# Patient Record
Sex: MALE | Race: WHITE | NOT HISPANIC OR LATINO | Employment: OTHER | URBAN - METROPOLITAN AREA
[De-identification: names, ages, dates, MRNs, and addresses within clinical notes are randomized per-mention and may not be internally consistent; named-entity substitution may affect disease eponyms.]

---

## 2021-08-18 ENCOUNTER — HISTORICAL (OUTPATIENT)
Dept: ADMINISTRATIVE | Facility: HOSPITAL | Age: 71
End: 2021-08-18

## 2022-04-10 ENCOUNTER — HISTORICAL (OUTPATIENT)
Dept: ADMINISTRATIVE | Facility: HOSPITAL | Age: 72
End: 2022-04-10

## 2022-04-27 VITALS
SYSTOLIC BLOOD PRESSURE: 164 MMHG | HEIGHT: 71 IN | BODY MASS INDEX: 34.32 KG/M2 | WEIGHT: 245.13 LBS | DIASTOLIC BLOOD PRESSURE: 96 MMHG | OXYGEN SATURATION: 98 %

## 2022-06-20 ENCOUNTER — HOSPITAL ENCOUNTER (OUTPATIENT)
Dept: RADIOLOGY | Facility: HOSPITAL | Age: 72
Discharge: HOME | End: 2022-06-20
Attending: FAMILY MEDICINE
Payer: MEDICARE

## 2022-06-20 DIAGNOSIS — N50.819 TESTICULAR PAIN: ICD-10-CM

## 2022-06-20 PROCEDURE — 76870 US EXAM SCROTUM: CPT | Mod: TC

## 2023-01-09 ENCOUNTER — HOSPITAL ENCOUNTER (OUTPATIENT)
Dept: RADIOLOGY | Facility: HOSPITAL | Age: 73
Discharge: HOME | End: 2023-01-09
Attending: FAMILY MEDICINE
Payer: MEDICARE

## 2023-01-09 DIAGNOSIS — J06.9 ACUTE UPPER RESPIRATORY INFECTION: ICD-10-CM

## 2023-01-09 PROCEDURE — 71046 X-RAY EXAM CHEST 2 VIEWS: CPT | Mod: TC

## 2023-05-10 DIAGNOSIS — M54.59 OTHER LOW BACK PAIN: Primary | ICD-10-CM

## 2023-05-17 ENCOUNTER — HOSPITAL ENCOUNTER (OUTPATIENT)
Dept: RADIOLOGY | Facility: HOSPITAL | Age: 73
Discharge: HOME | End: 2023-05-17
Attending: FAMILY MEDICINE
Payer: MEDICARE

## 2023-05-17 DIAGNOSIS — M54.59 OTHER LOW BACK PAIN: ICD-10-CM

## 2023-05-17 PROCEDURE — 72148 MRI LUMBAR SPINE W/O DYE: CPT | Mod: TC

## 2023-06-07 ENCOUNTER — HOSPITAL ENCOUNTER (INPATIENT)
Facility: HOSPITAL | Age: 73
LOS: 12 days | Discharge: HOME HEALTH | DRG: 640 | End: 2023-06-19
Attending: STUDENT IN AN ORGANIZED HEALTH CARE EDUCATION/TRAINING PROGRAM | Admitting: INTERNAL MEDICINE
Payer: MEDICARE

## 2023-06-07 DIAGNOSIS — E88.09 HYPOALBUMINEMIA: ICD-10-CM

## 2023-06-07 DIAGNOSIS — R00.1 BRADYCARDIA: ICD-10-CM

## 2023-06-07 DIAGNOSIS — E04.1 THYROID NODULE: ICD-10-CM

## 2023-06-07 DIAGNOSIS — N17.9 AKI (ACUTE KIDNEY INJURY): ICD-10-CM

## 2023-06-07 DIAGNOSIS — E83.52 HYPERCALCEMIA: ICD-10-CM

## 2023-06-07 DIAGNOSIS — R41.0 CONFUSION: ICD-10-CM

## 2023-06-07 DIAGNOSIS — R53.1 WEAKNESS: ICD-10-CM

## 2023-06-07 LAB
ALBUMIN SERPL-MCNC: 1.9 G/DL (ref 3.4–4.8)
ALBUMIN/GLOB SERPL: 0.2 RATIO (ref 1.1–2)
ALP SERPL-CCNC: 69 UNIT/L (ref 40–150)
ALT SERPL-CCNC: 11 UNIT/L (ref 0–55)
AMPHET UR QL SCN: NEGATIVE
ANION GAP SERPL CALC-SCNC: 10 MEQ/L
APPEARANCE UR: CLEAR
APTT PPP: 45.8 SECONDS (ref 23.2–33.7)
AST SERPL-CCNC: 31 UNIT/L (ref 5–34)
BACTERIA #/AREA URNS AUTO: ABNORMAL /HPF
BARBITURATE SCN PRESENT UR: NEGATIVE
BASOPHILS # BLD AUTO: 0.03 X10(3)/MCL
BASOPHILS NFR BLD AUTO: 0.4 %
BENZODIAZ UR QL SCN: NEGATIVE
BILIRUB UR QL STRIP.AUTO: NEGATIVE MG/DL
BILIRUBIN DIRECT+TOT PNL SERPL-MCNC: 0.7 MG/DL
BUN SERPL-MCNC: 22 MG/DL (ref 8.4–25.7)
BUN SERPL-MCNC: 23 MG/DL (ref 8.4–25.7)
CALCIUM SERPL-MCNC: 12.6 MG/DL (ref 8.8–10)
CALCIUM SERPL-MCNC: 13.4 MG/DL (ref 8.8–10)
CANNABINOIDS UR QL SCN: NEGATIVE
CHLORIDE SERPL-SCNC: 97 MMOL/L (ref 98–107)
CHLORIDE SERPL-SCNC: 99 MMOL/L (ref 98–107)
CO2 SERPL-SCNC: 26 MMOL/L (ref 23–31)
CO2 SERPL-SCNC: 28 MMOL/L (ref 23–31)
COCAINE UR QL SCN: NEGATIVE
COLOR UR: YELLOW
CREAT SERPL-MCNC: 2.24 MG/DL (ref 0.73–1.18)
CREAT SERPL-MCNC: 2.31 MG/DL (ref 0.73–1.18)
CREAT/UREA NIT SERPL: 10
EOSINOPHIL # BLD AUTO: 0.2 X10(3)/MCL (ref 0–0.9)
EOSINOPHIL NFR BLD AUTO: 2.6 %
ERYTHROCYTE [DISTWIDTH] IN BLOOD BY AUTOMATED COUNT: 14.4 % (ref 11.5–17)
ETHANOL SERPL-MCNC: <10 MG/DL
FENTANYL UR QL SCN: NEGATIVE
GFR SERPLBLD CREATININE-BSD FMLA CKD-EPI: 29 MLS/MIN/1.73/M2
GFR SERPLBLD CREATININE-BSD FMLA CKD-EPI: 30 MLS/MIN/1.73/M2
GLOBULIN SER-MCNC: 10.7 GM/DL (ref 2.4–3.5)
GLUCOSE SERPL-MCNC: 125 MG/DL (ref 82–115)
GLUCOSE SERPL-MCNC: 165 MG/DL (ref 82–115)
GLUCOSE UR QL STRIP.AUTO: NEGATIVE MG/DL
HCT VFR BLD AUTO: 34.3 % (ref 42–52)
HGB BLD-MCNC: 11.6 G/DL (ref 14–18)
HYALINE CASTS URNS QL MICRO: ABNORMAL /LPF
IMM GRANULOCYTES # BLD AUTO: 0.07 X10(3)/MCL (ref 0–0.04)
IMM GRANULOCYTES NFR BLD AUTO: 0.9 %
INR BLD: 1.75 (ref 0–1.3)
KETONES UR QL STRIP.AUTO: NEGATIVE MG/DL
LEUKOCYTE ESTERASE UR QL STRIP.AUTO: NEGATIVE UNIT/L
LYMPHOCYTES # BLD AUTO: 2.99 X10(3)/MCL (ref 0.6–4.6)
LYMPHOCYTES NFR BLD AUTO: 38.2 %
MAGNESIUM SERPL-MCNC: 2.1 MG/DL (ref 1.6–2.6)
MCH RBC QN AUTO: 32.4 PG (ref 27–31)
MCHC RBC AUTO-ENTMCNC: 33.8 G/DL (ref 33–36)
MCV RBC AUTO: 95.8 FL (ref 80–94)
MDMA UR QL SCN: NEGATIVE
MONOCYTES # BLD AUTO: 0.79 X10(3)/MCL (ref 0.1–1.3)
MONOCYTES NFR BLD AUTO: 10.1 %
NEUTROPHILS # BLD AUTO: 3.75 X10(3)/MCL (ref 2.1–9.2)
NEUTROPHILS NFR BLD AUTO: 47.8 %
NITRITE UR QL STRIP.AUTO: NEGATIVE
OPIATES UR QL SCN: NEGATIVE
PCP UR QL: NEGATIVE
PH UR STRIP.AUTO: 5.5 [PH]
PH UR: 5.5 [PH] (ref 3–11)
PLATELET # BLD AUTO: 187 X10(3)/MCL (ref 130–400)
PMV BLD AUTO: 9.3 FL (ref 7.4–10.4)
POCT GLUCOSE: 153 MG/DL (ref 70–110)
POTASSIUM SERPL-SCNC: 3.2 MMOL/L (ref 3.5–5.1)
POTASSIUM SERPL-SCNC: 3.6 MMOL/L (ref 3.5–5.1)
PROT SERPL-MCNC: 12.6 GM/DL (ref 5.8–7.6)
PROT UR QL STRIP.AUTO: ABNORMAL MG/DL
PROTHROMBIN TIME: 20.7 SECONDS (ref 12.5–14.5)
RBC # BLD AUTO: 3.58 X10(6)/MCL (ref 4.7–6.1)
RBC #/AREA URNS AUTO: ABNORMAL /HPF
RBC UR QL AUTO: ABNORMAL UNIT/L
SODIUM SERPL-SCNC: 135 MMOL/L (ref 136–145)
SODIUM SERPL-SCNC: 137 MMOL/L (ref 136–145)
SP GR UR STRIP.AUTO: 1.02
SQUAMOUS #/AREA URNS AUTO: ABNORMAL /HPF
TROPONIN I SERPL-MCNC: 0.02 NG/ML (ref 0–0.04)
TSH SERPL-ACNC: 3.65 UIU/ML (ref 0.35–4.94)
UROBILINOGEN UR STRIP-ACNC: 1 MG/DL
WBC # SPEC AUTO: 7.83 X10(3)/MCL (ref 4.5–11.5)
WBC #/AREA URNS AUTO: ABNORMAL /HPF

## 2023-06-07 PROCEDURE — 25000003 PHARM REV CODE 250: Performed by: STUDENT IN AN ORGANIZED HEALTH CARE EDUCATION/TRAINING PROGRAM

## 2023-06-07 PROCEDURE — 83970 ASSAY OF PARATHORMONE: CPT | Performed by: INTERNAL MEDICINE

## 2023-06-07 PROCEDURE — 85025 COMPLETE CBC W/AUTO DIFF WBC: CPT | Performed by: STUDENT IN AN ORGANIZED HEALTH CARE EDUCATION/TRAINING PROGRAM

## 2023-06-07 PROCEDURE — 25000003 PHARM REV CODE 250: Performed by: INTERNAL MEDICINE

## 2023-06-07 PROCEDURE — 93010 EKG 12-LEAD: ICD-10-PCS | Mod: ,,, | Performed by: INTERNAL MEDICINE

## 2023-06-07 PROCEDURE — 82077 ASSAY SPEC XCP UR&BREATH IA: CPT | Performed by: STUDENT IN AN ORGANIZED HEALTH CARE EDUCATION/TRAINING PROGRAM

## 2023-06-07 PROCEDURE — 80307 DRUG TEST PRSMV CHEM ANLYZR: CPT | Performed by: STUDENT IN AN ORGANIZED HEALTH CARE EDUCATION/TRAINING PROGRAM

## 2023-06-07 PROCEDURE — 85730 THROMBOPLASTIN TIME PARTIAL: CPT | Performed by: STUDENT IN AN ORGANIZED HEALTH CARE EDUCATION/TRAINING PROGRAM

## 2023-06-07 PROCEDURE — 96361 HYDRATE IV INFUSION ADD-ON: CPT

## 2023-06-07 PROCEDURE — 63600175 PHARM REV CODE 636 W HCPCS: Performed by: INTERNAL MEDICINE

## 2023-06-07 PROCEDURE — 84484 ASSAY OF TROPONIN QUANT: CPT | Performed by: STUDENT IN AN ORGANIZED HEALTH CARE EDUCATION/TRAINING PROGRAM

## 2023-06-07 PROCEDURE — 99285 EMERGENCY DEPT VISIT HI MDM: CPT | Mod: 25

## 2023-06-07 PROCEDURE — 83735 ASSAY OF MAGNESIUM: CPT | Performed by: STUDENT IN AN ORGANIZED HEALTH CARE EDUCATION/TRAINING PROGRAM

## 2023-06-07 PROCEDURE — 93005 ELECTROCARDIOGRAM TRACING: CPT

## 2023-06-07 PROCEDURE — 81001 URINALYSIS AUTO W/SCOPE: CPT | Performed by: STUDENT IN AN ORGANIZED HEALTH CARE EDUCATION/TRAINING PROGRAM

## 2023-06-07 PROCEDURE — 82962 GLUCOSE BLOOD TEST: CPT

## 2023-06-07 PROCEDURE — 21400001 HC TELEMETRY ROOM

## 2023-06-07 PROCEDURE — 96375 TX/PRO/DX INJ NEW DRUG ADDON: CPT

## 2023-06-07 PROCEDURE — 84443 ASSAY THYROID STIM HORMONE: CPT | Performed by: STUDENT IN AN ORGANIZED HEALTH CARE EDUCATION/TRAINING PROGRAM

## 2023-06-07 PROCEDURE — 11000001 HC ACUTE MED/SURG PRIVATE ROOM

## 2023-06-07 PROCEDURE — 93010 ELECTROCARDIOGRAM REPORT: CPT | Mod: ,,, | Performed by: INTERNAL MEDICINE

## 2023-06-07 PROCEDURE — 85610 PROTHROMBIN TIME: CPT | Performed by: STUDENT IN AN ORGANIZED HEALTH CARE EDUCATION/TRAINING PROGRAM

## 2023-06-07 PROCEDURE — 82330 ASSAY OF CALCIUM: CPT | Performed by: INTERNAL MEDICINE

## 2023-06-07 PROCEDURE — 80053 COMPREHEN METABOLIC PANEL: CPT | Performed by: STUDENT IN AN ORGANIZED HEALTH CARE EDUCATION/TRAINING PROGRAM

## 2023-06-07 RX ORDER — FINASTERIDE 5 MG/1
5 TABLET, FILM COATED ORAL DAILY
Status: DISCONTINUED | OUTPATIENT
Start: 2023-06-08 | End: 2023-06-19 | Stop reason: HOSPADM

## 2023-06-07 RX ORDER — ATORVASTATIN CALCIUM 10 MG/1
10 TABLET, FILM COATED ORAL NIGHTLY
COMMUNITY
Start: 2023-05-17

## 2023-06-07 RX ORDER — FUROSEMIDE 10 MG/ML
20 INJECTION INTRAMUSCULAR; INTRAVENOUS
Status: DISCONTINUED | OUTPATIENT
Start: 2023-06-07 | End: 2023-06-08

## 2023-06-07 RX ORDER — METFORMIN HYDROCHLORIDE 500 MG/1
1000 TABLET, EXTENDED RELEASE ORAL 2 TIMES DAILY
Status: ON HOLD | COMMUNITY
Start: 2023-05-17 | End: 2023-06-19 | Stop reason: HOSPADM

## 2023-06-07 RX ORDER — CARVEDILOL 12.5 MG/1
12.5 TABLET ORAL 2 TIMES DAILY
COMMUNITY
Start: 2023-05-11

## 2023-06-07 RX ORDER — ACETAMINOPHEN 325 MG/1
650 TABLET ORAL
Status: COMPLETED | OUTPATIENT
Start: 2023-06-07 | End: 2023-06-07

## 2023-06-07 RX ORDER — FUROSEMIDE 10 MG/ML
40 INJECTION INTRAMUSCULAR; INTRAVENOUS ONCE
Status: COMPLETED | OUTPATIENT
Start: 2023-06-07 | End: 2023-06-07

## 2023-06-07 RX ORDER — SODIUM CHLORIDE 9 MG/ML
1000 INJECTION, SOLUTION INTRAVENOUS CONTINUOUS
Status: ACTIVE | OUTPATIENT
Start: 2023-06-07 | End: 2023-06-08

## 2023-06-07 RX ORDER — ALBUTEROL SULFATE 90 UG/1
2 AEROSOL, METERED RESPIRATORY (INHALATION)
COMMUNITY
Start: 2023-01-09

## 2023-06-07 RX ORDER — CLONIDINE HYDROCHLORIDE 0.2 MG/1
0.2 TABLET ORAL DAILY PRN
COMMUNITY
Start: 2023-05-17

## 2023-06-07 RX ORDER — CARVEDILOL 12.5 MG/1
12.5 TABLET ORAL 2 TIMES DAILY
Status: DISCONTINUED | OUTPATIENT
Start: 2023-06-08 | End: 2023-06-12

## 2023-06-07 RX ORDER — TALC
6 POWDER (GRAM) TOPICAL NIGHTLY PRN
Status: DISCONTINUED | OUTPATIENT
Start: 2023-06-07 | End: 2023-06-10

## 2023-06-07 RX ORDER — AMLODIPINE BESYLATE 5 MG/1
5 TABLET ORAL DAILY
Status: DISCONTINUED | OUTPATIENT
Start: 2023-06-08 | End: 2023-06-10

## 2023-06-07 RX ORDER — SPIRONOLACTONE 25 MG/1
12.5 TABLET ORAL EVERY MORNING
COMMUNITY
Start: 2023-05-17

## 2023-06-07 RX ORDER — ALBUTEROL SULFATE 0.83 MG/ML
2.5 SOLUTION RESPIRATORY (INHALATION) EVERY 6 HOURS PRN
Status: DISCONTINUED | OUTPATIENT
Start: 2023-06-08 | End: 2023-06-19 | Stop reason: HOSPADM

## 2023-06-07 RX ORDER — BLOOD SUGAR DIAGNOSTIC
STRIP MISCELLANEOUS
COMMUNITY
Start: 2023-04-26

## 2023-06-07 RX ORDER — AMOXICILLIN 250 MG
1 CAPSULE ORAL 2 TIMES DAILY
Status: DISCONTINUED | OUTPATIENT
Start: 2023-06-07 | End: 2023-06-19 | Stop reason: HOSPADM

## 2023-06-07 RX ORDER — FINASTERIDE 5 MG/1
5 TABLET, FILM COATED ORAL EVERY MORNING
COMMUNITY
Start: 2023-05-11

## 2023-06-07 RX ORDER — LEVOTHYROXINE SODIUM 112 UG/1
112 TABLET ORAL
COMMUNITY
Start: 2023-05-17

## 2023-06-07 RX ORDER — AMLODIPINE BESYLATE 5 MG/1
5 TABLET ORAL
Status: ON HOLD | COMMUNITY
Start: 2023-05-17 | End: 2023-06-19 | Stop reason: HOSPADM

## 2023-06-07 RX ORDER — SODIUM CHLORIDE 0.9 % (FLUSH) 0.9 %
10 SYRINGE (ML) INJECTION
Status: DISCONTINUED | OUTPATIENT
Start: 2023-06-07 | End: 2023-06-19 | Stop reason: HOSPADM

## 2023-06-07 RX ORDER — LEVOTHYROXINE SODIUM 112 UG/1
112 TABLET ORAL
Status: DISCONTINUED | OUTPATIENT
Start: 2023-06-08 | End: 2023-06-19 | Stop reason: HOSPADM

## 2023-06-07 RX ORDER — TAMSULOSIN HYDROCHLORIDE 0.4 MG/1
1 CAPSULE ORAL
COMMUNITY
Start: 2023-05-10 | End: 2023-11-01

## 2023-06-07 RX ORDER — CLONIDINE HYDROCHLORIDE 0.2 MG/1
0.2 TABLET ORAL
Status: COMPLETED | OUTPATIENT
Start: 2023-06-07 | End: 2023-06-07

## 2023-06-07 RX ORDER — ONDANSETRON 2 MG/ML
4 INJECTION INTRAMUSCULAR; INTRAVENOUS EVERY 8 HOURS PRN
Status: DISCONTINUED | OUTPATIENT
Start: 2023-06-07 | End: 2023-06-19 | Stop reason: HOSPADM

## 2023-06-07 RX ORDER — ACETAMINOPHEN 325 MG/1
650 TABLET ORAL EVERY 8 HOURS PRN
Status: DISCONTINUED | OUTPATIENT
Start: 2023-06-07 | End: 2023-06-09

## 2023-06-07 RX ORDER — TAMSULOSIN HYDROCHLORIDE 0.4 MG/1
1 CAPSULE ORAL DAILY
Status: DISCONTINUED | OUTPATIENT
Start: 2023-06-08 | End: 2023-06-19 | Stop reason: HOSPADM

## 2023-06-07 RX ORDER — FAMOTIDINE 20 MG/1
20 TABLET, FILM COATED ORAL DAILY
Status: DISCONTINUED | OUTPATIENT
Start: 2023-06-08 | End: 2023-06-19 | Stop reason: HOSPADM

## 2023-06-07 RX ORDER — GABAPENTIN 100 MG/1
100 CAPSULE ORAL DAILY PRN
COMMUNITY
Start: 2023-05-19

## 2023-06-07 RX ADMIN — CLONIDINE HYDROCHLORIDE 0.2 MG: 0.2 TABLET ORAL at 08:06

## 2023-06-07 RX ADMIN — FUROSEMIDE 40 MG: 10 INJECTION, SOLUTION INTRAMUSCULAR; INTRAVENOUS at 08:06

## 2023-06-07 RX ADMIN — SODIUM CHLORIDE 1000 ML: 9 INJECTION, SOLUTION INTRAVENOUS at 07:06

## 2023-06-07 RX ADMIN — ACETAMINOPHEN 325MG 650 MG: 325 TABLET ORAL at 05:06

## 2023-06-07 RX ADMIN — SODIUM CHLORIDE 1000 ML: 9 INJECTION, SOLUTION INTRAVENOUS at 06:06

## 2023-06-07 NOTE — ED PROVIDER NOTES
Encounter Date: 6/7/2023       History     Chief Complaint   Patient presents with    Altered Mental Status     Confusion and balance issues this morning   last seen normal 2 days ago     HPI    72-year-old male with a past medical history of hypertension, chronic back pain recently started on gabapentin, BPH, diabetes, hypothyroidism presents emergency department because his son was concerned that he had some confusion and balance issues this morning.  Patient states that he feels completely fine but took some gabapentin which was started a few days ago and it made him feel very groggy.  The patient's son states he sounded drunk this morning.  Son states that everything has resolved.  He states that his mother had similar symptoms when she took gabapentin.  Patient states he is on gabapentin for chronic back pain which worsened over last few weeks.  States the pain is tolerable.  No falls.    Review of patient's allergies indicates:   Allergen Reactions    Amoxicillin-pot clavulanate Itching     No past medical history on file.  No past surgical history on file.  No family history on file.     Review of Systems   Constitutional:  Negative for fever.   Respiratory:  Negative for cough and shortness of breath.    Cardiovascular:  Negative for chest pain.   Gastrointestinal:  Negative for abdominal pain.   Psychiatric/Behavioral:  Positive for confusion.    All other systems reviewed and are negative.    Physical Exam     Initial Vitals [06/07/23 1707]   BP Pulse Resp Temp SpO2   (!) 207/95 75 16 98.2 °F (36.8 °C) (!) 94 %      MAP       --         Physical Exam    Nursing note and vitals reviewed.  Constitutional: He appears well-developed and well-nourished. No distress.   Cardiovascular:  Normal rate and regular rhythm.           Pulmonary/Chest: Breath sounds normal. No respiratory distress.   Abdominal: Abdomen is soft. There is no abdominal tenderness. There is no rebound and no guarding.   Musculoskeletal:          General: No tenderness. Normal range of motion.     Neurological: He is alert and oriented to person, place, and time. He has normal strength. No cranial nerve deficit or sensory deficit. GCS score is 15. GCS eye subscore is 4. GCS verbal subscore is 5. GCS motor subscore is 6.   Skin: Skin is warm. Capillary refill takes less than 2 seconds.   Psychiatric: He has a normal mood and affect. Thought content normal.       ED Course   Procedures  Admission on 06/07/2023   Component Date Value Ref Range Status    PTT 06/07/2023 45.8 (H)  23.2 - 33.7 seconds Final    Sodium Level 06/07/2023 135 (L)  136 - 145 mmol/L Final    Potassium Level 06/07/2023 3.6  3.5 - 5.1 mmol/L Final    Chloride 06/07/2023 97 (L)  98 - 107 mmol/L Final    Carbon Dioxide 06/07/2023 26  23 - 31 mmol/L Final    Glucose Level 06/07/2023 165 (H)  82 - 115 mg/dL Final    Blood Urea Nitrogen 06/07/2023 23.0  8.4 - 25.7 mg/dL Final    Creatinine 06/07/2023 2.31 (H)  0.73 - 1.18 mg/dL Final    Calcium Level Total 06/07/2023 13.4 (HH)  8.8 - 10.0 mg/dL Final    Protein Total 06/07/2023 12.6 (H)  5.8 - 7.6 gm/dL Final    Albumin Level 06/07/2023 1.9 (L)  3.4 - 4.8 g/dL Final    Globulin 06/07/2023 10.7 (H)  2.4 - 3.5 gm/dL Final    Albumin/Globulin Ratio 06/07/2023 0.2 (L)  1.1 - 2.0 ratio Final    Bilirubin Total 06/07/2023 0.7  <=1.5 mg/dL Final    Alkaline Phosphatase 06/07/2023 69  40 - 150 unit/L Final    Alanine Aminotransferase 06/07/2023 11  0 - 55 unit/L Final    Aspartate Aminotransferase 06/07/2023 31  5 - 34 unit/L Final    eGFR 06/07/2023 29  mls/min/1.73/m2 Final    Amphetamines, Urine 06/07/2023 Negative  Negative Final    Barbituates, Urine 06/07/2023 Negative  Negative Final    Benzodiazepine, Urine 06/07/2023 Negative  Negative Final    Cannabinoids, Urine 06/07/2023 Negative  Negative Final    Cocaine, Urine 06/07/2023 Negative  Negative Final    Fentanyl, Urine 06/07/2023 Negative  Negative Final    MDMA, Urine 06/07/2023  Negative  Negative Final    Opiates, Urine 06/07/2023 Negative  Negative Final    Phencyclidine, Urine 06/07/2023 Negative  Negative Final    pH, Urine 06/07/2023 5.5  3.0 - 11.0 Final    Ethanol Level 06/07/2023 <10.0  <=10.0 mg/dL Final    Magnesium Level 06/07/2023 2.10  1.60 - 2.60 mg/dL Final    PT 06/07/2023 20.7 (H)  12.5 - 14.5 seconds Final    INR 06/07/2023 1.75 (H)  0.00 - 1.30 Final    Troponin-I 06/07/2023 0.023  0.000 - 0.045 ng/mL Final    Thyroid Stimulating Hormone 06/07/2023 3.649  0.350 - 4.940 uIU/mL Final    Color, UA 06/07/2023 Yellow  Yellow, Light-Yellow, Dark Yellow, Evelin, Straw Final    Appearance, UA 06/07/2023 Clear  Clear Final    Specific Gravity, UA 06/07/2023 1.025   Final    pH, UA 06/07/2023 5.5  5.0 - 8.5 Final    Protein, UA 06/07/2023 3+ (A)  Negative mg/dL Final    Glucose, UA 06/07/2023 Negative  Negative, Normal mg/dL Final    Ketones, UA 06/07/2023 Negative  Negative mg/dL Final    Blood, UA 06/07/2023 2+ (A)  Negative unit/L Final    Bilirubin, UA 06/07/2023 Negative  Negative mg/dL Final    Urobilinogen, UA 06/07/2023 1.0  0.2, 1.0, Normal mg/dL Final    Nitrites, UA 06/07/2023 Negative  Negative Final    Leukocyte Esterase, UA 06/07/2023 Negative  Negative unit/L Final    WBC 06/07/2023 7.83  4.50 - 11.50 x10(3)/mcL Final    RBC 06/07/2023 3.58 (L)  4.70 - 6.10 x10(6)/mcL Final    Hgb 06/07/2023 11.6 (L)  14.0 - 18.0 g/dL Final    Hct 06/07/2023 34.3 (L)  42.0 - 52.0 % Final    MCV 06/07/2023 95.8 (H)  80.0 - 94.0 fL Final    MCH 06/07/2023 32.4 (H)  27.0 - 31.0 pg Final    MCHC 06/07/2023 33.8  33.0 - 36.0 g/dL Final    RDW 06/07/2023 14.4  11.5 - 17.0 % Final    Platelet 06/07/2023 187  130 - 400 x10(3)/mcL Final    MPV 06/07/2023 9.3  7.4 - 10.4 fL Final    Neut % 06/07/2023 47.8  % Final    Lymph % 06/07/2023 38.2  % Final    Mono % 06/07/2023 10.1  % Final    Eos % 06/07/2023 2.6  % Final    Basophil % 06/07/2023 0.4  % Final    Lymph # 06/07/2023 2.99  0.6 - 4.6  x10(3)/mcL Final    Neut # 06/07/2023 3.75  2.1 - 9.2 x10(3)/mcL Final    Mono # 06/07/2023 0.79  0.1 - 1.3 x10(3)/mcL Final    Eos # 06/07/2023 0.20  0 - 0.9 x10(3)/mcL Final    Baso # 06/07/2023 0.03  <=0.2 x10(3)/mcL Final    IG# 06/07/2023 0.07 (H)  0 - 0.04 x10(3)/mcL Final    IG% 06/07/2023 0.9  % Final    POCT Glucose 06/07/2023 153 (H)  70 - 110 mg/dL Final    Bacteria, UA 06/07/2023 Rare  None Seen, Rare, Occasional /HPF Final    Hyaline Casts, UA 06/07/2023 Rare (A)  None Seen /LPF Final    RBC, UA 06/07/2023 0-2  None Seen, 0-2, 3-5, 0-5 /HPF Final    WBC, UA 06/07/2023 0-2  None Seen, 0-2, 3-5, 0-5 /HPF Final    Squamous Epithelial Cells, UA 06/07/2023 Rare  None Seen, Rare, Occasional, Occ /HPF Final    Sodium Level 06/07/2023 137  136 - 145 mmol/L Final    Potassium Level 06/07/2023 3.2 (L)  3.5 - 5.1 mmol/L Final    Chloride 06/07/2023 99  98 - 107 mmol/L Final    Carbon Dioxide 06/07/2023 28  23 - 31 mmol/L Final    Glucose Level 06/07/2023 125 (H)  82 - 115 mg/dL Final    Blood Urea Nitrogen 06/07/2023 22.0  8.4 - 25.7 mg/dL Final    Creatinine 06/07/2023 2.24 (H)  0.73 - 1.18 mg/dL Final    BUN/Creatinine Ratio 06/07/2023 10   Final    Calcium Level Total 06/07/2023 12.6 (HH)  8.8 - 10.0 mg/dL Final    Anion Gap 06/07/2023 10.0  mEq/L Final    eGFR 06/07/2023 30  mls/min/1.73/m2 Final       Labs Reviewed   APTT - Abnormal; Notable for the following components:       Result Value    PTT 45.8 (*)     All other components within normal limits   COMPREHENSIVE METABOLIC PANEL - Abnormal; Notable for the following components:    Sodium Level 135 (*)     Chloride 97 (*)     Glucose Level 165 (*)     Creatinine 2.31 (*)     Calcium Level Total 13.4 (*)     Protein Total 12.6 (*)     Albumin Level 1.9 (*)     Globulin 10.7 (*)     Albumin/Globulin Ratio 0.2 (*)     All other components within normal limits   PROTIME-INR - Abnormal; Notable for the following components:    PT 20.7 (*)     INR 1.75 (*)      All other components within normal limits   URINALYSIS, REFLEX TO URINE CULTURE - Abnormal; Notable for the following components:    Protein, UA 3+ (*)     Blood, UA 2+ (*)     All other components within normal limits   CBC WITH DIFFERENTIAL - Abnormal; Notable for the following components:    RBC 3.58 (*)     Hgb 11.6 (*)     Hct 34.3 (*)     MCV 95.8 (*)     MCH 32.4 (*)     IG# 0.07 (*)     All other components within normal limits   URINALYSIS, MICROSCOPIC - Abnormal; Notable for the following components:    Hyaline Casts, UA Rare (*)     All other components within normal limits   BASIC METABOLIC PANEL - Abnormal; Notable for the following components:    Potassium Level 3.2 (*)     Glucose Level 125 (*)     Creatinine 2.24 (*)     Calcium Level Total 12.6 (*)     All other components within normal limits   POCT GLUCOSE - Abnormal; Notable for the following components:    POCT Glucose 153 (*)     All other components within normal limits   DRUG SCREEN, URINE (BEAKER) - Normal    Narrative:     Cut off concentrations:    Amphetamines - 1000 ng/ml  Barbiturates - 200 ng/ml  Benzodiazepine - 200 ng/ml  Cannabinoids (THC) - 50 ng/ml  Cocaine - 300 ng/ml  Fentanyl - 1.0 ng/ml  MDMA - 500 ng/ml  Opiates - 300 ng/ml   Phencyclidine (PCP) - 25 ng/ml    Specimen submitted for drug analysis and tested for pH and specific gravity in order to evaluate sample integrity. Suspect tampering if specific gravity is <1.003 and/or pH is not within the range of 4.5 - 8.0  False negatives may result form substances such as bleach added to urine.  False positives may result for the presence of a substance with similar chemical structure to the drug or its metabolite.    This test provides only a PRELIMINARY analytical test result. A more specific alternate chemical method must be used in order to obtain a confirmed analytical result. Gas chromatography/mass spectrometry (GC/MS) is the preferred confirmatory method. Other chemical  confirmation methods are available. Clinical consideration and professional judgement should be applied to any drug of abuse test result, particularly when preliminary positive results are used.    Positive results will be confirmed only at the physicians request. Unconfirmed screening results are to be used only for medical purposes (treatment).        ALCOHOL,MEDICAL (ETHANOL) - Normal   MAGNESIUM - Normal   TROPONIN I - Normal   TSH - Normal   CBC W/ AUTO DIFFERENTIAL    Narrative:     The following orders were created for panel order CBC auto differential.  Procedure                               Abnormality         Status                     ---------                               -----------         ------                     CBC with Differential[[id number]]        Abnormal            Final result                 Please view results for these tests on the individual orders.   CALCIUM, IONIZED   PTH, INTACT   POCT GLUCOSE, HAND-HELD DEVICE     EKG Readings: (Independently Interpreted)   Initial Reading: No STEMI. Rhythm: Normal Sinus Rhythm. Heart Rate: 82. Ectopy: No Ectopy PVCs. Conduction: Normal. ST Segments: Normal ST Segments. T Waves: Normal. Clinical Impression: Normal Sinus Rhythm with PVCs   ECG Results              EKG 12-lead (In process)  Result time 06/07/23 20:37:43      In process by Interface, Lab In [facility] (06/07/23 20:37:43)                   Narrative:    Test Reason : R53.1,    Vent. Rate : 082 BPM     Atrial Rate : 082 BPM     P-R Int : 202 ms          QRS Dur : 090 ms      QT Int : 344 ms       P-R-T Axes : 055 -02 062 degrees     QTc Int : 401 ms    Sinus rhythm with occasional Premature ventricular complexes  Nonspecific T wave abnormality  Abnormal ECG  No previous ECGs available    Referred By: [email or web address]   SELF           Confirmed By:                                   Imaging Results              CT Head Without Contrast (Final result)  Result time 06/07/23 17:58:22      Final result  by Henrique Wallace MD (06/07/23 17:58:22)                   Impression:      No acute abnormality.      Electronically signed by: Chrystal Wallace MD  Date:    06/07/2023  Time:    17:58               Narrative:    EXAMINATION:  CT HEAD WITHOUT CONTRAST    CLINICAL HISTORY:  Mental status change, unknown cause;    TECHNIQUE:  Low dose axial CT images obtained throughout the head without intravenous contrast. Sagittal and coronal reconstructions were performed.  .  Automated exposure control used.    COMPARISON:  None.    FINDINGS:  Intracranial compartment:    Ventricles and sulci are normal in size for age without evidence of hydrocephalus. No extra-axial blood or fluid collections.    The brain parenchyma appears normal. No parenchymal mass, hemorrhage, edema or major vascular distribution infarct.    Skull/extracranial contents (limited evaluation): No fracture. Mastoid air cells and paranasal sinuses are essentially clear.                                       X-Ray Chest 1 View (Preliminary result)  Result time 06/07/23 20:31:24      Wet Read by Silvestre Antunez MD (06/07/23 20:31:24, [facility] - Emergency Dept, Emergency Medicine)    No acute cardiopulmonary changes noted                                     Medications   acetaminophen tablet 650 mg (650 mg Oral Given 6/7/23 1745)   sodium chloride 0.9% bolus 1,000 mL 1,000 mL (0 mLs Intravenous Stopped 6/7/23 1925)   sodium chloride 0.9% bolus 1,000 mL 1,000 mL (1,000 mLs Intravenous New Bag 6/7/23 1922)   furosemide injection 40 mg (40 mg Intravenous Given 6/7/23 2018)   cloNIDine tablet 0.2 mg (0.2 mg Oral Given 6/7/23 2041)     Medical Decision Making:   Differential Diagnosis:   Medication reaction, UTI, CVA, hypoglycemia, metabolic derangement/dehydration, CVA           ED Course as of 06/07/23 2151 Wed Jun 07, 2023   1728 POCT Glucose(!): 153 [BS]   1745 Transition of care at 6 PM. Dr. Antunez will assume care and determine  appropriate treatment and care of this patient.   [BS]   1821 QUINN P Panda Ponce MD, assumed care at 1755.  Agree with above care plan and documentation.   PT seen and examined.  Lab called with a critical calcium.  It actually corrects to 15.1 with the albumin being 1.9    cr is elevated at 2.31    Pt and son report no known history of renal disease or calcium issues.  Will give iv fluids in a bolus x2 then lasix iv and at that point repeat bmp, calcium and order a PTH.  Pt and son made aware and agree.  [PL]   2147 After 2 L of fluid patient's corrected calcium comes down to 14.3.  Discussed with him and his son that would like to admit him overnight so we have paged on-call physician to get him admitted and then will eventually be seen by Dr. Gonzalez.  I spoke with the lab who has sent out the PTH and calcium to be done [PL]      ED Course User Index  [BS] Yuval Reeder MD  [PL] Silvestre Ponce MD                 Clinical Impression:   Final diagnoses:  [R53.1] Weakness  [E83.52] Hypercalcemia (Primary)  [E88.09] Hypoalbuminemia  [R41.0] Confusion  [N17.9] COLTON (acute kidney injury)        ED Disposition Condition    Admit Stable                Silvestre Ponce MD  06/07/23 7168

## 2023-06-08 PROBLEM — R94.8 ABNORMAL RADIONUCLIDE BONE SCAN: Status: ACTIVE | Noted: 2023-06-08

## 2023-06-08 LAB
ALBUMIN SERPL-MCNC: 1.8 G/DL (ref 3.4–4.8)
ALBUMIN/GLOB SERPL: 0.2 RATIO (ref 1.1–2)
ALP SERPL-CCNC: 72 UNIT/L (ref 40–150)
ALT SERPL-CCNC: 10 UNIT/L (ref 0–55)
AST SERPL-CCNC: 26 UNIT/L (ref 5–34)
BASOPHILS # BLD AUTO: 0.02 X10(3)/MCL
BASOPHILS NFR BLD AUTO: 0.3 %
BILIRUBIN DIRECT+TOT PNL SERPL-MCNC: 0.7 MG/DL
BUN SERPL-MCNC: 22 MG/DL (ref 8.4–25.7)
CALCIUM SERPL-MCNC: 12.6 MG/DL (ref 8.8–10)
CHLORIDE SERPL-SCNC: 100 MMOL/L (ref 98–107)
CO2 SERPL-SCNC: 25 MMOL/L (ref 23–31)
CREAT SERPL-MCNC: 2.19 MG/DL (ref 0.73–1.18)
DEPRECATED CALCIDIOL+CALCIFEROL SERPL-MC: 93.1 NG/ML (ref 30–80)
EOSINOPHIL # BLD AUTO: 0.23 X10(3)/MCL (ref 0–0.9)
EOSINOPHIL NFR BLD AUTO: 3.4 %
ERYTHROCYTE [DISTWIDTH] IN BLOOD BY AUTOMATED COUNT: 14.4 % (ref 11.5–17)
EST. AVERAGE GLUCOSE BLD GHB EST-MCNC: 137 MG/DL
GFR SERPLBLD CREATININE-BSD FMLA CKD-EPI: 31 MLS/MIN/1.73/M2
GLOBULIN SER-MCNC: 10 GM/DL (ref 2.4–3.5)
GLUCOSE SERPL-MCNC: 123 MG/DL (ref 82–115)
HBA1C MFR BLD: 6.4 %
HCT VFR BLD AUTO: 33.3 % (ref 42–52)
HGB BLD-MCNC: 11.2 G/DL (ref 14–18)
IGA SERPL-MCNC: >7040 MG/DL (ref 101–645)
IGG SERPL-MCNC: 165 MG/DL (ref 540–1822)
IGM SERPL-MCNC: 6 MG/DL (ref 22–240)
IMM GRANULOCYTES # BLD AUTO: 0.03 X10(3)/MCL (ref 0–0.04)
IMM GRANULOCYTES NFR BLD AUTO: 0.4 %
LYMPHOCYTES # BLD AUTO: 2.45 X10(3)/MCL (ref 0.6–4.6)
LYMPHOCYTES NFR BLD AUTO: 35.9 %
MAGNESIUM SERPL-MCNC: 2 MG/DL (ref 1.6–2.6)
MCH RBC QN AUTO: 32.7 PG (ref 27–31)
MCHC RBC AUTO-ENTMCNC: 33.6 G/DL (ref 33–36)
MCV RBC AUTO: 97.1 FL (ref 80–94)
MONOCYTES # BLD AUTO: 0.69 X10(3)/MCL (ref 0.1–1.3)
MONOCYTES NFR BLD AUTO: 10.1 %
NEUTROPHILS # BLD AUTO: 3.41 X10(3)/MCL (ref 2.1–9.2)
NEUTROPHILS NFR BLD AUTO: 49.9 %
PHOSPHATE SERPL-MCNC: 3 MG/DL (ref 2.3–4.7)
PLATELET # BLD AUTO: 175 X10(3)/MCL (ref 130–400)
PMV BLD AUTO: 8.7 FL (ref 7.4–10.4)
POCT GLUCOSE: 100 MG/DL (ref 70–110)
POCT GLUCOSE: 118 MG/DL (ref 70–110)
POCT GLUCOSE: 119 MG/DL (ref 70–110)
POTASSIUM SERPL-SCNC: 3.2 MMOL/L (ref 3.5–5.1)
PROT SERPL-MCNC: 11.8 GM/DL (ref 5.8–7.6)
PTH-INTACT SERPL-MCNC: 8.2 PG/ML (ref 8.7–77)
RBC # BLD AUTO: 3.43 X10(6)/MCL (ref 4.7–6.1)
SODIUM SERPL-SCNC: 136 MMOL/L (ref 136–145)
TSH SERPL-ACNC: 3.56 UIU/ML (ref 0.35–4.94)
WBC # SPEC AUTO: 6.83 X10(3)/MCL (ref 4.5–11.5)

## 2023-06-08 PROCEDURE — 83521 IG LIGHT CHAINS FREE EACH: CPT | Performed by: INTERNAL MEDICINE

## 2023-06-08 PROCEDURE — 63600175 PHARM REV CODE 636 W HCPCS: Performed by: INTERNAL MEDICINE

## 2023-06-08 PROCEDURE — 82784 ASSAY IGA/IGD/IGG/IGM EACH: CPT | Performed by: INTERNAL MEDICINE

## 2023-06-08 PROCEDURE — 25000003 PHARM REV CODE 250: Performed by: INTERNAL MEDICINE

## 2023-06-08 PROCEDURE — 86334 IMMUNOFIX E-PHORESIS SERUM: CPT | Performed by: INTERNAL MEDICINE

## 2023-06-08 PROCEDURE — 82306 VITAMIN D 25 HYDROXY: CPT | Performed by: FAMILY MEDICINE

## 2023-06-08 PROCEDURE — 83036 HEMOGLOBIN GLYCOSYLATED A1C: CPT | Performed by: INTERNAL MEDICINE

## 2023-06-08 PROCEDURE — 84165 PROTEIN E-PHORESIS SERUM: CPT | Performed by: INTERNAL MEDICINE

## 2023-06-08 PROCEDURE — 83735 ASSAY OF MAGNESIUM: CPT | Performed by: INTERNAL MEDICINE

## 2023-06-08 PROCEDURE — 84100 ASSAY OF PHOSPHORUS: CPT | Performed by: INTERNAL MEDICINE

## 2023-06-08 PROCEDURE — 94761 N-INVAS EAR/PLS OXIMETRY MLT: CPT

## 2023-06-08 PROCEDURE — 25000003 PHARM REV CODE 250: Performed by: FAMILY MEDICINE

## 2023-06-08 PROCEDURE — 21400001 HC TELEMETRY ROOM

## 2023-06-08 PROCEDURE — 84443 ASSAY THYROID STIM HORMONE: CPT | Performed by: INTERNAL MEDICINE

## 2023-06-08 PROCEDURE — 99900035 HC TECH TIME PER 15 MIN (STAT)

## 2023-06-08 PROCEDURE — 85025 COMPLETE CBC W/AUTO DIFF WBC: CPT | Performed by: INTERNAL MEDICINE

## 2023-06-08 PROCEDURE — 11000001 HC ACUTE MED/SURG PRIVATE ROOM

## 2023-06-08 PROCEDURE — 63600175 PHARM REV CODE 636 W HCPCS: Performed by: FAMILY MEDICINE

## 2023-06-08 PROCEDURE — 80053 COMPREHEN METABOLIC PANEL: CPT | Performed by: INTERNAL MEDICINE

## 2023-06-08 RX ORDER — IBUPROFEN 200 MG
24 TABLET ORAL
Status: DISCONTINUED | OUTPATIENT
Start: 2023-06-08 | End: 2023-06-15

## 2023-06-08 RX ORDER — SODIUM CHLORIDE 9 MG/ML
1000 INJECTION, SOLUTION INTRAVENOUS CONTINUOUS
Status: ACTIVE | OUTPATIENT
Start: 2023-06-08 | End: 2023-06-09

## 2023-06-08 RX ORDER — HYDRALAZINE HYDROCHLORIDE 20 MG/ML
10 INJECTION INTRAMUSCULAR; INTRAVENOUS EVERY 6 HOURS PRN
Status: DISCONTINUED | OUTPATIENT
Start: 2023-06-08 | End: 2023-06-19 | Stop reason: HOSPADM

## 2023-06-08 RX ORDER — CLONIDINE HYDROCHLORIDE 0.1 MG/1
0.1 TABLET ORAL EVERY 6 HOURS PRN
Status: DISCONTINUED | OUTPATIENT
Start: 2023-06-08 | End: 2023-06-19 | Stop reason: HOSPADM

## 2023-06-08 RX ORDER — SODIUM CHLORIDE 9 MG/ML
1000 INJECTION, SOLUTION INTRAVENOUS CONTINUOUS
Status: ACTIVE | OUTPATIENT
Start: 2023-06-08 | End: 2023-06-08

## 2023-06-08 RX ORDER — ENOXAPARIN SODIUM 100 MG/ML
40 INJECTION SUBCUTANEOUS EVERY 24 HOURS
Status: DISCONTINUED | OUTPATIENT
Start: 2023-06-08 | End: 2023-06-19 | Stop reason: HOSPADM

## 2023-06-08 RX ORDER — CALCITONIN SALMON 200 [IU]/.09ML
1 SPRAY, METERED NASAL DAILY
Status: COMPLETED | OUTPATIENT
Start: 2023-06-08 | End: 2023-06-09

## 2023-06-08 RX ORDER — DEXTROSE 40 %
15 GEL (GRAM) ORAL
Status: DISCONTINUED | OUTPATIENT
Start: 2023-06-08 | End: 2023-06-15

## 2023-06-08 RX ORDER — IBUPROFEN 200 MG
16 TABLET ORAL
Status: DISCONTINUED | OUTPATIENT
Start: 2023-06-08 | End: 2023-06-15

## 2023-06-08 RX ORDER — AMLODIPINE BESYLATE 10 MG/1
10 TABLET ORAL ONCE
Status: COMPLETED | OUTPATIENT
Start: 2023-06-08 | End: 2023-06-08

## 2023-06-08 RX ORDER — INSULIN ASPART 100 [IU]/ML
0-5 INJECTION, SOLUTION INTRAVENOUS; SUBCUTANEOUS
Status: DISCONTINUED | OUTPATIENT
Start: 2023-06-08 | End: 2023-06-15

## 2023-06-08 RX ORDER — DEXTROSE 40 %
30 GEL (GRAM) ORAL
Status: DISCONTINUED | OUTPATIENT
Start: 2023-06-08 | End: 2023-06-15

## 2023-06-08 RX ORDER — AMLODIPINE BESYLATE 10 MG/1
10 TABLET ORAL DAILY
Status: CANCELLED | OUTPATIENT
Start: 2023-06-09

## 2023-06-08 RX ORDER — SODIUM CHLORIDE 9 MG/ML
1000 INJECTION, SOLUTION INTRAVENOUS CONTINUOUS
Status: DISCONTINUED | OUTPATIENT
Start: 2023-06-08 | End: 2023-06-08

## 2023-06-08 RX ORDER — GLUCAGON 1 MG
1 KIT INJECTION
Status: DISCONTINUED | OUTPATIENT
Start: 2023-06-08 | End: 2023-06-15

## 2023-06-08 RX ADMIN — CALCITONIN SALMON 1 SPRAY: 200 SPRAY, METERED NASAL at 11:06

## 2023-06-08 RX ADMIN — CLONIDINE HYDROCHLORIDE 0.1 MG: 0.1 TABLET ORAL at 04:06

## 2023-06-08 RX ADMIN — AMLODIPINE BESYLATE 5 MG: 5 TABLET ORAL at 09:06

## 2023-06-08 RX ADMIN — AMLODIPINE BESYLATE 10 MG: 10 TABLET ORAL at 01:06

## 2023-06-08 RX ADMIN — CARVEDILOL 12.5 MG: 12.5 TABLET, FILM COATED ORAL at 08:06

## 2023-06-08 RX ADMIN — SENNOSIDES AND DOCUSATE SODIUM 1 TABLET: 50; 8.6 TABLET ORAL at 12:06

## 2023-06-08 RX ADMIN — ENOXAPARIN SODIUM 40 MG: 40 INJECTION SUBCUTANEOUS at 05:06

## 2023-06-08 RX ADMIN — HYDRALAZINE HYDROCHLORIDE 10 MG: 20 INJECTION INTRAMUSCULAR; INTRAVENOUS at 01:06

## 2023-06-08 RX ADMIN — FINASTERIDE 5 MG: 5 TABLET, FILM COATED ORAL at 09:06

## 2023-06-08 RX ADMIN — SENNOSIDES AND DOCUSATE SODIUM 1 TABLET: 50; 8.6 TABLET ORAL at 08:06

## 2023-06-08 RX ADMIN — SODIUM CHLORIDE 1000 ML: 9 INJECTION, SOLUTION INTRAVENOUS at 04:06

## 2023-06-08 RX ADMIN — HYDRALAZINE HYDROCHLORIDE 10 MG: 20 INJECTION INTRAMUSCULAR; INTRAVENOUS at 08:06

## 2023-06-08 RX ADMIN — TAMSULOSIN HYDROCHLORIDE 0.4 MG: 0.4 CAPSULE ORAL at 09:06

## 2023-06-08 RX ADMIN — LEVOTHYROXINE SODIUM 112 MCG: 112 TABLET ORAL at 05:06

## 2023-06-08 RX ADMIN — SODIUM CHLORIDE 1000 ML: 9 INJECTION, SOLUTION INTRAVENOUS at 12:06

## 2023-06-08 RX ADMIN — SENNOSIDES AND DOCUSATE SODIUM 1 TABLET: 50; 8.6 TABLET ORAL at 09:06

## 2023-06-08 RX ADMIN — CARVEDILOL 12.5 MG: 12.5 TABLET, FILM COATED ORAL at 09:06

## 2023-06-08 RX ADMIN — FUROSEMIDE 20 MG: 10 INJECTION, SOLUTION INTRAMUSCULAR; INTRAVENOUS at 12:06

## 2023-06-08 RX ADMIN — FAMOTIDINE 20 MG: 20 TABLET, FILM COATED ORAL at 09:06

## 2023-06-08 RX ADMIN — SODIUM CHLORIDE 1000 ML: 9 INJECTION, SOLUTION INTRAVENOUS at 08:06

## 2023-06-08 RX ADMIN — SODIUM CHLORIDE 1000 ML: 9 INJECTION, SOLUTION INTRAVENOUS at 11:06

## 2023-06-08 NOTE — PLAN OF CARE
Problem: Adult Inpatient Plan of Care  Goal: Plan of Care Review  Outcome: Ongoing, Progressing  Goal: Patient-Specific Goal (Individualized)  Outcome: Ongoing, Progressing  Goal: Absence of Hospital-Acquired Illness or Injury  Outcome: Ongoing, Progressing  Goal: Optimal Comfort and Wellbeing  Outcome: Ongoing, Progressing  Goal: Readiness for Transition of Care  Outcome: Ongoing, Progressing     Problem: Fluid and Electrolyte Imbalance (Acute Kidney Injury/Impairment)  Goal: Fluid and Electrolyte Balance  Outcome: Ongoing, Progressing     Problem: Oral Intake Inadequate (Acute Kidney Injury/Impairment)  Goal: Optimal Nutrition Intake  Outcome: Ongoing, Progressing     Problem: Renal Function Impairment (Acute Kidney Injury/Impairment)  Goal: Effective Renal Function  Outcome: Ongoing, Progressing     Problem: Electrolyte Imbalance  Goal: Electrolyte Balance  Outcome: Ongoing, Progressing     Problem: Fall Injury Risk  Goal: Absence of Fall and Fall-Related Injury  Outcome: Ongoing, Progressing     Problem: Skin Injury Risk Increased  Goal: Skin Health and Integrity  Outcome: Ongoing, Progressing

## 2023-06-08 NOTE — CONSULTS
[facility]  [address] [address]  Phone: [phone]    Department of Nephrology  Consult Note      PATIENT NAME: Chip Goodson    MRN: [id number]  TODAY'S DATE: 06/08/2023  ADMIT DATE: 6/7/2023                          CONSULT REQUESTED BY: Debbie Gonzalez MD    SUBJECTIVE     PRINCIPAL PROBLEM: Confusion      REASON FOR CONSULT:  For hypercalcemia and chronic kidney disease.      HPI:  Patient is a 72-year-old  male with a history of hypertension chronic back pain BPH and diabetes.  He had some confusion and was brought in his result of this patient apparently poorly cognizant on evaluation noted to have an elevated calcium difficult to get a past medical history  This patient from the patient.        Review of patient's allergies indicates:   Allergen Reactions    Iodine Anaphylaxis    Shellfish containing products     Amoxicillin-pot clavulanate Itching       No past medical history on file.  No past surgical history on file.        Review of Systems   Unable to perform ROS: Acuity of condition     OBJECTIVE     VITAL SIGNS (Most Recent)  Temp: 98.1 °F (36.7 °C) (06/08/23 0743)  Pulse: 65 (06/08/23 0743)  Resp: 20 (06/08/23 0743)  BP: (!) 155/68 (06/08/23 0743)  SpO2: (!) 92 % (06/08/23 0743)    VENTILATION STATUS  Resp: 20 (06/08/23 0743)  SpO2: (!) 92 % (06/08/23 0743)           I & O (Last 24H):  Intake/Output Summary (Last 24 hours) at 6/8/2023 0909  Last data filed at 6/8/2023 0547  Gross per 24 hour   Intake --   Output 2200 ml   Net -2200 ml       WEIGHTS  Wt Readings from Last 3 Encounters:   06/07/23 1707 113.4 kg (250 lb)   02/07/18 1124 111.2 kg (245 lb 2.4 oz)   10/09/17 0837 108 kg (237 lb 15.8 oz)   06/01/17 0833 108.9 kg (239 lb 15.9 oz)       Physical Exam  Constitutional:       Appearance: He is not toxic-appearing.      Comments: Patient is very somnolent   HENT:      Head: Normocephalic and atraumatic.      Mouth/Throat:      Mouth:  Mucous membranes are moist.   Eyes:      Extraocular Movements: Extraocular movements intact.      Conjunctiva/sclera: Conjunctivae normal.      Pupils: Pupils are equal, round, and reactive to light.   Cardiovascular:      Rate and Rhythm: Normal rate and regular rhythm.      Heart sounds: No murmur heard.  Pulmonary:      Effort: Pulmonary effort is normal.      Breath sounds: Normal breath sounds.   Abdominal:      Tenderness: There is right CVA tenderness and left CVA tenderness.   Musculoskeletal:      Cervical back: Normal range of motion and neck supple.      Right lower leg: No edema.      Left lower leg: Edema present.      Comments: Atrophic muscle groups   Skin:     Capillary Refill: Capillary refill takes less than 2 seconds.   Neurological:      General: No focal deficit present.   Psychiatric:         Attention and Perception: He is inattentive.         Speech: Speech is delayed.         Behavior: Behavior is slowed.         Cognition and Memory: Cognition is impaired.       HOME MEDICATIONS:  No current facility-administered medications on file prior to encounter.     Current Outpatient Medications on File Prior to Encounter   Medication Sig Dispense Refill    albuterol (PROVENTIL/VENTOLIN HFA) 90 mcg/actuation inhaler 2 puffs every 4 to 6 hours as needed.      amLODIPine (NORVASC) 5 MG tablet Take 5 mg by mouth.      atorvastatin (LIPITOR) 10 MG tablet Take 10 mg by mouth.      carvediloL (COREG) 12.5 MG tablet Take 12.5 mg by mouth 2 (two) times daily.      cloNIDine (CATAPRES) 0.2 MG tablet Take 0.2 mg by mouth daily as needed.      finasteride (PROSCAR) 5 mg tablet Take 5 mg by mouth.      gabapentin (NEURONTIN) 100 MG capsule Take by mouth.      levothyroxine (SYNTHROID) 112 MCG tablet Take 112 mcg by mouth.      metFORMIN (GLUCOPHAGE-XR) 500 MG ER 24hr tablet Take 1,000 mg by mouth 2 (two) times daily.      ONETOUCH ULTRA TEST Strp USE TO TEST BLOOD GLUCOSE TWICE DAILY      spironolactone  (ALDACTONE) 25 MG tablet Take 12.5 mg by mouth.      tamsulosin (FLOMAX) 0.4 mg Cap Take 1 capsule by mouth.         SCHEDULED MEDS:   amLODIPine  5 mg Oral Daily    calcitonin (salmon)  1 spray Alternating Nostrils Daily    carvediloL  12.5 mg Oral BID    enoxparin  40 mg Subcutaneous Q24H (prophylaxis, 1700)    famotidine  20 mg Oral Daily    finasteride  5 mg Oral Daily    levothyroxine  112 mcg Oral Before breakfast    senna-docusate 8.6-50 mg  1 tablet Oral BID    tamsulosin  1 capsule Oral Daily       CONTINUOUS INFUSIONS:   sodium chloride 0.9%         PRN MEDS:acetaminophen, albuterol, cloNIDine, dextrose, dextrose, dextrose 50%, dextrose 50%, glucagon (human recombinant), glucose, glucose, hydrALAZINE, insulin aspart U-100, melatonin, ondansetron, sodium chloride 0.9%    LABS AND DIAGNOSTICS     CBC LAST 3 DAYS  Recent Labs   Lab 06/07/23 1730 06/08/23  0441   WBC 7.83 6.83   RBC 3.58* 3.43*   HGB 11.6* 11.2*   HCT 34.3* 33.3*   MCV 95.8* 97.1*   MCH 32.4* 32.7*   MCHC 33.8 33.6   RDW 14.4 14.4    175   MPV 9.3 8.7       COAGULATION LAST 3 DAYS  Recent Labs   Lab 06/07/23  1730   INR 1.75*       CHEMISTRY LAST 3 DAYS  Recent Labs   Lab 06/07/23 1730 06/07/23 2037 06/08/23  0441   * 137 136   K 3.6 3.2* 3.2*   CO2 26 28 25   BUN 23.0 22.0 22.0   CREATININE 2.31* 2.24* 2.19*   CALCIUM 13.4* 12.6* 12.6*   MG 2.10  --  2.00   ALBUMIN 1.9*  --  1.8*   ALKPHOS 69  --  72   ALT 11  --  10   AST 31  --  26   BILITOT 0.7  --  0.7       CARDIAC PROFILE LAST 3 DAYS  Recent Labs   Lab 06/07/23  1730   TROPONINI 0.023       ENDOCRINE LAST 3 DAYS  Recent Labs   Lab 06/07/23 1730 06/08/23  0441   TSH 3.649 3.557       LAST ARTERIAL BLOOD GAS  ABG  No results for input(s): PH, PO2, PCO2, HCO3, BE in the last 168 hours.    LAST 7 DAYS MICROBIOLOGY   Microbiology Results (last 7 days)       ** No results found for the last 168 hours. **            MOST RECENT IMAGING  CT Chest Without Contrast  Narrative:  EXAMINATION:  CT CHEST WITHOUT CONTRAST    CLINICAL HISTORY:  Hypercalcemia;, .    TECHNIQUE:  PATIENT RADIATION DOSE: DLP(mGycm) 215    As per PQRS measures, all CT scans at this facility used dose modulation, iterative reconstruction, and/or weight based dose adjustment when appropriate to reduce radiation dose to as low as reasonably achievable.    COMPARISON:  None available    FINDINGS:  Serial axial imaging of the chest without the administration of IV contrast.  Both soft tissue and pulmonary parenchymal windows were obtained.  Additional sagittal and coronal reconstructions were performed.    Artifact: Mild motion artifact is seen on some images.    Contrast: Please note this is a non contrast CT scan of the chest which is non-diagnostic for pulmonary emboli.    Soft Tissues: Unremarkable.    Lines and Tubes: None.    Axilla: A few prominent lymph nodes are seen in the right and left axilla.    Neck: The visualized soft tissues of the neck appear unremarkable.    Mediastinum: A few subcentimeter mediastinal lymph nodes are seen in the upper and lower paratracheal and subcarinal spaces .    Heart: Moderate cardiomegaly is seen. Pronounced coronary artery calcification is seen.    Aorta: Unremarkable appearing aorta.    Pulmonary Arteries: Mildly enlarged main pulmonary arteries are identified. This could reflect an element of pulmonary arterial hypertension.    Lungs: There is mild non specific dependent change at the lung bases. Mild streaky linear opacity is seen predominantly in the dependent portions at the lung bases with peripheral predominance consistent with subsegmental atelectasis. A small patchy area of ground glass density is seen in the lateral basal segment of the left lower lobe. This could reflect an acute focal infiltrate / pneumonitis.    Pleura: There is a small left sided pleural effusion. There is mild pleural thickening along the right lower posterior pleural surface.    Bony Structures:  There is diffuse osteopenia along the visualised bony structures together with multiple, numerous, small round-ovoid lucent lesion of varying sizes involving the marrow.    Spine: Moderate multilevel spondylolytic changes are seen in the thoracic spine.    Abdomen: Bilateral renal cysts are indentified, which are large in size on the left side.  Impression: Impression:    1. There is a small left sided pleural effusion.    2. A small patchy area of ground glass density is seen in the lateral basal segment of the left lower lobe. This could reflect an acute focal infiltrate / pneumonitis. Correlate clinically and with laboratory findings, as regards additional evaluation and follow-up.    3. There is diffuse osteopenia along the visualised bony structures together with multiple, numerous, small round-ovoid lucent lesion of varying sizes involving the marrow. These changes could reflect metabolic bone disease like hyperparathyroidism. Correlate clinically and with laboratory findings, as regards additional evaluation and follow-up.    1. Concur with preliminary report    Electronically signed by: Boo Camacho  Date:    06/08/2023  Time:    08:30  X-Ray Chest 1 View  Narrative: EXAMINATION:  XR CHEST 1 VIEW    CLINICAL HISTORY:  , Weakness.    COMPARISON:  0923    FINDINGS:  An AP view or more reveals the heart to be normal in size.  The trachea is just right of midline.  Atherosclerosis is seen within the aorta.  There is patchy hazy opacity at the left lung base.  There are changes and curvature are noted to the thoracic spine.  Impression: 1. Patchy infiltrate, atelectasis, and pleural reaction left lung base  2. Atherosclerosis  3. Thoracic spondylosis and scoliosis    Electronically signed by: Boo Camacho  Date:    06/08/2023  Time:    08:11      ECHOCARDIOGRAM RESULTS (last 5)  No results found for this or any previous visit.      CURRENT/PREVIOUS VISIT EKG  Results for orders placed or performed during the  hospital encounter of 06/07/23   EKG 12-lead    Collection Time: 06/07/23  5:17 PM    Narrative    Test Reason : R53.1,    Vent. Rate : 082 BPM     Atrial Rate : 082 BPM     P-R Int : 202 ms          QRS Dur : 090 ms      QT Int : 344 ms       P-R-T Axes : 055 -02 062 degrees     QTc Int : 401 ms    Sinus rhythm with occasional Premature ventricular complexes  Nonspecific T wave abnormality  Abnormal ECG  No previous ECGs available  Confirmed by Boo Sargent MD ([id number]) on 6/8/2023 8:09:08 AM    Referred By: THOMAS   SELF           Confirmed By:Boo Sargent MD           ASSESSMENT/PLAN:     Active Hospital Problems    Diagnosis    *Confusion    Hypercalcemia    Hypoalbuminemia    Weakness    COLTON (acute kidney injury)       ASSESSMENT & PLAN:   Old  male history of confusion and hypercalcemia with a very elevated total protein a very low albumin to globulin ratio and very.  Highly suspect a paraproteinemia appropriate studies have been ordered.      RECOMMENDATIONS:  Would not use a bisphosphonate just yet that would use pamidronate only for 48 hours.  And await results of labs        Avi Vega MD  Department of Nephrology  Date of Service: 06/08/2023  9:09 AM

## 2023-06-08 NOTE — PLAN OF CARE
Problem: Adult Inpatient Plan of Care  Goal: Plan of Care Review  Outcome: Ongoing, Progressing  Goal: Patient-Specific Goal (Individualized)  Outcome: Ongoing, Progressing     Problem: Fluid and Electrolyte Imbalance (Acute Kidney Injury/Impairment)  Goal: Fluid and Electrolyte Balance  Outcome: Ongoing, Progressing  Intervention: Monitor and Manage Fluid and Electrolyte Balance  Flowsheets (Taken 6/8/2023 0038)  Fluid/Electrolyte Management:   intravenous fluid replacement initiated   fluids provided   electrolyte supplement adjusted     Problem: Oral Intake Inadequate (Acute Kidney Injury/Impairment)  Goal: Optimal Nutrition Intake  Outcome: Ongoing, Progressing  Intervention: Promote and Optimize Nutrition  Flowsheets (Taken 6/8/2023 0038)  Oral Nutrition Promotion:   rest periods promoted   physical activity promoted     Problem: Renal Function Impairment (Acute Kidney Injury/Impairment)  Goal: Effective Renal Function  Outcome: Ongoing, Progressing  Intervention: Monitor and Support Renal Function  Flowsheets (Taken 6/8/2023 0038)  Medication Review/Management:   medications reviewed   infusion initiated     Problem: Electrolyte Imbalance  Goal: Electrolyte Balance  Intervention: Monitor and Manage Electrolyte Imbalance  Flowsheets (Taken 6/8/2023 0038)  Fluid/Electrolyte Management:   intravenous fluid replacement initiated   fluids provided   electrolyte supplement adjusted

## 2023-06-08 NOTE — NURSING
"During rounding, had taken off aishwarya hose and scds, patient is refusing the aishwarya hose stating "its too tight".   Bed alarm was placed on patient and education was provided regarding the aishwarya hose yet he continued to refuse.   "

## 2023-06-08 NOTE — H&P
[facility]                     [address] [address]    PATIENT NAME:       KATHERIN WOODARD   YOB: 1950  CSN:                [id number]   MRN:                [id number]  ADMIT DATE:         06/07/2023 17:05:00  PHYSICIAN:          Valeriano George MD                        HISTORY AND PHYSICAL      CODE STATUS:  Full code.    CHIEF COMPLAINT:  The patient was brought to the emergency room because of   confusion and balance issues getting worse since this morning and was normal 2   days ago, but did have a fall in the washroom in the recent past.    HISTORY OF PRESENT ILLNESS:  Mr. Katherin Woodard was seen in ER by me after   patient was admitted for altered mental status and confusion and balance issues   reported by son and as also noted by the patient.  He was last normal as per the   son 2 days ago and he had recently seen a urologist for BPH and as per the son   who was there, patient had a procedure done for most likely urethral stricture.    He said that his father told him that the obstruction in the urethra was   cleared and most likely he underwent dilatation.  He does have a history of BPH   as per the medication and history, and I have myself talked to the patient and   he was kind of tired, sleepy, but could answer most of the questions, said that   he cannot rely much on his memory, and said that he used to be on, especially   the memory part came when I asked him being a diabetic is he on aspirin.  He   says he used to be, but somebody stopped it and replaced with somebody else.    Most likely a solid medication list Mobic and that might have been the reason   somebody must have stopped the aspirin, but he did not know any reason.  The   patient was worked up by Dr. Clemens and he found that the patient has   hypercalcemia even after giving IV fluids.  The corrected calcium even after    correction was more than 14, adjusted to the albumin, which probably was the   cause of patient's metabolic encephalopathy.  The patient is being admitted for   the same as the parathyroid workup is sent out and no results will be available.    I have explained that to the patient's son who had a confusion about thyroid   and parathyroid.  Even before I left the patient, I told him that there is   nothing wrong with his thyroid hormone as patient does take Synthroid so that   the patient's son is not confused.  Besides that, patient does not give any   other specific complaint.  He had a fall for which he was given gabapentin and   they thought that that might have been the cause of patient's confusion and lack   of balance, but I said that it can make the patient sleepy, but even if those   were the cause patient's calcium is very high and needs to be investigated and   patient needs to be treated and hydrated and given some diuretics.  Also a   nephrology consult has been done for Dr. Vega.  Renal ultrasound has been   ordered in the MAR for the morning and also the patient is going to get a CAT   scan of the chest before he comes up.    PAST MEDICAL HISTORY:  Significant for hypothyroidism, BPH with LUTS, diabetes   mellitus type 2, hypertension, low back pain after the recent fall in the   washroom, dyslipidemia.    PAST SURGICAL HISTORY:  As per the patient could remember he had his appendix   removed in 1965, had a urethral dilatation done recently.  Does not know the   name of the urologist.  Besides that, he says that he did not have any other   surgery.    SOCIAL HISTORY:  He is retired from [employer], worked for 36+ years.  Does not   smoke.  Does not drink.  Lives with his wife and is still very active.  He has 5   children, 3 sons and 2 daughters, and all of them are fine.  His youngest son   had an MI, but is alive.    FAMILY HISTORY:  Pertinent for dad dying from an automobile accident when his    dad was in late 20s or early 30s.  His mother passed away.  She was of 65 of   age.  He does not remember properly the cause.  He has no brothers, 2 sisters,   and they are alive, and as per him, they are healthy.    ALLERGIES:  TO AUGMENTIN, SO POSSIBLY PENICILLIN OR CLAVULANIC ACID OR BOTH, AND   IODINE.  IODINE CAUSES ANAPHYLAXIS.  JELLYFISH HE CANNOT EAT.  SAYS HE CANNOT   EAT SHELLFISH.     MEDICATIONS:    1. Levothyroxine 112 mcg daily.  2. Finasteride 5 mg daily.  3. Amlodipine 5 mg daily.  4. Clonidine 0.2 mg p.r.n.  5. Coreg 12.5 mg b.i.d.  6. Metformin 1 g b.i.d.  7. Spironolactone 25 mg half tablet daily.  8. Tamsulosin 0.4 mg daily.  9. Gabapentin 100 to 200 one tablet 3 times a day.  10. New medicine is atorvastatin 10 mg daily, I saw in his bag.  11. Meloxicam 7.5 mg b.i.d.   12. Over-the-counter medicine cinnamon 500 mg daily.  13. Saw palmetto 450 mg daily.  14. D3 25 mcg daily.  15. Men's multivitamin 1 tablet daily.  16. Zinc 50 mg daily.    REVIEW OF SYSTEMS:  As mentioned in history of present illness.    PHYSICAL EXAMINATION:  GENERAL:  The patient is alert and oriented x3.  Slight confusion is there   because of the sleepiness and problem with the hypercalcemia.  HEENT:  Head is normocephalic.  Pupils bilaterally reactive, equal size.  Mild   conjunctival pallor.  No scleral icterus.  Trachea is midline.  CHEST:  Has good bilateral air entry.  CVS:  First and second heart sounds are heard normally without any murmurs.   ABDOMEN:  Soft, nontender.   EXTREMITIES:  Devoid of any significant edema, cyanosis, or clubbing.    LABS AND INVESTIGATIONS:  WBC 7.83, hemoglobin 11.6, hematocrit 34.3, MCV 95.8.    Pro-time 20.7, INR 1.75, PTT 45.8.  Sodium 137, potassium 3.2, chloride 99,   bicarb 28, BUN 22, creatinine 2.24.  When the patient came in, his BUN was 23,   creatinine was 2.31.  His glucose is 125.  His albumin is 1.9.  His calcium was   13.4, and after the IV fluids and Lasix, it came down  to 12.6 and is at 12.6.    Urine drug screen is unremarkable.  UA shows protein 3+, occult blood 2+.  The   CAT scan of the head shows no acute abnormality.  Chest x-ray shows borderline   cardiomegaly, mild atelectasis or scarring lung bases, thoracic spondylosis.    IMPRESSION:    1. Metabolic encephalopathy secondary to hypercalcemia under investigations as   parathyroid hormone and workup is sent out.  2. Gait problems, most likely secondary to hyperparathyroidism.  3. History of hypertension.  4. History of dyslipidemia.  5. History of hypothyroidism.  6. History of low back pain for the recent fall.  7. History of benign prostatic hypertrophy with lower urinary tract symptoms, on   appropriate therapy.    8. Acute kidney injury secondary to hypercalcemia.    PLAN:    1. Admit the patient to the floor.  2. Renal ultrasound in the morning.  3. CAT scan of the chest to be done before the patient comes up.  4. Continue with the normal saline and IV and loop diuretics.  5. Accu-Cheks a.c. and h.s. and coverage with a low-dose sliding scale because   of the renal insufficiency.  6. Avoid any calcium products.  7. Vitamin D to be stopped of over-the-counter medications.  8. Wait for the results of parathyroid hormone, total thyroid and ionized   calcium.  9. Because of the protein in the urine, 24-hour urine protein protocol to be   initiated.  10. Skeletal survey of the patient if needed to be done later on.  11. Avoid any nephrotoxic drugs.  12. DVT prophylaxis with low-dose Lovenox.  13. GI prophylaxis with proton pump inhibitors.    Pleasure taking care of Mr. Chip Goodson during his stay at [facility] on the third floor.        ______________________________  Valeriano George MD    SS/AQS  DD:  06/07/2023  Time:  11:11PM  DT:  06/08/2023  Time:  12:22AM  Job #:  [id number]    cc:   MD Tr Vickers MD        HISTORY AND PHYSICAL

## 2023-06-09 LAB
ALBUMIN % SPEP (OHS): 24.35
ALBUMIN SERPL-MCNC: 1.7 G/DL (ref 3.4–4.8)
ALBUMIN SERPL-MCNC: 2.8 G/DL (ref 3.4–4.8)
ALBUMIN/GLOB SERPL: 0.2 RATIO (ref 1.1–2)
ALBUMIN/GLOB SERPL: 0.3 RATIO (ref 1.1–2)
ALP SERPL-CCNC: 66 UNIT/L (ref 40–150)
ALPHA 1 GLOB (OHS): 0.16 GM/DL
ALPHA 1 GLOB% (OHS): 1.41
ALPHA 2 GLOB % (OHS): 3.69
ALPHA 2 GLOB (OHS): 0.42 GM/DL
ALT SERPL-CCNC: 6 UNIT/L (ref 0–55)
AST SERPL-CCNC: 24 UNIT/L (ref 5–34)
BASOPHILS # BLD AUTO: 0.03 X10(3)/MCL
BASOPHILS NFR BLD AUTO: 0.4 %
BETA GLOB (OHS): 7.78 GM/DL
BETA GLOB% (OHS): 68.86
BILIRUBIN DIRECT+TOT PNL SERPL-MCNC: 0.7 MG/DL
BUN SERPL-MCNC: 22 MG/DL (ref 8.4–25.7)
CA-I ADJ PH7.4 SERPL ISE-MCNC: 7.65 MG/DL (ref 4.57–5.43)
CALCIUM SERPL-MCNC: 12.9 MG/DL (ref 8.8–10)
CHLORIDE SERPL-SCNC: 101 MMOL/L (ref 98–107)
CO2 SERPL-SCNC: 25 MMOL/L (ref 23–31)
CREAT 24H UR-MCNC: 1491.4 MG/DAY (ref 710–1650)
CREAT SERPL-MCNC: 2.32 MG/DL (ref 0.73–1.18)
CREAT UR-MCNC: 61.5 MG/DL (ref 63–166)
EOSINOPHIL # BLD AUTO: 0.26 X10(3)/MCL (ref 0–0.9)
EOSINOPHIL NFR BLD AUTO: 3.8 %
ERYTHROCYTE [DISTWIDTH] IN BLOOD BY AUTOMATED COUNT: 14.5 % (ref 11.5–17)
GAMMA GLOBULIN % (OHS): 1.69
GAMMA GLOBULIN (OHS): 0.19 GM/DL
GFR SERPLBLD CREATININE-BSD FMLA CKD-EPI: 29 MLS/MIN/1.73/M2
GLOBULIN SER-MCNC: 8.5 GM/DL (ref 2.4–3.5)
GLOBULIN SER-MCNC: 9.5 GM/DL (ref 2.4–3.5)
GLUCOSE SERPL-MCNC: 122 MG/DL (ref 82–115)
HCT VFR BLD AUTO: 32.1 % (ref 42–52)
HGB BLD-MCNC: 10.7 G/DL (ref 14–18)
IMM GRANULOCYTES # BLD AUTO: 0.04 X10(3)/MCL (ref 0–0.04)
IMM GRANULOCYTES NFR BLD AUTO: 0.6 %
KAPPA LC FREE SER-MCNC: 5.09 MG/DL (ref 0.33–1.94)
KAPPA LC FREE/LAMBDA FREE SER: 9.09 {RATIO} (ref 0.26–1.65)
LAMBDA LC FREE SERPL-MCNC: 0.56 MG/DL (ref 0.57–2.63)
LYMPHOCYTES # BLD AUTO: 2.84 X10(3)/MCL (ref 0.6–4.6)
LYMPHOCYTES NFR BLD AUTO: 41.5 %
M SPIKE % (OHS): 29.44
M SPIKE (OHS): 3.33 GM/DL
MCH RBC QN AUTO: 31.8 PG (ref 27–31)
MCHC RBC AUTO-ENTMCNC: 33.3 G/DL (ref 33–36)
MCV RBC AUTO: 95.3 FL (ref 80–94)
MONOCYTES # BLD AUTO: 0.68 X10(3)/MCL (ref 0.1–1.3)
MONOCYTES NFR BLD AUTO: 9.9 %
NEUTROPHILS # BLD AUTO: 2.99 X10(3)/MCL (ref 2.1–9.2)
NEUTROPHILS NFR BLD AUTO: 43.8 %
PATH REV: NORMAL
PLATELET # BLD AUTO: 172 X10(3)/MCL (ref 130–400)
PMV BLD AUTO: 9.2 FL (ref 7.4–10.4)
POCT GLUCOSE: 102 MG/DL (ref 70–110)
POCT GLUCOSE: 105 MG/DL (ref 70–110)
POCT GLUCOSE: 111 MG/DL (ref 70–110)
POTASSIUM SERPL-SCNC: 3.3 MMOL/L (ref 3.5–5.1)
PROT 24H UR-MCNC: 4292.3 MG/24 H (ref 0–165)
PROT SERPL-MCNC: 11.2 GM/DL (ref 5.8–7.6)
PROT SERPL-MCNC: 11.3 GM/DL (ref 5.8–7.6)
PROT UR STRIP-MCNC: 177 MG/DL
PSA SERPL-MCNC: 0.96 NG/ML
RBC # BLD AUTO: 3.37 X10(6)/MCL (ref 4.7–6.1)
SODIUM SERPL-SCNC: 137 MMOL/L (ref 136–145)
TOTAL VOLUME  (OHS): 2425 ML
TOTAL VOLUME  (OHS): 2425 ML
WBC # SPEC AUTO: 6.84 X10(3)/MCL (ref 4.5–11.5)

## 2023-06-09 PROCEDURE — 25000003 PHARM REV CODE 250: Performed by: INTERNAL MEDICINE

## 2023-06-09 PROCEDURE — 84156 ASSAY OF PROTEIN URINE: CPT

## 2023-06-09 PROCEDURE — 25000003 PHARM REV CODE 250: Performed by: FAMILY MEDICINE

## 2023-06-09 PROCEDURE — 99900035 HC TECH TIME PER 15 MIN (STAT)

## 2023-06-09 PROCEDURE — 84166 PROTEIN E-PHORESIS/URINE/CSF: CPT

## 2023-06-09 PROCEDURE — 63600175 PHARM REV CODE 636 W HCPCS: Performed by: FAMILY MEDICINE

## 2023-06-09 PROCEDURE — 82570 ASSAY OF URINE CREATININE: CPT | Performed by: INTERNAL MEDICINE

## 2023-06-09 PROCEDURE — 84591 ASSAY OF NOS VITAMIN: CPT | Performed by: FAMILY MEDICINE

## 2023-06-09 PROCEDURE — 63600175 PHARM REV CODE 636 W HCPCS: Performed by: INTERNAL MEDICINE

## 2023-06-09 PROCEDURE — 84153 ASSAY OF PSA TOTAL: CPT | Performed by: FAMILY MEDICINE

## 2023-06-09 PROCEDURE — 85025 COMPLETE CBC W/AUTO DIFF WBC: CPT | Performed by: FAMILY MEDICINE

## 2023-06-09 PROCEDURE — 80053 COMPREHEN METABOLIC PANEL: CPT | Performed by: FAMILY MEDICINE

## 2023-06-09 PROCEDURE — 11000001 HC ACUTE MED/SURG PRIVATE ROOM

## 2023-06-09 PROCEDURE — 21400001 HC TELEMETRY ROOM

## 2023-06-09 PROCEDURE — 94761 N-INVAS EAR/PLS OXIMETRY MLT: CPT

## 2023-06-09 PROCEDURE — 84156 ASSAY OF PROTEIN URINE: CPT | Performed by: INTERNAL MEDICINE

## 2023-06-09 PROCEDURE — 0077U IG PARAPROTEIN QUAL BLD/UR: CPT | Performed by: INTERNAL MEDICINE

## 2023-06-09 RX ORDER — TRAMADOL HYDROCHLORIDE 50 MG/1
50 TABLET ORAL EVERY 4 HOURS PRN
Status: DISCONTINUED | OUTPATIENT
Start: 2023-06-09 | End: 2023-06-10

## 2023-06-09 RX ORDER — ACETAMINOPHEN 325 MG/1
650 TABLET ORAL EVERY 6 HOURS PRN
Status: DISCONTINUED | OUTPATIENT
Start: 2023-06-09 | End: 2023-06-19 | Stop reason: HOSPADM

## 2023-06-09 RX ADMIN — PAMIDRONATE DISODIUM 60 MG: 3 INJECTION, SOLUTION INTRAVENOUS at 10:06

## 2023-06-09 RX ADMIN — FAMOTIDINE 20 MG: 20 TABLET, FILM COATED ORAL at 10:06

## 2023-06-09 RX ADMIN — CARVEDILOL 12.5 MG: 12.5 TABLET, FILM COATED ORAL at 10:06

## 2023-06-09 RX ADMIN — ENOXAPARIN SODIUM 40 MG: 40 INJECTION SUBCUTANEOUS at 05:06

## 2023-06-09 RX ADMIN — LEVOTHYROXINE SODIUM 112 MCG: 112 TABLET ORAL at 05:06

## 2023-06-09 RX ADMIN — AMLODIPINE BESYLATE 5 MG: 5 TABLET ORAL at 10:06

## 2023-06-09 RX ADMIN — Medication 6 MG: at 09:06

## 2023-06-09 RX ADMIN — SENNOSIDES AND DOCUSATE SODIUM 1 TABLET: 50; 8.6 TABLET ORAL at 10:06

## 2023-06-09 RX ADMIN — SODIUM CHLORIDE 1000 ML: 9 INJECTION, SOLUTION INTRAVENOUS at 06:06

## 2023-06-09 RX ADMIN — CARVEDILOL 12.5 MG: 12.5 TABLET, FILM COATED ORAL at 09:06

## 2023-06-09 RX ADMIN — TAMSULOSIN HYDROCHLORIDE 0.4 MG: 0.4 CAPSULE ORAL at 10:06

## 2023-06-09 RX ADMIN — SENNOSIDES AND DOCUSATE SODIUM 1 TABLET: 50; 8.6 TABLET ORAL at 09:06

## 2023-06-09 RX ADMIN — TRAMADOL HYDROCHLORIDE 50 MG: 50 TABLET, FILM COATED ORAL at 09:06

## 2023-06-09 RX ADMIN — FINASTERIDE 5 MG: 5 TABLET, FILM COATED ORAL at 10:06

## 2023-06-09 RX ADMIN — HYDRALAZINE HYDROCHLORIDE 10 MG: 20 INJECTION INTRAMUSCULAR; INTRAVENOUS at 11:06

## 2023-06-09 RX ADMIN — CALCITONIN SALMON 1 SPRAY: 200 SPRAY, METERED NASAL at 10:06

## 2023-06-09 NOTE — NURSING
Patient this shift has unintentionally pulled out two Ivs, seemed impulsive even though has been educated on multiple occasions to call. First instance he saw a piece of tape on his hand pulled it and the catheter came out of his hand.  He did not realize what he had done. Second occasion after being educated to call for help tried to drink some water and spilled it on himself, he pulled out his IV taking all of his clothes off of himself. While trying to change his bed he wanted to walk to the couch.  I don't believe he is aware of his limitations, he is shaky just standing on the side of the bed. Pleasant and could answer my questions followed commands

## 2023-06-09 NOTE — NURSING
[facility] called to state the on call Oncologist will see the patient on Tuesday of next week, as he is deemed stable. Dr. Gonzalez is aware as per LouannNP

## 2023-06-09 NOTE — PROGRESS NOTES
"Inpatient Nutrition Evaluation    Admit Date: 6/7/2023   Total duration of encounter: 2 days    Nutrition Recommendation/Prescription     Rec'd continue Diabetic Diet as tolerated.   Boost Glucose Control, BID. Provides 250 kcal, 14 g protein per serving.  Monitor intake, weight, and labs.     RD following and available as needed. Thank you.     Nutrition Assessment     Chart Review    Reason Seen: continuous nutrition monitoring    Malnutrition Screening Tool Results                Diagnosis:   *Confusion    Abnormal radionuclide bone scan    Hypercalcemia    Hypoalbuminemia    Weakness    COLTON (acute kidney injury)       Relevant Medical History:   None at this time.     Nutrition-Related Medications:   Lovenox; Insulin; Levothyroxine; Senna-Docusate.     Nutrition-Related Labs:  6/9: H/H 10.7/32.1(L); K+ 3.3(L); Crea 2.32(H); GFR 29(L); (H); Ca+ 12.9(H); Alb 1.7(L).  6/8: HgbA1C 6.4.    Diet Order: Diet diabetic  Oral Supplement Order: none  Appetite/Oral Intake: fair/50-75% of meals  Factors Affecting Nutritional Intake: none identified  Food/Mormon/Cultural Preferences: none reported  Food Allergies: shellfish and Iodine.        Wound(s):       Comments    6/9: Pt with fair intake. Consuming 50% of meals per recorded intake. No recent weight loss noted/reported. Labs and meds reviewed. Nsg reports confusion better today. Will add Boost Glucose Control and continue to monitor during stay.       Anthropometrics    Height: 5' 11" (180.3 cm)    Last Weight: 113.4 kg (250 lb) (06/07/23 1707)    BMI (Calculated): 34.9  BMI Classification: obese grade I (BMI 30-34.9)        Ideal Body Weight (IBW), Male: 172 lb     % Ideal Body Weight, Male (lb): 145.35 %                          Usual Weight Provided By: EMR weight history    Wt Readings from Last 5 Encounters:   06/07/23 113.4 kg (250 lb)   02/07/18 111.2 kg (245 lb 2.4 oz)     Weight Change(s) Since Admission:  Admit Weight: 113.4 kg (250 lb) (06/07/23 " 1709)      Patient Education    Not applicable.    Monitoring & Evaluation     Dietitian will monitor food and beverage intake, energy intake, weight, weight change, electrolyte/renal panel, glucose/endocrine profile, and gastrointestinal profile.  Nutrition Risk/Follow-Up: low (follow-up in 5-7 days)  Patients assigned 'low nutrition risk' status do not qualify for a full nutritional assessment but will be monitored and re-evaluated in a 5-7 day time period. Please consult if re-evaluation needed sooner.

## 2023-06-09 NOTE — PROGRESS NOTES
[facility]  [address] [address]  Phone: [phone]    Department of Nephrology  Progress Note      PATIENT NAME: Chip Goodson  MRN: [id number]  TODAY'S DATE: 06/09/2023  ADMIT DATE: 6/7/2023    SUBJECTIVE     PRINCIPLE PROBLEM: Confusion    INTERVAL HISTORY:    6/9/2023  Patient much more with it today able to carry on conversation with the gentleman.  He is a 72-year-old  male with a history of diabetes that was admitted with altered consciousness and hypercalcemia.  He notably had a very elevated total protein and globulin.  Fourth, serum protein electrophoresis, urine protein electrophoresis and quantitation of gamma globulins were obtain SPEP and UPEP are not back yet however his IgA level was over 7000 mg/dL were normal is up to about 600 mg/dL.    Review of patient's allergies indicates:   Allergen Reactions    Iodine Anaphylaxis    Shellfish containing products     Amoxicillin-pot clavulanate Itching       Review of Systems   Constitutional: Negative.    Respiratory: Negative.     Cardiovascular: Negative.    Genitourinary: Negative.    Musculoskeletal:  Positive for back pain.     OBJECTIVE     VITAL SIGNS (Most Recent)  Temp: 99.6 °F (37.6 °C) (06/09/23 1111)  Pulse: 61 (06/09/23 1111)  Resp: 20 (06/09/23 0800)  BP: (!) 206/83 (06/09/23 1111)  SpO2: 95 % (06/09/23 1111)    VENTILATION STATUS  Resp: 20 (06/09/23 0800)  SpO2: 95 % (06/09/23 1111)           I & O (Last 24H):  Intake/Output Summary (Last 24 hours) at 6/9/2023 1204  Last data filed at 6/9/2023 1134  Gross per 24 hour   Intake --   Output 2650 ml   Net -2650 ml       WEIGHTS  Wt Readings from Last 3 Encounters:   06/07/23 1707 113.4 kg (250 lb)   02/07/18 1124 111.2 kg (245 lb 2.4 oz)   10/09/17 0837 108 kg (237 lb 15.8 oz)   06/01/17 0833 108.9 kg (239 lb 15.9 oz)       Physical Exam  Constitutional:       General: He is not in acute distress.  Eyes:      Extraocular Movements:  Extraocular movements intact.      Pupils: Pupils are equal, round, and reactive to light.   Cardiovascular:      Rate and Rhythm: Normal rate and regular rhythm.      Heart sounds: No murmur heard.  Pulmonary:      Effort: Pulmonary effort is normal.      Breath sounds: Normal breath sounds.   Abdominal:      General: Abdomen is flat.   Musculoskeletal:      Right lower leg: No edema.      Left lower leg: No edema.   Skin:     Capillary Refill: Capillary refill takes less than 2 seconds.   Neurological:      General: No focal deficit present.      Mental Status: He is alert and oriented to person, place, and time.   Psychiatric:         Mood and Affect: Mood normal.       SCHEDULED MEDS:   amLODIPine  5 mg Oral Daily    carvediloL  12.5 mg Oral BID    enoxparin  40 mg Subcutaneous Q24H (prophylaxis, 1700)    famotidine  20 mg Oral Daily    finasteride  5 mg Oral Daily    levothyroxine  112 mcg Oral Before breakfast    senna-docusate 8.6-50 mg  1 tablet Oral BID    tamsulosin  1 capsule Oral Daily       CONTINUOUS INFUSIONS:   sodium chloride 0.9% 1,000 mL (06/09/23 0635)       PRN MEDS:acetaminophen, albuterol, cloNIDine, dextrose, dextrose, dextrose 50%, dextrose 50%, glucagon (human recombinant), glucose, glucose, hydrALAZINE, insulin aspart U-100, melatonin, ondansetron, sodium chloride 0.9%    LABS AND DIAGNOSTICS     CBC LAST 3 DAYS  Recent Labs   Lab 06/07/23 1730 06/08/23 0441 06/09/23  0742   WBC 7.83 6.83 6.84   RBC 3.58* 3.43* 3.37*   HGB 11.6* 11.2* 10.7*   HCT 34.3* 33.3* 32.1*   MCV 95.8* 97.1* 95.3*   MCH 32.4* 32.7* 31.8*   MCHC 33.8 33.6 33.3   RDW 14.4 14.4 14.5    175 172   MPV 9.3 8.7 9.2       COAGULATION LAST 3 DAYS  Recent Labs   Lab 06/07/23  1730   INR 1.75*       CHEMISTRY LAST 3 DAYS  Recent Labs   Lab 06/07/23  1730 06/07/23 2037 06/08/23  0441 06/09/23  0742   * 137 136 137   K 3.6 3.2* 3.2* 3.3*   CO2 26 28 25 25   BUN 23.0 22.0 22.0 22.0   CREATININE 2.31* 2.24* 2.19*  2.32*   CALCIUM 13.4* 12.6* 12.6* 12.9*   MG 2.10  --  2.00  --    ALBUMIN 1.9*  --  1.8* 1.7*   ALKPHOS 69  --  72 66   ALT 11  --  10 6   AST 31  --  26 24   BILITOT 0.7  --  0.7 0.7       CARDIAC PROFILE LAST 3 DAYS  Recent Labs   Lab 06/07/23  1730   TROPONINI 0.023       ENDOCRINE LAST 3 DAYS  Recent Labs   Lab 06/07/23  1730 06/08/23  0441   TSH 3.649 3.557       LAST ARTERIAL BLOOD GAS  ABG  No results for input(s): PH, PO2, PCO2, HCO3, BE in the last 168 hours.    LAST 7 DAYS MICROBIOLOGY   Microbiology Results (last 7 days)       ** No results found for the last 168 hours. **            MOST RECENT IMAGING  NM Bone Scan Whole Body  Narrative: EXAMINATION:  NM BONE SCAN WHOLE BODY    CLINICAL HISTORY:  myeloma ??;, .    TECHNIQUE:  mCi of Tc-99m MDP was administered intravenously. After appropriate delay, whole body bone scan images were obtained.    COMPARISON:  None available    FINDINGS:  Activity is identified within the kidneys bilaterally and within the bladder. Focal increased activity is evident at the anterior right 4th 5th and 6th ribs and anterior left 1st, 4th, and 7th ribs.  There is mild central activity at the manubrium.  There is mild increased activity at the sternoclavicular joints as well as the shoulders bilaterally.  Mild increased activity evident thyroid bed.  Impression: 1. Scattered activity at the anterior left right rib margins as described.  A CT exam on the previous day reveals no definite fracture.  There is mild heterogeneous and bony loss at the anterior left and right ribs.  This is not have the typical pattern of a metastases.  2. Focal increased activity at the anterior manubrium which again on the CT exam demonstrates osteopenia and heterogeneous bony loss as well as scattered subcentimeter small lytic defects.  3. Suspect mild arthritic changes at the shoulders sternoclavicular joints    Electronically signed by: Boo Camacho  Date:    06/08/2023  Time:    14:28  XR  Metastatic Survey  Narrative: EXAMINATION:  SKELETAL SURVEY:    CLINICAL HISTORY:  , possible myeloma;    TECHNIQUE:  AP and lateral views were obtained of the axial skeleton as well as limited views of the lower and upper upper extremity.    COMPARISON:  None available    FINDINGS:  There is absence of dentition.  Mastoid air cells are aerated bilaterally.  Maxillary and frontal sinuses are relatively clear.  There is sinusoidal deviation of the nasal septum.  Bony structures are osteopenic.  Degenerative changes are noted to the spine.  No fracture or subluxation is seen.  No aggressive osteolytic or osteoblastic lesion is seen.  A small oval sclerotic focus is noted to the proximal left humeral shaft measuring 10 mm in greatest diameter.  Arthritic changes are evident at the shoulders, hips, wrists, and elbows bilaterally.  Atherosclerosis is seen.  Enthesophyte formation is evident at the tibial tubercle.  A pinpoint 2-3 mm metallic density noted to the soft tissues of the right axilla suggesting a foreign body and another at the soft tissues of the medial right thigh  Impression: 1. No acute osseous defect identified  2. Osteopenia  3. Osteoarthritis  4. Pinpoint metallic density/suspect foreign body right axilla and medial right thigh soft tissues  5. Small 1 cm oval shaped sclerotic focus proximal left humeral shaft which has a nonaggressive appearance.    Electronically signed by: Boo Camacho  Date:    06/08/2023  Time:    13:55  US Retroperitoneal Complete  Narrative: EXAMINATION:  RENAL/RETROPERITONEAL SONOGRAM:    CLINICAL HISTORY:  Renal insufficiency;, .    COMPARISON:  None available    FINDINGS:  Real-time imaging was performed through the retroperitoneum evaluating the kidneys. Arterial and venous flow are identified within the kidneys. No hydronephrosis is seen.  The bladder is incompletely distended.  Two round hypoechoic foci are noted to the mid and lower right kidney measuring 3.3 cm and 1.5  cm respectively compatible with simple cysts.  2 round hypoechoic foci are noted to the upper and mid left kidney measuring 5.4 and 6.5 cm respectively compatible with cysts.  No free fluid collect identified.    MEASUREMENTS:    Right Kidney: 12.9 cm    RI: 0.79    Left Kidney: 14.7 cm    RI: 0.73  Impression: 1. Findings compatible simple cysts to the kidneys bilaterally as described  2. Elevated renal resistive indices bilaterally  3. Incompletely distended bladder    Electronically signed by: Boo Camacho  Date:    06/08/2023  Time:    12:05  CT Chest Without Contrast  Narrative: EXAMINATION:  CT CHEST WITHOUT CONTRAST    CLINICAL HISTORY:  Hypercalcemia;, .    TECHNIQUE:  PATIENT RADIATION DOSE: DLP(mGycm) 215    As per PQRS measures, all CT scans at this facility used dose modulation, iterative reconstruction, and/or weight based dose adjustment when appropriate to reduce radiation dose to as low as reasonably achievable.    COMPARISON:  None available    FINDINGS:  Serial axial imaging of the chest without the administration of IV contrast.  Both soft tissue and pulmonary parenchymal windows were obtained.  Additional sagittal and coronal reconstructions were performed.    Artifact: Mild motion artifact is seen on some images.    Contrast: Please note this is a non contrast CT scan of the chest which is non-diagnostic for pulmonary emboli.    Soft Tissues: Unremarkable.    Lines and Tubes: None.    Axilla: A few prominent lymph nodes are seen in the right and left axilla.    Neck: The visualized soft tissues of the neck appear unremarkable.    Mediastinum: A few subcentimeter mediastinal lymph nodes are seen in the upper and lower paratracheal and subcarinal spaces .    Heart: Moderate cardiomegaly is seen. Pronounced coronary artery calcification is seen.    Aorta: Unremarkable appearing aorta.    Pulmonary Arteries: Mildly enlarged main pulmonary arteries are identified. This could reflect an element of  pulmonary arterial hypertension.    Lungs: There is mild non specific dependent change at the lung bases. Mild streaky linear opacity is seen predominantly in the dependent portions at the lung bases with peripheral predominance consistent with subsegmental atelectasis. A small patchy area of ground glass density is seen in the lateral basal segment of the left lower lobe. This could reflect an acute focal infiltrate / pneumonitis.    Pleura: There is a small left sided pleural effusion. There is mild pleural thickening along the right lower posterior pleural surface.    Bony Structures: There is diffuse osteopenia along the visualised bony structures together with multiple, numerous, small round-ovoid lucent lesion of varying sizes involving the marrow.    Spine: Moderate multilevel spondylolytic changes are seen in the thoracic spine.    Abdomen: Bilateral renal cysts are indentified, which are large in size on the left side.  Impression: Impression:    1. There is a small left sided pleural effusion.    2. A small patchy area of ground glass density is seen in the lateral basal segment of the left lower lobe. This could reflect an acute focal infiltrate / pneumonitis. Correlate clinically and with laboratory findings, as regards additional evaluation and follow-up.    3. There is diffuse osteopenia along the visualised bony structures together with multiple, numerous, small round-ovoid lucent lesion of varying sizes involving the marrow. These changes could reflect metabolic bone disease like hyperparathyroidism. Correlate clinically and with laboratory findings, as regards additional evaluation and follow-up.    1. Concur with preliminary report    Electronically signed by: Boo Camacho  Date:    06/08/2023  Time:    08:30  X-Ray Chest 1 View  Narrative: EXAMINATION:  XR CHEST 1 VIEW    CLINICAL HISTORY:  , Weakness.    COMPARISON:  0923    FINDINGS:  An AP view or more reveals the heart to be normal in size.   The trachea is just right of midline.  Atherosclerosis is seen within the aorta.  There is patchy hazy opacity at the left lung base.  There are changes and curvature are noted to the thoracic spine.  Impression: 1. Patchy infiltrate, atelectasis, and pleural reaction left lung base  2. Atherosclerosis  3. Thoracic spondylosis and scoliosis    Electronically signed by: Boo Camacho  Date:    06/08/2023  Time:    08:11      LASTECHO  No results found for this or any previous visit.      CURRENT/PREVIOUS VISIT EKG  Results for orders placed or performed during the hospital encounter of 06/07/23   EKG 12-lead    Collection Time: 06/07/23  5:17 PM    Narrative    Test Reason : R53.1,    Vent. Rate : 082 BPM     Atrial Rate : 082 BPM     P-R Int : 202 ms          QRS Dur : 090 ms      QT Int : 344 ms       P-R-T Axes : 055 -02 062 degrees     QTc Int : 401 ms    Sinus rhythm with occasional Premature ventricular complexes  Nonspecific T wave abnormality  Abnormal ECG  No previous ECGs available  Confirmed by Boo Sargent MD ([id number]) on 6/8/2023 8:09:08 AM    Referred By: THOMAS   SELF           Confirmed By:Boo Sargent MD       ASSESSMENT/PLAN:     Active Hospital Problems    Diagnosis    *Confusion    Abnormal radionuclide bone scan    Hypercalcemia    Hypoalbuminemia    Weakness    COLTON (acute kidney injury)       ASSESSMENT & PLAN:   72-year-old  male hypercalcemia and confusion likely due to a paraproteinemia his IgA levels have come back very elevated bili indicating an IgA multiple myeloma.      RECOMMENDATIONS:  Consult pathology and Hematology-Oncology for bone marrow biopsy and treatment        Avi Vega MD  Department of Nephrology  Date of Service: 06/09/2023  12:04 PM

## 2023-06-09 NOTE — PLAN OF CARE
Problem: Adult Inpatient Plan of Care  Goal: Plan of Care Review  Outcome: Ongoing, Progressing  Goal: Patient-Specific Goal (Individualized)  Outcome: Ongoing, Progressing  Goal: Absence of Hospital-Acquired Illness or Injury  Outcome: Ongoing, Progressing  Goal: Optimal Comfort and Wellbeing  6/9/2023 0312 by Debbie Trimble RN  Outcome: Ongoing, Progressing  6/9/2023 0311 by Debbie Trimble RN  Outcome: Ongoing, Progressing  Goal: Readiness for Transition of Care  6/9/2023 0312 by Debbie Trimble RN  Outcome: Ongoing, Progressing  6/9/2023 0311 by Debbie Trimble RN  Outcome: Ongoing, Progressing     Problem: Fluid and Electrolyte Imbalance (Acute Kidney Injury/Impairment)  Goal: Fluid and Electrolyte Balance  Outcome: Ongoing, Progressing     Problem: Oral Intake Inadequate (Acute Kidney Injury/Impairment)  Goal: Optimal Nutrition Intake  Outcome: Ongoing, Progressing

## 2023-06-09 NOTE — PROGRESS NOTES
Progress Note    Admit Date: 6/7/2023   LOS: 1 day     SUBJECTIVE:     Follow-up For:  high ca    Scheduled Meds:   amLODIPine  5 mg Oral Daily    calcitonin (salmon)  1 spray Alternating Nostrils Daily    carvediloL  12.5 mg Oral BID    enoxparin  40 mg Subcutaneous Q24H (prophylaxis, 1700)    famotidine  20 mg Oral Daily    finasteride  5 mg Oral Daily    levothyroxine  112 mcg Oral Before breakfast    senna-docusate 8.6-50 mg  1 tablet Oral BID    tamsulosin  1 capsule Oral Daily     Continuous Infusions:  PRN Meds:acetaminophen, albuterol, cloNIDine, dextrose, dextrose, dextrose 50%, dextrose 50%, glucagon (human recombinant), glucose, glucose, hydrALAZINE, insulin aspart U-100, melatonin, ondansetron, sodium chloride 0.9%    Review of patient's allergies indicates:   Allergen Reactions    Iodine Anaphylaxis    Shellfish containing products     Amoxicillin-pot clavulanate Itching       Review of Systems  Review of Systems   Constitutional:  Positive for malaise/fatigue and weight loss. Negative for chills and fever.   HENT:  Negative for congestion, ear discharge, ear pain, hearing loss, nosebleeds, sinus pain, sore throat and tinnitus.    Eyes:  Positive for double vision. Negative for blurred vision.   Respiratory:  Positive for cough and shortness of breath. Negative for hemoptysis, sputum production, wheezing and stridor.    Cardiovascular:  Negative for chest pain, palpitations, orthopnea and claudication.   Gastrointestinal:  Positive for heartburn. Negative for abdominal pain, blood in stool, constipation, diarrhea, nausea and vomiting.   Genitourinary:  Negative for dysuria and urgency.   Musculoskeletal:  Positive for back pain, myalgias and neck pain.   Skin:  Negative for itching and rash.   Neurological:  Negative for dizziness, tingling, tremors, sensory change, speech change, focal weakness, seizures, weakness and headaches.   Endo/Heme/Allergies:  Negative for polydipsia.   Psychiatric/Behavioral:   Negative for depression, substance abuse and suicidal ideas.      OBJECTIVE:     Vital Signs (Most Recent)  Temp: 97.9 °F (36.6 °C) (06/1950)  Pulse: 72 (06/08/23 2100)  Resp: 20 (06/08/23 2100)  BP: (!) 177/82 (06/1950)  SpO2: 96 % (06/08/23 2100)    Vital Signs Range (Last 24H):  Temp:  [97.3 °F (36.3 °C)-98.2 °F (36.8 °C)]   Pulse:  [53-77]   Resp:  [20]   BP: (144-177)/(68-88)   SpO2:  [92 %-96 %]     I & O (Last 24H):  Intake/Output Summary (Last 24 hours) at 6/8/2023 2122  Last data filed at 6/8/2023 1607  Gross per 24 hour   Intake 780 ml   Output 2825 ml   Net -2045 ml     Physical Exam:  Physical Exam   Vitals:    06/08/23 2100   BP:    Pulse: 72   Resp: 20   Temp:        Physical Exam   Constitutional: drowsy  but answers  appropiatel& oriented, no acute distress  HENT:   Head: Normocephalic and atraumatic.   Eyes: Conjunctivae normal and EOM are normal. Pupils are equal, round, and reactive to light.   Neck: Normal range of motion. Neck supple.   Cardiovascular: Normal rate, regular rhythm, normal heart sounds   Pulmonary/Chest: CTAB, nonlabored respiration  Abdominal: Soft, nontender, bowel sounds normal  Neurological: nonfocal  Skin: warm, dry intact  Psychiatric: normal mood and affect, cooperative          Laboratory:  Recent Results (from the past 24 hour(s))   Comprehensive metabolic panel    Collection Time: 06/08/23  4:41 AM   Result Value Ref Range    Sodium Level 136 136 - 145 mmol/L    Potassium Level 3.2 (L) 3.5 - 5.1 mmol/L    Chloride 100 98 - 107 mmol/L    Carbon Dioxide 25 23 - 31 mmol/L    Glucose Level 123 (H) 82 - 115 mg/dL    Blood Urea Nitrogen 22.0 8.4 - 25.7 mg/dL    Creatinine 2.19 (H) 0.73 - 1.18 mg/dL    Calcium Level Total 12.6 (HH) 8.8 - 10.0 mg/dL    Protein Total 11.8 (H) 5.8 - 7.6 gm/dL    Albumin Level 1.8 (L) 3.4 - 4.8 g/dL    Globulin 10.0 (H) 2.4 - 3.5 gm/dL    Albumin/Globulin Ratio 0.2 (L) 1.1 - 2.0 ratio    Bilirubin Total 0.7 <=1.5 mg/dL    Alkaline  Phosphatase 72 40 - 150 unit/L    Alanine Aminotransferase 10 0 - 55 unit/L    Aspartate Aminotransferase 26 5 - 34 unit/L    eGFR 31 mls/min/1.73/m2   Magnesium    Collection Time: 06/08/23  4:41 AM   Result Value Ref Range    Magnesium Level 2.00 1.60 - 2.60 mg/dL   Phosphorus    Collection Time: 06/08/23  4:41 AM   Result Value Ref Range    Phosphorus Level 3.0 2.3 - 4.7 mg/dL   TSH    Collection Time: 06/08/23  4:41 AM   Result Value Ref Range    Thyroid Stimulating Hormone 3.557 0.350 - 4.940 uIU/mL   Hemoglobin A1C    Collection Time: 06/08/23  4:41 AM   Result Value Ref Range    Hemoglobin A1c 6.4 <=7.0 %    Estimated Average Glucose 137.0 mg/dL   CBC with Differential    Collection Time: 06/08/23  4:41 AM   Result Value Ref Range    WBC 6.83 4.50 - 11.50 x10(3)/mcL    RBC 3.43 (L) 4.70 - 6.10 x10(6)/mcL    Hgb 11.2 (L) 14.0 - 18.0 g/dL    Hct 33.3 (L) 42.0 - 52.0 %    MCV 97.1 (H) 80.0 - 94.0 fL    MCH 32.7 (H) 27.0 - 31.0 pg    MCHC 33.6 33.0 - 36.0 g/dL    RDW 14.4 11.5 - 17.0 %    Platelet 175 130 - 400 x10(3)/mcL    MPV 8.7 7.4 - 10.4 fL    Neut % 49.9 %    Lymph % 35.9 %    Mono % 10.1 %    Eos % 3.4 %    Basophil % 0.3 %    Lymph # 2.45 0.6 - 4.6 x10(3)/mcL    Neut # 3.41 2.1 - 9.2 x10(3)/mcL    Mono # 0.69 0.1 - 1.3 x10(3)/mcL    Eos # 0.23 0 - 0.9 x10(3)/mcL    Baso # 0.02 <=0.2 x10(3)/mcL    IG# 0.03 0 - 0.04 x10(3)/mcL    IG% 0.4 %   Immunoglobulins (IgG, IgA, IgM) Quantitative    Collection Time: 06/08/23  7:00 AM   Result Value Ref Range    IgG Level 165.00 (L) 540.00 - 1,822.00 mg/dL    IgA Level >7,040.0 (H) 101.0 - 645.0 mg/dL    IgM Level 6.0 (L) 22.0 - 240.0 mg/dL   Vitamin D    Collection Time: 06/08/23  7:00 AM   Result Value Ref Range    Vit D 25 OH 93.1 (H) 30.0 - 80.0 ng/mL   POCT glucose    Collection Time: 06/08/23 11:48 AM   Result Value Ref Range    POCT Glucose 118 (H) 70 - 110 mg/dL   POCT glucose    Collection Time: 06/08/23  4:30 PM   Result Value Ref Range    POCT Glucose 100  70 - 110 mg/dL   POCT glucose    Collection Time: 06/08/23  8:13 PM   Result Value Ref Range    POCT Glucose 119 (H) 70 - 110 mg/dL        Diagnostic Results:  X-Ray Chest 1 View    Result Date: 6/8/2023  EXAMINATION: XR CHEST 1 VIEW CLINICAL HISTORY: , Weakness. COMPARISON: 0923 FINDINGS: An AP view or more reveals the heart to be normal in size.  The trachea is just right of midline.  Atherosclerosis is seen within the aorta.  There is patchy hazy opacity at the left lung base.  There are changes and curvature are noted to the thoracic spine.     1. Patchy infiltrate, atelectasis, and pleural reaction left lung base 2. Atherosclerosis 3. Thoracic spondylosis and scoliosis Electronically signed by: Boo Camacho Date:    06/08/2023 Time:    08:11    CT Head Without Contrast    Result Date: 6/7/2023  EXAMINATION: CT HEAD WITHOUT CONTRAST CLINICAL HISTORY: Mental status change, unknown cause; TECHNIQUE: Low dose axial CT images obtained throughout the head without intravenous contrast. Sagittal and coronal reconstructions were performed.  .  Automated exposure control used. COMPARISON: None. FINDINGS: Intracranial compartment: Ventricles and sulci are normal in size for age without evidence of hydrocephalus. No extra-axial blood or fluid collections. The brain parenchyma appears normal. No parenchymal mass, hemorrhage, edema or major vascular distribution infarct. Skull/extracranial contents (limited evaluation): No fracture. Mastoid air cells and paranasal sinuses are essentially clear.     No acute abnormality. Electronically signed by: Chrystal Wallace MD Date:    06/07/2023 Time:    17:58    CT Chest Without Contrast    Result Date: 6/8/2023  EXAMINATION: CT CHEST WITHOUT CONTRAST CLINICAL HISTORY: Hypercalcemia;, . TECHNIQUE: PATIENT RADIATION DOSE: DLP(mGycm) 215 As per PQRS measures, all CT scans at this facility used dose modulation, iterative reconstruction, and/or weight based dose adjustment when  appropriate to reduce radiation dose to as low as reasonably achievable. COMPARISON: None available FINDINGS: Serial axial imaging of the chest without the administration of IV contrast.  Both soft tissue and pulmonary parenchymal windows were obtained.  Additional sagittal and coronal reconstructions were performed. Artifact: Mild motion artifact is seen on some images. Contrast: Please note this is a non contrast CT scan of the chest which is non-diagnostic for pulmonary emboli. Soft Tissues: Unremarkable. Lines and Tubes: None. Axilla: A few prominent lymph nodes are seen in the right and left axilla. Neck: The visualized soft tissues of the neck appear unremarkable. Mediastinum: A few subcentimeter mediastinal lymph nodes are seen in the upper and lower paratracheal and subcarinal spaces . Heart: Moderate cardiomegaly is seen. Pronounced coronary artery calcification is seen. Aorta: Unremarkable appearing aorta. Pulmonary Arteries: Mildly enlarged main pulmonary arteries are identified. This could reflect an element of pulmonary arterial hypertension. Lungs: There is mild non specific dependent change at the lung bases. Mild streaky linear opacity is seen predominantly in the dependent portions at the lung bases with peripheral predominance consistent with subsegmental atelectasis. A small patchy area of ground glass density is seen in the lateral basal segment of the left lower lobe. This could reflect an acute focal infiltrate / pneumonitis. Pleura: There is a small left sided pleural effusion. There is mild pleural thickening along the right lower posterior pleural surface. Bony Structures: There is diffuse osteopenia along the visualised bony structures together with multiple, numerous, small round-ovoid lucent lesion of varying sizes involving the marrow. Spine: Moderate multilevel spondylolytic changes are seen in the thoracic spine. Abdomen: Bilateral renal cysts are indentified, which are large in size  on the left side.     Impression: 1. There is a small left sided pleural effusion. 2. A small patchy area of ground glass density is seen in the lateral basal segment of the left lower lobe. This could reflect an acute focal infiltrate / pneumonitis. Correlate clinically and with laboratory findings, as regards additional evaluation and follow-up. 3. There is diffuse osteopenia along the visualised bony structures together with multiple, numerous, small round-ovoid lucent lesion of varying sizes involving the marrow. These changes could reflect metabolic bone disease like hyperparathyroidism. Correlate clinically and with laboratory findings, as regards additional evaluation and follow-up. 1. Concur with preliminary report Electronically signed by: Boo Camacho Date:    06/08/2023 Time:    08:30    NM Bone Scan Whole Body    Result Date: 6/8/2023  EXAMINATION: NM BONE SCAN WHOLE BODY CLINICAL HISTORY: myeloma ??;, . TECHNIQUE: mCi of Tc-99m MDP was administered intravenously. After appropriate delay, whole body bone scan images were obtained. COMPARISON: None available FINDINGS: Activity is identified within the kidneys bilaterally and within the bladder. Focal increased activity is evident at the anterior right 4th 5th and 6th ribs and anterior left 1st, 4th, and 7th ribs.  There is mild central activity at the manubrium.  There is mild increased activity at the sternoclavicular joints as well as the shoulders bilaterally.  Mild increased activity evident thyroid bed.     1. Scattered activity at the anterior left right rib margins as described.  A CT exam on the previous day reveals no definite fracture.  There is mild heterogeneous and bony loss at the anterior left and right ribs.  This is not have the typical pattern of a metastases. 2. Focal increased activity at the anterior manubrium which again on the CT exam demonstrates osteopenia and heterogeneous bony loss as well as scattered subcentimeter small lytic  defects. 3. Suspect mild arthritic changes at the shoulders sternoclavicular joints Electronically signed by: Boo Camacho Date:    06/08/2023 Time:    14:28    US Retroperitoneal Complete    Result Date: 6/8/2023  EXAMINATION: RENAL/RETROPERITONEAL SONOGRAM: CLINICAL HISTORY: Renal insufficiency;, . COMPARISON: None available FINDINGS: Real-time imaging was performed through the retroperitoneum evaluating the kidneys. Arterial and venous flow are identified within the kidneys. No hydronephrosis is seen.  The bladder is incompletely distended.  Two round hypoechoic foci are noted to the mid and lower right kidney measuring 3.3 cm and 1.5 cm respectively compatible with simple cysts.  2 round hypoechoic foci are noted to the upper and mid left kidney measuring 5.4 and 6.5 cm respectively compatible with cysts.  No free fluid collect identified. MEASUREMENTS: Right Kidney: 12.9 cm RI: 0.79 Left Kidney: 14.7 cm RI: 0.73     1. Findings compatible simple cysts to the kidneys bilaterally as described 2. Elevated renal resistive indices bilaterally 3. Incompletely distended bladder Electronically signed by: Boo Camacho Date:    06/08/2023 Time:    12:05    XR Metastatic Survey    Result Date: 6/8/2023  EXAMINATION: SKELETAL SURVEY: CLINICAL HISTORY: , possible myeloma; TECHNIQUE: AP and lateral views were obtained of the axial skeleton as well as limited views of the lower and upper upper extremity. COMPARISON: None available FINDINGS: There is absence of dentition.  Mastoid air cells are aerated bilaterally.  Maxillary and frontal sinuses are relatively clear.  There is sinusoidal deviation of the nasal septum.  Bony structures are osteopenic.  Degenerative changes are noted to the spine.  No fracture or subluxation is seen.  No aggressive osteolytic or osteoblastic lesion is seen.  A small oval sclerotic focus is noted to the proximal left humeral shaft measuring 10 mm in greatest diameter.  Arthritic changes are  evident at the shoulders, hips, wrists, and elbows bilaterally.  Atherosclerosis is seen.  Enthesophyte formation is evident at the tibial tubercle.  A pinpoint 2-3 mm metallic density noted to the soft tissues of the right axilla suggesting a foreign body and another at the soft tissues of the medial right thigh     1. No acute osseous defect identified 2. Osteopenia 3. Osteoarthritis 4. Pinpoint metallic density/suspect foreign body right axilla and medial right thigh soft tissues 5. Small 1 cm oval shaped sclerotic focus proximal left humeral shaft which has a nonaggressive appearance. Electronically signed by: Boo Camacho Date:    06/08/2023 Time:    13:55       ASSESSMENT/PLAN:   72  year old  w  hypercalcemia   Needs workup   His ct  had finding in  c spine   Ordered  skeletal survey  area on humerus only   On nuc  bone scan   ribs munubrium  w  lesions and thyroid bed w  uptake   Will order thyroid  us.   Need to  rule out myeloma  Us  of renal  is  normal   except for  elevated resistive indices  Spep upep  ligh chains are  pending  Cont  his fluids  One spray of  calcitonin  today  and tomorrow      Plan:   Patient Active Problem List    Diagnosis Date Noted    Abnormal radionuclide bone scan 06/08/2023    Hypercalcemia 06/07/2023    Confusion 06/07/2023    Hypoalbuminemia 06/07/2023    Weakness 06/07/2023    COLTON (acute kidney injury) 06/07/2023    `

## 2023-06-10 LAB
ALBUMIN SERPL-MCNC: 1.7 G/DL (ref 3.4–4.8)
ALBUMIN/GLOB SERPL: 0.2 RATIO (ref 1.1–2)
ALLENS TEST: ABNORMAL
ALP SERPL-CCNC: 64 UNIT/L (ref 40–150)
ALT SERPL-CCNC: 7 UNIT/L (ref 0–55)
AST SERPL-CCNC: 31 UNIT/L (ref 5–34)
BASOPHILS # BLD AUTO: 0.02 X10(3)/MCL
BASOPHILS NFR BLD AUTO: 0.5 %
BILIRUBIN DIRECT+TOT PNL SERPL-MCNC: 0.9 MG/DL
BUN SERPL-MCNC: 28 MG/DL (ref 8.4–25.7)
CALCIUM SERPL-MCNC: 12.2 MG/DL (ref 8.8–10)
CHLORIDE SERPL-SCNC: 105 MMOL/L (ref 98–107)
CO2 SERPL-SCNC: 23 MMOL/L (ref 23–31)
CREAT SERPL-MCNC: 2.52 MG/DL (ref 0.73–1.18)
DELSYS: ABNORMAL
EOSINOPHIL # BLD AUTO: 0.03 X10(3)/MCL (ref 0–0.9)
EOSINOPHIL NFR BLD AUTO: 0.7 %
ERYTHROCYTE [DISTWIDTH] IN BLOOD BY AUTOMATED COUNT: 14.8 % (ref 11.5–17)
GFR SERPLBLD CREATININE-BSD FMLA CKD-EPI: 26 MLS/MIN/1.73/M2
GLOBULIN SER-MCNC: 9.6 GM/DL (ref 2.4–3.5)
GLUCOSE SERPL-MCNC: 135 MG/DL (ref 82–115)
HCO3 UR-SCNC: 24.4 MMOL/L (ref 24–28)
HCT VFR BLD AUTO: 30.5 % (ref 42–52)
HGB BLD-MCNC: 10.3 G/DL (ref 14–18)
IMM GRANULOCYTES # BLD AUTO: 0.05 X10(3)/MCL (ref 0–0.04)
IMM GRANULOCYTES NFR BLD AUTO: 1.2 %
LACTATE SERPL-SCNC: 0.8 MMOL/L (ref 0.5–2.2)
LYMPHOCYTES # BLD AUTO: 0.53 X10(3)/MCL (ref 0.6–4.6)
LYMPHOCYTES NFR BLD AUTO: 13.1 %
MAGNESIUM SERPL-MCNC: 1.9 MG/DL (ref 1.6–2.6)
MCH RBC QN AUTO: 32.2 PG (ref 27–31)
MCHC RBC AUTO-ENTMCNC: 33.8 G/DL (ref 33–36)
MCV RBC AUTO: 95.3 FL (ref 80–94)
MONOCYTES # BLD AUTO: 0.19 X10(3)/MCL (ref 0.1–1.3)
MONOCYTES NFR BLD AUTO: 4.7 %
NEUTROPHILS # BLD AUTO: 3.24 X10(3)/MCL (ref 2.1–9.2)
NEUTROPHILS NFR BLD AUTO: 79.8 %
PCO2 BLDA: 36 MMHG (ref 35–45)
PH SMN: 7.44 [PH] (ref 7.35–7.45)
PHOSPHATE SERPL-MCNC: 3.6 MG/DL (ref 2.3–4.7)
PLATELET # BLD AUTO: 138 X10(3)/MCL (ref 130–400)
PMV BLD AUTO: 8.9 FL (ref 7.4–10.4)
PO2 BLDA: 71 MMHG (ref 80–100)
POC BE: 0 MMOL/L
POC SATURATED O2: 95 % (ref 95–100)
POC TCO2: 25 MMOL/L (ref 23–27)
POCT GLUCOSE: 114 MG/DL (ref 70–110)
POCT GLUCOSE: 114 MG/DL (ref 70–110)
POCT GLUCOSE: 117 MG/DL (ref 70–110)
POCT GLUCOSE: 119 MG/DL (ref 70–110)
POCT GLUCOSE: 130 MG/DL (ref 70–110)
POTASSIUM SERPL-SCNC: 3.3 MMOL/L (ref 3.5–5.1)
PROT SERPL-MCNC: 11.3 GM/DL (ref 5.8–7.6)
RBC # BLD AUTO: 3.2 X10(6)/MCL (ref 4.7–6.1)
SAMPLE: ABNORMAL
SITE: ABNORMAL
SODIUM SERPL-SCNC: 139 MMOL/L (ref 136–145)
WBC # SPEC AUTO: 4.06 X10(3)/MCL (ref 4.5–11.5)

## 2023-06-10 PROCEDURE — C1751 CATH, INF, PER/CENT/MIDLINE: HCPCS

## 2023-06-10 PROCEDURE — 82803 BLOOD GASES ANY COMBINATION: CPT

## 2023-06-10 PROCEDURE — 83735 ASSAY OF MAGNESIUM: CPT | Performed by: FAMILY MEDICINE

## 2023-06-10 PROCEDURE — 25000003 PHARM REV CODE 250: Performed by: INTERNAL MEDICINE

## 2023-06-10 PROCEDURE — 11000001 HC ACUTE MED/SURG PRIVATE ROOM

## 2023-06-10 PROCEDURE — 94761 N-INVAS EAR/PLS OXIMETRY MLT: CPT

## 2023-06-10 PROCEDURE — 63600175 PHARM REV CODE 636 W HCPCS: Performed by: FAMILY MEDICINE

## 2023-06-10 PROCEDURE — 99900035 HC TECH TIME PER 15 MIN (STAT)

## 2023-06-10 PROCEDURE — A4216 STERILE WATER/SALINE, 10 ML: HCPCS | Performed by: FAMILY MEDICINE

## 2023-06-10 PROCEDURE — 84100 ASSAY OF PHOSPHORUS: CPT | Performed by: FAMILY MEDICINE

## 2023-06-10 PROCEDURE — 63600175 PHARM REV CODE 636 W HCPCS: Performed by: INTERNAL MEDICINE

## 2023-06-10 PROCEDURE — 83605 ASSAY OF LACTIC ACID: CPT | Performed by: FAMILY MEDICINE

## 2023-06-10 PROCEDURE — 25000003 PHARM REV CODE 250: Performed by: FAMILY MEDICINE

## 2023-06-10 PROCEDURE — 36410 VNPNXR 3YR/> PHY/QHP DX/THER: CPT

## 2023-06-10 PROCEDURE — 21400001 HC TELEMETRY ROOM

## 2023-06-10 PROCEDURE — 36600 WITHDRAWAL OF ARTERIAL BLOOD: CPT

## 2023-06-10 PROCEDURE — 85025 COMPLETE CBC W/AUTO DIFF WBC: CPT | Performed by: FAMILY MEDICINE

## 2023-06-10 PROCEDURE — 87040 BLOOD CULTURE FOR BACTERIA: CPT | Performed by: FAMILY MEDICINE

## 2023-06-10 PROCEDURE — 80053 COMPREHEN METABOLIC PANEL: CPT | Performed by: FAMILY MEDICINE

## 2023-06-10 RX ORDER — POTASSIUM CHLORIDE 7.45 MG/ML
10 INJECTION INTRAVENOUS ONCE
Status: COMPLETED | OUTPATIENT
Start: 2023-06-10 | End: 2023-06-10

## 2023-06-10 RX ORDER — SODIUM CHLORIDE 0.9 % (FLUSH) 0.9 %
10 SYRINGE (ML) INJECTION EVERY 6 HOURS
Status: DISCONTINUED | OUTPATIENT
Start: 2023-06-10 | End: 2023-06-19 | Stop reason: HOSPADM

## 2023-06-10 RX ORDER — AMLODIPINE BESYLATE 10 MG/1
10 TABLET ORAL DAILY
Status: DISCONTINUED | OUTPATIENT
Start: 2023-06-11 | End: 2023-06-19 | Stop reason: HOSPADM

## 2023-06-10 RX ORDER — CEFTRIAXONE 1 G/1
1 INJECTION, POWDER, FOR SOLUTION INTRAMUSCULAR; INTRAVENOUS
Status: DISCONTINUED | OUTPATIENT
Start: 2023-06-10 | End: 2023-06-10

## 2023-06-10 RX ORDER — AMLODIPINE BESYLATE 5 MG/1
5 TABLET ORAL DAILY
Status: DISCONTINUED | OUTPATIENT
Start: 2023-06-10 | End: 2023-06-10

## 2023-06-10 RX ORDER — FUROSEMIDE 10 MG/ML
20 INJECTION INTRAMUSCULAR; INTRAVENOUS ONCE
Status: DISCONTINUED | OUTPATIENT
Start: 2023-06-10 | End: 2023-06-10

## 2023-06-10 RX ORDER — AMLODIPINE BESYLATE 5 MG/1
5 TABLET ORAL DAILY
Status: DISPENSED | OUTPATIENT
Start: 2023-06-10 | End: 2023-06-11

## 2023-06-10 RX ORDER — SODIUM CHLORIDE 0.9 % (FLUSH) 0.9 %
10 SYRINGE (ML) INJECTION
Status: DISCONTINUED | OUTPATIENT
Start: 2023-06-10 | End: 2023-06-19 | Stop reason: HOSPADM

## 2023-06-10 RX ORDER — ALPRAZOLAM 0.25 MG/1
0.25 TABLET ORAL ONCE
Status: COMPLETED | OUTPATIENT
Start: 2023-06-10 | End: 2023-06-10

## 2023-06-10 RX ORDER — SODIUM CHLORIDE 9 MG/ML
INJECTION, SOLUTION INTRAVENOUS CONTINUOUS
Status: DISCONTINUED | OUTPATIENT
Start: 2023-06-10 | End: 2023-06-13

## 2023-06-10 RX ADMIN — LEVOTHYROXINE SODIUM 112 MCG: 112 TABLET ORAL at 05:06

## 2023-06-10 RX ADMIN — CARVEDILOL 12.5 MG: 12.5 TABLET, FILM COATED ORAL at 08:06

## 2023-06-10 RX ADMIN — SENNOSIDES AND DOCUSATE SODIUM 1 TABLET: 50; 8.6 TABLET ORAL at 10:06

## 2023-06-10 RX ADMIN — SODIUM CHLORIDE: 9 INJECTION, SOLUTION INTRAVENOUS at 12:06

## 2023-06-10 RX ADMIN — POTASSIUM CHLORIDE 10 MEQ: 7.46 INJECTION, SOLUTION INTRAVENOUS at 03:06

## 2023-06-10 RX ADMIN — CEFTRIAXONE SODIUM 1 G: 1 INJECTION, POWDER, FOR SOLUTION INTRAMUSCULAR; INTRAVENOUS at 05:06

## 2023-06-10 RX ADMIN — TRAMADOL HYDROCHLORIDE 50 MG: 50 TABLET, FILM COATED ORAL at 03:06

## 2023-06-10 RX ADMIN — SODIUM CHLORIDE 250 ML: 9 INJECTION, SOLUTION INTRAVENOUS at 12:06

## 2023-06-10 RX ADMIN — HYDRALAZINE HYDROCHLORIDE 10 MG: 20 INJECTION INTRAMUSCULAR; INTRAVENOUS at 03:06

## 2023-06-10 RX ADMIN — FINASTERIDE 5 MG: 5 TABLET, FILM COATED ORAL at 10:06

## 2023-06-10 RX ADMIN — CARVEDILOL 12.5 MG: 12.5 TABLET, FILM COATED ORAL at 10:06

## 2023-06-10 RX ADMIN — ENOXAPARIN SODIUM 40 MG: 40 INJECTION SUBCUTANEOUS at 05:06

## 2023-06-10 RX ADMIN — ALPRAZOLAM 0.25 MG: 0.25 TABLET ORAL at 08:06

## 2023-06-10 RX ADMIN — AMLODIPINE BESYLATE 5 MG: 5 TABLET ORAL at 10:06

## 2023-06-10 RX ADMIN — FAMOTIDINE 20 MG: 20 TABLET, FILM COATED ORAL at 10:06

## 2023-06-10 RX ADMIN — TAMSULOSIN HYDROCHLORIDE 0.4 MG: 0.4 CAPSULE ORAL at 10:06

## 2023-06-10 RX ADMIN — SODIUM CHLORIDE: 9 INJECTION, SOLUTION INTRAVENOUS at 06:06

## 2023-06-10 RX ADMIN — SENNOSIDES AND DOCUSATE SODIUM 1 TABLET: 50; 8.6 TABLET ORAL at 08:06

## 2023-06-10 RX ADMIN — SODIUM CHLORIDE, PRESERVATIVE FREE 10 ML: 5 INJECTION INTRAVENOUS at 05:06

## 2023-06-10 RX ADMIN — HYDRALAZINE HYDROCHLORIDE 10 MG: 20 INJECTION INTRAMUSCULAR; INTRAVENOUS at 06:06

## 2023-06-10 NOTE — PROGRESS NOTES
Progress Note    Admit Date: 6/7/2023   LOS: 3 days     SUBJECTIVE:     Follow-up For:  high ca    Scheduled Meds:   [START ON 6/11/2023] amLODIPine  10 mg Oral Daily    amLODIPine  5 mg Oral Daily    carvediloL  12.5 mg Oral BID    cefTRIAXone (ROCEPHIN) IVPB  1 g Intravenous Q24H    enoxparin  40 mg Subcutaneous Q24H (prophylaxis, 1700)    famotidine  20 mg Oral Daily    finasteride  5 mg Oral Daily    levothyroxine  112 mcg Oral Before breakfast    senna-docusate 8.6-50 mg  1 tablet Oral BID    tamsulosin  1 capsule Oral Daily     Continuous Infusions:   sodium chloride 0.9% 150 mL/hr at 06/10/23 1232     PRN Meds:acetaminophen, albuterol, cloNIDine, dextrose, dextrose, dextrose 50%, dextrose 50%, glucagon (human recombinant), glucose, glucose, hydrALAZINE, insulin aspart U-100, ondansetron, sodium chloride 0.9%    Review of patient's allergies indicates:   Allergen Reactions    Iodine Anaphylaxis    Shellfish containing products     Amoxicillin-pot clavulanate Itching       Review of Systems  Review of Systems   Constitutional:  Positive for malaise/fatigue and weight loss. Negative for chills and fever.   HENT:  Negative for congestion, ear discharge, ear pain, hearing loss, nosebleeds, sinus pain, sore throat and tinnitus.    Eyes:  Positive for double vision. Negative for blurred vision.   Respiratory:  Positive for cough and shortness of breath. Negative for hemoptysis, sputum production, wheezing and stridor.    Cardiovascular:  Negative for chest pain, palpitations, orthopnea and claudication.   Gastrointestinal:  Positive for heartburn. Negative for abdominal pain, blood in stool, constipation, diarrhea, nausea and vomiting.   Genitourinary:  Negative for dysuria and urgency.   Musculoskeletal:  Positive for back pain, myalgias and neck pain.   Skin:  Negative for itching and rash.   Neurological:  Negative for dizziness, tingling, tremors, sensory change, speech change, focal weakness, seizures, weakness  and headaches.   Endo/Heme/Allergies:  Negative for polydipsia.   Psychiatric/Behavioral:  Negative for depression, substance abuse and suicidal ideas.  confused    OBJECTIVE:     Vital Signs (Most Recent)  Temp: 97.9 °F (36.6 °C) (06/10/23 1201)  Pulse: 62 (06/10/23 1201)  Resp: 16 (06/10/23 0758)  BP: (!) 153/77 (06/10/23 1201)  SpO2: (!) 94 % (06/10/23 1201)    Vital Signs Range (Last 24H):  Temp:  [97.9 °F (36.6 °C)-98.1 °F (36.7 °C)]   Pulse:  [62-88]   Resp:  [16-20]   BP: (153-192)/(65-93)   SpO2:  [92 %-95 %]     I & O (Last 24H):  Intake/Output Summary (Last 24 hours) at 6/10/2023 1605  Last data filed at 6/10/2023 1054  Gross per 24 hour   Intake 237 ml   Output 120 ml   Net 117 ml       Physical Exam:  Physical Exam   Vitals:    06/10/23 1201   BP: (!) 153/77   Pulse: 62   Resp:    Temp: 97.9 °F (36.6 °C)       Physical Exam   Constitutional: drowsy  but answers  appropiatel& oriented, no acute distress  HENT:   Head: Normocephalic and atraumatic.   Eyes: Conjunctivae normal and EOM are normal. Pupils are equal, round, and reactive to light.   Neck: Normal range of motion. Neck supple.   Cardiovascular: Normal rate, regular rhythm, normal heart sounds   Pulmonary/Chest: CTAB, nonlabored respiration  Abdominal: Soft, nontender, bowel sounds normal  Neurological:tender  t  spine and ls pine    No  break down   on  skin on  buttocks but it  is red  Skin: warm, dry intact  Psychiatric: cooperative but confused      Laboratory:  Recent Results (from the past 24 hour(s))   POCT glucose    Collection Time: 06/09/23  9:27 PM   Result Value Ref Range    POCT Glucose 102 70 - 110 mg/dL   POCT glucose    Collection Time: 06/10/23  5:27 AM   Result Value Ref Range    POCT Glucose 117 (H) 70 - 110 mg/dL   Comprehensive Metabolic Panel    Collection Time: 06/10/23 10:19 AM   Result Value Ref Range    Sodium Level 139 136 - 145 mmol/L    Potassium Level 3.3 (L) 3.5 - 5.1 mmol/L    Chloride 105 98 - 107 mmol/L    Carbon  Dioxide 23 23 - 31 mmol/L    Glucose Level 135 (H) 82 - 115 mg/dL    Blood Urea Nitrogen 28.0 (H) 8.4 - 25.7 mg/dL    Creatinine 2.52 (H) 0.73 - 1.18 mg/dL    Calcium Level Total 12.2 (HH) 8.8 - 10.0 mg/dL    Protein Total 11.3 (H) 5.8 - 7.6 gm/dL    Albumin Level 1.7 (L) 3.4 - 4.8 g/dL    Globulin 9.6 (H) 2.4 - 3.5 gm/dL    Albumin/Globulin Ratio 0.2 (L) 1.1 - 2.0 ratio    Bilirubin Total 0.9 <=1.5 mg/dL    Alkaline Phosphatase 64 40 - 150 unit/L    Alanine Aminotransferase 7 0 - 55 unit/L    Aspartate Aminotransferase 31 5 - 34 unit/L    eGFR 26 mls/min/1.73/m2   Magnesium    Collection Time: 06/10/23 10:19 AM   Result Value Ref Range    Magnesium Level 1.90 1.60 - 2.60 mg/dL   Phosphorus    Collection Time: 06/10/23 10:19 AM   Result Value Ref Range    Phosphorus Level 3.6 2.3 - 4.7 mg/dL   CBC with Differential    Collection Time: 06/10/23 10:19 AM   Result Value Ref Range    WBC 4.06 (L) 4.50 - 11.50 x10(3)/mcL    RBC 3.20 (L) 4.70 - 6.10 x10(6)/mcL    Hgb 10.3 (L) 14.0 - 18.0 g/dL    Hct 30.5 (L) 42.0 - 52.0 %    MCV 95.3 (H) 80.0 - 94.0 fL    MCH 32.2 (H) 27.0 - 31.0 pg    MCHC 33.8 33.0 - 36.0 g/dL    RDW 14.8 11.5 - 17.0 %    Platelet 138 130 - 400 x10(3)/mcL    MPV 8.9 7.4 - 10.4 fL    Neut % 79.8 %    Lymph % 13.1 %    Mono % 4.7 %    Eos % 0.7 %    Basophil % 0.5 %    Lymph # 0.53 (L) 0.6 - 4.6 x10(3)/mcL    Neut # 3.24 2.1 - 9.2 x10(3)/mcL    Mono # 0.19 0.1 - 1.3 x10(3)/mcL    Eos # 0.03 0 - 0.9 x10(3)/mcL    Baso # 0.02 <=0.2 x10(3)/mcL    IG# 0.05 (H) 0 - 0.04 x10(3)/mcL    IG% 1.2 %   POCT glucose    Collection Time: 06/10/23 11:16 AM   Result Value Ref Range    POCT Glucose 119 (H) 70 - 110 mg/dL   ISTAT PROCEDURE    Collection Time: 06/10/23 12:41 PM   Result Value Ref Range    POC PH 7.438 7.35 - 7.45    POC PCO2 36.0 35 - 45 mmHg    POC PO2 71 (L) 80 - 100 mmHg    POC HCO3 24.4 24 - 28 mmol/L    POC BE 0 -2 to 2 mmol/L    POC SATURATED O2 95 95 - 100 %    POC TCO2 25 23 - 27 mmol/L    Sample  ARTERIAL     Site RR     Allens Test Pass     DelSys Room Air    Lactic Acid, Plasma    Collection Time: 06/10/23  3:16 PM   Result Value Ref Range    Lactic Acid Level 0.8 0.5 - 2.2 mmol/L        Diagnostic Results:  X-Ray Chest 1 View    Result Date: 6/8/2023  EXAMINATION: XR CHEST 1 VIEW CLINICAL HISTORY: , Weakness. COMPARISON: 0923 FINDINGS: An AP view or more reveals the heart to be normal in size.  The trachea is just right of midline.  Atherosclerosis is seen within the aorta.  There is patchy hazy opacity at the left lung base.  There are changes and curvature are noted to the thoracic spine.     1. Patchy infiltrate, atelectasis, and pleural reaction left lung base 2. Atherosclerosis 3. Thoracic spondylosis and scoliosis Electronically signed by: Boo Camacho Date:    06/08/2023 Time:    08:11    CT Head Without Contrast    Result Date: 6/7/2023  EXAMINATION: CT HEAD WITHOUT CONTRAST CLINICAL HISTORY: Mental status change, unknown cause; TECHNIQUE: Low dose axial CT images obtained throughout the head without intravenous contrast. Sagittal and coronal reconstructions were performed.  .  Automated exposure control used. COMPARISON: None. FINDINGS: Intracranial compartment: Ventricles and sulci are normal in size for age without evidence of hydrocephalus. No extra-axial blood or fluid collections. The brain parenchyma appears normal. No parenchymal mass, hemorrhage, edema or major vascular distribution infarct. Skull/extracranial contents (limited evaluation): No fracture. Mastoid air cells and paranasal sinuses are essentially clear.     No acute abnormality. Electronically signed by: Chrystal Wallace MD Date:    06/07/2023 Time:    17:58    CT Chest Without Contrast    Result Date: 6/8/2023  EXAMINATION: CT CHEST WITHOUT CONTRAST CLINICAL HISTORY: Hypercalcemia;, . TECHNIQUE: PATIENT RADIATION DOSE: DLP(mGycm) 215 As per PQRS measures, all CT scans at this facility used dose modulation, iterative  reconstruction, and/or weight based dose adjustment when appropriate to reduce radiation dose to as low as reasonably achievable. COMPARISON: None available FINDINGS: Serial axial imaging of the chest without the administration of IV contrast.  Both soft tissue and pulmonary parenchymal windows were obtained.  Additional sagittal and coronal reconstructions were performed. Artifact: Mild motion artifact is seen on some images. Contrast: Please note this is a non contrast CT scan of the chest which is non-diagnostic for pulmonary emboli. Soft Tissues: Unremarkable. Lines and Tubes: None. Axilla: A few prominent lymph nodes are seen in the right and left axilla. Neck: The visualized soft tissues of the neck appear unremarkable. Mediastinum: A few subcentimeter mediastinal lymph nodes are seen in the upper and lower paratracheal and subcarinal spaces . Heart: Moderate cardiomegaly is seen. Pronounced coronary artery calcification is seen. Aorta: Unremarkable appearing aorta. Pulmonary Arteries: Mildly enlarged main pulmonary arteries are identified. This could reflect an element of pulmonary arterial hypertension. Lungs: There is mild non specific dependent change at the lung bases. Mild streaky linear opacity is seen predominantly in the dependent portions at the lung bases with peripheral predominance consistent with subsegmental atelectasis. A small patchy area of ground glass density is seen in the lateral basal segment of the left lower lobe. This could reflect an acute focal infiltrate / pneumonitis. Pleura: There is a small left sided pleural effusion. There is mild pleural thickening along the right lower posterior pleural surface. Bony Structures: There is diffuse osteopenia along the visualised bony structures together with multiple, numerous, small round-ovoid lucent lesion of varying sizes involving the marrow. Spine: Moderate multilevel spondylolytic changes are seen in the thoracic spine. Abdomen:  Bilateral renal cysts are indentified, which are large in size on the left side.     Impression: 1. There is a small left sided pleural effusion. 2. A small patchy area of ground glass density is seen in the lateral basal segment of the left lower lobe. This could reflect an acute focal infiltrate / pneumonitis. Correlate clinically and with laboratory findings, as regards additional evaluation and follow-up. 3. There is diffuse osteopenia along the visualised bony structures together with multiple, numerous, small round-ovoid lucent lesion of varying sizes involving the marrow. These changes could reflect metabolic bone disease like hyperparathyroidism. Correlate clinically and with laboratory findings, as regards additional evaluation and follow-up. 1. Concur with preliminary report Electronically signed by: Boo Camacho Date:    06/08/2023 Time:    08:30    NM Bone Scan Whole Body    Result Date: 6/8/2023  EXAMINATION: NM BONE SCAN WHOLE BODY CLINICAL HISTORY: myeloma ??;, . TECHNIQUE: mCi of Tc-99m MDP was administered intravenously. After appropriate delay, whole body bone scan images were obtained. COMPARISON: None available FINDINGS: Activity is identified within the kidneys bilaterally and within the bladder. Focal increased activity is evident at the anterior right 4th 5th and 6th ribs and anterior left 1st, 4th, and 7th ribs.  There is mild central activity at the manubrium.  There is mild increased activity at the sternoclavicular joints as well as the shoulders bilaterally.  Mild increased activity evident thyroid bed.     1. Scattered activity at the anterior left right rib margins as described.  A CT exam on the previous day reveals no definite fracture.  There is mild heterogeneous and bony loss at the anterior left and right ribs.  This is not have the typical pattern of a metastases. 2. Focal increased activity at the anterior manubrium which again on the CT exam demonstrates osteopenia and  heterogeneous bony loss as well as scattered subcentimeter small lytic defects. 3. Suspect mild arthritic changes at the shoulders sternoclavicular joints Electronically signed by: Boo Camacho Date:    06/08/2023 Time:    14:28    US Retroperitoneal Complete    Result Date: 6/8/2023  EXAMINATION: RENAL/RETROPERITONEAL SONOGRAM: CLINICAL HISTORY: Renal insufficiency;, . COMPARISON: None available FINDINGS: Real-time imaging was performed through the retroperitoneum evaluating the kidneys. Arterial and venous flow are identified within the kidneys. No hydronephrosis is seen.  The bladder is incompletely distended.  Two round hypoechoic foci are noted to the mid and lower right kidney measuring 3.3 cm and 1.5 cm respectively compatible with simple cysts.  2 round hypoechoic foci are noted to the upper and mid left kidney measuring 5.4 and 6.5 cm respectively compatible with cysts.  No free fluid collect identified. MEASUREMENTS: Right Kidney: 12.9 cm RI: 0.79 Left Kidney: 14.7 cm RI: 0.73     1. Findings compatible simple cysts to the kidneys bilaterally as described 2. Elevated renal resistive indices bilaterally 3. Incompletely distended bladder Electronically signed by: Boo Camacho Date:    06/08/2023 Time:    12:05    XR Metastatic Survey    Result Date: 6/8/2023  EXAMINATION: SKELETAL SURVEY: CLINICAL HISTORY: , possible myeloma; TECHNIQUE: AP and lateral views were obtained of the axial skeleton as well as limited views of the lower and upper upper extremity. COMPARISON: None available FINDINGS: There is absence of dentition.  Mastoid air cells are aerated bilaterally.  Maxillary and frontal sinuses are relatively clear.  There is sinusoidal deviation of the nasal septum.  Bony structures are osteopenic.  Degenerative changes are noted to the spine.  No fracture or subluxation is seen.  No aggressive osteolytic or osteoblastic lesion is seen.  A small oval sclerotic focus is noted to the proximal left humeral  shaft measuring 10 mm in greatest diameter.  Arthritic changes are evident at the shoulders, hips, wrists, and elbows bilaterally.  Atherosclerosis is seen.  Enthesophyte formation is evident at the tibial tubercle.  A pinpoint 2-3 mm metallic density noted to the soft tissues of the right axilla suggesting a foreign body and another at the soft tissues of the medial right thigh     1. No acute osseous defect identified 2. Osteopenia 3. Osteoarthritis 4. Pinpoint metallic density/suspect foreign body right axilla and medial right thigh soft tissues 5. Small 1 cm oval shaped sclerotic focus proximal left humeral shaft which has a nonaggressive appearance. Electronically signed by: Boo Camacho Date:    06/08/2023 Time:    13:55       ASSESSMENT/PLAN:   72  year old  w  hypercalcemia   Needs workup   His ct  had finding in  c spine   Ordered  skeletal survey  area on humerus only   On nuc  bone scan   ribs munubrium  w  lesions and thyroid bed w  uptake   Will order thyroid  us.   Need to  rule out myeloma  Us  of renal  is  normal   except for  elevated resistive indices  Spep upep  ligh chains Cont  his fluids  One spray of  calcitonin  yesterfday   Pamidronate 60  x 1   today   T spine   mri no contrast  tday         Plan:   Patient Active Problem List    Diagnosis Date Noted    Abnormal radionuclide bone scan 06/08/2023    Hypercalcemia 06/07/2023    Confusion 06/07/2023    Hypoalbuminemia 06/07/2023    Weakness 06/07/2023    COLTON (acute kidney injury) 06/07/2023    `

## 2023-06-10 NOTE — PROCEDURES
"Chip Goodson is a 72 y.o. male patient.    Temp: 97.9 °F (36.6 °C) (06/10/23 1201)  Pulse: 62 (06/10/23 1201)  Resp: 16 (06/10/23 0758)  BP: (!) 173/75 (06/10/23 1700)  SpO2: (!) 94 % (06/10/23 1201)  Weight: 113.4 kg (250 lb) (06/07/23 1707)  Height: 5' 11" (180.3 cm) (06/07/23 1707)    PICC  Date/Time: 6/10/2023 5:22 PM  Performed by: Luigi Lemon RN  Consent Done: Yes  Time out: Immediately prior to procedure a time out was called to verify the correct patient, procedure, equipment, support staff and site/side marked as required  Indications: med administration and vascular access  Anesthesia: local infiltration  Local anesthetic: lidocaine 1% without epinephrine  Anesthetic Total (mL): 4  Preparation: skin prepped with ChloraPrep  Skin prep agent dried: skin prep agent completely dried prior to procedure  Sterile barriers: all five maximum sterile barriers used - cap, mask, sterile gown, sterile gloves, and large sterile sheet  Hand hygiene: hand hygiene performed prior to central venous catheter insertion  Location details: left basilic  Catheter type: single lumen  Catheter size: 4 Fr  Catheter Length: 17cm    Ultrasound guidance: yes  Vessel Caliber: medium and patent, compressibility normal  Needle advanced into vessel with real time Ultrasound guidance.  Guidewire confirmed in vessel.  Sterile sheath used.  Number of attempts: 1  Post-procedure: blood return through all ports, sterile dressing applied and chlorhexidine patch    Complications: none  Comments: Arm circ- 29cm        Luigi Lemon Rn  6/10/2023    "

## 2023-06-10 NOTE — PROGRESS NOTES
Progress Note    Admit Date: 6/7/2023   LOS: 3 days     SUBJECTIVE:     Follow-up For:  high ca  Confusion     Scheduled Meds:   [START ON 6/11/2023] amLODIPine  10 mg Oral Daily    amLODIPine  5 mg Oral Daily    carvediloL  12.5 mg Oral BID    cefTRIAXone (ROCEPHIN) IVPB  1 g Intravenous Q24H    enoxparin  40 mg Subcutaneous Q24H (prophylaxis, 1700)    famotidine  20 mg Oral Daily    finasteride  5 mg Oral Daily    levothyroxine  112 mcg Oral Before breakfast    senna-docusate 8.6-50 mg  1 tablet Oral BID    tamsulosin  1 capsule Oral Daily     Continuous Infusions:   sodium chloride 0.9% 150 mL/hr at 06/10/23 1232     PRN Meds:acetaminophen, albuterol, cloNIDine, dextrose, dextrose, dextrose 50%, dextrose 50%, glucagon (human recombinant), glucose, glucose, hydrALAZINE, insulin aspart U-100, ondansetron, sodium chloride 0.9%    Review of patient's allergies indicates:   Allergen Reactions    Iodine Anaphylaxis    Shellfish containing products     Amoxicillin-pot clavulanate Itching       Review of Systems  Review of Systems   Constitutional:  Positive for malaise/fatigue and weight loss. Negative for chills and fever.   HENT:  Negative for congestion, ear discharge, ear pain, hearing loss, nosebleeds, sinus pain, sore throat and tinnitus.    Eyes:  Positive for double vision. Negative for blurred vision.   Respiratory:  Positive for cough and shortness of breath. Negative for hemoptysis, sputum production, wheezing and stridor.    Cardiovascular:  Negative for chest pain, palpitations, orthopnea and claudication.   Gastrointestinal:  Positive for heartburn. Negative for abdominal pain, blood in stool, constipation, diarrhea, nausea and vomiting.   Genitourinary:  Negative for dysuria and urgency.   Musculoskeletal:  Positive for back pain, myalgias and neck pain.   Skin:  Negative for itching and rash.   Neurological:  Negative for dizziness, tingling, tremors, sensory change, speech change, focal weakness,  seizures, weakness and headaches.   Endo/Heme/Allergies:  Negative for polydipsia.   Psychiatric/Behavioral:  Negative for depression, substance abuse and suicidal ideas.  confused    OBJECTIVE:     Vital Signs (Most Recent)  Temp: 97.9 °F (36.6 °C) (06/10/23 1201)  Pulse: 62 (06/10/23 1201)  Resp: 16 (06/10/23 0758)  BP: (!) 153/77 (06/10/23 1201)  SpO2: (!) 94 % (06/10/23 1201)    Vital Signs Range (Last 24H):  Temp:  [97.9 °F (36.6 °C)-98.1 °F (36.7 °C)]   Pulse:  [62-88]   Resp:  [16-20]   BP: (153-192)/(65-93)   SpO2:  [92 %-95 %]     I & O (Last 24H):  Intake/Output Summary (Last 24 hours) at 6/10/2023 1621  Last data filed at 6/10/2023 1054  Gross per 24 hour   Intake 237 ml   Output 120 ml   Net 117 ml       Physical Exam:  Physical Exam   Vitals:    06/10/23 1201   BP: (!) 153/77   Pulse: 62   Resp:    Temp: 97.9 °F (36.6 °C)       Physical Exam   Constitutional: drowsy  but answers  appropiatel& oriented, no acute distress  HENT:   Head: Normocephalic and atraumatic.   Eyes: Conjunctivae normal and EOM are normal. Pupils are equal, round, and reactive to light.   Neck: Normal range of motion. Neck supple.   Cardiovascular: Normal rate, regular rhythm, normal heart sounds   Pulmonary/Chest: CTAB, nonlabored respiration  Abdominal: Soft, nontender, bowel sounds normal  Neurological:tender  t  spine and ls pine    No  break down   on  skin on  buttocks but it  is red  Skin: warm, dry intact  Psychiatric: cooperative but confused      Laboratory:  Recent Results (from the past 24 hour(s))   POCT glucose    Collection Time: 06/09/23  9:27 PM   Result Value Ref Range    POCT Glucose 102 70 - 110 mg/dL   POCT glucose    Collection Time: 06/10/23  5:27 AM   Result Value Ref Range    POCT Glucose 117 (H) 70 - 110 mg/dL   Comprehensive Metabolic Panel    Collection Time: 06/10/23 10:19 AM   Result Value Ref Range    Sodium Level 139 136 - 145 mmol/L    Potassium Level 3.3 (L) 3.5 - 5.1 mmol/L    Chloride 105 98 -  107 mmol/L    Carbon Dioxide 23 23 - 31 mmol/L    Glucose Level 135 (H) 82 - 115 mg/dL    Blood Urea Nitrogen 28.0 (H) 8.4 - 25.7 mg/dL    Creatinine 2.52 (H) 0.73 - 1.18 mg/dL    Calcium Level Total 12.2 (HH) 8.8 - 10.0 mg/dL    Protein Total 11.3 (H) 5.8 - 7.6 gm/dL    Albumin Level 1.7 (L) 3.4 - 4.8 g/dL    Globulin 9.6 (H) 2.4 - 3.5 gm/dL    Albumin/Globulin Ratio 0.2 (L) 1.1 - 2.0 ratio    Bilirubin Total 0.9 <=1.5 mg/dL    Alkaline Phosphatase 64 40 - 150 unit/L    Alanine Aminotransferase 7 0 - 55 unit/L    Aspartate Aminotransferase 31 5 - 34 unit/L    eGFR 26 mls/min/1.73/m2   Magnesium    Collection Time: 06/10/23 10:19 AM   Result Value Ref Range    Magnesium Level 1.90 1.60 - 2.60 mg/dL   Phosphorus    Collection Time: 06/10/23 10:19 AM   Result Value Ref Range    Phosphorus Level 3.6 2.3 - 4.7 mg/dL   CBC with Differential    Collection Time: 06/10/23 10:19 AM   Result Value Ref Range    WBC 4.06 (L) 4.50 - 11.50 x10(3)/mcL    RBC 3.20 (L) 4.70 - 6.10 x10(6)/mcL    Hgb 10.3 (L) 14.0 - 18.0 g/dL    Hct 30.5 (L) 42.0 - 52.0 %    MCV 95.3 (H) 80.0 - 94.0 fL    MCH 32.2 (H) 27.0 - 31.0 pg    MCHC 33.8 33.0 - 36.0 g/dL    RDW 14.8 11.5 - 17.0 %    Platelet 138 130 - 400 x10(3)/mcL    MPV 8.9 7.4 - 10.4 fL    Neut % 79.8 %    Lymph % 13.1 %    Mono % 4.7 %    Eos % 0.7 %    Basophil % 0.5 %    Lymph # 0.53 (L) 0.6 - 4.6 x10(3)/mcL    Neut # 3.24 2.1 - 9.2 x10(3)/mcL    Mono # 0.19 0.1 - 1.3 x10(3)/mcL    Eos # 0.03 0 - 0.9 x10(3)/mcL    Baso # 0.02 <=0.2 x10(3)/mcL    IG# 0.05 (H) 0 - 0.04 x10(3)/mcL    IG% 1.2 %   POCT glucose    Collection Time: 06/10/23 11:16 AM   Result Value Ref Range    POCT Glucose 119 (H) 70 - 110 mg/dL   ISTAT PROCEDURE    Collection Time: 06/10/23 12:41 PM   Result Value Ref Range    POC PH 7.438 7.35 - 7.45    POC PCO2 36.0 35 - 45 mmHg    POC PO2 71 (L) 80 - 100 mmHg    POC HCO3 24.4 24 - 28 mmol/L    POC BE 0 -2 to 2 mmol/L    POC SATURATED O2 95 95 - 100 %    POC TCO2 25 23 - 27  mmol/L    Sample ARTERIAL     Site RR     Allens Test Pass     DelSys Room Air    Lactic Acid, Plasma    Collection Time: 06/10/23  3:16 PM   Result Value Ref Range    Lactic Acid Level 0.8 0.5 - 2.2 mmol/L        Diagnostic Results:  X-Ray Chest 1 View    Result Date: 6/8/2023  EXAMINATION: XR CHEST 1 VIEW CLINICAL HISTORY: , Weakness. COMPARISON: 0923 FINDINGS: An AP view or more reveals the heart to be normal in size.  The trachea is just right of midline.  Atherosclerosis is seen within the aorta.  There is patchy hazy opacity at the left lung base.  There are changes and curvature are noted to the thoracic spine.     1. Patchy infiltrate, atelectasis, and pleural reaction left lung base 2. Atherosclerosis 3. Thoracic spondylosis and scoliosis Electronically signed by: Boo Camacho Date:    06/08/2023 Time:    08:11    CT Head Without Contrast    Result Date: 6/7/2023  EXAMINATION: CT HEAD WITHOUT CONTRAST CLINICAL HISTORY: Mental status change, unknown cause; TECHNIQUE: Low dose axial CT images obtained throughout the head without intravenous contrast. Sagittal and coronal reconstructions were performed.  .  Automated exposure control used. COMPARISON: None. FINDINGS: Intracranial compartment: Ventricles and sulci are normal in size for age without evidence of hydrocephalus. No extra-axial blood or fluid collections. The brain parenchyma appears normal. No parenchymal mass, hemorrhage, edema or major vascular distribution infarct. Skull/extracranial contents (limited evaluation): No fracture. Mastoid air cells and paranasal sinuses are essentially clear.     No acute abnormality. Electronically signed by: Chrystal Wallace MD Date:    06/07/2023 Time:    17:58    CT Chest Without Contrast    Result Date: 6/8/2023  EXAMINATION: CT CHEST WITHOUT CONTRAST CLINICAL HISTORY: Hypercalcemia;, . TECHNIQUE: PATIENT RADIATION DOSE: DLP(mGycm) 215 As per PQRS measures, all CT scans at this facility used dose  modulation, iterative reconstruction, and/or weight based dose adjustment when appropriate to reduce radiation dose to as low as reasonably achievable. COMPARISON: None available FINDINGS: Serial axial imaging of the chest without the administration of IV contrast.  Both soft tissue and pulmonary parenchymal windows were obtained.  Additional sagittal and coronal reconstructions were performed. Artifact: Mild motion artifact is seen on some images. Contrast: Please note this is a non contrast CT scan of the chest which is non-diagnostic for pulmonary emboli. Soft Tissues: Unremarkable. Lines and Tubes: None. Axilla: A few prominent lymph nodes are seen in the right and left axilla. Neck: The visualized soft tissues of the neck appear unremarkable. Mediastinum: A few subcentimeter mediastinal lymph nodes are seen in the upper and lower paratracheal and subcarinal spaces . Heart: Moderate cardiomegaly is seen. Pronounced coronary artery calcification is seen. Aorta: Unremarkable appearing aorta. Pulmonary Arteries: Mildly enlarged main pulmonary arteries are identified. This could reflect an element of pulmonary arterial hypertension. Lungs: There is mild non specific dependent change at the lung bases. Mild streaky linear opacity is seen predominantly in the dependent portions at the lung bases with peripheral predominance consistent with subsegmental atelectasis. A small patchy area of ground glass density is seen in the lateral basal segment of the left lower lobe. This could reflect an acute focal infiltrate / pneumonitis. Pleura: There is a small left sided pleural effusion. There is mild pleural thickening along the right lower posterior pleural surface. Bony Structures: There is diffuse osteopenia along the visualised bony structures together with multiple, numerous, small round-ovoid lucent lesion of varying sizes involving the marrow. Spine: Moderate multilevel spondylolytic changes are seen in the thoracic  spine. Abdomen: Bilateral renal cysts are indentified, which are large in size on the left side.     Impression: 1. There is a small left sided pleural effusion. 2. A small patchy area of ground glass density is seen in the lateral basal segment of the left lower lobe. This could reflect an acute focal infiltrate / pneumonitis. Correlate clinically and with laboratory findings, as regards additional evaluation and follow-up. 3. There is diffuse osteopenia along the visualised bony structures together with multiple, numerous, small round-ovoid lucent lesion of varying sizes involving the marrow. These changes could reflect metabolic bone disease like hyperparathyroidism. Correlate clinically and with laboratory findings, as regards additional evaluation and follow-up. 1. Concur with preliminary report Electronically signed by: Boo Camacho Date:    06/08/2023 Time:    08:30    NM Bone Scan Whole Body    Result Date: 6/8/2023  EXAMINATION: NM BONE SCAN WHOLE BODY CLINICAL HISTORY: myeloma ??;, . TECHNIQUE: mCi of Tc-99m MDP was administered intravenously. After appropriate delay, whole body bone scan images were obtained. COMPARISON: None available FINDINGS: Activity is identified within the kidneys bilaterally and within the bladder. Focal increased activity is evident at the anterior right 4th 5th and 6th ribs and anterior left 1st, 4th, and 7th ribs.  There is mild central activity at the manubrium.  There is mild increased activity at the sternoclavicular joints as well as the shoulders bilaterally.  Mild increased activity evident thyroid bed.     1. Scattered activity at the anterior left right rib margins as described.  A CT exam on the previous day reveals no definite fracture.  There is mild heterogeneous and bony loss at the anterior left and right ribs.  This is not have the typical pattern of a metastases. 2. Focal increased activity at the anterior manubrium which again on the CT exam demonstrates  osteopenia and heterogeneous bony loss as well as scattered subcentimeter small lytic defects. 3. Suspect mild arthritic changes at the shoulders sternoclavicular joints Electronically signed by: Boo Camacho Date:    06/08/2023 Time:    14:28    US Retroperitoneal Complete    Result Date: 6/8/2023  EXAMINATION: RENAL/RETROPERITONEAL SONOGRAM: CLINICAL HISTORY: Renal insufficiency;, . COMPARISON: None available FINDINGS: Real-time imaging was performed through the retroperitoneum evaluating the kidneys. Arterial and venous flow are identified within the kidneys. No hydronephrosis is seen.  The bladder is incompletely distended.  Two round hypoechoic foci are noted to the mid and lower right kidney measuring 3.3 cm and 1.5 cm respectively compatible with simple cysts.  2 round hypoechoic foci are noted to the upper and mid left kidney measuring 5.4 and 6.5 cm respectively compatible with cysts.  No free fluid collect identified. MEASUREMENTS: Right Kidney: 12.9 cm RI: 0.79 Left Kidney: 14.7 cm RI: 0.73     1. Findings compatible simple cysts to the kidneys bilaterally as described 2. Elevated renal resistive indices bilaterally 3. Incompletely distended bladder Electronically signed by: Boo Camacho Date:    06/08/2023 Time:    12:05    XR Metastatic Survey    Result Date: 6/8/2023  EXAMINATION: SKELETAL SURVEY: CLINICAL HISTORY: , possible myeloma; TECHNIQUE: AP and lateral views were obtained of the axial skeleton as well as limited views of the lower and upper upper extremity. COMPARISON: None available FINDINGS: There is absence of dentition.  Mastoid air cells are aerated bilaterally.  Maxillary and frontal sinuses are relatively clear.  There is sinusoidal deviation of the nasal septum.  Bony structures are osteopenic.  Degenerative changes are noted to the spine.  No fracture or subluxation is seen.  No aggressive osteolytic or osteoblastic lesion is seen.  A small oval sclerotic focus is noted to the  proximal left humeral shaft measuring 10 mm in greatest diameter.  Arthritic changes are evident at the shoulders, hips, wrists, and elbows bilaterally.  Atherosclerosis is seen.  Enthesophyte formation is evident at the tibial tubercle.  A pinpoint 2-3 mm metallic density noted to the soft tissues of the right axilla suggesting a foreign body and another at the soft tissues of the medial right thigh     1. No acute osseous defect identified 2. Osteopenia 3. Osteoarthritis 4. Pinpoint metallic density/suspect foreign body right axilla and medial right thigh soft tissues 5. Small 1 cm oval shaped sclerotic focus proximal left humeral shaft which has a nonaggressive appearance. Electronically signed by: Boo Camacho Date:    06/08/2023 Time:    13:55       ASSESSMENT/PLAN:   72  year old  w  hypercalcemia   Needs workup   His ct  had finding in  c spine   Ordered  skeletal survey  area on humerus only   On nuc  bone scan   ribs munubrium  w  lesions and thyroid bed w  uptake   Will order thyroid  us. - neg   Us  of renal  is  normal   except for  elevated resistive indices  Spep + M spike in   IgA trini   One spray of  calcitonin  6/8  Pamidronate 60  x 1   6/9  T spine   mri no contrast  showing  areas of  possible metastasis or bone marrow involvement in  t spine  Confusion is  very  increased today   Wanted to rule out  any   other  causes  other than high ca  Abg did not   show  co2   narcosis   CT  head  no acute  bleed or  changes  Blood cx  x  2    Lactic  acid  0.8  Ua   to  collect  and  cx  does have a  hx of  prostate  issues  Cxray   developing  pneumonia    and  effusion   May  be  limited  w  how much fluid  we  can   Give    \bolused   him and increased  rate     Talked to nurse about  watching for  resp  status   May need to  give lasix.   Still w  good  urine  output  Start rocephin  1  g  daily to cover for pneumonia              Plan:   Patient Active Problem List    Diagnosis Date Noted    Abnormal  radionuclide bone scan 06/08/2023    Hypercalcemia 06/07/2023    Confusion 06/07/2023    Hypoalbuminemia 06/07/2023    Weakness 06/07/2023    COLTON (acute kidney injury) 06/07/2023    `

## 2023-06-10 NOTE — PT/OT/SLP PROGRESS
Physical Therapy      Patient Name:  Chip Goodson   MRN:  [id number]    Patient not seen today secondary to Nurse/ ALONA hold, Other (Comment) (Nursing reports mulitple tests today). Will follow-up 6/12/23.

## 2023-06-11 ENCOUNTER — ANESTHESIA EVENT (OUTPATIENT)
Dept: SURGERY | Facility: HOSPITAL | Age: 73
DRG: 640 | End: 2023-06-11
Payer: MEDICARE

## 2023-06-11 LAB
ALBUMIN SERPL-MCNC: 1.5 G/DL (ref 3.4–4.8)
ALBUMIN SERPL-MCNC: 1.6 G/DL (ref 3.4–4.8)
ALBUMIN/GLOB SERPL: 0.2 RATIO (ref 1.1–2)
ALBUMIN/GLOB SERPL: 0.2 RATIO (ref 1.1–2)
ALP SERPL-CCNC: 52 UNIT/L (ref 40–150)
ALP SERPL-CCNC: 55 UNIT/L (ref 40–150)
ALT SERPL-CCNC: 13 UNIT/L (ref 0–55)
ALT SERPL-CCNC: 9 UNIT/L (ref 0–55)
AST SERPL-CCNC: 31 UNIT/L (ref 5–34)
AST SERPL-CCNC: 35 UNIT/L (ref 5–34)
BASOPHILS # BLD AUTO: 0.02 X10(3)/MCL
BASOPHILS NFR BLD AUTO: 0.4 %
BILIRUBIN DIRECT+TOT PNL SERPL-MCNC: 0.4 MG/DL
BILIRUBIN DIRECT+TOT PNL SERPL-MCNC: 0.6 MG/DL
BUN SERPL-MCNC: 36 MG/DL (ref 8.4–25.7)
BUN SERPL-MCNC: 38 MG/DL (ref 8.4–25.7)
CALCIUM SERPL-MCNC: 10.6 MG/DL (ref 8.8–10)
CALCIUM SERPL-MCNC: 9.9 MG/DL (ref 8.8–10)
CHLORIDE SERPL-SCNC: 107 MMOL/L (ref 98–107)
CHLORIDE SERPL-SCNC: 111 MMOL/L (ref 98–107)
CO2 SERPL-SCNC: 21 MMOL/L (ref 23–31)
CO2 SERPL-SCNC: 24 MMOL/L (ref 23–31)
CREAT SERPL-MCNC: 2.21 MG/DL (ref 0.73–1.18)
CREAT SERPL-MCNC: 2.46 MG/DL (ref 0.73–1.18)
EOSINOPHIL # BLD AUTO: 0.05 X10(3)/MCL (ref 0–0.9)
EOSINOPHIL NFR BLD AUTO: 1.1 %
ERYTHROCYTE [DISTWIDTH] IN BLOOD BY AUTOMATED COUNT: 15.1 % (ref 11.5–17)
GFR SERPLBLD CREATININE-BSD FMLA CKD-EPI: 27 MLS/MIN/1.73/M2
GFR SERPLBLD CREATININE-BSD FMLA CKD-EPI: 31 MLS/MIN/1.73/M2
GLOBULIN SER-MCNC: 8.4 GM/DL (ref 2.4–3.5)
GLOBULIN SER-MCNC: 9.2 GM/DL (ref 2.4–3.5)
GLUCOSE SERPL-MCNC: 100 MG/DL (ref 82–115)
GLUCOSE SERPL-MCNC: 114 MG/DL (ref 82–115)
HCT VFR BLD AUTO: 31.6 % (ref 42–52)
HGB BLD-MCNC: 10 G/DL (ref 14–18)
IMM GRANULOCYTES # BLD AUTO: 0.02 X10(3)/MCL (ref 0–0.04)
IMM GRANULOCYTES NFR BLD AUTO: 0.4 %
INR BLD: 1.64 (ref 0–1.3)
LYMPHOCYTES # BLD AUTO: 1.55 X10(3)/MCL (ref 0.6–4.6)
LYMPHOCYTES NFR BLD AUTO: 33.7 %
MAGNESIUM SERPL-MCNC: 1.9 MG/DL (ref 1.6–2.6)
MCH RBC QN AUTO: 31.6 PG (ref 27–31)
MCHC RBC AUTO-ENTMCNC: 31.6 G/DL (ref 33–36)
MCV RBC AUTO: 100 FL (ref 80–94)
MONOCYTES # BLD AUTO: 0.3 X10(3)/MCL (ref 0.1–1.3)
MONOCYTES NFR BLD AUTO: 6.5 %
NEUTROPHILS # BLD AUTO: 2.66 X10(3)/MCL (ref 2.1–9.2)
NEUTROPHILS NFR BLD AUTO: 57.9 %
PHOSPHATE SERPL-MCNC: 3.4 MG/DL (ref 2.3–4.7)
PLATELET # BLD AUTO: 126 X10(3)/MCL (ref 130–400)
PMV BLD AUTO: 9 FL (ref 7.4–10.4)
POCT GLUCOSE: 88 MG/DL (ref 70–110)
POCT GLUCOSE: 91 MG/DL (ref 70–110)
POTASSIUM SERPL-SCNC: 3.3 MMOL/L (ref 3.5–5.1)
POTASSIUM SERPL-SCNC: 3.4 MMOL/L (ref 3.5–5.1)
PROT SERPL-MCNC: 10.8 GM/DL (ref 5.8–7.6)
PROT SERPL-MCNC: 9.9 GM/DL (ref 5.8–7.6)
PROTHROMBIN TIME: 19.8 SECONDS (ref 12.5–14.5)
RBC # BLD AUTO: 3.16 X10(6)/MCL (ref 4.7–6.1)
SODIUM SERPL-SCNC: 142 MMOL/L (ref 136–145)
SODIUM SERPL-SCNC: 142 MMOL/L (ref 136–145)
WBC # SPEC AUTO: 4.6 X10(3)/MCL (ref 4.5–11.5)

## 2023-06-11 PROCEDURE — 99900035 HC TECH TIME PER 15 MIN (STAT)

## 2023-06-11 PROCEDURE — 85025 COMPLETE CBC W/AUTO DIFF WBC: CPT | Performed by: FAMILY MEDICINE

## 2023-06-11 PROCEDURE — 25000003 PHARM REV CODE 250: Performed by: FAMILY MEDICINE

## 2023-06-11 PROCEDURE — 80053 COMPREHEN METABOLIC PANEL: CPT | Performed by: FAMILY MEDICINE

## 2023-06-11 PROCEDURE — 25000003 PHARM REV CODE 250: Performed by: INTERNAL MEDICINE

## 2023-06-11 PROCEDURE — 94761 N-INVAS EAR/PLS OXIMETRY MLT: CPT

## 2023-06-11 PROCEDURE — 21400001 HC TELEMETRY ROOM

## 2023-06-11 PROCEDURE — A4216 STERILE WATER/SALINE, 10 ML: HCPCS | Performed by: FAMILY MEDICINE

## 2023-06-11 PROCEDURE — 11000001 HC ACUTE MED/SURG PRIVATE ROOM

## 2023-06-11 PROCEDURE — 63600175 PHARM REV CODE 636 W HCPCS: Performed by: INTERNAL MEDICINE

## 2023-06-11 PROCEDURE — 84100 ASSAY OF PHOSPHORUS: CPT | Performed by: FAMILY MEDICINE

## 2023-06-11 PROCEDURE — 63600175 PHARM REV CODE 636 W HCPCS: Performed by: FAMILY MEDICINE

## 2023-06-11 PROCEDURE — 83735 ASSAY OF MAGNESIUM: CPT | Performed by: FAMILY MEDICINE

## 2023-06-11 PROCEDURE — 85610 PROTHROMBIN TIME: CPT | Performed by: FAMILY MEDICINE

## 2023-06-11 RX ORDER — SODIUM CHLORIDE, SODIUM LACTATE, POTASSIUM CHLORIDE, CALCIUM CHLORIDE 600; 310; 30; 20 MG/100ML; MG/100ML; MG/100ML; MG/100ML
INJECTION, SOLUTION INTRAVENOUS CONTINUOUS
Status: CANCELLED | OUTPATIENT
Start: 2023-06-11

## 2023-06-11 RX ORDER — POTASSIUM CHLORIDE 14.9 MG/ML
20 INJECTION INTRAVENOUS ONCE
Status: COMPLETED | OUTPATIENT
Start: 2023-06-11 | End: 2023-06-11

## 2023-06-11 RX ADMIN — SENNOSIDES AND DOCUSATE SODIUM 1 TABLET: 50; 8.6 TABLET ORAL at 08:06

## 2023-06-11 RX ADMIN — CLONIDINE HYDROCHLORIDE 0.1 MG: 0.1 TABLET ORAL at 05:06

## 2023-06-11 RX ADMIN — ENOXAPARIN SODIUM 40 MG: 40 INJECTION SUBCUTANEOUS at 05:06

## 2023-06-11 RX ADMIN — SODIUM CHLORIDE, PRESERVATIVE FREE 10 ML: 5 INJECTION INTRAVENOUS at 12:06

## 2023-06-11 RX ADMIN — HYDRALAZINE HYDROCHLORIDE 10 MG: 20 INJECTION INTRAMUSCULAR; INTRAVENOUS at 08:06

## 2023-06-11 RX ADMIN — CARVEDILOL 12.5 MG: 12.5 TABLET, FILM COATED ORAL at 08:06

## 2023-06-11 RX ADMIN — SODIUM CHLORIDE, PRESERVATIVE FREE 10 ML: 5 INJECTION INTRAVENOUS at 11:06

## 2023-06-11 RX ADMIN — SODIUM CHLORIDE: 9 INJECTION, SOLUTION INTRAVENOUS at 12:06

## 2023-06-11 RX ADMIN — SODIUM CHLORIDE 500 ML: 9 INJECTION, SOLUTION INTRAVENOUS at 09:06

## 2023-06-11 RX ADMIN — POTASSIUM CHLORIDE 20 MEQ: 14.9 INJECTION, SOLUTION INTRAVENOUS at 10:06

## 2023-06-11 RX ADMIN — ACETAMINOPHEN 650 MG: 325 TABLET ORAL at 01:06

## 2023-06-11 RX ADMIN — CEFTRIAXONE SODIUM 1 G: 1 INJECTION, POWDER, FOR SOLUTION INTRAMUSCULAR; INTRAVENOUS at 04:06

## 2023-06-11 RX ADMIN — SODIUM CHLORIDE: 9 INJECTION, SOLUTION INTRAVENOUS at 08:06

## 2023-06-11 RX ADMIN — SODIUM CHLORIDE: 9 INJECTION, SOLUTION INTRAVENOUS at 01:06

## 2023-06-11 RX ADMIN — SODIUM CHLORIDE, PRESERVATIVE FREE 10 ML: 5 INJECTION INTRAVENOUS at 06:06

## 2023-06-11 RX ADMIN — SODIUM CHLORIDE, PRESERVATIVE FREE 10 ML: 5 INJECTION INTRAVENOUS at 05:06

## 2023-06-11 RX ADMIN — HYDRALAZINE HYDROCHLORIDE 10 MG: 20 INJECTION INTRAMUSCULAR; INTRAVENOUS at 12:06

## 2023-06-11 NOTE — PLAN OF CARE
Problem: Pain Acute  Goal: Acceptable Pain Control and Functional Ability  Outcome: Ongoing, Progressing  Intervention: Develop Pain Management Plan  Flowsheets (Taken 6/11/2023 8570)  Pain Management Interventions: pillow support provided     Problem: Fatigue  Goal: Improved Activity Tolerance  Outcome: Ongoing, Progressing

## 2023-06-11 NOTE — PLAN OF CARE
Problem: Pain Acute  Goal: Acceptable Pain Control and Functional Ability  Outcome: Ongoing, Progressing     Problem: Fatigue  Goal: Improved Activity Tolerance  Outcome: Ongoing, Progressing     Problem: Asthma Comorbidity  Goal: Maintenance of Asthma Control  Outcome: Ongoing, Progressing     Problem: Behavioral Health Comorbidity  Goal: Maintenance of Behavioral Health Symptom Control  Outcome: Ongoing, Progressing     Problem: COPD (Chronic Obstructive Pulmonary Disease) Comorbidity  Goal: Maintenance of COPD Symptom Control  Outcome: Ongoing, Progressing     Problem: Heart Failure Comorbidity  Goal: Maintenance of Heart Failure Symptom Control  Outcome: Ongoing, Progressing     Problem: Hypertension Comorbidity  Goal: Blood Pressure in Desired Range  Outcome: Ongoing, Progressing     Problem: Obstructive Sleep Apnea Risk or Actual Comorbidity Management  Goal: Unobstructed Breathing During Sleep  Outcome: Ongoing, Progressing     Problem: Osteoarthritis Comorbidity  Goal: Maintenance of Osteoarthritis Symptom Control  Outcome: Ongoing, Progressing     Problem: Pain Chronic (Persistent) (Comorbidity Management)  Goal: Acceptable Pain Control and Functional Ability  Outcome: Ongoing, Progressing     Problem: Seizure Disorder Comorbidity  Goal: Maintenance of Seizure Control  Outcome: Ongoing, Progressing     Problem: Gas Exchange Impaired  Goal: Optimal Gas Exchange  Outcome: Ongoing, Progressing     Problem: Infection  Goal: Absence of Infection Signs and Symptoms  Outcome: Ongoing, Progressing

## 2023-06-11 NOTE — PROGRESS NOTES
Progress Note    Admit Date: 6/7/2023   LOS: 4 days     SUBJECTIVE:     Follow-up For:  high ca  Confusion   Slightly more  alert  but  more  aggitated      Scheduled Meds:   amLODIPine  10 mg Oral Daily    carvediloL  12.5 mg Oral BID    cefTRIAXone (ROCEPHIN) IVPB  1 g Intravenous Q24H    enoxparin  40 mg Subcutaneous Q24H (prophylaxis, 1700)    famotidine  20 mg Oral Daily    finasteride  5 mg Oral Daily    levothyroxine  112 mcg Oral Before breakfast    potassium chloride in water  20 mEq Intravenous Once    senna-docusate 8.6-50 mg  1 tablet Oral BID    sodium chloride 0.9%  500 mL Intravenous Once    sodium chloride 0.9%  10 mL Intravenous Q6H    tamsulosin  1 capsule Oral Daily     Continuous Infusions:   sodium chloride 0.9% 150 mL/hr at 06/11/23 0849     PRN Meds:acetaminophen, albuterol, cloNIDine, dextrose, dextrose, dextrose 50%, dextrose 50%, glucagon (human recombinant), glucose, glucose, hydrALAZINE, insulin aspart U-100, ondansetron, sodium chloride 0.9%, Flushing PICC Protocol **AND** sodium chloride 0.9% **AND** sodium chloride 0.9%    Review of patient's allergies indicates:   Allergen Reactions    Iodine Anaphylaxis    Shellfish containing products     Amoxicillin-pot clavulanate Itching       Review of Systems  Review of Systems   Constitutional:  Positive for malaise/fatigue and weight loss. Negative for chills and fever.   HENT:  Negative for congestion, ear discharge, ear pain, hearing loss, nosebleeds, sinus pain, sore throat and tinnitus.    Eyes:  Positive for double vision. Negative for blurred vision.   Respiratory:  Positive for cough and shortness of breath. Negative for hemoptysis, sputum production, wheezing and stridor.    Cardiovascular:  Negative for chest pain, palpitations, orthopnea and claudication.   Gastrointestinal:  Positive for heartburn. Negative for abdominal pain, blood in stool, constipation, diarrhea, nausea and vomiting.   Genitourinary:  Negative for dysuria and  urgency.   Musculoskeletal:  Positive for back pain, myalgias and neck pain.   Skin:  Negative for itching and rash.   Neurological:  Negative for dizziness, tingling, tremors, sensory change, speech change, focal weakness, seizures, weakness and headaches.   Endo/Heme/Allergies:  Negative for polydipsia.   Psychiatric/Behavioral:  Negative for depression, substance abuse and suicidal ideas.  confused    OBJECTIVE:     Vital Signs (Most Recent)  Temp: 97.8 °F (36.6 °C) (06/11/23 0829)  Pulse: (!) 51 (06/11/23 0829)  Resp: 20 (06/11/23 0800)  BP: (!) 137/56 (06/11/23 0829)  SpO2: 95 % (06/11/23 0800)    Vital Signs Range (Last 24H):  Temp:  [97.8 °F (36.6 °C)-99.2 °F (37.3 °C)]   Pulse:  [51-80]   Resp:  [18-20]   BP: (137-173)/(56-87)   SpO2:  [94 %-97 %]     I & O (Last 24H):  Intake/Output Summary (Last 24 hours) at 6/11/2023 1029  Last data filed at 6/10/2023 1054  Gross per 24 hour   Intake --   Output 120 ml   Net -120 ml       Physical Exam:  Physical Exam   Vitals:    06/11/23 0829   BP: (!) 137/56   Pulse: (!) 51   Resp:    Temp: 97.8 °F (36.6 °C)       Physical Exam   Constitutional: drowsy aggitated  but did try to  get up to  urinate  no acute distress  HENT:   Head: Normocephalic and atraumatic.   Eyes: Conjunctivae normal and EOM are normal. Pupils are equal, round, and reactive to light.   Neck: Normal range of motion. Neck supple.   Cardiovascular: Normal rate, regular rhythm, normal heart sounds   Pulmonary/Chest: CTAB,  coarse  breath sounds Abdominal: Soft, nontender, bowel sounds normal  Neurological:tender  t  spine and ls pine    No  break down   on  skin on  buttocks but it  is red  Skin: warm, dry intact  Psychiatric: confused      Laboratory:  Recent Results (from the past 24 hour(s))   POCT glucose    Collection Time: 06/10/23 11:16 AM   Result Value Ref Range    POCT Glucose 119 (H) 70 - 110 mg/dL   ISTAT PROCEDURE    Collection Time: 06/10/23 12:41 PM   Result Value Ref Range    POC PH  7.438 7.35 - 7.45    POC PCO2 36.0 35 - 45 mmHg    POC PO2 71 (L) 80 - 100 mmHg    POC HCO3 24.4 24 - 28 mmol/L    POC BE 0 -2 to 2 mmol/L    POC SATURATED O2 95 95 - 100 %    POC TCO2 25 23 - 27 mmol/L    Sample ARTERIAL     Site RR     Allens Test Pass     DelSys Room Air    Lactic Acid, Plasma    Collection Time: 06/10/23  3:16 PM   Result Value Ref Range    Lactic Acid Level 0.8 0.5 - 2.2 mmol/L   POCT glucose    Collection Time: 06/10/23  5:38 PM   Result Value Ref Range    POCT Glucose 114 (H) 70 - 110 mg/dL   POCT glucose    Collection Time: 06/10/23  8:27 PM   Result Value Ref Range    POCT Glucose 114 (H) 70 - 110 mg/dL   Comprehensive Metabolic Panel    Collection Time: 06/11/23  5:46 AM   Result Value Ref Range    Sodium Level 142 136 - 145 mmol/L    Potassium Level 3.4 (L) 3.5 - 5.1 mmol/L    Chloride 107 98 - 107 mmol/L    Carbon Dioxide 24 23 - 31 mmol/L    Glucose Level 114 82 - 115 mg/dL    Blood Urea Nitrogen 36.0 (H) 8.4 - 25.7 mg/dL    Creatinine 2.46 (H) 0.73 - 1.18 mg/dL    Calcium Level Total 10.6 (H) 8.8 - 10.0 mg/dL    Protein Total 10.8 (H) 5.8 - 7.6 gm/dL    Albumin Level 1.6 (L) 3.4 - 4.8 g/dL    Globulin 9.2 (H) 2.4 - 3.5 gm/dL    Albumin/Globulin Ratio 0.2 (L) 1.1 - 2.0 ratio    Bilirubin Total 0.6 <=1.5 mg/dL    Alkaline Phosphatase 52 40 - 150 unit/L    Alanine Aminotransferase 13 0 - 55 unit/L    Aspartate Aminotransferase 35 (H) 5 - 34 unit/L    eGFR 27 mls/min/1.73/m2   Magnesium    Collection Time: 06/11/23  5:46 AM   Result Value Ref Range    Magnesium Level 1.90 1.60 - 2.60 mg/dL   Phosphorus    Collection Time: 06/11/23  5:46 AM   Result Value Ref Range    Phosphorus Level 3.4 2.3 - 4.7 mg/dL   CBC with Differential    Collection Time: 06/11/23  5:46 AM   Result Value Ref Range    WBC 4.60 4.50 - 11.50 x10(3)/mcL    RBC 3.16 (L) 4.70 - 6.10 x10(6)/mcL    Hgb 10.0 (L) 14.0 - 18.0 g/dL    Hct 31.6 (L) 42.0 - 52.0 %    .0 (H) 80.0 - 94.0 fL    MCH 31.6 (H) 27.0 - 31.0 pg     MCHC 31.6 (L) 33.0 - 36.0 g/dL    RDW 15.1 11.5 - 17.0 %    Platelet 126 (L) 130 - 400 x10(3)/mcL    MPV 9.0 7.4 - 10.4 fL    Neut % 57.9 %    Lymph % 33.7 %    Mono % 6.5 %    Eos % 1.1 %    Basophil % 0.4 %    Lymph # 1.55 0.6 - 4.6 x10(3)/mcL    Neut # 2.66 2.1 - 9.2 x10(3)/mcL    Mono # 0.30 0.1 - 1.3 x10(3)/mcL    Eos # 0.05 0 - 0.9 x10(3)/mcL    Baso # 0.02 <=0.2 x10(3)/mcL    IG# 0.02 0 - 0.04 x10(3)/mcL    IG% 0.4 %        Diagnostic Results:  X-Ray Chest 1 View    Result Date: 6/8/2023  EXAMINATION: XR CHEST 1 VIEW CLINICAL HISTORY: , Weakness. COMPARISON: 0923 FINDINGS: An AP view or more reveals the heart to be normal in size.  The trachea is just right of midline.  Atherosclerosis is seen within the aorta.  There is patchy hazy opacity at the left lung base.  There are changes and curvature are noted to the thoracic spine.     1. Patchy infiltrate, atelectasis, and pleural reaction left lung base 2. Atherosclerosis 3. Thoracic spondylosis and scoliosis Electronically signed by: Boo Camacho Date:    06/08/2023 Time:    08:11    CT Head Without Contrast    Result Date: 6/7/2023  EXAMINATION: CT HEAD WITHOUT CONTRAST CLINICAL HISTORY: Mental status change, unknown cause; TECHNIQUE: Low dose axial CT images obtained throughout the head without intravenous contrast. Sagittal and coronal reconstructions were performed.  .  Automated exposure control used. COMPARISON: None. FINDINGS: Intracranial compartment: Ventricles and sulci are normal in size for age without evidence of hydrocephalus. No extra-axial blood or fluid collections. The brain parenchyma appears normal. No parenchymal mass, hemorrhage, edema or major vascular distribution infarct. Skull/extracranial contents (limited evaluation): No fracture. Mastoid air cells and paranasal sinuses are essentially clear.     No acute abnormality. Electronically signed by: Chrystal Wallace MD Date:    06/07/2023 Time:    17:58    CT Chest Without  Contrast    Result Date: 6/8/2023  EXAMINATION: CT CHEST WITHOUT CONTRAST CLINICAL HISTORY: Hypercalcemia;, . TECHNIQUE: PATIENT RADIATION DOSE: DLP(mGycm) 215 As per PQRS measures, all CT scans at this facility used dose modulation, iterative reconstruction, and/or weight based dose adjustment when appropriate to reduce radiation dose to as low as reasonably achievable. COMPARISON: None available FINDINGS: Serial axial imaging of the chest without the administration of IV contrast.  Both soft tissue and pulmonary parenchymal windows were obtained.  Additional sagittal and coronal reconstructions were performed. Artifact: Mild motion artifact is seen on some images. Contrast: Please note this is a non contrast CT scan of the chest which is non-diagnostic for pulmonary emboli. Soft Tissues: Unremarkable. Lines and Tubes: None. Axilla: A few prominent lymph nodes are seen in the right and left axilla. Neck: The visualized soft tissues of the neck appear unremarkable. Mediastinum: A few subcentimeter mediastinal lymph nodes are seen in the upper and lower paratracheal and subcarinal spaces . Heart: Moderate cardiomegaly is seen. Pronounced coronary artery calcification is seen. Aorta: Unremarkable appearing aorta. Pulmonary Arteries: Mildly enlarged main pulmonary arteries are identified. This could reflect an element of pulmonary arterial hypertension. Lungs: There is mild non specific dependent change at the lung bases. Mild streaky linear opacity is seen predominantly in the dependent portions at the lung bases with peripheral predominance consistent with subsegmental atelectasis. A small patchy area of ground glass density is seen in the lateral basal segment of the left lower lobe. This could reflect an acute focal infiltrate / pneumonitis. Pleura: There is a small left sided pleural effusion. There is mild pleural thickening along the right lower posterior pleural surface. Bony Structures: There is diffuse  osteopenia along the visualised bony structures together with multiple, numerous, small round-ovoid lucent lesion of varying sizes involving the marrow. Spine: Moderate multilevel spondylolytic changes are seen in the thoracic spine. Abdomen: Bilateral renal cysts are indentified, which are large in size on the left side.     Impression: 1. There is a small left sided pleural effusion. 2. A small patchy area of ground glass density is seen in the lateral basal segment of the left lower lobe. This could reflect an acute focal infiltrate / pneumonitis. Correlate clinically and with laboratory findings, as regards additional evaluation and follow-up. 3. There is diffuse osteopenia along the visualised bony structures together with multiple, numerous, small round-ovoid lucent lesion of varying sizes involving the marrow. These changes could reflect metabolic bone disease like hyperparathyroidism. Correlate clinically and with laboratory findings, as regards additional evaluation and follow-up. 1. Concur with preliminary report Electronically signed by: Boo Camacho Date:    06/08/2023 Time:    08:30    NM Bone Scan Whole Body    Result Date: 6/8/2023  EXAMINATION: NM BONE SCAN WHOLE BODY CLINICAL HISTORY: myeloma ??;, . TECHNIQUE: mCi of Tc-99m MDP was administered intravenously. After appropriate delay, whole body bone scan images were obtained. COMPARISON: None available FINDINGS: Activity is identified within the kidneys bilaterally and within the bladder. Focal increased activity is evident at the anterior right 4th 5th and 6th ribs and anterior left 1st, 4th, and 7th ribs.  There is mild central activity at the manubrium.  There is mild increased activity at the sternoclavicular joints as well as the shoulders bilaterally.  Mild increased activity evident thyroid bed.     1. Scattered activity at the anterior left right rib margins as described.  A CT exam on the previous day reveals no definite fracture.  There  is mild heterogeneous and bony loss at the anterior left and right ribs.  This is not have the typical pattern of a metastases. 2. Focal increased activity at the anterior manubrium which again on the CT exam demonstrates osteopenia and heterogeneous bony loss as well as scattered subcentimeter small lytic defects. 3. Suspect mild arthritic changes at the shoulders sternoclavicular joints Electronically signed by: Boo Camacho Date:    06/08/2023 Time:    14:28    US Retroperitoneal Complete    Result Date: 6/8/2023  EXAMINATION: RENAL/RETROPERITONEAL SONOGRAM: CLINICAL HISTORY: Renal insufficiency;, . COMPARISON: None available FINDINGS: Real-time imaging was performed through the retroperitoneum evaluating the kidneys. Arterial and venous flow are identified within the kidneys. No hydronephrosis is seen.  The bladder is incompletely distended.  Two round hypoechoic foci are noted to the mid and lower right kidney measuring 3.3 cm and 1.5 cm respectively compatible with simple cysts.  2 round hypoechoic foci are noted to the upper and mid left kidney measuring 5.4 and 6.5 cm respectively compatible with cysts.  No free fluid collect identified. MEASUREMENTS: Right Kidney: 12.9 cm RI: 0.79 Left Kidney: 14.7 cm RI: 0.73     1. Findings compatible simple cysts to the kidneys bilaterally as described 2. Elevated renal resistive indices bilaterally 3. Incompletely distended bladder Electronically signed by: Boo Camacho Date:    06/08/2023 Time:    12:05    XR Metastatic Survey    Result Date: 6/8/2023  EXAMINATION: SKELETAL SURVEY: CLINICAL HISTORY: , possible myeloma; TECHNIQUE: AP and lateral views were obtained of the axial skeleton as well as limited views of the lower and upper upper extremity. COMPARISON: None available FINDINGS: There is absence of dentition.  Mastoid air cells are aerated bilaterally.  Maxillary and frontal sinuses are relatively clear.  There is sinusoidal deviation of the nasal septum.   Bony structures are osteopenic.  Degenerative changes are noted to the spine.  No fracture or subluxation is seen.  No aggressive osteolytic or osteoblastic lesion is seen.  A small oval sclerotic focus is noted to the proximal left humeral shaft measuring 10 mm in greatest diameter.  Arthritic changes are evident at the shoulders, hips, wrists, and elbows bilaterally.  Atherosclerosis is seen.  Enthesophyte formation is evident at the tibial tubercle.  A pinpoint 2-3 mm metallic density noted to the soft tissues of the right axilla suggesting a foreign body and another at the soft tissues of the medial right thigh     1. No acute osseous defect identified 2. Osteopenia 3. Osteoarthritis 4. Pinpoint metallic density/suspect foreign body right axilla and medial right thigh soft tissues 5. Small 1 cm oval shaped sclerotic focus proximal left humeral shaft which has a nonaggressive appearance. Electronically signed by: Boo Camacho Date:    06/08/2023 Time:    13:55       ASSESSMENT/PLAN:   72  year old  w  hypercalcemia   Needs workup   His ct  had finding in  c spine   Ordered  skeletal survey  area on humerus only   On nuc  bone scan   ribs munubrium  w  lesions and thyroid bed w  uptake   Will order thyroid  us. - neg   Us  of renal  is  normal   except for  elevated resistive indices  Spep + M spike in   IgA trini   One spray of  calcitonin  6/8  Pamidronate 60  x 1   6/9  T spine   mri no contrast  showing  areas of  possible metastasis or bone marrow involvement in  t spine  Confusion is slightly  b grace   Wanted to rule out  any   other  causes  other than high ca  Abg did not   show  co2   narcosis   CT  head  no acute  bleed or  changes  Blood cx  x  2    Lactic  acid  0.8  Ua   to  collect  and  cx  does have a  hx of  prostate  issues  Cxray   developing  pneumonia    and  effusion   Rocephin s tarted   May  be  limited  w  how much fluid  we  can   Give    \bolused   him and increased  rate     Talked to  nurse about  watching for  resp  status   May need to  give lasix.   Still w  good  urine  output   Difficulty  quantitating  his urine   Pt  family  wants him to walk which is  understandable  but he is  unsteady   Instructed to  use the urinal       His  ca is  better today at  10.6  (corrected  12.5)  Down  from  12.2 yesterday  Bone marrow  bx  tomorrow           Plan:   Patient Active Problem List    Diagnosis Date Noted    Abnormal radionuclide bone scan 06/08/2023    Hypercalcemia 06/07/2023    Confusion 06/07/2023    Hypoalbuminemia 06/07/2023    Weakness 06/07/2023    COLTON (acute kidney injury) 06/07/2023    `

## 2023-06-11 NOTE — NURSING
Patient is more alert, responding more, still with some confusion. Patient was able to tolerate some jello today. Patient is now up in recliner awake, still tired but able to hold himself up. Reclined chair back chair alarm in place, family at bedside and communicating with patient. Dr. Gonzalez made aware of status change.

## 2023-06-11 NOTE — ANESTHESIA PREPROCEDURE EVALUATION
06/11/2023  Chip Goodson is a 72 y.o., male.      Pre-op Assessment    I have reviewed the Patient Summary Reports.     I have reviewed the Nursing Notes. I have reviewed the NPO Status.   I have reviewed the Medications.     Review of Systems  Anesthesia Hx:  Denies Family Hx of Anesthesia complications.   Denies Personal Hx of Anesthesia complications.   Hematology/Oncology:  Hematology Normal   Oncology Normal     EENT/Dental:EENT/Dental Normal   Cardiovascular:  Cardiovascular Normal  ECG has been reviewed.    Pulmonary:  Pulmonary Normal    Renal/:   Chronic Renal Disease, CKD    Hepatic/GI:  Hepatic/GI Normal    Musculoskeletal:  Musculoskeletal Normal    Neurological:  Neurology Normal    Endocrine:  Endocrine Normal    Dermatological:  Skin Normal    Psych:   Psychiatric History          Physical Exam  General: Cooperative, Alert and Oriented    Airway:  Mallampati: II   Mouth Opening: Normal  TM Distance: Normal  Tongue: Normal  Neck ROM: Normal ROM    Dental:  Intact        Anesthesia Plan  Type of Anesthesia, risks & benefits discussed:    Anesthesia Type: Gen Natural Airway  Intra-op Monitoring Plan: Standard ASA Monitors  Post Op Pain Control Plan:   (medical reason for not using multimodal pain management)  Induction:  IV  Airway Plan: Direct  Informed Consent: Informed consent signed with the Patient and all parties understand the risks and agree with anesthesia plan.  All questions answered. Patient consented to blood products? Yes  ASA Score: 3    Ready For Surgery From Anesthesia Perspective.     .

## 2023-06-11 NOTE — PROGRESS NOTES
Pharmacokinetic Initial Assessment: IV Vancomycin    Assessment/Plan:    Initiate intravenous vancomycin with loading dose of 0 mg once followed by a maintenance dose of vancomycin 1250mg IV every 24 hours  Desired empiric serum trough concentration is 15 to 20 mcg/mL  Draw vancomycin trough level 60 min prior to third dose on 6/13 at approximately 1100  Pharmacy will continue to follow and monitor vancomycin.      Please contact pharmacy at extension [phone] with any questions regarding this assessment.     Thank you for the consult,   Smita Rivers       Patient brief summary:  Chip Goodson is a 72 y.o. male initiated on antimicrobial therapy with IV Vancomycin for treatment of suspected  pneumonia    Drug Allergies:   Review of patient's allergies indicates:   Allergen Reactions    Iodine Anaphylaxis    Shellfish containing products     Amoxicillin-pot clavulanate Itching       Actual Body Weight:   113.4kg    Renal Function:   Estimated Creatinine Clearance: 34.2 mL/min (A) (based on SCr of 2.46 mg/dL (H)).,     Dialysis Method (if applicable):  N/A    CBC (last 72 hours):  Recent Labs   Lab Result Units 06/09/23  0742 06/10/23  1019 06/11/23  0546   WBC x10(3)/mcL 6.84 4.06* 4.60   Hgb g/dL 10.7* 10.3* 10.0*   Hct % 32.1* 30.5* 31.6*   Platelet x10(3)/mcL 172 138 126*   Mono % % 9.9 4.7 6.5   Eos % % 3.8 0.7 1.1   Basophil % % 0.4 0.5 0.4       Metabolic Panel (last 72 hours):  Recent Labs   Lab Result Units 06/09/23  0742 06/09/23  0949 06/10/23  1019 06/11/23  0546   Sodium Level mmol/L 137  --  139 142   Potassium Level mmol/L 3.3*  --  3.3* 3.4*   Chloride mmol/L 101  --  105 107   Carbon Dioxide mmol/L 25  --  23 24   Glucose Level mg/dL 122*  --  135* 114   Blood Urea Nitrogen mg/dL 22.0  --  28.0* 36.0*   Creatinine mg/dL 2.32*  --  2.52* 2.46*   Urine Creatinine mg/dL  --  61.5*  --   --    Albumin Level g/dL 1.7*  --  1.7* 1.6*   Bilirubin Total mg/dL 0.7  --  0.9 0.6   Alkaline Phosphatase unit/L 66   --  64 52   Aspartate Aminotransferase unit/L 24  --  31 35*   Alanine Aminotransferase unit/L 6  --  7 13   Magnesium Level mg/dL  --   --  1.90 1.90   Phosphorus Level mg/dL  --   --  3.6 3.4       Drug levels (last 3 results):  No results for input(s): VANCOMYCINRA, VANCORANDOM, VANCOMYCINPE, VANCOPEAK, VANCOMYCINTR, VANCOTROUGH in the last 72 hours.    Microbiologic Results:  Microbiology Results (last 7 days)       Procedure Component Value Units Date/Time    Blood Culture [[id number]] Collected: 06/10/23 1516    Order Status: Sent Specimen: Blood from Arm, Right Updated: 06/10/23 1518    Blood Culture [[id number]] Collected: 06/10/23 1516    Order Status: Sent Specimen: Blood from Hand, Right Updated: 06/10/23 1518    Urine Culture High Risk [[id number]]     Order Status: Sent Specimen: Urine, Clean Catch

## 2023-06-12 ENCOUNTER — ANESTHESIA (OUTPATIENT)
Dept: SURGERY | Facility: HOSPITAL | Age: 73
DRG: 640 | End: 2023-06-12
Payer: MEDICARE

## 2023-06-12 LAB
ALBUMIN SERPL-MCNC: 1.4 G/DL (ref 3.4–4.8)
ALBUMIN/GLOB SERPL: 0.2 RATIO (ref 1.1–2)
ALP SERPL-CCNC: 53 UNIT/L (ref 40–150)
ALT SERPL-CCNC: 11 UNIT/L (ref 0–55)
AST SERPL-CCNC: 27 UNIT/L (ref 5–34)
BASOPHILS # BLD AUTO: 0.02 X10(3)/MCL
BASOPHILS NFR BLD AUTO: 0.4 %
BILIRUBIN DIRECT+TOT PNL SERPL-MCNC: 0.4 MG/DL
BUN SERPL-MCNC: 39 MG/DL (ref 8.4–25.7)
CALCIUM SERPL-MCNC: 9.8 MG/DL (ref 8.8–10)
CHLORIDE SERPL-SCNC: 110 MMOL/L (ref 98–107)
CO2 SERPL-SCNC: 21 MMOL/L (ref 23–31)
CREAT SERPL-MCNC: 2.13 MG/DL (ref 0.73–1.18)
EOSINOPHIL # BLD AUTO: 0.22 X10(3)/MCL (ref 0–0.9)
EOSINOPHIL NFR BLD AUTO: 4 %
ERYTHROCYTE [DISTWIDTH] IN BLOOD BY AUTOMATED COUNT: 15.3 % (ref 11.5–17)
GFR SERPLBLD CREATININE-BSD FMLA CKD-EPI: 32 MLS/MIN/1.73/M2
GLOBULIN SER-MCNC: 8.4 GM/DL (ref 2.4–3.5)
GLUCOSE SERPL-MCNC: 88 MG/DL (ref 82–115)
HCT VFR BLD AUTO: 27 % (ref 42–52)
HGB BLD-MCNC: 8.8 G/DL (ref 14–18)
IMM GRANULOCYTES # BLD AUTO: 0.03 X10(3)/MCL (ref 0–0.04)
IMM GRANULOCYTES NFR BLD AUTO: 0.5 %
LYMPHOCYTES # BLD AUTO: 1.96 X10(3)/MCL (ref 0.6–4.6)
LYMPHOCYTES NFR BLD AUTO: 35.5 %
MAGNESIUM SERPL-MCNC: 1.9 MG/DL (ref 1.6–2.6)
MCH RBC QN AUTO: 31.9 PG (ref 27–31)
MCHC RBC AUTO-ENTMCNC: 32.6 G/DL (ref 33–36)
MCV RBC AUTO: 97.8 FL (ref 80–94)
MONOCYTES # BLD AUTO: 0.48 X10(3)/MCL (ref 0.1–1.3)
MONOCYTES NFR BLD AUTO: 8.7 %
NEUTROPHILS # BLD AUTO: 2.81 X10(3)/MCL (ref 2.1–9.2)
NEUTROPHILS NFR BLD AUTO: 50.9 %
PHOSPHATE SERPL-MCNC: 2.6 MG/DL (ref 2.3–4.7)
PLATELET # BLD AUTO: 119 X10(3)/MCL (ref 130–400)
PMV BLD AUTO: 9.7 FL (ref 7.4–10.4)
POCT GLUCOSE: 119 MG/DL (ref 70–110)
POCT GLUCOSE: 85 MG/DL (ref 70–110)
POTASSIUM SERPL-SCNC: 3 MMOL/L (ref 3.5–5.1)
PROT SERPL-MCNC: 9.8 GM/DL (ref 5.8–7.6)
RBC # BLD AUTO: 2.76 X10(6)/MCL (ref 4.7–6.1)
SODIUM SERPL-SCNC: 142 MMOL/L (ref 136–145)
WBC # SPEC AUTO: 5.52 X10(3)/MCL (ref 4.5–11.5)

## 2023-06-12 PROCEDURE — 88364 INSITU HYBRIDIZATION (FISH): CPT

## 2023-06-12 PROCEDURE — 94664 DEMO&/EVAL PT USE INHALER: CPT

## 2023-06-12 PROCEDURE — A4216 STERILE WATER/SALINE, 10 ML: HCPCS | Performed by: FAMILY MEDICINE

## 2023-06-12 PROCEDURE — D9220A PRA ANESTHESIA: ICD-10-PCS | Mod: ,,, | Performed by: NURSE ANESTHETIST, CERTIFIED REGISTERED

## 2023-06-12 PROCEDURE — 93010 ELECTROCARDIOGRAM REPORT: CPT | Mod: ,,, | Performed by: STUDENT IN AN ORGANIZED HEALTH CARE EDUCATION/TRAINING PROGRAM

## 2023-06-12 PROCEDURE — 25000003 PHARM REV CODE 250: Performed by: INTERNAL MEDICINE

## 2023-06-12 PROCEDURE — 97110 THERAPEUTIC EXERCISES: CPT

## 2023-06-12 PROCEDURE — 88311 DECALCIFY TISSUE: CPT

## 2023-06-12 PROCEDURE — 93010 EKG 12-LEAD: ICD-10-PCS | Mod: ,,, | Performed by: STUDENT IN AN ORGANIZED HEALTH CARE EDUCATION/TRAINING PROGRAM

## 2023-06-12 PROCEDURE — 88313 SPECIAL STAINS GROUP 2: CPT

## 2023-06-12 PROCEDURE — 88182 CELL MARKER STUDY: CPT

## 2023-06-12 PROCEDURE — 88341 IMHCHEM/IMCYTCHM EA ADD ANTB: CPT

## 2023-06-12 PROCEDURE — 11000001 HC ACUTE MED/SURG PRIVATE ROOM

## 2023-06-12 PROCEDURE — 37000009 HC ANESTHESIA EA ADD 15 MINS: Performed by: PATHOLOGY

## 2023-06-12 PROCEDURE — 83735 ASSAY OF MAGNESIUM: CPT | Performed by: FAMILY MEDICINE

## 2023-06-12 PROCEDURE — 21400001 HC TELEMETRY ROOM

## 2023-06-12 PROCEDURE — 99900035 HC TECH TIME PER 15 MIN (STAT)

## 2023-06-12 PROCEDURE — 25000003 PHARM REV CODE 250: Performed by: NURSE ANESTHETIST, CERTIFIED REGISTERED

## 2023-06-12 PROCEDURE — 36000705 HC OR TIME LEV I EA ADD 15 MIN: Performed by: PATHOLOGY

## 2023-06-12 PROCEDURE — 88274 CYTOGENETICS 25-99: CPT

## 2023-06-12 PROCEDURE — 93005 ELECTROCARDIOGRAM TRACING: CPT

## 2023-06-12 PROCEDURE — 25000003 PHARM REV CODE 250: Performed by: FAMILY MEDICINE

## 2023-06-12 PROCEDURE — 80053 COMPREHEN METABOLIC PANEL: CPT | Performed by: FAMILY MEDICINE

## 2023-06-12 PROCEDURE — 63600175 PHARM REV CODE 636 W HCPCS: Performed by: FAMILY MEDICINE

## 2023-06-12 PROCEDURE — 97530 THERAPEUTIC ACTIVITIES: CPT

## 2023-06-12 PROCEDURE — 36000704 HC OR TIME LEV I 1ST 15 MIN: Performed by: PATHOLOGY

## 2023-06-12 PROCEDURE — 85025 COMPLETE CBC W/AUTO DIFF WBC: CPT | Performed by: FAMILY MEDICINE

## 2023-06-12 PROCEDURE — 63600175 PHARM REV CODE 636 W HCPCS: Performed by: NURSE ANESTHETIST, CERTIFIED REGISTERED

## 2023-06-12 PROCEDURE — 88305 TISSUE EXAM BY PATHOLOGIST: CPT | Performed by: PATHOLOGY

## 2023-06-12 PROCEDURE — 88187 FLOWCYTOMETRY/READ 2-8: CPT

## 2023-06-12 PROCEDURE — 27000173 HC ACAPELLA DEVICE DH OR DM

## 2023-06-12 PROCEDURE — 87088 URINE BACTERIA CULTURE: CPT | Performed by: FAMILY MEDICINE

## 2023-06-12 PROCEDURE — 88271 CYTOGENETICS DNA PROBE: CPT

## 2023-06-12 PROCEDURE — 88185 FLOWCYTOMETRY/TC ADD-ON: CPT

## 2023-06-12 PROCEDURE — 88184 FLOWCYTOMETRY/ TC 1 MARKER: CPT

## 2023-06-12 PROCEDURE — 97161 PT EVAL LOW COMPLEX 20 MIN: CPT

## 2023-06-12 PROCEDURE — 63600175 PHARM REV CODE 636 W HCPCS

## 2023-06-12 PROCEDURE — 94761 N-INVAS EAR/PLS OXIMETRY MLT: CPT

## 2023-06-12 PROCEDURE — 88342 IMHCHEM/IMCYTCHM 1ST ANTB: CPT

## 2023-06-12 PROCEDURE — 84100 ASSAY OF PHOSPHORUS: CPT | Performed by: FAMILY MEDICINE

## 2023-06-12 PROCEDURE — D9220A PRA ANESTHESIA: Mod: ,,, | Performed by: NURSE ANESTHETIST, CERTIFIED REGISTERED

## 2023-06-12 PROCEDURE — 88291 CYTO/MOLECULAR REPORT: CPT

## 2023-06-12 PROCEDURE — 37000008 HC ANESTHESIA 1ST 15 MINUTES: Performed by: PATHOLOGY

## 2023-06-12 PROCEDURE — 88365 INSITU HYBRIDIZATION (FISH): CPT

## 2023-06-12 RX ORDER — HYDRALAZINE HYDROCHLORIDE 10 MG/1
10 TABLET, FILM COATED ORAL EVERY 8 HOURS
Status: DISCONTINUED | OUTPATIENT
Start: 2023-06-12 | End: 2023-06-12

## 2023-06-12 RX ORDER — POTASSIUM CHLORIDE 14.9 MG/ML
20 INJECTION INTRAVENOUS ONCE
Status: COMPLETED | OUTPATIENT
Start: 2023-06-12 | End: 2023-06-12

## 2023-06-12 RX ORDER — HYDROCODONE BITARTRATE AND ACETAMINOPHEN 5; 325 MG/1; MG/1
1 TABLET ORAL EVERY 6 HOURS PRN
Status: ACTIVE | OUTPATIENT
Start: 2023-06-12 | End: 2023-06-13

## 2023-06-12 RX ORDER — FUROSEMIDE 10 MG/ML
10 INJECTION INTRAMUSCULAR; INTRAVENOUS ONCE
Status: COMPLETED | OUTPATIENT
Start: 2023-06-12 | End: 2023-06-12

## 2023-06-12 RX ORDER — HYDRALAZINE HYDROCHLORIDE 25 MG/1
25 TABLET, FILM COATED ORAL EVERY 8 HOURS
Status: DISCONTINUED | OUTPATIENT
Start: 2023-06-13 | End: 2023-06-13

## 2023-06-12 RX ORDER — FENTANYL CITRATE 50 UG/ML
INJECTION, SOLUTION INTRAMUSCULAR; INTRAVENOUS
Status: DISCONTINUED | OUTPATIENT
Start: 2023-06-12 | End: 2023-06-12

## 2023-06-12 RX ORDER — PROPOFOL 10 MG/ML
VIAL (ML) INTRAVENOUS
Status: DISCONTINUED | OUTPATIENT
Start: 2023-06-12 | End: 2023-06-12

## 2023-06-12 RX ORDER — LIDOCAINE HYDROCHLORIDE 20 MG/ML
INJECTION, SOLUTION EPIDURAL; INFILTRATION; INTRACAUDAL; PERINEURAL
Status: DISCONTINUED | OUTPATIENT
Start: 2023-06-12 | End: 2023-06-12

## 2023-06-12 RX ORDER — POTASSIUM CHLORIDE 20 MEQ/1
20 TABLET, EXTENDED RELEASE ORAL 2 TIMES DAILY
Status: DISCONTINUED | OUTPATIENT
Start: 2023-06-12 | End: 2023-06-13

## 2023-06-12 RX ADMIN — POTASSIUM CHLORIDE 20 MEQ: 1500 TABLET, EXTENDED RELEASE ORAL at 11:06

## 2023-06-12 RX ADMIN — CEFTRIAXONE SODIUM 1 G: 1 INJECTION, POWDER, FOR SOLUTION INTRAMUSCULAR; INTRAVENOUS at 03:06

## 2023-06-12 RX ADMIN — AMLODIPINE BESYLATE 10 MG: 10 TABLET ORAL at 11:06

## 2023-06-12 RX ADMIN — FUROSEMIDE 10 MG: 10 INJECTION, SOLUTION INTRAMUSCULAR; INTRAVENOUS at 10:06

## 2023-06-12 RX ADMIN — PROPOFOL 50 MG: 10 INJECTION, EMULSION INTRAVENOUS at 09:06

## 2023-06-12 RX ADMIN — FENTANYL CITRATE 100 MCG: 50 INJECTION, SOLUTION INTRAMUSCULAR; INTRAVENOUS at 09:06

## 2023-06-12 RX ADMIN — POTASSIUM CHLORIDE 20 MEQ: 14.9 INJECTION, SOLUTION INTRAVENOUS at 05:06

## 2023-06-12 RX ADMIN — FAMOTIDINE 20 MG: 20 TABLET, FILM COATED ORAL at 11:06

## 2023-06-12 RX ADMIN — ACETAMINOPHEN 650 MG: 325 TABLET ORAL at 06:06

## 2023-06-12 RX ADMIN — SENNOSIDES AND DOCUSATE SODIUM 1 TABLET: 50; 8.6 TABLET ORAL at 09:06

## 2023-06-12 RX ADMIN — SODIUM CHLORIDE, PRESERVATIVE FREE 10 ML: 5 INJECTION INTRAVENOUS at 05:06

## 2023-06-12 RX ADMIN — LIDOCAINE HYDROCHLORIDE 100 MG: 20 INJECTION, SOLUTION EPIDURAL; INFILTRATION; INTRACAUDAL; PERINEURAL at 09:06

## 2023-06-12 RX ADMIN — SENNOSIDES AND DOCUSATE SODIUM 1 TABLET: 50; 8.6 TABLET ORAL at 11:06

## 2023-06-12 RX ADMIN — SODIUM CHLORIDE: 9 INJECTION, SOLUTION INTRAVENOUS at 03:06

## 2023-06-12 RX ADMIN — POTASSIUM CHLORIDE 20 MEQ: 1500 TABLET, EXTENDED RELEASE ORAL at 09:06

## 2023-06-12 RX ADMIN — SODIUM CHLORIDE, PRESERVATIVE FREE 10 ML: 5 INJECTION INTRAVENOUS at 12:06

## 2023-06-12 RX ADMIN — FINASTERIDE 5 MG: 5 TABLET, FILM COATED ORAL at 11:06

## 2023-06-12 RX ADMIN — LEVOTHYROXINE SODIUM 112 MCG: 112 TABLET ORAL at 05:06

## 2023-06-12 RX ADMIN — ENOXAPARIN SODIUM 40 MG: 40 INJECTION SUBCUTANEOUS at 05:06

## 2023-06-12 RX ADMIN — HYDRALAZINE HYDROCHLORIDE 10 MG: 10 TABLET, FILM COATED ORAL at 09:06

## 2023-06-12 RX ADMIN — TAMSULOSIN HYDROCHLORIDE 0.4 MG: 0.4 CAPSULE ORAL at 11:06

## 2023-06-12 RX ADMIN — POTASSIUM CHLORIDE 20 MEQ: 14.9 INJECTION, SOLUTION INTRAVENOUS at 11:06

## 2023-06-12 NOTE — PROGRESS NOTES
[facility]  [address] [place] [address]  Phone: [phone]    Department of Nephrology  Progress Note      PATIENT NAME: Chip Goodson  MRN: [id number]  TODAY'S DATE: 06/12/2023  ADMIT DATE: 6/7/2023    SUBJECTIVE     PRINCIPLE PROBLEM: Confusion    INTERVAL HISTORY:    6/12/2023  72-year-old  male admitted with severe hypercalcemia and confusion which revealed most of his weekend however today's quite lucid..  Unsure for exactly why the encephalopathic behavior.  He is sitting quietly having lunch very conversive and very appropriate.  He is status post bone marrow biopsy this morning.    Review of patient's allergies indicates:   Allergen Reactions    Iodine Anaphylaxis    Shellfish containing products     Amoxicillin-pot clavulanate Itching       Review of Systems   Constitutional:  Negative for weight loss.   Respiratory:  Positive for cough. Negative for sputum production.    Cardiovascular: Negative.    Gastrointestinal: Negative.    Genitourinary: Negative.    Musculoskeletal:  Positive for back pain.     OBJECTIVE     VITAL SIGNS (Most Recent)  Temp: 97.7 °F (36.5 °C) (06/12/23 1058)  Pulse: (!) 52 (06/12/23 1058)  Resp: 18 (06/12/23 1058)  BP: (!) 156/77 (06/12/23 1058)  SpO2: 96 % (06/12/23 1100)    VENTILATION STATUS  Resp: 18 (06/12/23 1058)  SpO2: 96 % (06/12/23 1100)           I & O (Last 24H):  Intake/Output Summary (Last 24 hours) at 6/12/2023 1204  Last data filed at 6/12/2023 0942  Gross per 24 hour   Intake 360 ml   Output 455 ml   Net -95 ml       WEIGHTS  Wt Readings from Last 3 Encounters:   06/07/23 1707 113.4 kg (250 lb)   02/07/18 1124 111.2 kg (245 lb 2.4 oz)   10/09/17 0837 108 kg (237 lb 15.8 oz)   06/01/17 0833 108.9 kg (239 lb 15.9 oz)       Physical Exam  Constitutional:       Appearance: Normal appearance. He is not ill-appearing.   Eyes:      Extraocular Movements: Extraocular movements intact.      Pupils: Pupils are equal, round,  and reactive to light.   Cardiovascular:      Rate and Rhythm: Normal rate and regular rhythm.   Pulmonary:      Effort: Pulmonary effort is normal.      Breath sounds: Normal breath sounds.   Abdominal:      General: Abdomen is flat.      Tenderness: There is no guarding or rebound.   Musculoskeletal:      Right lower leg: No edema.      Left lower leg: No edema.   Neurological:      General: No focal deficit present.      Mental Status: He is alert.       SCHEDULED MEDS:   amLODIPine  10 mg Oral Daily    cefTRIAXone (ROCEPHIN) IVPB  1 g Intravenous Q24H    enoxparin  40 mg Subcutaneous Q24H (prophylaxis, 1700)    famotidine  20 mg Oral Daily    finasteride  5 mg Oral Daily    levothyroxine  112 mcg Oral Before breakfast    potassium chloride  20 mEq Oral BID    senna-docusate 8.6-50 mg  1 tablet Oral BID    sodium chloride 0.9%  10 mL Intravenous Q6H    tamsulosin  1 capsule Oral Daily       CONTINUOUS INFUSIONS:   sodium chloride 0.9% 75 mL/hr at 06/12/23 1007       PRN MEDS:acetaminophen, albuterol, cloNIDine, dextrose, dextrose, dextrose 50%, dextrose 50%, glucagon (human recombinant), glucose, glucose, hydrALAZINE, insulin aspart U-100, ondansetron, sodium chloride 0.9%, Flushing PICC Protocol **AND** sodium chloride 0.9% **AND** sodium chloride 0.9%    LABS AND DIAGNOSTICS     CBC LAST 3 DAYS  Recent Labs   Lab 06/10/23  1019 06/11/23  0546 06/12/23  0441   WBC 4.06* 4.60 5.52   RBC 3.20* 3.16* 2.76*   HGB 10.3* 10.0* 8.8*   HCT 30.5* 31.6* 27.0*   MCV 95.3* 100.0* 97.8*   MCH 32.2* 31.6* 31.9*   MCHC 33.8 31.6* 32.6*   RDW 14.8 15.1 15.3    126* 119*   MPV 8.9 9.0 9.7       COAGULATION LAST 3 DAYS  Recent Labs   Lab 06/07/23  1730 06/11/23  1612   INR 1.75* 1.64*       CHEMISTRY LAST 3 DAYS  Recent Labs   Lab 06/10/23  1019 06/10/23  1241 06/11/23  0546 06/11/23  1612 06/12/23  0441     --  142 142 142   K 3.3*  --  3.4* 3.3* 3.0*   CO2 23  --  24 21* 21*   BUN 28.0*  --  36.0* 38.0* 39.0*    CREATININE 2.52*  --  2.46* 2.21* 2.13*   CALCIUM 12.2*  --  10.6* 9.9 9.8   PH  --  7.438  --   --   --    MG 1.90  --  1.90  --  1.90   ALBUMIN 1.7*  --  1.6* 1.5* 1.4*   ALKPHOS 64  --  52 55 53   ALT 7  --  13 9 11   AST 31  --  35* 31 27   BILITOT 0.9  --  0.6 0.4 0.4       CARDIAC PROFILE LAST 3 DAYS  Recent Labs   Lab 06/07/23  1730   TROPONINI 0.023       ENDOCRINE LAST 3 DAYS  Recent Labs   Lab 06/07/23  1730 06/08/23  0441   TSH 3.649 3.557       LAST ARTERIAL BLOOD GAS  ABG  Recent Labs   Lab 06/10/23  1241   PH 7.438   PO2 71*   PCO2 36.0   HCO3 24.4   BE 0       LAST 7 DAYS MICROBIOLOGY   Microbiology Results (last 7 days)       Procedure Component Value Units Date/Time    Urine Culture High Risk [[id number]] Collected: 06/12/23 0228    Order Status: Sent Specimen: Urine, Clean Catch Updated: 06/12/23 0228    Blood Culture [[id number]] Collected: 06/10/23 1516    Order Status: Resulted Specimen: Blood from Arm, Right Updated: 06/10/23 1518    Blood Culture [[id number]] Collected: 06/10/23 1516    Order Status: Resulted Specimen: Blood from Hand, Right Updated: 06/10/23 1518            MOST RECENT IMAGING  X-Ray Chest AP Portable  Narrative: EXAMINATION:  XR CHEST AP PORTABLE    CLINICAL HISTORY:  Cough;, .    COMPARISON:  06/11/2023    FINDINGS:  An AP view or more reveals the heart is borderline enlarged.  The trachea is just right of midline.  Atherosclerosis seen within a prominent aortic arch.  Patchy hazy opacities evident at the lower lungs.  A small punctate metallic opacity is again evident at the soft tissue of the right axilla.  Degenerative changes and curvature are noted to the thoracic spine.  Impression: 1. Patchy hazy opacities again evident at the lower lungs bilaterally suspicious for infiltrate and atelectasis.  Small bibasilar pleural reactions cannot be excluded.  2. Borderline cardiomegaly  3. Atherosclerosis    Electronically signed by: Boo  Doug  Date:    06/12/2023  Time:    10:09  X-Ray Chest AP Portable  Narrative: EXAMINATION:  XR CHEST AP PORTABLE    CLINICAL HISTORY:  Cough;, .    COMPARISON:  06/10/2023    FINDINGS:  An AP view or more reveals the heart to be borderline enlarged.  The trachea is midline.  Patchy hazy increased opacities are evident at the lower lungs.  Inspiration is less than optimal.  Degenerative changes and curvature are noted to the thoracic spine.  A pinpoint metallic density is superimposed over the right axilla.  Impression: 1. Infiltrate and or atelectasis lower lungs bilaterally  2. Borderline cardiomegaly  3. Suboptimal inspiration    Electronically signed by: Boo Camacho  Date:    06/12/2023  Time:    08:51      [facility]  No results found for this or any previous visit.      CURRENT/PREVIOUS VISIT EKG  Results for orders placed or performed during the hospital encounter of 06/07/23   EKG 12-lead    Collection Time: 06/12/23  6:04 AM    Narrative    Test Reason : R00.1,    Vent. Rate : 050 BPM     Atrial Rate : 050 BPM     P-R Int : 186 ms          QRS Dur : 090 ms      QT Int : 424 ms       P-R-T Axes : 046 007 016 degrees     QTc Int : 386 ms    Sinus bradycardia  Nonspecific T wave abnormality  Abnormal ECG  When compared with ECG of 07-JUN-2023 17:17,  Premature ventricular complexes are no longer Present  Vent. rate has decreased BY  32 BPM    Referred By: [email or web address]   SELF           Confirmed By:        ASSESSMENT/PLAN:     Active Hospital Problems    Diagnosis    *Confusion    Abnormal radionuclide bone scan    Hypercalcemia    Hypoalbuminemia    Weakness    COLTON (acute kidney injury)       ASSESSMENT & PLAN:   72-year-old with hypercalcemia which is improved with pamidronate; however, still elevated with a corrected calcium of 12.4.  His mentation is improved as well as his behavior compared to the weekend according to his primary care doctor.  Serum protein electrophoresis shows an M spike at 3.3 g with  IgA and kappa chain specificity, IgA multiple myeloma probable.  Awaiting biopsy of the bone marrow results.  multiple implications for renal impairment included but not limited to light chain deposition disease, proximal tubular apathy, and cast nephropathy etc.      RECOMMENDATIONS:  Consultation to Heme-Onc for treatment plan.        Avi Vega MD  Department of Nephrology  Date of Service: 06/12/2023  12:04 PM

## 2023-06-12 NOTE — PT/OT/SLP EVAL
Physical Therapy Evaluation    Patient Name:  Chip Goodson   MRN:  [id number]    Recommendations:     Discharge Recommendations: home with home health   Discharge Equipment Recommendations: none   Barriers to discharge: Decreased caregiver support    Assessment:     Chip Goodson is a 72 y.o. male admitted with a medical diagnosis of Confusion.  He presents with the following impairments/functional limitations:   gen. weakness.    Rehab Prognosis: Good; patient would benefit from acute skilled PT services to address these deficits and reach maximum level of function.    Recent Surgery: Procedure(s) (LRB):  BIOPSY, BONE MARROW (Right) Day of Surgery    Plan:     During this hospitalization, patient to be seen 5 x/week to address the identified rehab impairments via gait training, therapeutic activities, therapeutic exercises and progress toward the following goals:    Plan of Care Expires:       Subjective     Chief Complaint: Pt agrees to participate  Patient/Family Comments/goals: to go home   Pain/Comfort:       Patients cultural, spiritual, Jehovah's witness conflicts given the current situation:      Living Environment:   home, lives with wife who is on LTAC currently  Prior to admission, patients level of function was amb.  Equipment used at home: walker, rolling.  DME owned (not currently used): none.  Upon discharge, patient will have assistance from family.    Objective:     Communicated with pt and caregiver daughter prior to session.  Patient found HOB elevated with    upon PT entry to room.    General Precautions: Standard, fall  Orthopedic Precautions:    Braces:    Respiratory Status: Room air    Exams:  RLE Strength: 3/5  LLE Strength: Deficits: 3/5    Functional Mobility:  Bed Mobility:     Scooting: minimum assistance  Supine to Sit: moderate assistance  Sit to Supine: minimum assistance  Transfers:     Sit to Stand:  moderate assistance with no AD      AM-PAC 6 CLICK MOBILITY  Total  Score:12       Treatment & Education:  Pt educ indications for therapy and HEP intro BLE AROM ex against gravity 1x10-15reps LAQ, marches, hip abd add, heel raises, toe raises    Patient left HOB elevated with all lines intact, call button in reach, and bed alarm on.    GOALS:   Multidisciplinary Problems       Physical Therapy Goals          Problem: Physical Therapy    Goal Priority Disciplines Outcome Goal Variances Interventions   Physical Therapy Goal     PT, PT/OT Ongoing, Progressing     Description: 1.  Pt will be able to tf SBA  2.  Pt will be able to perform bed mobility Talon  3.  Pt will be able to amb in halls with RW SBA  4.  Pt will be I HEP with caregiver                       History:     History reviewed. No pertinent past medical history.    History reviewed. No pertinent surgical history.    Time Tracking:     PT Received On: 06/12/23  PT Start Time: 1300     PT Stop Time: 1345  PT Total Time (min): 45 min     Billable Minutes: Evaluation 15, Therapeutic Activity 15, and Therapeutic Exercise 15      06/12/2023

## 2023-06-12 NOTE — PLAN OF CARE
Problem: Pain Acute  Goal: Acceptable Pain Control and Functional Ability  Outcome: Ongoing, Progressing     Problem: Fatigue  Goal: Improved Activity Tolerance  Outcome: Ongoing, Progressing

## 2023-06-12 NOTE — ANESTHESIA POSTPROCEDURE EVALUATION
Anesthesia Post Evaluation    Patient: Chip Goodson    Procedure(s) Performed: Procedure(s) (LRB):  BIOPSY, BONE MARROW (Right)    Final Anesthesia Type: general      Patient location during evaluation: med/surg floor  Patient participation: Yes- Able to Participate  Level of consciousness: awake and alert  Post-procedure vital signs: reviewed and stable  Pain management: adequate  Airway patency: patent  WALDO mitigation strategies: Multimodal analgesia  PONV status at discharge: No PONV  Anesthetic complications: no      Cardiovascular status: blood pressure returned to baseline  Respiratory status: unassisted  Hydration status: euvolemic  Follow-up not needed.          Vitals Value Taken Time   /78 06/12/23 0819   Temp 36.8 °C (98.2 °F) 06/12/23 0819   Pulse 54 06/12/23 0819   Resp 20 06/12/23 0819   SpO2 96 % 06/12/23 0819         No case tracking events are documented in the log.      Pain/Lan Score: Pain Rating Prior to Med Admin: 7 (6/11/2023  1:37 PM)  Pain Rating Post Med Admin: 0 (6/11/2023  2:37 PM)

## 2023-06-12 NOTE — PLAN OF CARE
06/09/23 1605   Discharge Assessment   Assessment Type Discharge Planning Assessment   Source of Information patient   People in Home significant other;grandchild(freedom)   Do you expect to return to your current living situation? Yes   Do you have help at home or someone to help you manage your care at home? Yes   Prior to hospitilization cognitive status: Alert/Oriented;No Deficits   Current cognitive status: Alert/Oriented;No Deficits   Equipment Currently Used at Home walker, rolling   Do you currently have service(s) that help you manage your care at home? No   Do you take prescription medications? Yes   Do you have prescription coverage? Yes   Do you have any problems affording any of your prescribed medications? No   Is the patient taking medications as prescribed? yes   How do you get to doctors appointments? family or friend will provide   Are you on dialysis? No   Discharge Plan A Home with family;Home Health   Discharge Plan B Home with family;Home Health   DME Needed Upon Discharge  none   Discharge Plan discussed with: Patient   Transition of Care Barriers None   Physical Activity   On average, how many days per week do you engage in moderate to strenuous exercise (like a brisk walk)? Patient refu   On average, how many minutes do you engage in exercise at this level? Patient refu   Financial Resource Strain   How hard is it for you to pay for the very basics like food, housing, medical care, and heating? Patient refu   Housing Stability   In the last 12 months, was there a time when you were not able to pay the mortgage or rent on time? [place]   In the last 12 months, was there a time when you did not have a steady place to sleep or slept in a shelter (including now)? [place]   Transportation Needs   In the past 12 months, has lack of transportation kept you from medical appointments or from getting medications? Patient refu   In the past 12 months, has lack of transportation kept you from meetings, work, or  from getting things needed for daily living? Patient refu   Food Insecurity   Within the past 12 months, you worried that your food would run out before you got the money to buy more. Patient refu   Within the past 12 months, the food you bought just didn't last and you didn't have money to get more. Patient refu   Stress   Do you feel stress - tense, restless, nervous, or anxious, or unable to sleep at night because your mind is troubled all the time - these days? Patient refu   Social Connections   In a typical week, how many times do you talk on the phone with family, friends, or neighbors? Patient refu   How often do you get together with friends or relatives? Patient refu   How often do you attend Sikh or Restorationist services? Patient refu   Do you belong to any clubs or organizations such as Sikh groups, unions, fraternal or athletic groups, or school groups? Patient refu   How often do you attend meetings of the clubs or organizations you belong to? Patient refu   Are you , , , , never , or living with a partner? Patient refu   Alcohol Use   Q1: How often do you have a drink containing alcohol? Patient refu   Q2: How many drinks containing alcohol do you have on a typical day when you are drinking? Patient refu   Q3: How often do you have six or more drinks on one occasion? Patient refu

## 2023-06-12 NOTE — NURSING
Patients BP elevated taken with monitor. Re checked with manual cuff and patient is still 176/85.  PRN meds will be given. Will continue to monitor.

## 2023-06-12 NOTE — PT/OT/SLP PROGRESS
Physical Therapy      Patient Name:  Chip Goodson   MRN:  [id number]    Patient not seen today secondary to in bone biopsy procedure. Will follow-up this pm.

## 2023-06-13 LAB
ALBUMIN SERPL-MCNC: 1.5 G/DL (ref 3.4–4.8)
ALBUMIN/GLOB SERPL: 0.2 RATIO (ref 1.1–2)
ALP SERPL-CCNC: 67 UNIT/L (ref 40–150)
ALT SERPL-CCNC: 19 UNIT/L (ref 0–55)
AST SERPL-CCNC: 30 UNIT/L (ref 5–34)
BASOPHILS # BLD AUTO: 0.01 X10(3)/MCL
BASOPHILS NFR BLD AUTO: 0.2 %
BILIRUBIN DIRECT+TOT PNL SERPL-MCNC: 0.4 MG/DL
BUN SERPL-MCNC: 33 MG/DL (ref 8.4–25.7)
CALCIUM SERPL-MCNC: 8.9 MG/DL (ref 8.8–10)
CHLORIDE SERPL-SCNC: 107 MMOL/L (ref 98–107)
CO2 SERPL-SCNC: 23 MMOL/L (ref 23–31)
CREAT SERPL-MCNC: 1.91 MG/DL (ref 0.73–1.18)
EOSINOPHIL # BLD AUTO: 0.28 X10(3)/MCL (ref 0–0.9)
EOSINOPHIL NFR BLD AUTO: 4.2 %
ERYTHROCYTE [DISTWIDTH] IN BLOOD BY AUTOMATED COUNT: 14.9 % (ref 11.5–17)
GFR SERPLBLD CREATININE-BSD FMLA CKD-EPI: 37 MLS/MIN/1.73/M2
GLOBULIN SER-MCNC: 8.7 GM/DL (ref 2.4–3.5)
GLUCOSE SERPL-MCNC: 91 MG/DL (ref 82–115)
HCT VFR BLD AUTO: 28.4 % (ref 42–52)
HGB BLD-MCNC: 9.3 G/DL (ref 14–18)
IMM GRANULOCYTES # BLD AUTO: 0.05 X10(3)/MCL (ref 0–0.04)
IMM GRANULOCYTES NFR BLD AUTO: 0.8 %
LYMPHOCYTES # BLD AUTO: 2.32 X10(3)/MCL (ref 0.6–4.6)
LYMPHOCYTES NFR BLD AUTO: 34.9 %
MAGNESIUM SERPL-MCNC: 1.8 MG/DL (ref 1.6–2.6)
MCH RBC QN AUTO: 31.8 PG (ref 27–31)
MCHC RBC AUTO-ENTMCNC: 32.7 G/DL (ref 33–36)
MCV RBC AUTO: 97.3 FL (ref 80–94)
MONOCYTES # BLD AUTO: 0.43 X10(3)/MCL (ref 0.1–1.3)
MONOCYTES NFR BLD AUTO: 6.5 %
NEUTROPHILS # BLD AUTO: 3.55 X10(3)/MCL (ref 2.1–9.2)
NEUTROPHILS NFR BLD AUTO: 53.4 %
PHOSPHATE SERPL-MCNC: 2.3 MG/DL (ref 2.3–4.7)
PLATELET # BLD AUTO: 126 X10(3)/MCL (ref 130–400)
PMV BLD AUTO: 9.5 FL (ref 7.4–10.4)
POCT GLUCOSE: 101 MG/DL (ref 70–110)
POCT GLUCOSE: 126 MG/DL (ref 70–110)
POCT GLUCOSE: 90 MG/DL (ref 70–110)
POCT GLUCOSE: 91 MG/DL (ref 70–110)
POCT GLUCOSE: 91 MG/DL (ref 70–110)
POTASSIUM SERPL-SCNC: 2.9 MMOL/L (ref 3.5–5.1)
PROT SERPL-MCNC: 10.2 GM/DL (ref 5.8–7.6)
RBC # BLD AUTO: 2.92 X10(6)/MCL (ref 4.7–6.1)
SODIUM SERPL-SCNC: 140 MMOL/L (ref 136–145)
WBC # SPEC AUTO: 6.64 X10(3)/MCL (ref 4.5–11.5)

## 2023-06-13 PROCEDURE — 97116 GAIT TRAINING THERAPY: CPT

## 2023-06-13 PROCEDURE — 63600175 PHARM REV CODE 636 W HCPCS: Performed by: INTERNAL MEDICINE

## 2023-06-13 PROCEDURE — 25000003 PHARM REV CODE 250: Performed by: INTERNAL MEDICINE

## 2023-06-13 PROCEDURE — 27000173 HC ACAPELLA DEVICE DH OR DM

## 2023-06-13 PROCEDURE — 94761 N-INVAS EAR/PLS OXIMETRY MLT: CPT

## 2023-06-13 PROCEDURE — 80053 COMPREHEN METABOLIC PANEL: CPT | Performed by: FAMILY MEDICINE

## 2023-06-13 PROCEDURE — 94664 DEMO&/EVAL PT USE INHALER: CPT

## 2023-06-13 PROCEDURE — 85025 COMPLETE CBC W/AUTO DIFF WBC: CPT | Performed by: FAMILY MEDICINE

## 2023-06-13 PROCEDURE — 63600175 PHARM REV CODE 636 W HCPCS: Performed by: FAMILY MEDICINE

## 2023-06-13 PROCEDURE — 83735 ASSAY OF MAGNESIUM: CPT | Performed by: FAMILY MEDICINE

## 2023-06-13 PROCEDURE — A4216 STERILE WATER/SALINE, 10 ML: HCPCS | Performed by: FAMILY MEDICINE

## 2023-06-13 PROCEDURE — 99900035 HC TECH TIME PER 15 MIN (STAT)

## 2023-06-13 PROCEDURE — 84100 ASSAY OF PHOSPHORUS: CPT | Performed by: FAMILY MEDICINE

## 2023-06-13 PROCEDURE — 21400001 HC TELEMETRY ROOM

## 2023-06-13 PROCEDURE — 25000003 PHARM REV CODE 250: Performed by: FAMILY MEDICINE

## 2023-06-13 PROCEDURE — 97530 THERAPEUTIC ACTIVITIES: CPT

## 2023-06-13 RX ORDER — POTASSIUM CHLORIDE 14.9 MG/ML
20 INJECTION INTRAVENOUS ONCE
Status: COMPLETED | OUTPATIENT
Start: 2023-06-13 | End: 2023-06-13

## 2023-06-13 RX ORDER — POTASSIUM CHLORIDE 14.9 MG/ML
20 INJECTION INTRAVENOUS ONCE
Status: DISCONTINUED | OUTPATIENT
Start: 2023-06-13 | End: 2023-06-15

## 2023-06-13 RX ORDER — FUROSEMIDE 10 MG/ML
10 INJECTION INTRAMUSCULAR; INTRAVENOUS ONCE
Status: COMPLETED | OUTPATIENT
Start: 2023-06-13 | End: 2023-06-13

## 2023-06-13 RX ORDER — HYDRALAZINE HYDROCHLORIDE 50 MG/1
50 TABLET, FILM COATED ORAL EVERY 8 HOURS
Status: DISCONTINUED | OUTPATIENT
Start: 2023-06-13 | End: 2023-06-19 | Stop reason: HOSPADM

## 2023-06-13 RX ORDER — POTASSIUM CHLORIDE 20 MEQ/1
40 TABLET, EXTENDED RELEASE ORAL 2 TIMES DAILY
Status: DISCONTINUED | OUTPATIENT
Start: 2023-06-13 | End: 2023-06-15

## 2023-06-13 RX ORDER — GABAPENTIN 100 MG/1
100 CAPSULE ORAL 3 TIMES DAILY
Status: DISCONTINUED | OUTPATIENT
Start: 2023-06-13 | End: 2023-06-19 | Stop reason: HOSPADM

## 2023-06-13 RX ADMIN — POTASSIUM CHLORIDE 40 MEQ: 1500 TABLET, EXTENDED RELEASE ORAL at 09:06

## 2023-06-13 RX ADMIN — SODIUM CHLORIDE, PRESERVATIVE FREE 10 ML: 5 INJECTION INTRAVENOUS at 12:06

## 2023-06-13 RX ADMIN — HYDRALAZINE HYDROCHLORIDE 25 MG: 25 TABLET, FILM COATED ORAL at 05:06

## 2023-06-13 RX ADMIN — SENNOSIDES AND DOCUSATE SODIUM 1 TABLET: 50; 8.6 TABLET ORAL at 09:06

## 2023-06-13 RX ADMIN — GABAPENTIN 100 MG: 100 CAPSULE ORAL at 09:06

## 2023-06-13 RX ADMIN — ENOXAPARIN SODIUM 40 MG: 40 INJECTION SUBCUTANEOUS at 04:06

## 2023-06-13 RX ADMIN — TAMSULOSIN HYDROCHLORIDE 0.4 MG: 0.4 CAPSULE ORAL at 08:06

## 2023-06-13 RX ADMIN — LEVOTHYROXINE SODIUM 112 MCG: 112 TABLET ORAL at 05:06

## 2023-06-13 RX ADMIN — FAMOTIDINE 20 MG: 20 TABLET, FILM COATED ORAL at 08:06

## 2023-06-13 RX ADMIN — CEFTRIAXONE SODIUM 1 G: 1 INJECTION, POWDER, FOR SOLUTION INTRAMUSCULAR; INTRAVENOUS at 04:06

## 2023-06-13 RX ADMIN — HYDRALAZINE HYDROCHLORIDE 10 MG: 20 INJECTION INTRAMUSCULAR; INTRAVENOUS at 05:06

## 2023-06-13 RX ADMIN — SODIUM CHLORIDE: 9 INJECTION, SOLUTION INTRAVENOUS at 05:06

## 2023-06-13 RX ADMIN — HYDRALAZINE HYDROCHLORIDE 50 MG: 50 TABLET, FILM COATED ORAL at 02:06

## 2023-06-13 RX ADMIN — SODIUM CHLORIDE, PRESERVATIVE FREE 10 ML: 5 INJECTION INTRAVENOUS at 05:06

## 2023-06-13 RX ADMIN — AMLODIPINE BESYLATE 10 MG: 10 TABLET ORAL at 08:06

## 2023-06-13 RX ADMIN — FINASTERIDE 5 MG: 5 TABLET, FILM COATED ORAL at 08:06

## 2023-06-13 RX ADMIN — SENNOSIDES AND DOCUSATE SODIUM 1 TABLET: 50; 8.6 TABLET ORAL at 08:06

## 2023-06-13 RX ADMIN — GABAPENTIN 100 MG: 100 CAPSULE ORAL at 02:06

## 2023-06-13 RX ADMIN — HYDRALAZINE HYDROCHLORIDE 50 MG: 50 TABLET, FILM COATED ORAL at 09:06

## 2023-06-13 RX ADMIN — FUROSEMIDE 10 MG: 10 INJECTION, SOLUTION INTRAMUSCULAR; INTRAVENOUS at 12:06

## 2023-06-13 RX ADMIN — POTASSIUM CHLORIDE 20 MEQ: 14.9 INJECTION, SOLUTION INTRAVENOUS at 09:06

## 2023-06-13 NOTE — CONSULTS
[facility] - Oncology Acute  Hematology/Oncology  Progress Note      Consult Requested By: Debbie Gonzalez MD    Reason for Consult:     SUBJECTIVE:     History of Present Illness:  Patient is a 72 y.o. male brought to the emergency department for confusion.  Upon evaluation in the ER CT scan of the head with no acute intracranial abnormality.  Finding consistent with microangiopathic no interval change.  CT of the chest with no contrast showed diffuse osteopenia with multiple, numerous, small round avoid lucent lesions ovarian size involving the marrow.  Changes could reflect metabolic bone disease like hyperparathyroidism.  MRI of the thoracic spine with multiple scattered vertebral body lesion are suspect and could represent metastatic disease.  Bone scan with scattered activity in the anterior left right rib margins no fractures focal increased activity at the anterior manubrium scattered subcentimeter small lytic defects.  Ultrasound of the thyroid that shows multiple nodules. skeletal survey  area on humerus only.    Labs with elevated calcium of 12.6, corrected calcium 14.36.  Total protein was 11.8 with a total globulin 10.0.  Further workup revealed an IgA over 7040.0, IgM 6.0, IgG 165.00.  Free serum kappa light chain 5.09, free serum lambda light chains 0.5600 with a kappa/lambda ratio of 9.09.  M spike 3.33.  PSA 0.96    Bone marrow biopsy performed yesterday. Patient receive Aredia 60 mg on 06/09/2023 and was started on hydration.      Patient is seen in rounds this afternoon.  Daughter at bedside.  Patient is more alert and oriented.  Able to answer questions most of the time properly.  Occasional confusion.    Continuous Infusions:  Scheduled Meds:   amLODIPine  10 mg Oral Daily    cefTRIAXone (ROCEPHIN) IVPB  1 g Intravenous Q24H    enoxparin  40 mg Subcutaneous Q24H (prophylaxis, 1700)    famotidine  20 mg Oral Daily    finasteride  5 mg Oral Daily    gabapentin  100 mg Oral TID     hydrALAZINE  50 mg Oral Q8H    levothyroxine  112 mcg Oral Before breakfast    potassium chloride in water  20 mEq Intravenous Once    potassium chloride  40 mEq Oral BID    senna-docusate 8.6-50 mg  1 tablet Oral BID    sodium chloride 0.9%  10 mL Intravenous Q6H    tamsulosin  1 capsule Oral Daily     PRN Meds:acetaminophen, albuterol, cloNIDine, dextrose, dextrose, dextrose 50%, dextrose 50%, glucagon (human recombinant), glucose, glucose, hydrALAZINE, HYDROcodone-acetaminophen, insulin aspart U-100, ondansetron, sodium chloride 0.9%, Flushing PICC Protocol **AND** sodium chloride 0.9% **AND** sodium chloride 0.9%      PAST MEDICAL HISTORY:  Significant for hypothyroidism, BPH with LUTS, diabetes   mellitus type 2, hypertension, low back pain after the recent fall in the   washroom, dyslipidemia.  PAST SURGICAL HISTORY:  As per the patient could remember he had his appendix   removed in 1965, had a urethral dilatation done recently.  Does not know the   name of the urologist.  Besides that, he says that he did not have any other   surgery.  SOCIAL HISTORY:  He is retired from [email or web address], worked for 36+ years.  Does not   smoke.  Does not drink.  Lives with his wife and is still very active.  He has 5   children, 3 sons and 2 daughters, and all of them are fine.  His youngest son   had an MI, but is alive.  FAMILY HISTORY:  Pertinent for dad dying from an automobile accident when his   dad was in late 20s or early 30s.  His mother passed away.  She was of 65 of   age.  He does not remember properly the cause.  He has no brothers, 2 sisters,   and they are alive, and as per him, they are healthy.    History reviewed. No pertinent past medical history.  Past Surgical History:   Procedure Laterality Date    BONE MARROW BIOPSY Right 6/12/2023    Procedure: BIOPSY, BONE MARROW;  Surgeon: Silvano Austin MD;  Location: [facility];  Service: General;  Laterality: Right;     History reviewed. No pertinent family history.        Review of patient's allergies indicates:   Allergen Reactions    Iodine Anaphylaxis    Shellfish containing products     Amoxicillin-pot clavulanate Itching      No current facility-administered medications on file prior to encounter.     Current Outpatient Medications on File Prior to Encounter   Medication Sig Dispense Refill    albuterol (PROVENTIL/VENTOLIN HFA) 90 mcg/actuation inhaler 2 puffs every 4 to 6 hours as needed.      amLODIPine (NORVASC) 5 MG tablet Take 5 mg by mouth.      atorvastatin (LIPITOR) 10 MG tablet Take 10 mg by mouth.      carvediloL (COREG) 12.5 MG tablet Take 12.5 mg by mouth 2 (two) times daily.      cloNIDine (CATAPRES) 0.2 MG tablet Take 0.2 mg by mouth daily as needed.      finasteride (PROSCAR) 5 mg tablet Take 5 mg by mouth.      gabapentin (NEURONTIN) 100 MG capsule Take by mouth.      levothyroxine (SYNTHROID) 112 MCG tablet Take 112 mcg by mouth.      metFORMIN (GLUCOPHAGE-XR) 500 MG ER 24hr tablet Take 1,000 mg by mouth 2 (two) times daily.      ONETOUCH ULTRA TEST Strp USE TO TEST BLOOD GLUCOSE TWICE DAILY      spironolactone (ALDACTONE) 25 MG tablet Take 12.5 mg by mouth.      tamsulosin (FLOMAX) 0.4 mg Cap Take 1 capsule by mouth.         Review of Systems   Constitutional:  Positive for activity change, appetite change and fatigue. Negative for chills, diaphoresis, fever and unexpected weight change.   HENT:  Negative for nasal congestion, mouth sores, nosebleeds, sinus pressure/congestion, sore throat and trouble swallowing.    Eyes: Negative.    Respiratory:  Negative for cough and shortness of breath.    Cardiovascular:  Negative for chest pain and palpitations.   Gastrointestinal:  Negative for abdominal distention, abdominal pain, blood in stool, change in bowel habit, constipation, diarrhea, nausea, vomiting and change in bowel habit.   Endocrine: Negative.    Genitourinary:  Negative for bladder incontinence, decreased urine volume, difficulty urinating, dysuria,  frequency, hematuria and urgency.   Musculoskeletal:  Negative for arthralgias, back pain, gait problem, joint swelling, leg pain and myalgias.   Integumentary:  Negative for rash.   Allergic/Immunologic: Negative.    Neurological:  Positive for weakness. Negative for dizziness, tremors, syncope, light-headedness, numbness, headaches and memory loss.   Hematological:  Negative for adenopathy. Does not bruise/bleed easily.   Psychiatric/Behavioral:  Positive for confusion (better). Negative for agitation, hallucinations, sleep disturbance and suicidal ideas. The patient is not nervous/anxious.        OBJECTIVE:     Vital Signs (Most Recent)  Temp: 97.9 °F (36.6 °C) (06/13/23 1554)  Pulse: 73 (06/13/23 1554)  Resp: 20 (06/13/23 1318)  BP: (!) 171/95 (06/13/23 1554)  SpO2: 95 % (06/13/23 1554)    Pain Assessment: No pain reported at this time    Vital Signs Range (Last 24H):  Temp:  [97.8 °F (36.6 °C)-98.7 °F (37.1 °C)]   Pulse:  [57-78]   Resp:  [16-20]   BP: (155-187)/(67-95)   SpO2:  [95 %-97 %]     Physical Exam:  Physical Exam  Vitals and nursing note reviewed.   Constitutional:       General: He is not in acute distress.     Comments: Elderly male   HENT:      Head: Normocephalic and atraumatic.      Mouth/Throat:      Mouth: Mucous membranes are moist.   Eyes:      General: No scleral icterus.     Extraocular Movements: Extraocular movements intact.      Conjunctiva/sclera: Conjunctivae normal.   Neck:      Vascular: No JVD.   Cardiovascular:      Rate and Rhythm: Normal rate and regular rhythm.      Heart sounds: No murmur heard.  Pulmonary:      Effort: Pulmonary effort is normal.      Breath sounds: Normal breath sounds. No wheezing or rhonchi.   Abdominal:      General: Bowel sounds are normal. There is no distension.      Palpations: Abdomen is soft.      Tenderness: There is no abdominal tenderness.   Musculoskeletal:         General: No swelling or deformity.      Cervical back: Neck supple.    Lymphadenopathy:      Head:      Right side of head: No submandibular adenopathy.      Left side of head: No submandibular adenopathy.      Cervical: No cervical adenopathy.      Upper Body:      Right upper body: No supraclavicular or axillary adenopathy.      Left upper body: No supraclavicular or axillary adenopathy.      Lower Body: No right inguinal adenopathy. No left inguinal adenopathy.   Skin:     General: Skin is warm.      Coloration: Skin is not jaundiced.      Findings: No rash.   Neurological:      Mental Status: He is alert.      Cranial Nerves: Cranial nerves 2-12 are intact.      Comments: Confused at times   Psychiatric:         Behavior: Behavior is cooperative.       Laboratory:  CBC with Differential:  Recent Labs   Lab 06/13/23 0459   WBC 6.64   RBC 2.92*   HCT 28.4*   HGB 9.3*   MCV 97.3*   MCH 31.8*   RDW 14.9   *   MPV 9.5     CMP:  Recent Labs   Lab 06/13/23 0459   CALCIUM 8.9   ALBUMIN 1.5*      K 2.9*   CO2 23   BUN 33.0*   CREATININE 1.91*   ALKPHOS 67   ALT 19   AST 30   BILITOT 0.4     BMP:   Recent Labs   Lab 06/13/23 0459   CALCIUM 8.9      K 2.9*   CO2 23   BUN 33.0*   CREATININE 1.91*     LFTs:   Recent Labs   Lab 06/13/23 0459   ALT 19   AST 30   ALKPHOS 67   BILITOT 0.4   ALBUMIN 1.5*     Haptoglobin: No results for input(s): HAPTOGLOBIN in the last 24 hours.  Tumor Markers: No results for input(s): PSA, CEA, , AFPTM, AG5143,  in the last 24 hours.    Invalid input(s): ALGTM  Immunology: No results for input(s): SPEP, PAO, JACQUELINE, FREELAMBDALI in the last 24 hours.  Coagulation: No results for input(s): PT, INR, APTT in the last 24 hours.  Specimen (24h ago, onward)      None          Microbiology Results (last 7 days)       Procedure Component Value Units Date/Time    Urine Culture High Risk [[id number]] Collected: 06/12/23 0228    Order Status: Completed Specimen: Urine, Clean Catch Updated: 06/13/23 0631     Urine Culture No Growth At 24 Hours     Blood Culture [[id number]]  (Normal) Collected: 06/10/23 1516    Order Status: Completed Specimen: Blood from Arm, Right Updated: 06/12/23 1601     CULTURE, BLOOD ([facility]) No Growth At 24 Hours    Blood Culture [[id number]]  (Normal) Collected: 06/10/23 1516    Order Status: Completed Specimen: Blood from Hand, Right Updated: 06/12/23 1601     CULTURE, BLOOD ([facility]) No Growth At 24 Hours            Diagnostic Results:  Imaging Results              CT Chest Without Contrast (Final result)  Result time 06/08/23 08:30:58      Final result by Boo Camacho MD (06/08/23 08:30:58)                   Impression:    Impression:    1. There is a small left sided pleural effusion.    2. A small patchy area of ground glass density is seen in the lateral basal segment of the left lower lobe. This could reflect an acute focal infiltrate / pneumonitis. Correlate clinically and with laboratory findings, as regards additional evaluation and follow-up.    3. There is diffuse osteopenia along the visualised bony structures together with multiple, numerous, small round-ovoid lucent lesion of varying sizes involving the marrow. These changes could reflect metabolic bone disease like hyperparathyroidism. Correlate clinically and with laboratory findings, as regards additional evaluation and follow-up.    1. Concur with preliminary report      Electronically signed by: Boo Camacho  Date:    06/08/2023  Time:    08:30               Narrative:    EXAMINATION:  CT CHEST WITHOUT CONTRAST    CLINICAL HISTORY:  Hypercalcemia;, .    TECHNIQUE:  PATIENT RADIATION DOSE: DLP(mGycm) 215    As per PQRS measures, all CT scans at this facility used dose modulation, iterative reconstruction, and/or weight based dose adjustment when appropriate to reduce radiation dose to as low as reasonably achievable.    COMPARISON:  None available    FINDINGS:  Serial axial imaging of the chest without the administration of IV contrast.  Both soft tissue and pulmonary  parenchymal windows were obtained.  Additional sagittal and coronal reconstructions were performed.    Artifact: Mild motion artifact is seen on some images.    Contrast: Please note this is a non contrast CT scan of the chest which is non-diagnostic for pulmonary emboli.    Soft Tissues: Unremarkable.    Lines and Tubes: None.    Axilla: A few prominent lymph nodes are seen in the right and left axilla.    Neck: The visualized soft tissues of the neck appear unremarkable.    Mediastinum: A few subcentimeter mediastinal lymph nodes are seen in the upper and lower paratracheal and subcarinal spaces .    Heart: Moderate cardiomegaly is seen. Pronounced coronary artery calcification is seen.    Aorta: Unremarkable appearing aorta.    Pulmonary Arteries: Mildly enlarged main pulmonary arteries are identified. This could reflect an element of pulmonary arterial hypertension.    Lungs: There is mild non specific dependent change at the lung bases. Mild streaky linear opacity is seen predominantly in the dependent portions at the lung bases with peripheral predominance consistent with subsegmental atelectasis. A small patchy area of ground glass density is seen in the lateral basal segment of the left lower lobe. This could reflect an acute focal infiltrate / pneumonitis.    Pleura: There is a small left sided pleural effusion. There is mild pleural thickening along the right lower posterior pleural surface.    Bony Structures: There is diffuse osteopenia along the visualised bony structures together with multiple, numerous, small round-ovoid lucent lesion of varying sizes involving the marrow.    Spine: Moderate multilevel spondylolytic changes are seen in the thoracic spine.    Abdomen: Bilateral renal cysts are indentified, which are large in size on the left side.                                       CT Head Without Contrast (Final result)  Result time 06/07/23 17:58:22      Final result by Henrique Wallace MD  (06/07/23 17:58:22)                   Impression:      No acute abnormality.      Electronically signed by: Chrystal Wallace MD  Date:    06/07/2023  Time:    17:58               Narrative:    EXAMINATION:  CT HEAD WITHOUT CONTRAST    CLINICAL HISTORY:  Mental status change, unknown cause;    TECHNIQUE:  Low dose axial CT images obtained throughout the head without intravenous contrast. Sagittal and coronal reconstructions were performed.  .  Automated exposure control used.    COMPARISON:  None.    FINDINGS:  Intracranial compartment:    Ventricles and sulci are normal in size for age without evidence of hydrocephalus. No extra-axial blood or fluid collections.    The brain parenchyma appears normal. No parenchymal mass, hemorrhage, edema or major vascular distribution infarct.    Skull/extracranial contents (limited evaluation): No fracture. Mastoid air cells and paranasal sinuses are essentially clear.                                       X-Ray Chest 1 View (Final result)  Result time 06/08/23 08:11:56      Final result by Boo Camacho MD (06/08/23 08:11:56)                   Impression:      1. Patchy infiltrate, atelectasis, and pleural reaction left lung base  2. Atherosclerosis  3. Thoracic spondylosis and scoliosis      Electronically signed by: Boo Camacho  Date:    06/08/2023  Time:    08:11               Narrative:    EXAMINATION:  XR CHEST 1 VIEW    CLINICAL HISTORY:  , Weakness.    COMPARISON:  0923    FINDINGS:  An AP view or more reveals the heart to be normal in size.  The trachea is just right of midline.  Atherosclerosis is seen within the aorta.  There is patchy hazy opacity at the left lung base.  There are changes and curvature are noted to the thoracic spine.                                       RADIOLOGY REPORT (Final result)  Result time 06/12/23 12:09:38      Final result by Unknown User (06/12/23 12:09:38)                                            ASSESSMENT/PLAN:      Patient Active Problem List   Diagnosis    Hypercalcemia    Confusion    Hypoalbuminemia    Weakness    COLTON (acute kidney injury)    Abnormal radionuclide bone scan   IgA monoclonal gammopathy  Altered mental status-improving patient more alert today  Hypercalcemia-improving  Hypokalemia  COLTON-improving    Plan  24 hours urine collection for immunofixation   Continue hydration  He will benefit of pulse dose of dexamethasone 40 mg daily x 4 days, but will need monitor very closely his blood sugar. Patient clinically much better and stable. Numbers improving. Can start pulse dose after completion of 24 hrs urine collection.  Replace potassium  Continue supportive care  Patient will need followup at the clinic upon d/c    Thank you for the consult  Will be available for any questions.      lEsy Amaya MD  Hematology/Oncology  [facility]

## 2023-06-13 NOTE — PROGRESS NOTES
Progress Note    Admit Date: 6/7/2023   LOS: 5 days     SUBJECTIVE:     Follow-up For:  high ca    Daughter  harvey present   He  is better today   More oriented  today         Scheduled Meds:   amLODIPine  10 mg Oral Daily    cefTRIAXone (ROCEPHIN) IVPB  1 g Intravenous Q24H    enoxparin  40 mg Subcutaneous Q24H (prophylaxis, 1700)    famotidine  20 mg Oral Daily    finasteride  5 mg Oral Daily    [START ON 6/13/2023] hydrALAZINE  25 mg Oral Q8H    levothyroxine  112 mcg Oral Before breakfast    potassium chloride  20 mEq Oral BID    senna-docusate 8.6-50 mg  1 tablet Oral BID    sodium chloride 0.9%  10 mL Intravenous Q6H    tamsulosin  1 capsule Oral Daily     Continuous Infusions:   sodium chloride 0.9% 75 mL/hr at 06/12/23 1536     PRN Meds:acetaminophen, albuterol, cloNIDine, dextrose, dextrose, dextrose 50%, dextrose 50%, glucagon (human recombinant), glucose, glucose, hydrALAZINE, HYDROcodone-acetaminophen, insulin aspart U-100, ondansetron, sodium chloride 0.9%, Flushing PICC Protocol **AND** sodium chloride 0.9% **AND** sodium chloride 0.9%    Review of patient's allergies indicates:   Allergen Reactions    Iodine Anaphylaxis    Shellfish containing products     Amoxicillin-pot clavulanate Itching       Review of Systems  Review of Systems   Constitutional:  Positive for malaise/fatigue and weight loss. Negative for chills and fever.   HENT:  Negative for congestion, ear discharge, ear pain, hearing loss, nosebleeds, sinus pain, sore throat and tinnitus.    Eyes:  Positive for double vision. Negative for blurred vision.   Respiratory:  Positive for cough and shortness of breath. Negative for hemoptysis, sputum production, wheezing and stridor.    Cardiovascular:  Negative for chest pain, palpitations, orthopnea and claudication.   Gastrointestinal:  Positive for heartburn. Negative for abdominal pain, blood in stool, constipation, diarrhea, nausea and vomiting.   Genitourinary:  Negative for dysuria and  urgency.   Musculoskeletal:  Positive for back pain, myalgias and neck pain.   Skin:  Negative for itching and rash.   Neurological:  Negative for dizziness, tingling, tremors, sensory change, speech change, focal weakness, seizures, weakness and headaches.   Endo/Heme/Allergies:  Negative for polydipsia.   Psychiatric/Behavioral:  Negative for depression, substance abuse and suicidal ideas.  confused    OBJECTIVE:     Vital Signs (Most Recent)  Temp: 97.8 °F (36.6 °C) (06/12/23 1951)  Pulse: (!) 58 (06/12/23 2000)  Resp: 20 (06/12/23 2000)  BP: (!) 187/88 (06/12/23 1951)  SpO2: 95 % (06/12/23 2000)    Vital Signs Range (Last 24H):  Temp:  [97.7 °F (36.5 °C)-98.4 °F (36.9 °C)]   Pulse:  [52-63]   Resp:  [18-20]   BP: (143-187)/(71-88)   SpO2:  [95 %-96 %]     I & O (Last 24H):  Intake/Output Summary (Last 24 hours) at 6/12/2023 2216  Last data filed at 6/12/2023 1702  Gross per 24 hour   Intake 50 ml   Output 805 ml   Net -755 ml       Physical Exam:  Physical Exam   Vitals:    06/12/23 2000   BP:    Pulse: (!) 58   Resp: 20   Temp:        Physical Exam   Constitutional: drowsy aggitated  but did try to  get up to  urinate  no acute distress  HENT:   Head: Normocephalic and atraumatic.   Eyes: Conjunctivae normal and EOM are normal. Pupils are equal, round, and reactive to light.   Neck: Normal range of motion. Neck supple.   Cardiovascular: Normal rate, regular rhythm, normal heart sounds   Pulmonary/Chest: CTAB,  coarse  breath sounds Abdominal: Soft, nontender, bowel sounds normal  Neurological:tender  t  spine and ls pine    No  break down   on  skin on  buttocks but it  is red  Skin: warm, dry intact  Psychiatric: confused      Laboratory:  Recent Results (from the past 24 hour(s))   Comprehensive Metabolic Panel    Collection Time: 06/12/23  4:41 AM   Result Value Ref Range    Sodium Level 142 136 - 145 mmol/L    Potassium Level 3.0 (L) 3.5 - 5.1 mmol/L    Chloride 110 (H) 98 - 107 mmol/L    Carbon Dioxide 21  (L) 23 - 31 mmol/L    Glucose Level 88 82 - 115 mg/dL    Blood Urea Nitrogen 39.0 (H) 8.4 - 25.7 mg/dL    Creatinine 2.13 (H) 0.73 - 1.18 mg/dL    Calcium Level Total 9.8 8.8 - 10.0 mg/dL    Protein Total 9.8 (H) 5.8 - 7.6 gm/dL    Albumin Level 1.4 (L) 3.4 - 4.8 g/dL    Globulin 8.4 (H) 2.4 - 3.5 gm/dL    Albumin/Globulin Ratio 0.2 (L) 1.1 - 2.0 ratio    Bilirubin Total 0.4 <=1.5 mg/dL    Alkaline Phosphatase 53 40 - 150 unit/L    Alanine Aminotransferase 11 0 - 55 unit/L    Aspartate Aminotransferase 27 5 - 34 unit/L    eGFR 32 mls/min/1.73/m2   Magnesium    Collection Time: 06/12/23  4:41 AM   Result Value Ref Range    Magnesium Level 1.90 1.60 - 2.60 mg/dL   Phosphorus    Collection Time: 06/12/23  4:41 AM   Result Value Ref Range    Phosphorus Level 2.6 2.3 - 4.7 mg/dL   CBC with Differential    Collection Time: 06/12/23  4:41 AM   Result Value Ref Range    WBC 5.52 4.50 - 11.50 x10(3)/mcL    RBC 2.76 (L) 4.70 - 6.10 x10(6)/mcL    Hgb 8.8 (L) 14.0 - 18.0 g/dL    Hct 27.0 (L) 42.0 - 52.0 %    MCV 97.8 (H) 80.0 - 94.0 fL    MCH 31.9 (H) 27.0 - 31.0 pg    MCHC 32.6 (L) 33.0 - 36.0 g/dL    RDW 15.3 11.5 - 17.0 %    Platelet 119 (L) 130 - 400 x10(3)/mcL    MPV 9.7 7.4 - 10.4 fL    Neut % 50.9 %    Lymph % 35.5 %    Mono % 8.7 %    Eos % 4.0 %    Basophil % 0.4 %    Lymph # 1.96 0.6 - 4.6 x10(3)/mcL    Neut # 2.81 2.1 - 9.2 x10(3)/mcL    Mono # 0.48 0.1 - 1.3 x10(3)/mcL    Eos # 0.22 0 - 0.9 x10(3)/mcL    Baso # 0.02 <=0.2 x10(3)/mcL    IG# 0.03 0 - 0.04 x10(3)/mcL    IG% 0.5 %   POCT glucose    Collection Time: 06/12/23 11:22 AM   Result Value Ref Range    POCT Glucose 85 70 - 110 mg/dL   POCT glucose    Collection Time: 06/12/23  4:16 PM   Result Value Ref Range    POCT Glucose 119 (H) 70 - 110 mg/dL        Diagnostic Results:  X-Ray Chest 1 View    Result Date: 6/8/2023  EXAMINATION: XR CHEST 1 VIEW CLINICAL HISTORY: , Weakness. COMPARISON: 0923 FINDINGS: An AP view or more reveals the heart to be normal in size.   The trachea is just right of midline.  Atherosclerosis is seen within the aorta.  There is patchy hazy opacity at the left lung base.  There are changes and curvature are noted to the thoracic spine.     1. Patchy infiltrate, atelectasis, and pleural reaction left lung base 2. Atherosclerosis 3. Thoracic spondylosis and scoliosis Electronically signed by: Boo Camacho Date:    06/08/2023 Time:    08:11    CT Head Without Contrast    Result Date: 6/7/2023  EXAMINATION: CT HEAD WITHOUT CONTRAST CLINICAL HISTORY: Mental status change, unknown cause; TECHNIQUE: Low dose axial CT images obtained throughout the head without intravenous contrast. Sagittal and coronal reconstructions were performed.  .  Automated exposure control used. COMPARISON: None. FINDINGS: Intracranial compartment: Ventricles and sulci are normal in size for age without evidence of hydrocephalus. No extra-axial blood or fluid collections. The brain parenchyma appears normal. No parenchymal mass, hemorrhage, edema or major vascular distribution infarct. Skull/extracranial contents (limited evaluation): No fracture. Mastoid air cells and paranasal sinuses are essentially clear.     No acute abnormality. Electronically signed by: Chrystal Wallace MD Date:    06/07/2023 Time:    17:58    CT Chest Without Contrast    Result Date: 6/8/2023  EXAMINATION: CT CHEST WITHOUT CONTRAST CLINICAL HISTORY: Hypercalcemia;, . TECHNIQUE: PATIENT RADIATION DOSE: DLP(mGycm) 215 As per PQRS measures, all CT scans at this facility used dose modulation, iterative reconstruction, and/or weight based dose adjustment when appropriate to reduce radiation dose to as low as reasonably achievable. COMPARISON: None available FINDINGS: Serial axial imaging of the chest without the administration of IV contrast.  Both soft tissue and pulmonary parenchymal windows were obtained.  Additional sagittal and coronal reconstructions were performed. Artifact: Mild motion artifact is  seen on some images. Contrast: Please note this is a non contrast CT scan of the chest which is non-diagnostic for pulmonary emboli. Soft Tissues: Unremarkable. Lines and Tubes: None. Axilla: A few prominent lymph nodes are seen in the right and left axilla. Neck: The visualized soft tissues of the neck appear unremarkable. Mediastinum: A few subcentimeter mediastinal lymph nodes are seen in the upper and lower paratracheal and subcarinal spaces . Heart: Moderate cardiomegaly is seen. Pronounced coronary artery calcification is seen. Aorta: Unremarkable appearing aorta. Pulmonary Arteries: Mildly enlarged main pulmonary arteries are identified. This could reflect an element of pulmonary arterial hypertension. Lungs: There is mild non specific dependent change at the lung bases. Mild streaky linear opacity is seen predominantly in the dependent portions at the lung bases with peripheral predominance consistent with subsegmental atelectasis. A small patchy area of ground glass density is seen in the lateral basal segment of the left lower lobe. This could reflect an acute focal infiltrate / pneumonitis. Pleura: There is a small left sided pleural effusion. There is mild pleural thickening along the right lower posterior pleural surface. Bony Structures: There is diffuse osteopenia along the visualised bony structures together with multiple, numerous, small round-ovoid lucent lesion of varying sizes involving the marrow. Spine: Moderate multilevel spondylolytic changes are seen in the thoracic spine. Abdomen: Bilateral renal cysts are indentified, which are large in size on the left side.     Impression: 1. There is a small left sided pleural effusion. 2. A small patchy area of ground glass density is seen in the lateral basal segment of the left lower lobe. This could reflect an acute focal infiltrate / pneumonitis. Correlate clinically and with laboratory findings, as regards additional evaluation and follow-up. 3.  There is diffuse osteopenia along the visualised bony structures together with multiple, numerous, small round-ovoid lucent lesion of varying sizes involving the marrow. These changes could reflect metabolic bone disease like hyperparathyroidism. Correlate clinically and with laboratory findings, as regards additional evaluation and follow-up. 1. Concur with preliminary report Electronically signed by: Boo Camacho Date:    06/08/2023 Time:    08:30    NM Bone Scan Whole Body    Result Date: 6/8/2023  EXAMINATION: NM BONE SCAN WHOLE BODY CLINICAL HISTORY: myeloma ??;, . TECHNIQUE: mCi of Tc-99m MDP was administered intravenously. After appropriate delay, whole body bone scan images were obtained. COMPARISON: None available FINDINGS: Activity is identified within the kidneys bilaterally and within the bladder. Focal increased activity is evident at the anterior right 4th 5th and 6th ribs and anterior left 1st, 4th, and 7th ribs.  There is mild central activity at the manubrium.  There is mild increased activity at the sternoclavicular joints as well as the shoulders bilaterally.  Mild increased activity evident thyroid bed.     1. Scattered activity at the anterior left right rib margins as described.  A CT exam on the previous day reveals no definite fracture.  There is mild heterogeneous and bony loss at the anterior left and right ribs.  This is not have the typical pattern of a metastases. 2. Focal increased activity at the anterior manubrium which again on the CT exam demonstrates osteopenia and heterogeneous bony loss as well as scattered subcentimeter small lytic defects. 3. Suspect mild arthritic changes at the shoulders sternoclavicular joints Electronically signed by: Boo Camacho Date:    06/08/2023 Time:    14:28    US Retroperitoneal Complete    Result Date: 6/8/2023  EXAMINATION: RENAL/RETROPERITONEAL SONOGRAM: CLINICAL HISTORY: Renal insufficiency;, . COMPARISON: None available FINDINGS: Real-time  imaging was performed through the retroperitoneum evaluating the kidneys. Arterial and venous flow are identified within the kidneys. No hydronephrosis is seen.  The bladder is incompletely distended.  Two round hypoechoic foci are noted to the mid and lower right kidney measuring 3.3 cm and 1.5 cm respectively compatible with simple cysts.  2 round hypoechoic foci are noted to the upper and mid left kidney measuring 5.4 and 6.5 cm respectively compatible with cysts.  No free fluid collect identified. MEASUREMENTS: Right Kidney: 12.9 cm RI: 0.79 Left Kidney: 14.7 cm RI: 0.73     1. Findings compatible simple cysts to the kidneys bilaterally as described 2. Elevated renal resistive indices bilaterally 3. Incompletely distended bladder Electronically signed by: Boo Camacho Date:    06/08/2023 Time:    12:05    XR Metastatic Survey    Result Date: 6/8/2023  EXAMINATION: SKELETAL SURVEY: CLINICAL HISTORY: , possible myeloma; TECHNIQUE: AP and lateral views were obtained of the axial skeleton as well as limited views of the lower and upper upper extremity. COMPARISON: None available FINDINGS: There is absence of dentition.  Mastoid air cells are aerated bilaterally.  Maxillary and frontal sinuses are relatively clear.  There is sinusoidal deviation of the nasal septum.  Bony structures are osteopenic.  Degenerative changes are noted to the spine.  No fracture or subluxation is seen.  No aggressive osteolytic or osteoblastic lesion is seen.  A small oval sclerotic focus is noted to the proximal left humeral shaft measuring 10 mm in greatest diameter.  Arthritic changes are evident at the shoulders, hips, wrists, and elbows bilaterally.  Atherosclerosis is seen.  Enthesophyte formation is evident at the tibial tubercle.  A pinpoint 2-3 mm metallic density noted to the soft tissues of the right axilla suggesting a foreign body and another at the soft tissues of the medial right thigh     1. No acute osseous defect  identified 2. Osteopenia 3. Osteoarthritis 4. Pinpoint metallic density/suspect foreign body right axilla and medial right thigh soft tissues 5. Small 1 cm oval shaped sclerotic focus proximal left humeral shaft which has a nonaggressive appearance. Electronically signed by: Boo Camacho Date:    06/08/2023 Time:    13:55       ASSESSMENT/PLAN:   72  year old  w  hypercalcemia   Needs workup   His ct  had finding in  c spine   Ordered  skeletal survey  area on humerus only   On nuc  bone scan   ribs munubrium  w  lesions and thyroid bed w  uptake   Will order thyroid  us. - neg   Us  of renal  is  normal   except for  elevated resistive indices  Spep + M spike in   IgA trini   One spray of  calcitonin  6/8  Pamidronate 60  x 1   6/9  T spine   mri no contrast  showing  areas of  possible metastasis or bone marrow involvement in  t spine  Confusion is slightly  b grace   Wanted to rule out  any   other  causes  other than high ca  Abg did not   show  co2   narcosis   CT  head  no acute  bleed or  changes  Blood cx  x  2   neg   Lactic  acid  0.8  Ua   to  collect  and  cx  does have a  hx of  prostate  issues  Cxray   developing  pneumonia    and  effusion   lasix  today     Rocephin s tarted   Still w  good  urine  output     His  calcium is  much better  He  is much more lucid today   Bone marrow bx today     Hem onc  to see tomorrow    Plan:   Patient Active Problem List    Diagnosis Date Noted    Abnormal radionuclide bone scan 06/08/2023    Hypercalcemia 06/07/2023    Confusion 06/07/2023    Hypoalbuminemia 06/07/2023    Weakness 06/07/2023    COLTON (acute kidney injury) 06/07/2023    `

## 2023-06-13 NOTE — PROCEDURES
"Chip Goodson is a 72 y.o. male patient.    Temp: 98.7 °F (37.1 °C) (06/13/23 0741)  Pulse: 63 (06/13/23 0741)  Resp: 20 (06/13/23 0741)  BP: (!) 177/80 (reported b/p to nurse) (06/13/23 0741)  SpO2: 97 % (06/13/23 0741)  Weight: 113.4 kg (250 lb) (06/07/23 1707)  Height: 5' 11" (180.3 cm) (06/07/23 1707)       Bone marrow    Date/Time: 6/13/2023 10:28 AM  Performed by: Silvano Austin MD  Authorized by: Silvano Austin MD     Consent Done?: Yes (Verbal)   Immediately prior to procedure a time out was called to verify the correct patient, procedure, equipment, support staff and site/side marked as required.   Patient was prepped and draped in the usual sterile fashion.      Assistants?: Yes    I was present for the entire procedure.    Position: left lateral  Anesthesia: topical anesthetic  Local anesthetic: lidocaine 1% without epinephrine  Aspiration?: Yes   Biopsy?: Yes    Specimen source: right posterior iliac crest  Number of Specimens: 2  Estimated blood loss (cc):3  Patient tolerated the procedure well with no immediate complications.  Post-operative instructions were provided for the patient.  Patient was discharged and will follow up if any complications occur.     6/13/2023    "

## 2023-06-13 NOTE — PT/OT/SLP PROGRESS
Physical Therapy Treatment    Patient Name:  Chip Goodson   MRN:  [id number]    Recommendations:     Discharge Recommendations: home with home health vs. SNF?  Discharge Equipment Recommendations: none  Barriers to discharge: Decreased caregiver support    Assessment:     Chip Goodson is a 72 y.o. male admitted with a medical diagnosis of Confusion.  He presents with the following impairments/functional limitations:   difficulty walking.    Rehab Prognosis: Good; patient would benefit from acute skilled PT services to address these deficits and reach maximum level of function.    Recent Surgery: Procedure(s) (LRB):  BIOPSY, BONE MARROW (Right) 1 Day Post-Op    Plan:     During this hospitalization, patient to be seen 5 x/week to address the identified rehab impairments via gait training, therapeutic activities, therapeutic exercises and progress toward the following goals:    Plan of Care Expires:       Subjective     Chief Complaint: Pt agrees to participate in PT  Patient/Family Comments/goals: to rtn home with wife  Pain/Comfort:         Objective:     Communicated with pt and  prior to session.  Patient found HOB elevated with   upon PT entry to room.     General Precautions: Standard, fall  Orthopedic Precautions:    Braces:    Respiratory Status: Room air     Functional Mobility:  Bed Mobility:     Supine to Sit: moderate assistance  Transfers:     Sit to Stand:  moderate assistance with rolling walker  Gait: 80ftx2 RW Crispin      AM-PAC 6 CLICK MOBILITY          Treatment & Education:  Up; in chair with call bell, pt educated call bell use for safety, chair alarm on,  in room    GOALS:   Multidisciplinary Problems       Physical Therapy Goals          Problem: Physical Therapy    Goal Priority Disciplines Outcome Goal Variances Interventions   Physical Therapy Goal     PT, PT/OT Ongoing, Progressing     Description: 1.  Pt will be able to tf SBA  2.  Pt will be able to perform bed mobility  [place]  3.  Pt will be able to amb in halls with RW SBA  4.  Pt will be I HEP with caregiver                       Time Tracking:     PT Received On: 06/13/23  PT Start Time: 0945     PT Stop Time: 1015  PT Total Time (min): 30 min     Billable Minutes: Gait Training 15 and Therapeutic Activity 15    Treatment Type: Treatment  PT/PTA: PT           06/13/2023

## 2023-06-13 NOTE — PLAN OF CARE
06/13/23 1329   Discharge Reassessment   Assessment Type Discharge Planning Reassessment   Discharge Plan discussed with: Patient   Discharge Plan A Home Health   Discharge Plan B Home Health   DME Needed Upon Discharge  none   Transition of Care Barriers None   Why the patient remains in the hospital Requires continued medical care   Post-Acute Status   Post-Acute Authorization Home Health   Home Health Status Referrals Sent   Discharge Delays (!) Post-Acute Set-up     Spoke to patient's fly about d/c options.  They are refusing SNF and wants pt to go home with home health.  Wife asked that I set up [facility].  Referral sent.    Daughter is Elsy at # [phone].

## 2023-06-14 LAB
ALBUMIN SERPL-MCNC: 1.5 G/DL (ref 3.4–4.8)
ALBUMIN/GLOB SERPL: 0.2 RATIO (ref 1.1–2)
ALP SERPL-CCNC: 70 UNIT/L (ref 40–150)
ALT SERPL-CCNC: 20 UNIT/L (ref 0–55)
AST SERPL-CCNC: 30 UNIT/L (ref 5–34)
BACTERIA UR CULT: NO GROWTH
BASOPHILS # BLD AUTO: 0.03 X10(3)/MCL
BASOPHILS NFR BLD AUTO: 0.4 %
BILIRUBIN DIRECT+TOT PNL SERPL-MCNC: 0.4 MG/DL
BUN SERPL-MCNC: 26 MG/DL (ref 8.4–25.7)
CALCIUM SERPL-MCNC: 9 MG/DL (ref 8.8–10)
CHLORIDE SERPL-SCNC: 107 MMOL/L (ref 98–107)
CO2 SERPL-SCNC: 22 MMOL/L (ref 23–31)
CREAT SERPL-MCNC: 1.86 MG/DL (ref 0.73–1.18)
EOSINOPHIL # BLD AUTO: 0.35 X10(3)/MCL (ref 0–0.9)
EOSINOPHIL NFR BLD AUTO: 5.1 %
ERYTHROCYTE [DISTWIDTH] IN BLOOD BY AUTOMATED COUNT: 15 % (ref 11.5–17)
GFR SERPLBLD CREATININE-BSD FMLA CKD-EPI: 38 MLS/MIN/1.73/M2
GLOBULIN SER-MCNC: 9.2 GM/DL (ref 2.4–3.5)
GLUCOSE SERPL-MCNC: 100 MG/DL (ref 82–115)
HCT VFR BLD AUTO: 31.9 % (ref 42–52)
HGB BLD-MCNC: 10.5 G/DL (ref 14–18)
IMM GRANULOCYTES # BLD AUTO: 0.04 X10(3)/MCL (ref 0–0.04)
IMM GRANULOCYTES NFR BLD AUTO: 0.6 %
LYMPHOCYTES # BLD AUTO: 2.35 X10(3)/MCL (ref 0.6–4.6)
LYMPHOCYTES NFR BLD AUTO: 34.2 %
MAYO GENERIC ORDERABLE RESULT: ABNORMAL
MCH RBC QN AUTO: 31.8 PG (ref 27–31)
MCHC RBC AUTO-ENTMCNC: 32.9 G/DL (ref 33–36)
MCV RBC AUTO: 96.7 FL (ref 80–94)
MONOCYTES # BLD AUTO: 0.59 X10(3)/MCL (ref 0.1–1.3)
MONOCYTES NFR BLD AUTO: 8.6 %
NEUTROPHILS # BLD AUTO: 3.51 X10(3)/MCL (ref 2.1–9.2)
NEUTROPHILS NFR BLD AUTO: 51.1 %
PLATELET # BLD AUTO: 131 X10(3)/MCL (ref 130–400)
PMV BLD AUTO: 9.2 FL (ref 7.4–10.4)
POCT GLUCOSE: 131 MG/DL (ref 70–110)
POCT GLUCOSE: 170 MG/DL (ref 70–110)
POCT GLUCOSE: 172 MG/DL (ref 70–110)
POTASSIUM SERPL-SCNC: 3.5 MMOL/L (ref 3.5–5.1)
PROT SERPL-MCNC: 10.7 GM/DL (ref 5.8–7.6)
PSYCHE PATHOLOGY RESULT: NORMAL
RBC # BLD AUTO: 3.3 X10(6)/MCL (ref 4.7–6.1)
SODIUM SERPL-SCNC: 140 MMOL/L (ref 136–145)
WBC # SPEC AUTO: 6.87 X10(3)/MCL (ref 4.5–11.5)

## 2023-06-14 PROCEDURE — 97116 GAIT TRAINING THERAPY: CPT

## 2023-06-14 PROCEDURE — 84166 PROTEIN E-PHORESIS/URINE/CSF: CPT

## 2023-06-14 PROCEDURE — 80053 COMPREHEN METABOLIC PANEL: CPT | Performed by: FAMILY MEDICINE

## 2023-06-14 PROCEDURE — 63600175 PHARM REV CODE 636 W HCPCS: Performed by: FAMILY MEDICINE

## 2023-06-14 PROCEDURE — 97530 THERAPEUTIC ACTIVITIES: CPT

## 2023-06-14 PROCEDURE — 21400001 HC TELEMETRY ROOM

## 2023-06-14 PROCEDURE — 94664 DEMO&/EVAL PT USE INHALER: CPT

## 2023-06-14 PROCEDURE — 25000003 PHARM REV CODE 250: Performed by: INTERNAL MEDICINE

## 2023-06-14 PROCEDURE — 27000173 HC ACAPELLA DEVICE DH OR DM

## 2023-06-14 PROCEDURE — 0077U IG PARAPROTEIN QUAL BLD/UR: CPT | Performed by: FAMILY MEDICINE

## 2023-06-14 PROCEDURE — 94761 N-INVAS EAR/PLS OXIMETRY MLT: CPT

## 2023-06-14 PROCEDURE — 99900035 HC TECH TIME PER 15 MIN (STAT)

## 2023-06-14 PROCEDURE — A4216 STERILE WATER/SALINE, 10 ML: HCPCS | Performed by: FAMILY MEDICINE

## 2023-06-14 PROCEDURE — 25000003 PHARM REV CODE 250: Performed by: FAMILY MEDICINE

## 2023-06-14 PROCEDURE — 85025 COMPLETE CBC W/AUTO DIFF WBC: CPT | Performed by: FAMILY MEDICINE

## 2023-06-14 PROCEDURE — 84156 ASSAY OF PROTEIN URINE: CPT

## 2023-06-14 RX ORDER — PANTOPRAZOLE SODIUM 40 MG/10ML
40 INJECTION, POWDER, LYOPHILIZED, FOR SOLUTION INTRAVENOUS DAILY
Status: DISCONTINUED | OUTPATIENT
Start: 2023-06-14 | End: 2023-06-14

## 2023-06-14 RX ORDER — ALPRAZOLAM 0.5 MG/1
0.5 TABLET ORAL EVERY 8 HOURS PRN
Status: DISCONTINUED | OUTPATIENT
Start: 2023-06-14 | End: 2023-06-17

## 2023-06-14 RX ORDER — PANTOPRAZOLE SODIUM 40 MG/10ML
40 INJECTION, POWDER, LYOPHILIZED, FOR SOLUTION INTRAVENOUS DAILY
Status: DISCONTINUED | OUTPATIENT
Start: 2023-06-15 | End: 2023-06-14

## 2023-06-14 RX ORDER — DEXAMETHASONE 4 MG/1
40 TABLET ORAL DAILY
Status: COMPLETED | OUTPATIENT
Start: 2023-06-14 | End: 2023-06-17

## 2023-06-14 RX ORDER — FUROSEMIDE 10 MG/ML
10 INJECTION INTRAMUSCULAR; INTRAVENOUS ONCE
Status: COMPLETED | OUTPATIENT
Start: 2023-06-15 | End: 2023-06-15

## 2023-06-14 RX ORDER — CARVEDILOL 12.5 MG/1
12.5 TABLET ORAL 2 TIMES DAILY
Status: DISCONTINUED | OUTPATIENT
Start: 2023-06-14 | End: 2023-06-19 | Stop reason: HOSPADM

## 2023-06-14 RX ORDER — GLUCAGON 1 MG
1 KIT INJECTION
Status: DISCONTINUED | OUTPATIENT
Start: 2023-06-14 | End: 2023-06-19 | Stop reason: HOSPADM

## 2023-06-14 RX ORDER — INSULIN ASPART 100 [IU]/ML
0-5 INJECTION, SOLUTION INTRAVENOUS; SUBCUTANEOUS
Status: DISCONTINUED | OUTPATIENT
Start: 2023-06-14 | End: 2023-06-19 | Stop reason: HOSPADM

## 2023-06-14 RX ORDER — PANTOPRAZOLE SODIUM 40 MG/10ML
40 INJECTION, POWDER, LYOPHILIZED, FOR SOLUTION INTRAVENOUS DAILY
Status: DISCONTINUED | OUTPATIENT
Start: 2023-06-15 | End: 2023-06-15

## 2023-06-14 RX ORDER — DEXTROSE 40 %
15 GEL (GRAM) ORAL
Status: DISCONTINUED | OUTPATIENT
Start: 2023-06-14 | End: 2023-06-19 | Stop reason: HOSPADM

## 2023-06-14 RX ORDER — DEXTROSE 40 %
30 GEL (GRAM) ORAL
Status: DISCONTINUED | OUTPATIENT
Start: 2023-06-14 | End: 2023-06-19 | Stop reason: HOSPADM

## 2023-06-14 RX ADMIN — HYDRALAZINE HYDROCHLORIDE 50 MG: 50 TABLET, FILM COATED ORAL at 06:06

## 2023-06-14 RX ADMIN — SENNOSIDES AND DOCUSATE SODIUM 1 TABLET: 50; 8.6 TABLET ORAL at 08:06

## 2023-06-14 RX ADMIN — TAMSULOSIN HYDROCHLORIDE 0.4 MG: 0.4 CAPSULE ORAL at 09:06

## 2023-06-14 RX ADMIN — CEFTRIAXONE SODIUM 1 G: 1 INJECTION, POWDER, FOR SOLUTION INTRAMUSCULAR; INTRAVENOUS at 04:06

## 2023-06-14 RX ADMIN — GABAPENTIN 100 MG: 100 CAPSULE ORAL at 08:06

## 2023-06-14 RX ADMIN — SODIUM CHLORIDE, PRESERVATIVE FREE 10 ML: 5 INJECTION INTRAVENOUS at 05:06

## 2023-06-14 RX ADMIN — HYDRALAZINE HYDROCHLORIDE 50 MG: 50 TABLET, FILM COATED ORAL at 02:06

## 2023-06-14 RX ADMIN — SODIUM CHLORIDE, PRESERVATIVE FREE 10 ML: 5 INJECTION INTRAVENOUS at 02:06

## 2023-06-14 RX ADMIN — SENNOSIDES AND DOCUSATE SODIUM 1 TABLET: 50; 8.6 TABLET ORAL at 09:06

## 2023-06-14 RX ADMIN — ENOXAPARIN SODIUM 40 MG: 40 INJECTION SUBCUTANEOUS at 04:06

## 2023-06-14 RX ADMIN — SODIUM CHLORIDE, PRESERVATIVE FREE 10 ML: 5 INJECTION INTRAVENOUS at 06:06

## 2023-06-14 RX ADMIN — FINASTERIDE 5 MG: 5 TABLET, FILM COATED ORAL at 09:06

## 2023-06-14 RX ADMIN — HYDRALAZINE HYDROCHLORIDE 50 MG: 50 TABLET, FILM COATED ORAL at 10:06

## 2023-06-14 RX ADMIN — POTASSIUM CHLORIDE 40 MEQ: 1500 TABLET, EXTENDED RELEASE ORAL at 09:06

## 2023-06-14 RX ADMIN — SODIUM CHLORIDE, PRESERVATIVE FREE 10 ML: 5 INJECTION INTRAVENOUS at 12:06

## 2023-06-14 RX ADMIN — CARVEDILOL 12.5 MG: 12.5 TABLET, FILM COATED ORAL at 09:06

## 2023-06-14 RX ADMIN — LEVOTHYROXINE SODIUM 112 MCG: 112 TABLET ORAL at 06:06

## 2023-06-14 RX ADMIN — GABAPENTIN 100 MG: 100 CAPSULE ORAL at 02:06

## 2023-06-14 RX ADMIN — AMLODIPINE BESYLATE 10 MG: 10 TABLET ORAL at 09:06

## 2023-06-14 RX ADMIN — PANTOPRAZOLE SODIUM 8 MG/HR: 40 INJECTION, POWDER, FOR SOLUTION INTRAVENOUS at 11:06

## 2023-06-14 RX ADMIN — FAMOTIDINE 20 MG: 20 TABLET, FILM COATED ORAL at 09:06

## 2023-06-14 RX ADMIN — DEXAMETHASONE 40 MG: 4 TABLET ORAL at 05:06

## 2023-06-14 RX ADMIN — GABAPENTIN 100 MG: 100 CAPSULE ORAL at 09:06

## 2023-06-14 RX ADMIN — POTASSIUM CHLORIDE 40 MEQ: 1500 TABLET, EXTENDED RELEASE ORAL at 08:06

## 2023-06-14 NOTE — PT/OT/SLP PROGRESS
Physical Therapy Treatment    Patient Name:  Chip Goodson   MRN:  [id number]    Recommendations:     Discharge Recommendations: home   Discharge Equipment Recommendations: none  Barriers to discharge: Decreased caregiver support    Assessment:     Chip Goodson is a 72 y.o. male admitted with a medical diagnosis of Confusion.  He presents with the following impairments/functional limitations:   difficulty walking.    Rehab Prognosis: Good; patient would benefit from acute skilled PT services to address these deficits and reach maximum level of function.    Recent Surgery: Procedure(s) (LRB):  BIOPSY, BONE MARROW (Right) 2 Days Post-Op    Plan:     During this hospitalization, patient to be seen 5 x/week to address the identified rehab impairments via gait training, therapeutic activities, therapeutic exercises and progress toward the following goals:    Plan of Care Expires:   6-23/23    Subjective     Chief Complaint: Pt agrees to participate in PT  Patient/Family Comments/goals: to rtn home with wife  Pain/Comfort:         Objective:     Communicated with pt and  prior to session.  Patient found HOB elevated with   upon PT entry to room.     General Precautions: Standard, fall  Orthopedic Precautions:    Braces:    Respiratory Status: Room air     Functional Mobility:  Pt amb 200ft Supervision  Pt tf sit to stand, bed to chair, on and off commode supervision      AM-PAC 6 CLICK MOBILITY          Treatment & Education:  Up; in chair with call bell, pt educated call bell use for safety, chair alarm on,  in room    GOALS:   Multidisciplinary Problems       Physical Therapy Goals          Problem: Physical Therapy    Goal Priority Disciplines Outcome Goal Variances Interventions   Physical Therapy Goal     PT, PT/OT Ongoing, Progressing     Description: 1.  Pt will be able to tf SBA  2.  Pt will be able to perform bed mobility Talon  3.  Pt will be able to amb in halls with RW SBA  4.  Pt will be I HEP  with caregiver                       Time Tracking:     PT Received On: 06/14/23  PT Start Time: 0830     PT Stop Time: 0900  PT Total Time (min): 30 min     Billable Minutes: Gait Training 15 and Therapeutic Activity 15    Treatment Type: Treatment  PT/PTA: PT           06/14/2023

## 2023-06-14 NOTE — PLAN OF CARE
Problem: Pain Acute  Goal: Acceptable Pain Control and Functional Ability  Outcome: Ongoing, Progressing     Problem: Fatigue  Goal: Improved Activity Tolerance  Outcome: Ongoing, Progressing     Problem: Gas Exchange Impaired  Goal: Optimal Gas Exchange  Outcome: Ongoing, Progressing     Problem: Infection  Goal: Absence of Infection Signs and Symptoms  Outcome: Ongoing, Progressing

## 2023-06-15 LAB
ALBUMIN SERPL-MCNC: 1.5 G/DL (ref 3.4–4.8)
ALBUMIN/GLOB SERPL: 0.2 RATIO (ref 1.1–2)
ALP SERPL-CCNC: 78 UNIT/L (ref 40–150)
ALT SERPL-CCNC: 25 UNIT/L (ref 0–55)
AST SERPL-CCNC: 30 UNIT/L (ref 5–34)
BASOPHILS # BLD AUTO: 0.03 X10(3)/MCL
BASOPHILS NFR BLD AUTO: 0.4 %
BILIRUBIN DIRECT+TOT PNL SERPL-MCNC: 0.4 MG/DL
BUN SERPL-MCNC: 30 MG/DL (ref 8.4–25.7)
CALCIUM SERPL-MCNC: 8.6 MG/DL (ref 8.8–10)
CHLORIDE SERPL-SCNC: 103 MMOL/L (ref 98–107)
CO2 SERPL-SCNC: 22 MMOL/L (ref 23–31)
CREAT SERPL-MCNC: 1.79 MG/DL (ref 0.73–1.18)
EOSINOPHIL # BLD AUTO: 0.08 X10(3)/MCL (ref 0–0.9)
EOSINOPHIL NFR BLD AUTO: 1.1 %
ERYTHROCYTE [DISTWIDTH] IN BLOOD BY AUTOMATED COUNT: 14.8 % (ref 11.5–17)
GFR SERPLBLD CREATININE-BSD FMLA CKD-EPI: 40 MLS/MIN/1.73/M2
GLOBULIN SER-MCNC: 9.3 GM/DL (ref 2.4–3.5)
GLUCOSE SERPL-MCNC: 199 MG/DL (ref 82–115)
HCT VFR BLD AUTO: 32.7 % (ref 42–52)
HGB BLD-MCNC: 10.5 G/DL (ref 14–18)
IMM GRANULOCYTES # BLD AUTO: 0.09 X10(3)/MCL (ref 0–0.04)
IMM GRANULOCYTES NFR BLD AUTO: 1.2 %
LYMPHOCYTES # BLD AUTO: 2.15 X10(3)/MCL (ref 0.6–4.6)
LYMPHOCYTES NFR BLD AUTO: 28.5 %
MAGNESIUM SERPL-MCNC: 1.9 MG/DL (ref 1.6–2.6)
MCH RBC QN AUTO: 31.5 PG (ref 27–31)
MCHC RBC AUTO-ENTMCNC: 32.1 G/DL (ref 33–36)
MCV RBC AUTO: 98.2 FL (ref 80–94)
MONOCYTES # BLD AUTO: 0.17 X10(3)/MCL (ref 0.1–1.3)
MONOCYTES NFR BLD AUTO: 2.3 %
NEUTROPHILS # BLD AUTO: 5.02 X10(3)/MCL (ref 2.1–9.2)
NEUTROPHILS NFR BLD AUTO: 66.5 %
PHOSPHATE SERPL-MCNC: 2.8 MG/DL (ref 2.3–4.7)
PLATELET # BLD AUTO: 127 X10(3)/MCL (ref 130–400)
PMV BLD AUTO: 10.3 FL (ref 7.4–10.4)
POCT GLUCOSE: 241 MG/DL (ref 70–110)
POCT GLUCOSE: 251 MG/DL (ref 70–110)
POCT GLUCOSE: 344 MG/DL (ref 70–110)
POTASSIUM SERPL-SCNC: 4.4 MMOL/L (ref 3.5–5.1)
PROT SERPL-MCNC: 10.8 GM/DL (ref 5.8–7.6)
RBC # BLD AUTO: 3.33 X10(6)/MCL (ref 4.7–6.1)
SODIUM SERPL-SCNC: 135 MMOL/L (ref 136–145)
WBC # SPEC AUTO: 7.54 X10(3)/MCL (ref 4.5–11.5)

## 2023-06-15 PROCEDURE — 94761 N-INVAS EAR/PLS OXIMETRY MLT: CPT

## 2023-06-15 PROCEDURE — C9113 INJ PANTOPRAZOLE SODIUM, VIA: HCPCS | Performed by: FAMILY MEDICINE

## 2023-06-15 PROCEDURE — 99900035 HC TECH TIME PER 15 MIN (STAT)

## 2023-06-15 PROCEDURE — 25000003 PHARM REV CODE 250: Performed by: INTERNAL MEDICINE

## 2023-06-15 PROCEDURE — 84100 ASSAY OF PHOSPHORUS: CPT | Performed by: FAMILY MEDICINE

## 2023-06-15 PROCEDURE — 63600175 PHARM REV CODE 636 W HCPCS: Performed by: FAMILY MEDICINE

## 2023-06-15 PROCEDURE — 85025 COMPLETE CBC W/AUTO DIFF WBC: CPT | Performed by: FAMILY MEDICINE

## 2023-06-15 PROCEDURE — 99232 SBSQ HOSP IP/OBS MODERATE 35: CPT | Mod: ,,, | Performed by: STUDENT IN AN ORGANIZED HEALTH CARE EDUCATION/TRAINING PROGRAM

## 2023-06-15 PROCEDURE — 80053 COMPREHEN METABOLIC PANEL: CPT | Performed by: FAMILY MEDICINE

## 2023-06-15 PROCEDURE — 97116 GAIT TRAINING THERAPY: CPT

## 2023-06-15 PROCEDURE — 83735 ASSAY OF MAGNESIUM: CPT | Performed by: FAMILY MEDICINE

## 2023-06-15 PROCEDURE — 99232 PR SUBSEQUENT HOSPITAL CARE,LEVL II: ICD-10-PCS | Mod: ,,, | Performed by: STUDENT IN AN ORGANIZED HEALTH CARE EDUCATION/TRAINING PROGRAM

## 2023-06-15 PROCEDURE — 94664 DEMO&/EVAL PT USE INHALER: CPT

## 2023-06-15 PROCEDURE — 21400001 HC TELEMETRY ROOM

## 2023-06-15 PROCEDURE — 25000003 PHARM REV CODE 250: Performed by: FAMILY MEDICINE

## 2023-06-15 PROCEDURE — 94668 MNPJ CHEST WALL SBSQ: CPT

## 2023-06-15 PROCEDURE — A4216 STERILE WATER/SALINE, 10 ML: HCPCS | Performed by: FAMILY MEDICINE

## 2023-06-15 RX ORDER — PANTOPRAZOLE SODIUM 40 MG/10ML
40 INJECTION, POWDER, LYOPHILIZED, FOR SOLUTION INTRAVENOUS 2 TIMES DAILY
Status: DISCONTINUED | OUTPATIENT
Start: 2023-06-15 | End: 2023-06-17

## 2023-06-15 RX ADMIN — SENNOSIDES AND DOCUSATE SODIUM 1 TABLET: 50; 8.6 TABLET ORAL at 10:06

## 2023-06-15 RX ADMIN — FUROSEMIDE 10 MG: 10 INJECTION, SOLUTION INTRAMUSCULAR; INTRAVENOUS at 06:06

## 2023-06-15 RX ADMIN — INSULIN ASPART 2 UNITS: 100 INJECTION, SOLUTION INTRAVENOUS; SUBCUTANEOUS at 09:06

## 2023-06-15 RX ADMIN — LEVOTHYROXINE SODIUM 112 MCG: 112 TABLET ORAL at 05:06

## 2023-06-15 RX ADMIN — SODIUM CHLORIDE, PRESERVATIVE FREE 10 ML: 5 INJECTION INTRAVENOUS at 11:06

## 2023-06-15 RX ADMIN — SODIUM CHLORIDE, PRESERVATIVE FREE 10 ML: 5 INJECTION INTRAVENOUS at 07:06

## 2023-06-15 RX ADMIN — SENNOSIDES AND DOCUSATE SODIUM 1 TABLET: 50; 8.6 TABLET ORAL at 09:06

## 2023-06-15 RX ADMIN — INSULIN ASPART 2 UNITS: 100 INJECTION, SOLUTION INTRAVENOUS; SUBCUTANEOUS at 12:06

## 2023-06-15 RX ADMIN — SODIUM CHLORIDE, PRESERVATIVE FREE 10 ML: 5 INJECTION INTRAVENOUS at 05:06

## 2023-06-15 RX ADMIN — SODIUM CHLORIDE, PRESERVATIVE FREE 10 ML: 5 INJECTION INTRAVENOUS at 12:06

## 2023-06-15 RX ADMIN — TAMSULOSIN HYDROCHLORIDE 0.4 MG: 0.4 CAPSULE ORAL at 10:06

## 2023-06-15 RX ADMIN — AMLODIPINE BESYLATE 10 MG: 10 TABLET ORAL at 10:06

## 2023-06-15 RX ADMIN — CEFTRIAXONE SODIUM 1 G: 1 INJECTION, POWDER, FOR SOLUTION INTRAMUSCULAR; INTRAVENOUS at 05:06

## 2023-06-15 RX ADMIN — FINASTERIDE 5 MG: 5 TABLET, FILM COATED ORAL at 10:06

## 2023-06-15 RX ADMIN — DEXAMETHASONE 40 MG: 4 TABLET ORAL at 10:06

## 2023-06-15 RX ADMIN — GABAPENTIN 100 MG: 100 CAPSULE ORAL at 09:06

## 2023-06-15 RX ADMIN — ALPRAZOLAM 0.5 MG: 0.5 TABLET ORAL at 11:06

## 2023-06-15 RX ADMIN — GABAPENTIN 100 MG: 100 CAPSULE ORAL at 10:06

## 2023-06-15 RX ADMIN — HYDRALAZINE HYDROCHLORIDE 50 MG: 50 TABLET, FILM COATED ORAL at 01:06

## 2023-06-15 RX ADMIN — CARVEDILOL 12.5 MG: 12.5 TABLET, FILM COATED ORAL at 10:06

## 2023-06-15 RX ADMIN — CLONIDINE HYDROCHLORIDE 0.1 MG: 0.1 TABLET ORAL at 05:06

## 2023-06-15 RX ADMIN — CARVEDILOL 12.5 MG: 12.5 TABLET, FILM COATED ORAL at 09:06

## 2023-06-15 RX ADMIN — FAMOTIDINE 20 MG: 20 TABLET, FILM COATED ORAL at 10:06

## 2023-06-15 RX ADMIN — HYDRALAZINE HYDROCHLORIDE 50 MG: 50 TABLET, FILM COATED ORAL at 05:06

## 2023-06-15 RX ADMIN — HYDRALAZINE HYDROCHLORIDE 50 MG: 50 TABLET, FILM COATED ORAL at 09:06

## 2023-06-15 RX ADMIN — PANTOPRAZOLE SODIUM 40 MG: 40 INJECTION, POWDER, FOR SOLUTION INTRAVENOUS at 09:06

## 2023-06-15 RX ADMIN — GABAPENTIN 100 MG: 100 CAPSULE ORAL at 04:06

## 2023-06-15 RX ADMIN — PANTOPRAZOLE SODIUM 40 MG: 40 INJECTION, POWDER, FOR SOLUTION INTRAVENOUS at 10:06

## 2023-06-15 RX ADMIN — ENOXAPARIN SODIUM 40 MG: 40 INJECTION SUBCUTANEOUS at 04:06

## 2023-06-15 NOTE — PROGRESS NOTES
Progress Note    Admit Date: 6/7/2023   LOS: 7 days     SUBJECTIVE:     Follow-up For:  high ca    Daughter  harvey present   He  is better today   More oriented  today         Scheduled Meds:   amLODIPine  10 mg Oral Daily    carvediloL  12.5 mg Oral BID    cefTRIAXone (ROCEPHIN) IVPB  1 g Intravenous Q24H    dexAMETHasone  40 mg Oral Daily    enoxparin  40 mg Subcutaneous Q24H (prophylaxis, 1700)    famotidine  20 mg Oral Daily    finasteride  5 mg Oral Daily    gabapentin  100 mg Oral TID    hydrALAZINE  50 mg Oral Q8H    levothyroxine  112 mcg Oral Before breakfast    pantoprazole  40 mg Intravenous Daily    potassium chloride in water  20 mEq Intravenous Once    potassium chloride  40 mEq Oral BID    senna-docusate 8.6-50 mg  1 tablet Oral BID    sodium chloride 0.9%  10 mL Intravenous Q6H    tamsulosin  1 capsule Oral Daily     Continuous Infusions:    PRN Meds:acetaminophen, albuterol, ALPRAZolam, cloNIDine, dextrose 10%, dextrose 10%, dextrose, dextrose, dextrose, dextrose, dextrose 50%, dextrose 50%, glucagon (human recombinant), glucagon (human recombinant), glucose, glucose, hydrALAZINE, insulin aspart U-100, insulin aspart U-100, ondansetron, sodium chloride 0.9%, Flushing PICC Protocol **AND** sodium chloride 0.9% **AND** sodium chloride 0.9%    Review of patient's allergies indicates:   Allergen Reactions    Iodine Anaphylaxis    Shellfish containing products     Amoxicillin-pot clavulanate Itching       Review of Systems  Review of Systems   Constitutional:  Positive for malaise/fatigue and weight loss. Negative for chills and fever.   HENT:  Negative for congestion, ear discharge, ear pain, hearing loss, nosebleeds, sinus pain, sore throat and tinnitus.    Eyes:  Positive for double vision. Negative for blurred vision.   Respiratory:  Positive for cough and shortness of breath. Negative for hemoptysis, sputum production, wheezing and stridor.    Cardiovascular:  Negative for chest pain,  palpitations, orthopnea and claudication.   Gastrointestinal:  Positive for heartburn. Negative for abdominal pain, blood in stool, constipation, diarrhea, nausea and vomiting.   Genitourinary:  Negative for dysuria and urgency.   Musculoskeletal:  Positive for back pain, myalgias and neck pain.   Skin:  Negative for itching and rash.   Neurological:  Negative for dizziness, tingling, tremors, sensory change, speech change, focal weakness, seizures, weakness and headaches.   Endo/Heme/Allergies:  Negative for polydipsia.   Psychiatric/Behavioral:  Negative for depression, substance abuse and suicidal ideas.  confused    OBJECTIVE:     Vital Signs (Most Recent)  Temp: 97.4 °F (36.3 °C) (06/14/23 2015)  Pulse: 89 (06/14/23 2015)  Resp: 20 (06/14/23 2015)  BP: (!) 196/81 (06/14/23 2015)  SpO2: 97 % (06/14/23 2015)    Vital Signs Range (Last 24H):  Temp:  [97.4 °F (36.3 °C)-98.7 °F (37.1 °C)]   Pulse:  [76-93]   Resp:  [20]   BP: (148-196)/(62-90)   SpO2:  [94 %-97 %]     I & O (Last 24H):  Intake/Output Summary (Last 24 hours) at 6/14/2023 2224  Last data filed at 6/14/2023 1910  Gross per 24 hour   Intake 1436.7 ml   Output 1175 ml   Net 261.7 ml       Physical Exam:  Physical Exam   Vitals:    06/14/23 2015   BP: (!) 196/81   Pulse: 89   Resp: 20   Temp: 97.4 °F (36.3 °C)       Physical Exam   Constitutional: drowsy aggitated  but did try to  get up to  urinate  no acute distress  HENT:   Head: Normocephalic and atraumatic.   Eyes: Conjunctivae normal and EOM are normal. Pupils are equal, round, and reactive to light.   Neck: Normal range of motion. Neck supple.   Cardiovascular: Normal rate, regular rhythm, normal heart sounds   Pulmonary/Chest: CTAB,  coarse  breath sounds Abdominal: Soft, nontender, bowel sounds normal  Neurological:tender  t  spine and ls pine    No  break down   on  skin on  buttocks but it  is red  Skin: warm, dry intact  Psychiatric: confused      Laboratory:  Recent Results (from the past 24  hour(s))   Comprehensive Metabolic Panel    Collection Time: 06/14/23  6:06 AM   Result Value Ref Range    Sodium Level 140 136 - 145 mmol/L    Potassium Level 3.5 3.5 - 5.1 mmol/L    Chloride 107 98 - 107 mmol/L    Carbon Dioxide 22 (L) 23 - 31 mmol/L    Glucose Level 100 82 - 115 mg/dL    Blood Urea Nitrogen 26.0 (H) 8.4 - 25.7 mg/dL    Creatinine 1.86 (H) 0.73 - 1.18 mg/dL    Calcium Level Total 9.0 8.8 - 10.0 mg/dL    Protein Total 10.7 (H) 5.8 - 7.6 gm/dL    Albumin Level 1.5 (L) 3.4 - 4.8 g/dL    Globulin 9.2 (H) 2.4 - 3.5 gm/dL    Albumin/Globulin Ratio 0.2 (L) 1.1 - 2.0 ratio    Bilirubin Total 0.4 <=1.5 mg/dL    Alkaline Phosphatase 70 40 - 150 unit/L    Alanine Aminotransferase 20 0 - 55 unit/L    Aspartate Aminotransferase 30 5 - 34 unit/L    eGFR 38 mls/min/1.73/m2   CBC with Differential    Collection Time: 06/14/23  6:06 AM   Result Value Ref Range    WBC 6.87 4.50 - 11.50 x10(3)/mcL    RBC 3.30 (L) 4.70 - 6.10 x10(6)/mcL    Hgb 10.5 (L) 14.0 - 18.0 g/dL    Hct 31.9 (L) 42.0 - 52.0 %    MCV 96.7 (H) 80.0 - 94.0 fL    MCH 31.8 (H) 27.0 - 31.0 pg    MCHC 32.9 (L) 33.0 - 36.0 g/dL    RDW 15.0 11.5 - 17.0 %    Platelet 131 130 - 400 x10(3)/mcL    MPV 9.2 7.4 - 10.4 fL    Neut % 51.1 %    Lymph % 34.2 %    Mono % 8.6 %    Eos % 5.1 %    Basophil % 0.4 %    Lymph # 2.35 0.6 - 4.6 x10(3)/mcL    Neut # 3.51 2.1 - 9.2 x10(3)/mcL    Mono # 0.59 0.1 - 1.3 x10(3)/mcL    Eos # 0.35 0 - 0.9 x10(3)/mcL    Baso # 0.03 <=0.2 x10(3)/mcL    IG# 0.04 0 - 0.04 x10(3)/mcL    IG% 0.6 %   POCT glucose    Collection Time: 06/14/23 10:42 AM   Result Value Ref Range    POCT Glucose 172 (H) 70 - 110 mg/dL   POCT glucose    Collection Time: 06/14/23  4:29 PM   Result Value Ref Range    POCT Glucose 131 (H) 70 - 110 mg/dL   POCT glucose    Collection Time: 06/14/23  8:47 PM   Result Value Ref Range    POCT Glucose 170 (H) 70 - 110 mg/dL        Diagnostic Results:  X-Ray Chest 1 View    Result Date: 6/8/2023  EXAMINATION: XR CHEST 1  VIEW CLINICAL HISTORY: , Weakness. COMPARISON: 0923 FINDINGS: An AP view or more reveals the heart to be normal in size.  The trachea is just right of midline.  Atherosclerosis is seen within the aorta.  There is patchy hazy opacity at the left lung base.  There are changes and curvature are noted to the thoracic spine.     1. Patchy infiltrate, atelectasis, and pleural reaction left lung base 2. Atherosclerosis 3. Thoracic spondylosis and scoliosis Electronically signed by: Boo Camacho Date:    06/08/2023 Time:    08:11    CT Head Without Contrast    Result Date: 6/7/2023  EXAMINATION: CT HEAD WITHOUT CONTRAST CLINICAL HISTORY: Mental status change, unknown cause; TECHNIQUE: Low dose axial CT images obtained throughout the head without intravenous contrast. Sagittal and coronal reconstructions were performed.  .  Automated exposure control used. COMPARISON: None. FINDINGS: Intracranial compartment: Ventricles and sulci are normal in size for age without evidence of hydrocephalus. No extra-axial blood or fluid collections. The brain parenchyma appears normal. No parenchymal mass, hemorrhage, edema or major vascular distribution infarct. Skull/extracranial contents (limited evaluation): No fracture. Mastoid air cells and paranasal sinuses are essentially clear.     No acute abnormality. Electronically signed by: Chrystal Wallace MD Date:    06/07/2023 Time:    17:58    CT Chest Without Contrast    Result Date: 6/8/2023  EXAMINATION: CT CHEST WITHOUT CONTRAST CLINICAL HISTORY: Hypercalcemia;, . TECHNIQUE: PATIENT RADIATION DOSE: DLP(mGycm) 215 As per PQRS measures, all CT scans at this facility used dose modulation, iterative reconstruction, and/or weight based dose adjustment when appropriate to reduce radiation dose to as low as reasonably achievable. COMPARISON: None available FINDINGS: Serial axial imaging of the chest without the administration of IV contrast.  Both soft tissue and pulmonary parenchymal  windows were obtained.  Additional sagittal and coronal reconstructions were performed. Artifact: Mild motion artifact is seen on some images. Contrast: Please note this is a non contrast CT scan of the chest which is non-diagnostic for pulmonary emboli. Soft Tissues: Unremarkable. Lines and Tubes: None. Axilla: A few prominent lymph nodes are seen in the right and left axilla. Neck: The visualized soft tissues of the neck appear unremarkable. Mediastinum: A few subcentimeter mediastinal lymph nodes are seen in the upper and lower paratracheal and subcarinal spaces . Heart: Moderate cardiomegaly is seen. Pronounced coronary artery calcification is seen. Aorta: Unremarkable appearing aorta. Pulmonary Arteries: Mildly enlarged main pulmonary arteries are identified. This could reflect an element of pulmonary arterial hypertension. Lungs: There is mild non specific dependent change at the lung bases. Mild streaky linear opacity is seen predominantly in the dependent portions at the lung bases with peripheral predominance consistent with subsegmental atelectasis. A small patchy area of ground glass density is seen in the lateral basal segment of the left lower lobe. This could reflect an acute focal infiltrate / pneumonitis. Pleura: There is a small left sided pleural effusion. There is mild pleural thickening along the right lower posterior pleural surface. Bony Structures: There is diffuse osteopenia along the visualised bony structures together with multiple, numerous, small round-ovoid lucent lesion of varying sizes involving the marrow. Spine: Moderate multilevel spondylolytic changes are seen in the thoracic spine. Abdomen: Bilateral renal cysts are indentified, which are large in size on the left side.     Impression: 1. There is a small left sided pleural effusion. 2. A small patchy area of ground glass density is seen in the lateral basal segment of the left lower lobe. This could reflect an acute focal  infiltrate / pneumonitis. Correlate clinically and with laboratory findings, as regards additional evaluation and follow-up. 3. There is diffuse osteopenia along the visualised bony structures together with multiple, numerous, small round-ovoid lucent lesion of varying sizes involving the marrow. These changes could reflect metabolic bone disease like hyperparathyroidism. Correlate clinically and with laboratory findings, as regards additional evaluation and follow-up. 1. Concur with preliminary report Electronically signed by: Boo Camacho Date:    06/08/2023 Time:    08:30    NM Bone Scan Whole Body    Result Date: 6/8/2023  EXAMINATION: NM BONE SCAN WHOLE BODY CLINICAL HISTORY: myeloma ??;, . TECHNIQUE: mCi of Tc-99m MDP was administered intravenously. After appropriate delay, whole body bone scan images were obtained. COMPARISON: None available FINDINGS: Activity is identified within the kidneys bilaterally and within the bladder. Focal increased activity is evident at the anterior right 4th 5th and 6th ribs and anterior left 1st, 4th, and 7th ribs.  There is mild central activity at the manubrium.  There is mild increased activity at the sternoclavicular joints as well as the shoulders bilaterally.  Mild increased activity evident thyroid bed.     1. Scattered activity at the anterior left right rib margins as described.  A CT exam on the previous day reveals no definite fracture.  There is mild heterogeneous and bony loss at the anterior left and right ribs.  This is not have the typical pattern of a metastases. 2. Focal increased activity at the anterior manubrium which again on the CT exam demonstrates osteopenia and heterogeneous bony loss as well as scattered subcentimeter small lytic defects. 3. Suspect mild arthritic changes at the shoulders sternoclavicular joints Electronically signed by: Boo Camacho Date:    06/08/2023 Time:    14:28    US Retroperitoneal Complete    Result Date:  6/8/2023  EXAMINATION: RENAL/RETROPERITONEAL SONOGRAM: CLINICAL HISTORY: Renal insufficiency;, . COMPARISON: None available FINDINGS: Real-time imaging was performed through the retroperitoneum evaluating the kidneys. Arterial and venous flow are identified within the kidneys. No hydronephrosis is seen.  The bladder is incompletely distended.  Two round hypoechoic foci are noted to the mid and lower right kidney measuring 3.3 cm and 1.5 cm respectively compatible with simple cysts.  2 round hypoechoic foci are noted to the upper and mid left kidney measuring 5.4 and 6.5 cm respectively compatible with cysts.  No free fluid collect identified. MEASUREMENTS: Right Kidney: 12.9 cm RI: 0.79 Left Kidney: 14.7 cm RI: 0.73     1. Findings compatible simple cysts to the kidneys bilaterally as described 2. Elevated renal resistive indices bilaterally 3. Incompletely distended bladder Electronically signed by: Boo Camacho Date:    06/08/2023 Time:    12:05    XR Metastatic Survey    Result Date: 6/8/2023  EXAMINATION: SKELETAL SURVEY: CLINICAL HISTORY: , possible myeloma; TECHNIQUE: AP and lateral views were obtained of the axial skeleton as well as limited views of the lower and upper upper extremity. COMPARISON: None available FINDINGS: There is absence of dentition.  Mastoid air cells are aerated bilaterally.  Maxillary and frontal sinuses are relatively clear.  There is sinusoidal deviation of the nasal septum.  Bony structures are osteopenic.  Degenerative changes are noted to the spine.  No fracture or subluxation is seen.  No aggressive osteolytic or osteoblastic lesion is seen.  A small oval sclerotic focus is noted to the proximal left humeral shaft measuring 10 mm in greatest diameter.  Arthritic changes are evident at the shoulders, hips, wrists, and elbows bilaterally.  Atherosclerosis is seen.  Enthesophyte formation is evident at the tibial tubercle.  A pinpoint 2-3 mm metallic density noted to the soft  tissues of the right axilla suggesting a foreign body and another at the soft tissues of the medial right thigh     1. No acute osseous defect identified 2. Osteopenia 3. Osteoarthritis 4. Pinpoint metallic density/suspect foreign body right axilla and medial right thigh soft tissues 5. Small 1 cm oval shaped sclerotic focus proximal left humeral shaft which has a nonaggressive appearance. Electronically signed by: Boo Camacho Date:    06/08/2023 Time:    13:55       ASSESSMENT/PLAN:   72  year old  w  hypercalcemia   Needs workup   His ct  had finding in  c spine   Ordered  skeletal survey  area on humerus only   On nuc  bone scan   ribs munubrium  w  lesions and thyroid bed w  uptake   Will order thyroid  us. - neg   Us  of renal  is  normal   except for  elevated resistive indices  Spep + M spike in   IgA trini   One spray of  calcitonin  6/8  Pamidronate 60  x 1   6/9  T spine   mri no contrast  showing  areas of  possible metastasis or bone marrow involvement in  t spine  Confusion is slightly  b grace   Wanted to rule out  any   other  causes  other than high ca  Abg did not   show  co2   narcosis   CT  head  no acute  bleed or  changes  Blood cx  x  2   neg   Lactic  acid  0.8  Ua   to  collect  and  cx  does have a  hx of  prostate  issues  Cxray   developing  pneumonia    and  effusion   lasix  today     Rocephin s tarted   Still w  good  urine  output     His  calcium is  much better  He  is much more lucid today   Bone marrow bx  mon  Hem onc  to see  today   Fix potassium     Plan:   Patient Active Problem List    Diagnosis Date Noted    Abnormal radionuclide bone scan 06/08/2023    Hypercalcemia 06/07/2023    Confusion 06/07/2023    Hypoalbuminemia 06/07/2023    Weakness 06/07/2023    COLTON (acute kidney injury) 06/07/2023    `

## 2023-06-15 NOTE — PLAN OF CARE
Problem: Pain Acute  Goal: Acceptable Pain Control and Functional Ability  Outcome: Ongoing, Progressing     Problem: Fatigue  Goal: Improved Activity Tolerance  Outcome: Ongoing, Progressing     Problem: Gas Exchange Impaired  Goal: Optimal Gas Exchange  Outcome: Ongoing, Progressing     Problem: Infection  Goal: Absence of Infection Signs and Symptoms  Outcome: Ongoing, Progressing     Patient is alert and oriented x4, PERRLA, Bed lowest position, bed alarm activated, chair alarm in place, side rails up x2, call bell within reach

## 2023-06-15 NOTE — PROGRESS NOTES
[facility]  Hematology/Oncology  Progress Note      Consult Requested By: Debbie Gonzalez MD    Reason for Consult:     SUBJECTIVE:     History of Present Illness:  Patient is a 72 y.o. male brought to the emergency department for confusion.  Upon evaluation in the ER CT scan of the head with no acute intracranial abnormality.  Finding consistent with microangiopathic no interval change.  CT of the chest with no contrast showed diffuse osteopenia with multiple, numerous, small round avoid lucent lesions ovarian size involving the marrow.  Changes could reflect metabolic bone disease like hyperparathyroidism.  MRI of the thoracic spine with multiple scattered vertebral body lesion are suspect and could represent metastatic disease.  Bone scan with scattered activity in the anterior left right rib margins no fractures focal increased activity at the anterior manubrium scattered subcentimeter small lytic defects.  Ultrasound of the thyroid that shows multiple nodules. skeletal survey  area on humerus only.    Labs with elevated calcium of 12.6, corrected calcium 14.36.  Total protein was 11.8 with a total globulin 10.0.  Further workup revealed an IgA over 7040.0, IgM 6.0, IgG 165.00.  Free serum kappa light chain 5.09, free serum lambda light chains 0.5600 with a kappa/lambda ratio of 9.09.  M spike 3.33.  PSA 0.96    Bone marrow biopsy performed yesterday. Patient receive Aredia 60 mg on 06/09/2023 and was started on hydration.      Patient is seen in rounds this afternoon.  Daughter at bedside.  Patient is more alert and oriented.  Able to answer questions most of the time properly.  Occasional confusion.    Interval History  6/15/23 - Patient seen and examined this morning. No one at bedside. Very pleasant. Appears oriented and less confused compared to previous documentation. Unable to remember much of his hospitalization or biopsy but otherwise doing well with no complaints this  morning.     Continuous Infusions:  Scheduled Meds:   amLODIPine  10 mg Oral Daily    carvediloL  12.5 mg Oral BID    cefTRIAXone (ROCEPHIN) IVPB  1 g Intravenous Q24H    dexAMETHasone  40 mg Oral Daily    enoxparin  40 mg Subcutaneous Q24H (prophylaxis, 1700)    famotidine  20 mg Oral Daily    finasteride  5 mg Oral Daily    gabapentin  100 mg Oral TID    hydrALAZINE  50 mg Oral Q8H    levothyroxine  112 mcg Oral Before breakfast    pantoprazole  40 mg Intravenous BID    senna-docusate 8.6-50 mg  1 tablet Oral BID    sodium chloride 0.9%  10 mL Intravenous Q6H    tamsulosin  1 capsule Oral Daily     PRN Meds:acetaminophen, albuterol, ALPRAZolam, cloNIDine, dextrose 10%, dextrose 10%, dextrose, dextrose, dextrose, dextrose, dextrose 50%, dextrose 50%, glucagon (human recombinant), glucagon (human recombinant), glucose, glucose, hydrALAZINE, insulin aspart U-100, insulin aspart U-100, ondansetron, sodium chloride 0.9%, Flushing PICC Protocol **AND** sodium chloride 0.9% **AND** sodium chloride 0.9%      PAST MEDICAL HISTORY:  Significant for hypothyroidism, BPH with LUTS, diabetes   mellitus type 2, hypertension, low back pain after the recent fall in the   washroom, dyslipidemia.  PAST SURGICAL HISTORY:  As per the patient could remember he had his appendix   removed in 1965, had a urethral dilatation done recently.  Does not know the   name of the urologist.  Besides that, he says that he did not have any other   surgery.  SOCIAL HISTORY:  He is retired from [employer], worked for 36+ years.  Does not   smoke.  Does not drink.  Lives with his wife and is still very active.  He has 5   children, 3 sons and 2 daughters, and all of them are fine.  His youngest son   had an MI, but is alive.  FAMILY HISTORY:  Pertinent for dad dying from an automobile accident when his   dad was in late 20s or early 30s.  His mother passed away.  She was of 65 of   age.  He does not remember properly the cause.  He has no brothers, 2  sisters,   and they are alive, and as per him, they are healthy.    History reviewed. No pertinent past medical history.  Past Surgical History:   Procedure Laterality Date    BONE MARROW BIOPSY Right 6/12/2023    Procedure: BIOPSY, BONE MARROW;  Surgeon: Silvano Austin MD;  Location: [facility];  Service: General;  Laterality: Right;     History reviewed. No pertinent family history.       Review of patient's allergies indicates:   Allergen Reactions    Iodine Anaphylaxis    Shellfish containing products     Amoxicillin-pot clavulanate Itching      No current facility-administered medications on file prior to encounter.     Current Outpatient Medications on File Prior to Encounter   Medication Sig Dispense Refill    albuterol (PROVENTIL/VENTOLIN HFA) 90 mcg/actuation inhaler 2 puffs every 4 to 6 hours as needed.      amLODIPine (NORVASC) 5 MG tablet Take 5 mg by mouth.      atorvastatin (LIPITOR) 10 MG tablet Take 10 mg by mouth.      carvediloL (COREG) 12.5 MG tablet Take 12.5 mg by mouth 2 (two) times daily.      cloNIDine (CATAPRES) 0.2 MG tablet Take 0.2 mg by mouth daily as needed.      finasteride (PROSCAR) 5 mg tablet Take 5 mg by mouth.      gabapentin (NEURONTIN) 100 MG capsule Take by mouth.      levothyroxine (SYNTHROID) 112 MCG tablet Take 112 mcg by mouth.      metFORMIN (GLUCOPHAGE-XR) 500 MG ER 24hr tablet Take 1,000 mg by mouth 2 (two) times daily.      ONETOUCH ULTRA TEST Strp USE TO TEST BLOOD GLUCOSE TWICE DAILY      spironolactone (ALDACTONE) 25 MG tablet Take 12.5 mg by mouth.      tamsulosin (FLOMAX) 0.4 mg Cap Take 1 capsule by mouth.         Review of Systems   Constitutional:  Positive for activity change, appetite change and fatigue. Negative for chills, diaphoresis, fever and unexpected weight change.   HENT:  Negative for nasal congestion, mouth sores, nosebleeds, sinus pressure/congestion, sore throat and trouble swallowing.    Eyes: Negative.    Respiratory:  Negative for cough and  shortness of breath.    Cardiovascular:  Negative for chest pain and palpitations.   Gastrointestinal:  Negative for abdominal distention, abdominal pain, blood in stool, change in bowel habit, constipation, diarrhea, nausea, vomiting and change in bowel habit.   Endocrine: Negative.    Genitourinary:  Negative for bladder incontinence, decreased urine volume, difficulty urinating, dysuria, frequency, hematuria and urgency.   Musculoskeletal:  Negative for arthralgias, back pain, gait problem, joint swelling, leg pain and myalgias.   Integumentary:  Negative for rash.   Allergic/Immunologic: Negative.    Neurological:  Positive for weakness. Negative for dizziness, tremors, syncope, light-headedness, numbness, headaches and memory loss.   Hematological:  Negative for adenopathy. Does not bruise/bleed easily.   Psychiatric/Behavioral:  Positive for confusion (better). Negative for agitation, hallucinations, sleep disturbance and suicidal ideas. The patient is not nervous/anxious.        OBJECTIVE:     Vital Signs (Most Recent)  Temp: 97.6 °F (36.4 °C) (06/15/23 1242)  Pulse: 80 (06/15/23 1242)  Resp: 20 (06/14/23 2015)  BP: (!) 168/68 (06/15/23 1242)  SpO2: 96 % (06/15/23 1242)    Pain Assessment: No pain reported at this time    Vital Signs Range (Last 24H):  Temp:  [97.4 °F (36.3 °C)-98 °F (36.7 °C)]   Pulse:  [77-89]   Resp:  [20]   BP: (148-196)/(68-90)   SpO2:  [95 %-97 %]     Physical Exam:  Physical Exam  Vitals and nursing note reviewed.   Constitutional:       General: He is not in acute distress.     Comments: Elderly male   HENT:      Head: Normocephalic and atraumatic.      Mouth/Throat:      Mouth: Mucous membranes are moist.   Eyes:      General: No scleral icterus.     Extraocular Movements: Extraocular movements intact.      Conjunctiva/sclera: Conjunctivae normal.   Neck:      Vascular: No JVD.   Cardiovascular:      Rate and Rhythm: Normal rate and regular rhythm.      Heart sounds: No murmur  heard.  Pulmonary:      Effort: Pulmonary effort is normal.      Breath sounds: Normal breath sounds. No wheezing or rhonchi.   Abdominal:      General: Bowel sounds are normal. There is no distension.      Palpations: Abdomen is soft.      Tenderness: There is no abdominal tenderness.   Musculoskeletal:         General: No swelling or deformity.      Cervical back: Neck supple.   Lymphadenopathy:      Head:      Right side of head: No submandibular adenopathy.      Left side of head: No submandibular adenopathy.      Cervical: No cervical adenopathy.      Upper Body:      Right upper body: No supraclavicular or axillary adenopathy.      Left upper body: No supraclavicular or axillary adenopathy.      Lower Body: No right inguinal adenopathy. No left inguinal adenopathy.   Skin:     General: Skin is warm.      Coloration: Skin is not jaundiced.      Findings: No rash.   Neurological:      Mental Status: He is alert.      Cranial Nerves: Cranial nerves 2-12 are intact.      Comments: Confused at times   Psychiatric:         Behavior: Behavior is cooperative.       Laboratory:  CBC with Differential:  Recent Labs   Lab 06/15/23  0440   WBC 7.54   RBC 3.33*   HCT 32.7*   HGB 10.5*   MCV 98.2*   MCH 31.5*   RDW 14.8   *   MPV 10.3       CMP:  Recent Labs   Lab 06/15/23  0440   CALCIUM 8.6*   ALBUMIN 1.5*   *   K 4.4   CO2 22*   BUN 30.0*   CREATININE 1.79*   ALKPHOS 78   ALT 25   AST 30   BILITOT 0.4       BMP:   Recent Labs   Lab 06/15/23  0440   CALCIUM 8.6*   *   K 4.4   CO2 22*   BUN 30.0*   CREATININE 1.79*       LFTs:   Recent Labs   Lab 06/15/23  0440   ALT 25   AST 30   ALKPHOS 78   BILITOT 0.4   ALBUMIN 1.5*       Haptoglobin: No results for input(s): HAPTOGLOBIN in the last 24 hours.  Tumor Markers: No results for input(s): PSA, CEA, , AFPTM, [id number],  in the last 24 hours.    Invalid input(s): ALGTM  Immunology: No results for input(s): SPEP, PAO, JACQUELINE, FREELAMBDALI in the last 24  hours.  Coagulation: No results for input(s): PT, INR, APTT in the last 24 hours.  Specimen (24h ago, onward)      None          Microbiology Results (last 7 days)       Procedure Component Value Units Date/Time    Blood Culture [[id number]]  (Normal) Collected: 06/10/23 1516    Order Status: Completed Specimen: Blood from Arm, Right Updated: 06/15/23 1601     CULTURE, BLOOD ([facility]) No Growth At 96 Hours    Blood Culture [[id number]]  (Normal) Collected: 06/10/23 1516    Order Status: Completed Specimen: Blood from Hand, Right Updated: 06/15/23 1601     CULTURE, BLOOD ([facility]) No Growth At 96 Hours    Urine Culture High Risk [[id number]] Collected: 06/12/23 0228    Order Status: Completed Specimen: Urine, Clean Catch Updated: 06/14/23 1051     Urine Culture No Growth            Diagnostic Results:  Imaging Results              CT Chest Without Contrast (Final result)  Result time 06/08/23 08:30:58      Final result by Boo Camacho MD (06/08/23 08:30:58)                   Impression:    Impression:    1. There is a small left sided pleural effusion.    2. A small patchy area of ground glass density is seen in the lateral basal segment of the left lower lobe. This could reflect an acute focal infiltrate / pneumonitis. Correlate clinically and with laboratory findings, as regards additional evaluation and follow-up.    3. There is diffuse osteopenia along the visualised bony structures together with multiple, numerous, small round-ovoid lucent lesion of varying sizes involving the marrow. These changes could reflect metabolic bone disease like hyperparathyroidism. Correlate clinically and with laboratory findings, as regards additional evaluation and follow-up.    1. Concur with preliminary report      Electronically signed by: Boo Camacho  Date:    06/08/2023  Time:    08:30               Narrative:    EXAMINATION:  CT CHEST WITHOUT CONTRAST    CLINICAL HISTORY:  Hypercalcemia;, .    TECHNIQUE:  PATIENT RADIATION DOSE:  DLP(mGycm) 215    As per PQRS measures, all CT scans at this facility used dose modulation, iterative reconstruction, and/or weight based dose adjustment when appropriate to reduce radiation dose to as low as reasonably achievable.    COMPARISON:  None available    FINDINGS:  Serial axial imaging of the chest without the administration of IV contrast.  Both soft tissue and pulmonary parenchymal windows were obtained.  Additional sagittal and coronal reconstructions were performed.    Artifact: Mild motion artifact is seen on some images.    Contrast: Please note this is a non contrast CT scan of the chest which is non-diagnostic for pulmonary emboli.    Soft Tissues: Unremarkable.    Lines and Tubes: None.    Axilla: A few prominent lymph nodes are seen in the right and left axilla.    Neck: The visualized soft tissues of the neck appear unremarkable.    Mediastinum: A few subcentimeter mediastinal lymph nodes are seen in the upper and lower paratracheal and subcarinal spaces .    Heart: Moderate cardiomegaly is seen. Pronounced coronary artery calcification is seen.    Aorta: Unremarkable appearing aorta.    Pulmonary Arteries: Mildly enlarged main pulmonary arteries are identified. This could reflect an element of pulmonary arterial hypertension.    Lungs: There is mild non specific dependent change at the lung bases. Mild streaky linear opacity is seen predominantly in the dependent portions at the lung bases with peripheral predominance consistent with subsegmental atelectasis. A small patchy area of ground glass density is seen in the lateral basal segment of the left lower lobe. This could reflect an acute focal infiltrate / pneumonitis.    Pleura: There is a small left sided pleural effusion. There is mild pleural thickening along the right lower posterior pleural surface.    Bony Structures: There is diffuse osteopenia along the visualised bony structures together with multiple, numerous, small round-ovoid  lucent lesion of varying sizes involving the marrow.    Spine: Moderate multilevel spondylolytic changes are seen in the thoracic spine.    Abdomen: Bilateral renal cysts are indentified, which are large in size on the left side.                                       CT Head Without Contrast (Final result)  Result time 06/07/23 17:58:22      Final result by Henrique Wallace MD (06/07/23 17:58:22)                   Impression:      No acute abnormality.      Electronically signed by: Chrystal Wallace MD  Date:    06/07/2023  Time:    17:58               Narrative:    EXAMINATION:  CT HEAD WITHOUT CONTRAST    CLINICAL HISTORY:  Mental status change, unknown cause;    TECHNIQUE:  Low dose axial CT images obtained throughout the head without intravenous contrast. Sagittal and coronal reconstructions were performed.  .  Automated exposure control used.    COMPARISON:  None.    FINDINGS:  Intracranial compartment:    Ventricles and sulci are normal in size for age without evidence of hydrocephalus. No extra-axial blood or fluid collections.    The brain parenchyma appears normal. No parenchymal mass, hemorrhage, edema or major vascular distribution infarct.    Skull/extracranial contents (limited evaluation): No fracture. Mastoid air cells and paranasal sinuses are essentially clear.                                       X-Ray Chest 1 View (Final result)  Result time 06/08/23 08:11:56      Final result by Boo Camacho MD (06/08/23 08:11:56)                   Impression:      1. Patchy infiltrate, atelectasis, and pleural reaction left lung base  2. Atherosclerosis  3. Thoracic spondylosis and scoliosis      Electronically signed by: Boo Camacho  Date:    06/08/2023  Time:    08:11               Narrative:    EXAMINATION:  XR CHEST 1 VIEW    CLINICAL HISTORY:  , Weakness.    COMPARISON:  0923    FINDINGS:  An AP view or more reveals the heart to be normal in size.  The trachea is just right of midline.   Atherosclerosis is seen within the aorta.  There is patchy hazy opacity at the left lung base.  There are changes and curvature are noted to the thoracic spine.                                       RADIOLOGY REPORT (Final result)  Result time 06/12/23 12:09:38      Final result by Unknown User (06/12/23 12:09:38)                                            ASSESSMENT/PLAN:     Patient Active Problem List   Diagnosis    Hypercalcemia    Confusion    Hypoalbuminemia    Weakness    COLTON (acute kidney injury)    Abnormal radionuclide bone scan   IgA monoclonal gammopathy  Altered mental status-improving patient more alert today  Hypercalcemia-improving  Hypokalemia  COLTON-improving    Plan  24 hours urine collection for immunofixation in process  Continue hydration  Continue dexamethasone 40 mg daily x 4 days,currently day 2   Patient clinically much better and stable. Numbers improving.  Replace electrolytes as needed  Continue supportive care  Patient will need followup at the clinic with Dr. Amaya upon d/c    Thank you for the consult  Will be available for any questions.      Elizabeth LeJeune, MD  Hematology/Oncology  [facility]

## 2023-06-15 NOTE — PT/OT/SLP PROGRESS
Physical Therapy Treatment    Patient Name:  Chip Goodson   MRN:  [id number]    Recommendations:     Discharge Recommendations: home   Discharge Equipment Recommendations: none  Barriers to discharge: Decreased caregiver support    Assessment:     Chip Goodson is a 72 y.o. male admitted with a medical diagnosis of Confusion.  He presents with the following impairments/functional limitations:   difficulty walking.    Rehab Prognosis: Good; patient would benefit from acute skilled PT services to address these deficits and reach maximum level of function.    Recent Surgery: Procedure(s) (LRB):  BIOPSY, BONE MARROW (Right) 3 Days Post-Op    Plan:     During this hospitalization, patient to be seen 5 x/week to address the identified rehab impairments via gait training, therapeutic activities, therapeutic exercises and progress toward the following goals:    Plan of Care Expires:   6-23/23    Subjective     Chief Complaint: Pt agrees to participate in PT  Patient/Family Comments/goals: to rtn home with wife  Pain/Comfort:         Objective:     Communicated with pt and  prior to session.  Patient found HOB elevated with   upon PT entry to room.     General Precautions: Standard, fall  Orthopedic Precautions:    Braces:    Respiratory Status: Room air     Functional Mobility:  Pt amb 250ft Supervision  Pt tf sit to stand, bed to chair SBA  AM-PAC 6 CLICK MOBILITY          Treatment & Education:  Up; in chair with call bell, pt educated call bell use for safety, chair alarm on,  in room    GOALS:   Multidisciplinary Problems       Physical Therapy Goals          Problem: Physical Therapy    Goal Priority Disciplines Outcome Goal Variances Interventions   Physical Therapy Goal     PT, PT/OT Ongoing, Progressing     Description: 1.  Pt will be able to tf SBA  2.  Pt will be able to perform bed mobility Talon  3.  Pt will be able to amb in halls with RW SBA  4.  Pt will be I HEP with caregiver                        Time Tracking:     PT Received On: 06/15/23  PT Start Time: 1130     PT Stop Time: 1145  PT Total Time (min): 15 min     Billable Minutes: Gt Tx 15min    Treatment Type: Treatment  PT/PTA: PT           06/15/2023

## 2023-06-15 NOTE — PROGRESS NOTES
Progress Note    Admit Date: 6/7/2023   LOS: 7 days     SUBJECTIVE:     Follow-up For:  high ca    Daughter  harvey present   He  is better today   More oriented  today         Scheduled Meds:   amLODIPine  10 mg Oral Daily    carvediloL  12.5 mg Oral BID    cefTRIAXone (ROCEPHIN) IVPB  1 g Intravenous Q24H    dexAMETHasone  40 mg Oral Daily    enoxparin  40 mg Subcutaneous Q24H (prophylaxis, 1700)    famotidine  20 mg Oral Daily    finasteride  5 mg Oral Daily    gabapentin  100 mg Oral TID    hydrALAZINE  50 mg Oral Q8H    levothyroxine  112 mcg Oral Before breakfast    pantoprazole  40 mg Intravenous Daily    potassium chloride in water  20 mEq Intravenous Once    potassium chloride  40 mEq Oral BID    senna-docusate 8.6-50 mg  1 tablet Oral BID    sodium chloride 0.9%  10 mL Intravenous Q6H    tamsulosin  1 capsule Oral Daily     Continuous Infusions:    PRN Meds:acetaminophen, albuterol, ALPRAZolam, cloNIDine, dextrose 10%, dextrose 10%, dextrose, dextrose, dextrose, dextrose, dextrose 50%, dextrose 50%, glucagon (human recombinant), glucagon (human recombinant), glucose, glucose, hydrALAZINE, insulin aspart U-100, insulin aspart U-100, ondansetron, sodium chloride 0.9%, Flushing PICC Protocol **AND** sodium chloride 0.9% **AND** sodium chloride 0.9%    Review of patient's allergies indicates:   Allergen Reactions    Iodine Anaphylaxis    Shellfish containing products     Amoxicillin-pot clavulanate Itching       Review of Systems  Review of Systems   Constitutional:  Positive for malaise/fatigue and weight loss. Negative for chills and fever.   HENT:  Negative for congestion, ear discharge, ear pain, hearing loss, nosebleeds, sinus pain, sore throat and tinnitus.    Eyes:  Positive for double vision. Negative for blurred vision.   Respiratory:  Positive for cough and shortness of breath. Negative for hemoptysis, sputum production, wheezing and stridor.    Cardiovascular:  Negative for chest pain,  palpitations, orthopnea and claudication.   Gastrointestinal:  Positive for heartburn. Negative for abdominal pain, blood in stool, constipation, diarrhea, nausea and vomiting.   Genitourinary:  Negative for dysuria and urgency.   Musculoskeletal:  Positive for back pain, myalgias and neck pain.   Skin:  Negative for itching and rash.   Neurological:  Negative for dizziness, tingling, tremors, sensory change, speech change, focal weakness, seizures, weakness and headaches.   Endo/Heme/Allergies:  Negative for polydipsia.   Psychiatric/Behavioral:  Negative for depression, substance abuse and suicidal ideas.  confused    OBJECTIVE:     Vital Signs (Most Recent)  Temp: 97.4 °F (36.3 °C) (06/14/23 2015)  Pulse: 89 (06/14/23 2015)  Resp: 20 (06/14/23 2015)  BP: (!) 196/81 (06/14/23 2015)  SpO2: 97 % (06/14/23 2015)    Vital Signs Range (Last 24H):  Temp:  [97.4 °F (36.3 °C)-98.7 °F (37.1 °C)]   Pulse:  [76-93]   Resp:  [20]   BP: (148-196)/(62-90)   SpO2:  [94 %-97 %]     I & O (Last 24H):  Intake/Output Summary (Last 24 hours) at 6/14/2023 2220  Last data filed at 6/14/2023 1910  Gross per 24 hour   Intake 1436.7 ml   Output 1175 ml   Net 261.7 ml       Physical Exam:  Physical Exam   Vitals:    06/14/23 2015   BP: (!) 196/81   Pulse: 89   Resp: 20   Temp: 97.4 °F (36.3 °C)       Physical Exam   Constitutional: drowsy aggitated  but did try to  get up to  urinate  no acute distress  HENT:   Head: Normocephalic and atraumatic.   Eyes: Conjunctivae normal and EOM are normal. Pupils are equal, round, and reactive to light.   Neck: Normal range of motion. Neck supple.   Cardiovascular: Normal rate, regular rhythm, normal heart sounds   Pulmonary/Chest: CTAB,  coarse  breath sounds Abdominal: Soft, nontender, bowel sounds normal  Neurological:tender  t  spine and ls pine    No  break down   on  skin on  buttocks but it  is red  Skin: warm, dry intact  Psychiatric: confused      Laboratory:  Recent Results (from the past 24  hour(s))   Comprehensive Metabolic Panel    Collection Time: 06/14/23  6:06 AM   Result Value Ref Range    Sodium Level 140 136 - 145 mmol/L    Potassium Level 3.5 3.5 - 5.1 mmol/L    Chloride 107 98 - 107 mmol/L    Carbon Dioxide 22 (L) 23 - 31 mmol/L    Glucose Level 100 82 - 115 mg/dL    Blood Urea Nitrogen 26.0 (H) 8.4 - 25.7 mg/dL    Creatinine 1.86 (H) 0.73 - 1.18 mg/dL    Calcium Level Total 9.0 8.8 - 10.0 mg/dL    Protein Total 10.7 (H) 5.8 - 7.6 gm/dL    Albumin Level 1.5 (L) 3.4 - 4.8 g/dL    Globulin 9.2 (H) 2.4 - 3.5 gm/dL    Albumin/Globulin Ratio 0.2 (L) 1.1 - 2.0 ratio    Bilirubin Total 0.4 <=1.5 mg/dL    Alkaline Phosphatase 70 40 - 150 unit/L    Alanine Aminotransferase 20 0 - 55 unit/L    Aspartate Aminotransferase 30 5 - 34 unit/L    eGFR 38 mls/min/1.73/m2   CBC with Differential    Collection Time: 06/14/23  6:06 AM   Result Value Ref Range    WBC 6.87 4.50 - 11.50 x10(3)/mcL    RBC 3.30 (L) 4.70 - 6.10 x10(6)/mcL    Hgb 10.5 (L) 14.0 - 18.0 g/dL    Hct 31.9 (L) 42.0 - 52.0 %    MCV 96.7 (H) 80.0 - 94.0 fL    MCH 31.8 (H) 27.0 - 31.0 pg    MCHC 32.9 (L) 33.0 - 36.0 g/dL    RDW 15.0 11.5 - 17.0 %    Platelet 131 130 - 400 x10(3)/mcL    MPV 9.2 7.4 - 10.4 fL    Neut % 51.1 %    Lymph % 34.2 %    Mono % 8.6 %    Eos % 5.1 %    Basophil % 0.4 %    Lymph # 2.35 0.6 - 4.6 x10(3)/mcL    Neut # 3.51 2.1 - 9.2 x10(3)/mcL    Mono # 0.59 0.1 - 1.3 x10(3)/mcL    Eos # 0.35 0 - 0.9 x10(3)/mcL    Baso # 0.03 <=0.2 x10(3)/mcL    IG# 0.04 0 - 0.04 x10(3)/mcL    IG% 0.6 %   POCT glucose    Collection Time: 06/14/23 10:42 AM   Result Value Ref Range    POCT Glucose 172 (H) 70 - 110 mg/dL   POCT glucose    Collection Time: 06/14/23  4:29 PM   Result Value Ref Range    POCT Glucose 131 (H) 70 - 110 mg/dL   POCT glucose    Collection Time: 06/14/23  8:47 PM   Result Value Ref Range    POCT Glucose 170 (H) 70 - 110 mg/dL        Diagnostic Results:  X-Ray Chest 1 View    Result Date: 6/8/2023  EXAMINATION: XR CHEST 1  VIEW CLINICAL HISTORY: , Weakness. COMPARISON: 0923 FINDINGS: An AP view or more reveals the heart to be normal in size.  The trachea is just right of midline.  Atherosclerosis is seen within the aorta.  There is patchy hazy opacity at the left lung base.  There are changes and curvature are noted to the thoracic spine.     1. Patchy infiltrate, atelectasis, and pleural reaction left lung base 2. Atherosclerosis 3. Thoracic spondylosis and scoliosis Electronically signed by: Boo Camacho Date:    06/08/2023 Time:    08:11    CT Head Without Contrast    Result Date: 6/7/2023  EXAMINATION: CT HEAD WITHOUT CONTRAST CLINICAL HISTORY: Mental status change, unknown cause; TECHNIQUE: Low dose axial CT images obtained throughout the head without intravenous contrast. Sagittal and coronal reconstructions were performed.  .  Automated exposure control used. COMPARISON: None. FINDINGS: Intracranial compartment: Ventricles and sulci are normal in size for age without evidence of hydrocephalus. No extra-axial blood or fluid collections. The brain parenchyma appears normal. No parenchymal mass, hemorrhage, edema or major vascular distribution infarct. Skull/extracranial contents (limited evaluation): No fracture. Mastoid air cells and paranasal sinuses are essentially clear.     No acute abnormality. Electronically signed by: Chrystal Wallace MD Date:    06/07/2023 Time:    17:58    CT Chest Without Contrast    Result Date: 6/8/2023  EXAMINATION: CT CHEST WITHOUT CONTRAST CLINICAL HISTORY: Hypercalcemia;, . TECHNIQUE: PATIENT RADIATION DOSE: DLP(mGycm) 215 As per PQRS measures, all CT scans at this facility used dose modulation, iterative reconstruction, and/or weight based dose adjustment when appropriate to reduce radiation dose to as low as reasonably achievable. COMPARISON: None available FINDINGS: Serial axial imaging of the chest without the administration of IV contrast.  Both soft tissue and pulmonary parenchymal  windows were obtained.  Additional sagittal and coronal reconstructions were performed. Artifact: Mild motion artifact is seen on some images. Contrast: Please note this is a non contrast CT scan of the chest which is non-diagnostic for pulmonary emboli. Soft Tissues: Unremarkable. Lines and Tubes: None. Axilla: A few prominent lymph nodes are seen in the right and left axilla. Neck: The visualized soft tissues of the neck appear unremarkable. Mediastinum: A few subcentimeter mediastinal lymph nodes are seen in the upper and lower paratracheal and subcarinal spaces . Heart: Moderate cardiomegaly is seen. Pronounced coronary artery calcification is seen. Aorta: Unremarkable appearing aorta. Pulmonary Arteries: Mildly enlarged main pulmonary arteries are identified. This could reflect an element of pulmonary arterial hypertension. Lungs: There is mild non specific dependent change at the lung bases. Mild streaky linear opacity is seen predominantly in the dependent portions at the lung bases with peripheral predominance consistent with subsegmental atelectasis. A small patchy area of ground glass density is seen in the lateral basal segment of the left lower lobe. This could reflect an acute focal infiltrate / pneumonitis. Pleura: There is a small left sided pleural effusion. There is mild pleural thickening along the right lower posterior pleural surface. Bony Structures: There is diffuse osteopenia along the visualised bony structures together with multiple, numerous, small round-ovoid lucent lesion of varying sizes involving the marrow. Spine: Moderate multilevel spondylolytic changes are seen in the thoracic spine. Abdomen: Bilateral renal cysts are indentified, which are large in size on the left side.     Impression: 1. There is a small left sided pleural effusion. 2. A small patchy area of ground glass density is seen in the lateral basal segment of the left lower lobe. This could reflect an acute focal  infiltrate / pneumonitis. Correlate clinically and with laboratory findings, as regards additional evaluation and follow-up. 3. There is diffuse osteopenia along the visualised bony structures together with multiple, numerous, small round-ovoid lucent lesion of varying sizes involving the marrow. These changes could reflect metabolic bone disease like hyperparathyroidism. Correlate clinically and with laboratory findings, as regards additional evaluation and follow-up. 1. Concur with preliminary report Electronically signed by: Boo Camacho Date:    06/08/2023 Time:    08:30    NM Bone Scan Whole Body    Result Date: 6/8/2023  EXAMINATION: NM BONE SCAN WHOLE BODY CLINICAL HISTORY: myeloma ??;, . TECHNIQUE: mCi of Tc-99m MDP was administered intravenously. After appropriate delay, whole body bone scan images were obtained. COMPARISON: None available FINDINGS: Activity is identified within the kidneys bilaterally and within the bladder. Focal increased activity is evident at the anterior right 4th 5th and 6th ribs and anterior left 1st, 4th, and 7th ribs.  There is mild central activity at the manubrium.  There is mild increased activity at the sternoclavicular joints as well as the shoulders bilaterally.  Mild increased activity evident thyroid bed.     1. Scattered activity at the anterior left right rib margins as described.  A CT exam on the previous day reveals no definite fracture.  There is mild heterogeneous and bony loss at the anterior left and right ribs.  This is not have the typical pattern of a metastases. 2. Focal increased activity at the anterior manubrium which again on the CT exam demonstrates osteopenia and heterogeneous bony loss as well as scattered subcentimeter small lytic defects. 3. Suspect mild arthritic changes at the shoulders sternoclavicular joints Electronically signed by: Boo Camacho Date:    06/08/2023 Time:    14:28    US Retroperitoneal Complete    Result Date:  6/8/2023  EXAMINATION: RENAL/RETROPERITONEAL SONOGRAM: CLINICAL HISTORY: Renal insufficiency;, . COMPARISON: None available FINDINGS: Real-time imaging was performed through the retroperitoneum evaluating the kidneys. Arterial and venous flow are identified within the kidneys. No hydronephrosis is seen.  The bladder is incompletely distended.  Two round hypoechoic foci are noted to the mid and lower right kidney measuring 3.3 cm and 1.5 cm respectively compatible with simple cysts.  2 round hypoechoic foci are noted to the upper and mid left kidney measuring 5.4 and 6.5 cm respectively compatible with cysts.  No free fluid collect identified. MEASUREMENTS: Right Kidney: 12.9 cm RI: 0.79 Left Kidney: 14.7 cm RI: 0.73     1. Findings compatible simple cysts to the kidneys bilaterally as described 2. Elevated renal resistive indices bilaterally 3. Incompletely distended bladder Electronically signed by: Boo Camacho Date:    06/08/2023 Time:    12:05    XR Metastatic Survey    Result Date: 6/8/2023  EXAMINATION: SKELETAL SURVEY: CLINICAL HISTORY: , possible myeloma; TECHNIQUE: AP and lateral views were obtained of the axial skeleton as well as limited views of the lower and upper upper extremity. COMPARISON: None available FINDINGS: There is absence of dentition.  Mastoid air cells are aerated bilaterally.  Maxillary and frontal sinuses are relatively clear.  There is sinusoidal deviation of the nasal septum.  Bony structures are osteopenic.  Degenerative changes are noted to the spine.  No fracture or subluxation is seen.  No aggressive osteolytic or osteoblastic lesion is seen.  A small oval sclerotic focus is noted to the proximal left humeral shaft measuring 10 mm in greatest diameter.  Arthritic changes are evident at the shoulders, hips, wrists, and elbows bilaterally.  Atherosclerosis is seen.  Enthesophyte formation is evident at the tibial tubercle.  A pinpoint 2-3 mm metallic density noted to the soft  tissues of the right axilla suggesting a foreign body and another at the soft tissues of the medial right thigh     1. No acute osseous defect identified 2. Osteopenia 3. Osteoarthritis 4. Pinpoint metallic density/suspect foreign body right axilla and medial right thigh soft tissues 5. Small 1 cm oval shaped sclerotic focus proximal left humeral shaft which has a nonaggressive appearance. Electronically signed by: Boo Camacho Date:    06/08/2023 Time:    13:55       ASSESSMENT/PLAN:   72  year old  w  hypercalcemia   Needs workup   His ct  had finding in  c spine   Ordered  skeletal survey  area on humerus only   On nuc  bone scan   ribs munubrium  w  lesions and thyroid bed w  uptake   Will order thyroid  us. - neg   Us  of renal  is  normal   except for  elevated resistive indices  Spep + M spike in   IgA trini   One spray of  calcitonin  6/8  Pamidronate 60  x 1   6/9  T spine   mri no contrast  showing  areas of  possible metastasis or bone marrow involvement in  t spine  Confusion is slightly  b grace   Wanted to rule out  any   other  causes  other than high ca  Abg did not   show  co2   narcosis   CT  head  no acute  bleed or  changes  Blood cx  x  2   neg   Lactic  acid  0.8  Ua   to  collect  and  cx  does have a  hx of  prostate  issues  Cxray   developing  pneumonia    and  effusion   lasix  today     Rocephin s tarted   Still w  good  urine  output     His  calcium is  much better  He  is much more lucid today   Bone marrow bx results  pending   Will finish  24 hour   urine   And will start  dex   40  po x   4  days  Protonix  40  bid  iv   Sliding  scale    Xanax as  needed   Prn bp  meds are   available         Hem onc saw  him yesterday    Plan:   Patient Active Problem List    Diagnosis Date Noted    Abnormal radionuclide bone scan 06/08/2023    Hypercalcemia 06/07/2023    Confusion 06/07/2023    Hypoalbuminemia 06/07/2023    Weakness 06/07/2023    COLTON (acute kidney injury) 06/07/2023    `

## 2023-06-16 LAB
ALBUMIN SERPL-MCNC: 1.5 G/DL (ref 3.4–4.8)
ALBUMIN/GLOB SERPL: 0.2 RATIO (ref 1.1–2)
ALP SERPL-CCNC: 66 UNIT/L (ref 40–150)
ALT SERPL-CCNC: 20 UNIT/L (ref 0–55)
AST SERPL-CCNC: 31 UNIT/L (ref 5–34)
BACTERIA BLD CULT: NORMAL
BACTERIA BLD CULT: NORMAL
BASOPHILS # BLD AUTO: 0 X10(3)/MCL
BASOPHILS NFR BLD AUTO: 0 %
BILIRUBIN DIRECT+TOT PNL SERPL-MCNC: 0.3 MG/DL
BUN SERPL-MCNC: 41 MG/DL (ref 8.4–25.7)
CALCIUM SERPL-MCNC: 8.2 MG/DL (ref 8.8–10)
CHLORIDE SERPL-SCNC: 103 MMOL/L (ref 98–107)
CO2 SERPL-SCNC: 22 MMOL/L (ref 23–31)
CREAT SERPL-MCNC: 1.59 MG/DL (ref 0.73–1.18)
EOSINOPHIL # BLD AUTO: 0 X10(3)/MCL (ref 0–0.9)
EOSINOPHIL NFR BLD AUTO: 0 %
ERYTHROCYTE [DISTWIDTH] IN BLOOD BY AUTOMATED COUNT: 14.6 % (ref 11.5–17)
GFR SERPLBLD CREATININE-BSD FMLA CKD-EPI: 46 MLS/MIN/1.73/M2
GLOBULIN SER-MCNC: 8.8 GM/DL (ref 2.4–3.5)
GLUCOSE SERPL-MCNC: 191 MG/DL (ref 82–115)
HCT VFR BLD AUTO: 28.8 % (ref 42–52)
HGB BLD-MCNC: 9.6 G/DL (ref 14–18)
IMM GRANULOCYTES # BLD AUTO: 0.61 X10(3)/MCL (ref 0–0.04)
IMM GRANULOCYTES NFR BLD AUTO: 6.6 %
LYMPHOCYTES # BLD AUTO: 1.3 X10(3)/MCL (ref 0.6–4.6)
LYMPHOCYTES NFR BLD AUTO: 14.1 %
MCH RBC QN AUTO: 31.6 PG (ref 27–31)
MCHC RBC AUTO-ENTMCNC: 33.3 G/DL (ref 33–36)
MCV RBC AUTO: 94.7 FL (ref 80–94)
MONOCYTES # BLD AUTO: 0.65 X10(3)/MCL (ref 0.1–1.3)
MONOCYTES NFR BLD AUTO: 7.1 %
NEUTROPHILS # BLD AUTO: 6.65 X10(3)/MCL (ref 2.1–9.2)
NEUTROPHILS NFR BLD AUTO: 72.2 %
PLATELET # BLD AUTO: 123 X10(3)/MCL (ref 130–400)
PMV BLD AUTO: 9.2 FL (ref 7.4–10.4)
POCT GLUCOSE: 183 MG/DL (ref 70–110)
POCT GLUCOSE: 202 MG/DL (ref 70–110)
POCT GLUCOSE: 249 MG/DL (ref 70–110)
POTASSIUM SERPL-SCNC: 3.8 MMOL/L (ref 3.5–5.1)
PROT SERPL-MCNC: 10.3 GM/DL (ref 5.8–7.6)
RBC # BLD AUTO: 3.04 X10(6)/MCL (ref 4.7–6.1)
SODIUM SERPL-SCNC: 136 MMOL/L (ref 136–145)
WBC # SPEC AUTO: 9.21 X10(3)/MCL (ref 4.5–11.5)

## 2023-06-16 PROCEDURE — 25000003 PHARM REV CODE 250: Performed by: FAMILY MEDICINE

## 2023-06-16 PROCEDURE — 27000173 HC ACAPELLA DEVICE DH OR DM

## 2023-06-16 PROCEDURE — 97116 GAIT TRAINING THERAPY: CPT | Mod: CQ

## 2023-06-16 PROCEDURE — 63600175 PHARM REV CODE 636 W HCPCS: Performed by: FAMILY MEDICINE

## 2023-06-16 PROCEDURE — 94664 DEMO&/EVAL PT USE INHALER: CPT

## 2023-06-16 PROCEDURE — 27000221 HC OXYGEN, UP TO 24 HOURS

## 2023-06-16 PROCEDURE — 25000003 PHARM REV CODE 250: Performed by: INTERNAL MEDICINE

## 2023-06-16 PROCEDURE — 94761 N-INVAS EAR/PLS OXIMETRY MLT: CPT

## 2023-06-16 PROCEDURE — 21400001 HC TELEMETRY ROOM

## 2023-06-16 PROCEDURE — A4216 STERILE WATER/SALINE, 10 ML: HCPCS | Performed by: FAMILY MEDICINE

## 2023-06-16 PROCEDURE — C9113 INJ PANTOPRAZOLE SODIUM, VIA: HCPCS | Performed by: FAMILY MEDICINE

## 2023-06-16 PROCEDURE — 99900035 HC TECH TIME PER 15 MIN (STAT)

## 2023-06-16 PROCEDURE — 80053 COMPREHEN METABOLIC PANEL: CPT | Performed by: FAMILY MEDICINE

## 2023-06-16 PROCEDURE — 97530 THERAPEUTIC ACTIVITIES: CPT | Mod: CQ

## 2023-06-16 PROCEDURE — 85025 COMPLETE CBC W/AUTO DIFF WBC: CPT | Performed by: FAMILY MEDICINE

## 2023-06-16 RX ORDER — FUROSEMIDE 10 MG/ML
20 INJECTION INTRAMUSCULAR; INTRAVENOUS ONCE
Status: COMPLETED | OUTPATIENT
Start: 2023-06-16 | End: 2023-06-16

## 2023-06-16 RX ORDER — CLONAZEPAM 0.5 MG/1
0.5 TABLET ORAL NIGHTLY PRN
Status: DISCONTINUED | OUTPATIENT
Start: 2023-06-17 | End: 2023-06-19 | Stop reason: HOSPADM

## 2023-06-16 RX ADMIN — INSULIN ASPART 2 UNITS: 100 INJECTION, SOLUTION INTRAVENOUS; SUBCUTANEOUS at 04:06

## 2023-06-16 RX ADMIN — CLONIDINE HYDROCHLORIDE 0.1 MG: 0.1 TABLET ORAL at 09:06

## 2023-06-16 RX ADMIN — FAMOTIDINE 20 MG: 20 TABLET, FILM COATED ORAL at 09:06

## 2023-06-16 RX ADMIN — AMLODIPINE BESYLATE 10 MG: 10 TABLET ORAL at 09:06

## 2023-06-16 RX ADMIN — FUROSEMIDE 20 MG: 10 INJECTION, SOLUTION INTRAVENOUS at 08:06

## 2023-06-16 RX ADMIN — CEFTRIAXONE SODIUM 1 G: 1 INJECTION, POWDER, FOR SOLUTION INTRAMUSCULAR; INTRAVENOUS at 04:06

## 2023-06-16 RX ADMIN — CARVEDILOL 12.5 MG: 12.5 TABLET, FILM COATED ORAL at 09:06

## 2023-06-16 RX ADMIN — HYDRALAZINE HYDROCHLORIDE 50 MG: 50 TABLET, FILM COATED ORAL at 02:06

## 2023-06-16 RX ADMIN — ENOXAPARIN SODIUM 40 MG: 40 INJECTION SUBCUTANEOUS at 04:06

## 2023-06-16 RX ADMIN — PANTOPRAZOLE SODIUM 40 MG: 40 INJECTION, POWDER, FOR SOLUTION INTRAVENOUS at 08:06

## 2023-06-16 RX ADMIN — GABAPENTIN 100 MG: 100 CAPSULE ORAL at 02:06

## 2023-06-16 RX ADMIN — LEVOTHYROXINE SODIUM 112 MCG: 112 TABLET ORAL at 06:06

## 2023-06-16 RX ADMIN — SODIUM CHLORIDE, PRESERVATIVE FREE 10 ML: 5 INJECTION INTRAVENOUS at 06:06

## 2023-06-16 RX ADMIN — INSULIN ASPART 2 UNITS: 100 INJECTION, SOLUTION INTRAVENOUS; SUBCUTANEOUS at 11:06

## 2023-06-16 RX ADMIN — SODIUM CHLORIDE, PRESERVATIVE FREE 10 ML: 5 INJECTION INTRAVENOUS at 11:06

## 2023-06-16 RX ADMIN — SENNOSIDES AND DOCUSATE SODIUM 1 TABLET: 50; 8.6 TABLET ORAL at 09:06

## 2023-06-16 RX ADMIN — TAMSULOSIN HYDROCHLORIDE 0.4 MG: 0.4 CAPSULE ORAL at 09:06

## 2023-06-16 RX ADMIN — PANTOPRAZOLE SODIUM 40 MG: 40 INJECTION, POWDER, FOR SOLUTION INTRAVENOUS at 09:06

## 2023-06-16 RX ADMIN — GABAPENTIN 100 MG: 100 CAPSULE ORAL at 09:06

## 2023-06-16 RX ADMIN — FINASTERIDE 5 MG: 5 TABLET, FILM COATED ORAL at 09:06

## 2023-06-16 RX ADMIN — SODIUM CHLORIDE, PRESERVATIVE FREE 10 ML: 5 INJECTION INTRAVENOUS at 05:06

## 2023-06-16 RX ADMIN — INSULIN ASPART 1 UNITS: 100 INJECTION, SOLUTION INTRAVENOUS; SUBCUTANEOUS at 09:06

## 2023-06-16 RX ADMIN — HYDRALAZINE HYDROCHLORIDE 50 MG: 50 TABLET, FILM COATED ORAL at 06:06

## 2023-06-16 RX ADMIN — DEXAMETHASONE 40 MG: 4 TABLET ORAL at 09:06

## 2023-06-16 RX ADMIN — CLONAZEPAM 0.5 MG: 0.5 TABLET ORAL at 11:06

## 2023-06-16 RX ADMIN — HYDRALAZINE HYDROCHLORIDE 50 MG: 50 TABLET, FILM COATED ORAL at 09:06

## 2023-06-16 NOTE — PT/OT/SLP PROGRESS
Physical Therapy Treatment    Patient Name:  Chip Goodson   MRN:  [id number]    Recommendations:     Discharge Recommendations: home  Discharge Equipment Recommendations: none  Barriers to discharge: Decreased caregiver support    Assessment:     Chip Goodson is a 72 y.o. male admitted with a medical diagnosis of Confusion.  He presents with the following impairments/functional limitations:   difficulty walking.    Rehab Prognosis: Good; patient would benefit from acute skilled PT services to address these deficits and reach maximum level of function.    Recent Surgery: Procedure(s) (LRB):  BIOPSY, BONE MARROW (Right) 4 Days Post-Op    Plan:     During this hospitalization, patient to be seen 5 x/week to address the identified rehab impairments via gait training, therapeutic activities, therapeutic exercises and progress toward the following goals:    Plan of Care Expires:       Subjective     Chief Complaint: no complaints today  Patient/Family Comments/goals: wanted to walk for longer today and stay out of room  Pain/Comfort:  Pain Rating 1: 0/10      Objective:     Communicated with RN prior to session.  Patient found HOB elevated with bed alarm upon PT entry to room.     General Precautions: Standard, fall  Orthopedic Precautions:    Braces:    Respiratory Status: Room air     Functional Mobility:  Bed Mobility:     Supine to Sit: supervision  Transfers:     Sit to Stand:  stand by assistance with no AD  Gait: Pt amb 150ft x 3 SPV; min LOB during turning however pt able to self correct   Balance: Pt ashley/doff shorts while standing with no AD, CGA      AM-PAC 6 CLICK MOBILITY          Treatment & Education:  Educated pt on using call bell when needing to get up for safety    Patient left up in chair with all lines intact, call button in reach, chair alarm on, and  in room ..    GOALS:   Multidisciplinary Problems       Physical Therapy Goals          Problem: Physical Therapy    Goal Priority Disciplines  Outcome Goal Variances Interventions   Physical Therapy Goal     PT, PT/OT Ongoing, Progressing     Description: 1.  Pt will be able to tf SBA  2.  Pt will be able to perform bed mobility Talon  3.  Pt will be able to amb in halls with RW SBA  4.  Pt will be I HEP with caregiver                       Time Tracking:     PT Received On:    PT Start Time: 1051     PT Stop Time: 1117  PT Total Time (min): 26 min     Billable Minutes: Gait Training 15 and Therapeutic Activity 11    Treatment Type: Treatment  PT/PTA: PTA           06/16/2023

## 2023-06-16 NOTE — PROGRESS NOTES
Progress Note    Admit Date: 6/7/2023   LOS: 9 days     SUBJECTIVE:     Follow-up For:  high ca    Daughter  harvey present   He  is better today   More oriented  today         Scheduled Meds:   amLODIPine  10 mg Oral Daily    carvediloL  12.5 mg Oral BID    cefTRIAXone (ROCEPHIN) IVPB  1 g Intravenous Q24H    dexAMETHasone  40 mg Oral Daily    enoxparin  40 mg Subcutaneous Q24H (prophylaxis, 1700)    famotidine  20 mg Oral Daily    finasteride  5 mg Oral Daily    gabapentin  100 mg Oral TID    hydrALAZINE  50 mg Oral Q8H    levothyroxine  112 mcg Oral Before breakfast    pantoprazole  40 mg Intravenous BID    senna-docusate 8.6-50 mg  1 tablet Oral BID    sodium chloride 0.9%  10 mL Intravenous Q6H    tamsulosin  1 capsule Oral Daily     Continuous Infusions:    PRN Meds:acetaminophen, albuterol, ALPRAZolam, cloNIDine, dextrose 10%, dextrose 10%, dextrose, dextrose, glucagon (human recombinant), hydrALAZINE, insulin aspart U-100, ondansetron, sodium chloride 0.9%, Flushing PICC Protocol **AND** sodium chloride 0.9% **AND** sodium chloride 0.9%    Review of patient's allergies indicates:   Allergen Reactions    Iodine Anaphylaxis    Shellfish containing products     Amoxicillin-pot clavulanate Itching       Review of Systems  Review of Systems   Constitutional:  Positive for malaise/fatigue and weight loss. Negative for chills and fever.   HENT:  Negative for congestion, ear discharge, ear pain, hearing loss, nosebleeds, sinus pain, sore throat and tinnitus.    Eyes:  Positive for double vision. Negative for blurred vision.   Respiratory:  Positive for cough and shortness of breath. Negative for hemoptysis, sputum production, wheezing and stridor.    Cardiovascular:  Negative for chest pain, palpitations, orthopnea and claudication.   Gastrointestinal:  Positive for heartburn. Negative for abdominal pain, blood in stool, constipation, diarrhea, nausea and vomiting.   Genitourinary:  Negative for dysuria and  urgency.   Musculoskeletal:  Positive for back pain, myalgias and neck pain.   Skin:  Negative for itching and rash.   Neurological:  Negative for dizziness, tingling, tremors, sensory change, speech change, focal weakness, seizures, weakness and headaches.   Endo/Heme/Allergies:  Negative for polydipsia.   Psychiatric/Behavioral:  Negative for depression, substance abuse and suicidal ideas.  confused    OBJECTIVE:     Vital Signs (Most Recent)  Temp: 98.4 °F (36.9 °C) (06/16/23 0319)  Pulse: 80 (06/16/23 0319)  Resp: 18 (06/15/23 2015)  BP: (!) 159/85 (06/16/23 0319)  SpO2: 96 % (06/16/23 0319)    Vital Signs Range (Last 24H):  Temp:  [97.4 °F (36.3 °C)-98.4 °F (36.9 °C)]   Pulse:  [64-91]   Resp:  [18]   BP: (143-170)/(65-92)   SpO2:  [92 %-97 %]     I & O (Last 24H):  Intake/Output Summary (Last 24 hours) at 6/16/2023 0659  Last data filed at 6/16/2023 0445  Gross per 24 hour   Intake 1148.02 ml   Output 2575 ml   Net -1426.98 ml       Physical Exam:  Physical Exam   Vitals:    06/16/23 0319   BP: (!) 159/85   Pulse: 80   Resp:    Temp: 98.4 °F (36.9 °C)       Physical Exam   Constitutional: drowsy aggitated  but did try to  get up to  urinate  no acute distress  HENT:   Head: Normocephalic and atraumatic.   Eyes: Conjunctivae normal and EOM are normal. Pupils are equal, round, and reactive to light.   Neck: Normal range of motion. Neck supple.   Cardiovascular: Normal rate, regular rhythm, normal heart sounds   Pulmonary/Chest: CTAB,  coarse  breath sounds Abdominal: Soft, nontender, bowel sounds normal  Neurological:tender  t  spine and ls pine    No  break down   on  skin on  buttocks but it  is red  Skin: warm, dry intact  Psychiatric: confused      Laboratory:  Recent Results (from the past 24 hour(s))   POCT glucose    Collection Time: 06/15/23 10:58 AM   Result Value Ref Range    POCT Glucose 241 (H) 70 - 110 mg/dL   POCT glucose    Collection Time: 06/15/23  3:58 PM   Result Value Ref Range    POCT  Glucose 251 (H) 70 - 110 mg/dL   POCT glucose    Collection Time: 06/15/23  8:18 PM   Result Value Ref Range    POCT Glucose 344 (H) 70 - 110 mg/dL   POCT glucose    Collection Time: 06/16/23  6:14 AM   Result Value Ref Range    POCT Glucose 183 (H) 70 - 110 mg/dL        Diagnostic Results:  X-Ray Chest 1 View    Result Date: 6/8/2023  EXAMINATION: XR CHEST 1 VIEW CLINICAL HISTORY: , Weakness. COMPARISON: 0923 FINDINGS: An AP view or more reveals the heart to be normal in size.  The trachea is just right of midline.  Atherosclerosis is seen within the aorta.  There is patchy hazy opacity at the left lung base.  There are changes and curvature are noted to the thoracic spine.     1. Patchy infiltrate, atelectasis, and pleural reaction left lung base 2. Atherosclerosis 3. Thoracic spondylosis and scoliosis Electronically signed by: Boo Camacho Date:    06/08/2023 Time:    08:11    CT Head Without Contrast    Result Date: 6/7/2023  EXAMINATION: CT HEAD WITHOUT CONTRAST CLINICAL HISTORY: Mental status change, unknown cause; TECHNIQUE: Low dose axial CT images obtained throughout the head without intravenous contrast. Sagittal and coronal reconstructions were performed.  .  Automated exposure control used. COMPARISON: None. FINDINGS: Intracranial compartment: Ventricles and sulci are normal in size for age without evidence of hydrocephalus. No extra-axial blood or fluid collections. The brain parenchyma appears normal. No parenchymal mass, hemorrhage, edema or major vascular distribution infarct. Skull/extracranial contents (limited evaluation): No fracture. Mastoid air cells and paranasal sinuses are essentially clear.     No acute abnormality. Electronically signed by: Chrystal Wallace MD Date:    06/07/2023 Time:    17:58    CT Chest Without Contrast    Result Date: 6/8/2023  EXAMINATION: CT CHEST WITHOUT CONTRAST CLINICAL HISTORY: Hypercalcemia;, . TECHNIQUE: PATIENT RADIATION DOSE: DLP(mGycm) 215 As per  PQRS measures, all CT scans at this facility used dose modulation, iterative reconstruction, and/or weight based dose adjustment when appropriate to reduce radiation dose to as low as reasonably achievable. COMPARISON: None available FINDINGS: Serial axial imaging of the chest without the administration of IV contrast.  Both soft tissue and pulmonary parenchymal windows were obtained.  Additional sagittal and coronal reconstructions were performed. Artifact: Mild motion artifact is seen on some images. Contrast: Please note this is a non contrast CT scan of the chest which is non-diagnostic for pulmonary emboli. Soft Tissues: Unremarkable. Lines and Tubes: None. Axilla: A few prominent lymph nodes are seen in the right and left axilla. Neck: The visualized soft tissues of the neck appear unremarkable. Mediastinum: A few subcentimeter mediastinal lymph nodes are seen in the upper and lower paratracheal and subcarinal spaces . Heart: Moderate cardiomegaly is seen. Pronounced coronary artery calcification is seen. Aorta: Unremarkable appearing aorta. Pulmonary Arteries: Mildly enlarged main pulmonary arteries are identified. This could reflect an element of pulmonary arterial hypertension. Lungs: There is mild non specific dependent change at the lung bases. Mild streaky linear opacity is seen predominantly in the dependent portions at the lung bases with peripheral predominance consistent with subsegmental atelectasis. A small patchy area of ground glass density is seen in the lateral basal segment of the left lower lobe. This could reflect an acute focal infiltrate / pneumonitis. Pleura: There is a small left sided pleural effusion. There is mild pleural thickening along the right lower posterior pleural surface. Bony Structures: There is diffuse osteopenia along the visualised bony structures together with multiple, numerous, small round-ovoid lucent lesion of varying sizes involving the marrow. Spine: Moderate  multilevel spondylolytic changes are seen in the thoracic spine. Abdomen: Bilateral renal cysts are indentified, which are large in size on the left side.     Impression: 1. There is a small left sided pleural effusion. 2. A small patchy area of ground glass density is seen in the lateral basal segment of the left lower lobe. This could reflect an acute focal infiltrate / pneumonitis. Correlate clinically and with laboratory findings, as regards additional evaluation and follow-up. 3. There is diffuse osteopenia along the visualised bony structures together with multiple, numerous, small round-ovoid lucent lesion of varying sizes involving the marrow. These changes could reflect metabolic bone disease like hyperparathyroidism. Correlate clinically and with laboratory findings, as regards additional evaluation and follow-up. 1. Concur with preliminary report Electronically signed by: Boo Camacho Date:    06/08/2023 Time:    08:30    NM Bone Scan Whole Body    Result Date: 6/8/2023  EXAMINATION: NM BONE SCAN WHOLE BODY CLINICAL HISTORY: myeloma ??;, . TECHNIQUE: mCi of Tc-99m MDP was administered intravenously. After appropriate delay, whole body bone scan images were obtained. COMPARISON: None available FINDINGS: Activity is identified within the kidneys bilaterally and within the bladder. Focal increased activity is evident at the anterior right 4th 5th and 6th ribs and anterior left 1st, 4th, and 7th ribs.  There is mild central activity at the manubrium.  There is mild increased activity at the sternoclavicular joints as well as the shoulders bilaterally.  Mild increased activity evident thyroid bed.     1. Scattered activity at the anterior left right rib margins as described.  A CT exam on the previous day reveals no definite fracture.  There is mild heterogeneous and bony loss at the anterior left and right ribs.  This is not have the typical pattern of a metastases. 2. Focal increased activity at the  anterior manubrium which again on the CT exam demonstrates osteopenia and heterogeneous bony loss as well as scattered subcentimeter small lytic defects. 3. Suspect mild arthritic changes at the shoulders sternoclavicular joints Electronically signed by: Boo Camacho Date:    06/08/2023 Time:    14:28    US Retroperitoneal Complete    Result Date: 6/8/2023  EXAMINATION: RENAL/RETROPERITONEAL SONOGRAM: CLINICAL HISTORY: Renal insufficiency;, . COMPARISON: None available FINDINGS: Real-time imaging was performed through the retroperitoneum evaluating the kidneys. Arterial and venous flow are identified within the kidneys. No hydronephrosis is seen.  The bladder is incompletely distended.  Two round hypoechoic foci are noted to the mid and lower right kidney measuring 3.3 cm and 1.5 cm respectively compatible with simple cysts.  2 round hypoechoic foci are noted to the upper and mid left kidney measuring 5.4 and 6.5 cm respectively compatible with cysts.  No free fluid collect identified. MEASUREMENTS: Right Kidney: 12.9 cm RI: 0.79 Left Kidney: 14.7 cm RI: 0.73     1. Findings compatible simple cysts to the kidneys bilaterally as described 2. Elevated renal resistive indices bilaterally 3. Incompletely distended bladder Electronically signed by: Boo Camacho Date:    06/08/2023 Time:    12:05    XR Metastatic Survey    Result Date: 6/8/2023  EXAMINATION: SKELETAL SURVEY: CLINICAL HISTORY: , possible myeloma; TECHNIQUE: AP and lateral views were obtained of the axial skeleton as well as limited views of the lower and upper upper extremity. COMPARISON: None available FINDINGS: There is absence of dentition.  Mastoid air cells are aerated bilaterally.  Maxillary and frontal sinuses are relatively clear.  There is sinusoidal deviation of the nasal septum.  Bony structures are osteopenic.  Degenerative changes are noted to the spine.  No fracture or subluxation is seen.  No aggressive osteolytic or osteoblastic lesion is  seen.  A small oval sclerotic focus is noted to the proximal left humeral shaft measuring 10 mm in greatest diameter.  Arthritic changes are evident at the shoulders, hips, wrists, and elbows bilaterally.  Atherosclerosis is seen.  Enthesophyte formation is evident at the tibial tubercle.  A pinpoint 2-3 mm metallic density noted to the soft tissues of the right axilla suggesting a foreign body and another at the soft tissues of the medial right thigh     1. No acute osseous defect identified 2. Osteopenia 3. Osteoarthritis 4. Pinpoint metallic density/suspect foreign body right axilla and medial right thigh soft tissues 5. Small 1 cm oval shaped sclerotic focus proximal left humeral shaft which has a nonaggressive appearance. Electronically signed by: Boo Camacho Date:    06/08/2023 Time:    13:55       ASSESSMENT/PLAN:   72  year old  w  hypercalcemia   Needs workup   His ct  had finding in  c spine   Ordered  skeletal survey  area on humerus only   On nuc  bone scan   ribs munubrium  w  lesions and thyroid bed w  uptake   Will order thyroid  us. - neg   Us  of renal  is  normal   except for  elevated resistive indices  Spep + M spike in   IgA trini   One spray of  calcitonin  6/8  Pamidronate 60  x 1   6/9  T spine   mri no contrast  showing  areas of  possible metastasis or bone marrow involvement in  t spine  Confusion is slightly  b grace   Wanted to rule out  any   other  causes  other than high ca  Abg did not   show  co2   narcosis   CT  head  no acute  bleed or  changes  Blood cx  x  2   neg   Lactic  acid  0.8  Ua   to  collect  and  cx  does have a  hx of  prostate  issues  Cxray   developing  pneumonia    and  effusion   lasix  today     Rocephin s tarted   Still w  good  urine  output     His  calcium is  much better  He  is much more lucid today   Bone marrow bx results  pending   Will finish  24 hour   urine   And will start  dex   40  po x   4  days (dose  2  today   Protonix  40  bid  iv   Sliding   scale    Xanax as  needed   Prn bp  meds are   available   Using  sliding scale  his  gluc  was  340        Hem onc saw  him yesterday    Plan:   Patient Active Problem List    Diagnosis Date Noted    Abnormal radionuclide bone scan 06/08/2023    Hypercalcemia 06/07/2023    Confusion 06/07/2023    Hypoalbuminemia 06/07/2023    Weakness 06/07/2023    COLTON (acute kidney injury) 06/07/2023    `

## 2023-06-16 NOTE — PROGRESS NOTES
"Inpatient Nutrition Evaluation    Admit Date: 6/7/2023   Total duration of encounter: 9 days    Nutrition Recommendation/Prescription     Rec'd continue Diabetic Diet as tolerated.   Continue Boost Glucose Control, BID. Provides 250 kcal, 14 g protein per serving.  Monitor intake, weight, and labs.     RD following and available as needed. Thank you.     Nutrition Assessment     Chart Review    Reason Seen: continuous nutrition monitoring and follow-up    Malnutrition Screening Tool Results                Diagnosis:   *Confusion    Abnormal radionuclide bone scan    Hypercalcemia    Hypoalbuminemia    Weakness    COLTON (acute kidney injury)       Relevant Medical History:   None at this time.     Nutrition-Related Medications:   Lovenox; Insulin; Levothyroxine; Senna-Docusate; Dexamethasone    Nutrition-Related Labs:  6/16: BUN/Crea 41/1.59(H); GFR 46(L) - Improving. (H); Alb 1.5(L); Ca+ 8.2(L); H/H 9.6/28.8(L).  6/9: H/H 10.7/32.1(L); K+ 3.3(L); Crea 2.32(H); GFR 29(L); (H); Ca+ 12.9(H); Alb 1.7(L).  6/8: HgbA1C 6.4.    Diet Order: Diet diabetic  Oral Supplement Order: none  Appetite/Oral Intake: fair/50-75% of meals  Factors Affecting Nutritional Intake: none identified  Food/[facility]/Cultural Preferences: none reported  Food Allergies: shellfish and Iodine.        Wound(s):       Comments    6/16: Appetite improving. Last recorded intake 75%. Labs and meds reviewed. Weight stable. Will continue to monitor during stay.     6/9: Pt with fair intake. Consuming 50% of meals per recorded intake. No recent weight loss noted/reported. Labs and meds reviewed. Nsg reports confusion better today. Will add Boost Glucose Control and continue to monitor during stay.       Anthropometrics    Height: 5' 11" (180.3 cm)    Last Weight: 113.4 kg (250 lb) (06/07/23 1707)    BMI (Calculated): 34.9  BMI Classification: obese grade I (BMI 30-34.9)        Ideal Body Weight (IBW), Male: 172 lb     % Ideal Body Weight, Male " (lb): 145.35 %                          Usual Weight Provided By: EMR weight history    Wt Readings from Last 5 Encounters:   06/07/23 113.4 kg (250 lb)   02/07/18 111.2 kg (245 lb 2.4 oz)     Weight Change(s) Since Admission:  Admit Weight: 113.4 kg (250 lb) (06/07/23 1707)      Patient Education    Not applicable.    Monitoring & Evaluation     Dietitian will monitor food and beverage intake, energy intake, weight, weight change, electrolyte/renal panel, glucose/endocrine profile, and gastrointestinal profile.  Nutrition Risk/Follow-Up: low (follow-up in 5-7 days)  Patients assigned 'low nutrition risk' status do not qualify for a full nutritional assessment but will be monitored and re-evaluated in a 5-7 day time period. Please consult if re-evaluation needed sooner.

## 2023-06-16 NOTE — PROGRESS NOTES
Progress Note    Admit Date: 6/7/2023   LOS: 9 days     SUBJECTIVE:     Follow-up For:  high ca    Daughter  harvey present   He  is better today   More oriented  today       Day 3 of 4 of dexamethasone 40 p.o.      Scheduled Meds:   amLODIPine  10 mg Oral Daily    carvediloL  12.5 mg Oral BID    cefTRIAXone (ROCEPHIN) IVPB  1 g Intravenous Q24H    dexAMETHasone  40 mg Oral Daily    enoxparin  40 mg Subcutaneous Q24H (prophylaxis, 1700)    famotidine  20 mg Oral Daily    finasteride  5 mg Oral Daily    gabapentin  100 mg Oral TID    hydrALAZINE  50 mg Oral Q8H    levothyroxine  112 mcg Oral Before breakfast    pantoprazole  40 mg Intravenous BID    senna-docusate 8.6-50 mg  1 tablet Oral BID    sodium chloride 0.9%  10 mL Intravenous Q6H    tamsulosin  1 capsule Oral Daily     Continuous Infusions:    PRN Meds:acetaminophen, albuterol, ALPRAZolam, cloNIDine, dextrose 10%, dextrose 10%, dextrose, dextrose, glucagon (human recombinant), hydrALAZINE, insulin aspart U-100, ondansetron, sodium chloride 0.9%, Flushing PICC Protocol **AND** sodium chloride 0.9% **AND** sodium chloride 0.9%    Review of patient's allergies indicates:   Allergen Reactions    Iodine Anaphylaxis    Shellfish containing products     Amoxicillin-pot clavulanate Itching       Review of Systems  Review of Systems   Constitutional:  Positive for malaise/fatigue and weight loss. Negative for chills and fever.   HENT:  Negative for congestion, ear discharge, ear pain, hearing loss, nosebleeds, sinus pain, sore throat and tinnitus.    Eyes:  Positive for double vision. Negative for blurred vision.   Respiratory:  Positive for cough and shortness of breath. Negative for hemoptysis, sputum production, wheezing and stridor.    Cardiovascular:  Negative for chest pain, palpitations, orthopnea and claudication.   Gastrointestinal:  Positive for heartburn. Negative for abdominal pain, blood in stool, constipation, diarrhea, nausea and vomiting.    Genitourinary:  Negative for dysuria and urgency.   Musculoskeletal:  Positive for back pain, myalgias and neck pain.   Skin:  Negative for itching and rash.   Neurological:  Negative for dizziness, tingling, tremors, sensory change, speech change, focal weakness, seizures, weakness and headaches.   Endo/Heme/Allergies:  Negative for polydipsia.   Psychiatric/Behavioral:  Negative for depression, substance abuse and suicidal ideas.  confused    OBJECTIVE:     Vital Signs (Most Recent)  Temp: 98.4 °F (36.9 °C) (06/16/23 0319)  Pulse: 80 (06/16/23 0319)  Resp: 18 (06/15/23 2015)  BP: (!) 159/85 (06/16/23 0319)  SpO2: 96 % (06/16/23 0700)    Vital Signs Range (Last 24H):  Temp:  [97.4 °F (36.3 °C)-98.4 °F (36.9 °C)]   Pulse:  [64-91]   Resp:  [18]   BP: (143-170)/(65-92)   SpO2:  [92 %-97 %]     I & O (Last 24H):  Intake/Output Summary (Last 24 hours) at 6/16/2023 0805  Last data filed at 6/16/2023 0445  Gross per 24 hour   Intake 848.02 ml   Output 2575 ml   Net -1726.98 ml       Physical Exam:  Physical Exam   Vitals:    06/16/23 0319   BP: (!) 159/85   Pulse: 80   Resp:    Temp: 98.4 °F (36.9 °C)       Physical Exam   Constitutional: drowsy aggitated  but did try to  get up to  urinate  no acute distress  HENT:   Head: Normocephalic and atraumatic.   Eyes: Conjunctivae normal and EOM are normal. Pupils are equal, round, and reactive to light.   Neck: Normal range of motion. Neck supple.   Cardiovascular: Normal rate, regular rhythm, normal heart sounds   Pulmonary/Chest: CTAB,  coarse  breath sounds Abdominal: Soft, nontender, bowel sounds normal  Neurological:tender  t  spine and ls pine    No  break down   on  skin on  buttocks but it  is red  Skin: warm, dry intact  Psychiatric: confused      Laboratory:  Recent Results (from the past 24 hour(s))   POCT glucose    Collection Time: 06/15/23 10:58 AM   Result Value Ref Range    POCT Glucose 241 (H) 70 - 110 mg/dL   POCT glucose    Collection Time: 06/15/23  3:58  PM   Result Value Ref Range    POCT Glucose 251 (H) 70 - 110 mg/dL   POCT glucose    Collection Time: 06/15/23  8:18 PM   Result Value Ref Range    POCT Glucose 344 (H) 70 - 110 mg/dL   POCT glucose    Collection Time: 06/16/23  6:14 AM   Result Value Ref Range    POCT Glucose 183 (H) 70 - 110 mg/dL   Comprehensive Metabolic Panel    Collection Time: 06/16/23  7:23 AM   Result Value Ref Range    Sodium Level 136 136 - 145 mmol/L    Potassium Level 3.8 3.5 - 5.1 mmol/L    Chloride 103 98 - 107 mmol/L    Carbon Dioxide 22 (L) 23 - 31 mmol/L    Glucose Level 191 (H) 82 - 115 mg/dL    Blood Urea Nitrogen 41.0 (H) 8.4 - 25.7 mg/dL    Creatinine 1.59 (H) 0.73 - 1.18 mg/dL    Calcium Level Total 8.2 (L) 8.8 - 10.0 mg/dL    Protein Total 10.3 (H) 5.8 - 7.6 gm/dL    Albumin Level 1.5 (L) 3.4 - 4.8 g/dL    Globulin 8.8 (H) 2.4 - 3.5 gm/dL    Albumin/Globulin Ratio 0.2 (L) 1.1 - 2.0 ratio    Bilirubin Total 0.3 <=1.5 mg/dL    Alkaline Phosphatase 66 40 - 150 unit/L    Alanine Aminotransferase 20 0 - 55 unit/L    Aspartate Aminotransferase 31 5 - 34 unit/L    eGFR 46 mls/min/1.73/m2   CBC with Differential    Collection Time: 06/16/23  7:23 AM   Result Value Ref Range    WBC 9.21 4.50 - 11.50 x10(3)/mcL    RBC 3.04 (L) 4.70 - 6.10 x10(6)/mcL    Hgb 9.6 (L) 14.0 - 18.0 g/dL    Hct 28.8 (L) 42.0 - 52.0 %    MCV 94.7 (H) 80.0 - 94.0 fL    MCH 31.6 (H) 27.0 - 31.0 pg    MCHC 33.3 33.0 - 36.0 g/dL    RDW 14.6 11.5 - 17.0 %    Platelet 123 (L) 130 - 400 x10(3)/mcL    MPV 9.2 7.4 - 10.4 fL    Neut % 72.2 %    Lymph % 14.1 %    Mono % 7.1 %    Eos % 0.0 %    Basophil % 0.0 %    Lymph # 1.30 0.6 - 4.6 x10(3)/mcL    Neut # 6.65 2.1 - 9.2 x10(3)/mcL    Mono # 0.65 0.1 - 1.3 x10(3)/mcL    Eos # 0.00 0 - 0.9 x10(3)/mcL    Baso # 0.00 <=0.2 x10(3)/mcL    IG# 0.61 (H) 0 - 0.04 x10(3)/mcL    IG% 6.6 %        Diagnostic Results:  X-Ray Chest 1 View    Result Date: 6/8/2023  EXAMINATION: XR CHEST 1 VIEW CLINICAL HISTORY: , Weakness. COMPARISON:  0923 FINDINGS: An AP view or more reveals the heart to be normal in size.  The trachea is just right of midline.  Atherosclerosis is seen within the aorta.  There is patchy hazy opacity at the left lung base.  There are changes and curvature are noted to the thoracic spine.     1. Patchy infiltrate, atelectasis, and pleural reaction left lung base 2. Atherosclerosis 3. Thoracic spondylosis and scoliosis Electronically signed by: Boo Camacho Date:    06/08/2023 Time:    08:11    CT Head Without Contrast    Result Date: 6/7/2023  EXAMINATION: CT HEAD WITHOUT CONTRAST CLINICAL HISTORY: Mental status change, unknown cause; TECHNIQUE: Low dose axial CT images obtained throughout the head without intravenous contrast. Sagittal and coronal reconstructions were performed.  .  Automated exposure control used. COMPARISON: None. FINDINGS: Intracranial compartment: Ventricles and sulci are normal in size for age without evidence of hydrocephalus. No extra-axial blood or fluid collections. The brain parenchyma appears normal. No parenchymal mass, hemorrhage, edema or major vascular distribution infarct. Skull/extracranial contents (limited evaluation): No fracture. Mastoid air cells and paranasal sinuses are essentially clear.     No acute abnormality. Electronically signed by: Chrystal Wallace MD Date:    06/07/2023 Time:    17:58    CT Chest Without Contrast    Result Date: 6/8/2023  EXAMINATION: CT CHEST WITHOUT CONTRAST CLINICAL HISTORY: Hypercalcemia;, . TECHNIQUE: PATIENT RADIATION DOSE: DLP(mGycm) 215 As per PQRS measures, all CT scans at this facility used dose modulation, iterative reconstruction, and/or weight based dose adjustment when appropriate to reduce radiation dose to as low as reasonably achievable. COMPARISON: None available FINDINGS: Serial axial imaging of the chest without the administration of IV contrast.  Both soft tissue and pulmonary parenchymal windows were obtained.  Additional sagittal and  coronal reconstructions were performed. Artifact: Mild motion artifact is seen on some images. Contrast: Please note this is a non contrast CT scan of the chest which is non-diagnostic for pulmonary emboli. Soft Tissues: Unremarkable. Lines and Tubes: None. Axilla: A few prominent lymph nodes are seen in the right and left axilla. Neck: The visualized soft tissues of the neck appear unremarkable. Mediastinum: A few subcentimeter mediastinal lymph nodes are seen in the upper and lower paratracheal and subcarinal spaces . Heart: Moderate cardiomegaly is seen. Pronounced coronary artery calcification is seen. Aorta: Unremarkable appearing aorta. Pulmonary Arteries: Mildly enlarged main pulmonary arteries are identified. This could reflect an element of pulmonary arterial hypertension. Lungs: There is mild non specific dependent change at the lung bases. Mild streaky linear opacity is seen predominantly in the dependent portions at the lung bases with peripheral predominance consistent with subsegmental atelectasis. A small patchy area of ground glass density is seen in the lateral basal segment of the left lower lobe. This could reflect an acute focal infiltrate / pneumonitis. Pleura: There is a small left sided pleural effusion. There is mild pleural thickening along the right lower posterior pleural surface. Bony Structures: There is diffuse osteopenia along the visualised bony structures together with multiple, numerous, small round-ovoid lucent lesion of varying sizes involving the marrow. Spine: Moderate multilevel spondylolytic changes are seen in the thoracic spine. Abdomen: Bilateral renal cysts are indentified, which are large in size on the left side.     Impression: 1. There is a small left sided pleural effusion. 2. A small patchy area of ground glass density is seen in the lateral basal segment of the left lower lobe. This could reflect an acute focal infiltrate / pneumonitis. Correlate clinically and with  laboratory findings, as regards additional evaluation and follow-up. 3. There is diffuse osteopenia along the visualised bony structures together with multiple, numerous, small round-ovoid lucent lesion of varying sizes involving the marrow. These changes could reflect metabolic bone disease like hyperparathyroidism. Correlate clinically and with laboratory findings, as regards additional evaluation and follow-up. 1. Concur with preliminary report Electronically signed by: Boo Camacho Date:    06/08/2023 Time:    08:30    NM Bone Scan Whole Body    Result Date: 6/8/2023  EXAMINATION: NM BONE SCAN WHOLE BODY CLINICAL HISTORY: myeloma ??;, . TECHNIQUE: mCi of Tc-99m MDP was administered intravenously. After appropriate delay, whole body bone scan images were obtained. COMPARISON: None available FINDINGS: Activity is identified within the kidneys bilaterally and within the bladder. Focal increased activity is evident at the anterior right 4th 5th and 6th ribs and anterior left 1st, 4th, and 7th ribs.  There is mild central activity at the manubrium.  There is mild increased activity at the sternoclavicular joints as well as the shoulders bilaterally.  Mild increased activity evident thyroid bed.     1. Scattered activity at the anterior left right rib margins as described.  A CT exam on the previous day reveals no definite fracture.  There is mild heterogeneous and bony loss at the anterior left and right ribs.  This is not have the typical pattern of a metastases. 2. Focal increased activity at the anterior manubrium which again on the CT exam demonstrates osteopenia and heterogeneous bony loss as well as scattered subcentimeter small lytic defects. 3. Suspect mild arthritic changes at the shoulders sternoclavicular joints Electronically signed by: Boo Camacho Date:    06/08/2023 Time:    14:28    US Retroperitoneal Complete    Result Date: 6/8/2023  EXAMINATION: RENAL/RETROPERITONEAL SONOGRAM: CLINICAL HISTORY:  Renal insufficiency;, . COMPARISON: None available FINDINGS: Real-time imaging was performed through the retroperitoneum evaluating the kidneys. Arterial and venous flow are identified within the kidneys. No hydronephrosis is seen.  The bladder is incompletely distended.  Two round hypoechoic foci are noted to the mid and lower right kidney measuring 3.3 cm and 1.5 cm respectively compatible with simple cysts.  2 round hypoechoic foci are noted to the upper and mid left kidney measuring 5.4 and 6.5 cm respectively compatible with cysts.  No free fluid collect identified. MEASUREMENTS: Right Kidney: 12.9 cm RI: 0.79 Left Kidney: 14.7 cm RI: 0.73     1. Findings compatible simple cysts to the kidneys bilaterally as described 2. Elevated renal resistive indices bilaterally 3. Incompletely distended bladder Electronically signed by: Boo Camacho Date:    06/08/2023 Time:    12:05    XR Metastatic Survey    Result Date: 6/8/2023  EXAMINATION: SKELETAL SURVEY: CLINICAL HISTORY: , possible myeloma; TECHNIQUE: AP and lateral views were obtained of the axial skeleton as well as limited views of the lower and upper upper extremity. COMPARISON: None available FINDINGS: There is absence of dentition.  Mastoid air cells are aerated bilaterally.  Maxillary and frontal sinuses are relatively clear.  There is sinusoidal deviation of the nasal septum.  Bony structures are osteopenic.  Degenerative changes are noted to the spine.  No fracture or subluxation is seen.  No aggressive osteolytic or osteoblastic lesion is seen.  A small oval sclerotic focus is noted to the proximal left humeral shaft measuring 10 mm in greatest diameter.  Arthritic changes are evident at the shoulders, hips, wrists, and elbows bilaterally.  Atherosclerosis is seen.  Enthesophyte formation is evident at the tibial tubercle.  A pinpoint 2-3 mm metallic density noted to the soft tissues of the right axilla suggesting a foreign body and another at the soft  tissues of the medial right thigh     1. No acute osseous defect identified 2. Osteopenia 3. Osteoarthritis 4. Pinpoint metallic density/suspect foreign body right axilla and medial right thigh soft tissues 5. Small 1 cm oval shaped sclerotic focus proximal left humeral shaft which has a nonaggressive appearance. Electronically signed by: Boo Camacho Date:    06/08/2023 Time:    13:55       ASSESSMENT/PLAN:   72  year old  w  hypercalcemia   Needs workup   His ct  had finding in  c spine   Ordered  skeletal survey  area on humerus only   On nuc  bone scan   ribs munubrium  w  lesions and thyroid bed w  uptake   Will order thyroid  us. - neg   Us  of renal  is  normal   except for  elevated resistive indices  Spep + M spike in   IgA trini   One spray of  calcitonin  6/8  Pamidronate 60  x 1   6/9  T spine   mri no contrast  showing  areas of  possible metastasis or bone marrow involvement in  t spine  Confusion is slightly  b grace   Wanted to rule out  any   other  causes  other than high ca  Abg did not   show  co2   narcosis   CT  head  no acute  bleed or  changes  Blood cx  x  2   neg   Lactic  acid  0.8  Ua   to  collect  and  cx  does have a  hx of  prostate  issues  Cxray   developing  pneumonia    and  effusion   lasix  today     Rocephin s tarted   Still w  good  urine  output     His  calcium is  much better  He  is much more lucid today   Bone marrow bx results  pending   Will finish  24 hour   urine   And will start  dex   40  po x   4  days (dose  2  today   Protonix  40  bid  iv   Sliding  scale    Xanax as  needed   Prn bp  meds are   available   Using  sliding scale  his  gluc  was  340    Continue sliding scale, continue p.r.n. blood pressure medications.  Patient is tolerating the dexamethasone well.  Using Xanax p.r.n.      Hem onc saw  him yesterday  We will follow up with Dr. Amaya on 06/27      Plan:   Patient Active Problem List    Diagnosis Date Noted    Abnormal radionuclide bone scan  06/08/2023    Hypercalcemia 06/07/2023    Confusion 06/07/2023    Hypoalbuminemia 06/07/2023    Weakness 06/07/2023    COLTON (acute kidney injury) 06/07/2023    `

## 2023-06-17 PROBLEM — C90.00 MULTIPLE MYELOMA: Status: ACTIVE | Noted: 2023-06-17

## 2023-06-17 LAB
BIOTIN SERPL-SCNC: 1308.1 PG/ML (ref 221–3004)
POCT GLUCOSE: 193 MG/DL (ref 70–110)
POCT GLUCOSE: 217 MG/DL (ref 70–110)
POCT GLUCOSE: 310 MG/DL (ref 70–110)

## 2023-06-17 PROCEDURE — 27000221 HC OXYGEN, UP TO 24 HOURS

## 2023-06-17 PROCEDURE — 25000003 PHARM REV CODE 250: Performed by: INTERNAL MEDICINE

## 2023-06-17 PROCEDURE — 94761 N-INVAS EAR/PLS OXIMETRY MLT: CPT

## 2023-06-17 PROCEDURE — 63600175 PHARM REV CODE 636 W HCPCS: Performed by: FAMILY MEDICINE

## 2023-06-17 PROCEDURE — 25000003 PHARM REV CODE 250: Performed by: FAMILY MEDICINE

## 2023-06-17 PROCEDURE — A4216 STERILE WATER/SALINE, 10 ML: HCPCS | Performed by: FAMILY MEDICINE

## 2023-06-17 PROCEDURE — 94664 DEMO&/EVAL PT USE INHALER: CPT

## 2023-06-17 PROCEDURE — 21400001 HC TELEMETRY ROOM

## 2023-06-17 PROCEDURE — 27000173 HC ACAPELLA DEVICE DH OR DM

## 2023-06-17 PROCEDURE — 99900035 HC TECH TIME PER 15 MIN (STAT)

## 2023-06-17 PROCEDURE — C9113 INJ PANTOPRAZOLE SODIUM, VIA: HCPCS | Performed by: FAMILY MEDICINE

## 2023-06-17 RX ORDER — PANTOPRAZOLE SODIUM 40 MG/1
40 TABLET, DELAYED RELEASE ORAL
Status: DISCONTINUED | OUTPATIENT
Start: 2023-06-17 | End: 2023-06-19 | Stop reason: HOSPADM

## 2023-06-17 RX ADMIN — FAMOTIDINE 20 MG: 20 TABLET, FILM COATED ORAL at 09:06

## 2023-06-17 RX ADMIN — AMLODIPINE BESYLATE 10 MG: 10 TABLET ORAL at 09:06

## 2023-06-17 RX ADMIN — INSULIN ASPART 4 UNITS: 100 INJECTION, SOLUTION INTRAVENOUS; SUBCUTANEOUS at 05:06

## 2023-06-17 RX ADMIN — SODIUM CHLORIDE, PRESERVATIVE FREE 10 ML: 5 INJECTION INTRAVENOUS at 12:06

## 2023-06-17 RX ADMIN — GABAPENTIN 100 MG: 100 CAPSULE ORAL at 09:06

## 2023-06-17 RX ADMIN — DEXAMETHASONE 40 MG: 4 TABLET ORAL at 09:06

## 2023-06-17 RX ADMIN — HYDRALAZINE HYDROCHLORIDE 50 MG: 50 TABLET, FILM COATED ORAL at 02:06

## 2023-06-17 RX ADMIN — GABAPENTIN 100 MG: 100 CAPSULE ORAL at 08:06

## 2023-06-17 RX ADMIN — HYDRALAZINE HYDROCHLORIDE 50 MG: 50 TABLET, FILM COATED ORAL at 09:06

## 2023-06-17 RX ADMIN — ENOXAPARIN SODIUM 40 MG: 40 INJECTION SUBCUTANEOUS at 05:06

## 2023-06-17 RX ADMIN — CLONAZEPAM 0.5 MG: 0.5 TABLET ORAL at 09:06

## 2023-06-17 RX ADMIN — PANTOPRAZOLE SODIUM 40 MG: 40 INJECTION, POWDER, FOR SOLUTION INTRAVENOUS at 08:06

## 2023-06-17 RX ADMIN — SENNOSIDES AND DOCUSATE SODIUM 1 TABLET: 50; 8.6 TABLET ORAL at 09:06

## 2023-06-17 RX ADMIN — FINASTERIDE 5 MG: 5 TABLET, FILM COATED ORAL at 09:06

## 2023-06-17 RX ADMIN — SODIUM CHLORIDE, PRESERVATIVE FREE 10 ML: 5 INJECTION INTRAVENOUS at 05:06

## 2023-06-17 RX ADMIN — TAMSULOSIN HYDROCHLORIDE 0.4 MG: 0.4 CAPSULE ORAL at 09:06

## 2023-06-17 RX ADMIN — INSULIN ASPART 1 UNITS: 100 INJECTION, SOLUTION INTRAVENOUS; SUBCUTANEOUS at 09:06

## 2023-06-17 RX ADMIN — LEVOTHYROXINE SODIUM 112 MCG: 112 TABLET ORAL at 05:06

## 2023-06-17 RX ADMIN — CARVEDILOL 12.5 MG: 12.5 TABLET, FILM COATED ORAL at 09:06

## 2023-06-17 RX ADMIN — PANTOPRAZOLE SODIUM 40 MG: 40 TABLET, DELAYED RELEASE ORAL at 03:06

## 2023-06-17 RX ADMIN — CEFTRIAXONE SODIUM 1 G: 1 INJECTION, POWDER, FOR SOLUTION INTRAMUSCULAR; INTRAVENOUS at 04:06

## 2023-06-17 RX ADMIN — SENNOSIDES AND DOCUSATE SODIUM 1 TABLET: 50; 8.6 TABLET ORAL at 08:06

## 2023-06-17 RX ADMIN — HYDRALAZINE HYDROCHLORIDE 50 MG: 50 TABLET, FILM COATED ORAL at 05:06

## 2023-06-17 RX ADMIN — CARVEDILOL 12.5 MG: 12.5 TABLET, FILM COATED ORAL at 08:06

## 2023-06-17 RX ADMIN — GABAPENTIN 100 MG: 100 CAPSULE ORAL at 03:06

## 2023-06-17 RX ADMIN — INSULIN ASPART 2 UNITS: 100 INJECTION, SOLUTION INTRAVENOUS; SUBCUTANEOUS at 06:06

## 2023-06-17 NOTE — NURSING
Pt requesting sleep aid, xanax 0.5 mg po ordered PRN, but pt has experienced mild confusion with this medication on last night. Call placed to Dr. Donahue to receive an order for something else, order received for klonopin 0.5 mg HSPRN. Order readback and verified.Will monitor pt.

## 2023-06-17 NOTE — PROGRESS NOTES
[facility]  [address] [place] [address]  Phone: [phone]    (Hospital) Internal Medicine  Progress Note      PATIENT NAME: Chip Goodson  MRN: [id number]  TODAY'S DATE: 06/17/2023  ADMIT DATE: 6/7/2023    SUBJECTIVE     PRINCIPLE PROBLEM: Confusion    INTERVAL HISTORY:    6/17/2023  Cross coverage for Dr. Debbie Gonzalez.  Patient was admitted for hypercalcemia acute renal failure, ultimate workup for multiple myeloma was confirmed positive with bone marrow.  Further delineation of disease process off to [place].      Patient today feels much better.  He has been on steroids after consultation with Dr. Amaya oncology.  He is no complaints today.  I put him on some Klonopin last night any slept much better.  He was delirious and confused couple of nights prior to.  He is very linear today.    Review of patient's allergies indicates:   Allergen Reactions    Iodine Anaphylaxis    Shellfish containing products     Amoxicillin-pot clavulanate Itching       ROS-14 point review of systems except as mentioned above.    OBJECTIVE     VITAL SIGNS (Most Recent)  Temp: 98 °F (36.7 °C) (06/17/23 1200)  Pulse: 64 (06/17/23 1200)  Resp: 20 (06/17/23 1200)  BP: (!) 160/67 (06/17/23 1200)  SpO2: (!) 94 % (06/17/23 1200)    VENTILATION STATUS  Resp: 20 (06/17/23 1200)  SpO2: (!) 94 % (06/17/23 1200)           I & O (Last 24H):  Intake/Output Summary (Last 24 hours) at 6/17/2023 1540  Last data filed at 6/17/2023 0818  Gross per 24 hour   Intake 1022.59 ml   Output 1100 ml   Net -77.41 ml       WEIGHTS  Wt Readings from Last 3 Encounters:   06/07/23 1707 113.4 kg (250 lb)   02/07/18 1124 111.2 kg (245 lb 2.4 oz)   10/09/17 0837 108 kg (237 lb 15.8 oz)   06/01/17 0833 108.9 kg (239 lb 15.9 oz)       Physical Exam    Patient is alert and oriented x3.  Cranial nerves 2-12 intact.  He is with his wife present from the [facility] as well as their son Kaiser who is at the bedside.  He is regular rate and  rhythm no rubs gallops or murmurs clear to auscultation bilaterally soft nontender nondistended mildly obese abdomen no clubbing cyanosis or edema bilateral lower extremities.    SCHEDULED MEDS:   amLODIPine  10 mg Oral Daily    carvediloL  12.5 mg Oral BID    cefTRIAXone (ROCEPHIN) IVPB  1 g Intravenous Q24H    enoxparin  40 mg Subcutaneous Q24H (prophylaxis, 1700)    famotidine  20 mg Oral Daily    finasteride  5 mg Oral Daily    gabapentin  100 mg Oral TID    hydrALAZINE  50 mg Oral Q8H    levothyroxine  112 mcg Oral Before breakfast    pantoprazole  40 mg Oral BID AC    senna-docusate 8.6-50 mg  1 tablet Oral BID    sodium chloride 0.9%  10 mL Intravenous Q6H    tamsulosin  1 capsule Oral Daily       CONTINUOUS INFUSIONS:    PRN MEDS:acetaminophen, albuterol, clonazePAM, cloNIDine, dextrose 10%, dextrose 10%, dextrose, dextrose, glucagon (human recombinant), hydrALAZINE, insulin aspart U-100, ondansetron, sodium chloride 0.9%, Flushing PICC Protocol **AND** sodium chloride 0.9% **AND** sodium chloride 0.9%    LABS AND DIAGNOSTICS     CBC LAST 3 DAYS  Recent Labs   Lab 06/14/23  0606 06/15/23  0440 06/16/23  0723   WBC 6.87 7.54 9.21   RBC 3.30* 3.33* 3.04*   HGB 10.5* 10.5* 9.6*   HCT 31.9* 32.7* 28.8*   MCV 96.7* 98.2* 94.7*   MCH 31.8* 31.5* 31.6*   MCHC 32.9* 32.1* 33.3   RDW 15.0 14.8 14.6    127* 123*   MPV 9.2 10.3 9.2       COAGULATION LAST 3 DAYS  Recent Labs   Lab 06/11/23  1612   INR 1.64*       CHEMISTRY LAST 3 DAYS  Recent Labs   Lab 06/12/23  0441 06/13/23  0459 06/14/23  0606 06/15/23  0440 06/16/23  0723    140 140 135* 136   K 3.0* 2.9* 3.5 4.4 3.8   CO2 21* 23 22* 22* 22*   BUN 39.0* 33.0* 26.0* 30.0* 41.0*   CREATININE 2.13* 1.91* 1.86* 1.79* 1.59*   CALCIUM 9.8 8.9 9.0 8.6* 8.2*   MG 1.90 1.80  --  1.90  --    ALBUMIN 1.4* 1.5* 1.5* 1.5* 1.5*   ALKPHOS 53 67 70 78 66   ALT 11 19 20 25 20   AST 27 30 30 30 31   BILITOT 0.4 0.4 0.4 0.4 0.3       CARDIAC PROFILE LAST 3 DAYS  No  results for input(s): BNP, CPK, CPKMB, LDH, TROPONINI in the last 168 hours.    ENDOCRINE LAST 3 DAYS  No results for input(s): TSH, PROCAL in the last 168 hours.    LAST ARTERIAL BLOOD GAS  ABG  No results for input(s): PH, PO2, PCO2, HCO3, BE in the last 168 hours.    LAST 7 DAYS MICROBIOLOGY   Microbiology Results (last 7 days)       Procedure Component Value Units Date/Time    Blood Culture [[id number]]  (Normal) Collected: 06/10/23 1516    Order Status: Completed Specimen: Blood from Arm, Right Updated: 06/16/23 1601     CULTURE, BLOOD ([facility]) No Growth at 5 days    Blood Culture [[id number]]  (Normal) Collected: 06/10/23 1516    Order Status: Completed Specimen: Blood from Hand, Right Updated: 06/16/23 1601     CULTURE, BLOOD ([facility]) No Growth at 5 days    Urine Culture High Risk [[id number]] Collected: 06/12/23 0228    Order Status: Completed Specimen: Urine, Clean Catch Updated: 06/14/23 1051     Urine Culture No Growth            MOST RECENT IMAGING  X-Ray Chest PA And Lateral  Narrative: EXAMINATION:  XR CHEST PA AND LATERAL    CLINICAL HISTORY:  Dyspnea;    COMPARISON:  12 June 2023    FINDINGS:  Frontal and lateral views of the chest were obtained. Heart size upper limit normal, stable.  There is improved aeration of the lung bases compared to prior but some mild bibasilar opacities persist.  Suspect small pleural effusions as well.  Impression: Improved aeration of the lung bases with mild residual bibasilar opacities and small pleural effusions suspected.    Electronically signed by: Mesfin Varma  Date:    06/16/2023  Time:    09:32      [facility]  No results found for this or any previous visit.      CURRENT/PREVIOUS VISIT EKG  Results for orders placed or performed during the hospital encounter of 06/07/23   EKG 12-lead    Collection Time: 06/12/23  6:04 AM    Narrative    Test Reason : R00.1,    Vent. Rate : 050 BPM     Atrial Rate : 050 BPM     P-R Int : 186 ms          QRS Dur : 090 ms      QT Int : 424  ms       P-R-T Axes : 046 007 016 degrees     QTc Int : 386 ms    Sinus bradycardia  Nonspecific T wave abnormality  Abnormal ECG  Confirmed by Rommel Pinzon MD ([id number]) on 6/12/2023 5:19:20 PM    Referred By: THOMAS   SELF           Confirmed By:Rommel Pinzon MD       ASSESSMENT/PLAN:     Active Hospital Problems    Diagnosis    *Confusion    Multiple myeloma    Abnormal radionuclide bone scan    Hypercalcemia    Hypoalbuminemia    Weakness    COLTON (acute kidney injury)       ASSESSMENT & PLAN:   Confusion resolved.    Defer to Dr. Gonzalez to follow up on [facility] for further multiple myeloma delineation.  Right now we are waiting for steroids to suppress and then follow up with Oncology per Dr. Gonzalez's discharge planned for Monday.      Hypercalcemia resolved.      Continue therapy, diet, monitoring renal function.      RECOMMENDATIONS:  DVT prophylaxis with enoxaparin.  GI prophylaxis with pantoprazole orally.        Kei Donahue III, MD  Department of Hospital Medicine ([facility])   Date of Service: 06/17/2023  3:38 PM

## 2023-06-18 LAB
POCT GLUCOSE: 172 MG/DL (ref 70–110)
POCT GLUCOSE: 209 MG/DL (ref 70–110)
POCT GLUCOSE: 224 MG/DL (ref 70–110)
POCT GLUCOSE: 239 MG/DL (ref 70–110)
POCT GLUCOSE: 272 MG/DL (ref 70–110)
POCT GLUCOSE: 281 MG/DL (ref 70–110)
POCT GLUCOSE: 47 MG/DL (ref 70–110)

## 2023-06-18 PROCEDURE — 25000003 PHARM REV CODE 250: Performed by: FAMILY MEDICINE

## 2023-06-18 PROCEDURE — 94761 N-INVAS EAR/PLS OXIMETRY MLT: CPT

## 2023-06-18 PROCEDURE — 63600175 PHARM REV CODE 636 W HCPCS: Performed by: FAMILY MEDICINE

## 2023-06-18 PROCEDURE — 99900035 HC TECH TIME PER 15 MIN (STAT)

## 2023-06-18 PROCEDURE — 25000003 PHARM REV CODE 250: Performed by: INTERNAL MEDICINE

## 2023-06-18 PROCEDURE — 21400001 HC TELEMETRY ROOM

## 2023-06-18 PROCEDURE — 94664 DEMO&/EVAL PT USE INHALER: CPT

## 2023-06-18 PROCEDURE — A4216 STERILE WATER/SALINE, 10 ML: HCPCS | Performed by: FAMILY MEDICINE

## 2023-06-18 PROCEDURE — 94668 MNPJ CHEST WALL SBSQ: CPT

## 2023-06-18 PROCEDURE — 27000173 HC ACAPELLA DEVICE DH OR DM

## 2023-06-18 RX ADMIN — CARVEDILOL 12.5 MG: 12.5 TABLET, FILM COATED ORAL at 09:06

## 2023-06-18 RX ADMIN — CARVEDILOL 12.5 MG: 12.5 TABLET, FILM COATED ORAL at 08:06

## 2023-06-18 RX ADMIN — GABAPENTIN 100 MG: 100 CAPSULE ORAL at 03:06

## 2023-06-18 RX ADMIN — INSULIN ASPART 1 UNITS: 100 INJECTION, SOLUTION INTRAVENOUS; SUBCUTANEOUS at 09:06

## 2023-06-18 RX ADMIN — SODIUM CHLORIDE, PRESERVATIVE FREE 10 ML: 5 INJECTION INTRAVENOUS at 01:06

## 2023-06-18 RX ADMIN — GABAPENTIN 100 MG: 100 CAPSULE ORAL at 08:06

## 2023-06-18 RX ADMIN — AMLODIPINE BESYLATE 10 MG: 10 TABLET ORAL at 08:06

## 2023-06-18 RX ADMIN — CEFTRIAXONE SODIUM 1 G: 1 INJECTION, POWDER, FOR SOLUTION INTRAMUSCULAR; INTRAVENOUS at 03:06

## 2023-06-18 RX ADMIN — GABAPENTIN 100 MG: 100 CAPSULE ORAL at 09:06

## 2023-06-18 RX ADMIN — LEVOTHYROXINE SODIUM 112 MCG: 112 TABLET ORAL at 06:06

## 2023-06-18 RX ADMIN — SODIUM CHLORIDE, PRESERVATIVE FREE 10 ML: 5 INJECTION INTRAVENOUS at 05:06

## 2023-06-18 RX ADMIN — HYDRALAZINE HYDROCHLORIDE 50 MG: 50 TABLET, FILM COATED ORAL at 06:06

## 2023-06-18 RX ADMIN — INSULIN ASPART 2 UNITS: 100 INJECTION, SOLUTION INTRAVENOUS; SUBCUTANEOUS at 06:06

## 2023-06-18 RX ADMIN — INSULIN ASPART 2 UNITS: 100 INJECTION, SOLUTION INTRAVENOUS; SUBCUTANEOUS at 10:06

## 2023-06-18 RX ADMIN — SENNOSIDES AND DOCUSATE SODIUM 1 TABLET: 50; 8.6 TABLET ORAL at 08:06

## 2023-06-18 RX ADMIN — CLONAZEPAM 0.5 MG: 0.5 TABLET ORAL at 10:06

## 2023-06-18 RX ADMIN — FINASTERIDE 5 MG: 5 TABLET, FILM COATED ORAL at 08:06

## 2023-06-18 RX ADMIN — SENNOSIDES AND DOCUSATE SODIUM 1 TABLET: 50; 8.6 TABLET ORAL at 09:06

## 2023-06-18 RX ADMIN — PANTOPRAZOLE SODIUM 40 MG: 40 TABLET, DELAYED RELEASE ORAL at 03:06

## 2023-06-18 RX ADMIN — FAMOTIDINE 20 MG: 20 TABLET, FILM COATED ORAL at 08:06

## 2023-06-18 RX ADMIN — HYDRALAZINE HYDROCHLORIDE 50 MG: 50 TABLET, FILM COATED ORAL at 09:06

## 2023-06-18 RX ADMIN — HYDRALAZINE HYDROCHLORIDE 50 MG: 50 TABLET, FILM COATED ORAL at 01:06

## 2023-06-18 RX ADMIN — PANTOPRAZOLE SODIUM 40 MG: 40 TABLET, DELAYED RELEASE ORAL at 06:06

## 2023-06-18 RX ADMIN — ENOXAPARIN SODIUM 40 MG: 40 INJECTION SUBCUTANEOUS at 05:06

## 2023-06-18 RX ADMIN — SODIUM CHLORIDE, PRESERVATIVE FREE 10 ML: 5 INJECTION INTRAVENOUS at 11:06

## 2023-06-18 RX ADMIN — TAMSULOSIN HYDROCHLORIDE 0.4 MG: 0.4 CAPSULE ORAL at 08:06

## 2023-06-18 NOTE — PLAN OF CARE
Problem: Pain Acute  Goal: Acceptable Pain Control and Functional Ability  Outcome: Ongoing, Progressing  Intervention: Develop Pain Management Plan  Flowsheets (Taken 6/18/2023 0736)  Pain Management Interventions:   quiet environment facilitated   care clustered  Intervention: Prevent or Manage Pain  Flowsheets (Taken 6/18/2023 0736)  Sleep/Rest Enhancement:   awakenings minimized   family presence promoted  Intervention: Optimize Psychosocial Wellbeing  Flowsheets (Taken 6/18/2023 0736)  Diversional Activities: television     Problem: Fatigue  Goal: Improved Activity Tolerance  Outcome: Ongoing, Progressing  Intervention: Promote Improved Energy  Flowsheets (Taken 6/18/2023 0736)  Sleep/Rest Enhancement:   awakenings minimized   family presence promoted  Activity Management: Ambulated to bathroom - L4     Problem: Gas Exchange Impaired  Goal: Optimal Gas Exchange  Outcome: Ongoing, Progressing  Intervention: Optimize Oxygenation and Ventilation  Flowsheets (Taken 6/18/2023 0736)  Head of Bed (HOB) Positioning: HOB at 20 degrees     Problem: Infection  Goal: Absence of Infection Signs and Symptoms  Outcome: Ongoing, Progressing  Intervention: Prevent or Manage Infection  Flowsheets (Taken 6/18/2023 0736)  Fever Reduction/Comfort Measures:   lightweight bedding   lightweight clothing

## 2023-06-18 NOTE — PROGRESS NOTES
[facility]  [address] [place] [address]  Phone: [phone]    (Hospital) Internal Medicine  Progress Note      PATIENT NAME: Chip Goodson  MRN: [id number]  TODAY'S DATE: 06/18/2023  ADMIT DATE: 6/7/2023    SUBJECTIVE     PRINCIPLE PROBLEM: Multiple myeloma    INTERVAL HISTORY:    6/18/2023  Patient is without any particular complaints today.  He is feeling nice and strong he says.  He is eating has an appetite.  He did have 1 glucose that was 47 this morning.  He was asymptomatic.    Review of patient's allergies indicates:   Allergen Reactions    Iodine Anaphylaxis    Shellfish containing products     Amoxicillin-pot clavulanate Itching       ROS negative except as mentioned above.    OBJECTIVE     VITAL SIGNS (Most Recent)  Temp: 97.7 °F (36.5 °C) (06/18/23 1212)  Pulse: 77 (06/18/23 1212)  Resp: 18 (06/17/23 2053)  BP: (!) 174/88 (06/18/23 1212)  SpO2: 96 % (06/18/23 1212)    VENTILATION STATUS  Resp: 18 (06/17/23 2053)  SpO2: 96 % (06/18/23 1212)           I & O (Last 24H):  Intake/Output Summary (Last 24 hours) at 6/18/2023 1557  Last data filed at 6/18/2023 1200  Gross per 24 hour   Intake 535.78 ml   Output 1725 ml   Net -1189.22 ml       WEIGHTS  Wt Readings from Last 3 Encounters:   06/07/23 1707 113.4 kg (250 lb)   02/07/18 1124 111.2 kg (245 lb 2.4 oz)   10/09/17 0837 108 kg (237 lb 15.8 oz)   06/01/17 0833 108.9 kg (239 lb 15.9 oz)       Physical Exam    On physical exam he is alert orient x3.  He looks well.  Cranial nerves 2-12 are intact no JVD.  Regular rate and rhythm no rubs gallops murmurs clear to auscultation bilaterally soft nontender nondistended abdomen no clubbing cyanosis edema bilateral lower extremities.    SCHEDULED MEDS:   amLODIPine  10 mg Oral Daily    carvediloL  12.5 mg Oral BID    cefTRIAXone (ROCEPHIN) IVPB  1 g Intravenous Q24H    enoxparin  40 mg Subcutaneous Q24H (prophylaxis, 1700)    famotidine  20 mg Oral Daily    finasteride  5 mg  Oral Daily    gabapentin  100 mg Oral TID    hydrALAZINE  50 mg Oral Q8H    levothyroxine  112 mcg Oral Before breakfast    pantoprazole  40 mg Oral BID AC    senna-docusate 8.6-50 mg  1 tablet Oral BID    sodium chloride 0.9%  10 mL Intravenous Q6H    tamsulosin  1 capsule Oral Daily       CONTINUOUS INFUSIONS:    PRN MEDS:acetaminophen, albuterol, clonazePAM, cloNIDine, dextrose 10%, dextrose 10%, dextrose, dextrose, glucagon (human recombinant), hydrALAZINE, insulin aspart U-100, ondansetron, sodium chloride 0.9%, Flushing PICC Protocol **AND** sodium chloride 0.9% **AND** sodium chloride 0.9%    LABS AND DIAGNOSTICS     CBC LAST 3 DAYS  Recent Labs   Lab 06/14/23  0606 06/15/23  0440 06/16/23  0723   WBC 6.87 7.54 9.21   RBC 3.30* 3.33* 3.04*   HGB 10.5* 10.5* 9.6*   HCT 31.9* 32.7* 28.8*   MCV 96.7* 98.2* 94.7*   MCH 31.8* 31.5* 31.6*   MCHC 32.9* 32.1* 33.3   RDW 15.0 14.8 14.6    127* 123*   MPV 9.2 10.3 9.2       COAGULATION LAST 3 DAYS  Recent Labs   Lab 06/11/23  1612   INR 1.64*       CHEMISTRY LAST 3 DAYS  Recent Labs   Lab 06/12/23  0441 06/13/23  0459 06/14/23  0606 06/15/23  0440 06/16/23  0723    140 140 135* 136   K 3.0* 2.9* 3.5 4.4 3.8   CO2 21* 23 22* 22* 22*   BUN 39.0* 33.0* 26.0* 30.0* 41.0*   CREATININE 2.13* 1.91* 1.86* 1.79* 1.59*   CALCIUM 9.8 8.9 9.0 8.6* 8.2*   MG 1.90 1.80  --  1.90  --    ALBUMIN 1.4* 1.5* 1.5* 1.5* 1.5*   ALKPHOS 53 67 70 78 66   ALT 11 19 20 25 20   AST 27 30 30 30 31   BILITOT 0.4 0.4 0.4 0.4 0.3       CARDIAC PROFILE LAST 3 DAYS  No results for input(s): BNP, CPK, CPKMB, LDH, TROPONINI in the last 168 hours.    ENDOCRINE LAST 3 DAYS  No results for input(s): TSH, PROCAL in the last 168 hours.    LAST ARTERIAL BLOOD GAS  ABG  No results for input(s): PH, PO2, PCO2, HCO3, BE in the last 168 hours.    LAST 7 DAYS MICROBIOLOGY   Microbiology Results (last 7 days)       Procedure Component Value Units Date/Time    Blood Culture [[id number]]  (Normal)  Collected: 06/10/23 1516    Order Status: Completed Specimen: Blood from Arm, Right Updated: 06/16/23 1601     CULTURE, BLOOD ([facility]) No Growth at 5 days    Blood Culture [[id number]]  (Normal) Collected: 06/10/23 1516    Order Status: Completed Specimen: Blood from Hand, Right Updated: 06/16/23 1601     CULTURE, BLOOD ([facility]) No Growth at 5 days    Urine Culture High Risk [[id number]] Collected: 06/12/23 0228    Order Status: Completed Specimen: Urine, Clean Catch Updated: 06/14/23 1051     Urine Culture No Growth            MOST RECENT IMAGING  X-Ray Chest PA And Lateral  Narrative: EXAMINATION:  XR CHEST PA AND LATERAL    CLINICAL HISTORY:  Dyspnea;    COMPARISON:  12 June 2023    FINDINGS:  Frontal and lateral views of the chest were obtained. Heart size upper limit normal, stable.  There is improved aeration of the lung bases compared to prior but some mild bibasilar opacities persist.  Suspect small pleural effusions as well.  Impression: Improved aeration of the lung bases with mild residual bibasilar opacities and small pleural effusions suspected.    Electronically signed by: Mesfin Varma  Date:    06/16/2023  Time:    09:32      [facility]  No results found for this or any previous visit.      CURRENT/PREVIOUS VISIT EKG  Results for orders placed or performed during the hospital encounter of 06/07/23   EKG 12-lead    Collection Time: 06/12/23  6:04 AM    Narrative    Test Reason : R00.1,    Vent. Rate : 050 BPM     Atrial Rate : 050 BPM     P-R Int : 186 ms          QRS Dur : 090 ms      QT Int : 424 ms       P-R-T Axes : 046 007 016 degrees     QTc Int : 386 ms    Sinus bradycardia  Nonspecific T wave abnormality  Abnormal ECG  Confirmed by Rommel Pinzon MD ([id number]) on 6/12/2023 5:19:20 PM    Referred By: [email or web address]   SELF           Confirmed By:Rommel Pinzon MD       ASSESSMENT/PLAN:     Active Hospital Problems    Diagnosis    *Multiple myeloma    Abnormal radionuclide bone scan    Hypercalcemia    Confusion     Hypoalbuminemia    Weakness    COLTON (acute kidney injury)       ASSESSMENT & PLAN:   Confusion resolved.     Defer to Dr. Gonzalez to follow up on [place] proceedings for further multiple myeloma delineation.  Right now we are waiting for steroids to suppress and then follow up with Oncology per Dr. Gonzalez's discharge planned for Monday.       Hypercalcemia resolved.       Continue therapy, diet, monitoring renal function.        RECOMMENDATIONS:  DVT prophylaxis with enoxaparin.  GI prophylaxis with pantoprazole orally.           Kei Donahue III, MD  Department of Hospital Medicine ([facility])   Date of Service: 06/18/2023  3:57 PM

## 2023-06-19 VITALS
HEART RATE: 74 BPM | SYSTOLIC BLOOD PRESSURE: 141 MMHG | OXYGEN SATURATION: 96 % | WEIGHT: 250 LBS | BODY MASS INDEX: 35 KG/M2 | DIASTOLIC BLOOD PRESSURE: 76 MMHG | HEIGHT: 71 IN | RESPIRATION RATE: 20 BRPM | TEMPERATURE: 98 F

## 2023-06-19 LAB
ALBUMIN SERPL-MCNC: 1.5 G/DL (ref 3.4–4.8)
ALBUMIN/GLOB SERPL: 0.2 RATIO (ref 1.1–2)
ALP SERPL-CCNC: 68 UNIT/L (ref 40–150)
ALT SERPL-CCNC: 24 UNIT/L (ref 0–55)
AST SERPL-CCNC: 24 UNIT/L (ref 5–34)
BASOPHILS # BLD AUTO: 0.01 X10(3)/MCL
BASOPHILS NFR BLD AUTO: 0.1 %
BILIRUBIN DIRECT+TOT PNL SERPL-MCNC: 0.5 MG/DL
BUN SERPL-MCNC: 40 MG/DL (ref 8.4–25.7)
CALCIUM SERPL-MCNC: 7.6 MG/DL (ref 8.8–10)
CHLORIDE SERPL-SCNC: 104 MMOL/L (ref 98–107)
CO2 SERPL-SCNC: 25 MMOL/L (ref 23–31)
CREAT SERPL-MCNC: 1.24 MG/DL (ref 0.73–1.18)
EOSINOPHIL # BLD AUTO: 0.06 X10(3)/MCL (ref 0–0.9)
EOSINOPHIL NFR BLD AUTO: 0.7 %
ERYTHROCYTE [DISTWIDTH] IN BLOOD BY AUTOMATED COUNT: 14.6 % (ref 11.5–17)
GFR SERPLBLD CREATININE-BSD FMLA CKD-EPI: >60 MLS/MIN/1.73/M2
GLOBULIN SER-MCNC: 7.8 GM/DL (ref 2.4–3.5)
GLUCOSE SERPL-MCNC: 162 MG/DL (ref 82–115)
HCT VFR BLD AUTO: 29.2 % (ref 42–52)
HGB BLD-MCNC: 9.8 G/DL (ref 14–18)
IMM GRANULOCYTES # BLD AUTO: 0.14 X10(3)/MCL (ref 0–0.04)
IMM GRANULOCYTES NFR BLD AUTO: 1.6 %
LYMPHOCYTES # BLD AUTO: 1.45 X10(3)/MCL (ref 0.6–4.6)
LYMPHOCYTES NFR BLD AUTO: 16.9 %
MAGNESIUM SERPL-MCNC: 1.9 MG/DL (ref 1.6–2.6)
MCH RBC QN AUTO: 31.7 PG (ref 27–31)
MCHC RBC AUTO-ENTMCNC: 33.6 G/DL (ref 33–36)
MCV RBC AUTO: 94.5 FL (ref 80–94)
MONOCYTES # BLD AUTO: 0.82 X10(3)/MCL (ref 0.1–1.3)
MONOCYTES NFR BLD AUTO: 9.6 %
NEUTROPHILS # BLD AUTO: 6.1 X10(3)/MCL (ref 2.1–9.2)
NEUTROPHILS NFR BLD AUTO: 71.1 %
PHOSPHATE SERPL-MCNC: 1.8 MG/DL (ref 2.3–4.7)
PLATELET # BLD AUTO: 133 X10(3)/MCL (ref 130–400)
PMV BLD AUTO: 9.5 FL (ref 7.4–10.4)
POTASSIUM SERPL-SCNC: 3.6 MMOL/L (ref 3.5–5.1)
PROT SERPL-MCNC: 9.3 GM/DL (ref 5.8–7.6)
RBC # BLD AUTO: 3.09 X10(6)/MCL (ref 4.7–6.1)
SODIUM SERPL-SCNC: 137 MMOL/L (ref 136–145)
WBC # SPEC AUTO: 8.58 X10(3)/MCL (ref 4.5–11.5)

## 2023-06-19 PROCEDURE — 25000003 PHARM REV CODE 250: Performed by: INTERNAL MEDICINE

## 2023-06-19 PROCEDURE — 94664 DEMO&/EVAL PT USE INHALER: CPT

## 2023-06-19 PROCEDURE — 83735 ASSAY OF MAGNESIUM: CPT | Performed by: INTERNAL MEDICINE

## 2023-06-19 PROCEDURE — A4216 STERILE WATER/SALINE, 10 ML: HCPCS | Performed by: FAMILY MEDICINE

## 2023-06-19 PROCEDURE — 99900035 HC TECH TIME PER 15 MIN (STAT)

## 2023-06-19 PROCEDURE — 94761 N-INVAS EAR/PLS OXIMETRY MLT: CPT

## 2023-06-19 PROCEDURE — 25000003 PHARM REV CODE 250: Performed by: FAMILY MEDICINE

## 2023-06-19 PROCEDURE — 85025 COMPLETE CBC W/AUTO DIFF WBC: CPT | Performed by: INTERNAL MEDICINE

## 2023-06-19 PROCEDURE — 80053 COMPREHEN METABOLIC PANEL: CPT | Performed by: INTERNAL MEDICINE

## 2023-06-19 PROCEDURE — 94668 MNPJ CHEST WALL SBSQ: CPT

## 2023-06-19 PROCEDURE — 27000173 HC ACAPELLA DEVICE DH OR DM

## 2023-06-19 PROCEDURE — 84100 ASSAY OF PHOSPHORUS: CPT | Performed by: INTERNAL MEDICINE

## 2023-06-19 RX ORDER — FAMOTIDINE 20 MG/1
20 TABLET, FILM COATED ORAL DAILY
Qty: 30 TABLET | Refills: 11 | Status: SHIPPED | OUTPATIENT
Start: 2023-06-19 | End: 2024-03-11

## 2023-06-19 RX ORDER — AMLODIPINE BESYLATE 10 MG/1
10 TABLET ORAL DAILY
Qty: 30 EACH | Refills: 11 | Status: SHIPPED | OUTPATIENT
Start: 2023-06-19 | End: 2024-03-11

## 2023-06-19 RX ORDER — HYDRALAZINE HYDROCHLORIDE 50 MG/1
50 TABLET, FILM COATED ORAL EVERY 8 HOURS
Qty: 90 TABLET | Refills: 11 | Status: SHIPPED | OUTPATIENT
Start: 2023-06-19 | End: 2024-06-18

## 2023-06-19 RX ORDER — PANTOPRAZOLE SODIUM 40 MG/1
40 TABLET, DELAYED RELEASE ORAL
Qty: 60 TABLET | Refills: 11 | Status: SHIPPED | OUTPATIENT
Start: 2023-06-19 | End: 2024-06-18

## 2023-06-19 RX ADMIN — SODIUM CHLORIDE, PRESERVATIVE FREE 10 ML: 5 INJECTION INTRAVENOUS at 06:06

## 2023-06-19 RX ADMIN — SODIUM CHLORIDE, PRESERVATIVE FREE 10 ML: 5 INJECTION INTRAVENOUS at 12:06

## 2023-06-19 RX ADMIN — LEVOTHYROXINE SODIUM 112 MCG: 112 TABLET ORAL at 06:06

## 2023-06-19 RX ADMIN — HYDRALAZINE HYDROCHLORIDE 50 MG: 50 TABLET, FILM COATED ORAL at 06:06

## 2023-06-19 RX ADMIN — PANTOPRAZOLE SODIUM 40 MG: 40 TABLET, DELAYED RELEASE ORAL at 06:06

## 2023-06-19 NOTE — PLAN OF CARE
06/19/23 0924   Final Note   Assessment Type Final Discharge Note   Anticipated Discharge Disposition Home-Health   Hospital Resources/Appts/Education Provided Post-Acute resouces added to AVS   Post-Acute Status   Post-Acute Authorization Home Health   Home Health Status Set-up Complete/Auth obtained   Discharge Delays None known at this time     D/c info sent to [facility] for d/c home today.

## 2023-06-20 ENCOUNTER — PATIENT OUTREACH (OUTPATIENT)
Dept: ADMINISTRATIVE | Facility: CLINIC | Age: 73
End: 2023-06-20
Payer: MEDICARE

## 2023-06-20 LAB — MAYO GENERIC ORDERABLE RESULT: ABNORMAL

## 2023-06-20 NOTE — PROGRESS NOTES
C3 nurse spoke with Chip Goodson and wife for a TCC post hospital discharge follow up call. The patient has a scheduled HOSFU appointment with Debbie Gonzalez MD (Family Medicine) 06/27/2023 @ 2:45 and Elsy Cordova MD (Hematology and Oncology);6/27/23 @ 09:40.  Patient unable to complete med reconciliation as he is not familiar with his medications.  Patient and wife asked that I contact their daughter, Ashleigh.  Attempted to contact daughter.  No answer. VM left.

## 2023-06-26 DIAGNOSIS — E83.52 HYPERCALCEMIA: Primary | ICD-10-CM

## 2023-06-26 DIAGNOSIS — E88.09 HYPOALBUMINEMIA: ICD-10-CM

## 2023-06-26 NOTE — PROGRESS NOTES
HEMATOLOGY/ONCOLOGY OFFICE CLINIC VISIT    Visit Information:    Initial Evaluation: 2023 (hospital consult)   Referring Provider: Dr Gonzalez  Other providers:  Code status: Not addressed    Diagnosis:  Multiple Myeloma    Present treatment:    Treatment/Oncology history:    Plan of care:     Imagin/16/23 XRAY Chest: Impression:  Improved aeration of the lung bases with mild residual bibasilar opacities and small pleural effusions suspected.    23 XRAY Chest: Impression:  1. Patchy hazy opacities again evident at the lower lungs bilaterally suspicious for infiltrate and atelectasis.  Small bibasilar pleural reactions cannot be excluded.  2. Borderline cardiomegaly  3. Atherosclerosis    6/10/23 CT Head:Impression:  No acute intracranial abnormality.  Findings consistent with microangiopathy no interval change.    23 MRI Thoracic Spine: IMPRESSION  1. Limited exam secondary to technical factors discussed above.  2. Within limitations, no convincing evidence of acute thoracic spine abnormality, high-grade canal stenosis, or focal cord pathology.  3. Multiple scattered vertebral body lesions are suspected and could represent metastatic disease, otherwise of incomplete characterization by non-contrast protocol.  4. Incidental findings of the partially visualized thoracic cavity and retroperitoneum discussed above, poorly assessed by modality/protocol.  Recent non-contrast chest CT provides overall better visualization.    23 Bone Scan Whole Body:    Impression:  1. Scattered activity at the anterior left right rib margins as described.  A CT exam on the previous day reveals no definite fracture.  There is mild heterogeneous and bony loss at the anterior left and right ribs.  This is not have the typical pattern of a metastases.  2. Focal increased activity at the anterior manubrium which again on the CT exam demonstrates osteopenia and heterogeneous bony loss as well as scattered subcentimeter  small lytic defects.  3. Suspect mild arthritic changes at the shoulders sternoclavicular joints    Pathology:  6/12/23 Bone marrow aspiration/Biopsy:   PLASMA CELL MYELOMA, KAPPA RESTRICTED.     PLASMA CELL MYELOMA INVOLVES 90% OF BONE MARROW CELLULARITY WITH SMALL AREAS OF  SEQUENTIAL TRILINEAGE HEMATOPOIESIS.    ABSENT IRON STORES.     PERIPHERAL BLOOD: NORMOCYTIC NORMOCHROMIC ANEMIA. THROMBOCYTOPENIA.         CLINICAL HISTORY:       Patient: Chip Goodson is a 72 y.o. male That I saw for the first time as hospital consult on 6/13/2023.   Patient was brought to the emergency department for confusion.  Upon evaluation in the ER CT scan of the head with no acute intracranial abnormality.  Finding consistent with microangiopathic no interval change.  CT of the chest with no contrast showed diffuse osteopenia with multiple, numerous, small round avoid lucent lesions ovarian size involving the marrow.  Changes could reflect metabolic bone disease like hyperparathyroidism.  MRI of the thoracic spine with multiple scattered vertebral body lesion are suspect and could represent metastatic disease.  Bone scan with scattered activity in the anterior left right rib margins no fractures focal increased activity at the anterior manubrium scattered subcentimeter small lytic defects.  Ultrasound of the thyroid that shows multiple nodules. skeletal survey  area on humerus only.     Labs with elevated calcium of 12.6, corrected calcium 14.36.  Total protein was 11.8 with a total globulin 10.0.  Further workup revealed an IgA over 7040.0, IgM 6.0, IgG 165.00.  Free serum kappa light chain 5.09, free serum lambda light chains 0.5600 with a kappa/lambda ratio of 9.09.  M spike 3.33.  PSA 0.96     Bone marrow biopsy performed yesterday. Patient receive pamidronate 60 mg on 06/09/2023 and was started on hydration.  His calcium improved as well as his mental status.       Chief Complaint: Multiple Myeloma (Hospital Follow up---  Multiple Myeloma)      Interval History:  6/27/23: Pt arrives today as a F/U hospital consult, with labs and bone marrow results. Bone marrow results show that he does have multiple myeloma. Extensively explained different treatment options. Pt states he has a painful cough. He stated that he has multiple family members with cancer as well. He has pain in joints of shoulders, and c/o food tasting funny. No SOB or fevers noted.         Past Medical History:   Diagnosis Date    GERD (gastroesophageal reflux disease)     HTN (hypertension)     Hypothyroidism, unspecified     Type 2 diabetes mellitus without complications       Past Surgical History:   Procedure Laterality Date    APPENDECTOMY  1965    BONE MARROW BIOPSY Right 06/12/2023    Procedure: BIOPSY, BONE MARROW;  Surgeon: Silvano Austin MD;  Location: [facility];  Service: General;  Laterality: Right;    removal of boils       Family History   Problem Relation Age of Onset    Lung cancer Mother     Breast cancer Sister      Social Connections: Unknown    Frequency of Communication with Friends and Family: Patient refused    Frequency of Social Gatherings with Friends and Family: Patient refused    Attends [employer] Services: Patient refused    Active Member of Clubs or Organizations: Patient refused    Attends Club or Organization Meetings: Patient refused    Marital Status: Patient refused       Review of patient's allergies indicates:   Allergen Reactions    Iodine Anaphylaxis    Shellfish containing products     Amoxicillin-pot clavulanate Itching      Current Outpatient Medications on File Prior to Visit   Medication Sig Dispense Refill    albuterol (PROVENTIL/VENTOLIN HFA) 90 mcg/actuation inhaler 2 puffs every 4 to 6 hours as needed.      amLODIPine (NORVASC) 10 MG tablet Take 1 tablet (10 mg total) by mouth once daily. 30 each 11    atorvastatin (LIPITOR) 10 MG tablet Take 10 mg by mouth.      carvediloL (COREG) 12.5 MG tablet Take 12.5 mg by mouth 2 (two)  times daily.      cloNIDine (CATAPRES) 0.2 MG tablet Take 0.2 mg by mouth daily as needed.      famotidine (PEPCID) 20 MG tablet Take 1 tablet (20 mg total) by mouth once daily. 30 tablet 11    finasteride (PROSCAR) 5 mg tablet Take 5 mg by mouth.      gabapentin (NEURONTIN) 100 MG capsule Take by mouth.      hydrALAZINE (APRESOLINE) 50 MG tablet Take 1 tablet (50 mg total) by mouth every 8 (eight) hours. 90 tablet 11    levothyroxine (SYNTHROID) 112 MCG tablet Take 112 mcg by mouth.      ONETOUCH ULTRA TEST Strp USE TO TEST BLOOD GLUCOSE TWICE DAILY      pantoprazole (PROTONIX) 40 MG tablet Take 1 tablet (40 mg total) by mouth 2 (two) times daily before meals. 60 tablet 11    SITagliptin phosphate (JANUVIA) 50 MG Tab Take 1 tablet (50 mg total) by mouth once daily. 30 tablet 5    spironolactone (ALDACTONE) 25 MG tablet Take 12.5 mg by mouth.      tamsulosin (FLOMAX) 0.4 mg Cap Take 1 capsule by mouth.       No current facility-administered medications on file prior to visit.      Review of Systems   Constitutional:  Positive for activity change and fatigue. Negative for appetite change, chills, diaphoresis, fever and unexpected weight change.   HENT:  Negative for nasal congestion, mouth sores, nosebleeds, sinus pressure/congestion, sore throat and trouble swallowing.    Eyes: Negative.    Respiratory:  Negative for cough and shortness of breath.    Cardiovascular:  Negative for chest pain and palpitations.   Gastrointestinal:  Negative for abdominal distention, abdominal pain, blood in stool, change in bowel habit, constipation, diarrhea, nausea, vomiting and change in bowel habit.   Endocrine: Negative.    Genitourinary:  Negative for bladder incontinence, decreased urine volume, difficulty urinating, dysuria, frequency, hematuria and urgency.   Musculoskeletal:  Negative for arthralgias, back pain, gait problem, joint swelling, leg pain and myalgias.   Integumentary:  Negative for rash.   Allergic/Immunologic:  "Negative.    Neurological:  Positive for weakness. Negative for dizziness, tremors, syncope, light-headedness, numbness, headaches and memory loss.   Hematological:  Negative for adenopathy. Does not bruise/bleed easily.   Psychiatric/Behavioral:  Negative for agitation, confusion, hallucinations, sleep disturbance and suicidal ideas. The patient is not nervous/anxious.             Vitals:    06/27/23 0947   BP: 129/77   BP Location: Left arm   Pulse: 65   Resp: 20   Temp: 97.6 °F (36.4 °C)   SpO2: 97%   Weight: 88.5 kg (195 lb)   Height: 5' 11" (1.803 m)      Wt Readings from Last 6 Encounters:   06/27/23 88.5 kg (195 lb)   06/07/23 113.4 kg (250 lb)   02/07/18 111.2 kg (245 lb 2.4 oz)     Body mass index is 27.2 kg/m².  Body surface area is 2.11 meters squared.  Physical Exam  Vitals and nursing note reviewed.   Constitutional:       General: He is not in acute distress.     Appearance: Normal appearance.      Comments: Elderly male   HENT:      Head: Normocephalic and atraumatic.      Mouth/Throat:      Mouth: Mucous membranes are moist.   Eyes:      General: No scleral icterus.     Extraocular Movements: Extraocular movements intact.      Conjunctiva/sclera: Conjunctivae normal.   Neck:      Vascular: No JVD.   Cardiovascular:      Rate and Rhythm: Normal rate and regular rhythm.      Heart sounds: No murmur heard.  Pulmonary:      Effort: Pulmonary effort is normal.      Breath sounds: Normal breath sounds. No wheezing or rhonchi.   Abdominal:      General: Bowel sounds are normal. There is no distension.      Palpations: Abdomen is soft.      Tenderness: There is no abdominal tenderness.   Musculoskeletal:         General: No swelling or deformity.      Cervical back: Neck supple.   Lymphadenopathy:      Head:      Right side of head: No submandibular adenopathy.      Left side of head: No submandibular adenopathy.      Cervical: No cervical adenopathy.      Upper Body:      Right upper body: No " supraclavicular or axillary adenopathy.      Left upper body: No supraclavicular or axillary adenopathy.      Lower Body: No right inguinal adenopathy. No left inguinal adenopathy.   Skin:     General: Skin is warm.      Coloration: Skin is not jaundiced.      Findings: No rash.   Neurological:      General: No focal deficit present.      Mental Status: He is alert and oriented to person, place, and time.      Cranial Nerves: Cranial nerves 2-12 are intact.   Psychiatric:         Attention and Perception: Attention normal.         Behavior: Behavior is cooperative.     ECOG SCORE             Laboratory:  CBC with Differential:  Lab Results   Component Value Date    WBC 6.70 06/27/2023    RBC 3.00 (L) 06/27/2023    HGB 9.7 (L) 06/27/2023    HCT 29.4 (L) 06/27/2023    MCV 98.0 (H) 06/27/2023    MCH 32.3 (H) 06/27/2023    MCHC 33.0 06/27/2023    RDW 15.4 06/27/2023     06/27/2023    MPV 8.7 06/27/2023     Serum:  SPEP: Serum protein electrophoresis shows a distinct monoclonal peak (3.33 g/dL) in the beta zone, consistent with a monoclonal gammopathy.   PAO: A band is present in the IgA trini with a corresponding band in the kappa trini.   The immunofixation electrophoresis are consistent with monoclonal gammopathy of IgA-kappa isotype.     IgG Level 540.00 - 1,822.00 mg/dL 165.00 Low     IgA Level 101.0 - 645.0 mg/dL >7,040.0 High     IgM Level 22.0 - 240.0 mg/dL 6.0 Low       Kappa Free Light Chain 0.3300 - 1.94 mg/dL 5.09 High     Lambda Free Light Chain 0.5700 - 2.63 mg/dL 0.5600 Low     Kappa/Lambda FLC Ratio 0.2600 - 1.65 9.09 High      Urine:   24 hrs Urine:  M spike    1467      H    mg/24 h    CMP:  Sodium Level   Date Value Ref Range Status   06/27/2023 135 (L) 136 - 145 mmol/L Final     Potassium Level   Date Value Ref Range Status   06/27/2023 4.4 3.5 - 5.1 mmol/L Final     Carbon Dioxide   Date Value Ref Range Status   06/27/2023 23 23 - 31 mmol/L Final     Blood Urea Nitrogen   Date Value Ref Range  Status   06/27/2023 16.0 8.4 - 25.7 mg/dL Final     Creatinine   Date Value Ref Range Status   06/27/2023 1.75 (H) 0.73 - 1.18 mg/dL Final     Calcium Level Total   Date Value Ref Range Status   06/27/2023 8.3 (L) 8.8 - 10.0 mg/dL Final     Albumin Level   Date Value Ref Range Status   06/27/2023 1.8 (L) 3.4 - 4.8 g/dL Final     Bilirubin Total   Date Value Ref Range Status   06/27/2023 0.4 <=1.5 mg/dL Final     Alkaline Phosphatase   Date Value Ref Range Status   06/27/2023 92 40 - 150 unit/L Final     Aspartate Aminotransferase   Date Value Ref Range Status   06/27/2023 21 5 - 34 unit/L Final     Alanine Aminotransferase   Date Value Ref Range Status   06/27/2023 17 0 - 55 unit/L Final             Assessment:       1. Multiple myeloma, remission status unspecified          Plan:       Patient is doing much better today.  He is ready to start treatment.    -Chemo Ed scxheduled  -Chest XRAY today   RTC w/MD in 4 weeks with labs/results  The patient was seen, interviewed and examined. Pertinent lab and radiology studies were reviewed.   The patient was given ample opportunity to ask questions, and to the best of my abilities, all questions answered to satisfaction; patient demonstrated understanding of what we discussed and agreeable to the plan. Pt instructed to call should develop concerning signs/symptoms or have further questions.       Elsy Amaya MD  Hematology/Oncology

## 2023-06-26 NOTE — DISCHARGE SUMMARY
Discharge summary     Admit dx   1. Metabolic encephalopathy secondary to hypercalcemia under investigations as   parathyroid hormone and workup is sent out.  2. Gait problems, most likely secondary to hyperparathyroidism.  3. History of hypertension.  4. History of dyslipidemia.  5. History of hypothyroidism.  6. History of low back pain for the recent fall.  7. History of benign prostatic hypertrophy with lower urinary tract symptoms, on   appropriate therapy.    8. Acute kidney injury secondary to hypercalcemia.     Discharge  dx   Pleural effusion     Confusion    Hypoalbuminemia    Hypertension         Abnormal radionuclide bone scan   IgA monoclonal gammopathy  Altered mental status-improving patient more alert today  Hypercalcemia-improving  Hypokalemia  COLTON-improving      72   year  old brought to  the   ER  w confusion over the prior   week. He  was found to have  hypercalcemia. He  was started on fluids for  calcium  and   COLTON.   He was slow to  respond to  this and  was given pamidronate 60 mg.  After 2-3 days, his calcium did come down.  When his calcium decreased his mentation improved.  He underwent a bone marrow biopsy.  It showed sheets of plasma cells consistent with myeloma.  He did have activity on  his nuclear medicine scan his ribs and thoracic MRI showed lesions in his T-spine.  He did have some infiltrate in his lungs which she was started on Rocephin.  After the aggressive hydration he did have some pleural effusions resolving with Lasix.  Dr. Amaya saw him for consultation initially he had a follow-up consultation with Dr. Lejeune while in the hospital.  He is to follow up with Dr. Amaya on June 27th and he is to follow-up with me in a week he is to call if he has fever reason shortness of breath or any increase in pain  Lab findings and physical exam below            Vitals:    06/19/23 0432   BP: (!) 141/76   Pulse: 74   Resp:    Temp: 97.6 °F (36.4 °C)        Physical Exam   Constitutional: alert & oriented, no acute distress  HENT:   Head: Normocephalic and atraumatic.  Congnition greatly improved  Eyes: Conjunctivae normal and EOM are normal.   Wearing dentures  Pupils are equal, round, and reactive to light.   Neck: Normal range of motion. Neck supple.   Cardiovascular: Normal rate, regular rhythm, normal heart sounds   Pulmonary/Chest: CTAB, nonlabored respiration diminished  breath sounds   Abdominal: Soft, nontender, bowel sounds normal  Neurological: nonfocal  Skin: warm, dry intact  Psychiatric: normal mood and affect, cooperative                    X-Ray Chest PA And Lateral  Narrative: EXAMINATION:  XR CHEST PA AND LATERAL    CLINICAL HISTORY:  Dyspnea;    COMPARISON:  12 June 2023    FINDINGS:  Frontal and lateral views of the chest were obtained. Heart size upper limit normal, stable.  There is improved aeration of the lung bases compared to prior but some mild bibasilar opacities persist.  Suspect small pleural effusions as well.  Impression: Improved aeration of the lung bases with mild residual bibasilar opacities and small pleural effusions suspected.    Electronically signed by: Mesfin Varma  Date:    06/16/2023  Time:    09:32      FINAL DIAGNOSIS       1. BONE MARROW, RIGHT POSTERIOR ILIAC CREST, ASPIRATE, CLOT, DECALCIFIED CORE   BIOPSY:       PLASMA CELL MYELOMA, KAPPA RESTRICTED.       PLASMA CELL MYELOMA INVOLVES 90% OF BONE MARROW CELLULARITY WITH SMALL AREAS OF   SEQUENTIAL TRILINEAGE HEMATOPOIESIS.       ABSENT IRON STORES.       PERIPHERAL BLOOD:       NORMOCYTIC NORMOCHROMIC ANEMIA.       THROMBOCYTOPENIA.       2. SEE DIAGNOSIS #1.       CODE 9       EXAMINATION  MRI THORACIC SPINE WITHOUT CONTRAST     CLINICAL HISTORY  Bone neoplasm, thoracic spine, recurrence suspected;Mid-back pain, neuro deficit;     TECHNIQUE  Multiplanar, multisequence MR images were obtained without the intravenous administration of gadolinium-based contrast  media.     COMPARISON  None available at the time of initial interpretation.     FINDINGS  Exam quality: Markedly limited secondary to extensive patient movement and severe resulting artifact; this renders the axial T2-weighted sequence is centrally nondiagnostic.  Additionally, assessment of potential osseous lesions is significantly degraded by non-contrast protocol.     Vertebral bodies: No definite acute cortical displacement, retropulsion, or vertebral body compression deformity is identified.  Degenerative endplate and facet changes are noted throughout the spinal column, with multilevel right anterolateral osteophytic projections.  There is notable widespread and relatively poor marginated heterogeneous background marrow signal pattern.  No definite expansile or destructive focal lesion is identified.  However, multilevel circumscribed subtle T2/STIR hyperintense regions are noted and could represent neoplastic etiology.  For example, structure at the right T5 vertebral body measures approximately 1.4 cm x 1.5 cm (series 7, image 10); central T10 lesion measures 1.4 cm x 1.7 cm (image 10).     Disc spaces: Mild to moderate multilevel disc height loss is appreciated, as well as signal changes suggestive of degenerative desiccation.  Minimal areas of posterior protrusion and disc osteophyte complexes are appreciated throughout the thoracic spine, resulting in no significant spinal canal or foraminal stenosis.     Spinal canal: The visualized spinal cord is normal in size, contour, and signal pattern.  The conus medullaris terminates below the level of L1.     Other findings: The included paraspinal musculature and visualized retroperitoneal structures are without evidence of acute abnormality, although evaluation is limited by exam protocol.  Moderate volume layering left pleural effusion and suspected trace right pleural fluid noted, better evaluated on the 7 June 2023 chest CT.  Additionally, large exophytic  cystic structures of the kidneys are also poorly assessed by technical factors discussed above.     IMPRESSION  1. Limited exam secondary to technical factors discussed above.  2. Within limitations, no convincing evidence of acute thoracic spine abnormality, high-grade canal stenosis, or focal cord pathology.  3. Multiple scattered vertebral body lesions are suspected and could represent metastatic disease, otherwise of incomplete characterization by non-contrast protocol.  4. Incidental findings of the partially visualized thoracic cavity and retroperitoneum discussed above, poorly assessed by modality/protocol.  Recent non-contrast chest CT provides overall better visualization.  ==========     This report was flagged in Epic as abnormal.        Electronically signed by: Erlin Juárez  Date:                                            06/09/2023  Time:                                           18:29           Exam Ended: 06/09/23 15:43 Last Resulted: 06/09/23 18:29       Order Details     View Encounter     Lab and Collection Details     Routing     Result History     View Encounter Conversation           Result Care Coordination      Patient Communication     Add Comments   Seen Back to Top             MRI Thoracic Spine Without Contrast: Patient Communication     Add Comments   Seen     External Result Report    External Result Report     Narrative & Impression    EXAMINATION  MRI THORACIC SPINE WITHOUT CONTRAST     CLINICAL HISTORY  Bone neoplasm, thoracic spine, recurrence suspected;Mid-back pain, neuro deficit;     TECHNIQUE  Multiplanar, multisequence MR images were obtained without the intravenous administration of gadolinium-based contrast media.     COMPARISON  None available at the time of initial interpretation.     FINDINGS  Exam quality: Markedly limited secondary to extensive patient movement and severe resulting artifact; this renders the axial T2-weighted sequence is centrally nondiagnostic.   Additionally, assessment of potential osseous lesions is significantly degraded by non-contrast protocol.     Vertebral bodies: No definite acute cortical displacement, retropulsion, or vertebral body compression deformity is identified.  Degenerative endplate and facet changes are noted throughout the spinal column, with multilevel right anterolateral osteophytic projections.  There is notable widespread and relatively poor marginated heterogeneous background marrow signal pattern.  No definite expansile or destructive focal lesion is identified.  However, multilevel circumscribed subtle T2/STIR hyperintense regions are noted and could represent neoplastic etiology.  For example, structure at the right T5 vertebral body measures approximately 1.4 cm x 1.5 cm (series 7, image 10); central T10 lesion measures 1.4 cm x 1.7 cm (image 10).     Disc spaces: Mild to moderate multilevel disc height loss is appreciated, as well as signal changes suggestive of degenerative desiccation.  Minimal areas of posterior protrusion and disc osteophyte complexes are appreciated throughout the thoracic spine, resulting in no significant spinal canal or foraminal stenosis.     Spinal canal: The visualized spinal cord is normal in size, contour, and signal pattern.  The conus medullaris terminates below the level of L1.     Other findings: The included paraspinal musculature and visualized retroperitoneal structures are without evidence of acute abnormality, although evaluation is limited by exam protocol.  Moderate volume layering left pleural effusion and suspected trace right pleural fluid noted, better evaluated on the 7 June 2023 chest CT.  Additionally, large exophytic cystic structures of the kidneys are also poorly assessed by technical factors discussed above.     IMPRESSION  1. Limited exam secondary to technical factors discussed above.  2. Within limitations, no convincing evidence of acute thoracic spine abnormality,  high-grade canal stenosis, or focal cord pathology.  3. Multiple scattered vertebral body lesions are suspected and could represent metastatic disease, otherwise of incomplete characterization by non-contrast protocol.  4. Incidental findings of the partially visualized thoracic cavity and retroperitoneum discussed above, poorly assessed by modality/protocol.  Recent non-contrast chest CT provides overall better visualization.  ==========     This report was flagged in Epic as abnormal.     NM Bone Scan Whole Body  Status: Final result     MyChart Results Release    [facility] Status: Active  Results Release     PACS Images for [employer] Viewer     Show images for NM Bone Scan Whole Body  NM Bone Scan Whole Body  Order: [id number]  Status: Final result     Visible to patient: Yes (seen)     Next appt: 06/27/2023 at 09:40 AM in Hematology and Oncology (Elsy Cordova MD)     0 Result Notes  Details    Reading Physician Reading Date Result Priority   Boo Camacho MD  [phone] 6/8/2023 Routine     Narrative & Impression  EXAMINATION:  NM BONE SCAN WHOLE BODY     CLINICAL HISTORY:  myeloma ??;, .     TECHNIQUE:  mCi of Tc-99m MDP was administered intravenously. After appropriate delay, whole body bone scan images were obtained.     COMPARISON:  None available     FINDINGS:  Activity is identified within the kidneys bilaterally and within the bladder. Focal increased activity is evident at the anterior right 4th 5th and 6th ribs and anterior left 1st, 4th, and 7th ribs.  There is mild central activity at the manubrium.  There is mild increased activity at the sternoclavicular joints as well as the shoulders bilaterally.  Mild increased activity evident thyroid bed.     Impression:     1. Scattered activity at the anterior left right rib margins as described.  A CT exam on the previous day reveals no definite fracture.  There is mild heterogeneous and bony loss at the anterior left and right ribs.   This is not have the typical pattern of a metastases.  2. Focal increased activity at the anterior manubrium which again on the CT exam demonstrates osteopenia and heterogeneous bony loss as well as scattered subcentimeter small lytic defects.  3. Suspect mild arthritic changes at the shoulders sternoclavicular joints        Electronically signed by: Boo Camacho  Date:                                            06/08/2023

## 2023-06-27 ENCOUNTER — OFFICE VISIT (OUTPATIENT)
Dept: HEMATOLOGY/ONCOLOGY | Facility: CLINIC | Age: 73
End: 2023-06-27
Payer: MEDICARE

## 2023-06-27 ENCOUNTER — HOSPITAL ENCOUNTER (OUTPATIENT)
Dept: RADIOLOGY | Facility: HOSPITAL | Age: 73
Discharge: HOME | End: 2023-06-27
Attending: INTERNAL MEDICINE
Payer: MEDICARE

## 2023-06-27 VITALS
RESPIRATION RATE: 20 BRPM | SYSTOLIC BLOOD PRESSURE: 129 MMHG | DIASTOLIC BLOOD PRESSURE: 77 MMHG | BODY MASS INDEX: 27.3 KG/M2 | TEMPERATURE: 98 F | WEIGHT: 195 LBS | HEIGHT: 71 IN | HEART RATE: 65 BPM | OXYGEN SATURATION: 97 %

## 2023-06-27 DIAGNOSIS — E88.09 HYPOALBUMINEMIA: ICD-10-CM

## 2023-06-27 DIAGNOSIS — Z29.11 ENCOUNTER FOR PROPHYLACTIC IMMUNOTHERAPY FOR RESPIRATORY SYNCYTIAL VIRUS (RSV): ICD-10-CM

## 2023-06-27 DIAGNOSIS — C90.00 MULTIPLE MYELOMA, REMISSION STATUS UNSPECIFIED: Primary | ICD-10-CM

## 2023-06-27 DIAGNOSIS — C90.00 MULTIPLE MYELOMA NOT HAVING ACHIEVED REMISSION: ICD-10-CM

## 2023-06-27 DIAGNOSIS — C90.00 MULTIPLE MYELOMA, REMISSION STATUS UNSPECIFIED: ICD-10-CM

## 2023-06-27 PROCEDURE — 99215 OFFICE O/P EST HI 40 MIN: CPT | Mod: S$PBB,,, | Performed by: INTERNAL MEDICINE

## 2023-06-27 PROCEDURE — 99999 PR PBB SHADOW E&M-EST. PATIENT-LVL V: CPT | Mod: PBBFAC,,, | Performed by: INTERNAL MEDICINE

## 2023-06-27 PROCEDURE — 99215 PR OFFICE/OUTPT VISIT, EST, LEVL V, 40-54 MIN: ICD-10-PCS | Mod: S$PBB,,, | Performed by: INTERNAL MEDICINE

## 2023-06-27 PROCEDURE — 71046 X-RAY EXAM CHEST 2 VIEWS: CPT | Mod: TC

## 2023-06-27 PROCEDURE — 99215 OFFICE O/P EST HI 40 MIN: CPT | Mod: PBBFAC,25 | Performed by: INTERNAL MEDICINE

## 2023-06-27 PROCEDURE — 99999 PR PBB SHADOW E&M-EST. PATIENT-LVL V: ICD-10-PCS | Mod: PBBFAC,,, | Performed by: INTERNAL MEDICINE

## 2023-06-27 RX ORDER — MONTELUKAST SODIUM 10 MG/1
10 TABLET ORAL
Status: CANCELLED | OUTPATIENT
Start: 2023-07-12

## 2023-06-27 RX ORDER — MONTELUKAST SODIUM 10 MG/1
10 TABLET ORAL
Status: CANCELLED | OUTPATIENT
Start: 2023-07-25

## 2023-06-27 RX ORDER — DEXAMETHASONE 4 MG/1
20 TABLET ORAL
Status: CANCELLED | OUTPATIENT
Start: 2023-07-26

## 2023-06-27 RX ORDER — SODIUM CHLORIDE 0.9 % (FLUSH) 0.9 %
10 SYRINGE (ML) INJECTION
Status: CANCELLED | OUTPATIENT
Start: 2023-07-12

## 2023-06-27 RX ORDER — EPINEPHRINE 0.3 MG/.3ML
0.3 INJECTION SUBCUTANEOUS ONCE AS NEEDED
Status: CANCELLED | OUTPATIENT
Start: 2023-07-12

## 2023-06-27 RX ORDER — DEXAMETHASONE 4 MG/1
20 TABLET ORAL
Status: CANCELLED | OUTPATIENT
Start: 2023-07-12

## 2023-06-27 RX ORDER — HEPARIN 100 UNIT/ML
500 SYRINGE INTRAVENOUS
Status: CANCELLED | OUTPATIENT
Start: 2023-08-15

## 2023-06-27 RX ORDER — EPINEPHRINE 0.3 MG/.3ML
0.3 INJECTION SUBCUTANEOUS ONCE AS NEEDED
Status: CANCELLED | OUTPATIENT
Start: 2023-08-15

## 2023-06-27 RX ORDER — DIPHENHYDRAMINE HYDROCHLORIDE 50 MG/ML
50 INJECTION INTRAMUSCULAR; INTRAVENOUS ONCE AS NEEDED
Status: CANCELLED | OUTPATIENT
Start: 2023-07-25

## 2023-06-27 RX ORDER — DEXAMETHASONE 4 MG/1
20 TABLET ORAL
Status: CANCELLED | OUTPATIENT
Start: 2023-07-05

## 2023-06-27 RX ORDER — ACETAMINOPHEN 325 MG/1
650 TABLET ORAL
Status: CANCELLED | OUTPATIENT
Start: 2023-07-25

## 2023-06-27 RX ORDER — ACETAMINOPHEN 325 MG/1
650 TABLET ORAL
Status: CANCELLED | OUTPATIENT
Start: 2023-08-15

## 2023-06-27 RX ORDER — FAMOTIDINE 20 MG/1
20 TABLET, FILM COATED ORAL
Status: CANCELLED | OUTPATIENT
Start: 2023-07-12

## 2023-06-27 RX ORDER — DIPHENHYDRAMINE HYDROCHLORIDE 50 MG/ML
50 INJECTION INTRAMUSCULAR; INTRAVENOUS ONCE AS NEEDED
Status: CANCELLED | OUTPATIENT
Start: 2023-08-15

## 2023-06-27 RX ORDER — DEXAMETHASONE 4 MG/1
20 TABLET ORAL
Qty: 20 TABLET | Refills: 1 | Status: SHIPPED | OUTPATIENT
Start: 2023-06-27 | End: 2023-06-28 | Stop reason: SDUPTHER

## 2023-06-27 RX ORDER — SODIUM CHLORIDE 0.9 % (FLUSH) 0.9 %
10 SYRINGE (ML) INJECTION
Status: CANCELLED | OUTPATIENT
Start: 2023-07-25

## 2023-06-27 RX ORDER — DIPHENHYDRAMINE HCL 25 MG
25 CAPSULE ORAL
Status: CANCELLED | OUTPATIENT
Start: 2023-08-15

## 2023-06-27 RX ORDER — DIPHENHYDRAMINE HCL 25 MG
25 CAPSULE ORAL
Status: CANCELLED | OUTPATIENT
Start: 2023-07-25

## 2023-06-27 RX ORDER — EPINEPHRINE 0.3 MG/.3ML
0.3 INJECTION SUBCUTANEOUS ONCE AS NEEDED
Status: CANCELLED | OUTPATIENT
Start: 2023-07-25

## 2023-06-27 RX ORDER — SODIUM CHLORIDE 0.9 % (FLUSH) 0.9 %
10 SYRINGE (ML) INJECTION
Status: CANCELLED | OUTPATIENT
Start: 2023-08-15

## 2023-06-27 RX ORDER — DIPHENHYDRAMINE HCL 25 MG
25 CAPSULE ORAL
Status: CANCELLED | OUTPATIENT
Start: 2023-08-01

## 2023-06-27 RX ORDER — DIPHENHYDRAMINE HYDROCHLORIDE 50 MG/ML
50 INJECTION INTRAMUSCULAR; INTRAVENOUS ONCE AS NEEDED
Status: CANCELLED | OUTPATIENT
Start: 2023-08-01

## 2023-06-27 RX ORDER — MONTELUKAST SODIUM 10 MG/1
10 TABLET ORAL
Status: CANCELLED | OUTPATIENT
Start: 2023-08-01

## 2023-06-27 RX ORDER — SODIUM CHLORIDE 0.9 % (FLUSH) 0.9 %
10 SYRINGE (ML) INJECTION
Status: CANCELLED | OUTPATIENT
Start: 2023-08-01

## 2023-06-27 RX ORDER — DEXAMETHASONE 4 MG/1
20 TABLET ORAL
Status: CANCELLED | OUTPATIENT
Start: 2023-07-19

## 2023-06-27 RX ORDER — ACETAMINOPHEN 325 MG/1
650 TABLET ORAL
Status: CANCELLED | OUTPATIENT
Start: 2023-07-12

## 2023-06-27 RX ORDER — HEPARIN 100 UNIT/ML
500 SYRINGE INTRAVENOUS
Status: CANCELLED | OUTPATIENT
Start: 2023-07-25

## 2023-06-27 RX ORDER — DIPHENHYDRAMINE HYDROCHLORIDE 50 MG/ML
50 INJECTION INTRAMUSCULAR; INTRAVENOUS ONCE AS NEEDED
Status: CANCELLED | OUTPATIENT
Start: 2023-07-12

## 2023-06-27 RX ORDER — DIPHENHYDRAMINE HCL 25 MG
25 CAPSULE ORAL
Status: CANCELLED | OUTPATIENT
Start: 2023-07-12

## 2023-06-27 RX ORDER — HEPARIN 100 UNIT/ML
500 SYRINGE INTRAVENOUS
Status: CANCELLED | OUTPATIENT
Start: 2023-07-12

## 2023-06-27 RX ORDER — HEPARIN 100 UNIT/ML
500 SYRINGE INTRAVENOUS
Status: CANCELLED | OUTPATIENT
Start: 2023-08-01

## 2023-06-27 RX ORDER — ACETAMINOPHEN 325 MG/1
650 TABLET ORAL
Status: CANCELLED | OUTPATIENT
Start: 2023-08-01

## 2023-06-27 RX ORDER — EPINEPHRINE 0.3 MG/.3ML
0.3 INJECTION SUBCUTANEOUS ONCE AS NEEDED
Status: CANCELLED | OUTPATIENT
Start: 2023-08-01

## 2023-06-28 DIAGNOSIS — C90.00 MULTIPLE MYELOMA NOT HAVING ACHIEVED REMISSION: Primary | ICD-10-CM

## 2023-06-28 RX ORDER — LENALIDOMIDE 20 MG/1
20 CAPSULE ORAL DAILY
Qty: 21 CAPSULE | Refills: 0 | Status: SHIPPED | OUTPATIENT
Start: 2023-06-28 | End: 2023-07-03 | Stop reason: DRUGHIGH

## 2023-06-28 RX ORDER — DEXAMETHASONE 4 MG/1
20 TABLET ORAL
Qty: 20 TABLET | Refills: 1 | Status: SHIPPED | OUTPATIENT
Start: 2023-06-28 | End: 2024-02-06

## 2023-07-03 ENCOUNTER — OFFICE VISIT (OUTPATIENT)
Dept: HEMATOLOGY/ONCOLOGY | Facility: CLINIC | Age: 73
End: 2023-07-03
Payer: MEDICARE

## 2023-07-03 ENCOUNTER — TELEPHONE (OUTPATIENT)
Dept: PHARMACY | Facility: CLINIC | Age: 73
End: 2023-07-03
Payer: MEDICARE

## 2023-07-03 ENCOUNTER — CLINICAL SUPPORT (OUTPATIENT)
Dept: HEMATOLOGY/ONCOLOGY | Facility: CLINIC | Age: 73
End: 2023-07-03
Payer: MEDICARE

## 2023-07-03 ENCOUNTER — SPECIALTY PHARMACY (OUTPATIENT)
Dept: PHARMACY | Facility: CLINIC | Age: 73
End: 2023-07-03
Payer: MEDICARE

## 2023-07-03 VITALS
OXYGEN SATURATION: 96 % | HEIGHT: 71 IN | DIASTOLIC BLOOD PRESSURE: 64 MMHG | SYSTOLIC BLOOD PRESSURE: 117 MMHG | RESPIRATION RATE: 14 BRPM | BODY MASS INDEX: 27.63 KG/M2 | WEIGHT: 197.38 LBS | TEMPERATURE: 98 F | HEART RATE: 71 BPM

## 2023-07-03 DIAGNOSIS — C90.00 MULTIPLE MYELOMA, REMISSION STATUS UNSPECIFIED: Primary | ICD-10-CM

## 2023-07-03 PROCEDURE — 99999 PR PBB SHADOW E&M-EST. PATIENT-LVL IV: CPT | Mod: PBBFAC,,,

## 2023-07-03 PROCEDURE — 99999 PR PBB SHADOW E&M-EST. PATIENT-LVL I: ICD-10-PCS | Mod: PBBFAC,,,

## 2023-07-03 PROCEDURE — 99214 OFFICE O/P EST MOD 30 MIN: CPT | Mod: PBBFAC

## 2023-07-03 PROCEDURE — 99999 PR PBB SHADOW E&M-EST. PATIENT-LVL IV: ICD-10-PCS | Mod: PBBFAC,,,

## 2023-07-03 PROCEDURE — 99215 PR OFFICE/OUTPT VISIT, EST, LEVL V, 40-54 MIN: ICD-10-PCS | Mod: S$PBB,,,

## 2023-07-03 PROCEDURE — 99999 PR PBB SHADOW E&M-EST. PATIENT-LVL I: CPT | Mod: PBBFAC,,,

## 2023-07-03 PROCEDURE — 99211 OFF/OP EST MAY X REQ PHY/QHP: CPT | Mod: PBBFAC

## 2023-07-03 PROCEDURE — 99215 OFFICE O/P EST HI 40 MIN: CPT | Mod: S$PBB,,,

## 2023-07-03 RX ORDER — ACYCLOVIR 400 MG/1
400 TABLET ORAL 2 TIMES DAILY
Qty: 60 TABLET | Refills: 11 | Status: SHIPPED | OUTPATIENT
Start: 2023-07-03 | End: 2024-07-02

## 2023-07-03 RX ORDER — ONDANSETRON HYDROCHLORIDE 8 MG/1
8 TABLET, FILM COATED ORAL EVERY 8 HOURS PRN
Qty: 30 TABLET | Refills: 2 | Status: SHIPPED | OUTPATIENT
Start: 2023-07-03 | End: 2024-07-02

## 2023-07-03 RX ORDER — LENALIDOMIDE 10 MG/1
10 CAPSULE ORAL DAILY
Qty: 21 EACH | Refills: 0 | Status: ACTIVE | OUTPATIENT
Start: 2023-07-03 | End: 2023-08-08 | Stop reason: SDUPTHER

## 2023-07-03 RX ORDER — HYDROCODONE POLISTIREX AND CHLORPHENIRAMINE POLISTIREX 10; 8 MG/5ML; MG/5ML
SUSPENSION, EXTENDED RELEASE ORAL
COMMUNITY
Start: 2023-06-28 | End: 2024-01-29

## 2023-07-03 NOTE — TELEPHONE ENCOUNTER
PA for lenalidomide approved until 06/28/2026. Patient may fill medication at [facility] (phone # [phone])    Will route rx to [facility] and close out [facility] encounter

## 2023-07-03 NOTE — TELEPHONE ENCOUNTER
Joseph, this is Marquita Dao, clinical pharmacist with [facility] that is part of your care team.  We have begun working on your prescription that your doctor has sent us. Our next steps include:     Working with your insurance company to obtain approval for your medication  Working with you to ensure your medication is affordable     We will be calling you along the way with updates on your medication but if you have any concerns or receive information that you would like to discuss please reach us at [phone].    Welcome call outcome: Patient/caregiver reached

## 2023-07-03 NOTE — PROGRESS NOTES
THERAPY EDUCATION: DARATUMUMAB + LENALIDOMIDE + DEXAMETHASONE     Diagnosis:  Multiple Myeloma    Present treatment: Daratumumab + Lenalidomide + Dexamethasone     Treatment/Oncology history:    Plan of care:     Imagin/16/23 XRAY Chest: Impression:  Improved aeration of the lung bases with mild residual bibasilar opacities and small pleural effusions suspected.    23 XRAY Chest: Impression:  1. Patchy hazy opacities again evident at the lower lungs bilaterally suspicious for infiltrate and atelectasis.  Small bibasilar pleural reactions cannot be excluded.  2. Borderline cardiomegaly  3. Atherosclerosis    6/10/23 CT Head:Impression:  No acute intracranial abnormality.  Findings consistent with microangiopathy no interval change.    23 MRI Thoracic Spine: IMPRESSION  1. Limited exam secondary to technical factors discussed above.  2. Within limitations, no convincing evidence of acute thoracic spine abnormality, high-grade canal stenosis, or focal cord pathology.  3. Multiple scattered vertebral body lesions are suspected and could represent metastatic disease, otherwise of incomplete characterization by non-contrast protocol.  4. Incidental findings of the partially visualized thoracic cavity and retroperitoneum discussed above, poorly assessed by modality/protocol.  Recent non-contrast chest CT provides overall better visualization.    23 Bone Scan Whole Body:    Impression:  1. Scattered activity at the anterior left right rib margins as described.  A CT exam on the previous day reveals no definite fracture.  There is mild heterogeneous and bony loss at the anterior left and right ribs.  This is not have the typical pattern of a metastases.  2. Focal increased activity at the anterior manubrium which again on the CT exam demonstrates osteopenia and heterogeneous bony loss as well as scattered subcentimeter small lytic defects.  3. Suspect mild arthritic changes at the shoulders  sternoclavicular joints    Pathology:  6/12/23 Bone marrow aspiration/Biopsy:   PLASMA CELL MYELOMA, KAPPA RESTRICTED.     PLASMA CELL MYELOMA INVOLVES 90% OF BONE MARROW CELLULARITY WITH SMALL AREAS OF  SEQUENTIAL TRILINEAGE HEMATOPOIESIS.    ABSENT IRON STORES.     PERIPHERAL BLOOD: NORMOCYTIC NORMOCHROMIC ANEMIA. THROMBOCYTOPENIA.         CLINICAL HISTORY:       Patient: Chip Goodson is a 72 y.o. male That I saw for the first time as hospital consult on 6/13/2023.   Patient was brought to the emergency department for confusion.  Upon evaluation in the ER CT scan of the head with no acute intracranial abnormality.  Finding consistent with microangiopathic no interval change.  CT of the chest with no contrast showed diffuse osteopenia with multiple, numerous, small round avoid lucent lesions ovarian size involving the marrow.  Changes could reflect metabolic bone disease like hyperparathyroidism.  MRI of the thoracic spine with multiple scattered vertebral body lesion are suspect and could represent metastatic disease.  Bone scan with scattered activity in the anterior left right rib margins no fractures focal increased activity at the anterior manubrium scattered subcentimeter small lytic defects.  Ultrasound of the thyroid that shows multiple nodules. skeletal survey  area on humerus only.     Labs with elevated calcium of 12.6, corrected calcium 14.36.  Total protein was 11.8 with a total globulin 10.0.  Further workup revealed an IgA over 7040.0, IgM 6.0, IgG 165.00.  Free serum kappa light chain 5.09, free serum lambda light chains 0.5600 with a kappa/lambda ratio of 9.09.  M spike 3.33.  PSA 0.96     Bone marrow biopsy performed yesterday. Patient receive pamidronate 60 mg on 06/09/2023 and was started on hydration.  His calcium improved as well as his mental status.       Chief Complaint: Therapy Education       Interval History:    7/3/23: Mr. Goodson presents to the clinic accompanied by his daughter  "for therapy education. Patient reports that he has a "nagging cough that can't go away". Denies fever, chills, SOB, N/V/D, constipation, recent infection, chest pain or bleeding.    6/27/23: Pt arrives today as a F/U hospital consult, with labs and bone marrow results. Bone marrow results show that he does have multiple myeloma. Extensively explained different treatment options. Pt states he has a painful cough. He stated that he has multiple family members with cancer as well. He has pain in joints of shoulders, and c/o food tasting funny. No SOB or fevers noted.         Past Medical History:   Diagnosis Date    GERD (gastroesophageal reflux disease)     HTN (hypertension)     Hypothyroidism, unspecified     Type 2 diabetes mellitus without complications       Past Surgical History:   Procedure Laterality Date    APPENDECTOMY  1965    BONE MARROW BIOPSY Right 06/12/2023    Procedure: BIOPSY, BONE MARROW;  Surgeon: Silvano Austin MD;  Location: [facility] [place];  Service: General;  Laterality: Right;    removal of boils       Family History   Problem Relation Age of Onset    Lung cancer Mother     Breast cancer Sister      Social Connections: Unknown    Frequency of Communication with Friends and Family: Patient refused    Frequency of Social Gatherings with Friends and Family: Patient refused    Attends Taoist Services: Patient refused    Active Member of Clubs or Organizations: Patient refused    Attends Club or Organization Meetings: Patient refused    Marital Status: Patient refused       Review of patient's allergies indicates:   Allergen Reactions    Iodine Anaphylaxis    Shellfish containing products     Amoxicillin-pot clavulanate Itching      Current Outpatient Medications on File Prior to Visit   Medication Sig Dispense Refill    acyclovir (ZOVIRAX) 400 MG tablet Take 1 tablet (400 mg total) by mouth 2 (two) times daily. 60 tablet 11    albuterol (PROVENTIL/VENTOLIN HFA) 90 mcg/actuation inhaler 2 puffs every 4 " to 6 hours as needed.      amLODIPine (NORVASC) 10 MG tablet Take 1 tablet (10 mg total) by mouth once daily. 30 each 11    atorvastatin (LIPITOR) 10 MG tablet Take 10 mg by mouth.      carvediloL (COREG) 12.5 MG tablet Take 12.5 mg by mouth 2 (two) times daily.      cloNIDine (CATAPRES) 0.2 MG tablet Take 0.2 mg by mouth daily as needed.      dexAMETHasone (DECADRON) 4 MG Tab Take 5 tablets (20 mg total) by mouth every 7 days. on days following daratumumab infusion. Take with food. 20 tablet 1    famotidine (PEPCID) 20 MG tablet Take 1 tablet (20 mg total) by mouth once daily. 30 tablet 11    finasteride (PROSCAR) 5 mg tablet Take 5 mg by mouth.      gabapentin (NEURONTIN) 100 MG capsule Take by mouth.      hydrALAZINE (APRESOLINE) 50 MG tablet Take 1 tablet (50 mg total) by mouth every 8 (eight) hours. 90 tablet 11    levothyroxine (SYNTHROID) 112 MCG tablet Take 112 mcg by mouth.      ONETOUCH ULTRA TEST Strp USE TO TEST BLOOD GLUCOSE TWICE DAILY      pantoprazole (PROTONIX) 40 MG tablet Take 1 tablet (40 mg total) by mouth 2 (two) times daily before meals. 60 tablet 11    SITagliptin phosphate (JANUVIA) 50 MG Tab Take 1 tablet (50 mg total) by mouth once daily. 30 tablet 5    spironolactone (ALDACTONE) 25 MG tablet Take 12.5 mg by mouth.      tamsulosin (FLOMAX) 0.4 mg Cap Take 1 capsule by mouth.      [DISCONTINUED] lenalidomide (REVLIMID) 20 mg Cap Take 20 mg by mouth Daily For 21 days followed by a 7 day break, every 28 days. 21 capsule 0     No current facility-administered medications on file prior to visit.      Review of Systems   Constitutional:  Negative for activity change, appetite change, chills, diaphoresis, fatigue, fever and unexpected weight change.   HENT:  Negative for nasal congestion, mouth sores, nosebleeds, sinus pressure/congestion, sore throat and trouble swallowing.    Eyes: Negative.    Respiratory:  Negative for cough and shortness of breath.    Cardiovascular:  Negative for chest pain  "and palpitations.   Gastrointestinal:  Negative for abdominal distention, abdominal pain, blood in stool, change in bowel habit, constipation, diarrhea, nausea, vomiting and change in bowel habit.   Endocrine: Negative.    Genitourinary:  Negative for bladder incontinence, decreased urine volume, difficulty urinating, dysuria, frequency, hematuria and urgency.   Musculoskeletal:  Negative for arthralgias, back pain, gait problem, joint swelling, leg pain and myalgias.   Integumentary:  Negative for rash.   Allergic/Immunologic: Negative.    Neurological:  Negative for dizziness, tremors, syncope, weakness, light-headedness, numbness, headaches and memory loss.   Hematological:  Negative for adenopathy. Does not bruise/bleed easily.   Psychiatric/Behavioral:  Negative for agitation, confusion, hallucinations, sleep disturbance and suicidal ideas. The patient is not nervous/anxious.             Vitals:    07/03/23 1119   BP: 117/64   BP Location: Left arm   Patient Position: Sitting   Pulse: 71   Resp: 14   Temp: 97.7 °F (36.5 °C)   TempSrc: Oral   SpO2: 96%   Weight: 89.5 kg (197 lb 6.4 oz)   Height: 5' 11" (1.803 m)        Wt Readings from Last 6 Encounters:   06/27/23 88.5 kg (195 lb)   06/07/23 113.4 kg (250 lb)   02/07/18 111.2 kg (245 lb 2.4 oz)     Body mass index is 27.53 kg/m².  Body surface area is 2.12 meters squared.      Laboratory:  CBC with Differential:  Lab Results   Component Value Date    WBC 6.70 06/27/2023    RBC 3.00 (L) 06/27/2023    HGB 9.7 (L) 06/27/2023    HCT 29.4 (L) 06/27/2023    MCV 98.0 (H) 06/27/2023    MCH 32.3 (H) 06/27/2023    MCHC 33.0 06/27/2023    RDW 15.4 06/27/2023     06/27/2023    MPV 8.7 06/27/2023     Serum:  SPEP: Serum protein electrophoresis shows a distinct monoclonal peak (3.33 g/dL) in the beta zone, consistent with a monoclonal gammopathy.   PAO: A band is present in the IgA trini with a corresponding band in the kappa trini.   The immunofixation electrophoresis " are consistent with monoclonal gammopathy of IgA-kappa isotype.     IgG Level 540.00 - 1,822.00 mg/dL 165.00 Low     IgA Level 101.0 - 645.0 mg/dL >7,040.0 High     IgM Level 22.0 - 240.0 mg/dL 6.0 Low       Kappa Free Light Chain 0.3300 - 1.94 mg/dL 5.09 High     Lambda Free Light Chain 0.5700 - 2.63 mg/dL 0.5600 Low     Kappa/Lambda FLC Ratio 0.2600 - 1.65 9.09 High      Urine:   24 hrs Urine:  M spike    1467      H    mg/24 h    CMP:  Sodium Level   Date Value Ref Range Status   06/27/2023 135 (L) 136 - 145 mmol/L Final     Potassium Level   Date Value Ref Range Status   06/27/2023 4.4 3.5 - 5.1 mmol/L Final     Carbon Dioxide   Date Value Ref Range Status   06/27/2023 23 23 - 31 mmol/L Final     Blood Urea Nitrogen   Date Value Ref Range Status   06/27/2023 16.0 8.4 - 25.7 mg/dL Final     Creatinine   Date Value Ref Range Status   06/27/2023 1.75 (H) 0.73 - 1.18 mg/dL Final     Calcium Level Total   Date Value Ref Range Status   06/27/2023 8.3 (L) 8.8 - 10.0 mg/dL Final     Albumin Level   Date Value Ref Range Status   06/27/2023 1.8 (L) 3.4 - 4.8 g/dL Final     Bilirubin Total   Date Value Ref Range Status   06/27/2023 0.4 <=1.5 mg/dL Final     Alkaline Phosphatase   Date Value Ref Range Status   06/27/2023 92 40 - 150 unit/L Final     Aspartate Aminotransferase   Date Value Ref Range Status   06/27/2023 21 5 - 34 unit/L Final     Alanine Aminotransferase   Date Value Ref Range Status   06/27/2023 17 0 - 55 unit/L Final             Assessment:       Multiple Myeloma  Plan:   -Therapy education completed on 7/3/23.  -Plan to start DRd on 7/5/23. Daratumumab pre and post-treatment medication: before treatment educated patient with daratumumab that he will need to take some tablets called a premedication to help prevent you from having a reaction. Made patient aware that the premedications (Benadryl, Tylenol, Pepcid, Singulair) will be given to him 30 minutes prior to each treatment.  -Take Home Medications  discussed and calendar given for guidance      Dexamethasone- Take 5 tablets (20 mg total) by mouth every 7 days. And on the mornings before Darzalex infusion.     Lenalidomide- Take 10 mg by mouth once daily for 21 days followed by a 7 day break, every 28 days.      Acyclovir- Take 1 tablet (400 mg total) by mouth 2 (two) times daily  -Zofran PRN prescribed for at home with instructions to be taken by mouth every 8 hours as needed for nausea. If not working, Compazine can be prescribed as 2nd choice.    -OTC Imodium AD recommended for diarrhea (4-6 BMs a day). Take 2 tablets after the first loose bowel movement, and 1 tablet after each loose bowel movement after the first dose has been taken. No more than 4 tablets should be taken in any 24-hour period. If not working, Lomotil can be prescribed as 2nd choice.    -Emphasized adequate hand-hygiene and limited contact with people who are sick.   -Monitor and notify any bleeding in urine, stool, or sputum.  As well as unusual bleeding or bruising and stomach pain.   - Emphasized hydration with 4 16 oz bottles of water a day.    -Importance of moisturizing with fragrance free lotion to prevent skin rash and/or hand-foot syndrome.  -Call clinic if fever >100.4, shakes or chills, shortness of breath, chest pain, uncontrolled vomiting or diarrhea, pain and tingling in the chest or arm, or just not feeling well.    - RTC on 7/13 23 for toxicity check with NP    DISCUSSION:    1.  A total of 60 minutes were spent in counseling today, in which 100% were face-to-face.  At today's therapy teaching session, we discussed the patient's cancer diagnosis as well as planned therapy regimen, protocol, side effects and toxicities.  A handout of each therapeutic agent in the regimen was provided and reviewed in detail.    2.  The following side effects were discussed but not limited to:    The following side effects are common (occurring in greater than 20%) for patients taking  daratumumab:    Low blood counts. Your white and red blood cells and platelets may temporarily decrease. This can put you at increased risk for infection, anemia and/or bleeding.  Relatively mild infusion reactions (nasal congestion, throat irritation, cough, shortness of breath, chills, vomiting)  Fatigue  Nausea  Back pain  Cough  Joint pain  Leg pain  Decreased appetite  Diarrhea  Constipation  Muscular chest pain  Headache  High blood pressure    The following side effects are common (occurring in greater than 30%) for patients taking Lenalidomide:    Low blood counts . Your white blood cells and platelets may temporarily decrease. This can put you at increased risk for infection and/or bleeding.  Blood counts are monitored closely and dose adjustments may be necessary.   Diarrhea  Itching  Rash  Fatigue, tiredness    These are less common side effects for patients receiving Lenalidomide:    Constipation  Nausea  Sore throat  Generalized aches and pains   Fever   Back pain   Swelling of ankles or feet   Cough   Dizziness   Headache   Muscle cramps  Shortness of breath     Generalized weakness  Nose bleed   Infection  Dry skin   Anemia (low red blood cell count)     Pneumonia   Low potassium   Difficulty sleeping   Poor appetite  Vomiting                  a.  Discussed the risk of infection while on therapy related to pancytopenia, specifically a decrease in their white blood cell count.  Instructed to contact our office for temperature >100.4 F, chills, sudden onset cough or shortness of breath, symptoms of a urinary tract infection.                b.  Discussed the risk of anemia. Instructed to contact our office for dizziness, heart palpitations, or extreme or sudden changes in weakness.                c.  Discussed the risk of thrombocytopenia, which increases the risk of bruising or bleeding.  Instructed the patient to contact our office for spontaneous signs of bleeding, including nose bleeds, bleeding from  the gums or mouth, blood in sputum, urine or stool and unusual or excessive bruising or rash.                d.  Discussed GI side effects including weight changes, changes in appetite, altered sense of taste, stomatitis, nausea, vomiting, diarrhea, constipation, and heartburn.                e.  Discussed  side effects including painful urination, changes in the amount of urination, possible urine color changes.  Discussed fertility issues and to prevent  pregnancy if of child bearing age.                f.  Discussed neurological side effects including the risk of peripheral neuropathy, either temporary or permanent.                g.  Discussed the potential for skin, hair, and nail changes.       3.  Instructed to contact our office for discussion of medication changes, the addition of vitamin and/or herbal supplementation as they may interact with some chemotherapy agents.    4.  Discussed dietary modifications and the need to maintain adequate caloric intake and proper oral hydration.  Recommended 64 ounces of fluid per day.    5.  Discussed anti-emetic protocol and bowel regimen protocol.    6.  Office contact information given including after hours number.  Discussed there is an oncologist on call 24/7, 365 days including weekends.  Provided primary nurse's information .    7.  In summary, the patient is in agreement with the plan of care.  Questions appeared to be answered to their satisfaction. Consented the patient to the treatment plan and the patient was educated on the planned duration of the treatment and schedule of the treatment administration. Copy to be scanned into the chart.    All questions answered to the satisfaction of the patient and family.     Follow up appointments given to peggy.      BENNETT PERRY-C  PATIENT EDUCATOR  [facility]

## 2023-07-03 NOTE — NURSING
I met with Chip and his daughter for his patient education. He has a sense of humor and is hard of hearing. They know they can reach out to me or to the patient navigator in [place].

## 2023-07-05 DIAGNOSIS — C90.00 MULTIPLE MYELOMA NOT HAVING ACHIEVED REMISSION: Primary | ICD-10-CM

## 2023-07-06 ENCOUNTER — LAB VISIT (OUTPATIENT)
Dept: LAB | Facility: HOSPITAL | Age: 73
End: 2023-07-06
Attending: NURSE PRACTITIONER
Payer: MEDICARE

## 2023-07-06 DIAGNOSIS — C90.00 MULTIPLE MYELOMA NOT HAVING ACHIEVED REMISSION: ICD-10-CM

## 2023-07-06 DIAGNOSIS — Z29.11 ENCOUNTER FOR PROPHYLACTIC IMMUNOTHERAPY FOR RESPIRATORY SYNCYTIAL VIRUS (RSV): ICD-10-CM

## 2023-07-06 DIAGNOSIS — C90.00 MULTIPLE MYELOMA, REMISSION STATUS UNSPECIFIED: ICD-10-CM

## 2023-07-06 LAB
ABORH RETYPE: NORMAL
DIRECT COOMBS (DAT): NORMAL
GROUP & RH: NORMAL
HAV IGM SERPL QL IA: NONREACTIVE
HBV CORE IGM SERPL QL IA: NONREACTIVE
HBV SURFACE AG SERPL QL IA: NONREACTIVE
HCV AB SERPL QL IA: NONREACTIVE
INDIRECT COOMBS GEL: NORMAL
SPECIMEN OUTDATE: NORMAL

## 2023-07-06 PROCEDURE — 86880 COOMBS TEST DIRECT: CPT | Performed by: NURSE PRACTITIONER

## 2023-07-06 PROCEDURE — 36415 COLL VENOUS BLD VENIPUNCTURE: CPT

## 2023-07-06 PROCEDURE — 86900 BLOOD TYPING SEROLOGIC ABO: CPT | Performed by: NURSE PRACTITIONER

## 2023-07-06 PROCEDURE — 80074 ACUTE HEPATITIS PANEL: CPT

## 2023-07-07 LAB — PATH REV: NORMAL

## 2023-07-12 ENCOUNTER — OFFICE VISIT (OUTPATIENT)
Dept: HEMATOLOGY/ONCOLOGY | Facility: CLINIC | Age: 73
End: 2023-07-12
Payer: MEDICARE

## 2023-07-12 VITALS
BODY MASS INDEX: 27.62 KG/M2 | DIASTOLIC BLOOD PRESSURE: 74 MMHG | OXYGEN SATURATION: 95 % | RESPIRATION RATE: 18 BRPM | SYSTOLIC BLOOD PRESSURE: 130 MMHG | HEART RATE: 72 BPM | TEMPERATURE: 97 F | HEIGHT: 71 IN | WEIGHT: 197.31 LBS

## 2023-07-12 DIAGNOSIS — C90.00 MULTIPLE MYELOMA, REMISSION STATUS UNSPECIFIED: Primary | ICD-10-CM

## 2023-07-12 PROCEDURE — 99999 PR PBB SHADOW E&M-EST. PATIENT-LVL IV: CPT | Mod: PBBFAC,,, | Performed by: NURSE PRACTITIONER

## 2023-07-12 PROCEDURE — 99214 OFFICE O/P EST MOD 30 MIN: CPT | Mod: PBBFAC | Performed by: NURSE PRACTITIONER

## 2023-07-12 PROCEDURE — 99214 OFFICE O/P EST MOD 30 MIN: CPT | Mod: S$PBB,,, | Performed by: NURSE PRACTITIONER

## 2023-07-12 PROCEDURE — 99214 PR OFFICE/OUTPT VISIT, EST, LEVL IV, 30-39 MIN: ICD-10-PCS | Mod: S$PBB,,, | Performed by: NURSE PRACTITIONER

## 2023-07-12 PROCEDURE — 99999 PR PBB SHADOW E&M-EST. PATIENT-LVL IV: ICD-10-PCS | Mod: PBBFAC,,, | Performed by: NURSE PRACTITIONER

## 2023-07-17 RX ORDER — HEPARIN 100 UNIT/ML
500 SYRINGE INTRAVENOUS
Status: CANCELLED | OUTPATIENT
Start: 2023-07-18

## 2023-07-17 RX ORDER — SODIUM CHLORIDE 0.9 % (FLUSH) 0.9 %
10 SYRINGE (ML) INJECTION
Status: CANCELLED | OUTPATIENT
Start: 2023-07-18

## 2023-07-17 RX ORDER — MONTELUKAST SODIUM 10 MG/1
10 TABLET ORAL
Status: CANCELLED | OUTPATIENT
Start: 2023-07-18

## 2023-07-17 RX ORDER — DIPHENHYDRAMINE HYDROCHLORIDE 50 MG/ML
50 INJECTION INTRAMUSCULAR; INTRAVENOUS ONCE AS NEEDED
Status: CANCELLED | OUTPATIENT
Start: 2023-07-18

## 2023-07-17 RX ORDER — FAMOTIDINE 20 MG/1
20 TABLET, FILM COATED ORAL
Status: CANCELLED | OUTPATIENT
Start: 2023-07-18

## 2023-07-17 RX ORDER — DIPHENHYDRAMINE HCL 25 MG
25 CAPSULE ORAL
Status: CANCELLED | OUTPATIENT
Start: 2023-07-18

## 2023-07-17 RX ORDER — ACETAMINOPHEN 325 MG/1
650 TABLET ORAL
Status: CANCELLED | OUTPATIENT
Start: 2023-07-18

## 2023-07-17 RX ORDER — EPINEPHRINE 0.3 MG/.3ML
0.3 INJECTION SUBCUTANEOUS ONCE AS NEEDED
Status: CANCELLED | OUTPATIENT
Start: 2023-07-18

## 2023-07-17 NOTE — PROGRESS NOTES
HEMATOLOGY/ONCOLOGY OFFICE CLINIC VISIT    Visit Information:    Initial Evaluation: 2023 (hospital consult)   Referring Provider: Dr Gonzalez  Other providers:  Code status: Not addressed    Diagnosis:  Multiple Myeloma    Present treatment:    Treatment/Oncology history:    Plan of care:     Imagin/16/23 XRAY Chest: Impression:  Improved aeration of the lung bases with mild residual bibasilar opacities and small pleural effusions suspected.    23 XRAY Chest: Impression:  1. Patchy hazy opacities again evident at the lower lungs bilaterally suspicious for infiltrate and atelectasis.  Small bibasilar pleural reactions cannot be excluded.  2. Borderline cardiomegaly  3. Atherosclerosis    6/10/23 CT Head:Impression:  No acute intracranial abnormality.  Findings consistent with microangiopathy no interval change.    23 MRI Thoracic Spine: IMPRESSION  1. Limited exam secondary to technical factors discussed above.  2. Within limitations, no convincing evidence of acute thoracic spine abnormality, high-grade canal stenosis, or focal cord pathology.  3. Multiple scattered vertebral body lesions are suspected and could represent metastatic disease, otherwise of incomplete characterization by non-contrast protocol.  4. Incidental findings of the partially visualized thoracic cavity and retroperitoneum discussed above, poorly assessed by modality/protocol.  Recent non-contrast chest CT provides overall better visualization.    23 Bone Scan Whole Body:    Impression:  1. Scattered activity at the anterior left right rib margins as described.  A CT exam on the previous day reveals no definite fracture.  There is mild heterogeneous and bony loss at the anterior left and right ribs.  This is not have the typical pattern of a metastases.  2. Focal increased activity at the anterior manubrium which again on the CT exam demonstrates osteopenia and heterogeneous bony loss as well as scattered subcentimeter  small lytic defects.  3. Suspect mild arthritic changes at the shoulders sternoclavicular joints    Pathology:  6/12/23 Bone marrow aspiration/Biopsy:   PLASMA CELL MYELOMA, KAPPA RESTRICTED.     PLASMA CELL MYELOMA INVOLVES 90% OF BONE MARROW CELLULARITY WITH SMALL AREAS OF  SEQUENTIAL TRILINEAGE HEMATOPOIESIS.    ABSENT IRON STORES.     PERIPHERAL BLOOD: NORMOCYTIC NORMOCHROMIC ANEMIA. THROMBOCYTOPENIA.         CLINICAL HISTORY:       Patient: Chip Goodson is a 72 y.o. male That was seen  for the first time as hospital consult on 6/13/2023.   Patient was brought to the emergency department for confusion.  Upon evaluation in the ER CT scan of the head with no acute intracranial abnormality.  Finding consistent with microangiopathic no interval change.  CT of the chest with no contrast showed diffuse osteopenia with multiple, numerous, small round avid lucent lesions ovarian size involving the marrow.  Changes could reflect metabolic bone disease like hyperparathyroidism.  MRI of the thoracic spine with multiple scattered vertebral body lesion are suspect and could represent metastatic disease.  Bone scan with scattered activity in the anterior left right rib margins no fractures focal increased activity at the anterior manubrium scattered subcentimeter small lytic defects.  Ultrasound of the thyroid that shows multiple nodules. skeletal survey  area on humerus only.     Labs with elevated calcium of 12.6, corrected calcium 14.36.  Total protein was 11.8 with a total globulin 10.0.  Further workup revealed an IgA over 7040.0, IgM 6.0, IgG 165.00.  Free serum kappa light chain 5.09, free serum lambda light chains 0.5600 with a kappa/lambda ratio of 9.09.  M spike 3.33.  PSA 0.96     Bone marrow biopsy performed 6/12/23. Patient receive pamidronate 60 mg on 06/09/2023 and was started on hydration.  His calcium improved as well as his mental status.       Chief Complaint: Multiple Myeloma (Pt reports coughing x 3  weeks.)      Interval History:    7/12/13: Mr. Goodson presents today for cycle 1 of Revlemid+ Daratumumab+Dexamethasone. He reports feeling well overall. He has his prescribed medications on hand. He has a cough for the last 3 weeks for which his PCP is addressing. He is not currently on any antibiotics.     6/27/23: Pt arrives today as a F/U hospital consult, with labs and bone marrow results. Bone marrow results show that he does have multiple myeloma. Extensively explained different treatment options. Pt states he has a painful cough. He stated that he has multiple family members with cancer as well. He has pain in joints of shoulders, and c/o food tasting funny. No SOB or fevers noted.         Past Medical History:   Diagnosis Date    GERD (gastroesophageal reflux disease)     HTN (hypertension)     Hypothyroidism, unspecified     Type 2 diabetes mellitus without complications       Past Surgical History:   Procedure Laterality Date    APPENDECTOMY  1965    BONE MARROW BIOPSY Right 06/12/2023    Procedure: BIOPSY, BONE MARROW;  Surgeon: Silvano Austin MD;  Location: [facility];  Service: General;  Laterality: Right;    removal of boils       Family History   Problem Relation Age of Onset    Lung cancer Mother     Breast cancer Sister      Social Connections: Unknown    Frequency of Communication with Friends and Family: Patient refused    Frequency of Social Gatherings with Friends and Family: Patient refused    Attends Synagogue Services: Patient refused    Active Member of Clubs or Organizations: Patient refused    Attends Club or Organization Meetings: Patient refused    Marital Status: Patient refused       Review of patient's allergies indicates:   Allergen Reactions    Iodine Anaphylaxis    Shellfish containing products     Amoxicillin-pot clavulanate Itching      Current Outpatient Medications on File Prior to Visit   Medication Sig Dispense Refill    acyclovir (ZOVIRAX) 400 MG tablet Take 1 tablet (400 mg  total) by mouth 2 (two) times daily. 60 tablet 11    albuterol (PROVENTIL/VENTOLIN HFA) 90 mcg/actuation inhaler 2 puffs every 4 to 6 hours as needed.      amLODIPine (NORVASC) 10 MG tablet Take 1 tablet (10 mg total) by mouth once daily. 30 each 11    atorvastatin (LIPITOR) 10 MG tablet Take 10 mg by mouth.      carvediloL (COREG) 12.5 MG tablet Take 12.5 mg by mouth 2 (two) times daily.      cloNIDine (CATAPRES) 0.2 MG tablet Take 0.2 mg by mouth daily as needed.      famotidine (PEPCID) 20 MG tablet Take 1 tablet (20 mg total) by mouth once daily. 30 tablet 11    finasteride (PROSCAR) 5 mg tablet Take 5 mg by mouth.      gabapentin (NEURONTIN) 100 MG capsule Take by mouth.      hydrALAZINE (APRESOLINE) 50 MG tablet Take 1 tablet (50 mg total) by mouth every 8 (eight) hours. 90 tablet 11    levothyroxine (SYNTHROID) 112 MCG tablet Take 112 mcg by mouth.      ondansetron (ZOFRAN) 8 MG tablet Take 1 tablet (8 mg total) by mouth every 8 (eight) hours as needed for Nausea. 30 tablet 2    ONETOUCH ULTRA TEST Strp USE TO TEST BLOOD GLUCOSE TWICE DAILY      pantoprazole (PROTONIX) 40 MG tablet Take 1 tablet (40 mg total) by mouth 2 (two) times daily before meals. 60 tablet 11    SITagliptin phosphate (JANUVIA) 50 MG Tab Take 1 tablet (50 mg total) by mouth once daily. 30 tablet 5    spironolactone (ALDACTONE) 25 MG tablet Take 12.5 mg by mouth.      tamsulosin (FLOMAX) 0.4 mg Cap Take 1 capsule by mouth.      dexAMETHasone (DECADRON) 4 MG Tab Take 5 tablets (20 mg total) by mouth every 7 days. on days following daratumumab infusion. Take with food. (Patient not taking: Reported on 2023) 20 tablet 1    hydrocodone-chlorpheniramine (TUSSIONEX) 10-8 mg/5 mL suspension SMARTSI Milliliter(s) By Mouth Every 12 Hours      lenalidomide 10 mg Cap Take 10 mg by mouth once daily for 21 days followed by a 7 day break, every 28 days. (Patient not taking: Reported on 2023.) 21 each 0     No current facility-administered  "medications on file prior to visit.      Review of Systems   Constitutional:  Positive for activity change and fatigue. Negative for appetite change, chills, diaphoresis, fever and unexpected weight change.   HENT:  Negative for nasal congestion, mouth sores, nosebleeds, sinus pressure/congestion, sore throat and trouble swallowing.    Eyes: Negative.    Respiratory:  Negative for cough and shortness of breath.    Cardiovascular:  Negative for chest pain and palpitations.   Gastrointestinal:  Negative for abdominal distention, abdominal pain, blood in stool, change in bowel habit, constipation, diarrhea, nausea, vomiting and change in bowel habit.   Endocrine: Negative.    Genitourinary:  Negative for bladder incontinence, decreased urine volume, difficulty urinating, dysuria, frequency, hematuria and urgency.   Musculoskeletal:  Negative for arthralgias, back pain, gait problem, joint swelling, leg pain and myalgias.   Integumentary:  Negative for rash.   Allergic/Immunologic: Negative.    Neurological:  Positive for weakness. Negative for dizziness, tremors, syncope, light-headedness, numbness, headaches and memory loss.   Hematological:  Negative for adenopathy. Does not bruise/bleed easily.   Psychiatric/Behavioral:  Negative for agitation, confusion, hallucinations, sleep disturbance and suicidal ideas. The patient is not nervous/anxious.             Vitals:    07/12/23 1313   BP: 130/74   BP Location: Left arm   Patient Position: Sitting   Pulse: 72   Resp: 18   Temp: 97.4 °F (36.3 °C)   SpO2: 95%   Weight: 89.5 kg (197 lb 5 oz)   Height: 5' 11" (1.803 m)      Wt Readings from Last 6 Encounters:   07/12/23 89.5 kg (197 lb 5 oz)   07/03/23 89.5 kg (197 lb 6.4 oz)   06/27/23 88.5 kg (195 lb)   06/07/23 113.4 kg (250 lb)   02/07/18 111.2 kg (245 lb 2.4 oz)     Body mass index is 27.52 kg/m².  Body surface area is 2.12 meters squared.  Physical Exam  Vitals and nursing note reviewed.   Constitutional:       " General: He is not in acute distress.     Appearance: Normal appearance.      Comments: Elderly male   HENT:      Head: Normocephalic and atraumatic.      Mouth/Throat:      Mouth: Mucous membranes are moist.   Eyes:      General: No scleral icterus.     Extraocular Movements: Extraocular movements intact.      Conjunctiva/sclera: Conjunctivae normal.   Neck:      Vascular: No JVD.   Cardiovascular:      Rate and Rhythm: Normal rate and regular rhythm.      Heart sounds: No murmur heard.  Pulmonary:      Effort: Pulmonary effort is normal.      Breath sounds: Normal breath sounds. No wheezing or rhonchi.   Abdominal:      General: Bowel sounds are normal. There is no distension.      Palpations: Abdomen is soft.      Tenderness: There is no abdominal tenderness.   Musculoskeletal:         General: No swelling or deformity.      Cervical back: Neck supple.   Lymphadenopathy:      Head:      Right side of head: No submandibular adenopathy.      Left side of head: No submandibular adenopathy.      Cervical: No cervical adenopathy.      Upper Body:      Right upper body: No supraclavicular or axillary adenopathy.      Left upper body: No supraclavicular or axillary adenopathy.      Lower Body: No right inguinal adenopathy. No left inguinal adenopathy.   Skin:     General: Skin is warm.      Coloration: Skin is not jaundiced.      Findings: No rash.   Neurological:      General: No focal deficit present.      Mental Status: He is alert and oriented to person, place, and time.      Cranial Nerves: Cranial nerves 2-12 are intact.   Psychiatric:         Attention and Perception: Attention normal.         Behavior: Behavior is cooperative.     Laboratory:  CBC with Differential:  Lab Results   Component Value Date    WBC 5.12 07/12/2023    RBC 2.86 (L) 07/12/2023    HGB 9.2 (L) 07/12/2023    HCT 28.8 (L) 07/12/2023    .7 (H) 07/12/2023    MCH 32.2 (H) 07/12/2023    MCHC 31.9 (L) 07/12/2023    RDW 16.1 07/12/2023      07/12/2023    MPV 9.1 07/12/2023     Serum:  SPEP: Serum protein electrophoresis shows a distinct monoclonal peak (3.33 g/dL) in the beta zone, consistent with a monoclonal gammopathy.   PAO: A band is present in the IgA trini with a corresponding band in the kappa trini.   The immunofixation electrophoresis are consistent with monoclonal gammopathy of IgA-kappa isotype.     IgG Level 540.00 - 1,822.00 mg/dL 165.00 Low     IgA Level 101.0 - 645.0 mg/dL >7,040.0 High     IgM Level 22.0 - 240.0 mg/dL 6.0 Low       Kappa Free Light Chain 0.3300 - 1.94 mg/dL 5.09 High     Lambda Free Light Chain 0.5700 - 2.63 mg/dL 0.5600 Low     Kappa/Lambda FLC Ratio 0.2600 - 1.65 9.09 High      Urine:   24 hrs Urine:  M spike    1467      H    mg/24 h    CMP:  Sodium Level   Date Value Ref Range Status   07/12/2023 136 136 - 145 mmol/L Final     Potassium Level   Date Value Ref Range Status   07/12/2023 4.3 3.5 - 5.1 mmol/L Final     Carbon Dioxide   Date Value Ref Range Status   07/12/2023 24 23 - 31 mmol/L Final     Blood Urea Nitrogen   Date Value Ref Range Status   07/12/2023 18.0 8.4 - 25.7 mg/dL Final     Creatinine   Date Value Ref Range Status   07/12/2023 2.09 (H) 0.73 - 1.18 mg/dL Final     Calcium Level Total   Date Value Ref Range Status   07/12/2023 9.2 8.8 - 10.0 mg/dL Final     Albumin Level   Date Value Ref Range Status   07/12/2023 1.8 (L) 3.4 - 4.8 g/dL Final   07/12/2023 2.6 (L) 3.4 - 4.8 g/dL Final     Bilirubin Total   Date Value Ref Range Status   07/12/2023 0.5 <=1.5 mg/dL Final     Alkaline Phosphatase   Date Value Ref Range Status   07/12/2023 124 40 - 150 unit/L Final     Aspartate Aminotransferase   Date Value Ref Range Status   07/12/2023 18 5 - 34 unit/L Final     Alanine Aminotransferase   Date Value Ref Range Status   07/12/2023 11 0 - 55 unit/L Final             Assessment:     Multiple Myeloma        Plan:      -  RTC w/NP in 1 week with labs/results for tox check and treatment.  The patient was  seen, interviewed and examined. Pertinent lab and radiology studies were reviewed.   The patient was given ample opportunity to ask questions, and to the best of my abilities, all questions answered to satisfaction; patient demonstrated understanding of what we discussed and agreeable to the plan. Pt instructed to call should develop concerning signs/symptoms or have further questions.

## 2023-07-18 ENCOUNTER — INFUSION (OUTPATIENT)
Dept: INFUSION THERAPY | Facility: HOSPITAL | Age: 73
End: 2023-07-18
Attending: INTERNAL MEDICINE
Payer: MEDICARE

## 2023-07-18 VITALS
TEMPERATURE: 98 F | HEIGHT: 71 IN | RESPIRATION RATE: 18 BRPM | SYSTOLIC BLOOD PRESSURE: 138 MMHG | BODY MASS INDEX: 27.49 KG/M2 | WEIGHT: 196.38 LBS | DIASTOLIC BLOOD PRESSURE: 75 MMHG | HEART RATE: 99 BPM

## 2023-07-18 DIAGNOSIS — C90.00 MULTIPLE MYELOMA NOT HAVING ACHIEVED REMISSION: ICD-10-CM

## 2023-07-18 DIAGNOSIS — C90.00 MULTIPLE MYELOMA, REMISSION STATUS UNSPECIFIED: Primary | ICD-10-CM

## 2023-07-18 DIAGNOSIS — C90.00 MULTIPLE MYELOMA NOT HAVING ACHIEVED REMISSION: Primary | ICD-10-CM

## 2023-07-18 PROCEDURE — 96401 CHEMO ANTI-NEOPL SQ/IM: CPT

## 2023-07-18 PROCEDURE — 63600175 PHARM REV CODE 636 W HCPCS: Performed by: INTERNAL MEDICINE

## 2023-07-18 PROCEDURE — 96375 TX/PRO/DX INJ NEW DRUG ADDON: CPT

## 2023-07-18 PROCEDURE — 25000003 PHARM REV CODE 250: Performed by: INTERNAL MEDICINE

## 2023-07-18 PROCEDURE — 96374 THER/PROPH/DIAG INJ IV PUSH: CPT

## 2023-07-18 RX ORDER — DIPHENHYDRAMINE HYDROCHLORIDE 50 MG/ML
50 INJECTION INTRAMUSCULAR; INTRAVENOUS ONCE AS NEEDED
Status: DISCONTINUED | OUTPATIENT
Start: 2023-07-18 | End: 2023-07-18 | Stop reason: HOSPADM

## 2023-07-18 RX ORDER — HEPARIN 100 UNIT/ML
500 SYRINGE INTRAVENOUS
Status: DISCONTINUED | OUTPATIENT
Start: 2023-07-18 | End: 2023-07-18 | Stop reason: HOSPADM

## 2023-07-18 RX ORDER — DEXAMETHASONE 4 MG/1
4 TABLET ORAL DAILY
Qty: 2 TABLET | Refills: 0 | Status: SHIPPED | OUTPATIENT
Start: 2023-07-18 | End: 2023-07-18 | Stop reason: SDUPTHER

## 2023-07-18 RX ORDER — EPINEPHRINE 0.3 MG/.3ML
0.3 INJECTION SUBCUTANEOUS ONCE AS NEEDED
Status: DISCONTINUED | OUTPATIENT
Start: 2023-07-18 | End: 2023-07-18 | Stop reason: HOSPADM

## 2023-07-18 RX ORDER — LEVOFLOXACIN 500 MG/1
500 TABLET, FILM COATED ORAL DAILY
Qty: 7 EACH | Refills: 0 | Status: SHIPPED | OUTPATIENT
Start: 2023-07-18 | End: 2024-02-06

## 2023-07-18 RX ORDER — MONTELUKAST SODIUM 10 MG/1
10 TABLET ORAL
Status: COMPLETED | OUTPATIENT
Start: 2023-07-18 | End: 2023-07-18

## 2023-07-18 RX ORDER — FAMOTIDINE 20 MG/1
20 TABLET, FILM COATED ORAL
Status: COMPLETED | OUTPATIENT
Start: 2023-07-18 | End: 2023-07-18

## 2023-07-18 RX ORDER — MEPERIDINE HYDROCHLORIDE 100 MG/ML
25 INJECTION INTRAMUSCULAR; INTRAVENOUS; SUBCUTANEOUS ONCE
Status: COMPLETED | OUTPATIENT
Start: 2023-07-18 | End: 2023-07-18

## 2023-07-18 RX ORDER — LEVOFLOXACIN 500 MG/1
500 TABLET, FILM COATED ORAL DAILY
Qty: 7 TABLET | Refills: 0 | Status: SHIPPED | OUTPATIENT
Start: 2023-07-18 | End: 2023-07-18 | Stop reason: SDUPTHER

## 2023-07-18 RX ORDER — DIPHENHYDRAMINE HCL 25 MG
25 CAPSULE ORAL
Status: COMPLETED | OUTPATIENT
Start: 2023-07-18 | End: 2023-07-18

## 2023-07-18 RX ORDER — ACETAMINOPHEN 325 MG/1
650 TABLET ORAL
Status: COMPLETED | OUTPATIENT
Start: 2023-07-18 | End: 2023-07-18

## 2023-07-18 RX ORDER — DEXAMETHASONE 4 MG/1
4 TABLET ORAL DAILY
Qty: 2 TABLET | Refills: 0 | Status: SHIPPED | OUTPATIENT
Start: 2023-07-18 | End: 2023-11-15

## 2023-07-18 RX ORDER — SODIUM CHLORIDE 0.9 % (FLUSH) 0.9 %
10 SYRINGE (ML) INJECTION
Status: DISCONTINUED | OUTPATIENT
Start: 2023-07-18 | End: 2023-07-18 | Stop reason: HOSPADM

## 2023-07-18 RX ADMIN — DARATUMUMAB AND HYALURONIDASE-FIHJ (HUMAN RECOMBINANT) 1800 MG: 1800; 30000 INJECTION SUBCUTANEOUS at 09:07

## 2023-07-18 RX ADMIN — DIPHENHYDRAMINE HYDROCHLORIDE 25 MG: 25 CAPSULE ORAL at 08:07

## 2023-07-18 RX ADMIN — MEPERIDINE HYDROCHLORIDE 25 MG: 100 INJECTION INTRAMUSCULAR; INTRAVENOUS; SUBCUTANEOUS at 03:07

## 2023-07-18 RX ADMIN — FAMOTIDINE 20 MG: 20 TABLET ORAL at 08:07

## 2023-07-18 RX ADMIN — HYDROCORTISONE SODIUM SUCCINATE 100 MG: 100 INJECTION, POWDER, FOR SOLUTION INTRAMUSCULAR; INTRAVENOUS at 03:07

## 2023-07-18 RX ADMIN — MONTELUKAST 10 MG: 10 TABLET, FILM COATED ORAL at 08:07

## 2023-07-18 RX ADMIN — ACETAMINOPHEN 650 MG: 325 TABLET ORAL at 08:07

## 2023-07-18 NOTE — PROGRESS NOTES
Mr. Goodson started cycle 1 Daratumumab+ Dexamethasone + Revlimid today. Daratumumab was started at 930.  He was noted with reaction, at about 3pm, he was given Demerol 25mg IV, Hydrocortisone 100mg and placed on 2L NC. Upon questioning the patient he had not taken oral steroids this morning prior to the start of his Daratumumab treatment. We will discontinue oral weekly Dexamethasone and change to IV Dexamethasone 20mg.     He is noted with ongoing cough. CT from June shows a small patchy area of ground glass density is seen in the lateral basal segment of the left lower lobe. This could reflect an acute focal infiltrate / pneumonitis.     I will start him on prophylactic levaquin 500mg incase he is developing pneumonia. His PCP has given him Tussionex. I will also give him dex 4mg daily x 2 days for delayed reaction prevention.    He was advised to go to the ER if he notes any fever of 100.4 or higher, chest pain, SOB, or new/worsening symptoms.

## 2023-07-21 DIAGNOSIS — C90.00 MULTIPLE MYELOMA, REMISSION STATUS UNSPECIFIED: Primary | ICD-10-CM

## 2023-07-25 ENCOUNTER — INFUSION (OUTPATIENT)
Dept: INFUSION THERAPY | Facility: HOSPITAL | Age: 73
End: 2023-07-25
Attending: INTERNAL MEDICINE
Payer: MEDICARE

## 2023-07-25 ENCOUNTER — OFFICE VISIT (OUTPATIENT)
Dept: HEMATOLOGY/ONCOLOGY | Facility: CLINIC | Age: 73
End: 2023-07-25
Payer: MEDICARE

## 2023-07-25 VITALS
TEMPERATURE: 98 F | WEIGHT: 194.19 LBS | SYSTOLIC BLOOD PRESSURE: 138 MMHG | BODY MASS INDEX: 27.19 KG/M2 | HEIGHT: 71 IN | RESPIRATION RATE: 17 BRPM | HEART RATE: 61 BPM | DIASTOLIC BLOOD PRESSURE: 64 MMHG | OXYGEN SATURATION: 97 %

## 2023-07-25 VITALS
HEART RATE: 65 BPM | SYSTOLIC BLOOD PRESSURE: 126 MMHG | TEMPERATURE: 97 F | OXYGEN SATURATION: 97 % | WEIGHT: 194.19 LBS | DIASTOLIC BLOOD PRESSURE: 73 MMHG | BODY MASS INDEX: 27.19 KG/M2 | RESPIRATION RATE: 20 BRPM | HEIGHT: 71 IN

## 2023-07-25 DIAGNOSIS — R53.1 WEAKNESS: ICD-10-CM

## 2023-07-25 DIAGNOSIS — C90.00 MULTIPLE MYELOMA NOT HAVING ACHIEVED REMISSION: Primary | ICD-10-CM

## 2023-07-25 PROCEDURE — 99215 OFFICE O/P EST HI 40 MIN: CPT | Mod: S$PBB,,, | Performed by: INTERNAL MEDICINE

## 2023-07-25 PROCEDURE — 63600175 PHARM REV CODE 636 W HCPCS: Mod: JZ,TB | Performed by: INTERNAL MEDICINE

## 2023-07-25 PROCEDURE — 96367 TX/PROPH/DG ADDL SEQ IV INF: CPT

## 2023-07-25 PROCEDURE — 96365 THER/PROPH/DIAG IV INF INIT: CPT

## 2023-07-25 PROCEDURE — 99215 PR OFFICE/OUTPT VISIT, EST, LEVL V, 40-54 MIN: ICD-10-PCS | Mod: S$PBB,,, | Performed by: INTERNAL MEDICINE

## 2023-07-25 PROCEDURE — 99213 OFFICE O/P EST LOW 20 MIN: CPT | Mod: PBBFAC,25 | Performed by: INTERNAL MEDICINE

## 2023-07-25 PROCEDURE — 96401 CHEMO ANTI-NEOPL SQ/IM: CPT

## 2023-07-25 PROCEDURE — 99999 PR PBB SHADOW E&M-EST. PATIENT-LVL III: CPT | Mod: PBBFAC,,, | Performed by: INTERNAL MEDICINE

## 2023-07-25 PROCEDURE — 99999 PR PBB SHADOW E&M-EST. PATIENT-LVL III: ICD-10-PCS | Mod: PBBFAC,,, | Performed by: INTERNAL MEDICINE

## 2023-07-25 PROCEDURE — 25000003 PHARM REV CODE 250: Performed by: INTERNAL MEDICINE

## 2023-07-25 PROCEDURE — 25000003 PHARM REV CODE 250: Performed by: NURSE PRACTITIONER

## 2023-07-25 RX ORDER — ACETAMINOPHEN 325 MG/1
650 TABLET ORAL
Status: COMPLETED | OUTPATIENT
Start: 2023-07-25 | End: 2023-07-25

## 2023-07-25 RX ORDER — DIPHENHYDRAMINE HCL 25 MG
25 CAPSULE ORAL
Status: COMPLETED | OUTPATIENT
Start: 2023-07-25 | End: 2023-07-25

## 2023-07-25 RX ORDER — MONTELUKAST SODIUM 10 MG/1
10 TABLET ORAL
Status: COMPLETED | OUTPATIENT
Start: 2023-07-25 | End: 2023-07-25

## 2023-07-25 RX ADMIN — SODIUM CHLORIDE: 9 INJECTION, SOLUTION INTRAVENOUS at 09:07

## 2023-07-25 RX ADMIN — DARATUMUMAB AND HYALURONIDASE-FIHJ (HUMAN RECOMBINANT) 1800 MG: 1800; 30000 INJECTION SUBCUTANEOUS at 10:07

## 2023-07-25 RX ADMIN — DIPHENHYDRAMINE HYDROCHLORIDE 25 MG: 25 CAPSULE ORAL at 09:07

## 2023-07-25 RX ADMIN — DEXAMETHASONE SODIUM PHOSPHATE 20 MG: 4 INJECTION, SOLUTION INTRA-ARTICULAR; INTRALESIONAL; INTRAMUSCULAR; INTRAVENOUS; SOFT TISSUE at 09:07

## 2023-07-25 RX ADMIN — ACETAMINOPHEN 650 MG: 325 TABLET ORAL at 09:07

## 2023-07-25 RX ADMIN — MONTELUKAST 10 MG: 10 TABLET, FILM COATED ORAL at 09:07

## 2023-07-25 NOTE — PROGRESS NOTES
HEMATOLOGY/ONCOLOGY OFFICE CLINIC VISIT    Visit Information:    Initial Evaluation: 6/13/2023 (hospital consult)   Referring Provider: Dr Gonzalez  Other providers:  Code status: Not addressed    Diagnosis:  Multiple Myeloma    Present treatment:    Treatment/Oncology history:    Plan of care:     Imaging:  Ct C 6/7/2023: 1. There is a small left sided pleural effusion.2. A small patchy area of ground glass density is seen in the lateral basal segment of the left lower lobe. This could reflect an acute focal infiltrate / pneumonitis. 3. There is diffuse osteopenia along the visualised bony structures together with multiple, numerous, small round-ovoid lucent lesion of varying sizes involving the marrow. These changes could reflect metabolic bone disease like hyperparathyroidism. Correlate clinically and with laboratory findings, as regards additional evaluation and follow-up.  6/8/23 Bone Scan Whole Body:  1. Scattered activity at the anterior left right rib margins as described.  A CT exam on the previous day reveals no definite fracture.  There is mild heterogeneous and bony loss at the anterior left and right ribs.  This is not have the typical pattern of a metastases.  2. Focal increased activity at the anterior manubrium which again on the CT exam demonstrates osteopenia and heterogeneous bony loss as well as scattered subcentimeter small lytic defects. 3. Suspect mild arthritic changes at the shoulders sternoclavicular joints  6/9/23 MRI Thoracic Spine: Within limitations, no convincing evidence of acute thoracic spine abnormality, high-grade canal stenosis, or focal cord pathology. Multiple scattered vertebral body lesions are suspected and could represent metastatic disease, otherwise of incomplete characterization by non-contrast protocol. Incidental findings of the partially visualized thoracic cavity and retroperitoneum discussed above, poorly assessed by modality/protocol.  Recent non-contrast chest CT  provides overall better visualization.  6/10/23 CT Head: No acute intracranial abnormality.  Findings consistent with microangiopathy no interval change.  6/12/23 XRAY Chest: 1. Patchy hazy opacities again evident at the lower lungs bilaterally suspicious for infiltrate and atelectasis.  Small bibasilar pleural reactions cannot be excluded. 2. Borderline cardiomegaly 3. Atherosclerosis  6/16/23 XRAY Chest: Improved aeration of the lung bases with mild residual bibasilar opacities and small pleural effusions suspected.      Pathology:  6/12/23 Bone marrow aspiration/Biopsy:   PLASMA CELL MYELOMA, KAPPA RESTRICTED.     PLASMA CELL MYELOMA INVOLVES 90% OF BONE MARROW CELLULARITY WITH SMALL AREAS OF  SEQUENTIAL TRILINEAGE HEMATOPOIESIS.    ABSENT IRON STORES.     PERIPHERAL BLOOD: NORMOCYTIC NORMOCHROMIC ANEMIA. THROMBOCYTOPENIA.         CLINICAL HISTORY:       Patient: Chip Goodson is a 72 y.o. male That was seen  for the first time as hospital consult on 6/13/2023.   Patient was brought to the emergency department for confusion.  Upon evaluation in the ER CT scan of the head with no acute intracranial abnormality.  Finding consistent with microangiopathic no interval change.  CT of the chest with no contrast showed diffuse osteopenia with multiple, numerous, small round avid lucent lesions ovarian size involving the marrow.  Changes could reflect metabolic bone disease like hyperparathyroidism.  MRI of the thoracic spine with multiple scattered vertebral body lesion are suspect and could represent metastatic disease.  Bone scan with scattered activity in the anterior left right rib margins no fractures focal increased activity at the anterior manubrium scattered subcentimeter small lytic defects.  Ultrasound of the thyroid that shows multiple nodules. skeletal survey  area on humerus only.     Labs with elevated calcium of 12.6, corrected calcium 14.36.  Total protein was 11.8 with a total globulin 10.0.  Further  workup revealed an IgA over 7040.0, IgM 6.0, IgG 165.00.  Free serum kappa light chain 5.09, free serum lambda light chains 0.5600 with a kappa/lambda ratio of 9.09.  M spike 3.33.  PSA 0.96     Bone marrow biopsy performed 6/12/23. Patient receive pamidronate 60 mg on 06/09/2023 and was started on hydration.  His calcium improved as well as his mental status.       Chief Complaint: Other Misc (Pt reports a burning sensation during urination.)      Interval History:  6/27/23: Pt arrives today as a F/U hospital consult, with labs and bone marrow results. Bone marrow results show that he does have multiple myeloma. Extensively explained different treatment options. Pt states he has a painful cough. He stated that he has multiple family members with cancer as well. He has pain in joints of shoulders, and c/o food tasting funny. No SOB or fevers noted.     7/12/13: Mr. Goodson presents today for cycle 1 of Revlemid+ Daratumumab+Dexamethasone. He reports feeling well overall. He has his prescribed medications on hand. He has a cough for the last 3 weeks for which his PCP is addressing. He is not currently on any antibiotics.     7/25/2023: Patient is here today for C1D8. He had some rigors toward the end of daratumumab infusion but otherwise he tolerated treatment well. He still reports fatigue and weakness. Not worse. No fever      Past Medical History:   Diagnosis Date    GERD (gastroesophageal reflux disease)     HTN (hypertension)     Hypothyroidism, unspecified     Type 2 diabetes mellitus without complications       Past Surgical History:   Procedure Laterality Date    APPENDECTOMY  1965    BONE MARROW BIOPSY Right 06/12/2023    Procedure: BIOPSY, BONE MARROW;  Surgeon: Silvano Austin MD;  Location: [facility];  Service: General;  Laterality: Right;    removal of boils       Family History   Problem Relation Age of Onset    Lung cancer Mother     Breast cancer Sister      Social Connections: Unknown    Frequency of  Communication with Friends and Family: Patient refused    Frequency of Social Gatherings with Friends and Family: Patient refused    Attends [employer] Services: Patient refused    Active Member of Clubs or Organizations: Patient refused    Attends Club or Organization Meetings: Patient refused    Marital Status: Patient refused       Review of patient's allergies indicates:   Allergen Reactions    Iodine Anaphylaxis    Shellfish containing products     Amoxicillin-pot clavulanate Itching      Current Outpatient Medications on File Prior to Visit   Medication Sig Dispense Refill    acyclovir (ZOVIRAX) 400 MG tablet Take 1 tablet (400 mg total) by mouth 2 (two) times daily. 60 tablet 11    albuterol (PROVENTIL/VENTOLIN HFA) 90 mcg/actuation inhaler 2 puffs every 4 to 6 hours as needed.      amLODIPine (NORVASC) 10 MG tablet Take 1 tablet (10 mg total) by mouth once daily. 30 each 11    atorvastatin (LIPITOR) 10 MG tablet Take 10 mg by mouth.      carvediloL (COREG) 12.5 MG tablet Take 12.5 mg by mouth 2 (two) times daily.      cloNIDine (CATAPRES) 0.2 MG tablet Take 0.2 mg by mouth daily as needed.      dexAMETHasone (DECADRON) 4 MG Tab Take 5 tablets (20 mg total) by mouth every 7 days. on days following daratumumab infusion. Take with food. 20 tablet 1    dexAMETHasone (DECADRON) 4 MG Tab Take 1 tablet (4 mg total) by mouth once daily. For 2 days 23, and 23 2 tablet 0    famotidine (PEPCID) 20 MG tablet Take 1 tablet (20 mg total) by mouth once daily. 30 tablet 11    finasteride (PROSCAR) 5 mg tablet Take 5 mg by mouth.      gabapentin (NEURONTIN) 100 MG capsule Take by mouth.      hydrALAZINE (APRESOLINE) 50 MG tablet Take 1 tablet (50 mg total) by mouth every 8 (eight) hours. 90 tablet 11    hydrocodone-chlorpheniramine (TUSSIONEX) 10-8 mg/5 mL suspension SMARTSI Milliliter(s) By Mouth Every 12 Hours      lenalidomide 10 mg Cap Take 10 mg by mouth once daily for 21 days followed by a 7 day break,  every 28 days. 21 each 0    levoFLOXacin (LEVAQUIN) 500 MG tablet Take 1 tablet (500 mg total) by mouth once daily. 7 each 0    levothyroxine (SYNTHROID) 112 MCG tablet Take 112 mcg by mouth.      ondansetron (ZOFRAN) 8 MG tablet Take 1 tablet (8 mg total) by mouth every 8 (eight) hours as needed for Nausea. 30 tablet 2    ONETOUCH ULTRA TEST Strp USE TO TEST BLOOD GLUCOSE TWICE DAILY      pantoprazole (PROTONIX) 40 MG tablet Take 1 tablet (40 mg total) by mouth 2 (two) times daily before meals. 60 tablet 11    SITagliptin phosphate (JANUVIA) 50 MG Tab Take 1 tablet (50 mg total) by mouth once daily. 30 tablet 5    spironolactone (ALDACTONE) 25 MG tablet Take 12.5 mg by mouth.      tamsulosin (FLOMAX) 0.4 mg Cap Take 1 capsule by mouth.       Current Facility-Administered Medications on File Prior to Visit   Medication Dose Route Frequency Provider Last Rate Last Admin    alteplase injection 2 mg  2 mg Intra-Catheter PRN Elsy Amaya MD        daratumumab-hyaluronidase-fihj subcutaneous injection 1,800 mg  1,800 mg Subcutaneous 1 time in Clinic/HOD Elsy Amaya MD        diphenhydrAMINE injection 50 mg  50 mg Intravenous Once PRN Elsy Amaya MD        EPINEPHrine (EPIPEN) 0.3 mg/0.3 mL pen injection 0.3 mg  0.3 mg Intramuscular Once PRN Elsy Amaya MD        heparin, porcine (PF) 100 unit/mL injection flush 500 Units  500 Units Intravenous PRN Elsy Amaya MD        hydrocortisone sodium succinate injection 100 mg  100 mg Intravenous Once PRN Elsy Amaya MD        sodium chloride 0.9% 250 mL flush bag   Intravenous 1 time in Clinic/HOD Elsy Amaya MD        sodium chloride 0.9% flush 10 mL  10 mL Intravenous PRN Elsy Amaya MD          Review of Systems   Constitutional:  Positive for activity change and fatigue. Negative for appetite change, chills, diaphoresis, fever and unexpected weight change.   HENT:  Negative for nasal congestion, mouth sores,  "nosebleeds, sinus pressure/congestion, sore throat and trouble swallowing.    Eyes: Negative.    Respiratory:  Negative for cough and shortness of breath.    Cardiovascular:  Negative for chest pain and palpitations.   Gastrointestinal:  Negative for abdominal distention, abdominal pain, blood in stool, change in bowel habit, constipation, diarrhea, nausea, vomiting and change in bowel habit.   Endocrine: Negative.    Genitourinary:  Positive for difficulty urinating (had a ocampo cath). Negative for bladder incontinence, decreased urine volume, dysuria, frequency, hematuria and urgency.   Musculoskeletal:  Negative for arthralgias, back pain, gait problem, joint swelling, leg pain and myalgias.   Integumentary:  Negative for rash.   Allergic/Immunologic: Negative.    Neurological:  Positive for weakness. Negative for dizziness, tremors, syncope, light-headedness, numbness, headaches and memory loss.   Hematological:  Negative for adenopathy. Does not bruise/bleed easily.   Psychiatric/Behavioral:  Negative for agitation, confusion, hallucinations, sleep disturbance and suicidal ideas. The patient is not nervous/anxious.             Vitals:    07/25/23 0841   BP: 138/64   BP Location: Left arm   Patient Position: Sitting   Pulse: 61   Resp: 17   Temp: 98.2 °F (36.8 °C)   TempSrc: Oral   SpO2: 97%   Weight: 88.1 kg (194 lb 3.2 oz)   Height: 5' 11" (1.803 m)      Wt Readings from Last 6 Encounters:   07/25/23 88.1 kg (194 lb 3.2 oz)   07/18/23 89.1 kg (196 lb 6.4 oz)   07/12/23 89.5 kg (197 lb 5 oz)   07/03/23 89.5 kg (197 lb 6.4 oz)   06/27/23 88.5 kg (195 lb)   06/07/23 113.4 kg (250 lb)     Body mass index is 27.09 kg/m².  Body surface area is 2.1 meters squared.  Physical Exam  Vitals and nursing note reviewed.   Constitutional:       General: He is not in acute distress.     Appearance: Normal appearance.      Comments: Elderly male   HENT:      Head: Normocephalic and atraumatic.      Mouth/Throat:      Mouth: " Mucous membranes are moist.   Eyes:      General: No scleral icterus.     Extraocular Movements: Extraocular movements intact.      Conjunctiva/sclera: Conjunctivae normal.   Neck:      Vascular: No JVD.   Cardiovascular:      Rate and Rhythm: Normal rate and regular rhythm.      Heart sounds: No murmur heard.  Pulmonary:      Effort: Pulmonary effort is normal.      Breath sounds: Normal breath sounds. No wheezing or rhonchi.   Abdominal:      General: Bowel sounds are normal. There is no distension.      Palpations: Abdomen is soft.      Tenderness: There is no abdominal tenderness.   Musculoskeletal:         General: No swelling or deformity.      Cervical back: Neck supple.   Lymphadenopathy:      Head:      Right side of head: No submandibular adenopathy.      Left side of head: No submandibular adenopathy.      Cervical: No cervical adenopathy.      Upper Body:      Right upper body: No supraclavicular or axillary adenopathy.      Left upper body: No supraclavicular or axillary adenopathy.      Lower Body: No right inguinal adenopathy. No left inguinal adenopathy.   Skin:     General: Skin is warm.      Coloration: Skin is not jaundiced.      Findings: No rash.   Neurological:      General: No focal deficit present.      Mental Status: He is alert and oriented to person, place, and time.      Cranial Nerves: Cranial nerves 2-12 are intact.   Psychiatric:         Attention and Perception: Attention normal.         Behavior: Behavior is cooperative.     Laboratory:  CBC with Differential:  Lab Results   Component Value Date    WBC 8.25 07/25/2023    RBC 2.87 (L) 07/25/2023    HGB 9.3 (L) 07/25/2023    HCT 28.6 (L) 07/25/2023    MCV 99.7 (H) 07/25/2023    MCH 32.4 (H) 07/25/2023    MCHC 32.5 (L) 07/25/2023    RDW 16.2 07/25/2023     (L) 07/25/2023    MPV 10.7 (H) 07/25/2023     Serum:  SPEP: Serum protein electrophoresis shows a distinct monoclonal peak (3.33 g/dL) in the beta zone, consistent with a  monoclonal gammopathy.   PAO: A band is present in the IgA trini with a corresponding band in the kappa trini.   The immunofixation electrophoresis are consistent with monoclonal gammopathy of IgA-kappa isotype.     IgG Level 540.00 - 1,822.00 mg/dL 165.00 Low     IgA Level 101.0 - 645.0 mg/dL >7,040.0 High     IgM Level 22.0 - 240.0 mg/dL 6.0 Low       Kappa Free Light Chain 0.3300 - 1.94 mg/dL 5.09 High     Lambda Free Light Chain 0.5700 - 2.63 mg/dL 0.5600 Low     Kappa/Lambda FLC Ratio 0.2600 - 1.65 9.09 High      Urine:   24 hrs Urine:  M spike    1467      H    mg/24 h    CMP:  Sodium Level   Date Value Ref Range Status   07/25/2023 135 (L) 136 - 145 mmol/L Final     Potassium Level   Date Value Ref Range Status   07/25/2023 3.7 3.5 - 5.1 mmol/L Final     Carbon Dioxide   Date Value Ref Range Status   07/25/2023 25 23 - 31 mmol/L Final     Blood Urea Nitrogen   Date Value Ref Range Status   07/25/2023 31.0 (H) 8.4 - 25.7 mg/dL Final     Creatinine   Date Value Ref Range Status   07/25/2023 1.65 (H) 0.73 - 1.18 mg/dL Final     Calcium Level Total   Date Value Ref Range Status   07/25/2023 8.4 (L) 8.8 - 10.0 mg/dL Final     Albumin Level   Date Value Ref Range Status   07/25/2023 2.0 (L) 3.4 - 4.8 g/dL Final     Bilirubin Total   Date Value Ref Range Status   07/25/2023 0.7 <=1.5 mg/dL Final     Alkaline Phosphatase   Date Value Ref Range Status   07/25/2023 68 40 - 150 unit/L Final     Aspartate Aminotransferase   Date Value Ref Range Status   07/25/2023 14 5 - 34 unit/L Final     Alanine Aminotransferase   Date Value Ref Range Status   07/25/2023 16 0 - 55 unit/L Final             Assessment:       1. Multiple myeloma not having achieved remission    2. Weakness            Plan:      RTC 3 weeks for with NP  with myeloma labs and 24 hrs urine collection and treatment  Ok for C1D8 today  The patient was seen, interviewed and examined. Pertinent lab and radiology studies were reviewed.   The patient was given  ample opportunity to ask questions, and to the best of my abilities, all questions answered to satisfaction; patient demonstrated understanding of what we discussed and agreeable to the plan. Pt instructed to call should develop concerning signs/symptoms or have further questions.     Elsy Amaya MD  Hematology/Oncology  [facility]

## 2023-07-25 NOTE — PLAN OF CARE
Patient will have no s/s of Infusion reaction. Patient premedicated with Tylenol, Benadryl and decadron and singulair  .

## 2023-08-01 ENCOUNTER — INFUSION (OUTPATIENT)
Dept: INFUSION THERAPY | Facility: HOSPITAL | Age: 73
End: 2023-08-01
Attending: INTERNAL MEDICINE
Payer: MEDICARE

## 2023-08-01 VITALS
DIASTOLIC BLOOD PRESSURE: 64 MMHG | OXYGEN SATURATION: 97 % | HEIGHT: 71 IN | WEIGHT: 193.63 LBS | TEMPERATURE: 98 F | HEART RATE: 60 BPM | SYSTOLIC BLOOD PRESSURE: 126 MMHG | BODY MASS INDEX: 27.11 KG/M2

## 2023-08-01 DIAGNOSIS — C90.00 MULTIPLE MYELOMA NOT HAVING ACHIEVED REMISSION: Primary | ICD-10-CM

## 2023-08-01 PROCEDURE — 96365 THER/PROPH/DIAG IV INF INIT: CPT

## 2023-08-01 PROCEDURE — 63600175 PHARM REV CODE 636 W HCPCS: Mod: JZ,TB | Performed by: INTERNAL MEDICINE

## 2023-08-01 PROCEDURE — 96401 CHEMO ANTI-NEOPL SQ/IM: CPT

## 2023-08-01 PROCEDURE — 96367 TX/PROPH/DG ADDL SEQ IV INF: CPT

## 2023-08-01 PROCEDURE — 25000003 PHARM REV CODE 250: Performed by: INTERNAL MEDICINE

## 2023-08-01 RX ORDER — DIPHENHYDRAMINE HCL 25 MG
25 CAPSULE ORAL
Status: COMPLETED | OUTPATIENT
Start: 2023-08-01 | End: 2023-08-01

## 2023-08-01 RX ORDER — MONTELUKAST SODIUM 10 MG/1
10 TABLET ORAL
Status: COMPLETED | OUTPATIENT
Start: 2023-08-01 | End: 2023-08-01

## 2023-08-01 RX ORDER — ACETAMINOPHEN 325 MG/1
650 TABLET ORAL
Status: COMPLETED | OUTPATIENT
Start: 2023-08-01 | End: 2023-08-01

## 2023-08-01 RX ADMIN — SODIUM CHLORIDE: 9 INJECTION, SOLUTION INTRAVENOUS at 09:08

## 2023-08-01 RX ADMIN — MONTELUKAST 10 MG: 10 TABLET, FILM COATED ORAL at 09:08

## 2023-08-01 RX ADMIN — ACETAMINOPHEN 650 MG: 325 TABLET ORAL at 09:08

## 2023-08-01 RX ADMIN — DIPHENHYDRAMINE HYDROCHLORIDE 25 MG: 25 CAPSULE ORAL at 09:08

## 2023-08-01 RX ADMIN — DARATUMUMAB AND HYALURONIDASE-FIHJ (HUMAN RECOMBINANT) 1800 MG: 1800; 30000 INJECTION SUBCUTANEOUS at 10:08

## 2023-08-01 RX ADMIN — DEXAMETHASONE SODIUM PHOSPHATE 20 MG: 4 INJECTION, SOLUTION INTRA-ARTICULAR; INTRALESIONAL; INTRAMUSCULAR; INTRAVENOUS; SOFT TISSUE at 09:08

## 2023-08-01 NOTE — PLAN OF CARE
Patient will have no S&S of nausea and instructed understanding of how to take antiemetics. Patient will receive injection in a timely manor Patient completed injection without difficulty.

## 2023-08-07 DIAGNOSIS — C90.00 MULTIPLE MYELOMA, REMISSION STATUS UNSPECIFIED: Primary | ICD-10-CM

## 2023-08-08 ENCOUNTER — DOCUMENTATION ONLY (OUTPATIENT)
Dept: HEMATOLOGY/ONCOLOGY | Facility: CLINIC | Age: 73
End: 2023-08-08
Payer: MEDICARE

## 2023-08-08 DIAGNOSIS — C90.00 MULTIPLE MYELOMA NOT HAVING ACHIEVED REMISSION: Primary | ICD-10-CM

## 2023-08-08 DIAGNOSIS — C90.00 MULTIPLE MYELOMA NOT HAVING ACHIEVED REMISSION: ICD-10-CM

## 2023-08-08 NOTE — PROGRESS NOTES
Lab called with critical WBC of 1.9. Dr. Amaya informed of critical results. New orders noted to home chemotherapy, Levaquin 500mg po Daily x 7days. & call patient to have him to call us if having any fever. Patient called informed about new orders and no Chemotherapy today due to low WBC count. He was informed that we will repeat his labs in one week.

## 2023-08-09 RX ORDER — LENALIDOMIDE 10 MG/1
10 CAPSULE ORAL DAILY
Qty: 21 EACH | Refills: 0 | Status: SHIPPED | OUTPATIENT
Start: 2023-08-09 | End: 2023-08-28 | Stop reason: SDUPTHER

## 2023-08-15 ENCOUNTER — OFFICE VISIT (OUTPATIENT)
Dept: HEMATOLOGY/ONCOLOGY | Facility: CLINIC | Age: 73
End: 2023-08-15
Payer: MEDICARE

## 2023-08-15 ENCOUNTER — INFUSION (OUTPATIENT)
Dept: INFUSION THERAPY | Facility: HOSPITAL | Age: 73
End: 2023-08-15
Attending: FAMILY MEDICINE
Payer: MEDICARE

## 2023-08-15 VITALS
HEART RATE: 69 BPM | RESPIRATION RATE: 18 BRPM | WEIGHT: 193.81 LBS | OXYGEN SATURATION: 97 % | BODY MASS INDEX: 27.75 KG/M2 | DIASTOLIC BLOOD PRESSURE: 72 MMHG | TEMPERATURE: 98 F | SYSTOLIC BLOOD PRESSURE: 126 MMHG | HEIGHT: 70 IN

## 2023-08-15 DIAGNOSIS — C90.00 MULTIPLE MYELOMA NOT HAVING ACHIEVED REMISSION: Primary | ICD-10-CM

## 2023-08-15 PROCEDURE — 99215 OFFICE O/P EST HI 40 MIN: CPT | Mod: PBBFAC,25 | Performed by: NURSE PRACTITIONER

## 2023-08-15 PROCEDURE — 99999 PR PBB SHADOW E&M-EST. PATIENT-LVL V: ICD-10-PCS | Mod: PBBFAC,,, | Performed by: NURSE PRACTITIONER

## 2023-08-15 PROCEDURE — 99215 OFFICE O/P EST HI 40 MIN: CPT | Mod: S$PBB,,, | Performed by: NURSE PRACTITIONER

## 2023-08-15 PROCEDURE — 96367 TX/PROPH/DG ADDL SEQ IV INF: CPT

## 2023-08-15 PROCEDURE — 63600175 PHARM REV CODE 636 W HCPCS: Mod: JZ,TB | Performed by: INTERNAL MEDICINE

## 2023-08-15 PROCEDURE — 25000003 PHARM REV CODE 250: Performed by: INTERNAL MEDICINE

## 2023-08-15 PROCEDURE — 96365 THER/PROPH/DIAG IV INF INIT: CPT

## 2023-08-15 PROCEDURE — 96401 CHEMO ANTI-NEOPL SQ/IM: CPT

## 2023-08-15 PROCEDURE — 99215 PR OFFICE/OUTPT VISIT, EST, LEVL V, 40-54 MIN: ICD-10-PCS | Mod: S$PBB,,, | Performed by: NURSE PRACTITIONER

## 2023-08-15 PROCEDURE — 99999 PR PBB SHADOW E&M-EST. PATIENT-LVL V: CPT | Mod: PBBFAC,,, | Performed by: NURSE PRACTITIONER

## 2023-08-15 RX ORDER — DIPHENHYDRAMINE HYDROCHLORIDE 50 MG/ML
50 INJECTION INTRAMUSCULAR; INTRAVENOUS ONCE AS NEEDED
Status: DISCONTINUED | OUTPATIENT
Start: 2023-08-15 | End: 2023-08-15 | Stop reason: HOSPADM

## 2023-08-15 RX ORDER — DIPHENHYDRAMINE HCL 25 MG
25 CAPSULE ORAL
Status: COMPLETED | OUTPATIENT
Start: 2023-08-15 | End: 2023-08-15

## 2023-08-15 RX ORDER — ACETAMINOPHEN 325 MG/1
650 TABLET ORAL
Status: COMPLETED | OUTPATIENT
Start: 2023-08-15 | End: 2023-08-15

## 2023-08-15 RX ORDER — HEPARIN 100 UNIT/ML
500 SYRINGE INTRAVENOUS
Status: DISCONTINUED | OUTPATIENT
Start: 2023-08-15 | End: 2023-08-15 | Stop reason: HOSPADM

## 2023-08-15 RX ORDER — EPINEPHRINE 0.3 MG/.3ML
0.3 INJECTION SUBCUTANEOUS ONCE AS NEEDED
Status: DISCONTINUED | OUTPATIENT
Start: 2023-08-15 | End: 2023-08-15 | Stop reason: HOSPADM

## 2023-08-15 RX ORDER — SODIUM CHLORIDE 0.9 % (FLUSH) 0.9 %
10 SYRINGE (ML) INJECTION
Status: DISCONTINUED | OUTPATIENT
Start: 2023-08-15 | End: 2023-08-15 | Stop reason: HOSPADM

## 2023-08-15 RX ADMIN — DARATUMUMAB AND HYALURONIDASE-FIHJ (HUMAN RECOMBINANT) 1800 MG: 1800; 30000 INJECTION SUBCUTANEOUS at 12:08

## 2023-08-15 RX ADMIN — DEXAMETHASONE SODIUM PHOSPHATE 20 MG: 4 INJECTION, SOLUTION INTRA-ARTICULAR; INTRALESIONAL; INTRAMUSCULAR; INTRAVENOUS; SOFT TISSUE at 11:08

## 2023-08-15 RX ADMIN — DIPHENHYDRAMINE HYDROCHLORIDE 25 MG: 25 CAPSULE ORAL at 11:08

## 2023-08-15 RX ADMIN — ACETAMINOPHEN 650 MG: 325 TABLET ORAL at 11:08

## 2023-08-15 NOTE — PROGRESS NOTES
HEMATOLOGY/ONCOLOGY OFFICE CLINIC VISIT    Visit Information:    Initial Evaluation: 6/13/2023 (hospital consult)   Referring Provider: Dr Gonzalez  Other providers:  Code status: Not addressed    Diagnosis:  Multiple Myeloma    Present treatment:    Treatment/Oncology history:    Plan of care:     Imaging:  Ct C 6/7/2023: 1. There is a small left sided pleural effusion.2. A small patchy area of ground glass density is seen in the lateral basal segment of the left lower lobe. This could reflect an acute focal infiltrate / pneumonitis. 3. There is diffuse osteopenia along the visualised bony structures together with multiple, numerous, small round-ovoid lucent lesion of varying sizes involving the marrow. These changes could reflect metabolic bone disease like hyperparathyroidism. Correlate clinically and with laboratory findings, as regards additional evaluation and follow-up.  6/8/23 Bone Scan Whole Body:  1. Scattered activity at the anterior left right rib margins as described.  A CT exam on the previous day reveals no definite fracture.  There is mild heterogeneous and bony loss at the anterior left and right ribs.  This is not have the typical pattern of a metastases.  2. Focal increased activity at the anterior manubrium which again on the CT exam demonstrates osteopenia and heterogeneous bony loss as well as scattered subcentimeter small lytic defects. 3. Suspect mild arthritic changes at the shoulders sternoclavicular joints  6/9/23 MRI Thoracic Spine: Within limitations, no convincing evidence of acute thoracic spine abnormality, high-grade canal stenosis, or focal cord pathology. Multiple scattered vertebral body lesions are suspected and could represent metastatic disease, otherwise of incomplete characterization by non-contrast protocol. Incidental findings of the partially visualized thoracic cavity and retroperitoneum discussed above, poorly assessed by modality/protocol.  Recent non-contrast chest CT  provides overall better visualization.  6/10/23 CT Head: No acute intracranial abnormality.  Findings consistent with microangiopathy no interval change.  6/12/23 XRAY Chest: 1. Patchy hazy opacities again evident at the lower lungs bilaterally suspicious for infiltrate and atelectasis.  Small bibasilar pleural reactions cannot be excluded. 2. Borderline cardiomegaly 3. Atherosclerosis  6/16/23 XRAY Chest: Improved aeration of the lung bases with mild residual bibasilar opacities and small pleural effusions suspected.      Pathology:  6/12/23 Bone marrow aspiration/Biopsy:   PLASMA CELL MYELOMA, KAPPA RESTRICTED.     PLASMA CELL MYELOMA INVOLVES 90% OF BONE MARROW CELLULARITY WITH SMALL AREAS OF  SEQUENTIAL TRILINEAGE HEMATOPOIESIS.    ABSENT IRON STORES.     PERIPHERAL BLOOD: NORMOCYTIC NORMOCHROMIC ANEMIA. THROMBOCYTOPENIA.         CLINICAL HISTORY:       Patient: Chip Goodson is a 72 y.o. male That was seen  for the first time as hospital consult on 6/13/2023.   Patient was brought to the emergency department for confusion.  Upon evaluation in the ER CT scan of the head with no acute intracranial abnormality.  Finding consistent with microangiopathic no interval change.  CT of the chest with no contrast showed diffuse osteopenia with multiple, numerous, small round avid lucent lesions ovarian size involving the marrow.  Changes could reflect metabolic bone disease like hyperparathyroidism.  MRI of the thoracic spine with multiple scattered vertebral body lesion are suspect and could represent metastatic disease.  Bone scan with scattered activity in the anterior left right rib margins no fractures focal increased activity at the anterior manubrium scattered subcentimeter small lytic defects.  Ultrasound of the thyroid that shows multiple nodules. skeletal survey  area on humerus only.     Labs with elevated calcium of 12.6, corrected calcium 14.36.  Total protein was 11.8 with a total globulin 10.0.  Further  "workup revealed an IgA over 7040.0, IgM 6.0, IgG 165.00.  Free serum kappa light chain 5.09, free serum lambda light chains 0.5600 with a kappa/lambda ratio of 9.09.  M spike 3.33.  PSA 0.96     Bone marrow biopsy performed 6/12/23. Patient receive pamidronate 60 mg on 06/09/2023 and was started on hydration.  His calcium improved as well as his mental status.       Chief Complaint: Multiple Myeloma (No complaints.)      Interval History:  6/27/23: Pt arrives today as a F/U hospital consult, with labs and bone marrow results. Bone marrow results show that he does have multiple myeloma. Extensively explained different treatment options. Pt states he has a painful cough. He stated that he has multiple family members with cancer as well. He has pain in joints of shoulders, and c/o food tasting funny. No SOB or fevers noted.     7/12/13: Mr. Goodson presents today for cycle 1 of Revlemid+ Daratumumab+Dexamethasone. He reports feeling well overall. He has his prescribed medications on hand. He has a cough for the last 3 weeks for which his PCP is addressing. He is not currently on any antibiotics.     7/25/2023: Patient is here today for C1D8. He had some rigors toward the end of daratumumab infusion but otherwise he tolerated treatment well. He still reports fatigue and weakness. Not worse. No fever    8/15/2023:  Mr. Goodson is here today for follow up and cycle 1 day 22 of Daratumumab, Lenalidomide, and Dexamethasone.  His cycle 1, day 15 was held due to WBC 1.9 and ANC 0.684.  He was placed on Levaquin 500 mg PO QD x 7 days, which has completed.  Today, he reports "feeling great."  He denies any fever, chills, SOB, chest pain, cough, abdominal pain, change in bowel or bladder habits, abnormal bleeding or bruising, joint or bone pain, no edema of the lower extremities, and no peripheral  neuropathy.  Labs reviewed with patient.  WBC 3.29, hgb 10.3, Platelets 216,000, creatinine 1.39, calcium 8.7, albumin 2.5, and MM " panel is pending from today.  Weight is stable.  Eating and drinking well.  No recent hospitalizations.        Past Medical History:   Diagnosis Date    GERD (gastroesophageal reflux disease)     HTN (hypertension)     Hypothyroidism, unspecified     Type 2 diabetes mellitus without complications       Past Surgical History:   Procedure Laterality Date    APPENDECTOMY  1965    BONE MARROW BIOPSY Right 06/12/2023    Procedure: BIOPSY, BONE MARROW;  Surgeon: Silvano Austin MD;  Location: [facility];  Service: General;  Laterality: Right;    removal of boils       Family History   Problem Relation Age of Onset    Lung cancer Mother     Breast cancer Sister      Social Connections: Unknown (6/9/2023)    Social Connection and Isolation Panel [NHANES]     Frequency of Communication with Friends and Family: Patient refused     Frequency of Social Gatherings with Friends and Family: Patient refused     Attends Restorationist Services: Patient refused     Active Member of Clubs or Organizations: Patient refused     Attends Club or Organization Meetings: Patient refused     Marital Status: Patient refused       Review of patient's allergies indicates:   Allergen Reactions    Iodine Anaphylaxis    Shellfish containing products     Amoxicillin-pot clavulanate Itching      Current Outpatient Medications on File Prior to Visit   Medication Sig Dispense Refill    acyclovir (ZOVIRAX) 400 MG tablet Take 1 tablet (400 mg total) by mouth 2 (two) times daily. 60 tablet 11    albuterol (PROVENTIL/VENTOLIN HFA) 90 mcg/actuation inhaler 2 puffs every 4 to 6 hours as needed.      amLODIPine (NORVASC) 10 MG tablet Take 1 tablet (10 mg total) by mouth once daily. 30 each 11    atorvastatin (LIPITOR) 10 MG tablet Take 10 mg by mouth.      carvediloL (COREG) 12.5 MG tablet Take 12.5 mg by mouth 2 (two) times daily.      cloNIDine (CATAPRES) 0.2 MG tablet Take 0.2 mg by mouth daily as needed.      dexAMETHasone (DECADRON) 4 MG Tab Take 5 tablets (20 mg  total) by mouth every 7 days. on days following daratumumab infusion. Take with food. 20 tablet 1    dexAMETHasone (DECADRON) 4 MG Tab Take 1 tablet (4 mg total) by mouth once daily. For 2 days 23, and 23 2 tablet 0    famotidine (PEPCID) 20 MG tablet Take 1 tablet (20 mg total) by mouth once daily. 30 tablet 11    finasteride (PROSCAR) 5 mg tablet Take 5 mg by mouth.      gabapentin (NEURONTIN) 100 MG capsule Take by mouth.      hydrALAZINE (APRESOLINE) 50 MG tablet Take 1 tablet (50 mg total) by mouth every 8 (eight) hours. 90 tablet 11    hydrocodone-chlorpheniramine (TUSSIONEX) 10-8 mg/5 mL suspension SMARTSI Milliliter(s) By Mouth Every 12 Hours      lenalidomide 10 mg Cap Take 10 mg by mouth once daily for 21 days followed by a 7 day break, every 28 days. 21 each 0    levoFLOXacin (LEVAQUIN) 500 MG tablet Take 1 tablet (500 mg total) by mouth once daily. 7 each 0    levothyroxine (SYNTHROID) 112 MCG tablet Take 112 mcg by mouth.      ondansetron (ZOFRAN) 8 MG tablet Take 1 tablet (8 mg total) by mouth every 8 (eight) hours as needed for Nausea. 30 tablet 2    ONETOUCH ULTRA TEST Strp USE TO TEST BLOOD GLUCOSE TWICE DAILY      pantoprazole (PROTONIX) 40 MG tablet Take 1 tablet (40 mg total) by mouth 2 (two) times daily before meals. 60 tablet 11    SITagliptin phosphate (JANUVIA) 50 MG Tab Take 1 tablet (50 mg total) by mouth once daily. 30 tablet 5    spironolactone (ALDACTONE) 25 MG tablet Take 12.5 mg by mouth.      tamsulosin (FLOMAX) 0.4 mg Cap Take 1 capsule by mouth.       Current Facility-Administered Medications on File Prior to Visit   Medication Dose Route Frequency Provider Last Rate Last Admin    [COMPLETED] acetaminophen tablet 650 mg  650 mg Oral 1 time in Clinic/[facility] Elsy Amaya MD   650 mg at 08/15/23 1100    alteplase injection 2 mg  2 mg Intra-Catheter PRN Elsy Amaya MD        alteplase injection 2 mg  2 mg Intra-Catheter PRN Elsy Amaya MD         daratumumab-hyaluronidase-fihj subcutaneous injection 1,800 mg  1,800 mg Subcutaneous 1 time in Clinic/[facility] Elsy Amaya MD        daratumumab-hyaluronidase-fihj subcutaneous injection 1,800 mg  1,800 mg Subcutaneous 1 time in Clinic/[facility] Elsy Amaya MD        [COMPLETED] dexAMETHasone (DECADRON) 20 mg in sodium chloride 0.9% 50 mL IVPB  20 mg Intravenous 1 time in Clinic/[facility] Elsy Amaya  mL/hr at 08/15/23 1116 20 mg at 08/15/23 1116    [COMPLETED] diphenhydrAMINE capsule 25 mg  25 mg Oral 1 time in Clinic/[facility] Elsy Amaya MD   25 mg at 08/15/23 1101    diphenhydrAMINE injection 50 mg  50 mg Intravenous Once PRN Elsy Amaya MD        diphenhydrAMINE injection 50 mg  50 mg Intravenous Once PRN Elsy Amaya MD        EPINEPHrine (EPIPEN) 0.3 mg/0.3 mL pen injection 0.3 mg  0.3 mg Intramuscular Once PRN Elsy Amaya MD        EPINEPHrine (EPIPEN) 0.3 mg/0.3 mL pen injection 0.3 mg  0.3 mg Intramuscular Once PRN Elsy Amaya MD        heparin, porcine (PF) 100 unit/mL injection flush 500 Units  500 Units Intravenous PRN Elsy Amaya MD        heparin, porcine (PF) 100 unit/mL injection flush 500 Units  500 Units Intravenous PRN Elsy Amaya MD        hydrocortisone sodium succinate injection 100 mg  100 mg Intravenous Once PRN Elsy Amaya MD        hydrocortisone sodium succinate injection 100 mg  100 mg Intravenous Once PRN Elsy Amaya MD        sodium chloride 0.9% 250 mL flush bag   Intravenous 1 time in Clinic/[facility] Elsy Amaya MD        sodium chloride 0.9% 250 mL flush bag   Intravenous 1 time in [facility] Elsy Amaya MD        sodium chloride 0.9% flush 10 mL  10 mL Intravenous PRN Elsy Amaya MD        sodium chloride 0.9% flush 10 mL  10 mL Intravenous PRN Elsy Amaya MD          Review of Systems   Constitutional:  Negative for appetite change, chills, diaphoresis, fever  "and unexpected weight change.   HENT:  Negative for nasal congestion, mouth sores, nosebleeds, sinus pressure/congestion, sore throat and trouble swallowing.    Eyes: Negative.    Respiratory:  Negative for cough and shortness of breath.    Cardiovascular:  Negative for chest pain and palpitations.   Gastrointestinal:  Negative for abdominal distention, abdominal pain, blood in stool, change in bowel habit, constipation, diarrhea, nausea, vomiting and change in bowel habit.   Endocrine: Negative.    Genitourinary:  Negative for bladder incontinence, decreased urine volume, dysuria, frequency, hematuria and urgency. Difficulty urinating: had a ocampo cath.  Musculoskeletal:  Negative for arthralgias, back pain, gait problem, joint swelling, leg pain and myalgias.   Integumentary:  Negative for rash.   Allergic/Immunologic: Negative.    Neurological:  Negative for dizziness, tremors, syncope, weakness, light-headedness, numbness, headaches and memory loss.   Hematological:  Negative for adenopathy. Does not bruise/bleed easily.   Psychiatric/Behavioral:  Negative for agitation, confusion, hallucinations, sleep disturbance and suicidal ideas. The patient is not nervous/anxious.               Vitals:    08/15/23 1008   BP: 126/72   Pulse: 69   Resp: 18   Temp: 98 °F (36.7 °C)   SpO2: 97%   Weight: 87.9 kg (193 lb 12.8 oz)   Height: 5' 10" (1.778 m)      Wt Readings from Last 6 Encounters:   08/15/23 87.9 kg (193 lb 12.8 oz)   08/01/23 87.8 kg (193 lb 9.6 oz)   07/25/23 88.1 kg (194 lb 3.2 oz)   07/25/23 88.1 kg (194 lb 3.2 oz)   07/18/23 89.1 kg (196 lb 6.4 oz)   07/12/23 89.5 kg (197 lb 5 oz)     Body mass index is 27.81 kg/m².  Body surface area is 2.08 meters squared.  Physical Exam  Vitals and nursing note reviewed.   Constitutional:       General: He is not in acute distress.     Appearance: Normal appearance.      Comments: Elderly male   HENT:      Head: Normocephalic and atraumatic.      Mouth/Throat:      Mouth: " Mucous membranes are moist.   Eyes:      General: No scleral icterus.     Extraocular Movements: Extraocular movements intact.      Conjunctiva/sclera: Conjunctivae normal.   Neck:      Vascular: No JVD.   Cardiovascular:      Rate and Rhythm: Normal rate and regular rhythm.      Heart sounds: No murmur heard.  Pulmonary:      Effort: Pulmonary effort is normal.      Breath sounds: Normal breath sounds. No wheezing or rhonchi.   Abdominal:      General: Bowel sounds are normal. There is no distension.      Palpations: Abdomen is soft.      Tenderness: There is no abdominal tenderness.   Musculoskeletal:         General: No swelling or deformity.      Cervical back: Neck supple.   Lymphadenopathy:      Head:      Right side of head: No submandibular adenopathy.      Left side of head: No submandibular adenopathy.      Cervical: No cervical adenopathy.      Upper Body:      Right upper body: No supraclavicular or axillary adenopathy.      Left upper body: No supraclavicular or axillary adenopathy.      Lower Body: No right inguinal adenopathy. No left inguinal adenopathy.   Skin:     General: Skin is warm.      Coloration: Skin is not jaundiced.      Findings: No rash.   Neurological:      General: No focal deficit present.      Mental Status: He is alert and oriented to person, place, and time.      Cranial Nerves: Cranial nerves 2-12 are intact.   Psychiatric:         Attention and Perception: Attention normal.         Behavior: Behavior is cooperative.       Laboratory:  CBC with Differential:  Lab Results   Component Value Date    WBC 3.29 (L) 08/15/2023    RBC 3.14 (L) 08/15/2023    HGB 10.3 (L) 08/15/2023    HCT 32.1 (L) 08/15/2023    .2 (H) 08/15/2023    MCH 32.8 (H) 08/15/2023    MCHC 32.1 (L) 08/15/2023    RDW 15.9 08/15/2023     08/15/2023    MPV 9.3 08/15/2023     Serum:  SPEP: Serum protein electrophoresis shows a distinct monoclonal peak (3.33 g/dL) in the beta zone, consistent with a  monoclonal gammopathy.   PAO: A band is present in the IgA trini with a corresponding band in the kappa trini.   The immunofixation electrophoresis are consistent with monoclonal gammopathy of IgA-kappa isotype.     IgG Level 540.00 - 1,822.00 mg/dL 165.00 Low     IgA Level 101.0 - 645.0 mg/dL >7,040.0 High     IgM Level 22.0 - 240.0 mg/dL 6.0 Low       Kappa Free Light Chain 0.3300 - 1.94 mg/dL 5.09 High     Lambda Free Light Chain 0.5700 - 2.63 mg/dL 0.5600 Low     Kappa/Lambda FLC Ratio 0.2600 - 1.65 9.09 High      Urine:   24 hrs Urine:  M spike    1467      H    mg/24 h    CMP:  Sodium Level   Date Value Ref Range Status   08/15/2023 139 136 - 145 mmol/L Final     Potassium Level   Date Value Ref Range Status   08/15/2023 3.7 3.5 - 5.1 mmol/L Final     Carbon Dioxide   Date Value Ref Range Status   08/15/2023 26 23 - 31 mmol/L Final     Blood Urea Nitrogen   Date Value Ref Range Status   08/15/2023 21.0 8.4 - 25.7 mg/dL Final     Creatinine   Date Value Ref Range Status   08/15/2023 1.39 (H) 0.73 - 1.18 mg/dL Final     Calcium Level Total   Date Value Ref Range Status   08/15/2023 8.7 (L) 8.8 - 10.0 mg/dL Final     Albumin Level   Date Value Ref Range Status   08/15/2023 2.5 (L) 3.4 - 4.8 g/dL Final     Bilirubin Total   Date Value Ref Range Status   08/15/2023 0.5 <=1.5 mg/dL Final     Alkaline Phosphatase   Date Value Ref Range Status   08/15/2023 90 40 - 150 unit/L Final     Aspartate Aminotransferase   Date Value Ref Range Status   08/15/2023 13 5 - 34 unit/L Final     Alanine Aminotransferase   Date Value Ref Range Status   08/15/2023 15 0 - 55 unit/L Final             Assessment:       1. Multiple myeloma not having achieved remission      8/15/2023 Assessment:  Patient doing well today.  No complaints.  Afebrile.  Completed Antibiotics x 7 days for neutropenia.   Taking Lenalidomide and Dexamethasone as directed.  Here for cycle 1, day 22 today.        Plan:    8/15/2023 Plan:  Proceed with Daratumumab  today, cycle 1, day 22.  Return on 8/22/2023 for cycle 2 day 1 of Daratumumab.  MM panel pending.  Patient states no urine for UPEP today.  Will order next week with labs.      The patient is in agreement with today's plan of care.  All questions answered.  Patient is aware to contact our office for any problems or concerns prior to next visit.      Elizabeth Gonzalez, MSN-ED, APRN, AGACNP-BC, OCN

## 2023-08-15 NOTE — PLAN OF CARE
Encourage frequent hand washing, avoid raw seafood, report temperature > 100.4, wash fresh fruits and vegetables

## 2023-08-22 ENCOUNTER — OFFICE VISIT (OUTPATIENT)
Dept: HEMATOLOGY/ONCOLOGY | Facility: CLINIC | Age: 73
End: 2023-08-22
Payer: MEDICARE

## 2023-08-22 ENCOUNTER — INFUSION (OUTPATIENT)
Dept: INFUSION THERAPY | Facility: HOSPITAL | Age: 73
End: 2023-08-22
Attending: FAMILY MEDICINE
Payer: MEDICARE

## 2023-08-22 VITALS
HEIGHT: 70 IN | DIASTOLIC BLOOD PRESSURE: 69 MMHG | BODY MASS INDEX: 27.92 KG/M2 | TEMPERATURE: 98 F | RESPIRATION RATE: 16 BRPM | OXYGEN SATURATION: 96 % | WEIGHT: 195 LBS | HEART RATE: 91 BPM | SYSTOLIC BLOOD PRESSURE: 122 MMHG

## 2023-08-22 DIAGNOSIS — C90.00 MULTIPLE MYELOMA NOT HAVING ACHIEVED REMISSION: Primary | ICD-10-CM

## 2023-08-22 PROCEDURE — 96401 CHEMO ANTI-NEOPL SQ/IM: CPT

## 2023-08-22 PROCEDURE — 99999 PR PBB SHADOW E&M-EST. PATIENT-LVL V: ICD-10-PCS | Mod: PBBFAC,,, | Performed by: NURSE PRACTITIONER

## 2023-08-22 PROCEDURE — 96365 THER/PROPH/DIAG IV INF INIT: CPT

## 2023-08-22 PROCEDURE — 25000003 PHARM REV CODE 250: Performed by: NURSE PRACTITIONER

## 2023-08-22 PROCEDURE — 63600175 PHARM REV CODE 636 W HCPCS: Mod: JZ,TB | Performed by: NURSE PRACTITIONER

## 2023-08-22 PROCEDURE — 99215 OFFICE O/P EST HI 40 MIN: CPT | Mod: S$PBB,,, | Performed by: NURSE PRACTITIONER

## 2023-08-22 PROCEDURE — 99215 PR OFFICE/OUTPT VISIT, EST, LEVL V, 40-54 MIN: ICD-10-PCS | Mod: S$PBB,,, | Performed by: NURSE PRACTITIONER

## 2023-08-22 PROCEDURE — 96367 TX/PROPH/DG ADDL SEQ IV INF: CPT

## 2023-08-22 PROCEDURE — 99215 OFFICE O/P EST HI 40 MIN: CPT | Mod: PBBFAC,25 | Performed by: NURSE PRACTITIONER

## 2023-08-22 PROCEDURE — 99999 PR PBB SHADOW E&M-EST. PATIENT-LVL V: CPT | Mod: PBBFAC,,, | Performed by: NURSE PRACTITIONER

## 2023-08-22 RX ORDER — ACETAMINOPHEN 325 MG/1
650 TABLET ORAL
Status: CANCELLED | OUTPATIENT
Start: 2023-08-22

## 2023-08-22 RX ORDER — DIPHENHYDRAMINE HYDROCHLORIDE 50 MG/ML
50 INJECTION INTRAMUSCULAR; INTRAVENOUS ONCE AS NEEDED
Status: CANCELLED | OUTPATIENT
Start: 2023-09-12

## 2023-08-22 RX ORDER — EPINEPHRINE 0.3 MG/.3ML
0.3 INJECTION SUBCUTANEOUS ONCE AS NEEDED
Status: CANCELLED | OUTPATIENT
Start: 2023-08-29

## 2023-08-22 RX ORDER — SODIUM CHLORIDE 0.9 % (FLUSH) 0.9 %
10 SYRINGE (ML) INJECTION
Status: CANCELLED | OUTPATIENT
Start: 2023-09-05

## 2023-08-22 RX ORDER — HEPARIN 100 UNIT/ML
500 SYRINGE INTRAVENOUS
Status: CANCELLED | OUTPATIENT
Start: 2023-08-22

## 2023-08-22 RX ORDER — DIPHENHYDRAMINE HCL 25 MG
25 CAPSULE ORAL
Status: CANCELLED | OUTPATIENT
Start: 2023-09-05

## 2023-08-22 RX ORDER — SODIUM CHLORIDE 0.9 % (FLUSH) 0.9 %
10 SYRINGE (ML) INJECTION
Status: CANCELLED | OUTPATIENT
Start: 2023-08-22

## 2023-08-22 RX ORDER — DIPHENHYDRAMINE HYDROCHLORIDE 50 MG/ML
50 INJECTION INTRAMUSCULAR; INTRAVENOUS ONCE AS NEEDED
Status: CANCELLED | OUTPATIENT
Start: 2023-08-29

## 2023-08-22 RX ORDER — DIPHENHYDRAMINE HCL 25 MG
25 CAPSULE ORAL
Status: CANCELLED | OUTPATIENT
Start: 2023-09-12

## 2023-08-22 RX ORDER — SODIUM CHLORIDE 0.9 % (FLUSH) 0.9 %
10 SYRINGE (ML) INJECTION
Status: CANCELLED | OUTPATIENT
Start: 2023-08-29

## 2023-08-22 RX ORDER — DIPHENHYDRAMINE HCL 25 MG
25 CAPSULE ORAL
Status: CANCELLED | OUTPATIENT
Start: 2023-08-22

## 2023-08-22 RX ORDER — ACETAMINOPHEN 325 MG/1
650 TABLET ORAL
Status: COMPLETED | OUTPATIENT
Start: 2023-08-22 | End: 2023-08-22

## 2023-08-22 RX ORDER — HEPARIN 100 UNIT/ML
500 SYRINGE INTRAVENOUS
Status: CANCELLED | OUTPATIENT
Start: 2023-08-29

## 2023-08-22 RX ORDER — DIPHENHYDRAMINE HYDROCHLORIDE 50 MG/ML
50 INJECTION INTRAMUSCULAR; INTRAVENOUS ONCE AS NEEDED
Status: DISCONTINUED | OUTPATIENT
Start: 2023-08-22 | End: 2023-08-22 | Stop reason: HOSPADM

## 2023-08-22 RX ORDER — SODIUM CHLORIDE 0.9 % (FLUSH) 0.9 %
10 SYRINGE (ML) INJECTION
Status: DISCONTINUED | OUTPATIENT
Start: 2023-08-22 | End: 2023-08-22 | Stop reason: HOSPADM

## 2023-08-22 RX ORDER — DIPHENHYDRAMINE HYDROCHLORIDE 50 MG/ML
50 INJECTION INTRAMUSCULAR; INTRAVENOUS ONCE AS NEEDED
Status: CANCELLED | OUTPATIENT
Start: 2023-08-22

## 2023-08-22 RX ORDER — HEPARIN 100 UNIT/ML
500 SYRINGE INTRAVENOUS
Status: CANCELLED | OUTPATIENT
Start: 2023-09-12

## 2023-08-22 RX ORDER — HEPARIN 100 UNIT/ML
500 SYRINGE INTRAVENOUS
Status: CANCELLED | OUTPATIENT
Start: 2023-09-05

## 2023-08-22 RX ORDER — EPINEPHRINE 0.3 MG/.3ML
0.3 INJECTION SUBCUTANEOUS ONCE AS NEEDED
Status: CANCELLED | OUTPATIENT
Start: 2023-09-05

## 2023-08-22 RX ORDER — DIPHENHYDRAMINE HYDROCHLORIDE 50 MG/ML
50 INJECTION INTRAMUSCULAR; INTRAVENOUS ONCE AS NEEDED
Status: CANCELLED | OUTPATIENT
Start: 2023-09-05

## 2023-08-22 RX ORDER — DIPHENHYDRAMINE HCL 25 MG
25 CAPSULE ORAL
Status: COMPLETED | OUTPATIENT
Start: 2023-08-22 | End: 2023-08-22

## 2023-08-22 RX ORDER — ACETAMINOPHEN 325 MG/1
650 TABLET ORAL
Status: CANCELLED | OUTPATIENT
Start: 2023-09-12

## 2023-08-22 RX ORDER — HEPARIN 100 UNIT/ML
500 SYRINGE INTRAVENOUS
Status: DISCONTINUED | OUTPATIENT
Start: 2023-08-22 | End: 2023-08-22 | Stop reason: HOSPADM

## 2023-08-22 RX ORDER — EPINEPHRINE 0.3 MG/.3ML
0.3 INJECTION SUBCUTANEOUS ONCE AS NEEDED
Status: CANCELLED | OUTPATIENT
Start: 2023-08-22

## 2023-08-22 RX ORDER — EPINEPHRINE 0.3 MG/.3ML
0.3 INJECTION SUBCUTANEOUS ONCE AS NEEDED
Status: CANCELLED | OUTPATIENT
Start: 2023-09-12

## 2023-08-22 RX ORDER — EPINEPHRINE 0.3 MG/.3ML
0.3 INJECTION SUBCUTANEOUS ONCE AS NEEDED
Status: DISCONTINUED | OUTPATIENT
Start: 2023-08-22 | End: 2023-08-22 | Stop reason: HOSPADM

## 2023-08-22 RX ORDER — SODIUM CHLORIDE 0.9 % (FLUSH) 0.9 %
10 SYRINGE (ML) INJECTION
Status: CANCELLED | OUTPATIENT
Start: 2023-09-12

## 2023-08-22 RX ORDER — DIPHENHYDRAMINE HCL 25 MG
25 CAPSULE ORAL
Status: CANCELLED | OUTPATIENT
Start: 2023-08-29

## 2023-08-22 RX ORDER — ACETAMINOPHEN 325 MG/1
650 TABLET ORAL
Status: CANCELLED | OUTPATIENT
Start: 2023-08-29

## 2023-08-22 RX ORDER — ACETAMINOPHEN 325 MG/1
650 TABLET ORAL
Status: CANCELLED | OUTPATIENT
Start: 2023-09-05

## 2023-08-22 RX ADMIN — SODIUM CHLORIDE: 9 INJECTION, SOLUTION INTRAVENOUS at 09:08

## 2023-08-22 RX ADMIN — DEXAMETHASONE SODIUM PHOSPHATE 20 MG: 4 INJECTION, SOLUTION INTRA-ARTICULAR; INTRALESIONAL; INTRAMUSCULAR; INTRAVENOUS; SOFT TISSUE at 09:08

## 2023-08-22 RX ADMIN — ACETAMINOPHEN 650 MG: 325 TABLET ORAL at 09:08

## 2023-08-22 RX ADMIN — DIPHENHYDRAMINE HYDROCHLORIDE 25 MG: 25 CAPSULE ORAL at 09:08

## 2023-08-22 RX ADMIN — DARATUMUMAB AND HYALURONIDASE-FIHJ (HUMAN RECOMBINANT) 1800 MG: 1800; 30000 INJECTION SUBCUTANEOUS at 10:08

## 2023-08-22 NOTE — PROGRESS NOTES
HEMATOLOGY/ONCOLOGY OFFICE CLINIC VISIT    Visit Information:    Initial Evaluation: 6/13/2023 (hospital consult)   Referring Provider: Dr Gonzalez  Other providers:  Code status: Not addressed    Diagnosis:  Multiple Myeloma    Present treatment:    Treatment/Oncology history:    Plan of care:     Imaging:  Ct C 6/7/2023: 1. There is a small left sided pleural effusion.2. A small patchy area of ground glass density is seen in the lateral basal segment of the left lower lobe. This could reflect an acute focal infiltrate / pneumonitis. 3. There is diffuse osteopenia along the visualised bony structures together with multiple, numerous, small round-ovoid lucent lesion of varying sizes involving the marrow. These changes could reflect metabolic bone disease like hyperparathyroidism. Correlate clinically and with laboratory findings, as regards additional evaluation and follow-up.  6/8/23 Bone Scan Whole Body:  1. Scattered activity at the anterior left right rib margins as described.  A CT exam on the previous day reveals no definite fracture.  There is mild heterogeneous and bony loss at the anterior left and right ribs.  This is not have the typical pattern of a metastases.  2. Focal increased activity at the anterior manubrium which again on the CT exam demonstrates osteopenia and heterogeneous bony loss as well as scattered subcentimeter small lytic defects. 3. Suspect mild arthritic changes at the shoulders sternoclavicular joints  6/9/23 MRI Thoracic Spine: Within limitations, no convincing evidence of acute thoracic spine abnormality, high-grade canal stenosis, or focal cord pathology. Multiple scattered vertebral body lesions are suspected and could represent metastatic disease, otherwise of incomplete characterization by non-contrast protocol. Incidental findings of the partially visualized thoracic cavity and retroperitoneum discussed above, poorly assessed by modality/protocol.  Recent non-contrast chest CT  provides overall better visualization.  6/10/23 CT Head: No acute intracranial abnormality.  Findings consistent with microangiopathy no interval change.  6/12/23 XRAY Chest: 1. Patchy hazy opacities again evident at the lower lungs bilaterally suspicious for infiltrate and atelectasis.  Small bibasilar pleural reactions cannot be excluded. 2. Borderline cardiomegaly 3. Atherosclerosis  6/16/23 XRAY Chest: Improved aeration of the lung bases with mild residual bibasilar opacities and small pleural effusions suspected.      Pathology:  6/12/23 Bone marrow aspiration/Biopsy:   PLASMA CELL MYELOMA, KAPPA RESTRICTED.     PLASMA CELL MYELOMA INVOLVES 90% OF BONE MARROW CELLULARITY WITH SMALL AREAS OF  SEQUENTIAL TRILINEAGE HEMATOPOIESIS.    ABSENT IRON STORES.     PERIPHERAL BLOOD: NORMOCYTIC NORMOCHROMIC ANEMIA. THROMBOCYTOPENIA.         CLINICAL HISTORY:       Patient: Chip Goodson is a 72 y.o. male That was seen  for the first time as hospital consult on 6/13/2023.   Patient was brought to the emergency department for confusion.  Upon evaluation in the ER CT scan of the head with no acute intracranial abnormality.  Finding consistent with microangiopathic no interval change.  CT of the chest with no contrast showed diffuse osteopenia with multiple, numerous, small round avid lucent lesions ovarian size involving the marrow.  Changes could reflect metabolic bone disease like hyperparathyroidism.  MRI of the thoracic spine with multiple scattered vertebral body lesion are suspect and could represent metastatic disease.  Bone scan with scattered activity in the anterior left right rib margins no fractures focal increased activity at the anterior manubrium scattered subcentimeter small lytic defects.  Ultrasound of the thyroid that shows multiple nodules. skeletal survey  area on humerus only.     Labs with elevated calcium of 12.6, corrected calcium 14.36.  Total protein was 11.8 with a total globulin 10.0.  Further  "workup revealed an IgA over 7040.0, IgM 6.0, IgG 165.00.  Free serum kappa light chain 5.09, free serum lambda light chains 0.5600 with a kappa/lambda ratio of 9.09.  M spike 3.33.  PSA 0.96     Bone marrow biopsy performed 6/12/23. Patient receive pamidronate 60 mg on 06/09/2023 and was started on hydration.  His calcium improved as well as his mental status.       Chief Complaint: Other Misc (Pt reports no new concerns today.)      Interval History:  6/27/23: Pt arrives today as a F/U hospital consult, with labs and bone marrow results. Bone marrow results show that he does have multiple myeloma. Extensively explained different treatment options. Pt states he has a painful cough. He stated that he has multiple family members with cancer as well. He has pain in joints of shoulders, and c/o food tasting funny. No SOB or fevers noted.     7/12/13: Mr. Goodson presents today for cycle 1 of Revlemid+ Daratumumab+Dexamethasone. He reports feeling well overall. He has his prescribed medications on hand. He has a cough for the last 3 weeks for which his PCP is addressing. He is not currently on any antibiotics.     7/25/2023: Patient is here today for C1D8. He had some rigors toward the end of daratumumab infusion but otherwise he tolerated treatment well. He still reports fatigue and weakness. Not worse. No fever    8/15/2023:  Mr. Goodson is here today for follow up and cycle 1 day 22 of Daratumumab, Lenalidomide, and Dexamethasone.  His cycle 1, day 15 was held due to WBC 1.9 and ANC 0.684.  He was placed on Levaquin 500 mg PO QD x 7 days, which has completed.  Today, he reports "feeling great."  He denies any fever, chills, SOB, chest pain, cough, abdominal pain, change in bowel or bladder habits, abnormal bleeding or bruising, joint or bone pain, no edema of the lower extremities, and no peripheral  neuropathy.  Labs reviewed with patient.  WBC 3.29, hgb 10.3, Platelets 216,000, creatinine 1.39, calcium 8.7, " albumin 2.5, and MM panel is pending from today.  Weight is stable.  Eating and drinking well.  No recent hospitalizations.      8/22/2023:    Mr. Goodson is here today for follow up and cycle 1 day cycle 2 of Daratumumab, Lenalidomide, and Dexamethasone.  Today, he reports feeling well.  He denies any fever, chills, SOB, chest pain, cough, abdominal pain, confirms constipation, no change in bladder habits, abnormal bleeding or bruising, joint or bone pain, no edema of the lower extremities, and no peripheral  neuropathy.  Labs reviewed with patient.  WBC 3.30, hgb 10.0, Platelets 200,000, creatinine 1.16, calcium 8.4, albumin 2.6, with corrected calcium 9.52.  MM panel results from last visit discussed with patient:  IgG 208, IgA 4,003.0, IgM 5.0; Kappa FLC 2.19, Kappa/Lambda ratio 6.64, and M Scar 1.59, all numbers trending downward from previous visits.  Weight is stable.  Eating and drinking well.  No recent hospitalizations.        Past Medical History:   Diagnosis Date    GERD (gastroesophageal reflux disease)     HTN (hypertension)     Hypothyroidism, unspecified     Type 2 diabetes mellitus without complications       Past Surgical History:   Procedure Laterality Date    APPENDECTOMY  1965    BONE MARROW BIOPSY Right 06/12/2023    Procedure: BIOPSY, BONE MARROW;  Surgeon: Silvano Austin MD;  Location: [facility];  Service: General;  Laterality: Right;    removal of boils       Family History   Problem Relation Age of Onset    Lung cancer Mother     Breast cancer Sister      Social Connections: Unknown (6/9/2023)    Social Connection and Isolation Panel [NHANES]     Frequency of Communication with Friends and Family: Patient refused     Frequency of Social Gatherings with Friends and Family: Patient refused     Attends Rastafari Services: Patient refused     Active Member of Clubs or Organizations: Patient refused     Attends Club or Organization Meetings: Patient refused     Marital Status: Patient refused        Review of patient's allergies indicates:   Allergen Reactions    Iodine Anaphylaxis    Shellfish containing products     Amoxicillin-pot clavulanate Itching      Current Outpatient Medications on File Prior to Visit   Medication Sig Dispense Refill    acyclovir (ZOVIRAX) 400 MG tablet Take 1 tablet (400 mg total) by mouth 2 (two) times daily. 60 tablet 11    albuterol (PROVENTIL/VENTOLIN HFA) 90 mcg/actuation inhaler 2 puffs every 4 to 6 hours as needed.      amLODIPine (NORVASC) 10 MG tablet Take 1 tablet (10 mg total) by mouth once daily. 30 each 11    atorvastatin (LIPITOR) 10 MG tablet Take 10 mg by mouth.      carvediloL (COREG) 12.5 MG tablet Take 12.5 mg by mouth 2 (two) times daily.      cloNIDine (CATAPRES) 0.2 MG tablet Take 0.2 mg by mouth daily as needed.      dexAMETHasone (DECADRON) 4 MG Tab Take 5 tablets (20 mg total) by mouth every 7 days. on days following daratumumab infusion. Take with food. 20 tablet 1    dexAMETHasone (DECADRON) 4 MG Tab Take 1 tablet (4 mg total) by mouth once daily. For 2 days 23, and 23 2 tablet 0    famotidine (PEPCID) 20 MG tablet Take 1 tablet (20 mg total) by mouth once daily. 30 tablet 11    finasteride (PROSCAR) 5 mg tablet Take 5 mg by mouth.      gabapentin (NEURONTIN) 100 MG capsule Take by mouth.      hydrALAZINE (APRESOLINE) 50 MG tablet Take 1 tablet (50 mg total) by mouth every 8 (eight) hours. 90 tablet 11    hydrocodone-chlorpheniramine (TUSSIONEX) 10-8 mg/5 mL suspension SMARTSI Milliliter(s) By Mouth Every 12 Hours      lenalidomide 10 mg Cap Take 10 mg by mouth once daily for 21 days followed by a 7 day break, every 28 days. 21 each 0    levoFLOXacin (LEVAQUIN) 500 MG tablet Take 1 tablet (500 mg total) by mouth once daily. 7 each 0    levothyroxine (SYNTHROID) 112 MCG tablet Take 112 mcg by mouth.      ondansetron (ZOFRAN) 8 MG tablet Take 1 tablet (8 mg total) by mouth every 8 (eight) hours as needed for Nausea. 30 tablet 2    ONETOUCH  ULTRA TEST Strp USE TO TEST BLOOD GLUCOSE TWICE DAILY      pantoprazole (PROTONIX) 40 MG tablet Take 1 tablet (40 mg total) by mouth 2 (two) times daily before meals. 60 tablet 11    SITagliptin phosphate (JANUVIA) 50 MG Tab Take 1 tablet (50 mg total) by mouth once daily. 30 tablet 5    spironolactone (ALDACTONE) 25 MG tablet Take 12.5 mg by mouth.      tamsulosin (FLOMAX) 0.4 mg Cap Take 1 capsule by mouth.       Current Facility-Administered Medications on File Prior to Visit   Medication Dose Route Frequency Provider Last Rate Last Admin    alteplase injection 2 mg  2 mg Intra-Catheter PRN Elsy Amaya MD        daratumumab-hyaluronidase-fihj subcutaneous injection 1,800 mg  1,800 mg Subcutaneous 1 time in Clinic/HOD Elsy Amaya MD        diphenhydrAMINE injection 50 mg  50 mg Intravenous Once PRN Elsy Amaya MD        EPINEPHrine (EPIPEN) 0.3 mg/0.3 mL pen injection 0.3 mg  0.3 mg Intramuscular Once PRN Elsy Amaya MD        heparin, porcine (PF) 100 unit/mL injection flush 500 Units  500 Units Intravenous PRN Elsy Amaya MD        hydrocortisone sodium succinate injection 100 mg  100 mg Intravenous Once PRN Elsy Amaya MD        sodium chloride 0.9% 250 mL flush bag   Intravenous 1 time in Clinic/HOD Elsy Amaya MD        sodium chloride 0.9% flush 10 mL  10 mL Intravenous PRN Elsy Amaya MD          Review of Systems   Constitutional:  Negative for appetite change, chills, diaphoresis, fever and unexpected weight change.   HENT:  Negative for nasal congestion, mouth sores, nosebleeds, sinus pressure/congestion, sore throat and trouble swallowing.    Eyes: Negative.    Respiratory:  Negative for cough and shortness of breath.    Cardiovascular:  Negative for chest pain and palpitations.   Gastrointestinal:  Negative for abdominal distention, abdominal pain, blood in stool, change in bowel habit, constipation, diarrhea, nausea, vomiting and  "change in bowel habit.   Endocrine: Negative.    Genitourinary:  Negative for bladder incontinence, decreased urine volume, dysuria, frequency, hematuria and urgency. Difficulty urinating: had a ocampo cath.  Musculoskeletal:  Negative for arthralgias, back pain, gait problem, joint swelling, leg pain and myalgias.   Integumentary:  Negative for rash.   Allergic/Immunologic: Negative.    Neurological:  Negative for dizziness, tremors, syncope, weakness, light-headedness, numbness, headaches and memory loss.   Hematological:  Negative for adenopathy. Does not bruise/bleed easily.   Psychiatric/Behavioral:  Negative for agitation, confusion, hallucinations, sleep disturbance and suicidal ideas. The patient is not nervous/anxious.               Vitals:    08/22/23 0850   BP: 122/69   BP Location: Left arm   Patient Position: Sitting   Pulse: 91   Resp: 16   Temp: 98.3 °F (36.8 °C)   TempSrc: Oral   SpO2: 96%   Weight: 88.5 kg (195 lb)   Height: 5' 10" (1.778 m)      Wt Readings from Last 6 Encounters:   08/22/23 88.5 kg (195 lb)   08/15/23 87.9 kg (193 lb 12.8 oz)   08/01/23 87.8 kg (193 lb 9.6 oz)   07/25/23 88.1 kg (194 lb 3.2 oz)   07/25/23 88.1 kg (194 lb 3.2 oz)   07/18/23 89.1 kg (196 lb 6.4 oz)     Body mass index is 27.98 kg/m².  Body surface area is 2.09 meters squared.  Physical Exam  Vitals and nursing note reviewed.   Constitutional:       General: He is not in acute distress.     Appearance: Normal appearance.      Comments: Elderly male   HENT:      Head: Normocephalic and atraumatic.      Mouth/Throat:      Mouth: Mucous membranes are moist.   Eyes:      General: No scleral icterus.     Extraocular Movements: Extraocular movements intact.      Conjunctiva/sclera: Conjunctivae normal.   Neck:      Vascular: No JVD.   Cardiovascular:      Rate and Rhythm: Normal rate and regular rhythm.      Heart sounds: No murmur heard.  Pulmonary:      Effort: Pulmonary effort is normal.      Breath sounds: Normal breath " sounds. No wheezing or rhonchi.   Abdominal:      General: Bowel sounds are normal. There is no distension.      Palpations: Abdomen is soft.      Tenderness: There is no abdominal tenderness.   Musculoskeletal:         General: No swelling or deformity.      Cervical back: Neck supple.   Lymphadenopathy:      Head:      Right side of head: No submandibular adenopathy.      Left side of head: No submandibular adenopathy.      Cervical: No cervical adenopathy.      Upper Body:      Right upper body: No supraclavicular or axillary adenopathy.      Left upper body: No supraclavicular or axillary adenopathy.      Lower Body: No right inguinal adenopathy. No left inguinal adenopathy.   Skin:     General: Skin is warm.      Coloration: Skin is not jaundiced.      Findings: No rash.   Neurological:      General: No focal deficit present.      Mental Status: He is alert and oriented to person, place, and time.      Cranial Nerves: Cranial nerves 2-12 are intact.   Psychiatric:         Attention and Perception: Attention normal.         Behavior: Behavior is cooperative.       Laboratory:  CBC with Differential:  Lab Results   Component Value Date    WBC 3.30 (L) 08/22/2023    RBC 3.04 (L) 08/22/2023    HGB 10.0 (L) 08/22/2023    HCT 30.7 (L) 08/22/2023    .0 (H) 08/22/2023    MCH 32.9 (H) 08/22/2023    MCHC 32.6 (L) 08/22/2023    RDW 15.2 08/22/2023     08/22/2023    MPV 9.9 08/22/2023     Serum:  SPEP: Serum protein electrophoresis shows a distinct monoclonal peak (3.33 g/dL) in the beta zone, consistent with a monoclonal gammopathy.   PAO: A band is present in the IgA trini with a corresponding band in the kappa trini.   The immunofixation electrophoresis are consistent with monoclonal gammopathy of IgA-kappa isotype.     IgG Level 540.00 - 1,822.00 mg/dL 165.00 Low     IgA Level 101.0 - 645.0 mg/dL >7,040.0 High     IgM Level 22.0 - 240.0 mg/dL 6.0 Low       Kappa Free Light Chain 0.3300 - 1.94 mg/dL 5.09  High     Lambda Free Light Chain 0.5700 - 2.63 mg/dL 0.5600 Low     Kappa/Lambda FLC Ratio 0.2600 - 1.65 9.09 High      Urine:   24 hrs Urine:  M spike    1467      H    mg/24 h    CMP:  Sodium Level   Date Value Ref Range Status   08/22/2023 139 136 - 145 mmol/L Final     Potassium Level   Date Value Ref Range Status   08/22/2023 3.6 3.5 - 5.1 mmol/L Final     Carbon Dioxide   Date Value Ref Range Status   08/22/2023 26 23 - 31 mmol/L Final     Blood Urea Nitrogen   Date Value Ref Range Status   08/22/2023 15.0 8.4 - 25.7 mg/dL Final     Creatinine   Date Value Ref Range Status   08/22/2023 1.16 0.73 - 1.18 mg/dL Final     Calcium Level Total   Date Value Ref Range Status   08/22/2023 8.4 (L) 8.8 - 10.0 mg/dL Final     Albumin Level   Date Value Ref Range Status   08/22/2023 2.6 (L) 3.4 - 4.8 g/dL Final     Bilirubin Total   Date Value Ref Range Status   08/22/2023 0.7 <=1.5 mg/dL Final     Alkaline Phosphatase   Date Value Ref Range Status   08/22/2023 100 40 - 150 unit/L Final     Aspartate Aminotransferase   Date Value Ref Range Status   08/22/2023 16 5 - 34 unit/L Final     Alanine Aminotransferase   Date Value Ref Range Status   08/22/2023 21 0 - 55 unit/L Final             Assessment:       1. Multiple myeloma not having achieved remission      8/22/2023 Assessment:  Patient doing well today.  No complaints.  Afebrile.  Taking Lenalidomide and Dexamethasone as directed.  Here for cycle 2, day 1 today.        Plan:    8/22/2023 Plan:  Proceed with Daratumumab today, cycle 2, day 1.  Return on 8/29/2023 for cycle 2 day 8 of Daratumumab.  Patient will return 24 hour UPEP next week.  Will order next week with labs.      The patient is in agreement with today's plan of care.  All questions answered.  Patient is aware to contact our office for any problems or concerns prior to next visit.      Elizabeth Gonzalez, MSN-ED, APRN, AGACNP-BC, OCN

## 2023-08-23 PROCEDURE — 0077U IG PARAPROTEIN QUAL BLD/UR: CPT

## 2023-08-28 DIAGNOSIS — C90.00 MULTIPLE MYELOMA NOT HAVING ACHIEVED REMISSION: ICD-10-CM

## 2023-08-28 RX ORDER — LENALIDOMIDE 10 MG/1
10 CAPSULE ORAL DAILY
Qty: 21 EACH | Refills: 0 | Status: SHIPPED | OUTPATIENT
Start: 2023-08-28 | End: 2023-10-03 | Stop reason: SDUPTHER

## 2023-08-29 ENCOUNTER — LAB VISIT (OUTPATIENT)
Dept: LAB | Facility: HOSPITAL | Age: 73
End: 2023-08-29
Attending: NURSE PRACTITIONER
Payer: MEDICARE

## 2023-08-29 ENCOUNTER — INFUSION (OUTPATIENT)
Dept: INFUSION THERAPY | Facility: HOSPITAL | Age: 73
End: 2023-08-29
Attending: FAMILY MEDICINE
Payer: MEDICARE

## 2023-08-29 VITALS
HEART RATE: 74 BPM | DIASTOLIC BLOOD PRESSURE: 70 MMHG | SYSTOLIC BLOOD PRESSURE: 126 MMHG | BODY MASS INDEX: 27.54 KG/M2 | RESPIRATION RATE: 18 BRPM | TEMPERATURE: 99 F | HEIGHT: 70 IN | WEIGHT: 192.38 LBS | OXYGEN SATURATION: 97 %

## 2023-08-29 DIAGNOSIS — C90.00 MULTIPLE MYELOMA NOT HAVING ACHIEVED REMISSION: ICD-10-CM

## 2023-08-29 DIAGNOSIS — C90.00 MULTIPLE MYELOMA NOT HAVING ACHIEVED REMISSION: Primary | ICD-10-CM

## 2023-08-29 LAB
ALBUMIN SERPL-MCNC: 2.6 G/DL (ref 3.4–4.8)
ALBUMIN/GLOB SERPL: 0.6 RATIO (ref 1.1–2)
ALP SERPL-CCNC: 113 UNIT/L (ref 40–150)
ALT SERPL-CCNC: 21 UNIT/L (ref 0–55)
AST SERPL-CCNC: 11 UNIT/L (ref 5–34)
BASOPHILS # BLD AUTO: 0.03 X10(3)/MCL
BASOPHILS NFR BLD AUTO: 0.6 %
BILIRUB SERPL-MCNC: 0.8 MG/DL
BUN SERPL-MCNC: 23 MG/DL (ref 8.4–25.7)
CALCIUM SERPL-MCNC: 8.7 MG/DL (ref 8.8–10)
CHLORIDE SERPL-SCNC: 105 MMOL/L (ref 98–107)
CO2 SERPL-SCNC: 27 MMOL/L (ref 23–31)
CREAT SERPL-MCNC: 1.28 MG/DL (ref 0.73–1.18)
EOSINOPHIL # BLD AUTO: 0.42 X10(3)/MCL (ref 0–0.9)
EOSINOPHIL NFR BLD AUTO: 8.2 %
ERYTHROCYTE [DISTWIDTH] IN BLOOD BY AUTOMATED COUNT: 14.6 % (ref 11.5–17)
GFR SERPLBLD CREATININE-BSD FMLA CKD-EPI: 59 MLS/MIN/1.73/M2
GLOBULIN SER-MCNC: 4.1 GM/DL (ref 2.4–3.5)
GLUCOSE SERPL-MCNC: 127 MG/DL (ref 82–115)
HCT VFR BLD AUTO: 34.1 % (ref 42–52)
HGB BLD-MCNC: 11.2 G/DL (ref 14–18)
IMM GRANULOCYTES # BLD AUTO: 0.01 X10(3)/MCL (ref 0–0.04)
IMM GRANULOCYTES NFR BLD AUTO: 0.2 %
LYMPHOCYTES # BLD AUTO: 1.16 X10(3)/MCL (ref 0.6–4.6)
LYMPHOCYTES NFR BLD AUTO: 22.5 %
MAGNESIUM SERPL-MCNC: 1.9 MG/DL (ref 1.6–2.6)
MCH RBC QN AUTO: 33.1 PG (ref 27–31)
MCHC RBC AUTO-ENTMCNC: 32.8 G/DL (ref 33–36)
MCV RBC AUTO: 100.9 FL (ref 80–94)
MONOCYTES # BLD AUTO: 0.94 X10(3)/MCL (ref 0.1–1.3)
MONOCYTES NFR BLD AUTO: 18.3 %
NEUTROPHILS # BLD AUTO: 2.59 X10(3)/MCL (ref 2.1–9.2)
NEUTROPHILS NFR BLD AUTO: 50.2 %
PHOSPHATE SERPL-MCNC: 2.6 MG/DL (ref 2.3–4.7)
PLATELET # BLD AUTO: 160 X10(3)/MCL (ref 130–400)
PMV BLD AUTO: 10.1 FL (ref 7.4–10.4)
POTASSIUM SERPL-SCNC: 3.5 MMOL/L (ref 3.5–5.1)
PROT SERPL-MCNC: 6.7 GM/DL (ref 5.8–7.6)
RBC # BLD AUTO: 3.38 X10(6)/MCL (ref 4.7–6.1)
SODIUM SERPL-SCNC: 139 MMOL/L (ref 136–145)
WBC # SPEC AUTO: 5.15 X10(3)/MCL (ref 4.5–11.5)

## 2023-08-29 PROCEDURE — 96365 THER/PROPH/DIAG IV INF INIT: CPT

## 2023-08-29 PROCEDURE — 85025 COMPLETE CBC W/AUTO DIFF WBC: CPT

## 2023-08-29 PROCEDURE — 80053 COMPREHEN METABOLIC PANEL: CPT

## 2023-08-29 PROCEDURE — 83735 ASSAY OF MAGNESIUM: CPT

## 2023-08-29 PROCEDURE — 25000003 PHARM REV CODE 250: Performed by: NURSE PRACTITIONER

## 2023-08-29 PROCEDURE — 96401 CHEMO ANTI-NEOPL SQ/IM: CPT

## 2023-08-29 PROCEDURE — 84100 ASSAY OF PHOSPHORUS: CPT

## 2023-08-29 PROCEDURE — 63600175 PHARM REV CODE 636 W HCPCS: Mod: JZ,TB | Performed by: NURSE PRACTITIONER

## 2023-08-29 PROCEDURE — 36415 COLL VENOUS BLD VENIPUNCTURE: CPT

## 2023-08-29 RX ORDER — ACETAMINOPHEN 325 MG/1
650 TABLET ORAL
Status: COMPLETED | OUTPATIENT
Start: 2023-08-29 | End: 2023-08-29

## 2023-08-29 RX ORDER — DIPHENHYDRAMINE HCL 25 MG
25 CAPSULE ORAL
Status: COMPLETED | OUTPATIENT
Start: 2023-08-29 | End: 2023-08-29

## 2023-08-29 RX ORDER — DIPHENHYDRAMINE HYDROCHLORIDE 50 MG/ML
50 INJECTION INTRAMUSCULAR; INTRAVENOUS ONCE AS NEEDED
Status: DISCONTINUED | OUTPATIENT
Start: 2023-08-29 | End: 2023-08-29 | Stop reason: HOSPADM

## 2023-08-29 RX ORDER — EPINEPHRINE 0.3 MG/.3ML
0.3 INJECTION SUBCUTANEOUS ONCE AS NEEDED
Status: DISCONTINUED | OUTPATIENT
Start: 2023-08-29 | End: 2023-08-29 | Stop reason: HOSPADM

## 2023-08-29 RX ORDER — SODIUM CHLORIDE 0.9 % (FLUSH) 0.9 %
10 SYRINGE (ML) INJECTION
Status: DISCONTINUED | OUTPATIENT
Start: 2023-08-29 | End: 2023-08-29 | Stop reason: HOSPADM

## 2023-08-29 RX ORDER — HEPARIN 100 UNIT/ML
500 SYRINGE INTRAVENOUS
Status: DISCONTINUED | OUTPATIENT
Start: 2023-08-29 | End: 2023-08-29 | Stop reason: HOSPADM

## 2023-08-29 RX ADMIN — DIPHENHYDRAMINE HYDROCHLORIDE 25 MG: 25 CAPSULE ORAL at 09:08

## 2023-08-29 RX ADMIN — ACETAMINOPHEN 650 MG: 325 TABLET ORAL at 09:08

## 2023-08-29 RX ADMIN — DARATUMUMAB AND HYALURONIDASE-FIHJ (HUMAN RECOMBINANT) 1800 MG: 1800; 30000 INJECTION SUBCUTANEOUS at 10:08

## 2023-08-29 RX ADMIN — DEXAMETHASONE SODIUM PHOSPHATE 20 MG: 4 INJECTION, SOLUTION INTRA-ARTICULAR; INTRALESIONAL; INTRAMUSCULAR; INTRAVENOUS; SOFT TISSUE at 09:08

## 2023-09-05 ENCOUNTER — OFFICE VISIT (OUTPATIENT)
Dept: HEMATOLOGY/ONCOLOGY | Facility: CLINIC | Age: 73
End: 2023-09-05
Payer: MEDICARE

## 2023-09-05 ENCOUNTER — INFUSION (OUTPATIENT)
Dept: INFUSION THERAPY | Facility: HOSPITAL | Age: 73
End: 2023-09-05
Attending: FAMILY MEDICINE
Payer: MEDICARE

## 2023-09-05 VITALS
BODY MASS INDEX: 27.49 KG/M2 | OXYGEN SATURATION: 98 % | HEART RATE: 51 BPM | SYSTOLIC BLOOD PRESSURE: 112 MMHG | WEIGHT: 192 LBS | HEIGHT: 70 IN | TEMPERATURE: 98 F | DIASTOLIC BLOOD PRESSURE: 67 MMHG | RESPIRATION RATE: 16 BRPM

## 2023-09-05 VITALS
OXYGEN SATURATION: 98 % | HEIGHT: 70 IN | TEMPERATURE: 98 F | BODY MASS INDEX: 27.49 KG/M2 | WEIGHT: 192 LBS | DIASTOLIC BLOOD PRESSURE: 67 MMHG | SYSTOLIC BLOOD PRESSURE: 112 MMHG | RESPIRATION RATE: 16 BRPM | HEART RATE: 51 BPM

## 2023-09-05 DIAGNOSIS — C90.00 MULTIPLE MYELOMA NOT HAVING ACHIEVED REMISSION: Primary | ICD-10-CM

## 2023-09-05 PROCEDURE — 99999 PR PBB SHADOW E&M-EST. PATIENT-LVL V: CPT | Mod: PBBFAC,,,

## 2023-09-05 PROCEDURE — 96365 THER/PROPH/DIAG IV INF INIT: CPT

## 2023-09-05 PROCEDURE — 99215 OFFICE O/P EST HI 40 MIN: CPT | Mod: PBBFAC

## 2023-09-05 PROCEDURE — 96401 CHEMO ANTI-NEOPL SQ/IM: CPT

## 2023-09-05 PROCEDURE — 99215 PR OFFICE/OUTPT VISIT, EST, LEVL V, 40-54 MIN: ICD-10-PCS | Mod: S$PBB,,,

## 2023-09-05 PROCEDURE — 63600175 PHARM REV CODE 636 W HCPCS: Mod: JZ,TB | Performed by: NURSE PRACTITIONER

## 2023-09-05 PROCEDURE — 25000003 PHARM REV CODE 250: Performed by: NURSE PRACTITIONER

## 2023-09-05 PROCEDURE — 96367 TX/PROPH/DG ADDL SEQ IV INF: CPT

## 2023-09-05 PROCEDURE — 99215 OFFICE O/P EST HI 40 MIN: CPT | Mod: S$PBB,,,

## 2023-09-05 PROCEDURE — 99999 PR PBB SHADOW E&M-EST. PATIENT-LVL V: ICD-10-PCS | Mod: PBBFAC,,,

## 2023-09-05 RX ORDER — NITROGLYCERIN 0.4 MG/1
TABLET SUBLINGUAL
COMMUNITY
Start: 2023-08-21

## 2023-09-05 RX ORDER — DIPHENHYDRAMINE HCL 25 MG
25 CAPSULE ORAL
Status: COMPLETED | OUTPATIENT
Start: 2023-09-05 | End: 2023-09-05

## 2023-09-05 RX ORDER — ACETAMINOPHEN 325 MG/1
650 TABLET ORAL
Status: COMPLETED | OUTPATIENT
Start: 2023-09-05 | End: 2023-09-05

## 2023-09-05 RX ADMIN — DARATUMUMAB AND HYALURONIDASE-FIHJ (HUMAN RECOMBINANT) 1800 MG: 1800; 30000 INJECTION SUBCUTANEOUS at 11:09

## 2023-09-05 RX ADMIN — DEXAMETHASONE SODIUM PHOSPHATE 20 MG: 4 INJECTION, SOLUTION INTRA-ARTICULAR; INTRALESIONAL; INTRAMUSCULAR; INTRAVENOUS; SOFT TISSUE at 10:09

## 2023-09-05 RX ADMIN — DIPHENHYDRAMINE HYDROCHLORIDE 25 MG: 25 CAPSULE ORAL at 10:09

## 2023-09-05 RX ADMIN — ACETAMINOPHEN 650 MG: 325 TABLET ORAL at 10:09

## 2023-09-05 RX ADMIN — SODIUM CHLORIDE: 9 INJECTION, SOLUTION INTRAVENOUS at 10:09

## 2023-09-05 NOTE — PROGRESS NOTES
HEMATOLOGY/ONCOLOGY OFFICE CLINIC VISIT    Visit Information:    Initial Evaluation: 6/13/2023 (hospital consult)   Referring Provider: Dr Gonzalez  Other providers:  Code status: Not addressed    Diagnosis:  Multiple Myeloma    Present treatment:  Daratumumab, lenalidomide, and dexamethasone    Treatment/Oncology history:    Plan of care:     Imaging:  Ct C 6/7/2023: 1. There is a small left sided pleural effusion.2. A small patchy area of ground glass density is seen in the lateral basal segment of the left lower lobe. This could reflect an acute focal infiltrate / pneumonitis. 3. There is diffuse osteopenia along the visualised bony structures together with multiple, numerous, small round-ovoid lucent lesion of varying sizes involving the marrow. These changes could reflect metabolic bone disease like hyperparathyroidism. Correlate clinically and with laboratory findings, as regards additional evaluation and follow-up.  6/8/23 Bone Scan Whole Body:  1. Scattered activity at the anterior left right rib margins as described.  A CT exam on the previous day reveals no definite fracture.  There is mild heterogeneous and bony loss at the anterior left and right ribs.  This is not have the typical pattern of a metastases.  2. Focal increased activity at the anterior manubrium which again on the CT exam demonstrates osteopenia and heterogeneous bony loss as well as scattered subcentimeter small lytic defects. 3. Suspect mild arthritic changes at the shoulders sternoclavicular joints  6/9/23 MRI Thoracic Spine: Within limitations, no convincing evidence of acute thoracic spine abnormality, high-grade canal stenosis, or focal cord pathology. Multiple scattered vertebral body lesions are suspected and could represent metastatic disease, otherwise of incomplete characterization by non-contrast protocol. Incidental findings of the partially visualized thoracic cavity and retroperitoneum discussed above, poorly assessed by  modality/protocol.  Recent non-contrast chest CT provides overall better visualization.  6/10/23 CT Head: No acute intracranial abnormality.  Findings consistent with microangiopathy no interval change.  6/12/23 XRAY Chest: 1. Patchy hazy opacities again evident at the lower lungs bilaterally suspicious for infiltrate and atelectasis.  Small bibasilar pleural reactions cannot be excluded. 2. Borderline cardiomegaly 3. Atherosclerosis  6/16/23 XRAY Chest: Improved aeration of the lung bases with mild residual bibasilar opacities and small pleural effusions suspected.      Pathology:  6/12/23 Bone marrow aspiration/Biopsy:   PLASMA CELL MYELOMA, KAPPA RESTRICTED.     PLASMA CELL MYELOMA INVOLVES 90% OF BONE MARROW CELLULARITY WITH SMALL AREAS OF  SEQUENTIAL TRILINEAGE HEMATOPOIESIS.    ABSENT IRON STORES.     PERIPHERAL BLOOD: NORMOCYTIC NORMOCHROMIC ANEMIA. THROMBOCYTOPENIA.         CLINICAL HISTORY:       Patient: Chip Goodson is a 73 y.o. male That was seen  for the first time as hospital consult on 6/13/2023.   Patient was brought to the emergency department for confusion.  Upon evaluation in the ER CT scan of the head with no acute intracranial abnormality.  Finding consistent with microangiopathic no interval change.  CT of the chest with no contrast showed diffuse osteopenia with multiple, numerous, small round avid lucent lesions ovarian size involving the marrow.  Changes could reflect metabolic bone disease like hyperparathyroidism.  MRI of the thoracic spine with multiple scattered vertebral body lesion are suspect and could represent metastatic disease.  Bone scan with scattered activity in the anterior left right rib margins no fractures focal increased activity at the anterior manubrium scattered subcentimeter small lytic defects.  Ultrasound of the thyroid that shows multiple nodules. skeletal survey  area on humerus only.     Labs with elevated calcium of 12.6, corrected calcium 14.36.  Total  protein was 11.8 with a total globulin 10.0.  Further workup revealed an IgA over 7040.0, IgM 6.0, IgG 165.00.  Free serum kappa light chain 5.09, free serum lambda light chains 0.5600 with a kappa/lambda ratio of 9.09.  M spike 3.33.  PSA 0.96     Bone marrow biopsy performed 6/12/23. Patient receive pamidronate 60 mg on 06/09/2023 and was started on hydration.  His calcium improved as well as his mental status.       Chief Complaint: Multiple Myeloma (Patient c/o increased lower back pain since last visit.)      Interval History:  9/5/2023:  He returns to clinic today for a three-week follow-up for treatment clearance for cycle 2 day 15 daratumumab, Angely mellitus, and dexamethasone.  He reports feeling well today, but does note some increased fatigue.  He does report constipation that he is currently prescribed something by his PCP for, he is unsure of the name at this time.  Recommended Colace and MiraLax as needed as well.  He denies any fever, chills, shortness of breath, chest pain, cough, abdominal pain, nausea, vomiting, diarrhea, peripheral neuropathy, edema.  Labs reviewed with patient in detail, weight stable.    8/22/2023:    Mr. Goodson is here today for follow up and cycle 1 day cycle 2 of Daratumumab, Lenalidomide, and Dexamethasone.  Today, he reports feeling well.  He denies any fever, chills, SOB, chest pain, cough, abdominal pain, confirms constipation, no change in bladder habits, abnormal bleeding or bruising, joint or bone pain, no edema of the lower extremities, and no peripheral  neuropathy.  Labs reviewed with patient.  WBC 3.30, hgb 10.0, Platelets 200,000, creatinine 1.16, calcium 8.4, albumin 2.6, with corrected calcium 9.52.  MM panel results from last visit discussed with patient:  IgG 208, IgA 4,003.0, IgM 5.0; Kappa FLC 2.19, Kappa/Lambda ratio 6.64, and M Scar 1.59, all numbers trending downward from previous visits.  Weight is stable.  Eating and drinking well.  No recent  hospitalizations.        Past Medical History:   Diagnosis Date    GERD (gastroesophageal reflux disease)     HTN (hypertension)     Hypothyroidism, unspecified     Type 2 diabetes mellitus without complications       Past Surgical History:   Procedure Laterality Date    APPENDECTOMY  1965    BONE MARROW BIOPSY Right 06/12/2023    Procedure: BIOPSY, BONE MARROW;  Surgeon: Silvano Austin MD;  Location: [facility];  Service: General;  Laterality: Right;    removal of boils       Family History   Problem Relation Age of Onset    Lung cancer Mother     Breast cancer Sister      Social Connections: Unknown (6/9/2023)    Social Connection and Isolation Panel [NHANES]     Frequency of Communication with Friends and Family: Patient refused     Frequency of Social Gatherings with Friends and Family: Patient refused     Attends Protestant Services: Patient refused     Active Member of Clubs or Organizations: Patient refused     Attends Club or Organization Meetings: Patient refused     Marital Status: Patient refused       Review of patient's allergies indicates:   Allergen Reactions    Iodine Anaphylaxis    Shellfish containing products     Amoxicillin-pot clavulanate Itching      Current Outpatient Medications on File Prior to Visit   Medication Sig Dispense Refill    acyclovir (ZOVIRAX) 400 MG tablet Take 1 tablet (400 mg total) by mouth 2 (two) times daily. 60 tablet 11    albuterol (PROVENTIL/VENTOLIN HFA) 90 mcg/actuation inhaler 2 puffs every 4 to 6 hours as needed.      amLODIPine (NORVASC) 10 MG tablet Take 1 tablet (10 mg total) by mouth once daily. 30 each 11    atorvastatin (LIPITOR) 10 MG tablet Take 10 mg by mouth.      carvediloL (COREG) 12.5 MG tablet Take 12.5 mg by mouth 2 (two) times daily.      cloNIDine (CATAPRES) 0.2 MG tablet Take 0.2 mg by mouth daily as needed.      dexAMETHasone (DECADRON) 4 MG Tab Take 5 tablets (20 mg total) by mouth every 7 days. on days following daratumumab infusion. Take with food.  20 tablet 1    dexAMETHasone (DECADRON) 4 MG Tab Take 1 tablet (4 mg total) by mouth once daily. For 2 days 23, and 23 2 tablet 0    famotidine (PEPCID) 20 MG tablet Take 1 tablet (20 mg total) by mouth once daily. 30 tablet 11    finasteride (PROSCAR) 5 mg tablet Take 5 mg by mouth.      gabapentin (NEURONTIN) 100 MG capsule Take by mouth.      hydrALAZINE (APRESOLINE) 50 MG tablet Take 1 tablet (50 mg total) by mouth every 8 (eight) hours. 90 tablet 11    hydrocodone-chlorpheniramine (TUSSIONEX) 10-8 mg/5 mL suspension SMARTSI Milliliter(s) By Mouth Every 12 Hours      lenalidomide 10 mg Cap Take 10 mg by mouth once daily for 21 days followed by a 7 day break, every 28 days. 21 each 0    levoFLOXacin (LEVAQUIN) 500 MG tablet Take 1 tablet (500 mg total) by mouth once daily. 7 each 0    levothyroxine (SYNTHROID) 112 MCG tablet Take 112 mcg by mouth.      nitroGLYCERIN (NITROSTAT) 0.4 MG SL tablet place one tablet under the tongue every five minutes as needed for chest pain up to 3 doses. if no relief call 911      ondansetron (ZOFRAN) 8 MG tablet Take 1 tablet (8 mg total) by mouth every 8 (eight) hours as needed for Nausea. 30 tablet 2    ONETOUCH ULTRA TEST Strp USE TO TEST BLOOD GLUCOSE TWICE DAILY      pantoprazole (PROTONIX) 40 MG tablet Take 1 tablet (40 mg total) by mouth 2 (two) times daily before meals. 60 tablet 11    SITagliptin phosphate (JANUVIA) 50 MG Tab Take 1 tablet (50 mg total) by mouth once daily. 30 tablet 5    spironolactone (ALDACTONE) 25 MG tablet Take 12.5 mg by mouth.      tamsulosin (FLOMAX) 0.4 mg Cap Take 1 capsule by mouth.       Current Facility-Administered Medications on File Prior to Visit   Medication Dose Route Frequency Provider Last Rate Last Admin    alteplase injection 2 mg  2 mg Intra-Catheter PRN Elsy Amaay MD        daratumumab-hyaluronidase-fij subcutaneous injection 1,800 mg  1,800 mg Subcutaneous 1 time in Clinic/HOD Elsy Amaya MD         diphenhydrAMINE injection 50 mg  50 mg Intravenous Once PRN Elsy Amaya MD        EPINEPHrine (EPIPEN) 0.3 mg/0.3 mL pen injection 0.3 mg  0.3 mg Intramuscular Once PRN Elsy Amaya MD        heparin, porcine (PF) 100 unit/mL injection flush 500 Units  500 Units Intravenous PRN Elsy Amaya MD        hydrocortisone sodium succinate injection 100 mg  100 mg Intravenous Once PRN Elsy Amaya MD        sodium chloride 0.9% 250 mL flush bag   Intravenous 1 time in Clinic/HOD Elsy Amaya MD        sodium chloride 0.9% flush 10 mL  10 mL Intravenous PRN Elsy Amaya MD          Review of Systems   Constitutional:  Negative for appetite change, chills, diaphoresis, fever and unexpected weight change.   HENT:  Negative for nasal congestion, mouth sores, nosebleeds, sinus pressure/congestion, sore throat and trouble swallowing.    Eyes: Negative.    Respiratory:  Negative for cough and shortness of breath.    Cardiovascular:  Negative for chest pain and palpitations.   Gastrointestinal:  Negative for abdominal distention, abdominal pain, blood in stool, change in bowel habit, constipation, diarrhea, nausea, vomiting and change in bowel habit.   Endocrine: Negative.    Genitourinary:  Negative for bladder incontinence, decreased urine volume, dysuria, frequency, hematuria and urgency. Difficulty urinating: had a ocampo cath.  Musculoskeletal:  Negative for arthralgias, back pain, gait problem, joint swelling, leg pain and myalgias.   Integumentary:  Negative for rash.   Allergic/Immunologic: Negative.    Neurological:  Negative for dizziness, tremors, syncope, weakness, light-headedness, numbness, headaches and memory loss.   Hematological:  Negative for adenopathy. Does not bruise/bleed easily.   Psychiatric/Behavioral:  Negative for agitation, confusion, hallucinations, sleep disturbance and suicidal ideas. The patient is not nervous/anxious.               Vitals:     "09/05/23 0853   BP: 112/67   Pulse: (!) 51   Resp: 16   Temp: 97.5 °F (36.4 °C)   SpO2: 98%   Weight: 87.1 kg (192 lb)   Height: 5' 10" (1.778 m)      Wt Readings from Last 6 Encounters:   09/05/23 87.1 kg (192 lb)   08/29/23 87.3 kg (192 lb 6.4 oz)   08/22/23 88.5 kg (195 lb)   08/15/23 87.9 kg (193 lb 12.8 oz)   08/01/23 87.8 kg (193 lb 9.6 oz)   07/25/23 88.1 kg (194 lb 3.2 oz)     Body mass index is 27.55 kg/m².  Body surface area is 2.07 meters squared.  Physical Exam  Vitals and nursing note reviewed.   Constitutional:       General: He is not in acute distress.     Appearance: Normal appearance.      Comments: Elderly male   HENT:      Head: Normocephalic and atraumatic.      Mouth/Throat:      Mouth: Mucous membranes are moist.   Eyes:      General: No scleral icterus.     Extraocular Movements: Extraocular movements intact.      Conjunctiva/sclera: Conjunctivae normal.   Neck:      Vascular: No JVD.   Cardiovascular:      Rate and Rhythm: Normal rate and regular rhythm.      Heart sounds: No murmur heard.  Pulmonary:      Effort: Pulmonary effort is normal.      Breath sounds: Normal breath sounds. No wheezing or rhonchi.   Abdominal:      General: Bowel sounds are normal. There is no distension.      Palpations: Abdomen is soft.      Tenderness: There is no abdominal tenderness.   Musculoskeletal:         General: No swelling or deformity.      Cervical back: Neck supple.   Lymphadenopathy:      Head:      Right side of head: No submandibular adenopathy.      Left side of head: No submandibular adenopathy.      Cervical: No cervical adenopathy.      Upper Body:      Right upper body: No supraclavicular or axillary adenopathy.      Left upper body: No supraclavicular or axillary adenopathy.      Lower Body: No right inguinal adenopathy. No left inguinal adenopathy.   Skin:     General: Skin is warm.      Coloration: Skin is not jaundiced.      Findings: No rash.   Neurological:      General: No focal " deficit present.      Mental Status: He is alert and oriented to person, place, and time.      Cranial Nerves: Cranial nerves 2-12 are intact.   Psychiatric:         Attention and Perception: Attention normal.         Behavior: Behavior is cooperative.       Laboratory:  CBC with Differential:  Lab Results   Component Value Date    WBC 4.58 09/05/2023    RBC 3.58 (L) 09/05/2023    HGB 11.9 (L) 09/05/2023    HCT 35.8 (L) 09/05/2023    .0 (H) 09/05/2023    MCH 33.2 (H) 09/05/2023    MCHC 33.2 09/05/2023    RDW 14.3 09/05/2023     09/05/2023    MPV 10.3 09/05/2023     Serum:  SPEP: Serum protein electrophoresis shows a distinct monoclonal peak (3.33 g/dL) in the beta zone, consistent with a monoclonal gammopathy.   PAO: A band is present in the IgA trini with a corresponding band in the kappa trini.   The immunofixation electrophoresis are consistent with monoclonal gammopathy of IgA-kappa isotype.     IgG Level 540.00 - 1,822.00 mg/dL 165.00 Low     IgA Level 101.0 - 645.0 mg/dL >7,040.0 High     IgM Level 22.0 - 240.0 mg/dL 6.0 Low       Kappa Free Light Chain 0.3300 - 1.94 mg/dL 5.09 High     Lambda Free Light Chain 0.5700 - 2.63 mg/dL 0.5600 Low     Kappa/Lambda FLC Ratio 0.2600 - 1.65 9.09 High      Urine:   24 hrs Urine:  M spike    1467      H    mg/24 h    CMP:  Sodium Level   Date Value Ref Range Status   09/05/2023 138 136 - 145 mmol/L Final     Potassium Level   Date Value Ref Range Status   09/05/2023 3.8 3.5 - 5.1 mmol/L Final     Carbon Dioxide   Date Value Ref Range Status   09/05/2023 27 23 - 31 mmol/L Final     Blood Urea Nitrogen   Date Value Ref Range Status   09/05/2023 17.0 8.4 - 25.7 mg/dL Final     Creatinine   Date Value Ref Range Status   09/05/2023 1.06 0.73 - 1.18 mg/dL Final     Calcium Level Total   Date Value Ref Range Status   09/05/2023 8.9 8.8 - 10.0 mg/dL Final     Albumin Level   Date Value Ref Range Status   09/05/2023 2.9 (L) 3.4 - 4.8 g/dL Final     Bilirubin Total    Date Value Ref Range Status   09/05/2023 0.9 <=1.5 mg/dL Final     Alkaline Phosphatase   Date Value Ref Range Status   09/05/2023 123 40 - 150 unit/L Final     Aspartate Aminotransferase   Date Value Ref Range Status   09/05/2023 13 5 - 34 unit/L Final     Alanine Aminotransferase   Date Value Ref Range Status   09/05/2023 20 0 - 55 unit/L Final             Assessment:       1. Multiple myeloma not having achieved remission      Chemotherapy   Patient doing well today.  No complaints.  Afebrile.  Taking Lenalidomide and Dexamethasone as directed.  Here for cycle 2, day 15 today.    Constipation  Currently taking prescribed medication by PCP, recommended Colace and MiraLax as needed as well.      Plan:   Labs stable.    Continue with cycle 2 day 15 as scheduled.    Return to clinic in one-week with NP for follow up/lab/treatment.    CBC, CMP, Mag, phos, mm labs      The patient is in agreement with today's plan of care.  All questions answered.  Patient is aware to contact our office for any problems or concerns prior to next visit.    VICKY Pineda-C  Oncology/Hematology   [facility]

## 2023-09-05 NOTE — PLAN OF CARE
Patient will have no s/s of Infusion reaction. Patient premedicated with Tylenol, Benadryl and Dexamethasone.

## 2023-09-11 PROBLEM — N17.9 AKI (ACUTE KIDNEY INJURY): Status: RESOLVED | Noted: 2023-06-07 | Resolved: 2023-09-11

## 2023-09-12 ENCOUNTER — INFUSION (OUTPATIENT)
Dept: INFUSION THERAPY | Facility: HOSPITAL | Age: 73
End: 2023-09-12
Attending: FAMILY MEDICINE
Payer: MEDICARE

## 2023-09-12 ENCOUNTER — OFFICE VISIT (OUTPATIENT)
Dept: HEMATOLOGY/ONCOLOGY | Facility: CLINIC | Age: 73
End: 2023-09-12
Payer: MEDICARE

## 2023-09-12 VITALS
HEIGHT: 70 IN | DIASTOLIC BLOOD PRESSURE: 71 MMHG | SYSTOLIC BLOOD PRESSURE: 132 MMHG | WEIGHT: 194.38 LBS | OXYGEN SATURATION: 97 % | BODY MASS INDEX: 27.83 KG/M2 | HEART RATE: 60 BPM | RESPIRATION RATE: 17 BRPM | TEMPERATURE: 97 F

## 2023-09-12 VITALS
HEART RATE: 60 BPM | SYSTOLIC BLOOD PRESSURE: 132 MMHG | BODY MASS INDEX: 27.83 KG/M2 | WEIGHT: 194.38 LBS | RESPIRATION RATE: 17 BRPM | TEMPERATURE: 97 F | HEIGHT: 70 IN | OXYGEN SATURATION: 97 % | DIASTOLIC BLOOD PRESSURE: 71 MMHG

## 2023-09-12 DIAGNOSIS — R53.83 FATIGUE, UNSPECIFIED TYPE: ICD-10-CM

## 2023-09-12 DIAGNOSIS — C90.00 MULTIPLE MYELOMA NOT HAVING ACHIEVED REMISSION: Primary | ICD-10-CM

## 2023-09-12 DIAGNOSIS — D84.821 IMMUNODEFICIENCY DUE TO CHEMOTHERAPY: ICD-10-CM

## 2023-09-12 DIAGNOSIS — E83.52 HYPERCALCEMIA: ICD-10-CM

## 2023-09-12 DIAGNOSIS — R53.1 WEAKNESS: ICD-10-CM

## 2023-09-12 DIAGNOSIS — C90.00 MULTIPLE MYELOMA, REMISSION STATUS UNSPECIFIED: ICD-10-CM

## 2023-09-12 DIAGNOSIS — K59.00 CONSTIPATION, UNSPECIFIED CONSTIPATION TYPE: ICD-10-CM

## 2023-09-12 DIAGNOSIS — T45.1X5A IMMUNODEFICIENCY DUE TO CHEMOTHERAPY: ICD-10-CM

## 2023-09-12 DIAGNOSIS — Z79.899 IMMUNODEFICIENCY DUE TO CHEMOTHERAPY: ICD-10-CM

## 2023-09-12 DIAGNOSIS — E88.09 HYPOALBUMINEMIA: ICD-10-CM

## 2023-09-12 PROCEDURE — 99215 OFFICE O/P EST HI 40 MIN: CPT | Mod: PBBFAC,25

## 2023-09-12 PROCEDURE — 99999 PR PBB SHADOW E&M-EST. PATIENT-LVL V: CPT | Mod: PBBFAC,,,

## 2023-09-12 PROCEDURE — 99999 PR PBB SHADOW E&M-EST. PATIENT-LVL V: ICD-10-PCS | Mod: PBBFAC,,,

## 2023-09-12 PROCEDURE — 25000003 PHARM REV CODE 250: Performed by: NURSE PRACTITIONER

## 2023-09-12 PROCEDURE — 63600175 PHARM REV CODE 636 W HCPCS: Mod: JZ,TB | Performed by: NURSE PRACTITIONER

## 2023-09-12 PROCEDURE — 99215 PR OFFICE/OUTPT VISIT, EST, LEVL V, 40-54 MIN: ICD-10-PCS | Mod: S$PBB,,,

## 2023-09-12 PROCEDURE — 96401 CHEMO ANTI-NEOPL SQ/IM: CPT

## 2023-09-12 PROCEDURE — 96365 THER/PROPH/DIAG IV INF INIT: CPT

## 2023-09-12 PROCEDURE — 99215 OFFICE O/P EST HI 40 MIN: CPT | Mod: S$PBB,,,

## 2023-09-12 PROCEDURE — 96367 TX/PROPH/DG ADDL SEQ IV INF: CPT

## 2023-09-12 RX ORDER — SODIUM CHLORIDE 0.9 % (FLUSH) 0.9 %
10 SYRINGE (ML) INJECTION
Status: DISCONTINUED | OUTPATIENT
Start: 2023-09-12 | End: 2023-09-12 | Stop reason: HOSPADM

## 2023-09-12 RX ORDER — DIPHENHYDRAMINE HCL 25 MG
25 CAPSULE ORAL
Status: COMPLETED | OUTPATIENT
Start: 2023-09-12 | End: 2023-09-12

## 2023-09-12 RX ORDER — HEPARIN 100 UNIT/ML
500 SYRINGE INTRAVENOUS
Status: DISCONTINUED | OUTPATIENT
Start: 2023-09-12 | End: 2023-09-12 | Stop reason: HOSPADM

## 2023-09-12 RX ORDER — ACETAMINOPHEN 325 MG/1
650 TABLET ORAL
Status: COMPLETED | OUTPATIENT
Start: 2023-09-12 | End: 2023-09-12

## 2023-09-12 RX ORDER — DEXAMETHASONE 4 MG/1
TABLET ORAL
Qty: 30 TABLET | Refills: 3 | Status: SHIPPED | OUTPATIENT
Start: 2023-09-12 | End: 2024-02-06

## 2023-09-12 RX ADMIN — SODIUM CHLORIDE: 9 INJECTION, SOLUTION INTRAVENOUS at 11:09

## 2023-09-12 RX ADMIN — ACETAMINOPHEN 650 MG: 325 TABLET ORAL at 10:09

## 2023-09-12 RX ADMIN — DARATUMUMAB AND HYALURONIDASE-FIHJ (HUMAN RECOMBINANT) 1800 MG: 1800; 30000 INJECTION SUBCUTANEOUS at 12:09

## 2023-09-12 RX ADMIN — DEXAMETHASONE SODIUM PHOSPHATE 20 MG: 4 INJECTION, SOLUTION INTRA-ARTICULAR; INTRALESIONAL; INTRAMUSCULAR; INTRAVENOUS; SOFT TISSUE at 11:09

## 2023-09-12 RX ADMIN — DIPHENHYDRAMINE HYDROCHLORIDE 25 MG: 25 CAPSULE ORAL at 10:09

## 2023-09-12 NOTE — PROGRESS NOTES
HEMATOLOGY/ONCOLOGY OFFICE CLINIC VISIT    Visit Information:    Initial Evaluation: 6/13/2023 (hospital consult)   Referring Provider: Dr Gonzalez  Other providers:  Code status: Not addressed    Diagnosis:  Multiple Myeloma    Present treatment:  Daratumumab, lenalidomide, and dexamethasone    Treatment/Oncology history:    Plan of care:     Imaging:  Ct C 6/7/2023: 1. There is a small left sided pleural effusion.2. A small patchy area of ground glass density is seen in the lateral basal segment of the left lower lobe. This could reflect an acute focal infiltrate / pneumonitis. 3. There is diffuse osteopenia along the visualised bony structures together with multiple, numerous, small round-ovoid lucent lesion of varying sizes involving the marrow. These changes could reflect metabolic bone disease like hyperparathyroidism. Correlate clinically and with laboratory findings, as regards additional evaluation and follow-up.  6/8/23 Bone Scan Whole Body:  1. Scattered activity at the anterior left right rib margins as described.  A CT exam on the previous day reveals no definite fracture.  There is mild heterogeneous and bony loss at the anterior left and right ribs.  This is not have the typical pattern of a metastases.  2. Focal increased activity at the anterior manubrium which again on the CT exam demonstrates osteopenia and heterogeneous bony loss as well as scattered subcentimeter small lytic defects. 3. Suspect mild arthritic changes at the shoulders sternoclavicular joints  6/9/23 MRI Thoracic Spine: Within limitations, no convincing evidence of acute thoracic spine abnormality, high-grade canal stenosis, or focal cord pathology. Multiple scattered vertebral body lesions are suspected and could represent metastatic disease, otherwise of incomplete characterization by non-contrast protocol. Incidental findings of the partially visualized thoracic cavity and retroperitoneum discussed above, poorly assessed by  modality/protocol.  Recent non-contrast chest CT provides overall better visualization.  6/10/23 CT Head: No acute intracranial abnormality.  Findings consistent with microangiopathy no interval change.  6/12/23 XRAY Chest: 1. Patchy hazy opacities again evident at the lower lungs bilaterally suspicious for infiltrate and atelectasis.  Small bibasilar pleural reactions cannot be excluded. 2. Borderline cardiomegaly 3. Atherosclerosis  6/16/23 XRAY Chest: Improved aeration of the lung bases with mild residual bibasilar opacities and small pleural effusions suspected.      Pathology:  6/12/23 Bone marrow aspiration/Biopsy:   PLASMA CELL MYELOMA, KAPPA RESTRICTED.     PLASMA CELL MYELOMA INVOLVES 90% OF BONE MARROW CELLULARITY WITH SMALL AREAS OF  SEQUENTIAL TRILINEAGE HEMATOPOIESIS.    ABSENT IRON STORES.     PERIPHERAL BLOOD: NORMOCYTIC NORMOCHROMIC ANEMIA. THROMBOCYTOPENIA.         CLINICAL HISTORY:       Patient: Chip Goodson is a 73 y.o. male That was seen  for the first time as hospital consult on 6/13/2023.   Patient was brought to the emergency department for confusion.  Upon evaluation in the ER CT scan of the head with no acute intracranial abnormality.  Finding consistent with microangiopathic no interval change.  CT of the chest with no contrast showed diffuse osteopenia with multiple, numerous, small round avid lucent lesions ovarian size involving the marrow.  Changes could reflect metabolic bone disease like hyperparathyroidism.  MRI of the thoracic spine with multiple scattered vertebral body lesion are suspect and could represent metastatic disease.  Bone scan with scattered activity in the anterior left right rib margins no fractures focal increased activity at the anterior manubrium scattered subcentimeter small lytic defects.  Ultrasound of the thyroid that shows multiple nodules. skeletal survey  area on humerus only.     Labs with elevated calcium of 12.6, corrected calcium 14.36.  Total  protein was 11.8 with a total globulin 10.0.  Further workup revealed an IgA over 7040.0, IgM 6.0, IgG 165.00.  Free serum kappa light chain 5.09, free serum lambda light chains 0.5600 with a kappa/lambda ratio of 9.09.  M spike 3.33.  PSA 0.96     Bone marrow biopsy performed 6/12/23. Patient receive pamidronate 60 mg on 06/09/2023 and was started on hydration.  His calcium improved as well as his mental status.       Chief Complaint: Other Misc (Pt reports no new concerns today.)      Interval History:   He returns to clinic today for a one-week follow-up and for treatment clearance for cycle 2 day 22 daratumumab, lenalidomide, and dexamethasone.  He reports feeling well today. Stable fatigue.  Having constipation, but currently taking Linzess as prescribed by PCP for this. He denies any fever, chills, shortness of breath, chest pain, cough, abdominal pain, nausea, vomiting, diarrhea, peripheral neuropathy, edema.  Labs reviewed with patient in detail, weight stable.        Past Medical History:   Diagnosis Date    GERD (gastroesophageal reflux disease)     HTN (hypertension)     Hypothyroidism, unspecified     Type 2 diabetes mellitus without complications       Past Surgical History:   Procedure Laterality Date    APPENDECTOMY  1965    BONE MARROW BIOPSY Right 06/12/2023    Procedure: BIOPSY, BONE MARROW;  Surgeon: Silvano Austin MD;  Location: [facility];  Service: General;  Laterality: Right;    removal of boils       Family History   Problem Relation Age of Onset    Lung cancer Mother     Breast cancer Sister      Social Connections: Unknown (6/9/2023)    Social Connection and Isolation Panel [NHANES]     Frequency of Communication with Friends and Family: Patient refused     Frequency of Social Gatherings with Friends and Family: Patient refused     Attends Jew Services: Patient refused     Active Member of Clubs or Organizations: Patient refused     Attends Club or Organization Meetings: Patient refused      Marital Status: Patient refused       Review of patient's allergies indicates:   Allergen Reactions    Iodine Anaphylaxis    Shellfish containing products     Amoxicillin-pot clavulanate Itching      Current Outpatient Medications on File Prior to Visit   Medication Sig Dispense Refill    acyclovir (ZOVIRAX) 400 MG tablet Take 1 tablet (400 mg total) by mouth 2 (two) times daily. 60 tablet 11    albuterol (PROVENTIL/VENTOLIN HFA) 90 mcg/actuation inhaler 2 puffs every 4 to 6 hours as needed.      amLODIPine (NORVASC) 10 MG tablet Take 1 tablet (10 mg total) by mouth once daily. 30 each 11    atorvastatin (LIPITOR) 10 MG tablet Take 10 mg by mouth.      carvediloL (COREG) 12.5 MG tablet Take 12.5 mg by mouth 2 (two) times daily.      cloNIDine (CATAPRES) 0.2 MG tablet Take 0.2 mg by mouth daily as needed.      dexAMETHasone (DECADRON) 4 MG Tab Take 5 tablets (20 mg total) by mouth every 7 days. on days following daratumumab infusion. Take with food. 20 tablet 1    dexAMETHasone (DECADRON) 4 MG Tab Take 1 tablet (4 mg total) by mouth once daily. For 2 days 23, and 23 2 tablet 0    famotidine (PEPCID) 20 MG tablet Take 1 tablet (20 mg total) by mouth once daily. 30 tablet 11    finasteride (PROSCAR) 5 mg tablet Take 5 mg by mouth.      gabapentin (NEURONTIN) 100 MG capsule Take by mouth.      hydrALAZINE (APRESOLINE) 50 MG tablet Take 1 tablet (50 mg total) by mouth every 8 (eight) hours. 90 tablet 11    hydrocodone-chlorpheniramine (TUSSIONEX) 10-8 mg/5 mL suspension SMARTSI Milliliter(s) By Mouth Every 12 Hours      lenalidomide 10 mg Cap Take 10 mg by mouth once daily for 21 days followed by a 7 day break, every 28 days. 21 each 0    levoFLOXacin (LEVAQUIN) 500 MG tablet Take 1 tablet (500 mg total) by mouth once daily. 7 each 0    levothyroxine (SYNTHROID) 112 MCG tablet Take 112 mcg by mouth.      nitroGLYCERIN (NITROSTAT) 0.4 MG SL tablet place one tablet under the tongue every five minutes as  needed for chest pain up to 3 doses. if no relief call 911      ondansetron (ZOFRAN) 8 MG tablet Take 1 tablet (8 mg total) by mouth every 8 (eight) hours as needed for Nausea. 30 tablet 2    ONETOUCH ULTRA TEST Strp USE TO TEST BLOOD GLUCOSE TWICE DAILY      pantoprazole (PROTONIX) 40 MG tablet Take 1 tablet (40 mg total) by mouth 2 (two) times daily before meals. 60 tablet 11    SITagliptin phosphate (JANUVIA) 50 MG Tab Take 1 tablet (50 mg total) by mouth once daily. 30 tablet 5    spironolactone (ALDACTONE) 25 MG tablet Take 12.5 mg by mouth.      tamsulosin (FLOMAX) 0.4 mg Cap Take 1 capsule by mouth.       Current Facility-Administered Medications on File Prior to Visit   Medication Dose Route Frequency Provider Last Rate Last Admin    alteplase injection 2 mg  2 mg Intra-Catheter PRN Elsy Amaya MD        daratumumab-hyaluronidase-fihj subcutaneous injection 1,800 mg  1,800 mg Subcutaneous 1 time in Clinic/Elsy Lane MD        diphenhydrAMINE injection 50 mg  50 mg Intravenous Once PRN Elsy Amaya MD        EPINEPHrine (EPIPEN) 0.3 mg/0.3 mL pen injection 0.3 mg  0.3 mg Intramuscular Once PRN Elsy Amaya MD        heparin, porcine (PF) 100 unit/mL injection flush 500 Units  500 Units Intravenous PRN Elsy Amaya MD        hydrocortisone sodium succinate injection 100 mg  100 mg Intravenous Once PRN Elsy Amaya MD        sodium chloride 0.9% 250 mL flush bag   Intravenous 1 time in Clinic/Elsy Lane MD        sodium chloride 0.9% flush 10 mL  10 mL Intravenous PRN Elsy Amaya MD          Review of Systems   Constitutional:  Negative for appetite change, chills, diaphoresis, fever and unexpected weight change.   HENT:  Negative for nasal congestion, mouth sores, nosebleeds, sinus pressure/congestion, sore throat and trouble swallowing.    Eyes: Negative.    Respiratory:  Negative for cough and shortness of breath.   "  Cardiovascular:  Negative for chest pain and palpitations.   Gastrointestinal:  Negative for abdominal distention, abdominal pain, blood in stool, change in bowel habit, constipation, diarrhea, nausea, vomiting and change in bowel habit.   Endocrine: Negative.    Genitourinary:  Negative for bladder incontinence, decreased urine volume, dysuria, frequency, hematuria and urgency. Difficulty urinating: had a ocampo cath.  Musculoskeletal:  Negative for arthralgias, back pain, gait problem, joint swelling, leg pain and myalgias.   Integumentary:  Negative for rash.   Allergic/Immunologic: Negative.    Neurological:  Negative for dizziness, tremors, syncope, weakness, light-headedness, numbness, headaches and memory loss.   Hematological:  Negative for adenopathy. Does not bruise/bleed easily.   Psychiatric/Behavioral:  Negative for agitation, confusion, hallucinations, sleep disturbance and suicidal ideas. The patient is not nervous/anxious.               Vitals:    09/12/23 0945   BP: 132/71   BP Location: Right arm   Patient Position: Sitting   Pulse: 60   Resp: 17   Temp: 97.4 °F (36.3 °C)   TempSrc: Oral   SpO2: 97%   Weight: 88.2 kg (194 lb 6.4 oz)   Height: 5' 10" (1.778 m)      Wt Readings from Last 6 Encounters:   09/12/23 88.2 kg (194 lb 6.4 oz)   09/05/23 87.1 kg (192 lb)   09/05/23 87.1 kg (192 lb)   08/29/23 87.3 kg (192 lb 6.4 oz)   08/22/23 88.5 kg (195 lb)   08/15/23 87.9 kg (193 lb 12.8 oz)     Body mass index is 27.89 kg/m².  Body surface area is 2.09 meters squared.  Physical Exam  Vitals and nursing note reviewed.   Constitutional:       General: He is not in acute distress.     Appearance: Normal appearance.      Comments: Elderly male   HENT:      Head: Normocephalic and atraumatic.      Mouth/Throat:      Mouth: Mucous membranes are moist.   Eyes:      General: No scleral icterus.     Extraocular Movements: Extraocular movements intact.      Conjunctiva/sclera: Conjunctivae normal.   Neck:      " Vascular: No JVD.   Cardiovascular:      Rate and Rhythm: Normal rate and regular rhythm.      Heart sounds: No murmur heard.  Pulmonary:      Effort: Pulmonary effort is normal.      Breath sounds: Normal breath sounds. No wheezing or rhonchi.   Abdominal:      General: Bowel sounds are normal. There is no distension.      Palpations: Abdomen is soft.      Tenderness: There is no abdominal tenderness.   Musculoskeletal:         General: No swelling or deformity.      Cervical back: Neck supple.   Lymphadenopathy:      Head:      Right side of head: No submandibular adenopathy.      Left side of head: No submandibular adenopathy.      Cervical: No cervical adenopathy.      Upper Body:      Right upper body: No supraclavicular or axillary adenopathy.      Left upper body: No supraclavicular or axillary adenopathy.      Lower Body: No right inguinal adenopathy. No left inguinal adenopathy.   Skin:     General: Skin is warm.      Coloration: Skin is not jaundiced.      Findings: No rash.   Neurological:      General: No focal deficit present.      Mental Status: He is alert and oriented to person, place, and time.      Cranial Nerves: Cranial nerves 2-12 are intact.   Psychiatric:         Attention and Perception: Attention normal.         Behavior: Behavior is cooperative.       Laboratory:  CBC with Differential:  Lab Results   Component Value Date    WBC 5.57 09/12/2023    RBC 3.70 (L) 09/12/2023    HGB 12.1 (L) 09/12/2023    HCT 36.9 (L) 09/12/2023    MCV 99.7 (H) 09/12/2023    MCH 32.7 (H) 09/12/2023    MCHC 32.8 (L) 09/12/2023    RDW 14.0 09/12/2023     09/12/2023    MPV 9.4 09/12/2023     Serum:  SPEP: Serum protein electrophoresis shows a distinct monoclonal peak (3.33 g/dL) in the beta zone, consistent with a monoclonal gammopathy.   PAO: A band is present in the IgA trini with a corresponding band in the kappa trini.   The immunofixation electrophoresis are consistent with monoclonal gammopathy of  IgA-kappa isotype.     IgG Level 540.00 - 1,822.00 mg/dL 165.00 Low     IgA Level 101.0 - 645.0 mg/dL >7,040.0 High     IgM Level 22.0 - 240.0 mg/dL 6.0 Low       Kappa Free Light Chain 0.3300 - 1.94 mg/dL 5.09 High     Lambda Free Light Chain 0.5700 - 2.63 mg/dL 0.5600 Low     Kappa/Lambda FLC Ratio 0.2600 - 1.65 9.09 High      Urine:   24 hrs Urine:  M spike    1467      H    mg/24 h    CMP:  Sodium Level   Date Value Ref Range Status   09/12/2023 140 136 - 145 mmol/L Final     Potassium Level   Date Value Ref Range Status   09/12/2023 3.8 3.5 - 5.1 mmol/L Final     Carbon Dioxide   Date Value Ref Range Status   09/12/2023 27 23 - 31 mmol/L Final     Blood Urea Nitrogen   Date Value Ref Range Status   09/12/2023 15.0 8.4 - 25.7 mg/dL Final     Creatinine   Date Value Ref Range Status   09/12/2023 1.12 0.73 - 1.18 mg/dL Final     Calcium Level Total   Date Value Ref Range Status   09/12/2023 9.1 8.8 - 10.0 mg/dL Final     Albumin Level   Date Value Ref Range Status   09/12/2023 3.0 (L) 3.4 - 4.8 g/dL Final     Bilirubin Total   Date Value Ref Range Status   09/12/2023 0.8 <=1.5 mg/dL Final     Alkaline Phosphatase   Date Value Ref Range Status   09/12/2023 148 40 - 150 unit/L Final     Aspartate Aminotransferase   Date Value Ref Range Status   09/12/2023 14 5 - 34 unit/L Final     Alanine Aminotransferase   Date Value Ref Range Status   09/12/2023 47 0 - 55 unit/L Final             Assessment:       No diagnosis found.    Chemotherapy   Patient doing well today.  No complaints.  Afebrile.  Taking Lenalidomide and Dexamethasone as directed.  Here for cycle 2, day 22 today.    Constipation  Currently taking Linzess as prescribed by PCP      Plan:   Labs stable.    MM labs pending.   Continue with cycle 2 day 22 as scheduled.    Return to clinic in one-week with NP for follow up/lab/treatment.  (C3D1)  CBC, CMP, Mag, phos      The patient is in agreement with today's plan of care.  All questions answered.  Patient is  aware to contact our office for any problems or concerns prior to next visit.    VICKY Pineda-C  Oncology/Hematology   [facility]

## 2023-09-15 ENCOUNTER — LAB VISIT (OUTPATIENT)
Dept: LAB | Facility: HOSPITAL | Age: 73
End: 2023-09-15
Attending: FAMILY MEDICINE
Payer: MEDICARE

## 2023-09-15 DIAGNOSIS — C90.00 MULTIPLE MYELOMA NOT HAVING ACHIEVED REMISSION: ICD-10-CM

## 2023-09-15 DIAGNOSIS — E78.2 MIXED HYPERLIPIDEMIA: ICD-10-CM

## 2023-09-15 DIAGNOSIS — I11.9 MALIGNANT HYPERTENSIVE HEART DISEASE WITHOUT HEART FAILURE: Primary | ICD-10-CM

## 2023-09-15 DIAGNOSIS — E11.69 DIABETES MELLITUS ASSOCIATED WITH HORMONAL ETIOLOGY: ICD-10-CM

## 2023-09-15 LAB
ALBUMIN SERPL-MCNC: 3 G/DL (ref 3.4–4.8)
ALBUMIN/GLOB SERPL: 0.9 RATIO (ref 1.1–2)
ALP SERPL-CCNC: 118 UNIT/L (ref 40–150)
ALT SERPL-CCNC: 32 UNIT/L (ref 0–55)
AST SERPL-CCNC: 11 UNIT/L (ref 5–34)
BASOPHILS # BLD AUTO: 0.03 X10(3)/MCL
BASOPHILS NFR BLD AUTO: 0.4 %
BILIRUB SERPL-MCNC: 1.1 MG/DL
BUN SERPL-MCNC: 21 MG/DL (ref 8.4–25.7)
CALCIUM SERPL-MCNC: 9.4 MG/DL (ref 8.8–10)
CHLORIDE SERPL-SCNC: 102 MMOL/L (ref 98–107)
CHOLEST SERPL-MCNC: 101 MG/DL
CHOLEST/HDLC SERPL: 2 {RATIO} (ref 0–5)
CO2 SERPL-SCNC: 29 MMOL/L (ref 23–31)
CREAT SERPL-MCNC: 1.01 MG/DL (ref 0.73–1.18)
EOSINOPHIL # BLD AUTO: 0.09 X10(3)/MCL (ref 0–0.9)
EOSINOPHIL NFR BLD AUTO: 1.1 %
ERYTHROCYTE [DISTWIDTH] IN BLOOD BY AUTOMATED COUNT: 14.3 % (ref 11.5–17)
EST. AVERAGE GLUCOSE BLD GHB EST-MCNC: 119.8 MG/DL
GFR SERPLBLD CREATININE-BSD FMLA CKD-EPI: >60 MLS/MIN/1.73/M2
GLOBULIN SER-MCNC: 3.4 GM/DL (ref 2.4–3.5)
GLUCOSE SERPL-MCNC: 124 MG/DL (ref 82–115)
HBA1C MFR BLD: 5.8 %
HCT VFR BLD AUTO: 36.7 % (ref 42–52)
HDLC SERPL-MCNC: 42 MG/DL (ref 35–60)
HGB BLD-MCNC: 12.2 G/DL (ref 14–18)
IMM GRANULOCYTES # BLD AUTO: 0.04 X10(3)/MCL (ref 0–0.04)
IMM GRANULOCYTES NFR BLD AUTO: 0.5 %
LDLC SERPL CALC-MCNC: 42 MG/DL (ref 50–140)
LYMPHOCYTES # BLD AUTO: 1.81 X10(3)/MCL (ref 0.6–4.6)
LYMPHOCYTES NFR BLD AUTO: 22.4 %
MAGNESIUM SERPL-MCNC: 2 MG/DL (ref 1.6–2.6)
MCH RBC QN AUTO: 33.3 PG (ref 27–31)
MCHC RBC AUTO-ENTMCNC: 33.2 G/DL (ref 33–36)
MCV RBC AUTO: 100.3 FL (ref 80–94)
MONOCYTES # BLD AUTO: 1.41 X10(3)/MCL (ref 0.1–1.3)
MONOCYTES NFR BLD AUTO: 17.5 %
NEUTROPHILS # BLD AUTO: 4.69 X10(3)/MCL (ref 2.1–9.2)
NEUTROPHILS NFR BLD AUTO: 58.1 %
PHOSPHATE SERPL-MCNC: 2.3 MG/DL (ref 2.3–4.7)
PLATELET # BLD AUTO: 236 X10(3)/MCL (ref 130–400)
PMV BLD AUTO: 9.3 FL (ref 7.4–10.4)
POTASSIUM SERPL-SCNC: 3.8 MMOL/L (ref 3.5–5.1)
PROT SERPL-MCNC: 6.4 GM/DL (ref 5.8–7.6)
RBC # BLD AUTO: 3.66 X10(6)/MCL (ref 4.7–6.1)
SODIUM SERPL-SCNC: 137 MMOL/L (ref 136–145)
TRIGL SERPL-MCNC: 85 MG/DL (ref 34–140)
VLDLC SERPL CALC-MCNC: 17 MG/DL
WBC # SPEC AUTO: 8.07 X10(3)/MCL (ref 4.5–11.5)

## 2023-09-15 PROCEDURE — 84100 ASSAY OF PHOSPHORUS: CPT

## 2023-09-15 PROCEDURE — 83036 HEMOGLOBIN GLYCOSYLATED A1C: CPT

## 2023-09-15 PROCEDURE — 80061 LIPID PANEL: CPT

## 2023-09-15 PROCEDURE — 83521 IG LIGHT CHAINS FREE EACH: CPT

## 2023-09-15 PROCEDURE — 36415 COLL VENOUS BLD VENIPUNCTURE: CPT

## 2023-09-15 PROCEDURE — 83735 ASSAY OF MAGNESIUM: CPT

## 2023-09-15 PROCEDURE — 82784 ASSAY IGA/IGD/IGG/IGM EACH: CPT

## 2023-09-15 PROCEDURE — 80053 COMPREHEN METABOLIC PANEL: CPT

## 2023-09-15 PROCEDURE — 85025 COMPLETE CBC W/AUTO DIFF WBC: CPT

## 2023-09-18 ENCOUNTER — LAB VISIT (OUTPATIENT)
Dept: LAB | Facility: HOSPITAL | Age: 73
End: 2023-09-18
Payer: MEDICARE

## 2023-09-18 DIAGNOSIS — C90.00 MULTIPLE MYELOMA NOT HAVING ACHIEVED REMISSION: ICD-10-CM

## 2023-09-18 LAB
ALBUMIN SERPL-MCNC: 2.8 G/DL (ref 3.4–4.8)
ALBUMIN/GLOB SERPL: 0.8 RATIO (ref 1.1–2)
ALP SERPL-CCNC: 137 UNIT/L (ref 40–150)
ALT SERPL-CCNC: 32 UNIT/L (ref 0–55)
AST SERPL-CCNC: 20 UNIT/L (ref 5–34)
BASOPHILS # BLD AUTO: 0.04 X10(3)/MCL
BASOPHILS NFR BLD AUTO: 0.7 %
BILIRUB SERPL-MCNC: 1.1 MG/DL
BUN SERPL-MCNC: 13.5 MG/DL (ref 8.4–25.7)
CALCIUM SERPL-MCNC: 9.1 MG/DL (ref 8.8–10)
CHLORIDE SERPL-SCNC: 101 MMOL/L (ref 98–107)
CO2 SERPL-SCNC: 26 MMOL/L (ref 23–31)
CREAT SERPL-MCNC: 1.17 MG/DL (ref 0.73–1.18)
EOSINOPHIL # BLD AUTO: 0.43 X10(3)/MCL (ref 0–0.9)
EOSINOPHIL NFR BLD AUTO: 7.5 %
ERYTHROCYTE [DISTWIDTH] IN BLOOD BY AUTOMATED COUNT: 13.8 % (ref 11.5–17)
GFR SERPLBLD CREATININE-BSD FMLA CKD-EPI: >60 MLS/MIN/1.73/M2
GLOBULIN SER-MCNC: 3.5 GM/DL (ref 2.4–3.5)
GLUCOSE SERPL-MCNC: 130 MG/DL (ref 82–115)
HCT VFR BLD AUTO: 36.7 % (ref 42–52)
HGB BLD-MCNC: 12.3 G/DL (ref 14–18)
IMM GRANULOCYTES # BLD AUTO: 0.01 X10(3)/MCL (ref 0–0.04)
IMM GRANULOCYTES NFR BLD AUTO: 0.2 %
LYMPHOCYTES # BLD AUTO: 1.53 X10(3)/MCL (ref 0.6–4.6)
LYMPHOCYTES NFR BLD AUTO: 26.8 %
MAGNESIUM SERPL-MCNC: 2.2 MG/DL (ref 1.6–2.6)
MCH RBC QN AUTO: 33.4 PG (ref 27–31)
MCHC RBC AUTO-ENTMCNC: 33.5 G/DL (ref 33–36)
MCV RBC AUTO: 99.7 FL (ref 80–94)
MONOCYTES # BLD AUTO: 0.68 X10(3)/MCL (ref 0.1–1.3)
MONOCYTES NFR BLD AUTO: 11.9 %
NEUTROPHILS # BLD AUTO: 3.01 X10(3)/MCL (ref 2.1–9.2)
NEUTROPHILS NFR BLD AUTO: 52.9 %
PHOSPHATE SERPL-MCNC: 2.8 MG/DL (ref 2.3–4.7)
PLATELET # BLD AUTO: 227 X10(3)/MCL (ref 130–400)
PMV BLD AUTO: 9.9 FL (ref 7.4–10.4)
POTASSIUM SERPL-SCNC: 4.2 MMOL/L (ref 3.5–5.1)
PROT SERPL-MCNC: 6.3 GM/DL (ref 5.8–7.6)
RBC # BLD AUTO: 3.68 X10(6)/MCL (ref 4.7–6.1)
SODIUM SERPL-SCNC: 133 MMOL/L (ref 136–145)
WBC # SPEC AUTO: 5.7 X10(3)/MCL (ref 4.5–11.5)

## 2023-09-18 PROCEDURE — 84100 ASSAY OF PHOSPHORUS: CPT

## 2023-09-18 PROCEDURE — 80053 COMPREHEN METABOLIC PANEL: CPT

## 2023-09-18 PROCEDURE — 36415 COLL VENOUS BLD VENIPUNCTURE: CPT

## 2023-09-18 PROCEDURE — 83735 ASSAY OF MAGNESIUM: CPT

## 2023-09-18 PROCEDURE — 85025 COMPLETE CBC W/AUTO DIFF WBC: CPT

## 2023-09-19 ENCOUNTER — OFFICE VISIT (OUTPATIENT)
Dept: HEMATOLOGY/ONCOLOGY | Facility: CLINIC | Age: 73
End: 2023-09-19
Payer: MEDICARE

## 2023-09-19 ENCOUNTER — INFUSION (OUTPATIENT)
Dept: INFUSION THERAPY | Facility: HOSPITAL | Age: 73
End: 2023-09-19
Attending: FAMILY MEDICINE
Payer: MEDICARE

## 2023-09-19 VITALS
OXYGEN SATURATION: 98 % | RESPIRATION RATE: 18 BRPM | SYSTOLIC BLOOD PRESSURE: 146 MMHG | HEIGHT: 70 IN | WEIGHT: 191 LBS | BODY MASS INDEX: 27.35 KG/M2 | DIASTOLIC BLOOD PRESSURE: 75 MMHG | HEART RATE: 54 BPM | TEMPERATURE: 98 F

## 2023-09-19 VITALS
HEART RATE: 54 BPM | TEMPERATURE: 98 F | OXYGEN SATURATION: 98 % | DIASTOLIC BLOOD PRESSURE: 75 MMHG | BODY MASS INDEX: 27.35 KG/M2 | SYSTOLIC BLOOD PRESSURE: 146 MMHG | HEIGHT: 70 IN | RESPIRATION RATE: 18 BRPM | WEIGHT: 191 LBS

## 2023-09-19 DIAGNOSIS — Z79.899 IMMUNODEFICIENCY DUE TO CHEMOTHERAPY: ICD-10-CM

## 2023-09-19 DIAGNOSIS — E88.09 HYPOALBUMINEMIA: ICD-10-CM

## 2023-09-19 DIAGNOSIS — C90.00 MULTIPLE MYELOMA, REMISSION STATUS UNSPECIFIED: ICD-10-CM

## 2023-09-19 DIAGNOSIS — R53.83 FATIGUE, UNSPECIFIED TYPE: ICD-10-CM

## 2023-09-19 DIAGNOSIS — E83.52 HYPERCALCEMIA: ICD-10-CM

## 2023-09-19 DIAGNOSIS — R53.1 WEAKNESS: ICD-10-CM

## 2023-09-19 DIAGNOSIS — K59.00 CONSTIPATION, UNSPECIFIED CONSTIPATION TYPE: ICD-10-CM

## 2023-09-19 DIAGNOSIS — D84.821 IMMUNODEFICIENCY DUE TO CHEMOTHERAPY: ICD-10-CM

## 2023-09-19 DIAGNOSIS — C90.00 MULTIPLE MYELOMA NOT HAVING ACHIEVED REMISSION: Primary | ICD-10-CM

## 2023-09-19 DIAGNOSIS — T45.1X5A IMMUNODEFICIENCY DUE TO CHEMOTHERAPY: ICD-10-CM

## 2023-09-19 PROCEDURE — 99999 PR PBB SHADOW E&M-EST. PATIENT-LVL V: CPT | Mod: PBBFAC,,,

## 2023-09-19 PROCEDURE — 99999 PR PBB SHADOW E&M-EST. PATIENT-LVL V: ICD-10-PCS | Mod: PBBFAC,,,

## 2023-09-19 PROCEDURE — 96365 THER/PROPH/DIAG IV INF INIT: CPT

## 2023-09-19 PROCEDURE — 99215 OFFICE O/P EST HI 40 MIN: CPT | Mod: S$PBB,,,

## 2023-09-19 PROCEDURE — 99215 PR OFFICE/OUTPT VISIT, EST, LEVL V, 40-54 MIN: ICD-10-PCS | Mod: S$PBB,,,

## 2023-09-19 PROCEDURE — 99215 OFFICE O/P EST HI 40 MIN: CPT | Mod: PBBFAC,25

## 2023-09-19 PROCEDURE — 63600175 PHARM REV CODE 636 W HCPCS: Mod: JZ,TB

## 2023-09-19 PROCEDURE — 96367 TX/PROPH/DG ADDL SEQ IV INF: CPT

## 2023-09-19 PROCEDURE — 96401 CHEMO ANTI-NEOPL SQ/IM: CPT

## 2023-09-19 PROCEDURE — 25000003 PHARM REV CODE 250

## 2023-09-19 RX ORDER — DIPHENHYDRAMINE HYDROCHLORIDE 50 MG/ML
50 INJECTION INTRAMUSCULAR; INTRAVENOUS ONCE AS NEEDED
Status: CANCELLED | OUTPATIENT
Start: 2023-10-03

## 2023-09-19 RX ORDER — SODIUM CHLORIDE 0.9 % (FLUSH) 0.9 %
10 SYRINGE (ML) INJECTION
Status: CANCELLED | OUTPATIENT
Start: 2023-10-03

## 2023-09-19 RX ORDER — DIPHENHYDRAMINE HYDROCHLORIDE 50 MG/ML
50 INJECTION INTRAMUSCULAR; INTRAVENOUS ONCE AS NEEDED
Status: CANCELLED | OUTPATIENT
Start: 2023-09-19

## 2023-09-19 RX ORDER — ACETAMINOPHEN 325 MG/1
650 TABLET ORAL
Status: CANCELLED | OUTPATIENT
Start: 2023-10-03

## 2023-09-19 RX ORDER — HEPARIN 100 UNIT/ML
500 SYRINGE INTRAVENOUS
Status: CANCELLED | OUTPATIENT
Start: 2023-10-03

## 2023-09-19 RX ORDER — EPINEPHRINE 0.3 MG/.3ML
0.3 INJECTION SUBCUTANEOUS ONCE AS NEEDED
Status: CANCELLED | OUTPATIENT
Start: 2023-09-19

## 2023-09-19 RX ORDER — EPINEPHRINE 0.3 MG/.3ML
0.3 INJECTION SUBCUTANEOUS ONCE AS NEEDED
Status: CANCELLED | OUTPATIENT
Start: 2023-10-03

## 2023-09-19 RX ORDER — DIPHENHYDRAMINE HCL 25 MG
25 CAPSULE ORAL
Status: CANCELLED | OUTPATIENT
Start: 2023-09-19

## 2023-09-19 RX ORDER — DIPHENHYDRAMINE HCL 25 MG
25 CAPSULE ORAL
Status: CANCELLED | OUTPATIENT
Start: 2023-10-03

## 2023-09-19 RX ORDER — ACETAMINOPHEN 325 MG/1
650 TABLET ORAL
Status: CANCELLED | OUTPATIENT
Start: 2023-09-19

## 2023-09-19 RX ORDER — DIPHENHYDRAMINE HCL 25 MG
25 CAPSULE ORAL
Status: COMPLETED | OUTPATIENT
Start: 2023-09-19 | End: 2023-09-19

## 2023-09-19 RX ORDER — HEPARIN 100 UNIT/ML
500 SYRINGE INTRAVENOUS
Status: CANCELLED | OUTPATIENT
Start: 2023-09-19

## 2023-09-19 RX ORDER — ACETAMINOPHEN 325 MG/1
650 TABLET ORAL
Status: COMPLETED | OUTPATIENT
Start: 2023-09-19 | End: 2023-09-19

## 2023-09-19 RX ORDER — SODIUM CHLORIDE 0.9 % (FLUSH) 0.9 %
10 SYRINGE (ML) INJECTION
Status: CANCELLED | OUTPATIENT
Start: 2023-09-19

## 2023-09-19 RX ADMIN — SODIUM CHLORIDE: 9 INJECTION, SOLUTION INTRAVENOUS at 11:09

## 2023-09-19 RX ADMIN — DARATUMUMAB AND HYALURONIDASE-FIHJ (HUMAN RECOMBINANT) 1800 MG: 1800; 30000 INJECTION SUBCUTANEOUS at 12:09

## 2023-09-19 RX ADMIN — DIPHENHYDRAMINE HYDROCHLORIDE 25 MG: 25 CAPSULE ORAL at 11:09

## 2023-09-19 RX ADMIN — ACETAMINOPHEN 650 MG: 325 TABLET ORAL at 11:09

## 2023-09-19 RX ADMIN — DEXAMETHASONE SODIUM PHOSPHATE 20 MG: 4 INJECTION, SOLUTION INTRA-ARTICULAR; INTRALESIONAL; INTRAMUSCULAR; INTRAVENOUS; SOFT TISSUE at 11:09

## 2023-09-19 NOTE — PROGRESS NOTES
HEMATOLOGY/ONCOLOGY OFFICE CLINIC VISIT    Visit Information:    Initial Evaluation: 6/13/2023 (hospital consult)   Referring Provider: Dr Gonzalez  Other providers:  Code status: Not addressed    Diagnosis:  Multiple Myeloma    Present treatment:  Daratumumab, lenalidomide, and dexamethasone    Treatment/Oncology history:    Plan of care:     Imaging:  Ct C 6/7/2023: 1. There is a small left sided pleural effusion.2. A small patchy area of ground glass density is seen in the lateral basal segment of the left lower lobe. This could reflect an acute focal infiltrate / pneumonitis. 3. There is diffuse osteopenia along the visualised bony structures together with multiple, numerous, small round-ovoid lucent lesion of varying sizes involving the marrow. These changes could reflect metabolic bone disease like hyperparathyroidism. Correlate clinically and with laboratory findings, as regards additional evaluation and follow-up.  6/8/23 Bone Scan Whole Body:  1. Scattered activity at the anterior left right rib margins as described.  A CT exam on the previous day reveals no definite fracture.  There is mild heterogeneous and bony loss at the anterior left and right ribs.  This is not have the typical pattern of a metastases.  2. Focal increased activity at the anterior manubrium which again on the CT exam demonstrates osteopenia and heterogeneous bony loss as well as scattered subcentimeter small lytic defects. 3. Suspect mild arthritic changes at the shoulders sternoclavicular joints  6/9/23 MRI Thoracic Spine: Within limitations, no convincing evidence of acute thoracic spine abnormality, high-grade canal stenosis, or focal cord pathology. Multiple scattered vertebral body lesions are suspected and could represent metastatic disease, otherwise of incomplete characterization by non-contrast protocol. Incidental findings of the partially visualized thoracic cavity and retroperitoneum discussed above, poorly assessed by  modality/protocol.  Recent non-contrast chest CT provides overall better visualization.  6/10/23 CT Head: No acute intracranial abnormality.  Findings consistent with microangiopathy no interval change.  6/12/23 XRAY Chest: 1. Patchy hazy opacities again evident at the lower lungs bilaterally suspicious for infiltrate and atelectasis.  Small bibasilar pleural reactions cannot be excluded. 2. Borderline cardiomegaly 3. Atherosclerosis  6/16/23 XRAY Chest: Improved aeration of the lung bases with mild residual bibasilar opacities and small pleural effusions suspected.      Pathology:  6/12/23 Bone marrow aspiration/Biopsy:   PLASMA CELL MYELOMA, KAPPA RESTRICTED.     PLASMA CELL MYELOMA INVOLVES 90% OF BONE MARROW CELLULARITY WITH SMALL AREAS OF  SEQUENTIAL TRILINEAGE HEMATOPOIESIS.    ABSENT IRON STORES.     PERIPHERAL BLOOD: NORMOCYTIC NORMOCHROMIC ANEMIA. THROMBOCYTOPENIA.         CLINICAL HISTORY:       Patient: Chip Goodson is a 73 y.o. male That was seen  for the first time as hospital consult on 6/13/2023.   Patient was brought to the emergency department for confusion.  Upon evaluation in the ER CT scan of the head with no acute intracranial abnormality.  Finding consistent with microangiopathic no interval change.  CT of the chest with no contrast showed diffuse osteopenia with multiple, numerous, small round avid lucent lesions ovarian size involving the marrow.  Changes could reflect metabolic bone disease like hyperparathyroidism.  MRI of the thoracic spine with multiple scattered vertebral body lesion are suspect and could represent metastatic disease.  Bone scan with scattered activity in the anterior left right rib margins no fractures focal increased activity at the anterior manubrium scattered subcentimeter small lytic defects.  Ultrasound of the thyroid that shows multiple nodules. skeletal survey  area on humerus only.     Labs with elevated calcium of 12.6, corrected calcium 14.36.  Total  protein was 11.8 with a total globulin 10.0.  Further workup revealed an IgA over 7040.0, IgM 6.0, IgG 165.00.  Free serum kappa light chain 5.09, free serum lambda light chains 0.5600 with a kappa/lambda ratio of 9.09.  M spike 3.33.  PSA 0.96     Bone marrow biopsy performed 6/12/23. Patient receive pamidronate 60 mg on 06/09/2023 and was started on hydration.  His calcium improved as well as his mental status.       Chief Complaint: Other Misc (Pt reports no new concerns today.)      Interval History:   He returns to clinic today for a one-week follow-up and for treatment clearance for cycle 3 day 1 daratumumab, lenalidomide, and dexamethasone.  He reports feeling well today. Stable fatigue.  Having constipation, but currently taking Linzess as prescribed by PCP for this. He denies any fever, chills, shortness of breath, chest pain, cough, abdominal pain, nausea, vomiting, diarrhea, peripheral neuropathy, edema.  Labs reviewed with patient in detail, weight stable.        Past Medical History:   Diagnosis Date    GERD (gastroesophageal reflux disease)     HTN (hypertension)     Hypothyroidism, unspecified     Type 2 diabetes mellitus without complications       Past Surgical History:   Procedure Laterality Date    APPENDECTOMY  1965    BONE MARROW BIOPSY Right 06/12/2023    Procedure: BIOPSY, BONE MARROW;  Surgeon: Silvano Austin MD;  Location: [facility];  Service: General;  Laterality: Right;    removal of boils       Family History   Problem Relation Age of Onset    Lung cancer Mother     Breast cancer Sister      Social Connections: Unknown (6/9/2023)    Social Connection and Isolation Panel [NHANES]     Frequency of Communication with Friends and Family: Patient refused     Frequency of Social Gatherings with Friends and Family: Patient refused     Attends Sikh Services: Patient refused     Active Member of Clubs or Organizations: Patient refused     Attends Club or Organization Meetings: Patient refused      Marital Status: Patient refused       Review of patient's allergies indicates:   Allergen Reactions    Iodine Anaphylaxis    Shellfish containing products     Amoxicillin-pot clavulanate Itching      Current Outpatient Medications on File Prior to Visit   Medication Sig Dispense Refill    acyclovir (ZOVIRAX) 400 MG tablet Take 1 tablet (400 mg total) by mouth 2 (two) times daily. 60 tablet 11    albuterol (PROVENTIL/VENTOLIN HFA) 90 mcg/actuation inhaler 2 puffs every 4 to 6 hours as needed.      amLODIPine (NORVASC) 10 MG tablet Take 1 tablet (10 mg total) by mouth once daily. 30 each 11    atorvastatin (LIPITOR) 10 MG tablet Take 10 mg by mouth.      carvediloL (COREG) 12.5 MG tablet Take 12.5 mg by mouth 2 (two) times daily.      cloNIDine (CATAPRES) 0.2 MG tablet Take 0.2 mg by mouth daily as needed.      dexAMETHasone (DECADRON) 4 MG Tab Take 5 tablets (20 mg total) by mouth every 7 days. on days following daratumumab infusion. Take with food. 20 tablet 1    dexAMETHasone (DECADRON) 4 MG Tab Take 1 tablet (4 mg total) by mouth once daily. For 2 days 23, and 23 2 tablet 0    dexAMETHasone (DECADRON) 4 MG Tab Take 5 tabs (20 mg) orally on days following daratumumab infusion (Days 2 and 16) and take 10 tabs (40 mg) on Days 8 and 22 of each cycle. Take with food. 30 tablet 3    famotidine (PEPCID) 20 MG tablet Take 1 tablet (20 mg total) by mouth once daily. 30 tablet 11    finasteride (PROSCAR) 5 mg tablet Take 5 mg by mouth.      gabapentin (NEURONTIN) 100 MG capsule Take by mouth.      hydrALAZINE (APRESOLINE) 50 MG tablet Take 1 tablet (50 mg total) by mouth every 8 (eight) hours. 90 tablet 11    hydrocodone-chlorpheniramine (TUSSIONEX) 10-8 mg/5 mL suspension SMARTSI Milliliter(s) By Mouth Every 12 Hours      lenalidomide 10 mg Cap Take 10 mg by mouth once daily for 21 days followed by a 7 day break, every 28 days. 21 each 0    levoFLOXacin (LEVAQUIN) 500 MG tablet Take 1 tablet (500 mg total) by  mouth once daily. 7 each 0    levothyroxine (SYNTHROID) 112 MCG tablet Take 112 mcg by mouth.      nitroGLYCERIN (NITROSTAT) 0.4 MG SL tablet place one tablet under the tongue every five minutes as needed for chest pain up to 3 doses. if no relief call 911      ondansetron (ZOFRAN) 8 MG tablet Take 1 tablet (8 mg total) by mouth every 8 (eight) hours as needed for Nausea. 30 tablet 2    ONETOUCH ULTRA TEST Strp USE TO TEST BLOOD GLUCOSE TWICE DAILY      pantoprazole (PROTONIX) 40 MG tablet Take 1 tablet (40 mg total) by mouth 2 (two) times daily before meals. 60 tablet 11    SITagliptin phosphate (JANUVIA) 50 MG Tab Take 1 tablet (50 mg total) by mouth once daily. 30 tablet 5    spironolactone (ALDACTONE) 25 MG tablet Take 12.5 mg by mouth.      tamsulosin (FLOMAX) 0.4 mg Cap Take 1 capsule by mouth.       Current Facility-Administered Medications on File Prior to Visit   Medication Dose Route Frequency Provider Last Rate Last Admin    [COMPLETED] acetaminophen tablet 650 mg  650 mg Oral 1 time in [facility] Marjan Eli FNP   650 mg at 09/19/23 1124    alteplase injection 2 mg  2 mg Intra-Catheter PRN Elsy Amaya MD        daratumumab-hyaluronidase-fihj subcutaneous injection 1,800 mg  1,800 mg Subcutaneous 1 time in Clinic/E[address]y [address] MD        [COMPLETED] daratumumab-hyaluronidase-fihj subcutaneous injection 1,800 mg  1,800 mg Subcutaneous 1 time in [facility]/Marjan Nevarez FNP   1,800 mg at 09/19/23 1239    [COMPLETED] dexAMETHasone (DECADRON) 20 mg in sodium chloride 0.9% 50 mL IVPB  20 mg Intravenous 1 time in Clinic/Marjan Nevarez FNP   Stopped at 09/19/23 1141    [COMPLETED] diphenhydrAMINE capsule 25 mg  25 mg Oral 1 time in Clinic/[facility] Marjan Eli FNP   25 mg at 09/19/23 1124    diphenhydrAMINE injection 50 mg  50 mg Intravenous Once PRN Elsy Amaya MD        EPINEPHrine (EPIPEN) 0.3 mg/0.3 mL pen injection 0.3 mg  0.3 mg Intramuscular Once PRN Monique  Elsy BURNHAM MD        heparin, porcine (PF) 100 unit/mL injection flush 500 Units  500 Units Intravenous PRN Elsy Amaya MD        hydrocortisone sodium succinate injection 100 mg  100 mg Intravenous Once PRN Elsy Amaya MD        sodium chloride 0.9% 250 mL flush bag   Intravenous 1 time in Clinic/[facility] Elsy Amaya MD        [COMPLETED] sodium chloride 0.9% 250 mL flush bag   Intravenous 1 time in Clinic/[facility] Marjan Eli FNP   Stopped at 09/19/23 1254    sodium chloride 0.9% flush 10 mL  10 mL Intravenous PRN Elsy Amaya MD          Review of Systems   Constitutional:  Negative for appetite change, chills, diaphoresis, fever and unexpected weight change.   HENT:  Negative for nasal congestion, mouth sores, nosebleeds, sinus pressure/congestion, sore throat and trouble swallowing.    Eyes: Negative.    Respiratory:  Negative for cough and shortness of breath.    Cardiovascular:  Negative for chest pain and palpitations.   Gastrointestinal:  Negative for abdominal distention, abdominal pain, blood in stool, change in bowel habit, constipation, diarrhea, nausea and vomiting.   Endocrine: Negative.    Genitourinary:  Negative for bladder incontinence, decreased urine volume, dysuria, frequency, hematuria and urgency. Difficulty urinating: had a ocampo cath.  Musculoskeletal:  Negative for arthralgias, back pain, gait problem, joint swelling, leg pain and myalgias.   Integumentary:  Negative for rash.   Allergic/Immunologic: Negative.    Neurological:  Negative for dizziness, tremors, syncope, weakness, light-headedness, numbness, headaches and memory loss.   Hematological:  Negative for adenopathy. Does not bruise/bleed easily.   Psychiatric/Behavioral:  Negative for agitation, confusion, hallucinations, sleep disturbance and suicidal ideas. The patient is not nervous/anxious.               Vitals:    09/19/23 1026   BP: (!) 146/75   BP Location: Right arm   Patient Position:  "Sitting   Pulse: (!) 54   Resp: 18   Temp: 98.1 °F (36.7 °C)   TempSrc: Oral   SpO2: 98%   Weight: 86.6 kg (191 lb)   Height: 5' 10" (1.778 m)      Wt Readings from Last 6 Encounters:   09/19/23 86.6 kg (191 lb)   09/19/23 86.6 kg (191 lb)   09/12/23 88.2 kg (194 lb 6.4 oz)   09/12/23 88.2 kg (194 lb 6.4 oz)   09/05/23 87.1 kg (192 lb)   09/05/23 87.1 kg (192 lb)     Body mass index is 27.41 kg/m².  Body surface area is 2.07 meters squared.  Physical Exam  Vitals and nursing note reviewed.   Constitutional:       General: He is not in acute distress.     Appearance: Normal appearance.      Comments: Elderly male   HENT:      Head: Normocephalic and atraumatic.      Mouth/Throat:      Mouth: Mucous membranes are moist.   Eyes:      General: No scleral icterus.     Extraocular Movements: Extraocular movements intact.      Conjunctiva/sclera: Conjunctivae normal.   Neck:      Vascular: No JVD.   Cardiovascular:      Rate and Rhythm: Normal rate and regular rhythm.      Heart sounds: No murmur heard.  Pulmonary:      Effort: Pulmonary effort is normal.      Breath sounds: Normal breath sounds. No wheezing or rhonchi.   Abdominal:      General: Bowel sounds are normal. There is no distension.      Palpations: Abdomen is soft.      Tenderness: There is no abdominal tenderness.   Musculoskeletal:         General: No swelling or deformity.      Cervical back: Neck supple.   Lymphadenopathy:      Head:      Right side of head: No submandibular adenopathy.      Left side of head: No submandibular adenopathy.      Cervical: No cervical adenopathy.      Upper Body:      Right upper body: No supraclavicular or axillary adenopathy.      Left upper body: No supraclavicular or axillary adenopathy.      Lower Body: No right inguinal adenopathy. No left inguinal adenopathy.   Skin:     General: Skin is warm.      Coloration: Skin is not jaundiced.      Findings: No rash.   Neurological:      General: No focal deficit present.      " Mental Status: He is alert and oriented to person, place, and time.      Cranial Nerves: Cranial nerves 2-12 are intact.   Psychiatric:         Attention and Perception: Attention normal.         Behavior: Behavior is cooperative.       Laboratory:  CBC with Differential:  Lab Results   Component Value Date    WBC 5.70 09/18/2023    RBC 3.68 (L) 09/18/2023    HGB 12.3 (L) 09/18/2023    HCT 36.7 (L) 09/18/2023    MCV 99.7 (H) 09/18/2023    MCH 33.4 (H) 09/18/2023    MCHC 33.5 09/18/2023    RDW 13.8 09/18/2023     09/18/2023    MPV 9.9 09/18/2023     Serum:  SPEP: Serum protein electrophoresis shows a distinct monoclonal peak (3.33 g/dL) in the beta zone, consistent with a monoclonal gammopathy.   PAO: A band is present in the IgA trini with a corresponding band in the kappa trini.   The immunofixation electrophoresis are consistent with monoclonal gammopathy of IgA-kappa isotype.     IgG Level 540.00 - 1,822.00 mg/dL 165.00 Low     IgA Level 101.0 - 645.0 mg/dL >7,040.0 High     IgM Level 22.0 - 240.0 mg/dL 6.0 Low       Kappa Free Light Chain 0.3300 - 1.94 mg/dL 5.09 High     Lambda Free Light Chain 0.5700 - 2.63 mg/dL 0.5600 Low     Kappa/Lambda FLC Ratio 0.2600 - 1.65 9.09 High      Urine:   24 hrs Urine:  M spike    1467      H    mg/24 h    CMP:  Sodium Level   Date Value Ref Range Status   09/18/2023 133 (L) 136 - 145 mmol/L Final     Potassium Level   Date Value Ref Range Status   09/18/2023 4.2 3.5 - 5.1 mmol/L Final     Carbon Dioxide   Date Value Ref Range Status   09/18/2023 26 23 - 31 mmol/L Final     Blood Urea Nitrogen   Date Value Ref Range Status   09/18/2023 13.5 8.4 - 25.7 mg/dL Final     Creatinine   Date Value Ref Range Status   09/18/2023 1.17 0.73 - 1.18 mg/dL Final     Calcium Level Total   Date Value Ref Range Status   09/18/2023 9.1 8.8 - 10.0 mg/dL Final     Albumin Level   Date Value Ref Range Status   09/18/2023 2.8 (L) 3.4 - 4.8 g/dL Final     Bilirubin Total   Date Value Ref Range  Status   09/18/2023 1.1 <=1.5 mg/dL Final     Alkaline Phosphatase   Date Value Ref Range Status   09/18/2023 137 40 - 150 unit/L Final     Aspartate Aminotransferase   Date Value Ref Range Status   09/18/2023 20 5 - 34 unit/L Final     Alanine Aminotransferase   Date Value Ref Range Status   09/18/2023 32 0 - 55 unit/L Final             Assessment:       1. Multiple myeloma not having achieved remission    2. Multiple myeloma, remission status unspecified    3. Weakness    4. Hypoalbuminemia    5. Hypercalcemia    6. Constipation, unspecified constipation type    7. 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  No complaints.  Afebrile.  Taking Lenalidomide and Dexamethasone as directed.  Here for cycle 3, day 1 today.    Constipation  Currently taking Linzess as prescribed by PCP      Plan:   Labs stable.    MM labs reviewed and improving.  Regimen reviewed with patient in detail.   Continue with cycle 3 day 1 as scheduled.    Return to clinic in two weeks with NP for follow up/lab/treatment.  (C3D15)        The patient is in agreement with today's plan of care.  All questions answered.  Patient is aware to contact our office for any problems or concerns prior to next visit.    Marjan Eli, VICKY-C  Oncology/Hematology   [facility]

## 2023-10-03 ENCOUNTER — INFUSION (OUTPATIENT)
Dept: INFUSION THERAPY | Facility: HOSPITAL | Age: 73
End: 2023-10-03
Attending: FAMILY MEDICINE
Payer: MEDICARE

## 2023-10-03 ENCOUNTER — OFFICE VISIT (OUTPATIENT)
Dept: HEMATOLOGY/ONCOLOGY | Facility: CLINIC | Age: 73
End: 2023-10-03
Payer: MEDICARE

## 2023-10-03 VITALS
DIASTOLIC BLOOD PRESSURE: 73 MMHG | SYSTOLIC BLOOD PRESSURE: 135 MMHG | WEIGHT: 195 LBS | BODY MASS INDEX: 27.92 KG/M2 | OXYGEN SATURATION: 97 % | HEIGHT: 70 IN | RESPIRATION RATE: 17 BRPM | TEMPERATURE: 98 F | HEART RATE: 52 BPM

## 2023-10-03 DIAGNOSIS — C90.00 MULTIPLE MYELOMA NOT HAVING ACHIEVED REMISSION: ICD-10-CM

## 2023-10-03 DIAGNOSIS — D50.8 OTHER IRON DEFICIENCY ANEMIA: ICD-10-CM

## 2023-10-03 DIAGNOSIS — C90.00 MULTIPLE MYELOMA NOT HAVING ACHIEVED REMISSION: Primary | ICD-10-CM

## 2023-10-03 PROCEDURE — 99999 PR PBB SHADOW E&M-EST. PATIENT-LVL V: ICD-10-PCS | Mod: PBBFAC,,, | Performed by: INTERNAL MEDICINE

## 2023-10-03 PROCEDURE — 25000003 PHARM REV CODE 250

## 2023-10-03 PROCEDURE — 96401 CHEMO ANTI-NEOPL SQ/IM: CPT

## 2023-10-03 PROCEDURE — 99215 OFFICE O/P EST HI 40 MIN: CPT | Mod: S$PBB,,, | Performed by: INTERNAL MEDICINE

## 2023-10-03 PROCEDURE — 99215 PR OFFICE/OUTPT VISIT, EST, LEVL V, 40-54 MIN: ICD-10-PCS | Mod: S$PBB,,, | Performed by: INTERNAL MEDICINE

## 2023-10-03 PROCEDURE — 63600175 PHARM REV CODE 636 W HCPCS: Mod: JZ,JG

## 2023-10-03 PROCEDURE — 99215 OFFICE O/P EST HI 40 MIN: CPT | Mod: PBBFAC | Performed by: INTERNAL MEDICINE

## 2023-10-03 PROCEDURE — 99999 PR PBB SHADOW E&M-EST. PATIENT-LVL V: CPT | Mod: PBBFAC,,, | Performed by: INTERNAL MEDICINE

## 2023-10-03 RX ORDER — HEPARIN 100 UNIT/ML
500 SYRINGE INTRAVENOUS
Status: DISCONTINUED | OUTPATIENT
Start: 2023-10-03 | End: 2023-10-03 | Stop reason: HOSPADM

## 2023-10-03 RX ORDER — SODIUM CHLORIDE 0.9 % (FLUSH) 0.9 %
10 SYRINGE (ML) INJECTION
Status: DISCONTINUED | OUTPATIENT
Start: 2023-10-03 | End: 2023-10-03 | Stop reason: HOSPADM

## 2023-10-03 RX ORDER — EPINEPHRINE 0.3 MG/.3ML
0.3 INJECTION SUBCUTANEOUS ONCE AS NEEDED
Status: DISCONTINUED | OUTPATIENT
Start: 2023-10-03 | End: 2023-10-03 | Stop reason: HOSPADM

## 2023-10-03 RX ORDER — DIPHENHYDRAMINE HYDROCHLORIDE 50 MG/ML
50 INJECTION INTRAMUSCULAR; INTRAVENOUS ONCE AS NEEDED
Status: DISCONTINUED | OUTPATIENT
Start: 2023-10-03 | End: 2023-10-03 | Stop reason: HOSPADM

## 2023-10-03 RX ORDER — DIPHENHYDRAMINE HCL 25 MG
25 CAPSULE ORAL
Status: COMPLETED | OUTPATIENT
Start: 2023-10-03 | End: 2023-10-03

## 2023-10-03 RX ORDER — ACETAMINOPHEN 325 MG/1
650 TABLET ORAL
Status: COMPLETED | OUTPATIENT
Start: 2023-10-03 | End: 2023-10-03

## 2023-10-03 RX ORDER — DEXAMETHASONE 4 MG/1
20 TABLET ORAL
Status: COMPLETED | OUTPATIENT
Start: 2023-10-03 | End: 2023-10-03

## 2023-10-03 RX ADMIN — DEXAMETHASONE 20 MG: 4 TABLET ORAL at 10:10

## 2023-10-03 RX ADMIN — DIPHENHYDRAMINE HYDROCHLORIDE 25 MG: 25 CAPSULE ORAL at 10:10

## 2023-10-03 RX ADMIN — ACETAMINOPHEN 650 MG: 325 TABLET ORAL at 10:10

## 2023-10-03 RX ADMIN — DARATUMUMAB AND HYALURONIDASE-FIHJ (HUMAN RECOMBINANT) 1800 MG: 1800; 30000 INJECTION SUBCUTANEOUS at 11:10

## 2023-10-03 NOTE — PROGRESS NOTES
HEMATOLOGY/ONCOLOGY OFFICE CLINIC VISIT    Visit Information:    Initial Evaluation: 6/13/2023 (hospital consult)   Referring Provider: Dr Gonzalez  Other providers:  Code status: Not addressed    Diagnosis:  Multiple Myeloma IgA kappa    Present treatment:  Daratumumab, lenalidomide, and dexamethasone initiated 7/12/23    Treatment/Oncology history: 72 yo male admitted to the hospital in [place] 6/7/23 for weakness and hypercalcemia. She was diagnosed with multiple myeloma.     Plan of care:     Imaging:  Ct C 6/7/2023: 1. There is a small left sided pleural effusion.2. A small patchy area of ground glass density is seen in the lateral basal segment of the left lower lobe. This could reflect an acute focal infiltrate / pneumonitis. 3. There is diffuse osteopenia along the visualised bony structures together with multiple, numerous, small round-ovoid lucent lesion of varying sizes involving the marrow. These changes could reflect metabolic bone disease like hyperparathyroidism. Correlate clinically and with laboratory findings, as regards additional evaluation and follow-up.  6/8/23 Bone Scan Whole Body:  1. Scattered activity at the anterior left right rib margins as described.  A CT exam on the previous day reveals no definite fracture.  There is mild heterogeneous and bony loss at the anterior left and right ribs.  This is not have the typical pattern of a metastases.  2. Focal increased activity at the anterior manubrium which again on the CT exam demonstrates osteopenia and heterogeneous bony loss as well as scattered subcentimeter small lytic defects. 3. Suspect mild arthritic changes at the shoulders sternoclavicular joints  6/9/23 MRI Thoracic Spine: Within limitations, no convincing evidence of acute thoracic spine abnormality, high-grade canal stenosis, or focal cord pathology. Multiple scattered vertebral body lesions are suspected and could represent metastatic disease, otherwise of incomplete  characterization by non-contrast protocol. Incidental findings of the partially visualized thoracic cavity and retroperitoneum discussed above, poorly assessed by modality/protocol.  Recent non-contrast chest CT provides overall better visualization.  6/10/23 CT Head: No acute intracranial abnormality.  Findings consistent with microangiopathy no interval change.  6/12/23 XRAY Chest: 1. Patchy hazy opacities again evident at the lower lungs bilaterally suspicious for infiltrate and atelectasis.  Small bibasilar pleural reactions cannot be excluded. 2. Borderline cardiomegaly 3. Atherosclerosis  6/16/23 XRAY Chest: Improved aeration of the lung bases with mild residual bibasilar opacities and small pleural effusions suspected.      Pathology:  6/12/23 Bone marrow aspiration/Biopsy:   PLASMA CELL MYELOMA, KAPPA RESTRICTED.     PLASMA CELL MYELOMA INVOLVES 90% OF BONE MARROW CELLULARITY WITH SMALL AREAS OF  SEQUENTIAL TRILINEAGE HEMATOPOIESIS.    ABSENT IRON STORES.     PERIPHERAL BLOOD: NORMOCYTIC NORMOCHROMIC ANEMIA. THROMBOCYTOPENIA.     LABS:  6/8/23 M-spike 3.33, IgA >7040, serum kappa 5/ lambda 0.56/ K:L ratio 9.09, 24 hour urine for M protein done but not resulted. Calcium 13.4/Alb 1.9, Cr 2.3, globulin 10.7  7/12/23 M-spike 2.49. IgA >7040, Corrected calcium 12.3, Cr 2.09, Globulin 7.6  8/15/23 M-spike 1.49, IgA 4000  9/18/23 M-spike 1.0, IgA 1700    CLINICAL HISTORY:       Patient: Chip Goodson is a 73 y.o. male That was seen  for the first time as hospital consult on 6/13/2023.   Patient was brought to the emergency department for confusion.  Upon evaluation in the ER CT scan of the head with no acute intracranial abnormality.  Finding consistent with microangiopathic no interval change.  CT of the chest with no contrast showed diffuse osteopenia with multiple, numerous, small round avid lucent lesions ovarian size involving the marrow.  Changes could reflect metabolic bone disease like  hyperparathyroidism.  MRI of the thoracic spine with multiple scattered vertebral body lesion are suspect and could represent metastatic disease.  Bone scan with scattered activity in the anterior left right rib margins no fractures focal increased activity at the anterior manubrium scattered subcentimeter small lytic defects.  Ultrasound of the thyroid that shows multiple nodules. skeletal survey  area on humerus only.     Labs with elevated calcium of 12.6, corrected calcium 14.36.  Total protein was 11.8 with a total globulin 10.0.  Further workup revealed an IgA over 7040.0, IgM 6.0, IgG 165.00.  Free serum kappa light chain 5.09, free serum lambda light chains 0.5600 with a kappa/lambda ratio of 9.09.  M spike 3.33.  PSA 0.96     Bone marrow biopsy performed 6/12/23. Patient receive pamidronate 60 mg on 06/09/2023 and was started on hydration.  His calcium improved as well as his mental status.     Chief Complaint: No chief complaint on file.    Interval History:  10/3/23 No complaints. Tolerating treatment well. I introduced the idea of transplant. Not being certain of transplant team age cut off, will make a referral to them for telehealth.  To get cycle 3 day 15.     9/19/23 He returns to clinic today for a one-week follow-up and for treatment clearance for cycle 3 day 1 daratumumab, lenalidomide, and dexamethasone.  He reports feeling well today. Stable fatigue.  Having constipation, but currently taking Linzess as prescribed by PCP for this. He denies any fever, chills, shortness of breath, chest pain, cough, abdominal pain, nausea, vomiting, diarrhea, peripheral neuropathy, edema.  Labs reviewed with patient in detail, weight stable.    Past Medical History:   Diagnosis Date    GERD (gastroesophageal reflux disease)     HTN (hypertension)     Hypothyroidism, unspecified     Type 2 diabetes mellitus without complications       Past Surgical History:   Procedure Laterality Date    APPENDECTOMY  1965    BONE  MARROW BIOPSY Right 06/12/2023    Procedure: BIOPSY, BONE MARROW;  Surgeon: Silvano Austin MD;  Location: [facility];  Service: General;  Laterality: Right;    removal of boils       Family History   Problem Relation Age of Onset    Lung cancer Mother     Breast cancer Sister      Social Connections: Unknown (6/9/2023)    Social Connection and Isolation Panel [NHANES]     Frequency of Communication with Friends and Family: Patient refused     Frequency of Social Gatherings with Friends and Family: Patient refused     Attends Zoroastrian Services: Patient refused     Active Member of Clubs or Organizations: Patient refused     Attends Club or Organization Meetings: Patient refused     Marital Status: Patient refused       Review of patient's allergies indicates:   Allergen Reactions    Iodine Anaphylaxis    Shellfish containing products     Amoxicillin-pot clavulanate Itching      Current Outpatient Medications on File Prior to Visit   Medication Sig Dispense Refill    acyclovir (ZOVIRAX) 400 MG tablet Take 1 tablet (400 mg total) by mouth 2 (two) times daily. 60 tablet 11    albuterol (PROVENTIL/VENTOLIN HFA) 90 mcg/actuation inhaler 2 puffs every 4 to 6 hours as needed.      amLODIPine (NORVASC) 10 MG tablet Take 1 tablet (10 mg total) by mouth once daily. 30 each 11    atorvastatin (LIPITOR) 10 MG tablet Take 10 mg by mouth.      carvediloL (COREG) 12.5 MG tablet Take 12.5 mg by mouth 2 (two) times daily.      cloNIDine (CATAPRES) 0.2 MG tablet Take 0.2 mg by mouth daily as needed.      dexAMETHasone (DECADRON) 4 MG Tab Take 5 tablets (20 mg total) by mouth every 7 days. on days following daratumumab infusion. Take with food. 20 tablet 1    dexAMETHasone (DECADRON) 4 MG Tab Take 1 tablet (4 mg total) by mouth once daily. For 2 days 7/19/23, and 7/20/23 2 tablet 0    dexAMETHasone (DECADRON) 4 MG Tab Take 5 tabs (20 mg) orally on days following daratumumab infusion (Days 2 and 16) and take 10 tabs (40 mg) on Days 8 and  22 of each cycle. Take with food. 30 tablet 3    famotidine (PEPCID) 20 MG tablet Take 1 tablet (20 mg total) by mouth once daily. 30 tablet 11    finasteride (PROSCAR) 5 mg tablet Take 5 mg by mouth.      gabapentin (NEURONTIN) 100 MG capsule Take by mouth.      hydrALAZINE (APRESOLINE) 50 MG tablet Take 1 tablet (50 mg total) by mouth every 8 (eight) hours. 90 tablet 11    hydrocodone-chlorpheniramine (TUSSIONEX) 10-8 mg/5 mL suspension SMARTSI Milliliter(s) By Mouth Every 12 Hours      lenalidomide 10 mg Cap Take 10 mg by mouth once daily for 21 days followed by a 7 day break, every 28 days. 21 each 0    levoFLOXacin (LEVAQUIN) 500 MG tablet Take 1 tablet (500 mg total) by mouth once daily. 7 each 0    levothyroxine (SYNTHROID) 112 MCG tablet Take 112 mcg by mouth.      nitroGLYCERIN (NITROSTAT) 0.4 MG SL tablet place one tablet under the tongue every five minutes as needed for chest pain up to 3 doses. if no relief call 911      ondansetron (ZOFRAN) 8 MG tablet Take 1 tablet (8 mg total) by mouth every 8 (eight) hours as needed for Nausea. 30 tablet 2    ONETOUCH ULTRA TEST Strp USE TO TEST BLOOD GLUCOSE TWICE DAILY      pantoprazole (PROTONIX) 40 MG tablet Take 1 tablet (40 mg total) by mouth 2 (two) times daily before meals. 60 tablet 11    SITagliptin phosphate (JANUVIA) 50 MG Tab Take 1 tablet (50 mg total) by mouth once daily. 30 tablet 5    spironolactone (ALDACTONE) 25 MG tablet Take 12.5 mg by mouth.      tamsulosin (FLOMAX) 0.4 mg Cap Take 1 capsule by mouth.       Current Facility-Administered Medications on File Prior to Visit   Medication Dose Route Frequency Provider Last Rate Last Admin    alteplase injection 2 mg  2 mg Intra-Catheter PRN Elsy Amaya MD        daratumumab-hyaluronidase-fij subcutaneous injection 1,800 mg  1,800 mg Subcutaneous 1 time in Clinic/HOD Elsy Amaya MD        diphenhydrAMINE injection 50 mg  50 mg Intravenous Once PRN Elsy Amaya MD         EPINEPHrine (EPIPEN) 0.3 mg/0.3 mL pen injection 0.3 mg  0.3 mg Intramuscular Once PRN Elsy Amaya MD        heparin, porcine (PF) 100 unit/mL injection flush 500 Units  500 Units Intravenous PRN Elsy Amaya MD        hydrocortisone sodium succinate injection 100 mg  100 mg Intravenous Once PRN Elsy Amaya MD        sodium chloride 0.9% 250 mL flush bag   Intravenous 1 time in Clinic/HOD Elsy Amaya MD        sodium chloride 0.9% flush 10 mL  10 mL Intravenous PRN Elsy Amaya MD          Review of Systems   Constitutional:  Negative for appetite change, chills, diaphoresis, fever and unexpected weight change.   HENT:  Negative for nasal congestion, mouth sores, nosebleeds, sinus pressure/congestion, sore throat and trouble swallowing.    Eyes: Negative.    Respiratory:  Negative for cough and shortness of breath.    Cardiovascular:  Negative for chest pain and palpitations.   Gastrointestinal:  Negative for abdominal distention, abdominal pain, blood in stool, change in bowel habit, constipation, diarrhea, nausea and vomiting.   Endocrine: Negative.    Genitourinary:  Negative for bladder incontinence, decreased urine volume, dysuria, frequency, hematuria and urgency. Difficulty urinating: had a ocampo cath.  Musculoskeletal:  Negative for arthralgias, back pain, gait problem, joint swelling, leg pain and myalgias.   Integumentary:  Negative for rash.   Allergic/Immunologic: Negative.    Neurological:  Negative for dizziness, tremors, syncope, weakness, light-headedness, numbness, headaches and memory loss.   Hematological:  Negative for adenopathy. Does not bruise/bleed easily.   Psychiatric/Behavioral:  Negative for agitation, confusion, hallucinations, sleep disturbance and suicidal ideas. The patient is not nervous/anxious.               There were no vitals filed for this visit.     Wt Readings from Last 6 Encounters:   09/19/23 86.6 kg (191 lb)   09/19/23 86.6 kg  (191 lb)   09/12/23 88.2 kg (194 lb 6.4 oz)   09/12/23 88.2 kg (194 lb 6.4 oz)   09/05/23 87.1 kg (192 lb)   09/05/23 87.1 kg (192 lb)     There is no height or weight on file to calculate BMI.  There is no height or weight on file to calculate BSA.  Physical Exam  Vitals and nursing note reviewed.   Constitutional:       General: He is not in acute distress.     Appearance: Normal appearance.      Comments: Elderly male   HENT:      Head: Normocephalic and atraumatic.      Mouth/Throat:      Mouth: Mucous membranes are moist.   Eyes:      General: No scleral icterus.     Extraocular Movements: Extraocular movements intact.      Conjunctiva/sclera: Conjunctivae normal.   Neck:      Vascular: No JVD.   Cardiovascular:      Rate and Rhythm: Normal rate and regular rhythm.      Heart sounds: No murmur heard.  Pulmonary:      Effort: Pulmonary effort is normal.      Breath sounds: Normal breath sounds. No wheezing or rhonchi.   Abdominal:      General: Bowel sounds are normal. There is no distension.      Palpations: Abdomen is soft.      Tenderness: There is no abdominal tenderness.   Musculoskeletal:         General: No swelling or deformity.      Cervical back: Neck supple.   Lymphadenopathy:      Head:      Right side of head: No submandibular adenopathy.      Left side of head: No submandibular adenopathy.      Cervical: No cervical adenopathy.      Upper Body:      Right upper body: No supraclavicular or axillary adenopathy.      Left upper body: No supraclavicular or axillary adenopathy.      Lower Body: No right inguinal adenopathy. No left inguinal adenopathy.   Skin:     General: Skin is warm.      Coloration: Skin is not jaundiced.      Findings: No rash.   Neurological:      General: No focal deficit present.      Mental Status: He is alert and oriented to person, place, and time.      Cranial Nerves: Cranial nerves 2-12 are intact.   Psychiatric:         Attention and Perception: Attention normal.          Behavior: Behavior is cooperative.     Laboratory:  CBC with Differential:  Lab Results   Component Value Date    WBC 3.56 (L) 10/03/2023    RBC 3.39 (L) 10/03/2023    HGB 11.0 (L) 10/03/2023    HCT 33.6 (L) 10/03/2023    MCV 99.1 (H) 10/03/2023    MCH 32.4 (H) 10/03/2023    MCHC 32.7 (L) 10/03/2023    RDW 13.2 10/03/2023     10/03/2023    MPV 10.3 10/03/2023     Serum:  SPEP: Serum protein electrophoresis shows a distinct monoclonal peak (3.33 g/dL) in the beta zone, consistent with a monoclonal gammopathy.   PAO: A band is present in the IgA trini with a corresponding band in the kappa trini.   The immunofixation electrophoresis are consistent with monoclonal gammopathy of IgA-kappa isotype.     IgG Level 540.00 - 1,822.00 mg/dL 165.00 Low     IgA Level 101.0 - 645.0 mg/dL >7,040.0 High     IgM Level 22.0 - 240.0 mg/dL 6.0 Low       Kappa Free Light Chain 0.3300 - 1.94 mg/dL 5.09 High     Lambda Free Light Chain 0.5700 - 2.63 mg/dL 0.5600 Low     Kappa/Lambda FLC Ratio 0.2600 - 1.65 9.09 High      Urine:   24 hrs Urine:  M spike    1467      H    mg/24 h    CMP:  Sodium Level   Date Value Ref Range Status   09/18/2023 133 (L) 136 - 145 mmol/L Final     Potassium Level   Date Value Ref Range Status   09/18/2023 4.2 3.5 - 5.1 mmol/L Final     Carbon Dioxide   Date Value Ref Range Status   09/18/2023 26 23 - 31 mmol/L Final     Blood Urea Nitrogen   Date Value Ref Range Status   09/18/2023 13.5 8.4 - 25.7 mg/dL Final     Creatinine   Date Value Ref Range Status   09/18/2023 1.17 0.73 - 1.18 mg/dL Final     Calcium Level Total   Date Value Ref Range Status   09/18/2023 9.1 8.8 - 10.0 mg/dL Final     Albumin Level   Date Value Ref Range Status   09/18/2023 2.8 (L) 3.4 - 4.8 g/dL Final     Bilirubin Total   Date Value Ref Range Status   09/18/2023 1.1 <=1.5 mg/dL Final     Alkaline Phosphatase   Date Value Ref Range Status   09/18/2023 137 40 - 150 unit/L Final     Aspartate Aminotransferase   Date Value Ref Range  Status   09/18/2023 20 5 - 34 unit/L Final     Alanine Aminotransferase   Date Value Ref Range Status   09/18/2023 32 0 - 55 unit/L Final             Assessment:       No diagnosis found.      Chemotherapy   Patient doing well today.  No complaints.  Afebrile.  Taking Lenalidomide and Dexamethasone as directed.  Here for cycle 3, day 1 today.    Constipation  Currently taking Linzess as prescribed by PCP      Plan:   Labs stable.    MM labs reviewed and improving.  Regimen reviewed with patient in detail.   Continue with cycle 3 day 1 as scheduled today 10/3/23.    Return to clinic in two weeks with NP for follow up/lab/treatment.  C4/D1    ** begin oral iron for absent iron stores on bone marrow biopsy. Cycle 3 day 15 today, RTC 10/17/23, refer to transplant [facility] for telemed consult.     The patient is in agreement with today's plan of care.  All questions answered.  Patient is aware to contact our office for any problems or concerns prior to next visit.    Sandra Rose  Oncology/Hematology   [facility]

## 2023-10-04 ENCOUNTER — TELEPHONE (OUTPATIENT)
Dept: HEMATOLOGY/ONCOLOGY | Facility: CLINIC | Age: 73
End: 2023-10-04
Payer: MEDICARE

## 2023-10-04 DIAGNOSIS — C90.00 MULTIPLE MYELOMA NOT HAVING ACHIEVED REMISSION: Primary | ICD-10-CM

## 2023-10-04 RX ORDER — LENALIDOMIDE 10 MG/1
10 CAPSULE ORAL DAILY
Qty: 21 EACH | Refills: 0 | Status: SHIPPED | OUTPATIENT
Start: 2023-10-04 | End: 2023-10-30 | Stop reason: SDUPTHER

## 2023-10-04 NOTE — TELEPHONE ENCOUNTER
----- Message from Bhavani Sarmiento RN sent at 10/4/2023 11:15 AM CDT -----  Regarding: cONSULT tRANSPLANT  TRANSPLANT CONSULT FOR TELEHEALTH  IF POSSIBLE.

## 2023-10-05 ENCOUNTER — DOCUMENTATION ONLY (OUTPATIENT)
Dept: SURGERY | Facility: CLINIC | Age: 73
End: 2023-10-05
Payer: MEDICARE

## 2023-10-05 NOTE — NURSING
Benefits check complete, gathering records, waiting for response as to schedule virtually.  Oncology Navigation   Intake  Date of Diagnosis: 06/12/23  Cancer Type: Myeloma  Internal / External Referral: Internal  Date of Referral: 10/04/23  Date Worked: 10/04/23     Treatment        Medical Oncologist: Elsy Amaya MD       Procedures: Biopsy; Bone scan; Ultrasound; MRI; CT; X-Ray  Biopsy Schedule Date: 06/12/23  Bone Scan Schedule Date: 06/08/23  CT Schedule Date: 06/10/23  MRI Schedule Date: 06/09/23  Ultrasound Schedule Date: 06/09/23    General Referrals: [employer]; Dexter Niño          Support Systems: Family members     Acuity  Systemic Treatment - predicted or initiated: More than one treatment modality concurrently (chemotherapy, radiation, etc.) (+2)  Treatment Tolerability: 1   Needed: 0  Support: 0  Verbalizes Financial Concerns: 0  Transportation: 0  History of noncompliance/frequent no shows and cancellations: 0  Verbalizes the need for more education: 0  Navigation Acuity: 5     Follow Up  No follow-ups on file.

## 2023-10-17 ENCOUNTER — LAB VISIT (OUTPATIENT)
Dept: LAB | Facility: HOSPITAL | Age: 73
End: 2023-10-17
Attending: INTERNAL MEDICINE
Payer: MEDICARE

## 2023-10-17 DIAGNOSIS — C90.00 MULTIPLE MYELOMA NOT HAVING ACHIEVED REMISSION: ICD-10-CM

## 2023-10-17 LAB
ALBUMIN SERPL-MCNC: 3.3 G/DL (ref 3.4–4.8)
ALBUMIN/GLOB SERPL: 1.2 RATIO (ref 1.1–2)
ALP SERPL-CCNC: 138 UNIT/L (ref 40–150)
ALT SERPL-CCNC: 15 UNIT/L (ref 0–55)
AST SERPL-CCNC: 10 UNIT/L (ref 5–34)
BASOPHILS # BLD AUTO: 0.06 X10(3)/MCL
BASOPHILS NFR BLD AUTO: 1.2 %
BILIRUB SERPL-MCNC: 0.8 MG/DL
BUN SERPL-MCNC: 20 MG/DL (ref 8.4–25.7)
CALCIUM SERPL-MCNC: 9.1 MG/DL (ref 8.8–10)
CHLORIDE SERPL-SCNC: 104 MMOL/L (ref 98–107)
CO2 SERPL-SCNC: 29 MMOL/L (ref 23–31)
CREAT SERPL-MCNC: 1.1 MG/DL (ref 0.73–1.18)
EOSINOPHIL # BLD AUTO: 0.24 X10(3)/MCL (ref 0–0.9)
EOSINOPHIL NFR BLD AUTO: 4.7 %
ERYTHROCYTE [DISTWIDTH] IN BLOOD BY AUTOMATED COUNT: 13.7 % (ref 11.5–17)
GFR SERPLBLD CREATININE-BSD FMLA CKD-EPI: >60 MLS/MIN/1.73/M2
GLOBULIN SER-MCNC: 2.7 GM/DL (ref 2.4–3.5)
GLUCOSE SERPL-MCNC: 117 MG/DL (ref 82–115)
HCT VFR BLD AUTO: 37.8 % (ref 42–52)
HGB BLD-MCNC: 12.6 G/DL (ref 14–18)
IGA SERPL-MCNC: 700 MG/DL (ref 101–645)
IGG SERPL-MCNC: 345 MG/DL (ref 540–1822)
IGM SERPL-MCNC: 12 MG/DL (ref 22–240)
IMM GRANULOCYTES # BLD AUTO: 0.07 X10(3)/MCL (ref 0–0.04)
IMM GRANULOCYTES NFR BLD AUTO: 1.4 %
LYMPHOCYTES # BLD AUTO: 1.36 X10(3)/MCL (ref 0.6–4.6)
LYMPHOCYTES NFR BLD AUTO: 26.6 %
MCH RBC QN AUTO: 32.6 PG (ref 27–31)
MCHC RBC AUTO-ENTMCNC: 33.3 G/DL (ref 33–36)
MCV RBC AUTO: 97.9 FL (ref 80–94)
MONOCYTES # BLD AUTO: 0.43 X10(3)/MCL (ref 0.1–1.3)
MONOCYTES NFR BLD AUTO: 8.4 %
NEUTROPHILS # BLD AUTO: 2.95 X10(3)/MCL (ref 2.1–9.2)
NEUTROPHILS NFR BLD AUTO: 57.7 %
PLATELET # BLD AUTO: 194 X10(3)/MCL (ref 130–400)
PMV BLD AUTO: 10.2 FL (ref 7.4–10.4)
POTASSIUM SERPL-SCNC: 3.5 MMOL/L (ref 3.5–5.1)
PROT SERPL-MCNC: 6 GM/DL (ref 5.8–7.6)
RBC # BLD AUTO: 3.86 X10(6)/MCL (ref 4.7–6.1)
SODIUM SERPL-SCNC: 140 MMOL/L (ref 136–145)
WBC # SPEC AUTO: 5.11 X10(3)/MCL (ref 4.5–11.5)

## 2023-10-17 PROCEDURE — 86334 IMMUNOFIX E-PHORESIS SERUM: CPT

## 2023-10-17 PROCEDURE — 80053 COMPREHEN METABOLIC PANEL: CPT

## 2023-10-17 PROCEDURE — 85025 COMPLETE CBC W/AUTO DIFF WBC: CPT

## 2023-10-17 PROCEDURE — 84165 PROTEIN E-PHORESIS SERUM: CPT

## 2023-10-17 PROCEDURE — 82784 ASSAY IGA/IGD/IGG/IGM EACH: CPT

## 2023-10-17 PROCEDURE — 36415 COLL VENOUS BLD VENIPUNCTURE: CPT

## 2023-10-17 PROCEDURE — 83521 IG LIGHT CHAINS FREE EACH: CPT

## 2023-10-18 ENCOUNTER — OFFICE VISIT (OUTPATIENT)
Dept: HEMATOLOGY/ONCOLOGY | Facility: CLINIC | Age: 73
End: 2023-10-18
Payer: MEDICARE

## 2023-10-18 ENCOUNTER — INFUSION (OUTPATIENT)
Dept: INFUSION THERAPY | Facility: HOSPITAL | Age: 73
End: 2023-10-18
Attending: FAMILY MEDICINE
Payer: MEDICARE

## 2023-10-18 VITALS
SYSTOLIC BLOOD PRESSURE: 128 MMHG | OXYGEN SATURATION: 97 % | TEMPERATURE: 99 F | DIASTOLIC BLOOD PRESSURE: 72 MMHG | BODY MASS INDEX: 28.06 KG/M2 | HEART RATE: 54 BPM | HEIGHT: 70 IN | WEIGHT: 196 LBS | RESPIRATION RATE: 18 BRPM

## 2023-10-18 VITALS
SYSTOLIC BLOOD PRESSURE: 128 MMHG | DIASTOLIC BLOOD PRESSURE: 72 MMHG | HEART RATE: 54 BPM | RESPIRATION RATE: 18 BRPM | TEMPERATURE: 99 F | HEIGHT: 70 IN | OXYGEN SATURATION: 97 % | BODY MASS INDEX: 28.06 KG/M2 | WEIGHT: 196 LBS

## 2023-10-18 DIAGNOSIS — C90.00 MULTIPLE MYELOMA, REMISSION STATUS UNSPECIFIED: ICD-10-CM

## 2023-10-18 DIAGNOSIS — C90.00 MULTIPLE MYELOMA NOT HAVING ACHIEVED REMISSION: Primary | ICD-10-CM

## 2023-10-18 DIAGNOSIS — T45.1X5A IMMUNODEFICIENCY DUE TO CHEMOTHERAPY: ICD-10-CM

## 2023-10-18 DIAGNOSIS — D84.821 IMMUNODEFICIENCY DUE TO CHEMOTHERAPY: ICD-10-CM

## 2023-10-18 DIAGNOSIS — K59.00 CONSTIPATION, UNSPECIFIED CONSTIPATION TYPE: ICD-10-CM

## 2023-10-18 DIAGNOSIS — E83.52 HYPERCALCEMIA: ICD-10-CM

## 2023-10-18 DIAGNOSIS — R53.83 FATIGUE, UNSPECIFIED TYPE: ICD-10-CM

## 2023-10-18 DIAGNOSIS — R53.1 WEAKNESS: ICD-10-CM

## 2023-10-18 DIAGNOSIS — E88.09 HYPOALBUMINEMIA: ICD-10-CM

## 2023-10-18 DIAGNOSIS — D50.8 OTHER IRON DEFICIENCY ANEMIA: ICD-10-CM

## 2023-10-18 DIAGNOSIS — Z79.899 IMMUNODEFICIENCY DUE TO CHEMOTHERAPY: ICD-10-CM

## 2023-10-18 LAB
KAPPA LC FREE SER-MCNC: 1.21 MG/DL (ref 0.33–1.94)
KAPPA LC FREE/LAMBDA FREE SER: 2.09 {RATIO} (ref 0.26–1.65)
LAMBDA LC FREE SERPL-MCNC: 0.58 MG/DL (ref 0.57–2.63)

## 2023-10-18 PROCEDURE — 96401 CHEMO ANTI-NEOPL SQ/IM: CPT

## 2023-10-18 PROCEDURE — 99215 OFFICE O/P EST HI 40 MIN: CPT | Mod: S$PBB,,,

## 2023-10-18 PROCEDURE — 63600175 PHARM REV CODE 636 W HCPCS

## 2023-10-18 PROCEDURE — 99215 OFFICE O/P EST HI 40 MIN: CPT | Mod: PBBFAC

## 2023-10-18 PROCEDURE — 99999 PR PBB SHADOW E&M-EST. PATIENT-LVL V: CPT | Mod: PBBFAC,,,

## 2023-10-18 PROCEDURE — 99215 PR OFFICE/OUTPT VISIT, EST, LEVL V, 40-54 MIN: ICD-10-PCS | Mod: S$PBB,,,

## 2023-10-18 PROCEDURE — 25000003 PHARM REV CODE 250

## 2023-10-18 PROCEDURE — 99999 PR PBB SHADOW E&M-EST. PATIENT-LVL V: ICD-10-PCS | Mod: PBBFAC,,,

## 2023-10-18 RX ORDER — EPINEPHRINE 0.3 MG/.3ML
0.3 INJECTION SUBCUTANEOUS ONCE AS NEEDED
Status: CANCELLED | OUTPATIENT
Start: 2023-10-18

## 2023-10-18 RX ORDER — DIPHENHYDRAMINE HCL 25 MG
25 CAPSULE ORAL
Status: COMPLETED | OUTPATIENT
Start: 2023-10-18 | End: 2023-10-18

## 2023-10-18 RX ORDER — ACETAMINOPHEN 325 MG/1
650 TABLET ORAL
Status: COMPLETED | OUTPATIENT
Start: 2023-10-18 | End: 2023-10-18

## 2023-10-18 RX ORDER — DIPHENHYDRAMINE HYDROCHLORIDE 50 MG/ML
50 INJECTION INTRAMUSCULAR; INTRAVENOUS ONCE AS NEEDED
Status: CANCELLED | OUTPATIENT
Start: 2023-10-18

## 2023-10-18 RX ORDER — ACETAMINOPHEN 325 MG/1
650 TABLET ORAL
Status: CANCELLED | OUTPATIENT
Start: 2023-10-18

## 2023-10-18 RX ORDER — HEPARIN 100 UNIT/ML
500 SYRINGE INTRAVENOUS
Status: CANCELLED | OUTPATIENT
Start: 2023-10-18

## 2023-10-18 RX ORDER — DIPHENHYDRAMINE HCL 25 MG
25 CAPSULE ORAL
Status: CANCELLED | OUTPATIENT
Start: 2023-10-18

## 2023-10-18 RX ORDER — DEXAMETHASONE 4 MG/1
20 TABLET ORAL
Status: CANCELLED
Start: 2023-10-18

## 2023-10-18 RX ORDER — DEXAMETHASONE 4 MG/1
20 TABLET ORAL
Status: COMPLETED | OUTPATIENT
Start: 2023-10-18 | End: 2023-10-18

## 2023-10-18 RX ORDER — SODIUM CHLORIDE 0.9 % (FLUSH) 0.9 %
10 SYRINGE (ML) INJECTION
Status: CANCELLED | OUTPATIENT
Start: 2023-10-18

## 2023-10-18 RX ORDER — RANOLAZINE 500 MG/1
500 TABLET, EXTENDED RELEASE ORAL 2 TIMES DAILY
COMMUNITY
Start: 2023-10-12

## 2023-10-18 RX ADMIN — DARATUMUMAB AND HYALURONIDASE-FIHJ (HUMAN RECOMBINANT) 1800 MG: 1800; 30000 INJECTION SUBCUTANEOUS at 12:10

## 2023-10-18 RX ADMIN — DEXAMETHASONE 20 MG: 4 TABLET ORAL at 11:10

## 2023-10-18 RX ADMIN — DIPHENHYDRAMINE HYDROCHLORIDE 25 MG: 25 CAPSULE ORAL at 11:10

## 2023-10-18 RX ADMIN — ACETAMINOPHEN 650 MG: 325 TABLET ORAL at 11:10

## 2023-10-18 NOTE — PROGRESS NOTES
HEMATOLOGY/ONCOLOGY OFFICE CLINIC VISIT    Visit Information:    Initial Evaluation: 6/13/2023 (hospital consult)   Referring Provider: Dr Gonzalez  Other providers:  Code status: Not addressed    Diagnosis:  Multiple Myeloma IgA kappa    Present treatment:  Daratumumab, lenalidomide, and dexamethasone initiated 7/12/23    Treatment/Oncology history: 74 yo male admitted to the hospital in [place] 6/7/23 for weakness and hypercalcemia. She was diagnosed with multiple myeloma.     Plan of care:     Imaging:  Ct C 6/7/2023: 1. There is a small left sided pleural effusion.2. A small patchy area of ground glass density is seen in the lateral basal segment of the left lower lobe. This could reflect an acute focal infiltrate / pneumonitis. 3. There is diffuse osteopenia along the visualised bony structures together with multiple, numerous, small round-ovoid lucent lesion of varying sizes involving the marrow. These changes could reflect metabolic bone disease like hyperparathyroidism. Correlate clinically and with laboratory findings, as regards additional evaluation and follow-up.  6/8/23 Bone Scan Whole Body:  1. Scattered activity at the anterior left right rib margins as described.  A CT exam on the previous day reveals no definite fracture.  There is mild heterogeneous and bony loss at the anterior left and right ribs.  This is not have the typical pattern of a metastases.  2. Focal increased activity at the anterior manubrium which again on the CT exam demonstrates osteopenia and heterogeneous bony loss as well as scattered subcentimeter small lytic defects. 3. Suspect mild arthritic changes at the shoulders sternoclavicular joints  6/9/23 MRI Thoracic Spine: Within limitations, no convincing evidence of acute thoracic spine abnormality, high-grade canal stenosis, or focal cord pathology. Multiple scattered vertebral body lesions are suspected and could represent metastatic disease, otherwise of incomplete  characterization by non-contrast protocol. Incidental findings of the partially visualized thoracic cavity and retroperitoneum discussed above, poorly assessed by modality/protocol.  Recent non-contrast chest CT provides overall better visualization.  6/10/23 CT Head: No acute intracranial abnormality.  Findings consistent with microangiopathy no interval change.  6/12/23 XRAY Chest: 1. Patchy hazy opacities again evident at the lower lungs bilaterally suspicious for infiltrate and atelectasis.  Small bibasilar pleural reactions cannot be excluded. 2. Borderline cardiomegaly 3. Atherosclerosis  6/16/23 XRAY Chest: Improved aeration of the lung bases with mild residual bibasilar opacities and small pleural effusions suspected.      Pathology:  6/12/23 Bone marrow aspiration/Biopsy:   PLASMA CELL MYELOMA, KAPPA RESTRICTED.     PLASMA CELL MYELOMA INVOLVES 90% OF BONE MARROW CELLULARITY WITH SMALL AREAS OF  SEQUENTIAL TRILINEAGE HEMATOPOIESIS.    ABSENT IRON STORES.     PERIPHERAL BLOOD: NORMOCYTIC NORMOCHROMIC ANEMIA. THROMBOCYTOPENIA.     LABS:  6/8/23 M-spike 3.33, IgA >7040, serum kappa 5/ lambda 0.56/ K:L ratio 9.09, 24 hour urine for M protein done but not resulted. Calcium 13.4/Alb 1.9, Cr 2.3, globulin 10.7  7/12/23 M-spike 2.49. IgA >7040, Corrected calcium 12.3, Cr 2.09, Globulin 7.6  8/15/23 M-spike 1.49, IgA 4000  9/18/23 M-spike 1.0, IgA 1700    CLINICAL HISTORY:       Patient: Chip Goodson is a 73 y.o. male That was seen  for the first time as hospital consult on 6/13/2023.   Patient was brought to the emergency department for confusion.  Upon evaluation in the ER CT scan of the head with no acute intracranial abnormality.  Finding consistent with microangiopathic no interval change.  CT of the chest with no contrast showed diffuse osteopenia with multiple, numerous, small round avid lucent lesions ovarian size involving the marrow.  Changes could reflect metabolic bone disease like  hyperparathyroidism.  MRI of the thoracic spine with multiple scattered vertebral body lesion are suspect and could represent metastatic disease.  Bone scan with scattered activity in the anterior left right rib margins no fractures focal increased activity at the anterior manubrium scattered subcentimeter small lytic defects.  Ultrasound of the thyroid that shows multiple nodules. skeletal survey  area on humerus only.     Labs with elevated calcium of 12.6, corrected calcium 14.36.  Total protein was 11.8 with a total globulin 10.0.  Further workup revealed an IgA over 7040.0, IgM 6.0, IgG 165.00.  Free serum kappa light chain 5.09, free serum lambda light chains 0.5600 with a kappa/lambda ratio of 9.09.  M spike 3.33.  PSA 0.96     Bone marrow biopsy performed 6/12/23. Patient receive pamidronate 60 mg on 06/09/2023 and was started on hydration.  His calcium improved as well as his mental status.     Chief Complaint: Multiple Myeloma (Pt reports no new complaints)    Interval History:  He returns to clinic today for a two week follow-up and for treatment clearance for cycle 4 day 1 daratumumab, lenalidomide, and dexamethasone.  He reports feeling well today. Stable fatigue.  Having constipation, but currently taking Linzess as prescribed by PCP for this. He did begin iron BID as prescribed at last OV. He has an appt with the transplant team on Friday to discuss his eligibility. He denies any fever, chills, shortness of breath, chest pain, cough, abdominal pain, nausea, vomiting, diarrhea, peripheral neuropathy, edema.  Labs reviewed with patient in detail, weight stable.    Past Medical History:   Diagnosis Date    GERD (gastroesophageal reflux disease)     HTN (hypertension)     Hypothyroidism, unspecified     Type 2 diabetes mellitus without complications       Past Surgical History:   Procedure Laterality Date    APPENDECTOMY  1965    BONE MARROW BIOPSY Right 06/12/2023    Procedure: BIOPSY, BONE MARROW;   Surgeon: Silvano Austin MD;  Location: [facility] OR;  Service: General;  Laterality: Right;    removal of boils       Family History   Problem Relation Age of Onset    Lung cancer Mother     Breast cancer Sister      Social Connections: Unknown (6/9/2023)    Social Connection and Isolation Panel [NHANES]     Frequency of Communication with Friends and Family: Patient refused     Frequency of Social Gatherings with Friends and Family: Patient refused     Attends Rastafarian Services: Patient refused     Active Member of Clubs or Organizations: Patient refused     Attends Club or Organization Meetings: Patient refused     Marital Status: Patient refused       Review of patient's allergies indicates:   Allergen Reactions    Iodine Anaphylaxis    Shellfish containing products     Amoxicillin-pot clavulanate Itching      Current Outpatient Medications on File Prior to Visit   Medication Sig Dispense Refill    acyclovir (ZOVIRAX) 400 MG tablet Take 1 tablet (400 mg total) by mouth 2 (two) times daily. 60 tablet 11    albuterol (PROVENTIL/VENTOLIN HFA) 90 mcg/actuation inhaler 2 puffs every 4 to 6 hours as needed.      amLODIPine (NORVASC) 10 MG tablet Take 1 tablet (10 mg total) by mouth once daily. 30 each 11    atorvastatin (LIPITOR) 10 MG tablet Take 10 mg by mouth.      carvediloL (COREG) 12.5 MG tablet Take 12.5 mg by mouth 2 (two) times daily.      cloNIDine (CATAPRES) 0.2 MG tablet Take 0.2 mg by mouth daily as needed.      dexAMETHasone (DECADRON) 4 MG Tab Take 5 tablets (20 mg total) by mouth every 7 days. on days following daratumumab infusion. Take with food. 20 tablet 1    dexAMETHasone (DECADRON) 4 MG Tab Take 1 tablet (4 mg total) by mouth once daily. For 2 days 7/19/23, and 7/20/23 2 tablet 0    dexAMETHasone (DECADRON) 4 MG Tab Take 5 tabs (20 mg) orally on days following daratumumab infusion (Days 2 and 16) and take 10 tabs (40 mg) on Days 8 and 22 of each cycle. Take with food. 30 tablet 3    famotidine (PEPCID)  20 MG tablet Take 1 tablet (20 mg total) by mouth once daily. 30 tablet 11    finasteride (PROSCAR) 5 mg tablet Take 5 mg by mouth.      gabapentin (NEURONTIN) 100 MG capsule Take by mouth.      hydrALAZINE (APRESOLINE) 50 MG tablet Take 1 tablet (50 mg total) by mouth every 8 (eight) hours. 90 tablet 11    hydrocodone-chlorpheniramine (TUSSIONEX) 10-8 mg/5 mL suspension SMARTSI Milliliter(s) By Mouth Every 12 Hours      lenalidomide 10 mg Cap Take 10 mg by mouth once daily for 21 days followed by a 7 day break, every 28 days. 21 each 0    levoFLOXacin (LEVAQUIN) 500 MG tablet Take 1 tablet (500 mg total) by mouth once daily. 7 each 0    levothyroxine (SYNTHROID) 112 MCG tablet Take 112 mcg by mouth.      nitroGLYCERIN (NITROSTAT) 0.4 MG SL tablet place one tablet under the tongue every five minutes as needed for chest pain up to 3 doses. if no relief call 911      ondansetron (ZOFRAN) 8 MG tablet Take 1 tablet (8 mg total) by mouth every 8 (eight) hours as needed for Nausea. 30 tablet 2    ONETOUCH ULTRA TEST Strp USE TO TEST BLOOD GLUCOSE TWICE DAILY      pantoprazole (PROTONIX) 40 MG tablet Take 1 tablet (40 mg total) by mouth 2 (two) times daily before meals. 60 tablet 11    ranolazine (RANEXA) 500 MG Tb12 Take 500 mg by mouth 2 (two) times daily.      SITagliptin phosphate (JANUVIA) 50 MG Tab Take 1 tablet (50 mg total) by mouth once daily. 30 tablet 5    spironolactone (ALDACTONE) 25 MG tablet Take 12.5 mg by mouth.      tamsulosin (FLOMAX) 0.4 mg Cap Take 1 capsule by mouth.       Current Facility-Administered Medications on File Prior to Visit   Medication Dose Route Frequency Provider Last Rate Last Admin    alteplase injection 2 mg  2 mg Intra-Catheter PRN Elsy Amaya MD        daratumumab-hyaluronidase-fihj subcutaneous injection 1,800 mg  1,800 mg Subcutaneous 1 time in Clinic/HOD Elsy Amaya MD        diphenhydrAMINE injection 50 mg  50 mg Intravenous Once PRN Elsy Amaya,  MD        EPINEPHrine (EPIPEN) 0.3 mg/0.3 mL pen injection 0.3 mg  0.3 mg Intramuscular Once PRN Elsy Amaya MD        heparin, porcine (PF) 100 unit/mL injection flush 500 Units  500 Units Intravenous PRN Elsy Amaya MD        hydrocortisone sodium succinate injection 100 mg  100 mg Intravenous Once PRN Elsy Amaya MD        sodium chloride 0.9% 250 mL flush bag   Intravenous 1 time in Clinic/HOD Elsy Amaya MD        sodium chloride 0.9% flush 10 mL  10 mL Intravenous PRN Elsy Amaya MD          Review of Systems   Constitutional:  Negative for appetite change, chills, diaphoresis, fever and unexpected weight change.   HENT:  Negative for nasal congestion, mouth sores, nosebleeds, sinus pressure/congestion, sore throat and trouble swallowing.    Eyes: Negative.    Respiratory:  Negative for cough and shortness of breath.    Cardiovascular:  Negative for chest pain and palpitations.   Gastrointestinal:  Negative for abdominal distention, abdominal pain, blood in stool, change in bowel habit, constipation, diarrhea, nausea and vomiting.   Endocrine: Negative.    Genitourinary:  Negative for bladder incontinence, decreased urine volume, dysuria, frequency, hematuria and urgency. Difficulty urinating: had a ocampo cath.  Musculoskeletal:  Negative for arthralgias, back pain, gait problem, joint swelling, leg pain and myalgias.   Integumentary:  Negative for rash.   Allergic/Immunologic: Negative.    Neurological:  Negative for dizziness, tremors, syncope, weakness, light-headedness, numbness, headaches and memory loss.   Hematological:  Negative for adenopathy. Does not bruise/bleed easily.   Psychiatric/Behavioral:  Negative for agitation, confusion, hallucinations, sleep disturbance and suicidal ideas. The patient is not nervous/anxious.               Vitals:    10/18/23 1028   BP: 128/72   Pulse: (!) 54   Resp: 18   Temp: 98.7 °F (37.1 °C)   SpO2: 97%   Weight: 88.9 kg  "(196 lb)   Height: 5' 10" (1.778 m)        Wt Readings from Last 6 Encounters:   10/18/23 88.9 kg (196 lb)   10/18/23 88.9 kg (196 lb)   10/03/23 88.5 kg (195 lb)   09/19/23 86.6 kg (191 lb)   09/19/23 86.6 kg (191 lb)   09/12/23 88.2 kg (194 lb 6.4 oz)     Body mass index is 28.12 kg/m².  Body surface area is 2.1 meters squared.  Physical Exam  Vitals and nursing note reviewed.   Constitutional:       General: He is not in acute distress.     Appearance: Normal appearance.      Comments: Elderly male   HENT:      Head: Normocephalic and atraumatic.      Mouth/Throat:      Mouth: Mucous membranes are moist.   Eyes:      General: No scleral icterus.     Extraocular Movements: Extraocular movements intact.      Conjunctiva/sclera: Conjunctivae normal.   Neck:      Vascular: No JVD.   Cardiovascular:      Rate and Rhythm: Normal rate and regular rhythm.      Heart sounds: No murmur heard.  Pulmonary:      Effort: Pulmonary effort is normal.      Breath sounds: Normal breath sounds. No wheezing or rhonchi.   Abdominal:      General: Bowel sounds are normal. There is no distension.      Palpations: Abdomen is soft.      Tenderness: There is no abdominal tenderness.   Musculoskeletal:         General: No swelling or deformity.      Cervical back: Neck supple.   Lymphadenopathy:      Head:      Right side of head: No submandibular adenopathy.      Left side of head: No submandibular adenopathy.      Cervical: No cervical adenopathy.      Upper Body:      Right upper body: No supraclavicular or axillary adenopathy.      Left upper body: No supraclavicular or axillary adenopathy.      Lower Body: No right inguinal adenopathy. No left inguinal adenopathy.   Skin:     General: Skin is warm.      Coloration: Skin is not jaundiced.      Findings: No rash.   Neurological:      General: No focal deficit present.      Mental Status: He is alert and oriented to person, place, and time.      Cranial Nerves: Cranial nerves 2-12 are " intact.   Psychiatric:         Attention and Perception: Attention normal.         Behavior: Behavior is cooperative.       Laboratory:  CBC with Differential:  Lab Results   Component Value Date    WBC 5.11 10/17/2023    RBC 3.86 (L) 10/17/2023    HGB 12.6 (L) 10/17/2023    HCT 37.8 (L) 10/17/2023    MCV 97.9 (H) 10/17/2023    MCH 32.6 (H) 10/17/2023    MCHC 33.3 10/17/2023    RDW 13.7 10/17/2023     10/17/2023    MPV 10.2 10/17/2023     Serum:  SPEP: Serum protein electrophoresis shows a distinct monoclonal peak (3.33 g/dL) in the beta zone, consistent with a monoclonal gammopathy.   PAO: A band is present in the IgA trini with a corresponding band in the kappa trini.   The immunofixation electrophoresis are consistent with monoclonal gammopathy of IgA-kappa isotype.     IgG Level 540.00 - 1,822.00 mg/dL 165.00 Low     IgA Level 101.0 - 645.0 mg/dL >7,040.0 High     IgM Level 22.0 - 240.0 mg/dL 6.0 Low       Kappa Free Light Chain 0.3300 - 1.94 mg/dL 5.09 High     Lambda Free Light Chain 0.5700 - 2.63 mg/dL 0.5600 Low     Kappa/Lambda FLC Ratio 0.2600 - 1.65 9.09 High      Urine:   24 hrs Urine:  M spike    1467      H    mg/24 h    CMP:  Sodium Level   Date Value Ref Range Status   10/17/2023 140 136 - 145 mmol/L Final     Potassium Level   Date Value Ref Range Status   10/17/2023 3.5 3.5 - 5.1 mmol/L Final     Carbon Dioxide   Date Value Ref Range Status   10/17/2023 29 23 - 31 mmol/L Final     Blood Urea Nitrogen   Date Value Ref Range Status   10/17/2023 20.0 8.4 - 25.7 mg/dL Final     Creatinine   Date Value Ref Range Status   10/17/2023 1.10 0.73 - 1.18 mg/dL Final     Calcium Level Total   Date Value Ref Range Status   10/17/2023 9.1 8.8 - 10.0 mg/dL Final     Albumin Level   Date Value Ref Range Status   10/17/2023 3.3 (L) 3.4 - 4.8 g/dL Final     Bilirubin Total   Date Value Ref Range Status   10/17/2023 0.8 <=1.5 mg/dL Final     Alkaline Phosphatase   Date Value Ref Range Status   10/17/2023 138 40  - 150 unit/L Final     Aspartate Aminotransferase   Date Value Ref Range Status   10/17/2023 10 5 - 34 unit/L Final     Alanine Aminotransferase   Date Value Ref Range Status   10/17/2023 15 0 - 55 unit/L Final             Assessment:       No diagnosis found.      Chemotherapy   Patient doing well today.  No complaints.  Afebrile.  Taking Lenalidomide and Dexamethasone as directed.  Here for cycle 3, day 1 today.    Constipation  Currently taking Linzess as prescribed by PCP      Plan:   Labs stable.    MM labs reviewed and improving.  Regimen reviewed with patient in detail.   Continue with cycle 4 day 1 as scheduled today as scheduled.   Continue oral iron.   Follow-up with transplant as scheduled.   Return to clinic in two weeks with NP for follow up/lab/treatment.  C4/D15  PET ordered to be completed prior to C5. Will need MD appt after for scan review   Qmonth MM labs      The patient is in agreement with today's plan of care.  All questions answered.  Patient is aware to contact our office for any problems or concerns prior to next visit.    VICKY Pineda-C  Oncology/Hematology   [facility]

## 2023-10-19 LAB
ALBUMIN % SPEP (OHS): 52.45
ALBUMIN SERPL-MCNC: 2.9 G/DL (ref 3.4–4.8)
ALBUMIN/GLOB SERPL: 1.1 RATIO (ref 1.1–2)
ALPHA 1 GLOB (OHS): 0.23 GM/DL
ALPHA 1 GLOB% (OHS): 4.16
ALPHA 2 GLOB % (OHS): 13.06
ALPHA 2 GLOB (OHS): 0.72 GM/DL
BETA GLOB (OHS): 1.37 GM/DL
BETA GLOB% (OHS): 24.95
GAMMA GLOBULIN % (OHS): 5.38
GAMMA GLOBULIN (OHS): 0.3 GM/DL
GLOBULIN SER-MCNC: 2.6 GM/DL (ref 2.4–3.5)
M SPIKE % (OHS): ABNORMAL
M SPIKE (OHS): ABNORMAL
PATH REV: NORMAL
PROT SERPL-MCNC: 5.5 GM/DL (ref 5.8–7.6)

## 2023-10-20 ENCOUNTER — OFFICE VISIT (OUTPATIENT)
Dept: HEMATOLOGY/ONCOLOGY | Facility: CLINIC | Age: 73
End: 2023-10-20
Payer: MEDICARE

## 2023-10-20 DIAGNOSIS — K21.9 GASTROESOPHAGEAL REFLUX DISEASE, UNSPECIFIED WHETHER ESOPHAGITIS PRESENT: ICD-10-CM

## 2023-10-20 DIAGNOSIS — E11.9 TYPE 2 DIABETES MELLITUS WITHOUT COMPLICATION, WITHOUT LONG-TERM CURRENT USE OF INSULIN: ICD-10-CM

## 2023-10-20 DIAGNOSIS — C90.01 MULTIPLE MYELOMA IN REMISSION: Primary | ICD-10-CM

## 2023-10-20 DIAGNOSIS — Z76.82 STEM CELL TRANSPLANT CANDIDATE: ICD-10-CM

## 2023-10-20 DIAGNOSIS — E03.9 ACQUIRED HYPOTHYROIDISM: ICD-10-CM

## 2023-10-20 DIAGNOSIS — I10 PRIMARY HYPERTENSION: ICD-10-CM

## 2023-10-20 PROCEDURE — 99215 PR OFFICE/OUTPT VISIT, EST, LEVL V, 40-54 MIN: ICD-10-PCS | Mod: 95,,, | Performed by: INTERNAL MEDICINE

## 2023-10-20 PROCEDURE — 99215 OFFICE O/P EST HI 40 MIN: CPT | Mod: 95,,, | Performed by: INTERNAL MEDICINE

## 2023-10-20 NOTE — PROGRESS NOTES
HEMATOLOGY/ONCOLOGY CONSULTATION        The patient location is: home  The chief complaint leading to consultation is: multiple myeloma, stem cell transplant candidate    Visit type: audiovisual    Face to Face time with patient: 30 minutes  45 minutes of total time spent on the encounter, which includes face to face time and non-face to face time preparing to see the patient (eg, review of tests), Obtaining and/or reviewing separately obtained history, Documenting clinical information in the electronic or other health record, Independently interpreting results (not separately reported) and communicating results to the patient/family/caregiver, or Care coordination (not separately reported).         Each patient to whom he or she provides medical services by telemedicine is:  (1) informed of the relationship between the physician and patient and the respective role of any other health care provider with respect to management of the patient; and (2) notified that he or she may decline to receive medical services by telemedicine and may withdraw from such care at any time.    Notes:      Multiple Myeloma IgA kappa    Present treatment:  Daratumumab, lenalidomide, and dexamethasone initiated 7/12/23      Oncology History ( copied from EMR)    Imaging:  CT C 6/7/2023: 1. There is a small left sided pleural effusion.2. A small patchy area of ground glass density is seen in the lateral basal segment of the left lower lobe. This could reflect an acute focal infiltrate / pneumonitis. 3. There is diffuse osteopenia along the visualised bony structures together with multiple, numerous, small round-ovoid lucent lesion of varying sizes involving the marrow. These changes could reflect metabolic bone disease like hyperparathyroidism. Correlate clinically and with laboratory findings, as regards additional evaluation and follow-up.  6/8/23 Bone Scan Whole Body:  1. Scattered activity at the anterior left right rib margins as  described.  A CT exam on the previous day reveals no definite fracture.  There is mild heterogeneous and bony loss at the anterior left and right ribs.  This is not have the typical pattern of a metastases.  2. Focal increased activity at the anterior manubrium which again on the CT exam demonstrates osteopenia and heterogeneous bony loss as well as scattered subcentimeter small lytic defects. 3. Suspect mild arthritic changes at the shoulders sternoclavicular joints  6/9/23 MRI Thoracic Spine: Within limitations, no convincing evidence of acute thoracic spine abnormality, high-grade canal stenosis, or focal cord pathology. Multiple scattered vertebral body lesions are suspected and could represent metastatic disease, otherwise of incomplete characterization by non-contrast protocol. Incidental findings of the partially visualized thoracic cavity and retroperitoneum discussed above, poorly assessed by modality/protocol.  Recent non-contrast chest CT provides overall better visualization.  6/10/23 CT Head: No acute intracranial abnormality.  Findings consistent with microangiopathy no interval change.  6/12/23 XRAY Chest: 1. Patchy hazy opacities again evident at the lower lungs bilaterally suspicious for infiltrate and atelectasis.  Small bibasilar pleural reactions cannot be excluded. 2. Borderline cardiomegaly 3. Atherosclerosis  6/16/23 XRAY Chest: Improved aeration of the lung bases with mild residual bibasilar opacities and small pleural effusions suspected.      Pathology:  6/12/23 Bone marrow aspiration/Biopsy:   PLASMA CELL MYELOMA, KAPPA RESTRICTED.     PLASMA CELL MYELOMA INVOLVES 90% OF BONE MARROW CELLULARITY WITH SMALL AREAS OF  SEQUENTIAL TRILINEAGE HEMATOPOIESIS.    ABSENT IRON STORES.     PERIPHERAL BLOOD: NORMOCYTIC NORMOCHROMIC ANEMIA. THROMBOCYTOPENIA.     LABS:  6/8/23 M-spike 3.33, IgA >7040, serum kappa 5/ lambda 0.56/ K:L ratio 9.09, 24 hour urine for M protein done but not resulted. Calcium  13.4/Alb 1.9, Cr 2.3, globulin 10.7  7/12/23 M-spike 2.49. IgA >7040, Corrected calcium 12.3, Cr 2.09, Globulin 7.6  8/15/23 M-spike 1.49, IgA 4000  9/18/23 M-spike 1.0, IgA 1700    , 73, is here for virtual visit for stem cell transplant consultation. He has HTn, GERD, type 2 DM not on long term insulin.   He has IgA kappa multiple myeloma, diagnosed in June 2023.    He has been treated with DRd from 7/18/23.   He is currently in cycle 4. He has tolerated the treatments well.      Past Medical History:   Diagnosis Date    GERD (gastroesophageal reflux disease)     HTN (hypertension)     Hypothyroidism, unspecified     Type 2 diabetes mellitus without complications       Past Surgical History:   Procedure Laterality Date    APPENDECTOMY  1965    BONE MARROW BIOPSY Right 06/12/2023    Procedure: BIOPSY, BONE MARROW;  Surgeon: Silvano Austin MD;  Location: [facility];  Service: General;  Laterality: Right;    removal of boils       Family History   Problem Relation Age of Onset    Lung cancer Mother     Breast cancer Sister      Social Connections: Unknown (6/9/2023)    Social Connection and Isolation Panel [NHANES]     Frequency of Communication with Friends and Family: Patient refused     Frequency of Social Gatherings with Friends and Family: Patient refused     Attends [employer] Services: Patient refused     Active Member of Clubs or Organizations: Patient refused     Attends Club or Organization Meetings: Patient refused     Marital Status: Patient refused       Review of patient's allergies indicates:   Allergen Reactions    Iodine Anaphylaxis    Shellfish containing products     Amoxicillin-pot clavulanate Itching      Current Outpatient Medications on File Prior to Visit   Medication Sig Dispense Refill    acyclovir (ZOVIRAX) 400 MG tablet Take 1 tablet (400 mg total) by mouth 2 (two) times daily. 60 tablet 11    albuterol (PROVENTIL/VENTOLIN HFA) 90 mcg/actuation inhaler 2 puffs  every 4 to 6 hours as needed.      amLODIPine (NORVASC) 10 MG tablet Take 1 tablet (10 mg total) by mouth once daily. 30 each 11    atorvastatin (LIPITOR) 10 MG tablet Take 10 mg by mouth.      carvediloL (COREG) 12.5 MG tablet Take 12.5 mg by mouth 2 (two) times daily.      cloNIDine (CATAPRES) 0.2 MG tablet Take 0.2 mg by mouth daily as needed.      dexAMETHasone (DECADRON) 4 MG Tab Take 5 tablets (20 mg total) by mouth every 7 days. on days following daratumumab infusion. Take with food. 20 tablet 1    dexAMETHasone (DECADRON) 4 MG Tab Take 1 tablet (4 mg total) by mouth once daily. For 2 days 23, and 23 2 tablet 0    dexAMETHasone (DECADRON) 4 MG Tab Take 5 tabs (20 mg) orally on days following daratumumab infusion (Days 2 and 16) and take 10 tabs (40 mg) on Days 8 and 22 of each cycle. Take with food. 30 tablet 3    famotidine (PEPCID) 20 MG tablet Take 1 tablet (20 mg total) by mouth once daily. 30 tablet 11    finasteride (PROSCAR) 5 mg tablet Take 5 mg by mouth.      gabapentin (NEURONTIN) 100 MG capsule Take by mouth.      hydrALAZINE (APRESOLINE) 50 MG tablet Take 1 tablet (50 mg total) by mouth every 8 (eight) hours. 90 tablet 11    hydrocodone-chlorpheniramine (TUSSIONEX) 10-8 mg/5 mL suspension SMARTSI Milliliter(s) By Mouth Every 12 Hours      lenalidomide 10 mg Cap Take 10 mg by mouth once daily for 21 days followed by a 7 day break, every 28 days. 21 each 0    levoFLOXacin (LEVAQUIN) 500 MG tablet Take 1 tablet (500 mg total) by mouth once daily. 7 each 0    levothyroxine (SYNTHROID) 112 MCG tablet Take 112 mcg by mouth.      nitroGLYCERIN (NITROSTAT) 0.4 MG SL tablet place one tablet under the tongue every five minutes as needed for chest pain up to 3 doses. if no relief call 911      ondansetron (ZOFRAN) 8 MG tablet Take 1 tablet (8 mg total) by mouth every 8 (eight) hours as needed for Nausea. 30 tablet 2    ONETOUCH ULTRA TEST Strp USE TO TEST BLOOD GLUCOSE TWICE  DAILY      pantoprazole (PROTONIX) 40 MG tablet Take 1 tablet (40 mg total) by mouth 2 (two) times daily before meals. 60 tablet 11    ranolazine (RANEXA) 500 MG Tb12 Take 500 mg by mouth 2 (two) times daily.      SITagliptin phosphate (JANUVIA) 50 MG Tab Take 1 tablet (50 mg total) by mouth once daily. 30 tablet 5    spironolactone (ALDACTONE) 25 MG tablet Take 12.5 mg by mouth.      tamsulosin (FLOMAX) 0.4 mg Cap Take 1 capsule by mouth.       Current Facility-Administered Medications on File Prior to Visit   Medication Dose Route Frequency Provider Last Rate Last Admin    alteplase injection 2 mg  2 mg Intra-Catheter PRN Elsy Amaya MD        daratumumab-hyaluronidase-fihj subcutaneous injection 1,800 mg  1,800 mg Subcutaneous 1 time in Clinic/HOD Elsy Amaya MD        diphenhydrAMINE injection 50 mg  50 mg Intravenous Once PRN Elsy Amaya MD        EPINEPHrine (EPIPEN) 0.3 mg/0.3 mL pen injection 0.3 mg  0.3 mg Intramuscular Once PRN Elsy Amaya MD        heparin, porcine (PF) 100 unit/mL injection flush 500 Units  500 Units Intravenous PRN Elsy Amaya MD        hydrocortisone sodium succinate injection 100 mg  100 mg Intravenous Once PRN Elsy Amaya MD        sodium chloride 0.9% 250 mL flush bag   Intravenous 1 time in Clinic/HOD Elsy Amaya MD        sodium chloride 0.9% flush 10 mL  10 mL Intravenous PRN Elsy Amaya MD          Review of Systems   Constitutional:  Negative for appetite change, chills, diaphoresis, fever and unexpected weight change.   HENT:  Negative for nasal congestion, mouth sores, nosebleeds, sinus pressure/congestion, sore throat and trouble swallowing.    Eyes: Negative.    Respiratory:  Negative for cough and shortness of breath.    Cardiovascular:  Negative for chest pain and palpitations.   Gastrointestinal:  Negative for abdominal distention, abdominal pain, blood in stool, change in bowel habit,  constipation, diarrhea, nausea and vomiting.   Endocrine: Negative.    Genitourinary:  Negative for bladder incontinence, decreased urine volume, dysuria, frequency, hematuria and urgency. Difficulty urinating: had a ocampo cath.  Musculoskeletal:  Negative for arthralgias, back pain, gait problem, joint swelling, leg pain and myalgias.   Integumentary:  Negative for rash.   Allergic/Immunologic: Negative.    Neurological:  Negative for dizziness, tremors, syncope, weakness, light-headedness, numbness, headaches and memory loss.   Hematological:  Negative for adenopathy. Does not bruise/bleed easily.   Psychiatric/Behavioral:  Negative for agitation, confusion, hallucinations, sleep disturbance and suicidal ideas. The patient is not nervous/anxious.          There were no vitals filed for this visit.      Physical exam deferred due to nature of visit today     Laboratory:  CBC with Differential:  Lab Results   Component Value Date    WBC 5.11 10/17/2023    RBC 3.86 (L) 10/17/2023    HGB 12.6 (L) 10/17/2023    HCT 37.8 (L) 10/17/2023    MCV 97.9 (H) 10/17/2023    MCH 32.6 (H) 10/17/2023    MCHC 33.3 10/17/2023    RDW 13.7 10/17/2023     10/17/2023    MPV 10.2 10/17/2023     Serum:  SPEP: Serum protein electrophoresis shows a distinct monoclonal peak (3.33 g/dL) in the beta zone, consistent with a monoclonal gammopathy.   PAO: A band is present in the IgA trini with a corresponding band in the kappa trini.   The immunofixation electrophoresis are consistent with monoclonal gammopathy of IgA-kappa isotype.     IgG Level 540.00 - 1,822.00 mg/dL 165.00 Low     IgA Level 101.0 - 645.0 mg/dL >7,040.0 High     IgM Level 22.0 - 240.0 mg/dL 6.0 Low       Kappa Free Light Chain 0.3300 - 1.94 mg/dL 5.09 High     Lambda Free Light Chain 0.5700 - 2.63 mg/dL 0.5600 Low     Kappa/Lambda FLC Ratio 0.2600 - 1.65 9.09 High      Urine:   24 hrs Urine:  M spike    1467      H    mg/24 h    CMP:  Sodium Level   Date Value Ref Range  Status   10/17/2023 140 136 - 145 mmol/L Final     Potassium Level   Date Value Ref Range Status   10/17/2023 3.5 3.5 - 5.1 mmol/L Final     Carbon Dioxide   Date Value Ref Range Status   10/17/2023 29 23 - 31 mmol/L Final     Blood Urea Nitrogen   Date Value Ref Range Status   10/17/2023 20.0 8.4 - 25.7 mg/dL Final     Creatinine   Date Value Ref Range Status   10/17/2023 1.10 0.73 - 1.18 mg/dL Final     Calcium Level Total   Date Value Ref Range Status   10/17/2023 9.1 8.8 - 10.0 mg/dL Final     Albumin Level   Date Value Ref Range Status   10/17/2023 3.3 (L) 3.4 - 4.8 g/dL Final   10/17/2023 2.9 (L) 3.4 - 4.8 g/dL Final     Bilirubin Total   Date Value Ref Range Status   10/17/2023 0.8 <=1.5 mg/dL Final     Alkaline Phosphatase   Date Value Ref Range Status   10/17/2023 138 40 - 150 unit/L Final     Aspartate Aminotransferase   Date Value Ref Range Status   10/17/2023 10 5 - 34 unit/L Final     Alanine Aminotransferase   Date Value Ref Range Status   10/17/2023 15 0 - 55 unit/L Final           Assessment    1. IgA kappa multuiple myeloma in remission  2. Stem cell transplant candidate  3. Type 2 DM not on long term insulin without complications  4. HTn  5. GERD  6. BPH  7. Hypothyroidism    Plan:    1,2: Stage at diagnosis not clear. FISH, LDH, serum beta2 microglobulin at diagnosis not available. He had ~90% plasma cells in his bone marrow at diagnosis. He had hypercalcemia. He had M spike of 3.33g/dl on SPEP in June 2023. He has responded well to DRd. M spike has decreased significantly.    Role, timing, risk and benefits of autologous peripheral blood stem cell transplant in multiple myeloma discussed in detail.  I explained that it is NOT CURATIVE, but has significant progression free survival, and overall survival, especially when it is followed by maintenance chemotherapy.  However, most of the benefit of high dose chemotherapy and stem cell transplant is in younger ( < 65) patients.   He is very fit, had  limited co-morbidities, and has good support.  He will be considered for reduced intensity conditioning and autologous stem cell transplant.  He will stop daratumumab after cycle 4, and continue lenaldiomide and decadron.    Discussed with , his oncologist in [place].       3. He follows with his primary care physician.    4. He follows with his primary care physician. On hydralazine, tamsulosin, spironolactione    5. Denies heart burn. He follows with his primary care physician.    6. On tamsulosin, follows with his primary care physician    7. He is on synthroid, follows with his primary care physician.        BMT Chart Routing      Follow up with physician . Per transplant coordintaor   Follow up with [facility]    Provider visit type    Infusion scheduling note    Injection scheduling note    Labs    Imaging    Pharmacy appointment    Other referrals

## 2023-10-23 ENCOUNTER — TELEPHONE (OUTPATIENT)
Dept: HEMATOLOGY/ONCOLOGY | Facility: CLINIC | Age: 73
End: 2023-10-23
Payer: MEDICARE

## 2023-10-23 NOTE — TELEPHONE ENCOUNTER
----- Message from Mary Jo Bhakta sent at 10/23/2023  9:58 AM CDT -----  Regarding: RE: PET  PET 11/09/23 @ [facility]  Pt confirmed appt  ----- Message -----  From: Abbie Ulrich MA  Sent: 10/18/2023  11:05 AM CDT  To: Mary Jo Bhakta  Subject: PET                                              Will order PET to be completed prior to C5.  RTC in four weeks with MD for scan review/lab/treat

## 2023-10-30 DIAGNOSIS — C90.00 MULTIPLE MYELOMA NOT HAVING ACHIEVED REMISSION: ICD-10-CM

## 2023-10-30 RX ORDER — LENALIDOMIDE 10 MG/1
10 CAPSULE ORAL DAILY
Qty: 21 EACH | Refills: 0 | Status: SHIPPED | OUTPATIENT
Start: 2023-10-30 | End: 2023-11-24 | Stop reason: SDUPTHER

## 2023-10-31 ENCOUNTER — LAB VISIT (OUTPATIENT)
Dept: LAB | Facility: HOSPITAL | Age: 73
End: 2023-10-31
Payer: MEDICARE

## 2023-10-31 DIAGNOSIS — C90.00 MULTIPLE MYELOMA NOT HAVING ACHIEVED REMISSION: ICD-10-CM

## 2023-10-31 DIAGNOSIS — C90.00 MULTIPLE MYELOMA, REMISSION STATUS UNSPECIFIED: ICD-10-CM

## 2023-10-31 LAB
ALBUMIN SERPL-MCNC: 3.2 G/DL (ref 3.4–4.8)
ALBUMIN/GLOB SERPL: 1.3 RATIO (ref 1.1–2)
ALP SERPL-CCNC: 131 UNIT/L (ref 40–150)
ALT SERPL-CCNC: 17 UNIT/L (ref 0–55)
AST SERPL-CCNC: 11 UNIT/L (ref 5–34)
BASOPHILS # BLD AUTO: 0.05 X10(3)/MCL
BASOPHILS NFR BLD AUTO: 0.9 %
BILIRUB SERPL-MCNC: 0.8 MG/DL
BUN SERPL-MCNC: 17 MG/DL (ref 8.4–25.7)
CALCIUM SERPL-MCNC: 9 MG/DL (ref 8.8–10)
CHLORIDE SERPL-SCNC: 104 MMOL/L (ref 98–107)
CO2 SERPL-SCNC: 28 MMOL/L (ref 23–31)
CREAT SERPL-MCNC: 1.1 MG/DL (ref 0.73–1.18)
EOSINOPHIL # BLD AUTO: 0.56 X10(3)/MCL (ref 0–0.9)
EOSINOPHIL NFR BLD AUTO: 10.1 %
ERYTHROCYTE [DISTWIDTH] IN BLOOD BY AUTOMATED COUNT: 13.9 % (ref 11.5–17)
GFR SERPLBLD CREATININE-BSD FMLA CKD-EPI: >60 MLS/MIN/1.73/M2
GLOBULIN SER-MCNC: 2.4 GM/DL (ref 2.4–3.5)
GLUCOSE SERPL-MCNC: 126 MG/DL (ref 82–115)
HCT VFR BLD AUTO: 36.5 % (ref 42–52)
HGB BLD-MCNC: 12.3 G/DL (ref 14–18)
IMM GRANULOCYTES # BLD AUTO: 0.01 X10(3)/MCL (ref 0–0.04)
IMM GRANULOCYTES NFR BLD AUTO: 0.2 %
LYMPHOCYTES # BLD AUTO: 1.72 X10(3)/MCL (ref 0.6–4.6)
LYMPHOCYTES NFR BLD AUTO: 31 %
MAGNESIUM SERPL-MCNC: 2 MG/DL (ref 1.6–2.6)
MCH RBC QN AUTO: 32.9 PG (ref 27–31)
MCHC RBC AUTO-ENTMCNC: 33.7 G/DL (ref 33–36)
MCV RBC AUTO: 97.6 FL (ref 80–94)
MONOCYTES # BLD AUTO: 0.8 X10(3)/MCL (ref 0.1–1.3)
MONOCYTES NFR BLD AUTO: 14.4 %
NEUTROPHILS # BLD AUTO: 2.41 X10(3)/MCL (ref 2.1–9.2)
NEUTROPHILS NFR BLD AUTO: 43.4 %
PHOSPHATE SERPL-MCNC: 3.1 MG/DL (ref 2.3–4.7)
PLATELET # BLD AUTO: 149 X10(3)/MCL (ref 130–400)
PMV BLD AUTO: 9.7 FL (ref 7.4–10.4)
POTASSIUM SERPL-SCNC: 3.6 MMOL/L (ref 3.5–5.1)
PROT SERPL-MCNC: 5.6 GM/DL (ref 5.8–7.6)
RBC # BLD AUTO: 3.74 X10(6)/MCL (ref 4.7–6.1)
SODIUM SERPL-SCNC: 143 MMOL/L (ref 136–145)
WBC # SPEC AUTO: 5.55 X10(3)/MCL (ref 4.5–11.5)

## 2023-10-31 PROCEDURE — 85025 COMPLETE CBC W/AUTO DIFF WBC: CPT

## 2023-10-31 PROCEDURE — 84100 ASSAY OF PHOSPHORUS: CPT

## 2023-10-31 PROCEDURE — 36415 COLL VENOUS BLD VENIPUNCTURE: CPT

## 2023-10-31 PROCEDURE — 83735 ASSAY OF MAGNESIUM: CPT

## 2023-10-31 PROCEDURE — 80053 COMPREHEN METABOLIC PANEL: CPT

## 2023-11-01 ENCOUNTER — OFFICE VISIT (OUTPATIENT)
Dept: HEMATOLOGY/ONCOLOGY | Facility: CLINIC | Age: 73
End: 2023-11-01
Payer: MEDICARE

## 2023-11-01 ENCOUNTER — INFUSION (OUTPATIENT)
Dept: INFUSION THERAPY | Facility: HOSPITAL | Age: 73
End: 2023-11-01
Attending: FAMILY MEDICINE
Payer: MEDICARE

## 2023-11-01 VITALS
WEIGHT: 200 LBS | TEMPERATURE: 99 F | BODY MASS INDEX: 28.63 KG/M2 | OXYGEN SATURATION: 99 % | WEIGHT: 200 LBS | SYSTOLIC BLOOD PRESSURE: 134 MMHG | HEIGHT: 70 IN | SYSTOLIC BLOOD PRESSURE: 134 MMHG | HEIGHT: 70 IN | BODY MASS INDEX: 28.63 KG/M2 | RESPIRATION RATE: 20 BRPM | DIASTOLIC BLOOD PRESSURE: 73 MMHG | RESPIRATION RATE: 20 BRPM | OXYGEN SATURATION: 99 % | HEART RATE: 59 BPM | DIASTOLIC BLOOD PRESSURE: 73 MMHG | HEART RATE: 59 BPM

## 2023-11-01 DIAGNOSIS — T45.1X5A IMMUNODEFICIENCY DUE TO CHEMOTHERAPY: ICD-10-CM

## 2023-11-01 DIAGNOSIS — Z79.899 IMMUNODEFICIENCY DUE TO CHEMOTHERAPY: ICD-10-CM

## 2023-11-01 DIAGNOSIS — C90.00 MULTIPLE MYELOMA NOT HAVING ACHIEVED REMISSION: Primary | ICD-10-CM

## 2023-11-01 DIAGNOSIS — Z76.82 STEM CELL TRANSPLANT CANDIDATE: ICD-10-CM

## 2023-11-01 DIAGNOSIS — D50.8 OTHER IRON DEFICIENCY ANEMIA: ICD-10-CM

## 2023-11-01 DIAGNOSIS — D84.821 IMMUNODEFICIENCY DUE TO CHEMOTHERAPY: ICD-10-CM

## 2023-11-01 DIAGNOSIS — C90.00 MULTIPLE MYELOMA, REMISSION STATUS UNSPECIFIED: Primary | ICD-10-CM

## 2023-11-01 PROCEDURE — 96401 CHEMO ANTI-NEOPL SQ/IM: CPT

## 2023-11-01 PROCEDURE — 99215 PR OFFICE/OUTPT VISIT, EST, LEVL V, 40-54 MIN: ICD-10-PCS | Mod: S$PBB,,,

## 2023-11-01 PROCEDURE — 99215 OFFICE O/P EST HI 40 MIN: CPT | Mod: PBBFAC

## 2023-11-01 PROCEDURE — 99999 PR PBB SHADOW E&M-EST. PATIENT-LVL V: CPT | Mod: PBBFAC,,,

## 2023-11-01 PROCEDURE — 99999 PR PBB SHADOW E&M-EST. PATIENT-LVL V: ICD-10-PCS | Mod: PBBFAC,,,

## 2023-11-01 PROCEDURE — 99215 OFFICE O/P EST HI 40 MIN: CPT | Mod: S$PBB,,,

## 2023-11-01 PROCEDURE — 63600175 PHARM REV CODE 636 W HCPCS

## 2023-11-01 PROCEDURE — 25000003 PHARM REV CODE 250

## 2023-11-01 RX ORDER — DIPHENHYDRAMINE HYDROCHLORIDE 50 MG/ML
50 INJECTION INTRAMUSCULAR; INTRAVENOUS ONCE AS NEEDED
Status: CANCELLED | OUTPATIENT
Start: 2023-11-01

## 2023-11-01 RX ORDER — OXYBUTYNIN CHLORIDE 10 MG/1
TABLET, EXTENDED RELEASE ORAL
COMMUNITY
Start: 2023-07-03 | End: 2024-02-06

## 2023-11-01 RX ORDER — ACETAMINOPHEN 325 MG/1
650 TABLET ORAL
Status: CANCELLED | OUTPATIENT
Start: 2023-11-01

## 2023-11-01 RX ORDER — SODIUM CHLORIDE 0.9 % (FLUSH) 0.9 %
10 SYRINGE (ML) INJECTION
Status: CANCELLED | OUTPATIENT
Start: 2023-11-01

## 2023-11-01 RX ORDER — DEXAMETHASONE 4 MG/1
20 TABLET ORAL
Status: COMPLETED | OUTPATIENT
Start: 2023-11-01 | End: 2023-11-01

## 2023-11-01 RX ORDER — DIPHENHYDRAMINE HCL 25 MG
25 CAPSULE ORAL
Status: CANCELLED | OUTPATIENT
Start: 2023-11-01

## 2023-11-01 RX ORDER — EPINEPHRINE 0.3 MG/.3ML
0.3 INJECTION SUBCUTANEOUS ONCE AS NEEDED
Status: CANCELLED | OUTPATIENT
Start: 2023-11-01

## 2023-11-01 RX ORDER — DEXAMETHASONE 4 MG/1
20 TABLET ORAL
Status: CANCELLED
Start: 2023-11-01

## 2023-11-01 RX ORDER — ACETAMINOPHEN 325 MG/1
650 TABLET ORAL
Status: COMPLETED | OUTPATIENT
Start: 2023-11-01 | End: 2023-11-01

## 2023-11-01 RX ORDER — ALPRAZOLAM 0.5 MG/1
0.5 TABLET ORAL DAILY PRN
COMMUNITY
Start: 2023-10-19

## 2023-11-01 RX ORDER — HEPARIN 100 UNIT/ML
500 SYRINGE INTRAVENOUS
Status: CANCELLED | OUTPATIENT
Start: 2023-11-01

## 2023-11-01 RX ORDER — DIPHENHYDRAMINE HCL 25 MG
25 CAPSULE ORAL
Status: COMPLETED | OUTPATIENT
Start: 2023-11-01 | End: 2023-11-01

## 2023-11-01 RX ADMIN — DIPHENHYDRAMINE HYDROCHLORIDE 25 MG: 25 CAPSULE ORAL at 01:11

## 2023-11-01 RX ADMIN — DARATUMUMAB AND HYALURONIDASE-FIHJ (HUMAN RECOMBINANT) 1800 MG: 1800; 30000 INJECTION SUBCUTANEOUS at 02:11

## 2023-11-01 RX ADMIN — ACETAMINOPHEN 650 MG: 325 TABLET ORAL at 01:11

## 2023-11-01 RX ADMIN — DEXAMETHASONE 20 MG: 4 TABLET ORAL at 01:11

## 2023-11-01 NOTE — PROGRESS NOTES
HEMATOLOGY/ONCOLOGY OFFICE CLINIC VISIT    Visit Information:    Initial Evaluation: 6/13/2023 (hospital consult)   Referring Provider: Dr Gonzalez  Other providers:  Code status: Not addressed    Diagnosis:  Multiple Myeloma IgA kappa    Present treatment:  Daratumumab, lenalidomide, and dexamethasone initiated 7/12/23    Treatment/Oncology history: 72 yo male admitted to the hospital in [place] 6/7/23 for weakness and hypercalcemia. She was diagnosed with multiple myeloma.     Plan of care:     Imaging:  Ct C 6/7/2023: 1. There is a small left sided pleural effusion.2. A small patchy area of ground glass density is seen in the lateral basal segment of the left lower lobe. This could reflect an acute focal infiltrate / pneumonitis. 3. There is diffuse osteopenia along the visualised bony structures together with multiple, numerous, small round-ovoid lucent lesion of varying sizes involving the marrow. These changes could reflect metabolic bone disease like hyperparathyroidism. Correlate clinically and with laboratory findings, as regards additional evaluation and follow-up.  6/8/23 Bone Scan Whole Body:  1. Scattered activity at the anterior left right rib margins as described.  A CT exam on the previous day reveals no definite fracture.  There is mild heterogeneous and bony loss at the anterior left and right ribs.  This is not have the typical pattern of a metastases.  2. Focal increased activity at the anterior manubrium which again on the CT exam demonstrates osteopenia and heterogeneous bony loss as well as scattered subcentimeter small lytic defects. 3. Suspect mild arthritic changes at the shoulders sternoclavicular joints  6/9/23 MRI Thoracic Spine: Within limitations, no convincing evidence of acute thoracic spine abnormality, high-grade canal stenosis, or focal cord pathology. Multiple scattered vertebral body lesions are suspected and could represent metastatic disease, otherwise of incomplete  characterization by non-contrast protocol. Incidental findings of the partially visualized thoracic cavity and retroperitoneum discussed above, poorly assessed by modality/protocol.  Recent non-contrast chest CT provides overall better visualization.  6/10/23 CT Head: No acute intracranial abnormality.  Findings consistent with microangiopathy no interval change.  6/12/23 XRAY Chest: 1. Patchy hazy opacities again evident at the lower lungs bilaterally suspicious for infiltrate and atelectasis.  Small bibasilar pleural reactions cannot be excluded. 2. Borderline cardiomegaly 3. Atherosclerosis  6/16/23 XRAY Chest: Improved aeration of the lung bases with mild residual bibasilar opacities and small pleural effusions suspected.      Pathology:  6/12/23 Bone marrow aspiration/Biopsy:   PLASMA CELL MYELOMA, KAPPA RESTRICTED.     PLASMA CELL MYELOMA INVOLVES 90% OF BONE MARROW CELLULARITY WITH SMALL AREAS OF  SEQUENTIAL TRILINEAGE HEMATOPOIESIS.    ABSENT IRON STORES.     PERIPHERAL BLOOD: NORMOCYTIC NORMOCHROMIC ANEMIA. THROMBOCYTOPENIA.     LABS:  6/8/23 M-spike 3.33, IgA >7040, serum kappa 5/ lambda 0.56/ K:L ratio 9.09, 24 hour urine for M protein done but not resulted. Calcium 13.4/Alb 1.9, Cr 2.3, globulin 10.7  7/12/23 M-spike 2.49. IgA >7040, Corrected calcium 12.3, Cr 2.09, Globulin 7.6  8/15/23 M-spike 1.49, IgA 4000  9/18/23 M-spike 1.0, IgA 1700    CLINICAL HISTORY:       Patient: Chip Goodson is a 73 y.o. male That was seen  for the first time as hospital consult on 6/13/2023.   Patient was brought to the emergency department for confusion.  Upon evaluation in the ER CT scan of the head with no acute intracranial abnormality.  Finding consistent with microangiopathic no interval change.  CT of the chest with no contrast showed diffuse osteopenia with multiple, numerous, small round avid lucent lesions ovarian size involving the marrow.  Changes could reflect metabolic bone disease like  hyperparathyroidism.  MRI of the thoracic spine with multiple scattered vertebral body lesion are suspect and could represent metastatic disease.  Bone scan with scattered activity in the anterior left right rib margins no fractures focal increased activity at the anterior manubrium scattered subcentimeter small lytic defects.  Ultrasound of the thyroid that shows multiple nodules. skeletal survey  area on humerus only.     Labs with elevated calcium of 12.6, corrected calcium 14.36.  Total protein was 11.8 with a total globulin 10.0.  Further workup revealed an IgA over 7040.0, IgM 6.0, IgG 165.00.  Free serum kappa light chain 5.09, free serum lambda light chains 0.5600 with a kappa/lambda ratio of 9.09.  M spike 3.33.  PSA 0.96     Bone marrow biopsy performed 6/12/23. Patient receive pamidronate 60 mg on 06/09/2023 and was started on hydration.  His calcium improved as well as his mental status.     Chief Complaint: Multiple Myeloma (F/u with labs-- Patient reports no new concerns )    Interval History:  He returns to clinic today for a two week follow-up and for treatment clearance for cycle 4 day 15 daratumumab, lenalidomide, and dexamethasone.  He reports feeling well today. Stable fatigue.  Having constipation, but currently taking Linzess as prescribed by PCP for this. He did begin iron BID as prescribed at last OV. He had an appt with the transplant team on 10/20/2023 to discuss his eligibility.  As of now, he will be stopping daratumumab after cycle 4 and continue with lenalidomide and Decadron per transplant on 10/20/2023. He denies any fever, chills, shortness of breath, chest pain, cough, abdominal pain, nausea, vomiting, diarrhea, peripheral neuropathy, edema.  Labs reviewed with patient in detail, weight stable.    Past Medical History:   Diagnosis Date    GERD (gastroesophageal reflux disease)     HTN (hypertension)     Hypothyroidism, unspecified     Type 2 diabetes mellitus without complications        Past Surgical History:   Procedure Laterality Date    APPENDECTOMY  1965    BONE MARROW BIOPSY Right 06/12/2023    Procedure: BIOPSY, BONE MARROW;  Surgeon: Silvano Austin MD;  Location: [facility];  Service: General;  Laterality: Right;    removal of boils       Family History   Problem Relation Age of Onset    Lung cancer Mother     Breast cancer Sister      Social Connections: Unknown (6/9/2023)    Social Connection and Isolation Panel [NHANES]     Frequency of Communication with Friends and Family: Patient refused     Frequency of Social Gatherings with Friends and Family: Patient refused     Attends Caodaism Services: Patient refused     Active Member of Clubs or Organizations: Patient refused     Attends Club or Organization Meetings: Patient refused     Marital Status: Patient refused       Review of patient's allergies indicates:   Allergen Reactions    Iodine Anaphylaxis    Shellfish containing products     Amoxicillin-pot clavulanate Itching      Current Outpatient Medications on File Prior to Visit   Medication Sig Dispense Refill    acyclovir (ZOVIRAX) 400 MG tablet Take 1 tablet (400 mg total) by mouth 2 (two) times daily. 60 tablet 11    albuterol (PROVENTIL/VENTOLIN HFA) 90 mcg/actuation inhaler 2 puffs every 4 to 6 hours as needed.      ALPRAZolam (XANAX) 0.5 MG tablet Take 0.5 mg by mouth daily as needed.      amLODIPine (NORVASC) 10 MG tablet Take 1 tablet (10 mg total) by mouth once daily. 30 each 11    atorvastatin (LIPITOR) 10 MG tablet Take 10 mg by mouth.      carvediloL (COREG) 12.5 MG tablet Take 12.5 mg by mouth 2 (two) times daily.      cloNIDine (CATAPRES) 0.2 MG tablet Take 0.2 mg by mouth daily as needed.      dexAMETHasone (DECADRON) 4 MG Tab Take 5 tablets (20 mg total) by mouth every 7 days. on days following daratumumab infusion. Take with food. 20 tablet 1    dexAMETHasone (DECADRON) 4 MG Tab Take 1 tablet (4 mg total) by mouth once daily. For 2 days 7/19/23, and 7/20/23 2 tablet  0    dexAMETHasone (DECADRON) 4 MG Tab Take 5 tabs (20 mg) orally on days following daratumumab infusion (Days 2 and 16) and take 10 tabs (40 mg) on Days 8 and 22 of each cycle. Take with food. 30 tablet 3    famotidine (PEPCID) 20 MG tablet Take 1 tablet (20 mg total) by mouth once daily. 30 tablet 11    finasteride (PROSCAR) 5 mg tablet Take 5 mg by mouth.      gabapentin (NEURONTIN) 100 MG capsule Take by mouth.      hydrALAZINE (APRESOLINE) 50 MG tablet Take 1 tablet (50 mg total) by mouth every 8 (eight) hours. 90 tablet 11    hydrocodone-chlorpheniramine (TUSSIONEX) 10-8 mg/5 mL suspension SMARTSI Milliliter(s) By Mouth Every 12 Hours      lenalidomide 10 mg Cap Take 10 mg by mouth once daily for 21 days followed by a 7 day break, every 28 days. 21 each 0    levoFLOXacin (LEVAQUIN) 500 MG tablet Take 1 tablet (500 mg total) by mouth once daily. 7 each 0    levothyroxine (SYNTHROID) 112 MCG tablet Take 112 mcg by mouth.      nitroGLYCERIN (NITROSTAT) 0.4 MG SL tablet place one tablet under the tongue every five minutes as needed for chest pain up to 3 doses. if no relief call 911      ondansetron (ZOFRAN) 8 MG tablet Take 1 tablet (8 mg total) by mouth every 8 (eight) hours as needed for Nausea. 30 tablet 2    ONETOUCH ULTRA TEST Strp USE TO TEST BLOOD GLUCOSE TWICE DAILY      oxybutynin (DITROPAN-XL) 10 MG 24 hr tablet TAKE ONE TABLET BY MOUTH ONCE DAILY AS NEEDED FOR BLADDER SPASMS      pantoprazole (PROTONIX) 40 MG tablet Take 1 tablet (40 mg total) by mouth 2 (two) times daily before meals. 60 tablet 11    ranolazine (RANEXA) 500 MG Tb12 Take 500 mg by mouth 2 (two) times daily.      SITagliptin phosphate (JANUVIA) 50 MG Tab Take 1 tablet (50 mg total) by mouth once daily. 30 tablet 5    spironolactone (ALDACTONE) 25 MG tablet Take 12.5 mg by mouth.      [DISCONTINUED] tamsulosin (FLOMAX) 0.4 mg Cap Take 1 capsule by mouth.       Current Facility-Administered Medications on File Prior to Visit    Medication Dose Route Frequency Provider Last Rate Last Admin    alteplase injection 2 mg  2 mg Intra-Catheter PRN Elsy Amaya MD        daratumumab-hyaluronidase-fihj subcutaneous injection 1,800 mg  1,800 mg Subcutaneous 1 time in Clinic/HOD Elsy Amaya MD        diphenhydrAMINE injection 50 mg  50 mg Intravenous Once PRN Elsy Amaya MD        EPINEPHrine (EPIPEN) 0.3 mg/0.3 mL pen injection 0.3 mg  0.3 mg Intramuscular Once PRN Elsy Amaya MD        heparin, porcine (PF) 100 unit/mL injection flush 500 Units  500 Units Intravenous PRN Elsy Amaya MD        hydrocortisone sodium succinate injection 100 mg  100 mg Intravenous Once PRN Elsy Amaya MD        sodium chloride 0.9% 250 mL flush bag   Intravenous 1 time in Clinic/HOD Elsy Amaya MD        sodium chloride 0.9% flush 10 mL  10 mL Intravenous PRN Elsy Amaya MD          Review of Systems   Constitutional:  Negative for appetite change, chills, diaphoresis, fever and unexpected weight change.   HENT:  Negative for nasal congestion, mouth sores, nosebleeds, sinus pressure/congestion, sore throat and trouble swallowing.    Eyes: Negative.    Respiratory:  Negative for cough and shortness of breath.    Cardiovascular:  Negative for chest pain and palpitations.   Gastrointestinal:  Negative for abdominal distention, abdominal pain, blood in stool, change in bowel habit, constipation, diarrhea, nausea and vomiting.   Endocrine: Negative.    Genitourinary:  Negative for bladder incontinence, decreased urine volume, dysuria, frequency, hematuria and urgency. Difficulty urinating: had a ocampo cath.  Musculoskeletal:  Negative for arthralgias, back pain, gait problem, joint swelling, leg pain and myalgias.   Integumentary:  Negative for rash.   Allergic/Immunologic: Negative.    Neurological:  Negative for dizziness, tremors, syncope, weakness, light-headedness, numbness, headaches and memory  "loss.   Hematological:  Negative for adenopathy. Does not bruise/bleed easily.   Psychiatric/Behavioral:  Negative for agitation, confusion, hallucinations, sleep disturbance and suicidal ideas. The patient is not nervous/anxious.               Vitals:    11/01/23 1302   BP: 134/73   BP Location: Left arm   Pulse: (!) 59   Resp: 20   SpO2: 99%   Weight: 90.7 kg (200 lb)   Height: 5' 10" (1.778 m)          Wt Readings from Last 6 Encounters:   11/01/23 90.7 kg (200 lb)   10/18/23 88.9 kg (196 lb)   10/18/23 88.9 kg (196 lb)   10/03/23 88.5 kg (195 lb)   09/19/23 86.6 kg (191 lb)   09/19/23 86.6 kg (191 lb)     Body mass index is 28.7 kg/m².  Body surface area is 2.12 meters squared.  Physical Exam  Vitals and nursing note reviewed.   Constitutional:       General: He is not in acute distress.     Appearance: Normal appearance.      Comments: Elderly male   HENT:      Head: Normocephalic and atraumatic.      Mouth/Throat:      Mouth: Mucous membranes are moist.   Eyes:      General: No scleral icterus.     Extraocular Movements: Extraocular movements intact.      Conjunctiva/sclera: Conjunctivae normal.   Neck:      Vascular: No JVD.   Cardiovascular:      Rate and Rhythm: Normal rate and regular rhythm.      Heart sounds: No murmur heard.  Pulmonary:      Effort: Pulmonary effort is normal.      Breath sounds: Normal breath sounds. No wheezing or rhonchi.   Abdominal:      General: Bowel sounds are normal. There is no distension.      Palpations: Abdomen is soft.      Tenderness: There is no abdominal tenderness.   Musculoskeletal:         General: No swelling or deformity.      Cervical back: Neck supple.   Lymphadenopathy:      Head:      Right side of head: No submandibular adenopathy.      Left side of head: No submandibular adenopathy.      Cervical: No cervical adenopathy.      Upper Body:      Right upper body: No supraclavicular or axillary adenopathy.      Left upper body: No supraclavicular or axillary " adenopathy.      Lower Body: No right inguinal adenopathy. No left inguinal adenopathy.   Skin:     General: Skin is warm.      Coloration: Skin is not jaundiced.      Findings: No rash.   Neurological:      General: No focal deficit present.      Mental Status: He is alert and oriented to person, place, and time.      Cranial Nerves: Cranial nerves 2-12 are intact.   Psychiatric:         Attention and Perception: Attention normal.         Behavior: Behavior is cooperative.       Laboratory:  CBC with Differential:  Lab Results   Component Value Date    WBC 5.55 10/31/2023    RBC 3.74 (L) 10/31/2023    HGB 12.3 (L) 10/31/2023    HCT 36.5 (L) 10/31/2023    MCV 97.6 (H) 10/31/2023    MCH 32.9 (H) 10/31/2023    MCHC 33.7 10/31/2023    RDW 13.9 10/31/2023     10/31/2023    MPV 9.7 10/31/2023     Serum:  SPEP: Serum protein electrophoresis shows a distinct monoclonal peak (3.33 g/dL) in the beta zone, consistent with a monoclonal gammopathy.   PAO: A band is present in the IgA trini with a corresponding band in the kappa trini.   The immunofixation electrophoresis are consistent with monoclonal gammopathy of IgA-kappa isotype.     IgG Level 540.00 - 1,822.00 mg/dL 165.00 Low     IgA Level 101.0 - 645.0 mg/dL >7,040.0 High     IgM Level 22.0 - 240.0 mg/dL 6.0 Low       Kappa Free Light Chain 0.3300 - 1.94 mg/dL 5.09 High     Lambda Free Light Chain 0.5700 - 2.63 mg/dL 0.5600 Low     Kappa/Lambda FLC Ratio 0.2600 - 1.65 9.09 High      Urine:   24 hrs Urine:  M spike    1467      H    mg/24 h    CMP:  Sodium Level   Date Value Ref Range Status   10/31/2023 143 136 - 145 mmol/L Final     Potassium Level   Date Value Ref Range Status   10/31/2023 3.6 3.5 - 5.1 mmol/L Final     Carbon Dioxide   Date Value Ref Range Status   10/31/2023 28 23 - 31 mmol/L Final     Blood Urea Nitrogen   Date Value Ref Range Status   10/31/2023 17.0 8.4 - 25.7 mg/dL Final     Creatinine   Date Value Ref Range Status   10/31/2023 1.10 0.73 -  1.18 mg/dL Final     Calcium Level Total   Date Value Ref Range Status   10/31/2023 9.0 8.8 - 10.0 mg/dL Final     Albumin Level   Date Value Ref Range Status   10/31/2023 3.2 (L) 3.4 - 4.8 g/dL Final     Bilirubin Total   Date Value Ref Range Status   10/31/2023 0.8 <=1.5 mg/dL Final     Alkaline Phosphatase   Date Value Ref Range Status   10/31/2023 131 40 - 150 unit/L Final     Aspartate Aminotransferase   Date Value Ref Range Status   10/31/2023 11 5 - 34 unit/L Final     Alanine Aminotransferase   Date Value Ref Range Status   10/31/2023 17 0 - 55 unit/L Final             Assessment:       1. Multiple myeloma, remission status unspecified    2. Immunodeficiency due to chemotherapy    3. Stem cell transplant candidate    4. Other iron deficiency anemia          Chemotherapy   Patient doing well today.  No complaints.  Afebrile.  Taking Lenalidomide and Dexamethasone as directed.  Here for cycle 4, day 15 today.    Constipation  Currently taking Linzess as prescribed by PCP      Plan:   Labs stable.    Continue with cycle 4 day 15 as scheduled today.   Per transplant, planning to stop daratumumab after completion of cycle 4 and continue with lenalidomide and dexamethasone this time.  Continue oral iron.   Follow-up with transplant as scheduled.  PET scheduled to be completed prior to next office visit.  Return to clinic in two weeks with MD for follow up/lab/treatment C5/D1  Qmonth MM labs      The patient is in agreement with today's plan of care.  All questions answered.  Patient is aware to contact our office for any problems or concerns prior to next visit.    Marjan Eli, FNP-C  Oncology/Hematology   [facility]

## 2023-11-09 ENCOUNTER — HOSPITAL ENCOUNTER (OUTPATIENT)
Dept: RADIOLOGY | Facility: HOSPITAL | Age: 73
Discharge: HOME | End: 2023-11-09
Payer: MEDICARE

## 2023-11-09 DIAGNOSIS — C90.00 MULTIPLE MYELOMA NOT HAVING ACHIEVED REMISSION: ICD-10-CM

## 2023-11-09 LAB — POCT GLUCOSE: 111 MG/DL (ref 70–110)

## 2023-11-09 PROCEDURE — A9552 F18 FDG: HCPCS

## 2023-11-15 ENCOUNTER — OFFICE VISIT (OUTPATIENT)
Dept: HEMATOLOGY/ONCOLOGY | Facility: CLINIC | Age: 73
End: 2023-11-15
Payer: MEDICARE

## 2023-11-15 VITALS
WEIGHT: 201.38 LBS | RESPIRATION RATE: 18 BRPM | SYSTOLIC BLOOD PRESSURE: 145 MMHG | TEMPERATURE: 98 F | BODY MASS INDEX: 28.83 KG/M2 | OXYGEN SATURATION: 97 % | HEIGHT: 70 IN | HEART RATE: 51 BPM | DIASTOLIC BLOOD PRESSURE: 71 MMHG

## 2023-11-15 DIAGNOSIS — D84.821 IMMUNODEFICIENCY DUE TO CHEMOTHERAPY: ICD-10-CM

## 2023-11-15 DIAGNOSIS — D50.8 OTHER IRON DEFICIENCY ANEMIA: ICD-10-CM

## 2023-11-15 DIAGNOSIS — C90.00 MULTIPLE MYELOMA, REMISSION STATUS UNSPECIFIED: Primary | ICD-10-CM

## 2023-11-15 DIAGNOSIS — Z76.82 STEM CELL TRANSPLANT CANDIDATE: ICD-10-CM

## 2023-11-15 DIAGNOSIS — T45.1X5A IMMUNODEFICIENCY DUE TO CHEMOTHERAPY: ICD-10-CM

## 2023-11-15 DIAGNOSIS — Z79.899 IMMUNODEFICIENCY DUE TO CHEMOTHERAPY: ICD-10-CM

## 2023-11-15 PROCEDURE — 99999 PR PBB SHADOW E&M-EST. PATIENT-LVL V: ICD-10-PCS | Mod: PBBFAC,,, | Performed by: INTERNAL MEDICINE

## 2023-11-15 PROCEDURE — 99214 OFFICE O/P EST MOD 30 MIN: CPT | Mod: S$PBB,,, | Performed by: INTERNAL MEDICINE

## 2023-11-15 PROCEDURE — 99214 PR OFFICE/OUTPT VISIT, EST, LEVL IV, 30-39 MIN: ICD-10-PCS | Mod: S$PBB,,, | Performed by: INTERNAL MEDICINE

## 2023-11-15 PROCEDURE — 99999 PR PBB SHADOW E&M-EST. PATIENT-LVL V: CPT | Mod: PBBFAC,,, | Performed by: INTERNAL MEDICINE

## 2023-11-15 PROCEDURE — 99215 OFFICE O/P EST HI 40 MIN: CPT | Mod: PBBFAC | Performed by: INTERNAL MEDICINE

## 2023-11-15 NOTE — PROGRESS NOTES
HEMATOLOGY/ONCOLOGY OFFICE CLINIC VISIT    Visit Information:    Initial Evaluation: 6/13/2023 (hospital consult)   Referring Provider: Dr Gonzalez  Other providers:  Code status: Not addressed    Diagnosis:  Multiple Myeloma IgA kappa    Present treatment:   lenalidomide 10 mg by mouth once daily for 21 days followed by a 7 day break, every 28 days. , and dexamethasone 20 mg weekly po initiated 7/12/23    PREVIOUS TREATMENT: Daratumumab x 4 cycles from 7/12/23 to 11/1/23 then held,   lenalidomide 10 mg by mouth once daily for 21 days followed by a 7 day break, every 28 days. , and dexamethasone 20 mg weekly initiated 7/12/23.    Treatment/Oncology history: 74 yo male admitted to the hospital in [place] 6/7/23 for weakness and hypercalcemia. She was diagnosed with multiple myeloma.     Plan of care:     Imaging:  Ct C 6/7/2023: 1. There is a small left sided pleural effusion.2. A small patchy area of ground glass density is seen in the lateral basal segment of the left lower lobe. This could reflect an acute focal infiltrate / pneumonitis. 3. There is diffuse osteopenia along the visualised bony structures together with multiple, numerous, small round-ovoid lucent lesion of varying sizes involving the marrow. These changes could reflect metabolic bone disease like hyperparathyroidism. Correlate clinically and with laboratory findings, as regards additional evaluation and follow-up.  6/8/23 Bone Scan Whole Body:  1. Scattered activity at the anterior left right rib margins as described.  A CT exam on the previous day reveals no definite fracture.  There is mild heterogeneous and bony loss at the anterior left and right ribs.  This is not have the typical pattern of a metastases.  2. Focal increased activity at the anterior manubrium which again on the CT exam demonstrates osteopenia and heterogeneous bony loss as well as scattered subcentimeter small lytic defects. 3. Suspect mild arthritic changes at the shoulders  sternoclavicular joints  6/9/23 MRI Thoracic Spine: Within limitations, no convincing evidence of acute thoracic spine abnormality, high-grade canal stenosis, or focal cord pathology. Multiple scattered vertebral body lesions are suspected and could represent metastatic disease, otherwise of incomplete characterization by non-contrast protocol. Incidental findings of the partially visualized thoracic cavity and retroperitoneum discussed above, poorly assessed by modality/protocol.  Recent non-contrast chest CT provides overall better visualization.  6/10/23 CT Head: No acute intracranial abnormality.  Findings consistent with microangiopathy no interval change.  6/12/23 XRAY Chest: 1. Patchy hazy opacities again evident at the lower lungs bilaterally suspicious for infiltrate and atelectasis.  Small bibasilar pleural reactions cannot be excluded. 2. Borderline cardiomegaly 3. Atherosclerosis  6/16/23 XRAY Chest: Improved aeration of the lung bases with mild residual bibasilar opacities and small pleural effusions suspected.      Pathology:  6/12/23 Bone marrow aspiration/Biopsy:   PLASMA CELL MYELOMA, KAPPA RESTRICTED.     PLASMA CELL MYELOMA INVOLVES 90% OF BONE MARROW CELLULARITY WITH SMALL AREAS OF  SEQUENTIAL TRILINEAGE HEMATOPOIESIS.    ABSENT IRON STORES.     PERIPHERAL BLOOD: NORMOCYTIC NORMOCHROMIC ANEMIA. THROMBOCYTOPENIA.     LABS:  6/8/23 M-spike 3.33, IgA >7040, serum kappa 5/ lambda 0.56/ K:L ratio 9.09, 24 hour urine for M protein done but not resulted. Calcium 13.4/Alb 1.9, Cr 2.3, globulin 10.7  7/12/23 M-spike 2.49. IgA >7040, Corrected calcium 12.3, Cr 2.09, Globulin 7.6  8/15/23 M-spike 1.49, IgA 4000  9/18/23 M-spike 1.0, IgA 1700  10/17/23 M-spike 0.1, IgA 700, KL ratio 2 (nl K and L light chains)    CLINICAL HISTORY:       Patient: Chip Goodson is a 73 y.o. male That was seen  for the first time as hospital consult on 6/13/2023.   Patient was brought to the emergency department for  confusion.  Upon evaluation in the ER CT scan of the head with no acute intracranial abnormality.  Finding consistent with microangiopathic no interval change.  CT of the chest with no contrast showed diffuse osteopenia with multiple, numerous, small round avid lucent lesions ovarian size involving the marrow.  Changes could reflect metabolic bone disease like hyperparathyroidism.  MRI of the thoracic spine with multiple scattered vertebral body lesion are suspect and could represent metastatic disease.  Bone scan with scattered activity in the anterior left right rib margins no fractures focal increased activity at the anterior manubrium scattered subcentimeter small lytic defects.  Ultrasound of the thyroid that shows multiple nodules. skeletal survey  area on humerus only.     Labs with elevated calcium of 12.6, corrected calcium 14.36.  Total protein was 11.8 with a total globulin 10.0.  Further workup revealed an IgA over 7040.0, IgM 6.0, IgG 165.00.  Free serum kappa light chain 5.09, free serum lambda light chains 0.5600 with a kappa/lambda ratio of 9.09.  M spike 3.33.  PSA 0.96     Bone marrow biopsy performed 6/12/23. Patient receive pamidronate 60 mg on 06/09/2023 and was started on hydration.  His calcium improved as well as his mental status.     Chief Complaint: Multiple Myeloma (Pt reports no new complaints)    Interval History:  11/15/23:  no further Daratumumab per transplant. The transplant doctor will followup with him in Mid January so as not to have the transplant during the holidays. He will continue Dex and leno; his daughter puts his meds into a pill box for him. He is not familiar with what he is taking.     11/1/23 He returns to clinic today for a two week follow-up and for treatment clearance for cycle 4 day 15 daratumumab, lenalidomide, and dexamethasone.  He reports feeling well today. Stable fatigue.  Having constipation, but currently taking Linzess as prescribed by PCP for this. He  did begin iron BID as prescribed at last OV. He had an appt with the transplant team on 10/20/2023 to discuss his eligibility.  As of now, he will be stopping daratumumab after cycle 4 and continue with lenalidomide and Decadron per transplant on 10/20/2023. He denies any fever, chills, shortness of breath, chest pain, cough, abdominal pain, nausea, vomiting, diarrhea, peripheral neuropathy, edema.  Labs reviewed with patient in detail, weight stable.    Past Medical History:   Diagnosis Date    GERD (gastroesophageal reflux disease)     HTN (hypertension)     Hypothyroidism, unspecified     Type 2 diabetes mellitus without complications       Past Surgical History:   Procedure Laterality Date    APPENDECTOMY  1965    BONE MARROW BIOPSY Right 06/12/2023    Procedure: BIOPSY, BONE MARROW;  Surgeon: Silvano Austin MD;  Location: [facility];  Service: General;  Laterality: Right;    removal of boils       Family History   Problem Relation Age of Onset    Lung cancer Mother     Breast cancer Sister      Social Connections: Unknown (6/9/2023)    Social Connection and Isolation Panel [NHANES]     Frequency of Communication with Friends and Family: Patient refused     Frequency of Social Gatherings with Friends and Family: Patient refused     Attends Scientologist Services: Patient refused     Active Member of Clubs or Organizations: Patient refused     Attends Club or Organization Meetings: Patient refused     Marital Status: Patient refused       Review of patient's allergies indicates:   Allergen Reactions    Iodine Anaphylaxis    Shellfish containing products     Amoxicillin-pot clavulanate Itching      Current Outpatient Medications on File Prior to Visit   Medication Sig Dispense Refill    acyclovir (ZOVIRAX) 400 MG tablet Take 1 tablet (400 mg total) by mouth 2 (two) times daily. 60 tablet 11    albuterol (PROVENTIL/VENTOLIN HFA) 90 mcg/actuation inhaler 2 puffs every 4 to 6 hours as needed.      ALPRAZolam (XANAX) 0.5 MG  tablet Take 0.5 mg by mouth daily as needed.      amLODIPine (NORVASC) 10 MG tablet Take 1 tablet (10 mg total) by mouth once daily. 30 each 11    atorvastatin (LIPITOR) 10 MG tablet Take 10 mg by mouth.      carvediloL (COREG) 12.5 MG tablet Take 12.5 mg by mouth 2 (two) times daily.      cloNIDine (CATAPRES) 0.2 MG tablet Take 0.2 mg by mouth daily as needed.      dexAMETHasone (DECADRON) 4 MG Tab Take 5 tablets (20 mg total) by mouth every 7 days. on days following daratumumab infusion. Take with food. 20 tablet 1    dexAMETHasone (DECADRON) 4 MG Tab Take 5 tabs (20 mg) orally on days following daratumumab infusion (Days 2 and 16) and take 10 tabs (40 mg) on Days 8 and 22 of each cycle. Take with food. 30 tablet 3    famotidine (PEPCID) 20 MG tablet Take 1 tablet (20 mg total) by mouth once daily. 30 tablet 11    finasteride (PROSCAR) 5 mg tablet Take 5 mg by mouth.      gabapentin (NEURONTIN) 100 MG capsule Take by mouth.      hydrALAZINE (APRESOLINE) 50 MG tablet Take 1 tablet (50 mg total) by mouth every 8 (eight) hours. 90 tablet 11    hydrocodone-chlorpheniramine (TUSSIONEX) 10-8 mg/5 mL suspension SMARTSI Milliliter(s) By Mouth Every 12 Hours      lenalidomide 10 mg Cap Take 10 mg by mouth once daily for 21 days followed by a 7 day break, every 28 days. 21 each 0    levoFLOXacin (LEVAQUIN) 500 MG tablet Take 1 tablet (500 mg total) by mouth once daily. 7 each 0    levothyroxine (SYNTHROID) 112 MCG tablet Take 112 mcg by mouth.      nitroGLYCERIN (NITROSTAT) 0.4 MG SL tablet place one tablet under the tongue every five minutes as needed for chest pain up to 3 doses. if no relief call 911      ondansetron (ZOFRAN) 8 MG tablet Take 1 tablet (8 mg total) by mouth every 8 (eight) hours as needed for Nausea. 30 tablet 2    ONETOUCH ULTRA TEST Strp USE TO TEST BLOOD GLUCOSE TWICE DAILY      oxybutynin (DITROPAN-XL) 10 MG 24 hr tablet TAKE ONE TABLET BY MOUTH ONCE DAILY AS NEEDED FOR BLADDER SPASMS       pantoprazole (PROTONIX) 40 MG tablet Take 1 tablet (40 mg total) by mouth 2 (two) times daily before meals. 60 tablet 11    ranolazine (RANEXA) 500 MG Tb12 Take 500 mg by mouth 2 (two) times daily.      SITagliptin phosphate (JANUVIA) 50 MG Tab Take 1 tablet (50 mg total) by mouth once daily. 30 tablet 5    spironolactone (ALDACTONE) 25 MG tablet Take 12.5 mg by mouth.      [DISCONTINUED] dexAMETHasone (DECADRON) 4 MG Tab Take 1 tablet (4 mg total) by mouth once daily. For 2 days 7/19/23, and 7/20/23 2 tablet 0     Current Facility-Administered Medications on File Prior to Visit   Medication Dose Route Frequency Provider Last Rate Last Admin    alteplase injection 2 mg  2 mg Intra-Catheter PRN Elsy Amaya MD        daratumumab-hyaluronidase-fihj subcutaneous injection 1,800 mg  1,800 mg Subcutaneous 1 time in Clinic/HOD Elsy Amaya MD        diphenhydrAMINE injection 50 mg  50 mg Intravenous Once PRN Elsy Amaya MD        EPINEPHrine (EPIPEN) 0.3 mg/0.3 mL pen injection 0.3 mg  0.3 mg Intramuscular Once PRN Elsy Amaya MD        heparin, porcine (PF) 100 unit/mL injection flush 500 Units  500 Units Intravenous PRN Elsy Amaya MD        hydrocortisone sodium succinate injection 100 mg  100 mg Intravenous Once PRN Elsy Amaya MD        sodium chloride 0.9% 250 mL flush bag   Intravenous 1 time in Clinic/HOD Elsy Amaya MD        sodium chloride 0.9% flush 10 mL  10 mL Intravenous PRN Elsy Amaya MD          Review of Systems   Constitutional:  Negative for appetite change, chills, diaphoresis, fever and unexpected weight change.   HENT:  Negative for nasal congestion, mouth sores, nosebleeds, sinus pressure/congestion, sore throat and trouble swallowing.    Eyes: Negative.    Respiratory:  Negative for cough and shortness of breath.    Cardiovascular:  Negative for chest pain and palpitations.   Gastrointestinal:  Negative for abdominal  "distention, abdominal pain, blood in stool, change in bowel habit, constipation, diarrhea, nausea and vomiting.   Endocrine: Negative.    Genitourinary:  Negative for bladder incontinence, decreased urine volume, dysuria, frequency, hematuria and urgency. Difficulty urinating: had a ocampo cath.  Musculoskeletal:  Negative for arthralgias, back pain, gait problem, joint swelling, leg pain and myalgias.   Integumentary:  Negative for rash.   Allergic/Immunologic: Negative.    Neurological:  Negative for dizziness, tremors, syncope, weakness, light-headedness, numbness, headaches and memory loss.   Hematological:  Negative for adenopathy. Does not bruise/bleed easily.   Psychiatric/Behavioral:  Negative for agitation, confusion, hallucinations, sleep disturbance and suicidal ideas. The patient is not nervous/anxious.               Vitals:    11/15/23 1005   BP: (!) 145/71   Pulse: (!) 51   Resp: 18   Temp: 98.2 °F (36.8 °C)   SpO2: 97%   Weight: 91.4 kg (201 lb 6.4 oz)   Height: 5' 10" (1.778 m)            Wt Readings from Last 6 Encounters:   11/15/23 91.4 kg (201 lb 6.4 oz)   11/01/23 90.7 kg (200 lb)   11/01/23 90.7 kg (200 lb)   10/18/23 88.9 kg (196 lb)   10/18/23 88.9 kg (196 lb)   10/03/23 88.5 kg (195 lb)     Body mass index is 28.9 kg/m².  Body surface area is 2.12 meters squared.  Physical Exam  Vitals and nursing note reviewed.   Constitutional:       General: He is not in acute distress.     Appearance: Normal appearance.      Comments: Elderly male   HENT:      Head: Normocephalic and atraumatic.      Mouth/Throat:      Mouth: Mucous membranes are moist.   Eyes:      General: No scleral icterus.     Extraocular Movements: Extraocular movements intact.      Conjunctiva/sclera: Conjunctivae normal.   Neck:      Vascular: No JVD.   Cardiovascular:      Rate and Rhythm: Normal rate and regular rhythm.      Heart sounds: No murmur heard.  Pulmonary:      Effort: Pulmonary effort is normal.      Breath sounds: " Normal breath sounds. No wheezing or rhonchi.   Abdominal:      General: Bowel sounds are normal. There is no distension.      Palpations: Abdomen is soft.      Tenderness: There is no abdominal tenderness.   Musculoskeletal:         General: No swelling or deformity.      Cervical back: Neck supple.   Lymphadenopathy:      Head:      Right side of head: No submandibular adenopathy.      Left side of head: No submandibular adenopathy.      Cervical: No cervical adenopathy.      Upper Body:      Right upper body: No supraclavicular or axillary adenopathy.      Left upper body: No supraclavicular or axillary adenopathy.      Lower Body: No right inguinal adenopathy. No left inguinal adenopathy.   Skin:     General: Skin is warm.      Coloration: Skin is not jaundiced.      Findings: No rash.   Neurological:      General: No focal deficit present.      Mental Status: He is alert and oriented to person, place, and time.      Cranial Nerves: Cranial nerves 2-12 are intact.   Psychiatric:         Attention and Perception: Attention normal.         Behavior: Behavior is cooperative.       Laboratory:  CBC with Differential:  Lab Results   Component Value Date    WBC 6.11 11/15/2023    RBC 3.98 (L) 11/15/2023    HGB 12.9 (L) 11/15/2023    HCT 39.1 (L) 11/15/2023    MCV 98.2 (H) 11/15/2023    MCH 32.4 (H) 11/15/2023    MCHC 33.0 11/15/2023    RDW 14.3 11/15/2023     11/15/2023    MPV 9.7 11/15/2023     Serum:  SPEP: Serum protein electrophoresis shows a distinct monoclonal peak (3.33 g/dL) in the beta zone, consistent with a monoclonal gammopathy.   PAO: A band is present in the IgA trini with a corresponding band in the kappa trini.   The immunofixation electrophoresis are consistent with monoclonal gammopathy of IgA-kappa isotype.     IgG Level 540.00 - 1,822.00 mg/dL 165.00 Low     IgA Level 101.0 - 645.0 mg/dL >7,040.0 High     IgM Level 22.0 - 240.0 mg/dL 6.0 Low       Kappa Free Light Chain 0.3300 - 1.94 mg/dL  "5.09 High     Lambda Free Light Chain 0.5700 - 2.63 mg/dL 0.5600 Low     Kappa/Lambda FLC Ratio 0.2600 - 1.65 9.09 High      Urine:   24 hrs Urine:  M spike    1467      H    mg/24 h    CMP:  Sodium Level   Date Value Ref Range Status   11/15/2023 141 136 - 145 mmol/L Final     Potassium Level   Date Value Ref Range Status   11/15/2023 3.7 3.5 - 5.1 mmol/L Final     Carbon Dioxide   Date Value Ref Range Status   11/15/2023 29 23 - 31 mmol/L Final     Blood Urea Nitrogen   Date Value Ref Range Status   11/15/2023 22.0 8.4 - 25.7 mg/dL Final     Creatinine   Date Value Ref Range Status   11/15/2023 1.20 (H) 0.73 - 1.18 mg/dL Final     Calcium Level Total   Date Value Ref Range Status   11/15/2023 9.3 8.8 - 10.0 mg/dL Final     Albumin Level   Date Value Ref Range Status   11/15/2023 3.6 3.4 - 4.8 g/dL Final     Bilirubin Total   Date Value Ref Range Status   11/15/2023 0.8 <=1.5 mg/dL Final     Alkaline Phosphatase   Date Value Ref Range Status   11/15/2023 130 40 - 150 unit/L Final     Aspartate Aminotransferase   Date Value Ref Range Status   11/15/2023 15 5 - 34 unit/L Final     Alanine Aminotransferase   Date Value Ref Range Status   11/15/2023 21 0 - 55 unit/L Final             Assessment:       1. Multiple myeloma, remission status unspecified    2. Immunodeficiency due to chemotherapy    3. Stem cell transplant candidate    4. Other iron deficiency anemia        Chemotherapy   Patient doing well today.  No complaints.  Afebrile.  Taking Lenalidomide and Dexamethasone as directed.  Here for cycle 4, day 15 today.    Constipation  Currently taking Linzess as prescribed by PCP  Per transplant MD DR. Vasquez on 10/20/23:     "He is very fit, had limited co-morbidities, and has good support.  He will be considered for reduced intensity conditioning and autologous stem cell transplant.  He will stop daratumumab after cycle 4, and continue lenaldiomide and decadron.    Discussed with , his oncologist in " "[place]. "      Plan:   Labs stable.    Completed cycle 4 day 15 on 11/1/23; no further daratumumab per transplant team after cycle 4.   Per transplant, continue with lenalidomide and dexamethasone this time.  Continue oral iron.   Follow-up with transplant as scheduled in Mid January.  PET 11/9/23 ordered by Transplatn:    Musculoskeletal:  Several FDG avid left rib expansile lesions are present, maximum SUV of 4.3 in the 5th rib on image 119.  Similar appearance is in the anterolateral right 3rd and 4th ribs.   Impression:   1. Bilateral rib expansile lesions with low level uptake likely relate to patient's myeloma.  2. No extraosseous disease.    Return to clinic in 4 weeks weeks with MD for follow up  Qmonth MM labs      The patient is in agreement with today's plan of care.  All questions answered.  Patient is aware to contact our office for any problems or concerns prior to next visit.    Sandra Rose MD  Oncology/Hematology   [facility]       "

## 2023-11-24 DIAGNOSIS — C90.00 MULTIPLE MYELOMA NOT HAVING ACHIEVED REMISSION: ICD-10-CM

## 2023-11-24 RX ORDER — LENALIDOMIDE 10 MG/1
10 CAPSULE ORAL DAILY
Qty: 21 EACH | Refills: 0 | Status: SHIPPED | OUTPATIENT
Start: 2023-11-24 | End: 2023-12-26 | Stop reason: SDUPTHER

## 2023-12-06 ENCOUNTER — LAB VISIT (OUTPATIENT)
Dept: LAB | Facility: HOSPITAL | Age: 73
End: 2023-12-06
Attending: INTERNAL MEDICINE
Payer: MEDICARE

## 2023-12-06 DIAGNOSIS — C90.00 MULTIPLE MYELOMA, REMISSION STATUS UNSPECIFIED: ICD-10-CM

## 2023-12-06 DIAGNOSIS — C90.00 MULTIPLE MYELOMA, REMISSION STATUS UNSPECIFIED: Primary | ICD-10-CM

## 2023-12-06 LAB
ALBUMIN SERPL-MCNC: 3.6 G/DL (ref 3.4–4.8)
ALBUMIN/GLOB SERPL: 1.6 RATIO (ref 1.1–2)
ALP SERPL-CCNC: 115 UNIT/L (ref 40–150)
ALT SERPL-CCNC: 34 UNIT/L (ref 0–55)
AST SERPL-CCNC: 15 UNIT/L (ref 5–34)
BASOPHILS # BLD AUTO: 0.07 X10(3)/MCL
BASOPHILS NFR BLD AUTO: 1.4 %
BILIRUB SERPL-MCNC: 1.1 MG/DL
BUN SERPL-MCNC: 20 MG/DL (ref 8.4–25.7)
CALCIUM SERPL-MCNC: 9.3 MG/DL (ref 8.8–10)
CHLORIDE SERPL-SCNC: 105 MMOL/L (ref 98–107)
CO2 SERPL-SCNC: 28 MMOL/L (ref 23–31)
CREAT SERPL-MCNC: 1.21 MG/DL (ref 0.73–1.18)
EOSINOPHIL # BLD AUTO: 0.18 X10(3)/MCL (ref 0–0.9)
EOSINOPHIL NFR BLD AUTO: 3.6 %
ERYTHROCYTE [DISTWIDTH] IN BLOOD BY AUTOMATED COUNT: 13.7 % (ref 11.5–17)
GFR SERPLBLD CREATININE-BSD FMLA CKD-EPI: >60 MLS/MIN/1.73/M2
GLOBULIN SER-MCNC: 2.3 GM/DL (ref 2.4–3.5)
GLUCOSE SERPL-MCNC: 140 MG/DL (ref 82–115)
HCT VFR BLD AUTO: 38.9 % (ref 42–52)
HGB BLD-MCNC: 13.1 G/DL (ref 14–18)
IGA SERPL-MCNC: 402 MG/DL (ref 101–645)
IGG SERPL-MCNC: 424 MG/DL (ref 540–1822)
IGM SERPL-MCNC: 12 MG/DL (ref 22–240)
IMM GRANULOCYTES # BLD AUTO: 0.01 X10(3)/MCL (ref 0–0.04)
IMM GRANULOCYTES NFR BLD AUTO: 0.2 %
LYMPHOCYTES # BLD AUTO: 1.62 X10(3)/MCL (ref 0.6–4.6)
LYMPHOCYTES NFR BLD AUTO: 32.8 %
MCH RBC QN AUTO: 32.8 PG (ref 27–31)
MCHC RBC AUTO-ENTMCNC: 33.7 G/DL (ref 33–36)
MCV RBC AUTO: 97.5 FL (ref 80–94)
MONOCYTES # BLD AUTO: 0.73 X10(3)/MCL (ref 0.1–1.3)
MONOCYTES NFR BLD AUTO: 14.8 %
NEUTROPHILS # BLD AUTO: 2.33 X10(3)/MCL (ref 2.1–9.2)
NEUTROPHILS NFR BLD AUTO: 47.2 %
PLATELET # BLD AUTO: 219 X10(3)/MCL (ref 130–400)
PMV BLD AUTO: 9.4 FL (ref 7.4–10.4)
POTASSIUM SERPL-SCNC: 3.7 MMOL/L (ref 3.5–5.1)
PROT SERPL-MCNC: 5.9 GM/DL (ref 5.8–7.6)
RBC # BLD AUTO: 3.99 X10(6)/MCL (ref 4.7–6.1)
SODIUM SERPL-SCNC: 139 MMOL/L (ref 136–145)
WBC # SPEC AUTO: 4.94 X10(3)/MCL (ref 4.5–11.5)

## 2023-12-06 PROCEDURE — 82784 ASSAY IGA/IGD/IGG/IGM EACH: CPT

## 2023-12-06 PROCEDURE — 82232 ASSAY OF BETA-2 PROTEIN: CPT

## 2023-12-06 PROCEDURE — 83521 IG LIGHT CHAINS FREE EACH: CPT | Mod: 59

## 2023-12-06 PROCEDURE — 86334 IMMUNOFIX E-PHORESIS SERUM: CPT

## 2023-12-06 PROCEDURE — 84165 PROTEIN E-PHORESIS SERUM: CPT

## 2023-12-06 PROCEDURE — 36415 COLL VENOUS BLD VENIPUNCTURE: CPT

## 2023-12-06 PROCEDURE — 80053 COMPREHEN METABOLIC PANEL: CPT

## 2023-12-06 PROCEDURE — 85025 COMPLETE CBC W/AUTO DIFF WBC: CPT

## 2023-12-07 LAB
ALBUMIN % SPEP (OHS): 58.64
ALBUMIN SERPL-MCNC: 3.5 G/DL (ref 3.4–4.8)
ALBUMIN/GLOB SERPL: 1.5 RATIO (ref 1.1–2)
ALPHA 1 GLOB (OHS): 0.22 GM/DL
ALPHA 1 GLOB% (OHS): 3.75
ALPHA 2 GLOB % (OHS): 11.52
ALPHA 2 GLOB (OHS): 0.68 GM/DL
BETA GLOB (OHS): 1.14 GM/DL
BETA GLOB% (OHS): 19.3
GAMMA GLOBULIN % (OHS): 6.8
GAMMA GLOBULIN (OHS): 0.4 GM/DL
GLOBULIN SER-MCNC: 2.4 GM/DL (ref 2.4–3.5)
KAPPA LC FREE SER-MCNC: 1.11 MG/DL (ref 0.33–1.94)
KAPPA LC FREE/LAMBDA FREE SER: 1.52 {RATIO} (ref 0.26–1.65)
LAMBDA LC FREE SERPL-MCNC: 0.73 MG/DL (ref 0.57–2.63)
M SPIKE % (OHS): NORMAL
M SPIKE (OHS): NORMAL
PATH REV: NORMAL
PROT SERPL-MCNC: 5.9 GM/DL (ref 5.8–7.6)

## 2023-12-08 LAB — B2 MICROGLOB SERPL-MCNC: 2.39 MCG/ML (ref 1.21–2.7)

## 2023-12-13 ENCOUNTER — OFFICE VISIT (OUTPATIENT)
Dept: HEMATOLOGY/ONCOLOGY | Facility: CLINIC | Age: 73
End: 2023-12-13
Payer: MEDICARE

## 2023-12-13 ENCOUNTER — TELEPHONE (OUTPATIENT)
Dept: HEMATOLOGY/ONCOLOGY | Facility: CLINIC | Age: 73
End: 2023-12-13
Payer: MEDICARE

## 2023-12-13 VITALS
DIASTOLIC BLOOD PRESSURE: 65 MMHG | RESPIRATION RATE: 20 BRPM | OXYGEN SATURATION: 98 % | BODY MASS INDEX: 28.83 KG/M2 | TEMPERATURE: 98 F | WEIGHT: 201.38 LBS | HEIGHT: 70 IN | HEART RATE: 71 BPM | SYSTOLIC BLOOD PRESSURE: 123 MMHG

## 2023-12-13 DIAGNOSIS — T45.1X5A IMMUNODEFICIENCY DUE TO CHEMOTHERAPY: ICD-10-CM

## 2023-12-13 DIAGNOSIS — Z76.82 STEM CELL TRANSPLANT CANDIDATE: ICD-10-CM

## 2023-12-13 DIAGNOSIS — Z79.899 IMMUNODEFICIENCY DUE TO CHEMOTHERAPY: ICD-10-CM

## 2023-12-13 DIAGNOSIS — C90.00 MULTIPLE MYELOMA, REMISSION STATUS UNSPECIFIED: Primary | ICD-10-CM

## 2023-12-13 DIAGNOSIS — D84.821 IMMUNODEFICIENCY DUE TO CHEMOTHERAPY: ICD-10-CM

## 2023-12-13 DIAGNOSIS — D50.8 OTHER IRON DEFICIENCY ANEMIA: ICD-10-CM

## 2023-12-13 PROCEDURE — 99215 PR OFFICE/OUTPT VISIT, EST, LEVL V, 40-54 MIN: ICD-10-PCS | Mod: S$PBB,,,

## 2023-12-13 PROCEDURE — 99215 OFFICE O/P EST HI 40 MIN: CPT | Mod: PBBFAC

## 2023-12-13 PROCEDURE — 99999 PR PBB SHADOW E&M-EST. PATIENT-LVL V: ICD-10-PCS | Mod: PBBFAC,,,

## 2023-12-13 PROCEDURE — 99215 OFFICE O/P EST HI 40 MIN: CPT | Mod: S$PBB,,,

## 2023-12-13 PROCEDURE — 99999 PR PBB SHADOW E&M-EST. PATIENT-LVL V: CPT | Mod: PBBFAC,,,

## 2023-12-13 NOTE — TELEPHONE ENCOUNTER
Per Dr. Evans's office visit note from 10/20/23, no follow up needed unless directed by transplant coordinator.

## 2023-12-13 NOTE — TELEPHONE ENCOUNTER
"----- Message from Carlos Avila sent at 12/13/2023  2:53 PM CST -----  Consult/Advisory:      Name Of Caller: Sheridan Sarmiento ([facility])      Contact Preference?: [phone]      Provider Name: Cristina      Does patient feel the need to be seen today? No      What is the nature of the call?: Requesting confirmation if pt needs a follow up appt or not      Additional Notes:  "Thank you for all that you do for our patients"        "

## 2023-12-13 NOTE — PROGRESS NOTES
HEMATOLOGY/ONCOLOGY OFFICE CLINIC VISIT    Visit Information:    Initial Evaluation: 6/13/2023 (hospital consult)   Referring Provider: Dr Gonzalez  Other providers:  Code status: Not addressed    Diagnosis:  Multiple Myeloma IgA kappa    Present treatment:   lenalidomide 10 mg by mouth once daily for 21 days followed by a 7 day break, every 28 days. , and dexamethasone 20 mg weekly po initiated 7/12/23    PREVIOUS TREATMENT: Daratumumab x 4 cycles from 7/12/23 to 11/1/23 then held,   lenalidomide 10 mg by mouth once daily for 21 days followed by a 7 day break, every 28 days. , and dexamethasone 20 mg weekly initiated 7/12/23.    Treatment/Oncology history: 72 yo male admitted to the hospital in [place] 6/7/23 for weakness and hypercalcemia. She was diagnosed with multiple myeloma.     Plan of care:     Imaging:  Ct C 6/7/2023: 1. There is a small left sided pleural effusion.2. A small patchy area of ground glass density is seen in the lateral basal segment of the left lower lobe. This could reflect an acute focal infiltrate / pneumonitis. 3. There is diffuse osteopenia along the visualised bony structures together with multiple, numerous, small round-ovoid lucent lesion of varying sizes involving the marrow. These changes could reflect metabolic bone disease like hyperparathyroidism. Correlate clinically and with laboratory findings, as regards additional evaluation and follow-up.  6/8/23 Bone Scan Whole Body:  1. Scattered activity at the anterior left right rib margins as described.  A CT exam on the previous day reveals no definite fracture.  There is mild heterogeneous and bony loss at the anterior left and right ribs.  This is not have the typical pattern of a metastases.  2. Focal increased activity at the anterior manubrium which again on the CT exam demonstrates osteopenia and heterogeneous bony loss as well as scattered subcentimeter small lytic defects. 3. Suspect mild arthritic changes at the shoulders  sternoclavicular joints  6/9/23 MRI Thoracic Spine: Within limitations, no convincing evidence of acute thoracic spine abnormality, high-grade canal stenosis, or focal cord pathology. Multiple scattered vertebral body lesions are suspected and could represent metastatic disease, otherwise of incomplete characterization by non-contrast protocol. Incidental findings of the partially visualized thoracic cavity and retroperitoneum discussed above, poorly assessed by modality/protocol.  Recent non-contrast chest CT provides overall better visualization.  6/10/23 CT Head: No acute intracranial abnormality.  Findings consistent with microangiopathy no interval change.  6/12/23 XRAY Chest: 1. Patchy hazy opacities again evident at the lower lungs bilaterally suspicious for infiltrate and atelectasis.  Small bibasilar pleural reactions cannot be excluded. 2. Borderline cardiomegaly 3. Atherosclerosis  6/16/23 XRAY Chest: Improved aeration of the lung bases with mild residual bibasilar opacities and small pleural effusions suspected.      Pathology:  6/12/23 Bone marrow aspiration/Biopsy:   PLASMA CELL MYELOMA, KAPPA RESTRICTED.     PLASMA CELL MYELOMA INVOLVES 90% OF BONE MARROW CELLULARITY WITH SMALL AREAS OF  SEQUENTIAL TRILINEAGE HEMATOPOIESIS.    ABSENT IRON STORES.     PERIPHERAL BLOOD: NORMOCYTIC NORMOCHROMIC ANEMIA. THROMBOCYTOPENIA.     LABS:  6/8/23 M-spike 3.33, IgA >7040, serum kappa 5/ lambda 0.56/ K:L ratio 9.09, 24 hour urine for M protein done but not resulted. Calcium 13.4/Alb 1.9, Cr 2.3, globulin 10.7  7/12/23 M-spike 2.49. IgA >7040, Corrected calcium 12.3, Cr 2.09, Globulin 7.6  8/15/23 M-spike 1.49, IgA 4000  9/18/23 M-spike 1.0, IgA 1700  10/17/23 M-spike 0.1, IgA 700, KL ratio 2 (nl K and L light chains)  11/15/23 M spike 0.14, IgA 448, KL ratio 1.65 (normal K and L light chains)  12/06/23 M spike 0.12, IgA 402, KL ratio 1.52 (normal K and L light chains)    CLINICAL HISTORY:       Patient: Chip  Hamzah is a 73 y.o. male That was seen  for the first time as hospital consult on 6/13/2023.   Patient was brought to the emergency department for confusion.  Upon evaluation in the ER CT scan of the head with no acute intracranial abnormality.  Finding consistent with microangiopathic no interval change.  CT of the chest with no contrast showed diffuse osteopenia with multiple, numerous, small round avid lucent lesions ovarian size involving the marrow.  Changes could reflect metabolic bone disease like hyperparathyroidism.  MRI of the thoracic spine with multiple scattered vertebral body lesion are suspect and could represent metastatic disease.  Bone scan with scattered activity in the anterior left right rib margins no fractures focal increased activity at the anterior manubrium scattered subcentimeter small lytic defects.  Ultrasound of the thyroid that shows multiple nodules. skeletal survey  area on humerus only.     Labs with elevated calcium of 12.6, corrected calcium 14.36.  Total protein was 11.8 with a total globulin 10.0.  Further workup revealed an IgA over 7040.0, IgM 6.0, IgG 165.00.  Free serum kappa light chain 5.09, free serum lambda light chains 0.5600 with a kappa/lambda ratio of 9.09.  M spike 3.33.  PSA 0.96     Bone marrow biopsy performed 6/12/23. Patient receive pamidronate 60 mg on 06/09/2023 and was started on hydration.  His calcium improved as well as his mental status.     Chief Complaint: Multiple Myeloma (No complaints. )    Interval History:  12/13/2023:  He returns to clinic today for a 4 week follow up.  He started his most recent cycle of Revlimid on 12/04/2023.  He does not currently have follow up with transplant team at this time.  PET was completed recently.  He feels well today without any concerns or complaints.  Labs are stable and reviewed with him in detail.    11/15/23:  no further Daratumumab per transplant. The transplant doctor will followup with him in Mid January  so as not to have the transplant during the holidays. He will continue Dex and leno; his daughter puts his meds into a pill box for him. He is not familiar with what he is taking.     11/1/23 He returns to clinic today for a two week follow-up and for treatment clearance for cycle 4 day 15 daratumumab, lenalidomide, and dexamethasone.  He reports feeling well today. Stable fatigue.  Having constipation, but currently taking Linzess as prescribed by PCP for this. He did begin iron BID as prescribed at last OV. He had an appt with the transplant team on 10/20/2023 to discuss his eligibility.  As of now, he will be stopping daratumumab after cycle 4 and continue with lenalidomide and Decadron per transplant on 10/20/2023. He denies any fever, chills, shortness of breath, chest pain, cough, abdominal pain, nausea, vomiting, diarrhea, peripheral neuropathy, edema.  Labs reviewed with patient in detail, weight stable.    Past Medical History:   Diagnosis Date    GERD (gastroesophageal reflux disease)     HTN (hypertension)     Hypothyroidism, unspecified     Type 2 diabetes mellitus without complications       Past Surgical History:   Procedure Laterality Date    APPENDECTOMY  1965    BONE MARROW BIOPSY Right 06/12/2023    Procedure: BIOPSY, BONE MARROW;  Surgeon: Silvano Austin MD;  Location: [facility];  Service: General;  Laterality: Right;    removal of boils       Family History   Problem Relation Age of Onset    Lung cancer Mother     Breast cancer Sister      Social Connections: Unknown (6/9/2023)    Social Connection and Isolation Panel [NHANES]     Frequency of Communication with Friends and Family: Patient refused     Frequency of Social Gatherings with Friends and Family: Patient refused     Attends Orthodoxy Services: Patient refused     Active Member of Clubs or Organizations: Patient refused     Attends Club or Organization Meetings: Patient refused     Marital Status: Patient refused       Review of patient's  allergies indicates:   Allergen Reactions    Iodine Anaphylaxis    Shellfish containing products     Amoxicillin-pot clavulanate Itching      Current Outpatient Medications on File Prior to Visit   Medication Sig Dispense Refill    acyclovir (ZOVIRAX) 400 MG tablet Take 1 tablet (400 mg total) by mouth 2 (two) times daily. 60 tablet 11    albuterol (PROVENTIL/VENTOLIN HFA) 90 mcg/actuation inhaler 2 puffs every 4 to 6 hours as needed.      ALPRAZolam (XANAX) 0.5 MG tablet Take 0.5 mg by mouth daily as needed.      amLODIPine (NORVASC) 10 MG tablet Take 1 tablet (10 mg total) by mouth once daily. 30 each 11    atorvastatin (LIPITOR) 10 MG tablet Take 10 mg by mouth.      carvediloL (COREG) 12.5 MG tablet Take 12.5 mg by mouth 2 (two) times daily.      cloNIDine (CATAPRES) 0.2 MG tablet Take 0.2 mg by mouth daily as needed.      dexAMETHasone (DECADRON) 4 MG Tab Take 5 tablets (20 mg total) by mouth every 7 days. on days following daratumumab infusion. Take with food. 20 tablet 1    dexAMETHasone (DECADRON) 4 MG Tab Take 5 tabs (20 mg) orally on days following daratumumab infusion (Days 2 and 16) and take 10 tabs (40 mg) on Days 8 and 22 of each cycle. Take with food. 30 tablet 3    famotidine (PEPCID) 20 MG tablet Take 1 tablet (20 mg total) by mouth once daily. 30 tablet 11    finasteride (PROSCAR) 5 mg tablet Take 5 mg by mouth.      gabapentin (NEURONTIN) 100 MG capsule Take by mouth.      hydrALAZINE (APRESOLINE) 50 MG tablet Take 1 tablet (50 mg total) by mouth every 8 (eight) hours. 90 tablet 11    hydrocodone-chlorpheniramine (TUSSIONEX) 10-8 mg/5 mL suspension SMARTSI Milliliter(s) By Mouth Every 12 Hours      lenalidomide 10 mg Cap Take 10 mg by mouth once daily for 21 days followed by a 7 day break, every 28 days. 21 each 0    levoFLOXacin (LEVAQUIN) 500 MG tablet Take 1 tablet (500 mg total) by mouth once daily. 7 each 0    levothyroxine (SYNTHROID) 112 MCG tablet Take 112 mcg by mouth.       nitroGLYCERIN (NITROSTAT) 0.4 MG SL tablet place one tablet under the tongue every five minutes as needed for chest pain up to 3 doses. if no relief call 911      ondansetron (ZOFRAN) 8 MG tablet Take 1 tablet (8 mg total) by mouth every 8 (eight) hours as needed for Nausea. 30 tablet 2    ONETOUCH ULTRA TEST Strp USE TO TEST BLOOD GLUCOSE TWICE DAILY      oxybutynin (DITROPAN-XL) 10 MG 24 hr tablet TAKE ONE TABLET BY MOUTH ONCE DAILY AS NEEDED FOR BLADDER SPASMS      pantoprazole (PROTONIX) 40 MG tablet Take 1 tablet (40 mg total) by mouth 2 (two) times daily before meals. 60 tablet 11    ranolazine (RANEXA) 500 MG Tb12 Take 500 mg by mouth 2 (two) times daily.      SITagliptin phosphate (JANUVIA) 50 MG Tab Take 1 tablet (50 mg total) by mouth once daily. 30 tablet 5    spironolactone (ALDACTONE) 25 MG tablet Take 12.5 mg by mouth.       Current Facility-Administered Medications on File Prior to Visit   Medication Dose Route Frequency Provider Last Rate Last Admin    alteplase injection 2 mg  2 mg Intra-Catheter PRN Elsy Amaya MD        daratumumab-hyaluronidase-fihj subcutaneous injection 1,800 mg  1,800 mg Subcutaneous 1 time in Clinic/Elsy Lane MD        diphenhydrAMINE injection 50 mg  50 mg Intravenous Once PRN Elsy Amaya MD        EPINEPHrine (EPIPEN) 0.3 mg/0.3 mL pen injection 0.3 mg  0.3 mg Intramuscular Once PRN Elsy Amaya MD        heparin, porcine (PF) 100 unit/mL injection flush 500 Units  500 Units Intravenous PRN Elsy Amaya MD        hydrocortisone sodium succinate injection 100 mg  100 mg Intravenous Once PRN Elsy Amaya MD        sodium chloride 0.9% 250 mL flush bag   Intravenous 1 time in Clinic/Elsy Lane MD        sodium chloride 0.9% flush 10 mL  10 mL Intravenous PRN Elsy Amaya MD          Review of Systems   Constitutional:  Negative for appetite change, chills, diaphoresis, fever and unexpected weight  "change.   HENT:  Negative for nasal congestion, mouth sores, nosebleeds, sinus pressure/congestion, sore throat and trouble swallowing.    Eyes: Negative.    Respiratory:  Negative for cough and shortness of breath.    Cardiovascular:  Negative for chest pain and palpitations.   Gastrointestinal:  Negative for abdominal distention, abdominal pain, blood in stool, change in bowel habit, constipation, diarrhea, nausea and vomiting.   Endocrine: Negative.    Genitourinary:  Negative for bladder incontinence, decreased urine volume, dysuria, frequency, hematuria and urgency. Difficulty urinating: had a ocampo cath.  Musculoskeletal:  Negative for arthralgias, back pain, gait problem, joint swelling, leg pain and myalgias.   Integumentary:  Negative for rash.   Allergic/Immunologic: Negative.    Neurological:  Negative for dizziness, tremors, syncope, weakness, light-headedness, numbness, headaches and memory loss.   Hematological:  Negative for adenopathy. Does not bruise/bleed easily.   Psychiatric/Behavioral:  Negative for agitation, confusion, hallucinations, sleep disturbance and suicidal ideas. The patient is not nervous/anxious.               Vitals:    12/13/23 1417   BP: 123/65   Pulse: 71   Resp: 20   Temp: 97.7 °F (36.5 °C)   SpO2: 98%   Weight: 91.4 kg (201 lb 6.4 oz)   Height: 5' 10" (1.778 m)            Wt Readings from Last 6 Encounters:   12/13/23 91.4 kg (201 lb 6.4 oz)   11/15/23 91.4 kg (201 lb 6.4 oz)   11/01/23 90.7 kg (200 lb)   11/01/23 90.7 kg (200 lb)   10/18/23 88.9 kg (196 lb)   10/18/23 88.9 kg (196 lb)     Body mass index is 28.9 kg/m².  Body surface area is 2.12 meters squared.  Physical Exam  Vitals and nursing note reviewed.   Constitutional:       General: He is not in acute distress.     Appearance: Normal appearance.      Comments: Elderly male   HENT:      Head: Normocephalic and atraumatic.      Mouth/Throat:      Mouth: Mucous membranes are moist.   Eyes:      General: No scleral " icterus.     Extraocular Movements: Extraocular movements intact.      Conjunctiva/sclera: Conjunctivae normal.   Neck:      Vascular: No JVD.   Cardiovascular:      Rate and Rhythm: Normal rate and regular rhythm.      Heart sounds: No murmur heard.  Pulmonary:      Effort: Pulmonary effort is normal.      Breath sounds: Normal breath sounds. No wheezing or rhonchi.   Abdominal:      General: Bowel sounds are normal. There is no distension.      Palpations: Abdomen is soft.      Tenderness: There is no abdominal tenderness.   Musculoskeletal:         General: No swelling or deformity.      Cervical back: Neck supple.   Lymphadenopathy:      Head:      Right side of head: No submandibular adenopathy.      Left side of head: No submandibular adenopathy.      Cervical: No cervical adenopathy.      Upper Body:      Right upper body: No supraclavicular or axillary adenopathy.      Left upper body: No supraclavicular or axillary adenopathy.      Lower Body: No right inguinal adenopathy. No left inguinal adenopathy.   Skin:     General: Skin is warm.      Coloration: Skin is not jaundiced.      Findings: No rash.   Neurological:      General: No focal deficit present.      Mental Status: He is alert and oriented to person, place, and time.      Cranial Nerves: Cranial nerves 2-12 are intact.   Psychiatric:         Attention and Perception: Attention normal.         Behavior: Behavior is cooperative.       Laboratory:  CBC with Differential:  Lab Results   Component Value Date    WBC 4.94 12/06/2023    RBC 3.99 (L) 12/06/2023    HGB 13.1 (L) 12/06/2023    HCT 38.9 (L) 12/06/2023    MCV 97.5 (H) 12/06/2023    MCH 32.8 (H) 12/06/2023    MCHC 33.7 12/06/2023    RDW 13.7 12/06/2023     12/06/2023    MPV 9.4 12/06/2023     Serum:  SPEP: Serum protein electrophoresis shows a distinct monoclonal peak (3.33 g/dL) in the beta zone, consistent with a monoclonal gammopathy.   PAO: A band is present in the IgA trini with a  corresponding band in the kappa trini.   The immunofixation electrophoresis are consistent with monoclonal gammopathy of IgA-kappa isotype.     IgG Level 540.00 - 1,822.00 mg/dL 165.00 Low     IgA Level 101.0 - 645.0 mg/dL >7,040.0 High     IgM Level 22.0 - 240.0 mg/dL 6.0 Low       Kappa Free Light Chain 0.3300 - 1.94 mg/dL 5.09 High     Lambda Free Light Chain 0.5700 - 2.63 mg/dL 0.5600 Low     Kappa/Lambda FLC Ratio 0.2600 - 1.65 9.09 High      Urine:   24 hrs Urine:  M spike    1467      H    mg/24 h    CMP:  Sodium Level   Date Value Ref Range Status   12/06/2023 139 136 - 145 mmol/L Final     Potassium Level   Date Value Ref Range Status   12/06/2023 3.7 3.5 - 5.1 mmol/L Final     Carbon Dioxide   Date Value Ref Range Status   12/06/2023 28 23 - 31 mmol/L Final     Blood Urea Nitrogen   Date Value Ref Range Status   12/06/2023 20.0 8.4 - 25.7 mg/dL Final     Creatinine   Date Value Ref Range Status   12/06/2023 1.21 (H) 0.73 - 1.18 mg/dL Final     Calcium Level Total   Date Value Ref Range Status   12/06/2023 9.3 8.8 - 10.0 mg/dL Final     Albumin Level   Date Value Ref Range Status   12/06/2023 3.6 3.4 - 4.8 g/dL Final   12/06/2023 3.5 3.4 - 4.8 g/dL Final     Bilirubin Total   Date Value Ref Range Status   12/06/2023 1.1 <=1.5 mg/dL Final     Alkaline Phosphatase   Date Value Ref Range Status   12/06/2023 115 40 - 150 unit/L Final     Aspartate Aminotransferase   Date Value Ref Range Status   12/06/2023 15 5 - 34 unit/L Final     Alanine Aminotransferase   Date Value Ref Range Status   12/06/2023 34 0 - 55 unit/L Final             Assessment:       1. Multiple myeloma, remission status unspecified    2. Immunodeficiency due to chemotherapy    3. Stem cell transplant candidate    4. Other iron deficiency anemia          Chemotherapy   Patient doing well today.  No complaints.  Afebrile.  Taking Lenalidomide and Dexamethasone as directed.  Here for cycle 5    Constipation  Currently taking Linzess as prescribed  "by PCP    Per transplant MD DR. Vasquez on 10/20/23:     "He is very fit, had limited co-morbidities, and has good support.  He will be considered for reduced intensity conditioning and autologous stem cell transplant.  He will stop daratumumab after cycle 4, and continue lenaldiomide and decadron.    Discussed with , his oncologist in [place]. "      Plan:   Labs stable.    Per transplant, continue with lenalidomide and dexamethasone only at this time. Currently on cycle 5  Continue oral iron.   Follow-up with transplant as scheduled in Mid January.  Return to clinic in 4 weeks weeks with MD for follow up  Qmonth MM labs      The patient is in agreement with today's plan of care.  All questions answered.  Patient is aware to contact our office for any problems or concerns prior to next visit.    Marjan Eli, VICKY-C  Oncology/Hematology   [facility]         "

## 2023-12-19 ENCOUNTER — LAB VISIT (OUTPATIENT)
Dept: LAB | Facility: HOSPITAL | Age: 73
End: 2023-12-19
Attending: FAMILY MEDICINE
Payer: MEDICARE

## 2023-12-19 DIAGNOSIS — T45.1X5A IMMUNODEFICIENCY DUE TO CHEMOTHERAPY: ICD-10-CM

## 2023-12-19 DIAGNOSIS — C90.00 MULTIPLE MYELOMA, REMISSION STATUS UNSPECIFIED: Primary | ICD-10-CM

## 2023-12-19 DIAGNOSIS — D50.8 OTHER IRON DEFICIENCY ANEMIA: ICD-10-CM

## 2023-12-19 DIAGNOSIS — Z79.899 IMMUNODEFICIENCY DUE TO CHEMOTHERAPY: ICD-10-CM

## 2023-12-19 DIAGNOSIS — D84.821 IMMUNODEFICIENCY DUE TO CHEMOTHERAPY: ICD-10-CM

## 2023-12-19 LAB
ALBUMIN SERPL-MCNC: 3.4 G/DL (ref 3.4–4.8)
ALBUMIN/GLOB SERPL: 1.4 RATIO (ref 1.1–2)
ALP SERPL-CCNC: 104 UNIT/L (ref 40–150)
ALT SERPL-CCNC: 28 UNIT/L (ref 0–55)
AST SERPL-CCNC: 19 UNIT/L (ref 5–34)
BASOPHILS # BLD AUTO: 0.02 X10(3)/MCL
BASOPHILS NFR BLD AUTO: 0.5 %
BILIRUB SERPL-MCNC: 1 MG/DL
BUN SERPL-MCNC: 22 MG/DL (ref 8.4–25.7)
CALCIUM SERPL-MCNC: 8.9 MG/DL (ref 8.8–10)
CHLORIDE SERPL-SCNC: 105 MMOL/L (ref 98–107)
CHOLEST SERPL-MCNC: 98 MG/DL
CHOLEST/HDLC SERPL: 3 {RATIO} (ref 0–5)
CO2 SERPL-SCNC: 27 MMOL/L (ref 23–31)
CREAT SERPL-MCNC: 1.33 MG/DL (ref 0.73–1.18)
EOSINOPHIL # BLD AUTO: 0.26 X10(3)/MCL (ref 0–0.9)
EOSINOPHIL NFR BLD AUTO: 6.6 %
ERYTHROCYTE [DISTWIDTH] IN BLOOD BY AUTOMATED COUNT: 13.2 % (ref 11.5–17)
EST. AVERAGE GLUCOSE BLD GHB EST-MCNC: 114 MG/DL
GFR SERPLBLD CREATININE-BSD FMLA CKD-EPI: 56 MLS/MIN/1.73/M2
GLOBULIN SER-MCNC: 2.5 GM/DL (ref 2.4–3.5)
GLUCOSE SERPL-MCNC: 116 MG/DL (ref 82–115)
HBA1C MFR BLD: 5.6 %
HCT VFR BLD AUTO: 38.6 % (ref 42–52)
HDLC SERPL-MCNC: 34 MG/DL (ref 35–60)
HGB BLD-MCNC: 13 G/DL (ref 14–18)
IMM GRANULOCYTES # BLD AUTO: 0.01 X10(3)/MCL (ref 0–0.04)
IMM GRANULOCYTES NFR BLD AUTO: 0.3 %
LDLC SERPL CALC-MCNC: 44 MG/DL (ref 50–140)
LYMPHOCYTES # BLD AUTO: 1.17 X10(3)/MCL (ref 0.6–4.6)
LYMPHOCYTES NFR BLD AUTO: 29.6 %
MCH RBC QN AUTO: 32.7 PG (ref 27–31)
MCHC RBC AUTO-ENTMCNC: 33.7 G/DL (ref 33–36)
MCV RBC AUTO: 97 FL (ref 80–94)
MONOCYTES # BLD AUTO: 0.72 X10(3)/MCL (ref 0.1–1.3)
MONOCYTES NFR BLD AUTO: 18.2 %
NEUTROPHILS # BLD AUTO: 1.77 X10(3)/MCL (ref 2.1–9.2)
NEUTROPHILS NFR BLD AUTO: 44.8 %
PLATELET # BLD AUTO: 106 X10(3)/MCL (ref 130–400)
PMV BLD AUTO: 10.4 FL (ref 7.4–10.4)
POTASSIUM SERPL-SCNC: 3.5 MMOL/L (ref 3.5–5.1)
PROT SERPL-MCNC: 5.9 GM/DL (ref 5.8–7.6)
RBC # BLD AUTO: 3.98 X10(6)/MCL (ref 4.7–6.1)
SODIUM SERPL-SCNC: 140 MMOL/L (ref 136–145)
TRIGL SERPL-MCNC: 98 MG/DL (ref 34–140)
TSH SERPL-ACNC: 4.89 UIU/ML (ref 0.35–4.94)
VLDLC SERPL CALC-MCNC: 20 MG/DL
WBC # SPEC AUTO: 3.95 X10(3)/MCL (ref 4.5–11.5)

## 2023-12-19 PROCEDURE — 85025 COMPLETE CBC W/AUTO DIFF WBC: CPT

## 2023-12-19 PROCEDURE — 36415 COLL VENOUS BLD VENIPUNCTURE: CPT

## 2023-12-19 PROCEDURE — 84443 ASSAY THYROID STIM HORMONE: CPT

## 2023-12-19 PROCEDURE — 80061 LIPID PANEL: CPT

## 2023-12-19 PROCEDURE — 80053 COMPREHEN METABOLIC PANEL: CPT

## 2023-12-19 PROCEDURE — 83036 HEMOGLOBIN GLYCOSYLATED A1C: CPT

## 2023-12-26 DIAGNOSIS — C90.00 MULTIPLE MYELOMA NOT HAVING ACHIEVED REMISSION: ICD-10-CM

## 2023-12-27 ENCOUNTER — HOSPITAL ENCOUNTER (OUTPATIENT)
Dept: RADIOLOGY | Facility: HOSPITAL | Age: 73
Discharge: HOME | End: 2023-12-27
Attending: FAMILY MEDICINE
Payer: MEDICARE

## 2023-12-27 DIAGNOSIS — R05.9 COUGH: ICD-10-CM

## 2023-12-27 PROCEDURE — 71046 X-RAY EXAM CHEST 2 VIEWS: CPT | Mod: TC

## 2023-12-27 RX ORDER — LENALIDOMIDE 10 MG/1
10 CAPSULE ORAL DAILY
Qty: 21 EACH | Refills: 0 | Status: SHIPPED | OUTPATIENT
Start: 2023-12-27 | End: 2024-01-26 | Stop reason: SDUPTHER

## 2023-12-29 ENCOUNTER — OFFICE VISIT (OUTPATIENT)
Dept: HEMATOLOGY/ONCOLOGY | Facility: CLINIC | Age: 73
End: 2023-12-29
Payer: MEDICARE

## 2023-12-29 VITALS
WEIGHT: 203.81 LBS | BODY MASS INDEX: 29.18 KG/M2 | HEIGHT: 70 IN | RESPIRATION RATE: 18 BRPM | DIASTOLIC BLOOD PRESSURE: 72 MMHG | OXYGEN SATURATION: 98 % | HEART RATE: 56 BPM | SYSTOLIC BLOOD PRESSURE: 140 MMHG

## 2023-12-29 DIAGNOSIS — C90.00 MULTIPLE MYELOMA, REMISSION STATUS UNSPECIFIED: Primary | ICD-10-CM

## 2023-12-29 DIAGNOSIS — Z76.82 STEM CELL TRANSPLANT CANDIDATE: ICD-10-CM

## 2023-12-29 DIAGNOSIS — T45.1X5A IMMUNODEFICIENCY DUE TO CHEMOTHERAPY: ICD-10-CM

## 2023-12-29 DIAGNOSIS — D84.821 IMMUNODEFICIENCY DUE TO CHEMOTHERAPY: ICD-10-CM

## 2023-12-29 DIAGNOSIS — Z79.899 IMMUNODEFICIENCY DUE TO CHEMOTHERAPY: ICD-10-CM

## 2023-12-29 PROCEDURE — 99999 PR PBB SHADOW E&M-EST. PATIENT-LVL V: CPT | Mod: PBBFAC,,, | Performed by: INTERNAL MEDICINE

## 2023-12-29 PROCEDURE — 99214 OFFICE O/P EST MOD 30 MIN: CPT | Mod: S$PBB,,, | Performed by: INTERNAL MEDICINE

## 2023-12-29 PROCEDURE — 99215 OFFICE O/P EST HI 40 MIN: CPT | Mod: PBBFAC | Performed by: INTERNAL MEDICINE

## 2023-12-29 PROCEDURE — 99214 PR OFFICE/OUTPT VISIT, EST, LEVL IV, 30-39 MIN: ICD-10-PCS | Mod: S$PBB,,, | Performed by: INTERNAL MEDICINE

## 2023-12-29 PROCEDURE — 84165 PROTEIN E-PHORESIS SERUM: CPT

## 2023-12-29 PROCEDURE — 99999 PR PBB SHADOW E&M-EST. PATIENT-LVL V: ICD-10-PCS | Mod: PBBFAC,,, | Performed by: INTERNAL MEDICINE

## 2023-12-29 PROCEDURE — 84155 ASSAY OF PROTEIN SERUM: CPT

## 2023-12-29 RX ORDER — CHLOPHEDIANOL HCL AND PYRILAMINE MALEATE 12.5; 12.5 MG/5ML; MG/5ML
SOLUTION ORAL
COMMUNITY
End: 2024-02-06

## 2023-12-29 NOTE — PROGRESS NOTES
HEMATOLOGY/ONCOLOGY OFFICE CLINIC VISIT    Visit Information:    Initial Evaluation: 6/13/2023 (hospital consult)   Referring Provider: Dr Gonzalez  Other providers:  Code status: Not addressed    Diagnosis:  Multiple Myeloma IgA kappa    Present treatment:   lenalidomide 10 mg by mouth once daily for 21 days followed by a 7 day break, every 28 days. , and dexamethasone 20 mg weekly po initiated 7/12/23    PREVIOUS TREATMENT: Daratumumab x 4 cycles from 7/12/23 to 11/1/23 then held,   lenalidomide 10 mg by mouth once daily for 21 days followed by a 7 day break, every 28 days. , and dexamethasone 20 mg weekly initiated 7/12/23.    Treatment/Oncology history: 72 yo male admitted to the hospital in [place] 6/7/23 for weakness and hypercalcemia. She was diagnosed with multiple myeloma.     Plan of care:     Imaging:  Ct C 6/7/2023: 1. There is a small left sided pleural effusion.2. A small patchy area of ground glass density is seen in the lateral basal segment of the left lower lobe. This could reflect an acute focal infiltrate / pneumonitis. 3. There is diffuse osteopenia along the visualised bony structures together with multiple, numerous, small round-ovoid lucent lesion of varying sizes involving the marrow. These changes could reflect metabolic bone disease like hyperparathyroidism. Correlate clinically and with laboratory findings, as regards additional evaluation and follow-up.  6/8/23 Bone Scan Whole Body:  1. Scattered activity at the anterior left right rib margins as described.  A CT exam on the previous day reveals no definite fracture.  There is mild heterogeneous and bony loss at the anterior left and right ribs.  This is not have the typical pattern of a metastases.  2. Focal increased activity at the anterior manubrium which again on the CT exam demonstrates osteopenia and heterogeneous bony loss as well as scattered subcentimeter small lytic defects. 3. Suspect mild arthritic changes at the shoulders  sternoclavicular joints  6/9/23 MRI Thoracic Spine: Within limitations, no convincing evidence of acute thoracic spine abnormality, high-grade canal stenosis, or focal cord pathology. Multiple scattered vertebral body lesions are suspected and could represent metastatic disease, otherwise of incomplete characterization by non-contrast protocol. Incidental findings of the partially visualized thoracic cavity and retroperitoneum discussed above, poorly assessed by modality/protocol.  Recent non-contrast chest CT provides overall better visualization.  6/10/23 CT Head: No acute intracranial abnormality.  Findings consistent with microangiopathy no interval change.  6/12/23 XRAY Chest: 1. Patchy hazy opacities again evident at the lower lungs bilaterally suspicious for infiltrate and atelectasis.  Small bibasilar pleural reactions cannot be excluded. 2. Borderline cardiomegaly 3. Atherosclerosis  6/16/23 XRAY Chest: Improved aeration of the lung bases with mild residual bibasilar opacities and small pleural effusions suspected.      Pathology:  6/12/23 Bone marrow aspiration/Biopsy:   PLASMA CELL MYELOMA, KAPPA RESTRICTED.     PLASMA CELL MYELOMA INVOLVES 90% OF BONE MARROW CELLULARITY WITH SMALL AREAS OF  SEQUENTIAL TRILINEAGE HEMATOPOIESIS.    ABSENT IRON STORES.     PERIPHERAL BLOOD: NORMOCYTIC NORMOCHROMIC ANEMIA. THROMBOCYTOPENIA.     LABS:  6/8/23 M-spike 3.33, IgA >7040, serum kappa 5/ lambda 0.56/ K:L ratio 9.09, 24 hour urine for M protein done but not resulted. Calcium 13.4/Alb 1.9, Cr 2.3, globulin 10.7  7/12/23 M-spike 2.49. IgA >7040, Corrected calcium 12.3, Cr 2.09, Globulin 7.6  8/15/23 M-spike 1.49, IgA 4000  9/18/23 M-spike 1.0, IgA 1700  10/17/23 M-spike 0.1, IgA 700, KL ratio 2 (nl K and L light chains)  11/15/23 M spike 0.14, IgA 448, KL ratio 1.65 (normal K and L light chains)  12/06/23 M spike 0.12, IgA 402, KL ratio 1.52 (normal K and L light chains)    CLINICAL HISTORY:       Patient: Chip  Hamzah is a 73 y.o. male That was seen  for the first time as hospital consult on 6/13/2023.   Patient was brought to the emergency department for confusion.  Upon evaluation in the ER CT scan of the head with no acute intracranial abnormality.  Finding consistent with microangiopathic no interval change.  CT of the chest with no contrast showed diffuse osteopenia with multiple, numerous, small round avid lucent lesions ovarian size involving the marrow.  Changes could reflect metabolic bone disease like hyperparathyroidism.  MRI of the thoracic spine with multiple scattered vertebral body lesion are suspect and could represent metastatic disease.  Bone scan with scattered activity in the anterior left right rib margins no fractures focal increased activity at the anterior manubrium scattered subcentimeter small lytic defects.  Ultrasound of the thyroid that shows multiple nodules. skeletal survey  area on humerus only.     Labs with elevated calcium of 12.6, corrected calcium 14.36.  Total protein was 11.8 with a total globulin 10.0.  Further workup revealed an IgA over 7040.0, IgM 6.0, IgG 165.00.  Free serum kappa light chain 5.09, free serum lambda light chains 0.5600 with a kappa/lambda ratio of 9.09.  M spike 3.33.  PSA 0.96     Bone marrow biopsy performed 6/12/23. Patient receive pamidronate 60 mg on 06/09/2023 and was started on hydration.  His calcium improved as well as his mental status.     Chief Complaint: Multiple Myeloma (No complaints. )    Interval History:  12/13/2023:  He returns to clinic today for a 4 week follow up.  He started his most recent cycle of Revlimid on 12/04/2023.  He does not currently have follow up with transplant team at this time.  PET was completed recently.  He feels well today without any concerns or complaints.  Labs are stable and reviewed with him in detail.    11/15/23:  no further Daratumumab per transplant. The transplant doctor will followup with him in Mid January  so as not to have the transplant during the holidays. He will continue Dex and leno; his daughter puts his meds into a pill box for him. He is not familiar with what he is taking.     11/1/23 He returns to clinic today for a two week follow-up and for treatment clearance for cycle 4 day 15 daratumumab, lenalidomide, and dexamethasone.  He reports feeling well today. Stable fatigue.  Having constipation, but currently taking Linzess as prescribed by PCP for this. He did begin iron BID as prescribed at last OV. He had an appt with the transplant team on 10/20/2023 to discuss his eligibility.  As of now, he will be stopping daratumumab after cycle 4 and continue with lenalidomide and Decadron per transplant on 10/20/2023. He denies any fever, chills, shortness of breath, chest pain, cough, abdominal pain, nausea, vomiting, diarrhea, peripheral neuropathy, edema.  Labs reviewed with patient in detail, weight stable.    Past Medical History:   Diagnosis Date    GERD (gastroesophageal reflux disease)     HTN (hypertension)     Hypothyroidism, unspecified     Type 2 diabetes mellitus without complications       Past Surgical History:   Procedure Laterality Date    APPENDECTOMY  1965    BONE MARROW BIOPSY Right 06/12/2023    Procedure: BIOPSY, BONE MARROW;  Surgeon: Silvano Austin MD;  Location: [facility];  Service: General;  Laterality: Right;    removal of boils       Family History   Problem Relation Age of Onset    Lung cancer Mother     Breast cancer Sister      Social Connections: Patient Declined (6/9/2023)    Social Connection and Isolation Panel [NHANES]     Frequency of Communication with Friends and Family: Patient declined     Frequency of Social Gatherings with Friends and Family: Patient declined     Attends Faith Services: Patient declined     Active Member of Clubs or Organizations: Patient declined     Attends Club or Organization Meetings: Patient declined     Marital Status: Patient declined       Review  of patient's allergies indicates:   Allergen Reactions    Iodine Anaphylaxis    Shellfish containing products     Amoxicillin-pot clavulanate Itching      Current Outpatient Medications on File Prior to Visit   Medication Sig Dispense Refill    acyclovir (ZOVIRAX) 400 MG tablet Take 1 tablet (400 mg total) by mouth 2 (two) times daily. 60 tablet 11    albuterol (PROVENTIL/VENTOLIN HFA) 90 mcg/actuation inhaler 2 puffs every 4 to 6 hours as needed.      ALPRAZolam (XANAX) 0.5 MG tablet Take 0.5 mg by mouth daily as needed.      amLODIPine (NORVASC) 10 MG tablet Take 1 tablet (10 mg total) by mouth once daily. 30 each 11    atorvastatin (LIPITOR) 10 MG tablet Take 10 mg by mouth.      carvediloL (COREG) 12.5 MG tablet Take 12.5 mg by mouth 2 (two) times daily.      cloNIDine (CATAPRES) 0.2 MG tablet Take 0.2 mg by mouth daily as needed.      dexAMETHasone (DECADRON) 4 MG Tab Take 5 tablets (20 mg total) by mouth every 7 days. on days following daratumumab infusion. Take with food. 20 tablet 1    dexAMETHasone (DECADRON) 4 MG Tab Take 5 tabs (20 mg) orally on days following daratumumab infusion (Days 2 and 16) and take 10 tabs (40 mg) on Days 8 and 22 of each cycle. Take with food. 30 tablet 3    famotidine (PEPCID) 20 MG tablet Take 1 tablet (20 mg total) by mouth once daily. 30 tablet 11    finasteride (PROSCAR) 5 mg tablet Take 5 mg by mouth.      gabapentin (NEURONTIN) 100 MG capsule Take by mouth.      hydrALAZINE (APRESOLINE) 50 MG tablet Take 1 tablet (50 mg total) by mouth every 8 (eight) hours. 90 tablet 11    hydrocodone-chlorpheniramine (TUSSIONEX) 10-8 mg/5 mL suspension SMARTSI Milliliter(s) By Mouth Every 12 Hours      lenalidomide 10 mg Cap Take 10 mg by mouth once daily for 21 days followed by a 7 day break, every 28 days. 21 each 0    levoFLOXacin (LEVAQUIN) 500 MG tablet Take 1 tablet (500 mg total) by mouth once daily. 7 each 0    levothyroxine (SYNTHROID) 112 MCG tablet Take 112 mcg by mouth.       nitroGLYCERIN (NITROSTAT) 0.4 MG SL tablet place one tablet under the tongue every five minutes as needed for chest pain up to 3 doses. if no relief call 911      ondansetron (ZOFRAN) 8 MG tablet Take 1 tablet (8 mg total) by mouth every 8 (eight) hours as needed for Nausea. 30 tablet 2    ONETOUCH ULTRA TEST Strp USE TO TEST BLOOD GLUCOSE TWICE DAILY      oxybutynin (DITROPAN-XL) 10 MG 24 hr tablet TAKE ONE TABLET BY MOUTH ONCE DAILY AS NEEDED FOR BLADDER SPASMS      pantoprazole (PROTONIX) 40 MG tablet Take 1 tablet (40 mg total) by mouth 2 (two) times daily before meals. 60 tablet 11    pyrilamine-chlophedianoL (NINJACOF) 12.5-12.5 mg/5 mL Liqd Take by mouth.      ranolazine (RANEXA) 500 MG Tb12 Take 500 mg by mouth 2 (two) times daily.      SITagliptin phosphate (JANUVIA) 50 MG Tab Take 1 tablet (50 mg total) by mouth once daily. 30 tablet 5    spironolactone (ALDACTONE) 25 MG tablet Take 12.5 mg by mouth.       Current Facility-Administered Medications on File Prior to Visit   Medication Dose Route Frequency Provider Last Rate Last Admin    alteplase injection 2 mg  2 mg Intra-Catheter PRN Elsy Amaya MD        daratumumab-hyaluronidase-fihj subcutaneous injection 1,800 mg  1,800 mg Subcutaneous 1 time in Clinic/HOD Elsy Amaya MD        diphenhydrAMINE injection 50 mg  50 mg Intravenous Once PRN Elsy Amaya MD        EPINEPHrine (EPIPEN) 0.3 mg/0.3 mL pen injection 0.3 mg  0.3 mg Intramuscular Once PRN Elsy Amaya MD        heparin, porcine (PF) 100 unit/mL injection flush 500 Units  500 Units Intravenous PRN Elsy Amaya MD        hydrocortisone sodium succinate injection 100 mg  100 mg Intravenous Once PRN Elsy Amaya MD        sodium chloride 0.9% 250 mL flush bag   Intravenous 1 time in Clinic/HOD Elsy Amaya MD        sodium chloride 0.9% flush 10 mL  10 mL Intravenous PRN Elsy Amaya MD          Review of Systems   Constitutional:   "Negative for appetite change, chills, diaphoresis, fever and unexpected weight change.   HENT:  Negative for nasal congestion, mouth sores, nosebleeds, sinus pressure/congestion, sore throat and trouble swallowing.    Eyes: Negative.    Respiratory:  Negative for cough and shortness of breath.    Cardiovascular:  Negative for chest pain and palpitations.   Gastrointestinal:  Negative for abdominal distention, abdominal pain, blood in stool, change in bowel habit, constipation, diarrhea, nausea and vomiting.   Endocrine: Negative.    Genitourinary:  Negative for bladder incontinence, decreased urine volume, dysuria, frequency, hematuria and urgency. Difficulty urinating: had a ocampo cath.  Musculoskeletal:  Negative for arthralgias, back pain, gait problem, joint swelling, leg pain and myalgias.   Integumentary:  Negative for rash.   Allergic/Immunologic: Negative.    Neurological:  Negative for dizziness, tremors, syncope, weakness, light-headedness, numbness, headaches and memory loss.   Hematological:  Negative for adenopathy. Does not bruise/bleed easily.   Psychiatric/Behavioral:  Negative for agitation, confusion, hallucinations, sleep disturbance and suicidal ideas. The patient is not nervous/anxious.               Vitals:    12/29/23 1131   BP: (!) 140/72   Pulse: (!) 56   Resp: 18   SpO2: 98%   Weight: 92.4 kg (203 lb 12.8 oz)   Height: 5' 10" (1.778 m)              Wt Readings from Last 6 Encounters:   12/29/23 92.4 kg (203 lb 12.8 oz)   12/13/23 91.4 kg (201 lb 6.4 oz)   11/15/23 91.4 kg (201 lb 6.4 oz)   11/01/23 90.7 kg (200 lb)   11/01/23 90.7 kg (200 lb)   10/18/23 88.9 kg (196 lb)     Body mass index is 29.24 kg/m².  Body surface area is 2.14 meters squared.  Physical Exam  Vitals and nursing note reviewed.   Constitutional:       General: He is not in acute distress.     Appearance: Normal appearance.      Comments: Elderly male   HENT:      Head: Normocephalic and atraumatic.      Mouth/Throat:      " Mouth: Mucous membranes are moist.   Eyes:      General: No scleral icterus.     Extraocular Movements: Extraocular movements intact.      Conjunctiva/sclera: Conjunctivae normal.   Neck:      Vascular: No JVD.   Cardiovascular:      Rate and Rhythm: Normal rate and regular rhythm.      Heart sounds: No murmur heard.  Pulmonary:      Effort: Pulmonary effort is normal.      Breath sounds: Normal breath sounds. No wheezing or rhonchi.   Abdominal:      General: Bowel sounds are normal. There is no distension.      Palpations: Abdomen is soft.      Tenderness: There is no abdominal tenderness.   Musculoskeletal:         General: No swelling or deformity.      Cervical back: Neck supple.   Lymphadenopathy:      Head:      Right side of head: No submandibular adenopathy.      Left side of head: No submandibular adenopathy.      Cervical: No cervical adenopathy.      Upper Body:      Right upper body: No supraclavicular or axillary adenopathy.      Left upper body: No supraclavicular or axillary adenopathy.      Lower Body: No right inguinal adenopathy. No left inguinal adenopathy.   Skin:     General: Skin is warm.      Coloration: Skin is not jaundiced.      Findings: No rash.   Neurological:      General: No focal deficit present.      Mental Status: He is alert and oriented to person, place, and time.      Cranial Nerves: Cranial nerves 2-12 are intact.   Psychiatric:         Attention and Perception: Attention normal.         Behavior: Behavior is cooperative.       Laboratory:  CBC with Differential:  Lab Results   Component Value Date    WBC 5.58 12/29/2023    RBC 4.05 (L) 12/29/2023    HGB 13.5 (L) 12/29/2023    HCT 40.0 (L) 12/29/2023    MCV 98.8 (H) 12/29/2023    MCH 33.3 (H) 12/29/2023    MCHC 33.8 12/29/2023    RDW 13.5 12/29/2023     12/29/2023    MPV 9.6 12/29/2023   ++++++++++++++  ORIGINAL MYELOMA LABS IN 7/2023  Serum:  SPEP: Serum protein electrophoresis shows a distinct monoclonal peak (3.33  g/dL) in the beta zone, consistent with a monoclonal gammopathy.   PAO: A band is present in the IgA trini with a corresponding band in the kappa trini.   The immunofixation electrophoresis are consistent with monoclonal gammopathy of IgA-kappa isotype.     IgG Level 540.00 - 1,822.00 mg/dL 165.00 Low     IgA Level 101.0 - 645.0 mg/dL >7,040.0 High     IgM Level 22.0 - 240.0 mg/dL 6.0 Low       Kappa Free Light Chain 0.3300 - 1.94 mg/dL 5.09 High     Lambda Free Light Chain 0.5700 - 2.63 mg/dL 0.5600 Low     Kappa/Lambda FLC Ratio 0.2600 - 1.65 9.09 High      Urine:   24 hrs Urine:  M spike    1467      H    mg/24 h  +++++++++++++++++  CMP:  Sodium Level   Date Value Ref Range Status   12/29/2023 141 136 - 145 mmol/L Final     Potassium Level   Date Value Ref Range Status   12/29/2023 3.8 3.5 - 5.1 mmol/L Final     Carbon Dioxide   Date Value Ref Range Status   12/29/2023 29 23 - 31 mmol/L Final     Blood Urea Nitrogen   Date Value Ref Range Status   12/29/2023 17.0 8.4 - 25.7 mg/dL Final     Creatinine   Date Value Ref Range Status   12/29/2023 1.14 0.73 - 1.18 mg/dL Final     Calcium Level Total   Date Value Ref Range Status   12/29/2023 9.2 8.8 - 10.0 mg/dL Final     Albumin Level   Date Value Ref Range Status   12/29/2023 3.5 3.4 - 4.8 g/dL Final     Bilirubin Total   Date Value Ref Range Status   12/29/2023 1.0 <=1.5 mg/dL Final     Alkaline Phosphatase   Date Value Ref Range Status   12/29/2023 118 40 - 150 unit/L Final     Aspartate Aminotransferase   Date Value Ref Range Status   12/29/2023 11 5 - 34 unit/L Final     Alanine Aminotransferase   Date Value Ref Range Status   12/29/2023 20 0 - 55 unit/L Final      12/6/23 IgA 402, M-spike 0.12, WBC 5.58, HGB 13.5, HCT 40.0    5.58 x10(3)/mcL Lymph % 29.2 %    RBC 4.05 Low  x10(6)/mcL Mono % 13.8 %   Hgb 13.5 Low  g/dL Eos % 5.9 %   Hct 40.0 Low  % Basophil % 0.4 %   MCV 98.8 High  fL Lymph # 1.63 x10(3)/mcL   MCH 33.3 High  pg Neut # 2.82 x10(3)/mcL   MCHC 33.8  "g/dL Mono # 0.77 x10(3)/mcL   RDW 13.5 % Eos # 0.33 x10(3)/mcL   Platelet 251 x10(3)/mc        IMAGING  11/2023 PET:  1. Bilateral rib expansile lesions with low level uptake likely relate to patient's myeloma.  2. No extraosseous disease.  Assessment:       1. Multiple myeloma, remission status unspecified    2. Stem cell transplant candidate    3. Immunodeficiency due to chemotherapy            Chemotherapy   Patient doing well today.  No complaints.  Afebrile.  Taking Lenalidomide and Dexamethasone as directed.  Here for cycle 5    Constipation  Currently taking Linzess as prescribed by PCP    Per transplant MD DR. Vasquez on 10/20/23:     "He is very fit, had limited co-morbidities, and has good support.  He will be considered for reduced intensity conditioning and autologous stem cell transplant.  He will stop daratumumab after cycle 4, and continue lenaldiomide and decadron.    Discussed with , his oncologist in [place]. "      Plan:   Labs stable.    Per transplant, continue with lenalidomide and dexamethasone only at this time. Currently on cycle 5  Continue oral iron.   At his 12/29/23 visit he did not have follow up with transplant. I chatted with Dr. Evans who states that he will get the process started.  Return to clinic in 4 weeks weeks with MD for follow up  Qmonth MM labs      The patient is in agreement with today's plan of care.  All questions answered.  Patient is aware to contact our office for any problems or concerns prior to next visit.    Oncology/Hematology   [facility]         "

## 2024-01-02 ENCOUNTER — TELEPHONE (OUTPATIENT)
Dept: HEMATOLOGY/ONCOLOGY | Facility: CLINIC | Age: 74
End: 2024-01-02
Payer: MEDICARE

## 2024-01-02 NOTE — TELEPHONE ENCOUNTER
----- Message from Bhavani Sarmiento RN sent at 1/2/2024  1:50 PM CST -----  Regarding: Needs Transplant Doctor Appointment  Dr Rose spoke with Dr Evans on Friday regarding this patient. He suppose to be getting an appointment schedule. Can you let me know when patient is schedule? Thanks.

## 2024-01-17 ENCOUNTER — TELEPHONE (OUTPATIENT)
Dept: HEMATOLOGY/ONCOLOGY | Facility: CLINIC | Age: 74
End: 2024-01-17
Payer: MEDICARE

## 2024-01-17 DIAGNOSIS — C90.01 MULTIPLE MYELOMA IN REMISSION: ICD-10-CM

## 2024-01-17 DIAGNOSIS — Z76.82 STEM CELL TRANSPLANT CANDIDATE: Primary | ICD-10-CM

## 2024-01-17 DIAGNOSIS — C90.00 MULTIPLE MYELOMA, REMISSION STATUS UNSPECIFIED: ICD-10-CM

## 2024-01-17 NOTE — TELEPHONE ENCOUNTER
----- Message from Temi Escobedo sent at 1/17/2024 11:31 AM CST -----  Type:  Information Regarding Upcoming Appt     Who Called: Pt's daughter, Ashleigh   Would the patient rather a call back or a response via [facility]? Call back   Best Call Back Number: [phone]  Additional Information: Please be advised, caller would like to know for upcoming appt on 01/23 for pt, if it's going to be overnight and what should they bring

## 2024-01-19 ENCOUNTER — LAB VISIT (OUTPATIENT)
Dept: LAB | Facility: HOSPITAL | Age: 74
End: 2024-01-19
Attending: INTERNAL MEDICINE
Payer: MEDICARE

## 2024-01-19 DIAGNOSIS — C90.01 MULTIPLE MYELOMA IN REMISSION: ICD-10-CM

## 2024-01-19 LAB
ALBUMIN SERPL-MCNC: 3.8 G/DL (ref 3.4–4.8)
ALBUMIN/GLOB SERPL: 1.7 RATIO (ref 1.1–2)
ALP SERPL-CCNC: 104 UNIT/L (ref 40–150)
ALT SERPL-CCNC: 17 UNIT/L (ref 0–55)
AST SERPL-CCNC: 11 UNIT/L (ref 5–34)
BASOPHILS # BLD AUTO: 0.04 X10(3)/MCL
BASOPHILS NFR BLD AUTO: 0.6 %
BILIRUB SERPL-MCNC: 1 MG/DL
BUN SERPL-MCNC: 28 MG/DL (ref 8.4–25.7)
CALCIUM SERPL-MCNC: 9.4 MG/DL (ref 8.8–10)
CHLORIDE SERPL-SCNC: 103 MMOL/L (ref 98–107)
CO2 SERPL-SCNC: 29 MMOL/L (ref 23–31)
CREAT SERPL-MCNC: 1.15 MG/DL (ref 0.73–1.18)
EOSINOPHIL # BLD AUTO: 0.35 X10(3)/MCL (ref 0–0.9)
EOSINOPHIL NFR BLD AUTO: 4.9 %
ERYTHROCYTE [DISTWIDTH] IN BLOOD BY AUTOMATED COUNT: 13.3 % (ref 11.5–17)
GFR SERPLBLD CREATININE-BSD FMLA CKD-EPI: >60 MLS/MIN/1.73/M2
GLOBULIN SER-MCNC: 2.2 GM/DL (ref 2.4–3.5)
GLUCOSE SERPL-MCNC: 118 MG/DL (ref 82–115)
HCT VFR BLD AUTO: 39.1 % (ref 42–52)
HGB BLD-MCNC: 13.3 G/DL (ref 14–18)
IGA SERPL-MCNC: 335 MG/DL (ref 101–645)
IGG SERPL-MCNC: 454 MG/DL (ref 540–1822)
IGM SERPL-MCNC: 15 MG/DL (ref 22–240)
IMM GRANULOCYTES # BLD AUTO: 0.01 X10(3)/MCL (ref 0–0.04)
IMM GRANULOCYTES NFR BLD AUTO: 0.1 %
LDH SERPL-CCNC: 111 U/L (ref 125–220)
LYMPHOCYTES # BLD AUTO: 1.9 X10(3)/MCL (ref 0.6–4.6)
LYMPHOCYTES NFR BLD AUTO: 26.5 %
MCH RBC QN AUTO: 33.5 PG (ref 27–31)
MCHC RBC AUTO-ENTMCNC: 34 G/DL (ref 33–36)
MCV RBC AUTO: 98.5 FL (ref 80–94)
MONOCYTES # BLD AUTO: 0.73 X10(3)/MCL (ref 0.1–1.3)
MONOCYTES NFR BLD AUTO: 10.2 %
NEUTROPHILS # BLD AUTO: 4.15 X10(3)/MCL (ref 2.1–9.2)
NEUTROPHILS NFR BLD AUTO: 57.7 %
PLATELET # BLD AUTO: 152 X10(3)/MCL (ref 130–400)
PMV BLD AUTO: 9.9 FL (ref 7.4–10.4)
POTASSIUM SERPL-SCNC: 3.6 MMOL/L (ref 3.5–5.1)
PROT SERPL-MCNC: 6 GM/DL (ref 5.8–7.6)
RBC # BLD AUTO: 3.97 X10(6)/MCL (ref 4.7–6.1)
SODIUM SERPL-SCNC: 137 MMOL/L (ref 136–145)
WBC # SPEC AUTO: 7.18 X10(3)/MCL (ref 4.5–11.5)

## 2024-01-19 PROCEDURE — 80053 COMPREHEN METABOLIC PANEL: CPT

## 2024-01-19 PROCEDURE — 84165 PROTEIN E-PHORESIS SERUM: CPT

## 2024-01-19 PROCEDURE — 83521 IG LIGHT CHAINS FREE EACH: CPT | Mod: 59

## 2024-01-19 PROCEDURE — 82784 ASSAY IGA/IGD/IGG/IGM EACH: CPT

## 2024-01-19 PROCEDURE — 84155 ASSAY OF PROTEIN SERUM: CPT | Mod: 91

## 2024-01-19 PROCEDURE — 83615 LACTATE (LD) (LDH) ENZYME: CPT

## 2024-01-19 PROCEDURE — 36415 COLL VENOUS BLD VENIPUNCTURE: CPT

## 2024-01-19 PROCEDURE — 85025 COMPLETE CBC W/AUTO DIFF WBC: CPT

## 2024-01-22 LAB
KAPPA LC FREE SER-MCNC: 1.61 MG/DL (ref 0.33–1.94)
KAPPA LC FREE/LAMBDA FREE SER: 1.75 {RATIO} (ref 0.26–1.65)
LAMBDA LC FREE SERPL-MCNC: 0.92 MG/DL (ref 0.57–2.63)

## 2024-01-23 ENCOUNTER — OFFICE VISIT (OUTPATIENT)
Dept: HEMATOLOGY/ONCOLOGY | Facility: CLINIC | Age: 74
End: 2024-01-23
Payer: MEDICARE

## 2024-01-23 DIAGNOSIS — C90.01 MULTIPLE MYELOMA IN REMISSION: ICD-10-CM

## 2024-01-23 DIAGNOSIS — K21.9 GASTROESOPHAGEAL REFLUX DISEASE, UNSPECIFIED WHETHER ESOPHAGITIS PRESENT: Primary | ICD-10-CM

## 2024-01-23 DIAGNOSIS — Z76.82 STEM CELL TRANSPLANT CANDIDATE: ICD-10-CM

## 2024-01-23 DIAGNOSIS — I10 PRIMARY HYPERTENSION: ICD-10-CM

## 2024-01-23 LAB — MAYO GENERIC ORDERABLE RESULT: ABNORMAL

## 2024-01-23 PROCEDURE — 99214 OFFICE O/P EST MOD 30 MIN: CPT | Mod: 95,,, | Performed by: INTERNAL MEDICINE

## 2024-01-23 NOTE — PROGRESS NOTES
STEM CELL TRANSPLANT FOLLOW UP NOTE        The patient location is: home  The chief complaint leading to consultation is: multiple myeloma, stem cell transplant candidate    Visit type: audiovisual    Face to Face time with patient: 30 minutes  45 minutes of total time spent on the encounter, which includes face to face time and non-face to face time preparing to see the patient (eg, review of tests), Obtaining and/or reviewing separately obtained history, Documenting clinical information in the electronic or other health record, Independently interpreting results (not separately reported) and communicating results to the patient/family/caregiver, or Care coordination (not separately reported).         Each patient to whom he or she provides medical services by telemedicine is:  (1) informed of the relationship between the physician and patient and the respective role of any other health care provider with respect to management of the patient; and (2) notified that he or she may decline to receive medical services by telemedicine and may withdraw from such care at any time.    Notes:      Multiple Myeloma IgA kappa    Present treatment:  Daratumumab, lenalidomide, and dexamethasone initiated 7/12/23      Oncology History ( copied from EMR)    Imaging:  CT C 6/7/2023: 1. There is a small left sided pleural effusion.2. A small patchy area of ground glass density is seen in the lateral basal segment of the left lower lobe. This could reflect an acute focal infiltrate / pneumonitis. 3. There is diffuse osteopenia along the visualised bony structures together with multiple, numerous, small round-ovoid lucent lesion of varying sizes involving the marrow. These changes could reflect metabolic bone disease like hyperparathyroidism. Correlate clinically and with laboratory findings, as regards additional evaluation and follow-up.  6/8/23 Bone Scan Whole Body:  1. Scattered activity at the anterior left right rib margins as  described.  A CT exam on the previous day reveals no definite fracture.  There is mild heterogeneous and bony loss at the anterior left and right ribs.  This is not have the typical pattern of a metastases.  2. Focal increased activity at the anterior manubrium which again on the CT exam demonstrates osteopenia and heterogeneous bony loss as well as scattered subcentimeter small lytic defects. 3. Suspect mild arthritic changes at the shoulders sternoclavicular joints  6/9/23 MRI Thoracic Spine: Within limitations, no convincing evidence of acute thoracic spine abnormality, high-grade canal stenosis, or focal cord pathology. Multiple scattered vertebral body lesions are suspected and could represent metastatic disease, otherwise of incomplete characterization by non-contrast protocol. Incidental findings of the partially visualized thoracic cavity and retroperitoneum discussed above, poorly assessed by modality/protocol.  Recent non-contrast chest CT provides overall better visualization.  6/10/23 CT Head: No acute intracranial abnormality.  Findings consistent with microangiopathy no interval change.  6/12/23 XRAY Chest: 1. Patchy hazy opacities again evident at the lower lungs bilaterally suspicious for infiltrate and atelectasis.  Small bibasilar pleural reactions cannot be excluded. 2. Borderline cardiomegaly 3. Atherosclerosis  6/16/23 XRAY Chest: Improved aeration of the lung bases with mild residual bibasilar opacities and small pleural effusions suspected.      Pathology:  6/12/23 Bone marrow aspiration/Biopsy:   PLASMA CELL MYELOMA, KAPPA RESTRICTED.     PLASMA CELL MYELOMA INVOLVES 90% OF BONE MARROW CELLULARITY WITH SMALL AREAS OF  SEQUENTIAL TRILINEAGE HEMATOPOIESIS.    ABSENT IRON STORES.     PERIPHERAL BLOOD: NORMOCYTIC NORMOCHROMIC ANEMIA. THROMBOCYTOPENIA.     LABS:  6/8/23 M-spike 3.33, IgA >7040, serum kappa 5/ lambda 0.56/ K:L ratio 9.09, 24 hour urine for M protein done but not resulted. Calcium  13.4/Alb 1.9, Cr 2.3, globulin 10.7  7/12/23 M-spike 2.49. IgA >7040, Corrected calcium 12.3, Cr 2.09, Globulin 7.6  8/15/23 M-spike 1.49, IgA 4000  9/18/23 M-spike 1.0, IgA 1700    , 73, is here for virtual visit for stem cell transplant consultation. He has HTn, GERD, type 2 DM not on long term insulin.   He has IgA kappa multiple myeloma, diagnosed in June 2023.    He has been treated with DRd from 7/18/23.   He is currently in cycle 4. He has tolerated the treatments well.        Review of patient's allergies indicates:   Allergen Reactions    Iodine Anaphylaxis    Shellfish containing products     Amoxicillin-pot clavulanate Itching      Current Outpatient Medications on File Prior to Visit   Medication Sig Dispense Refill    acyclovir (ZOVIRAX) 400 MG tablet Take 1 tablet (400 mg total) by mouth 2 (two) times daily. 60 tablet 11    albuterol (PROVENTIL/VENTOLIN HFA) 90 mcg/actuation inhaler 2 puffs every 4 to 6 hours as needed.      ALPRAZolam (XANAX) 0.5 MG tablet Take 0.5 mg by mouth daily as needed.      amLODIPine (NORVASC) 10 MG tablet Take 1 tablet (10 mg total) by mouth once daily. 30 each 11    atorvastatin (LIPITOR) 10 MG tablet Take 10 mg by mouth.      carvediloL (COREG) 12.5 MG tablet Take 12.5 mg by mouth 2 (two) times daily.      cloNIDine (CATAPRES) 0.2 MG tablet Take 0.2 mg by mouth daily as needed.      dexAMETHasone (DECADRON) 4 MG Tab Take 5 tablets (20 mg total) by mouth every 7 days. on days following daratumumab infusion. Take with food. 20 tablet 1    dexAMETHasone (DECADRON) 4 MG Tab Take 5 tabs (20 mg) orally on days following daratumumab infusion (Days 2 and 16) and take 10 tabs (40 mg) on Days 8 and 22 of each cycle. Take with food. 30 tablet 3    famotidine (PEPCID) 20 MG tablet Take 1 tablet (20 mg total) by mouth once daily. 30 tablet 11    finasteride (PROSCAR) 5 mg tablet Take 5 mg by mouth.      gabapentin (NEURONTIN) 100 MG capsule Take by mouth.      hydrALAZINE  (APRESOLINE) 50 MG tablet Take 1 tablet (50 mg total) by mouth every 8 (eight) hours. 90 tablet 11    hydrocodone-chlorpheniramine (TUSSIONEX) 10-8 mg/5 mL suspension SMARTSI Milliliter(s) By Mouth Every 12 Hours      lenalidomide 10 mg Cap Take 10 mg by mouth once daily for 21 days followed by a 7 day break, every 28 days. 21 each 0    levoFLOXacin (LEVAQUIN) 500 MG tablet Take 1 tablet (500 mg total) by mouth once daily. 7 each 0    levothyroxine (SYNTHROID) 112 MCG tablet Take 112 mcg by mouth.      nitroGLYCERIN (NITROSTAT) 0.4 MG SL tablet place one tablet under the tongue every five minutes as needed for chest pain up to 3 doses. if no relief call 911      ondansetron (ZOFRAN) 8 MG tablet Take 1 tablet (8 mg total) by mouth every 8 (eight) hours as needed for Nausea. 30 tablet 2    ONETOUCH ULTRA TEST Strp USE TO TEST BLOOD GLUCOSE TWICE DAILY      oxybutynin (DITROPAN-XL) 10 MG 24 hr tablet TAKE ONE TABLET BY MOUTH ONCE DAILY AS NEEDED FOR BLADDER SPASMS      pantoprazole (PROTONIX) 40 MG tablet Take 1 tablet (40 mg total) by mouth 2 (two) times daily before meals. 60 tablet 11    pyrilamine-chlophedianoL (NINJACOF) 12.5-12.5 mg/5 mL Liqd Take by mouth.      ranolazine (RANEXA) 500 MG Tb12 Take 500 mg by mouth 2 (two) times daily.      SITagliptin phosphate (JANUVIA) 50 MG Tab Take 1 tablet (50 mg total) by mouth once daily. 30 tablet 5    spironolactone (ALDACTONE) 25 MG tablet Take 12.5 mg by mouth.       Current Facility-Administered Medications on File Prior to Visit   Medication Dose Route Frequency Provider Last Rate Last Admin    alteplase injection 2 mg  2 mg Intra-Catheter PRN Elsy Amaya MD        daratumumab-hyaluronidase-fij subcutaneous injection 1,800 mg  1,800 mg Subcutaneous 1 time in Clinic/HOD Elsy Amaya MD        diphenhydrAMINE injection 50 mg  50 mg Intravenous Once PRN Elsy Amaya MD        EPINEPHrine (EPIPEN) 0.3 mg/0.3 mL pen injection 0.3 mg  0.3 mg  Intramuscular Once PRN Elsy Amaya MD        heparin, porcine (PF) 100 unit/mL injection flush 500 Units  500 Units Intravenous PRN Elsy Amaya MD        hydrocortisone sodium succinate injection 100 mg  100 mg Intravenous Once PRN Elsy Amaya MD        sodium chloride 0.9% 250 mL flush bag   Intravenous 1 time in Clinic/HOD Elsy Amaya MD        sodium chloride 0.9% flush 10 mL  10 mL Intravenous PRN Elsy Amaya MD          Review of Systems   Constitutional:  Negative for appetite change, chills, diaphoresis, fever and unexpected weight change.   HENT:  Negative for nasal congestion, mouth sores, nosebleeds, sinus pressure/congestion, sore throat and trouble swallowing.    Eyes: Negative.    Respiratory:  Negative for cough and shortness of breath.    Cardiovascular:  Negative for chest pain and palpitations.   Gastrointestinal:  Negative for abdominal distention, abdominal pain, blood in stool, change in bowel habit, constipation, diarrhea, nausea and vomiting.   Endocrine: Negative.    Genitourinary:  Negative for bladder incontinence, decreased urine volume, dysuria, frequency, hematuria and urgency. Difficulty urinating: had a ocampo cath.  Musculoskeletal:  Negative for arthralgias, back pain, gait problem, joint swelling, leg pain and myalgias.   Integumentary:  Negative for rash.   Allergic/Immunologic: Negative.    Neurological:  Negative for dizziness, tremors, syncope, weakness, light-headedness, numbness, headaches and memory loss.   Hematological:  Negative for adenopathy. Does not bruise/bleed easily.   Psychiatric/Behavioral:  Negative for agitation, confusion, hallucinations, sleep disturbance and suicidal ideas. The patient is not nervous/anxious.          There were no vitals filed for this visit.      Physical exam deferred due to nature of visit today     Laboratory:  CBC with Differential:  Lab Results   Component Value Date    WBC 7.18 01/19/2024     RBC 3.97 (L) 01/19/2024    HGB 13.3 (L) 01/19/2024    HCT 39.1 (L) 01/19/2024    MCV 98.5 (H) 01/19/2024    MCH 33.5 (H) 01/19/2024    MCHC 34.0 01/19/2024    RDW 13.3 01/19/2024     01/19/2024    MPV 9.9 01/19/2024     Serum:  SPEP: Serum protein electrophoresis shows a distinct monoclonal peak (3.33 g/dL) in the beta zone, consistent with a monoclonal gammopathy.   PAO: A band is present in the IgA trini with a corresponding band in the kappa trini.   The immunofixation electrophoresis are consistent with monoclonal gammopathy of IgA-kappa isotype.     IgG Level 540.00 - 1,822.00 mg/dL 165.00 Low     IgA Level 101.0 - 645.0 mg/dL >7,040.0 High     IgM Level 22.0 - 240.0 mg/dL 6.0 Low       Kappa Free Light Chain 0.3300 - 1.94 mg/dL 5.09 High     Lambda Free Light Chain 0.5700 - 2.63 mg/dL 0.5600 Low     Kappa/Lambda FLC Ratio 0.2600 - 1.65 9.09 High      Urine:   24 hrs Urine:  M spike    1467      H    mg/24 h    CMP:  Sodium Level   Date Value Ref Range Status   01/19/2024 137 136 - 145 mmol/L Final     Potassium Level   Date Value Ref Range Status   01/19/2024 3.6 3.5 - 5.1 mmol/L Final     Carbon Dioxide   Date Value Ref Range Status   01/19/2024 29 23 - 31 mmol/L Final     Blood Urea Nitrogen   Date Value Ref Range Status   01/19/2024 28.0 (H) 8.4 - 25.7 mg/dL Final     Creatinine   Date Value Ref Range Status   01/19/2024 1.15 0.73 - 1.18 mg/dL Final     Calcium Level Total   Date Value Ref Range Status   01/19/2024 9.4 8.8 - 10.0 mg/dL Final     Albumin Level   Date Value Ref Range Status   01/19/2024 3.8 3.4 - 4.8 g/dL Final     Bilirubin Total   Date Value Ref Range Status   01/19/2024 1.0 <=1.5 mg/dL Final     Alkaline Phosphatase   Date Value Ref Range Status   01/19/2024 104 40 - 150 unit/L Final     Aspartate Aminotransferase   Date Value Ref Range Status   01/19/2024 11 5 - 34 unit/L Final     Alanine Aminotransferase   Date Value Ref Range Status   01/19/2024 17 0 - 55 unit/L Final       Component      Latest Ref Rng 1/19/2024   Sodium      136 - 145 mmol/L 137    Potassium      3.5 - 5.1 mmol/L 3.6    Chloride      98 - 107 mmol/L 103    CO2      23 - 31 mmol/L 29    Glucose      82 - 115 mg/dL 118 (H)    BUN      8.4 - 25.7 mg/dL 28.0 (H)    Creatinine      0.73 - 1.18 mg/dL 1.15    Calcium      8.8 - 10.0 mg/dL 9.4    PROTEIN TOTAL      5.8 - 7.6 gm/dL 6.0    Albumin      3.4 - 4.8 g/dL 3.8    Globulin, Total      2.4 - 3.5 gm/dL 2.2 (L)    Albumin/Globulin Ratio      1.1 - 2.0 ratio 1.7    BILIRUBIN TOTAL      <=1.5 mg/dL 1.0    ALP      40 - 150 unit/L 104    ALT      0 - 55 unit/L 17    AST      5 - 34 unit/L 11    eGFR      mls/min/1.73/m2 >60    Kappa Free Light Chain      0.3300 - 1.94 mg/dL 1.61    Lambda Free Light Chain      0.5700 - 2.63 mg/dL 0.9200    Kappa/Lambda FLC Ratio      0.2600 - 1.65  1.75 (H)    IgG      540.00 - 1,822.00 mg/dL 454.00 (L)    IgA      101.0 - 645.0 mg/dL 335.0    IgM      22.0 - 240.0 mg/dL 15.0 (L)    LD      125 - 220 U/L 111 (L)        12/29/23 SPEP         Small abnormality in beta fraction. .     M-protein Isotype MALDI-TOF MS        IgA kappa, small monoclonal.   Suggest quantitative immunoglobulin level to assist in   following monoclonal protein.        Assessment    1. IgA kappa multuiple myeloma in remission  2. Stem cell transplant candidate  3. Type 2 DM not on long term insulin without complications  4. HTn  5. GERD  6. BPH  7. Hypothyroidism    Plan:    1,2: Stage at diagnosis not clear. FISH, LDH, serum beta2 microglobulin at diagnosis not available. He had ~90% plasma cells in his bone marrow at diagnosis. He had hypercalcemia. He had M spike of 3.33g/dl on SPEP in June 2023. He has responded well to DRd. M spike has decreased significantly.    Role, timing, risk and benefits of autologous peripheral blood stem cell transplant in multiple myeloma discussed in detail.  I explained that it is NOT CURATIVE, but has significant progression free  survival, and overall survival, especially when it is followed by maintenance chemotherapy.  However, most of the benefit of high dose chemotherapy and stem cell transplant is in younger ( < 65) patients.   He is very fit, had limited co-morbidities, and has good support.  He will be considered for reduced intensity conditioning and autologous stem cell transplant.  He has stopped daratumumab after cycle 4, and continue lenaldiomide and decadron.    Most recent SPEP from 12/29/23 shows a very small monoclonal protein. He is still interested in proceeding to SCT. He will jhave colonoscopy scheduled. He has dentures, and has no ongoing dental issues.   He will start evaluation for SCT in the next few weeks.       3. He follows with his primary care physician.    4. He follows with his primary care physician. On hydralazine, tamsulosin, spironolactione    5. Denies heart burn. He follows with his primary care physician.    6. On tamsulosin, follows with his primary care physician    7. He is on synthroid, follows with his primary care physician.

## 2024-01-26 DIAGNOSIS — C90.00 MULTIPLE MYELOMA NOT HAVING ACHIEVED REMISSION: ICD-10-CM

## 2024-01-26 RX ORDER — LENALIDOMIDE 10 MG/1
10 CAPSULE ORAL DAILY
Qty: 21 EACH | Refills: 0 | Status: SHIPPED | OUTPATIENT
Start: 2024-01-26 | End: 2024-02-27

## 2024-01-29 ENCOUNTER — LAB VISIT (OUTPATIENT)
Dept: LAB | Facility: HOSPITAL | Age: 74
End: 2024-01-29
Attending: INTERNAL MEDICINE
Payer: MEDICARE

## 2024-01-29 ENCOUNTER — OFFICE VISIT (OUTPATIENT)
Dept: HEMATOLOGY/ONCOLOGY | Facility: CLINIC | Age: 74
End: 2024-01-29
Payer: MEDICARE

## 2024-01-29 VITALS
BODY MASS INDEX: 29.62 KG/M2 | OXYGEN SATURATION: 97 % | DIASTOLIC BLOOD PRESSURE: 72 MMHG | WEIGHT: 206.88 LBS | HEART RATE: 53 BPM | SYSTOLIC BLOOD PRESSURE: 148 MMHG | RESPIRATION RATE: 18 BRPM | TEMPERATURE: 98 F | HEIGHT: 70 IN

## 2024-01-29 DIAGNOSIS — E03.9 ACQUIRED HYPOTHYROIDISM: ICD-10-CM

## 2024-01-29 DIAGNOSIS — T45.1X5A IMMUNODEFICIENCY DUE TO CHEMOTHERAPY: ICD-10-CM

## 2024-01-29 DIAGNOSIS — E11.9 TYPE 2 DIABETES MELLITUS WITHOUT COMPLICATION, WITHOUT LONG-TERM CURRENT USE OF INSULIN: ICD-10-CM

## 2024-01-29 DIAGNOSIS — C90.01 MULTIPLE MYELOMA IN REMISSION: Primary | ICD-10-CM

## 2024-01-29 DIAGNOSIS — Z79.899 IMMUNODEFICIENCY DUE TO CHEMOTHERAPY: ICD-10-CM

## 2024-01-29 DIAGNOSIS — Z86.39: ICD-10-CM

## 2024-01-29 DIAGNOSIS — Z76.82 STEM CELL TRANSPLANT CANDIDATE: ICD-10-CM

## 2024-01-29 DIAGNOSIS — D84.821 IMMUNODEFICIENCY DUE TO CHEMOTHERAPY: ICD-10-CM

## 2024-01-29 DIAGNOSIS — C90.00 MULTIPLE MYELOMA, REMISSION STATUS UNSPECIFIED: ICD-10-CM

## 2024-01-29 LAB
ALBUMIN SERPL-MCNC: 3.6 G/DL (ref 3.4–4.8)
ALBUMIN/GLOB SERPL: 1.6 RATIO (ref 1.1–2)
ALP SERPL-CCNC: 96 UNIT/L (ref 40–150)
ALT SERPL-CCNC: 13 UNIT/L (ref 0–55)
AST SERPL-CCNC: 10 UNIT/L (ref 5–34)
BILIRUB SERPL-MCNC: 1 MG/DL
BUN SERPL-MCNC: 16 MG/DL (ref 8.4–25.7)
CALCIUM SERPL-MCNC: 9.1 MG/DL (ref 8.8–10)
CHLORIDE SERPL-SCNC: 104 MMOL/L (ref 98–107)
CO2 SERPL-SCNC: 29 MMOL/L (ref 23–31)
CREAT SERPL-MCNC: 1.18 MG/DL (ref 0.73–1.18)
GFR SERPLBLD CREATININE-BSD FMLA CKD-EPI: >60 MLS/MIN/1.73/M2
GLOBULIN SER-MCNC: 2.2 GM/DL (ref 2.4–3.5)
GLUCOSE SERPL-MCNC: 147 MG/DL (ref 82–115)
IGA SERPL-MCNC: 353 MG/DL (ref 101–645)
IGG SERPL-MCNC: 477 MG/DL (ref 540–1822)
IGM SERPL-MCNC: 18 MG/DL (ref 22–240)
POTASSIUM SERPL-SCNC: 3.7 MMOL/L (ref 3.5–5.1)
PROT SERPL-MCNC: 5.8 GM/DL (ref 5.8–7.6)
SODIUM SERPL-SCNC: 139 MMOL/L (ref 136–145)

## 2024-01-29 PROCEDURE — 99215 OFFICE O/P EST HI 40 MIN: CPT | Mod: PBBFAC | Performed by: INTERNAL MEDICINE

## 2024-01-29 PROCEDURE — 36415 COLL VENOUS BLD VENIPUNCTURE: CPT

## 2024-01-29 PROCEDURE — 82784 ASSAY IGA/IGD/IGG/IGM EACH: CPT

## 2024-01-29 PROCEDURE — 99999 PR PBB SHADOW E&M-EST. PATIENT-LVL V: CPT | Mod: PBBFAC,,, | Performed by: INTERNAL MEDICINE

## 2024-01-29 PROCEDURE — 83521 IG LIGHT CHAINS FREE EACH: CPT

## 2024-01-29 PROCEDURE — 84165 PROTEIN E-PHORESIS SERUM: CPT

## 2024-01-29 PROCEDURE — 80053 COMPREHEN METABOLIC PANEL: CPT

## 2024-01-29 PROCEDURE — 99214 OFFICE O/P EST MOD 30 MIN: CPT | Mod: S$PBB,,, | Performed by: INTERNAL MEDICINE

## 2024-01-29 NOTE — PROGRESS NOTES
Multiple Myeloma IgA kappa    Present treatment:  Lenalidomide and dexmetasone.  PREVIOUS TREATMENT  Daratumumab, lenalidomide, and dexamethasone x 4 cycles  initiated 7/12/23      Oncology History     Imaging:  CT C 6/7/2023: 1. There is a small left sided pleural effusion.2. A small patchy area of ground glass density is seen in the lateral basal segment of the left lower lobe. This could reflect an acute focal infiltrate / pneumonitis. 3. There is diffuse osteopenia along the visualised bony structures together with multiple, numerous, small round-ovoid lucent lesion of varying sizes involving the marrow. These changes could reflect metabolic bone disease like hyperparathyroidism. Correlate clinically and with laboratory findings, as regards additional evaluation and follow-up.  6/8/23 Bone Scan Whole Body:  1. Scattered activity at the anterior left right rib margins as described.  A CT exam on the previous day reveals no definite fracture.  There is mild heterogeneous and bony loss at the anterior left and right ribs.  This is not have the typical pattern of a metastases.  2. Focal increased activity at the anterior manubrium which again on the CT exam demonstrates osteopenia and heterogeneous bony loss as well as scattered subcentimeter small lytic defects. 3. Suspect mild arthritic changes at the shoulders sternoclavicular joints  6/9/23 MRI Thoracic Spine: Within limitations, no convincing evidence of acute thoracic spine abnormality, high-grade canal stenosis, or focal cord pathology. Multiple scattered vertebral body lesions are suspected and could represent metastatic disease, otherwise of incomplete characterization by non-contrast protocol. Incidental findings of the partially visualized thoracic cavity and retroperitoneum discussed above, poorly assessed by modality/protocol.  Recent non-contrast chest CT provides overall better visualization.  6/10/23 CT Head: No acute intracranial abnormality.   "Findings consistent with microangiopathy no interval change.  6/12/23 XRAY Chest: 1. Patchy hazy opacities again evident at the lower lungs bilaterally suspicious for infiltrate and atelectasis.  Small bibasilar pleural reactions cannot be excluded. 2. Borderline cardiomegaly 3. Atherosclerosis  6/16/23 XRAY Chest: Improved aeration of the lung bases with mild residual bibasilar opacities and small pleural effusions suspected.      Pathology:  6/12/23 Bone marrow aspiration/Biopsy:   PLASMA CELL MYELOMA, KAPPA RESTRICTED.     PLASMA CELL MYELOMA INVOLVES 90% OF BONE MARROW CELLULARITY WITH SMALL AREAS OF  SEQUENTIAL TRILINEAGE HEMATOPOIESIS.    ABSENT IRON STORES.     PERIPHERAL BLOOD: NORMOCYTIC NORMOCHROMIC ANEMIA. THROMBOCYTOPENIA.     LABS:  6/8/23 M-spike 3.33, IgA >7040, serum kappa 5/ lambda 0.56/ K:L ratio 9.09, 24 hour urine for M protein done but not resulted. Calcium 13.4/Alb 1.9, Cr 2.3, globulin 10.7  7/12/23 M-spike 2.49. IgA >7040, Corrected calcium 12.3, Cr 2.09, Globulin 7.6  8/15/23 M-spike 1.49, IgA 4000  9/18/23 M-spike 1.0, IgA 1700  1/19/24 M-spike 0 (smal protein in beta fraction), IgA 335, KFLC 1.61/LFLC 0.92/ ratio 1.75    INTERVAL HISTORY:    1/24/24:  Per Dr. Vasquez's Note:  " 74yo, is here for virtual visit for stem cell transplant consultation. He has HTn, GERD, type 2 DM not on long term insulin.   He has IgA kappa multiple myeloma, diagnosed in June 2023.    He has been treated with DRd from 7/18/23.   He is currently in cycle 4. He has tolerated the treatments well. ..   He will be considered for reduced intensity conditioning and autologous stem cell transplant.  He has stopped daratumumab after cycle 4, and continue lenaldiomide and decadron...   He will start evaluation for SCT in the next few weeks. "     Review of patient's allergies indicates:   Allergen Reactions    Iodine Anaphylaxis    Shellfish containing products     Amoxicillin-pot clavulanate Itching      Current " Outpatient Medications on File Prior to Visit   Medication Sig Dispense Refill    acyclovir (ZOVIRAX) 400 MG tablet Take 1 tablet (400 mg total) by mouth 2 (two) times daily. 60 tablet 11    albuterol (PROVENTIL/VENTOLIN HFA) 90 mcg/actuation inhaler 2 puffs every 4 to 6 hours as needed.      ALPRAZolam (XANAX) 0.5 MG tablet Take 0.5 mg by mouth daily as needed.      amLODIPine (NORVASC) 10 MG tablet Take 1 tablet (10 mg total) by mouth once daily. 30 each 11    atorvastatin (LIPITOR) 10 MG tablet Take 10 mg by mouth.      carvediloL (COREG) 12.5 MG tablet Take 12.5 mg by mouth 2 (two) times daily.      cloNIDine (CATAPRES) 0.2 MG tablet Take 0.2 mg by mouth daily as needed.      dexAMETHasone (DECADRON) 4 MG Tab Take 5 tablets (20 mg total) by mouth every 7 days. on days following daratumumab infusion. Take with food. 20 tablet 1    dexAMETHasone (DECADRON) 4 MG Tab Take 5 tabs (20 mg) orally on days following daratumumab infusion (Days 2 and 16) and take 10 tabs (40 mg) on Days 8 and 22 of each cycle. Take with food. 30 tablet 3    famotidine (PEPCID) 20 MG tablet Take 1 tablet (20 mg total) by mouth once daily. 30 tablet 11    finasteride (PROSCAR) 5 mg tablet Take 5 mg by mouth.      gabapentin (NEURONTIN) 100 MG capsule Take by mouth.      hydrALAZINE (APRESOLINE) 50 MG tablet Take 1 tablet (50 mg total) by mouth every 8 (eight) hours. 90 tablet 11    hydrocodone-chlorpheniramine (TUSSIONEX) 10-8 mg/5 mL suspension SMARTSI Milliliter(s) By Mouth Every 12 Hours      lenalidomide 10 mg Cap Take 10 mg by mouth once daily for 21 days followed by a 7 day break, every 28 days. 21 each 0    levoFLOXacin (LEVAQUIN) 500 MG tablet Take 1 tablet (500 mg total) by mouth once daily. 7 each 0    levothyroxine (SYNTHROID) 112 MCG tablet Take 112 mcg by mouth.      nitroGLYCERIN (NITROSTAT) 0.4 MG SL tablet place one tablet under the tongue every five minutes as needed for chest pain up to 3 doses. if no relief call 911       ondansetron (ZOFRAN) 8 MG tablet Take 1 tablet (8 mg total) by mouth every 8 (eight) hours as needed for Nausea. 30 tablet 2    ONETOUCH ULTRA TEST Strp USE TO TEST BLOOD GLUCOSE TWICE DAILY      oxybutynin (DITROPAN-XL) 10 MG 24 hr tablet TAKE ONE TABLET BY MOUTH ONCE DAILY AS NEEDED FOR BLADDER SPASMS      pantoprazole (PROTONIX) 40 MG tablet Take 1 tablet (40 mg total) by mouth 2 (two) times daily before meals. 60 tablet 11    pyrilamine-chlophedianoL (NINJACOF) 12.5-12.5 mg/5 mL Liqd Take by mouth.      ranolazine (RANEXA) 500 MG Tb12 Take 500 mg by mouth 2 (two) times daily.      SITagliptin phosphate (JANUVIA) 50 MG Tab Take 1 tablet (50 mg total) by mouth once daily. 30 tablet 5    spironolactone (ALDACTONE) 25 MG tablet Take 12.5 mg by mouth.       Current Facility-Administered Medications on File Prior to Visit   Medication Dose Route Frequency Provider Last Rate Last Admin    alteplase injection 2 mg  2 mg Intra-Catheter PRN Elsy Amaya MD        daratumumab-hyaluronidase-fihj subcutaneous injection 1,800 mg  1,800 mg Subcutaneous 1 time in Clinic/HOD Elsy Amaya MD        diphenhydrAMINE injection 50 mg  50 mg Intravenous Once PRN Elsy Amaya MD        EPINEPHrine (EPIPEN) 0.3 mg/0.3 mL pen injection 0.3 mg  0.3 mg Intramuscular Once PRN Elsy Amaya MD        heparin, porcine (PF) 100 unit/mL injection flush 500 Units  500 Units Intravenous PRN Elsy Amaya MD        hydrocortisone sodium succinate injection 100 mg  100 mg Intravenous Once PRN Elsy Amaya MD        sodium chloride 0.9% 250 mL flush bag   Intravenous 1 time in Clinic/HOD Elsy Amaya MD        sodium chloride 0.9% flush 10 mL  10 mL Intravenous PRN Elsy Amaya MD          Review of Systems   Constitutional:  Negative for appetite change, chills, diaphoresis, fever and unexpected weight change.   HENT:  Negative for nasal congestion, mouth sores, nosebleeds, sinus  pressure/congestion, sore throat and trouble swallowing.    Eyes: Negative.    Respiratory:  Negative for cough and shortness of breath.    Cardiovascular:  Negative for chest pain and palpitations.   Gastrointestinal:  Negative for abdominal distention, abdominal pain, blood in stool, change in bowel habit, constipation, diarrhea, nausea and vomiting.   Endocrine: Negative.    Genitourinary:  Negative for bladder incontinence, decreased urine volume, dysuria, frequency, hematuria and urgency. Difficulty urinating: had a ocampo cath.  Musculoskeletal:  Negative for arthralgias, back pain, gait problem, joint swelling, leg pain and myalgias.   Integumentary:  Negative for rash.   Allergic/Immunologic: Negative.    Neurological:  Negative for dizziness, tremors, syncope, weakness, light-headedness, numbness, headaches and memory loss.   Hematological:  Negative for adenopathy. Does not bruise/bleed easily.   Psychiatric/Behavioral:  Negative for agitation, confusion, hallucinations, sleep disturbance and suicidal ideas. The patient is not nervous/anxious.          There were no vitals filed for this visit.      Physical exam deferred due to nature of visit today     Laboratory:  CBC with Differential:  Lab Results   Component Value Date    WBC 7.18 01/19/2024    RBC 3.97 (L) 01/19/2024    HGB 13.3 (L) 01/19/2024    HCT 39.1 (L) 01/19/2024    MCV 98.5 (H) 01/19/2024    MCH 33.5 (H) 01/19/2024    MCHC 34.0 01/19/2024    RDW 13.3 01/19/2024     01/19/2024    MPV 9.9 01/19/2024   INITIAL MYELOMA LABS:  Serum:  SPEP: Serum protein electrophoresis shows a distinct monoclonal peak (3.33 g/dL) in the beta zone, consistent with a monoclonal gammopathy.   PAO: A band is present in the IgA trini with a corresponding band in the kappa trini.   The immunofixation electrophoresis are consistent with monoclonal gammopathy of IgA-kappa isotype.     IgG Level 540.00 - 1,822.00 mg/dL 165.00 Low     IgA Level 101.0 - 645.0 mg/dL  >7,040.0 High     IgM Level 22.0 - 240.0 mg/dL 6.0 Low       Kappa Free Light Chain 0.3300 - 1.94 mg/dL 5.09 High     Lambda Free Light Chain 0.5700 - 2.63 mg/dL 0.5600 Low     Kappa/Lambda FLC Ratio 0.2600 - 1.65 9.09 High      Urine:   24 hrs Urine:  M spike    1467      H    mg/24 h    CMP:  Sodium Level   Date Value Ref Range Status   01/19/2024 137 136 - 145 mmol/L Final     Potassium Level   Date Value Ref Range Status   01/19/2024 3.6 3.5 - 5.1 mmol/L Final     Carbon Dioxide   Date Value Ref Range Status   01/19/2024 29 23 - 31 mmol/L Final     Blood Urea Nitrogen   Date Value Ref Range Status   01/19/2024 28.0 (H) 8.4 - 25.7 mg/dL Final     Creatinine   Date Value Ref Range Status   01/19/2024 1.15 0.73 - 1.18 mg/dL Final     Calcium Level Total   Date Value Ref Range Status   01/19/2024 9.4 8.8 - 10.0 mg/dL Final     Albumin Level   Date Value Ref Range Status   01/19/2024 3.8 3.4 - 4.8 g/dL Final     Bilirubin Total   Date Value Ref Range Status   01/19/2024 1.0 <=1.5 mg/dL Final     Alkaline Phosphatase   Date Value Ref Range Status   01/19/2024 104 40 - 150 unit/L Final     Aspartate Aminotransferase   Date Value Ref Range Status   01/19/2024 11 5 - 34 unit/L Final     Alanine Aminotransferase   Date Value Ref Range Status   01/19/2024 17 0 - 55 unit/L Final      Component      Latest Ref Rng 1/19/2024   Sodium      136 - 145 mmol/L 137    Potassium      3.5 - 5.1 mmol/L 3.6    Chloride      98 - 107 mmol/L 103    CO2      23 - 31 mmol/L 29    Glucose      82 - 115 mg/dL 118 (H)    BUN      8.4 - 25.7 mg/dL 28.0 (H)    Creatinine      0.73 - 1.18 mg/dL 1.15    Calcium      8.8 - 10.0 mg/dL 9.4    PROTEIN TOTAL      5.8 - 7.6 gm/dL 6.0    Albumin      3.4 - 4.8 g/dL 3.8    Globulin, Total      2.4 - 3.5 gm/dL 2.2 (L)    Albumin/Globulin Ratio      1.1 - 2.0 ratio 1.7    BILIRUBIN TOTAL      <=1.5 mg/dL 1.0    ALP      40 - 150 unit/L 104    ALT      0 - 55 unit/L 17    AST      5 - 34 unit/L 11    eGFR    "   mls/min/1.73/m2 >60    Kappa Free Light Chain      0.3300 - 1.94 mg/dL 1.61    Lambda Free Light Chain      0.5700 - 2.63 mg/dL 0.9200    Kappa/Lambda FLC Ratio      0.2600 - 1.65  1.75 (H)    IgG      540.00 - 1,822.00 mg/dL 454.00 (L)    IgA      101.0 - 645.0 mg/dL 335.0    IgM      22.0 - 240.0 mg/dL 15.0 (L)    LD      125 - 220 U/L 111 (L)        12/29/23 SPEP  Small abnormality in beta fraction. .     M-protein Isotype MALDI-TOF MS        IgA kappa, small monoclonal.   Suggest quantitative immunoglobulin level to assist in following monoclonal protein.        Assessment    1. IgA kappa multuiple myeloma in remission  2. Stem cell transplant candidate  3. Type 2 DM not on long term insulin without complications  4. HTn  5. GERD  6. BPH  7. Hypothyroidism    Plan oer transplant:  "Keep appt with his dentist in prep for transplant  Has an appt with Dr. Donahue to disciss a colonoscopy   Once he has t hese test done he will present in [facility] for bone marrow transplant in March.   1,2: Stage at diagnosis not clear. FISH, LDH, serum beta2 microglobulin at diagnosis not available. He had ~90% plasma cells in his bone marrow at diagnosis. He had hypercalcemia. He had M spike of 3.33g/dl on SPEP in June 2023. He has responded well to DRd. M spike is no longer detectable.     Role, timing, risk and benefits of autologous peripheral blood stem cell transplant in multiple myeloma discussed in detail.  I explained that it is NOT CURATIVE, but has significant progression free survival, and overall survival, especially when it is followed by maintenance chemotherapy.  However, most of the benefit of high dose chemotherapy and stem cell transplant is in younger ( < 65) patients.   He is very fit, had limited co-morbidities, and has good support.  He will be considered for reduced intensity conditioning and autologous stem cell transplant.  He has stopped daratumumab after cycle 4, and continue lenaldiomide and decadron.  " "  Most recent SPEP from 12/29/23 shows a very small monoclonal protein. He is still interested in proceeding to SCT. He will jhave colonoscopy scheduled. He has dentures, and has no ongoing dental issues.   He will start evaluation for SCT in the next few weeks.       3. He follows with his primary care physician.    4. He follows with his primary care physician. On hydralazine, tamsulosin, spironolactione    5. Denies heart burn. He follows with his primary care physician.    6. On tamsulosin, follows with his primary care physician    7. He is on synthroid, follows with his primary care physician."    Patient continues on Rev Dex. All protein studies have normalized.       "

## 2024-01-30 LAB
KAPPA LC FREE SER-MCNC: 1.5 MG/DL (ref 0.33–1.94)
KAPPA LC FREE/LAMBDA FREE SER: 1.63 {RATIO} (ref 0.26–1.65)
LAMBDA LC FREE SERPL-MCNC: 0.92 MG/DL (ref 0.57–2.63)

## 2024-01-31 LAB
ALBUMIN % SPEP (OHS): 59.37
ALBUMIN SERPL-MCNC: 3.4 G/DL (ref 3.4–4.8)
ALBUMIN/GLOB SERPL: 1.5 RATIO (ref 1.1–2)
ALPHA 1 GLOB (OHS): 0.2 GM/DL
ALPHA 1 GLOB% (OHS): 3.44
ALPHA 2 GLOB % (OHS): 10.6
ALPHA 2 GLOB (OHS): 0.6 GM/DL
BETA GLOB (OHS): 1.06 GM/DL
BETA GLOB% (OHS): 18.6
GAMMA GLOBULIN % (OHS): 7.99
GAMMA GLOBULIN (OHS): 0.46 GM/DL
GLOBULIN SER-MCNC: 2.3 GM/DL (ref 2.4–3.5)
M SPIKE % (OHS): 2.09
M SPIKE (OHS): 0.12 GM/DL
PATH REV: NORMAL
PROT SERPL-MCNC: 5.7 GM/DL (ref 5.8–7.6)

## 2024-02-05 DIAGNOSIS — Z12.11 SCREEN FOR COLON CANCER: Primary | ICD-10-CM

## 2024-02-06 ENCOUNTER — CLINICAL SUPPORT (OUTPATIENT)
Dept: RESPIRATORY THERAPY | Facility: HOSPITAL | Age: 74
End: 2024-02-06
Attending: INTERNAL MEDICINE
Payer: MEDICARE

## 2024-02-06 DIAGNOSIS — Z12.11 SCREEN FOR COLON CANCER: ICD-10-CM

## 2024-02-06 PROCEDURE — 93010 ELECTROCARDIOGRAM REPORT: CPT | Mod: ,,, | Performed by: INTERNAL MEDICINE

## 2024-02-06 PROCEDURE — 93005 ELECTROCARDIOGRAM TRACING: CPT

## 2024-02-06 RX ORDER — METFORMIN HYDROCHLORIDE 500 MG/1
1000 TABLET, EXTENDED RELEASE ORAL 2 TIMES DAILY
COMMUNITY
Start: 2023-12-29

## 2024-02-06 NOTE — DISCHARGE INSTRUCTIONS
Follow prep on Wednesday. Clear liquids only. Nothing by mouth after midnight. Take Amlodipine, Ranexa, Hydralazine, and Carvedilol AM of procedure with small sip of water.         INSTRUCTIONS  AFTER A COLONOSCOPY/EGD    NO DRIVING X 24 HOURS. NOTIFY YOUR DOCTOR WITH     ABDOMINAL PAIN UNRELIEVED BY  PASSING GAS,   FEVER WITHIN 24 HOURS, OR LARGE AMOUNT OF BLEEDING.

## 2024-02-08 ENCOUNTER — TELEPHONE (OUTPATIENT)
Dept: HEMATOLOGY/ONCOLOGY | Facility: CLINIC | Age: 74
End: 2024-02-08
Payer: MEDICARE

## 2024-02-08 LAB
OHS QRS DURATION: 88 MS
OHS QTC CALCULATION: 420 MS

## 2024-02-08 NOTE — TELEPHONE ENCOUNTER
Spoke with patient to discuss autologous stem cell transplant. Discussed with patient pre-screening requirements. Educated patient on the evaluation process, mobilization, line placement, stem cell collection, and the hospitalization including current visitor's policy. Reviewed with patient lodging and caregiver requirements following transplant as well as the need for follow-up and immunizations. Reading material provided; patient instructed to reach out with further questions.

## 2024-02-09 DIAGNOSIS — Z76.82 STEM CELL TRANSPLANT CANDIDATE: Primary | ICD-10-CM

## 2024-02-09 DIAGNOSIS — C90.01 MULTIPLE MYELOMA IN REMISSION: ICD-10-CM

## 2024-02-12 ENCOUNTER — ANESTHESIA EVENT (OUTPATIENT)
Dept: SURGERY | Facility: HOSPITAL | Age: 74
End: 2024-02-12
Payer: MEDICARE

## 2024-02-12 NOTE — ANESTHESIA PREPROCEDURE EVALUATION
02/12/2024  Chip Goodson is a 73 y.o., male.      Pre-op Assessment    I have reviewed the Patient Summary Reports.    I have reviewed the NPO Status.   I have reviewed the Medications.     Review of Systems  Anesthesia Hx:  No problems with previous Anesthesia   Neg history of prior surgery.          Denies Family Hx of Anesthesia complications.    Denies Personal Hx of Anesthesia complications.                    Social:  Former Smoker       Hematology/Oncology:  Hematology Normal                     Current/Recent Cancer.            Oncology Comments: Multiple Myeloma     EENT/Dental:  EENT/Dental Normal           Cardiovascular:     Hypertension   CAD       CHF    hyperlipidemia                             Pulmonary:  Pulmonary Normal                       Renal/:  Renal/ Normal                 Hepatic/GI:     GERD             Musculoskeletal:  Musculoskeletal Normal                Neurological:  Neurology Normal                                      Endocrine:  Diabetes Hypothyroidism          Dermatological:  Skin Normal    Psych:  Psychiatric History anxiety                 Physical Exam  General: Cooperative and Alert    Airway:  Mallampati: II   Mouth Opening: Normal  TM Distance: Normal  Tongue: Normal  Neck ROM: Normal ROM    Dental:  Intact        Anesthesia Plan  Type of Anesthesia, risks & benefits discussed:    Anesthesia Type: Gen Natural Airway  Intra-op Monitoring Plan: Standard ASA Monitors  Post Op Pain Control Plan: multimodal analgesia  Induction:  IV  Informed Consent: Informed consent signed with the Patient and all parties understand the risks and agree with anesthesia plan.  All questions answered. Patient consented to blood products? Yes  ASA Score: 3  Day of Surgery Review of History & Physical: I have interviewed and examined the patient. I have reviewed the patient's H&P  dated: There are no significant changes.     Ready For Surgery From Anesthesia Perspective.     .

## 2024-02-15 ENCOUNTER — HOSPITAL ENCOUNTER (OUTPATIENT)
Facility: HOSPITAL | Age: 74
Discharge: HOME | End: 2024-02-15
Attending: INTERNAL MEDICINE | Admitting: INTERNAL MEDICINE
Payer: MEDICARE

## 2024-02-15 ENCOUNTER — ANESTHESIA (OUTPATIENT)
Dept: SURGERY | Facility: HOSPITAL | Age: 74
End: 2024-02-15
Payer: MEDICARE

## 2024-02-15 VITALS
RESPIRATION RATE: 18 BRPM | HEART RATE: 57 BPM | DIASTOLIC BLOOD PRESSURE: 80 MMHG | SYSTOLIC BLOOD PRESSURE: 179 MMHG | OXYGEN SATURATION: 99 % | TEMPERATURE: 98 F | BODY MASS INDEX: 28.84 KG/M2 | WEIGHT: 201 LBS

## 2024-02-15 DIAGNOSIS — Z12.11 SCREEN FOR COLON CANCER: ICD-10-CM

## 2024-02-15 LAB — POCT GLUCOSE: 107 MG/DL (ref 70–110)

## 2024-02-15 PROCEDURE — 25000003 PHARM REV CODE 250: Performed by: NURSE ANESTHETIST, CERTIFIED REGISTERED

## 2024-02-15 PROCEDURE — 88305 TISSUE EXAM BY PATHOLOGIST: CPT | Performed by: INTERNAL MEDICINE

## 2024-02-15 PROCEDURE — D9220A PRA ANESTHESIA: Mod: PT,,, | Performed by: NURSE ANESTHETIST, CERTIFIED REGISTERED

## 2024-02-15 PROCEDURE — 37000008 HC ANESTHESIA 1ST 15 MINUTES: Performed by: INTERNAL MEDICINE

## 2024-02-15 PROCEDURE — 63600175 PHARM REV CODE 636 W HCPCS: Performed by: NURSE ANESTHETIST, CERTIFIED REGISTERED

## 2024-02-15 PROCEDURE — 37000009 HC ANESTHESIA EA ADD 15 MINS: Performed by: INTERNAL MEDICINE

## 2024-02-15 PROCEDURE — 82962 GLUCOSE BLOOD TEST: CPT | Performed by: INTERNAL MEDICINE

## 2024-02-15 PROCEDURE — 63600175 PHARM REV CODE 636 W HCPCS: Performed by: ANESTHESIOLOGY

## 2024-02-15 PROCEDURE — C1773 RET DEV, INSERTABLE: HCPCS | Performed by: INTERNAL MEDICINE

## 2024-02-15 PROCEDURE — 45385 COLONOSCOPY W/LESION REMOVAL: CPT | Mod: PT | Performed by: INTERNAL MEDICINE

## 2024-02-15 RX ORDER — PROPOFOL 10 MG/ML
VIAL (ML) INTRAVENOUS
Status: DISCONTINUED | OUTPATIENT
Start: 2024-02-15 | End: 2024-02-15

## 2024-02-15 RX ORDER — LIDOCAINE HYDROCHLORIDE 20 MG/ML
INJECTION INTRAVENOUS
Status: DISCONTINUED | OUTPATIENT
Start: 2024-02-15 | End: 2024-02-15

## 2024-02-15 RX ORDER — SODIUM CHLORIDE, SODIUM LACTATE, POTASSIUM CHLORIDE, CALCIUM CHLORIDE 600; 310; 30; 20 MG/100ML; MG/100ML; MG/100ML; MG/100ML
INJECTION, SOLUTION INTRAVENOUS CONTINUOUS
Status: DISCONTINUED | OUTPATIENT
Start: 2024-02-15 | End: 2024-03-11

## 2024-02-15 RX ADMIN — PROPOFOL 50 MG: 10 INJECTION, EMULSION INTRAVENOUS at 07:02

## 2024-02-15 RX ADMIN — SODIUM CHLORIDE, POTASSIUM CHLORIDE, SODIUM LACTATE AND CALCIUM CHLORIDE: 600; 310; 30; 20 INJECTION, SOLUTION INTRAVENOUS at 06:02

## 2024-02-15 RX ADMIN — LIDOCAINE HYDROCHLORIDE 20 MG: 20 INJECTION, SOLUTION INTRAVENOUS at 07:02

## 2024-02-15 RX ADMIN — PROPOFOL 100 MG: 10 INJECTION, EMULSION INTRAVENOUS at 07:02

## 2024-02-15 RX ADMIN — PROPOFOL 50 MG: 10 INJECTION, EMULSION INTRAVENOUS at 08:02

## 2024-02-15 NOTE — OP NOTE
[facility] - Periop Services  Operative Note    Date of Procedure: 2/15/2024     Procedure: Procedure(s) (LRB):  COLONOSCOPY (N/A)   Colonoscopy    Surgeon(s) and Role:     * Kei Donahue III, MD - Primary    Assisting Surgeon: None    Pre-Operative Diagnosis: Screen for colon cancer [Z12.11]  Screening for colon cancer   (pending stem cell transplant)    Post-Operative Diagnosis: Post-Op Diagnosis Codes:     * Screen for colon cancer [Z12.11]  Proximal to mid transverse subcentimeter hot snare polypectomy  Pan diverticulosis predominance sigmoid  Suboptimal prep  BPH with nodular prostate    Endoscopic Specimens:  ID Type Source Tests Collected by Time Destination   A : TRANSVERSE COLON POLYPECTOMY Tissue Intestine Large, Transverse Colon SPECIMEN TO PATHOLOGY Kei Donahue III, MD 2/15/2024 0748          Anesthesia: General    Consent:  Patient was consented for the procedure at my office.  The risks and benefits of procedure explained in detail.  They were willing to undergo those risks.    HPI & Operative Findings (including complications, if any):  This is a pleasant 73-year-old white gentleman unfortunately with a diagnosis of multiple myeloma pending stem cell transplant.  Here for clearance of colonic malignancy.  The patient was no alarm symptoms from a GI standpoint.    Patient was brought down to the endoscopy room suite.  He admitted yesterday upon questioning despite my telling him to be on clears all day long, eating catfish courtbouillon at noon.  Then he took his prep.    He was laid in left lateral decubitus position, rectal exam was performed.  It was within normal limits.  The prostate was enlarged and firm with a nodule in the left posterior lobe that was about 3-4 mm.  The Olympus colonoscope was lubricated advanced to the left side of the colon.  He would some significantly large diverticula in the left side but I was able to easily get past these and into the cecum.  The  cecum had the remnants of the meal from yesterday but I could clearly see that he had no obvious masses or polyps to the cecum.  I was able to intubate the terminal ileum to 10 cm in clear that as well no abnormalities noted.      360 degree circumferential views were taken of the entirety of the colon.  I washed out as much as I could and aspirated as much as I could with the detritus from the previous meal.  Again no obvious masses or polyps to the ascending colon hepatic flexure and proximal transverse.      Patient had a small 3-4 mm pedunculated polyp that was removed with a hot snare electrocautery settings 12/6 and removed in collected and placed in jar letter A.  No perforation no bleed.  The rest of the transverse colon was within normal limits next para splenic flexure, descending colon and sigmoid were within normal limits with the exception of the aforementioned diverticular disease in an increasing gradient in this anatomic area.  The scope and retroflexion showed no abnormalities to the rectum.    The patient otherwise tolerated the procedure well scope was removed without any complications.      Discussion:     I would clear this gentleman from a lower GI standpoint from the perspective of his saw stem cell transplant.  He has no obvious malignancies at this point in the colon of concern.  However, just for continuity sake and the fact that he did not have the best prep I had repeat a colonoscopy in 3 years if he is healthy around age 76.      Blood Loss (EBL): 0 mL           Implants: * No implants in log *    Specimens:   Specimen (24h ago, onward)       Start     Ordered    02/15/24 0805  Specimen to Pathology  RELEASE UPON ORDERING        References:    Click here for ordering Quick Tip   Question:  Release to patient  Answer:  Immediate    02/15/24 0805                            Condition: Stable for Discharge    Disposition: Home or Self Care        Discharge Note    OUTCOME: Patient tolerated  treatment/procedure well without complication and is now ready for discharge.    DISPOSITION: Home or Self Care    FINAL DIAGNOSIS:  <principal problem not specified>    FOLLOWUP: Follow up in clinic in 1-2 weeks to review biopsies    DISCHARGE INSTRUCTIONS:  No discharge procedures on file.     Clinical Reference Documents Added to Patient Instructions         Document    COLONOSCOPY DISCHARGE INSTRUCTIONS (ENGLISH)    MODERATE SEDATION IN ADULTS DISCHARGE INSTRUCTIONS (ENGLISH)

## 2024-02-15 NOTE — ANESTHESIA POSTPROCEDURE EVALUATION
Anesthesia Post Evaluation    Patient: Chip Goodson    Procedure(s) Performed: Procedure(s) (LRB):  COLONOSCOPY (N/A)    Final Anesthesia Type: general      Patient location during evaluation: OPS  Patient participation: Yes- Able to Participate  Level of consciousness: awake and alert  Post-procedure vital signs: reviewed and stable  Pain management: adequate  Airway patency: patent  WALDO mitigation strategies: Multimodal analgesia  PONV status at discharge: No PONV  Anesthetic complications: no      Cardiovascular status: hemodynamically stable  Respiratory status: unassisted, spontaneous ventilation and room air  Hydration status: euvolemic  Follow-up not needed.              Vitals Value Taken Time   /78 02/15/24 0624   Temp 36.4 °C (97.5 °F) 02/15/24 0552   Pulse 53 02/15/24 0624   Resp 18 02/15/24 0552   SpO2 97 % 02/15/24 0552         No case tracking events are documented in the log.      Pain/Lan Score: No data recorded

## 2024-02-19 ENCOUNTER — LAB VISIT (OUTPATIENT)
Dept: LAB | Facility: HOSPITAL | Age: 74
End: 2024-02-19
Attending: INTERNAL MEDICINE
Payer: MEDICARE

## 2024-02-19 DIAGNOSIS — C90.01 MULTIPLE MYELOMA IN REMISSION: ICD-10-CM

## 2024-02-19 LAB
ALBUMIN SERPL-MCNC: 3.7 G/DL (ref 3.4–4.8)
ALBUMIN/GLOB SERPL: 1.5 RATIO (ref 1.1–2)
ALP SERPL-CCNC: 101 UNIT/L (ref 40–150)
ALT SERPL-CCNC: 18 UNIT/L (ref 0–55)
AST SERPL-CCNC: 12 UNIT/L (ref 5–34)
BASOPHILS # BLD AUTO: 0.03 X10(3)/MCL
BASOPHILS NFR BLD AUTO: 0.5 %
BILIRUB SERPL-MCNC: 0.9 MG/DL
BUN SERPL-MCNC: 23 MG/DL (ref 8.4–25.7)
CALCIUM SERPL-MCNC: 9.5 MG/DL (ref 8.8–10)
CHLORIDE SERPL-SCNC: 104 MMOL/L (ref 98–107)
CO2 SERPL-SCNC: 29 MMOL/L (ref 23–31)
CREAT SERPL-MCNC: 1.04 MG/DL (ref 0.73–1.18)
EOSINOPHIL # BLD AUTO: 0.35 X10(3)/MCL (ref 0–0.9)
EOSINOPHIL NFR BLD AUTO: 5.8 %
ERYTHROCYTE [DISTWIDTH] IN BLOOD BY AUTOMATED COUNT: 13.5 % (ref 11.5–17)
GFR SERPLBLD CREATININE-BSD FMLA CKD-EPI: >60 MLS/MIN/1.73/M2
GLOBULIN SER-MCNC: 2.5 GM/DL (ref 2.4–3.5)
GLUCOSE SERPL-MCNC: 128 MG/DL (ref 82–115)
HCT VFR BLD AUTO: 39.7 % (ref 42–52)
HGB BLD-MCNC: 13.5 G/DL (ref 14–18)
IGA SERPL-MCNC: 408 MG/DL (ref 101–645)
IGG SERPL-MCNC: 464 MG/DL (ref 540–1822)
IGM SERPL-MCNC: 15 MG/DL (ref 22–240)
IMM GRANULOCYTES # BLD AUTO: 0.04 X10(3)/MCL (ref 0–0.04)
IMM GRANULOCYTES NFR BLD AUTO: 0.7 %
LYMPHOCYTES # BLD AUTO: 1.65 X10(3)/MCL (ref 0.6–4.6)
LYMPHOCYTES NFR BLD AUTO: 27.2 %
MCH RBC QN AUTO: 33.9 PG (ref 27–31)
MCHC RBC AUTO-ENTMCNC: 34 G/DL (ref 33–36)
MCV RBC AUTO: 99.7 FL (ref 80–94)
MONOCYTES # BLD AUTO: 0.57 X10(3)/MCL (ref 0.1–1.3)
MONOCYTES NFR BLD AUTO: 9.4 %
NEUTROPHILS # BLD AUTO: 3.42 X10(3)/MCL (ref 2.1–9.2)
NEUTROPHILS NFR BLD AUTO: 56.4 %
PLATELET # BLD AUTO: 203 X10(3)/MCL (ref 130–400)
PMV BLD AUTO: 9.8 FL (ref 7.4–10.4)
POTASSIUM SERPL-SCNC: 3.5 MMOL/L (ref 3.5–5.1)
PROT SERPL-MCNC: 6.2 GM/DL (ref 5.8–7.6)
PSYCHE PATHOLOGY RESULT: NORMAL
RBC # BLD AUTO: 3.98 X10(6)/MCL (ref 4.7–6.1)
SODIUM SERPL-SCNC: 140 MMOL/L (ref 136–145)
WBC # SPEC AUTO: 6.06 X10(3)/MCL (ref 4.5–11.5)

## 2024-02-19 PROCEDURE — 84165 PROTEIN E-PHORESIS SERUM: CPT

## 2024-02-19 PROCEDURE — 82784 ASSAY IGA/IGD/IGG/IGM EACH: CPT

## 2024-02-19 PROCEDURE — 83521 IG LIGHT CHAINS FREE EACH: CPT | Mod: 59

## 2024-02-19 PROCEDURE — 36415 COLL VENOUS BLD VENIPUNCTURE: CPT

## 2024-02-19 PROCEDURE — 85025 COMPLETE CBC W/AUTO DIFF WBC: CPT

## 2024-02-19 PROCEDURE — 80053 COMPREHEN METABOLIC PANEL: CPT

## 2024-02-20 LAB
KAPPA LC FREE SER-MCNC: 1.27 MG/DL (ref 0.33–1.94)
KAPPA LC FREE/LAMBDA FREE SER: 1.59 {RATIO} (ref 0.26–1.65)
LAMBDA LC FREE SERPL-MCNC: 0.8 MG/DL (ref 0.57–2.63)
PATH REV: NORMAL

## 2024-02-22 LAB
ALBUMIN % SPEP (OHS): 59.26
ALBUMIN SERPL-MCNC: 3.7 G/DL (ref 3.4–4.8)
ALBUMIN/GLOB SERPL: 1.5 RATIO (ref 1.1–2)
ALPHA 1 GLOB (OHS): 0.2 GM/DL
ALPHA 1 GLOB% (OHS): 3.17
ALPHA 2 GLOB % (OHS): 11.15
ALPHA 2 GLOB (OHS): 0.69 GM/DL
BETA GLOB (OHS): 1.18 GM/DL
BETA GLOB% (OHS): 18.96
GAMMA GLOBULIN % (OHS): 7.46
GAMMA GLOBULIN (OHS): 0.46 GM/DL
GLOBULIN SER-MCNC: 2.5 GM/DL (ref 2.4–3.5)
M SPIKE % (OHS): 2.52
M SPIKE (OHS): 0.16 GM/DL
PROT SERPL-MCNC: 6.2 GM/DL (ref 5.8–7.6)

## 2024-02-23 ENCOUNTER — DOCUMENTATION ONLY (OUTPATIENT)
Dept: HEMATOLOGY/ONCOLOGY | Facility: CLINIC | Age: 74
End: 2024-02-23
Payer: MEDICARE

## 2024-02-23 NOTE — PROGRESS NOTES
Received request to assist with lodging.  Spoke to daughter Ashleigh, who will be caregiver during upcoming appointments.  Both are eligible for [facility] with no need for wheelchair access or service animals and no prior criminal convictions.  Request submitted to [facility] for 3/4 to 3/11; awaiting response.  Will follow.

## 2024-02-26 ENCOUNTER — OFFICE VISIT (OUTPATIENT)
Dept: HEMATOLOGY/ONCOLOGY | Facility: CLINIC | Age: 74
End: 2024-02-26
Payer: MEDICARE

## 2024-02-26 DIAGNOSIS — D84.821 IMMUNODEFICIENCY DUE TO CHEMOTHERAPY: ICD-10-CM

## 2024-02-26 DIAGNOSIS — C90.01 MULTIPLE MYELOMA IN REMISSION: Primary | ICD-10-CM

## 2024-02-26 DIAGNOSIS — T45.1X5A IMMUNODEFICIENCY DUE TO CHEMOTHERAPY: ICD-10-CM

## 2024-02-26 DIAGNOSIS — Z76.82 STEM CELL TRANSPLANT CANDIDATE: ICD-10-CM

## 2024-02-26 DIAGNOSIS — Z79.899 IMMUNODEFICIENCY DUE TO CHEMOTHERAPY: ICD-10-CM

## 2024-02-26 PROCEDURE — 99999 PR PBB SHADOW E&M-EST. PATIENT-LVL I: CPT | Mod: PBBFAC,,,

## 2024-02-26 PROCEDURE — 99215 OFFICE O/P EST HI 40 MIN: CPT | Mod: S$PBB,,,

## 2024-02-26 PROCEDURE — 99211 OFF/OP EST MAY X REQ PHY/QHP: CPT | Mod: PBBFAC

## 2024-02-26 NOTE — PROGRESS NOTES
Multiple Myeloma IgA kappa    Present treatment:  Lenalidomide and dexmetasone.  PREVIOUS TREATMENT  Daratumumab, lenalidomide, and dexamethasone x 4 cycles  initiated 7/12/23      Oncology History     Imaging:  CT C 6/7/2023: 1. There is a small left sided pleural effusion.2. A small patchy area of ground glass density is seen in the lateral basal segment of the left lower lobe. This could reflect an acute focal infiltrate / pneumonitis. 3. There is diffuse osteopenia along the visualised bony structures together with multiple, numerous, small round-ovoid lucent lesion of varying sizes involving the marrow. These changes could reflect metabolic bone disease like hyperparathyroidism. Correlate clinically and with laboratory findings, as regards additional evaluation and follow-up.  6/8/23 Bone Scan Whole Body:  1. Scattered activity at the anterior left right rib margins as described.  A CT exam on the previous day reveals no definite fracture.  There is mild heterogeneous and bony loss at the anterior left and right ribs.  This is not have the typical pattern of a metastases.  2. Focal increased activity at the anterior manubrium which again on the CT exam demonstrates osteopenia and heterogeneous bony loss as well as scattered subcentimeter small lytic defects. 3. Suspect mild arthritic changes at the shoulders sternoclavicular joints  6/9/23 MRI Thoracic Spine: Within limitations, no convincing evidence of acute thoracic spine abnormality, high-grade canal stenosis, or focal cord pathology. Multiple scattered vertebral body lesions are suspected and could represent metastatic disease, otherwise of incomplete characterization by non-contrast protocol. Incidental findings of the partially visualized thoracic cavity and retroperitoneum discussed above, poorly assessed by modality/protocol.  Recent non-contrast chest CT provides overall better visualization.  6/10/23 CT Head: No acute intracranial abnormality.   "Findings consistent with microangiopathy no interval change.  6/12/23 XRAY Chest: 1. Patchy hazy opacities again evident at the lower lungs bilaterally suspicious for infiltrate and atelectasis.  Small bibasilar pleural reactions cannot be excluded. 2. Borderline cardiomegaly 3. Atherosclerosis  6/16/23 XRAY Chest: Improved aeration of the lung bases with mild residual bibasilar opacities and small pleural effusions suspected.      Pathology:  6/12/23 Bone marrow aspiration/Biopsy:   PLASMA CELL MYELOMA, KAPPA RESTRICTED.     PLASMA CELL MYELOMA INVOLVES 90% OF BONE MARROW CELLULARITY WITH SMALL AREAS OF  SEQUENTIAL TRILINEAGE HEMATOPOIESIS.    ABSENT IRON STORES.     PERIPHERAL BLOOD: NORMOCYTIC NORMOCHROMIC ANEMIA. THROMBOCYTOPENIA.     LABS:  6/8/23 M-spike 3.33, IgA >7040, serum kappa 5/ lambda 0.56/ K:L ratio 9.09, 24 hour urine for M protein done but not resulted. Calcium 13.4/Alb 1.9, Cr 2.3, globulin 10.7  7/12/23 M-spike 2.49. IgA >7040, Corrected calcium 12.3, Cr 2.09, Globulin 7.6  8/15/23 M-spike 1.49, IgA 4000  9/18/23 M-spike 1.0, IgA 1700  1/19/24 M-spike 0 (smal protein in beta fraction), IgA 335, KFLC 1.61/LFLC 0.92/ ratio 1.75    INTERVAL HISTORY:    2/26/24 Patient is here today for a 4 week follow up for Multiple Myeloma IgA Kappa. He is currently on lenalidomide and decadron. He had a colonoscopy on 2/15/2024  for clearance for stem cell transplant, repeat colonoscopy in 3 years around age 76. He is scheduled for a PET and biopsy on 3/4/2024 in [place] to start evaluation for stem cell transplant in April. He denies any new pain, chest pain, shortness of breath, diarrhea, constipation, nausea, vomiting, change in appetite, fatigue, unexplained weight loss.    1/24/24:  Per Dr. Vasquez's Note:  " 72yo, is here for virtual visit for stem cell transplant consultation. He has HTn, GERD, type 2 DM not on long term insulin.   He has IgA kappa multiple myeloma, diagnosed in June 2023.    He has " "been treated with Hannah from 7/18/23.   He is currently in cycle 4. He has tolerated the treatments well. ..   He will be considered for reduced intensity conditioning and autologous stem cell transplant.  He has stopped daratumumab after cycle 4, and continue lenaldiomide and decadron...   He will start evaluation for SCT in the next few weeks. "     Review of patient's allergies indicates:   Allergen Reactions    Iodine Anaphylaxis    Shellfish containing products     Poison ivy extract     Amoxicillin-pot clavulanate Itching      Current Outpatient Medications on File Prior to Visit   Medication Sig Dispense Refill    acyclovir (ZOVIRAX) 400 MG tablet Take 1 tablet (400 mg total) by mouth 2 (two) times daily. 60 tablet 11    albuterol (PROVENTIL/VENTOLIN HFA) 90 mcg/actuation inhaler 2 puffs every 4 to 6 hours as needed.      ALPRAZolam (XANAX) 0.5 MG tablet Take 0.5 mg by mouth daily as needed.      amLODIPine (NORVASC) 10 MG tablet Take 1 tablet (10 mg total) by mouth once daily. (Patient taking differently: Take 10 mg by mouth every morning.) 30 each 11    atorvastatin (LIPITOR) 10 MG tablet Take 10 mg by mouth every evening.      carvediloL (COREG) 12.5 MG tablet Take 12.5 mg by mouth 2 (two) times daily.      cloNIDine (CATAPRES) 0.2 MG tablet Take 0.2 mg by mouth daily as needed.      famotidine (PEPCID) 20 MG tablet Take 1 tablet (20 mg total) by mouth once daily. (Patient taking differently: Take 20 mg by mouth every morning.) 30 tablet 11    finasteride (PROSCAR) 5 mg tablet Take 5 mg by mouth every morning.      gabapentin (NEURONTIN) 100 MG capsule 100 mg 3 (three) times daily.      hydrALAZINE (APRESOLINE) 50 MG tablet Take 1 tablet (50 mg total) by mouth every 8 (eight) hours. 90 tablet 11    lenalidomide 10 mg Cap Take 10 mg by mouth once daily for 21 days followed by a 7 day break, every 28 days. 21 each 0    levothyroxine (SYNTHROID) 112 MCG tablet Take 112 mcg by mouth before breakfast.      " metFORMIN (GLUCOPHAGE-XR) 500 MG ER 24hr tablet Take 1,000 mg by mouth 2 (two) times daily.      nitroGLYCERIN (NITROSTAT) 0.4 MG SL tablet place one tablet under the tongue every five minutes as needed for chest pain up to 3 doses. if no relief call 911      ondansetron (ZOFRAN) 8 MG tablet Take 1 tablet (8 mg total) by mouth every 8 (eight) hours as needed for Nausea. 30 tablet 2    ONETOUCH ULTRA TEST Strp USE TO TEST BLOOD GLUCOSE TWICE DAILY      pantoprazole (PROTONIX) 40 MG tablet Take 1 tablet (40 mg total) by mouth 2 (two) times daily before meals. 60 tablet 11    ranolazine (RANEXA) 500 MG Tb12 Take 500 mg by mouth 2 (two) times daily.      SITagliptin phosphate (JANUVIA) 50 MG Tab Take 1 tablet (50 mg total) by mouth once daily. (Patient taking differently: Take 50 mg by mouth every morning.) 30 tablet 5    spironolactone (ALDACTONE) 25 MG tablet Take 12.5 mg by mouth every morning.       Current Facility-Administered Medications on File Prior to Visit   Medication Dose Route Frequency Provider Last Rate Last Admin    alteplase injection 2 mg  2 mg Intra-Catheter PRN Elsy Amaya MD        daratumumab-hyaluronidase-fihj subcutaneous injection 1,800 mg  1,800 mg Subcutaneous 1 time in Clinic/HOD Elsy Amaya MD        diphenhydrAMINE injection 50 mg  50 mg Intravenous Once PRN Elsy Amaya MD        EPINEPHrine (EPIPEN) 0.3 mg/0.3 mL pen injection 0.3 mg  0.3 mg Intramuscular Once PRN Elsy Amaya MD        heparin, porcine (PF) 100 unit/mL injection flush 500 Units  500 Units Intravenous PRN Elsy Amaya MD        hydrocortisone sodium succinate injection 100 mg  100 mg Intravenous Once PRN Elsy Amaya MD        lactated ringers infusion   Intravenous Continuous Felecia Garrison MD 10 mL/hr at 02/15/24 0643 Restarted at 02/15/24 0805    sodium chloride 0.9% 250 mL flush bag   Intravenous 1 time in Clinic/HOD Esly Amaya MD        sodium chloride  0.9% flush 10 mL  10 mL Intravenous PRN Elsy Amaya MD          Review of Systems   Constitutional:  Negative for appetite change, chills, diaphoresis, fever and unexpected weight change.   HENT:  Negative for nasal congestion, mouth sores, nosebleeds, sinus pressure/congestion, sore throat and trouble swallowing.    Eyes: Negative.    Respiratory:  Negative for cough and shortness of breath.    Cardiovascular:  Negative for chest pain and palpitations.   Gastrointestinal:  Negative for abdominal distention, abdominal pain, blood in stool, change in bowel habit, constipation, diarrhea, nausea and vomiting.   Endocrine: Negative.    Genitourinary:  Negative for bladder incontinence, decreased urine volume, dysuria, frequency, hematuria and urgency. Difficulty urinating: had a ocampo cath.  Musculoskeletal:  Negative for arthralgias, back pain, gait problem, joint swelling, leg pain and myalgias.   Integumentary:  Negative for rash.   Allergic/Immunologic: Negative.    Neurological:  Negative for dizziness, tremors, syncope, weakness, light-headedness, numbness, headaches and memory loss.   Hematological:  Negative for adenopathy. Does not bruise/bleed easily.   Psychiatric/Behavioral:  Negative for agitation, confusion, hallucinations, sleep disturbance and suicidal ideas. The patient is not nervous/anxious.          There were no vitals filed for this visit.      Physical exam deferred due to nature of visit today     Laboratory:  CBC with Differential:  Lab Results   Component Value Date    WBC 6.06 02/19/2024    RBC 3.98 (L) 02/19/2024    HGB 13.5 (L) 02/19/2024    HCT 39.7 (L) 02/19/2024    MCV 99.7 (H) 02/19/2024    MCH 33.9 (H) 02/19/2024    MCHC 34.0 02/19/2024    RDW 13.5 02/19/2024     02/19/2024    MPV 9.8 02/19/2024   INITIAL MYELOMA LABS:  Serum:  SPEP: Serum protein electrophoresis shows a distinct monoclonal peak (3.33 g/dL) in the beta zone, consistent with a monoclonal gammopathy.    PAO: A band is present in the IgA trini with a corresponding band in the kappa trini.   The immunofixation electrophoresis are consistent with monoclonal gammopathy of IgA-kappa isotype.     IgG Level 540.00 - 1,822.00 mg/dL 165.00 Low     IgA Level 101.0 - 645.0 mg/dL >7,040.0 High     IgM Level 22.0 - 240.0 mg/dL 6.0 Low       Kappa Free Light Chain 0.3300 - 1.94 mg/dL 5.09 High     Lambda Free Light Chain 0.5700 - 2.63 mg/dL 0.5600 Low     Kappa/Lambda FLC Ratio 0.2600 - 1.65 9.09 High      Urine:   24 hrs Urine:  M spike    1467      H    mg/24 h    CMP:  Sodium Level   Date Value Ref Range Status   02/19/2024 140 136 - 145 mmol/L Final     Potassium Level   Date Value Ref Range Status   02/19/2024 3.5 3.5 - 5.1 mmol/L Final     Carbon Dioxide   Date Value Ref Range Status   02/19/2024 29 23 - 31 mmol/L Final     Blood Urea Nitrogen   Date Value Ref Range Status   02/19/2024 23.0 8.4 - 25.7 mg/dL Final     Creatinine   Date Value Ref Range Status   02/19/2024 1.04 0.73 - 1.18 mg/dL Final     Calcium Level Total   Date Value Ref Range Status   02/19/2024 9.5 8.8 - 10.0 mg/dL Final     Albumin Level   Date Value Ref Range Status   02/19/2024 3.7 3.4 - 4.8 g/dL Final   02/19/2024 3.7 3.4 - 4.8 g/dL Final     Bilirubin Total   Date Value Ref Range Status   02/19/2024 0.9 <=1.5 mg/dL Final     Alkaline Phosphatase   Date Value Ref Range Status   02/19/2024 101 40 - 150 unit/L Final     Aspartate Aminotransferase   Date Value Ref Range Status   02/19/2024 12 5 - 34 unit/L Final     Alanine Aminotransferase   Date Value Ref Range Status   02/19/2024 18 0 - 55 unit/L Final      Component      Latest Ref Rng 1/19/2024   Sodium      136 - 145 mmol/L 137    Potassium      3.5 - 5.1 mmol/L 3.6    Chloride      98 - 107 mmol/L 103    CO2      23 - 31 mmol/L 29    Glucose      82 - 115 mg/dL 118 (H)    BUN      8.4 - 25.7 mg/dL 28.0 (H)    Creatinine      0.73 - 1.18 mg/dL 1.15    Calcium      8.8 - 10.0 mg/dL 9.4    PROTEIN  "TOTAL      5.8 - 7.6 gm/dL 6.0    Albumin      3.4 - 4.8 g/dL 3.8    Globulin, Total      2.4 - 3.5 gm/dL 2.2 (L)    Albumin/Globulin Ratio      1.1 - 2.0 ratio 1.7    BILIRUBIN TOTAL      <=1.5 mg/dL 1.0    ALP      40 - 150 unit/L 104    ALT      0 - 55 unit/L 17    AST      5 - 34 unit/L 11    eGFR      mls/min/1.73/m2 >60    Kappa Free Light Chain      0.3300 - 1.94 mg/dL 1.61    Lambda Free Light Chain      0.5700 - 2.63 mg/dL 0.9200    Kappa/Lambda FLC Ratio      0.2600 - 1.65  1.75 (H)    IgG      540.00 - 1,822.00 mg/dL 454.00 (L)    IgA      101.0 - 645.0 mg/dL 335.0    IgM      22.0 - 240.0 mg/dL 15.0 (L)    LD      125 - 220 U/L 111 (L)        12/29/23 SPEP  Small abnormality in beta fraction. .     M-protein Isotype MALDI-TOF MS        IgA kappa, small monoclonal.   Suggest quantitative immunoglobulin level to assist in following monoclonal protein.        Assessment    1. IgA kappa multuiple myeloma in remission  2. Stem cell transplant candidate  3. Type 2 DM not on long term insulin without complications  4. HTn  5. GERD  6. BPH  7. Hypothyroidism    Plan oer transplant:  "Keep appt with his dentist in prep for transplant  Has an appt with Dr. Donahue to disciss a colonoscopy   Once he has t hese test done he will present in [facility] for bone marrow transplant in March.   1,2: Stage at diagnosis not clear. FISH, LDH, serum beta2 microglobulin at diagnosis not available. He had ~90% plasma cells in his bone marrow at diagnosis. He had hypercalcemia. He had M spike of 3.33g/dl on SPEP in June 2023. He has responded well to DRd. M spike is no longer detectable.     Role, timing, risk and benefits of autologous peripheral blood stem cell transplant in multiple myeloma discussed in detail.  I explained that it is NOT CURATIVE, but has significant progression free survival, and overall survival, especially when it is followed by maintenance chemotherapy.  However, most of the benefit of high dose chemotherapy " "and stem cell transplant is in younger ( < 65) patients.   He is very fit, had limited co-morbidities, and has good support.  He will be considered for reduced intensity conditioning and autologous stem cell transplant.  He has stopped daratumumab after cycle 4, and continue lenaldiomide and decadron.    Most recent SPEP from 12/29/23 shows a very small monoclonal protein. He is still interested in proceeding to SCT. He will jhave colonoscopy scheduled. He has dentures, and has no ongoing dental issues.   He will start evaluation for SCT in the next few weeks.       3. He follows with his primary care physician.    4. He follows with his primary care physician. On hydralazine, tamsulosin, spironolactione    5. Denies heart burn. He follows with his primary care physician.    6. On tamsulosin, follows with his primary care physician    7. He is on synthroid, follows with his primary care physician."    Patient continues on Rev Dex. All protein studies have normalized.        Return to clinic in 4 weeks labs/Dr. Rose  Continue lenalidomide and dexamethasone.  CBC CMP MM labs 1 week prior to visit     Shaina Tai, NICHOLASP-C  Oncology/Hematology  [facility]      "

## 2024-02-27 DIAGNOSIS — C90.00 MULTIPLE MYELOMA NOT HAVING ACHIEVED REMISSION: ICD-10-CM

## 2024-02-27 RX ORDER — LENALIDOMIDE 10 MG/1
CAPSULE ORAL
Qty: 21 EACH | Refills: 0 | Status: SHIPPED | OUTPATIENT
Start: 2024-02-27 | End: 2024-03-01 | Stop reason: SDUPTHER

## 2024-02-28 DIAGNOSIS — C90.00 MULTIPLE MYELOMA NOT HAVING ACHIEVED REMISSION: ICD-10-CM

## 2024-03-01 DIAGNOSIS — C90.00 MULTIPLE MYELOMA NOT HAVING ACHIEVED REMISSION: ICD-10-CM

## 2024-03-01 RX ORDER — LENALIDOMIDE 10 MG/1
10 CAPSULE ORAL DAILY
Qty: 21 EACH | Refills: 0 | Status: ON HOLD | OUTPATIENT
Start: 2024-03-01 | End: 2024-05-11 | Stop reason: HOSPADM

## 2024-03-05 ENCOUNTER — HOSPITAL ENCOUNTER (OUTPATIENT)
Dept: RADIOLOGY | Facility: HOSPITAL | Age: 74
Discharge: HOME | End: 2024-03-05
Attending: INTERNAL MEDICINE
Payer: MEDICARE

## 2024-03-05 ENCOUNTER — PROCEDURE VISIT (OUTPATIENT)
Dept: HEMATOLOGY/ONCOLOGY | Facility: CLINIC | Age: 74
End: 2024-03-05
Payer: MEDICARE

## 2024-03-05 VITALS
HEART RATE: 40 BPM | OXYGEN SATURATION: 97 % | SYSTOLIC BLOOD PRESSURE: 171 MMHG | TEMPERATURE: 98 F | RESPIRATION RATE: 18 BRPM | DIASTOLIC BLOOD PRESSURE: 76 MMHG

## 2024-03-05 DIAGNOSIS — C90.00 MULTIPLE MYELOMA NOT HAVING ACHIEVED REMISSION: ICD-10-CM

## 2024-03-05 DIAGNOSIS — D46.4 REFRACTORY ANEMIA, UNSPECIFIED: ICD-10-CM

## 2024-03-05 DIAGNOSIS — Z76.82 STEM CELL TRANSPLANT CANDIDATE: ICD-10-CM

## 2024-03-05 DIAGNOSIS — Z76.82 STEM CELL TRANSPLANT CANDIDATE: Primary | ICD-10-CM

## 2024-03-05 DIAGNOSIS — C90.01 MULTIPLE MYELOMA IN REMISSION: ICD-10-CM

## 2024-03-05 LAB — POCT GLUCOSE: 110 MG/DL (ref 70–110)

## 2024-03-05 PROCEDURE — 38222 DX BONE MARROW BX & ASPIR: CPT | Mod: S$PBB,LT,, | Performed by: NURSE PRACTITIONER

## 2024-03-05 PROCEDURE — 88365 INSITU HYBRIDIZATION (FISH): CPT | Performed by: PATHOLOGY

## 2024-03-05 PROCEDURE — A9552 F18 FDG: HCPCS | Performed by: INTERNAL MEDICINE

## 2024-03-05 PROCEDURE — 85097 BONE MARROW INTERPRETATION: CPT | Mod: ,,, | Performed by: PATHOLOGY

## 2024-03-05 PROCEDURE — 88271 CYTOGENETICS DNA PROBE: CPT | Performed by: NURSE PRACTITIONER

## 2024-03-05 PROCEDURE — 88274 CYTOGENETICS 25-99: CPT | Performed by: NURSE PRACTITIONER

## 2024-03-05 PROCEDURE — 88313 SPECIAL STAINS GROUP 2: CPT | Mod: 26,,, | Performed by: PATHOLOGY

## 2024-03-05 PROCEDURE — 88311 DECALCIFY TISSUE: CPT | Mod: 26,,, | Performed by: PATHOLOGY

## 2024-03-05 PROCEDURE — 88341 IMHCHEM/IMCYTCHM EA ADD ANTB: CPT | Mod: 26,,, | Performed by: PATHOLOGY

## 2024-03-05 PROCEDURE — 88342 IMHCHEM/IMCYTCHM 1ST ANTB: CPT | Mod: 26,59,, | Performed by: PATHOLOGY

## 2024-03-05 PROCEDURE — 88365 INSITU HYBRIDIZATION (FISH): CPT | Mod: 26,,, | Performed by: PATHOLOGY

## 2024-03-05 PROCEDURE — 99999PBSHW PR PBB SHADOW TECHNICAL ONLY FILED TO HB: Mod: PBBFAC,,,

## 2024-03-05 PROCEDURE — 78816 PET IMAGE W/CT FULL BODY: CPT | Mod: 26,PS,, | Performed by: STUDENT IN AN ORGANIZED HEALTH CARE EDUCATION/TRAINING PROGRAM

## 2024-03-05 PROCEDURE — 88311 DECALCIFY TISSUE: CPT | Performed by: PATHOLOGY

## 2024-03-05 PROCEDURE — 88185 FLOWCYTOMETRY/TC ADD-ON: CPT | Mod: 59 | Performed by: PATHOLOGY

## 2024-03-05 PROCEDURE — 88364 INSITU HYBRIDIZATION (FISH): CPT | Performed by: PATHOLOGY

## 2024-03-05 PROCEDURE — 88189 FLOWCYTOMETRY/READ 16 & >: CPT | Mod: ,,, | Performed by: PATHOLOGY

## 2024-03-05 PROCEDURE — 88342 IMHCHEM/IMCYTCHM 1ST ANTB: CPT | Performed by: PATHOLOGY

## 2024-03-05 PROCEDURE — 38222 DX BONE MARROW BX & ASPIR: CPT | Mod: PBBFAC | Performed by: NURSE PRACTITIONER

## 2024-03-05 PROCEDURE — 88305 TISSUE EXAM BY PATHOLOGIST: CPT | Mod: 59 | Performed by: PATHOLOGY

## 2024-03-05 PROCEDURE — 88184 FLOWCYTOMETRY/ TC 1 MARKER: CPT | Performed by: NURSE PRACTITIONER

## 2024-03-05 PROCEDURE — 88184 FLOWCYTOMETRY/ TC 1 MARKER: CPT | Mod: 59 | Performed by: PATHOLOGY

## 2024-03-05 PROCEDURE — 88305 TISSUE EXAM BY PATHOLOGIST: CPT | Mod: 26,,, | Performed by: PATHOLOGY

## 2024-03-05 PROCEDURE — 88364 INSITU HYBRIDIZATION (FISH): CPT | Mod: 26,,, | Performed by: PATHOLOGY

## 2024-03-05 PROCEDURE — 78816 PET IMAGE W/CT FULL BODY: CPT | Mod: TC,PS

## 2024-03-05 PROCEDURE — 88341 IMHCHEM/IMCYTCHM EA ADD ANTB: CPT | Mod: 59 | Performed by: PATHOLOGY

## 2024-03-05 PROCEDURE — 88313 SPECIAL STAINS GROUP 2: CPT | Performed by: PATHOLOGY

## 2024-03-05 RX ORDER — FLUDEOXYGLUCOSE F18 500 MCI/ML
10 INJECTION INTRAVENOUS
Status: COMPLETED | OUTPATIENT
Start: 2024-03-05 | End: 2024-03-05

## 2024-03-05 RX ORDER — LIDOCAINE HYDROCHLORIDE 20 MG/ML
8 INJECTION, SOLUTION INFILTRATION; PERINEURAL
Status: COMPLETED | OUTPATIENT
Start: 2024-03-05 | End: 2024-03-05

## 2024-03-05 RX ADMIN — FLUDEOXYGLUCOSE F-18 11.63 MILLICURIE: 500 INJECTION INTRAVENOUS at 09:03

## 2024-03-05 RX ADMIN — LIDOCAINE HYDROCHLORIDE 8 ML: 20 INJECTION, SOLUTION INFILTRATION; PERINEURAL at 11:03

## 2024-03-05 NOTE — PROCEDURES
Bone marrow    Date/Time: 3/5/2024 11:30 AM    Performed by: Chelsie Man NP  Authorized by: Chelsie Man NP    Consent Done?: Yes (Written)      Position: prone  Aspiration?: Yes   Biopsy?: Yes        PROCEDURE NOTE:  Date of Procedure: 03/05/2024  Bone Marrow Biopsy and Aspiration  Indication: MM; pre auto SCT  Consent: Informed consent was obtained from patient.  Timeout: Done and documented. Allergies reviewed.  Position: prone  Site: Left posterior illiac crest.  Prep: Chlorohexidine.  Needle used: 11 gauge Jamshidi needle.  Anesthetic: 2% lidocaine 8 cc.  Biopsy: The biopsy needle was introduced into the marrow cavity and an aspirate was obtained without complications and sent for flow cytometry, PCPD fish, and cytogenetics. Core biopsy obtained without difficulty and sent for routine histologic examination.  Complications: None.  Disposition: The patient was placed supine for 15min following procedure. MA to assess bandaid for bleeding prior to discharge home. Patient aware to keep bandaid dry and intact for 24hrs and to call clinic for any bleeding, fevers, pain, or signs of infection.  Blood loss: Minimal.     Chelsie Man NP  Hematology/Oncology/BMT

## 2024-03-06 LAB
PCPDS FINAL DIAGNOSIS: NORMAL
PCPDS PRE-ANALYSIS PRE-SORT: NORMAL

## 2024-03-08 LAB
BODY SITE - BONE MARROW: NORMAL
CLINICAL DIAGNOSIS - BONE MARROW: NORMAL
FLOW CYTOMETRY ANTIBODIES ANALYZED - BONE MARROW: NORMAL
FLOW CYTOMETRY COMMENT - BONE MARROW: NORMAL
FLOW CYTOMETRY INTERPRETATION - BONE MARROW: NORMAL

## 2024-03-11 ENCOUNTER — HOSPITAL ENCOUNTER (OUTPATIENT)
Dept: PULMONOLOGY | Facility: CLINIC | Age: 74
Discharge: HOME | End: 2024-03-11
Payer: MEDICARE

## 2024-03-11 ENCOUNTER — CLINICAL SUPPORT (OUTPATIENT)
Dept: HEMATOLOGY/ONCOLOGY | Facility: CLINIC | Age: 74
End: 2024-03-11
Payer: MEDICARE

## 2024-03-11 ENCOUNTER — HOSPITAL ENCOUNTER (OUTPATIENT)
Dept: CARDIOLOGY | Facility: CLINIC | Age: 74
Discharge: HOME | End: 2024-03-11
Payer: MEDICARE

## 2024-03-11 ENCOUNTER — HOSPITAL ENCOUNTER (OUTPATIENT)
Dept: CARDIOLOGY | Facility: HOSPITAL | Age: 74
Discharge: HOME | End: 2024-03-11
Attending: INTERNAL MEDICINE
Payer: MEDICARE

## 2024-03-11 ENCOUNTER — HOSPITAL ENCOUNTER (OUTPATIENT)
Dept: RADIOLOGY | Facility: HOSPITAL | Age: 74
Discharge: HOME | End: 2024-03-11
Attending: INTERNAL MEDICINE
Payer: MEDICARE

## 2024-03-11 VITALS
SYSTOLIC BLOOD PRESSURE: 140 MMHG | DIASTOLIC BLOOD PRESSURE: 70 MMHG | BODY MASS INDEX: 28.77 KG/M2 | HEART RATE: 74 BPM | HEIGHT: 70 IN | WEIGHT: 201 LBS

## 2024-03-11 DIAGNOSIS — Z76.82 STEM CELL TRANSPLANT CANDIDATE: ICD-10-CM

## 2024-03-11 DIAGNOSIS — C90.01 MULTIPLE MYELOMA IN REMISSION: ICD-10-CM

## 2024-03-11 DIAGNOSIS — I10 PRIMARY HYPERTENSION: ICD-10-CM

## 2024-03-11 DIAGNOSIS — Z76.82 STEM CELL TRANSPLANT CANDIDATE: Primary | ICD-10-CM

## 2024-03-11 DIAGNOSIS — C90.00 MULTIPLE MYELOMA NOT HAVING ACHIEVED REMISSION: Primary | ICD-10-CM

## 2024-03-11 DIAGNOSIS — E11.9 TYPE 2 DIABETES MELLITUS WITHOUT COMPLICATION, WITHOUT LONG-TERM CURRENT USE OF INSULIN: ICD-10-CM

## 2024-03-11 LAB
ASCENDING AORTA: 3.61 CM
AV INDEX (PROSTH): 0.8
AV MEAN GRADIENT: 5 MMHG
AV PEAK GRADIENT: 11 MMHG
AV VALVE AREA BY VELOCITY RATIO: 3.58 CM²
AV VALVE AREA: 4.29 CM²
AV VELOCITY RATIO: 0.67
BSA FOR ECHO PROCEDURE: 2.12 M2
CV ECHO LV RWT: 0.37 CM
DLCO ADJ PRE: 18.34 ML/(MIN*MMHG) (ref 20.31–34.17)
DLCO SINGLE BREATH LLN: 20.31
DLCO SINGLE BREATH PRE REF: 66.4 %
DLCO SINGLE BREATH REF: 27.24
DLCOC SBVA LLN: 2.6
DLCOC SBVA PRE REF: 85.5 %
DLCOC SBVA REF: 3.72
DLCOC SINGLE BREATH LLN: 20.31
DLCOC SINGLE BREATH PRE REF: 67.3 %
DLCOC SINGLE BREATH REF: 27.24
DLCOCSBVAULN: 4.83
DLCOCSINGLEBREATHULN: 34.17
DLCOSINGLEBREATHULN: 34.17
DLCOVA LLN: 2.6
DLCOVA PRE REF: 84.3 %
DLCOVA PRE: 3.13 ML/(MIN*MMHG*L) (ref 2.6–4.83)
DLCOVA REF: 3.72
DLCOVAULN: 4.83
DLVAADJ PRE: 3.18 ML/(MIN*MMHG*L) (ref 2.6–4.83)
DOP CALC AO PEAK VEL: 1.69 M/S
DOP CALC AO VTI: 37.71 CM
DOP CALC LVOT AREA: 5.3 CM2
DOP CALC LVOT DIAMETER: 2.61 CM
DOP CALC LVOT PEAK VEL: 1.13 M/S
DOP CALC LVOT STROKE VOLUME: 161.87 CM3
DOP CALCLVOT PEAK VEL VTI: 30.27 CM
E WAVE DECELERATION TIME: 163.23 MSEC
E/A RATIO: 0.8
E/E' RATIO: 9.41 M/S
ECHO LV POSTERIOR WALL: 0.98 CM (ref 0.6–1.1)
FEF 25 75 LLN: 0.94
FEF 25 75 PRE REF: 97.1 %
FEF 25 75 REF: 2.3
FEV05 LLN: 1.53
FEV05 REF: 2.67
FEV1 FVC LLN: 61
FEV1 FVC PRE REF: 99.5 %
FEV1 FVC REF: 75
FEV1 LLN: 2.24
FEV1 PRE REF: 89.6 %
FEV1 REF: 3.15
FRACTIONAL SHORTENING: 31 % (ref 28–44)
FVC LLN: 3.09
FVC PRE REF: 89.5 %
FVC REF: 4.22
GLOBAL LONGITUIDAL STRAIN: 21 %
INTERVENTRICULAR SEPTUM: 1 CM (ref 0.6–1.1)
IVC PRE: 3.57 L (ref 3.09–5.37)
IVC SINGLE BREATH LLN: 3.09
IVC SINGLE BREATH PRE REF: 84.5 %
IVC SINGLE BREATH REF: 4.22
IVCSINGLEBREATHULN: 5.37
LA MAJOR: 7.61 CM
LA MINOR: 6.95 CM
LA WIDTH: 4.42 CM
LEFT ATRIUM SIZE: 4.78 CM
LEFT ATRIUM VOLUME INDEX MOD: 58.3 ML/M2
LEFT ATRIUM VOLUME INDEX: 62.4 ML/M2
LEFT ATRIUM VOLUME MOD: 121.88 CM3
LEFT ATRIUM VOLUME: 130.47 CM3
LEFT INTERNAL DIMENSION IN SYSTOLE: 3.72 CM (ref 2.1–4)
LEFT VENTRICLE DIASTOLIC VOLUME INDEX: 66.42 ML/M2
LEFT VENTRICLE DIASTOLIC VOLUME: 138.81 ML
LEFT VENTRICLE MASS INDEX: 96 G/M2
LEFT VENTRICLE SYSTOLIC VOLUME INDEX: 28.1 ML/M2
LEFT VENTRICLE SYSTOLIC VOLUME: 58.75 ML
LEFT VENTRICULAR INTERNAL DIMENSION IN DIASTOLE: 5.36 CM (ref 3.5–6)
LEFT VENTRICULAR MASS: 201.49 G
LV LATERAL E/E' RATIO: 8 M/S
LV SEPTAL E/E' RATIO: 11.43 M/S
MV A" WAVE DURATION": 13.13 MSEC
MV PEAK A VEL: 1 M/S
MV PEAK E VEL: 0.8 M/S
MV STENOSIS PRESSURE HALF TIME: 47.34 MS
MV VALVE AREA P 1/2 METHOD: 4.65 CM2
OHS LV EJECTION FRACTION SIMPSONS BIPLANE MOD: 59 %
OHS QRS DURATION: 90 MS
OHS QTC CALCULATION: 399 MS
PEF LLN: 5.78
PEF PRE REF: 73.5 %
PEF REF: 8.17
PHYSICIAN COMMENT: ABNORMAL
PISA TR MAX VEL: 3.21 M/S
PRE DLCO: 18.08 ML/(MIN*MMHG) (ref 20.31–34.17)
PRE FEF 25 75: 2.23 L/S (ref 0.94–4.25)
PRE FET 100: 6.88 SEC
PRE FEV05 REF: 82.5 %
PRE FEV1 FVC: 74.79 % (ref 61.19–87.67)
PRE FEV1: 2.83 L (ref 2.24–4.01)
PRE FEV5: 2.2 L (ref 1.53–3.8)
PRE FVC: 3.78 L (ref 3.09–5.37)
PRE PEF: 6 L/S (ref 5.78–10.55)
PULM VEIN S/D RATIO: 1.38
PV PEAK D VEL: 0.24 M/S
PV PEAK S VEL: 0.33 M/S
RA MAJOR: 7.28 CM
RA PRESSURE ESTIMATED: 3 MMHG
RA WIDTH: 4.75 CM
RIGHT VENTRICULAR END-DIASTOLIC DIMENSION: 5.04 CM
RV TB RVSP: 6 MMHG
SINUS: 3.74 CM
STJ: 3.19 CM
TDI LATERAL: 0.1 M/S
TDI SEPTAL: 0.07 M/S
TDI: 0.09 M/S
TR MAX PG: 41 MMHG
TRICUSPID ANNULAR PLANE SYSTOLIC EXCURSION: 2.81 CM
TV REST PULMONARY ARTERY PRESSURE: 44 MMHG
VA PRE: 5.77 L (ref 7.18–7.18)
VA SINGLE BREATH LLN: 7.18
VA SINGLE BREATH PRE REF: 80.3 %
VA SINGLE BREATH REF: 7.18
VASINGLEBREATHULN: 7.18
Z-SCORE OF LEFT VENTRICULAR DIMENSION IN END DIASTOLE: -1.85
Z-SCORE OF LEFT VENTRICULAR DIMENSION IN END SYSTOLE: -0.46

## 2024-03-11 PROCEDURE — 94729 DIFFUSING CAPACITY: CPT | Mod: 26,S$PBB,, | Performed by: INTERNAL MEDICINE

## 2024-03-11 PROCEDURE — 99999 PR PBB SHADOW E&M-EST. PATIENT-LVL II: CPT | Mod: PBBFAC,,,

## 2024-03-11 PROCEDURE — 93306 TTE W/DOPPLER COMPLETE: CPT | Mod: 26,,, | Performed by: INTERNAL MEDICINE

## 2024-03-11 PROCEDURE — 93306 TTE W/DOPPLER COMPLETE: CPT

## 2024-03-11 PROCEDURE — 71046 X-RAY EXAM CHEST 2 VIEWS: CPT | Mod: 26,,, | Performed by: RADIOLOGY

## 2024-03-11 PROCEDURE — 93010 ELECTROCARDIOGRAM REPORT: CPT | Mod: S$PBB,,, | Performed by: INTERNAL MEDICINE

## 2024-03-11 PROCEDURE — 93005 ELECTROCARDIOGRAM TRACING: CPT | Mod: PBBFAC | Performed by: INTERNAL MEDICINE

## 2024-03-11 PROCEDURE — 94729 DIFFUSING CAPACITY: CPT | Mod: PBBFAC | Performed by: INTERNAL MEDICINE

## 2024-03-11 PROCEDURE — 94010 BREATHING CAPACITY TEST: CPT | Mod: 26,S$PBB,, | Performed by: INTERNAL MEDICINE

## 2024-03-11 PROCEDURE — 94010 BREATHING CAPACITY TEST: CPT | Mod: PBBFAC | Performed by: INTERNAL MEDICINE

## 2024-03-11 PROCEDURE — 93356 MYOCRD STRAIN IMG SPCKL TRCK: CPT | Mod: ,,, | Performed by: INTERNAL MEDICINE

## 2024-03-11 PROCEDURE — 99212 OFFICE O/P EST SF 10 MIN: CPT | Mod: PBBFAC,25

## 2024-03-11 PROCEDURE — 71046 X-RAY EXAM CHEST 2 VIEWS: CPT | Mod: TC,FY

## 2024-03-11 RX ORDER — ASCORBIC ACID 500 MG
500 TABLET ORAL DAILY
COMMUNITY

## 2024-03-11 RX ORDER — ZINC GLUCONATE 50 MG
50 TABLET ORAL DAILY
Status: ON HOLD | COMMUNITY
End: 2024-05-11 | Stop reason: HOSPADM

## 2024-03-11 RX ORDER — POLYETHYLENE GLYCOL-3350 AND ELECTROLYTES 236; 6.74; 5.86; 2.97; 22.74 G/274.31G; G/274.31G; G/274.31G; G/274.31G; G/274.31G
POWDER, FOR SOLUTION ORAL
COMMUNITY
Start: 2024-02-14 | End: 2024-03-11 | Stop reason: ALTCHOICE

## 2024-03-11 RX ORDER — AMLODIPINE BESYLATE 10 MG/1
10 TABLET ORAL EVERY MORNING
Start: 2024-03-11

## 2024-03-11 RX ORDER — MAG HYDROX/ALUMINUM HYD/SIMETH 400-400-40
1 SUSPENSION, ORAL (FINAL DOSE FORM) ORAL NIGHTLY
Status: ON HOLD | COMMUNITY
End: 2024-04-16 | Stop reason: CLARIF

## 2024-03-11 RX ORDER — PHENYLEPHRINE HCL 10 MG
2000 TABLET ORAL DAILY
Status: ON HOLD | COMMUNITY
End: 2024-04-16 | Stop reason: CLARIF

## 2024-03-11 RX ORDER — FAMOTIDINE 20 MG/1
20 TABLET, FILM COATED ORAL EVERY MORNING
Qty: 30 TABLET | Refills: 11 | Status: ON HOLD
Start: 2024-03-11 | End: 2024-05-11 | Stop reason: HOSPADM

## 2024-03-11 RX ORDER — ACETAMINOPHEN 500 MG
5000 TABLET ORAL DAILY
COMMUNITY

## 2024-03-11 RX ORDER — FERROUS SULFATE 325(65) MG
325 TABLET, DELAYED RELEASE (ENTERIC COATED) ORAL DAILY
COMMUNITY

## 2024-03-11 NOTE — PROGRESS NOTES
BMT Pharmacist Evaluation    Current Outpatient Medications:     acyclovir (ZOVIRAX) 400 MG tablet, Take 1 tablet (400 mg total) by mouth 2 (two) times daily., Disp: 60 tablet, Rfl: 11    albuterol (PROVENTIL/VENTOLIN HFA) 90 mcg/actuation inhaler, 2 puffs every 4 to 6 hours as needed., Disp: , Rfl:     ALPRAZolam (XANAX) 0.5 MG tablet, Take 0.5 mg by mouth daily as needed., Disp: , Rfl:     amLODIPine (NORVASC) 10 MG tablet, Take 1 tablet (10 mg total) by mouth every morning., Disp: , Rfl:     ascorbic acid, vitamin C, (VITAMIN C) 500 MG tablet, Take 500 mg by mouth once daily., Disp: , Rfl:     atorvastatin (LIPITOR) 10 MG tablet, Take 10 mg by mouth every evening., Disp: , Rfl:     carvediloL (COREG) 12.5 MG tablet, Take 12.5 mg by mouth 2 (two) times daily., Disp: , Rfl:     cholecalciferol, vitamin D3, (VITAMIN D3) 125 mcg (5,000 unit) Tab, Take 5,000 Units by mouth once daily., Disp: , Rfl:     cinnamon bark (CINNAMON) 500 mg capsule, Take 2,000 mg by mouth once daily., Disp: , Rfl:     cloNIDine (CATAPRES) 0.2 MG tablet, Take 0.2 mg by mouth daily as needed., Disp: , Rfl:     famotidine (PEPCID) 20 MG tablet, Take 1 tablet (20 mg total) by mouth every morning., Disp: 30 tablet, Rfl: 11    ferrous sulfate 325 (65 FE) MG EC tablet, Take 325 mg by mouth once daily., Disp: , Rfl:     finasteride (PROSCAR) 5 mg tablet, Take 5 mg by mouth every morning., Disp: , Rfl:     hydrALAZINE (APRESOLINE) 50 MG tablet, Take 1 tablet (50 mg total) by mouth every 8 (eight) hours., Disp: 90 tablet, Rfl: 11    lenalidomide (REVLIMID) 10 mg Cap, Take 10 mg by mouth once daily., Disp: 21 each, Rfl: 0    levothyroxine (SYNTHROID) 112 MCG tablet, Take 112 mcg by mouth before breakfast., Disp: , Rfl:     metFORMIN (GLUCOPHAGE-XR) 500 MG ER 24hr tablet, Take 1,000 mg by mouth 2 (two) times daily., Disp: , Rfl:     nitroGLYCERIN (NITROSTAT) 0.4 MG SL tablet, place one tablet under the tongue every five minutes as needed for chest pain  up to 3 doses. if no relief call 911, Disp: , Rfl:     ondansetron (ZOFRAN) 8 MG tablet, Take 1 tablet (8 mg total) by mouth every 8 (eight) hours as needed for Nausea., Disp: 30 tablet, Rfl: 2    ONETOUCH ULTRA TEST Strp, USE TO TEST BLOOD GLUCOSE TWICE DAILY, Disp: , Rfl:     pantoprazole (PROTONIX) 40 MG tablet, Take 1 tablet (40 mg total) by mouth 2 (two) times daily before meals., Disp: 60 tablet, Rfl: 11    ranolazine (RANEXA) 500 MG Tb12, Take 500 mg by mouth 2 (two) times daily., Disp: , Rfl:     saw palmetto 450 mg Cap, Take 1 capsule by mouth every evening., Disp: , Rfl:     spironolactone (ALDACTONE) 25 MG tablet, Take 12.5 mg by mouth every morning., Disp: , Rfl:     zinc gluconate 50 mg tablet, Take 50 mg by mouth once daily., Disp: , Rfl:     gabapentin (NEURONTIN) 100 MG capsule, 100 mg daily as needed., Disp: , Rfl:   No current facility-administered medications for this visit.      Review of patient's allergies indicates:   Allergen Reactions    Iodine Anaphylaxis    Shellfish containing products     Poison ivy extract     Amoxicillin-pot clavulanate Itching    - Augmentin - itching       CrCl cannot be calculated (Unknown ideal weight.).       Medication adherence: pill box  Medication-related problems: none     Planned conditioning regimen:  Melphalan TBD on Day -1    Antimicrobial Prophylaxis:  Acyclovir starting on Day -1  Levofloxacin starting on Day -1  Fluconazole starting on Day -1  Bactrim starting on Day +30    Growth Factor Support:  Neupogen starting on Day +7      Caregiver: daughter and other family members  Post-transplant discharge plans: Smita Portillo     Notes:  Reviewed and reconciled the medication list with the patient and daughter. Patient was able to confirm all medications he currently takes including schedule and indication. Patient demonstrates excellent medication adherence and understands the importance of this through the transplant process.     Reviewed the planned  high-dose chemotherapy regimen, including schedule and possible side effects. Provided the patient with drug information handouts for chemotherapy. Reviewed possible side effects of transplant including: neutropenia, thrombocytopenia, anemia, infection, infusion reactions, nausea/vomiting, mucositis, loss of appetite, taste changes, diarrhea,hair loss, liver and/or renal dysfunction. Also discussed the rare side effect of neurotoxicity. Reviewed prophylactic antimicrobials, as well as prophylactic and as needed antiemetics. Encouraged the patient to report all possible side effects/new symptoms and to ask for supportive care medications if needed. Patient verbalized understanding and all questions were answered.    Pharmacy Notes:   - I asked about the PRN reason for clonidine (ex: SBP > xxx), however he has not had to use clonidine in over 8 months. Would hold from ordering during admission and discontinue medication at discharge if not needed.    - Similar is gabapentin. He has not had to use it since starting chemotherapy. Would also hold upon admission and potentially discontinue at discharge if patient no longer requires it.    - He is currently taking pantoprazole BID and famotidine daily due to possible ulcerations in his stomach. I attempted to get a timeline of when this information was given, but daughter and patient unable to recall. He is most likely past needing pantoprazole BID, so would reduce to usual pantoprazole daily upon admission. He most likely no longer requires the famotidine as well.    - I discussed that all supplements will be held upon admission: ascorbic acid, cinnamon bark, ferrous sulfate, saw palmetto, and zinc.      - All supplements may be resumed at discharge with the exception of: saw palmetto (risk of prolonged bleeding and patient will be thrombocytopenic); zinc (if patient requires fluoroquinolone and tetracycline at discharge due to drug-supplement interaction).    - No  history of shingles, invasive fungal infections, or CDiff.    - Patient understands that he will need to take a higher dose of acyclovir for one year post-transplant and start taking Bactrim at day +30 and continue until six months post-transplant.     Drug Interactions: see above       Proposed recommendations:  The patient demonstrates good medication adherence and understanding of the chemotherapy and transplant plan. BMT/Hematology Oncology PharmD will continue to follow the patient while admitted to the inpatient unit.       Francine Jasso, Pharm.D., BCOP  Clinical Pharmacy Specialist, Bone Marrow Transplant/Cellular Therapy  [facility]  SpectraLink: [id number]

## 2024-03-13 ENCOUNTER — SOCIAL WORK (OUTPATIENT)
Dept: HEMATOLOGY/ONCOLOGY | Facility: CLINIC | Age: 74
End: 2024-03-13
Payer: MEDICARE

## 2024-03-13 NOTE — PROGRESS NOTES
[facility]   Bone Marrow Transplant Psychosocial Assessment   Date: 2024       Demographic Information     Name: Chip Goodson    : 1950    Age: 73 y.o.    Sex: male    Race: White    Marital Status:    SS #:     Phone Number(s): [phone] (home) [phone] (work)    Home Address: [address] [place] [address]    Mailing Address: [address] [place] [address]    Are you a [place] Citizen? Yes     Contact Information     Next of Kin: Hamzah,Ashleigh   Relationship: Daughter   Phone Number(s): [phone]   Emergency Contact: Extended Emergency Contact Information  Primary Emergency Contact: Ashleigh Goodson  Mobile Phone: [phone]  Relation: Daughter  Preferred language: English   needed? No  Secondary Emergency Contact: [facility]  Mobile Phone: [phone]  Relation: Son  Preferred language: English   needed? No        Living Arrangements   Household Composition:  Patient currently resides with: Spouse and Other (grandson, 22)   If patient resides with spouse, please explain the marital relationship: Not present, unable to assess   Does the patient currently own his/her own home? Yes   Does the patient currently rent home/apartment: No   Are current living arrangements permanent? Yes   If no, please explain: Spouse's situation may be affected by pending amputation       Children's Names     Name Sex Age   1. 5 kids including Ashleigh and [facility]  All adult     Support System   Primary Caregiver:     Name: Ashleigh Goodson   Relationship: daughter   Cell #: [phone]     Secondary Caregiver:     Name: TBD   Relationship: Nephew vs. 3 granddaughters                             Will patient's caregiver be available full-time? Yes   What is the patient's Episcopal? Cheondoism raised Latter day vs. Full Judaism   Is patient currently practicing or non-practicing? Yes      Does patient have any other sources for support?  "Yes      If yes, please explain: Children live nearby; sister and goddaughter to provide support during hospitalization       Post BMT Plans      Does patient have full understanding of recovery from BMT? Yes   Does patient understand risk associated with BMT? Yes   What are patient's housing plans post BMT? [place]   Does patient have a Living Will? No   Does patient have a Power of ?  No     Employment Information     Is patient currently employed? No   Employer: [employer] to record [employer]    Phone #: Data Unavailable   Position: Retired from [employer] in 2009     Significant Other Employment Information     Employer: retired     Financial Information     Monthly Income: $4,166.67   Yearly Income: $50,000   Source of Income:  social security   Do you have any financial concerns? No   If yes, please explain:         Insurance Information      Do you have health insurance?  Yes   Insurance [employer] Medicare AB/C supplement   Policy #: [id number]   Group #: [id number]   Policy Elise: self   Medicare: Yes   Medicare Part D: Yes   Medicaid: No   Do you have Disability Insurance? No      Do you have a Cancer Policy? No   Do you have medication/prescription coverage? Yes   Are you a ? No       Medical Information     Diagnosis: No diagnosis found. "MM"   Date of Diagnosis: 6/2023   Is this a new diagnosis? Yes         If no, please explain:    Past Medical History: Past Medical History:   Diagnosis Date    Chronic diastolic (congestive) heart failure     Coronary artery disease     GERD (gastroesophageal reflux disease)     High cholesterol     HTN (hypertension)     Hypothyroidism, unspecified     Multiple myeloma     Secondary pulmonary arterial hypertension     Type 2 diabetes mellitus without complications       Infusion Services: none   Home Health: Past use of [facility] in 2023 for hypercalcemia   Durable Medical Equipment: RW, not in use   Activities of Daily Living: " "independent   Patient's Family Cancer History: Cancer-related family history includes Breast cancer in his sister; Lung cancer in his mother.cousin, unknown dx.  Wife with liposarcoma.       Cognitive Functioning     Cognitive State: alert   Does patient have any concerns that may affect medical follow up and full understanding of treatment? No   Does patient have any concerns that may impact medication compliance? No   Education Level: high school diploma/GED   Does the patient have any learning disabilities? No   If yes, please explain:    Can the patient read English? Yes   Can the patient write in English? Yes      Is the patient Literate? Yes   What is the patient's primary language? English   Does the patient need interpretation services? No        Psychosocial History     Does patient have any emotional issues? No   If, yes please explain:     Does patient have a psychiatric history? No   If, yes please explain:     Is patient currently taking any psychiatric medication? No   If yes, please list medications:    Is patient currently in therapy or attending support groups? No                   Alcohol/Drug Use/Abuse History     Alcohol Use: yearly at [place] Social History     Substance and Sexual Activity   Alcohol Use Yes      Tobacco Use: quit 30 years ago Social History     Tobacco Use   Smoking Status Former    Types: Cigarettes   Smokeless Tobacco Former      Drug Use:none Social History     Substance and Sexual Activity   Drug Use Not on file          Coping Skills     How is the patient currently coping with their diagnosis? "Whoop the shit out of" his stress; cares for cows, horses, tractors, hay bridger   Is the patient open/receptive to psychosocial intervention? Yes   Has the patient experienced any significant losses in his/her life? No      If yes, please explain:    What are the patient's identified needs? lodging   Goals: Return to normal activities, including farm work/livestock   Interview " Behavior: Open, cooperative   Suitability for Transplant: Suitable   Additional Comments: Mr. Goodson and his daughter Ashleigh were open and cooperative throughout assessment for auto SCT on 3/11.  He is very hard of hearing.  Ashleigh will be his primary caregiver at the [facility], with her daughters and his nephew available to assist if needed. They both had a good understanding of risks and benefits of transplant and the recovery process.  Both signed the caregiver contract without reservation.  They stayed at the [facility] during evaluation.  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  His support at home after [facility] may be affected by wife's adjustment following her 3/20 amputation.

## 2024-03-14 LAB
GENETICIST REVIEW: NORMAL
PLASMA CELL PROLIF RELEASED BY: NORMAL
PLASMA CELL PROLIF RESULT SUMMARY: NORMAL
PLASMA CELL PROLIF RESULT TABLE: NORMAL
REASON FOR REFERRAL, PLASMA CELL PROLIF (PCPD), FISH: NORMAL
REF LAB TEST METHOD: NORMAL
RESULTS, PLASMA CELL PROLIF (PCPD), FISH: NORMAL
SERVICE CMNT-IMP: NORMAL
SERVICE CMNT-IMP: NORMAL
SPECIMEN SOURCE: NORMAL
SPECIMEN, PLASMA CELL PROLIF (PCPD), FISH: NORMAL

## 2024-03-18 ENCOUNTER — PATIENT MESSAGE (OUTPATIENT)
Dept: SURGERY | Facility: CLINIC | Age: 74
End: 2024-03-18
Payer: MEDICARE

## 2024-03-18 ENCOUNTER — LAB VISIT (OUTPATIENT)
Dept: LAB | Facility: HOSPITAL | Age: 74
End: 2024-03-18
Payer: MEDICARE

## 2024-03-18 DIAGNOSIS — C90.01 MULTIPLE MYELOMA IN REMISSION: ICD-10-CM

## 2024-03-18 DIAGNOSIS — D84.821 IMMUNODEFICIENCY DUE TO CHEMOTHERAPY: ICD-10-CM

## 2024-03-18 DIAGNOSIS — Z76.82 STEM CELL TRANSPLANT CANDIDATE: Primary | ICD-10-CM

## 2024-03-18 DIAGNOSIS — Z79.899 IMMUNODEFICIENCY DUE TO CHEMOTHERAPY: ICD-10-CM

## 2024-03-18 DIAGNOSIS — T45.1X5A IMMUNODEFICIENCY DUE TO CHEMOTHERAPY: ICD-10-CM

## 2024-03-18 LAB
ALBUMIN SERPL-MCNC: 3.9 G/DL (ref 3.4–4.8)
ALBUMIN/GLOB SERPL: 1.4 RATIO (ref 1.1–2)
ALP SERPL-CCNC: 97 UNIT/L (ref 40–150)
ALT SERPL-CCNC: 11 UNIT/L (ref 0–55)
AST SERPL-CCNC: 12 UNIT/L (ref 5–34)
BASOPHILS # BLD AUTO: 0.07 X10(3)/MCL
BASOPHILS NFR BLD AUTO: 1.2 %
BILIRUB SERPL-MCNC: 0.9 MG/DL
BUN SERPL-MCNC: 27 MG/DL (ref 8.4–25.7)
CALCIUM SERPL-MCNC: 9.7 MG/DL (ref 8.8–10)
CHLORIDE SERPL-SCNC: 104 MMOL/L (ref 98–107)
CO2 SERPL-SCNC: 28 MMOL/L (ref 23–31)
COMMENT: NORMAL
CREAT SERPL-MCNC: 1.36 MG/DL (ref 0.73–1.18)
EOSINOPHIL # BLD AUTO: 0.2 X10(3)/MCL (ref 0–0.9)
EOSINOPHIL NFR BLD AUTO: 3.4 %
ERYTHROCYTE [DISTWIDTH] IN BLOOD BY AUTOMATED COUNT: 13.2 % (ref 11.5–17)
FINAL PATHOLOGIC DIAGNOSIS: NORMAL
GFR SERPLBLD CREATININE-BSD FMLA CKD-EPI: 55 MLS/MIN/1.73/M2
GLOBULIN SER-MCNC: 2.8 GM/DL (ref 2.4–3.5)
GLUCOSE SERPL-MCNC: 133 MG/DL (ref 82–115)
GROSS: NORMAL
HCT VFR BLD AUTO: 39.5 % (ref 42–52)
HGB BLD-MCNC: 13.7 G/DL (ref 14–18)
IGA SERPL-MCNC: 502 MG/DL (ref 101–645)
IGG SERPL-MCNC: 516 MG/DL (ref 540–1822)
IGM SERPL-MCNC: 19 MG/DL (ref 22–240)
IMM GRANULOCYTES # BLD AUTO: 0.02 X10(3)/MCL (ref 0–0.04)
IMM GRANULOCYTES NFR BLD AUTO: 0.3 %
LYMPHOCYTES # BLD AUTO: 2.32 X10(3)/MCL (ref 0.6–4.6)
LYMPHOCYTES NFR BLD AUTO: 39.7 %
Lab: NORMAL
MCH RBC QN AUTO: 34.9 PG (ref 27–31)
MCHC RBC AUTO-ENTMCNC: 34.7 G/DL (ref 33–36)
MCV RBC AUTO: 100.5 FL (ref 80–94)
MICROSCOPIC EXAM: NORMAL
MONOCYTES # BLD AUTO: 0.82 X10(3)/MCL (ref 0.1–1.3)
MONOCYTES NFR BLD AUTO: 14 %
NEUTROPHILS # BLD AUTO: 2.42 X10(3)/MCL (ref 2.1–9.2)
NEUTROPHILS NFR BLD AUTO: 41.4 %
PLATELET # BLD AUTO: 214 X10(3)/MCL (ref 130–400)
PMV BLD AUTO: 8.9 FL (ref 7.4–10.4)
POTASSIUM SERPL-SCNC: 4.1 MMOL/L (ref 3.5–5.1)
PROT SERPL-MCNC: 6.7 GM/DL (ref 5.8–7.6)
RBC # BLD AUTO: 3.93 X10(6)/MCL (ref 4.7–6.1)
SODIUM SERPL-SCNC: 139 MMOL/L (ref 136–145)
SUPPLEMENTAL DIAGNOSIS: NORMAL
WBC # SPEC AUTO: 5.85 X10(3)/MCL (ref 4.5–11.5)

## 2024-03-18 PROCEDURE — 80053 COMPREHEN METABOLIC PANEL: CPT

## 2024-03-18 PROCEDURE — 83521 IG LIGHT CHAINS FREE EACH: CPT

## 2024-03-18 PROCEDURE — 36415 COLL VENOUS BLD VENIPUNCTURE: CPT

## 2024-03-18 PROCEDURE — 84165 PROTEIN E-PHORESIS SERUM: CPT

## 2024-03-18 PROCEDURE — 85025 COMPLETE CBC W/AUTO DIFF WBC: CPT

## 2024-03-18 PROCEDURE — 82784 ASSAY IGA/IGD/IGG/IGM EACH: CPT

## 2024-03-19 ENCOUNTER — OFFICE VISIT (OUTPATIENT)
Dept: PSYCHIATRY | Facility: CLINIC | Age: 74
End: 2024-03-19
Payer: MEDICARE

## 2024-03-19 DIAGNOSIS — Z76.82 STEM CELL TRANSPLANT CANDIDATE: ICD-10-CM

## 2024-03-19 DIAGNOSIS — C90.01 MULTIPLE MYELOMA IN REMISSION: ICD-10-CM

## 2024-03-19 DIAGNOSIS — Z01.818 PRE-TRANSPLANT EVALUATION FOR STEM CELL TRANSPLANT: Primary | ICD-10-CM

## 2024-03-19 LAB
KAPPA LC FREE SER-MCNC: 1.43 MG/DL (ref 0.33–1.94)
KAPPA LC FREE/LAMBDA FREE SER: 1.66 {RATIO} (ref 0.26–1.65)
LAMBDA LC FREE SERPL-MCNC: 0.86 MG/DL (ref 0.57–2.63)

## 2024-03-19 PROCEDURE — 99999 PR PBB SHADOW E&M-EST. PATIENT-LVL II: CPT | Mod: PBBFAC,,, | Performed by: PSYCHOLOGIST

## 2024-03-19 PROCEDURE — 99212 OFFICE O/P EST SF 10 MIN: CPT | Mod: PBBFAC | Performed by: PSYCHOLOGIST

## 2024-03-19 PROCEDURE — 90791 PSYCH DIAGNOSTIC EVALUATION: CPT | Mod: ,,, | Performed by: PSYCHOLOGIST

## 2024-03-19 PROCEDURE — 96136 PSYCL/NRPSYC TST PHY/QHP 1ST: CPT | Mod: ,,, | Performed by: PSYCHOLOGIST

## 2024-03-19 PROCEDURE — 96130 PSYCL TST EVAL PHYS/QHP 1ST: CPT | Mod: ,,, | Performed by: PSYCHOLOGIST

## 2024-03-19 RX ORDER — PLERIXAFOR 24 MG/1.2ML
0.24 SOLUTION SUBCUTANEOUS ONCE AS NEEDED
OUTPATIENT
Start: 2024-04-10 | End: 2035-09-07

## 2024-03-19 RX ORDER — POTASSIUM CHLORIDE 20 MEQ/1
40 TABLET, EXTENDED RELEASE ORAL ONCE AS NEEDED
Status: CANCELLED | OUTPATIENT
Start: 2024-04-09

## 2024-03-19 RX ORDER — POTASSIUM CHLORIDE 20 MEQ/1
40 TABLET, EXTENDED RELEASE ORAL ONCE AS NEEDED
OUTPATIENT
Start: 2024-04-10

## 2024-03-19 RX ORDER — PLERIXAFOR 24 MG/1.2ML
0.24 SOLUTION SUBCUTANEOUS ONCE AS NEEDED
Status: CANCELLED | OUTPATIENT
Start: 2024-04-09 | End: 2035-09-06

## 2024-03-19 RX ORDER — ACETAMINOPHEN 325 MG/1
650 TABLET ORAL ONCE AS NEEDED
Status: CANCELLED | OUTPATIENT
Start: 2024-04-07

## 2024-03-19 RX ORDER — ACETAMINOPHEN 325 MG/1
650 TABLET ORAL ONCE AS NEEDED
OUTPATIENT
Start: 2024-04-10

## 2024-03-19 RX ORDER — LANOLIN ALCOHOL/MO/W.PET/CERES
800 CREAM (GRAM) TOPICAL ONCE AS NEEDED
Status: CANCELLED | OUTPATIENT
Start: 2024-04-08

## 2024-03-19 RX ORDER — LANOLIN ALCOHOL/MO/W.PET/CERES
800 CREAM (GRAM) TOPICAL ONCE AS NEEDED
OUTPATIENT
Start: 2024-04-10

## 2024-03-19 RX ORDER — ACETAMINOPHEN 325 MG/1
650 TABLET ORAL ONCE AS NEEDED
Status: CANCELLED | OUTPATIENT
Start: 2024-04-09

## 2024-03-19 RX ORDER — LANOLIN ALCOHOL/MO/W.PET/CERES
800 CREAM (GRAM) TOPICAL ONCE AS NEEDED
Status: CANCELLED | OUTPATIENT
Start: 2024-04-05

## 2024-03-19 RX ORDER — ACETAMINOPHEN 325 MG/1
650 TABLET ORAL ONCE AS NEEDED
OUTPATIENT
Start: 2024-04-11

## 2024-03-19 RX ORDER — ACETAMINOPHEN 325 MG/1
650 TABLET ORAL ONCE AS NEEDED
Status: CANCELLED | OUTPATIENT
Start: 2024-04-05

## 2024-03-19 RX ORDER — SODIUM,POTASSIUM PHOSPHATES 280-250MG
2 POWDER IN PACKET (EA) ORAL ONCE AS NEEDED
Status: CANCELLED | OUTPATIENT
Start: 2024-04-07

## 2024-03-19 RX ORDER — ACETAMINOPHEN 325 MG/1
650 TABLET ORAL ONCE AS NEEDED
Status: CANCELLED | OUTPATIENT
Start: 2024-04-08

## 2024-03-19 RX ORDER — SODIUM,POTASSIUM PHOSPHATES 280-250MG
2 POWDER IN PACKET (EA) ORAL ONCE AS NEEDED
Status: CANCELLED | OUTPATIENT
Start: 2024-04-05

## 2024-03-19 RX ORDER — DIPHENHYDRAMINE HCL 25 MG
25 CAPSULE ORAL ONCE AS NEEDED
Status: CANCELLED | OUTPATIENT
Start: 2024-04-09

## 2024-03-19 RX ORDER — DIPHENHYDRAMINE HCL 25 MG
25 CAPSULE ORAL ONCE AS NEEDED
Status: CANCELLED | OUTPATIENT
Start: 2024-04-07

## 2024-03-19 RX ORDER — LANOLIN ALCOHOL/MO/W.PET/CERES
800 CREAM (GRAM) TOPICAL ONCE AS NEEDED
Status: CANCELLED | OUTPATIENT
Start: 2024-04-09

## 2024-03-19 RX ORDER — DIPHENHYDRAMINE HCL 25 MG
25 CAPSULE ORAL ONCE AS NEEDED
Status: CANCELLED | OUTPATIENT
Start: 2024-04-05

## 2024-03-19 RX ORDER — LANOLIN ALCOHOL/MO/W.PET/CERES
800 CREAM (GRAM) TOPICAL ONCE AS NEEDED
OUTPATIENT
Start: 2024-04-11

## 2024-03-19 RX ORDER — DIPHENHYDRAMINE HCL 25 MG
25 CAPSULE ORAL ONCE AS NEEDED
OUTPATIENT
Start: 2024-04-10

## 2024-03-19 RX ORDER — POTASSIUM CHLORIDE 20 MEQ/1
40 TABLET, EXTENDED RELEASE ORAL ONCE AS NEEDED
Status: CANCELLED | OUTPATIENT
Start: 2024-04-05

## 2024-03-19 RX ORDER — DIPHENHYDRAMINE HCL 25 MG
25 CAPSULE ORAL ONCE AS NEEDED
Status: CANCELLED | OUTPATIENT
Start: 2024-04-06

## 2024-03-19 RX ORDER — LANOLIN ALCOHOL/MO/W.PET/CERES
800 CREAM (GRAM) TOPICAL ONCE AS NEEDED
Status: CANCELLED | OUTPATIENT
Start: 2024-04-07

## 2024-03-19 RX ORDER — PLERIXAFOR 24 MG/1.2ML
0.24 SOLUTION SUBCUTANEOUS ONCE AS NEEDED
OUTPATIENT
Start: 2024-04-11 | End: 2035-09-08

## 2024-03-19 RX ORDER — SODIUM,POTASSIUM PHOSPHATES 280-250MG
2 POWDER IN PACKET (EA) ORAL ONCE AS NEEDED
OUTPATIENT
Start: 2024-04-10

## 2024-03-19 RX ORDER — SODIUM,POTASSIUM PHOSPHATES 280-250MG
2 POWDER IN PACKET (EA) ORAL ONCE AS NEEDED
Status: CANCELLED | OUTPATIENT
Start: 2024-04-06

## 2024-03-19 RX ORDER — POTASSIUM CHLORIDE 20 MEQ/1
40 TABLET, EXTENDED RELEASE ORAL ONCE AS NEEDED
Status: CANCELLED | OUTPATIENT
Start: 2024-04-07

## 2024-03-19 RX ORDER — PLERIXAFOR 24 MG/1.2ML
0.24 SOLUTION SUBCUTANEOUS ONCE AS NEEDED
Status: CANCELLED | OUTPATIENT
Start: 2024-04-08 | End: 2035-09-05

## 2024-03-19 RX ORDER — SODIUM,POTASSIUM PHOSPHATES 280-250MG
2 POWDER IN PACKET (EA) ORAL ONCE AS NEEDED
Status: CANCELLED | OUTPATIENT
Start: 2024-04-08

## 2024-03-19 RX ORDER — SODIUM,POTASSIUM PHOSPHATES 280-250MG
2 POWDER IN PACKET (EA) ORAL ONCE AS NEEDED
OUTPATIENT
Start: 2024-04-11

## 2024-03-19 RX ORDER — DIPHENHYDRAMINE HCL 25 MG
25 CAPSULE ORAL ONCE AS NEEDED
OUTPATIENT
Start: 2024-04-11

## 2024-03-19 RX ORDER — LANOLIN ALCOHOL/MO/W.PET/CERES
800 CREAM (GRAM) TOPICAL ONCE AS NEEDED
Status: CANCELLED | OUTPATIENT
Start: 2024-04-06

## 2024-03-19 RX ORDER — POTASSIUM CHLORIDE 20 MEQ/1
40 TABLET, EXTENDED RELEASE ORAL ONCE AS NEEDED
Status: CANCELLED | OUTPATIENT
Start: 2024-04-06

## 2024-03-19 RX ORDER — ACETAMINOPHEN 325 MG/1
650 TABLET ORAL ONCE AS NEEDED
Status: CANCELLED | OUTPATIENT
Start: 2024-04-06

## 2024-03-19 RX ORDER — DIPHENHYDRAMINE HCL 25 MG
25 CAPSULE ORAL ONCE AS NEEDED
Status: CANCELLED | OUTPATIENT
Start: 2024-04-08

## 2024-03-19 RX ORDER — POTASSIUM CHLORIDE 20 MEQ/1
40 TABLET, EXTENDED RELEASE ORAL ONCE AS NEEDED
Status: CANCELLED | OUTPATIENT
Start: 2024-04-08

## 2024-03-19 RX ORDER — SODIUM,POTASSIUM PHOSPHATES 280-250MG
2 POWDER IN PACKET (EA) ORAL ONCE AS NEEDED
Status: CANCELLED | OUTPATIENT
Start: 2024-04-09

## 2024-03-19 RX ORDER — POTASSIUM CHLORIDE 20 MEQ/1
40 TABLET, EXTENDED RELEASE ORAL ONCE AS NEEDED
OUTPATIENT
Start: 2024-04-11

## 2024-03-19 NOTE — PROGRESS NOTES
INFORMED CONSENT/ LIMITS of CONFIDENTIALITY: Prior to beginning the interview, the patient's identification was confirmed using two identifiers. Chip Goodson  was informed of the possible risks and benefits of psychological interventions (e.g., counseling, psychotherapy, testing) and provided information regarding the handling of protected health records and   the limits of confidentiality, including the importance of reporting any suicidal or homicidal ideation to ensure safety of all parties. This provider explained the purpose of today's appointment and the patient was provided with time to ask questions regarding this information.  Acceptance and understanding of these conditions was expressed, and Chip Goodson freely consented to this evaluation.     Psycho-Oncology Pre-Transplant Evaluation  Psychiatry Initial Visit (PhD)  Psychological Intake and Assessment    Date:  3/19/2024     CPT Code: 09819 (1hr) , 57499 (1hr), 69836 (1hr) Evaluation Length (direct face-to-face time):  1 hour   Total Time including report writing, chart review, integration of data and feedback: 3 hours       Referred by:  BMT Team/ Oncologist: BENNETT Evans MD.     Chief complaint/reason for encounter:  Psychological Evaluation prior to stem cell transplantation    Clinical status of patient: Outpatient    Chip Goodson, a 73 y.o. male, was seen for initial evaluation visit.  Met with patient and daughter Ashleigh. His primary care physician is Debbie Gonzalez MD.       Psychological Intake  Medical/Surgical History:   Patient Active Problem List   Diagnosis    Hypercalcemia    Confusion    Hypoalbuminemia    Weakness    Abnormal radionuclide bone scan    Multiple myeloma    GERD (gastroesophageal reflux disease)    Hypothyroidism, unspecified    Type 2 diabetes mellitus without complications    HTN (hypertension)    Stem cell transplant candidate        Health Behaviors:       ETOH Use: No (rare)      Tobacco Use: No   Illicit  Drug Use:  No     Prescription Misuse:No   Caffeine:1-2 cup of coffee   Exercise:The patient engages in environmental activity only.   Firearms:  Yes   Advanced directives:No     Family History:  Not known      Past Psychiatric History:   Inpatient treatment: No     Outpatient treatment: No     Prior substance abuse treatment: No     Suicide Attempts: No      Psychotropic Medications:  Current: Xanax       Past: none    Current medications as per below, allergies reviewed in chart.  Current Outpatient Medications   Medication    acyclovir (ZOVIRAX) 400 MG tablet    albuterol (PROVENTIL/VENTOLIN HFA) 90 mcg/actuation inhaler    ALPRAZolam (XANAX) 0.5 MG tablet    amLODIPine (NORVASC) 10 MG tablet    ascorbic acid, vitamin C, (VITAMIN C) 500 MG tablet    atorvastatin (LIPITOR) 10 MG tablet    carvediloL (COREG) 12.5 MG tablet    cholecalciferol, vitamin D3, (VITAMIN D3) 125 mcg (5,000 unit) Tab    cinnamon bark (CINNAMON) 500 mg capsule    cloNIDine (CATAPRES) 0.2 MG tablet    famotidine (PEPCID) 20 MG tablet    ferrous sulfate 325 (65 FE) MG EC tablet    finasteride (PROSCAR) 5 mg tablet    gabapentin (NEURONTIN) 100 MG capsule    hydrALAZINE (APRESOLINE) 50 MG tablet    lenalidomide (REVLIMID) 10 mg Cap    levothyroxine (SYNTHROID) 112 MCG tablet    metFORMIN (GLUCOPHAGE-XR) 500 MG ER 24hr tablet    nitroGLYCERIN (NITROSTAT) 0.4 MG SL tablet    ondansetron (ZOFRAN) 8 MG tablet    ONETOUCH ULTRA TEST Strp    pantoprazole (PROTONIX) 40 MG tablet    ranolazine (RANEXA) 500 MG Tb12    saw palmetto 450 mg Cap    spironolactone (ALDACTONE) 25 MG tablet    zinc gluconate 50 mg tablet     No current facility-administered medications for this visit.         Social situation/Stressors:Chip Goodson is an 73 y.o. male referred by BENNETT Evans MD for pre-transplant evaluation.  Chip Goodson lives with wife grandson in [place], [place]. He is retired off [employer]    His prior work history is stable.  The patient reports  that he does have adequate availability of time off for the procedure and recovery.  Chip Goodson has been  54 years and has 5 children. The patient reports good social support.  Ashleigh will be present and available to assist the patient during his recovery period.   Chip Goodson is spiritual, but not Congregational. Chip Goodson's hobbies include outdoors, animals, farming.   The patient has no  history.    Additional stressors:  Wife with health issues     Strengths: Housing stability, Able to vocalize needs, Values and traditions, Interpersonal relationships and supports available - family, relatives, friends, and Cultural/spiritual/Congregational and community involvement   Liabilities: Complicated medical illness    History of present illness:   Oncology History   Multiple myeloma   6/17/2023 Initial Diagnosis    Multiple myeloma     7/18/2023 -  Chemotherapy    Treatment Summary   Plan Name: OP DARATUMUMAB LENALIDOMIDE DEXAMETHASONE FOR MULTIPLE MYELOMA   Treatment Goal: Palliative  Status: Active  Start Date: 7/18/2023  End Date: 5/29/2024 (Planned)  Provider: Elsy Amaya MD  Chemotherapy: daratumumab-hyaluronidase-fij subcutaneous injection 1,800 mg, 1,800 mg, Subcutaneous, Clinic[facility] 1 time, 4 of 12 cycles  Administration: 1,800 mg (7/25/2023), 1,800 mg (8/1/2023), 1,800 mg (8/15/2023), 1,800 mg (8/22/2023), 1,800 mg (8/29/2023), 1,800 mg (9/12/2023), 1,800 mg (9/19/2023), 1,800 mg (10/3/2023), 1,800 mg (7/18/2023), 1,800 mg (9/5/2023), 1,800 mg (10/18/2023), 1,800 mg (11/1/2023)       Patient with MM with plans for transplant. No noted psych issues in chart    Chip Goodson has adjusted to illness well primarily through active coping strategies. He has engaged in appropriate information gathering.  The patient has good family support.  His family is coping adequately with the diagnosis/treatment/prognosis.    Chip Goodson reports using  outdoors and time with family and  friends  as his primary methods of coping with general stressors.  Illness-related psychosocial stressors include  wife with medicla issues . These stressors will not prevent patient from adhering to post-transplant requirements.  The patient has an good partnership with his [facility] oncology treatment team. The patient reports the following barriers to cancer care:none.    Stem Cell Transplantation (SCT):  Chip Goodson possesses a good level of knowledge about SCT gleaned from materials provided by his clinicians and discussions with his clinical team .  Chip Goodson is knowledgeable about the possible costs, risks, and complications of the procedure and the behavioral changes which will be required of him.  He has anticipated his recovery needs and has planned adequate time away from work, assistance from family, and residence [place] to facilitate healing. Chip Goodson is aware of the requirement that HSCT patients must stay within 1 hour of the hospital for their first 30 days post-transplant.  Chip Goodson knows he must commit to careful monitoring of symptoms, the possibility of a complex long-term multiple drug treatment regimen, and long term follow-up visits with his oncologist (as required) following the procedure.  He is aware of the following necessary behavioral changes:changes in food selection, preparation, and storage, increased vigilance with home cleanliness , careful personal and dental hygiene , and rapid return to physical activity. The patient reports good compliance with medical treatment in the past, which is supported by review of his medical chart.  Chip Goodson has realistic expectations of health and illness possibilities following SCT. He is aware of possible medical side effects including infection, hair loss, GI difficulties , loss of appetite, fatigue , neurocognitive changes, neuropathy, and mucositis  during/following treatment. He is aware of the risk of  mortality. He also anticipates social strains including decreased ability to participate in some leisure and social activities for some time and the strains of care-giving demands on friends/family.      Collateral Information: no concerns    Current Symptoms:  Mood: denied no prior and no SI/HI;   Jovanna: Denies   Psychosis: Denies  Anxiety: denied; no prior;   Generalized anxiety: Denies       Panic Disorder: Denies  Social/specific phobia: Denies   OCD: Denies  Substance abuse: denied  Is the patient willing to submit to a random drug screen?   Yes (Drugs of abuse screen Neg)  Cognitive functioning: denied  Health behaviors: noncontributory  Can the patient identify own medications and describe purpose and proper dosing? Yes  Does the patient appropriately manage chronic conditions which need close monitoring (such as diabetes)? Yes  Does the patient use complementary or alternative medications, remedies, procedures, or interventions? No   Sleep: No concerns Some jitteriness with Steroid  Pain: Mr. Goodson reports no pain.     Trauma: Denies  Sexual Dysfunction:  Denies   Head Injury History: Denies  Personality Functioning: The patient does not display any personality characteristics which would be an impediment to receiving BMT.    Patient Reported Cancer Treatment Symptoms:  no complaints    Psychological Assessment/Testing:     Distress thermometer:       4/2/2024    11:42 AM 1/23/2024     8:17 AM 10/3/2023    10:15 AM 8/29/2023     8:40 AM 8/22/2023     8:50 AM 8/15/2023    11:00 AM 7/25/2023     8:41 AM   DISTRESS SCREENING   Distress Score 0 - No Distress 5  0 - No Distress 0 - No Distress 0 - No Distress 0 - No Distress   Practical Concerns None of these None of these None of these None of these None of these None of these    Social Concerns None of these None of these None of these None of these None of these None of these    Emotional Concerns None of these None of these None of these None of these None  of these None of these    Retire Spiritual or Moravian Concerns   No No No No    Spiritual or Moravian Concerns None of these None of these        Physical Concerns None of these Sleep None of these None of these None of these Fatigue         PHQ ANSWERS      2024    11:42 AM   PHQ-9 Depression Patient Health Questionnaire   Little interest or pleasure in doing things 0   Feeling down, depressed, or hopeless 0   Trouble falling or staying asleep, or sleeping too much 0   Feeling tired or having little energy 0   Poor appetite or overeating 0   Feeling bad about yourself - or that you are a failure or have let yourself or your family down 0   Trouble concentrating on things, such as reading the newspaper or watching television 0   Moving or speaking so slowly that other people could have noticed. Or the opposite - being so fidgety or restless that you have been moving around a lot more than usual 0        ZACHARY-7 Answers      2024    11:42 AM   GAD7   1. Feeling nervous, anxious, or on edge? 0   2. Not being able to stop or control worrying? 0   3. Worrying too much about different things? 0   4. Trouble relaxing? 0   5. Being so restless that it is hard to sit still? 0   6. Becoming easily annoyed or irritable? 0   7. Feeling afraid as if something awful might happen? 0   ZACHARY-7 Score 0        AUDIT-C  The AUDIT-C is a 3-item alcohol screen that can help identify individuals who are hazardous drinkers or who have alcohol use disorders (including alcohol abuse or dependence). Negative; Score:  0    PCS: Pain catastrophizin There were no elevations 0      MOCA  Deferred due to auditory issues. Patient's ability to complete ADLS is not impacted.     REALM-R:   The Rapid Estimate of Adult Literacy in Medicine, Revised (REALM-R) is a brief screening instrument used to assess an adult patient's ability to read common medical words. It is designed to assist medical professionals in identifying patients at risk  "for poor literacy skills.  Results:  Sufficient health literacy      [facility] BEHAVIORAL MEDICINE DIAGNOSTIC (MBMD)                                                               The MBMD provides an assessment of the potential role of psychiatric factors in a patient's disease and treatment. Chip Goodson produced a valid MBMD profile. Validity indices suggested some concerns in the areas of (Desirability) though not enough to invalidate results. The elevation of the desirability scale suggests that Chip Goodson may be reporting to ensure that he is presented in a positive light.     The patient's profile suggests the presence of  no  anxiety, minimal depression, minimal  cognitive issues, minimal difficulty with moodiness or mood swings, and mild apprehension to being open with others about these issues.     In regard to COPING STYLES, several key scales show response patterns that would be expected to POSITIVELY influence rhonda-procedural course (Sociable, Confident, and Respectful). Chip Goodson's responses indicate that he is cooperative following treatment, which suggests that he will be compliant with recommendations by providers. Scorers with similar profiles tend to be responsible and cooperative, but may keep their feelings to themselves (performing the role of "ideal patient").      The STRESS MODERATORS scales assess factors which have the potential to influence patient responses to treatment.  Chip Goodson obtained mild elevations on the Stress Moderators scales (Functional Deficits, Pain Sensitivity). His low scores on the Social Isolation and Spiritual Absence scales suggest high levels of family support and strong spiritual coping mechanisms. He is positive about his future and feels able to cope with his current pain levels and physical abilities (based on his scores on the Functional Deficits, Pain Sensitivity, and Future Pessimism scales).    His low scores on the Future Pessimism scale " suggest he remains optimistic about his ability to overcome his healthcare challenges.    Profiles similar to Chip Goodson indicate the patient may have high awareness of changes in his physical functioning, which may be beneficial to him with regard to being proactive in seeking medical treatment, but may cause him some worry about his physical health.       The patient's responses indicate that he may sometimes find it difficult to carry out activities, roles, and responsibilities of daily life.The patient's responses indicate that he is likely to require significant assistance to overcome his psychosocial needs and additional psychotherapeutic support may be warranted.  It should be noted that, given hCip Goodson's multiple co- morbid medical problems, these elevations are fairly reasonable and do NOT reflect absolute contraindications.     Mental Status Exam:    General appearance:  appears stated age, neatly dressed, well groomed  Level of cooperation:  cooperative  Thought processes:  logical, goal-directed   Speech: normal in rate, volume, and tone    Mood: steady  Affect: mood congruent  Thought content:  no illusions, no visual hallucinations, no auditory hallucinations, no delusions, no active or passive homicidal thoughts, no active or passive suicidal ideation, no obsessions, no compulsions, no violence  Orientation:  oriented to person, place, and time  Memory:  Recent memory:  WNL   Remote memory - intact  Attention span and concentration: WNL  Abstract reasoning:    Similarities: abstract.    Proverbs: Violet  Judgment and insight: good   Language:  Intact      SUMMARY:  Chip Goodson is a  73 y.o. male referred by Dr. BENNETT Evans MD. for psychological evaluation prior to stem cell transplantation.  The patient appears absent of disabling psychopathology or disabilities which would prevent understanding and compliance with medical treatment.  There is no evidence of suicidality.   The  patient has satisfactory knowledge about HSCT, appropriate expectations for health and illness following transplantation, adequate  understanding of the possible risks and complications of this treatment option, and a medium willingness to sustain effort for lifestyle changes and health adaptations which will be required of him. He is aware of the 30 day 1 hour residence requirement.  He reports adequate compliance with previous medical treatment.  Chip Goodson has good social support from family. Caregivers are engaged and aware of post-HSCT demands.  The patient exhibits a high degree of social stability.  The patient acknowledges no stressors expected to limit his ability to cope with the demands of HSCT and recovery.  The patient reports no tobacco use, no alcohol use, limited, social alcohol use, and no illicit drug use  He demonstrates adequate health literacy.          IMPRESSIONS AND RECOMMENDATIONS  Chip Goodson is an acceptable HSCT candidate from a psychological perspective, with mild risk. Patient may be introverted about symptoms and side effects during hospital course to ensure, as to not be seen in a negative light.   There are no overt psychological contraindications for proceeding with the procedure.He has no significant mental health history, and reports no current psychiatric problems or major adjustment issues.  The patient has reasonable expectations for the procedure, good social support, and has already begun making appropriate life plans in anticipation of the procedure. The patient has verbalized appropriate awareness and commitment to the necessary behavioral changes associated with HSCTand appears willing to adjust to long-term lifestyle challenges and medical follow-up. The patient and family are aware of resources available should their psychological needs change in the future.      ICD-10-CM ICD-9-CM   1. Pre-transplant evaluation for stem cell transplant  Z01.818 V72.83   2.  Multiple myeloma in remission  C90.01 203.01   3. Stem cell transplant candidate  Z76.82 V49.83      Ebony Castro, PhD  Clinical Psychologist  [place] License #[id number]  [place] License #[id number]

## 2024-03-19 NOTE — LETTER
April 2, 2024        Ady Evans MD  [address] [facility] [address]             [facility] - Psychiatry  [address] [place] [id number]  Phone: [phone]  Fax: [phone]   Patient: Chip Goodson   MR Number: [id number]   YOB: 1950   Date of Visit: 3/19/2024       Dear Dr. Vasquez    Thank you for referring Chip Goodson to me for evaluation. Below are the relevant portions of my assessment and plan of care.     IMPRESSIONS AND RECOMMENDATIONS  Chip Goodson is an acceptable HSCT candidate from a psychological perspective, with mild risk. Patient may be introverted about symptoms and side effects during hospital course to ensure, as to not be seen in a negative light.   There are no overt psychological contraindications for proceeding with the procedure.He has no significant mental health history, and reports no current psychiatric problems or major adjustment issues.  The patient has reasonable expectations for the procedure, good social support, and has already begun making appropriate life plans in anticipation of the procedure. The patient has verbalized appropriate awareness and commitment to the necessary behavioral changes associated with HSCTand appears willing to adjust to long-term lifestyle challenges and medical follow-up. The patient and family are aware of resources available should their psychological needs change in the future.    1. Multiple myeloma in remission    2. Stem cell transplant candidate              If you have questions, please do not hesitate to call me. I look forward to following Chip along with you.    Sincerely,      Ebony Castro, PhD           CC    No Recipients

## 2024-03-20 LAB
ALBUMIN % SPEP (OHS): 56.02
ALBUMIN SERPL-MCNC: 3.5 G/DL (ref 3.4–4.8)
ALBUMIN/GLOB SERPL: 1.3 RATIO (ref 1.1–2)
ALPHA 1 GLOB (OHS): 0.22 GM/DL
ALPHA 1 GLOB% (OHS): 3.58
ALPHA 2 GLOB % (OHS): 11.28
ALPHA 2 GLOB (OHS): 0.7 GM/DL
BETA GLOB (OHS): 1.29 GM/DL
BETA GLOB% (OHS): 20.82
GAMMA GLOBULIN % (OHS): 8.29
GAMMA GLOBULIN (OHS): 0.51 GM/DL
GLOBULIN SER-MCNC: 2.7 GM/DL (ref 2.4–3.5)
M SPIKE % (OHS): 1.82
M SPIKE (OHS): 0.11 GM/DL
PATH REV: NORMAL
PROT SERPL-MCNC: 6.2 GM/DL (ref 5.8–7.6)

## 2024-03-22 NOTE — PROGRESS NOTES
Oncology Nutrition - Autologous Evaluation and Education Note    Chip Goodson  1950    Referring Provider: No ref. provider found / Dr. Villanueva / [facility]  Diagnosis: MM  Caregiver(s): Wife, Ashleigh    PMHx:   Past Medical History:   Diagnosis Date    Chronic diastolic (congestive) heart failure     Coronary artery disease     GERD (gastroesophageal reflux disease)     High cholesterol     HTN (hypertension)     Hypothyroidism, unspecified     Multiple myeloma     Secondary pulmonary arterial hypertension     Type 2 diabetes mellitus without complications      Allergies: Iodine, Shellfish containing products, Poison ivy extract, and Amoxicillin-pot clavulanate    Nutrition Assessment    Chip Goodson is a 73 y.o. male with MM being seen for nutrition evaluation and education prior to Autologous SCT. Noted that Mr. Goodson is very hard of hearing. Reports difficulty purchasing foods. Denies receipt of SNAP benefits and declined referral to food pantry at [email or web address] due to distance from home.     Anthropometrics:   Weight:   Wt Readings from Last 3 Encounters:   03/11/24 1534 91.2 kg (201 lb)   02/06/24 0915 91.2 kg (201 lb)   01/29/24 0847 93.8 kg (206 lb 14.4 oz)                                Height: 70 inches   BMI: 28.8 Usual BW: N/A. Timeframe: N/A  Weight Change: N/A     Appetite/Intake: Regular Diet. Eating 3-5 times daily. Premier Protein BID.     Client History/Food Access:  Cultural/Spiritual/Personal Preferences: No Preferences    Living Situation: wife and grandson  Who: Shops for Groceries? Patient  Who: Prepare meals? Family  Eating out: 5+x/week.  Are there financial difficulties purchasing food? Yes, no food stamps.     Current Medications:    Current Outpatient Medications:     acyclovir (ZOVIRAX) 400 MG tablet, Take 1 tablet (400 mg total) by mouth 2 (two) times daily., Disp: 60 tablet, Rfl: 11    albuterol (PROVENTIL/VENTOLIN HFA) 90 mcg/actuation inhaler, 2 puffs every 4 to 6  hours as needed., Disp: , Rfl:     ALPRAZolam (XANAX) 0.5 MG tablet, Take 0.5 mg by mouth daily as needed., Disp: , Rfl:     amLODIPine (NORVASC) 10 MG tablet, Take 1 tablet (10 mg total) by mouth every morning., Disp: , Rfl:     ascorbic acid, vitamin C, (VITAMIN C) 500 MG tablet, Take 500 mg by mouth once daily., Disp: , Rfl:     atorvastatin (LIPITOR) 10 MG tablet, Take 10 mg by mouth every evening., Disp: , Rfl:     carvediloL (COREG) 12.5 MG tablet, Take 12.5 mg by mouth 2 (two) times daily., Disp: , Rfl:     cholecalciferol, vitamin D3, (VITAMIN D3) 125 mcg (5,000 unit) Tab, Take 5,000 Units by mouth once daily., Disp: , Rfl:     cinnamon bark (CINNAMON) 500 mg capsule, Take 2,000 mg by mouth once daily., Disp: , Rfl:     cloNIDine (CATAPRES) 0.2 MG tablet, Take 0.2 mg by mouth daily as needed., Disp: , Rfl:     famotidine (PEPCID) 20 MG tablet, Take 1 tablet (20 mg total) by mouth every morning., Disp: 30 tablet, Rfl: 11    ferrous sulfate 325 (65 FE) MG EC tablet, Take 325 mg by mouth once daily., Disp: , Rfl:     finasteride (PROSCAR) 5 mg tablet, Take 5 mg by mouth every morning., Disp: , Rfl:     gabapentin (NEURONTIN) 100 MG capsule, 100 mg daily as needed., Disp: , Rfl:     hydrALAZINE (APRESOLINE) 50 MG tablet, Take 1 tablet (50 mg total) by mouth every 8 (eight) hours., Disp: 90 tablet, Rfl: 11    lenalidomide (REVLIMID) 10 mg Cap, Take 10 mg by mouth once daily., Disp: 21 each, Rfl: 0    levothyroxine (SYNTHROID) 112 MCG tablet, Take 112 mcg by mouth before breakfast., Disp: , Rfl:     metFORMIN (GLUCOPHAGE-XR) 500 MG ER 24hr tablet, Take 1,000 mg by mouth 2 (two) times daily., Disp: , Rfl:     nitroGLYCERIN (NITROSTAT) 0.4 MG SL tablet, place one tablet under the tongue every five minutes as needed for chest pain up to 3 doses. if no relief call 911, Disp: , Rfl:     ondansetron (ZOFRAN) 8 MG tablet, Take 1 tablet (8 mg total) by mouth every 8 (eight) hours as needed for Nausea., Disp: 30 tablet,  Rfl: 2    ONETOUCH ULTRA TEST Strp, USE TO TEST BLOOD GLUCOSE TWICE DAILY, Disp: , Rfl:     pantoprazole (PROTONIX) 40 MG tablet, Take 1 tablet (40 mg total) by mouth 2 (two) times daily before meals., Disp: 60 tablet, Rfl: 11    ranolazine (RANEXA) 500 MG Tb12, Take 500 mg by mouth 2 (two) times daily., Disp: , Rfl:     saw palmetto 450 mg Cap, Take 1 capsule by mouth every evening., Disp: , Rfl:     spironolactone (ALDACTONE) 25 MG tablet, Take 12.5 mg by mouth every morning., Disp: , Rfl:     zinc gluconate 50 mg tablet, Take 50 mg by mouth once daily., Disp: , Rfl:     Vitamins/Supplements: N/A    Labs: Reviewed 3/11/24: gluc 128; 3/18/24: bun 27, cr 1.36    Nutrition Intervention    Nutrition Prescription Post Auto SCT  4860-3028 kcals/day 30-35 kcal/kg   136-182 g protein/day 1.5-2.0 gm/kg  3953-4730 mL fluid/day 1 ml/kcal    Recommendations:   Review details of [facility] Nutrition Guide review during visit   Encouraged safe food practices.    Materials Provided/Reviewed: [facility] Nutrition Guide  Barriers to Learning: none identified  Patient and/or Family Verbalizes understanding: yes     Nutrition Monitoring and Evaluation    Follow up: upon RTC post SCT.     Communication to referring provider/care team: Note available in chart.    Consultation Time: 30 Minutes     Marcus HUGHESAP, , LDN  [facility] in Clinical Nutrition  Board Certified Specialist in Oncology Nutrition   [facility]  [phone]

## 2024-03-25 ENCOUNTER — OFFICE VISIT (OUTPATIENT)
Dept: HEMATOLOGY/ONCOLOGY | Facility: CLINIC | Age: 74
End: 2024-03-25
Payer: MEDICARE

## 2024-03-25 VITALS
HEIGHT: 70 IN | WEIGHT: 211.38 LBS | DIASTOLIC BLOOD PRESSURE: 73 MMHG | HEART RATE: 65 BPM | OXYGEN SATURATION: 95 % | TEMPERATURE: 98 F | BODY MASS INDEX: 30.26 KG/M2 | SYSTOLIC BLOOD PRESSURE: 164 MMHG

## 2024-03-25 DIAGNOSIS — C90.00 MULTIPLE MYELOMA NOT HAVING ACHIEVED REMISSION: Primary | ICD-10-CM

## 2024-03-25 DIAGNOSIS — Z76.82 STEM CELL TRANSPLANT CANDIDATE: ICD-10-CM

## 2024-03-25 DIAGNOSIS — E11.9 TYPE 2 DIABETES MELLITUS WITHOUT COMPLICATION, WITHOUT LONG-TERM CURRENT USE OF INSULIN: ICD-10-CM

## 2024-03-25 DIAGNOSIS — Z86.39: ICD-10-CM

## 2024-03-25 PROCEDURE — 99214 OFFICE O/P EST MOD 30 MIN: CPT | Mod: S$PBB,,, | Performed by: INTERNAL MEDICINE

## 2024-03-25 PROCEDURE — 99999 PR PBB SHADOW E&M-EST. PATIENT-LVL V: CPT | Mod: PBBFAC,,, | Performed by: INTERNAL MEDICINE

## 2024-03-25 PROCEDURE — 99215 OFFICE O/P EST HI 40 MIN: CPT | Mod: PBBFAC | Performed by: INTERNAL MEDICINE

## 2024-03-25 NOTE — PROGRESS NOTES
Multiple Myeloma IgA kappa    Present treatment:  to have bone marrow transplant in April; presently on no treatment.   PREVIOUS TREATMENT  Daratumumab, lenalidomide, and dexamethasone x 4 cycles  initiated 7/12/23  Lenalidomide and dexamethasone stopped on 3/6/24    Oncology History     Imaging:  CT C 6/7/2023: 1. There is a small left sided pleural effusion.2. A small patchy area of ground glass density is seen in the lateral basal segment of the left lower lobe. This could reflect an acute focal infiltrate / pneumonitis. 3. There is diffuse osteopenia along the visualised bony structures together with multiple, numerous, small round-ovoid lucent lesion of varying sizes involving the marrow. These changes could reflect metabolic bone disease like hyperparathyroidism. Correlate clinically and with laboratory findings, as regards additional evaluation and follow-up.  6/8/23 Bone Scan Whole Body:  1. Scattered activity at the anterior left right rib margins as described.  A CT exam on the previous day reveals no definite fracture.  There is mild heterogeneous and bony loss at the anterior left and right ribs.  This is not have the typical pattern of a metastases.  2. Focal increased activity at the anterior manubrium which again on the CT exam demonstrates osteopenia and heterogeneous bony loss as well as scattered subcentimeter small lytic defects. 3. Suspect mild arthritic changes at the shoulders sternoclavicular joints  6/9/23 MRI Thoracic Spine: Within limitations, no convincing evidence of acute thoracic spine abnormality, high-grade canal stenosis, or focal cord pathology. Multiple scattered vertebral body lesions are suspected and could represent metastatic disease, otherwise of incomplete characterization by non-contrast protocol. Incidental findings of the partially visualized thoracic cavity and retroperitoneum discussed above, poorly assessed by modality/protocol.  Recent non-contrast chest CT provides  overall better visualization.  6/10/23 CT Head: No acute intracranial abnormality.  Findings consistent with microangiopathy no interval change.  6/12/23 XRAY Chest: 1. Patchy hazy opacities again evident at the lower lungs bilaterally suspicious for infiltrate and atelectasis.  Small bibasilar pleural reactions cannot be excluded. 2. Borderline cardiomegaly 3. Atherosclerosis  6/16/23 XRAY Chest: Improved aeration of the lung bases with mild residual bibasilar opacities and small pleural effusions suspected.      Pathology:  6/12/23 Bone marrow aspiration/Biopsy:   PLASMA CELL MYELOMA, KAPPA RESTRICTED.     PLASMA CELL MYELOMA INVOLVES 90% OF BONE MARROW CELLULARITY WITH SMALL AREAS OF  SEQUENTIAL TRILINEAGE HEMATOPOIESIS.    ABSENT IRON STORES.     PERIPHERAL BLOOD: NORMOCYTIC NORMOCHROMIC ANEMIA. THROMBOCYTOPENIA.   3/7/24 bone marrow biopsy:  BONE MARROW ASPIRATE, TOUCH PREP, CLOT, AND DECALCIFIED NEEDLE CORE BIOPSY:  LEFT POSTEROSUPERIOR ILIAC CREST  - NO DEFINITIVE RESIDUAL KAPPA LIGHT CHAIN RESTRICTED MULTIPLE MYELOMA  - Mildly hypercellular marrow (35-45% total cellularity) with non-increased scattered plasma cells and trilineage hematopoiesis with focally increased erythroid precursors (see comments)    LABS:  6/8/23 M-spike 3.33, IgA >7040, serum kappa 5/ lambda 0.56/ K:L ratio 9.09, 24 hour urine for M protein done but not resulted. Calcium 13.4/Alb 1.9, Cr 2.3, globulin 10.7  7/12/23 M-spike 2.49. IgA >7040, Corrected calcium 12.3, Cr 2.09, Globulin 7.6  8/15/23 M-spike 1.49, IgA 4000  9/18/23 M-spike 1.0, IgA 1700  1/19/24 M-spike 0 (smal protein in beta fraction), IgA 335, KFLC 1.61/LFLC 0.92/ ratio 1.75  3/18/24 M-spike 0.11, IgG 516, KFLC 1.43/LFLC 0.86/ ratio 1.66      INTERVAL HISTORY:  3/25/24 recently seen by transplant. Bone marrow biopy normal. Has an appt with Dr. Evans tomorrow and will have neupogen on 4/4/24 for stem cell harvesting. He stopped his revlimid and dex on 3/6/24. He thinks the  "transplant will be on 4/15/24. Wife had leg amputation on 3/20/24 due to lack of circulation and gangrene. She is OLOL.   2/26/24 Patient is here today for a 4 week follow up for Multiple Myeloma IgA Kappa. He is currently on lenalidomide and decadron. He had a colonoscopy on 2/15/2024  for clearance for stem cell transplant, repeat colonoscopy in 3 years around age 76. He is scheduled for a PET and biopsy on 3/4/2024 in [place] to start evaluation for stem cell transplant in April. He denies any new pain, chest pain, shortness of breath, diarrhea, constipation, nausea, vomiting, change in appetite, fatigue, unexplained weight loss.    1/24/24:  Per Dr. Vasquez's Note:  " 74yo, is here for virtual visit for stem cell transplant consultation. He has HTn, GERD, type 2 DM not on long term insulin.   He has IgA kappa multiple myeloma, diagnosed in June 2023.    He has been treated with DRd from 7/18/23.   He is currently in cycle 4. He has tolerated the treatments well. ..   He will be considered for reduced intensity conditioning and autologous stem cell transplant.  He has stopped daratumumab after cycle 4, and continue lenaldiomide and decadron...   He will start evaluation for SCT in the next few weeks. "     Review of patient's allergies indicates:   Allergen Reactions    Iodine Anaphylaxis    Shellfish containing products     Poison ivy extract     Amoxicillin-pot clavulanate Itching      Current Outpatient Medications on File Prior to Visit   Medication Sig Dispense Refill    acyclovir (ZOVIRAX) 400 MG tablet Take 1 tablet (400 mg total) by mouth 2 (two) times daily. 60 tablet 11    albuterol (PROVENTIL/VENTOLIN HFA) 90 mcg/actuation inhaler 2 puffs every 4 to 6 hours as needed.      ALPRAZolam (XANAX) 0.5 MG tablet Take 0.5 mg by mouth daily as needed.      amLODIPine (NORVASC) 10 MG tablet Take 1 tablet (10 mg total) by mouth every morning.      ascorbic acid, vitamin C, (VITAMIN C) 500 MG tablet Take 500 " mg by mouth once daily.      atorvastatin (LIPITOR) 10 MG tablet Take 10 mg by mouth every evening.      carvediloL (COREG) 12.5 MG tablet Take 12.5 mg by mouth 2 (two) times daily.      cholecalciferol, vitamin D3, (VITAMIN D3) 125 mcg (5,000 unit) Tab Take 5,000 Units by mouth once daily.      cinnamon bark (CINNAMON) 500 mg capsule Take 2,000 mg by mouth once daily.      cloNIDine (CATAPRES) 0.2 MG tablet Take 0.2 mg by mouth daily as needed.      famotidine (PEPCID) 20 MG tablet Take 1 tablet (20 mg total) by mouth every morning. 30 tablet 11    ferrous sulfate 325 (65 FE) MG EC tablet Take 325 mg by mouth once daily.      finasteride (PROSCAR) 5 mg tablet Take 5 mg by mouth every morning.      gabapentin (NEURONTIN) 100 MG capsule 100 mg daily as needed.      hydrALAZINE (APRESOLINE) 50 MG tablet Take 1 tablet (50 mg total) by mouth every 8 (eight) hours. 90 tablet 11    levothyroxine (SYNTHROID) 112 MCG tablet Take 112 mcg by mouth before breakfast.      metFORMIN (GLUCOPHAGE-XR) 500 MG ER 24hr tablet Take 1,000 mg by mouth 2 (two) times daily.      nitroGLYCERIN (NITROSTAT) 0.4 MG SL tablet place one tablet under the tongue every five minutes as needed for chest pain up to 3 doses. if no relief call 911      ondansetron (ZOFRAN) 8 MG tablet Take 1 tablet (8 mg total) by mouth every 8 (eight) hours as needed for Nausea. 30 tablet 2    ONETOUCH ULTRA TEST Strp USE TO TEST BLOOD GLUCOSE TWICE DAILY      pantoprazole (PROTONIX) 40 MG tablet Take 1 tablet (40 mg total) by mouth 2 (two) times daily before meals. 60 tablet 11    ranolazine (RANEXA) 500 MG Tb12 Take 500 mg by mouth 2 (two) times daily.      saw palmetto 450 mg Cap Take 1 capsule by mouth every evening.      spironolactone (ALDACTONE) 25 MG tablet Take 12.5 mg by mouth every morning.      zinc gluconate 50 mg tablet Take 50 mg by mouth once daily.      lenalidomide (REVLIMID) 10 mg Cap Take 10 mg by mouth once daily. (Patient not taking: Reported on  3/25/2024.) 21 each 0     No current facility-administered medications on file prior to visit.      Review of Systems   Constitutional:  Negative for appetite change, chills, diaphoresis, fever and unexpected weight change.   HENT:  Negative for nasal congestion, mouth sores, nosebleeds, sinus pressure/congestion, sore throat and trouble swallowing.    Eyes: Negative.    Respiratory:  Negative for cough and shortness of breath.    Cardiovascular:  Negative for chest pain and palpitations.   Gastrointestinal:  Negative for abdominal distention, abdominal pain, blood in stool, change in bowel habit, constipation, diarrhea, nausea and vomiting.   Endocrine: Negative.    Genitourinary:  Negative for bladder incontinence, decreased urine volume, dysuria, frequency, hematuria and urgency. Difficulty urinating: had a ocampo cath.  Musculoskeletal:  Negative for arthralgias, back pain, gait problem, joint swelling, leg pain and myalgias.   Integumentary:  Negative for rash.   Allergic/Immunologic: Negative.    Neurological:  Negative for dizziness, tremors, syncope, weakness, light-headedness, numbness, headaches and memory loss.   Hematological:  Negative for adenopathy. Does not bruise/bleed easily.   Psychiatric/Behavioral:  Negative for agitation, confusion, hallucinations, sleep disturbance and suicidal ideas. The patient is not nervous/anxious.          Vitals:    03/25/24 1335   BP: (!) 164/73   Pulse: 65   Temp: 98.2 °F (36.8 °C)         Physical exam deferred due to nature of visit today     Laboratory:  CBC with Differential:  Lab Results   Component Value Date    WBC 5.85 03/18/2024    RBC 3.93 (L) 03/18/2024    HGB 13.7 (L) 03/18/2024    HCT 39.5 (L) 03/18/2024    .5 (H) 03/18/2024    MCH 34.9 (H) 03/18/2024    MCHC 34.7 03/18/2024    RDW 13.2 03/18/2024     03/18/2024    MPV 8.9 03/18/2024    GRAN 2.0 03/11/2024    GRAN 36.8 (L) 03/11/2024    LYMPH 2.4 03/11/2024    LYMPH 45.0 03/11/2024    MONO  0.7 03/11/2024    MONO 12.6 03/11/2024    EOS 0.2 03/11/2024    BASO 0.07 03/11/2024    EOSINOPHIL 4.1 03/11/2024    BASOPHIL 1.3 03/11/2024   INITIAL MYELOMA LABS:  Serum:  SPEP: Serum protein electrophoresis shows a distinct monoclonal peak (3.33 g/dL) in the beta zone, consistent with a monoclonal gammopathy.   PAO: A band is present in the IgA trini with a corresponding band in the kappa trini.   The immunofixation electrophoresis are consistent with monoclonal gammopathy of IgA-kappa isotype.     IgG Level 540.00 - 1,822.00 mg/dL 165.00 Low     IgA Level 101.0 - 645.0 mg/dL >7,040.0 High     IgM Level 22.0 - 240.0 mg/dL 6.0 Low       Kappa Free Light Chain 0.3300 - 1.94 mg/dL 5.09 High     Lambda Free Light Chain 0.5700 - 2.63 mg/dL 0.5600 Low     Kappa/Lambda FLC Ratio 0.2600 - 1.65 9.09 High      Urine:   24 hrs Urine:  M spike    1467      H    mg/24 h    CMP:  Sodium   Date Value Ref Range Status   03/11/2024 142 136 - 145 mmol/L Final     Sodium Level   Date Value Ref Range Status   03/18/2024 139 136 - 145 mmol/L Final     Potassium   Date Value Ref Range Status   03/11/2024 4.1 3.5 - 5.1 mmol/L Final     Potassium Level   Date Value Ref Range Status   03/18/2024 4.1 3.5 - 5.1 mmol/L Final     Chloride   Date Value Ref Range Status   03/11/2024 109 95 - 110 mmol/L Final     CO2   Date Value Ref Range Status   03/11/2024 27 23 - 29 mmol/L Final     Carbon Dioxide   Date Value Ref Range Status   03/18/2024 28 23 - 31 mmol/L Final     Glucose   Date Value Ref Range Status   03/11/2024 128 (H) 70 - 110 mg/dL Final     BUN   Date Value Ref Range Status   03/11/2024 10 8 - 23 mg/dL Final     Blood Urea Nitrogen   Date Value Ref Range Status   03/18/2024 27.0 (H) 8.4 - 25.7 mg/dL Final     Creatinine   Date Value Ref Range Status   03/18/2024 1.36 (H) 0.73 - 1.18 mg/dL Final   03/11/2024 1.4 0.5 - 1.4 mg/dL Final     Calcium   Date Value Ref Range Status   03/11/2024 9.6 8.7 - 10.5 mg/dL Final     Calcium Level  Total   Date Value Ref Range Status   03/18/2024 9.7 8.8 - 10.0 mg/dL Final     Total Protein   Date Value Ref Range Status   03/11/2024 7.0 6.0 - 8.4 g/dL Final     Albumin   Date Value Ref Range Status   03/11/2024 4.1 3.5 - 5.2 g/dL Final     Albumin Level   Date Value Ref Range Status   03/18/2024 3.9 3.4 - 4.8 g/dL Final   03/18/2024 3.5 3.4 - 4.8 g/dL Final     Total Bilirubin   Date Value Ref Range Status   03/11/2024 1.3 (H) 0.1 - 1.0 mg/dL Final     Comment:     For infants and newborns, interpretation of results should be based  on gestational age, weight and in agreement with clinical  observations.    Premature Infant recommended reference ranges:  Up to 24 hours.............<8.0 mg/dL  Up to 48 hours............<12.0 mg/dL  3-5 days..................<15.0 mg/dL  6-29 days.................<15.0 mg/dL       Bilirubin Total   Date Value Ref Range Status   03/18/2024 0.9 <=1.5 mg/dL Final     Alkaline Phosphatase   Date Value Ref Range Status   03/18/2024 97 40 - 150 unit/L Final   03/11/2024 106 55 - 135 U/L Final     AST   Date Value Ref Range Status   03/11/2024 11 10 - 40 U/L Final     Aspartate Aminotransferase   Date Value Ref Range Status   03/18/2024 12 5 - 34 unit/L Final     ALT   Date Value Ref Range Status   03/11/2024 13 10 - 44 U/L Final     Alanine Aminotransferase   Date Value Ref Range Status   03/18/2024 11 0 - 55 unit/L Final     Anion Gap   Date Value Ref Range Status   03/11/2024 6 (L) 8 - 16 mmol/L Final      Component      Latest Ref Rng 1/19/2024   Sodium      136 - 145 mmol/L 137    Potassium      3.5 - 5.1 mmol/L 3.6    Chloride      98 - 107 mmol/L 103    CO2      23 - 31 mmol/L 29    Glucose      82 - 115 mg/dL 118 (H)    BUN      8.4 - 25.7 mg/dL 28.0 (H)    Creatinine      0.73 - 1.18 mg/dL 1.15    Calcium      8.8 - 10.0 mg/dL 9.4    PROTEIN TOTAL      5.8 - 7.6 gm/dL 6.0    Albumin      3.4 - 4.8 g/dL 3.8    Globulin, Total      2.4 - 3.5 gm/dL 2.2 (L)    Albumin/Globulin  "Ratio      1.1 - 2.0 ratio 1.7    BILIRUBIN TOTAL      <=1.5 mg/dL 1.0    ALP      40 - 150 unit/L 104    ALT      0 - 55 unit/L 17    AST      5 - 34 unit/L 11    eGFR      mls/min/1.73/m2 >60    Kappa Free Light Chain      0.3300 - 1.94 mg/dL 1.61    Lambda Free Light Chain      0.5700 - 2.63 mg/dL 0.9200    Kappa/Lambda FLC Ratio      0.2600 - 1.65  1.75 (H)    IgG      540.00 - 1,822.00 mg/dL 454.00 (L)    IgA      101.0 - 645.0 mg/dL 335.0    IgM      22.0 - 240.0 mg/dL 15.0 (L)    LD      125 - 220 U/L 111 (L)        12/29/23 SPEP  Small abnormality in beta fraction. .     M-protein Isotype MALDI-TOF MS        IgA kappa, small monoclonal.   Suggest quantitative immunoglobulin level to assist in following monoclonal protein.        Assessment    1. IgA kappa multuiple myeloma in remission  2. Stem cell transplant candidate; scheduled for harvesting of stem cell 4/4/24 and tansplant mid [facility]  3. Type 2 DM not on long term insulin without complications  4. HTn  5. GERD  6. BPH  7. Hypothyroidism    Plan per transplant 1/2024:  "Keep appt with his dentist in prep for transplant  Has an appt with Dr. Donahue to disciss a colonoscopy   Once he has these test done he will [facility] March.   1,2: Stage at diagnosis not clear. FISH, LDH, serum beta2 microglobulin at diagnosis not available. He had ~90% plasma cells in his bone marrow at diagnosis. He had hypercalcemia. He had M spike of 3.33g/dl on SPEP in June 2023. He has responded well to DRd. M spike is no longer detectable.     Role, timing, risk and benefits of autologous peripheral blood stem cell transplant in multiple myeloma discussed in detail.  I explained that it is NOT CURATIVE, but has significant progression free survival, and overall survival, especially when it is followed by maintenance chemotherapy.  However, most of the benefit of high dose chemotherapy and stem cell transplant is in younger ( < 65) patients.   " "He is very fit, had limited co-morbidities, and has good support.  He will be considered for reduced intensity conditioning and autologous stem cell transplant.  He has stopped daratumumab after cycle 4, and continue lenaldiomide and decadron.    Most recent SPEP from 12/29/23 shows a very small monoclonal protein. He is still interested in proceeding to SCT. He will jhave colonoscopy scheduled. He has dentures, and has no ongoing dental issues.   He will start evaluation for SCT in the next few weeks. "  3/6/24 PET:  In the bones, there are multiple tracer avid osseous lesions noting increased conspicuity of previous hypermetabolic rib lesions and few new lesions.  Numerous additional lytic foci without significant increase tracer uptake, similar to prior.  Index lesions as follows:   New hypermetabolic lytic lesion within the right posteromedial 4th rib, SUV max 6.6 (axial fused image 110).   Increased tracer uptake within left anterolateral 5th rib with increased soft tissue component, SUV max 8 (axial fused image 152).  Previous SUV max 4.3.   New tracer avid focus in left proximal femur with associated increased marrow attenuation, SUV max 4.4 (axial fused image 308).   In the extremities, there are no hypermetabolic lesions worrisome for malignancy.   Additional CT findings: Calcific coronary atherosclerosis.  Similar bilateral renal cysts.  Colonic diverticulosis.   Impression:   Progression of hypermetabolic osseous disease in patient with multiple myeloma, index lesions as above.      3/6/24 bone marrow biopsy:    Diagnosis     BONE MARROW ASPIRATE, TOUCH PREP, CLOT, AND DECALCIFIED NEEDLE CORE BIOPSY:  LEFT POSTEROSUPERIOR ILIAC CREST  - NO DEFINITIVE RESIDUAL KAPPA LIGHT CHAIN RESTRICTED MULTIPLE MYELOMA  - Mildly hypercellular marrow (35-45% total cellularity) with non-increased scattered plasma cells and trilineage hematopoiesis with focally increased erythroid precursors (see comments)  - No karyotype " "performed (see comments)  - Solitary small well-defined non-paratrabecular lymphoid aggregate (favor benign/reactive)  - Adequate stainable histiocytic iron stores (3+ out of 6+)  - Negative for amyloid deposition by [place] Red special stain       Per patient at his 3/25/24 visit:  Has an appt with Dr. Evans tomorrow and will have neupogen on 4/4/24 for stem cell harvesting. He stopped his revlimid on 3/6/24. He thinks the transplant will be on 4/15/24.    3. He follows with his primary care physician.    4. He follows with his primary care physician. On hydralazine, tamsulosin, spironolactione    5. Denies heart burn. He follows with his primary care physician.    6. On tamsulosin, follows with his primary care physician    7. He is on synthroid, follows with his primary care physician."  PLAN:    Rev Dex held as of 3/6/24 for transplant       Return to clinic in 6-8  weeks after bone marrow transplant per transplant team.  Continue to HOLD lenalidomide and dexamethasone.      Oncology/Hematology  [facility]      "

## 2024-03-25 NOTE — PROGRESS NOTES
STEM CELL TRANSPLANT CONSENT  NOTE      Multiple Myeloma IgA kappa    Present treatment:  Daratumumab, lenalidomide, and dexamethasone initiated 7/12/23      Oncology History ( copied from EMR)    Imaging:  CT C 6/7/2023: 1. There is a small left sided pleural effusion.2. A small patchy area of ground glass density is seen in the lateral basal segment of the left lower lobe. This could reflect an acute focal infiltrate / pneumonitis. 3. There is diffuse osteopenia along the visualised bony structures together with multiple, numerous, small round-ovoid lucent lesion of varying sizes involving the marrow. These changes could reflect metabolic bone disease like hyperparathyroidism. Correlate clinically and with laboratory findings, as regards additional evaluation and follow-up.  6/8/23 Bone Scan Whole Body:  1. Scattered activity at the anterior left right rib margins as described.  A CT exam on the previous day reveals no definite fracture.  There is mild heterogeneous and bony loss at the anterior left and right ribs.  This is not have the typical pattern of a metastases.  2. Focal increased activity at the anterior manubrium which again on the CT exam demonstrates osteopenia and heterogeneous bony loss as well as scattered subcentimeter small lytic defects. 3. Suspect mild arthritic changes at the shoulders sternoclavicular joints  6/9/23 MRI Thoracic Spine: Within limitations, no convincing evidence of acute thoracic spine abnormality, high-grade canal stenosis, or focal cord pathology. Multiple scattered vertebral body lesions are suspected and could represent metastatic disease, otherwise of incomplete characterization by non-contrast protocol. Incidental findings of the partially visualized thoracic cavity and retroperitoneum discussed above, poorly assessed by modality/protocol.  Recent non-contrast chest CT provides overall better visualization.  6/10/23 CT Head: No acute intracranial abnormality.  Findings  consistent with microangiopathy no interval change.  6/12/23 XRAY Chest: 1. Patchy hazy opacities again evident at the lower lungs bilaterally suspicious for infiltrate and atelectasis.  Small bibasilar pleural reactions cannot be excluded. 2. Borderline cardiomegaly 3. Atherosclerosis  6/16/23 XRAY Chest: Improved aeration of the lung bases with mild residual bibasilar opacities and small pleural effusions suspected.      Pathology:  6/12/23 Bone marrow aspiration/Biopsy:   PLASMA CELL MYELOMA, KAPPA RESTRICTED.     PLASMA CELL MYELOMA INVOLVES 90% OF BONE MARROW CELLULARITY WITH SMALL AREAS OF  SEQUENTIAL TRILINEAGE HEMATOPOIESIS.    ABSENT IRON STORES.     PERIPHERAL BLOOD: NORMOCYTIC NORMOCHROMIC ANEMIA. THROMBOCYTOPENIA.     LABS:  6/8/23 M-spike 3.33, IgA >7040, serum kappa 5/ lambda 0.56/ K:L ratio 9.09, 24 hour urine for M protein done but not resulted. Calcium 13.4/Alb 1.9, Cr 2.3, globulin 10.7  7/12/23 M-spike 2.49. IgA >7040, Corrected calcium 12.3, Cr 2.09, Globulin 7.6  8/15/23 M-spike 1.49, IgA 4000  9/18/23 M-spike 1.0, IgA 1700    , 73, is here for follow up visit. He is here to consent for stem cell transplant . He has HTn, GERD, type 2 DM not on long term insulin.   He has IgA kappa multiple myeloma, diagnosed in June 2023.    He was  treated with DRd from 7/18/23.          Review of patient's allergies indicates:   Allergen Reactions    Iodine Anaphylaxis    Shellfish containing products     Poison ivy extract     Amoxicillin-pot clavulanate Itching      Current Outpatient Medications on File Prior to Visit   Medication Sig Dispense Refill    acyclovir (ZOVIRAX) 400 MG tablet Take 1 tablet (400 mg total) by mouth 2 (two) times daily. 60 tablet 11    albuterol (PROVENTIL/VENTOLIN HFA) 90 mcg/actuation inhaler 2 puffs every 4 to 6 hours as needed.      ALPRAZolam (XANAX) 0.5 MG tablet Take 0.5 mg by mouth daily as needed.      amLODIPine (NORVASC) 10 MG tablet Take 1 tablet (10 mg total)  by mouth every morning.      ascorbic acid, vitamin C, (VITAMIN C) 500 MG tablet Take 500 mg by mouth once daily.      atorvastatin (LIPITOR) 10 MG tablet Take 10 mg by mouth every evening.      carvediloL (COREG) 12.5 MG tablet Take 12.5 mg by mouth 2 (two) times daily.      cholecalciferol, vitamin D3, (VITAMIN D3) 125 mcg (5,000 unit) Tab Take 5,000 Units by mouth once daily.      cinnamon bark (CINNAMON) 500 mg capsule Take 2,000 mg by mouth once daily.      cloNIDine (CATAPRES) 0.2 MG tablet Take 0.2 mg by mouth daily as needed.      famotidine (PEPCID) 20 MG tablet Take 1 tablet (20 mg total) by mouth every morning. 30 tablet 11    ferrous sulfate 325 (65 FE) MG EC tablet Take 325 mg by mouth once daily.      finasteride (PROSCAR) 5 mg tablet Take 5 mg by mouth every morning.      gabapentin (NEURONTIN) 100 MG capsule 100 mg daily as needed.      hydrALAZINE (APRESOLINE) 50 MG tablet Take 1 tablet (50 mg total) by mouth every 8 (eight) hours. 90 tablet 11    lenalidomide (REVLIMID) 10 mg Cap Take 10 mg by mouth once daily. (Patient not taking: Reported on 3/25/2024.) 21 each 0    levothyroxine (SYNTHROID) 112 MCG tablet Take 112 mcg by mouth before breakfast.      metFORMIN (GLUCOPHAGE-XR) 500 MG ER 24hr tablet Take 1,000 mg by mouth 2 (two) times daily.      nitroGLYCERIN (NITROSTAT) 0.4 MG SL tablet place one tablet under the tongue every five minutes as needed for chest pain up to 3 doses. if no relief call 911      ondansetron (ZOFRAN) 8 MG tablet Take 1 tablet (8 mg total) by mouth every 8 (eight) hours as needed for Nausea. 30 tablet 2    ONETOUCH ULTRA TEST Strp USE TO TEST BLOOD GLUCOSE TWICE DAILY      pantoprazole (PROTONIX) 40 MG tablet Take 1 tablet (40 mg total) by mouth 2 (two) times daily before meals. 60 tablet 11    ranolazine (RANEXA) 500 MG Tb12 Take 500 mg by mouth 2 (two) times daily.      saw palmetto 450 mg Cap Take 1 capsule by mouth every evening.      spironolactone (ALDACTONE) 25 MG  tablet Take 12.5 mg by mouth every morning.      zinc gluconate 50 mg tablet Take 50 mg by mouth once daily.       No current facility-administered medications on file prior to visit.      Review of Systems   Constitutional:  Negative for appetite change, chills, diaphoresis, fever and unexpected weight change.   HENT:  Negative for nasal congestion, mouth sores, nosebleeds, sinus pressure/congestion, sore throat and trouble swallowing.    Eyes: Negative.    Respiratory:  Negative for cough and shortness of breath.    Cardiovascular:  Negative for chest pain and palpitations.   Gastrointestinal:  Negative for abdominal distention, abdominal pain, blood in stool, change in bowel habit, constipation, diarrhea, nausea and vomiting.   Endocrine: Negative.    Genitourinary:  Negative for bladder incontinence, decreased urine volume, dysuria, frequency, hematuria and urgency. Difficulty urinating: had a ocampo cath.  Musculoskeletal:  Negative for arthralgias, back pain, gait problem, joint swelling, leg pain and myalgias.   Integumentary:  Negative for rash.   Allergic/Immunologic: Negative.    Neurological:  Negative for dizziness, tremors, syncope, weakness, light-headedness, numbness, headaches and memory loss.   Hematological:  Negative for adenopathy. Does not bruise/bleed easily.   Psychiatric/Behavioral:  Negative for agitation, confusion, hallucinations, sleep disturbance and suicidal ideas. The patient is not nervous/anxious.          Vitals:    03/26/24 1027   BP: (!) 153/68   Pulse: (!) 50   Resp: 16   Temp: 97.8 °F (36.6 °C)       PS : ECOG 1 /KPS 80  Response at transplant: [place]  Risk at transplant : low  HCT CI score 3 ( 2 points for sub optimal DLCO, 1 pt for DM)    Physical Exam  HENT:      Head: Normocephalic.      Mouth/Throat:      Pharynx: No posterior oropharyngeal erythema.   Eyes:      General: No scleral icterus.  Cardiovascular:      Rate and Rhythm: Normal rate.      Pulses: Normal pulses.       Heart sounds: Normal heart sounds.   Pulmonary:      Effort: Pulmonary effort is normal. No respiratory distress.      Breath sounds: No stridor.   Abdominal:      General: There is no distension.      Palpations: There is no mass.      Tenderness: There is no abdominal tenderness.   Lymphadenopathy:      Cervical: No cervical adenopathy.   Skin:     Coloration: Skin is not jaundiced.   Neurological:      General: No focal deficit present.      Mental Status: He is alert and oriented to person, place, and time.      Cranial Nerves: No cranial nerve deficit.      Comments: He has hearing difficulty               Laboratory:  CBC with Differential:  Lab Results   Component Value Date    WBC 5.85 03/18/2024    RBC 3.93 (L) 03/18/2024    HGB 13.7 (L) 03/18/2024    HCT 39.5 (L) 03/18/2024    .5 (H) 03/18/2024    MCH 34.9 (H) 03/18/2024    MCHC 34.7 03/18/2024    RDW 13.2 03/18/2024     03/18/2024    MPV 8.9 03/18/2024    GRAN 2.0 03/11/2024    GRAN 36.8 (L) 03/11/2024    LYMPH 2.4 03/11/2024    LYMPH 45.0 03/11/2024    MONO 0.7 03/11/2024    MONO 12.6 03/11/2024    EOS 0.2 03/11/2024    BASO 0.07 03/11/2024    EOSINOPHIL 4.1 03/11/2024    BASOPHIL 1.3 03/11/2024     Serum:  SPEP: Serum protein electrophoresis shows a distinct monoclonal peak (3.33 g/dL) in the beta zone, consistent with a monoclonal gammopathy.   PAO: A band is present in the IgA trini with a corresponding band in the kappa trini.   The immunofixation electrophoresis are consistent with monoclonal gammopathy of IgA-kappa isotype.     IgG Level 540.00 - 1,822.00 mg/dL 165.00 Low     IgA Level 101.0 - 645.0 mg/dL >7,040.0 High     IgM Level 22.0 - 240.0 mg/dL 6.0 Low       Kappa Free Light Chain 0.3300 - 1.94 mg/dL 5.09 High     Lambda Free Light Chain 0.5700 - 2.63 mg/dL 0.5600 Low     Kappa/Lambda FLC Ratio 0.2600 - 1.65 9.09 High      Urine:   24 hrs Urine:  M spike    1467      H    mg/24 h      12/29/23 SPEP         Small abnormality in  beta fraction. M-protein Isotype MALDI-TOF MS  IgA kappa, small monoclonal.   Suggest quantitative immunoglobulin level to assist in following monoclonal protein.         Component      Latest Ref Rng 3/11/2024   NIL      IU/mL 0.54390    TB1 - Nil      IU/mL 0.007    TB2 - Nil      IU/mL 0.018    Mitogen - Nil      IU/mL 9.989    TB Gold Plus      Negative  Negative    HSV 1 IgG      Negative  Positive !    HSV 2 IgG      Negative  Negative    Varicella IgG      AU/ml 426.40    Varicella Interpretation Positive    Strongyloides Ab IgG      Negative  Negative    G6PD Quant      8.0 - 11.9 U/g Hb 9.9    [place] Ab, Donor Eval ([facility])      Non reactive  Non-reactive    CMV Ab Total, Donor Eval ([facility])      Non-reactive  Reactive !    RPR, Donor Eval ([facility])      Non-reactive  Non-reactive    Hepatitis B Core Antibody, Donor Eval ([facility])      Non reactive  Non-reactive    Hepatitis B Surface Ag, Donor Eval ([facility])      Non-reactive  Non-reactive    Hepatitis C Antibody, Donor Eval ([facility])      Non-reactive  Non-reactive    HIV  1/2 Ag/Ab, Donor Eval ([facility])      Non-reactive  Non-reactive    HTLV I/II Antibody, Donor Eval ([facility])      Non-reactive  Non-reactive    Direct Jose Ale[facility] ([facility]) NEG      Component      Latest Ref Rng 3/18/2024   Sodium      136 - 145 mmol/L 139    Potassium      3.5 - 5.1 mmol/L 4.1    Chloride      98 - 107 mmol/L 104    CO2      23 - 31 mmol/L 28    Glucose      82 - 115 mg/dL 133 (H)    BUN      8.4 - 25.7 mg/dL 27.0 (H)    Creatinine      0.73 - 1.18 mg/dL 1.36 (H)    Calcium      8.8 - 10.0 mg/dL 9.7    PROTEIN TOTAL      5.8 - 7.6 gm/dL 6.7    Albumin      3.4 - 4.8 g/dL 3.9    Globulin, Total      2.4 - 3.5 gm/dL 2.8    Albumin/Globulin Ratio      1.1 - 2.0 ratio 1.4    BILIRUBIN TOTAL      <=1.5 mg/dL 0.9    ALP      40 - 150 unit/L 97    ALT      0 - 55 unit/L 11    AST      5 - 34 unit/L 12    eGFR      mls/min/1.73/m2 55    IgG       540.00 - 1,822.00 mg/dL 516.00 (L)    IgA      101.0 - 645.0 mg/dL 502.0    IgM      22.0 - 240.0 mg/dL 19.0 (L)    [place] Free Light Chain      0.3300 - 1.94 mg/dL 1.43    Lambda Free Light Chain      0.5700 - 2.63 mg/dL 0.8600    Kappa/Lambda FLC Ratio      0.2600 - 1.65  1.66 (H)       Component      Latest Ref [facility] 3/11/2024   Amphetamine Scrn Negative    Barbiturate Scrn Negative    Benzodiazepines Negative    Cocaine Lvl Negative    Methadone (Dolophine), Serum Negative    Opiates, Blood Negative    Phencyclidine, Blood Negative    Propoxyphene Negative    THC (Marijuana) Metabolite, bl Negative    Alcohol Scrn Negative    Prostate Specific Antigen      0.00 - 4.00 ng/mL 1.9          3/11/24 SPEP: Normal total protein. Normal gamma globulins are decreased. Paraprotein peak in beta-2 = 0.39 g/dL (previously, 0.16 g/dL) and   newly noted 2nd peak in near-gamma <0.1 g/dL (no previous value)       3/11/24 sIFE: Two (2) closely adjacent IgA kappa specific monoclonal protein bands at the beta-2/near-gamma junction are identified.  Component      Latest Ref [facility] 3/11/2024   Urine Total Volume      mL 1100    Urine Collection Duration      Hr 24    PROTEIN URINE      0 - 15 mg/dL 24 (H)    Urine Protein, Timed      0 - 100 mg/Spec 264 (H)      3/11/24 UIFE: No monoclonal peaks identified   Component      Latest Ref [facility] 3/11/2024   Urine Total Volume      mL 1100    Urine Collection Duration      Hr 24    CREATININE, URINE (SEND OUT)      23.0 - 375.0 mg/dL 110.0    Creatinine Clearance      70 - 110 mL/min 60 (L)    Urine Creatinine Absolute      mg/Spec 1210.0    Creatinine      0.5 - 1.4 mg/dL 1.4       Legend:  (L) Low    3/11/24 PFTs      Spirometry is normal. DLCO is mildly decreased   Adj DLCO 67.3%      3/11/24 EKG    Sinus bradycardia   Otherwise normal ECG       3/11/24 2D ECHO          Left Ventricle: The left ventricle is normal in size. Normal wall thickness. Normal wall motion. There is normal systolic  function with a visually estimated ejection fraction of 55 - 60%. Biplane (2D) method of discs ejection fraction is 59%. Global longitudinal strain is -21.0%. There is indeterminate diastolic function.    Right Ventricle: Normal right ventricular cavity size. Wall thickness is normal. Right ventricle wall motion  is normal. Systolic function is normal.    Biatrial enlargement    Tricuspid Valve: There is mild regurgitation.    Pulmonary Artery: The estimated pulmonary artery systolic pressure is 44 mmHg.    IVC/SVC: Normal venous pressure at 3 mmHg.    3/5/24 PET CT    COMPARISON:  [facility] PET/CT from 11/09/2023.     FINDINGS:  Quality of the study: Adequate.     In the head and neck, there is a new tracer avid focus centered within left thyroid cartilage, SUV max measures 4 (axial fused image 85).  CT appearance of thyroid cartilage is unchanged when compared to previous PET.  Could represent myelomatous involvement although other physiologic/degenerative uptake could have a similar appearance.  Attention on follow-up.  No suspicious tracer avid lymphadenopathy.     Diffusely increased thyroid uptake, similar prior, may represent thyroiditis.     In the chest, there are no hypermetabolic lesions worrisome for malignancy.  There are no concerning pulmonary nodules or masses.     Mild bilateral hilar uptake and mild uptake within subcentimeter left axillary lymph nodes, favor reactive etiology.  Attention on follow-up.     In the abdomen and pelvis, there is physiologic tracer distribution within the abdominal organs and excretion into the genitourinary system.     Diffusely increased bowel uptake, likely related to metformin usage.     In the bones, there are multiple tracer avid osseous lesions noting increased conspicuity of previous hypermetabolic rib lesions and few new lesions.  Numerous additional lytic foci without significant increase tracer uptake, similar to prior.  Index lesions as follows:     New  hypermetabolic lytic lesion within the right posteromedial 4th rib, SUV max 6.6 (axial fused image 110).     Increased tracer uptake within left anterolateral 5th rib with increased soft tissue component, SUV max 8 (axial fused image 152).  Previous SUV max 4.3.     New tracer avid focus in left proximal femur with associated increased marrow attenuation, SUV max 4.4 (axial fused image 308).     In the extremities, there are no hypermetabolic lesions worrisome for malignancy.     Additional CT findings: Calcific coronary atherosclerosis.  Similar bilateral renal cysts.  Colonic diverticulosis.     Impression:     Progression of hypermetabolic osseous disease in patient with multiple myeloma, index lesions as above.     Additional findings as above.      3/5/24 BONE MARROW ASPIRATE, TOUCH PREP, CLOT, AND DECALCIFIED NEEDLE CORE BIOPSY: LEFT POSTEROSUPERIOR ILIAC CREST   - NO DEFINITIVE RESIDUAL KAPPA LIGHT CHAIN RESTRICTED MULTIPLE MYELOMA   - Mildly hypercellular marrow (35-45% total cellularity) with non-increased scattered plasma cells and trilineage hematopoiesis with focally increased erythroid precursors (see comments)   - No karyotype performed (see comments)   - Solitary small well-defined non-paratrabecular lymphoid aggregate (favor benign/reactive)   - Adequate stainable histiocytic iron stores (3+ out of 6+)   - Negative for amyloid deposition by [place] Red special stain      Legend:  (H) High        Assessment    1. IgA kappa multiple myeloma in remission  2. Stem cell transplant candidate  3. Type 2 DM not on long term insulin without complications  4. HTn  5. GERD  6. BPH  7. Hypothyroidism    Plan:    1,2: Stage at diagnosis not clear. FISH, LDH, serum beta2 microglobulin at diagnosis not available. He had ~90% plasma cells in his bone marrow at diagnosis. He had hypercalcemia. He had M spike of 3.33g/dl on SPEP in June 2023. He has responded well to DRd. M spike has decreased significantly.    Role,  timing, risk and benefits of autologous peripheral blood stem cell transplant in multiple myeloma discussed in detail.  I explained that it is NOT CURATIVE, but has significant progression free survival, and overall survival, especially when it is followed by maintenance chemotherapy.  However, most of the benefit of high dose chemotherapy and stem cell transplant is in younger ( < 65) patients.   He is very fit, had limited co-morbidities, and has good support.  He will be considered for reduced intensity conditioning and autologous stem cell transplant.  He stopped daratumumab after cycle 4, and continued lenaldiomide and decadron.     SPEP from 12/29/23 showed a very small monoclonal protein. He is still interested in proceeding to SCT. He had colonoscopy. He has dentures, and has no ongoing dental issues.         Risks and benefits of autologous peripheral blood stem cell transplant discussed in detail.  He is 73, but otherwise healthy and has good performance status.  He understands that this is  a NON CURATIVE treatment.  He will be conditioned with melphalan 140mg/m2 due to his age.        BENEFITS OF HIGH DOSE CHEMOTHERAPY FOLLOWED BY PERIPHERAL BLOOD STEM CELL TRANSPLANT IN MULTIPLE MYELOMA DISCUSSED IN DETAIL    In the recently ( [facility] July 14 2022) published phase 3 DETERMINATION trial, adults (18 to 65 years of age) with symptomatic myeloma received one cycle of RVD and were then randomly assigned, in a 1:1 ratio, to receive two additional RVD cycles plus stem-cell mobilization, followed by either five additional RVD cycles (the RVD-alone group) or high-dose melphalan plus ASCT followed by two additional RVD cycles (the transplantation group). Both groups received lenalidomide until disease progression, unacceptable side effects, or both. The primary end point was progression-free survival.  At a median follow-up of 76.0 months, 328 events of disease progression or death occurred; the risk was 53% higher  in the RVD-alone group than in the transplantation group (hazard ratio, 1.53; 95% confidence interval [CI], 1.23 to 1.91; P<0.001); median progression-free survival was 46.2 months and 67.5 months.   The percentage of patients with a partial response or better was 95.0% in the RVD-alone group and 97.5% in the transplantation group (P=0.55);   42.0% and 46.8%, respectively, had a complete response or better (P=0.99).   Treatment-related adverse events of grade 3 or higher occurred in 78.2% and 94.2%, respectively; 5-year survival was 79.2% and 80.7% (hazard ratio for death, 1.10; 95% CI, 0.73 to 1.65 )       RISKS    * Bone Marrow Suppression    High-dose chemotherapy directly destroys the bone marrow's ability to produce white blood cells, red blood cells and platelets. Patients experience side effects caused by neutropenia, anemia and thrombocytopenia. Patients usually need blood and platelet transfusions to treat anemia and thrombocytopenia until the new graft beings producing blood cells. The duration of bone marrow suppression is shortened by infusing an optimal number of stem cells and administering growth factors that hasten the recovery of blood cell production.    * Infections    During the two to three weeks it takes the new bone marrow to grow and produce white blood cells, patients are susceptible to infection and require the administration of antibiotics to prevent bacterial and fungal infections. Bacterial infections are the most common during this initial period of neutropenia. Stem cells collected from peripheral blood tend to engraft faster than bone marrow and may reduce the risk of infection by shortening the period of neutropenia. The growth factor filgrastim also increases the rate of white blood cell recovery and has been approved by the Food and Drug Administration for use during autologous stem cell transplant.    The immune system takes even longer to recover than white blood cell production,  with a resulting susceptibility to some bacterial, fungal and viral infections for weeks to months. After initial recovery from autologous stem cell transplant, patients are often required to take antibiotics for weeks to months to prevent infections from occurring. Prophylactic antibiotic administration can prevent Pneumocystis carinii pneumonia and some bacterial and fungal infections. Prophylactic antibiotics can also decrease the incidence of herpes zoster infection, which commonly occurs after high-dose chemotherapy and autologous stem cell transplant.    * Veno-Occlusive Disease of the Liver (VOD)    High-dose chemotherapy can result in damage to the liver, which can be serious and even fatal. This complication is increased in patients who have substantial amounts of previous chemotherapy and/or radiation therapy, a history of liver damage or hepatitis. Veno-occlusive disease (VOD) of the liver typically occurs in the first two weeks after high-dose chemotherapy treatment. Patients typically experience symptoms of abdominal fullness or swelling, liver tenderness and weight gain from fluid retention.     * Interstitial Pneumonia Syndrome (IPS)  High-dose chemotherapy can directly damage the cells of the lungs. This may be more frequent in patients treated with certain types of chemotherapy and/or radiation therapy given prior to the transplant. This complication of transplant may occur anytime, from a few days after high-dose chemotherapy to several months after treatment. Patients typically experience a dry non-productive cough or shortness of breath. Patients experiencing shortness of breath or a new cough after autologous transplant should bring this to the immediate attention of their doctor since this can be a serious and even fatal complication.    * Graft Failure  Graft failure is extremely unusual in autologous stem cell transplantation. Graft failure occurs when bone marrow function does not return.  The graft may fail to grow in the patient--resulting in bone marrow failure--with the absence of red blood cells, white blood cells and platelet production. This results in infection, anemia and bleeding. Graft failure may also occur in patients with extensive marrow fibrosis before transplantation, a viral illness or from the use of some drugs (such as methotrexate). In leukemia patients, graft failure often is associated with a recurrence of cancer; the leukemic cells may inhibit the growth of the transplanted cells. In some cases, the reasons for graft failure are unknown.    * Risks associated with central iv line placement (for leukapheresis)    --Bleeding   --Pain  --Infections  --Pneumothorax( rare)    *Risks associated with use of neupogen / GCSF     >10%:    Cardiovascular: Chest pain (5% to 13%)  Central nervous system: Fatigue (20%), dizziness (14%), pain (12%)  Dermatologic: Skin rash (2% to 14%)  Gastrointestinal: Nausea (43%)  Hematologic & oncologic: Thrombocytopenia (5% to 38%), splenomegaly (?5%; severe chronic neutropenia: 30%)  Hepatic: Increased serum alkaline phosphatase (6% to 11%)  Neuromuscular & skeletal: Ostealgia (11% to 30%), back pain (2% to 15%)  Respiratory: Epistaxis (?5%), cough (14%), dyspnea (13%)  Miscellaneous: Fever (8% to 48%)    1% to 10%:    Cardiovascular: Peripheral edema (?5%), hypertension (?5%)  Central nervous system: Headache (6% to 10%), hypoesthesia (?5%), insomnia (?5%), malaise (?5%), mouth pain (?5%)  Dermatologic: Alopecia (?5%), erythema (?2%), maculopapular rash (?2%)  Endocrine & metabolic: Increased lactate dehydrogenase (6%)  Gastrointestinal: Vomiting (?5%), decreased appetite (?5%), constipation (?2%), diarrhea (?2%)  Genitourinary: Urinary tract infection (?5%)  Hematologic & oncologic: Anemia (?5%), decreased hemoglobin (?5%), leukocytosis (?2%)  Hypersensitivity: Transfusion reaction (?2%), hypersensitivity reaction (?5%)  Immunologic: Antibody  "development (2% to 3%; no evidence of neutralizing response)  Infection: Sepsis (?5%)  Neuromuscular & skeletal: Arthralgia (5% to 9%), limb pain (2% to 7%), muscle spasm (?5%), musculoskeletal pain (?5%) asthenia (?5%)  Respiratory: Bronchitis (?5%), oropharyngeal pain (?5%), upper respiratory tract infection (?5%)        *Risks associated with transfusion    --Infections  --Volume over load  --Lung injury (very rare)  --Iron overload  --Formation of antibodies against platelets and/or red blood cells  --hemolytic reactions  --Febrile reactions    * Long-Term Side Effects of Autologous Stem Cell Transplant  There are several long-term or late side effects that result from the chemotherapy and radiation therapy used in autologous stem cell transplant. The frequency and severity of these problems depends on the chemotherapy used to treat the patient. Some examples of complications include the following:    --Cataracts: Cataracts occur in the overwhelming majority of patients who receive total body irradiation in their treatment regimen. In patients who receive chemotherapy without total body irradiation, cataracts are much less frequent. The onset of cataracts begins at 18 to 24 months following treatment. Patients who have received large doses of steroids will have an increased frequency and earlier onset of cataracts. Patients are advised to have slit lamp eye evaluations annually with early correction with artificial lenses.    --New Cancers: Treatment with chemotherapy and radiation therapy is known to increase the risk of developing a new cancer. These are called "secondary cancers" and may occur as a late complication of high-dose chemotherapy.Patients with lymphoma treated with high-dose chemotherapy and autologous stem cell transplant appear to have about an 2-4 %  chance of developing a secondary cancer if treated with high-dose chemotherapy and no radiation.   Patients with lymphoma treated with " conventional chemotherapy have also been reported to have a 4 to 8 percent risk of developing a secondary cancer. How much additional risk occurs from high-dose chemotherapy is unclear; however, high-dose radiation clearly increases the risk of developing a secondary cancer.          G6PD: normal  Sickle cell screen : negative  CMV Ab : reactive  RPR: non-reactive  Chagas Ab: non-reactive  Strongyloidies IgG: negative    HBV, HCV, HTLV I/II , HIV 1/2 : all non-reactive  Varicella IgG : positive  Urine toxicology screen: positive for opiates  Blood toxicology screen: negative  APTT, INR, Magnesium, phosphorous: all normal  UA :positive for trace ketones  24hr creatinine clearance: 60 ml/min  24hr UPEP/ UIFE: No paraprotein band / no monoclonal peaks identified.   sFLC ratio: 1.66  3/11/24 SPEP: Paraprotein peak in beta-2 = 0.39 g/dL (previously, 0.16 g/dL) and newly noted 2nd peak in near-gamma <0.1 g/dL (no previous value)   3/11/24 sIFE: Two (2) closely adjacent IgA kappa specific monoclonal protein bands at the beta-2/near-gamma junction are identified.    HSV 1 IgG: positive  HSV 2 IgG: negative    3/11/24 ECHO:Normal wall thickness. Normal wall motion. There is normal systolic function with a visually estimated ejection fraction of 55 - 60%.  3/11/24 EKG: Sinus bradycardia . Otherwise normal ECG   3/11/24 PFT: Spirometry is normal. DLCO is mildly decreased. Adj DLCO 67.3%  3/5/24 PET CT: Multiple tracer avid osseous lesions noting increased conspicuity of previous hypermetabolic rib lesions and few new lesions.  Numerous additional lytic foci without significant increase tracer uptake, similar to prior.  Index lesions as follows:   New hypermetabolic lytic lesion within the right posteromedial 4th rib, SUV max 6.6 . Increased tracer uptake within left anterolateral 5th rib with increased soft tissue component, SUV max 8 .  Previous SUV max 4.3. New tracer avid focus in left proximal femur with associated  increased marrow attenuation, SUV max 4.4 .  3/5/24 Bone marrow biopsy: NO DEFINITIVE RESIDUAL KAPPA LIGHT CHAIN RESTRICTED MULTIPLE MYELOMA. Mildly hypercellular marrow (35-45% total cellularity) with non-increased scattered plasma cells and trilineage hematopoiesis with focally increased erythroid precursors  PSA: 1.9, WNL  Colonoscopy: uptodate, no abnormal findings  Dental: clearance obtained  Social work: no barriers to SCT  Onco psychology: no overt psychological contraindications for proceeding with stem cell transplant ()   Care giver: Daughter    3. He follows with his primary care physician.    4. He follows with his primary care physician. On hydralazine, tamsulosin, spironolactione    5. Denies heart burn. He follows with his primary care physician.    6. On tamsulosin, follows with his primary care physician    7. He is on synthroid, follows with his primary care physician.

## 2024-03-26 ENCOUNTER — OFFICE VISIT (OUTPATIENT)
Dept: HEMATOLOGY/ONCOLOGY | Facility: CLINIC | Age: 74
End: 2024-03-26
Payer: MEDICARE

## 2024-03-26 ENCOUNTER — CLINICAL SUPPORT (OUTPATIENT)
Dept: HEMATOLOGY/ONCOLOGY | Facility: CLINIC | Age: 74
End: 2024-03-26
Payer: MEDICARE

## 2024-03-26 VITALS
OXYGEN SATURATION: 97 % | BODY MASS INDEX: 30.41 KG/M2 | SYSTOLIC BLOOD PRESSURE: 153 MMHG | TEMPERATURE: 98 F | WEIGHT: 212.44 LBS | DIASTOLIC BLOOD PRESSURE: 68 MMHG | HEART RATE: 50 BPM | RESPIRATION RATE: 16 BRPM | HEIGHT: 70 IN

## 2024-03-26 DIAGNOSIS — Z76.82 STEM CELL TRANSPLANT CANDIDATE: Primary | ICD-10-CM

## 2024-03-26 DIAGNOSIS — E03.9 ACQUIRED HYPOTHYROIDISM: ICD-10-CM

## 2024-03-26 DIAGNOSIS — K21.9 GASTROESOPHAGEAL REFLUX DISEASE, UNSPECIFIED WHETHER ESOPHAGITIS PRESENT: ICD-10-CM

## 2024-03-26 DIAGNOSIS — I10 PRIMARY HYPERTENSION: ICD-10-CM

## 2024-03-26 DIAGNOSIS — C90.01 MULTIPLE MYELOMA IN REMISSION: ICD-10-CM

## 2024-03-26 DIAGNOSIS — C90.00 MULTIPLE MYELOMA NOT HAVING ACHIEVED REMISSION: ICD-10-CM

## 2024-03-26 DIAGNOSIS — Z71.3 NUTRITIONAL COUNSELING: Primary | ICD-10-CM

## 2024-03-26 DIAGNOSIS — E11.9 TYPE 2 DIABETES MELLITUS WITHOUT COMPLICATION, WITHOUT LONG-TERM CURRENT USE OF INSULIN: ICD-10-CM

## 2024-03-26 DIAGNOSIS — Z76.82 STEM CELL TRANSPLANT CANDIDATE: ICD-10-CM

## 2024-03-26 PROCEDURE — 99213 OFFICE O/P EST LOW 20 MIN: CPT | Mod: PBBFAC | Performed by: INTERNAL MEDICINE

## 2024-03-26 PROCEDURE — 99999 PR PBB SHADOW E&M-EST. PATIENT-LVL III: CPT | Mod: PBBFAC,,, | Performed by: INTERNAL MEDICINE

## 2024-03-26 PROCEDURE — 99215 OFFICE O/P EST HI 40 MIN: CPT | Mod: S$PBB,,, | Performed by: INTERNAL MEDICINE

## 2024-04-01 ENCOUNTER — DOCUMENTATION ONLY (OUTPATIENT)
Dept: HEMATOLOGY/ONCOLOGY | Facility: CLINIC | Age: 74
End: 2024-04-01
Payer: MEDICARE

## 2024-04-01 NOTE — PROGRESS NOTES
Consent status for submitting research data to [facility]:    Patient accepts [facility] study.  accepts

## 2024-04-04 ENCOUNTER — TELEPHONE (OUTPATIENT)
Dept: SURGERY | Facility: CLINIC | Age: 74
End: 2024-04-04
Payer: MEDICARE

## 2024-04-04 ENCOUNTER — TELEPHONE (OUTPATIENT)
Dept: HEMATOLOGY/ONCOLOGY | Facility: CLINIC | Age: 74
End: 2024-04-04
Payer: MEDICARE

## 2024-04-04 NOTE — TELEPHONE ENCOUNTER
Spoke with patient regarding starting Neupogen injection tomorrow, 4/5/24. Reminded pt that he is scheduled for central line placement on Monday, 4/8/24. He should report to outpatient surgery first then come to [facility] 5th floor for 4th Neupogen injection. Pt verbalized understanding.

## 2024-04-04 NOTE — PRE-PROCEDURE INSTRUCTIONS
PreOp Instructions given:   - Verbal medication information (what to hold and what to take)   - NPO guidelines 2300   - Arrival place directions given; time to be given the day before procedure by the   Surgeon's Office DOSC  - Bathing with antibacterial soap   - Don't wear any jewelry or bring any valuables AM of surgery   - No makeup or moisturizer to face   - No perfume/cologne, powder, lotions or aftershave   Pt. verbalized understanding.   Pt denies any h/o Anesthesia/Sedation complications or side effects.  Patient does not know arrival time.  Explained that this information comes from the surgeon's office and if they haven't heard from them by 2 or 3 pm to call the office.  Patient stated an understanding.

## 2024-04-05 ENCOUNTER — INFUSION (OUTPATIENT)
Dept: INFUSION THERAPY | Facility: HOSPITAL | Age: 74
End: 2024-04-05
Payer: MEDICARE

## 2024-04-05 DIAGNOSIS — Z76.82 STEM CELL TRANSPLANT CANDIDATE: ICD-10-CM

## 2024-04-05 DIAGNOSIS — C90.00 MULTIPLE MYELOMA, REMISSION STATUS UNSPECIFIED: Primary | ICD-10-CM

## 2024-04-05 PROCEDURE — 96372 THER/PROPH/DIAG INJ SC/IM: CPT

## 2024-04-05 PROCEDURE — 63600175 PHARM REV CODE 636 W HCPCS: Mod: JZ,JG

## 2024-04-05 RX ADMIN — FILGRASTIM 960 MCG: 480 INJECTION, SOLUTION INTRAVENOUS; SUBCUTANEOUS at 08:04

## 2024-04-05 NOTE — NURSING
Pt here for C1D1 neupogen injection.  Reviewed medication mechanism of action, possible side effects and taking Claritin for possible bone pain.  Medication handout provided for pt.  Neupogen administered to abdominal tissue x 2 injections.  Pt tolerated.  Ambulated out unassisted by self.

## 2024-04-06 ENCOUNTER — INFUSION (OUTPATIENT)
Dept: INFUSION THERAPY | Facility: HOSPITAL | Age: 74
End: 2024-04-06
Payer: MEDICARE

## 2024-04-06 DIAGNOSIS — C90.00 MULTIPLE MYELOMA, REMISSION STATUS UNSPECIFIED: Primary | ICD-10-CM

## 2024-04-06 DIAGNOSIS — Z76.82 STEM CELL TRANSPLANT CANDIDATE: ICD-10-CM

## 2024-04-06 PROCEDURE — 63600175 PHARM REV CODE 636 W HCPCS: Mod: JZ,JG

## 2024-04-06 PROCEDURE — 96372 THER/PROPH/DIAG INJ SC/IM: CPT

## 2024-04-06 RX ADMIN — FILGRASTIM 960 MCG: 480 INJECTION, SOLUTION INTRAVENOUS; SUBCUTANEOUS at 09:04

## 2024-04-06 NOTE — PLAN OF CARE
0915-Pt tolerated Neupogen well today, no complaints or complications. Pt aware to call provider with any questions or concerns and is aware of upcoming appts. Pt ambulatory from clinic with steady gait, no distress noted.

## 2024-04-07 ENCOUNTER — INFUSION (OUTPATIENT)
Dept: INFUSION THERAPY | Facility: HOSPITAL | Age: 74
End: 2024-04-07
Payer: MEDICARE

## 2024-04-07 ENCOUNTER — ANESTHESIA EVENT (OUTPATIENT)
Dept: SURGERY | Facility: HOSPITAL | Age: 74
End: 2024-04-07
Payer: MEDICARE

## 2024-04-07 VITALS — WEIGHT: 212.44 LBS | BODY MASS INDEX: 30.41 KG/M2 | HEIGHT: 70 IN

## 2024-04-07 DIAGNOSIS — Z76.82 STEM CELL TRANSPLANT CANDIDATE: ICD-10-CM

## 2024-04-07 DIAGNOSIS — C90.00 MULTIPLE MYELOMA, REMISSION STATUS UNSPECIFIED: Primary | ICD-10-CM

## 2024-04-07 PROCEDURE — 63600175 PHARM REV CODE 636 W HCPCS: Mod: JZ,JG

## 2024-04-07 PROCEDURE — 96372 THER/PROPH/DIAG INJ SC/IM: CPT

## 2024-04-07 RX ADMIN — FILGRASTIM 960 MCG: 480 INJECTION, SOLUTION INTRAVENOUS; SUBCUTANEOUS at 07:04

## 2024-04-07 NOTE — PLAN OF CARE
0758-Pt tolerated Neupogen well today, no complaints or complications. Pt aware to call provider with any questions or concerns and is aware of upcoming appts. Pt ambulatory from clinic with steady gait, no distress noted.

## 2024-04-08 ENCOUNTER — HOSPITAL ENCOUNTER (OUTPATIENT)
Facility: HOSPITAL | Age: 74
Discharge: HOME | End: 2024-04-08
Attending: SURGERY | Admitting: SURGERY
Payer: MEDICARE

## 2024-04-08 ENCOUNTER — INFUSION (OUTPATIENT)
Dept: INFUSION THERAPY | Facility: HOSPITAL | Age: 74
End: 2024-04-08
Payer: MEDICARE

## 2024-04-08 ENCOUNTER — ANESTHESIA (OUTPATIENT)
Dept: SURGERY | Facility: HOSPITAL | Age: 74
End: 2024-04-08
Payer: MEDICARE

## 2024-04-08 VITALS
OXYGEN SATURATION: 98 % | TEMPERATURE: 98 F | BODY MASS INDEX: 30.35 KG/M2 | DIASTOLIC BLOOD PRESSURE: 74 MMHG | HEIGHT: 70 IN | RESPIRATION RATE: 18 BRPM | SYSTOLIC BLOOD PRESSURE: 153 MMHG | WEIGHT: 212 LBS | HEART RATE: 58 BPM

## 2024-04-08 VITALS
TEMPERATURE: 99 F | HEIGHT: 70 IN | BODY MASS INDEX: 30.35 KG/M2 | RESPIRATION RATE: 18 BRPM | OXYGEN SATURATION: 95 % | HEART RATE: 49 BPM | DIASTOLIC BLOOD PRESSURE: 79 MMHG | SYSTOLIC BLOOD PRESSURE: 172 MMHG | WEIGHT: 212 LBS

## 2024-04-08 DIAGNOSIS — Z76.82 STEM CELL TRANSPLANT CANDIDATE: ICD-10-CM

## 2024-04-08 DIAGNOSIS — C90.00 MULTIPLE MYELOMA, REMISSION STATUS UNSPECIFIED: Primary | ICD-10-CM

## 2024-04-08 LAB
ABO + RH BLD: NORMAL
ALBUMIN SERPL BCP-MCNC: 3.6 G/DL (ref 3.5–5.2)
ALP SERPL-CCNC: 162 U/L (ref 55–135)
ALT SERPL W/O P-5'-P-CCNC: 14 U/L (ref 10–44)
ANION GAP SERPL CALC-SCNC: 8 MMOL/L (ref 8–16)
APTT PPP: 25.6 SEC (ref 21–32)
AST SERPL-CCNC: 13 U/L (ref 10–40)
BASOPHILS # BLD AUTO: 0.04 K/UL (ref 0–0.2)
BASOPHILS NFR BLD: 0.1 % (ref 0–1.9)
BILIRUB SERPL-MCNC: 0.8 MG/DL (ref 0.1–1)
BLD GP AB SCN CELLS X3 SERPL QL: NORMAL
BUN SERPL-MCNC: 17 MG/DL (ref 8–23)
CA-I BLDV-SCNC: 1.21 MMOL/L (ref 1.06–1.42)
CALCIUM SERPL-MCNC: 9.7 MG/DL (ref 8.7–10.5)
CD34 %: 0.02 %
CD34 ABSOLUTE: 5.55 CELLS/UL
CD34 VIABILITY: 93.5 %
CHLORIDE SERPL-SCNC: 107 MMOL/L (ref 95–110)
CO2 SERPL-SCNC: 27 MMOL/L (ref 23–29)
CREAT SERPL-MCNC: 1.1 MG/DL (ref 0.5–1.4)
DIFFERENTIAL METHOD BLD: ABNORMAL
EOSINOPHIL # BLD AUTO: 0.6 K/UL (ref 0–0.5)
EOSINOPHIL NFR BLD: 1.6 % (ref 0–8)
ERYTHROCYTE [DISTWIDTH] IN BLOOD BY AUTOMATED COUNT: 13.4 % (ref 11.5–14.5)
EST. GFR  (NO RACE VARIABLE): >60 ML/MIN/1.73 M^2
GLUCOSE SERPL-MCNC: 118 MG/DL (ref 70–110)
HCT VFR BLD AUTO: 37.1 % (ref 40–54)
HGB BLD-MCNC: 12.7 G/DL (ref 14–18)
IMM GRANULOCYTES # BLD AUTO: 1.64 K/UL (ref 0–0.04)
IMM GRANULOCYTES NFR BLD AUTO: 4.8 % (ref 0–0.5)
INR PPP: 1 (ref 0.8–1.2)
LYMPHOCYTES # BLD AUTO: 4.2 K/UL (ref 1–4.8)
LYMPHOCYTES NFR BLD: 12.3 % (ref 18–48)
MAGNESIUM SERPL-MCNC: 1.9 MG/DL (ref 1.6–2.6)
MCH RBC QN AUTO: 35.4 PG (ref 27–31)
MCHC RBC AUTO-ENTMCNC: 34.2 G/DL (ref 32–36)
MCV RBC AUTO: 103 FL (ref 82–98)
MONOCYTES # BLD AUTO: 3.4 K/UL (ref 0.3–1)
MONOCYTES NFR BLD: 9.9 % (ref 4–15)
NEUTROPHILS # BLD AUTO: 24.2 K/UL (ref 1.8–7.7)
NEUTROPHILS NFR BLD: 71.3 % (ref 38–73)
NRBC BLD-RTO: 0 /100 WBC
PHOSPHATE SERPL-MCNC: 3 MG/DL (ref 2.7–4.5)
PLATELET # BLD AUTO: 183 K/UL (ref 150–450)
PMV BLD AUTO: 9.6 FL (ref 9.2–12.9)
POCT GLUCOSE: 115 MG/DL (ref 70–110)
POCT GLUCOSE: 124 MG/DL (ref 70–110)
POTASSIUM SERPL-SCNC: 3.4 MMOL/L (ref 3.5–5.1)
PROT SERPL-MCNC: 6.1 G/DL (ref 6–8.4)
PROTHROMBIN TIME: 10.9 SEC (ref 9–12.5)
RBC # BLD AUTO: 3.59 M/UL (ref 4.6–6.2)
SODIUM SERPL-SCNC: 142 MMOL/L (ref 136–145)
SPECIMEN OUTDATE: NORMAL
WBC # BLD AUTO: 33.95 K/UL (ref 3.9–12.7)

## 2024-04-08 PROCEDURE — 63600175 PHARM REV CODE 636 W HCPCS: Mod: JZ,JG

## 2024-04-08 PROCEDURE — 37000009 HC ANESTHESIA EA ADD 15 MINS: Performed by: SURGERY

## 2024-04-08 PROCEDURE — 82330 ASSAY OF CALCIUM: CPT | Performed by: SURGERY

## 2024-04-08 PROCEDURE — C1750 CATH, HEMODIALYSIS,LONG-TERM: HCPCS | Performed by: SURGERY

## 2024-04-08 PROCEDURE — 86850 RBC ANTIBODY SCREEN: CPT | Performed by: SURGERY

## 2024-04-08 PROCEDURE — 82962 GLUCOSE BLOOD TEST: CPT | Performed by: SURGERY

## 2024-04-08 PROCEDURE — D9220A PRA ANESTHESIA: Mod: ANES,,, | Performed by: ANESTHESIOLOGY

## 2024-04-08 PROCEDURE — 96372 THER/PROPH/DIAG INJ SC/IM: CPT

## 2024-04-08 PROCEDURE — 83735 ASSAY OF MAGNESIUM: CPT | Performed by: SURGERY

## 2024-04-08 PROCEDURE — 86367 STEM CELLS TOTAL COUNT: CPT | Performed by: SURGERY

## 2024-04-08 PROCEDURE — 63600175 PHARM REV CODE 636 W HCPCS: Performed by: SURGERY

## 2024-04-08 PROCEDURE — 25000003 PHARM REV CODE 250: Performed by: STUDENT IN AN ORGANIZED HEALTH CARE EDUCATION/TRAINING PROGRAM

## 2024-04-08 PROCEDURE — 84100 ASSAY OF PHOSPHORUS: CPT | Performed by: SURGERY

## 2024-04-08 PROCEDURE — 85610 PROTHROMBIN TIME: CPT | Performed by: SURGERY

## 2024-04-08 PROCEDURE — 80053 COMPREHEN METABOLIC PANEL: CPT | Performed by: SURGERY

## 2024-04-08 PROCEDURE — 77001 FLUOROGUIDE FOR VEIN DEVICE: CPT | Mod: 26,,, | Performed by: SURGERY

## 2024-04-08 PROCEDURE — 96372 THER/PROPH/DIAG INJ SC/IM: CPT | Mod: 59

## 2024-04-08 PROCEDURE — 37000008 HC ANESTHESIA 1ST 15 MINUTES: Performed by: SURGERY

## 2024-04-08 PROCEDURE — 71000044 HC DOSC ROUTINE RECOVERY FIRST HOUR: Performed by: SURGERY

## 2024-04-08 PROCEDURE — 71000015 HC POSTOP RECOV 1ST HR: Performed by: SURGERY

## 2024-04-08 PROCEDURE — 63600175 PHARM REV CODE 636 W HCPCS: Performed by: STUDENT IN AN ORGANIZED HEALTH CARE EDUCATION/TRAINING PROGRAM

## 2024-04-08 PROCEDURE — 85730 THROMBOPLASTIN TIME PARTIAL: CPT | Performed by: SURGERY

## 2024-04-08 PROCEDURE — 36558 INSERT TUNNELED CV CATH: CPT | Mod: LT,,, | Performed by: SURGERY

## 2024-04-08 PROCEDURE — 63600175 PHARM REV CODE 636 W HCPCS: Performed by: NURSE ANESTHETIST, CERTIFIED REGISTERED

## 2024-04-08 PROCEDURE — D9220A PRA ANESTHESIA: Mod: CRNA,,, | Performed by: NURSE ANESTHETIST, CERTIFIED REGISTERED

## 2024-04-08 PROCEDURE — 85025 COMPLETE CBC W/AUTO DIFF WBC: CPT | Performed by: SURGERY

## 2024-04-08 PROCEDURE — 25000003 PHARM REV CODE 250: Performed by: NURSE ANESTHETIST, CERTIFIED REGISTERED

## 2024-04-08 PROCEDURE — 36000707: Performed by: SURGERY

## 2024-04-08 PROCEDURE — 36000706: Performed by: SURGERY

## 2024-04-08 DEVICE — CATH HEMOSPLIT 14.5FR 19CM: Type: IMPLANTABLE DEVICE | Site: NECK | Status: FUNCTIONAL

## 2024-04-08 RX ORDER — SODIUM CHLORIDE 0.9 % (FLUSH) 0.9 %
10 SYRINGE (ML) INJECTION
Status: DISCONTINUED | OUTPATIENT
Start: 2024-04-08 | End: 2024-04-08 | Stop reason: HOSPADM

## 2024-04-08 RX ORDER — BUPIVACAINE HYDROCHLORIDE 2.5 MG/ML
INJECTION, SOLUTION EPIDURAL; INFILTRATION; INTRACAUDAL
Status: DISCONTINUED | OUTPATIENT
Start: 2024-04-08 | End: 2024-04-08 | Stop reason: HOSPADM

## 2024-04-08 RX ORDER — LIDOCAINE HYDROCHLORIDE 20 MG/ML
INJECTION INTRAVENOUS
Status: DISCONTINUED | OUTPATIENT
Start: 2024-04-08 | End: 2024-04-08

## 2024-04-08 RX ORDER — SODIUM CHLORIDE 9 MG/ML
INJECTION, SOLUTION INTRAVENOUS CONTINUOUS
Status: DISCONTINUED | OUTPATIENT
Start: 2024-04-08 | End: 2024-04-08 | Stop reason: HOSPADM

## 2024-04-08 RX ORDER — FENTANYL CITRATE 50 UG/ML
25 INJECTION, SOLUTION INTRAMUSCULAR; INTRAVENOUS EVERY 5 MIN PRN
Status: DISCONTINUED | OUTPATIENT
Start: 2024-04-08 | End: 2024-04-08 | Stop reason: HOSPADM

## 2024-04-08 RX ORDER — DIPHENHYDRAMINE HYDROCHLORIDE 50 MG/ML
INJECTION INTRAMUSCULAR; INTRAVENOUS
Status: DISCONTINUED | OUTPATIENT
Start: 2024-04-08 | End: 2024-04-08

## 2024-04-08 RX ORDER — PHENYLEPHRINE HYDROCHLORIDE 10 MG/ML
INJECTION INTRAVENOUS
Status: DISCONTINUED | OUTPATIENT
Start: 2024-04-08 | End: 2024-04-08

## 2024-04-08 RX ORDER — PLERIXAFOR 24 MG/1.2ML
0.24 SOLUTION SUBCUTANEOUS ONCE AS NEEDED
Status: COMPLETED | OUTPATIENT
Start: 2024-04-08 | End: 2024-04-08

## 2024-04-08 RX ORDER — PROPOFOL 10 MG/ML
VIAL (ML) INTRAVENOUS CONTINUOUS PRN
Status: DISCONTINUED | OUTPATIENT
Start: 2024-04-08 | End: 2024-04-08

## 2024-04-08 RX ORDER — FENTANYL CITRATE 50 UG/ML
INJECTION, SOLUTION INTRAMUSCULAR; INTRAVENOUS
Status: DISCONTINUED | OUTPATIENT
Start: 2024-04-08 | End: 2024-04-08

## 2024-04-08 RX ORDER — HALOPERIDOL 5 MG/ML
0.5 INJECTION INTRAMUSCULAR EVERY 10 MIN PRN
Status: DISCONTINUED | OUTPATIENT
Start: 2024-04-08 | End: 2024-04-08 | Stop reason: HOSPADM

## 2024-04-08 RX ORDER — PROPOFOL 10 MG/ML
VIAL (ML) INTRAVENOUS
Status: DISCONTINUED | OUTPATIENT
Start: 2024-04-08 | End: 2024-04-08

## 2024-04-08 RX ORDER — HEPARIN 100 UNIT/ML
SYRINGE INTRAVENOUS
Status: DISCONTINUED | OUTPATIENT
Start: 2024-04-08 | End: 2024-04-08 | Stop reason: HOSPADM

## 2024-04-08 RX ORDER — OXYCODONE HYDROCHLORIDE 5 MG/1
5 TABLET ORAL EVERY 4 HOURS PRN
Qty: 8 TABLET | Refills: 0 | Status: SHIPPED | OUTPATIENT
Start: 2024-04-08 | End: 2024-06-06

## 2024-04-08 RX ORDER — EPHEDRINE SULFATE 50 MG/ML
INJECTION, SOLUTION INTRAVENOUS
Status: DISCONTINUED | OUTPATIENT
Start: 2024-04-08 | End: 2024-04-08

## 2024-04-08 RX ADMIN — LIDOCAINE HYDROCHLORIDE 100 MG: 20 INJECTION INTRAVENOUS at 07:04

## 2024-04-08 RX ADMIN — FILGRASTIM 960 MCG: 480 INJECTION, SOLUTION INTRAVENOUS; SUBCUTANEOUS at 10:04

## 2024-04-08 RX ADMIN — FENTANYL CITRATE 25 MCG: 50 INJECTION, SOLUTION INTRAMUSCULAR; INTRAVENOUS at 07:04

## 2024-04-08 RX ADMIN — PHENYLEPHRINE HYDROCHLORIDE 100 MCG: 10 INJECTION INTRAVENOUS at 07:04

## 2024-04-08 RX ADMIN — EPHEDRINE SULFATE 15 MG: 50 INJECTION INTRAVENOUS at 07:04

## 2024-04-08 RX ADMIN — SODIUM CHLORIDE: 0.9 INJECTION, SOLUTION INTRAVENOUS at 05:04

## 2024-04-08 RX ADMIN — PROPOFOL 30 MG: 10 INJECTION, EMULSION INTRAVENOUS at 07:04

## 2024-04-08 RX ADMIN — PROPOFOL 100 MCG/KG/MIN: 10 INJECTION, EMULSION INTRAVENOUS at 07:04

## 2024-04-08 RX ADMIN — PROPOFOL 20 MG: 10 INJECTION, EMULSION INTRAVENOUS at 07:04

## 2024-04-08 RX ADMIN — DIPHENHYDRAMINE HYDROCHLORIDE 12.5 MG: 50 INJECTION, SOLUTION INTRAMUSCULAR; INTRAVENOUS at 07:04

## 2024-04-08 RX ADMIN — PLERIXAFOR 22 MG: 24 SOLUTION SUBCUTANEOUS at 05:04

## 2024-04-08 RX ADMIN — CEFAZOLIN 2 G: 2 INJECTION, POWDER, FOR SOLUTION INTRAMUSCULAR; INTRAVENOUS at 07:04

## 2024-04-08 NOTE — BRIEF OP NOTE
[facility] - Surgery ([facility])  Brief Operative Note    Surgery Date: 4/8/2024     Surgeon(s) and Role:     * Theodore Caruso MD - Primary     * Linus Bone MD - Resident - Assisting     * Patricia Gudino MD - Resident - Assisting     * Erlin Ferris MD - Resident - Observing        Pre-op Diagnosis:  Stem cell transplant candidate [Z76.82]    Post-op Diagnosis:  Post-Op Diagnosis Codes:     * Stem cell transplant candidate [Z76.82]    Procedure(s) (LRB):  INSERTION, CATHETER, HEMODIALYSIS, DUAL LUMEN, left poss right, Bard 14.5 fr hemosplit catheter, model [id number] (Left)    Anesthesia: Local MAC    Operative Findings: Right IJ tunneled HD catheter placement. Both lines manuela back and flushed easily. Hep locked. Manual pressure held to access site for 7 min. Hemostatic at case end.    Estimated Blood Loss: 15 mL             Discharge Note    OUTCOME: Patient tolerated treatment/procedure well without complication and is now ready for discharge.    DISPOSITION: Home or Self Care    FINAL DIAGNOSIS:  <principal problem not specified>    FOLLOWUP: None    DISCHARGE INSTRUCTIONS:    Discharge Procedure Orders   Diet Adult Regular     Notify your health care provider if you experience any of the following:  temperature >100.4     Notify your health care provider if you experience any of the following:  persistent nausea and vomiting or diarrhea     Notify your health care provider if you experience any of the following:  severe uncontrolled pain     Notify your health care provider if you experience any of the following:  redness, tenderness, or signs of infection (pain, swelling, redness, odor or green/yellow discharge around incision site)     Notify your health care provider if you experience any of the following:  difficulty breathing or increased cough     Weight bearing restrictions (specify):   Order Comments: Do not lift greater than 10 lbs for 4 weeks

## 2024-04-08 NOTE — TRANSFER OF CARE
"Anesthesia Transfer of Care Note    Patient: Chip Goodson    Procedure(s) Performed: Procedure(s) (LRB):  INSERTION, CATHETER, HEMODIALYSIS, DUAL LUMEN, left poss right, Bard 14.5 fr hemosplit catheter, model [id number] (Left)    Patient location: [facility]    Anesthesia Type: general    Transport from OR: Transported from OR on room air with adequate spontaneous ventilation    Post pain: adequate analgesia    Post assessment: no apparent anesthetic complications    Post vital signs: stable    Level of consciousness: awake    Nausea/Vomiting: no nausea/vomiting    Complications: none    Transfer of care protocol was followed      Last vitals: Visit Vitals  BP (!) 140/67 (BP Location: Left arm, Patient Position: Lying)   Pulse (!) 59   Temp 37.3 °C (99.1 °F) (Temporal)   Resp 16   Ht 5' 10" (1.778 m)   Wt 96.2 kg (212 lb)   SpO2 97%   BMI 30.42 kg/m²     "

## 2024-04-08 NOTE — OP NOTE
[facility]  General Surgery  Operative Note      Patient: Chip Goodson  Date: 4/8/2024  MRN: [id number]    Date of Procedure: 4/8/2024     Procedure: Procedure(s) (LRB):  INSERTION, CATHETER, HEMODIALYSIS, DUAL LUMEN, left poss right, Bard 14.5 fr hemosplit catheter, model [id number] (Left)     Surgeon(s) and Role:     * Theodore Caruso MD - Primary     * Linus Bone MD - Resident - Assisting     * Patricia Gudino MD - Resident - Assisting     * Erlin Ferris MD - Resident - Observing    Pre-Operative Diagnosis: Stem cell transplant candidate [Z76.82]    Post-Operative Diagnosis: Post-Op Diagnosis Codes:     * Stem cell transplant candidate [Z76.82]    Anesthesia: Local MAC    Patient brought to OR. Placed supine with shoulder roll and arms tucked. Prepped and draped. Time out called and all in agreement.     US identification of right IJ. Patient placed in trendelenburg.     Access point grasped compressed with adson and local anesthetic injected at this site.     #11 blade used to make skin nick. Opening widened with hemostat.     18g needle used to access the vein perpendicular to the skin.     Wire passed through needle and position confirmed with fluoroscopy.     Tunnel exit site marked, injected with lidocaine, and incised with #15. The planned tract was also injected with local.     Tunneler with tip bent was used to tunnel from the exit site to the neck incision where it was grasped with a hemostat.     The dilator was advanced on the wire under fluoro.     The catheter was advanced through the peel away catheter.    Fluoro confirmed good placement.     The catheter aspirated blood. 2cc of heparin was injected into both lumens.     He tolerated the procedure well and was brought to PACU in stable condition.        Abe Bone MD  General Surgery Resident

## 2024-04-08 NOTE — ANESTHESIA PREPROCEDURE EVALUATION
04/08/2024  Chip Goodson is a 73 y.o., male.    Pre-operative evaluation for Procedure(s) (LRB):  INSERTION, CATHETER, HEMODIALYSIS, DUAL LUMEN, left poss right, Bard 14.5 fr hemosplit catheter, model [id number] (Left)    Chip Goodson is a 73 y.o. male     Patient Active Problem List   Diagnosis    Hypercalcemia    Confusion    Hypoalbuminemia    Weakness    Abnormal radionuclide bone scan    Multiple myeloma    GERD (gastroesophageal reflux disease)    Hypothyroidism, unspecified    Type 2 diabetes mellitus without complications    HTN (hypertension)    Stem cell transplant candidate       Review of patient's allergies indicates:   Allergen Reactions    Iodine Anaphylaxis    Shellfish containing products     Poison ivy extract     Amoxicillin-pot clavulanate Itching       No current facility-administered medications on file prior to encounter.     Current Outpatient Medications on File Prior to Encounter   Medication Sig Dispense Refill    acyclovir (ZOVIRAX) 400 MG tablet Take 1 tablet (400 mg total) by mouth 2 (two) times daily. 60 tablet 11    ALPRAZolam (XANAX) 0.5 MG tablet Take 0.5 mg by mouth daily as needed.      amLODIPine (NORVASC) 10 MG tablet Take 1 tablet (10 mg total) by mouth every morning.      ascorbic acid, vitamin C, (VITAMIN C) 500 MG tablet Take 500 mg by mouth once daily.      atorvastatin (LIPITOR) 10 MG tablet Take 10 mg by mouth every evening.      carvediloL (COREG) 12.5 MG tablet Take 12.5 mg by mouth 2 (two) times daily.      famotidine (PEPCID) 20 MG tablet Take 1 tablet (20 mg total) by mouth every morning. 30 tablet 11    ferrous sulfate 325 (65 FE) MG EC tablet Take 325 mg by mouth once daily.      finasteride (PROSCAR) 5 mg tablet Take 5 mg by mouth every morning.      hydrALAZINE (APRESOLINE) 50 MG tablet Take 1 tablet (50 mg total) by mouth every 8 (eight) hours. 90  tablet 11    Lactobacillus rhamnosus GG (CULTURELLE) 10 billion cell capsule Take 1 capsule by mouth once daily.      levothyroxine (SYNTHROID) 112 MCG tablet Take 112 mcg by mouth every evening.      metFORMIN (GLUCOPHAGE-XR) 500 MG ER 24hr tablet Take 1,000 mg by mouth 2 (two) times daily.      pantoprazole (PROTONIX) 40 MG tablet Take 1 tablet (40 mg total) by mouth 2 (two) times daily before meals. 60 tablet 11    ranolazine (RANEXA) 500 MG Tb12 Take 500 mg by mouth 2 (two) times daily.      spironolactone (ALDACTONE) 25 MG tablet Take 12.5 mg by mouth every morning.      zinc gluconate 50 mg tablet Take 50 mg by mouth once daily.      albuterol (PROVENTIL/VENTOLIN HFA) 90 mcg/actuation inhaler 2 puffs every 4 to 6 hours as needed.      cholecalciferol, vitamin D3, (VITAMIN D3) 125 mcg (5,000 unit) Tab Take 5,000 Units by mouth once daily.      cinnamon bark (CINNAMON) 500 mg capsule Take 2,000 mg by mouth once daily.      cloNIDine (CATAPRES) 0.2 MG tablet Take 0.2 mg by mouth daily as needed.      gabapentin (NEURONTIN) 100 MG capsule 100 mg daily as needed.      lenalidomide (REVLIMID) 10 mg Cap Take 10 mg by mouth once daily. (Patient not taking: Reported on 3/25/2024.) 21 each 0    nitroGLYCERIN (NITROSTAT) 0.4 MG SL tablet place one tablet under the tongue every five minutes as needed for chest pain up to 3 doses. if no relief call 911      ondansetron (ZOFRAN) 8 MG tablet Take 1 tablet (8 mg total) by mouth every 8 (eight) hours as needed for Nausea. 30 tablet 2    ONETOUCH ULTRA TEST Strp USE TO TEST BLOOD GLUCOSE TWICE DAILY      saw palmetto 450 mg Cap Take 1 capsule by mouth every evening.         Past Surgical History:   Procedure Laterality Date    APPENDECTOMY  1965    BONE MARROW BIOPSY Right 06/12/2023    Procedure: BIOPSY, BONE MARROW;  Surgeon: Silvano Austin MD;  Location: [facility];  Service: General;  Laterality: Right;    COLONOSCOPY N/A 2/15/2024    Procedure: COLONOSCOPY;  Surgeon: Rowan  Kei GARCIA III, MD;  Location: [facility];  Service: Endoscopy;  Laterality: N/A;  POST-OP: ENLARGED NODULAR PROSTATE, SUBOPTIMAL PREP, PAN DIVERTICULOSIS, TRANSVERSE COLON POLYPECTOMY    removal of boils         Social History     Socioeconomic History    Marital status:      Spouse name: Graciela    Number of children: 5   Tobacco Use    Smoking status: Former     Types: Cigarettes    Smokeless tobacco: Former   Substance and Sexual Activity    Alcohol use: Yes    Drug use: Never    Sexual activity: Not Currently     Social Determinants of Health     Financial Resource Strain: Low Risk  (1/23/2024)    Overall Financial Resource Strain (CARDIA)     Difficulty of Paying Living Expenses: Not hard at all   Food Insecurity: No Food Insecurity (1/23/2024)    Hunger Vital Sign     Worried About Running Out of Food in the Last Year: Never true     Ran Out of Food in the Last Year: Never true   Transportation Needs: No Transportation Needs (1/23/2024)    PRAPARE - Transportation     Lack of Transportation (Medical): No     Lack of Transportation (Non-Medical): No   Physical Activity: Sufficiently Active (1/23/2024)    Exercise Vital Sign     Days of Exercise per Week: 7 days     Minutes of Exercise per Session: 40 min   Stress: Stress Concern Present (1/23/2024)    [place] [employer] - Occupational Stress Questionnaire     Feeling of Stress : To some extent   Social Connections: Unknown (1/23/2024)    Social Connection and Isolation Panel [NHANES]     Frequency of Communication with Friends and Family: Three times a week     Frequency of Social Gatherings with Friends and Family: Twice a week     Attends Islam Services: Patient declined     Active Member of Clubs or Organizations: No     Attends Club or Organization Meetings: Never     Marital Status:    Housing Stability: High Risk (1/23/2024)    Housing Stability Vital Sign     Unable to Pay for Housing in the Last Year: No      "Number of Places Lived in the Last Year: 1     Unstable Housing in the Last Year: Yes         CBC: No results for input(s): "WBC", "RBC", "HGB", "HCT", "PLT", "MCV", "MCH", "MCHC" in the last 72 hours.    CMP: No results for input(s): "NA", "K", "CL", "CO2", "BUN", "CREATININE", "GLU", "MG", "PHOS", "CALCIUM", "ALBUMIN", "PROT", "ALKPHOS", "ALT", "AST", "BILITOT" in the last 72 hours.    INR  No results for input(s): "PT", "INR", "PROTIME", "APTT" in the last 72 hours.        Diagnostic Studies:      EKD Echo:  No results found for this or any previous visit.        Pre-op Assessment    I have reviewed the Patient Summary Reports.    I have reviewed the NPO Status.   I have reviewed the Medications.     Review of Systems  Anesthesia Hx:  No problems with previous Anesthesia               Denies Personal Hx of Anesthesia complications.                    Hematology/Oncology:                      Current/Recent Cancer.                Cardiovascular:  Exercise tolerance: good   Hypertension                                        Pulmonary:  Pulmonary Normal                       Hepatic/GI:     GERD, well controlled             Neurological:    Denies CVA.    Denies Seizures.                                Endocrine:  Diabetes Hypothyroidism              Physical Exam  General: Cooperative, Alert and Oriented    Airway:  Mallampati: II   Mouth Opening: Normal  TM Distance: Normal  Tongue: Normal  Neck ROM: Normal ROM    Dental:  Dentures        Anesthesia Plan  Type of Anesthesia, risks & benefits discussed:    Anesthesia Type: Gen Natural Airway  Intra-op Monitoring Plan: Standard ASA Monitors  Post Op Pain Control Plan: IV/PO Opioids PRN and multimodal analgesia  Induction:  IV  Informed Consent: Informed consent signed with the Patient and all parties understand the risks and agree with anesthesia plan.  All questions answered.   ASA Score: 3  Day of Surgery Review of History & Physical: H&P Update referred " to the surgeon/provider.    Ready For Surgery From Anesthesia Perspective.     .

## 2024-04-08 NOTE — H&P
General Surgery   History and Physical    Patient Name: Chip Goodson  YOB: 1950 (73 y.o.)  MRN: [id number]  Today's Date: 04/08/2024    Referring Md:   Theodore Caruso Md  [address] [place] [address]    SUBJECTIVE:     History of Present Illness:  Chip Goodson is a 73 y.o. male with PMHx of  multiple myeloma  who presents today for tunneled central line placement in preparation for stem cell transplant. He denies ever having prior central lines or ports placed. He denies use of blood thinners.     Denies alcohol, tobacco, and elicit drug use.   Not currently on any anticoagulants      Review of patient's allergies indicates:   Allergen Reactions    Iodine Anaphylaxis    Shellfish containing products     Poison ivy extract     Amoxicillin-pot clavulanate Itching       Past Medical History:   Diagnosis Date    Chronic diastolic (congestive) heart failure     Coronary artery disease     GERD (gastroesophageal reflux disease)     High cholesterol     HTN (hypertension)     Hypothyroidism, unspecified     Multiple myeloma     Secondary pulmonary arterial hypertension     Type 2 diabetes mellitus without complications      Past Surgical History:   Procedure Laterality Date    APPENDECTOMY  1965    BONE MARROW BIOPSY Right 06/12/2023    Procedure: BIOPSY, BONE MARROW;  Surgeon: Silvano Austin MD;  Location: [facility] OR;  Service: General;  Laterality: Right;    COLONOSCOPY N/A 2/15/2024    Procedure: COLONOSCOPY;  Surgeon: Kei Donahue III, MD;  Location: [facility] ENDO;  Service: Endoscopy;  Laterality: N/A;  POST-OP: ENLARGED NODULAR PROSTATE, SUBOPTIMAL PREP, PAN DIVERTICULOSIS, TRANSVERSE COLON POLYPECTOMY    removal of boils       Family History   Problem Relation Age of Onset    Lung cancer Mother     Breast cancer Sister      Social History     Tobacco Use    Smoking status: Former     Types: Cigarettes    Smokeless tobacco: Former   Substance Use Topics    Alcohol use: Yes     "Drug use: Never        Review of Systems:  Review of Systems   Constitutional:  Negative for chills and fever.   HENT: Negative.     Eyes: Negative.    Respiratory:  Negative for cough and shortness of breath.    Cardiovascular:  Negative for chest pain.   Gastrointestinal:  Negative for abdominal pain, nausea and vomiting.   Genitourinary: Negative.    Musculoskeletal: Negative.    Skin: Negative.    Neurological:  Negative for dizziness and weakness.   Psychiatric/Behavioral: Negative.         OBJECTIVE:     Vital Signs (Most Recent)  /65 (BP Location: Left arm, Patient Position: Lying)   Pulse (!) 56   Temp 99.1 °F (37.3 °C) (Temporal)   Resp 20   Ht 5' 10" (1.778 m)   Wt 96.2 kg (212 lb)   SpO2 97%   BMI 30.42 kg/m²     Physical Exam  Constitutional:       General: He is not in acute distress.     Appearance: Normal appearance.   HENT:      Head: Normocephalic and atraumatic.      Nose: Nose normal.      Mouth/Throat:      Mouth: Mucous membranes are moist.   Eyes:      Extraocular Movements: Extraocular movements intact.   Cardiovascular:      Rate and Rhythm: Normal rate.   Pulmonary:      Effort: Pulmonary effort is normal. No respiratory distress.   Abdominal:      Palpations: Abdomen is soft.      Tenderness: There is no abdominal tenderness.   Musculoskeletal:         General: Normal range of motion.      Cervical back: Normal range of motion.   Skin:     General: Skin is warm.      Coloration: Skin is not jaundiced.   Neurological:      General: No focal deficit present.      Mental Status: He is alert and oriented to person, place, and time.           Labs:     Lab Results   Component Value Date    WBC 5.85 03/18/2024    HGB 13.7 (L) 03/18/2024    HCT 39.5 (L) 03/18/2024    .5 (H) 03/18/2024     03/18/2024         CMP  Sodium   Date Value Ref Range Status   03/11/2024 142 136 - 145 mmol/L Final     Sodium Level   Date Value Ref Range Status   03/18/2024 139 136 - 145 mmol/L " Final     Potassium   Date Value Ref Range Status   03/11/2024 4.1 3.5 - 5.1 mmol/L Final     Potassium Level   Date Value Ref Range Status   03/18/2024 4.1 3.5 - 5.1 mmol/L Final     Chloride   Date Value Ref Range Status   03/11/2024 109 95 - 110 mmol/L Final     CO2   Date Value Ref Range Status   03/11/2024 27 23 - 29 mmol/L Final     Carbon Dioxide   Date Value Ref Range Status   03/18/2024 28 23 - 31 mmol/L Final     Glucose   Date Value Ref Range Status   03/11/2024 128 (H) 70 - 110 mg/dL Final     BUN   Date Value Ref Range Status   03/11/2024 10 8 - 23 mg/dL Final     Blood Urea Nitrogen   Date Value Ref Range Status   03/18/2024 27.0 (H) 8.4 - 25.7 mg/dL Final     Creatinine   Date Value Ref Range Status   03/18/2024 1.36 (H) 0.73 - 1.18 mg/dL Final   03/11/2024 1.4 0.5 - 1.4 mg/dL Final     Calcium   Date Value Ref Range Status   03/11/2024 9.6 8.7 - 10.5 mg/dL Final     Calcium Level Total   Date Value Ref Range Status   03/18/2024 9.7 8.8 - 10.0 mg/dL Final     Total Protein   Date Value Ref Range Status   03/11/2024 7.0 6.0 - 8.4 g/dL Final     Albumin   Date Value Ref Range Status   03/11/2024 4.1 3.5 - 5.2 g/dL Final     Albumin Level   Date Value Ref Range Status   03/18/2024 3.9 3.4 - 4.8 g/dL Final   03/18/2024 3.5 3.4 - 4.8 g/dL Final     Total Bilirubin   Date Value Ref Range Status   03/11/2024 1.3 (H) 0.1 - 1.0 mg/dL Final     Comment:     For infants and newborns, interpretation of results should be based  on gestational age, weight and in agreement with clinical  observations.    Premature Infant recommended reference ranges:  Up to 24 hours.............<8.0 mg/dL  Up to 48 hours............<12.0 mg/dL  3-5 days..................<15.0 mg/dL  6-29 days.................<15.0 mg/dL       Bilirubin Total   Date Value Ref Range Status   03/18/2024 0.9 <=1.5 mg/dL Final     Alkaline Phosphatase   Date Value Ref Range Status   03/18/2024 97 40 - 150 unit/L Final   03/11/2024 106 55 - 135 U/L Final      AST   Date Value Ref Range Status   03/11/2024 11 10 - 40 U/L Final     Aspartate Aminotransferase   Date Value Ref Range Status   03/18/2024 12 5 - 34 unit/L Final     ALT   Date Value Ref Range Status   03/11/2024 13 10 - 44 U/L Final     Alanine Aminotransferase   Date Value Ref Range Status   03/18/2024 11 0 - 55 unit/L Final     Anion Gap   Date Value Ref Range Status   03/11/2024 6 (L) 8 - 16 mmol/L Final           ASSESSMENT/PLAN:     Chip Goodson is a 73 y.o. male with recently diagnosed multiple myeloma who presents today for tunneled catheter for stem cell transplant.    - to OR today for right chest, possible left chest tunneled HD catheter placement  - consent obtained    Patricia Gudino MD  [facility]  General Surgery PGY-1

## 2024-04-08 NOTE — DISCHARGE INSTRUCTIONS
POSTOPERATIVE INSTRUCTIONS FOLLOWING PERM-A-CATH PLACMENT    The following are post-operative instructions that will help you to recover from your surgery.  Please read over these instructions carefully and contact us if we can answer any of your questions or concerns.    Dressing  Keep the sterile dressing in place. Keep dressing dry. You may shower the day after surgery but do not wet the site.    Activity   You should be able to return to your regular activities 2 days after your surgery.  However, do not engage in strenuous activities in which you use your upper body such as:  golf, tennis, aerobics, washing windows, raking the yard, mopping, vacuuming, heavy lifting (e.g children) for 2 weeks. Do not lift anything heavier than a gallon of milk.    Medication for pain  Over the counter pain medications should be sufficient for your pain. Use Prescription pain medication for pain not relieved by Tylenol.    Please report the following:  Temperature greater than 101 degrees  Discharge or bad odor from the wound  Excessive bleeding, such as saturated bloody dressing or extreme bruising  Redness at incision and/or drain sites  Swelling or buildup of fluid around incision  Shortness of breath    Additional information  Your surgeon or medical oncologist will see you approximately 1-2 weeks following your surgery to check the incision.  If this follow-up appointment has not been made, please call the office.    If you have any questions or problems, please call my office or my nurse.    After hours and on weekends, you may call the main [facility] line at [phone] and ask to have the general surgery resident paged.

## 2024-04-09 ENCOUNTER — INFUSION (OUTPATIENT)
Dept: INFUSION THERAPY | Facility: HOSPITAL | Age: 74
End: 2024-04-09
Payer: MEDICARE

## 2024-04-09 ENCOUNTER — HOSPITAL ENCOUNTER (OUTPATIENT)
Dept: TRANSFUSION MEDICINE | Facility: HOSPITAL | Age: 74
Discharge: HOME | End: 2024-04-09
Payer: MEDICARE

## 2024-04-09 VITALS
SYSTOLIC BLOOD PRESSURE: 147 MMHG | OXYGEN SATURATION: 95 % | HEIGHT: 70 IN | TEMPERATURE: 98 F | WEIGHT: 212 LBS | RESPIRATION RATE: 20 BRPM | HEART RATE: 61 BPM | DIASTOLIC BLOOD PRESSURE: 72 MMHG | BODY MASS INDEX: 30.35 KG/M2

## 2024-04-09 DIAGNOSIS — C90.00 MULTIPLE MYELOMA, REMISSION STATUS UNSPECIFIED: Primary | ICD-10-CM

## 2024-04-09 DIAGNOSIS — Z76.82 STEM CELL TRANSPLANT CANDIDATE: ICD-10-CM

## 2024-04-09 DIAGNOSIS — R79.1 ABNORMAL COAGULATION PROFILE: ICD-10-CM

## 2024-04-09 DIAGNOSIS — Z76.82 STEM CELL TRANSPLANT CANDIDATE: Primary | ICD-10-CM

## 2024-04-09 DIAGNOSIS — C90.00 MULTIPLE MYELOMA: ICD-10-CM

## 2024-04-09 LAB
ALBUMIN SERPL BCP-MCNC: 3.1 G/DL (ref 3.5–5.2)
ALBUMIN SERPL BCP-MCNC: 3.2 G/DL (ref 3.5–5.2)
ALBUMIN SERPL BCP-MCNC: 3.5 G/DL (ref 3.5–5.2)
ALP SERPL-CCNC: 172 U/L (ref 55–135)
ALP SERPL-CCNC: 196 U/L (ref 55–135)
ALP SERPL-CCNC: 213 U/L (ref 55–135)
ALT SERPL W/O P-5'-P-CCNC: 10 U/L (ref 10–44)
ALT SERPL W/O P-5'-P-CCNC: 12 U/L (ref 10–44)
ALT SERPL W/O P-5'-P-CCNC: 14 U/L (ref 10–44)
ANION GAP SERPL CALC-SCNC: 10 MMOL/L (ref 8–16)
ANION GAP SERPL CALC-SCNC: 11 MMOL/L (ref 8–16)
ANION GAP SERPL CALC-SCNC: 12 MMOL/L (ref 8–16)
ANISOCYTOSIS BLD QL SMEAR: SLIGHT
AST SERPL-CCNC: 15 U/L (ref 10–40)
AST SERPL-CCNC: 16 U/L (ref 10–40)
AST SERPL-CCNC: 17 U/L (ref 10–40)
BASOPHILS # BLD AUTO: 0.04 K/UL (ref 0–0.2)
BASOPHILS # BLD AUTO: ABNORMAL K/UL (ref 0–0.2)
BASOPHILS NFR BLD: 0 % (ref 0–1.9)
BASOPHILS NFR BLD: 0 % (ref 0–1.9)
BASOPHILS NFR BLD: 0.1 % (ref 0–1.9)
BILIRUB SERPL-MCNC: 0.5 MG/DL (ref 0.1–1)
BILIRUB SERPL-MCNC: 0.6 MG/DL (ref 0.1–1)
BILIRUB SERPL-MCNC: 0.7 MG/DL (ref 0.1–1)
BUN SERPL-MCNC: 13 MG/DL (ref 8–23)
BUN SERPL-MCNC: 14 MG/DL (ref 8–23)
BUN SERPL-MCNC: 16 MG/DL (ref 8–23)
CA-I BLDV-SCNC: 1.06 MMOL/L (ref 1.06–1.42)
CA-I BLDV-SCNC: 1.09 MMOL/L (ref 1.06–1.42)
CA-I BLDV-SCNC: 1.2 MMOL/L (ref 1.06–1.42)
CALCIUM SERPL-MCNC: 10.4 MG/DL (ref 8.7–10.5)
CALCIUM SERPL-MCNC: 9.6 MG/DL (ref 8.7–10.5)
CALCIUM SERPL-MCNC: 9.9 MG/DL (ref 8.7–10.5)
CD34 %: 0.05 %
CD34 ABSOLUTE: 27.69 CELLS/UL
CD34 VIABILITY: 99.2 %
CHLORIDE SERPL-SCNC: 101 MMOL/L (ref 95–110)
CHLORIDE SERPL-SCNC: 103 MMOL/L (ref 95–110)
CHLORIDE SERPL-SCNC: 99 MMOL/L (ref 95–110)
CO2 SERPL-SCNC: 28 MMOL/L (ref 23–29)
CO2 SERPL-SCNC: 28 MMOL/L (ref 23–29)
CO2 SERPL-SCNC: 33 MMOL/L (ref 23–29)
CREAT SERPL-MCNC: 1.1 MG/DL (ref 0.5–1.4)
DIFFERENTIAL METHOD BLD: ABNORMAL
DOHLE BOD BLD QL SMEAR: PRESENT
EOSINOPHIL # BLD AUTO: 0.9 K/UL (ref 0–0.5)
EOSINOPHIL # BLD AUTO: ABNORMAL K/UL (ref 0–0.5)
EOSINOPHIL NFR BLD: 2.4 % (ref 0–8)
EOSINOPHIL NFR BLD: 4 % (ref 0–8)
EOSINOPHIL NFR BLD: 8 % (ref 0–8)
ERYTHROCYTE [DISTWIDTH] IN BLOOD BY AUTOMATED COUNT: 13.3 % (ref 11.5–14.5)
ERYTHROCYTE [DISTWIDTH] IN BLOOD BY AUTOMATED COUNT: 13.4 % (ref 11.5–14.5)
ERYTHROCYTE [DISTWIDTH] IN BLOOD BY AUTOMATED COUNT: 13.5 % (ref 11.5–14.5)
EST. GFR  (NO RACE VARIABLE): >60 ML/MIN/1.73 M^2
GLUCOSE SERPL-MCNC: 103 MG/DL (ref 70–110)
GLUCOSE SERPL-MCNC: 136 MG/DL (ref 70–110)
GLUCOSE SERPL-MCNC: 91 MG/DL (ref 70–110)
HCT VFR BLD AUTO: 35 % (ref 40–54)
HCT VFR BLD AUTO: 35.4 % (ref 40–54)
HCT VFR BLD AUTO: 37.5 % (ref 40–54)
HGB BLD-MCNC: 12.1 G/DL (ref 14–18)
HGB BLD-MCNC: 12.3 G/DL (ref 14–18)
HGB BLD-MCNC: 12.6 G/DL (ref 14–18)
IMM GRANULOCYTES # BLD AUTO: 1.48 K/UL (ref 0–0.04)
IMM GRANULOCYTES # BLD AUTO: ABNORMAL K/UL (ref 0–0.04)
IMM GRANULOCYTES # BLD AUTO: ABNORMAL K/UL (ref 0–0.04)
IMM GRANULOCYTES NFR BLD AUTO: 3.8 % (ref 0–0.5)
IMM GRANULOCYTES NFR BLD AUTO: ABNORMAL % (ref 0–0.5)
IMM GRANULOCYTES NFR BLD AUTO: ABNORMAL % (ref 0–0.5)
LYMPHOCYTES # BLD AUTO: 4.2 K/UL (ref 1–4.8)
LYMPHOCYTES # BLD AUTO: ABNORMAL K/UL (ref 1–4.8)
LYMPHOCYTES NFR BLD: 10.8 % (ref 18–48)
LYMPHOCYTES NFR BLD: 13 % (ref 18–48)
LYMPHOCYTES NFR BLD: 8 % (ref 18–48)
MAGNESIUM SERPL-MCNC: 1.7 MG/DL (ref 1.6–2.6)
MAGNESIUM SERPL-MCNC: 1.7 MG/DL (ref 1.6–2.6)
MAGNESIUM SERPL-MCNC: 1.8 MG/DL (ref 1.6–2.6)
MCH RBC QN AUTO: 34.7 PG (ref 27–31)
MCH RBC QN AUTO: 35.2 PG (ref 27–31)
MCH RBC QN AUTO: 35.9 PG (ref 27–31)
MCHC RBC AUTO-ENTMCNC: 33.6 G/DL (ref 32–36)
MCHC RBC AUTO-ENTMCNC: 34.2 G/DL (ref 32–36)
MCHC RBC AUTO-ENTMCNC: 35.1 G/DL (ref 32–36)
MCV RBC AUTO: 102 FL (ref 82–98)
MCV RBC AUTO: 103 FL (ref 82–98)
MCV RBC AUTO: 103 FL (ref 82–98)
METAMYELOCYTES NFR BLD MANUAL: 4 %
METAMYELOCYTES NFR BLD MANUAL: 8 %
MONOCYTES # BLD AUTO: 3.6 K/UL (ref 0.3–1)
MONOCYTES # BLD AUTO: ABNORMAL K/UL (ref 0.3–1)
MONOCYTES NFR BLD: 3 % (ref 4–15)
MONOCYTES NFR BLD: 9 % (ref 4–15)
MONOCYTES NFR BLD: 9.3 % (ref 4–15)
MYELOCYTES NFR BLD MANUAL: 1 %
MYELOCYTES NFR BLD MANUAL: 2 %
NEUTROPHILS # BLD AUTO: 28.4 K/UL (ref 1.8–7.7)
NEUTROPHILS NFR BLD: 31 % (ref 38–73)
NEUTROPHILS NFR BLD: 73.6 % (ref 38–73)
NEUTROPHILS NFR BLD: 76 % (ref 38–73)
NEUTS BAND NFR BLD MANUAL: 29 %
NEUTS BAND NFR BLD MANUAL: 4 %
NRBC BLD-RTO: 0 /100 WBC
OVALOCYTES BLD QL SMEAR: ABNORMAL
OVALOCYTES BLD QL SMEAR: ABNORMAL
PHOSPHATE SERPL-MCNC: 2.6 MG/DL (ref 2.7–4.5)
PHOSPHATE SERPL-MCNC: 2.9 MG/DL (ref 2.7–4.5)
PHOSPHATE SERPL-MCNC: 3.1 MG/DL (ref 2.7–4.5)
PLATELET # BLD AUTO: 107 K/UL (ref 150–450)
PLATELET # BLD AUTO: 152 K/UL (ref 150–450)
PLATELET # BLD AUTO: 182 K/UL (ref 150–450)
PLATELET BLD QL SMEAR: ABNORMAL
PLATELET BLD QL SMEAR: ABNORMAL
PMV BLD AUTO: 8.5 FL (ref 9.2–12.9)
PMV BLD AUTO: 9.2 FL (ref 9.2–12.9)
PMV BLD AUTO: 9.2 FL (ref 9.2–12.9)
POIKILOCYTOSIS BLD QL SMEAR: SLIGHT
POIKILOCYTOSIS BLD QL SMEAR: SLIGHT
POLYCHROMASIA BLD QL SMEAR: ABNORMAL
POLYCHROMASIA BLD QL SMEAR: ABNORMAL
POTASSIUM SERPL-SCNC: 3.5 MMOL/L (ref 3.5–5.1)
POTASSIUM SERPL-SCNC: 3.8 MMOL/L (ref 3.5–5.1)
POTASSIUM SERPL-SCNC: 4.1 MMOL/L (ref 3.5–5.1)
PROT SERPL-MCNC: 5.5 G/DL (ref 6–8.4)
PROT SERPL-MCNC: 5.6 G/DL (ref 6–8.4)
PROT SERPL-MCNC: 5.9 G/DL (ref 6–8.4)
RBC # BLD AUTO: 3.43 M/UL (ref 4.6–6.2)
RBC # BLD AUTO: 3.44 M/UL (ref 4.6–6.2)
RBC # BLD AUTO: 3.63 M/UL (ref 4.6–6.2)
SMUDGE CELLS BLD QL SMEAR: PRESENT
SODIUM SERPL-SCNC: 141 MMOL/L (ref 136–145)
SODIUM SERPL-SCNC: 141 MMOL/L (ref 136–145)
SODIUM SERPL-SCNC: 143 MMOL/L (ref 136–145)
TOXIC GRANULES BLD QL SMEAR: PRESENT
WBC # BLD AUTO: 37.56 K/UL (ref 3.9–12.7)
WBC # BLD AUTO: 38.65 K/UL (ref 3.9–12.7)
WBC # BLD AUTO: 42.52 K/UL (ref 3.9–12.7)

## 2024-04-09 PROCEDURE — 82330 ASSAY OF CALCIUM: CPT | Performed by: INTERNAL MEDICINE

## 2024-04-09 PROCEDURE — 80053 COMPREHEN METABOLIC PANEL: CPT | Mod: 91 | Performed by: INTERNAL MEDICINE

## 2024-04-09 PROCEDURE — 85025 COMPLETE CBC W/AUTO DIFF WBC: CPT | Performed by: PATHOLOGY

## 2024-04-09 PROCEDURE — 63600175 PHARM REV CODE 636 W HCPCS: Mod: JZ,JG

## 2024-04-09 PROCEDURE — 83735 ASSAY OF MAGNESIUM: CPT | Mod: 91 | Performed by: INTERNAL MEDICINE

## 2024-04-09 PROCEDURE — 63600175 PHARM REV CODE 636 W HCPCS: Performed by: PATHOLOGY

## 2024-04-09 PROCEDURE — 82330 ASSAY OF CALCIUM: CPT | Mod: 91 | Performed by: PATHOLOGY

## 2024-04-09 PROCEDURE — 80053 COMPREHEN METABOLIC PANEL: CPT | Mod: 91 | Performed by: PATHOLOGY

## 2024-04-09 PROCEDURE — 38206 HARVEST AUTO STEM CELLS: CPT

## 2024-04-09 PROCEDURE — 85007 BL SMEAR W/DIFF WBC COUNT: CPT | Mod: NCS | Performed by: INTERNAL MEDICINE

## 2024-04-09 PROCEDURE — 25000003 PHARM REV CODE 250: Performed by: PATHOLOGY

## 2024-04-09 PROCEDURE — 84100 ASSAY OF PHOSPHORUS: CPT | Mod: 91 | Performed by: INTERNAL MEDICINE

## 2024-04-09 PROCEDURE — 84100 ASSAY OF PHOSPHORUS: CPT | Mod: 91 | Performed by: PATHOLOGY

## 2024-04-09 PROCEDURE — 96372 THER/PROPH/DIAG INJ SC/IM: CPT

## 2024-04-09 PROCEDURE — 25000003 PHARM REV CODE 250

## 2024-04-09 PROCEDURE — 38207 CRYOPRESERVE STEM CELLS: CPT

## 2024-04-09 PROCEDURE — 85027 COMPLETE CBC AUTOMATED: CPT | Mod: 91 | Performed by: INTERNAL MEDICINE

## 2024-04-09 PROCEDURE — 85025 COMPLETE CBC W/AUTO DIFF WBC: CPT | Mod: 91 | Performed by: INTERNAL MEDICINE

## 2024-04-09 PROCEDURE — 83735 ASSAY OF MAGNESIUM: CPT | Mod: 91 | Performed by: PATHOLOGY

## 2024-04-09 PROCEDURE — 38206 HARVEST AUTO STEM CELLS: CPT | Mod: ,,, | Performed by: PATHOLOGY

## 2024-04-09 PROCEDURE — 86367 STEM CELLS TOTAL COUNT: CPT | Performed by: INTERNAL MEDICINE

## 2024-04-09 PROCEDURE — 38214 VOLUME DEPLETE OF HARVEST: CPT

## 2024-04-09 RX ORDER — HEPARIN SODIUM 1000 [USP'U]/ML
3000 INJECTION, SOLUTION INTRAVENOUS; SUBCUTANEOUS ONCE
Status: COMPLETED | OUTPATIENT
Start: 2024-04-09 | End: 2024-04-09

## 2024-04-09 RX ORDER — SODIUM,POTASSIUM PHOSPHATES 280-250MG
2 POWDER IN PACKET (EA) ORAL ONCE AS NEEDED
Status: DISCONTINUED | OUTPATIENT
Start: 2024-04-09 | End: 2024-04-11 | Stop reason: HOSPADM

## 2024-04-09 RX ORDER — SODIUM,POTASSIUM PHOSPHATES 280-250MG
2 POWDER IN PACKET (EA) ORAL ONCE
Status: DISCONTINUED | OUTPATIENT
Start: 2024-04-09 | End: 2024-04-11 | Stop reason: HOSPADM

## 2024-04-09 RX ADMIN — HEPARIN SODIUM 3100 UNITS: 1000 INJECTION, SOLUTION INTRAVENOUS; SUBCUTANEOUS at 04:04

## 2024-04-09 RX ADMIN — FILGRASTIM 960 MCG: 480 INJECTION, SOLUTION INTRAVENOUS; SUBCUTANEOUS at 07:04

## 2024-04-09 RX ADMIN — CALCIUM GLUCONATE 4 G: 98 INJECTION, SOLUTION INTRAVENOUS at 10:04

## 2024-04-09 RX ADMIN — POTASSIUM & SODIUM PHOSPHATES POWDER PACK 280-160-250 MG 2 PACKET: 280-160-250 PACK at 02:04

## 2024-04-09 NOTE — ANESTHESIA POSTPROCEDURE EVALUATION
Anesthesia Post Evaluation    Patient: Chip Goodson    Procedure(s) Performed: Procedure(s) (LRB):  INSERTION, CATHETER, HEMODIALYSIS, DUAL LUMEN, left poss right, Bard 14.5 fr hemosplit catheter, model [id number] (Left)    Final Anesthesia Type: general (Natural airway)      Patient location during evaluation: [facility]  Patient participation: Yes- Able to Participate  Level of consciousness: awake and alert  Post-procedure vital signs: reviewed and stable  Pain management: adequate  Airway patency: patent    PONV status at discharge: No PONV  Anesthetic complications: no      Cardiovascular status: hemodynamically stable  Respiratory status: unassisted  Hydration status: euvolemic  Follow-up not needed.              Vitals Value Taken Time   /74 04/08/24 0915   Temp 36.6 °C (97.9 °F) 04/08/24 0915   Pulse 58 04/08/24 0915   Resp 18 04/08/24 0915   SpO2 98 % 04/08/24 0915         No case tracking events are documented in the log.      Pain/Lan Score: Lan Score: 10 (4/8/2024  8:46 AM)

## 2024-04-09 NOTE — PROCEDURES
[facility] - Apheresis  Transfusion Medicine  Procedure Note    SUMMARY   Stem Cell Collection Autologous    Date/Time: 2024 4:18 PM    Performed by: Kaushal Luis MD  Authorized by: Ryan Barnett MD        Date of Procedure: 2024     Procedure: Hematopoietic Progenitor Cell Collection    Provider: Kaushal Luis MD     Assisting Provider: None    Pre-Procedure Diagnosis: Stem Cell Transplant Candidate    Post-Procedure Diagnosis: Stem Cell Transplant Candidate    Follow-up Assessment: Mr. Goodson is a 72 yo male with MM who presents to apheresis clinic for an autologous stem cell collection. He denies any changes in health.     HPC started @ 1005 with no complications. At 1228, a mid-run sample collected and met collection goal. HPC ended @ 1628. No plans for Day 2 collection.    PRABHAKAR Goodson's Post-Collection count is as follow:   CD34 2.91 x 106/kg   CD3 N/A   Target cell dose: 2-5 x 10^6/kg CD34    Pertinent Laboratory Data: Complete Blood Count:   Lab Results   Component Value Date    HGB 10.4 (L) 2024    HCT 30.6 (L) 2024     (L) 2024    WBC 14.53 (H) 2024     Comprehensive Metabolic Panel:   Lab Results   Component Value Date     2024    K 4.0 2024     (H) 2024    CO2 24 2024     (H) 2024    BUN 21 2024    CREATININE 1.1 2024    CALCIUM 8.2 (L) 2024    PROT 5.0 (L) 2024    ALBUMIN 3.0 (L) 2024    BILITOT 1.1 (H) 2024    ALKPHOS 91 2024    AST 23 2024    ALT 15 2024    ANIONGAP 7 (L) 2024     CD34 - quantitative:   Lab Results   Component Value Date    CD34 0.05 2024    RL70JAVPGZLG 27.69 2024    IJ83HVDUVYLR 99.2 2024       Pertinent Medications:     Current Facility-Administered Medications on File Prior to Encounter   Medication Dose Route Frequency Provider Last Rate Last Admin    [] 0.9 % NaCl with KCl 20 mEq infusion    Intravenous Continuous Chelsie Man  mL/hr at 04/18/24 1437 New Bag at 04/18/24 1437    0.9 % NaCl with KCl 20 mEq infusion   Intravenous Continuous Chelsie Man  mL/hr at 04/19/24 1413 New Bag at 04/19/24 1413    0.9 % NaCl with KCl 20 mEq infusion   Intravenous Continuous Jessica Cee NP 20 mL/hr at 04/19/24 1300 Rate Change at 04/19/24 1300    acyclovir capsule 800 mg  800 mg Oral BID Ady Evans MD   800 mg at 04/19/24 0844    albuterol inhaler 2 puff  2 puff Inhalation Q4H PRN Chelsie Man NP        alteplase injection 2 mg  2 mg Intra-Catheter PRN Ady Evans MD        aluminum-magnesium hydroxide-simethicone 200-200-20 mg/5 mL suspension 30 mL  30 mL Oral Q6H PRN Chelsie Man, NORMA        amLODIPine tablet 10 mg  10 mg Oral Daily Chelsie Man NP   10 mg at 04/17/24 0855    carvediloL tablet 12.5 mg  12.5 mg Oral BID Chelsie Man NP   12.5 mg at 04/18/24 2116    EPINEPHrine (PF) injection 0.5 mg  0.5 mg Intramuscular Once PRN Chelsie Man, NP        And    hydrocortisone sodium succinate injection 100 mg  100 mg Intravenous Once PRN Chelsie Man, NP        And    diphenhydrAMINE injection 50 mg  50 mg Intravenous Once PRN Chelsie Man, NP        And    meperidine injection 50 mg  50 mg Intravenous Once PRN Chelsie Man, NP        And    furosemide injection 100 mg  100 mg Intravenous Once PRN Chelsie Man, NORMA        And    cloNIDine tablet 0.1 mg  0.1 mg Oral Once PRN Chelsie Man, NP        And    nitroGLYCERIN SL tablet 0.4 mg  0.4 mg Sublingual Once PRN Chelsie Man, NP        dextrose 10% bolus 125 mL 125 mL  12.5 g Intravenous PRN Chelsie Man, NP        dextrose 10% bolus 250 mL 250 mL  25 g Intravenous PRN Chelsie Man, NP        diphenhydrAMINE capsule 25 mg  25 mg Oral PRN Chelsie Man, NP        diphenhydrAMINE injection 50 mg  50 mg Intravenous Once PRN Cristina,  MD Ady        [COMPLETED] diphenhydrAMINE injection 50 mg  50 mg Intravenous Once Chelsie Man, NP   50 mg at 04/19/24 1256    And    [COMPLETED] hydrocortisone sod succ (PF) injection 250 mg  250 mg Intravenous Once Chelsie Man, NP   250 mg at 04/19/24 1256    And    [COMPLETED] LORazepam injection 1 mg  1 mg Intravenous Once Chelsie Man, NP   1 mg at 04/19/24 1259    enoxaparin injection 40 mg  40 mg Subcutaneous Daily Chelsie Man, NP   40 mg at 04/18/24 1748    EPINEPHrine (EPIPEN) 0.3 mg/0.3 mL pen injection 0.3 mg  0.3 mg Intramuscular Once PRN Ady Evans MD        [START ON 4/25/2024] filgrastim (NEUPOGEN) injection 480 mcg/1.6 ml  480 mcg Subcutaneous Daily Ady Evans MD        finasteride tablet 5 mg  5 mg Oral Daily Chelsie Man, NP   5 mg at 04/19/24 0839    fluconazole tablet 400 mg  400 mg Oral Daily Ady Evans MD   400 mg at 04/19/24 0839    glucagon (human recombinant) injection 1 mg  1 mg Intramuscular PRN Chelsie Man, NP        glucose chewable tablet 16 g  16 g Oral PRN Chelsie Man NP        glucose chewable tablet 24 g  24 g Oral PRN Chelsie Man, NP        heparin (porcine) injection 1,600 Units  1,600 Units Intravenous PRN Ady Evans MD        hydrALAZINE tablet 50 mg  50 mg Oral Q8H Chelsie Man, NP   50 mg at 04/19/24 0602    hydrocortisone sodium succinate injection 100 mg  100 mg Intravenous Once PRN Ady Evans MD        hydrocortisone sodium succinate injection 100 mg  100 mg Intravenous Once PRN Chelsie Man, NP        insulin aspart U-100 pen 0-10 Units  0-10 Units Subcutaneous QID (AC + HS) PRN Chelsie Man NP   3 Units at 04/18/24 2109    k phos di & mono-sod phos mono 250 mg tablet 1 tablet  1 tablet Oral Q4H PRN Chelsie Man, NP   1 tablet at 04/18/24 1748    k phos di & mono-sod phos mono 250 mg tablet 2 tablet  2 tablet Oral Q4H PRN Chelsie Man, NP         levoFLOXacin tablet 500 mg  500 mg Oral Daily Ady Evans MD   500 mg at 04/19/24 0839    levothyroxine tablet 112 mcg  112 mcg Oral QHS Chelsie Man, NP   112 mcg at 04/18/24 2116    LORazepam injection 1 mg  1 mg Intravenous Q6H PRN Ady Evans MD        magnesium oxide tablet 400 mg  400 mg Oral Q4H PRN Chelsie Man, NP   400 mg at 04/17/24 1305    magnesium oxide tablet 400 mg  400 mg Oral Q4H PRN Chelsie Man, NP        magnesium oxide tablet 800 mg  800 mg Oral Q4H PRN Chelsie Man, NP        OLANZapine tablet 5 mg  5 mg Oral BID Ady Evans MD   5 mg at 04/19/24 0838    ondansetron disintegrating tablet 8 mg  8 mg Oral Q8H Ady Evans MD   8 mg at 04/19/24 1256    oxyCODONE immediate release tablet 5 mg  5 mg Oral Q4H PRN Chelsie Man, NORMA        pantoprazole EC tablet 40 mg  40 mg Oral Daily Chelsie Man, NP   40 mg at 04/19/24 0839    potassium chloride SA CR tablet 20 mEq  20 mEq Oral PRN Chelsie Man, NP        potassium chloride SA CR tablet 20 mEq  20 mEq Oral Q2H PRN Chelsie Man, NP        potassium chloride SA CR tablet 20 mEq  20 mEq Oral Q2H PRN Chelsie Man, NORMA        prochlorperazine injection Soln 10 mg  10 mg Intravenous Q6H PRN Ady Evans MD        sodium bicarb-sodium chloride powder 1 Dose  1 Dose Swish & Spit QID Ady Evans MD   1 Dose at 04/19/24 1257    sodium chloride 0.9% flush 10 mL  10 mL Intravenous PRN Ady Evans MD        spironolactone split tablet 12.5 mg  12.5 mg Oral Daily Chelsie Man, NP   12.5 mg at 04/19/24 0838    [START ON 5/18/2024] sulfamethoxazole-trimethoprim 800-160mg per tablet 1 tablet  1 tablet Oral Once per day on Monday Wednesday Friday Ady Evans MD        [DISCONTINUED] 0.9 % NaCl with KCl 20 mEq infusion   Intravenous Continuous Marjan Wang MD 75 mL/hr at 04/19/24 0215 Rate Verify at 04/19/24 0215    [DISCONTINUED] alteplase injection 2 mg  2 mg  Intra-Catheter BID PRN Chelsie Man NP         Current Outpatient Medications on File Prior to Encounter   Medication Sig Dispense Refill    acyclovir (ZOVIRAX) 400 MG tablet Take 1 tablet (400 mg total) by mouth 2 (two) times daily. 60 tablet 11    albuterol (PROVENTIL/VENTOLIN HFA) 90 mcg/actuation inhaler 2 puffs every 4 to 6 hours as needed.      amLODIPine (NORVASC) 10 MG tablet Take 1 tablet (10 mg total) by mouth every morning.      ascorbic acid, vitamin C, (VITAMIN C) 500 MG tablet Take 500 mg by mouth once daily.      atorvastatin (LIPITOR) 10 MG tablet Take 10 mg by mouth every evening.      carvediloL (COREG) 12.5 MG tablet Take 12.5 mg by mouth 2 (two) times daily.      cholecalciferol, vitamin D3, (VITAMIN D3) 125 mcg (5,000 unit) Tab Take 5,000 Units by mouth once daily.      famotidine (PEPCID) 20 MG tablet Take 1 tablet (20 mg total) by mouth every morning. 30 tablet 11    ferrous sulfate 325 (65 FE) MG EC tablet Take 325 mg by mouth once daily.      finasteride (PROSCAR) 5 mg tablet Take 5 mg by mouth every morning.      hydrALAZINE (APRESOLINE) 50 MG tablet Take 1 tablet (50 mg total) by mouth every 8 (eight) hours. 90 tablet 11    Lactobacillus rhamnosus GG (CULTURELLE) 10 billion cell capsule Take 1 capsule by mouth once daily.      lenalidomide (REVLIMID) 10 mg Cap Take 10 mg by mouth once daily. 21 each 0    levothyroxine (SYNTHROID) 112 MCG tablet Take 112 mcg by mouth every evening.      metFORMIN (GLUCOPHAGE-XR) 500 MG ER 24hr tablet Take 1,000 mg by mouth 2 (two) times daily.      nitroGLYCERIN (NITROSTAT) 0.4 MG SL tablet place one tablet under the tongue every five minutes as needed for chest pain up to 3 doses. if no relief call 911      ondansetron (ZOFRAN) 8 MG tablet Take 1 tablet (8 mg total) by mouth every 8 (eight) hours as needed for Nausea. 30 tablet 2    ONETOUCH ULTRA TEST Strp USE TO TEST BLOOD GLUCOSE TWICE DAILY      oxyCODONE (ROXICODONE) 5 MG immediate release  tablet Take 1 tablet (5 mg total) by mouth every 4 (four) hours as needed for Pain. 8 tablet 0    pantoprazole (PROTONIX) 40 MG tablet Take 1 tablet (40 mg total) by mouth 2 (two) times daily before meals. 60 tablet 11    ranolazine (RANEXA) 500 MG Tb12 Take 500 mg by mouth 2 (two) times daily.      spironolactone (ALDACTONE) 25 MG tablet Take 12.5 mg by mouth every morning.      zinc gluconate 50 mg tablet Take 50 mg by mouth once daily.           Review of patient's allergies indicates:   Allergen Reactions    Iodine Anaphylaxis    Shellfish containing products     Poison ivy extract     Amoxicillin-pot clavulanate Itching       Anesthesia: None     Technical Procedures Used: Hematopoietic Progenitor Cell collection: The patient presented to the 5th floor Chemotherapy unit, details about the procedure reviewed. Any interim clinical changes from previous clinic visit - No. All pertinent labs reviewed. Human Progenitor (Stem) Cell Collection initiated by Apheresis Nurse. Current plan is to perform the procedure for 6 hours. Initial laboratory tests collected, results to Oncology Nurse. Mid-run laboratory tests collected, results to Oncology Nurse. Included CD34 count on collection bag - results to Stem Cell Lab and BMT clinical team. Final laboratory tests collected, results to Oncology Nurse. Red blood cell or platelet transfusion - No.  Date of next procedure NA.    Description of the Findings of the Procedure:     Please see Apheresis Nurse flowsheet for details.    The patient was evaluated and all clinical and laboratory data relevant to the treatment was reviewed, and a decision was made to proceed with the Apheresis procedure.    I was available to the clinical staff throughout the procedure.    Significant Surgical Tasks Conducted by the Assistant(s): Not applicable    Complications: None    Estimated Blood Loss (EBL): None    Implants: None     Specimens: None

## 2024-04-09 NOTE — PLAN OF CARE
0800 Pt here for possible stem cell collection, Labs drawn by apheresis nurse, Neupogen injection given. Pt aware of plan of care, verbalized understanding. Will continue to monitor for safety

## 2024-04-09 NOTE — PROGRESS NOTES
Hematopoietic Stem Cell Collection:  Patient ambulates independently onto unit escorted by Chemo nurse Mcbride, RN. No expression of pain and no resp distress. 0750 CD34 lab collected and walked to designated lab department after line accessed and de-accessed per policy. Vitals stable awaiting cd34 results and ok from stem cell doctor to start collection.    Collection started 1005 per Dr. Barnett without complications, patient stable. Interface reached at 1031. Mid labs obtained at 1228 and mid sample obtained 1430, collect volume 264 mls when sample obtained, and sent to designated lab units for processing. Patient remains stable and calm during collection.  1613 collection in rinse back 1628 collection completed labs collected and line de-accessed per policy new dressing applied with sterile technique. Per Stem Cell doctor no additional collection needed. Patient stable and chemo nurse Mcbride, RN will discharge patient.

## 2024-04-09 NOTE — PLAN OF CARE
1720 Pt tolerated collection well with apheresis nurse. No electrolyte replacements needed. Pt discharged from unit, ambulatory with family, NAD

## 2024-04-16 ENCOUNTER — OFFICE VISIT (OUTPATIENT)
Dept: HEMATOLOGY/ONCOLOGY | Facility: CLINIC | Age: 74
DRG: 016 | End: 2024-04-16
Payer: MEDICARE

## 2024-04-16 ENCOUNTER — HOSPITAL ENCOUNTER (INPATIENT)
Facility: HOSPITAL | Age: 74
LOS: 25 days | Discharge: HOME | DRG: 016 | End: 2024-05-11
Attending: INTERNAL MEDICINE | Admitting: INTERNAL MEDICINE
Payer: MEDICARE

## 2024-04-16 ENCOUNTER — INFUSION (OUTPATIENT)
Dept: INFUSION THERAPY | Facility: HOSPITAL | Age: 74
End: 2024-04-16
Attending: FAMILY MEDICINE
Payer: MEDICARE

## 2024-04-16 VITALS
RESPIRATION RATE: 16 BRPM | SYSTOLIC BLOOD PRESSURE: 150 MMHG | BODY MASS INDEX: 30.02 KG/M2 | TEMPERATURE: 97 F | HEIGHT: 70 IN | HEART RATE: 55 BPM | OXYGEN SATURATION: 97 % | WEIGHT: 209.69 LBS | DIASTOLIC BLOOD PRESSURE: 83 MMHG

## 2024-04-16 DIAGNOSIS — C90.01 MULTIPLE MYELOMA IN REMISSION: Primary | ICD-10-CM

## 2024-04-16 DIAGNOSIS — E11.9 TYPE 2 DIABETES MELLITUS WITHOUT COMPLICATION, WITH LONG-TERM CURRENT USE OF INSULIN: ICD-10-CM

## 2024-04-16 DIAGNOSIS — D70.9 NEUTROPENIC FEVER: ICD-10-CM

## 2024-04-16 DIAGNOSIS — C90.01 MULTIPLE MYELOMA IN REMISSION: ICD-10-CM

## 2024-04-16 DIAGNOSIS — I10 PRIMARY HYPERTENSION: ICD-10-CM

## 2024-04-16 DIAGNOSIS — Z94.84 HISTORY OF AUTOLOGOUS STEM CELL TRANSPLANT: ICD-10-CM

## 2024-04-16 DIAGNOSIS — R50.81 NEUTROPENIC FEVER: ICD-10-CM

## 2024-04-16 DIAGNOSIS — Z76.82 STEM CELL TRANSPLANT CANDIDATE: ICD-10-CM

## 2024-04-16 DIAGNOSIS — Z79.4 TYPE 2 DIABETES MELLITUS WITHOUT COMPLICATION, WITH LONG-TERM CURRENT USE OF INSULIN: ICD-10-CM

## 2024-04-16 DIAGNOSIS — K21.9 GASTROESOPHAGEAL REFLUX DISEASE WITHOUT ESOPHAGITIS: ICD-10-CM

## 2024-04-16 DIAGNOSIS — Z76.82 STEM CELL TRANSPLANT CANDIDATE: Primary | ICD-10-CM

## 2024-04-16 DIAGNOSIS — R78.81 BACTEREMIA DUE TO GRAM-POSITIVE BACTERIA: ICD-10-CM

## 2024-04-16 PROBLEM — N40.0 BPH (BENIGN PROSTATIC HYPERPLASIA): Status: ACTIVE | Noted: 2024-04-16

## 2024-04-16 PROBLEM — R53.1 WEAKNESS: Status: RESOLVED | Noted: 2023-06-07 | Resolved: 2024-04-16

## 2024-04-16 PROBLEM — D63.0 ANEMIA IN NEOPLASTIC DISEASE: Status: ACTIVE | Noted: 2024-04-16

## 2024-04-16 PROBLEM — E80.6 HYPERBILIRUBINEMIA: Status: ACTIVE | Noted: 2024-04-16

## 2024-04-16 PROBLEM — E83.52 HYPERCALCEMIA: Status: RESOLVED | Noted: 2023-06-07 | Resolved: 2024-04-16

## 2024-04-16 PROBLEM — R41.0 CONFUSION: Status: RESOLVED | Noted: 2023-06-07 | Resolved: 2024-04-16

## 2024-04-16 PROBLEM — E88.09 HYPOALBUMINEMIA: Status: RESOLVED | Noted: 2023-06-07 | Resolved: 2024-04-16

## 2024-04-16 PROBLEM — G89.3 CANCER RELATED PAIN: Status: ACTIVE | Noted: 2024-04-16

## 2024-04-16 LAB
ABO + RH BLD: NORMAL
ALBUMIN SERPL BCP-MCNC: 3.8 G/DL (ref 3.5–5.2)
ALP SERPL-CCNC: 114 U/L (ref 55–135)
ALT SERPL W/O P-5'-P-CCNC: 13 U/L (ref 10–44)
ANION GAP SERPL CALC-SCNC: 8 MMOL/L (ref 8–16)
AST SERPL-CCNC: 10 U/L (ref 10–40)
BASOPHILS # BLD AUTO: 0.01 K/UL (ref 0–0.2)
BASOPHILS NFR BLD: 0.1 % (ref 0–1.9)
BILIRUB SERPL-MCNC: 1.7 MG/DL (ref 0.1–1)
BLD GP AB SCN CELLS X3 SERPL QL: NORMAL
BUN SERPL-MCNC: 32 MG/DL (ref 8–23)
CALCIUM SERPL-MCNC: 9.6 MG/DL (ref 8.7–10.5)
CHLORIDE SERPL-SCNC: 105 MMOL/L (ref 95–110)
CO2 SERPL-SCNC: 24 MMOL/L (ref 23–29)
CREAT SERPL-MCNC: 1.4 MG/DL (ref 0.5–1.4)
DIFFERENTIAL METHOD BLD: ABNORMAL
EOSINOPHIL # BLD AUTO: 0.2 K/UL (ref 0–0.5)
EOSINOPHIL NFR BLD: 2.6 % (ref 0–8)
ERYTHROCYTE [DISTWIDTH] IN BLOOD BY AUTOMATED COUNT: 12.7 % (ref 11.5–14.5)
EST. GFR  (NO RACE VARIABLE): 52.7 ML/MIN/1.73 M^2
GLUCOSE SERPL-MCNC: 123 MG/DL (ref 70–110)
HCT VFR BLD AUTO: 35.7 % (ref 40–54)
HGB BLD-MCNC: 12.4 G/DL (ref 14–18)
IMM GRANULOCYTES # BLD AUTO: 0.04 K/UL (ref 0–0.04)
IMM GRANULOCYTES NFR BLD AUTO: 0.5 % (ref 0–0.5)
LYMPHOCYTES # BLD AUTO: 1.5 K/UL (ref 1–4.8)
LYMPHOCYTES NFR BLD: 20.5 % (ref 18–48)
MCH RBC QN AUTO: 35.6 PG (ref 27–31)
MCHC RBC AUTO-ENTMCNC: 34.7 G/DL (ref 32–36)
MCV RBC AUTO: 103 FL (ref 82–98)
MONOCYTES # BLD AUTO: 0.6 K/UL (ref 0.3–1)
MONOCYTES NFR BLD: 7.4 % (ref 4–15)
NEUTROPHILS # BLD AUTO: 5.1 K/UL (ref 1.8–7.7)
NEUTROPHILS NFR BLD: 68.9 % (ref 38–73)
NRBC BLD-RTO: 0 /100 WBC
PLATELET # BLD AUTO: 147 K/UL (ref 150–450)
PMV BLD AUTO: 9.2 FL (ref 9.2–12.9)
POTASSIUM SERPL-SCNC: 4.4 MMOL/L (ref 3.5–5.1)
PROT SERPL-MCNC: 6.4 G/DL (ref 6–8.4)
RBC # BLD AUTO: 3.48 M/UL (ref 4.6–6.2)
SODIUM SERPL-SCNC: 137 MMOL/L (ref 136–145)
SPECIMEN OUTDATE: NORMAL
WBC # BLD AUTO: 7.45 K/UL (ref 3.9–12.7)

## 2024-04-16 PROCEDURE — 99214 OFFICE O/P EST MOD 30 MIN: CPT | Mod: S$PBB,,, | Performed by: INTERNAL MEDICINE

## 2024-04-16 PROCEDURE — 25000003 PHARM REV CODE 250: Performed by: INTERNAL MEDICINE

## 2024-04-16 PROCEDURE — 86901 BLOOD TYPING SEROLOGIC RH(D): CPT | Performed by: INTERNAL MEDICINE

## 2024-04-16 PROCEDURE — 63600175 PHARM REV CODE 636 W HCPCS: Performed by: INTERNAL MEDICINE

## 2024-04-16 PROCEDURE — 99213 OFFICE O/P EST LOW 20 MIN: CPT | Mod: PBBFAC,25 | Performed by: INTERNAL MEDICINE

## 2024-04-16 PROCEDURE — 85025 COMPLETE CBC W/AUTO DIFF WBC: CPT | Performed by: INTERNAL MEDICINE

## 2024-04-16 PROCEDURE — 36592 COLLECT BLOOD FROM PICC: CPT

## 2024-04-16 PROCEDURE — 63600175 PHARM REV CODE 636 W HCPCS: Performed by: NURSE PRACTITIONER

## 2024-04-16 PROCEDURE — 20600001 HC STEP DOWN PRIVATE ROOM

## 2024-04-16 PROCEDURE — 99999 PR PBB SHADOW E&M-EST. PATIENT-LVL III: CPT | Mod: PBBFAC,,, | Performed by: INTERNAL MEDICINE

## 2024-04-16 PROCEDURE — 25000003 PHARM REV CODE 250: Performed by: NURSE PRACTITIONER

## 2024-04-16 PROCEDURE — 80053 COMPREHEN METABOLIC PANEL: CPT | Performed by: INTERNAL MEDICINE

## 2024-04-16 PROCEDURE — A4216 STERILE WATER/SALINE, 10 ML: HCPCS | Performed by: INTERNAL MEDICINE

## 2024-04-16 RX ORDER — HEPARIN SODIUM 1000 [USP'U]/ML
1600 INJECTION, SOLUTION INTRAVENOUS; SUBCUTANEOUS
Status: CANCELLED | OUTPATIENT
Start: 2024-04-16

## 2024-04-16 RX ORDER — SODIUM CHLORIDE AND POTASSIUM CHLORIDE 150; 900 MG/100ML; MG/100ML
INJECTION, SOLUTION INTRAVENOUS CONTINUOUS
Status: CANCELLED | OUTPATIENT
Start: 2024-04-16

## 2024-04-16 RX ORDER — LANOLIN ALCOHOL/MO/W.PET/CERES
400 CREAM (GRAM) TOPICAL EVERY 4 HOURS PRN
Status: DISCONTINUED | OUTPATIENT
Start: 2024-04-16 | End: 2024-05-11 | Stop reason: HOSPADM

## 2024-04-16 RX ORDER — EPINEPHRINE 0.3 MG/.3ML
0.3 INJECTION SUBCUTANEOUS ONCE AS NEEDED
Status: DISCONTINUED | OUTPATIENT
Start: 2024-04-16 | End: 2024-04-23

## 2024-04-16 RX ORDER — LORAZEPAM 2 MG/ML
1 INJECTION INTRAMUSCULAR EVERY 6 HOURS PRN
Status: DISCONTINUED | OUTPATIENT
Start: 2024-04-16 | End: 2024-05-11 | Stop reason: HOSPADM

## 2024-04-16 RX ORDER — ACYCLOVIR 200 MG/1
800 CAPSULE ORAL 2 TIMES DAILY
Status: DISCONTINUED | OUTPATIENT
Start: 2024-04-17 | End: 2024-05-11 | Stop reason: HOSPADM

## 2024-04-16 RX ORDER — HEPARIN SODIUM 1000 [USP'U]/ML
1000 INJECTION, SOLUTION INTRAVENOUS; SUBCUTANEOUS ONCE
Status: COMPLETED | OUTPATIENT
Start: 2024-04-16 | End: 2024-04-16

## 2024-04-16 RX ORDER — SODIUM CHLORIDE 9 MG/ML
INJECTION, SOLUTION INTRAVENOUS CONTINUOUS
Status: ACTIVE | OUTPATIENT
Start: 2024-04-16 | End: 2024-04-17

## 2024-04-16 RX ORDER — OLANZAPINE 5 MG/1
5 TABLET ORAL 2 TIMES DAILY
Status: CANCELLED | OUTPATIENT
Start: 2024-04-16

## 2024-04-16 RX ORDER — LEVOTHYROXINE SODIUM 112 UG/1
112 TABLET ORAL NIGHTLY
Status: DISCONTINUED | OUTPATIENT
Start: 2024-04-16 | End: 2024-05-11 | Stop reason: HOSPADM

## 2024-04-16 RX ORDER — DIPHENHYDRAMINE HCL 25 MG
25 CAPSULE ORAL
Status: DISCONTINUED | OUTPATIENT
Start: 2024-04-16 | End: 2024-05-11 | Stop reason: HOSPADM

## 2024-04-16 RX ORDER — SODIUM CHLORIDE 0.9 % (FLUSH) 0.9 %
10 SYRINGE (ML) INJECTION
Status: CANCELLED | OUTPATIENT
Start: 2024-04-16

## 2024-04-16 RX ORDER — HEPARIN SODIUM 1000 [USP'U]/ML
1600 INJECTION, SOLUTION INTRAVENOUS; SUBCUTANEOUS
Status: DISCONTINUED | OUTPATIENT
Start: 2024-04-16 | End: 2024-05-11 | Stop reason: HOSPADM

## 2024-04-16 RX ORDER — PROCHLORPERAZINE EDISYLATE 5 MG/ML
10 INJECTION INTRAMUSCULAR; INTRAVENOUS EVERY 6 HOURS PRN
Status: DISCONTINUED | OUTPATIENT
Start: 2024-04-16 | End: 2024-04-24

## 2024-04-16 RX ORDER — FLUCONAZOLE 200 MG/1
400 TABLET ORAL DAILY
Status: CANCELLED | OUTPATIENT
Start: 2024-04-17

## 2024-04-16 RX ORDER — EPINEPHRINE 0.3 MG/.3ML
0.3 INJECTION SUBCUTANEOUS ONCE AS NEEDED
Status: CANCELLED | OUTPATIENT
Start: 2024-04-16

## 2024-04-16 RX ORDER — ONDANSETRON 8 MG/1
8 TABLET, ORALLY DISINTEGRATING ORAL
Status: CANCELLED | OUTPATIENT
Start: 2024-04-17

## 2024-04-16 RX ORDER — IBUPROFEN 200 MG
24 TABLET ORAL
Status: DISCONTINUED | OUTPATIENT
Start: 2024-04-16 | End: 2024-04-24

## 2024-04-16 RX ORDER — SODIUM CHLORIDE 0.9 % (FLUSH) 0.9 %
10 SYRINGE (ML) INJECTION
Status: DISCONTINUED | OUTPATIENT
Start: 2024-04-16 | End: 2024-05-11 | Stop reason: HOSPADM

## 2024-04-16 RX ORDER — IBUPROFEN 200 MG
16 TABLET ORAL
Status: DISCONTINUED | OUTPATIENT
Start: 2024-04-16 | End: 2024-04-24

## 2024-04-16 RX ORDER — SULFAMETHOXAZOLE AND TRIMETHOPRIM 800; 160 MG/1; MG/1
1 TABLET ORAL
Status: DISCONTINUED | OUTPATIENT
Start: 2024-05-18 | End: 2024-05-11 | Stop reason: HOSPADM

## 2024-04-16 RX ORDER — HYDRALAZINE HYDROCHLORIDE 50 MG/1
50 TABLET, FILM COATED ORAL EVERY 8 HOURS
Status: DISCONTINUED | OUTPATIENT
Start: 2024-04-16 | End: 2024-05-11 | Stop reason: HOSPADM

## 2024-04-16 RX ORDER — DIPHENHYDRAMINE HYDROCHLORIDE 50 MG/ML
50 INJECTION INTRAMUSCULAR; INTRAVENOUS ONCE AS NEEDED
Status: DISCONTINUED | OUTPATIENT
Start: 2024-04-16 | End: 2024-04-23

## 2024-04-16 RX ORDER — FINASTERIDE 5 MG/1
5 TABLET, FILM COATED ORAL DAILY
Status: DISCONTINUED | OUTPATIENT
Start: 2024-04-17 | End: 2024-05-11 | Stop reason: HOSPADM

## 2024-04-16 RX ORDER — PANTOPRAZOLE SODIUM 40 MG/1
40 TABLET, DELAYED RELEASE ORAL DAILY
Status: DISCONTINUED | OUTPATIENT
Start: 2024-04-17 | End: 2024-05-11 | Stop reason: HOSPADM

## 2024-04-16 RX ORDER — SODIUM CHLORIDE AND POTASSIUM CHLORIDE 150; 900 MG/100ML; MG/100ML
INJECTION, SOLUTION INTRAVENOUS CONTINUOUS
Status: DISCONTINUED | OUTPATIENT
Start: 2024-04-16 | End: 2024-04-19

## 2024-04-16 RX ORDER — ENOXAPARIN SODIUM 100 MG/ML
40 INJECTION SUBCUTANEOUS EVERY 24 HOURS
Status: DISCONTINUED | OUTPATIENT
Start: 2024-04-16 | End: 2024-04-24

## 2024-04-16 RX ORDER — DIPHENHYDRAMINE HYDROCHLORIDE 50 MG/ML
50 INJECTION INTRAMUSCULAR; INTRAVENOUS ONCE AS NEEDED
Status: CANCELLED | OUTPATIENT
Start: 2024-04-16

## 2024-04-16 RX ORDER — GLUCAGON 1 MG
1 KIT INJECTION
Status: DISCONTINUED | OUTPATIENT
Start: 2024-04-16 | End: 2024-04-24

## 2024-04-16 RX ORDER — POTASSIUM CHLORIDE 20 MEQ/1
20 TABLET, EXTENDED RELEASE ORAL
Status: DISCONTINUED | OUTPATIENT
Start: 2024-04-16 | End: 2024-05-11 | Stop reason: HOSPADM

## 2024-04-16 RX ORDER — OLANZAPINE 5 MG/1
5 TABLET ORAL 2 TIMES DAILY
Status: COMPLETED | OUTPATIENT
Start: 2024-04-16 | End: 2024-04-19

## 2024-04-16 RX ORDER — OXYCODONE HYDROCHLORIDE 5 MG/1
5 TABLET ORAL EVERY 4 HOURS PRN
Status: DISCONTINUED | OUTPATIENT
Start: 2024-04-16 | End: 2024-05-11 | Stop reason: HOSPADM

## 2024-04-16 RX ORDER — INSULIN ASPART 100 [IU]/ML
0-10 INJECTION, SOLUTION INTRAVENOUS; SUBCUTANEOUS
Status: DISCONTINUED | OUTPATIENT
Start: 2024-04-16 | End: 2024-04-24

## 2024-04-16 RX ORDER — ACYCLOVIR 200 MG/1
800 CAPSULE ORAL 2 TIMES DAILY
Status: CANCELLED | OUTPATIENT
Start: 2024-04-17

## 2024-04-16 RX ORDER — ALUMINUM HYDROXIDE, MAGNESIUM HYDROXIDE, AND SIMETHICONE 1200; 120; 1200 MG/30ML; MG/30ML; MG/30ML
30 SUSPENSION ORAL EVERY 6 HOURS PRN
Status: DISCONTINUED | OUTPATIENT
Start: 2024-04-16 | End: 2024-05-11 | Stop reason: HOSPADM

## 2024-04-16 RX ORDER — AMLODIPINE BESYLATE 10 MG/1
10 TABLET ORAL DAILY
Status: DISCONTINUED | OUTPATIENT
Start: 2024-04-17 | End: 2024-05-11 | Stop reason: HOSPADM

## 2024-04-16 RX ORDER — CARVEDILOL 12.5 MG/1
12.5 TABLET ORAL 2 TIMES DAILY
Status: DISCONTINUED | OUTPATIENT
Start: 2024-04-16 | End: 2024-04-24

## 2024-04-16 RX ORDER — DEXAMETHASONE 4 MG/1
12 TABLET ORAL
Status: CANCELLED | OUTPATIENT
Start: 2024-04-17

## 2024-04-16 RX ORDER — LEVOFLOXACIN 500 MG/1
500 TABLET, FILM COATED ORAL DAILY
Status: DISCONTINUED | OUTPATIENT
Start: 2024-04-17 | End: 2024-04-24

## 2024-04-16 RX ORDER — ONDANSETRON 8 MG/1
8 TABLET, ORALLY DISINTEGRATING ORAL
Status: COMPLETED | OUTPATIENT
Start: 2024-04-17 | End: 2024-04-19

## 2024-04-16 RX ORDER — LEVOFLOXACIN 500 MG/1
500 TABLET, FILM COATED ORAL DAILY
Status: CANCELLED | OUTPATIENT
Start: 2024-04-17

## 2024-04-16 RX ORDER — DEXAMETHASONE 4 MG/1
12 TABLET ORAL
Status: COMPLETED | OUTPATIENT
Start: 2024-04-17 | End: 2024-04-17

## 2024-04-16 RX ORDER — FLUCONAZOLE 200 MG/1
400 TABLET ORAL DAILY
Status: DISCONTINUED | OUTPATIENT
Start: 2024-04-17 | End: 2024-05-11 | Stop reason: HOSPADM

## 2024-04-16 RX ORDER — ALBUTEROL SULFATE 90 UG/1
2 AEROSOL, METERED RESPIRATORY (INHALATION) EVERY 4 HOURS PRN
Status: DISCONTINUED | OUTPATIENT
Start: 2024-04-16 | End: 2024-05-11 | Stop reason: HOSPADM

## 2024-04-16 RX ORDER — SULFAMETHOXAZOLE AND TRIMETHOPRIM 800; 160 MG/1; MG/1
1 TABLET ORAL
Status: CANCELLED | OUTPATIENT
Start: 2024-05-18

## 2024-04-16 RX ORDER — LANOLIN ALCOHOL/MO/W.PET/CERES
800 CREAM (GRAM) TOPICAL EVERY 4 HOURS PRN
Status: DISCONTINUED | OUTPATIENT
Start: 2024-04-16 | End: 2024-05-11 | Stop reason: HOSPADM

## 2024-04-16 RX ORDER — PROCHLORPERAZINE EDISYLATE 5 MG/ML
10 INJECTION INTRAMUSCULAR; INTRAVENOUS EVERY 6 HOURS PRN
Status: CANCELLED | OUTPATIENT
Start: 2024-04-16

## 2024-04-16 RX ORDER — SODIUM CHLORIDE 0.9 % (FLUSH) 0.9 %
10 SYRINGE (ML) INJECTION
Status: DISCONTINUED | OUTPATIENT
Start: 2024-04-16 | End: 2024-04-16 | Stop reason: HOSPADM

## 2024-04-16 RX ADMIN — OLANZAPINE 5 MG: 5 TABLET, FILM COATED ORAL at 10:04

## 2024-04-16 RX ADMIN — Medication 1 DOSE: at 10:04

## 2024-04-16 RX ADMIN — LEVOTHYROXINE SODIUM 112 MCG: 112 TABLET ORAL at 09:04

## 2024-04-16 RX ADMIN — HEPARIN SODIUM 1000 UNITS: 1000 INJECTION, SOLUTION INTRAVENOUS; SUBCUTANEOUS at 08:04

## 2024-04-16 RX ADMIN — SODIUM CHLORIDE, PRESERVATIVE FREE 10 ML: 5 INJECTION INTRAVENOUS at 08:04

## 2024-04-16 RX ADMIN — ENOXAPARIN SODIUM 40 MG: 40 INJECTION SUBCUTANEOUS at 09:04

## 2024-04-16 RX ADMIN — SODIUM CHLORIDE: 9 INJECTION, SOLUTION INTRAVENOUS at 09:04

## 2024-04-16 RX ADMIN — SODIUM CHLORIDE AND POTASSIUM CHLORIDE: .9; .15 SOLUTION INTRAVENOUS at 10:04

## 2024-04-16 NOTE — PROGRESS NOTES
STEM CELL TRANSPLANT CONSENT  NOTE      Multiple Myeloma IgA kappa    Present treatment:  Daratumumab, lenalidomide, and dexamethasone initiated 7/12/23      Oncology History ( copied from EMR)    Imaging:  CT C 6/7/2023: 1. There is a small left sided pleural effusion.2. A small patchy area of ground glass density is seen in the lateral basal segment of the left lower lobe. This could reflect an acute focal infiltrate / pneumonitis. 3. There is diffuse osteopenia along the visualised bony structures together with multiple, numerous, small round-ovoid lucent lesion of varying sizes involving the marrow. These changes could reflect metabolic bone disease like hyperparathyroidism. Correlate clinically and with laboratory findings, as regards additional evaluation and follow-up.  6/8/23 Bone Scan Whole Body:  1. Scattered activity at the anterior left right rib margins as described.  A CT exam on the previous day reveals no definite fracture.  There is mild heterogeneous and bony loss at the anterior left and right ribs.  This is not have the typical pattern of a metastases.  2. Focal increased activity at the anterior manubrium which again on the CT exam demonstrates osteopenia and heterogeneous bony loss as well as scattered subcentimeter small lytic defects. 3. Suspect mild arthritic changes at the shoulders sternoclavicular joints  6/9/23 MRI Thoracic Spine: Within limitations, no convincing evidence of acute thoracic spine abnormality, high-grade canal stenosis, or focal cord pathology. Multiple scattered vertebral body lesions are suspected and could represent metastatic disease, otherwise of incomplete characterization by non-contrast protocol. Incidental findings of the partially visualized thoracic cavity and retroperitoneum discussed above, poorly assessed by modality/protocol.  Recent non-contrast chest CT provides overall better visualization.  6/10/23 CT Head: No acute intracranial abnormality.  Findings  consistent with microangiopathy no interval change.  6/12/23 XRAY Chest: 1. Patchy hazy opacities again evident at the lower lungs bilaterally suspicious for infiltrate and atelectasis.  Small bibasilar pleural reactions cannot be excluded. 2. Borderline cardiomegaly 3. Atherosclerosis  6/16/23 XRAY Chest: Improved aeration of the lung bases with mild residual bibasilar opacities and small pleural effusions suspected.      Pathology:  6/12/23 Bone marrow aspiration/Biopsy:   PLASMA CELL MYELOMA, KAPPA RESTRICTED.     PLASMA CELL MYELOMA INVOLVES 90% OF BONE MARROW CELLULARITY WITH SMALL AREAS OF  SEQUENTIAL TRILINEAGE HEMATOPOIESIS.    ABSENT IRON STORES.     PERIPHERAL BLOOD: NORMOCYTIC NORMOCHROMIC ANEMIA. THROMBOCYTOPENIA.     LABS:  6/8/23 M-spike 3.33, IgA >7040, serum kappa 5/ lambda 0.56/ K:L ratio 9.09, 24 hour urine for M protein done but not resulted. Calcium 13.4/Alb 1.9, Cr 2.3, globulin 10.7  7/12/23 M-spike 2.49. IgA >7040, Corrected calcium 12.3, Cr 2.09, Globulin 7.6  8/15/23 M-spike 1.49, IgA 4000  9/18/23 M-spike 1.0, IgA 1700    , 73, is here for follow up visit. He is here prior to planned admission for reduced intensity melphalan conditioning ASCT for multiple myeloma . He has HTn, GERD, type 2 DM not on long term insulin.   He has IgA kappa multiple myeloma, diagnosed in June 2023.  He was  treated with DRd from 7/18/23.          Review of patient's allergies indicates:   Allergen Reactions    Iodine Anaphylaxis    Shellfish containing products     Poison ivy extract     Amoxicillin-pot clavulanate Itching      Current Outpatient Medications on File Prior to Visit   Medication Sig Dispense Refill    acyclovir (ZOVIRAX) 400 MG tablet Take 1 tablet (400 mg total) by mouth 2 (two) times daily. 60 tablet 11    albuterol (PROVENTIL/VENTOLIN HFA) 90 mcg/actuation inhaler 2 puffs every 4 to 6 hours as needed.      ALPRAZolam (XANAX) 0.5 MG tablet Take 0.5 mg by mouth daily as needed.       amLODIPine (NORVASC) 10 MG tablet Take 1 tablet (10 mg total) by mouth every morning.      ascorbic acid, vitamin C, (VITAMIN C) 500 MG tablet Take 500 mg by mouth once daily.      atorvastatin (LIPITOR) 10 MG tablet Take 10 mg by mouth every evening.      carvediloL (COREG) 12.5 MG tablet Take 12.5 mg by mouth 2 (two) times daily.      cholecalciferol, vitamin D3, (VITAMIN D3) 125 mcg (5,000 unit) Tab Take 5,000 Units by mouth once daily.      cinnamon bark (CINNAMON) 500 mg capsule Take 2,000 mg by mouth once daily.      cloNIDine (CATAPRES) 0.2 MG tablet Take 0.2 mg by mouth daily as needed.      famotidine (PEPCID) 20 MG tablet Take 1 tablet (20 mg total) by mouth every morning. 30 tablet 11    ferrous sulfate 325 (65 FE) MG EC tablet Take 325 mg by mouth once daily.      finasteride (PROSCAR) 5 mg tablet Take 5 mg by mouth every morning.      gabapentin (NEURONTIN) 100 MG capsule 100 mg daily as needed.      hydrALAZINE (APRESOLINE) 50 MG tablet Take 1 tablet (50 mg total) by mouth every 8 (eight) hours. 90 tablet 11    Lactobacillus rhamnosus GG (CULTURELLE) 10 billion cell capsule Take 1 capsule by mouth once daily.      lenalidomide (REVLIMID) 10 mg Cap Take 10 mg by mouth once daily. (Patient not taking: Reported on 3/25/2024.) 21 each 0    levothyroxine (SYNTHROID) 112 MCG tablet Take 112 mcg by mouth every evening.      metFORMIN (GLUCOPHAGE-XR) 500 MG ER 24hr tablet Take 1,000 mg by mouth 2 (two) times daily.      nitroGLYCERIN (NITROSTAT) 0.4 MG SL tablet place one tablet under the tongue every five minutes as needed for chest pain up to 3 doses. if no relief call 911      ondansetron (ZOFRAN) 8 MG tablet Take 1 tablet (8 mg total) by mouth every 8 (eight) hours as needed for Nausea. 30 tablet 2    ONETOUCH ULTRA TEST Strp USE TO TEST BLOOD GLUCOSE TWICE DAILY      oxyCODONE (ROXICODONE) 5 MG immediate release tablet Take 1 tablet (5 mg total) by mouth every 4 (four) hours as needed for Pain. 8 tablet 0     pantoprazole (PROTONIX) 40 MG tablet Take 1 tablet (40 mg total) by mouth 2 (two) times daily before meals. 60 tablet 11    ranolazine (RANEXA) 500 MG Tb12 Take 500 mg by mouth 2 (two) times daily.      saw palmetto 450 mg Cap Take 1 capsule by mouth every evening.      spironolactone (ALDACTONE) 25 MG tablet Take 12.5 mg by mouth every morning.      zinc gluconate 50 mg tablet Take 50 mg by mouth once daily.       No current facility-administered medications on file prior to visit.      Review of Systems   Constitutional:  Negative for appetite change, chills, diaphoresis, fever and unexpected weight change.   HENT:  Negative for nasal congestion, mouth sores, nosebleeds, sinus pressure/congestion, sore throat and trouble swallowing.    Eyes: Negative.    Respiratory:  Negative for cough and shortness of breath.    Cardiovascular:  Negative for chest pain and palpitations.   Gastrointestinal:  Negative for abdominal distention, abdominal pain, blood in stool, change in bowel habit, constipation, diarrhea, nausea and vomiting.   Endocrine: Negative.    Genitourinary:  Negative for bladder incontinence, decreased urine volume, dysuria, frequency, hematuria and urgency. Difficulty urinating: had a ocampo cath.  Musculoskeletal:  Negative for arthralgias, back pain, gait problem, joint swelling, leg pain and myalgias.   Integumentary:  Negative for rash.   Allergic/Immunologic: Negative.    Neurological:  Negative for dizziness, tremors, syncope, weakness, light-headedness, numbness, headaches and memory loss.   Hematological:  Negative for adenopathy. Does not bruise/bleed easily.   Psychiatric/Behavioral:  Negative for agitation, confusion, hallucinations, sleep disturbance and suicidal ideas. The patient is not nervous/anxious.            Vitals:    04/16/24 1035   BP: (!) 150/83   Pulse: (!) 55   Resp: 16   Temp: 97.4 °F (36.3 °C)         PS : ECOG 1 /KPS 80  Response at transplant: NE  Risk at transplant :  low  HCT CI score 3 ( 2 points for sub optimal DLCO, 1 pt for DM)    Physical Exam  HENT:      Head: Normocephalic.      Mouth/Throat:      Pharynx: No posterior oropharyngeal erythema.   Eyes:      General: No scleral icterus.  Cardiovascular:      Rate and Rhythm: Normal rate.      Pulses: Normal pulses.      Heart sounds: Normal heart sounds.   Pulmonary:      Effort: Pulmonary effort is normal. No respiratory distress.      Breath sounds: No stridor.   Abdominal:      General: There is no distension.      Palpations: There is no mass.      Tenderness: There is no abdominal tenderness.   Lymphadenopathy:      Cervical: No cervical adenopathy.   Skin:     Coloration: Skin is not jaundiced.   Neurological:      General: No focal deficit present.      Mental Status: He is alert and oriented to person, place, and time.      Cranial Nerves: No cranial nerve deficit.      Comments: He has hearing difficulty          Serum:  SPEP: Serum protein electrophoresis shows a distinct monoclonal peak (3.33 g/dL) in the beta zone, consistent with a monoclonal gammopathy.   PAO: A band is present in the IgA trini with a corresponding band in the kappa trini.   The immunofixation electrophoresis are consistent with monoclonal gammopathy of IgA-kappa isotype.     IgG Level 540.00 - 1,822.00 mg/dL 165.00 Low     IgA Level 101.0 - 645.0 mg/dL >7,040.0 High     IgM Level 22.0 - 240.0 mg/dL 6.0 Low       Kappa Free Light Chain 0.3300 - 1.94 mg/dL 5.09 High     Lambda Free Light Chain 0.5700 - 2.63 mg/dL 0.5600 Low     Kappa/Lambda FLC Ratio 0.2600 - 1.65 9.09 High      Urine:   24 hrs Urine:  M spike    1467      H    mg/24 h      12/29/23 SPEP         Small abnormality in beta fraction. M-protein Isotype MALDI-TOF MS  IgA kappa, small monoclonal.   Suggest quantitative immunoglobulin level to assist in following monoclonal protein.                 3/11/24 SPEP: Normal total protein. Normal gamma globulins are decreased. Paraprotein peak  in beta-2 = 0.39 g/dL (previously, 0.16 g/dL) and   newly noted 2nd peak in near-gamma <0.1 g/dL (no previous value)       3/11/24 sIFE: Two (2) closely adjacent IgA kappa specific monoclonal protein bands at the beta-2/near-gamma junction are identified.  Component      Latest Ref Rng 3/11/2024   Urine Total Volume      mL 1100    Urine Collection Duration      Hr 24    PROTEIN URINE      0 - 15 mg/dL 24 (H)    Urine Protein, Timed      0 - 100 mg/Spec 264 (H)      3/11/24 UIFE: No monoclonal peaks identified   Component      Latest Ref Rng 3/11/2024   Urine Total Volume      mL 1100    Urine Collection Duration      Hr 24    CREATININE, URINE (SEND OUT)      23.0 - 375.0 mg/dL 110.0    Creatinine Clearance      70 - 110 mL/min 60 (L)    Urine Creatinine Absolute      mg/Spec 1210.0    Creatinine      0.5 - 1.4 mg/dL 1.4       Legend:  (L) Low    3/11/24 PFTs      Spirometry is normal. DLCO is mildly decreased   Adj DLCO 67.3%      3/11/24 EKG    Sinus bradycardia   Otherwise normal ECG       3/11/24 2D ECHO          Left Ventricle: The left ventricle is normal in size. Normal wall thickness. Normal wall motion. There is normal systolic function with a visually estimated ejection fraction of 55 - 60%. Biplane (2D) method of discs ejection fraction is 59%. Global longitudinal strain is -21.0%. There is indeterminate diastolic function.    Right Ventricle: Normal right ventricular cavity size. Wall thickness is normal. Right ventricle wall motion  is normal. Systolic function is normal.    Biatrial enlargement    Tricuspid Valve: There is mild regurgitation.    Pulmonary Artery: The estimated pulmonary artery systolic pressure is 44 mmHg.    IVC/SVC: Normal venous pressure at 3 mmHg.    3/5/24 PET CT    COMPARISON:  FDG PET/CT from 11/09/2023.     FINDINGS:  Quality of the study: Adequate.     In the head and neck, there is a new tracer avid focus centered within left thyroid cartilage, SUV max measures 4 (axial  fused image 85).  CT appearance of thyroid cartilage is unchanged when compared to previous PET.  Could represent myelomatous involvement although other physiologic/degenerative uptake could have a similar appearance.  Attention on follow-up.  No suspicious tracer avid lymphadenopathy.     Diffusely increased thyroid uptake, similar prior, may represent thyroiditis.     In the chest, there are no hypermetabolic lesions worrisome for malignancy.  There are no concerning pulmonary nodules or masses.     Mild bilateral hilar uptake and mild uptake within subcentimeter left axillary lymph nodes, favor reactive etiology.  Attention on follow-up.     In the abdomen and pelvis, there is physiologic tracer distribution within the abdominal organs and excretion into the genitourinary system.     Diffusely increased bowel uptake, likely related to metformin usage.     In the bones, there are multiple tracer avid osseous lesions noting increased conspicuity of previous hypermetabolic rib lesions and few new lesions.  Numerous additional lytic foci without significant increase tracer uptake, similar to prior.  Index lesions as follows:     New hypermetabolic lytic lesion within the right posteromedial 4th rib, SUV max 6.6 (axial fused image 110).     Increased tracer uptake within left anterolateral 5th rib with increased soft tissue component, SUV max 8 (axial fused image 152).  Previous SUV max 4.3.     New tracer avid focus in left proximal femur with associated increased marrow attenuation, SUV max 4.4 (axial fused image 308).     In the extremities, there are no hypermetabolic lesions worrisome for malignancy.     Additional CT findings: Calcific coronary atherosclerosis.  Similar bilateral renal cysts.  Colonic diverticulosis.     Impression:     Progression of hypermetabolic osseous disease in patient with multiple myeloma, index lesions as above.     Additional findings as above.      3/5/24 BONE MARROW ASPIRATE, TOUCH  PREP, CLOT, AND DECALCIFIED NEEDLE CORE BIOPSY: LEFT POSTEROSUPERIOR ILIAC CREST   - NO DEFINITIVE RESIDUAL KAPPA LIGHT CHAIN RESTRICTED MULTIPLE MYELOMA   - Mildly hypercellular marrow (35-45% total cellularity) with non-increased scattered plasma cells and trilineage hematopoiesis with focally increased erythroid precursors (see comments)   - No karyotype performed (see comments)   - Solitary small well-defined non-paratrabecular lymphoid aggregate (favor benign/reactive)   - Adequate stainable histiocytic iron stores (3+ out of 6+)   - Negative for amyloid deposition by [place] Red special stain        3/11/24 SPEP: Paraprotein peak in beta-2 = 0.39 g/dL (previously, 0.16 g/dL) and newly noted 2nd peak in near-gamma <0.1 g/dL (no previous value)   3/11/24 sIFE: Two (2) closely adjacent IgA kappa specific monoclonal protein bands at the beta-2/near-gamma junction are identified.    HSV 1 IgG: positive  HSV 2 IgG: negative    3/11/24 ECHO:Normal wall thickness. Normal wall motion. There is normal systolic function with a visually estimated ejection fraction of 55 - 60%.  3/11/24 EKG: Sinus bradycardia . Otherwise normal ECG   3/11/24 PFT: Spirometry is normal. DLCO is mildly decreased. Adj DLCO 67.3%  3/5/24 PET CT: Multiple tracer avid osseous lesions noting increased conspicuity of previous hypermetabolic rib lesions and few new lesions.  Numerous additional lytic foci without significant increase tracer uptake, similar to prior.  Index lesions as follows:   New hypermetabolic lytic lesion within the right posteromedial 4th rib, SUV max 6.6 . Increased tracer uptake within left anterolateral 5th rib with increased soft tissue component, SUV max 8 .  Previous SUV max 4.3. New tracer avid focus in left proximal femur with associated increased marrow attenuation, SUV max 4.4 .  3/5/24 Bone marrow biopsy: NO DEFINITIVE RESIDUAL KAPPA LIGHT CHAIN RESTRICTED MULTIPLE MYELOMA. Mildly hypercellular marrow (35-45% total  cellularity) with non-increased scattered plasma cells and trilineage hematopoiesis with focally increased erythroid precursors  PSA: 1.9, WNL  Colonoscopy: uptodate, no abnormal findings  Dental: clearance obtained  Social work: no barriers to SCT  Onco psychology: no overt psychological contraindications for proceeding with stem cell transplant ()   Care giver: Daughter    Component      Latest Ref Rng 4/16/2024   WBC      3.90 - 12.70 K/uL 7.45    RBC      4.60 - 6.20 M/uL 3.48 (L)    Hemoglobin      14.0 - 18.0 g/dL 12.4 (L)    Hematocrit      40.0 - 54.0 % 35.7 (L)    MCV      82 - 98 fL 103 (H)    MCH      27.0 - 31.0 pg 35.6 (H)    MCHC      32.0 - 36.0 g/dL 34.7    RDW      11.5 - 14.5 % 12.7    Platelet Count      150 - 450 K/uL 147 (L)    MPV      9.2 - 12.9 fL 9.2    Immature Granulocytes      0.0 - 0.5 % 0.5    Gran # (ANC)      1.8 - 7.7 K/uL 5.1    Immature Grans (Abs)      0.00 - 0.04 K/uL 0.04    Lymph #      1.0 - 4.8 K/uL 1.5    Mono #      0.3 - 1.0 K/uL 0.6    Eos #      0.0 - 0.5 K/uL 0.2    Baso #      0.00 - 0.20 K/uL 0.01    nRBC      0 /100 WBC 0    Gran %      38.0 - 73.0 % 68.9    Lymph %      18.0 - 48.0 % 20.5    Mono %      4.0 - 15.0 % 7.4    Eos %      0.0 - 8.0 % 2.6    Basophil %      0.0 - 1.9 % 0.1    Differential Method Automated    Sodium      136 - 145 mmol/L 137    Potassium      3.5 - 5.1 mmol/L 4.4    Chloride      95 - 110 mmol/L 105    CO2      23 - 29 mmol/L 24    Glucose      70 - 110 mg/dL 123 (H)    BUN      8 - 23 mg/dL 32 (H)    Creatinine      0.5 - 1.4 mg/dL 1.4    Calcium      8.7 - 10.5 mg/dL 9.6    PROTEIN TOTAL      6.0 - 8.4 g/dL 6.4    Albumin      3.5 - 5.2 g/dL 3.8    BILIRUBIN TOTAL      0.1 - 1.0 mg/dL 1.7 (H)    ALP      55 - 135 U/L 114    AST      10 - 40 U/L 10    ALT      10 - 44 U/L 13    eGFR      >60 mL/min/1.73 m^2 52.7 !    Anion Gap      8 - 16 mmol/L 8 (C)      Legend:  (L) Low  (H) High  ! Abnormal  (C) Corrected      Assessment    1.  IgA kappa multiple myeloma in remission  2. Stem cell transplant candidate  3. Type 2 DM not on long term insulin without complications  4. HTn  5. GERD  6. BPH  7. Hypothyroidism  8. Macrocytic anemia  9. Thrombocytopenia  10. Hyperbilirubinemia    Plan:    1,2: Stage at diagnosis not clear. FISH, LDH, serum beta2 microglobulin at diagnosis not available. He had ~90% plasma cells in his bone marrow at diagnosis. He had hypercalcemia. He had M spike of 3.33g/dl on SPEP in June 2023. He has responded well to DRd. M spike has decreased significantly.    Role, timing, risk and benefits of autologous peripheral blood stem cell transplant in multiple myeloma discussed in detail.  I explained that it is NOT CURATIVE, but has significant progression free survival, and overall survival, especially when it is followed by maintenance chemotherapy.  However, most of the benefit of high dose chemotherapy and stem cell transplant is in younger ( < 65) patients.   He is very fit, had limited co-morbidities, and has good support.  He will be considered for reduced intensity conditioning and autologous stem cell transplant.  He stopped daratumumab after cycle 4, and continued lenaldiomide and decadron.     SPEP from 12/29/23 showed a very small monoclonal protein. He is still interested in proceeding to SCT. He had colonoscopy. He has dentures, and has no ongoing dental issues.   Risks and benefits of autologous peripheral blood stem cell transplant discussed in detail.  He is 73, but otherwise healthy and has good performance status.  He understands that this is  a NON CURATIVE treatment.  He will be conditioned with melphalan 140mg/m2 due to his age.     3. He follows with his primary care physician.    4. He follows with his primary care physician. On hydralazine, tamsulosin, spironolactione    5. Denies heart burn. He follows with his primary care physician.    6. On tamsulosin, follows with his primary care physician    7. He  is on synthroid, follows with his primary care physician.    8. Mild, no bleeding. Bilirubin milldly elevated, but no s/s of hemolysis.     9. Mild,a symptomatic    10. Bilirubin 1.7mg/dl. rest of LFTs normal today.

## 2024-04-16 NOTE — NURSING
Labs drawn from CVC.  Sterile dressing change to R chest wall CVC.  Pt tolerated.  Ambulated out unassisted with family member.  
detailed exam

## 2024-04-16 NOTE — ASSESSMENT & PLAN NOTE
- tbili 1.7 on admit; asymptomatic; no clear cause  - improving 4/17, downtrended to T bili 1.1  - will trend daily LFTs

## 2024-04-16 NOTE — HPI
Mr. Goodson is a 73 y.o. male, patient of Dr. Evans, who presents today for planned admission for his [employer] [employer] Auto stem cell transplant for his IgA kappa multiple myeloma. His medical history includes HTN, DM2, BPH, hypothyroid, GERD, and chronic neoplastic related pain. He was seen today in BMT clinic prior to his admission and was COVID negative. Reports feeling well overall. Denies any recent fevers, chills, chest pain, sob, n/v/d/c, bleeding, or bruising. He has a right chest central vascath.

## 2024-04-16 NOTE — ASSESSMENT & PLAN NOTE
- patient of Dr. Evans; follows locally with Dr. Rose in [place]  - diagnosed June 2023 with IgA Kappa MM; stage unclear at diagnosis as FISH not available  - started DRd therapy on 7/18/2023  - he stopped the daratumumab after cycle 4 and continue with revlimid and dex only; looks to have completed ~5 cycles  - Admitted for [employer] [employer] Auto SCT as above on 4/16/2023

## 2024-04-16 NOTE — ASSESSMENT & PLAN NOTE
- patient of Dr. Evans  - Today is Day -1  - Will receive chemotherapy on 4/17 with plan for SCT on 4/18    Planned conditioning regimen:  Melphalan TBD on Day -1     Antimicrobial Prophylaxis:  Acyclovir starting on Day -1  Levofloxacin starting on Day -1  Fluconazole starting on Day -1  Bactrim starting on Day +30     Growth Factor Support:  Neupogen starting on Day +7      Caregiver: daughter and other family members  Post-transplant discharge plans: Smita Portillo

## 2024-04-16 NOTE — SUBJECTIVE & OBJECTIVE
Subjective: Day -1 Melphalan 140 mg/m2 autologous stem cell transplant for multiple myeloma. Chemotherapy administered today. Witnessed cryotherapy to prevent/minimize chemotherapy induced mucositis. Monitoring blood pressure, may be temporarily increased due to steroids and IV fluids. No complaints. Daughter at bedside. VSS and afebrile. Hard of hearing.    Review of Systems  Objective:     Vital Signs (Most Recent):  Temp: 97.8 °F (36.6 °C) (04/17/24 1555)  Pulse: 63 (04/17/24 1555)  Resp: 18 (04/17/24 1555)  BP: (!) 147/76 (04/17/24 1555)  SpO2: 95 % (04/17/24 1555) Vital Signs (24h Range):  Temp:  [97.6 °F (36.4 °C)-98.7 °F (37.1 °C)] 97.8 °F (36.6 °C)  Pulse:  [54-65] 63  Resp:  [14-18] 18  SpO2:  [94 %-96 %] 95 %  BP: (122-165)/(58-77) 147/76     Weight: 95.8 kg (211 lb 1.5 oz)  Body mass index is 30.29 kg/m².  Body surface area is 2.18 meters squared.    ECOG SCORE 1          KPS 80%    Lines/Drains/Airways       Central Venous Catheter Line  Duration             Tunneled Central Line - Double Lumen  Internal Jugular Right -- days         Hemodialysis Catheter 04/08/24 0748 right internal jugular 9 days                     Physical Exam  Vitals and nursing note reviewed.   Constitutional:       Appearance: He is well-developed.   HENT:      Head: Normocephalic and atraumatic.      Ears:      Comments: Difficulty hearing  Eyes:      General: No scleral icterus.     Conjunctiva/sclera: Conjunctivae normal.   Cardiovascular:      Rate and Rhythm: Normal rate.   Pulmonary:      Effort: Pulmonary effort is normal. No respiratory distress.   Abdominal:      General: There is no distension.      Palpations: Abdomen is soft.      Tenderness: There is no abdominal tenderness.   Musculoskeletal:         General: Normal range of motion.      Cervical back: Normal range of motion and neck supple.   Skin:     General: Skin is warm and dry.      Comments: Right chest central line CDI   Neurological:      Mental Status: He  is alert and oriented to person, place, and time.      Cranial Nerves: No cranial nerve deficit.   Psychiatric:         Behavior: Behavior normal.            Significant Labs:   CBC:   Recent Labs   Lab 04/16/24  0800 04/17/24  0428   WBC 7.45 4.82   HGB 12.4* 10.8*   HCT 35.7* 30.9*   * 130*    and CMP:   Recent Labs   Lab 04/16/24  0800 04/17/24  0428    141   K 4.4 4.0    110   CO2 24 26   * 110   BUN 32* 24*   CREATININE 1.4 1.4   CALCIUM 9.6 8.6*   PROT 6.4 5.1*   ALBUMIN 3.8 3.1*   BILITOT 1.7* 1.1*   ALKPHOS 114 100   AST 10 10   ALT 13 10   ANIONGAP 8 5*       Diagnostic Results:  I have reviewed all pertinent imaging results/findings within the past 24 hours.

## 2024-04-16 NOTE — ASSESSMENT & PLAN NOTE
- will hold home metformin  - ACHS accuchecks with moderate dose SSI  - can titrate insulin as needed for BG goal of 140-180

## 2024-04-16 NOTE — ASSESSMENT & PLAN NOTE
- previously on BID protonix and daily pepcid for stomach ulcers; now resolved  - will continue daily protonix per transplant order set

## 2024-04-17 LAB
ABO + RH BLD: NORMAL
ALBUMIN SERPL BCP-MCNC: 3.1 G/DL (ref 3.5–5.2)
ALP SERPL-CCNC: 100 U/L (ref 55–135)
ALT SERPL W/O P-5'-P-CCNC: 10 U/L (ref 10–44)
ANION GAP SERPL CALC-SCNC: 5 MMOL/L (ref 8–16)
AST SERPL-CCNC: 10 U/L (ref 10–40)
BASOPHILS # BLD AUTO: 0.01 K/UL (ref 0–0.2)
BASOPHILS NFR BLD: 0.2 % (ref 0–1.9)
BILIRUB SERPL-MCNC: 1.1 MG/DL (ref 0.1–1)
BLD GP AB SCN CELLS X3 SERPL QL: NORMAL
BUN SERPL-MCNC: 24 MG/DL (ref 8–23)
CALCIUM SERPL-MCNC: 8.6 MG/DL (ref 8.7–10.5)
CHLORIDE SERPL-SCNC: 110 MMOL/L (ref 95–110)
CO2 SERPL-SCNC: 26 MMOL/L (ref 23–29)
CREAT SERPL-MCNC: 1.4 MG/DL (ref 0.5–1.4)
DIFFERENTIAL METHOD BLD: ABNORMAL
EOSINOPHIL # BLD AUTO: 0.2 K/UL (ref 0–0.5)
EOSINOPHIL NFR BLD: 3.1 % (ref 0–8)
ERYTHROCYTE [DISTWIDTH] IN BLOOD BY AUTOMATED COUNT: 12.5 % (ref 11.5–14.5)
EST. GFR  (NO RACE VARIABLE): 52.7 ML/MIN/1.73 M^2
GLUCOSE SERPL-MCNC: 110 MG/DL (ref 70–110)
HCT VFR BLD AUTO: 30.9 % (ref 40–54)
HGB BLD-MCNC: 10.8 G/DL (ref 14–18)
IMM GRANULOCYTES # BLD AUTO: 0.02 K/UL (ref 0–0.04)
IMM GRANULOCYTES NFR BLD AUTO: 0.4 % (ref 0–0.5)
LYMPHOCYTES # BLD AUTO: 1.5 K/UL (ref 1–4.8)
LYMPHOCYTES NFR BLD: 31.3 % (ref 18–48)
MAGNESIUM SERPL-MCNC: 1.9 MG/DL (ref 1.6–2.6)
MCH RBC QN AUTO: 35.8 PG (ref 27–31)
MCHC RBC AUTO-ENTMCNC: 35 G/DL (ref 32–36)
MCV RBC AUTO: 102 FL (ref 82–98)
MONOCYTES # BLD AUTO: 0.5 K/UL (ref 0.3–1)
MONOCYTES NFR BLD: 10.2 % (ref 4–15)
NEUTROPHILS # BLD AUTO: 2.6 K/UL (ref 1.8–7.7)
NEUTROPHILS NFR BLD: 54.8 % (ref 38–73)
NRBC BLD-RTO: 0 /100 WBC
PHOSPHATE SERPL-MCNC: 2.9 MG/DL (ref 2.7–4.5)
PLATELET # BLD AUTO: 130 K/UL (ref 150–450)
PMV BLD AUTO: 9.2 FL (ref 9.2–12.9)
POCT GLUCOSE: 103 MG/DL (ref 70–110)
POCT GLUCOSE: 152 MG/DL (ref 70–110)
POCT GLUCOSE: 193 MG/DL (ref 70–110)
POCT GLUCOSE: 222 MG/DL (ref 70–110)
POTASSIUM SERPL-SCNC: 4 MMOL/L (ref 3.5–5.1)
PROT SERPL-MCNC: 5.1 G/DL (ref 6–8.4)
RBC # BLD AUTO: 3.02 M/UL (ref 4.6–6.2)
SODIUM SERPL-SCNC: 141 MMOL/L (ref 136–145)
SPECIMEN OUTDATE: NORMAL
WBC # BLD AUTO: 4.82 K/UL (ref 3.9–12.7)

## 2024-04-17 PROCEDURE — 63600175 PHARM REV CODE 636 W HCPCS: Performed by: INTERNAL MEDICINE

## 2024-04-17 PROCEDURE — 20600001 HC STEP DOWN PRIVATE ROOM

## 2024-04-17 PROCEDURE — 86850 RBC ANTIBODY SCREEN: CPT | Performed by: NURSE PRACTITIONER

## 2024-04-17 PROCEDURE — 25000003 PHARM REV CODE 250: Performed by: NURSE PRACTITIONER

## 2024-04-17 PROCEDURE — 83735 ASSAY OF MAGNESIUM: CPT | Performed by: NURSE PRACTITIONER

## 2024-04-17 PROCEDURE — 99233 SBSQ HOSP IP/OBS HIGH 50: CPT | Mod: ,,, | Performed by: INTERNAL MEDICINE

## 2024-04-17 PROCEDURE — 84100 ASSAY OF PHOSPHORUS: CPT | Performed by: NURSE PRACTITIONER

## 2024-04-17 PROCEDURE — 85025 COMPLETE CBC W/AUTO DIFF WBC: CPT | Performed by: NURSE PRACTITIONER

## 2024-04-17 PROCEDURE — 63600175 PHARM REV CODE 636 W HCPCS: Performed by: NURSE PRACTITIONER

## 2024-04-17 PROCEDURE — 25000003 PHARM REV CODE 250: Performed by: INTERNAL MEDICINE

## 2024-04-17 PROCEDURE — 94761 N-INVAS EAR/PLS OXIMETRY MLT: CPT

## 2024-04-17 PROCEDURE — 80053 COMPREHEN METABOLIC PANEL: CPT | Performed by: NURSE PRACTITIONER

## 2024-04-17 RX ORDER — EPINEPHRINE 1 MG/ML
0.5 INJECTION, SOLUTION, CONCENTRATE INTRAVENOUS ONCE AS NEEDED
Status: COMPLETED | OUTPATIENT
Start: 2024-04-19 | End: 2024-04-19

## 2024-04-17 RX ORDER — EPINEPHRINE 1 MG/ML
0.5 INJECTION, SOLUTION, CONCENTRATE INTRAVENOUS ONCE AS NEEDED
Status: COMPLETED | OUTPATIENT
Start: 2024-04-18 | End: 2024-04-18

## 2024-04-17 RX ORDER — SODIUM CHLORIDE AND POTASSIUM CHLORIDE 150; 900 MG/100ML; MG/100ML
INJECTION, SOLUTION INTRAVENOUS CONTINUOUS
Status: ACTIVE | OUTPATIENT
Start: 2024-04-18 | End: 2024-04-18

## 2024-04-17 RX ORDER — CLONIDINE HYDROCHLORIDE 0.1 MG/1
0.1 TABLET ORAL ONCE AS NEEDED
Status: COMPLETED | OUTPATIENT
Start: 2024-04-18 | End: 2024-04-18

## 2024-04-17 RX ORDER — FUROSEMIDE 10 MG/ML
100 INJECTION INTRAMUSCULAR; INTRAVENOUS ONCE AS NEEDED
Status: COMPLETED | OUTPATIENT
Start: 2024-04-19 | End: 2024-04-19

## 2024-04-17 RX ORDER — DIPHENHYDRAMINE HYDROCHLORIDE 50 MG/ML
50 INJECTION INTRAMUSCULAR; INTRAVENOUS ONCE AS NEEDED
Status: COMPLETED | OUTPATIENT
Start: 2024-04-18 | End: 2024-04-18

## 2024-04-17 RX ORDER — NITROGLYCERIN 0.4 MG/1
0.4 TABLET SUBLINGUAL ONCE AS NEEDED
Status: COMPLETED | OUTPATIENT
Start: 2024-04-19 | End: 2024-04-19

## 2024-04-17 RX ORDER — LORAZEPAM 2 MG/ML
1 INJECTION INTRAMUSCULAR ONCE
Status: COMPLETED | OUTPATIENT
Start: 2024-04-18 | End: 2024-04-18

## 2024-04-17 RX ORDER — LORAZEPAM 2 MG/ML
1 INJECTION INTRAMUSCULAR ONCE
Status: COMPLETED | OUTPATIENT
Start: 2024-04-19 | End: 2024-04-19

## 2024-04-17 RX ORDER — DIPHENHYDRAMINE HYDROCHLORIDE 50 MG/ML
50 INJECTION INTRAMUSCULAR; INTRAVENOUS ONCE
Status: COMPLETED | OUTPATIENT
Start: 2024-04-19 | End: 2024-04-19

## 2024-04-17 RX ORDER — MEPERIDINE HYDROCHLORIDE 50 MG/ML
50 INJECTION INTRAMUSCULAR; INTRAVENOUS; SUBCUTANEOUS ONCE AS NEEDED
Status: COMPLETED | OUTPATIENT
Start: 2024-04-19 | End: 2024-04-19

## 2024-04-17 RX ORDER — MEPERIDINE HYDROCHLORIDE 50 MG/ML
50 INJECTION INTRAMUSCULAR; INTRAVENOUS; SUBCUTANEOUS ONCE AS NEEDED
Status: COMPLETED | OUTPATIENT
Start: 2024-04-18 | End: 2024-04-18

## 2024-04-17 RX ORDER — DIPHENHYDRAMINE HYDROCHLORIDE 50 MG/ML
50 INJECTION INTRAMUSCULAR; INTRAVENOUS ONCE AS NEEDED
Status: COMPLETED | OUTPATIENT
Start: 2024-04-19 | End: 2024-04-19

## 2024-04-17 RX ORDER — SODIUM CHLORIDE AND POTASSIUM CHLORIDE 150; 900 MG/100ML; MG/100ML
INJECTION, SOLUTION INTRAVENOUS CONTINUOUS
Status: DISCONTINUED | OUTPATIENT
Start: 2024-04-19 | End: 2024-04-19

## 2024-04-17 RX ORDER — FUROSEMIDE 10 MG/ML
100 INJECTION INTRAMUSCULAR; INTRAVENOUS ONCE AS NEEDED
Status: COMPLETED | OUTPATIENT
Start: 2024-04-18 | End: 2024-04-18

## 2024-04-17 RX ORDER — DIPHENHYDRAMINE HYDROCHLORIDE 50 MG/ML
50 INJECTION INTRAMUSCULAR; INTRAVENOUS ONCE
Status: COMPLETED | OUTPATIENT
Start: 2024-04-18 | End: 2024-04-18

## 2024-04-17 RX ORDER — NITROGLYCERIN 0.4 MG/1
0.4 TABLET SUBLINGUAL ONCE AS NEEDED
Status: COMPLETED | OUTPATIENT
Start: 2024-04-18 | End: 2024-04-18

## 2024-04-17 RX ORDER — CLONIDINE HYDROCHLORIDE 0.1 MG/1
0.1 TABLET ORAL ONCE AS NEEDED
Status: COMPLETED | OUTPATIENT
Start: 2024-04-19 | End: 2024-04-19

## 2024-04-17 RX ADMIN — SODIUM CHLORIDE AND POTASSIUM CHLORIDE: .9; .15 SOLUTION INTRAVENOUS at 05:04

## 2024-04-17 RX ADMIN — INSULIN ASPART 2 UNITS: 100 INJECTION, SOLUTION INTRAVENOUS; SUBCUTANEOUS at 01:04

## 2024-04-17 RX ADMIN — HYDRALAZINE HYDROCHLORIDE 50 MG: 50 TABLET ORAL at 01:04

## 2024-04-17 RX ADMIN — DEXAMETHASONE 12 MG: 4 TABLET ORAL at 08:04

## 2024-04-17 RX ADMIN — Medication 400 MG: at 08:04

## 2024-04-17 RX ADMIN — Medication 1 DOSE: at 09:04

## 2024-04-17 RX ADMIN — SPIRONOLACTONE 12.5 MG: 25 TABLET ORAL at 08:04

## 2024-04-17 RX ADMIN — AMLODIPINE BESYLATE 10 MG: 10 TABLET ORAL at 08:04

## 2024-04-17 RX ADMIN — SODIUM CHLORIDE AND POTASSIUM CHLORIDE: .9; .15 SOLUTION INTRAVENOUS at 11:04

## 2024-04-17 RX ADMIN — ACYCLOVIR 800 MG: 200 CAPSULE ORAL at 09:04

## 2024-04-17 RX ADMIN — Medication 1 DOSE: at 08:04

## 2024-04-17 RX ADMIN — Medication 1 DOSE: at 05:04

## 2024-04-17 RX ADMIN — ONDANSETRON 8 MG: 8 TABLET, ORALLY DISINTEGRATING ORAL at 09:04

## 2024-04-17 RX ADMIN — OLANZAPINE 5 MG: 5 TABLET, FILM COATED ORAL at 09:04

## 2024-04-17 RX ADMIN — PANTOPRAZOLE SODIUM 40 MG: 40 TABLET, DELAYED RELEASE ORAL at 08:04

## 2024-04-17 RX ADMIN — SODIUM CHLORIDE AND POTASSIUM CHLORIDE: .9; .15 SOLUTION INTRAVENOUS at 04:04

## 2024-04-17 RX ADMIN — CARVEDILOL 12.5 MG: 12.5 TABLET, FILM COATED ORAL at 09:04

## 2024-04-17 RX ADMIN — FINASTERIDE 5 MG: 5 TABLET, FILM COATED ORAL at 08:04

## 2024-04-17 RX ADMIN — INSULIN ASPART 4 UNITS: 100 INJECTION, SOLUTION INTRAVENOUS; SUBCUTANEOUS at 05:04

## 2024-04-17 RX ADMIN — OLANZAPINE 5 MG: 5 TABLET, FILM COATED ORAL at 08:04

## 2024-04-17 RX ADMIN — LEVOFLOXACIN 500 MG: 500 TABLET, FILM COATED ORAL at 08:04

## 2024-04-17 RX ADMIN — ENOXAPARIN SODIUM 40 MG: 40 INJECTION SUBCUTANEOUS at 05:04

## 2024-04-17 RX ADMIN — HYDRALAZINE HYDROCHLORIDE 50 MG: 50 TABLET ORAL at 09:04

## 2024-04-17 RX ADMIN — ONDANSETRON 8 MG: 8 TABLET, ORALLY DISINTEGRATING ORAL at 01:04

## 2024-04-17 RX ADMIN — Medication 400 MG: at 01:04

## 2024-04-17 RX ADMIN — ACYCLOVIR 800 MG: 200 CAPSULE ORAL at 08:04

## 2024-04-17 RX ADMIN — Medication 1 DOSE: at 01:04

## 2024-04-17 RX ADMIN — ONDANSETRON 8 MG: 8 TABLET, ORALLY DISINTEGRATING ORAL at 05:04

## 2024-04-17 RX ADMIN — FLUCONAZOLE 400 MG: 200 TABLET ORAL at 08:04

## 2024-04-17 RX ADMIN — LEVOTHYROXINE SODIUM 112 MCG: 112 TABLET ORAL at 09:04

## 2024-04-17 RX ADMIN — HYDRALAZINE HYDROCHLORIDE 50 MG: 50 TABLET ORAL at 05:04

## 2024-04-17 RX ADMIN — INSULIN ASPART 1 UNITS: 100 INJECTION, SOLUTION INTRAVENOUS; SUBCUTANEOUS at 09:04

## 2024-04-17 RX ADMIN — MELPHALAN 300 MG: 50 INJECTION, POWDER, LYOPHILIZED, FOR SOLUTION INTRAVENOUS at 10:04

## 2024-04-17 NOTE — NURSING
Consent & CAR in chart. CAR & BSA verified by two chemo certified Rns. Premedicated with dexamethasone. Chemo infusion and patient verified at bedside by two chemo certified Rns. Positive blood noted to right vas cath. melphalan Hcl-betadex sbes (EVOMELA) 300 mg in sodium chloride 0.9% 500 mL IVPB to right vas cath over 1 hour at 500ml/hr. Chemotherapy precautions maintained & patient educated.

## 2024-04-17 NOTE — PLAN OF CARE
Day -1 of Carmen 140 Auto SCT. Pt involved in plan of care and communicating needs throughout shift. Up in room and to bathroom independently; voiding without difficulty. Tolerating diet. IVF infusing @ 150. ACCUchk= 130; no SSI required. All VSS. Pt remaining free from falls or injury throughout shift.  Pt verbalized understanding of the risk of falling; however, pt refused bed alarm. Daughter is at bedside. Bed locked and in lowest position, personal belongings and call light within reach, non skid socks on when OOB. Pt instructed to call for assistance as needed. Q2H rounding done on pt.

## 2024-04-17 NOTE — NURSING
Nurses Note -- 4 Eyes      4/16/2024   7:17 PM      Skin assessed during: Admit      [x] No Altered Skin Integrity Present    []Prevention Measures Documented      [] Yes- Altered Skin Integrity Present or Discovered   [] LDA Added if Not in Epic (Describe Wound)   [] New Altered Skin Integrity was Present on Admit and Documented in LDA   [] Wound Image Taken    Wound Care Consulted? No    Attending Nurse:  KEYSHA Membreno     Second RN/Staff Member:   KEYSHA Jamison

## 2024-04-17 NOTE — NURSING
Pt admitted into room 858 for BEAM Auto SCT. Pt ambulated independently to floor accompanied by daughter. MD notified of pt arrival.    Vitals and assessment as charted. R Vascath in place, dressing dry, clean, and intact. All skin dry, clean, and intact.    Upon arrival to room, pt and family oriented to room, floor, and call light. Bed locked and in lowest position. Call light within reach. Instructed to call for assistance.

## 2024-04-17 NOTE — H&P
[facility] - Oncology ([facility])  [facility] Medicine  History & Physical    Patient Name: Chip Goodson  MRN: [id number]  Admission Date: 4/16/2024  Attending Physician: Marjan Wang MD   Primary Care Provider: Debbie Gonzalez MD         Patient information was obtained from patient, past medical records, and ER records.       Subjective:     Principal Problem:Multiple myeloma in remission    Chief Complaint: No chief complaint on file.       HPI: Patient is a 73-year-old male with a known PMHx of HTN, Hypothyroidism and Multiple Myeloma that presents to the hospital for initiation of reduced intensity melphalan conditioning ASCT. He has IgA kappa multiple myeloma, diagnosed in June 2023 and was treated with DRd from 7/18/23. Patient has responded well to Drd and M spike has decreased significantly. Patient reports that he is generally feeling well and denies any acute pain, fever, chills, shortness of breath or chest pain. Patient admitted to [facility] Oncology service.    Past Medical History:   Diagnosis Date    Chronic diastolic (congestive) heart failure     Coronary artery disease     GERD (gastroesophageal reflux disease)     High cholesterol     HTN (hypertension)     Hypothyroidism, unspecified     Multiple myeloma     Secondary pulmonary arterial hypertension     Type 2 diabetes mellitus without complications        Past Surgical History:   Procedure Laterality Date    APPENDECTOMY  1965    BONE MARROW BIOPSY Right 06/12/2023    Procedure: BIOPSY, BONE MARROW;  Surgeon: Silvano Austin MD;  Location: [facility] OR;  Service: General;  Laterality: Right;    COLONOSCOPY N/A 2/15/2024    Procedure: COLONOSCOPY;  Surgeon: Kei Donahue III, MD;  Location: [facility] ENDO;  Service: Endoscopy;  Laterality: N/A;  POST-OP: ENLARGED NODULAR PROSTATE, SUBOPTIMAL PREP, PAN DIVERTICULOSIS, TRANSVERSE COLON POLYPECTOMY    INSERTION OF TUNNELED CENTRAL VENOUS HEMODIALYSIS CATHETER Left 4/8/2024    Procedure: INSERTION, CATHETER,  HEMODIALYSIS, DUAL LUMEN, left poss right, Bard 14.5 fr hemosplit catheter, model [id number];  Surgeon: Theodore Caruso MD;  Location: [facility] OR [address] [place] [address];  Service: General;  Laterality: Left;    removal of boils         Review of patient's allergies indicates:   Allergen Reactions    Iodine Anaphylaxis    Shellfish containing products     Poison ivy extract     Amoxicillin-pot clavulanate Itching       Current Facility-Administered Medications   Medication Dose Route Frequency Provider Last Rate Last Admin    albuterol inhaler 2 puff  2 puff Inhalation Q4H PRN Chelsie Man, NP        alteplase injection 2 mg  2 mg Intra-Catheter BID PRN Chelsie aMn, NP        aluminum-magnesium hydroxide-simethicone 200-200-20 mg/5 mL suspension 30 mL  30 mL Oral Q6H PRN Chelsie Man, NP        [START ON 4/17/2024] amLODIPine tablet 10 mg  10 mg Oral Daily Chelsie Man, NP        carvediloL tablet 12.5 mg  12.5 mg Oral BID Chelsie Man, NP        dextrose 10% bolus 125 mL 125 mL  12.5 g Intravenous PRN Chelsie Man, NP        dextrose 10% bolus 250 mL 250 mL  25 g Intravenous PRN Chelsie Man, NP        diphenhydrAMINE capsule 25 mg  25 mg Oral PRN Chelsie Man, NP        enoxaparin injection 40 mg  40 mg Subcutaneous Daily Chelsie Man, NP        [START ON 4/17/2024] finasteride tablet 5 mg  5 mg Oral Daily Chelsie Man, NP        glucagon (human recombinant) injection 1 mg  1 mg Intramuscular PRN Chelsie Man, NP        glucose chewable tablet 16 g  16 g Oral PRN Chelsie Man, NP        glucose chewable tablet 24 g  24 g Oral PRN Chelsie Man, NP        hydrALAZINE tablet 50 mg  50 mg Oral Q8H Chelsie Man, NP        insulin aspart U-100 pen 0-10 Units  0-10 Units Subcutaneous QID (AC + HS) PRN Chelsie Man, NP        k phos di & mono-sod phos mono 250 mg tablet 1 tablet  1 tablet Oral Q4H PRN Chelsie Man, NORMA        k  phos di & mono-sod phos mono 250 mg tablet 2 tablet  2 tablet Oral Q4H PRN Chelsie Man, NORMA        levothyroxine tablet 112 mcg  112 mcg Oral QHS Chelsie Man, NP        magnesium oxide tablet 400 mg  400 mg Oral Q4H PRN Cheslie Man, NP        magnesium oxide tablet 400 mg  400 mg Oral Q4H PRN Chelsie Man, NP        magnesium oxide tablet 800 mg  800 mg Oral Q4H PRN Chelsie Man, NP        oxyCODONE immediate release tablet 5 mg  5 mg Oral Q4H PRN Chelsie Man, NP        [START ON 4/17/2024] pantoprazole EC tablet 40 mg  40 mg Oral Daily Chelsie Man, NP        potassium chloride SA CR tablet 20 mEq  20 mEq Oral PRN Chelsie Man, NP        potassium chloride SA CR tablet 20 mEq  20 mEq Oral Q2H PRN Chelsie Man, NP        potassium chloride SA CR tablet 20 mEq  20 mEq Oral Q2H PRN Chelsie Man, NP        [START ON 4/17/2024] spironolactone split tablet 12.5 mg  12.5 mg Oral Daily Chelsie Man NP         Family History       Problem Relation (Age of Onset)    Breast cancer Sister    Lung cancer Mother          Tobacco Use    Smoking status: Former     Types: Cigarettes    Smokeless tobacco: Former   Substance and Sexual Activity    Alcohol use: Yes    Drug use: Never    Sexual activity: Not Currently     Review of Systems   Constitutional:  Negative for chills and fever.   HENT:  Negative for congestion, postnasal drip, rhinorrhea and sore throat.    Respiratory:  Negative for cough, chest tightness and shortness of breath.    Cardiovascular:  Negative for palpitations.   Gastrointestinal:  Negative for abdominal pain, constipation, diarrhea, nausea and vomiting.   Genitourinary:  Negative for dysuria and hematuria.   Neurological:  Negative for dizziness, light-headedness and headaches.     Objective:     Vital Signs (Most Recent):  Temp: 98.4 °F (36.9 °C) (04/16/24 1917)  Pulse: 65 (04/16/24 1917)  Resp: 16 (04/16/24 1917)  BP: (!)  155/74 (04/16/24 1917)  SpO2: 96 % (04/16/24 1917) Vital Signs (24h Range):  Temp:  [97.4 °F (36.3 °C)-98.4 °F (36.9 °C)] 98.4 °F (36.9 °C)  Pulse:  [55-65] 65  Resp:  [16] 16  SpO2:  [96 %-97 %] 96 %  BP: (150-155)/(74-83) 155/74     Weight: 95.8 kg (211 lb 1.5 oz)  Body mass index is 30.29 kg/m².     Physical Exam  Constitutional:       General: He is not in acute distress.     Appearance: Normal appearance.   HENT:      Head: Normocephalic and atraumatic.   Eyes:      Extraocular Movements: Extraocular movements intact.   Cardiovascular:      Rate and Rhythm: Normal rate.   Pulmonary:      Effort: Pulmonary effort is normal. No tachypnea or respiratory distress.   Abdominal:      General: Abdomen is flat. There is no distension.   Musculoskeletal:         General: Normal range of motion.      Cervical back: Normal range of motion and neck supple.   Skin:     General: Skin is dry.   Neurological:      Mental Status: He is alert and oriented to person, place, and time.   Psychiatric:         Attention and Perception: Attention and perception normal.         Mood and Affect: Mood and affect normal.                Significant Labs: All pertinent labs within the past 24 hours have been reviewed.    Significant Imaging: I have reviewed all pertinent imaging results/findings within the past 24 hours.  Assessment/Plan:     * Multiple myeloma in remission  Here for initiation of reduced intensity melphalan conditioning ASCT.   Following closely with Oncology team.    Hyperbilirubinemia  Mild.  Continue to monitor with daily labs.      Anemia in neoplastic disease  Following closely with Oncology team.      Cancer related pain  Currently pain free.  Pain managed as per Oncology team.      BPH (benign prostatic hyperplasia)  Finasteride.      Stem cell transplant candidate  Following closely with Oncology team.      HTN (hypertension)  Chronic, controlled. Latest blood pressure and vitals reviewed-     Temp:  [97.4 °F (36.3  "°C)-98.4 °F (36.9 °C)]   Pulse:  [55-65]   Resp:  [16]   BP: (150-155)/(74-83)   SpO2:  [96 %-97 %] .   Home meds for hypertension were reviewed and noted below.   Hypertension Medications               amLODIPine (NORVASC) 10 MG tablet Take 1 tablet (10 mg total) by mouth every morning.    carvediloL (COREG) 12.5 MG tablet Take 12.5 mg by mouth 2 (two) times daily.    hydrALAZINE (APRESOLINE) 50 MG tablet Take 1 tablet (50 mg total) by mouth every 8 (eight) hours.    nitroGLYCERIN (NITROSTAT) 0.4 MG SL tablet place one tablet under the tongue every five minutes as needed for chest pain up to 3 doses. if no relief call 911    spironolactone (ALDACTONE) 25 MG tablet Take 12.5 mg by mouth every morning.            While in the hospital, will manage blood pressure as follows; Continue home antihypertensive regimen    Will utilize p.r.n. blood pressure medication only if patient's blood pressure greater than 180/110 and he develops symptoms such as worsening chest pain or shortness of breath.        Type 2 diabetes mellitus without complications  Patient's FSGs are controlled on current medication regimen.  Last A1c reviewed-   Lab Results   Component Value Date    HGBA1C 5.6 12/19/2023     Most recent fingerstick glucose reviewed- No results for input(s): "POCTGLUCOSE" in the last 24 hours.  Current correctional scale  Medium  Maintain anti-hyperglycemic dose as follows-   Antihyperglycemics (From admission, onward)      Start     Stop Route Frequency Ordered    04/16/24 1904  insulin aspart U-100 pen 0-10 Units         -- SubQ Before meals & nightly PRN 04/16/24 1904          Hold Oral hypoglycemics while patient is in the hospital.    Hypothyroidism, unspecified  Continue home dose levothyroxine (112 mcg qhs).      GERD (gastroesophageal reflux disease)  Pantoprazole.      COLTON (acute kidney injury)  Patient with acute kidney injury/acute renal failure likely due to pre-renal azotemia due to dehydration COLTON is " currently worsening. Baseline creatinine  1.1  - Labs reviewed- Renal function/electrolytes with Estimated Creatinine Clearance: 53.8 mL/min (based on SCr of 1.4 mg/dL). according to latest data. Monitor urine output and serial BMP and adjust therapy as needed. Avoid nephrotoxins and renally dose meds for GFR listed above.    Will give gentle IVF NS @ 75 cc/hr x 10 hours.      VTE Risk Mitigation (From admission, onward)           Ordered     enoxaparin injection 40 mg  Daily         04/16/24 1904     IP VTE HIGH RISK PATIENT  Once         04/16/24 1904     Place sequential compression device  Until discontinued         04/16/24 1904                         The attending portion of this evaluation, treatment, and documentation was performed per Kj Vegas MD via Telemedicine AudioVisual using the secure [employer] software platform with 2 way audio/video. The provider was located off-site and the patient is located in the hospital. The aforementioned video software was utilized to document the relevant history and physical exam            Kj Vegas MD  Department of Hospital Medicine   [facility] - Oncology ([facility])

## 2024-04-17 NOTE — PROGRESS NOTES
[facility] ([facility])  Hematology  Bone Marrow Transplant  Progress Note    Patient Name: Chip Goodson  Admission Date: 4/16/2024  Hospital Length of Stay: 1 days  Code Status: Full Code    Subjective: Day -1 Melphalan 140 mg/m2 autologous stem cell transplant for multiple myeloma. Chemotherapy administered today. Witnessed cryotherapy to prevent/minimize chemotherapy induced mucositis. Monitoring blood pressure, may be temporarily increased due to steroids and IV fluids. No complaints. Daughter at bedside. VSS and afebrile. Hard of hearing.    Review of Systems  Objective:     Vital Signs (Most Recent):  Temp: 97.8 °F (36.6 °C) (04/17/24 1555)  Pulse: 63 (04/17/24 1555)  Resp: 18 (04/17/24 1555)  BP: (!) 147/76 (04/17/24 1555)  SpO2: 95 % (04/17/24 1555) Vital Signs (24h Range):  Temp:  [97.6 °F (36.4 °C)-98.7 °F (37.1 °C)] 97.8 °F (36.6 °C)  Pulse:  [54-65] 63  Resp:  [14-18] 18  SpO2:  [94 %-96 %] 95 %  BP: (122-165)/(58-77) 147/76     Weight: 95.8 kg (211 lb 1.5 oz)  Body mass index is 30.29 kg/m².  Body surface area is 2.18 meters squared.    ECOG SCORE 1          KPS 80%    Lines/Drains/Airways       Central Venous Catheter Line  Duration             Tunneled Central Line - Double Lumen  Internal Jugular Right -- days         Hemodialysis Catheter 04/08/24 0748 right internal jugular 9 days                     Physical Exam  Vitals and nursing note reviewed.   Constitutional:       Appearance: He is well-developed.   HENT:      Head: Normocephalic and atraumatic.      Ears:      Comments: Difficulty hearing  Eyes:      General: No scleral icterus.     Conjunctiva/sclera: Conjunctivae normal.   Cardiovascular:      Rate and Rhythm: Normal rate.   Pulmonary:      Effort: Pulmonary effort is normal. No respiratory distress.   Abdominal:      General: There is no distension.      Palpations: Abdomen is soft.      Tenderness: There is no abdominal tenderness.   Musculoskeletal:         General: Normal  range of motion.      Cervical back: Normal range of motion and neck supple.   Skin:     General: Skin is warm and dry.      Comments: Right chest central line CDI   Neurological:      Mental Status: He is alert and oriented to person, place, and time.      Cranial Nerves: No cranial nerve deficit.   Psychiatric:         Behavior: Behavior normal.            Significant Labs:   CBC:   Recent Labs   Lab 04/16/24  0800 04/17/24  0428   WBC 7.45 4.82   HGB 12.4* 10.8*   HCT 35.7* 30.9*   * 130*    and CMP:   Recent Labs   Lab 04/16/24  0800 04/17/24  0428    141   K 4.4 4.0    110   CO2 24 26   * 110   BUN 32* 24*   CREATININE 1.4 1.4   CALCIUM 9.6 8.6*   PROT 6.4 5.1*   ALBUMIN 3.8 3.1*   BILITOT 1.7* 1.1*   ALKPHOS 114 100   AST 10 10   ALT 13 10   ANIONGAP 8 5*       Diagnostic Results:  I have reviewed all pertinent imaging results/findings within the past 24 hours.  Assessment/Plan:     * Stem cell transplant candidate  - patient of Dr. Evans  - Today is Day -1  - Will receive chemotherapy on 4/17 with plan for SCT on 4/18    Planned conditioning regimen:  Melphalan TBD on Day -1     Antimicrobial Prophylaxis:  Acyclovir starting on Day -1  Levofloxacin starting on Day -1  Fluconazole starting on Day -1  Bactrim starting on Day +30     Growth Factor Support:  Neupogen starting on Day +7      Caregiver: daughter and other family members  Post-transplant discharge plans: [facility]    Hyperbilirubinemia  - tbili 1.7 on admit; asymptomatic; no clear cause  - improving 4/17, downtrended to T bili 1.1  - will trend daily LFTs    Anemia in neoplastic disease  - daily CBC while inpatient  - transfuse for Hgb <7    Cancer related pain  - continue home prn oxy    BPH (benign prostatic hyperplasia)  - continue home finasteride    HTN (hypertension)  - continue home amlodipine, carvedilol, hydralazine, and spironolactone    Type 2 diabetes mellitus without complications  - will hold home  metformin  - ACHS accuchecks with moderate dose SSI  - can titrate insulin as needed for BG goal of 140-180    Hypothyroidism, unspecified  - continue home synthroid    GERD (gastroesophageal reflux disease)  - previously on BID protonix and daily pepcid for stomach ulcers; now resolved  - will continue daily protonix per transplant order set    Multiple myeloma in remission  - patient of Dr. Evans; follows locally with Dr. Rose in [place]  - diagnosed June 2023 with IgA Kappa MM; stage unclear at diagnosis as FISH not available  - started DRd therapy on 7/18/2023  - he stopped the daratumumab after cycle 4 and continue with revlimid and dex only; looks to have completed ~5 cycles  - Admitted for Carmen 140 Auto SCT as above on 4/16/2023    COLTON (acute kidney injury)  - creatinine 1.4 on admission; baseline ~1.1  - will monitor daily CMP  -on continuous fluids as part of chemotherapy regimen (NACL with 10% K)        VTE Risk Mitigation (From admission, onward)           Ordered     heparin (porcine) injection 1,600 Units  As needed (PRN)         04/16/24 2129     enoxaparin injection 40 mg  Daily         04/16/24 1904     IP VTE HIGH RISK PATIENT  Once         04/16/24 1904     Place sequential compression device  Until discontinued         04/16/24 1904                    Disposition: inpatient for stem cell transplant    Marjan Wang MD  Bone Marrow Transplant  [facility] ([facility])

## 2024-04-17 NOTE — ASSESSMENT & PLAN NOTE
Patient with acute kidney injury/acute renal failure likely due to pre-renal azotemia due to dehydration COLTON is currently worsening. Baseline creatinine  1.1  - Labs reviewed- Renal function/electrolytes with Estimated Creatinine Clearance: 53.8 mL/min (based on SCr of 1.4 mg/dL). according to latest data. Monitor urine output and serial BMP and adjust therapy as needed. Avoid nephrotoxins and renally dose meds for GFR listed above.    Will give gentle IVF NS @ 75 cc/hr x 10 hours.

## 2024-04-17 NOTE — PLAN OF CARE
Day -1 carlotta auto SCT; no acute events this shift. Afebrile & VSS on room air. No PRNs needed. Electrolytes closely monitored, magnesium replaced x2 per orders. Ambulates independently to bathroom; educated on importance of I/O recording. CHG wipes provided and encouraged use. Tolerating diet, voiding without difficulty. BG monitored ACHS and SSI given as needed. Pt remaining free from falls or injury throughout shift; bed locked and in lowest position; call light within reach. POC reviewed with patient and daughter at bedside. All needs addressed. Frequent rounding performed. SCT scheduled for tomorrow. Pt to receive 5 bags at 13:30.

## 2024-04-17 NOTE — PLAN OF CARE
Recommendations     1.) Recommend continuing with Regular diet as tolerated.                 - add double PRO portions.     2.) If PO intake <50%, recommend  Boost Plus TID (or Boost alternate) to help meet increased needs.      3.) RD to monitor wt, PO intake, skin, labs.      Goals: to meet % of EEN/EPN by next RD f/u  Nutrition Goal Status: new  Communication of RD Recs:  (POC)

## 2024-04-17 NOTE — CONSULTS
[facility] - Oncology ([facility])  Adult Nutrition  Consult Note    SUMMARY     Recommendations    1.) Recommend continuing with Regular diet as tolerated.     - add double PRO portions.    2.) If PO intake <50%, recommend  Boost Plus TID (or Boost alternate) to help meet increased needs.     3.) RD to monitor wt, PO intake, skin, labs.     Goals: to meet % of EEN/EPN by next RD f/u  Nutrition Goal Status: new  Communication of RD Recs:  (POC)    Assessment and Plan    Nutrition Problem  Increased PRO needs    Related to (etiology):   Increased physiological needs    Signs and Symptoms (as evidenced by):   Multiple myeloma     Interventions/Recommendations (treatment strategy):  Collaboration of nutritional care with other providers.   Food safety and High PRO/Kcal diet edu  ONS PRN/ Double PRO portions.    Nutrition Diagnosis Status:   New     Reason for Assessment    Reason For Assessment: consult  Diagnosis: cancer diagnosis/related complications  Relevant Medical History: HTN, Hypothyroidism, Multiple Myeloma  Interdisciplinary Rounds: did not attend    General Information Comments: RD consulted for stem cell tx. Pt denies n/v/d/c. Pt endorses good appetite, consuming % of their meals. Pt endorses good appetite PTA. Pt appears to be nourished, with no visible s/s of malnutrition. NFPE is not warranted. RD was able to provide Food safety and High PRO/Kcal diet edu. RD's contact information was provided. RD team to continue to monitor and f/u.     Nutrition Discharge Planning: Provided pt with high calorie, high protein diet education and food safety education for bone marrow transplant. Appropriate education material with RD contact information provided. No other needs identified.    Nutrition Risk Screen    Nutrition Risk Screen: no indicators present    Nutrition/Diet History    Patient Reported Diet/Restrictions/Preferences: general  Typical Food/Fluid Intake: 2-3 meal/day  Spiritual, Cultural  "Beliefs, Taoist Practices, Values that Affect Care: no  Supplemental Drinks or Food Habits: Premier Protein  Food Allergies: shellfish    Anthropometrics    Temp: 98 °F (36.7 °C)  Height Method: Stated  Height: 5' 10" (177.8 cm)  Height (inches): 70 in  Weight Method: Standard Scale  Weight: 95.8 kg (211 lb 1.5 oz)  Weight (lb): 211.09 lb  Ideal Body Weight (IBW), Male: 166 lb  % Ideal Body Weight, Male (lb): 127.16 %  BMI (Calculated): 30.3    Lab/Procedures/Meds    Pertinent Labs Reviewed: reviewed  Pertinent Labs Comments: BUN: 24, GFR: 52.7, PRO: 5.1, alb: 3.1, tbili: 1.1    Pertinent Medications Reviewed: reviewed  Pertinent Medications Comments: Enoxaparin, Neupogen, levothyroxine, ondansetron, pantoprazole, Na Bicarb, spironolactone, abx, NaCl/KCl    Estimated/Assessed Needs    Weight Used For Calorie Calculations: 75 kg (165 lb 5.5 oz) (IBW d/t BMI >30.0)  Energy Calorie Requirements (kcal): 1875- 2250 kcal  Energy Need Method: Kcal/kg (25-30 kcal/kg of IBW)    Protein Requirements: 113- 150g (1.5-2.0g/kg of IBW)  Weight Used For Protein Calculations: 75 kg (165 lb 5.5 oz) (IBW d/t BMI >30.0)    Fluid Requirements (mL): 1ml/1kcal or per MD  Estimated Fluid Requirement Method: RDA Method  RDA Method (mL): 1875    Nutrition Prescription Ordered    Current Diet Order: Regular Diet    Evaluation of Received Nutrient/Fluid Intake    I/O: +88ml since admit  Fluid Required:  (as per MD)  Comments: LBM 4/16  % Intake of Estimated Energy Needs: 75 - 100 %  % Meal Intake: 75 - 100 %    Nutrition Risk    Level of Risk/Frequency of Follow-up:  (RD to f/u x 1-2/week)     Monitor and Evaluation    Food and Nutrient Intake: energy intake, food and beverage intake  Food and Nutrient Adminstration: diet order  Knowledge/Beliefs/Attitudes: food and nutrition knowledge/skill, beliefs and attitudes  Physical Activity and Function: nutrition-related ADLs and IADLs  Anthropometric Measurements: weight, weight change, body mass " index  Biochemical Data, Medical Tests and Procedures: electrolyte and renal panel, gastrointestinal profile, glucose/endocrine profile, inflammatory profile, lipid profile  Nutrition-Focused Physical Findings: overall appearance, skin     Nutrition Follow-Up    RD Follow-up?: Yes

## 2024-04-17 NOTE — ASSESSMENT & PLAN NOTE
Here for initiation of reduced intensity melphalan conditioning ASCT.   Following closely with Oncology team.

## 2024-04-17 NOTE — ASSESSMENT & PLAN NOTE
"Patient's FSGs are controlled on current medication regimen.  Last A1c reviewed-   Lab Results   Component Value Date    HGBA1C 5.6 12/19/2023     Most recent fingerstick glucose reviewed- No results for input(s): "POCTGLUCOSE" in the last 24 hours.  Current correctional scale  Medium  Maintain anti-hyperglycemic dose as follows-   Antihyperglycemics (From admission, onward)      Start     Stop Route Frequency Ordered    04/16/24 1904  insulin aspart U-100 pen 0-10 Units         -- SubQ Before meals & nightly PRN 04/16/24 1904          Hold Oral hypoglycemics while patient is in the hospital.  "

## 2024-04-17 NOTE — HPI
Patient is a 73-year-old male with a known PMHx of HTN, Hypothyroidism and Multiple Myeloma that presents to the hospital for initiation of reduced intensity melphalan conditioning ASCT. He has IgA kappa multiple myeloma, diagnosed in June 2023 and was treated with DRd from 7/18/23. Patient has responded well to Drd and M spike has decreased significantly. Patient reports that he is generally feeling well and denies any acute pain, fever, chills, shortness of breath or chest pain. Patient admitted to [facility] Oncology service.

## 2024-04-17 NOTE — ASSESSMENT & PLAN NOTE
Chronic, controlled. Latest blood pressure and vitals reviewed-     Temp:  [97.4 °F (36.3 °C)-98.4 °F (36.9 °C)]   Pulse:  [55-65]   Resp:  [16]   BP: (150-155)/(74-83)   SpO2:  [96 %-97 %] .   Home meds for hypertension were reviewed and noted below.   Hypertension Medications               amLODIPine (NORVASC) 10 MG tablet Take 1 tablet (10 mg total) by mouth every morning.    carvediloL (COREG) 12.5 MG tablet Take 12.5 mg by mouth 2 (two) times daily.    hydrALAZINE (APRESOLINE) 50 MG tablet Take 1 tablet (50 mg total) by mouth every 8 (eight) hours.    nitroGLYCERIN (NITROSTAT) 0.4 MG SL tablet place one tablet under the tongue every five minutes as needed for chest pain up to 3 doses. if no relief call 911    spironolactone (ALDACTONE) 25 MG tablet Take 12.5 mg by mouth every morning.            While in the hospital, will manage blood pressure as follows; Continue home antihypertensive regimen    Will utilize p.r.n. blood pressure medication only if patient's blood pressure greater than 180/110 and he develops symptoms such as worsening chest pain or shortness of breath.

## 2024-04-17 NOTE — SUBJECTIVE & OBJECTIVE
Past Medical History:   Diagnosis Date    Chronic diastolic (congestive) heart failure     Coronary artery disease     GERD (gastroesophageal reflux disease)     High cholesterol     HTN (hypertension)     Hypothyroidism, unspecified     Multiple myeloma     Secondary pulmonary arterial hypertension     Type 2 diabetes mellitus without complications        Past Surgical History:   Procedure Laterality Date    APPENDECTOMY  1965    BONE MARROW BIOPSY Right 06/12/2023    Procedure: BIOPSY, BONE MARROW;  Surgeon: Silvano Austin MD;  Location: [facility] OR;  Service: General;  Laterality: Right;    COLONOSCOPY N/A 2/15/2024    Procedure: COLONOSCOPY;  Surgeon: Kei Donahue III, MD;  Location: [facility] ENDO;  Service: Endoscopy;  Laterality: N/A;  POST-OP: ENLARGED NODULAR PROSTATE, SUBOPTIMAL PREP, PAN DIVERTICULOSIS, TRANSVERSE COLON POLYPECTOMY    INSERTION OF TUNNELED CENTRAL VENOUS HEMODIALYSIS CATHETER Left 4/8/2024    Procedure: INSERTION, CATHETER, HEMODIALYSIS, DUAL LUMEN, left poss right, Bard 14.5 fr hemosplit catheter, model [id number];  Surgeon: Theodore Caruso MD;  Location: [address];  Service: General;  Laterality: Left;    removal of boils         Review of patient's allergies indicates:   Allergen Reactions    Iodine Anaphylaxis    Shellfish containing products     Poison ivy extract     Amoxicillin-pot clavulanate Itching       Current Facility-Administered Medications   Medication Dose Route Frequency Provider Last Rate Last Admin    albuterol inhaler 2 puff  2 puff Inhalation Q4H PRN Chelsie Man, NP        alteplase injection 2 mg  2 mg Intra-Catheter BID PRN Chelsie Man, NP        aluminum-magnesium hydroxide-simethicone 200-200-20 mg/5 mL suspension 30 mL  30 mL Oral Q6H PRN Chelsie Man NP        [START ON 4/17/2024] amLODIPine tablet 10 mg  10 mg Oral Daily Chelsie Man, NP        carvediloL tablet 12.5 mg  12.5 mg Oral BID Chelsie Man, NP        dextrose  10% bolus 125 mL 125 mL  12.5 g Intravenous PRN Chelsie Man, NP        dextrose 10% bolus 250 mL 250 mL  25 g Intravenous PRN Chelsie Man, NP        diphenhydrAMINE capsule 25 mg  25 mg Oral PRN Chelsie Man, NP        enoxaparin injection 40 mg  40 mg Subcutaneous Daily Chelsie Man, NP        [START ON 4/17/2024] finasteride tablet 5 mg  5 mg Oral Daily Chelsie Man, NP        glucagon (human recombinant) injection 1 mg  1 mg Intramuscular PRN Chelsie Man, NP        glucose chewable tablet 16 g  16 g Oral PRN Chelsie Man, NP        glucose chewable tablet 24 g  24 g Oral PRN Chelsie Man, NP        hydrALAZINE tablet 50 mg  50 mg Oral Q8H Chelsie Man, NP        insulin aspart U-100 pen 0-10 Units  0-10 Units Subcutaneous QID (AC + HS) PRN Chelsie Man, NP        k phos di & mono-sod phos mono 250 mg tablet 1 tablet  1 tablet Oral Q4H PRN Chelsie Man, NP        k phos di & mono-sod phos mono 250 mg tablet 2 tablet  2 tablet Oral Q4H PRN Chelsie Man, NP        levothyroxine tablet 112 mcg  112 mcg Oral QHS Chelsie Man, NP        magnesium oxide tablet 400 mg  400 mg Oral Q4H PRChelsie Andrea, NP        magnesium oxide tablet 400 mg  400 mg Oral Q4H PRN Chelsie Man, NP        magnesium oxide tablet 800 mg  800 mg Oral Q4H PRN Chelsie Man, NP        oxyCODONE immediate release tablet 5 mg  5 mg Oral Q4H PRN Chelsie Man, NP        [START ON 4/17/2024] pantoprazole EC tablet 40 mg  40 mg Oral Daily Chelsie Man, NP        potassium chloride SA CR tablet 20 mEq  20 mEq Oral PRN Chelsie Man, NP        potassium chloride SA CR tablet 20 mEq  20 mEq Oral Q2H PRN Chelsie Man, NP        potassium chloride SA CR tablet 20 mEq  20 mEq Oral Q2H PRN Chelsie Man, NORMA        [START ON 4/17/2024] spironolactone split tablet 12.5 mg  12.5 mg Oral Daily Chelsie Man, NP          Family History       Problem Relation (Age of Onset)    Breast cancer Sister    Lung cancer Mother          Tobacco Use    Smoking status: Former     Types: Cigarettes    Smokeless tobacco: Former   Substance and Sexual Activity    Alcohol use: Yes    Drug use: Never    Sexual activity: Not Currently     Review of Systems   Constitutional:  Negative for chills and fever.   HENT:  Negative for congestion, postnasal drip, rhinorrhea and sore throat.    Respiratory:  Negative for cough, chest tightness and shortness of breath.    Cardiovascular:  Negative for palpitations.   Gastrointestinal:  Negative for abdominal pain, constipation, diarrhea, nausea and vomiting.   Genitourinary:  Negative for dysuria and hematuria.   Neurological:  Negative for dizziness, light-headedness and headaches.     Objective:     Vital Signs (Most Recent):  Temp: 98.4 °F (36.9 °C) (04/16/24 1917)  Pulse: 65 (04/16/24 1917)  Resp: 16 (04/16/24 1917)  BP: (!) 155/74 (04/16/24 1917)  SpO2: 96 % (04/16/24 1917) Vital Signs (24h Range):  Temp:  [97.4 °F (36.3 °C)-98.4 °F (36.9 °C)] 98.4 °F (36.9 °C)  Pulse:  [55-65] 65  Resp:  [16] 16  SpO2:  [96 %-97 %] 96 %  BP: (150-155)/(74-83) 155/74     Weight: 95.8 kg (211 lb 1.5 oz)  Body mass index is 30.29 kg/m².     Physical Exam  Constitutional:       General: He is not in acute distress.     Appearance: Normal appearance.   HENT:      Head: Normocephalic and atraumatic.   Eyes:      Extraocular Movements: Extraocular movements intact.   Cardiovascular:      Rate and Rhythm: Normal rate.   Pulmonary:      Effort: Pulmonary effort is normal. No tachypnea or respiratory distress.   Abdominal:      General: Abdomen is flat. There is no distension.   Musculoskeletal:         General: Normal range of motion.      Cervical back: Normal range of motion and neck supple.   Skin:     General: Skin is dry.   Neurological:      Mental Status: He is alert and oriented to person, place, and time.    Psychiatric:         Attention and Perception: Attention and perception normal.         Mood and Affect: Mood and affect normal.                Significant Labs: All pertinent labs within the past 24 hours have been reviewed.    Significant Imaging: I have reviewed all pertinent imaging results/findings within the past 24 hours.

## 2024-04-18 LAB
ALBUMIN SERPL BCP-MCNC: 3.2 G/DL (ref 3.5–5.2)
ALP SERPL-CCNC: 96 U/L (ref 55–135)
ALT SERPL W/O P-5'-P-CCNC: 9 U/L (ref 10–44)
ANION GAP SERPL CALC-SCNC: 5 MMOL/L (ref 8–16)
AST SERPL-CCNC: 9 U/L (ref 10–40)
BASOPHILS # BLD AUTO: 0.04 K/UL (ref 0–0.2)
BASOPHILS NFR BLD: 0.4 % (ref 0–1.9)
BILIRUB SERPL-MCNC: 0.8 MG/DL (ref 0.1–1)
BLD PROD TYP BPU: NORMAL
BLOOD UNIT EXPIRATION DATE: NORMAL
BLOOD UNIT TYPE CODE: 5100
BLOOD UNIT TYPE: NORMAL
BUN SERPL-MCNC: 19 MG/DL (ref 8–23)
CALCIUM SERPL-MCNC: 8.8 MG/DL (ref 8.7–10.5)
CHLORIDE SERPL-SCNC: 109 MMOL/L (ref 95–110)
CO2 SERPL-SCNC: 25 MMOL/L (ref 23–29)
CODING SYSTEM: NORMAL
CREAT SERPL-MCNC: 1.2 MG/DL (ref 0.5–1.4)
CROSSMATCH INTERPRETATION: NORMAL
DIFFERENTIAL METHOD BLD: ABNORMAL
DISPENSE STATUS: NORMAL
EOSINOPHIL # BLD AUTO: 0 K/UL (ref 0–0.5)
EOSINOPHIL NFR BLD: 0 % (ref 0–8)
ERYTHROCYTE [DISTWIDTH] IN BLOOD BY AUTOMATED COUNT: 12 % (ref 11.5–14.5)
EST. GFR  (NO RACE VARIABLE): >60 ML/MIN/1.73 M^2
GLUCOSE SERPL-MCNC: 173 MG/DL (ref 70–110)
HCT VFR BLD AUTO: 32.1 % (ref 40–54)
HGB BLD-MCNC: 11.1 G/DL (ref 14–18)
IMM GRANULOCYTES # BLD AUTO: 0.05 K/UL (ref 0–0.04)
IMM GRANULOCYTES NFR BLD AUTO: 0.5 % (ref 0–0.5)
LYMPHOCYTES # BLD AUTO: 0.7 K/UL (ref 1–4.8)
LYMPHOCYTES NFR BLD: 6.8 % (ref 18–48)
MAGNESIUM SERPL-MCNC: 2 MG/DL (ref 1.6–2.6)
MCH RBC QN AUTO: 35.6 PG (ref 27–31)
MCHC RBC AUTO-ENTMCNC: 34.6 G/DL (ref 32–36)
MCV RBC AUTO: 103 FL (ref 82–98)
MONOCYTES # BLD AUTO: 0.2 K/UL (ref 0.3–1)
MONOCYTES NFR BLD: 1.9 % (ref 4–15)
NEUTROPHILS # BLD AUTO: 8.8 K/UL (ref 1.8–7.7)
NEUTROPHILS NFR BLD: 90.4 % (ref 38–73)
NRBC BLD-RTO: 0 /100 WBC
PHOSPHATE SERPL-MCNC: 2.2 MG/DL (ref 2.7–4.5)
PLATELET # BLD AUTO: 150 K/UL (ref 150–450)
PMV BLD AUTO: 9.3 FL (ref 9.2–12.9)
POCT GLUCOSE: 162 MG/DL (ref 70–110)
POCT GLUCOSE: 169 MG/DL (ref 70–110)
POCT GLUCOSE: 260 MG/DL (ref 70–110)
POCT GLUCOSE: 350 MG/DL (ref 70–110)
POTASSIUM SERPL-SCNC: 4.2 MMOL/L (ref 3.5–5.1)
PROT SERPL-MCNC: 5.5 G/DL (ref 6–8.4)
RBC # BLD AUTO: 3.12 M/UL (ref 4.6–6.2)
SODIUM SERPL-SCNC: 139 MMOL/L (ref 136–145)
UNIT NUMBER: NORMAL
WBC # BLD AUTO: 9.69 K/UL (ref 3.9–12.7)

## 2024-04-18 PROCEDURE — 85025 COMPLETE CBC W/AUTO DIFF WBC: CPT | Performed by: NURSE PRACTITIONER

## 2024-04-18 PROCEDURE — 25000003 PHARM REV CODE 250: Performed by: INTERNAL MEDICINE

## 2024-04-18 PROCEDURE — 63600175 PHARM REV CODE 636 W HCPCS: Performed by: INTERNAL MEDICINE

## 2024-04-18 PROCEDURE — 83735 ASSAY OF MAGNESIUM: CPT | Performed by: NURSE PRACTITIONER

## 2024-04-18 PROCEDURE — 25000003 PHARM REV CODE 250: Performed by: NURSE PRACTITIONER

## 2024-04-18 PROCEDURE — 38241 TRANSPLT AUTOL HCT/DONOR: CPT

## 2024-04-18 PROCEDURE — 80053 COMPREHEN METABOLIC PANEL: CPT | Performed by: NURSE PRACTITIONER

## 2024-04-18 PROCEDURE — 30243Y0 TRANSFUSION OF AUTOLOGOUS HEMATOPOIETIC STEM CELLS INTO CENTRAL VEIN, PERCUTANEOUS APPROACH: ICD-10-PCS | Performed by: INTERNAL MEDICINE

## 2024-04-18 PROCEDURE — 84100 ASSAY OF PHOSPHORUS: CPT | Performed by: NURSE PRACTITIONER

## 2024-04-18 PROCEDURE — 99233 SBSQ HOSP IP/OBS HIGH 50: CPT | Mod: ,,, | Performed by: INTERNAL MEDICINE

## 2024-04-18 PROCEDURE — 20600001 HC STEP DOWN PRIVATE ROOM

## 2024-04-18 PROCEDURE — 38208 THAW PRESERVED STEM CELLS: CPT

## 2024-04-18 PROCEDURE — 63600175 PHARM REV CODE 636 W HCPCS: Performed by: NURSE PRACTITIONER

## 2024-04-18 RX ADMIN — INSULIN ASPART 2 UNITS: 100 INJECTION, SOLUTION INTRAVENOUS; SUBCUTANEOUS at 01:04

## 2024-04-18 RX ADMIN — ONDANSETRON 8 MG: 8 TABLET, ORALLY DISINTEGRATING ORAL at 09:04

## 2024-04-18 RX ADMIN — ONDANSETRON 8 MG: 8 TABLET, ORALLY DISINTEGRATING ORAL at 01:04

## 2024-04-18 RX ADMIN — INSULIN ASPART 2 UNITS: 100 INJECTION, SOLUTION INTRAVENOUS; SUBCUTANEOUS at 09:04

## 2024-04-18 RX ADMIN — INSULIN ASPART 3 UNITS: 100 INJECTION, SOLUTION INTRAVENOUS; SUBCUTANEOUS at 09:04

## 2024-04-18 RX ADMIN — DIBASIC SODIUM PHOSPHATE, MONOBASIC POTASSIUM PHOSPHATE AND MONOBASIC SODIUM PHOSPHATE 1 TABLET: 852; 155; 130 TABLET ORAL at 05:04

## 2024-04-18 RX ADMIN — PANTOPRAZOLE SODIUM 40 MG: 40 TABLET, DELAYED RELEASE ORAL at 08:04

## 2024-04-18 RX ADMIN — OLANZAPINE 5 MG: 5 TABLET, FILM COATED ORAL at 08:04

## 2024-04-18 RX ADMIN — CARVEDILOL 12.5 MG: 12.5 TABLET, FILM COATED ORAL at 09:04

## 2024-04-18 RX ADMIN — HYDRALAZINE HYDROCHLORIDE 50 MG: 50 TABLET ORAL at 05:04

## 2024-04-18 RX ADMIN — LEVOTHYROXINE SODIUM 112 MCG: 112 TABLET ORAL at 09:04

## 2024-04-18 RX ADMIN — FLUCONAZOLE 400 MG: 200 TABLET ORAL at 08:04

## 2024-04-18 RX ADMIN — INSULIN ASPART 8 UNITS: 100 INJECTION, SOLUTION INTRAVENOUS; SUBCUTANEOUS at 06:04

## 2024-04-18 RX ADMIN — ACYCLOVIR 800 MG: 200 CAPSULE ORAL at 09:04

## 2024-04-18 RX ADMIN — Medication 1 DOSE: at 01:04

## 2024-04-18 RX ADMIN — Medication 1 DOSE: at 09:04

## 2024-04-18 RX ADMIN — Medication 1 DOSE: at 05:04

## 2024-04-18 RX ADMIN — SPIRONOLACTONE 12.5 MG: 25 TABLET ORAL at 08:04

## 2024-04-18 RX ADMIN — HYDROCORTISONE SODIUM SUCCINATE 250 MG: 250 INJECTION, POWDER, FOR SOLUTION INTRAMUSCULAR; INTRAVENOUS at 01:04

## 2024-04-18 RX ADMIN — DIPHENHYDRAMINE HYDROCHLORIDE 50 MG: 50 INJECTION, SOLUTION INTRAMUSCULAR; INTRAVENOUS at 01:04

## 2024-04-18 RX ADMIN — ENOXAPARIN SODIUM 40 MG: 40 INJECTION SUBCUTANEOUS at 05:04

## 2024-04-18 RX ADMIN — DIBASIC SODIUM PHOSPHATE, MONOBASIC POTASSIUM PHOSPHATE AND MONOBASIC SODIUM PHOSPHATE 1 TABLET: 852; 155; 130 TABLET ORAL at 11:04

## 2024-04-18 RX ADMIN — HYDRALAZINE HYDROCHLORIDE 50 MG: 50 TABLET ORAL at 09:04

## 2024-04-18 RX ADMIN — ACYCLOVIR 800 MG: 200 CAPSULE ORAL at 08:04

## 2024-04-18 RX ADMIN — SODIUM CHLORIDE AND POTASSIUM CHLORIDE: .9; .15 SOLUTION INTRAVENOUS at 11:04

## 2024-04-18 RX ADMIN — DIBASIC SODIUM PHOSPHATE, MONOBASIC POTASSIUM PHOSPHATE AND MONOBASIC SODIUM PHOSPHATE 1 TABLET: 852; 155; 130 TABLET ORAL at 04:04

## 2024-04-18 RX ADMIN — ONDANSETRON 8 MG: 8 TABLET, ORALLY DISINTEGRATING ORAL at 05:04

## 2024-04-18 RX ADMIN — FINASTERIDE 5 MG: 5 TABLET, FILM COATED ORAL at 08:04

## 2024-04-18 RX ADMIN — Medication 1 DOSE: at 08:04

## 2024-04-18 RX ADMIN — SODIUM CHLORIDE AND POTASSIUM CHLORIDE: .9; .15 SOLUTION INTRAVENOUS at 02:04

## 2024-04-18 RX ADMIN — LEVOFLOXACIN 500 MG: 500 TABLET, FILM COATED ORAL at 08:04

## 2024-04-18 RX ADMIN — LORAZEPAM 1 MG: 2 INJECTION INTRAMUSCULAR; INTRAVENOUS at 01:04

## 2024-04-18 RX ADMIN — OLANZAPINE 5 MG: 5 TABLET, FILM COATED ORAL at 09:04

## 2024-04-18 NOTE — ASSESSMENT & PLAN NOTE
- creatinine 1.4 on admission; baseline ~1.1  - will monitor daily CMP  -on continuous fluids as part of chemotherapy regimen (NACL with 10% K)

## 2024-04-18 NOTE — PLAN OF CARE
Day 0 carlotta auto SCT; no acute events this shift. Pt received SCT this shift for a total of 5 bags, tolerated well. Afebrile & VSS on room air. No PRNs needed. Electrolytes closely monitored, phosphorus replaced per PRN orders. Ambulates independently to bathroom; educated on importance of I/O recording. CHG wipes provided and encouraged use. Tolerating diet, voiding without difficulty. BG monitored ACHS and SSI given as needed. Pt remaining free from falls or injury throughout shift; bed locked and in lowest position; call light within reach. POC reviewed with patient and grandson at bedside. All needs addressed. Frequent rounding performed. SCT scheduled for tomorrow. Pt to receive 4 bags at 13:30.

## 2024-04-18 NOTE — HOSPITAL COURSE
04/18/2024 Day 0 Melphalan 140 mg/m2 autologous stem cell transplant for multiple myeloma. First day of stem cells of 5 bags, 4 bags infused tomorrow for total dose of 2.97 x10^6 CD34 cells. First stem cell infusion today at 1:30pm without any adverse side effects.  04/19/2024: Day +1 for a Carmen 140 auto SCT for MM. He will receive remaining 4 bags of stem cells today. Tolerated day 1 of transplant well. Remains afebrile. VSS. BP intermittently elevated on home BP meds. Oral intake is good, so will stop IVF following post hydration today. Denies any chemo-related side effects today.  04/22/2024: Day +4 from a Carmen 140 auto SCT for MM. Remains afebrile. VSS. BP improved off IVF. Denies mouth/throat soreness and GI toxicities. Tolerating diet well.  04/23/2024: Day +5 from a Carmen 140 auto SCT for MM. Remains afebrile. VSS. Continues to deny chemo side effects. Tolerating diet well. Blood counts dropping predictably. T-bili up to 1.6. likely 2/2 Melphalan. LFTs wnl.  04/24/2024: Day +6 from a Carmen 140 auto SCT for MM. Remains afebrile. VSS. Expressed concern for constipation today. One time dose of Pericolace ordered and patient had large BM per his report. Will avoid daily laxatives given high risk for chemo-induced diarrhea. Had episode of emesis this morning. PRN IV Zofran ordered. Stopped lovenox ppx due to thrombocytopenia. BG stable, so stopping ACHS blood glucose monitoring.  04/25/2024 Day +7 s/p Carmen 140 Auto SCT for MM. Afebrile. Constipation resolved with pericolace and lactulose yesterday. Denies pain, n/v. Walking in halls. No other acute concerns  04/26/2024: Day +8 from a Carmen 140 auto SCT for MM. Remains afebrile. VSS. Continues to tolerate chemo well. ANC down to 10 today. Remains active. Oral intake remains good. Family remains at bedside.  04/27/2024 Day +9 following AutoSCT conditioned with [id number].  He is doing well this time.  No nausea, vomiting, diarrhea.  Continue supportive care.  04/28/2024 Day +10  following AutoSCT conditioned with [id number]. Platelets transfused this morning. He offers no new complaints at this time.  04/29/2024 Day +11 s/p Carmen 140 Auto SCT for MM. Continues doing well overall. No acute issues this AM. Afebrile, VSS  04/30/2024 Day +12 s/p Carmen 140 Auto SCT for MM. Reports nausea this AM, has prn antiemetics. Also reporting feeling bloated/constipated, one BM yesterday, started on miralax. Mild increase to tbili, will trend. Will receive 1unit platelets. Afebrile, VSS  05/01/2024 Day +13 s/p Carmen 140 Auto SCT for MM. ANC 10. Nausea yesterday controlled with PRNs. Reports increased fatigue this AM. Had BM yesterday. Tbili still elevated but downtrending. Afebrile, VSS  05/02/2024: Day +14 from a Carmen 140 auto SCT for MM. T-max of 100.1 over night. VS otherwise stable. May develop engraftment fever in the coming days. ANC 10 today. No BM in 2 days despite scheduled Mirilax. Will give a dose of pericolace today. Can try lactulose if that is ineffective.  05/03/2024 Day +15 s/p Carmen 140 Auto SCT for MM. ANC 20 today. Did have BM yesterday with miralax and dose of pericolace. Low PO intake with mild increase to BUN and hyponatremia, giving 1L NS over 5hrs. Tmax 100F this morning. Will receive 1unit platelets for count of 6K  05/04/2024 Day +16 [id number] ASCT for MM. ANC 10. Developed neutropenic fever overnight and now on cefepime.   05/05/2024 Day +17 [id number] ASCT for MM. ANC 10. Blood cultures growing GPCs in clusters resembling staphylococcal species. Rapid PCR ID is negative. Vancomycin started. Transfusing  1 unit pRBCs and 1 unit platelets.   05/06/2024 Day +18 s/p Carmen 140 Auto SCT for MM. ANC 20 today. Remains on cefepime and vanc for Gemella Haemolysans bacteremia. ID consulted for recs for length of ATB therapy. Echo pending. UC also with low yield enterococcus. Blood cultures 5/4 NGTD. Patient reports feeling well overall this AM. Denies pain, n/v. Did have BM  05/07/2024: Day +19 from a Carmen 140  auto SCT for MM. ANC up to 60 today. Remains afebrile. VSS. Continuing Cefepime for Gemella bacteremia. Repeat blood cx from 5/4 still with NGTD. Unable to r/o vegetation of aortic valve on TTE, so will complete a 6 week course of empiric IV abx per ID. Susceptibilities pending. Transfusing 1 unit prbc for hgb of 6.9.   05/08/2024: Day +20 from a Carmen 140 auto SCT for MM. ANC up to 100 today. Remains afebrile. VSS. Transfusing 1 unit plts for plt count of 9K. Continuing Cefepime for Gemella bacteremia until engraftment and then will transition to Rocephin for daily dosing per ID rec. Will continue Rocephin through 6/14/24. Will place HH orders today. Repeat blood cx from 5/4/24 with NGTD.  05/09/2024 Day +21 s/p Carmen 140 Auto SCT for MM.  today. Afebrile. Remains on cefepime per ID. Will switch to ceftriaxone once ANC >500. EOT 6/14. Continues with mild nausea. Denies diarrhea or constipation.   05/10/2024 Day +22 s/p Carmen 140 Auto SCT for MM. Day 1 of engraftment today with . Will do discharge teaching today with patient and niece. Remains afebrile. Stopping cefepime and switching to ceftriaxone per ID. Denies any n/v/d/c. Will repeat urine culture today to check for clearance of enterococcus. ID follow up with Dr. Rojas is scheduled  05/11/2024 Day +23 s/p Carmen 140 Auto SCT for MM. Patient discharged today. Vitals stable. Patient with no new symptoms. Patient has oncology follow-up on 5/17. He was discharged with the appropriate antimicrobial prophylaxis.     Physical Exam  Vitals and nursing note reviewed.   Constitutional:       Appearance: Normal appearance. He is well-developed.   HENT:      Head: Normocephalic and atraumatic.      Ears:      Mouth/Throat:      Mouth: Mucous membranes are moist.      Pharynx: No oropharyngeal exudate or posterior oropharyngeal erythema.   Eyes:      General:         Right eye: No discharge.         Left eye: No discharge.      Extraocular Movements: Extraocular  movements intact.      Conjunctiva/sclera: Conjunctivae normal.   Cardiovascular:      Rate and Rhythm: Normal rate and regular rhythm.      Pulses: Normal pulses.      Heart sounds: Normal heart sounds. No murmur heard.  Pulmonary:      Effort: Pulmonary effort is normal. No respiratory distress.      Breath sounds: Normal breath sounds. No wheezing or rales.   Abdominal:      General: Bowel sounds are normal. There is no distension.      Palpations: Abdomen is soft.      Tenderness: There is no abdominal tenderness.   Musculoskeletal:         General: No deformity. Normal range of motion.      Cervical back: Normal range of motion and neck supple.      Right lower leg: No edema.      Left lower leg: No edema.   Skin:     General: Skin is warm and dry.      Findings: No bruising, erythema or rash.      Comments: Vas cath removed.   Neurological:      General: No focal deficit present.      Mental Status: He is alert and oriented to person, place, and time.      Motor: No weakness.   Psychiatric:         Mood and Affect: Mood normal.         Behavior: Behavior normal.         Thought Content: Thought content normal.         Judgment: Judgment normal.

## 2024-04-18 NOTE — ASSESSMENT & PLAN NOTE
- tbili 1.7 on admit; asymptomatic; no clear cause  - normal 4/18 at 0.8  - will trend daily LFTs  RESOLVED

## 2024-04-18 NOTE — PLAN OF CARE
Day 0 of Carmen 140 Auto SCT. Pt involved in plan of care and communicating needs throughout shift. Up in room and to bathroom independently; voiding without difficulty. Tolerating diet. IVF reduced to 75ml/hr at 2100 for SCT scheduled at 1330 today. ACCUchk= 193; SSI administered. Electrolytes replaced per protocol. All VSS. Pt remaining free from falls or injury throughout shift.  Pt verbalized understanding of the risk of falling; however, pt refused bed alarm. Family at bedside. Bed locked and in lowest position, personal belongings and call light within reach, non skid socks on when OOB. Pt instructed to call for assistance as needed. Q2H rounding done on pt.

## 2024-04-18 NOTE — PROGRESS NOTES
[facility] - Oncology ([facility])  Hematology  Bone Marrow Transplant  Progress Note    Patient Name: Chip Goodson  Admission Date: 4/16/2024  Hospital Length of Stay: 2 days  Code Status: Full Code    Subjective:     Interval History: Day 0 Melphalan 140 mg/m2 autologous stem cell transplant for multiple myeloma. First day of stem cells of 5 bags, 4 bags infused tomorrow for total dose of 2.97 x10^6 CD34 cells. First stem cell infusion today at 1:30pm without any adverse side effects.    Objective:     Vital Signs (Most Recent):  Temp: 97.8 °F (36.6 °C) (04/18/24 1600)  Pulse: 84 (04/18/24 1600)  Resp: 18 (04/18/24 1600)  BP: (!) 118/56 (04/18/24 1600)  SpO2: (!) 94 % (04/18/24 1600) Vital Signs (24h Range):  Temp:  [97.5 °F (36.4 °C)-98.3 °F (36.8 °C)] 97.8 °F (36.6 °C)  Pulse:  [] 84  Resp:  [14-20] 18  SpO2:  [93 %-98 %] 94 %  BP: (118-170)/(56-84) 118/56     Weight: 95.8 kg (211 lb 1.5 oz)  Body mass index is 30.29 kg/m².  Body surface area is 2.18 meters squared.    ECOG SCORE           ECOG PS 1    Intake/Output - Last 3 Shifts         04/16 0700  04/17 0659 04/17 0700  04/18 0659 04/18 0700  04/19 0659    P.O.  1200 1148    I.V. (mL/kg) 1537 (16.1) 2446.4 (25.6) 700 (7.3)    IV Piggyback  472.3     Total Intake(mL/kg) 1537 (16.1) 4118.7 (43) 1848 (19.3)    Urine (mL/kg/hr) 875 3800 (1.7) 2100 (2.4)    Total Output 875 3800 2100    Net +662 +318.7 -252                    Physical Exam  Vitals and nursing note reviewed.   Constitutional:       Appearance: He is well-developed.   HENT:      Head: Normocephalic and atraumatic.      Comments: Difficult hearing  Eyes:      General: No scleral icterus.     Conjunctiva/sclera: Conjunctivae normal.   Cardiovascular:      Rate and Rhythm: Normal rate.   Pulmonary:      Effort: Pulmonary effort is normal. No respiratory distress.   Abdominal:      General: There is no distension.      Palpations: Abdomen is soft.      Tenderness: There is no abdominal  tenderness.   Musculoskeletal:         General: Normal range of motion.      Cervical back: Normal range of motion and neck supple.   Skin:     General: Skin is warm and dry.      Comments: Right chest wall central line CDI   Neurological:      Mental Status: He is alert and oriented to person, place, and time.      Cranial Nerves: No cranial nerve deficit.   Psychiatric:         Behavior: Behavior normal.            Significant Labs:   CBC:   Recent Labs   Lab 04/17/24  0428 04/18/24  0258   WBC 4.82 9.69   HGB 10.8* 11.1*   HCT 30.9* 32.1*   * 150    and CMP:   Recent Labs   Lab 04/17/24 0428 04/18/24  0258    139   K 4.0 4.2    109   CO2 26 25    173*   BUN 24* 19   CREATININE 1.4 1.2   CALCIUM 8.6* 8.8   PROT 5.1* 5.5*   ALBUMIN 3.1* 3.2*   BILITOT 1.1* 0.8   ALKPHOS 100 96   AST 10 9*   ALT 10 9*   ANIONGAP 5* 5*       Diagnostic Results:  I have reviewed all pertinent imaging results/findings within the past 24 hours.  Assessment/Plan:     * Stem cell transplant candidate  - patient of Dr. Evans  - Today is Day 0  - Will receive chemotherapy on 4/17 with plan for SCT on 4/18    Planned conditioning regimen:  Melphalan TBD on Day -1     Antimicrobial Prophylaxis:  Acyclovir starting on Day -1  Levofloxacin starting on Day -1  Fluconazole starting on Day -1  Bactrim starting on Day +30     Growth Factor Support:  Neupogen starting on Day +7      Caregiver: daughter and other family members  Post-transplant discharge plans: [place]    Hyperbilirubinemia  - tbili 1.7 on admit; asymptomatic; no clear cause  - normal 4/18 at 0.8  - will trend daily LFTs  RESOLVED    Anemia in neoplastic disease  - daily CBC while inpatient  - transfuse for Hgb <7    Cancer related pain  - continue home prn oxy    BPH (benign prostatic hyperplasia)  - continue home finasteride    HTN (hypertension)  - continue home amlodipine, carvedilol, hydralazine, and spironolactone    Type 2 diabetes mellitus  without complications  - will hold home metformin  - ACHS accuchecks with moderate dose SSI  - can titrate insulin as needed for BG goal of 140-180    Hypothyroidism, unspecified  - continue home synthroid    GERD (gastroesophageal reflux disease)  - previously on BID protonix and daily pepcid for stomach ulcers; now resolved  - will continue daily protonix per transplant order set    Multiple myeloma in remission  - patient of Dr. Evans; follows locally with Dr. Rose in [place]  - diagnosed June 2023 with IgA Kappa MM; stage unclear at diagnosis as FISH not available  - started DRd therapy on 7/18/2023  - he stopped the daratumumab after cycle 4 and continue with revlimid and dex only; looks to have completed ~5 cycles  - Admitted for Carmen 140 Auto SCT as above on 4/16/2023    COLTON (acute kidney injury)  - creatinine 1.4 on admission; baseline ~1.1  - will monitor daily CMP  -on continuous fluids as part of chemotherapy regimen (NACL with 10% K)        VTE Risk Mitigation (From admission, onward)           Ordered     heparin (porcine) injection 1,600 Units  As needed (PRN)         04/16/24 2129     enoxaparin injection 40 mg  Daily         04/16/24 1904     IP VTE HIGH RISK PATIENT  Once         04/16/24 1904     Place sequential compression device  Until discontinued         04/16/24 1904                    Disposition: inpatient for stem cell transplant    Marjan Wang MD  Bone Marrow Transplant  [facility] - [facility] (Hospital)

## 2024-04-18 NOTE — ASSESSMENT & PLAN NOTE
- patient of Dr. Evans  - Today is Day 0  - Will receive chemotherapy on 4/17 with plan for SCT on 4/18    Planned conditioning regimen:  Melphalan TBD on Day -1     Antimicrobial Prophylaxis:  Acyclovir starting on Day -1  Levofloxacin starting on Day -1  Fluconazole starting on Day -1  Bactrim starting on Day +30     Growth Factor Support:  Neupogen starting on Day +7      Caregiver: daughter and other family members  Post-transplant discharge plans: Smita Portillo

## 2024-04-18 NOTE — ASSESSMENT & PLAN NOTE
- patient of Dr. Evans; follows locally with Dr. Rose in [place]  - diagnosed June 2023 with IgA Kappa MM; stage unclear at diagnosis as FISH not available  - started DRd therapy on 7/18/2023  - he stopped the daratumumab after cycle 4 and continue with revlimid and dex only; looks to have completed ~5 cycles  - Admitted for Carmen 140 Auto SCT as above on 4/16/2023

## 2024-04-18 NOTE — SUBJECTIVE & OBJECTIVE
Subjective:     Interval History: Day 0 Melphalan 140 mg/m2 autologous stem cell transplant for multiple myeloma. First day of stem cells of 5 bags, 4 bags infused tomorrow for total dose of 2.97 x10^6 CD34 cells. First stem cell infusion today at 1:30pm without any adverse side effects.    Objective:     Vital Signs (Most Recent):  Temp: 97.8 °F (36.6 °C) (04/18/24 1600)  Pulse: 84 (04/18/24 1600)  Resp: 18 (04/18/24 1600)  BP: (!) 118/56 (04/18/24 1600)  SpO2: (!) 94 % (04/18/24 1600) Vital Signs (24h Range):  Temp:  [97.5 °F (36.4 °C)-98.3 °F (36.8 °C)] 97.8 °F (36.6 °C)  Pulse:  [] 84  Resp:  [14-20] 18  SpO2:  [93 %-98 %] 94 %  BP: (118-170)/(56-84) 118/56     Weight: 95.8 kg (211 lb 1.5 oz)  Body mass index is 30.29 kg/m².  Body surface area is 2.18 meters squared.    ECOG SCORE           ECOG PS 1    Intake/Output - Last 3 Shifts         04/16 0700  04/17 0659 04/17 0700  04/18 0659 04/18 0700  04/19 0659    P.O.  1200 1148    I.V. (mL/kg) 1537 (16.1) 2446.4 (25.6) 700 (7.3)    IV Piggyback  472.3     Total Intake(mL/kg) 1537 (16.1) 4118.7 (43) 1848 (19.3)    Urine (mL/kg/hr) 875 3800 (1.7) 2100 (2.4)    Total Output 875 3800 2100    Net +662 +318.7 -252                    Physical Exam  Vitals and nursing note reviewed.   Constitutional:       Appearance: He is well-developed.   HENT:      Head: Normocephalic and atraumatic.      Comments: Difficult hearing  Eyes:      General: No scleral icterus.     Conjunctiva/sclera: Conjunctivae normal.   Cardiovascular:      Rate and Rhythm: Normal rate.   Pulmonary:      Effort: Pulmonary effort is normal. No respiratory distress.   Abdominal:      General: There is no distension.      Palpations: Abdomen is soft.      Tenderness: There is no abdominal tenderness.   Musculoskeletal:         General: Normal range of motion.      Cervical back: Normal range of motion and neck supple.   Skin:     General: Skin is warm and dry.      Comments: Right chest wall  central line CDI   Neurological:      Mental Status: He is alert and oriented to person, place, and time.      Cranial Nerves: No cranial nerve deficit.   Psychiatric:         Behavior: Behavior normal.            Significant Labs:   CBC:   Recent Labs   Lab 04/17/24 0428 04/18/24 0258   WBC 4.82 9.69   HGB 10.8* 11.1*   HCT 30.9* 32.1*   * 150    and CMP:   Recent Labs   Lab 04/17/24 0428 04/18/24 0258    139   K 4.0 4.2    109   CO2 26 25    173*   BUN 24* 19   CREATININE 1.4 1.2   CALCIUM 8.6* 8.8   PROT 5.1* 5.5*   ALBUMIN 3.1* 3.2*   BILITOT 1.1* 0.8   ALKPHOS 100 96   AST 10 9*   ALT 10 9*   ANIONGAP 5* 5*       Diagnostic Results:  I have reviewed all pertinent imaging results/findings within the past 24 hours.

## 2024-04-19 LAB
ALBUMIN SERPL BCP-MCNC: 3 G/DL (ref 3.5–5.2)
ALP SERPL-CCNC: 91 U/L (ref 55–135)
ALT SERPL W/O P-5'-P-CCNC: 15 U/L (ref 10–44)
ANION GAP SERPL CALC-SCNC: 7 MMOL/L (ref 8–16)
AST SERPL-CCNC: 23 U/L (ref 10–40)
BASOPHILS # BLD AUTO: 0.01 K/UL (ref 0–0.2)
BASOPHILS NFR BLD: 0.1 % (ref 0–1.9)
BILIRUB SERPL-MCNC: 1.1 MG/DL (ref 0.1–1)
BLD PROD TYP BPU: NORMAL
BLOOD UNIT EXPIRATION DATE: NORMAL
BLOOD UNIT TYPE CODE: 5100
BLOOD UNIT TYPE: NORMAL
BUN SERPL-MCNC: 21 MG/DL (ref 8–23)
CALCIUM SERPL-MCNC: 8.2 MG/DL (ref 8.7–10.5)
CHLORIDE SERPL-SCNC: 112 MMOL/L (ref 95–110)
CO2 SERPL-SCNC: 24 MMOL/L (ref 23–29)
CODING SYSTEM: NORMAL
CREAT SERPL-MCNC: 1.1 MG/DL (ref 0.5–1.4)
CROSSMATCH INTERPRETATION: NORMAL
DIFFERENTIAL METHOD BLD: ABNORMAL
DISPENSE STATUS: NORMAL
EOSINOPHIL # BLD AUTO: 0 K/UL (ref 0–0.5)
EOSINOPHIL NFR BLD: 0 % (ref 0–8)
ERYTHROCYTE [DISTWIDTH] IN BLOOD BY AUTOMATED COUNT: 12.8 % (ref 11.5–14.5)
EST. GFR  (NO RACE VARIABLE): >60 ML/MIN/1.73 M^2
GLUCOSE SERPL-MCNC: 148 MG/DL (ref 70–110)
HCT VFR BLD AUTO: 30.6 % (ref 40–54)
HGB BLD-MCNC: 10.4 G/DL (ref 14–18)
IMM GRANULOCYTES # BLD AUTO: 0.09 K/UL (ref 0–0.04)
IMM GRANULOCYTES NFR BLD AUTO: 0.6 % (ref 0–0.5)
LYMPHOCYTES # BLD AUTO: 0.5 K/UL (ref 1–4.8)
LYMPHOCYTES NFR BLD: 3.6 % (ref 18–48)
MAGNESIUM SERPL-MCNC: 2.1 MG/DL (ref 1.6–2.6)
MCH RBC QN AUTO: 35.7 PG (ref 27–31)
MCHC RBC AUTO-ENTMCNC: 34 G/DL (ref 32–36)
MCV RBC AUTO: 105 FL (ref 82–98)
MONOCYTES # BLD AUTO: 0.4 K/UL (ref 0.3–1)
MONOCYTES NFR BLD: 2.5 % (ref 4–15)
NEUTROPHILS # BLD AUTO: 13.5 K/UL (ref 1.8–7.7)
NEUTROPHILS NFR BLD: 93.2 % (ref 38–73)
NRBC BLD-RTO: 0 /100 WBC
PHOSPHATE SERPL-MCNC: 3.8 MG/DL (ref 2.7–4.5)
PLATELET # BLD AUTO: 139 K/UL (ref 150–450)
PMV BLD AUTO: 9.2 FL (ref 9.2–12.9)
POCT GLUCOSE: 104 MG/DL (ref 70–110)
POCT GLUCOSE: 260 MG/DL (ref 70–110)
POCT GLUCOSE: 261 MG/DL (ref 70–110)
POTASSIUM SERPL-SCNC: 4 MMOL/L (ref 3.5–5.1)
PROT SERPL-MCNC: 5 G/DL (ref 6–8.4)
RBC # BLD AUTO: 2.91 M/UL (ref 4.6–6.2)
SODIUM SERPL-SCNC: 143 MMOL/L (ref 136–145)
UNIT NUMBER: NORMAL
WBC # BLD AUTO: 14.53 K/UL (ref 3.9–12.7)

## 2024-04-19 PROCEDURE — 38208 THAW PRESERVED STEM CELLS: CPT

## 2024-04-19 PROCEDURE — 99233 SBSQ HOSP IP/OBS HIGH 50: CPT | Mod: FS,,, | Performed by: INTERNAL MEDICINE

## 2024-04-19 PROCEDURE — 84100 ASSAY OF PHOSPHORUS: CPT | Performed by: NURSE PRACTITIONER

## 2024-04-19 PROCEDURE — 63600175 PHARM REV CODE 636 W HCPCS: Performed by: NURSE PRACTITIONER

## 2024-04-19 PROCEDURE — 63600175 PHARM REV CODE 636 W HCPCS: Performed by: INTERNAL MEDICINE

## 2024-04-19 PROCEDURE — 25000003 PHARM REV CODE 250: Performed by: INTERNAL MEDICINE

## 2024-04-19 PROCEDURE — 25000003 PHARM REV CODE 250: Performed by: NURSE PRACTITIONER

## 2024-04-19 PROCEDURE — 83735 ASSAY OF MAGNESIUM: CPT | Performed by: NURSE PRACTITIONER

## 2024-04-19 PROCEDURE — 80053 COMPREHEN METABOLIC PANEL: CPT | Performed by: NURSE PRACTITIONER

## 2024-04-19 PROCEDURE — 85025 COMPLETE CBC W/AUTO DIFF WBC: CPT | Performed by: NURSE PRACTITIONER

## 2024-04-19 PROCEDURE — 97165 OT EVAL LOW COMPLEX 30 MIN: CPT

## 2024-04-19 PROCEDURE — 97161 PT EVAL LOW COMPLEX 20 MIN: CPT

## 2024-04-19 PROCEDURE — 20600001 HC STEP DOWN PRIVATE ROOM

## 2024-04-19 RX ORDER — SODIUM CHLORIDE AND POTASSIUM CHLORIDE 150; 900 MG/100ML; MG/100ML
INJECTION, SOLUTION INTRAVENOUS CONTINUOUS
Status: DISCONTINUED | OUTPATIENT
Start: 2024-04-19 | End: 2024-04-19

## 2024-04-19 RX ADMIN — OLANZAPINE 5 MG: 5 TABLET, FILM COATED ORAL at 10:04

## 2024-04-19 RX ADMIN — DIPHENHYDRAMINE HYDROCHLORIDE 50 MG: 50 INJECTION, SOLUTION INTRAMUSCULAR; INTRAVENOUS at 12:04

## 2024-04-19 RX ADMIN — ENOXAPARIN SODIUM 40 MG: 40 INJECTION SUBCUTANEOUS at 04:04

## 2024-04-19 RX ADMIN — SODIUM CHLORIDE AND POTASSIUM CHLORIDE: .9; .15 SOLUTION INTRAVENOUS at 11:04

## 2024-04-19 RX ADMIN — HEPARIN SODIUM 1600 UNITS: 1000 INJECTION, SOLUTION INTRAVENOUS; SUBCUTANEOUS at 04:04

## 2024-04-19 RX ADMIN — Medication 1 DOSE: at 08:04

## 2024-04-19 RX ADMIN — PANTOPRAZOLE SODIUM 40 MG: 40 TABLET, DELAYED RELEASE ORAL at 08:04

## 2024-04-19 RX ADMIN — ONDANSETRON 8 MG: 8 TABLET, ORALLY DISINTEGRATING ORAL at 08:04

## 2024-04-19 RX ADMIN — HYDROCORTISONE SODIUM SUCCINATE 250 MG: 250 INJECTION, POWDER, FOR SOLUTION INTRAMUSCULAR; INTRAVENOUS at 12:04

## 2024-04-19 RX ADMIN — SODIUM CHLORIDE AND POTASSIUM CHLORIDE: .9; .15 SOLUTION INTRAVENOUS at 02:04

## 2024-04-19 RX ADMIN — FLUCONAZOLE 400 MG: 200 TABLET ORAL at 08:04

## 2024-04-19 RX ADMIN — LEVOFLOXACIN 500 MG: 500 TABLET, FILM COATED ORAL at 08:04

## 2024-04-19 RX ADMIN — ONDANSETRON 8 MG: 8 TABLET, ORALLY DISINTEGRATING ORAL at 06:04

## 2024-04-19 RX ADMIN — ACYCLOVIR 800 MG: 200 CAPSULE ORAL at 08:04

## 2024-04-19 RX ADMIN — ONDANSETRON 8 MG: 8 TABLET, ORALLY DISINTEGRATING ORAL at 12:04

## 2024-04-19 RX ADMIN — Medication 1 DOSE: at 12:04

## 2024-04-19 RX ADMIN — CARVEDILOL 12.5 MG: 12.5 TABLET, FILM COATED ORAL at 08:04

## 2024-04-19 RX ADMIN — LEVOTHYROXINE SODIUM 112 MCG: 112 TABLET ORAL at 08:04

## 2024-04-19 RX ADMIN — Medication 1 DOSE: at 05:04

## 2024-04-19 RX ADMIN — OLANZAPINE 5 MG: 5 TABLET, FILM COATED ORAL at 08:04

## 2024-04-19 RX ADMIN — HYDRALAZINE HYDROCHLORIDE 50 MG: 50 TABLET ORAL at 06:04

## 2024-04-19 RX ADMIN — INSULIN ASPART 3 UNITS: 100 INJECTION, SOLUTION INTRAVENOUS; SUBCUTANEOUS at 09:04

## 2024-04-19 RX ADMIN — LORAZEPAM 1 MG: 2 INJECTION INTRAMUSCULAR; INTRAVENOUS at 12:04

## 2024-04-19 RX ADMIN — FINASTERIDE 5 MG: 5 TABLET, FILM COATED ORAL at 08:04

## 2024-04-19 RX ADMIN — SPIRONOLACTONE 12.5 MG: 25 TABLET ORAL at 08:04

## 2024-04-19 RX ADMIN — HYDRALAZINE HYDROCHLORIDE 50 MG: 50 TABLET ORAL at 09:04

## 2024-04-19 RX ADMIN — INSULIN ASPART 6 UNITS: 100 INJECTION, SOLUTION INTRAVENOUS; SUBCUTANEOUS at 05:04

## 2024-04-19 NOTE — ASSESSMENT & PLAN NOTE
- Holding home metformin  - ACHS accuchecks with moderate dose SSI  - Can titrate insulin as indicated for BG goal of 140-180. No indication at this time.

## 2024-04-19 NOTE — PLAN OF CARE
Problem: Physical Therapy  Goal: Physical Therapy Goal  Description: Goals to be met by: 24     Patient will maintain functional independence with mobility, without evidence of deconditioning, by performin. Supine to sit with [place]  2. Sit to stand transfer with [place]  3. Gait  x 400 feet with [place]   4. Ascend/Descend 6 inch curb step with [place]  5. Stand for 10 minutes with [place]  6. Lower extremity exercise program x30 reps per handout, with assistance as needed    Outcome: Ongoing, Progressing    Evaluation Complete. Goals Appropriate.

## 2024-04-19 NOTE — PROGRESS NOTES
[facility] - Oncology ([facility])  Hematology  Bone Marrow Transplant  Progress Note    Patient Name: Chip Goodson  Admission Date: 4/16/2024  Hospital Length of Stay: 3 days  Code Status: Full Code    Subjective:     Interval History: Day +1 for a Carmen 140 auto SCT for MM. He will receive remaining 4 bags of stem cells today. Tolerated day 1 of transplant well. Remains afebrile. VSS. BP intermittently elevated on home BP meds. Oral intake is good, so will stop IVF following post hydration today. Denies any chemo-related side effects today.    Objective:     Vital Signs (Most Recent):  Temp: 98 °F (36.7 °C) (04/19/24 1119)  Pulse: (!) 59 (04/19/24 1119)  Resp: 19 (04/19/24 1119)  BP: (!) 160/72 (04/19/24 1119)  SpO2: (!) 94 % (04/19/24 1119) Vital Signs (24h Range):  Temp:  [97.5 °F (36.4 °C)-98.3 °F (36.8 °C)] 98 °F (36.7 °C)  Pulse:  [] 59  Resp:  [14-19] 19  SpO2:  [93 %-98 %] 94 %  BP: (118-170)/(56-84) 160/72     Weight: 96.1 kg (211 lb 12 oz)  Body mass index is 30.38 kg/m².  Body surface area is 2.18 meters squared.    ECOG SCORE           [unfilled]    Intake/Output - Last 3 Shifts         04/17 0700  04/18 0659 04/18 0700  04/19 0659 04/19 0700  04/20 0659    P.O. 1200 1817     I.V. (mL/kg) 2446.4 (25.6) 1948.2 (20.3)     IV Piggyback 472.3      Total Intake(mL/kg) 4118.7 (43) 3765.2 (39.2)     Urine (mL/kg/hr) 3800 (1.7) 2975 (1.3) 750 (1.3)    Total Output 3800 2975 750    Net +318.7 +790.2 -750                    Physical Exam  Constitutional:       Appearance: He is well-developed.   HENT:      Head: Normocephalic and atraumatic.      Ears:      Comments: [place]     Mouth/Throat:      Pharynx: No oropharyngeal exudate.   Eyes:      General:         Right eye: No discharge.         Left eye: No discharge.      Conjunctiva/sclera: Conjunctivae normal.      Pupils: Pupils are equal, round, and reactive to light.   Cardiovascular:      Rate and Rhythm: Normal rate and regular rhythm.      Heart sounds:  Normal heart sounds. No murmur heard.  Pulmonary:      Effort: Pulmonary effort is normal. No respiratory distress.      Breath sounds: Normal breath sounds. No wheezing or rales.   Abdominal:      General: Bowel sounds are normal. There is no distension.      Palpations: Abdomen is soft.      Tenderness: There is no abdominal tenderness.   Musculoskeletal:         General: No deformity. Normal range of motion.      Cervical back: Normal range of motion and neck supple.   Skin:     General: Skin is warm and dry.      Findings: No erythema or rash.      Comments: Right chest wall vas cath. Dressing c/d/i. No sign of infection to site.   Neurological:      Mental Status: He is alert and oriented to person, place, and time.   Psychiatric:         Behavior: Behavior normal.         Thought Content: Thought content normal.         Judgment: Judgment normal.            Significant Labs:   CBC:   Recent Labs   Lab 04/18/24 0258 04/19/24  0337   WBC 9.69 14.53*   HGB 11.1* 10.4*   HCT 32.1* 30.6*    139*    and CMP:   Recent Labs   Lab 04/18/24 0258 04/19/24  0337    143   K 4.2 4.0    112*   CO2 25 24   * 148*   BUN 19 21   CREATININE 1.2 1.1   CALCIUM 8.8 8.2*   PROT 5.5* 5.0*   ALBUMIN 3.2* 3.0*   BILITOT 0.8 1.1*   ALKPHOS 96 91   AST 9* 23   ALT 9* 15   ANIONGAP 5* 7*       Diagnostic Results:  None  Assessment/Plan:     * History of autologous stem cell transplant  - Patient of Dr. Evans  - Admitting 4/16/24 for a Carmen 140 auto SCT  - Today is Day +1  - He received 5 bags on 4/18/24 at 1330 and will receive the remaining 4 bags today (4/19/24) at 1330. Total CD34 dose is 2.91 x 10^6.  - See treatment plan below.    Planned conditioning regimen:  Melphalan TBD on Day -1     Antimicrobial Prophylaxis:  Acyclovir starting on Day -1  Levofloxacin starting on Day -1  Fluconazole starting on Day -1  Bactrim starting on Day +30     Growth Factor Support:  Neupogen starting on Day +7       Caregiver: daughter and other family members  Post-transplant discharge plans: Smita Portillo    Multiple myeloma in remission  - patient of Dr. Evans; follows locally with Dr. Rose in [place]  - diagnosed June 2023 with IgA Kappa MM; stage unclear at diagnosis as FISH not available  - started DRd therapy on 7/18/2023  - he stopped the daratumumab after cycle 4 and continue with revlimid and dex only; looks to have completed ~5 cycles  - Admitted for Carmen 140 Auto SCT on 4/16/2023    HTN (hypertension)  - Continue home amlodipine, carvedilol, hydralazine, and spironolactone  - Will stop IVF following post transplant hydration today    Hyperbilirubinemia  - T-bili 1.7 on admit; asymptomatic; no clear cause  - normal 4/18 at 0.8  - will trend daily LFTs  RESOLVED    Anemia in neoplastic disease  - Anticipate pancytopenia following chemo  - Daily CBC while inpatient  - Transfuse for Hgb <7    Cancer related pain  - Continue home prn Oxy IR    BPH (benign prostatic hyperplasia)  - Continue home finasteride    Type 2 diabetes mellitus without complications  - Holding home metformin  - ACHS accuchecks with moderate dose SSI  - Can titrate insulin as indicated for BG goal of 140-180. No indication at this time.    Hypothyroidism, unspecified  - Continue home synthroid    GERD (gastroesophageal reflux disease)  - Previously on BID protonix and daily pepcid for stomach ulcers; now resolved  - Will continue daily protonix per transplant order set    COLTON (acute kidney injury)  - Creatinine 1.4 on admission; baseline ~1.1  - Monitoring CMP daily  - Receiving continuous fluids as part of chemotherapy regimen (NACL with 10% K)  - Creatinine at baseline today        VTE Risk Mitigation (From admission, onward)           Ordered     heparin (porcine) injection 1,600 Units  As needed (PRN)         04/16/24 2129     enoxaparin injection 40 mg  Daily         04/16/24 1904     IP VTE HIGH RISK PATIENT  Once         04/16/24 1904      Place sequential compression device  Until discontinued         04/16/24 4494                    Disposition: Inpatient for autologous SCT.    Jessica Cee, NP  Bone Marrow Transplant  [facility] - On[facility] ([facility])

## 2024-04-19 NOTE — ASSESSMENT & PLAN NOTE
- Patient of Dr. Evans  - Admitting 4/16/24 for a Carmen 140 auto SCT  - Today is Day +1  - He received 5 bags on 4/18/24 at 1330 and will receive the remaining 4 bags today (4/19/24) at 1330. Total CD34 dose is 2.91 x 10^6.  - See treatment plan below.    Planned conditioning regimen:  Melphalan TBD on Day -1     Antimicrobial Prophylaxis:  Acyclovir starting on Day -1  Levofloxacin starting on Day -1  Fluconazole starting on Day -1  Bactrim starting on Day +30     Growth Factor Support:  Neupogen starting on Day +7      Caregiver: daughter and other family members  Post-transplant discharge plans: Smita Portillo

## 2024-04-19 NOTE — PLAN OF CARE
Day +1 of Carmen 140 Auto SCT. Pt involved in plan of care and communicating needs throughout shift. Up in room and to bathroom independently; voiding without difficulty. Tolerating diet. Pt experiencing dry mouth; pt encouraged to continue good oral hygiene such as swish/spit. IVF @ 75 ml/hr. ACCUchk= 260; SSI administered. Electrolytes replaced per protocol. Pt scheduled to receive 4 bags @ 1330 today. All VSS. Pt remaining free from falls or injury throughout shift.  Pt verbalized understanding of the risk of falling; however, pt refused bed alarm. Family at bedside. Bed locked and in lowest position, personal belongings and call light within reach, non skid socks on when OOB. Pt instructed to call for assistance as needed. Q2H rounding done on pt.

## 2024-04-19 NOTE — ASSESSMENT & PLAN NOTE
- patient of Dr. Evans; follows locally with Dr. Rose in [place]  - diagnosed June 2023 with IgA Kappa MM; stage unclear at diagnosis as FISH not available  - started DRd therapy on 7/18/2023  - he stopped the daratumumab after cycle 4 and continue with revlimid and dex only; looks to have completed ~5 cycles  - Admitted for Carmen 140 Auto SCT on 4/16/2023

## 2024-04-19 NOTE — SUBJECTIVE & OBJECTIVE
Subjective:     Interval History: Day +1 for a Carmen 140 auto SCT for MM. He will receive remaining 4 bags of stem cells today. Tolerated day 1 of transplant well. Remains afebrile. VSS. BP intermittently elevated on home BP meds. Oral intake is good, so will so will stop IVF following post hydration today. Denies any chemo-related side effects today.    Objective:     Vital Signs (Most Recent):  Temp: 98 °F (36.7 °C) (04/19/24 1119)  Pulse: (!) 59 (04/19/24 1119)  Resp: 19 (04/19/24 1119)  BP: (!) 160/72 (04/19/24 1119)  SpO2: (!) 94 % (04/19/24 1119) Vital Signs (24h Range):  Temp:  [97.5 °F (36.4 °C)-98.3 °F (36.8 °C)] 98 °F (36.7 °C)  Pulse:  [] 59  Resp:  [14-19] 19  SpO2:  [93 %-98 %] 94 %  BP: (118-170)/(56-84) 160/72     Weight: 96.1 kg (211 lb 12 oz)  Body mass index is 30.38 kg/m².  Body surface area is 2.18 meters squared.    ECOG SCORE           [unfilled]    Intake/Output - Last 3 Shifts         04/17 0700  04/18 0659 04/18 0700  04/19 0659 04/19 0700  04/20 0659    P.O. 1200 1817     I.V. (mL/kg) 2446.4 (25.6) 1948.2 (20.3)     IV Piggyback 472.3      Total Intake(mL/kg) 4118.7 (43) 3765.2 (39.2)     Urine (mL/kg/hr) 3800 (1.7) 2975 (1.3) 750 (1.3)    Total Output 3800 2975 750    Net +318.7 +790.2 -750                    Physical Exam  Constitutional:       Appearance: He is well-developed.   HENT:      Head: Normocephalic and atraumatic.      Ears:      Comments: [place]     Mouth/Throat:      Pharynx: No oropharyngeal exudate.   Eyes:      General:         Right eye: No discharge.         Left eye: No discharge.      Conjunctiva/sclera: Conjunctivae normal.      Pupils: Pupils are equal, round, and reactive to light.   Cardiovascular:      Rate and Rhythm: Normal rate and regular rhythm.      Heart sounds: Normal heart sounds. No murmur heard.  Pulmonary:      Effort: Pulmonary effort is normal. No respiratory distress.      Breath sounds: Normal breath sounds. No wheezing or rales.   Abdominal:       General: Bowel sounds are normal. There is no distension.      Palpations: Abdomen is soft.      Tenderness: There is no abdominal tenderness.   Musculoskeletal:         General: No deformity. Normal range of motion.      Cervical back: Normal range of motion and neck supple.   Skin:     General: Skin is warm and dry.      Findings: No erythema or rash.      Comments: Right chest wall vas cath. Dressing c/d/i. No sign of infection to site.   Neurological:      Mental Status: He is alert and oriented to person, place, and time.   Psychiatric:         Behavior: Behavior normal.         Thought Content: Thought content normal.         Judgment: Judgment normal.            Significant Labs:   CBC:   Recent Labs   Lab 04/18/24 0258 04/19/24 0337   WBC 9.69 14.53*   HGB 11.1* 10.4*   HCT 32.1* 30.6*    139*    and CMP:   Recent Labs   Lab 04/18/24 0258 04/19/24 0337    143   K 4.2 4.0    112*   CO2 25 24   * 148*   BUN 19 21   CREATININE 1.2 1.1   CALCIUM 8.8 8.2*   PROT 5.5* 5.0*   ALBUMIN 3.2* 3.0*   BILITOT 0.8 1.1*   ALKPHOS 96 91   AST 9* 23   ALT 9* 15   ANIONGAP 5* 7*       Diagnostic Results:  None

## 2024-04-19 NOTE — PLAN OF CARE
Problem: Occupational Therapy  Goal: Occupational Therapy Goal  Description: Goals to be met by: 05-03-24     Patient will increase functional independence with ADLs by performing:    Pt. To be independent with ADL task performance  Pt. To be independent with HEP to BUE  to maintain level of endurance    Outcome: Ongoing, Progressing

## 2024-04-19 NOTE — ASSESSMENT & PLAN NOTE
- Continue home amlodipine, carvedilol, hydralazine, and spironolactone  - Will stop IVF following post transplant hydration today

## 2024-04-19 NOTE — PT/OT/SLP EVAL
Physical Therapy Evaluation    Patient Name:  Chip Goodson   MRN:  [id number]    Recommendations:     Discharge Recommendations: No Therapy Indicated   Discharge Equipment Recommendations: none   Barriers to discharge:  None    Assessment:     Chip Goodson is a 73 y.o. male admitted with a medical diagnosis of History of autologous stem cell transplant.  He presents with the following impairments/functional limitations: Patient at risk for deconditioning. Patient demonstrating increased motivation to participate in PT evaluation, with good response to activities completed and education provided. Session completed prior to SCT, therefore patient was evaluated at their functional baseline. Community distance ambulation not assessed this date due to patient about to receive transplant. PT will continue to monitor throughout course of treatment. At this time, patient does not demonstrate the need for skilled physical therapy services post-acutely.       Rehab Prognosis: Good; patient would benefit from acute skilled PT services to address these deficits and reach maximum level of function.    Recent Surgery: * No surgery found *      Plan:     During this hospitalization, patient to be seen 2 x/week to address the identified rehab impairments via gait training, therapeutic activities, therapeutic exercises, neuromuscular re-education and progress toward the following goals:    Plan of Care Expires:  05/19/24    Subjective     Chief Complaint: None stated  Patient/Family Comments/goals: To get better and go home  Pain/Comfort:  Pain Rating 1: 0/10  Pain Rating Post-Intervention 1: 0/10    Patients cultural, spiritual, Christianity conflicts given the current situation: no    Social History:  Residence: Patient lives with their Spouse and adult Grandson  in a single story house with  ramp entrance . Pt's bathroom has a standard height toilet & WIS, with associated grab bars & seat.  Equipment Owned: grab bar, bath  "bench  Equipment Used: none  Prior level of function:  Prior to admission, patient was independent for ambulation, mobility, and, ADLs.     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 Patient & Grandson reporting that level of assistance is variable upon each day, ranging from supervision - physical assistance required  Assistance Upon Discharge:  Becky    Objective:     Communicated with RN prior to session.  Patient found HOB elevated with central line  upon PT entry to room.    General Precautions: Standard, fall, diabetic, hearing impaired   Orthopedic Precautions:N/A   Braces: N/A   Body mass index is 30.38 kg/m².  Oxygen Device: Room Air  Vitals: BP (!) 164/86 (BP Location: Right arm, Patient Position: Lying)   Pulse 81   Temp 97.6 °F (36.4 °C) (Oral)   Resp 18   Ht 5' 10" (1.778 m)   Wt 96.1 kg (211 lb 12 oz)   SpO2 96%   BMI 30.38 kg/m²     Exams:  Cognition:   Alert and Cooperative   Patient is oriented to Person, Place, Time, Situation  Command following: Follows multistep verbal commands  Fluency: clear/fluent  Hearing: Impaired B  Skin Integrity: Visible skin intact  Postural Assessment: rounded shoulders and forward head  Physical Exam:   LLE ROM: WFL  RLE ROM: WFL    LLE Strength (out of 5):   Hip Flexion:5: Holds test position against STRONG pressure  Hip Abduction:5: Holds test position against STRONG pressure  Hip Adduction:5: Holds test position against STRONG pressure  Knee Extension:5: Holds test position against STRONG pressure  Knee Flexion:5: Holds test position against STRONG pressure  Ankle Dorsiflexion:5: Holds test position against STRONG pressure  Ankle Plantarflexion:5: Holds test position against STRONG pressure    RLE Strength (out of 5):   Hip Flexion:5: Holds test position against STRONG pressure  Hip Abduction:5: Holds test position against STRONG pressure  Hip Adduction:5: Holds test position against STRONG pressure  Knee Extension:5: Holds test " position against STRONG pressure  Knee Flexion:5: Holds test position against STRONG pressure  Ankle Dorsiflexion:5: Holds test position against STRONG pressure  Ankle Plantarflexion:5: Holds test position against STRONG pressure    Functional Mobility:  Bed Mobility:     EOB Scooting:   Anterior: Supervision  Supine>Sit: x1, Supervision with HOB Elevated  Sit>Supine: x1, Independent with HOB Elevated    Transfers:     Sit<>Stand: x1, Supervision from Edge of Bed with No AD    Gait: x30ft, Supervision with  utilizing UE support via IV pole    Total Distance: 30ft  Gait Assessment: No obvious gait deviations observed    Balance:   Static Sitting: Independent  Dynamic Sitting: Supervision-Independent  Static Standing: Supervision   Dynamic Standing: Supervision    AM-PAC 6 CLICK MOBILITY  Total Score:23     Treatment & Education:  Patient participated in the following activities:  Standing Marches: x5 alternating L/R, Supervision with utilizing UE support via IV pole    Patient Education Provided on:  The role of physical therapy and how the patient can benefit from skilled services  The negative effects of prolonged bed rest/sedentary behavior, along with the importance of OOB activity & patient participation with PT  The importance of contacting RN, via call light, for mobility throughout the day  Pt white board updated with current therapists name and level of mobility assistance needed.     Patient Verbalized understanding of all topics touched on this date. All patient questions answered within the PT scope of practice    Patient left HOB elevated with all lines intact, call button in reach, and Grandson present.    GOALS:   Multidisciplinary Problems       Physical Therapy Goals          Problem: Physical Therapy    Goal Priority Disciplines Outcome Goal Variances Interventions   Physical Therapy Goal     PT, PT/OT Ongoing, Progressing     Description: Goals to be met by: 05/03/24     Patient will maintain  functional independence with mobility, without evidence of deconditioning, by performin. Supine to sit with [place]  2. Sit to stand transfer with [place]  3. Gait  x 400 feet with [place]   4. Ascend/Descend 6 inch curb step with [place]  5. Stand for 10 minutes with [place]  6. Lower extremity exercise program x30 reps per handout, with assistance as needed                         History:     Past Medical History:   Diagnosis Date    Chronic diastolic (congestive) heart failure     Coronary artery disease     GERD (gastroesophageal reflux disease)     High cholesterol     HTN (hypertension)     Hypothyroidism, unspecified     Multiple myeloma     Secondary pulmonary arterial hypertension     Type 2 diabetes mellitus without complications        Past Surgical History:   Procedure Laterality Date    APPENDECTOMY  1965    BONE MARROW BIOPSY Right 2023    Procedure: BIOPSY, BONE MARROW;  Surgeon: Silvano Austin MD;  Location: [facility] OR;  Service: General;  Laterality: Right;    COLONOSCOPY N/A 2/15/2024    Procedure: COLONOSCOPY;  Surgeon: Kei Donahue III, MD;  Location: [facility];  Service: Endoscopy;  Laterality: N/A;  POST-OP: ENLARGED NODULAR PROSTATE, SUBOPTIMAL PREP, PAN DIVERTICULOSIS, TRANSVERSE COLON POLYPECTOMY    INSERTION OF TUNNELED CENTRAL VENOUS HEMODIALYSIS CATHETER Left 2024    Procedure: INSERTION, CATHETER, HEMODIALYSIS, DUAL LUMEN, left poss right, Bard 14.5 fr hemosplit catheter, model [id number];  Surgeon: Theodore Caruso MD;  Location: [address];  Service: General;  Laterality: Left;    removal of boils         Time Tracking:     PT Received On: 24  PT Start Time: 1153     PT Stop Time: 1206  PT Total Time (min): 13 min     Billable Minutes: Evaluation 13    2024

## 2024-04-19 NOTE — PLAN OF CARE
Day +1 carlotta auto SCT; no acute events this shift. Pt received SCT this shift for a total of 4 bags this shift, tolerated well. Afebrile & VSS on room air. No PRNs needed. Electrolytes closely monitored. Ambulates independently to bathroom; educated on importance of I/O recording. CHG wipes provided and encouraged use. Tolerating diet, voiding without difficulty. BG monitored ACHS and SSI given as needed. Pt refusing bed alarm despite education given. Pt remaining free from falls or injury throughout shift; bed locked and in lowest position; call light within reach. POC reviewed with patient and grandson at bedside. All needs addressed. Frequent rounding performed.

## 2024-04-19 NOTE — ASSESSMENT & PLAN NOTE
- T-bili 1.7 on admit; asymptomatic; no clear cause  - normal 4/18 at 0.8  - will trend daily LFTs  RESOLVED

## 2024-04-19 NOTE — PROGRESS NOTES
Admit Assessment    Patient Identification  Chip Goodson   :  1950  Admit Date:  2024  Attending Provider:  Toby Rosado MD              Referral:   Pt was admitted to  with a diagnosis of Stem cell transplant candidate, and was admitted this hospital stay due to Stem cell transplant candidate [Z76.82].   is involved was referred to the Social Work Department via routine referral.  Patient presents as a 73 y.o. year old  male.    Persons interviewed: patient and grandson.    Living Situation:  Lives with his wife and grandson, Salvador, in his own home.  Daughter Ashleigh ([phone]) will be caregiver after discharge.  Independent with ADLs.  Hard of hearing.      Resides at [address] [place] [address]  phone: [phone] (home).      (RETIRED) Functional Status Prior  Ambulation Prior: 0-->independent  Transferrin-->independent  Toiletin-->independent  Bathin-->independent  Dressin-->independent  Eatin-->independent  Communication: understands/communicates without difficulty  Swallowing: swallows foods/liquids without difficulty    Current or Past Agencies and Description of Services/Supplies    DME  Agency Name: none current  Equipment Currently Used at Home:  (has GB and shower seat for spouse), has RW, not in use.      Home Health  Agency Name: Past use of [facility] in  for hypercalcemia    IV Infusion  Agency Name: none    Nutrition: Oral, diabetic diet.      Outpatient Pharmacy:     [facility] - [address]  [address] [place] [address]  Phone: [phone] Fax: [phone]    [facility] [place] - [address]  [address] [place] [address]  Phone: [phone] Fax: [phone]      Patient Preference of agencies include: none expressed.      Patient/Caregiver informed of right to choose providers or agencies.  Patient provides permission  to release any necessary information to [facility] and to Non[facility] agencies as needed to facilitate patient care, treatment planning, and patient discharge planning.  Written and verbal resources provided.      Coping   Coping well with support of family.  Fatigued today.         Adjustment to Diagnosis and Treatment  Adequate.      Emotional/Behavioral/Cognitive Issues   None noted.           History/Current Symptoms of Anxiety/Depression: No:   History/Current Substance Use:   Social History     Tobacco Use    Smoking status: Former     Types: Cigarettes    Smokeless tobacco: Former   Substance and Sexual Activity    Alcohol use: Yes    Drug use: Never    Sexual activity: Not Currently       Indications of Abuse/Neglect: No:   Abuse Screen (yes response referral indicated)  Feels Unsafe at Home or Work/School: no  Feels Threatened by Someone: no  Does anyone try to keep you from having contact with others or doing things outside your home?: no  Physical Signs of Abuse Present: no    Financial:  Payer/Plan Subscr  Sex Relation Sub. Ins. ID Effective Group Num   1. MEDICARE - [place]* KATHERIN WOODARD 1950 Male Self [id number] 14                                    [address]   2. [facility]* KATHERIN WOODARD 1950 Male Self [id number]                                    [address]        Other identified concerns/needs: referral to [facility].      Plan: to [facility] in care of daughter.      Interventions/Referrals: pending [facility] referral.  Will need clear guidelines on timeline for resuming care of livestock post-transplant.  Patient/caregiver engaged in treatment planning process.     providing psychosocial and supportive counseling, resources, education, assistance and discharge planning as appropriate.  Patient/caregiver state understanding of  available resources,  following, remains available.  Given SWer contact info and encouraged to  call with any needs or concerns.

## 2024-04-19 NOTE — ASSESSMENT & PLAN NOTE
- Creatinine 1.4 on admission; baseline ~1.1  - Monitoring CMP daily  - Receiving continuous fluids as part of chemotherapy regimen (NACL with 10% K)  - Creatinine at baseline today

## 2024-04-19 NOTE — PT/OT/SLP EVAL
Occupational Therapy   Evaluation    Name: Chip Goodson  MRN: [id number]  Admitting Diagnosis: Stem cell transplant candidate  Recent Surgery: * No surgery found *      Recommendations:     Discharge Recommendations: No Therapy Indicated  Discharge Equipment Recommendations:  none  Barriers to discharge:  None    Assessment:     Chip Goodson is a 73 y.o. male with a medical diagnosis of Stem cell transplant candidate.  He presents with good mobility and self-care performance on this date. Education provided on importance of sitting up in chair during the day, washing hands when entering room if ambulates in hallway, need to wear a mask outside room and allowing body time to adjust to positional changes. Patient would benefit from continued OT services to maximize level of safety and independence with self-care tasks.   . Performance deficits affecting function: other (comment) (potential to decline due to stem cell tx).      Rehab Prognosis: Good; patient would benefit from acute skilled OT services to address these deficits and reach maximum level of function.       Plan:     Patient to be seen 1 x/week to address the above listed problems via self-care/home management, therapeutic exercises  Plan of Care Expires: 05/03/24  Plan of Care Reviewed with: patient    Subjective     Chief Complaint: No complaints on this date. Reported feeling good.   Patient/Family Comments/goals: to continue getting better    Occupational Profile:  Living Environment: Pt. Resides with spouse (who is an amputee) as well as grandson in  house with ramp access. Pt has a WIS with a built in seat as well as GB  Previous level of function: pt. Is completely independent with all ADLs/IADLs, rides his tractor,   Roles and Routines: caretaker of self, spouse, grandfather, community dweller  Equipment Used at Home:  (has GB and shower seat for spouse)  Assistance upon Discharge: spouse is an amputee but grandson does live with  them    Pain/Comfort:  Pain Rating 1: 0/10  Pain Rating Post-Intervention 1: 0/10    Patients cultural, spiritual, Amish conflicts given the current situation: no    Objective:     Communicated with: nurse prior to session.  Patient found supine with central line upon OT entry to room.    General Precautions: Standard, diabetic, hearing impaired  Orthopedic Precautions: N/A  Braces: N/A  Respiratory Status: Room air    Occupational Performance:    Bed Mobility:    Patient completed Supine to Sit with independence    Functional Mobility/Transfers:  Patient completed Sit <> Stand Transfer with independence  with  no assistive device   Patient completed Toilet Transfer Stand Pivot technique with independence with  no AD  Functional Mobility: pt. Ambulated throughout room while pushing IV pole with Mod I    Activities of Daily Living:  Grooming: independence to brush teeth at sink (earlier am)  Lower Body Dressing: independence to don socks using figure 4 technique    Cognitive/Visual Perceptual:  Cognitive/Psychosocial Skills:     -       Oriented to: Person, Place, Time, and Situation   -       Follows Commands/attention:Follows multistep  commands  -       Communication: clear/fluent  -       Memory: No Deficits noted  -       Safety awareness/insight to disability: intact   -       Mood/Affect/Coping skills/emotional control: Appropriate to situation  Visual/Perceptual:      -Intact .    Physical Exam:  Balance: -       sit: good; stand: good  Postural examination/scapula alignment:    -       Rounded shoulders  Skin integrity: Visible skin intact  Dominant hand: -       right  Upper Extremity Range of Motion:     -       Right Upper Extremity: WNL  -       Left Upper Extremity: WNL  Upper Extremity Strength:    -       Right Upper Extremity: WNL  -       Left Upper Extremity: WNL   Strength:    -       Right Upper Extremity: WNL  -       Left Upper Extremity: WNL    AMPAC 6 Click ADL:  AMPAC Total Score:  24    Treatment & Education:  Pt. Educated on role of OT and POC    Patient left up in chair with all lines intact    GOALS:   Multidisciplinary Problems       Occupational Therapy Goals          Problem: Occupational Therapy    Goal Priority Disciplines Outcome Interventions   Occupational Therapy Goal     OT, PT/OT Ongoing, Progressing    Description: Goals to be met by: 05-03-24     Patient will increase functional independence with ADLs by performing:    Pt. To be independent with ADL task performance  Pt. To be independent with HEP to BUE  to maintain level of endurance                         History:     Past Medical History:   Diagnosis Date    Chronic diastolic (congestive) heart failure     Coronary artery disease     GERD (gastroesophageal reflux disease)     High cholesterol     HTN (hypertension)     Hypothyroidism, unspecified     Multiple myeloma     Secondary pulmonary arterial hypertension     Type 2 diabetes mellitus without complications          Past Surgical History:   Procedure Laterality Date    APPENDECTOMY  1965    BONE MARROW BIOPSY Right 06/12/2023    Procedure: BIOPSY, BONE MARROW;  Surgeon: Silvano Austin MD;  Location: [facility] OR;  Service: General;  Laterality: Right;    COLONOSCOPY N/A 2/15/2024    Procedure: COLONOSCOPY;  Surgeon: Kei Donahue III, MD;  Location: [facility];  Service: Endoscopy;  Laterality: N/A;  POST-OP: ENLARGED NODULAR PROSTATE, SUBOPTIMAL PREP, PAN DIVERTICULOSIS, TRANSVERSE COLON POLYPECTOMY    INSERTION OF TUNNELED CENTRAL VENOUS HEMODIALYSIS CATHETER Left 4/8/2024    Procedure: INSERTION, CATHETER, HEMODIALYSIS, DUAL LUMEN, left poss right, Bard 14.5 fr hemosplit catheter, model [id number];  Surgeon: Theodore Caruso MD;  Location: [address];  Service: General;  Laterality: Left;    removal of boils         Time Tracking:     OT Date of Treatment: 04/19/24  OT Start Time: 0846  OT Stop Time: 0857  OT Total Time (min): 11 min    Billable  Minutes:Evaluation 11    4/19/2024

## 2024-04-20 LAB
ABO + RH BLD: NORMAL
ALBUMIN SERPL BCP-MCNC: 2.9 G/DL (ref 3.5–5.2)
ALP SERPL-CCNC: 80 U/L (ref 55–135)
ALT SERPL W/O P-5'-P-CCNC: 17 U/L (ref 10–44)
ANION GAP SERPL CALC-SCNC: 4 MMOL/L (ref 8–16)
AST SERPL-CCNC: 23 U/L (ref 10–40)
BASOPHILS # BLD AUTO: 0 K/UL (ref 0–0.2)
BASOPHILS NFR BLD: 0 % (ref 0–1.9)
BILIRUB SERPL-MCNC: 1.4 MG/DL (ref 0.1–1)
BLD GP AB SCN CELLS X3 SERPL QL: NORMAL
BUN SERPL-MCNC: 24 MG/DL (ref 8–23)
CALCIUM SERPL-MCNC: 8.3 MG/DL (ref 8.7–10.5)
CHLORIDE SERPL-SCNC: 109 MMOL/L (ref 95–110)
CO2 SERPL-SCNC: 28 MMOL/L (ref 23–29)
CREAT SERPL-MCNC: 1 MG/DL (ref 0.5–1.4)
DIFFERENTIAL METHOD BLD: ABNORMAL
EOSINOPHIL # BLD AUTO: 0 K/UL (ref 0–0.5)
EOSINOPHIL NFR BLD: 0 % (ref 0–8)
ERYTHROCYTE [DISTWIDTH] IN BLOOD BY AUTOMATED COUNT: 13 % (ref 11.5–14.5)
EST. GFR  (NO RACE VARIABLE): >60 ML/MIN/1.73 M^2
GLUCOSE SERPL-MCNC: 143 MG/DL (ref 70–110)
HCT VFR BLD AUTO: 28.7 % (ref 40–54)
HGB BLD-MCNC: 9.8 G/DL (ref 14–18)
IMM GRANULOCYTES # BLD AUTO: 0.08 K/UL (ref 0–0.04)
IMM GRANULOCYTES NFR BLD AUTO: 1 % (ref 0–0.5)
LYMPHOCYTES # BLD AUTO: 0.2 K/UL (ref 1–4.8)
LYMPHOCYTES NFR BLD: 2.8 % (ref 18–48)
MAGNESIUM SERPL-MCNC: 2.1 MG/DL (ref 1.6–2.6)
MCH RBC QN AUTO: 36.2 PG (ref 27–31)
MCHC RBC AUTO-ENTMCNC: 34.1 G/DL (ref 32–36)
MCV RBC AUTO: 106 FL (ref 82–98)
MONOCYTES # BLD AUTO: 0.1 K/UL (ref 0.3–1)
MONOCYTES NFR BLD: 1 % (ref 4–15)
NEUTROPHILS # BLD AUTO: 7.9 K/UL (ref 1.8–7.7)
NEUTROPHILS NFR BLD: 95.2 % (ref 38–73)
NRBC BLD-RTO: 0 /100 WBC
PHOSPHATE SERPL-MCNC: 2.9 MG/DL (ref 2.7–4.5)
PLATELET # BLD AUTO: 124 K/UL (ref 150–450)
PMV BLD AUTO: 8.9 FL (ref 9.2–12.9)
POCT GLUCOSE: 133 MG/DL (ref 70–110)
POTASSIUM SERPL-SCNC: 4.1 MMOL/L (ref 3.5–5.1)
PROT SERPL-MCNC: 4.8 G/DL (ref 6–8.4)
RBC # BLD AUTO: 2.71 M/UL (ref 4.6–6.2)
SODIUM SERPL-SCNC: 141 MMOL/L (ref 136–145)
SPECIMEN OUTDATE: NORMAL
WBC # BLD AUTO: 8.29 K/UL (ref 3.9–12.7)

## 2024-04-20 PROCEDURE — 99233 SBSQ HOSP IP/OBS HIGH 50: CPT | Mod: ,,, | Performed by: INTERNAL MEDICINE

## 2024-04-20 PROCEDURE — 85025 COMPLETE CBC W/AUTO DIFF WBC: CPT | Performed by: NURSE PRACTITIONER

## 2024-04-20 PROCEDURE — 80053 COMPREHEN METABOLIC PANEL: CPT | Performed by: NURSE PRACTITIONER

## 2024-04-20 PROCEDURE — 86850 RBC ANTIBODY SCREEN: CPT | Performed by: NURSE PRACTITIONER

## 2024-04-20 PROCEDURE — 63600175 PHARM REV CODE 636 W HCPCS: Performed by: NURSE PRACTITIONER

## 2024-04-20 PROCEDURE — 20600001 HC STEP DOWN PRIVATE ROOM

## 2024-04-20 PROCEDURE — 25000003 PHARM REV CODE 250: Performed by: NURSE PRACTITIONER

## 2024-04-20 PROCEDURE — 83735 ASSAY OF MAGNESIUM: CPT | Performed by: NURSE PRACTITIONER

## 2024-04-20 PROCEDURE — 84100 ASSAY OF PHOSPHORUS: CPT | Performed by: NURSE PRACTITIONER

## 2024-04-20 PROCEDURE — 25000003 PHARM REV CODE 250: Performed by: INTERNAL MEDICINE

## 2024-04-20 RX ADMIN — Medication 1 DOSE: at 08:04

## 2024-04-20 RX ADMIN — SPIRONOLACTONE 12.5 MG: 25 TABLET ORAL at 08:04

## 2024-04-20 RX ADMIN — CARVEDILOL 12.5 MG: 12.5 TABLET, FILM COATED ORAL at 09:04

## 2024-04-20 RX ADMIN — HYDRALAZINE HYDROCHLORIDE 50 MG: 50 TABLET ORAL at 02:04

## 2024-04-20 RX ADMIN — ACYCLOVIR 800 MG: 200 CAPSULE ORAL at 08:04

## 2024-04-20 RX ADMIN — AMLODIPINE BESYLATE 10 MG: 10 TABLET ORAL at 08:04

## 2024-04-20 RX ADMIN — FINASTERIDE 5 MG: 5 TABLET, FILM COATED ORAL at 08:04

## 2024-04-20 RX ADMIN — CARVEDILOL 12.5 MG: 12.5 TABLET, FILM COATED ORAL at 08:04

## 2024-04-20 RX ADMIN — Medication 1 DOSE: at 05:04

## 2024-04-20 RX ADMIN — ENOXAPARIN SODIUM 40 MG: 40 INJECTION SUBCUTANEOUS at 05:04

## 2024-04-20 RX ADMIN — INSULIN ASPART 2 UNITS: 100 INJECTION, SOLUTION INTRAVENOUS; SUBCUTANEOUS at 11:04

## 2024-04-20 RX ADMIN — HYDRALAZINE HYDROCHLORIDE 50 MG: 50 TABLET ORAL at 09:04

## 2024-04-20 RX ADMIN — LEVOTHYROXINE SODIUM 112 MCG: 112 TABLET ORAL at 09:04

## 2024-04-20 RX ADMIN — FLUCONAZOLE 400 MG: 200 TABLET ORAL at 08:04

## 2024-04-20 RX ADMIN — Medication 1 DOSE: at 01:04

## 2024-04-20 RX ADMIN — ACYCLOVIR 800 MG: 200 CAPSULE ORAL at 09:04

## 2024-04-20 RX ADMIN — LEVOFLOXACIN 500 MG: 500 TABLET, FILM COATED ORAL at 08:04

## 2024-04-20 RX ADMIN — HYDRALAZINE HYDROCHLORIDE 50 MG: 50 TABLET ORAL at 06:04

## 2024-04-20 RX ADMIN — PANTOPRAZOLE SODIUM 40 MG: 40 TABLET, DELAYED RELEASE ORAL at 08:04

## 2024-04-20 RX ADMIN — Medication 1 DOSE: at 09:04

## 2024-04-20 NOTE — NURSING
Plan of care reviewed with patient and grand ejugher.  FAll precautions maintained side rails up x2, call light in reach, bed alarm on.  Today is Day 2 of his  transplant.  Up and down to the bathroom , has urinal at the bedside.  Tolerating a regular diet without difficulty.  Instructed to call for assistance as needed.  No complaints voiced.

## 2024-04-20 NOTE — PLAN OF CARE
POC reviewed with patient; understanding verbalized. Carmen Auto day +2. Pt voids independently via urinal. Blood glucose checked before bedtime; SSI given. Bed alarm refused. Family to remain at bedside. Pt. with nonskid footwear on, bed in lowest position, and locked with bed rails up x2.  Pt. instructed to call prior to getting OOB.  Pt. has call light and personal items within reach. Patient ambulates in room independently. VSS and afebrile this shift. All questions and concerns addressed at this time.

## 2024-04-20 NOTE — PROGRESS NOTES
[facility] Stephens - Oncology ([facility])  Hematology  Bone Marrow Transplant  Progress Note    Patient Name: Chip Goodson  Admission Date: 4/16/2024  Hospital Length of Stay: 4  days  Code Status: Full Code    Subjective:     Interval History: Day +2 for a Carlotta 140 auto SCT for MM. Tolerated carlotta infusion and 2 days of stem cell infusion with no issues.  He's wout complaints today.     Objective:      Vitals:    04/20/24 1449   BP: (!) 152/71   Pulse: 62   Resp: 18   Temp: 96.7 °F (35.9 °C)          Physical Exam  Constitutional:       Appearance: He is well-developed.   HENT:      Head: Normocephalic and atraumatic.      Ears:      Comments: [place]     Mouth/Throat:      Pharynx: No oropharyngeal exudate.   Eyes:      General:         Right eye: No discharge.         Left eye: No discharge.      Conjunctiva/sclera: Conjunctivae normal.      Pupils: Pupils are equal, round, and reactive to light.   Cardiovascular:      Rate and Rhythm: Normal rate and regular rhythm.      Heart sounds: Normal heart sounds. No murmur heard.  Pulmonary:      Effort: Pulmonary effort is normal. No respiratory distress.      Breath sounds: Normal breath sounds. No wheezing or rales.   Abdominal:      General: Bowel sounds are normal. There is no distension.      Palpations: Abdomen is soft.      Tenderness: There is no abdominal tenderness.   Musculoskeletal:         General: No deformity. Normal range of motion.      Cervical back: Normal range of motion and neck supple.   Skin:     General: Skin is warm and dry.      Findings: No erythema or rash.      Comments: Right chest wall vas cath. Dressing c/d/i. No sign of infection to site.   Neurological:      Mental Status: He is alert and oriented to person, place, and time.   Psychiatric:         Behavior: Behavior normal.         Thought Content: Thought content normal.         Judgment: Judgment normal.            Significant Labs:   Lab Results   Component Value Date    WBC 8.29 04/20/2024     HGB 9.8 (L) 04/20/2024    HCT 28.7 (L) 04/20/2024     (H) 04/20/2024     (L) 04/20/2024       Gran # (ANC)   Date Value Ref Range Status   04/20/2024 7.9 (H) 1.8 - 7.7 K/uL Final     Gran %   Date Value Ref Range Status   04/20/2024 95.2 (H) 38.0 - 73.0 % Final     CMP  Sodium   Date Value Ref Range Status   04/20/2024 141 136 - 145 mmol/L Final     Potassium   Date Value Ref Range Status   04/20/2024 4.1 3.5 - 5.1 mmol/L Final     Chloride   Date Value Ref Range Status   04/20/2024 109 95 - 110 mmol/L Final     CO2   Date Value Ref Range Status   04/20/2024 28 23 - 29 mmol/L Final     Glucose   Date Value Ref Range Status   04/20/2024 143 (H) 70 - 110 mg/dL Final     BUN   Date Value Ref Range Status   04/20/2024 24 (H) 8 - 23 mg/dL Final     Creatinine   Date Value Ref Range Status   04/20/2024 1.0 0.5 - 1.4 mg/dL Final     Calcium   Date Value Ref Range Status   04/20/2024 8.3 (L) 8.7 - 10.5 mg/dL Final     Total Protein   Date Value Ref Range Status   04/20/2024 4.8 (L) 6.0 - 8.4 g/dL Final     Albumin   Date Value Ref Range Status   04/20/2024 2.9 (L) 3.5 - 5.2 g/dL Final     Total Bilirubin   Date Value Ref Range Status   04/20/2024 1.4 (H) 0.1 - 1.0 mg/dL Final     Comment:     For infants and newborns, interpretation of results should be based  on gestational age, weight and in agreement with clinical  observations.    Premature Infant recommended reference ranges:  Up to 24 hours.............<8.0 mg/dL  Up to 48 hours............<12.0 mg/dL  3-5 days..................<15.0 mg/dL  6-29 days.................<15.0 mg/dL       Alkaline Phosphatase   Date Value Ref Range Status   04/20/2024 80 55 - 135 U/L Final     AST   Date Value Ref Range Status   04/20/2024 23 10 - 40 U/L Final     ALT   Date Value Ref Range Status   04/20/2024 17 10 - 44 U/L Final     Anion Gap   Date Value Ref Range Status   04/20/2024 4 (L) 8 - 16 mmol/L Final     eGFR   Date Value Ref Range Status   04/20/2024 >60.0 >60  mL/min/1.73 m^2 Final       Diagnostic Results:  None  Assessment/Plan:     * History of autologous stem cell transplant  - Patient of Dr. Evans  - Admitting 4/16/24 for a Carmen 140 auto SCT  - Today is Day +2  - He received 5 bags on 4/18/24 at 1330 and will receive the remaining 4 bags today (4/19/24) at 1330. Total CD34 dose is 2.91 x 10^6.  - See treatment plan below.    Planned conditioning regimen:  Melphalan TBD on Day -1     Antimicrobial Prophylaxis:  Acyclovir starting on Day -1  Levofloxacin starting on Day -1  Fluconazole starting on Day -1  Bactrim starting on Day +30     Growth Factor Support:  Neupogen starting on Day +7      Caregiver: daughter and other family members  Post-transplant discharge plans: Smita Paintingge    Multiple myeloma in remission  - patient of Dr. Evans; follows locally with Dr. Rose in [place]  - diagnosed June 2023 with IgA Kappa MM; stage unclear at diagnosis as FISH not available  - started DRd therapy on 7/18/2023  - he stopped the daratumumab after cycle 4 and continue with revlimid and dex only; looks to have completed ~5 cycles  - Admitted for Carmen 140 Auto SCT on 4/16/2023    HTN (hypertension)  - Continue home amlodipine, carvedilol, hydralazine, and spironolactone  - dc'd ivf as with good po intake         Anemia in neoplastic disease  - Anticipate pancytopenia following chemo  - Daily CBC while inpatient  - Transfuse for Hgb <7    Cancer related pain  - Continue home prn Oxy IR    BPH (benign prostatic hyperplasia)  - Continue home finasteride    Type 2 diabetes mellitus without complications  - Holding home metformin  - ACHS accuchecks with moderate dose SSI  - Can titrate insulin as indicated for BG goal of 140-180. No indication at this time.    Hypothyroidism, unspecified  - Continue home synthroid    GERD (gastroesophageal reflux disease)  - Previously on BID protonix and daily pepcid for stomach ulcers; now resolved  - Will continue daily protonix per transplant  order set    COLTON (acute kidney injury)  - Creatinine 1.4 on admission; baseline ~1.1  - Monitoring CMP daily  - Receiving continuous fluids as part of chemotherapy regimen (NACL with 10% K); ivf stopped as with good po intake now  - Creatinine at baseline today        VTE Risk Mitigation (From admission, onward)           Ordered     heparin (porcine) injection 1,600 Units  As needed (PRN)         04/16/24 2129     enoxaparin injection 40 mg  Daily         04/16/24 1904     IP VTE HIGH RISK PATIENT  Once         04/16/24 1904     Place sequential compression device  Until discontinued         04/16/24 1904                    Disposition: Inpatient for autologous SCT.  Jaxon Rosado MD  Hematology & Stem Cell Transplant

## 2024-04-21 LAB
ALBUMIN SERPL BCP-MCNC: 2.9 G/DL (ref 3.5–5.2)
ALP SERPL-CCNC: 80 U/L (ref 55–135)
ALT SERPL W/O P-5'-P-CCNC: 18 U/L (ref 10–44)
ANION GAP SERPL CALC-SCNC: 6 MMOL/L (ref 8–16)
AST SERPL-CCNC: 16 U/L (ref 10–40)
BASOPHILS # BLD AUTO: 0 K/UL (ref 0–0.2)
BASOPHILS NFR BLD: 0 % (ref 0–1.9)
BILIRUB SERPL-MCNC: 2 MG/DL (ref 0.1–1)
BUN SERPL-MCNC: 17 MG/DL (ref 8–23)
CALCIUM SERPL-MCNC: 8.4 MG/DL (ref 8.7–10.5)
CHLORIDE SERPL-SCNC: 105 MMOL/L (ref 95–110)
CO2 SERPL-SCNC: 28 MMOL/L (ref 23–29)
CREAT SERPL-MCNC: 0.9 MG/DL (ref 0.5–1.4)
DIFFERENTIAL METHOD BLD: ABNORMAL
EOSINOPHIL # BLD AUTO: 0.1 K/UL (ref 0–0.5)
EOSINOPHIL NFR BLD: 1.6 % (ref 0–8)
ERYTHROCYTE [DISTWIDTH] IN BLOOD BY AUTOMATED COUNT: 12.1 % (ref 11.5–14.5)
EST. GFR  (NO RACE VARIABLE): >60 ML/MIN/1.73 M^2
GLUCOSE SERPL-MCNC: 122 MG/DL (ref 70–110)
HCT VFR BLD AUTO: 29.8 % (ref 40–54)
HGB BLD-MCNC: 10.3 G/DL (ref 14–18)
HYPOCHROMIA BLD QL SMEAR: ABNORMAL
IMM GRANULOCYTES # BLD AUTO: 0.01 K/UL (ref 0–0.04)
IMM GRANULOCYTES NFR BLD AUTO: 0.3 % (ref 0–0.5)
LYMPHOCYTES # BLD AUTO: 0.3 K/UL (ref 1–4.8)
LYMPHOCYTES NFR BLD: 8.5 % (ref 18–48)
MAGNESIUM SERPL-MCNC: 2.2 MG/DL (ref 1.6–2.6)
MCH RBC QN AUTO: 35.9 PG (ref 27–31)
MCHC RBC AUTO-ENTMCNC: 34.6 G/DL (ref 32–36)
MCV RBC AUTO: 104 FL (ref 82–98)
MONOCYTES # BLD AUTO: 0 K/UL (ref 0.3–1)
MONOCYTES NFR BLD: 0.3 % (ref 4–15)
NEUTROPHILS # BLD AUTO: 2.8 K/UL (ref 1.8–7.7)
NEUTROPHILS NFR BLD: 89.3 % (ref 38–73)
NRBC BLD-RTO: 0 /100 WBC
PHOSPHATE SERPL-MCNC: 3 MG/DL (ref 2.7–4.5)
PLATELET # BLD AUTO: 113 K/UL (ref 150–450)
PLATELET BLD QL SMEAR: ABNORMAL
PMV BLD AUTO: 8.9 FL (ref 9.2–12.9)
POCT GLUCOSE: 107 MG/DL (ref 70–110)
POCT GLUCOSE: 132 MG/DL (ref 70–110)
POCT GLUCOSE: 162 MG/DL (ref 70–110)
POCT GLUCOSE: 174 MG/DL (ref 70–110)
POCT GLUCOSE: 179 MG/DL (ref 70–110)
POCT GLUCOSE: 182 MG/DL (ref 70–110)
POTASSIUM SERPL-SCNC: 3.7 MMOL/L (ref 3.5–5.1)
PROT SERPL-MCNC: 4.9 G/DL (ref 6–8.4)
RBC # BLD AUTO: 2.87 M/UL (ref 4.6–6.2)
SODIUM SERPL-SCNC: 139 MMOL/L (ref 136–145)
SPHEROCYTES BLD QL SMEAR: ABNORMAL
WBC # BLD AUTO: 3.18 K/UL (ref 3.9–12.7)

## 2024-04-21 PROCEDURE — 63600175 PHARM REV CODE 636 W HCPCS: Performed by: NURSE PRACTITIONER

## 2024-04-21 PROCEDURE — 20600001 HC STEP DOWN PRIVATE ROOM

## 2024-04-21 PROCEDURE — 25000003 PHARM REV CODE 250: Performed by: STUDENT IN AN ORGANIZED HEALTH CARE EDUCATION/TRAINING PROGRAM

## 2024-04-21 PROCEDURE — 80053 COMPREHEN METABOLIC PANEL: CPT | Performed by: NURSE PRACTITIONER

## 2024-04-21 PROCEDURE — 25000003 PHARM REV CODE 250: Performed by: NURSE PRACTITIONER

## 2024-04-21 PROCEDURE — 83735 ASSAY OF MAGNESIUM: CPT | Performed by: NURSE PRACTITIONER

## 2024-04-21 PROCEDURE — 99233 SBSQ HOSP IP/OBS HIGH 50: CPT | Mod: ,,, | Performed by: INTERNAL MEDICINE

## 2024-04-21 PROCEDURE — 84100 ASSAY OF PHOSPHORUS: CPT | Performed by: NURSE PRACTITIONER

## 2024-04-21 PROCEDURE — 85025 COMPLETE CBC W/AUTO DIFF WBC: CPT | Performed by: NURSE PRACTITIONER

## 2024-04-21 PROCEDURE — 25000003 PHARM REV CODE 250: Performed by: INTERNAL MEDICINE

## 2024-04-21 RX ORDER — TALC
9 POWDER (GRAM) TOPICAL NIGHTLY PRN
Status: DISCONTINUED | OUTPATIENT
Start: 2024-04-21 | End: 2024-05-11 | Stop reason: HOSPADM

## 2024-04-21 RX ADMIN — FINASTERIDE 5 MG: 5 TABLET, FILM COATED ORAL at 09:04

## 2024-04-21 RX ADMIN — INSULIN ASPART 2 UNITS: 100 INJECTION, SOLUTION INTRAVENOUS; SUBCUTANEOUS at 11:04

## 2024-04-21 RX ADMIN — Medication 1 DOSE: at 08:04

## 2024-04-21 RX ADMIN — SPIRONOLACTONE 12.5 MG: 25 TABLET ORAL at 09:04

## 2024-04-21 RX ADMIN — PANTOPRAZOLE SODIUM 40 MG: 40 TABLET, DELAYED RELEASE ORAL at 09:04

## 2024-04-21 RX ADMIN — LEVOFLOXACIN 500 MG: 500 TABLET, FILM COATED ORAL at 09:04

## 2024-04-21 RX ADMIN — Medication 1 DOSE: at 09:04

## 2024-04-21 RX ADMIN — CARVEDILOL 12.5 MG: 12.5 TABLET, FILM COATED ORAL at 09:04

## 2024-04-21 RX ADMIN — ENOXAPARIN SODIUM 40 MG: 40 INJECTION SUBCUTANEOUS at 05:04

## 2024-04-21 RX ADMIN — Medication 1 DOSE: at 05:04

## 2024-04-21 RX ADMIN — Medication 1 DOSE: at 01:04

## 2024-04-21 RX ADMIN — INSULIN ASPART 1 UNITS: 100 INJECTION, SOLUTION INTRAVENOUS; SUBCUTANEOUS at 08:04

## 2024-04-21 RX ADMIN — HYDRALAZINE HYDROCHLORIDE 50 MG: 50 TABLET ORAL at 05:04

## 2024-04-21 RX ADMIN — FLUCONAZOLE 400 MG: 200 TABLET ORAL at 09:04

## 2024-04-21 RX ADMIN — LEVOTHYROXINE SODIUM 112 MCG: 112 TABLET ORAL at 08:04

## 2024-04-21 RX ADMIN — CARVEDILOL 12.5 MG: 12.5 TABLET, FILM COATED ORAL at 08:04

## 2024-04-21 RX ADMIN — ACYCLOVIR 800 MG: 200 CAPSULE ORAL at 09:04

## 2024-04-21 RX ADMIN — HYDRALAZINE HYDROCHLORIDE 50 MG: 50 TABLET ORAL at 01:04

## 2024-04-21 RX ADMIN — POTASSIUM CHLORIDE 20 MEQ: 1500 TABLET, EXTENDED RELEASE ORAL at 05:04

## 2024-04-21 RX ADMIN — ACYCLOVIR 800 MG: 200 CAPSULE ORAL at 08:04

## 2024-04-21 RX ADMIN — Medication 9 MG: at 08:04

## 2024-04-21 RX ADMIN — AMLODIPINE BESYLATE 10 MG: 10 TABLET ORAL at 09:04

## 2024-04-21 RX ADMIN — HYDRALAZINE HYDROCHLORIDE 50 MG: 50 TABLET ORAL at 09:04

## 2024-04-21 NOTE — PROGRESS NOTES
[facility] Stephens - Oncology ([facility])  Hematology  Bone Marrow Transplant  Progress Note    Patient Name: Chip Goodson  Admission Date: 4/16/2024  Hospital Length of Stay: 5  days  Code Status: Full Code    Subjective:     Interval History: Day +3 for a Carlotta 140 auto SCT for MM. Tolerated carlotta infusion and 2 days of stem cell infusion with no issues.  He's wout complaints today.     Objective:      Vitals:    04/21/24 1323   BP: 136/70   Pulse: (!) 54   Resp:    Temp:           Physical Exam  Constitutional:       Appearance: He is well-developed.   HENT:      Head: Normocephalic and atraumatic.      Ears:      Comments: [place]     Mouth/Throat:      Pharynx: No oropharyngeal exudate.   Eyes:      General:         Right eye: No discharge.         Left eye: No discharge.      Conjunctiva/sclera: Conjunctivae normal.      Pupils: Pupils are equal, round, and reactive to light.   Cardiovascular:      Rate and Rhythm: Normal rate and regular rhythm.      Heart sounds: Normal heart sounds. No murmur heard.  Pulmonary:      Effort: Pulmonary effort is normal. No respiratory distress.      Breath sounds: Normal breath sounds. No wheezing or rales.   Abdominal:      General: Bowel sounds are normal. There is no distension.      Palpations: Abdomen is soft.      Tenderness: There is no abdominal tenderness.   Musculoskeletal:         General: No deformity. Normal range of motion.      Cervical back: Normal range of motion and neck supple.   Skin:     General: Skin is warm and dry.      Findings: No erythema or rash.      Comments: Right chest wall vas cath. Dressing c/d/i. No sign of infection to site.   Neurological:      Mental Status: He is alert and oriented to person, place, and time.   Psychiatric:         Behavior: Behavior normal.         Thought Content: Thought content normal.         Judgment: Judgment normal.            Significant Labs:   Lab Results   Component Value Date    WBC 3.18 (L) 04/21/2024    HGB 10.3 (L)  04/21/2024    HCT 29.8 (L) 04/21/2024     (H) 04/21/2024     (L) 04/21/2024       Gran # (ANC)   Date Value Ref Range Status   04/21/2024 2.8 1.8 - 7.7 K/uL Final     Gran %   Date Value Ref Range Status   04/21/2024 89.3 (H) 38.0 - 73.0 % Final     CMP  Sodium   Date Value Ref Range Status   04/21/2024 139 136 - 145 mmol/L Final     Potassium   Date Value Ref Range Status   04/21/2024 3.7 3.5 - 5.1 mmol/L Final     Chloride   Date Value Ref Range Status   04/21/2024 105 95 - 110 mmol/L Final     CO2   Date Value Ref Range Status   04/21/2024 28 23 - 29 mmol/L Final     Glucose   Date Value Ref Range Status   04/21/2024 122 (H) 70 - 110 mg/dL Final     BUN   Date Value Ref Range Status   04/21/2024 17 8 - 23 mg/dL Final     Creatinine   Date Value Ref Range Status   04/21/2024 0.9 0.5 - 1.4 mg/dL Final     Calcium   Date Value Ref Range Status   04/21/2024 8.4 (L) 8.7 - 10.5 mg/dL Final     Total Protein   Date Value Ref Range Status   04/21/2024 4.9 (L) 6.0 - 8.4 g/dL Final     Albumin   Date Value Ref Range Status   04/21/2024 2.9 (L) 3.5 - 5.2 g/dL Final     Total Bilirubin   Date Value Ref Range Status   04/21/2024 2.0 (H) 0.1 - 1.0 mg/dL Final     Comment:     For infants and newborns, interpretation of results should be based  on gestational age, weight and in agreement with clinical  observations.    Premature Infant recommended reference ranges:  Up to 24 hours.............<8.0 mg/dL  Up to 48 hours............<12.0 mg/dL  3-5 days..................<15.0 mg/dL  6-29 days.................<15.0 mg/dL       Alkaline Phosphatase   Date Value Ref Range Status   04/21/2024 80 55 - 135 U/L Final     AST   Date Value Ref Range Status   04/21/2024 16 10 - 40 U/L Final     ALT   Date Value Ref Range Status   04/21/2024 18 10 - 44 U/L Final     Anion Gap   Date Value Ref Range Status   04/21/2024 6 (L) 8 - 16 mmol/L Final     eGFR   Date Value Ref Range Status   04/21/2024 >60.0 >60 mL/min/1.73 m^2 Final        Diagnostic Results:  None  Assessment/Plan:     * History of autologous stem cell transplant  - Patient of Dr. Evans  - Admitting 4/16/24 for a Carmen 140 auto SCT  - Today is Day +3  - He received 5 bags on 4/18/24 at 1330 and will receive the remaining 4 bags today (4/19/24) at 1330. Total CD34 dose is 2.91 x 10^6.  - See treatment plan below.    Planned conditioning regimen:  Melphalan TBD on Day -1     Antimicrobial Prophylaxis:  Acyclovir starting on Day -1  Levofloxacin starting on Day -1  Fluconazole starting on Day -1  Bactrim starting on Day +30     Growth Factor Support:  Neupogen starting on Day +7      Caregiver: daughter and other family members  Post-transplant discharge plans: Smita Paintingge    Multiple myeloma in remission  - patient of Dr. Evans; follows locally with Dr. Rose in [place]  - diagnosed June 2023 with IgA Kappa MM; stage unclear at diagnosis as FISH not available  - started DRd therapy on 7/18/2023  - he stopped the daratumumab after cycle 4 and continue with revlimid and dex only; looks to have completed ~5 cycles  - Admitted for Carmen 140 Auto SCT on 4/16/2023    HTN (hypertension)  - Continue home amlodipine, carvedilol, hydralazine, and spironolactone  - dc'd ivf as with good po intake         Anemia in neoplastic disease  - Anticipate pancytopenia following chemo  - Daily CBC while inpatient  - Transfuse for Hgb <7    Cancer related pain  - Continue home prn Oxy IR    BPH (benign prostatic hyperplasia)  - Continue home finasteride    Type 2 diabetes mellitus without complications  - Holding home metformin  - ACHS accuchecks with moderate dose SSI  - Can titrate insulin as indicated for BG goal of 140-180. No indication at this time.    Hypothyroidism, unspecified  - Continue home synthroid    GERD (gastroesophageal reflux disease)  - Previously on BID protonix and daily pepcid for stomach ulcers; now resolved  - Will continue daily protonix per transplant order set    COLTON (acute  kidney injury)  - resolved  - Monitoring CMP daily  - Receiving continuous fluids as part of chemotherapy regimen (NACL with 10% K); ivf stopped as with good po intake now  - Creatinine at baseline today        VTE Risk Mitigation (From admission, onward)           Ordered     heparin (porcine) injection 1,600 Units  As needed (PRN)         04/16/24 2129     enoxaparin injection 40 mg  Daily         04/16/24 1904     IP VTE HIGH RISK PATIENT  Once         04/16/24 1904     Place sequential compression device  Until discontinued         04/16/24 1904                    Disposition: Inpatient for autologous SCT.  Jaxon Rosado MD  Hematology & Stem Cell Transplant

## 2024-04-21 NOTE — PLAN OF CARE
Day +3 for a Carmen auto SCT for MM. Pt alert and oriented x4. Pt involved in plan of care and communicating needs throughout shift.  Up in room and to bathroom independently; no c/o pain. Tolerating diet, voiding without difficulty. All VSS; no acute events so far this shift.  Pt remaining free from falls or injury throughout shift; bed locked and in lowest position; call light within reach. Bed alarm refused. Pt instructed to call for assistance as needed.  Q1H rounding done on pt.

## 2024-04-21 NOTE — PLAN OF CARE
POC reviewed with patient; understanding verbalized. Carmen Auto Day +3. PO electrolyte replacement initiated. Pt voids independently via urinal. Bed alarm refused. Family to remain at bedside. Pt. with nonskid footwear on, bed in lowest position, and locked with bed rails up x2.  Pt. instructed to call prior to getting OOB.  Pt. has call light and personal items within reach. Patient ambulates in room independently. VSS and afebrile this shift. All questions and concerns addressed at this time.

## 2024-04-22 PROBLEM — D61.810 PANCYTOPENIA DUE TO CHEMOTHERAPY: Status: ACTIVE | Noted: 2024-04-16

## 2024-04-22 LAB
ALBUMIN SERPL BCP-MCNC: 3 G/DL (ref 3.5–5.2)
ALP SERPL-CCNC: 86 U/L (ref 55–135)
ALT SERPL W/O P-5'-P-CCNC: 17 U/L (ref 10–44)
ANION GAP SERPL CALC-SCNC: 7 MMOL/L (ref 8–16)
AST SERPL-CCNC: 12 U/L (ref 10–40)
BASOPHILS # BLD AUTO: 0 K/UL (ref 0–0.2)
BASOPHILS NFR BLD: 0 % (ref 0–1.9)
BILIRUB SERPL-MCNC: 1 MG/DL (ref 0.1–1)
BUN SERPL-MCNC: 22 MG/DL (ref 8–23)
CALCIUM SERPL-MCNC: 9 MG/DL (ref 8.7–10.5)
CHLORIDE SERPL-SCNC: 104 MMOL/L (ref 95–110)
CO2 SERPL-SCNC: 27 MMOL/L (ref 23–29)
CREAT SERPL-MCNC: 1 MG/DL (ref 0.5–1.4)
DIFFERENTIAL METHOD BLD: ABNORMAL
EOSINOPHIL # BLD AUTO: 0 K/UL (ref 0–0.5)
EOSINOPHIL NFR BLD: 1.2 % (ref 0–8)
ERYTHROCYTE [DISTWIDTH] IN BLOOD BY AUTOMATED COUNT: 12.5 % (ref 11.5–14.5)
EST. GFR  (NO RACE VARIABLE): >60 ML/MIN/1.73 M^2
GLUCOSE SERPL-MCNC: 139 MG/DL (ref 70–110)
HCT VFR BLD AUTO: 30.5 % (ref 40–54)
HGB BLD-MCNC: 10.6 G/DL (ref 14–18)
IMM GRANULOCYTES # BLD AUTO: 0.01 K/UL (ref 0–0.04)
IMM GRANULOCYTES NFR BLD AUTO: 0.4 % (ref 0–0.5)
LYMPHOCYTES # BLD AUTO: 0.1 K/UL (ref 1–4.8)
LYMPHOCYTES NFR BLD: 5 % (ref 18–48)
MAGNESIUM SERPL-MCNC: 2.2 MG/DL (ref 1.6–2.6)
MCH RBC QN AUTO: 35.9 PG (ref 27–31)
MCHC RBC AUTO-ENTMCNC: 34.8 G/DL (ref 32–36)
MCV RBC AUTO: 103 FL (ref 82–98)
MONOCYTES # BLD AUTO: 0 K/UL (ref 0.3–1)
MONOCYTES NFR BLD: 0 % (ref 4–15)
NEUTROPHILS # BLD AUTO: 2.3 K/UL (ref 1.8–7.7)
NEUTROPHILS NFR BLD: 93.4 % (ref 38–73)
NRBC BLD-RTO: 0 /100 WBC
PHOSPHATE SERPL-MCNC: 3.2 MG/DL (ref 2.7–4.5)
PLATELET # BLD AUTO: 108 K/UL (ref 150–450)
PLATELET BLD QL SMEAR: ABNORMAL
PMV BLD AUTO: 8.6 FL (ref 9.2–12.9)
POCT GLUCOSE: 130 MG/DL (ref 70–110)
POCT GLUCOSE: 135 MG/DL (ref 70–110)
POCT GLUCOSE: 140 MG/DL (ref 70–110)
POCT GLUCOSE: 183 MG/DL (ref 70–110)
POCT GLUCOSE: 93 MG/DL (ref 70–110)
POTASSIUM SERPL-SCNC: 3.9 MMOL/L (ref 3.5–5.1)
PROT SERPL-MCNC: 5.1 G/DL (ref 6–8.4)
RBC # BLD AUTO: 2.95 M/UL (ref 4.6–6.2)
SODIUM SERPL-SCNC: 138 MMOL/L (ref 136–145)
WBC # BLD AUTO: 2.41 K/UL (ref 3.9–12.7)

## 2024-04-22 PROCEDURE — 83735 ASSAY OF MAGNESIUM: CPT | Performed by: NURSE PRACTITIONER

## 2024-04-22 PROCEDURE — 25000003 PHARM REV CODE 250: Performed by: NURSE PRACTITIONER

## 2024-04-22 PROCEDURE — 84100 ASSAY OF PHOSPHORUS: CPT | Performed by: NURSE PRACTITIONER

## 2024-04-22 PROCEDURE — 99233 SBSQ HOSP IP/OBS HIGH 50: CPT | Mod: FS,,, | Performed by: INTERNAL MEDICINE

## 2024-04-22 PROCEDURE — 20600001 HC STEP DOWN PRIVATE ROOM

## 2024-04-22 PROCEDURE — 80053 COMPREHEN METABOLIC PANEL: CPT | Performed by: NURSE PRACTITIONER

## 2024-04-22 PROCEDURE — 63600175 PHARM REV CODE 636 W HCPCS: Performed by: NURSE PRACTITIONER

## 2024-04-22 PROCEDURE — 25000003 PHARM REV CODE 250: Performed by: INTERNAL MEDICINE

## 2024-04-22 PROCEDURE — 85025 COMPLETE CBC W/AUTO DIFF WBC: CPT | Performed by: NURSE PRACTITIONER

## 2024-04-22 PROCEDURE — 63600175 PHARM REV CODE 636 W HCPCS: Performed by: INTERNAL MEDICINE

## 2024-04-22 RX ADMIN — HYDRALAZINE HYDROCHLORIDE 50 MG: 50 TABLET ORAL at 01:04

## 2024-04-22 RX ADMIN — FLUCONAZOLE 400 MG: 200 TABLET ORAL at 08:04

## 2024-04-22 RX ADMIN — Medication 1 DOSE: at 09:04

## 2024-04-22 RX ADMIN — ENOXAPARIN SODIUM 40 MG: 40 INJECTION SUBCUTANEOUS at 05:04

## 2024-04-22 RX ADMIN — CARVEDILOL 12.5 MG: 12.5 TABLET, FILM COATED ORAL at 09:04

## 2024-04-22 RX ADMIN — SPIRONOLACTONE 12.5 MG: 25 TABLET ORAL at 08:04

## 2024-04-22 RX ADMIN — HYDRALAZINE HYDROCHLORIDE 50 MG: 50 TABLET ORAL at 05:04

## 2024-04-22 RX ADMIN — AMLODIPINE BESYLATE 10 MG: 10 TABLET ORAL at 08:04

## 2024-04-22 RX ADMIN — FINASTERIDE 5 MG: 5 TABLET, FILM COATED ORAL at 08:04

## 2024-04-22 RX ADMIN — INSULIN ASPART 2 UNITS: 100 INJECTION, SOLUTION INTRAVENOUS; SUBCUTANEOUS at 05:04

## 2024-04-22 RX ADMIN — Medication 1 DOSE: at 01:04

## 2024-04-22 RX ADMIN — HYDRALAZINE HYDROCHLORIDE 50 MG: 50 TABLET ORAL at 09:04

## 2024-04-22 RX ADMIN — LEVOFLOXACIN 500 MG: 500 TABLET, FILM COATED ORAL at 08:04

## 2024-04-22 RX ADMIN — Medication 1 DOSE: at 08:04

## 2024-04-22 RX ADMIN — CARVEDILOL 12.5 MG: 12.5 TABLET, FILM COATED ORAL at 08:04

## 2024-04-22 RX ADMIN — ACYCLOVIR 800 MG: 200 CAPSULE ORAL at 09:04

## 2024-04-22 RX ADMIN — ACYCLOVIR 800 MG: 200 CAPSULE ORAL at 08:04

## 2024-04-22 RX ADMIN — LEVOTHYROXINE SODIUM 112 MCG: 112 TABLET ORAL at 09:04

## 2024-04-22 RX ADMIN — PANTOPRAZOLE SODIUM 40 MG: 40 TABLET, DELAYED RELEASE ORAL at 08:04

## 2024-04-22 RX ADMIN — HEPARIN SODIUM 1600 UNITS: 1000 INJECTION, SOLUTION INTRAVENOUS; SUBCUTANEOUS at 05:04

## 2024-04-22 RX ADMIN — Medication 1 DOSE: at 05:04

## 2024-04-22 NOTE — ASSESSMENT & PLAN NOTE
- Expected following chemo  - Daily CBC while inpatient  - Transfuse for Hgb <7or plts < 10K  - Continue antimicrobial ppx

## 2024-04-22 NOTE — PLAN OF CARE
Day +4 calrotta auto SCT; no acute events this shift. Afebrile & VSS on room air. No PRNs needed. Electrolytes closely monitored. Ambulates independently to bathroom; educated on importance of I/O recording. CHG wipes provided and encouraged use. Tolerating diet, voiding without difficulty. BG monitored ACHS and SSI given as needed. Pt refusing bed alarm despite education given. Pt remaining free from falls or injury throughout shift; bed locked and in lowest position; call light within reach. POC reviewed with patient and granddaughter at bedside. All needs addressed. Frequent rounding performed.

## 2024-04-22 NOTE — ASSESSMENT & PLAN NOTE
- Patient of Dr. Evans  - Admitting 4/16/24 for a Carmen 140 auto SCT  - Today is Day +4  - He received 5 bags on 4/18/24 at 1330 and received the remaining 4 bags on 4/19/24 at 1330. Total CD34 dose was 2.91 x 10^6.  - See treatment plan below.    Planned conditioning regimen:  Melphalan TBD on Day -1     Antimicrobial Prophylaxis:  Acyclovir starting on Day -1  Levofloxacin starting on Day -1  Fluconazole starting on Day -1  Bactrim starting on Day +30     Growth Factor Support:  Neupogen starting on Day +7      Caregiver: daughter and other family members  Post-transplant discharge plans: Smita Portillo

## 2024-04-22 NOTE — ASSESSMENT & PLAN NOTE
- Creatinine 1.4 on admission; baseline ~1.1  - Monitoring CMP daily  - Receiving continuous fluids as part of chemotherapy regimen (NACL with 10% K)  - Creatinine at baseline today  RESOLVED

## 2024-04-22 NOTE — PROGRESS NOTES
[facility] Stephens - Oncology ([facility])  Hematology  Bone Marrow Transplant  Progress Note    Patient Name: Chip Goodson  Admission Date: 4/16/2024  Hospital Length of Stay: 6 days  Code Status: Full Code    Subjective:     Interval History: Day +4 from a Carmen 140 auto SCT for MM. Remains afebrile. VSS. BP improved off IVF. Denies mouth/throat soreness and GI toxicities. Tolerating diet well.    Objective:     Vital Signs (Most Recent):  Temp: 97.4 °F (36.3 °C) (04/22/24 1140)  Pulse: 70 (04/22/24 1140)  Resp: 18 (04/22/24 1140)  BP: 137/77 (04/22/24 1140)  SpO2: 96 % (04/22/24 1140) Vital Signs (24h Range):  Temp:  [97.4 °F (36.3 °C)-98.6 °F (37 °C)] 97.4 °F (36.3 °C)  Pulse:  [52-71] 70  Resp:  [16-18] 18  SpO2:  [94 %-97 %] 96 %  BP: (119-154)/(66-77) 137/77     Weight: 90.8 kg (200 lb 2.8 oz)  Body mass index is 28.72 kg/m².  Body surface area is 2.12 meters squared.    ECOG SCORE           [unfilled]    Intake/Output - Last 3 Shifts         04/20 0700  04/21 0659 04/21 0700  04/22 0659 04/22 0700  04/23 0659    P.O. 1382 2470 250    I.V. (mL/kg)       Total Intake(mL/kg) 1382 (14.9) 2470 (27.2) 250 (2.8)    Urine (mL/kg/hr) 4400 (2) 3100 (1.4) 250 (0.6)    Stool  0     Total Output 4400 3100 250    Net -3018 -630 0           Stool Occurrence  1 x              Physical Exam  Constitutional:       Appearance: He is well-developed.   HENT:      Head: Normocephalic and atraumatic.      Ears:      Comments: [place]     Mouth/Throat:      Pharynx: No oropharyngeal exudate.   Eyes:      General:         Right eye: No discharge.         Left eye: No discharge.      Conjunctiva/sclera: Conjunctivae normal.      Pupils: Pupils are equal, round, and reactive to light.   Cardiovascular:      Rate and Rhythm: Normal rate and regular rhythm.      Heart sounds: Normal heart sounds. No murmur heard.  Pulmonary:      Effort: Pulmonary effort is normal. No respiratory distress.      Breath sounds: Normal breath sounds. No wheezing or rales.    Abdominal:      General: Bowel sounds are normal. There is no distension.      Palpations: Abdomen is soft.      Tenderness: There is no abdominal tenderness.   Musculoskeletal:         General: No deformity. Normal range of motion.      Cervical back: Normal range of motion and neck supple.   Skin:     General: Skin is warm and dry.      Findings: No erythema or rash.      Comments: Right chest wall vas cath. Dressing c/d/i. No sign of infection to site.   Neurological:      Mental Status: He is alert and oriented to person, place, and time.   Psychiatric:         Behavior: Behavior normal.         Thought Content: Thought content normal.         Judgment: Judgment normal.            Significant Labs:   CBC:   Recent Labs   Lab 04/21/24  0338 04/22/24  0458   WBC 3.18* 2.41*   HGB 10.3* 10.6*   HCT 29.8* 30.5*   * 108*    and CMP:   Recent Labs   Lab 04/21/24 0338 04/22/24  0458    138   K 3.7 3.9    104   CO2 28 27   * 139*   BUN 17 22   CREATININE 0.9 1.0   CALCIUM 8.4* 9.0   PROT 4.9* 5.1*   ALBUMIN 2.9* 3.0*   BILITOT 2.0* 1.0   ALKPHOS 80 86   AST 16 12   ALT 18 17   ANIONGAP 6* 7*       Diagnostic Results:  None  Assessment/Plan:     * History of autologous stem cell transplant  - Patient of Dr. Evans  - Admitting 4/16/24 for a Carmen 140 auto SCT  - Today is Day +4  - He received 5 bags on 4/18/24 at 1330 and received the remaining 4 bags on 4/19/24 at 1330. Total CD34 dose was 2.91 x 10^6.  - See treatment plan below.    Planned conditioning regimen:  Melphalan TBD on Day -1     Antimicrobial Prophylaxis:  Acyclovir starting on Day -1  Levofloxacin starting on Day -1  Fluconazole starting on Day -1  Bactrim starting on Day +30     Growth Factor Support:  Neupogen starting on Day +7      Caregiver: daughter and other family members  Post-transplant discharge plans: Smita Portillo    Multiple myeloma in remission  - patient of Dr. Evans; follows locally with Dr. Rose in [place]  -  diagnosed June 2023 with IgA Kappa MM; stage unclear at diagnosis as FISH not available  - started DRd therapy on 7/18/2023  - he stopped the daratumumab after cycle 4 and continue with revlimid and dex only; looks to have completed ~5 cycles  - Admitted for Carmen 140 Auto SCT on 4/16/2023    Pancytopenia due to chemotherapy  - Expected following chemo  - Daily CBC while inpatient  - Transfuse for Hgb <7or plts < 10K  - Continue antimicrobial ppx    HTN (hypertension)  - Continue home amlodipine, carvedilol, hydralazine, and spironolactone  - Stopped IVF following transplant. BP improved off IVF.    Hyperbilirubinemia  - T-bili 1.7 on admit; asymptomatic; no clear cause  - normal 4/18 at 0.8  - will trend daily LFTs  RESOLVED    Cancer related pain  - Continue home prn Oxy IR    BPH (benign prostatic hyperplasia)  - Continue home finasteride    Type 2 diabetes mellitus without complications  - Holding home metformin  - ACHS accuchecks with moderate dose SSI  - Can titrate insulin as indicated for BG goal of 140-180. No indication at this time.    Hypothyroidism, unspecified  - Continue home synthroid    GERD (gastroesophageal reflux disease)  - Previously on BID protonix and daily pepcid for stomach ulcers; now resolved  - Will continue daily protonix per transplant order set    COLTON (acute kidney injury)  - Creatinine 1.4 on admission; baseline ~1.1  - Monitoring CMP daily  - Receiving continuous fluids as part of chemotherapy regimen (NACL with 10% K)  - Creatinine at baseline today  RESOLVED        VTE Risk Mitigation (From admission, onward)           Ordered     heparin (porcine) injection 1,600 Units  As needed (PRN)         04/16/24 2129     enoxaparin injection 40 mg  Daily         04/16/24 1904     IP VTE HIGH RISK PATIENT  Once         04/16/24 1904     Place sequential compression device  Until discontinued         04/16/24 1904                    Disposition: Inpatient for autologous SCT. Awaiting  neutrophil engraftment.    Jessica Cee, NP  Bone Marrow Transplant  [facility] ([facility])

## 2024-04-22 NOTE — ASSESSMENT & PLAN NOTE
- patient of Dr. Evans; follows locally with Dr. Rose in [place]  - diagnosed June 2023 with IgA Kappa MM; stage unclear at diagnosis as FISH not available  - started DRd therapy on 7/18/2023  - he stopped the daratumumab after cycle 4 and continue with revlimid and dex only; looks to have completed ~5 cycles  - Admitted for Carmen 140 Auto SCT on 4/16/2023

## 2024-04-22 NOTE — SUBJECTIVE & OBJECTIVE
Subjective:     Interval History: Day +4 from a Carmen 140 auto SCT for MM. Remains afebrile. VSS. BP improved off IVF. Denies mouth/throat soreness and GI toxicities. Tolerating diet well.    Objective:     Vital Signs (Most Recent):  Temp: 97.4 °F (36.3 °C) (04/22/24 1140)  Pulse: 70 (04/22/24 1140)  Resp: 18 (04/22/24 1140)  BP: 137/77 (04/22/24 1140)  SpO2: 96 % (04/22/24 1140) Vital Signs (24h Range):  Temp:  [97.4 °F (36.3 °C)-98.6 °F (37 °C)] 97.4 °F (36.3 °C)  Pulse:  [52-71] 70  Resp:  [16-18] 18  SpO2:  [94 %-97 %] 96 %  BP: (119-154)/(66-77) 137/77     Weight: 90.8 kg (200 lb 2.8 oz)  Body mass index is 28.72 kg/m².  Body surface area is 2.12 meters squared.    ECOG SCORE           [unfilled]    Intake/Output - Last 3 Shifts         04/20 0700  04/21 0659 04/21 0700  04/22 0659 04/22 0700  04/23 0659    P.O. 1382 2470 250    I.V. (mL/kg)       Total Intake(mL/kg) 1382 (14.9) 2470 (27.2) 250 (2.8)    Urine (mL/kg/hr) 4400 (2) 3100 (1.4) 250 (0.6)    Stool  0     Total Output 4400 3100 250    Net -3018 -630 0           Stool Occurrence  1 x              Physical Exam  Constitutional:       Appearance: He is well-developed.   HENT:      Head: Normocephalic and atraumatic.      Ears:      Comments: [place]     Mouth/Throat:      Pharynx: No oropharyngeal exudate.   Eyes:      General:         Right eye: No discharge.         Left eye: No discharge.      Conjunctiva/sclera: Conjunctivae normal.      Pupils: Pupils are equal, round, and reactive to light.   Cardiovascular:      Rate and Rhythm: Normal rate and regular rhythm.      Heart sounds: Normal heart sounds. No murmur heard.  Pulmonary:      Effort: Pulmonary effort is normal. No respiratory distress.      Breath sounds: Normal breath sounds. No wheezing or rales.   Abdominal:      General: Bowel sounds are normal. There is no distension.      Palpations: Abdomen is soft.      Tenderness: There is no abdominal tenderness.   Musculoskeletal:         General: No  deformity. Normal range of motion.      Cervical back: Normal range of motion and neck supple.   Skin:     General: Skin is warm and dry.      Findings: No erythema or rash.      Comments: Right chest wall vas cath. Dressing c/d/i. No sign of infection to site.   Neurological:      Mental Status: He is alert and oriented to person, place, and time.   Psychiatric:         Behavior: Behavior normal.         Thought Content: Thought content normal.         Judgment: Judgment normal.            Significant Labs:   CBC:   Recent Labs   Lab 04/21/24 0338 04/22/24  0458   WBC 3.18* 2.41*   HGB 10.3* 10.6*   HCT 29.8* 30.5*   * 108*    and CMP:   Recent Labs   Lab 04/21/24 0338 04/22/24  0458    138   K 3.7 3.9    104   CO2 28 27   * 139*   BUN 17 22   CREATININE 0.9 1.0   CALCIUM 8.4* 9.0   PROT 4.9* 5.1*   ALBUMIN 2.9* 3.0*   BILITOT 2.0* 1.0   ALKPHOS 80 86   AST 16 12   ALT 18 17   ANIONGAP 6* 7*       Diagnostic Results:  None

## 2024-04-22 NOTE — ASSESSMENT & PLAN NOTE
- Continue home amlodipine, carvedilol, hydralazine, and spironolactone  - Stopped IVF following transplant. BP improved off IVF.

## 2024-04-22 NOTE — PLAN OF CARE
Day + 4 carlotta auto SCT. Plan of care discussed with patient at start of shift. Free from falls and injuries. Resting quietly with eyes closed. Respirations even, unlabored. Skin warm and dry. Denies pain. Denies nausea. C/O insomnia, melatonin 9 mg ordered and given. Granddaughter remains at bedside. Frequent checks for pain and safety maintained. Bed in lowest position, wheels locked, side rails up x's 2, call light in reach. Instructed to call for assistance as needed, verbalizes understanding.

## 2024-04-23 LAB
ABO + RH BLD: NORMAL
ALBUMIN SERPL BCP-MCNC: 3.1 G/DL (ref 3.5–5.2)
ALP SERPL-CCNC: 84 U/L (ref 55–135)
ALT SERPL W/O P-5'-P-CCNC: 14 U/L (ref 10–44)
ANION GAP SERPL CALC-SCNC: 7 MMOL/L (ref 8–16)
ANISOCYTOSIS BLD QL SMEAR: SLIGHT
AST SERPL-CCNC: 12 U/L (ref 10–40)
BASOPHILS # BLD AUTO: 0.01 K/UL (ref 0–0.2)
BASOPHILS NFR BLD: 0.7 % (ref 0–1.9)
BILIRUB SERPL-MCNC: 1.6 MG/DL (ref 0.1–1)
BLD GP AB SCN CELLS X3 SERPL QL: NORMAL
BUN SERPL-MCNC: 21 MG/DL (ref 8–23)
CALCIUM SERPL-MCNC: 9.2 MG/DL (ref 8.7–10.5)
CHLORIDE SERPL-SCNC: 107 MMOL/L (ref 95–110)
CO2 SERPL-SCNC: 26 MMOL/L (ref 23–29)
CREAT SERPL-MCNC: 1 MG/DL (ref 0.5–1.4)
DACRYOCYTES BLD QL SMEAR: ABNORMAL
DIFFERENTIAL METHOD BLD: ABNORMAL
EOSINOPHIL # BLD AUTO: 0 K/UL (ref 0–0.5)
EOSINOPHIL NFR BLD: 0.7 % (ref 0–8)
ERYTHROCYTE [DISTWIDTH] IN BLOOD BY AUTOMATED COUNT: 11.7 % (ref 11.5–14.5)
EST. GFR  (NO RACE VARIABLE): >60 ML/MIN/1.73 M^2
GLUCOSE SERPL-MCNC: 139 MG/DL (ref 70–110)
HCT VFR BLD AUTO: 30.5 % (ref 40–54)
HGB BLD-MCNC: 10.8 G/DL (ref 14–18)
IMM GRANULOCYTES # BLD AUTO: 0.03 K/UL (ref 0–0.04)
IMM GRANULOCYTES NFR BLD AUTO: 2 % (ref 0–0.5)
LYMPHOCYTES # BLD AUTO: 0.1 K/UL (ref 1–4.8)
LYMPHOCYTES NFR BLD: 7.3 % (ref 18–48)
MAGNESIUM SERPL-MCNC: 2 MG/DL (ref 1.6–2.6)
MCH RBC QN AUTO: 36 PG (ref 27–31)
MCHC RBC AUTO-ENTMCNC: 35.4 G/DL (ref 32–36)
MCV RBC AUTO: 102 FL (ref 82–98)
MONOCYTES # BLD AUTO: 0 K/UL (ref 0.3–1)
MONOCYTES NFR BLD: 0.7 % (ref 4–15)
NEUTROPHILS # BLD AUTO: 1.3 K/UL (ref 1.8–7.7)
NEUTROPHILS NFR BLD: 88.6 % (ref 38–73)
NRBC BLD-RTO: 0 /100 WBC
OVALOCYTES BLD QL SMEAR: ABNORMAL
PHOSPHATE SERPL-MCNC: 3 MG/DL (ref 2.7–4.5)
PLATELET # BLD AUTO: 88 K/UL (ref 150–450)
PLATELET BLD QL SMEAR: ABNORMAL
PMV BLD AUTO: 8.6 FL (ref 9.2–12.9)
POCT GLUCOSE: 116 MG/DL (ref 70–110)
POCT GLUCOSE: 123 MG/DL (ref 70–110)
POCT GLUCOSE: 177 MG/DL (ref 70–110)
POCT GLUCOSE: 183 MG/DL (ref 70–110)
POIKILOCYTOSIS BLD QL SMEAR: SLIGHT
POTASSIUM SERPL-SCNC: 4 MMOL/L (ref 3.5–5.1)
PROT SERPL-MCNC: 5.3 G/DL (ref 6–8.4)
RBC # BLD AUTO: 3 M/UL (ref 4.6–6.2)
SODIUM SERPL-SCNC: 140 MMOL/L (ref 136–145)
SPECIMEN OUTDATE: NORMAL
WBC # BLD AUTO: 1.51 K/UL (ref 3.9–12.7)

## 2024-04-23 PROCEDURE — 85007 BL SMEAR W/DIFF WBC COUNT: CPT | Performed by: NURSE PRACTITIONER

## 2024-04-23 PROCEDURE — 99233 SBSQ HOSP IP/OBS HIGH 50: CPT | Mod: ,,, | Performed by: INTERNAL MEDICINE

## 2024-04-23 PROCEDURE — 97116 GAIT TRAINING THERAPY: CPT

## 2024-04-23 PROCEDURE — 80053 COMPREHEN METABOLIC PANEL: CPT | Performed by: NURSE PRACTITIONER

## 2024-04-23 PROCEDURE — 84100 ASSAY OF PHOSPHORUS: CPT | Performed by: NURSE PRACTITIONER

## 2024-04-23 PROCEDURE — 83735 ASSAY OF MAGNESIUM: CPT | Performed by: NURSE PRACTITIONER

## 2024-04-23 PROCEDURE — 20600001 HC STEP DOWN PRIVATE ROOM

## 2024-04-23 PROCEDURE — 85027 COMPLETE CBC AUTOMATED: CPT | Performed by: NURSE PRACTITIONER

## 2024-04-23 PROCEDURE — 63600175 PHARM REV CODE 636 W HCPCS: Performed by: NURSE PRACTITIONER

## 2024-04-23 PROCEDURE — 25000003 PHARM REV CODE 250: Performed by: NURSE PRACTITIONER

## 2024-04-23 PROCEDURE — 86850 RBC ANTIBODY SCREEN: CPT | Performed by: NURSE PRACTITIONER

## 2024-04-23 PROCEDURE — 25000003 PHARM REV CODE 250: Performed by: INTERNAL MEDICINE

## 2024-04-23 RX ADMIN — CARVEDILOL 12.5 MG: 12.5 TABLET, FILM COATED ORAL at 08:04

## 2024-04-23 RX ADMIN — AMLODIPINE BESYLATE 10 MG: 10 TABLET ORAL at 08:04

## 2024-04-23 RX ADMIN — SPIRONOLACTONE 12.5 MG: 25 TABLET ORAL at 08:04

## 2024-04-23 RX ADMIN — INSULIN ASPART 2 UNITS: 100 INJECTION, SOLUTION INTRAVENOUS; SUBCUTANEOUS at 12:04

## 2024-04-23 RX ADMIN — FINASTERIDE 5 MG: 5 TABLET, FILM COATED ORAL at 08:04

## 2024-04-23 RX ADMIN — ACYCLOVIR 800 MG: 200 CAPSULE ORAL at 08:04

## 2024-04-23 RX ADMIN — PANTOPRAZOLE SODIUM 40 MG: 40 TABLET, DELAYED RELEASE ORAL at 08:04

## 2024-04-23 RX ADMIN — ENOXAPARIN SODIUM 40 MG: 40 INJECTION SUBCUTANEOUS at 05:04

## 2024-04-23 RX ADMIN — HYDRALAZINE HYDROCHLORIDE 50 MG: 50 TABLET ORAL at 06:04

## 2024-04-23 RX ADMIN — Medication 1 DOSE: at 01:04

## 2024-04-23 RX ADMIN — HYDRALAZINE HYDROCHLORIDE 50 MG: 50 TABLET ORAL at 01:04

## 2024-04-23 RX ADMIN — LEVOFLOXACIN 500 MG: 500 TABLET, FILM COATED ORAL at 08:04

## 2024-04-23 RX ADMIN — Medication 1 DOSE: at 05:04

## 2024-04-23 RX ADMIN — LEVOTHYROXINE SODIUM 112 MCG: 112 TABLET ORAL at 08:04

## 2024-04-23 RX ADMIN — INSULIN ASPART 2 UNITS: 100 INJECTION, SOLUTION INTRAVENOUS; SUBCUTANEOUS at 05:04

## 2024-04-23 RX ADMIN — FLUCONAZOLE 400 MG: 200 TABLET ORAL at 08:04

## 2024-04-23 RX ADMIN — Medication 1 DOSE: at 08:04

## 2024-04-23 RX ADMIN — HYDRALAZINE HYDROCHLORIDE 50 MG: 50 TABLET ORAL at 09:04

## 2024-04-23 NOTE — PLAN OF CARE
Day +5 FRANCISCO Auto tx. Pt involved in plan of care and communicating needs throughout shift. No PRNs given. All VSS; no acute events so far this shift.  Pt remaining free from falls or injury throughout shift; bed locked and in lowest position; call light within reach.  Pt instructed to call for assistance as needed.  Q1H rounding done on pt.

## 2024-04-23 NOTE — PROGRESS NOTES
[facility] - Oncology ([facility])  Hematology  Bone Marrow Transplant  Progress Note    Patient Name: Chip Goodson  Admission Date: 4/16/2024  Hospital Length of Stay: 7 days  Code Status: Full Code    Subjective:     Interval History: Day +5 from a Carmen 140 auto SCT for MM. Remains afebrile. VSS. Continues to deny chemo side effects. Tolerating diet well. Blood counts dropping predictably. T-bili up to 1.6. likely 2/2 Melphalan. LFTs wnl.    Objective:     Vital Signs (Most Recent):  Temp: 97.5 °F (36.4 °C) (04/23/24 0713)  Pulse: 71 (04/23/24 0713)  Resp: 16 (04/23/24 0713)  BP: 135/69 (04/23/24 0713)  SpO2: 95 % (04/23/24 0713) Vital Signs (24h Range):  Temp:  [97.4 °F (36.3 °C)-98.2 °F (36.8 °C)] 97.5 °F (36.4 °C)  Pulse:  [32-71] 71  Resp:  [16-18] 16  SpO2:  [94 %-97 %] 95 %  BP: (125-157)/(64-77) 135/69     Weight: 89.9 kg (198 lb 4.9 oz)  Body mass index is 28.45 kg/m².  Body surface area is 2.11 meters squared.    ECOG SCORE           [unfilled]    Intake/Output - Last 3 Shifts         04/21 0700  04/22 0659 04/22 0700 04/23 0659 04/23 0700  04/24 0659    P.O. 2470 1699     Total Intake(mL/kg) 2470 (27.2) 1699 (18.9)     Urine (mL/kg/hr) 3100 (1.4) 3600 (1.7)     Stool 0      Total Output 3100 3600     Net -630 -1901            Stool Occurrence 1 x               Physical Exam  Constitutional:       Appearance: He is well-developed.   HENT:      Head: Normocephalic and atraumatic.      Ears:      Comments: [place]     Mouth/Throat:      Pharynx: No oropharyngeal exudate.   Eyes:      General:         Right eye: No discharge.         Left eye: No discharge.      Conjunctiva/sclera: Conjunctivae normal.      Pupils: Pupils are equal, round, and reactive to light.   Cardiovascular:      Rate and Rhythm: Normal rate and regular rhythm.      Heart sounds: Normal heart sounds. No murmur heard.  Pulmonary:      Effort: Pulmonary effort is normal. No respiratory distress.      Breath sounds: Normal breath sounds. No  wheezing or rales.   Abdominal:      General: Bowel sounds are normal. There is no distension.      Palpations: Abdomen is soft.      Tenderness: There is no abdominal tenderness.   Musculoskeletal:         General: No deformity. Normal range of motion.      Cervical back: Normal range of motion and neck supple.   Skin:     General: Skin is warm and dry.      Findings: No erythema or rash.      Comments: Right chest wall vas cath. Dressing c/d/i. No sign of infection to site.   Neurological:      Mental Status: He is alert and oriented to person, place, and time.   Psychiatric:         Behavior: Behavior normal.         Thought Content: Thought content normal.         Judgment: Judgment normal.            Significant Labs:   CBC:   Recent Labs   Lab 04/22/24  0458 04/23/24  0332   WBC 2.41* 1.51*   HGB 10.6* 10.8*   HCT 30.5* 30.5*   * 88*    and CMP:   Recent Labs   Lab 04/22/24  0458 04/23/24  0332    140   K 3.9 4.0    107   CO2 27 26   * 139*   BUN 22 21   CREATININE 1.0 1.0   CALCIUM 9.0 9.2   PROT 5.1* 5.3*   ALBUMIN 3.0* 3.1*   BILITOT 1.0 1.6*   ALKPHOS 86 84   AST 12 12   ALT 17 14   ANIONGAP 7* 7*       Diagnostic Results:  None  Assessment/Plan:     * History of autologous stem cell transplant  - Patient of Dr. Evans  - Admitting 4/16/24 for a Carmen 140 auto SCT  - Today is Day +5  - He received 5 bags on 4/18/24 at 1330 and received the remaining 4 bags on 4/19/24 at 1330. Total CD34 dose was 2.91 x 10^6.  - See treatment plan below.    Planned conditioning regimen:  Melphalan TBD on Day -1     Antimicrobial Prophylaxis:  Acyclovir starting on Day -1  Levofloxacin starting on Day -1  Fluconazole starting on Day -1  Bactrim starting on Day +30     Growth Factor Support:  Neupogen starting on Day +7      Caregiver: daughter and other family members  Post-transplant discharge plans: Smita Portillo    Multiple myeloma in remission  - patient of Dr. Evans; follows locally with   [facility] in [place]  - diagnosed June 2023 with IgA Kappa MM; stage unclear at diagnosis as FISH not available  - started DRd therapy on 7/18/2023  - he stopped the daratumumab after cycle 4 and continue with revlimid and dex only; looks to have completed ~5 cycles  - Admitted for Carmen 140 Auto SCT on 4/16/2023    Pancytopenia due to chemotherapy  - Expected following chemo  - Daily CBC while inpatient  - Transfuse for Hgb <7or plts < 10K  - Continue antimicrobial ppx    HTN (hypertension)  - Continue home amlodipine, carvedilol, hydralazine, and spironolactone  - Stopped IVF following transplant. BP improved off IVF.    Hyperbilirubinemia  - T-bili 1.7 on admit. Patient asymptomatic.  - Had normalized but up to 1.6 today. Suspect now 2/2 Melphalan.  - Trending with daily CMP    Cancer related pain  - Continue home prn Oxy IR    BPH (benign prostatic hyperplasia)  - Continue home finasteride    Type 2 diabetes mellitus without complications  - Holding home metformin  - ACHS accuchecks with moderate dose SSI  - Can titrate insulin as indicated for BG goal of 140-180. No indication at this time.    Hypothyroidism, unspecified  - Continue home synthroid    GERD (gastroesophageal reflux disease)  - Previously on BID protonix and daily pepcid for stomach ulcers; now resolved  - Will continue daily protonix per transplant order set    COLTON (acute kidney injury)  - Creatinine 1.4 on admission; baseline ~1.1  - Monitoring CMP daily  - Receiving continuous fluids as part of chemotherapy regimen (NACL with 10% K)  - Creatinine at baseline today  RESOLVED        VTE Risk Mitigation (From admission, onward)           Ordered     heparin (porcine) injection 1,600 Units  As needed (PRN)         04/16/24 2129     enoxaparin injection 40 mg  Daily         04/16/24 1904     IP VTE HIGH RISK PATIENT  Once         04/16/24 1904     Place sequential compression device  Until discontinued         04/16/24 1904                     Disposition: Inpatient for autologous SCT. Awaiting neutrophil engraftment.    Jessica Cee, NP  Bone Marrow Transplant  [facility] - Oncology ([facility])

## 2024-04-23 NOTE — ASSESSMENT & PLAN NOTE
- T-bili 1.7 on admit. Patient asymptomatic.  - Had normalized but up to 1.6 today. Suspect now 2/2 Melphalan.  - Trending with daily CMP

## 2024-04-23 NOTE — ASSESSMENT & PLAN NOTE
- patient of Dr. Evans; follows locally with Dr. Rose in [place]  - diagnosed June 2023 with IgA Kappa MM; stage unclear at diagnosis as FISH not available  - started DRd therapy on 7/18/2023  - he stopped the daratumumab after cycle 4 and continue with revlimid and dex only; looks to have completed ~5 cycles  - Admitted for Carmen 140 Auto SCT on 4/16/2023

## 2024-04-23 NOTE — PLAN OF CARE
POC reviewed with patient; understanding verbalized. Carmen Auto Day +5. Pt voids independently via urinal. Pt refuses bed alarm. Family to remain at bedside. Pt. with nonskid footwear on, bed in lowest position, and locked with bed rails up x2.  Pt. instructed to call prior to getting OOB.  Pt. has call light and personal items within reach. Patient ambulates in room independently. VSS and afebrile this shift. All questions and concerns addressed at this time.

## 2024-04-23 NOTE — SUBJECTIVE & OBJECTIVE
Subjective:     Interval History: Day +5 from a Carmen 140 auto SCT for MM. Remains afebrile. VSS. Continues to deny chemo side effects. Tolerating diet well. Blood counts dropping predictably. T-bili up to 1.6. likely 2/2 Melphalan. LFTs wnl.    Objective:     Vital Signs (Most Recent):  Temp: 97.5 °F (36.4 °C) (04/23/24 0713)  Pulse: 71 (04/23/24 0713)  Resp: 16 (04/23/24 0713)  BP: 135/69 (04/23/24 0713)  SpO2: 95 % (04/23/24 0713) Vital Signs (24h Range):  Temp:  [97.4 °F (36.3 °C)-98.2 °F (36.8 °C)] 97.5 °F (36.4 °C)  Pulse:  [32-71] 71  Resp:  [16-18] 16  SpO2:  [94 %-97 %] 95 %  BP: (125-157)/(64-77) 135/69     Weight: 89.9 kg (198 lb 4.9 oz)  Body mass index is 28.45 kg/m².  Body surface area is 2.11 meters squared.    ECOG SCORE           [unfilled]    Intake/Output - Last 3 Shifts         04/21 0700  04/22 0659 04/22 0700  04/23 0659 04/23 0700  04/24 0659    P.O. 2470 1699     Total Intake(mL/kg) 2470 (27.2) 1699 (18.9)     Urine (mL/kg/hr) 3100 (1.4) 3600 (1.7)     Stool 0      Total Output 3100 3600     Net -630 -1901            Stool Occurrence 1 x               Physical Exam  Constitutional:       Appearance: He is well-developed.   HENT:      Head: Normocephalic and atraumatic.      Ears:      Comments: [place]     Mouth/Throat:      Pharynx: No oropharyngeal exudate.   Eyes:      General:         Right eye: No discharge.         Left eye: No discharge.      Conjunctiva/sclera: Conjunctivae normal.      Pupils: Pupils are equal, round, and reactive to light.   Cardiovascular:      Rate and Rhythm: Normal rate and regular rhythm.      Heart sounds: Normal heart sounds. No murmur heard.  Pulmonary:      Effort: Pulmonary effort is normal. No respiratory distress.      Breath sounds: Normal breath sounds. No wheezing or rales.   Abdominal:      General: Bowel sounds are normal. There is no distension.      Palpations: Abdomen is soft.      Tenderness: There is no abdominal tenderness.   Musculoskeletal:          General: No deformity. Normal range of motion.      Cervical back: Normal range of motion and neck supple.   Skin:     General: Skin is warm and dry.      Findings: No erythema or rash.      Comments: Right chest wall vas cath. Dressing c/d/i. No sign of infection to site.   Neurological:      Mental Status: He is alert and oriented to person, place, and time.   Psychiatric:         Behavior: Behavior normal.         Thought Content: Thought content normal.         Judgment: Judgment normal.            Significant Labs:   CBC:   Recent Labs   Lab 04/22/24 0458 04/23/24  0332   WBC 2.41* 1.51*   HGB 10.6* 10.8*   HCT 30.5* 30.5*   * 88*    and CMP:   Recent Labs   Lab 04/22/24 0458 04/23/24  0332    140   K 3.9 4.0    107   CO2 27 26   * 139*   BUN 22 21   CREATININE 1.0 1.0   CALCIUM 9.0 9.2   PROT 5.1* 5.3*   ALBUMIN 3.0* 3.1*   BILITOT 1.0 1.6*   ALKPHOS 86 84   AST 12 12   ALT 17 14   ANIONGAP 7* 7*       Diagnostic Results:  None

## 2024-04-23 NOTE — PT/OT/SLP PROGRESS
"Physical Therapy Treatment    Patient Name:  Chip Goodson   MRN:  [id number]    Recommendations:     Discharge Recommendations: No Therapy Indicated  Discharge Equipment Recommendations: none  Barriers to discharge: None    Assessment:     Chip Goodson is a 73 y.o. male admitted with a medical diagnosis of History of autologous stem cell transplant.  He presents with the following impairments/functional limitations:  (at risk for deconditioning) Patient was pleasantly agreeable to therapy treatment session this date, and tolerated it well with encouragement from supportive grandson who was present throughout session. Ambulated in hallway this date without AD and supervision. Reports riding in-room exercise back in earlier AM for 20 minutes with no adverse effects. Will keep on PT caseload for 2x/week maintenance therapy, primarily to ensure he does not have any regression in his functional mobility during this admit.      Rehab Prognosis: Good; patient would benefit from acute skilled PT services to address these deficits and reach maximum level of function.    Recent Surgery: * No surgery found *      Plan:     During this hospitalization, patient to be seen 2 x/week to address the identified rehab impairments via gait training, therapeutic activities, therapeutic exercises, neuromuscular re-education and progress toward the following goals:    Plan of Care Expires:  05/19/24    Subjective     Chief Complaint: "I have been moving around all day, I'm used to it"  Patient/Family Comments/goals: return home to [facility]  Pain/Comfort:  Pain Rating 1: 0/10  Pain Rating Post-Intervention 1: 0/10      Objective:     Communicated with RN prior to session.  Patient found supine with  (no active lines) upon PT entry to room.     General Precautions: Standard, fall, diabetic, hearing impaired  Orthopedic Precautions: N/A  Braces: N/A  Respiratory Status: Room air     Functional Mobility:  Bed Mobility:     Scooting: " independence  Supine to Sit: independence  Sit to Supine: independence  Transfers:     Sit to Stand:  supervision with no AD and from EOB  Gait: 420 ft with supervision with no AD, mildly decreased gt, good arm swing, no LOB noted, 2 standing rest breaks  Balance: sitting: good, standing: supervision      AM-PAC 6 CLICK MOBILITY  Turning over in bed (including adjusting bedclothes, sheets and blankets)?: 4  Sitting down on and standing up from a chair with arms (e.g., wheelchair, bedside commode, etc.): 4  Moving from lying on back to sitting on the side of the bed?: 4  Moving to and from a bed to a chair (including a wheelchair)?: 4  Need to walk in hospital room?: 4  Climbing 3-5 steps with a railing?: 3  Basic Mobility Total Score: 23       Treatment & Education:  Patient donned/doffed shirt, pants, socks, hat with independence in sitting/standing  Patient educated on importance of OOB activity to promote overall endurance.  Patient educated on current level of function and progression towards therapeutic goals.    Patient left supine with all lines intact, call button in reach, RN notified, and grandson present..    GOALS:   Multidisciplinary Problems       Physical Therapy Goals          Problem: Physical Therapy    Goal Priority Disciplines Outcome Goal Variances Interventions   Physical Therapy Goal     PT, PT/OT Progressing     Description: Goals to be met by: 24     Patient will maintain functional independence with mobility, without evidence of deconditioning, by performin. Supine to sit with [place]  2. Sit to stand transfer with [place]  3. Gait  x 400 feet with [place]   4. Ascend/Descend 6 inch curb step with [place]  5. Stand for 10 minutes with [place]  6. Lower extremity exercise program x30 reps per handout, with assistance as needed                         Time Tracking:     PT Received On: 24  PT Start Time: 1622     PT Stop Time: 1633  PT Total  Time (min): 11 min     Billable Minutes: Gait Training 11    Treatment Type: Treatment  PT/PTA: PT     Number of PTA visits since last PT visit: 0     04/23/2024

## 2024-04-23 NOTE — ASSESSMENT & PLAN NOTE
- Patient of Dr. Evans  - Admitting 4/16/24 for a Carmen 140 auto SCT  - Today is Day +5  - He received 5 bags on 4/18/24 at 1330 and received the remaining 4 bags on 4/19/24 at 1330. Total CD34 dose was 2.91 x 10^6.  - See treatment plan below.    Planned conditioning regimen:  Melphalan TBD on Day -1     Antimicrobial Prophylaxis:  Acyclovir starting on Day -1  Levofloxacin starting on Day -1  Fluconazole starting on Day -1  Bactrim starting on Day +30     Growth Factor Support:  Neupogen starting on Day +7      Caregiver: daughter and other family members  Post-transplant discharge plans: Smita Portillo

## 2024-04-24 PROBLEM — T45.1X5A CHEMOTHERAPY INDUCED NAUSEA AND VOMITING: Status: ACTIVE | Noted: 2024-04-24

## 2024-04-24 PROBLEM — R11.2 CHEMOTHERAPY INDUCED NAUSEA AND VOMITING: Status: ACTIVE | Noted: 2024-04-24

## 2024-04-24 LAB
ALBUMIN SERPL BCP-MCNC: 3.1 G/DL (ref 3.5–5.2)
ALP SERPL-CCNC: 82 U/L (ref 55–135)
ALT SERPL W/O P-5'-P-CCNC: 14 U/L (ref 10–44)
ANION GAP SERPL CALC-SCNC: 6 MMOL/L (ref 8–16)
ANISOCYTOSIS BLD QL SMEAR: SLIGHT
AST SERPL-CCNC: 13 U/L (ref 10–40)
BASOPHILS # BLD AUTO: ABNORMAL K/UL (ref 0–0.2)
BASOPHILS NFR BLD: 0 % (ref 0–1.9)
BILIRUB SERPL-MCNC: 1.8 MG/DL (ref 0.1–1)
BUN SERPL-MCNC: 22 MG/DL (ref 8–23)
CALCIUM SERPL-MCNC: 9.1 MG/DL (ref 8.7–10.5)
CHLORIDE SERPL-SCNC: 106 MMOL/L (ref 95–110)
CO2 SERPL-SCNC: 26 MMOL/L (ref 23–29)
CREAT SERPL-MCNC: 1 MG/DL (ref 0.5–1.4)
DACRYOCYTES BLD QL SMEAR: ABNORMAL
DIFFERENTIAL METHOD BLD: ABNORMAL
EOSINOPHIL # BLD AUTO: ABNORMAL K/UL (ref 0–0.5)
EOSINOPHIL NFR BLD: 0 % (ref 0–8)
ERYTHROCYTE [DISTWIDTH] IN BLOOD BY AUTOMATED COUNT: 11.5 % (ref 11.5–14.5)
EST. GFR  (NO RACE VARIABLE): >60 ML/MIN/1.73 M^2
GLUCOSE SERPL-MCNC: 131 MG/DL (ref 70–110)
HCT VFR BLD AUTO: 30.2 % (ref 40–54)
HGB BLD-MCNC: 10.7 G/DL (ref 14–18)
HYPOCHROMIA BLD QL SMEAR: ABNORMAL
IMM GRANULOCYTES # BLD AUTO: ABNORMAL K/UL (ref 0–0.04)
IMM GRANULOCYTES NFR BLD AUTO: ABNORMAL % (ref 0–0.5)
LYMPHOCYTES # BLD AUTO: ABNORMAL K/UL (ref 1–4.8)
LYMPHOCYTES NFR BLD: 31.3 % (ref 18–48)
MAGNESIUM SERPL-MCNC: 2.1 MG/DL (ref 1.6–2.6)
MCH RBC QN AUTO: 36 PG (ref 27–31)
MCHC RBC AUTO-ENTMCNC: 35.4 G/DL (ref 32–36)
MCV RBC AUTO: 102 FL (ref 82–98)
MONOCYTES # BLD AUTO: ABNORMAL K/UL (ref 0.3–1)
MONOCYTES NFR BLD: 0 % (ref 4–15)
NEUTROPHILS NFR BLD: 68.7 % (ref 38–73)
NRBC BLD-RTO: 0 /100 WBC
OVALOCYTES BLD QL SMEAR: ABNORMAL
PHOSPHATE SERPL-MCNC: 3.1 MG/DL (ref 2.7–4.5)
PLATELET # BLD AUTO: 69 K/UL (ref 150–450)
PLATELET BLD QL SMEAR: ABNORMAL
PMV BLD AUTO: 8.6 FL (ref 9.2–12.9)
POCT GLUCOSE: 119 MG/DL (ref 70–110)
POIKILOCYTOSIS BLD QL SMEAR: SLIGHT
POLYCHROMASIA BLD QL SMEAR: ABNORMAL
POTASSIUM SERPL-SCNC: 4.2 MMOL/L (ref 3.5–5.1)
PROT SERPL-MCNC: 5.4 G/DL (ref 6–8.4)
RBC # BLD AUTO: 2.97 M/UL (ref 4.6–6.2)
SODIUM SERPL-SCNC: 138 MMOL/L (ref 136–145)
WBC # BLD AUTO: 0.38 K/UL (ref 3.9–12.7)

## 2024-04-24 PROCEDURE — 83735 ASSAY OF MAGNESIUM: CPT | Performed by: NURSE PRACTITIONER

## 2024-04-24 PROCEDURE — 25000003 PHARM REV CODE 250: Performed by: INTERNAL MEDICINE

## 2024-04-24 PROCEDURE — 25000003 PHARM REV CODE 250: Performed by: NURSE PRACTITIONER

## 2024-04-24 PROCEDURE — 63600175 PHARM REV CODE 636 W HCPCS: Performed by: INTERNAL MEDICINE

## 2024-04-24 PROCEDURE — 99233 SBSQ HOSP IP/OBS HIGH 50: CPT | Mod: ,,, | Performed by: INTERNAL MEDICINE

## 2024-04-24 PROCEDURE — 80053 COMPREHEN METABOLIC PANEL: CPT | Performed by: NURSE PRACTITIONER

## 2024-04-24 PROCEDURE — 85007 BL SMEAR W/DIFF WBC COUNT: CPT | Performed by: NURSE PRACTITIONER

## 2024-04-24 PROCEDURE — 85027 COMPLETE CBC AUTOMATED: CPT | Performed by: NURSE PRACTITIONER

## 2024-04-24 PROCEDURE — 84100 ASSAY OF PHOSPHORUS: CPT | Performed by: NURSE PRACTITIONER

## 2024-04-24 PROCEDURE — 20600001 HC STEP DOWN PRIVATE ROOM

## 2024-04-24 RX ORDER — AMOXICILLIN 250 MG
1 CAPSULE ORAL ONCE
Status: COMPLETED | OUTPATIENT
Start: 2024-04-24 | End: 2024-04-24

## 2024-04-24 RX ORDER — PROCHLORPERAZINE EDISYLATE 5 MG/ML
10 INJECTION INTRAMUSCULAR; INTRAVENOUS EVERY 6 HOURS PRN
Status: DISCONTINUED | OUTPATIENT
Start: 2024-04-24 | End: 2024-05-11 | Stop reason: HOSPADM

## 2024-04-24 RX ORDER — LEVOFLOXACIN 5 MG/ML
500 INJECTION, SOLUTION INTRAVENOUS
Status: DISCONTINUED | OUTPATIENT
Start: 2024-04-25 | End: 2024-04-24

## 2024-04-24 RX ORDER — LEVOFLOXACIN 5 MG/ML
500 INJECTION, SOLUTION INTRAVENOUS
Status: DISCONTINUED | OUTPATIENT
Start: 2024-04-24 | End: 2024-04-24

## 2024-04-24 RX ORDER — LACTULOSE 10 G/15ML
20 SOLUTION ORAL ONCE
Status: COMPLETED | OUTPATIENT
Start: 2024-04-24 | End: 2024-04-24

## 2024-04-24 RX ORDER — CARVEDILOL 6.25 MG/1
6.25 TABLET ORAL 2 TIMES DAILY
Status: DISCONTINUED | OUTPATIENT
Start: 2024-04-24 | End: 2024-05-11 | Stop reason: HOSPADM

## 2024-04-24 RX ORDER — LEVOFLOXACIN 500 MG/1
500 TABLET, FILM COATED ORAL DAILY
Status: DISCONTINUED | OUTPATIENT
Start: 2024-04-25 | End: 2024-05-03

## 2024-04-24 RX ORDER — ONDANSETRON HYDROCHLORIDE 2 MG/ML
4 INJECTION, SOLUTION INTRAVENOUS EVERY 8 HOURS PRN
Status: DISCONTINUED | OUTPATIENT
Start: 2024-04-24 | End: 2024-05-11 | Stop reason: HOSPADM

## 2024-04-24 RX ADMIN — Medication 1 DOSE: at 09:04

## 2024-04-24 RX ADMIN — Medication 1 DOSE: at 04:04

## 2024-04-24 RX ADMIN — HYDRALAZINE HYDROCHLORIDE 50 MG: 50 TABLET ORAL at 06:04

## 2024-04-24 RX ADMIN — ACYCLOVIR 800 MG: 200 CAPSULE ORAL at 08:04

## 2024-04-24 RX ADMIN — FLUCONAZOLE 400 MG: 200 TABLET ORAL at 09:04

## 2024-04-24 RX ADMIN — AMLODIPINE BESYLATE 10 MG: 10 TABLET ORAL at 09:04

## 2024-04-24 RX ADMIN — SPIRONOLACTONE 12.5 MG: 25 TABLET ORAL at 09:04

## 2024-04-24 RX ADMIN — SENNOSIDES AND DOCUSATE SODIUM 1 TABLET: 8.6; 5 TABLET ORAL at 09:04

## 2024-04-24 RX ADMIN — LACTULOSE 20 G: 20 SOLUTION ORAL at 09:04

## 2024-04-24 RX ADMIN — HYDRALAZINE HYDROCHLORIDE 50 MG: 50 TABLET ORAL at 09:04

## 2024-04-24 RX ADMIN — CARVEDILOL 6.25 MG: 6.25 TABLET, FILM COATED ORAL at 08:04

## 2024-04-24 RX ADMIN — PANTOPRAZOLE SODIUM 40 MG: 40 TABLET, DELAYED RELEASE ORAL at 09:04

## 2024-04-24 RX ADMIN — HEPARIN SODIUM 1600 UNITS: 1000 INJECTION, SOLUTION INTRAVENOUS; SUBCUTANEOUS at 04:04

## 2024-04-24 RX ADMIN — HYDRALAZINE HYDROCHLORIDE 50 MG: 50 TABLET ORAL at 01:04

## 2024-04-24 RX ADMIN — LEVOTHYROXINE SODIUM 112 MCG: 112 TABLET ORAL at 08:04

## 2024-04-24 RX ADMIN — FINASTERIDE 5 MG: 5 TABLET, FILM COATED ORAL at 09:04

## 2024-04-24 RX ADMIN — LEVOFLOXACIN 500 MG: 500 TABLET, FILM COATED ORAL at 09:04

## 2024-04-24 RX ADMIN — Medication 1 DOSE: at 01:04

## 2024-04-24 RX ADMIN — ACYCLOVIR 800 MG: 200 CAPSULE ORAL at 09:04

## 2024-04-24 RX ADMIN — CARVEDILOL 12.5 MG: 12.5 TABLET, FILM COATED ORAL at 09:04

## 2024-04-24 NOTE — ASSESSMENT & PLAN NOTE
- T-bili 1.7 on admit. Patient asymptomatic.  - Had normalized but up to 1.8 today. Suspect now 2/2 Melphalan.  - Trending with daily CMP

## 2024-04-24 NOTE — ASSESSMENT & PLAN NOTE
- Holding home metformin  - Stopped ACHS blood glucose monitoring as blood glucose has been stable.

## 2024-04-24 NOTE — PLAN OF CARE
Day +6 FRANCISCO Auto tx. Pt involved in plan of care and communicating needs throughout shift. No PRNs given. 1 time dose of lactulose given, pt had bm x1, episode of emesis x1. All VSS; no acute events so far this shift.  Pt remaining free from falls or injury throughout shift; bed locked and in lowest position; call light within reach.  Pt instructed to call for assistance as needed.  Q1H rounding done on pt.

## 2024-04-24 NOTE — ASSESSMENT & PLAN NOTE
- Creatinine 1.4 on admission; baseline ~1.1  - Monitoring CMP daily  - Was receiving continuous fluids as part of chemotherapy regimen (NACL with 10% K)  RESOLVED

## 2024-04-24 NOTE — PROGRESS NOTES
[facility] - Oncology ([facility])  Adult Nutrition  Progress Note    SUMMARY       Recommendations    1. Continue Regular Diet with double portions.   2. Recommend ONS Boost Plus- Chocolate with all meal for additional calories/ PRO.   3. Monitor I/O's weight and labs.   4. RD following.    Goals: to meet % of EEN/EPN by next RD f/u  Nutrition Goal Status: progressing towards goal  Communication of RD Recs:  (POC)    Assessment and Plan    Nutrition Problem  Increased nutrient needs (energy/protein)    Related to (etiology):   Increased physiological demands    Signs and Symptoms (as evidenced by):   S/p Auto SCT     Interventions/Recommendations (treatment strategy):  Collaboration of nutrition care with other providers  ONS  Nutrition education    Nutrition Diagnosis Status:   New         Reason for Assessment    Reason For Assessment: RD follow-up  Diagnosis: cancer diagnosis/related complications  Relevant Medical History: HTN, Hypothyroidism, Multiple Myeloma  Interdisciplinary Rounds: did not attend  General Information Comments: RD follow-up. Pt tolerating most PO intake. 1 episode of nausea/vomiting since admit. Consumes % at meals. Loose stools noted but patient is receiving lactulose. Interested in receiving ONS with every meal, RD encouraged.  Nutrition Discharge Planning: Provided pt with high calorie, high protein diet education and food safety education for bone marrow transplant. Appropriate education material with RD contact information provided. No other needs identified.    Nutrition Risk Screen    Nutrition Risk Screen: no indicators present    Nutrition/Diet History    Patient Reported Diet/Restrictions/Preferences: general  Typical Food/Fluid Intake: 2-3 meal/day  Spiritual, Cultural Beliefs, Worship Practices, Values that Affect Care: no  Supplemental Drinks or Food Habits: Premier Protein  Food Allergies: shellfish    Anthropometrics    Temp: 98.2 °F (36.8 °C)  Height Method:  "Stated  Height: 5' 10" (177.8 cm)  Height (inches): 70 in  Weight Method: Standard Scale  Weight: 88.6 kg (195 lb 5.2 oz)  Weight (lb): 195.33 lb  Ideal Body Weight (IBW), Male: 166 lb  % Ideal Body Weight, Male (lb): 127.16 %  BMI (Calculated): 28       Lab/Procedures/Meds    Pertinent Labs Reviewed: reviewed  Pertinent Labs Comments: Glu 131, TP 5.4, TBili 1.8  Pertinent Medications Reviewed: reviewed  Pertinent Medications Comments: Enoxaparin, nuepogen, levothyroxine, ondansetron, pantoprazole, Na Bicarb, spironolactone, abx, NaCl/KCl      Estimated/Assessed Needs    Weight Used For Calorie Calculations: 75 kg (165 lb 5.5 oz) (IBW d/t BMI >30.0)  Energy Calorie Requirements (kcal): 1875- 2250 kcal  Energy Need Method: Kcal/kg (25-30 kcal/kg of IBW)  Protein Requirements: 113- 150g (1.5-2.0g/kg of IBW)  Weight Used For Protein Calculations: 75 kg (165 lb 5.5 oz) (IBW d/t BMI >30.0)  Fluid Requirements (mL): 1ml/1kcal or per MD  Estimated Fluid Requirement Method: RDA Method  RDA Method (mL): 1875         Nutrition Prescription Ordered    Current Diet Order: Regular Diet    Evaluation of Received Nutrient/Fluid Intake    I/O: -1.41 L  Energy Calories Required: meeting needs  Protein Required: meeting needs  Fluid Required:  (as per MD)  Comments: LBM 4/16  Tolerance: tolerating  % Intake of Estimated Energy Needs: 75 - 100 %  % Meal Intake: 75 - 100 %    Nutrition Risk    Level of Risk/Frequency of Follow-up:  (RD to f/u x 1-2/week)     Monitor and Evaluation    Food and Nutrient Intake: energy intake, food and beverage intake  Food and Nutrient Adminstration: diet order  Knowledge/Beliefs/Attitudes: food and nutrition knowledge/skill, beliefs and attitudes  Physical Activity and Function: nutrition-related ADLs and IADLs  Anthropometric Measurements: weight, weight change, body mass index  Biochemical Data, Medical Tests and Procedures: electrolyte and renal panel, gastrointestinal profile, glucose/endocrine " profile, inflammatory profile, lipid profile  Nutrition-Focused Physical Findings: overall appearance, skin     Nutrition Follow-Up    RD Follow-up?: Yes    Ike Leal MS, RD, LDN

## 2024-04-24 NOTE — SUBJECTIVE & OBJECTIVE
Subjective:     Interval History: Day +6 from a Carmen 140 auto SCT for MM. Remains afebrile. VSS. Expressed concern for constipation today. One time dose of Pericolace ordered and patient had large BM per his report. Will avoid daily laxatives given high risk for chemo-induced diarrhea. Had episode of emesis this morning. PRN IV Zofran ordered. Stopped lovenox ppx due to thrombocytopenia. BG stable, so stopping ACHS blood glucose monitoring.    Objective:     Vital Signs (Most Recent):  Temp: 98.5 °F (36.9 °C) (04/24/24 0718)  Pulse: 61 (04/24/24 0904)  Resp: 16 (04/24/24 0718)  BP: 137/78 (04/24/24 0718)  SpO2: 98 % (04/24/24 0718) Vital Signs (24h Range):  Temp:  [97.9 °F (36.6 °C)-98.7 °F (37.1 °C)] 98.5 °F (36.9 °C)  Pulse:  [52-68] 61  Resp:  [16-20] 16  SpO2:  [95 %-98 %] 98 %  BP: (115-151)/(58-78) 137/78     Weight: 88.6 kg (195 lb 5.2 oz)  Body mass index is 28.03 kg/m².  Body surface area is 2.09 meters squared.    ECOG SCORE           [unfilled]    Intake/Output - Last 3 Shifts         04/22 0700  04/23 0659 04/23 0700  04/24 0659 04/24 0700  04/25 0659    P.O. 1699 690     Total Intake(mL/kg) 1699 (18.9) 690 (7.8)     Urine (mL/kg/hr) 3600 (1.7) 2100 (1) 250 (0.6)    Emesis/NG output   0    Stool  0 0    Total Output 3600 2100 250    Net -1901 -1410 -250           Stool Occurrence  1 x 1 x    Emesis Occurrence   1 x             Physical Exam  Constitutional:       Appearance: He is well-developed.   HENT:      Head: Normocephalic and atraumatic.      Ears:      Comments: [place]     Mouth/Throat:      Pharynx: No oropharyngeal exudate.   Eyes:      General:         Right eye: No discharge.         Left eye: No discharge.      Conjunctiva/sclera: Conjunctivae normal.      Pupils: Pupils are equal, round, and reactive to light.   Cardiovascular:      Rate and Rhythm: Normal rate and regular rhythm.      Heart sounds: Normal heart sounds. No murmur heard.  Pulmonary:      Effort: Pulmonary effort is normal. No  respiratory distress.      Breath sounds: Normal breath sounds. No wheezing or rales.   Abdominal:      General: Bowel sounds are normal. There is no distension.      Palpations: Abdomen is soft.      Tenderness: There is no abdominal tenderness.   Musculoskeletal:         General: No deformity. Normal range of motion.      Cervical back: Normal range of motion and neck supple.   Skin:     General: Skin is warm and dry.      Findings: No erythema or rash.      Comments: Right chest wall vas cath. Dressing c/d/i. No sign of infection to site.   Neurological:      Mental Status: He is alert and oriented to person, place, and time.   Psychiatric:         Behavior: Behavior normal.         Thought Content: Thought content normal.         Judgment: Judgment normal.            Significant Labs:   CBC:   Recent Labs   Lab 04/23/24  0332 04/24/24  0426   WBC 1.51* 0.38*   HGB 10.8* 10.7*   HCT 30.5* 30.2*   PLT 88* 69*    and CMP:   Recent Labs   Lab 04/23/24  0332 04/24/24  0426    138   K 4.0 4.2    106   CO2 26 26   * 131*   BUN 21 22   CREATININE 1.0 1.0   CALCIUM 9.2 9.1   PROT 5.3* 5.4*   ALBUMIN 3.1* 3.1*   BILITOT 1.6* 1.8*   ALKPHOS 84 82   AST 12 13   ALT 14 14   ANIONGAP 7* 6*       Diagnostic Results:  None

## 2024-04-24 NOTE — PLAN OF CARE
Recommendations    1. Continue Regular Diet with double portions.   2. Recommend ONS Boost Plus- Chocolate with all meal for additional calories/ PRO.   3. Monitor I/O's weight and labs.   4. RD following.    Goals: to meet % of EEN/EPN by next RD f/u  Nutrition Goal Status: progressing towards goal  Communication of RD Recs:  (POC)

## 2024-04-24 NOTE — PLAN OF CARE
Day +6 Carmen Auto SCT. AAOx4. Afebrile and VSS on room air. No complaints overnight. Patient is up independently to the bathroom. Free from falls and injury. Urinal provided and within reach. 1 bowel movement overnight. Family at bedside. Blood sugar monitored ACHS no sliding scale insulin needed. Bed is locked and in lowest position, non-skid socks on, and call light within reach. Patient educated on using the call lgiht when needing assistance and verbalizes understanding. Plan of care reviewed and is ongoing. Patient expresses no other needs at this time.

## 2024-04-24 NOTE — PROGRESS NOTES
[facility] - Oncology ([facility])  Hematology  Bone Marrow Transplant  Progress Note    Patient Name: Chip Goodson  Admission Date: 4/16/2024  Hospital Length of Stay: 8 days  Code Status: Full Code    Subjective:     Interval History: Day +6 from a Carmen 140 auto SCT for MM. Remains afebrile. VSS. Expressed concern for constipation today. One time dose of Pericolace ordered and patient had large BM per his report. Will avoid daily laxatives given high risk for chemo-induced diarrhea. Had episode of emesis this morning. PRN IV Zofran ordered. Stopped lovenox ppx due to thrombocytopenia. BG stable, so stopping ACHS blood glucose monitoring.    Objective:     Vital Signs (Most Recent):  Temp: 98.5 °F (36.9 °C) (04/24/24 0718)  Pulse: 61 (04/24/24 0904)  Resp: 16 (04/24/24 0718)  BP: 137/78 (04/24/24 0718)  SpO2: 98 % (04/24/24 0718) Vital Signs (24h Range):  Temp:  [97.9 °F (36.6 °C)-98.7 °F (37.1 °C)] 98.5 °F (36.9 °C)  Pulse:  [52-68] 61  Resp:  [16-20] 16  SpO2:  [95 %-98 %] 98 %  BP: (115-151)/(58-78) 137/78     Weight: 88.6 kg (195 lb 5.2 oz)  Body mass index is 28.03 kg/m².  Body surface area is 2.09 meters squared.    ECOG SCORE           [unfilled]    Intake/Output - Last 3 Shifts         04/22 0700  04/23 0659 04/23 0700  04/24 0659 04/24 0700  04/25 0659    P.O. 1699 690     Total Intake(mL/kg) 1699 (18.9) 690 (7.8)     Urine (mL/kg/hr) 3600 (1.7) 2100 (1) 250 (0.6)    Emesis/NG output   0    Stool  0 0    Total Output 3600 2100 250    Net -1901 -1410 -250           Stool Occurrence  1 x 1 x    Emesis Occurrence   1 x             Physical Exam  Constitutional:       Appearance: He is well-developed.   HENT:      Head: Normocephalic and atraumatic.      Ears:      Comments: [place]     Mouth/Throat:      Pharynx: No oropharyngeal exudate.   Eyes:      General:         Right eye: No discharge.         Left eye: No discharge.      Conjunctiva/sclera: Conjunctivae normal.      Pupils: Pupils are equal, round, and  reactive to light.   Cardiovascular:      Rate and Rhythm: Normal rate and regular rhythm.      Heart sounds: Normal heart sounds. No murmur heard.  Pulmonary:      Effort: Pulmonary effort is normal. No respiratory distress.      Breath sounds: Normal breath sounds. No wheezing or rales.   Abdominal:      General: Bowel sounds are normal. There is no distension.      Palpations: Abdomen is soft.      Tenderness: There is no abdominal tenderness.   Musculoskeletal:         General: No deformity. Normal range of motion.      Cervical back: Normal range of motion and neck supple.   Skin:     General: Skin is warm and dry.      Findings: No erythema or rash.      Comments: Right chest wall vas cath. Dressing c/d/i. No sign of infection to site.   Neurological:      Mental Status: He is alert and oriented to person, place, and time.   Psychiatric:         Behavior: Behavior normal.         Thought Content: Thought content normal.         Judgment: Judgment normal.            Significant Labs:   CBC:   Recent Labs   Lab 04/23/24  0332 04/24/24  0426   WBC 1.51* 0.38*   HGB 10.8* 10.7*   HCT 30.5* 30.2*   PLT 88* 69*    and CMP:   Recent Labs   Lab 04/23/24  0332 04/24/24  0426    138   K 4.0 4.2    106   CO2 26 26   * 131*   BUN 21 22   CREATININE 1.0 1.0   CALCIUM 9.2 9.1   PROT 5.3* 5.4*   ALBUMIN 3.1* 3.1*   BILITOT 1.6* 1.8*   ALKPHOS 84 82   AST 12 13   ALT 14 14   ANIONGAP 7* 6*       Diagnostic Results:  None  Assessment/Plan:     * History of autologous stem cell transplant  - Patient of Dr. Evans  - Admitting 4/16/24 for a Carmen 140 auto SCT  - Today is Day +6  - He received 5 bags on 4/18/24 at 1330 and received the remaining 4 bags on 4/19/24 at 1330. Total CD34 dose was 2.91 x 10^6.  - See treatment plan below.    Planned conditioning regimen:  Melphalan TBD on Day -1     Antimicrobial Prophylaxis:  Acyclovir starting on Day -1  Levofloxacin starting on Day -1  Fluconazole starting on Day  -1  Bactrim starting on Day +30     Growth Factor Support:  Neupogen starting on Day +7      Caregiver: daughter and other family members  Post-transplant discharge plans: Smita Portillo    Multiple myeloma in remission  - patient of Dr. Evans; follows locally with Dr. Rose in [place]  - diagnosed June 2023 with IgA Kappa MM; stage unclear at diagnosis as FISH not available  - started DRd therapy on 7/18/2023  - he stopped the daratumumab after cycle 4 and continue with revlimid and dex only; looks to have completed ~5 cycles  - Admitted for Carmen 140 Auto SCT on 4/16/2023    Pancytopenia due to chemotherapy  - Expected following chemo  - Daily CBC while inpatient  - Transfuse for Hgb <7or plts < 10K  - Continue antimicrobial ppx    Chemotherapy induced nausea and vomiting  - Continue PRN Zofran and Compazine    HTN (hypertension)  - Continue home amlodipine, carvedilol, hydralazine, and spironolactone  - Stopped IVF following transplant. BP improved off IVF.    Hyperbilirubinemia  - T-bili 1.7 on admit. Patient asymptomatic.  - Had normalized but up to 1.8 today. Suspect now 2/2 Melphalan.  - Trending with daily CMP    Cancer related pain  - Continue home prn Oxy IR    BPH (benign prostatic hyperplasia)  - Continue home finasteride    Type 2 diabetes mellitus without complications  - Holding home metformin  - Stopped ACHS blood glucose monitoring as blood glucose has been stable.    Hypothyroidism, unspecified  - Continue home synthroid    GERD (gastroesophageal reflux disease)  - Previously on BID protonix and daily pepcid for stomach ulcers; now resolved  - Will continue daily protonix per transplant order set    COLTON (acute kidney injury)  - Creatinine 1.4 on admission; baseline ~1.1  - Monitoring CMP daily  - Was receiving continuous fluids as part of chemotherapy regimen (NACL with 10% K)  RESOLVED        VTE Risk Mitigation (From admission, onward)           Ordered     heparin (porcine) injection 1,600 Units   As needed (PRN)         04/16/24 2129     IP VTE HIGH RISK PATIENT  Once         04/16/24 1904     Place sequential compression device  Until discontinued         04/16/24 1904                    Disposition: Inpatient for autologous SCT. Awaiting neutrophil engraftment.    Jessica Cee, NP  Bone Marrow Transplant  [facility] - Oncology ([facility])

## 2024-04-24 NOTE — ASSESSMENT & PLAN NOTE
- Patient of Dr. Evans  - Admitting 4/16/24 for a Carmen 140 auto SCT  - Today is Day +6  - He received 5 bags on 4/18/24 at 1330 and received the remaining 4 bags on 4/19/24 at 1330. Total CD34 dose was 2.91 x 10^6.  - See treatment plan below.    Planned conditioning regimen:  Melphalan TBD on Day -1     Antimicrobial Prophylaxis:  Acyclovir starting on Day -1  Levofloxacin starting on Day -1  Fluconazole starting on Day -1  Bactrim starting on Day +30     Growth Factor Support:  Neupogen starting on Day +7      Caregiver: daughter and other family members  Post-transplant discharge plans: Smita Portillo   Patient's caretaker, Michelle, called and states that patient was started on pain medicine at the ER , however Michelle's records show this particular medication flagged as an interaction. Please call Michelle to discuss.

## 2024-04-24 NOTE — ASSESSMENT & PLAN NOTE
- patient of Dr. Evans; follows locally with Dr. Rose in [place]  - diagnosed June 2023 with IgA Kappa MM; stage unclear at diagnosis as FISH not available  - started DRd therapy on 7/18/2023  - he stopped the daratumumab after cycle 4 and continue with revlimid and dex only; looks to have completed ~5 cycles  - Admitted for Carmen 140 Auto SCT on 4/16/2023

## 2024-04-25 LAB
ALBUMIN SERPL BCP-MCNC: 3.1 G/DL (ref 3.5–5.2)
ALP SERPL-CCNC: 85 U/L (ref 55–135)
ALT SERPL W/O P-5'-P-CCNC: 14 U/L (ref 10–44)
ANION GAP SERPL CALC-SCNC: 6 MMOL/L (ref 8–16)
ANISOCYTOSIS BLD QL SMEAR: SLIGHT
AST SERPL-CCNC: 11 U/L (ref 10–40)
BASOPHILS # BLD AUTO: 0 K/UL (ref 0–0.2)
BASOPHILS NFR BLD: 0 % (ref 0–1.9)
BILIRUB SERPL-MCNC: 0.7 MG/DL (ref 0.1–1)
BUN SERPL-MCNC: 24 MG/DL (ref 8–23)
CALCIUM SERPL-MCNC: 8.8 MG/DL (ref 8.7–10.5)
CHLORIDE SERPL-SCNC: 108 MMOL/L (ref 95–110)
CO2 SERPL-SCNC: 25 MMOL/L (ref 23–29)
CREAT SERPL-MCNC: 0.9 MG/DL (ref 0.5–1.4)
DIFFERENTIAL METHOD BLD: ABNORMAL
EOSINOPHIL # BLD AUTO: 0 K/UL (ref 0–0.5)
EOSINOPHIL NFR BLD: 0 % (ref 0–8)
ERYTHROCYTE [DISTWIDTH] IN BLOOD BY AUTOMATED COUNT: 11.7 % (ref 11.5–14.5)
EST. GFR  (NO RACE VARIABLE): >60 ML/MIN/1.73 M^2
GLUCOSE SERPL-MCNC: 120 MG/DL (ref 70–110)
HCT VFR BLD AUTO: 29.4 % (ref 40–54)
HGB BLD-MCNC: 10.3 G/DL (ref 14–18)
IMM GRANULOCYTES # BLD AUTO: 0 K/UL (ref 0–0.04)
IMM GRANULOCYTES NFR BLD AUTO: 0 % (ref 0–0.5)
LYMPHOCYTES # BLD AUTO: 0.1 K/UL (ref 1–4.8)
LYMPHOCYTES NFR BLD: 50 % (ref 18–48)
MAGNESIUM SERPL-MCNC: 2.2 MG/DL (ref 1.6–2.6)
MCH RBC QN AUTO: 35.3 PG (ref 27–31)
MCHC RBC AUTO-ENTMCNC: 35 G/DL (ref 32–36)
MCV RBC AUTO: 101 FL (ref 82–98)
MONOCYTES # BLD AUTO: 0 K/UL (ref 0.3–1)
MONOCYTES NFR BLD: 0 % (ref 4–15)
NEUTROPHILS # BLD AUTO: 0.1 K/UL (ref 1.8–7.7)
NEUTROPHILS NFR BLD: 50 % (ref 38–73)
NRBC BLD-RTO: 0 /100 WBC
OVALOCYTES BLD QL SMEAR: ABNORMAL
PHOSPHATE SERPL-MCNC: 3 MG/DL (ref 2.7–4.5)
PLATELET # BLD AUTO: 53 K/UL (ref 150–450)
PLATELET BLD QL SMEAR: ABNORMAL
PMV BLD AUTO: 8.8 FL (ref 9.2–12.9)
POIKILOCYTOSIS BLD QL SMEAR: SLIGHT
POTASSIUM SERPL-SCNC: 3.9 MMOL/L (ref 3.5–5.1)
PROT SERPL-MCNC: 5.4 G/DL (ref 6–8.4)
RBC # BLD AUTO: 2.92 M/UL (ref 4.6–6.2)
SODIUM SERPL-SCNC: 139 MMOL/L (ref 136–145)
WBC # BLD AUTO: 0.1 K/UL (ref 3.9–12.7)

## 2024-04-25 PROCEDURE — 25000003 PHARM REV CODE 250: Performed by: NURSE PRACTITIONER

## 2024-04-25 PROCEDURE — 99233 SBSQ HOSP IP/OBS HIGH 50: CPT | Mod: ,,, | Performed by: INTERNAL MEDICINE

## 2024-04-25 PROCEDURE — 25000003 PHARM REV CODE 250: Performed by: INTERNAL MEDICINE

## 2024-04-25 PROCEDURE — 83735 ASSAY OF MAGNESIUM: CPT | Performed by: NURSE PRACTITIONER

## 2024-04-25 PROCEDURE — 20600001 HC STEP DOWN PRIVATE ROOM

## 2024-04-25 PROCEDURE — 63600175 PHARM REV CODE 636 W HCPCS: Mod: JZ,JG | Performed by: INTERNAL MEDICINE

## 2024-04-25 PROCEDURE — 85025 COMPLETE CBC W/AUTO DIFF WBC: CPT | Performed by: NURSE PRACTITIONER

## 2024-04-25 PROCEDURE — 80053 COMPREHEN METABOLIC PANEL: CPT | Performed by: NURSE PRACTITIONER

## 2024-04-25 PROCEDURE — 84100 ASSAY OF PHOSPHORUS: CPT | Performed by: NURSE PRACTITIONER

## 2024-04-25 RX ADMIN — HEPARIN SODIUM 1600 UNITS: 1000 INJECTION, SOLUTION INTRAVENOUS; SUBCUTANEOUS at 04:04

## 2024-04-25 RX ADMIN — LEVOTHYROXINE SODIUM 112 MCG: 112 TABLET ORAL at 08:04

## 2024-04-25 RX ADMIN — ACYCLOVIR 800 MG: 200 CAPSULE ORAL at 09:04

## 2024-04-25 RX ADMIN — AMLODIPINE BESYLATE 10 MG: 10 TABLET ORAL at 09:04

## 2024-04-25 RX ADMIN — FLUCONAZOLE 400 MG: 200 TABLET ORAL at 09:04

## 2024-04-25 RX ADMIN — LEVOFLOXACIN 500 MG: 500 TABLET, FILM COATED ORAL at 09:04

## 2024-04-25 RX ADMIN — FINASTERIDE 5 MG: 5 TABLET, FILM COATED ORAL at 09:04

## 2024-04-25 RX ADMIN — CARVEDILOL 6.25 MG: 6.25 TABLET, FILM COATED ORAL at 08:04

## 2024-04-25 RX ADMIN — ACYCLOVIR 800 MG: 200 CAPSULE ORAL at 08:04

## 2024-04-25 RX ADMIN — Medication 1 DOSE: at 08:04

## 2024-04-25 RX ADMIN — HYDRALAZINE HYDROCHLORIDE 50 MG: 50 TABLET ORAL at 02:04

## 2024-04-25 RX ADMIN — CARVEDILOL 6.25 MG: 6.25 TABLET, FILM COATED ORAL at 09:04

## 2024-04-25 RX ADMIN — HYDRALAZINE HYDROCHLORIDE 50 MG: 50 TABLET ORAL at 09:04

## 2024-04-25 RX ADMIN — HYDRALAZINE HYDROCHLORIDE 50 MG: 50 TABLET ORAL at 06:04

## 2024-04-25 RX ADMIN — FILGRASTIM 480 MCG: 480 INJECTION, SOLUTION INTRAVENOUS; SUBCUTANEOUS at 09:04

## 2024-04-25 RX ADMIN — SPIRONOLACTONE 12.5 MG: 25 TABLET ORAL at 09:04

## 2024-04-25 RX ADMIN — PANTOPRAZOLE SODIUM 40 MG: 40 TABLET, DELAYED RELEASE ORAL at 09:04

## 2024-04-25 NOTE — ASSESSMENT & PLAN NOTE
- Holding home metformin  - Stopped ACHS blood glucose monitoring 4/24 as blood glucose has been stable  - monitor daily CMP

## 2024-04-25 NOTE — ASSESSMENT & PLAN NOTE
- patient of Dr. Evans; follows locally with Dr. Rose in [place]  - diagnosed June 2023 with IgA Kappa MM; stage unclear at diagnosis as FISH not available  - started DRd therapy on 7/18/2023  - he stopped the daratumumab after cycle 4 and continue with revlimid and dex only; looks to have completed ~5 cycles  - Admitted for Carmen 140 Auto SCT on 4/16/2023

## 2024-04-25 NOTE — ASSESSMENT & PLAN NOTE
- Continue home prn Oxy IR   History of Present Illness   Ebony Egan is a 51 year old female being seen for follow up on DM. She is taking Metformin 500mg BID. It causes diarrhea. She was taking XR at one time but it was causing headaches so she prefers the short acting Metformin. States the diarrhea is getting better. Her blood sugars range from 130-178 both fasting and after meals. She is not able to exercise much like she did before because she continues to have exertional fatigue after having COVID-19 in May of 2021.     Past Medical History     Past Medical History:   Diagnosis Date   • Dermatitis    • Esophageal reflux    • Hypercholesterolemia    • Hyperlipidemia    • Type II or unspecified type diabetes mellitus without mention of complication, not stated as uncontrolled      Health Maintenance     Health Maintenance   Topic Date Due   • Hepatitis B Vaccine (1 of 3 - Risk 3-dose series) Never done   • DTaP/Tdap/Td Vaccine (3 - Td or Tdap) 01/14/2021   • Influenza Vaccine (1) 08/01/2021   • Diabetes A1C  10/08/2021   • Diabetes Eye Exam  10/20/2021   • DM/CKD GFR  12/29/2021   • Diabetes Foot Exam  12/29/2021   • Colorectal Cancer Screen-  01/25/2022   • Breast Cancer Screening  02/08/2022   • Depression Screening  07/08/2022   • Cervical Cancer Screening  10/31/2022   • Pneumococcal Vaccine 0-64 (2 of 2 - PPSV23) 07/14/2033   • Shingles Vaccine  Completed   • COVID-19 Vaccine  Completed   • Meningococcal Vaccine  Aged Out   • HPV Vaccine  Aged Out     Allergies      ALLERGIES:   Allergen Reactions   • Penicillins    • Pollen Other (See Comments)     Hayfever   • Lisinopril Cough       Medications     Current Outpatient Medications   Medication Sig Dispense Refill   • losartan (COZAAR) 25 MG tablet Take 1 tablet by mouth daily. 90 tablet 3   • olopatadine (Pataday) 0.2 % ophthalmic solution Place 1 drop into both eyes daily. 2.5 mL 3   • omeprazole (PriLOSEC) 40 MG capsule TAKE ONE CAPSULE BY MOUTH DAILY 90 capsule 1   • atorvastatin  (Lipitor) 40 MG tablet Take 1 tablet by mouth daily. 90 tablet 3   • cholecalciferol (VITAMIN D3) 1000 UNITS tablet Take 1 tablet by mouth daily.     • Omega 3 1000 MG capsule Take 1,000 mg by mouth daily. 30 capsule 0   • metFORMIN (GLUCOPHAGE) 500 MG tablet Take 2 tablets by mouth 2 times daily (with meals). 180 tablet 3     No current facility-administered medications for this visit.       Review of Systems   No fever, body aches, chills, visual disturbances, weakness, sore throat, cough, congestion, chest pain, SOB, abdominal pain, leg swelling or paresthesia.    Physical Exam      Vitals:    07/08/21 1130   BP: 110/75   BP Location: LUE - Left upper extremity   Patient Position: Sitting   Cuff Size: Regular   Pulse: 85   Resp: 18   Temp: 95.5 °F (35.3 °C)   TempSrc: Tympanic   SpO2: 97%   Weight: 59 kg   Height: 4' 11\" (1.499 m)   LMP: 10/22/2014     Body mass index is 26.26 kg/m².  General:  Alert and oriented.  Cooperative, no acute distress.  Well groomed.   Cardiovascular:  Regular S1 and S2.  No S3, S4 murmurs or thrills.  Pulmonary:  CTA.  No use of accessory muscles, intercostal retraction or respiratory distress noted.   Abdomen:  Soft.  Nontender, nondistended, normoactive bowel sounds.  No hepatosplenomegaly.  No rebound tenderness.  No masses or hernias present.  Extremities:  No pedal edema.   Skin:  Moist.  No pallor.  No exanthem.  Good skin turgor.   Neuro:  CN 2-12 grossly intact.  Strong, steady townsend gait.    LABS  A1c is 8.3% today  Assessment and Plan      DM-uncontrolled  -She prefers to continue Metformin despite diarrhea.   -She will increase dose to Metformin 1,000mg BID.   -lifestyle changes    Follow up:  3 months  Isabelle Lubin NP

## 2024-04-25 NOTE — PLAN OF CARE
Problem: Adult Inpatient Plan of Care  Goal: Plan of Care Review  Outcome: Progressing  Goal: Patient-Specific Goal (Individualized)  Outcome: Progressing  Goal: Absence of Hospital-Acquired Illness or Injury  Outcome: Progressing  Goal: Optimal Comfort and Wellbeing  Outcome: Progressing  Goal: Readiness for Transition of Care  Outcome: Progressing     Problem: Diabetes Comorbidity  Goal: Blood Glucose Level Within Targeted Range  Outcome: Progressing     Problem: Fluid and Electrolyte Imbalance (Acute Kidney Injury/Impairment)  Goal: Fluid and Electrolyte Balance  Outcome: Progressing     Problem: Oral Intake Inadequate (Acute Kidney Injury/Impairment)  Goal: Optimal Nutrition Intake  Outcome: Progressing     Problem: Renal Function Impairment (Acute Kidney Injury/Impairment)  Goal: Effective Renal Function  Outcome: Progressing     Problem: Infection  Goal: Absence of Infection Signs and Symptoms  Outcome: Progressing     Problem: Fall Injury Risk  Goal: Absence of Fall and Fall-Related Injury  Outcome: Progressing

## 2024-04-25 NOTE — ASSESSMENT & PLAN NOTE
- Patient of Dr. Evans  - Admitting 4/16/24 for a Carmen 140 auto SCT  - Today is Day +7   - He received 5 bags on 4/18/24 at 1330 and received the remaining 4 bags on 4/19/24 at 1330. Total CD34 dose was 2.91 x 10^6.  - See treatment plan below.    Planned conditioning regimen:  Melphalan TBD on Day -1     Antimicrobial Prophylaxis:  Acyclovir starting on Day -1  Levofloxacin starting on Day -1  Fluconazole starting on Day -1  Bactrim starting on Day +30     Growth Factor Support:  Neupogen starting on Day +7      Caregiver: daughter and other family members  Post-transplant discharge plans: Smita Portillo

## 2024-04-25 NOTE — PLAN OF CARE
Pt involved in plan of care and communicating needs throughout shift.  Day +7 s/p Carmen Auto SCT for MM; AAOx4; No complaints of pain throughout shift; Neupogen started today; Ambulates in room & to RR independently; Remains afebrile. RA; Tolerating diet, voiding without difficulty. BM x2 this shift; q1h patient rounds. All VSS; Family at bedside; No acute events so far this shift. Non skid socks on; Pt. Instructed to call if any assistance is needed. Pt remaining free from falls or injury; Bed locked in lowest position with side rails up x2 & all belongings including call light within reach; Plan of care ongoing; All needs met at this time.

## 2024-04-25 NOTE — PROGRESS NOTES
[facility] - Oncology ([facility])  Hematology  Bone Marrow Transplant  Progress Note    Patient Name: Chip Goodson  Admission Date: 4/16/2024  Hospital Length of Stay: 9 days  Code Status: Full Code    Subjective:     Interval History: Day +7 s/p Carmen 140 Auto SCT for MM. Afebrile. Constipation resolved with pericolace and lactulose yesterday. Denies pain, n/v. Walking in halls. No other acute concerns    Objective:     Vital Signs (Most Recent):  Temp: 97.3 °F (36.3 °C) (04/25/24 1133)  Pulse: 71 (04/25/24 1133)  Resp: 18 (04/25/24 1133)  BP: 128/68 (04/25/24 1133)  SpO2: 97 % (04/25/24 1133) Vital Signs (24h Range):  Temp:  [97.3 °F (36.3 °C)-98.5 °F (36.9 °C)] 97.3 °F (36.3 °C)  Pulse:  [64-72] 71  Resp:  [17-20] 18  SpO2:  [95 %-98 %] 97 %  BP: (108-158)/(55-72) 128/68     Weight: 87.9 kg (193 lb 10.8 oz)  Body mass index is 27.79 kg/m².  Body surface area is 2.08 meters squared.      Intake/Output - Last 3 Shifts         04/23 0700  04/24 0659 04/24 0700  04/25 0659 04/25 0700  04/26 0659    P.O. 690 840     I.V. (mL/kg)  30 (0.3)     Total Intake(mL/kg) 690 (7.8) 870 (9.9)     Urine (mL/kg/hr) 2100 (1) 1125 (0.5)     Emesis/NG output  0     Stool 0 0     Total Output 2100 1125     Net -1410 -255            Urine Occurrence  3 x     Stool Occurrence 1 x 3 x     Emesis Occurrence  1 x              Physical Exam  Vitals and nursing note reviewed.   Constitutional:       Appearance: Normal appearance. He is well-developed.   HENT:      Head: Normocephalic and atraumatic.      Ears:      Comments: [place]     Mouth/Throat:      Mouth: Mucous membranes are moist.      Pharynx: No oropharyngeal exudate or posterior oropharyngeal erythema.   Eyes:      General:         Right eye: No discharge.         Left eye: No discharge.      Extraocular Movements: Extraocular movements intact.      Conjunctiva/sclera: Conjunctivae normal.   Cardiovascular:      Rate and Rhythm: Regular rhythm. Bradycardia present.      Pulses: Normal  pulses.      Heart sounds: Normal heart sounds. No murmur heard.  Pulmonary:      Effort: Pulmonary effort is normal. No respiratory distress.      Breath sounds: Normal breath sounds. No wheezing or rales.   Abdominal:      General: Bowel sounds are normal. There is no distension.      Palpations: Abdomen is soft.      Tenderness: There is no abdominal tenderness.   Musculoskeletal:         General: No deformity. Normal range of motion.      Cervical back: Normal range of motion and neck supple.      Right lower leg: No edema.      Left lower leg: No edema.   Skin:     General: Skin is warm and dry.      Findings: No bruising, erythema or rash.      Comments: Right chest wall vas cath. Dressing c/d/i. No sign of infection to site.   Neurological:      General: No focal deficit present.      Mental Status: He is alert and oriented to person, place, and time.      Motor: No weakness.   Psychiatric:         Mood and Affect: Mood normal.         Behavior: Behavior normal.         Thought Content: Thought content normal.         Judgment: Judgment normal.            Significant Labs:   CBC:   Recent Labs   Lab 04/24/24  0426 04/25/24  0425   WBC 0.38* 0.10*   HGB 10.7* 10.3*   HCT 30.2* 29.4*   PLT 69* 53*    and CMP:   Recent Labs   Lab 04/24/24  0426 04/25/24  0425    139   K 4.2 3.9    108   CO2 26 25   * 120*   BUN 22 24*   CREATININE 1.0 0.9   CALCIUM 9.1 8.8   PROT 5.4* 5.4*   ALBUMIN 3.1* 3.1*   BILITOT 1.8* 0.7   ALKPHOS 82 85   AST 13 11   ALT 14 14   ANIONGAP 6* 6*       Diagnostic Results:  None  Assessment/Plan:     * History of autologous stem cell transplant  - Patient of Dr. Evans  - Admitting 4/16/24 for a Carmen 140 auto SCT  - Today is Day +7   - He received 5 bags on 4/18/24 at 1330 and received the remaining 4 bags on 4/19/24 at 1330. Total CD34 dose was 2.91 x 10^6.  - See treatment plan below.    Planned conditioning regimen:  Melphalan TBD on Day -1     Antimicrobial  Prophylaxis:  Acyclovir starting on Day -1  Levofloxacin starting on Day -1  Fluconazole starting on Day -1  Bactrim starting on Day +30     Growth Factor Support:  Neupogen starting on Day +7      Caregiver: daughter and other family members  Post-transplant discharge plans: Smita Portillo    Multiple myeloma in remission  - patient of Dr. Evans; follows locally with Dr. Rose in [place]  - diagnosed June 2023 with IgA Kappa MM; stage unclear at diagnosis as FISH not available  - started DRd therapy on 7/18/2023  - he stopped the daratumumab after cycle 4 and continue with revlimid and dex only; looks to have completed ~5 cycles  - Admitted for Carmen 140 Auto SCT on 4/16/2023    Pancytopenia due to chemotherapy  - Daily CBC while inpatient  - Transfuse for Hgb <7or plts < 10K  - Continue antimicrobial ppx  - stopped VTE ppx 4/24 d/t downtrending platelets    Chemotherapy induced nausea and vomiting  - Continue PRN Zofran and Compazine    Hyperbilirubinemia  - T-bili 1.7 on admit. Patient asymptomatic.  - Trending with daily CMP  RESOLVED    Cancer related pain  - Continue home prn Oxy IR    BPH (benign prostatic hyperplasia)  - Continue home finasteride    HTN (hypertension)  - Continue home amlodipine, carvedilol, hydralazine, and spironolactone  - Stopped IVF following transplant. BP improved off IVF.    Type 2 diabetes mellitus without complications  - Holding home metformin  - Stopped ACHS blood glucose monitoring 4/24 as blood glucose has been stable  - monitor daily CMP    Hypothyroidism, unspecified  - Continue home synthroid    GERD (gastroesophageal reflux disease)  - Previously on BID protonix and daily pepcid for stomach ulcers; now resolved  - Will continue daily protonix per transplant order set    COLTON (acute kidney injury)  - Creatinine 1.4 on admission; baseline ~1.1  - Monitoring CMP daily  - Was receiving continuous fluids as part of chemotherapy regimen (NACL with 10% K)  RESOLVED        VTE Risk  Mitigation (From admission, onward)           Ordered     heparin (porcine) injection 1,600 Units  As needed (PRN)         04/16/24 2129     IP VTE HIGH RISK PATIENT  Once         04/16/24 1904     Place sequential compression device  Until discontinued         04/16/24 1904                    Disposition: Remains inpatient    Chelsie Man, NP  Bone Marrow Transplant  [facility] italo - Oncology ([facility])

## 2024-04-25 NOTE — ASSESSMENT & PLAN NOTE
- Daily CBC while inpatient  - Transfuse for Hgb <7or plts < 10K  - Continue antimicrobial ppx  - stopped VTE ppx 4/24 d/t downtrending platelets

## 2024-04-25 NOTE — PLAN OF CARE
Day +7 Carmen Auto SCT. AAOx4. Afebrile and VSS on room air. No complaints overnight. Patient is up independently to the bathroom. Free from falls and injury. Urinal provided and within reach. No bowel movement overnight. Family at bedside. Tolerating diet. Monitoring I&Os Bed is locked and in lowest position, non-skid socks on, and call light within reach. Patient educated on using the call lgiht when needing assistance and verbalizes understanding. CHG wipes given for self care. Plan of care reviewed and is ongoing. Patient expresses no other needs at this time.

## 2024-04-25 NOTE — SUBJECTIVE & OBJECTIVE
Subjective:     Interval History: Day +7 s/p Carmen 140 Auto SCT for MM. Afebrile. Constipation resolved with pericolace and lactulose yesterday. Denies pain, n/v. Walking in halls. No other acute concerns    Objective:     Vital Signs (Most Recent):  Temp: 97.3 °F (36.3 °C) (04/25/24 1133)  Pulse: 71 (04/25/24 1133)  Resp: 18 (04/25/24 1133)  BP: 128/68 (04/25/24 1133)  SpO2: 97 % (04/25/24 1133) Vital Signs (24h Range):  Temp:  [97.3 °F (36.3 °C)-98.5 °F (36.9 °C)] 97.3 °F (36.3 °C)  Pulse:  [64-72] 71  Resp:  [17-20] 18  SpO2:  [95 %-98 %] 97 %  BP: (108-158)/(55-72) 128/68     Weight: 87.9 kg (193 lb 10.8 oz)  Body mass index is 27.79 kg/m².  Body surface area is 2.08 meters squared.      Intake/Output - Last 3 Shifts         04/23 0700  04/24 0659 04/24 0700  04/25 0659 04/25 0700  04/26 0659    P.O. 690 840     I.V. (mL/kg)  30 (0.3)     Total Intake(mL/kg) 690 (7.8) 870 (9.9)     Urine (mL/kg/hr) 2100 (1) 1125 (0.5)     Emesis/NG output  0     Stool 0 0     Total Output 2100 1125     Net -1410 -255            Urine Occurrence  3 x     Stool Occurrence 1 x 3 x     Emesis Occurrence  1 x              Physical Exam  Vitals and nursing note reviewed.   Constitutional:       Appearance: Normal appearance. He is well-developed.   HENT:      Head: Normocephalic and atraumatic.      Ears:      Comments: [place]     Mouth/Throat:      Mouth: Mucous membranes are moist.      Pharynx: No oropharyngeal exudate or posterior oropharyngeal erythema.   Eyes:      General:         Right eye: No discharge.         Left eye: No discharge.      Extraocular Movements: Extraocular movements intact.      Conjunctiva/sclera: Conjunctivae normal.   Cardiovascular:      Rate and Rhythm: Regular rhythm. Bradycardia present.      Pulses: Normal pulses.      Heart sounds: Normal heart sounds. No murmur heard.  Pulmonary:      Effort: Pulmonary effort is normal. No respiratory distress.      Breath sounds: Normal breath sounds. No wheezing or  rales.   Abdominal:      General: Bowel sounds are normal. There is no distension.      Palpations: Abdomen is soft.      Tenderness: There is no abdominal tenderness.   Musculoskeletal:         General: No deformity. Normal range of motion.      Cervical back: Normal range of motion and neck supple.      Right lower leg: No edema.      Left lower leg: No edema.   Skin:     General: Skin is warm and dry.      Findings: No bruising, erythema or rash.      Comments: Right chest wall vas cath. Dressing c/d/i. No sign of infection to site.   Neurological:      General: No focal deficit present.      Mental Status: He is alert and oriented to person, place, and time.      Motor: No weakness.   Psychiatric:         Mood and Affect: Mood normal.         Behavior: Behavior normal.         Thought Content: Thought content normal.         Judgment: Judgment normal.            Significant Labs:   CBC:   Recent Labs   Lab 04/24/24 0426 04/25/24 0425   WBC 0.38* 0.10*   HGB 10.7* 10.3*   HCT 30.2* 29.4*   PLT 69* 53*    and CMP:   Recent Labs   Lab 04/24/24 0426 04/25/24 0425    139   K 4.2 3.9    108   CO2 26 25   * 120*   BUN 22 24*   CREATININE 1.0 0.9   CALCIUM 9.1 8.8   PROT 5.4* 5.4*   ALBUMIN 3.1* 3.1*   BILITOT 1.8* 0.7   ALKPHOS 82 85   AST 13 11   ALT 14 14   ANIONGAP 6* 6*       Diagnostic Results:  None

## 2024-04-26 LAB
ABO + RH BLD: NORMAL
ALBUMIN SERPL BCP-MCNC: 3.1 G/DL (ref 3.5–5.2)
ALP SERPL-CCNC: 81 U/L (ref 55–135)
ALT SERPL W/O P-5'-P-CCNC: 16 U/L (ref 10–44)
ANION GAP SERPL CALC-SCNC: 6 MMOL/L (ref 8–16)
AST SERPL-CCNC: 10 U/L (ref 10–40)
BASOPHILS # BLD AUTO: 0 K/UL (ref 0–0.2)
BASOPHILS NFR BLD: 0 % (ref 0–1.9)
BILIRUB SERPL-MCNC: 1.1 MG/DL (ref 0.1–1)
BLD GP AB SCN CELLS X3 SERPL QL: NORMAL
BUN SERPL-MCNC: 25 MG/DL (ref 8–23)
CALCIUM SERPL-MCNC: 8.8 MG/DL (ref 8.7–10.5)
CHLORIDE SERPL-SCNC: 108 MMOL/L (ref 95–110)
CO2 SERPL-SCNC: 25 MMOL/L (ref 23–29)
CREAT SERPL-MCNC: 1 MG/DL (ref 0.5–1.4)
DIFFERENTIAL METHOD BLD: ABNORMAL
EOSINOPHIL # BLD AUTO: 0 K/UL (ref 0–0.5)
EOSINOPHIL NFR BLD: 0 % (ref 0–8)
ERYTHROCYTE [DISTWIDTH] IN BLOOD BY AUTOMATED COUNT: 11.5 % (ref 11.5–14.5)
EST. GFR  (NO RACE VARIABLE): >60 ML/MIN/1.73 M^2
GLUCOSE SERPL-MCNC: 138 MG/DL (ref 70–110)
HCT VFR BLD AUTO: 28.3 % (ref 40–54)
HGB BLD-MCNC: 9.9 G/DL (ref 14–18)
IMM GRANULOCYTES # BLD AUTO: 0 K/UL (ref 0–0.04)
IMM GRANULOCYTES NFR BLD AUTO: 0 % (ref 0–0.5)
LYMPHOCYTES # BLD AUTO: 0 K/UL (ref 1–4.8)
LYMPHOCYTES NFR BLD: 75 % (ref 18–48)
MAGNESIUM SERPL-MCNC: 2 MG/DL (ref 1.6–2.6)
MCH RBC QN AUTO: 35.7 PG (ref 27–31)
MCHC RBC AUTO-ENTMCNC: 35 G/DL (ref 32–36)
MCV RBC AUTO: 102 FL (ref 82–98)
MONOCYTES # BLD AUTO: 0 K/UL (ref 0.3–1)
MONOCYTES NFR BLD: 0 % (ref 4–15)
NEUTROPHILS # BLD AUTO: 0 K/UL (ref 1.8–7.7)
NEUTROPHILS NFR BLD: 25 % (ref 38–73)
NRBC BLD-RTO: 0 /100 WBC
PHOSPHATE SERPL-MCNC: 3.4 MG/DL (ref 2.7–4.5)
PLATELET # BLD AUTO: 35 K/UL (ref 150–450)
PLATELET BLD QL SMEAR: ABNORMAL
PMV BLD AUTO: 8.9 FL (ref 9.2–12.9)
POTASSIUM SERPL-SCNC: 4 MMOL/L (ref 3.5–5.1)
PROT SERPL-MCNC: 5.1 G/DL (ref 6–8.4)
RBC # BLD AUTO: 2.77 M/UL (ref 4.6–6.2)
SODIUM SERPL-SCNC: 139 MMOL/L (ref 136–145)
SPECIMEN OUTDATE: NORMAL
WBC # BLD AUTO: 0.04 K/UL (ref 3.9–12.7)

## 2024-04-26 PROCEDURE — 83735 ASSAY OF MAGNESIUM: CPT | Performed by: NURSE PRACTITIONER

## 2024-04-26 PROCEDURE — 25000003 PHARM REV CODE 250: Performed by: NURSE PRACTITIONER

## 2024-04-26 PROCEDURE — 97530 THERAPEUTIC ACTIVITIES: CPT

## 2024-04-26 PROCEDURE — 85025 COMPLETE CBC W/AUTO DIFF WBC: CPT | Performed by: NURSE PRACTITIONER

## 2024-04-26 PROCEDURE — 25000003 PHARM REV CODE 250: Performed by: INTERNAL MEDICINE

## 2024-04-26 PROCEDURE — 80053 COMPREHEN METABOLIC PANEL: CPT | Performed by: NURSE PRACTITIONER

## 2024-04-26 PROCEDURE — 84100 ASSAY OF PHOSPHORUS: CPT | Performed by: NURSE PRACTITIONER

## 2024-04-26 PROCEDURE — 99232 SBSQ HOSP IP/OBS MODERATE 35: CPT | Mod: ,,, | Performed by: INTERNAL MEDICINE

## 2024-04-26 PROCEDURE — 20600001 HC STEP DOWN PRIVATE ROOM

## 2024-04-26 PROCEDURE — 97110 THERAPEUTIC EXERCISES: CPT

## 2024-04-26 PROCEDURE — 63600175 PHARM REV CODE 636 W HCPCS: Mod: JZ,JG | Performed by: INTERNAL MEDICINE

## 2024-04-26 PROCEDURE — 86901 BLOOD TYPING SEROLOGIC RH(D): CPT | Performed by: NURSE PRACTITIONER

## 2024-04-26 RX ADMIN — ACYCLOVIR 800 MG: 200 CAPSULE ORAL at 09:04

## 2024-04-26 RX ADMIN — FINASTERIDE 5 MG: 5 TABLET, FILM COATED ORAL at 09:04

## 2024-04-26 RX ADMIN — CARVEDILOL 6.25 MG: 6.25 TABLET, FILM COATED ORAL at 09:04

## 2024-04-26 RX ADMIN — HYDRALAZINE HYDROCHLORIDE 50 MG: 50 TABLET ORAL at 05:04

## 2024-04-26 RX ADMIN — CARVEDILOL 6.25 MG: 6.25 TABLET, FILM COATED ORAL at 08:04

## 2024-04-26 RX ADMIN — HYDRALAZINE HYDROCHLORIDE 50 MG: 50 TABLET ORAL at 01:04

## 2024-04-26 RX ADMIN — FLUCONAZOLE 400 MG: 200 TABLET ORAL at 09:04

## 2024-04-26 RX ADMIN — LEVOTHYROXINE SODIUM 112 MCG: 112 TABLET ORAL at 08:04

## 2024-04-26 RX ADMIN — AMLODIPINE BESYLATE 10 MG: 10 TABLET ORAL at 09:04

## 2024-04-26 RX ADMIN — HYDRALAZINE HYDROCHLORIDE 50 MG: 50 TABLET ORAL at 09:04

## 2024-04-26 RX ADMIN — LEVOFLOXACIN 500 MG: 500 TABLET, FILM COATED ORAL at 09:04

## 2024-04-26 RX ADMIN — FILGRASTIM 480 MCG: 480 INJECTION, SOLUTION INTRAVENOUS; SUBCUTANEOUS at 09:04

## 2024-04-26 RX ADMIN — Medication 1 DOSE: at 08:04

## 2024-04-26 RX ADMIN — SPIRONOLACTONE 12.5 MG: 25 TABLET ORAL at 09:04

## 2024-04-26 RX ADMIN — ACYCLOVIR 800 MG: 200 CAPSULE ORAL at 08:04

## 2024-04-26 RX ADMIN — PANTOPRAZOLE SODIUM 40 MG: 40 TABLET, DELAYED RELEASE ORAL at 09:04

## 2024-04-26 NOTE — ASSESSMENT & PLAN NOTE
- Continue home amlodipine, carvedilol (dose-reduced for bradycardia), hydralazine, and spironolactone  - Stopped IVF following transplant. BP improved off IVF.

## 2024-04-26 NOTE — PROGRESS NOTES
[facility] - Oncology ([facility])  Hematology  Bone Marrow Transplant  Progress Note    Patient Name: Chip Goodson  Admission Date: 4/16/2024  Hospital Length of Stay: 10 days  Code Status: Full Code    Subjective:     Interval History: Day +8 from a Carmen 140 auto SCT for MM. Remains afebrile. VSS. Continues to tolerate chemo well. ANC down to 10 today. Remains active. Oral intake remains good. Family remains at bedside.    Objective:     Vital Signs (Most Recent):  Temp: 97.9 °F (36.6 °C) (04/26/24 0751)  Pulse: (!) 52 (04/26/24 0751)  Resp: 16 (04/26/24 0751)  BP: 126/62 (04/26/24 0751)  SpO2: 97 % (04/26/24 0751) Vital Signs (24h Range):  Temp:  [97.3 °F (36.3 °C)-98.2 °F (36.8 °C)] 97.9 °F (36.6 °C)  Pulse:  [52-72] 52  Resp:  [16-20] 16  SpO2:  [95 %-98 %] 97 %  BP: (106-165)/(54-72) 126/62     Weight: 88 kg (194 lb 0.1 oz)  Body mass index is 27.84 kg/m².  Body surface area is 2.08 meters squared.    ECOG SCORE           [unfilled]    Intake/Output - Last 3 Shifts         04/24 0700  04/25 0659 04/25 0700  04/26 0659 04/26 0700  04/27 0659    P.O. 840 1080     I.V. (mL/kg) 30 (0.3)      Total Intake(mL/kg) 870 (9.9) 1080 (12.3)     Urine (mL/kg/hr) 1125 (0.5) 1300 (0.6) 220 (1.2)    Emesis/NG output 0      Stool 0 0     Total Output 1125 1300 220    Net -255 -220 -220           Urine Occurrence 3 x      Stool Occurrence 3 x 2 x     Emesis Occurrence 1 x               Physical Exam  Constitutional:       Appearance: He is well-developed.   HENT:      Head: Normocephalic and atraumatic.      Ears:      Comments: [place]     Mouth/Throat:      Pharynx: No oropharyngeal exudate.   Eyes:      General:         Right eye: No discharge.         Left eye: No discharge.      Conjunctiva/sclera: Conjunctivae normal.      Pupils: Pupils are equal, round, and reactive to light.   Cardiovascular:      Rate and Rhythm: Normal rate and regular rhythm.      Heart sounds: Normal heart sounds. No murmur heard.  Pulmonary:      Effort:  Pulmonary effort is normal. No respiratory distress.      Breath sounds: Normal breath sounds. No wheezing or rales.   Abdominal:      General: Bowel sounds are normal. There is no distension.      Palpations: Abdomen is soft.      Tenderness: There is no abdominal tenderness.   Musculoskeletal:         General: No deformity. Normal range of motion.      Cervical back: Normal range of motion and neck supple.   Skin:     General: Skin is warm and dry.      Findings: No erythema or rash.      Comments: Right chest wall vas cath. Dressing c/d/i. No sign of infection to site.   Neurological:      Mental Status: He is alert and oriented to person, place, and time.   Psychiatric:         Behavior: Behavior normal.         Thought Content: Thought content normal.         Judgment: Judgment normal.            Significant Labs:   CBC:   Recent Labs   Lab 04/25/24  0425 04/26/24  0403   WBC 0.10* 0.04*   HGB 10.3* 9.9*   HCT 29.4* 28.3*   PLT 53* 35*    and CMP:   Recent Labs   Lab 04/25/24  0425 04/26/24  0403    139   K 3.9 4.0    108   CO2 25 25   * 138*   BUN 24* 25*   CREATININE 0.9 1.0   CALCIUM 8.8 8.8   PROT 5.4* 5.1*   ALBUMIN 3.1* 3.1*   BILITOT 0.7 1.1*   ALKPHOS 85 81   AST 11 10   ALT 14 16   ANIONGAP 6* 6*       Diagnostic Results:  None  Assessment/Plan:     * History of autologous stem cell transplant  - Patient of Dr. Evans  - Admitting 4/16/24 for a Carmen 140 auto SCT  - Today is Day +8   - He received 5 bags on 4/18/24 at 1330 and received the remaining 4 bags on 4/19/24 at 1330. Total CD34 dose was 2.91 x 10^6.  - See treatment plan below.    Planned conditioning regimen:  Melphalan TBD on Day -1     Antimicrobial Prophylaxis:  Acyclovir starting on Day -1  Levofloxacin starting on Day -1  Fluconazole starting on Day -1  Bactrim starting on Day +30     Growth Factor Support:  Neupogen starting on Day +7      Caregiver: daughter and other family members  Post-transplant discharge plans:  Smita Portillo    Multiple myeloma in remission  - patient of Dr. Evans; follows locally with Dr. Rose in [place]  - diagnosed June 2023 with IgA Kappa MM; stage unclear at diagnosis as FISH not available  - started DRd therapy on 7/18/2023  - he stopped the daratumumab after cycle 4 and continue with revlimid and dex only; looks to have completed ~5 cycles  - Admitted for Carmen 140 Auto SCT on 4/16/2023    Pancytopenia due to chemotherapy  - Daily CBC while inpatient  - Transfuse for Hgb <7or plts < 10K  - Continue antimicrobial ppx  - Stopped VTE ppx 4/24 d/t downtrending platelets    Chemotherapy induced nausea and vomiting  - Continue PRN Zofran and Compazine    HTN (hypertension)  - Continue home amlodipine, carvedilol (dose-reduced for bradycardia), hydralazine, and spironolactone  - Stopped IVF following transplant. BP improved off IVF.    Hyperbilirubinemia  - T-bili 1.7 on admit. Patient asymptomatic.  - Trending with daily CMP  RESOLVED    Cancer related pain  - Continue home prn Oxy IR    BPH (benign prostatic hyperplasia)  - Continue home finasteride    Type 2 diabetes mellitus without complications  - Holding home metformin  - Stopped ACHS blood glucose monitoring 4/24 as blood glucose has been stable  - Monitoring with daily CMP    Hypothyroidism, unspecified  - Continue home synthroid    GERD (gastroesophageal reflux disease)  - Previously on BID protonix and daily pepcid for stomach ulcers; now resolved  - Will continue daily protonix per transplant order set    COLTON (acute kidney injury)  - Creatinine 1.4 on admission; baseline ~1.1  - Monitoring CMP daily  - Was receiving continuous fluids as part of chemotherapy regimen (NACL with 10% K)  RESOLVED        VTE Risk Mitigation (From admission, onward)           Ordered     heparin (porcine) injection 1,600 Units  As needed (PRN)         04/16/24 2129     IP VTE HIGH RISK PATIENT  Once         04/16/24 1904     Place sequential compression device  Until  discontinued         04/16/24 1904                    Disposition: Inpatient for autologous SCT. Awaiting neutrophil engraftment.    Jessica Cee, NP  Bone Marrow Transplant  [facility] - [facility] ([facility])

## 2024-04-26 NOTE — PT/OT/SLP PROGRESS
"Physical Therapy Treatment    Patient Name:  Chip Goodson   MRN:  [id number]    Recommendations:     Discharge Recommendations: No Therapy Indicated  Discharge Equipment Recommendations: none  Barriers to discharge: None    Assessment:     Chip Goodson is a 73 y.o. male admitted with a medical diagnosis of History of autologous stem cell transplant.  He presents with the following impairments/functional limitations:  (at risk for deconditioning) Patient motivated to participate in physical therapy session, and supportive sister present throughout. Session consists of hallway ambulation and stair negotiation. Will keep on PT caseload for 2x/week maintenance therapy, primarily to ensure he does not have any regression in his functional mobility during this admit.      Rehab Prognosis: Good; patient would benefit from acute skilled PT services to address these deficits and reach maximum level of function.    Recent Surgery: * No surgery found *      Plan:     During this hospitalization, patient to be seen 2 x/week to address the identified rehab impairments via gait training, therapeutic activities, therapeutic exercises, neuromuscular re-education and progress toward the following goals:    Plan of Care Expires:  05/19/24    Subjective     Chief Complaint: "I know I can go up these stairs"  Patient/Family Comments/goals: return home to [facility]  Pain/Comfort:  Pain Rating 1: 0/10  Pain Rating Post-Intervention 1: 0/10      Objective:     Communicated with RN prior to session.  Patient found supine with  (no active lines) upon PT entry to room.     General Precautions: Standard, fall, diabetic, hearing impaired  Orthopedic Precautions: N/A  Braces: N/A  Respiratory Status: Room air     Functional Mobility:  Bed Mobility:     Scooting: independence  Supine to Sit: independence  Sit to Supine: independence  Transfers:     Sit to Stand:  supervision with no AD and from EOB  Gait: 420 ft with supervision with no AD, no " LOB noted  Stairs: ascend/descend 18 stairs with R HR, SBA and no AD. One moment of mild lateral instability that patient easily corrected. One standing rest break on landing. Reciprocal pattern noted.  Balance: sitting: good, standing: supervision      AM-PAC 6 CLICK MOBILITY  Turning over in bed (including adjusting bedclothes, sheets and blankets)?: 4  Sitting down on and standing up from a chair with arms (e.g., wheelchair, bedside commode, etc.): 4  Moving from lying on back to sitting on the side of the bed?: 4  Moving to and from a bed to a chair (including a wheelchair)?: 4  Need to walk in hospital room?: 4  Climbing 3-5 steps with a railing?: 4  Basic Mobility Total Score: 24       Treatment & Education:  Patient donned/doffed mask for ambulation trial in hallway.  Patient educated on importance of OOB activity to promote overall endurance.  Patient educated on current level of function and progression towards therapeutic goals.    Patient left supine with all lines intact, call button in reach, RN notified, and grandson present..    GOALS:   Multidisciplinary Problems       Physical Therapy Goals          Problem: Physical Therapy    Goal Priority Disciplines Outcome Goal Variances Interventions   Physical Therapy Goal     PT, PT/OT Progressing     Description: Goals to be met by: 24     Patient will maintain functional independence with mobility, without evidence of deconditioning, by performin. Supine to sit with [place]  2. Sit to stand transfer with [place]  3. Gait  x 400 feet with [place]   4. Ascend/Descend 6 inch curb step with [place]  5. Stand for 10 minutes with [place]  6. Lower extremity exercise program x30 reps per handout, with assistance as needed                         Time Tracking:     PT Received On: 24  PT Start Time: 1640     PT Stop Time: 1649  PT Total Time (min): 9 min     Billable Minutes: Gait Training 11    Treatment Type:  Treatment  PT/PTA: PT     Number of PTA visits since last PT visit: 0     04/26/2024

## 2024-04-26 NOTE — ASSESSMENT & PLAN NOTE
- Patient of Dr. Evans  - Admitting 4/16/24 for a Carmen 140 auto SCT  - Today is Day +8   - He received 5 bags on 4/18/24 at 1330 and received the remaining 4 bags on 4/19/24 at 1330. Total CD34 dose was 2.91 x 10^6.  - See treatment plan below.    Planned conditioning regimen:  Melphalan TBD on Day -1     Antimicrobial Prophylaxis:  Acyclovir starting on Day -1  Levofloxacin starting on Day -1  Fluconazole starting on Day -1  Bactrim starting on Day +30     Growth Factor Support:  Neupogen starting on Day +7      Caregiver: daughter and other family members  Post-transplant discharge plans: Smita Portillo

## 2024-04-26 NOTE — PT/OT/SLP PROGRESS
Occupational Therapy   Treatment    Name: Chip Goodson  MRN: [id number]  Admitting Diagnosis:  History of autologous stem cell transplant       Recommendations:     Discharge Recommendations: No Therapy Indicated  Discharge Equipment Recommendations:  none  Barriers to discharge:  None    Assessment:     Chip Goodson is a 73 y.o. male with a medical diagnosis of History of autologous stem cell transplant.  He presents with the following performance deficits affecting function: other (comment) (at risk for deconditioning). Pt making progress towards goals.     Rehab Prognosis:  Good; patient would benefit from acute skilled OT services to address these deficits and reach maximum level of function.       Plan:     Patient to be seen 1 x/week to address the above listed problems via self-care/home management, therapeutic exercises  Plan of Care Expires: 05/03/24  Plan of Care Reviewed with: patient, spouse    Subjective     Chief Complaint: None  Patient/Family Comments/goals: ready to return home  Pain/Comfort:  Pain Rating 1: 0/10    Objective:     Communicated with: Nsg prior to session.  Patient found supine with  (no active lines) upon OT entry to room.    General Precautions: Standard, fall, diabetic, hearing impaired    Orthopedic Precautions:N/A  Braces: N/A  Respiratory Status: Room air     Occupational Performance:     Bed Mobility:    Patient completed Supine to Sit with independence  Patient completed Sit to Supine with independence     Functional Mobility/Transfers:  Patient completed Sit <> Stand Transfer with independence  with  no assistive device   Functional Mobility: Pt ambulated room and hallway level with S faded to Indp and no AD.    Activities of Daily Living:  Grooming: independence performed oral care prior to tx  Upper Body Dressing: independence don personal clothes  Lower Body Dressing: independence don personal clothes and socks    Therex: Pt performed 10 reps of the following using 1  LB dowel  Bicep curls  Chest press  Shoulder press      [facility] 6 Click ADL: 24    Treatment & Education:  Pt educated on role and purpose of therapy  Pt educated on goal setting  Pt educated on benefits of OOB activity  Pt educated on self advocacy     Patient left supine with call button in reach, nsg notified, and wife present    GOALS:   Multidisciplinary Problems       Occupational Therapy Goals          Problem: Occupational Therapy    Goal Priority Disciplines Outcome Interventions   Occupational Therapy Goal     OT, PT/OT Progressing    Description: Goals to be met by: 05-03-24     Patient will increase functional independence with ADLs by performing:    Pt. To be independent with ADL task performance  Pt. To be independent with HEP to BUE  to maintain level of endurance                         Time Tracking:     OT Date of Treatment: 04/26/24  OT Start Time: 1358  OT Stop Time: 1408  OT Total Time (min): 10 min    Billable Minutes:Therapeutic Activity 10    OT/NEL: OT          4/26/2024

## 2024-04-26 NOTE — SUBJECTIVE & OBJECTIVE
Subjective:     Interval History: Day +8 from a Carmen 140 auto SCT for MM. Remains afebrile. VSS. Continues to tolerate chemo well. ANC down to 10 today. Remains active. Oral intake remains good. Family remains at bedside.    Objective:     Vital Signs (Most Recent):  Temp: 97.9 °F (36.6 °C) (04/26/24 0751)  Pulse: (!) 52 (04/26/24 0751)  Resp: 16 (04/26/24 0751)  BP: 126/62 (04/26/24 0751)  SpO2: 97 % (04/26/24 0751) Vital Signs (24h Range):  Temp:  [97.3 °F (36.3 °C)-98.2 °F (36.8 °C)] 97.9 °F (36.6 °C)  Pulse:  [52-72] 52  Resp:  [16-20] 16  SpO2:  [95 %-98 %] 97 %  BP: (106-165)/(54-72) 126/62     Weight: 88 kg (194 lb 0.1 oz)  Body mass index is 27.84 kg/m².  Body surface area is 2.08 meters squared.    ECOG SCORE           [unfilled]    Intake/Output - Last 3 Shifts         04/24 0700  04/25 0659 04/25 0700  04/26 0659 04/26 0700  04/27 0659    P.O. 840 1080     I.V. (mL/kg) 30 (0.3)      Total Intake(mL/kg) 870 (9.9) 1080 (12.3)     Urine (mL/kg/hr) 1125 (0.5) 1300 (0.6) 220 (1.2)    Emesis/NG output 0      Stool 0 0     Total Output 1125 1300 220    Net -255 -220 -220           Urine Occurrence 3 x      Stool Occurrence 3 x 2 x     Emesis Occurrence 1 x               Physical Exam  Constitutional:       Appearance: He is well-developed.   HENT:      Head: Normocephalic and atraumatic.      Ears:      Comments: [place]     Mouth/Throat:      Pharynx: No oropharyngeal exudate.   Eyes:      General:         Right eye: No discharge.         Left eye: No discharge.      Conjunctiva/sclera: Conjunctivae normal.      Pupils: Pupils are equal, round, and reactive to light.   Cardiovascular:      Rate and Rhythm: Normal rate and regular rhythm.      Heart sounds: Normal heart sounds. No murmur heard.  Pulmonary:      Effort: Pulmonary effort is normal. No respiratory distress.      Breath sounds: Normal breath sounds. No wheezing or rales.   Abdominal:      General: Bowel sounds are normal. There is no distension.       Palpations: Abdomen is soft.      Tenderness: There is no abdominal tenderness.   Musculoskeletal:         General: No deformity. Normal range of motion.      Cervical back: Normal range of motion and neck supple.   Skin:     General: Skin is warm and dry.      Findings: No erythema or rash.      Comments: Right chest wall vas cath. Dressing c/d/i. No sign of infection to site.   Neurological:      Mental Status: He is alert and oriented to person, place, and time.   Psychiatric:         Behavior: Behavior normal.         Thought Content: Thought content normal.         Judgment: Judgment normal.            Significant Labs:   CBC:   Recent Labs   Lab 04/25/24  0425 04/26/24  0403   WBC 0.10* 0.04*   HGB 10.3* 9.9*   HCT 29.4* 28.3*   PLT 53* 35*    and CMP:   Recent Labs   Lab 04/25/24 0425 04/26/24  0403    139   K 3.9 4.0    108   CO2 25 25   * 138*   BUN 24* 25*   CREATININE 0.9 1.0   CALCIUM 8.8 8.8   PROT 5.4* 5.1*   ALBUMIN 3.1* 3.1*   BILITOT 0.7 1.1*   ALKPHOS 85 81   AST 11 10   ALT 14 16   ANIONGAP 6* 6*       Diagnostic Results:  None

## 2024-04-26 NOTE — PLAN OF CARE
Day + 8 carlotta auto SCT. Plan of care discussed with patient at start of shift. Free from falls and injuries. Resting quietly with eyes closed. Respirations even, unlabored. Skin warm and dry. Denies pain. Denies nausea. Frequent checks for pain and safety maintained. Family to remain at bedside. Bed in lowest position, wheels locked, side rails up x's 2, call light in reach. Instructed to call for assistance as needed, verbalizes understanding.

## 2024-04-26 NOTE — ASSESSMENT & PLAN NOTE
- Holding home metformin  - Stopped ACHS blood glucose monitoring 4/24 as blood glucose has been stable  - Monitoring with daily CMP

## 2024-04-26 NOTE — PROGRESS NOTES
Pt seen for follow up. Pleased with his progress without issue.  Hope Bucks confirmed stay for estimated dates following discharge; will update as needed.  No other needs identified at this time. Will continue to follow and assist as needed.

## 2024-04-27 LAB
ALBUMIN SERPL BCP-MCNC: 3.2 G/DL (ref 3.5–5.2)
ALP SERPL-CCNC: 85 U/L (ref 55–135)
ALT SERPL W/O P-5'-P-CCNC: 16 U/L (ref 10–44)
ANION GAP SERPL CALC-SCNC: 6 MMOL/L (ref 8–16)
AST SERPL-CCNC: 9 U/L (ref 10–40)
BASOPHILS # BLD AUTO: 0 K/UL (ref 0–0.2)
BASOPHILS NFR BLD: 0 % (ref 0–1.9)
BILIRUB SERPL-MCNC: 1.3 MG/DL (ref 0.1–1)
BUN SERPL-MCNC: 26 MG/DL (ref 8–23)
CALCIUM SERPL-MCNC: 9.1 MG/DL (ref 8.7–10.5)
CHLORIDE SERPL-SCNC: 109 MMOL/L (ref 95–110)
CO2 SERPL-SCNC: 25 MMOL/L (ref 23–29)
CREAT SERPL-MCNC: 1 MG/DL (ref 0.5–1.4)
DIFFERENTIAL METHOD BLD: ABNORMAL
EOSINOPHIL # BLD AUTO: 0 K/UL (ref 0–0.5)
EOSINOPHIL NFR BLD: 0 % (ref 0–8)
ERYTHROCYTE [DISTWIDTH] IN BLOOD BY AUTOMATED COUNT: 11.3 % (ref 11.5–14.5)
EST. GFR  (NO RACE VARIABLE): >60 ML/MIN/1.73 M^2
GLUCOSE SERPL-MCNC: 141 MG/DL (ref 70–110)
HCT VFR BLD AUTO: 28.8 % (ref 40–54)
HGB BLD-MCNC: 10 G/DL (ref 14–18)
IMM GRANULOCYTES # BLD AUTO: 0 K/UL (ref 0–0.04)
IMM GRANULOCYTES NFR BLD AUTO: 0 % (ref 0–0.5)
LYMPHOCYTES # BLD AUTO: 0 K/UL (ref 1–4.8)
LYMPHOCYTES NFR BLD: 75 % (ref 18–48)
MAGNESIUM SERPL-MCNC: 2 MG/DL (ref 1.6–2.6)
MCH RBC QN AUTO: 35.3 PG (ref 27–31)
MCHC RBC AUTO-ENTMCNC: 34.7 G/DL (ref 32–36)
MCV RBC AUTO: 102 FL (ref 82–98)
MONOCYTES # BLD AUTO: 0 K/UL (ref 0.3–1)
MONOCYTES NFR BLD: 0 % (ref 4–15)
NEUTROPHILS # BLD AUTO: 0 K/UL (ref 1.8–7.7)
NEUTROPHILS NFR BLD: 25 % (ref 38–73)
NRBC BLD-RTO: 0 /100 WBC
PHOSPHATE SERPL-MCNC: 3.5 MG/DL (ref 2.7–4.5)
PLATELET # BLD AUTO: 18 K/UL (ref 150–450)
PMV BLD AUTO: 10.2 FL (ref 9.2–12.9)
POTASSIUM SERPL-SCNC: 3.9 MMOL/L (ref 3.5–5.1)
PROT SERPL-MCNC: 5.4 G/DL (ref 6–8.4)
RBC # BLD AUTO: 2.83 M/UL (ref 4.6–6.2)
SODIUM SERPL-SCNC: 140 MMOL/L (ref 136–145)
WBC # BLD AUTO: 0.04 K/UL (ref 3.9–12.7)

## 2024-04-27 PROCEDURE — 63600175 PHARM REV CODE 636 W HCPCS: Mod: JZ,JG | Performed by: INTERNAL MEDICINE

## 2024-04-27 PROCEDURE — 25000003 PHARM REV CODE 250: Performed by: NURSE PRACTITIONER

## 2024-04-27 PROCEDURE — 80053 COMPREHEN METABOLIC PANEL: CPT | Performed by: NURSE PRACTITIONER

## 2024-04-27 PROCEDURE — 20600001 HC STEP DOWN PRIVATE ROOM

## 2024-04-27 PROCEDURE — 85025 COMPLETE CBC W/AUTO DIFF WBC: CPT | Performed by: NURSE PRACTITIONER

## 2024-04-27 PROCEDURE — 83735 ASSAY OF MAGNESIUM: CPT | Performed by: NURSE PRACTITIONER

## 2024-04-27 PROCEDURE — 84100 ASSAY OF PHOSPHORUS: CPT | Performed by: NURSE PRACTITIONER

## 2024-04-27 PROCEDURE — 99232 SBSQ HOSP IP/OBS MODERATE 35: CPT | Mod: ,,, | Performed by: INTERNAL MEDICINE

## 2024-04-27 PROCEDURE — 25000003 PHARM REV CODE 250: Performed by: INTERNAL MEDICINE

## 2024-04-27 RX ADMIN — HYDRALAZINE HYDROCHLORIDE 50 MG: 50 TABLET ORAL at 01:04

## 2024-04-27 RX ADMIN — ACYCLOVIR 800 MG: 200 CAPSULE ORAL at 09:04

## 2024-04-27 RX ADMIN — HYDRALAZINE HYDROCHLORIDE 50 MG: 50 TABLET ORAL at 05:04

## 2024-04-27 RX ADMIN — LEVOTHYROXINE SODIUM 112 MCG: 112 TABLET ORAL at 09:04

## 2024-04-27 RX ADMIN — AMLODIPINE BESYLATE 10 MG: 10 TABLET ORAL at 08:04

## 2024-04-27 RX ADMIN — Medication 1 DOSE: at 01:04

## 2024-04-27 RX ADMIN — FINASTERIDE 5 MG: 5 TABLET, FILM COATED ORAL at 08:04

## 2024-04-27 RX ADMIN — ACYCLOVIR 800 MG: 200 CAPSULE ORAL at 08:04

## 2024-04-27 RX ADMIN — Medication 1 DOSE: at 04:04

## 2024-04-27 RX ADMIN — Medication 1 DOSE: at 09:04

## 2024-04-27 RX ADMIN — Medication 1 DOSE: at 08:04

## 2024-04-27 RX ADMIN — CARVEDILOL 6.25 MG: 6.25 TABLET, FILM COATED ORAL at 08:04

## 2024-04-27 RX ADMIN — FILGRASTIM 480 MCG: 480 INJECTION, SOLUTION INTRAVENOUS; SUBCUTANEOUS at 08:04

## 2024-04-27 RX ADMIN — SPIRONOLACTONE 12.5 MG: 25 TABLET ORAL at 08:04

## 2024-04-27 RX ADMIN — FLUCONAZOLE 400 MG: 200 TABLET ORAL at 08:04

## 2024-04-27 RX ADMIN — CARVEDILOL 6.25 MG: 6.25 TABLET, FILM COATED ORAL at 09:04

## 2024-04-27 RX ADMIN — HEPARIN SODIUM 1600 UNITS: 1000 INJECTION, SOLUTION INTRAVENOUS; SUBCUTANEOUS at 05:04

## 2024-04-27 RX ADMIN — LEVOFLOXACIN 500 MG: 500 TABLET, FILM COATED ORAL at 08:04

## 2024-04-27 RX ADMIN — HYDRALAZINE HYDROCHLORIDE 50 MG: 50 TABLET ORAL at 09:04

## 2024-04-27 RX ADMIN — PANTOPRAZOLE SODIUM 40 MG: 40 TABLET, DELAYED RELEASE ORAL at 08:04

## 2024-04-27 NOTE — SUBJECTIVE & OBJECTIVE
Subjective:     Interval History: Day +9 following AutoSCT conditioned with [id number].  He is doing well this time.  No nausea, vomiting, diarrhea.  Continue supportive care.    Objective:     Vital Signs (Most Recent):  Temp: 97.6 °F (36.4 °C) (04/27/24 1222)  Pulse: 71 (04/27/24 1222)  Resp: 18 (04/27/24 1222)  BP: (!) 146/70 (04/27/24 1222)  SpO2: 99 % (04/27/24 1222) Vital Signs (24h Range):  Temp:  [97.6 °F (36.4 °C)-98.1 °F (36.7 °C)] 97.6 °F (36.4 °C)  Pulse:  [68-77] 71  Resp:  [18] 18  SpO2:  [96 %-99 %] 99 %  BP: (117-146)/(65-74) 146/70     Weight: 87.9 kg (193 lb 14.3 oz)  Body mass index is 27.82 kg/m².  Body surface area is 2.08 meters squared.    ECOG SCORE           [unfilled]    Intake/Output - Last 3 Shifts         04/25 0700  04/26 0659 04/26 0700  04/27 0659 04/27 0700  04/28 0659    P.O. 1080 711 240    I.V. (mL/kg)       Total Intake(mL/kg) 1080 (12.3) 711 (8.1) 240 (2.7)    Urine (mL/kg/hr) 1300 (0.6) 1570 (0.7) 975 (1.6)    Emesis/NG output       Stool 0 0     Total Output 1300 1570 975    Net -220 -859 -735           Stool Occurrence 2 x 1 x              Physical Exam  Constitutional:       Appearance: He is well-developed.   HENT:      Head: Normocephalic and atraumatic.      Ears:      Comments: [place]     Mouth/Throat:      Pharynx: No oropharyngeal exudate.   Eyes:      General:         Right eye: No discharge.         Left eye: No discharge.      Conjunctiva/sclera: Conjunctivae normal.      Pupils: Pupils are equal, round, and reactive to light.   Cardiovascular:      Rate and Rhythm: Normal rate and regular rhythm.      Heart sounds: Normal heart sounds. No murmur heard.  Pulmonary:      Effort: Pulmonary effort is normal. No respiratory distress.      Breath sounds: Normal breath sounds. No wheezing or rales.   Abdominal:      General: Bowel sounds are normal. There is no distension.      Palpations: Abdomen is soft.      Tenderness: There is no abdominal tenderness.   Musculoskeletal:          General: No deformity. Normal range of motion.      Cervical back: Normal range of motion and neck supple.   Skin:     General: Skin is warm and dry.      Findings: No erythema or rash.      Comments: Right chest wall vas cath. Dressing c/d/i. No sign of infection to site.   Neurological:      Mental Status: He is alert and oriented to person, place, and time.   Psychiatric:         Behavior: Behavior normal.         Thought Content: Thought content normal.         Judgment: Judgment normal.            Significant Labs:   CBC:   Recent Labs   Lab 04/26/24  0403 04/27/24  0519   WBC 0.04* 0.04*   HGB 9.9* 10.0*   HCT 28.3* 28.8*   PLT 35* 18*    and CMP:   Recent Labs   Lab 04/26/24  0403 04/27/24  0519    140   K 4.0 3.9    109   CO2 25 25   * 141*   BUN 25* 26*   CREATININE 1.0 1.0   CALCIUM 8.8 9.1   PROT 5.1* 5.4*   ALBUMIN 3.1* 3.2*   BILITOT 1.1* 1.3*   ALKPHOS 81 85   AST 10 9*   ALT 16 16   ANIONGAP 6* 6*       Diagnostic Results:  None

## 2024-04-27 NOTE — PLAN OF CARE
No acute events this shift, VSS, afebrile, aaox4, pt is Kashia, medications tolerated, pt ambulates independently, urinal provided dentures at bedside, frequent rounds performed, I/O recorded, pt in bed resting, call light in reach, pt instructed to call for assistance, NAD, safety maintained

## 2024-04-27 NOTE — PROGRESS NOTES
[facility] - Oncology ([facility])  Hematology  Bone Marrow Transplant  Progress Note    Patient Name: Chip Goodson  Admission Date: 4/16/2024  Hospital Length of Stay: 11 days  Code Status: Full Code    Subjective:     Interval History: Day +9 following AutoSCT conditioned with Tax248.  He is doing well this time.  No nausea, vomiting, diarrhea.  Continue supportive care.    Objective:     Vital Signs (Most Recent):  Temp: 97.6 °F (36.4 °C) (04/27/24 1222)  Pulse: 71 (04/27/24 1222)  Resp: 18 (04/27/24 1222)  BP: (!) 146/70 (04/27/24 1222)  SpO2: 99 % (04/27/24 1222) Vital Signs (24h Range):  Temp:  [97.6 °F (36.4 °C)-98.1 °F (36.7 °C)] 97.6 °F (36.4 °C)  Pulse:  [68-77] 71  Resp:  [18] 18  SpO2:  [96 %-99 %] 99 %  BP: (117-146)/(65-74) 146/70     Weight: 87.9 kg (193 lb 14.3 oz)  Body mass index is 27.82 kg/m².  Body surface area is 2.08 meters squared.    ECOG SCORE           [unfilled]    Intake/Output - Last 3 Shifts         04/25 0700  04/26 0659 04/26 0700  04/27 0659 04/27 0700  04/28 0659    P.O. 1080 711 240    I.V. (mL/kg)       Total Intake(mL/kg) 1080 (12.3) 711 (8.1) 240 (2.7)    Urine (mL/kg/hr) 1300 (0.6) 1570 (0.7) 975 (1.6)    Emesis/NG output       Stool 0 0     Total Output 1300 1570 975    Net -220 -859 -735           Stool Occurrence 2 x 1 x              Physical Exam  Constitutional:       Appearance: He is well-developed.   HENT:      Head: Normocephalic and atraumatic.      Ears:      Comments: [place]     Mouth/Throat:      Pharynx: No oropharyngeal exudate.   Eyes:      General:         Right eye: No discharge.         Left eye: No discharge.      Conjunctiva/sclera: Conjunctivae normal.      Pupils: Pupils are equal, round, and reactive to light.   Cardiovascular:      Rate and Rhythm: Normal rate and regular rhythm.      Heart sounds: Normal heart sounds. No murmur heard.  Pulmonary:      Effort: Pulmonary effort is normal. No respiratory distress.      Breath sounds: Normal breath sounds. No  wheezing or rales.   Abdominal:      General: Bowel sounds are normal. There is no distension.      Palpations: Abdomen is soft.      Tenderness: There is no abdominal tenderness.   Musculoskeletal:         General: No deformity. Normal range of motion.      Cervical back: Normal range of motion and neck supple.   Skin:     General: Skin is warm and dry.      Findings: No erythema or rash.      Comments: Right chest wall vas cath. Dressing c/d/i. No sign of infection to site.   Neurological:      Mental Status: He is alert and oriented to person, place, and time.   Psychiatric:         Behavior: Behavior normal.         Thought Content: Thought content normal.         Judgment: Judgment normal.            Significant Labs:   CBC:   Recent Labs   Lab 04/26/24  0403 04/27/24  0519   WBC 0.04* 0.04*   HGB 9.9* 10.0*   HCT 28.3* 28.8*   PLT 35* 18*    and CMP:   Recent Labs   Lab 04/26/24  0403 04/27/24  0519    140   K 4.0 3.9    109   CO2 25 25   * 141*   BUN 25* 26*   CREATININE 1.0 1.0   CALCIUM 8.8 9.1   PROT 5.1* 5.4*   ALBUMIN 3.1* 3.2*   BILITOT 1.1* 1.3*   ALKPHOS 81 85   AST 10 9*   ALT 16 16   ANIONGAP 6* 6*       Diagnostic Results:  None  Assessment/Plan:     * History of autologous stem cell transplant  - Patient of Dr. Evans  - Admitting 4/16/24 for a Carmen 140 auto SCT  - Today is Day +9   - He received 5 bags on 4/18/24 at 1330 and received the remaining 4 bags on 4/19/24 at 1330. Total CD34 dose was 2.91 x 10^6.  - See treatment plan below.    Planned conditioning regimen:  Melphalan TBD on Day -1     Antimicrobial Prophylaxis:  Acyclovir starting on Day -1  Levofloxacin starting on Day -1  Fluconazole starting on Day -1  Bactrim starting on Day +30     Growth Factor Support:  Neupogen starting on Day +7      Caregiver: daughter and other family members  Post-transplant discharge plans: Hope Kennard    Chemotherapy induced nausea and vomiting  - Continue PRN Zofran and  Compazine    Hyperbilirubinemia  - T-bili 1.7 on admit. Patient asymptomatic.  - Trending with daily CMP  RESOLVED    Pancytopenia due to chemotherapy  - Daily CBC while inpatient  - Transfuse for Hgb <7or plts < 10K  - Continue antimicrobial ppx  - Stopped VTE ppx 4/24 d/t downtrending platelets    Cancer related pain  - Continue home prn Oxy IR    BPH (benign prostatic hyperplasia)  - Continue home finasteride    HTN (hypertension)  - Continue home amlodipine, carvedilol (dose-reduced for bradycardia), hydralazine, and spironolactone  - Stopped IVF following transplant. BP improved off IVF.    Type 2 diabetes mellitus without complications  - Holding home metformin  - Stopped ACHS blood glucose monitoring 4/24 as blood glucose has been stable  - Monitoring with daily CMP    Hypothyroidism, unspecified  - Continue home synthroid    GERD (gastroesophageal reflux disease)  - Previously on BID protonix and daily pepcid for stomach ulcers; now resolved  - Will continue daily protonix per transplant order set    Multiple myeloma in remission  - patient of Dr. Evans; follows locally with Dr. Rose in [place]  - diagnosed June 2023 with IgA Kappa MM; stage unclear at diagnosis as FISH not available  - started DRd therapy on 7/18/2023  - he stopped the daratumumab after cycle 4 and continue with revlimid and dex only; looks to have completed ~5 cycles  - Admitted for Carmen 140 Auto SCT on 4/16/2023    COLTON (acute kidney injury)  - Creatinine 1.4 on admission; baseline ~1.1  - Monitoring CMP daily  - Was receiving continuous fluids as part of chemotherapy regimen (NACL with 10% K)  RESOLVED        VTE Risk Mitigation (From admission, onward)           Ordered     heparin (porcine) injection 1,600 Units  As needed (PRN)         04/16/24 2129     IP VTE HIGH RISK PATIENT  Once         04/16/24 1904     Place sequential compression device  Until discontinued         04/16/24 1904                    Disposition:   Inpatient    Ganesh Gudino MD  Bone Marrow Transplant  [facility] - Oncology ([facility])

## 2024-04-27 NOTE — PLAN OF CARE
Day +9 FRANCISCO Auto tx. Pt involved in plan of care and communicating needs throughout shift. No PRNs given. Pt has no c/o pain or discomfort at this time. All VSS; no acute events so far this shift.  Pt remaining free from falls or injury throughout shift; bed locked and in lowest position; call light within reach.  Pt instructed to call for assistance as needed.  Q1H rounding done on pt.

## 2024-04-28 LAB
ALBUMIN SERPL BCP-MCNC: 3 G/DL (ref 3.5–5.2)
ALP SERPL-CCNC: 83 U/L (ref 55–135)
ALT SERPL W/O P-5'-P-CCNC: 14 U/L (ref 10–44)
ANION GAP SERPL CALC-SCNC: 7 MMOL/L (ref 8–16)
ANISOCYTOSIS BLD QL SMEAR: SLIGHT
AST SERPL-CCNC: 10 U/L (ref 10–40)
BASOPHILS # BLD AUTO: 0 K/UL (ref 0–0.2)
BASOPHILS NFR BLD: 0 % (ref 0–1.9)
BILIRUB SERPL-MCNC: 1.3 MG/DL (ref 0.1–1)
BLD PROD TYP BPU: NORMAL
BLOOD UNIT EXPIRATION DATE: NORMAL
BLOOD UNIT TYPE CODE: 8400
BLOOD UNIT TYPE: NORMAL
BUN SERPL-MCNC: 24 MG/DL (ref 8–23)
CALCIUM SERPL-MCNC: 9 MG/DL (ref 8.7–10.5)
CHLORIDE SERPL-SCNC: 109 MMOL/L (ref 95–110)
CO2 SERPL-SCNC: 25 MMOL/L (ref 23–29)
CODING SYSTEM: NORMAL
CREAT SERPL-MCNC: 0.9 MG/DL (ref 0.5–1.4)
CROSSMATCH INTERPRETATION: NORMAL
DACRYOCYTES BLD QL SMEAR: ABNORMAL
DIFFERENTIAL METHOD BLD: ABNORMAL
DISPENSE STATUS: NORMAL
EOSINOPHIL # BLD AUTO: 0 K/UL (ref 0–0.5)
EOSINOPHIL NFR BLD: 0 % (ref 0–8)
ERYTHROCYTE [DISTWIDTH] IN BLOOD BY AUTOMATED COUNT: 10.9 % (ref 11.5–14.5)
EST. GFR  (NO RACE VARIABLE): >60 ML/MIN/1.73 M^2
GLUCOSE SERPL-MCNC: 138 MG/DL (ref 70–110)
HCT VFR BLD AUTO: 25.3 % (ref 40–54)
HGB BLD-MCNC: 9.4 G/DL (ref 14–18)
HYPOCHROMIA BLD QL SMEAR: ABNORMAL
IMM GRANULOCYTES # BLD AUTO: 0 K/UL (ref 0–0.04)
IMM GRANULOCYTES NFR BLD AUTO: 0 % (ref 0–0.5)
LYMPHOCYTES # BLD AUTO: 0 K/UL (ref 1–4.8)
LYMPHOCYTES NFR BLD: 100 % (ref 18–48)
MAGNESIUM SERPL-MCNC: 1.8 MG/DL (ref 1.6–2.6)
MCH RBC QN AUTO: 36.3 PG (ref 27–31)
MCHC RBC AUTO-ENTMCNC: 37.2 G/DL (ref 32–36)
MCV RBC AUTO: 98 FL (ref 82–98)
MONOCYTES # BLD AUTO: 0 K/UL (ref 0.3–1)
MONOCYTES NFR BLD: 0 % (ref 4–15)
NEUTROPHILS # BLD AUTO: 0 K/UL (ref 1.8–7.7)
NEUTROPHILS NFR BLD: 0 % (ref 38–73)
NRBC BLD-RTO: 0 /100 WBC
OVALOCYTES BLD QL SMEAR: ABNORMAL
PHOSPHATE SERPL-MCNC: 3.5 MG/DL (ref 2.7–4.5)
PLATELET # BLD AUTO: 8 K/UL (ref 150–450)
PLATELET BLD QL SMEAR: ABNORMAL
PMV BLD AUTO: 9.3 FL (ref 9.2–12.9)
POIKILOCYTOSIS BLD QL SMEAR: SLIGHT
POLYCHROMASIA BLD QL SMEAR: ABNORMAL
POTASSIUM SERPL-SCNC: 3.7 MMOL/L (ref 3.5–5.1)
PROT SERPL-MCNC: 5.1 G/DL (ref 6–8.4)
RBC # BLD AUTO: 2.59 M/UL (ref 4.6–6.2)
SODIUM SERPL-SCNC: 141 MMOL/L (ref 136–145)
SPHEROCYTES BLD QL SMEAR: ABNORMAL
UNIT NUMBER: NORMAL
WBC # BLD AUTO: 0.04 K/UL (ref 3.9–12.7)

## 2024-04-28 PROCEDURE — P9037 PLATE PHERES LEUKOREDU IRRAD: HCPCS | Performed by: INTERNAL MEDICINE

## 2024-04-28 PROCEDURE — 80053 COMPREHEN METABOLIC PANEL: CPT | Performed by: NURSE PRACTITIONER

## 2024-04-28 PROCEDURE — 25000003 PHARM REV CODE 250: Performed by: INTERNAL MEDICINE

## 2024-04-28 PROCEDURE — 25000003 PHARM REV CODE 250: Performed by: NURSE PRACTITIONER

## 2024-04-28 PROCEDURE — 20600001 HC STEP DOWN PRIVATE ROOM

## 2024-04-28 PROCEDURE — 30233R1 TRANSFUSION OF NONAUTOLOGOUS PLATELETS INTO PERIPHERAL VEIN, PERCUTANEOUS APPROACH: ICD-10-PCS | Performed by: INTERNAL MEDICINE

## 2024-04-28 PROCEDURE — 84100 ASSAY OF PHOSPHORUS: CPT | Performed by: NURSE PRACTITIONER

## 2024-04-28 PROCEDURE — 99232 SBSQ HOSP IP/OBS MODERATE 35: CPT | Mod: ,,, | Performed by: INTERNAL MEDICINE

## 2024-04-28 PROCEDURE — 83735 ASSAY OF MAGNESIUM: CPT | Performed by: NURSE PRACTITIONER

## 2024-04-28 PROCEDURE — 63600175 PHARM REV CODE 636 W HCPCS: Performed by: INTERNAL MEDICINE

## 2024-04-28 PROCEDURE — 85025 COMPLETE CBC W/AUTO DIFF WBC: CPT | Performed by: NURSE PRACTITIONER

## 2024-04-28 RX ORDER — ACETAMINOPHEN 325 MG/1
650 TABLET ORAL EVERY 8 HOURS PRN
Status: DISCONTINUED | OUTPATIENT
Start: 2024-04-28 | End: 2024-05-07

## 2024-04-28 RX ORDER — HYDROCODONE BITARTRATE AND ACETAMINOPHEN 500; 5 MG/1; MG/1
TABLET ORAL
Status: DISCONTINUED | OUTPATIENT
Start: 2024-04-28 | End: 2024-05-07

## 2024-04-28 RX ADMIN — CARVEDILOL 6.25 MG: 6.25 TABLET, FILM COATED ORAL at 08:04

## 2024-04-28 RX ADMIN — PANTOPRAZOLE SODIUM 40 MG: 40 TABLET, DELAYED RELEASE ORAL at 08:04

## 2024-04-28 RX ADMIN — HEPARIN SODIUM 1600 UNITS: 1000 INJECTION, SOLUTION INTRAVENOUS; SUBCUTANEOUS at 10:04

## 2024-04-28 RX ADMIN — Medication 1 DOSE: at 08:04

## 2024-04-28 RX ADMIN — HYDRALAZINE HYDROCHLORIDE 50 MG: 50 TABLET ORAL at 09:04

## 2024-04-28 RX ADMIN — ACYCLOVIR 800 MG: 200 CAPSULE ORAL at 08:04

## 2024-04-28 RX ADMIN — HYDRALAZINE HYDROCHLORIDE 50 MG: 50 TABLET ORAL at 05:04

## 2024-04-28 RX ADMIN — Medication 400 MG: at 09:04

## 2024-04-28 RX ADMIN — HYDRALAZINE HYDROCHLORIDE 50 MG: 50 TABLET ORAL at 01:04

## 2024-04-28 RX ADMIN — Medication 1 DOSE: at 04:04

## 2024-04-28 RX ADMIN — FINASTERIDE 5 MG: 5 TABLET, FILM COATED ORAL at 08:04

## 2024-04-28 RX ADMIN — FILGRASTIM 480 MCG: 480 INJECTION, SOLUTION INTRAVENOUS; SUBCUTANEOUS at 08:04

## 2024-04-28 RX ADMIN — DIPHENHYDRAMINE HYDROCHLORIDE 25 MG: 25 CAPSULE ORAL at 09:04

## 2024-04-28 RX ADMIN — AMLODIPINE BESYLATE 10 MG: 10 TABLET ORAL at 08:04

## 2024-04-28 RX ADMIN — POTASSIUM CHLORIDE 20 MEQ: 1500 TABLET, EXTENDED RELEASE ORAL at 05:04

## 2024-04-28 RX ADMIN — Medication 400 MG: at 05:04

## 2024-04-28 RX ADMIN — ACETAMINOPHEN 650 MG: 325 TABLET ORAL at 09:04

## 2024-04-28 RX ADMIN — Medication 1 DOSE: at 01:04

## 2024-04-28 RX ADMIN — SPIRONOLACTONE 12.5 MG: 25 TABLET ORAL at 08:04

## 2024-04-28 RX ADMIN — FLUCONAZOLE 400 MG: 200 TABLET ORAL at 08:04

## 2024-04-28 RX ADMIN — LEVOTHYROXINE SODIUM 112 MCG: 112 TABLET ORAL at 08:04

## 2024-04-28 RX ADMIN — LEVOFLOXACIN 500 MG: 500 TABLET, FILM COATED ORAL at 08:04

## 2024-04-28 NOTE — PLAN OF CARE
Day +10 of Carmen Auto SCT. No acute events this shift. Patient AAOx4. Afebrile and VSS. Ambulates independently and voids without difficulty. No complaints of pain or nausea. Wife remains at bedside. Bed in lower position and locked. Side rails up x2. All possessions and call light within reach. Non-skid socks worn. Instructed to call for assistance and voiced understanding. All needs of patient currently met. Will continue to monitor with frequent rounding.

## 2024-04-28 NOTE — ASSESSMENT & PLAN NOTE
- Patient of Dr. Evans  - Admitting 4/16/24 for a Carmen 140 auto SCT  - Today is Day +10   - He received 5 bags on 4/18/24 at 1330 and received the remaining 4 bags on 4/19/24 at 1330. Total CD34 dose was 2.91 x 10^6.  - See treatment plan below.    Planned conditioning regimen:  Melphalan TBD on Day -1     Antimicrobial Prophylaxis:  Acyclovir starting on Day -1  Levofloxacin starting on Day -1  Fluconazole starting on Day -1  Bactrim starting on Day +30     Growth Factor Support:  Neupogen starting on Day +7      Caregiver: daughter and other family members  Post-transplant discharge plans: Smita Portillo

## 2024-04-28 NOTE — SUBJECTIVE & OBJECTIVE
Subjective:     Interval History: Day +10 following AutoSCT conditioned with Web300. Platelets transfused this morning. He offers no new complaints at this time.    Objective:     Vital Signs (Most Recent):  Temp: 97.7 °F (36.5 °C) (04/28/24 1137)  Pulse: 62 (04/28/24 1137)  Resp: 18 (04/28/24 1137)  BP: 131/62 (04/28/24 1137)  SpO2: 95 % (04/28/24 1137) Vital Signs (24h Range):  Temp:  [97.6 °F (36.4 °C)-98.5 °F (36.9 °C)] 97.7 °F (36.5 °C)  Pulse:  [62-79] 62  Resp:  [15-18] 18  SpO2:  [95 %-98 %] 95 %  BP: (105-155)/(61-72) 131/62     Weight: 87.8 kg (193 lb 7.3 oz)  Body mass index is 27.76 kg/m².  Body surface area is 2.08 meters squared.    ECOG SCORE           [unfilled]    Intake/Output - Last 3 Shifts         04/26 0700  04/27 0659 04/27 0700  04/28 0659 04/28 0700  04/29 0659    P.O. 711 1067     Blood   546    Total Intake(mL/kg) 711 (8.1) 1067 (12.2) 546 (6.2)    Urine (mL/kg/hr) 1570 (0.7) 2100 (1) 650 (1.1)    Stool 0      Total Output 1570 2100 650    Net -859 -1033 -104           Stool Occurrence 1 x               Physical Exam  Constitutional:       Appearance: He is well-developed.   HENT:      Head: Normocephalic and atraumatic.      Ears:      Comments: [place]     Mouth/Throat:      Pharynx: No oropharyngeal exudate.   Eyes:      General:         Right eye: No discharge.         Left eye: No discharge.      Conjunctiva/sclera: Conjunctivae normal.      Pupils: Pupils are equal, round, and reactive to light.   Cardiovascular:      Rate and Rhythm: Normal rate and regular rhythm.      Heart sounds: Normal heart sounds. No murmur heard.  Pulmonary:      Effort: Pulmonary effort is normal. No respiratory distress.      Breath sounds: Normal breath sounds. No wheezing or rales.   Abdominal:      General: Bowel sounds are normal. There is no distension.      Palpations: Abdomen is soft.      Tenderness: There is no abdominal tenderness.   Musculoskeletal:         General: No deformity. Normal range of  motion.      Cervical back: Normal range of motion and neck supple.   Skin:     General: Skin is warm and dry.      Findings: No erythema or rash.      Comments: Right chest wall vas cath. Dressing c/d/i. No sign of infection to site.   Neurological:      Mental Status: He is alert and oriented to person, place, and time.   Psychiatric:         Behavior: Behavior normal.         Thought Content: Thought content normal.         Judgment: Judgment normal.            Significant Labs:   CBC:   Recent Labs   Lab 04/27/24  0519 04/28/24  0420   WBC 0.04* 0.04*   HGB 10.0* 9.4*   HCT 28.8* 25.3*   PLT 18* 8*    and CMP:   Recent Labs   Lab 04/27/24  0519 04/28/24  0420    141   K 3.9 3.7    109   CO2 25 25   * 138*   BUN 26* 24*   CREATININE 1.0 0.9   CALCIUM 9.1 9.0   PROT 5.4* 5.1*   ALBUMIN 3.2* 3.0*   BILITOT 1.3* 1.3*   ALKPHOS 85 83   AST 9* 10   ALT 16 14   ANIONGAP 6* 7*       Diagnostic Results:  None

## 2024-04-28 NOTE — PLAN OF CARE
Day +10 FRANCISCO Auto tx. Pt involved in plan of care and communicating needs throughout shift. No PRNs given. 1 unit of plts given w/o issue. All VSS; no acute events so far this shift.  Pt remaining free from falls or injury throughout shift; bed locked and in lowest position; call light within reach.  Pt instructed to call for assistance as needed.  Q1H rounding done on pt.

## 2024-04-28 NOTE — PROGRESS NOTES
[facility] - Oncology ([facility])  Hematology  Bone Marrow Transplant  Progress Note    Patient Name: Chip Goodson  Admission Date: 4/16/2024  Hospital Length of Stay: 12 days  Code Status: Full Code    Subjective:     Interval History: Day +10 following AutoSCT conditioned with [id number]. Platelets transfused this morning. He offers no new complaints at this time.    Objective:     Vital Signs (Most Recent):  Temp: 97.7 °F (36.5 °C) (04/28/24 1137)  Pulse: 62 (04/28/24 1137)  Resp: 18 (04/28/24 1137)  BP: 131/62 (04/28/24 1137)  SpO2: 95 % (04/28/24 1137) Vital Signs (24h Range):  Temp:  [97.6 °F (36.4 °C)-98.5 °F (36.9 °C)] 97.7 °F (36.5 °C)  Pulse:  [62-79] 62  Resp:  [15-18] 18  SpO2:  [95 %-98 %] 95 %  BP: (105-155)/(61-72) 131/62     Weight: 87.8 kg (193 lb 7.3 oz)  Body mass index is 27.76 kg/m².  Body surface area is 2.08 meters squared.    ECOG SCORE           [unfilled]    Intake/Output - Last 3 Shifts         04/26 0700  04/27 0659 04/27 0700  04/28 0659 04/28 0700  04/29 0659    P.O. 711 1067     Blood   546    Total Intake(mL/kg) 711 (8.1) 1067 (12.2) 546 (6.2)    Urine (mL/kg/hr) 1570 (0.7) 2100 (1) 650 (1.1)    Stool 0      Total Output 1570 2100 650    Net -859 -1033 -104           Stool Occurrence 1 x               Physical Exam  Constitutional:       Appearance: He is well-developed.   HENT:      Head: Normocephalic and atraumatic.      Ears:      Comments: [place]     Mouth/Throat:      Pharynx: No oropharyngeal exudate.   Eyes:      General:         Right eye: No discharge.         Left eye: No discharge.      Conjunctiva/sclera: Conjunctivae normal.      Pupils: Pupils are equal, round, and reactive to light.   Cardiovascular:      Rate and Rhythm: Normal rate and regular rhythm.      Heart sounds: Normal heart sounds. No murmur heard.  Pulmonary:      Effort: Pulmonary effort is normal. No respiratory distress.      Breath sounds: Normal breath sounds. No wheezing or rales.   Abdominal:      General:  Bowel sounds are normal. There is no distension.      Palpations: Abdomen is soft.      Tenderness: There is no abdominal tenderness.   Musculoskeletal:         General: No deformity. Normal range of motion.      Cervical back: Normal range of motion and neck supple.   Skin:     General: Skin is warm and dry.      Findings: No erythema or rash.      Comments: Right chest wall vas cath. Dressing c/d/i. No sign of infection to site.   Neurological:      Mental Status: He is alert and oriented to person, place, and time.   Psychiatric:         Behavior: Behavior normal.         Thought Content: Thought content normal.         Judgment: Judgment normal.            Significant Labs:   CBC:   Recent Labs   Lab 04/27/24  0519 04/28/24  0420   WBC 0.04* 0.04*   HGB 10.0* 9.4*   HCT 28.8* 25.3*   PLT 18* 8*    and CMP:   Recent Labs   Lab 04/27/24  0519 04/28/24  0420    141   K 3.9 3.7    109   CO2 25 25   * 138*   BUN 26* 24*   CREATININE 1.0 0.9   CALCIUM 9.1 9.0   PROT 5.4* 5.1*   ALBUMIN 3.2* 3.0*   BILITOT 1.3* 1.3*   ALKPHOS 85 83   AST 9* 10   ALT 16 14   ANIONGAP 6* 7*       Diagnostic Results:  None  Assessment/Plan:     * History of autologous stem cell transplant  - Patient of Dr. Evans  - Admitting 4/16/24 for a Carmen 140 auto SCT  - Today is Day +10   - He received 5 bags on 4/18/24 at 1330 and received the remaining 4 bags on 4/19/24 at 1330. Total CD34 dose was 2.91 x 10^6.  - See treatment plan below.    Planned conditioning regimen:  Melphalan TBD on Day -1     Antimicrobial Prophylaxis:  Acyclovir starting on Day -1  Levofloxacin starting on Day -1  Fluconazole starting on Day -1  Bactrim starting on Day +30     Growth Factor Support:  Neupogen starting on Day +7      Caregiver: daughter and other family members  Post-transplant discharge plans: [facility] [place] [facility]    Chemotherapy induced nausea and vomiting  - Continue PRN Zofran and Compazine    Hyperbilirubinemia  - T-bili 1.7 on admit. Patient  asymptomatic.  - Trending with daily CMP  RESOLVED    Pancytopenia due to chemotherapy  - Daily CBC while inpatient  - Transfuse for Hgb <7or plts < 10K  - Continue antimicrobial ppx  - Stopped VTE ppx 4/24 d/t downtrending platelets    Cancer related pain  - Continue home prn Oxy IR    BPH (benign prostatic hyperplasia)  - Continue home finasteride    HTN (hypertension)  - Continue home amlodipine, carvedilol (dose-reduced for bradycardia), hydralazine, and spironolactone  - Stopped IVF following transplant. BP improved off IVF.    Type 2 diabetes mellitus without complications  - Holding home metformin  - Stopped ACHS blood glucose monitoring 4/24 as blood glucose has been stable  - Monitoring with daily CMP    Hypothyroidism, unspecified  - Continue home synthroid    GERD (gastroesophageal reflux disease)  - Previously on BID protonix and daily pepcid for stomach ulcers; now resolved  - Will continue daily protonix per transplant order set    Multiple myeloma in remission  - patient of Dr. Evans; follows locally with Dr. Rose in [place]  - diagnosed June 2023 with IgA Kappa MM; stage unclear at diagnosis as FISH not available  - started DRd therapy on 7/18/2023  - he stopped the daratumumab after cycle 4 and continue with revlimid and dex only; looks to have completed ~5 cycles  - Admitted for Carmen 140 Auto SCT on 4/16/2023    COLTON (acute kidney injury)  - Creatinine 1.4 on admission; baseline ~1.1  - Monitoring CMP daily  - Was receiving continuous fluids as part of chemotherapy regimen (NACL with 10% K)  RESOLVED        VTE Risk Mitigation (From admission, onward)           Ordered     heparin (porcine) injection 1,600 Units  As needed (PRN)         04/16/24 2129     IP VTE HIGH RISK PATIENT  Once         04/16/24 1904     Place sequential compression device  Until discontinued         04/16/24 1904                    Disposition: inpatient     Ganesh Gudino MD  Bone Marrow Transplant  [facility] -  Oncology (Hospital)

## 2024-04-29 LAB
ABO + RH BLD: NORMAL
ALBUMIN SERPL BCP-MCNC: 3.2 G/DL (ref 3.5–5.2)
ALP SERPL-CCNC: 91 U/L (ref 55–135)
ALT SERPL W/O P-5'-P-CCNC: 15 U/L (ref 10–44)
ANION GAP SERPL CALC-SCNC: 7 MMOL/L (ref 8–16)
ANISOCYTOSIS BLD QL SMEAR: SLIGHT
AST SERPL-CCNC: 10 U/L (ref 10–40)
BASOPHILS # BLD AUTO: 0 K/UL (ref 0–0.2)
BASOPHILS NFR BLD: 0 % (ref 0–1.9)
BILIRUB SERPL-MCNC: 0.7 MG/DL (ref 0.1–1)
BLD GP AB SCN CELLS X3 SERPL QL: NORMAL
BUN SERPL-MCNC: 23 MG/DL (ref 8–23)
CALCIUM SERPL-MCNC: 9.1 MG/DL (ref 8.7–10.5)
CHLORIDE SERPL-SCNC: 108 MMOL/L (ref 95–110)
CO2 SERPL-SCNC: 26 MMOL/L (ref 23–29)
CREAT SERPL-MCNC: 1 MG/DL (ref 0.5–1.4)
DIFFERENTIAL METHOD BLD: ABNORMAL
EOSINOPHIL # BLD AUTO: 0 K/UL (ref 0–0.5)
EOSINOPHIL NFR BLD: 0 % (ref 0–8)
ERYTHROCYTE [DISTWIDTH] IN BLOOD BY AUTOMATED COUNT: 10.8 % (ref 11.5–14.5)
EST. GFR  (NO RACE VARIABLE): >60 ML/MIN/1.73 M^2
GLUCOSE SERPL-MCNC: 149 MG/DL (ref 70–110)
HCT VFR BLD AUTO: 25.6 % (ref 40–54)
HGB BLD-MCNC: 9 G/DL (ref 14–18)
HYPOCHROMIA BLD QL SMEAR: ABNORMAL
IMM GRANULOCYTES # BLD AUTO: 0 K/UL (ref 0–0.04)
IMM GRANULOCYTES NFR BLD AUTO: 0 % (ref 0–0.5)
LYMPHOCYTES # BLD AUTO: 0 K/UL (ref 1–4.8)
LYMPHOCYTES NFR BLD: 66.7 % (ref 18–48)
MAGNESIUM SERPL-MCNC: 2 MG/DL (ref 1.6–2.6)
MCH RBC QN AUTO: 35.2 PG (ref 27–31)
MCHC RBC AUTO-ENTMCNC: 35.2 G/DL (ref 32–36)
MCV RBC AUTO: 100 FL (ref 82–98)
MONOCYTES # BLD AUTO: 0 K/UL (ref 0.3–1)
MONOCYTES NFR BLD: 0 % (ref 4–15)
NEUTROPHILS # BLD AUTO: 0 K/UL (ref 1.8–7.7)
NEUTROPHILS NFR BLD: 33.3 % (ref 38–73)
NRBC BLD-RTO: 0 /100 WBC
OVALOCYTES BLD QL SMEAR: ABNORMAL
PHOSPHATE SERPL-MCNC: 3 MG/DL (ref 2.7–4.5)
PLATELET # BLD AUTO: 11 K/UL (ref 150–450)
PMV BLD AUTO: 10.4 FL (ref 9.2–12.9)
POIKILOCYTOSIS BLD QL SMEAR: SLIGHT
POLYCHROMASIA BLD QL SMEAR: ABNORMAL
POTASSIUM SERPL-SCNC: 4 MMOL/L (ref 3.5–5.1)
PROT SERPL-MCNC: 5.4 G/DL (ref 6–8.4)
RBC # BLD AUTO: 2.56 M/UL (ref 4.6–6.2)
SODIUM SERPL-SCNC: 141 MMOL/L (ref 136–145)
SPECIMEN OUTDATE: NORMAL
SPHEROCYTES BLD QL SMEAR: ABNORMAL
WBC # BLD AUTO: 0.03 K/UL (ref 3.9–12.7)

## 2024-04-29 PROCEDURE — 25000003 PHARM REV CODE 250: Performed by: NURSE PRACTITIONER

## 2024-04-29 PROCEDURE — 99232 SBSQ HOSP IP/OBS MODERATE 35: CPT | Mod: ,,, | Performed by: INTERNAL MEDICINE

## 2024-04-29 PROCEDURE — 25000003 PHARM REV CODE 250: Performed by: INTERNAL MEDICINE

## 2024-04-29 PROCEDURE — 83735 ASSAY OF MAGNESIUM: CPT | Performed by: NURSE PRACTITIONER

## 2024-04-29 PROCEDURE — 63600175 PHARM REV CODE 636 W HCPCS: Mod: JZ,JG | Performed by: INTERNAL MEDICINE

## 2024-04-29 PROCEDURE — 84100 ASSAY OF PHOSPHORUS: CPT | Performed by: NURSE PRACTITIONER

## 2024-04-29 PROCEDURE — 85025 COMPLETE CBC W/AUTO DIFF WBC: CPT | Performed by: NURSE PRACTITIONER

## 2024-04-29 PROCEDURE — 80053 COMPREHEN METABOLIC PANEL: CPT | Performed by: NURSE PRACTITIONER

## 2024-04-29 PROCEDURE — 86901 BLOOD TYPING SEROLOGIC RH(D): CPT | Performed by: NURSE PRACTITIONER

## 2024-04-29 PROCEDURE — 20600001 HC STEP DOWN PRIVATE ROOM

## 2024-04-29 RX ADMIN — FLUCONAZOLE 400 MG: 200 TABLET ORAL at 08:04

## 2024-04-29 RX ADMIN — CARVEDILOL 6.25 MG: 6.25 TABLET, FILM COATED ORAL at 08:04

## 2024-04-29 RX ADMIN — LEVOTHYROXINE SODIUM 112 MCG: 112 TABLET ORAL at 08:04

## 2024-04-29 RX ADMIN — FINASTERIDE 5 MG: 5 TABLET, FILM COATED ORAL at 08:04

## 2024-04-29 RX ADMIN — Medication 1 DOSE: at 08:04

## 2024-04-29 RX ADMIN — PANTOPRAZOLE SODIUM 40 MG: 40 TABLET, DELAYED RELEASE ORAL at 08:04

## 2024-04-29 RX ADMIN — ACYCLOVIR 800 MG: 200 CAPSULE ORAL at 08:04

## 2024-04-29 RX ADMIN — AMLODIPINE BESYLATE 10 MG: 10 TABLET ORAL at 08:04

## 2024-04-29 RX ADMIN — FILGRASTIM 480 MCG: 480 INJECTION, SOLUTION INTRAVENOUS; SUBCUTANEOUS at 08:04

## 2024-04-29 RX ADMIN — CARVEDILOL 6.25 MG: 6.25 TABLET, FILM COATED ORAL at 09:04

## 2024-04-29 RX ADMIN — SPIRONOLACTONE 12.5 MG: 25 TABLET ORAL at 08:04

## 2024-04-29 RX ADMIN — HYDRALAZINE HYDROCHLORIDE 50 MG: 50 TABLET ORAL at 11:04

## 2024-04-29 RX ADMIN — HYDRALAZINE HYDROCHLORIDE 50 MG: 50 TABLET ORAL at 06:04

## 2024-04-29 RX ADMIN — HYDRALAZINE HYDROCHLORIDE 50 MG: 50 TABLET ORAL at 02:04

## 2024-04-29 RX ADMIN — LEVOFLOXACIN 500 MG: 500 TABLET, FILM COATED ORAL at 08:04

## 2024-04-29 NOTE — SUBJECTIVE & OBJECTIVE
Subjective:     Interval History: Day +11 s/p Carmen 140 Auto SCT for MM. Continues doing well overall. No acute issues this AM. Afebrile, VSS    Objective:     Vital Signs (Most Recent):  Temp: 97.9 °F (36.6 °C) (04/29/24 0855)  Pulse: (!) 57 (04/29/24 0855)  Resp: 18 (04/29/24 0855)  BP: (!) 153/69 (04/29/24 0855)  SpO2: 96 % (04/29/24 0855) Vital Signs (24h Range):  Temp:  [97.5 °F (36.4 °C)-98.2 °F (36.8 °C)] 97.9 °F (36.6 °C)  Pulse:  [57-76] 57  Resp:  [15-20] 18  SpO2:  [95 %-99 %] 96 %  BP: (105-153)/(62-72) 153/69     Weight: 87.6 kg (193 lb 3.7 oz)  Body mass index is 27.73 kg/m².  Body surface area is 2.08 meters squared.      Intake/Output - Last 3 Shifts         04/27 0700  04/28 0659 04/28 0700  04/29 0659 04/29 0700  04/30 0659    P.O. 1067 840     Blood  546     Total Intake(mL/kg) 1067 (12.2) 1386 (15.8)     Urine (mL/kg/hr) 2100 (1) 2025 (1) 400 (2.2)    Stool  0 0    Total Output 2100 2025 400    Net -1033 -639 -400           Stool Occurrence  1 x 1 x             Physical Exam  Vitals and nursing note reviewed.   Constitutional:       Appearance: Normal appearance. He is well-developed.   HENT:      Head: Normocephalic and atraumatic.      Ears:      Comments: [place]     Mouth/Throat:      Mouth: Mucous membranes are moist.      Pharynx: No oropharyngeal exudate or posterior oropharyngeal erythema.   Eyes:      General:         Right eye: No discharge.         Left eye: No discharge.      Extraocular Movements: Extraocular movements intact.      Conjunctiva/sclera: Conjunctivae normal.   Cardiovascular:      Rate and Rhythm: Normal rate and regular rhythm.      Pulses: Normal pulses.      Heart sounds: Normal heart sounds. No murmur heard.  Pulmonary:      Effort: Pulmonary effort is normal. No respiratory distress.      Breath sounds: Normal breath sounds. No wheezing or rales.   Abdominal:      General: Bowel sounds are normal. There is no distension.      Palpations: Abdomen is soft.       Tenderness: There is no abdominal tenderness.   Musculoskeletal:         General: No deformity. Normal range of motion.      Cervical back: Normal range of motion and neck supple.      Right lower leg: No edema.      Left lower leg: No edema.   Skin:     General: Skin is warm and dry.      Findings: No bruising, erythema or rash.      Comments: Right chest wall vas cath. Dressing c/d/i. No sign of infection to site.   Neurological:      General: No focal deficit present.      Mental Status: He is alert and oriented to person, place, and time.      Motor: No weakness.   Psychiatric:         Mood and Affect: Mood normal.         Behavior: Behavior normal.         Thought Content: Thought content normal.         Judgment: Judgment normal.            Significant Labs:   CBC:   Recent Labs   Lab 04/28/24  0420 04/29/24  0435   WBC 0.04* 0.03*   HGB 9.4* 9.0*   HCT 25.3* 25.6*   PLT 8* 11*    and CMP:   Recent Labs   Lab 04/28/24  0420 04/29/24  0435    141   K 3.7 4.0    108   CO2 25 26   * 149*   BUN 24* 23   CREATININE 0.9 1.0   CALCIUM 9.0 9.1   PROT 5.1* 5.4*   ALBUMIN 3.0* 3.2*   BILITOT 1.3* 0.7   ALKPHOS 83 91   AST 10 10   ALT 14 15   ANIONGAP 7* 7*       Diagnostic Results:  None

## 2024-04-29 NOTE — ASSESSMENT & PLAN NOTE
- patient of Dr. Evans; follows locally with Dr. Rose in [place]  - diagnosed June 2023 with IgA Kappa MM; stage unclear at diagnosis as FISH not available  - started DRd therapy on 7/18/2023  - he stopped the daratumumab after cycle 4 and continue with revlimid and dex only; looks to have completed ~5 cycles  - Admitted for Carmen 140 Auto SCT on 4/16/2023

## 2024-04-29 NOTE — PLAN OF CARE
Day +11 of Carmen Auto SCT. No acute events this shift. Patient AAOx4. Afebrile and VSS. Bed alarm refused. Ambulates independently and voids without difficulty. No complaints of pain or nausea. Family remains at bedside. Bed in lower position and locked. Side rails up x2. All possessions and call light within reach. Non-skid socks worn. Instructed to call for assistance and voiced understanding. All needs of patient currently met. Will continue to monitor with frequent rounding.

## 2024-04-29 NOTE — PROGRESS NOTES
[facility] - Oncology ([facility])  Hematology  Bone Marrow Transplant  Progress Note    Patient Name: Chip Goodson  Admission Date: 4/16/2024  Hospital Length of Stay: 13 days  Code Status: Full Code    Subjective:     Interval History: Day +11 s/p Carmen 140 Auto SCT for MM. Continues doing well overall. No acute issues this AM. Afebrile, VSS    Objective:     Vital Signs (Most Recent):  Temp: 97.9 °F (36.6 °C) (04/29/24 0855)  Pulse: (!) 57 (04/29/24 0855)  Resp: 18 (04/29/24 0855)  BP: (!) 153/69 (04/29/24 0855)  SpO2: 96 % (04/29/24 0855) Vital Signs (24h Range):  Temp:  [97.5 °F (36.4 °C)-98.2 °F (36.8 °C)] 97.9 °F (36.6 °C)  Pulse:  [57-76] 57  Resp:  [15-20] 18  SpO2:  [95 %-99 %] 96 %  BP: (105-153)/(62-72) 153/69     Weight: 87.6 kg (193 lb 3.7 oz)  Body mass index is 27.73 kg/m².  Body surface area is 2.08 meters squared.      Intake/Output - Last 3 Shifts         04/27 0700  04/28 0659 04/28 0700  04/29 0659 04/29 0700  04/30 0659    P.O. 1067 840     Blood  546     Total Intake(mL/kg) 1067 (12.2) 1386 (15.8)     Urine (mL/kg/hr) 2100 (1) 2025 (1) 400 (2.2)    Stool  0 0    Total Output 2100 2025 400    Net -1033 -639 -400           Stool Occurrence  1 x 1 x             Physical Exam  Vitals and nursing note reviewed.   Constitutional:       Appearance: Normal appearance. He is well-developed.   HENT:      Head: Normocephalic and atraumatic.      Ears:      Comments: [place]     Mouth/Throat:      Mouth: Mucous membranes are moist.      Pharynx: No oropharyngeal exudate or posterior oropharyngeal erythema.   Eyes:      General:         Right eye: No discharge.         Left eye: No discharge.      Extraocular Movements: Extraocular movements intact.      Conjunctiva/sclera: Conjunctivae normal.   Cardiovascular:      Rate and Rhythm: Normal rate and regular rhythm.      Pulses: Normal pulses.      Heart sounds: Normal heart sounds. No murmur heard.  Pulmonary:      Effort: Pulmonary effort is normal. No  respiratory distress.      Breath sounds: Normal breath sounds. No wheezing or rales.   Abdominal:      General: Bowel sounds are normal. There is no distension.      Palpations: Abdomen is soft.      Tenderness: There is no abdominal tenderness.   Musculoskeletal:         General: No deformity. Normal range of motion.      Cervical back: Normal range of motion and neck supple.      Right lower leg: No edema.      Left lower leg: No edema.   Skin:     General: Skin is warm and dry.      Findings: No bruising, erythema or rash.      Comments: Right chest wall vas cath. Dressing c/d/i. No sign of infection to site.   Neurological:      General: No focal deficit present.      Mental Status: He is alert and oriented to person, place, and time.      Motor: No weakness.   Psychiatric:         Mood and Affect: Mood normal.         Behavior: Behavior normal.         Thought Content: Thought content normal.         Judgment: Judgment normal.            Significant Labs:   CBC:   Recent Labs   Lab 04/28/24  0420 04/29/24  0435   WBC 0.04* 0.03*   HGB 9.4* 9.0*   HCT 25.3* 25.6*   PLT 8* 11*    and CMP:   Recent Labs   Lab 04/28/24  0420 04/29/24  0435    141   K 3.7 4.0    108   CO2 25 26   * 149*   BUN 24* 23   CREATININE 0.9 1.0   CALCIUM 9.0 9.1   PROT 5.1* 5.4*   ALBUMIN 3.0* 3.2*   BILITOT 1.3* 0.7   ALKPHOS 83 91   AST 10 10   ALT 14 15   ANIONGAP 7* 7*       Diagnostic Results:  None  Assessment/Plan:     * History of autologous stem cell transplant  - Patient of Dr. Evans  - Admitting 4/16/24 for a Carmen 140 auto SCT  - Today is Day +11   - He received 5 bags on 4/18/24 at 1330 and received the remaining 4 bags on 4/19/24 at 1330. Total CD34 dose was 2.91 x 10^6.  - See treatment plan below.    Planned conditioning regimen:  Melphalan TBD on Day -1     Antimicrobial Prophylaxis:  Acyclovir starting on Day -1  Levofloxacin starting on Day -1  Fluconazole starting on Day -1  Bactrim starting on Day  +30     Growth Factor Support:  Neupogen starting on Day +7      Caregiver: daughter and other family members  Post-transplant discharge plans: [facility]    Multiple myeloma in remission  - patient of Dr. Evans; follows locally with Dr. Rose in [place]  - diagnosed June 2023 with IgA Kappa MM; stage unclear at diagnosis as FISH not available  - started DRd therapy on 7/18/2023  - he stopped the daratumumab after cycle 4 and continue with revlimid and dex only; looks to have completed ~5 cycles  - Admitted for Carmen 140 Auto SCT on 4/16/2023    Pancytopenia due to chemotherapy  - Daily CBC while inpatient  - Transfuse for Hgb <7or plts < 10K  - Continue antimicrobial ppx  - Stopped VTE ppx 4/24 d/t downtrending platelets    Chemotherapy induced nausea and vomiting  - Continue PRN Zofran and Compazine    Hyperbilirubinemia  - T-bili 1.7 on admit. Patient asymptomatic.  - Trending with daily CMP  RESOLVED    Cancer related pain  - Continue home prn Oxy IR    BPH (benign prostatic hyperplasia)  - Continue home finasteride    HTN (hypertension)  - Continue home amlodipine, carvedilol (dose-reduced for bradycardia), hydralazine, and spironolactone  - Stopped IVF following transplant. BP improved off IVF.    Type 2 diabetes mellitus without complications  - Holding home metformin  - Stopped ACHS blood glucose monitoring 4/24 as blood glucose has been stable  - Monitoring with daily CMP    Hypothyroidism, unspecified  - Continue home synthroid    GERD (gastroesophageal reflux disease)  - Previously on BID protonix and daily pepcid for stomach ulcers; now resolved  - Will continue daily protonix per transplant order set    COLTON (acute kidney injury)  - Creatinine 1.4 on admission; baseline ~1.1  - Monitoring CMP daily  - Was receiving continuous fluids as part of chemotherapy regimen (NACL with 10% K)  RESOLVED        VTE Risk Mitigation (From admission, onward)           Ordered     heparin (porcine) injection 1,600  Units  As needed (PRN)         04/16/24 2129     IP VTE HIGH RISK PATIENT  Once         04/16/24 1904     Place sequential compression device  Until discontinued         04/16/24 1904                    Disposition: Remains inpatient pending engraftment    Chelsie Man NP  Bone Marrow Transplant  [facility] - Oncology ([facility])

## 2024-04-29 NOTE — PLAN OF CARE
Pt involved in plan of care and communicating needs throughout shift. Day +11 s/p Carmen Auto SCT for MM; AAOx4; No complaints of pain throughout shift; Ambulates in room & to RR independently; Remains afebrile. RA; Tolerating diet, voiding without difficulty. BM x1 this shift; q1h patient rounds. All VSS; Family remains bedside; No acute events so far this shift. Non skid socks on; Pt. Instructed to call if any assistance is needed & prior to getting OOB. Pt remaining free from falls or injury; Bed alarm refused; Fall risk education given; pt. Verbalizes understanding; Bed locked in lowest position with side rails up x2 & all belongings including call light within reach; Plan of care ongoing; All needs met at this time.

## 2024-04-29 NOTE — ASSESSMENT & PLAN NOTE
- Patient of Dr. Evans  - Admitting 4/16/24 for a Carmen 140 auto SCT  - Today is Day +11   - He received 5 bags on 4/18/24 at 1330 and received the remaining 4 bags on 4/19/24 at 1330. Total CD34 dose was 2.91 x 10^6.  - See treatment plan below.    Planned conditioning regimen:  Melphalan TBD on Day -1     Antimicrobial Prophylaxis:  Acyclovir starting on Day -1  Levofloxacin starting on Day -1  Fluconazole starting on Day -1  Bactrim starting on Day +30     Growth Factor Support:  Neupogen starting on Day +7      Caregiver: daughter and other family members  Post-transplant discharge plans: [facility]

## 2024-04-30 LAB
ALBUMIN SERPL BCP-MCNC: 3.3 G/DL (ref 3.5–5.2)
ALP SERPL-CCNC: 95 U/L (ref 55–135)
ALT SERPL W/O P-5'-P-CCNC: 15 U/L (ref 10–44)
ANION GAP SERPL CALC-SCNC: 6 MMOL/L (ref 8–16)
AST SERPL-CCNC: 9 U/L (ref 10–40)
BASOPHILS # BLD AUTO: 0 K/UL (ref 0–0.2)
BASOPHILS NFR BLD: 0 % (ref 0–1.9)
BILIRUB SERPL-MCNC: 1.6 MG/DL (ref 0.1–1)
BLD PROD TYP BPU: NORMAL
BLOOD UNIT EXPIRATION DATE: NORMAL
BLOOD UNIT TYPE CODE: 7300
BLOOD UNIT TYPE: NORMAL
BUN SERPL-MCNC: 19 MG/DL (ref 8–23)
CALCIUM SERPL-MCNC: 9.5 MG/DL (ref 8.7–10.5)
CHLORIDE SERPL-SCNC: 108 MMOL/L (ref 95–110)
CO2 SERPL-SCNC: 24 MMOL/L (ref 23–29)
CODING SYSTEM: NORMAL
CREAT SERPL-MCNC: 0.9 MG/DL (ref 0.5–1.4)
CROSSMATCH INTERPRETATION: NORMAL
DACRYOCYTES BLD QL SMEAR: ABNORMAL
DIFFERENTIAL METHOD BLD: ABNORMAL
DISPENSE STATUS: NORMAL
EOSINOPHIL # BLD AUTO: 0 K/UL (ref 0–0.5)
EOSINOPHIL NFR BLD: 0 % (ref 0–8)
ERYTHROCYTE [DISTWIDTH] IN BLOOD BY AUTOMATED COUNT: 10.7 % (ref 11.5–14.5)
EST. GFR  (NO RACE VARIABLE): >60 ML/MIN/1.73 M^2
GLUCOSE SERPL-MCNC: 156 MG/DL (ref 70–110)
HCT VFR BLD AUTO: 24.6 % (ref 40–54)
HGB BLD-MCNC: 8.9 G/DL (ref 14–18)
HYPOCHROMIA BLD QL SMEAR: ABNORMAL
IMM GRANULOCYTES # BLD AUTO: 0 K/UL (ref 0–0.04)
IMM GRANULOCYTES NFR BLD AUTO: 0 % (ref 0–0.5)
LYMPHOCYTES # BLD AUTO: 0 K/UL (ref 1–4.8)
LYMPHOCYTES NFR BLD: 100 % (ref 18–48)
MAGNESIUM SERPL-MCNC: 1.8 MG/DL (ref 1.6–2.6)
MCH RBC QN AUTO: 35 PG (ref 27–31)
MCHC RBC AUTO-ENTMCNC: 36.2 G/DL (ref 32–36)
MCV RBC AUTO: 97 FL (ref 82–98)
MONOCYTES # BLD AUTO: 0 K/UL (ref 0.3–1)
MONOCYTES NFR BLD: 0 % (ref 4–15)
NEUTROPHILS # BLD AUTO: 0 K/UL (ref 1.8–7.7)
NEUTROPHILS NFR BLD: 0 % (ref 38–73)
NRBC BLD-RTO: 0 /100 WBC
OVALOCYTES BLD QL SMEAR: ABNORMAL
PHOSPHATE SERPL-MCNC: 3.6 MG/DL (ref 2.7–4.5)
PLATELET # BLD AUTO: 6 K/UL (ref 150–450)
PLATELET BLD QL SMEAR: ABNORMAL
PMV BLD AUTO: 9.9 FL (ref 9.2–12.9)
POTASSIUM SERPL-SCNC: 4 MMOL/L (ref 3.5–5.1)
PROT SERPL-MCNC: 5.6 G/DL (ref 6–8.4)
RBC # BLD AUTO: 2.54 M/UL (ref 4.6–6.2)
SODIUM SERPL-SCNC: 138 MMOL/L (ref 136–145)
SPHEROCYTES BLD QL SMEAR: ABNORMAL
UNIT NUMBER: NORMAL
WBC # BLD AUTO: 0.03 K/UL (ref 3.9–12.7)

## 2024-04-30 PROCEDURE — 99232 SBSQ HOSP IP/OBS MODERATE 35: CPT | Mod: ,,, | Performed by: INTERNAL MEDICINE

## 2024-04-30 PROCEDURE — 25000003 PHARM REV CODE 250: Performed by: NURSE PRACTITIONER

## 2024-04-30 PROCEDURE — P9037 PLATE PHERES LEUKOREDU IRRAD: HCPCS | Performed by: NURSE PRACTITIONER

## 2024-04-30 PROCEDURE — 63600175 PHARM REV CODE 636 W HCPCS: Performed by: NURSE PRACTITIONER

## 2024-04-30 PROCEDURE — 25000003 PHARM REV CODE 250: Performed by: INTERNAL MEDICINE

## 2024-04-30 PROCEDURE — 20600001 HC STEP DOWN PRIVATE ROOM

## 2024-04-30 PROCEDURE — 25000003 PHARM REV CODE 250: Performed by: STUDENT IN AN ORGANIZED HEALTH CARE EDUCATION/TRAINING PROGRAM

## 2024-04-30 PROCEDURE — 83735 ASSAY OF MAGNESIUM: CPT | Performed by: NURSE PRACTITIONER

## 2024-04-30 PROCEDURE — 85025 COMPLETE CBC W/AUTO DIFF WBC: CPT | Performed by: NURSE PRACTITIONER

## 2024-04-30 PROCEDURE — 84100 ASSAY OF PHOSPHORUS: CPT | Performed by: NURSE PRACTITIONER

## 2024-04-30 PROCEDURE — 63600175 PHARM REV CODE 636 W HCPCS: Mod: JZ,JG | Performed by: INTERNAL MEDICINE

## 2024-04-30 PROCEDURE — 36430 TRANSFUSION BLD/BLD COMPNT: CPT

## 2024-04-30 PROCEDURE — 80053 COMPREHEN METABOLIC PANEL: CPT | Performed by: NURSE PRACTITIONER

## 2024-04-30 RX ORDER — POLYETHYLENE GLYCOL 3350 17 G/17G
17 POWDER, FOR SOLUTION ORAL DAILY
Status: DISCONTINUED | OUTPATIENT
Start: 2024-04-30 | End: 2024-05-09

## 2024-04-30 RX ADMIN — ACETAMINOPHEN 650 MG: 325 TABLET ORAL at 12:04

## 2024-04-30 RX ADMIN — FINASTERIDE 5 MG: 5 TABLET, FILM COATED ORAL at 08:04

## 2024-04-30 RX ADMIN — CARVEDILOL 6.25 MG: 6.25 TABLET, FILM COATED ORAL at 08:04

## 2024-04-30 RX ADMIN — ACYCLOVIR 800 MG: 200 CAPSULE ORAL at 08:04

## 2024-04-30 RX ADMIN — Medication 9 MG: at 08:04

## 2024-04-30 RX ADMIN — PANTOPRAZOLE SODIUM 40 MG: 40 TABLET, DELAYED RELEASE ORAL at 08:04

## 2024-04-30 RX ADMIN — Medication 1 DOSE: at 01:04

## 2024-04-30 RX ADMIN — FILGRASTIM 480 MCG: 480 INJECTION, SOLUTION INTRAVENOUS; SUBCUTANEOUS at 08:04

## 2024-04-30 RX ADMIN — SPIRONOLACTONE 12.5 MG: 25 TABLET ORAL at 08:04

## 2024-04-30 RX ADMIN — AMLODIPINE BESYLATE 10 MG: 10 TABLET ORAL at 08:04

## 2024-04-30 RX ADMIN — Medication 400 MG: at 09:04

## 2024-04-30 RX ADMIN — Medication 400 MG: at 06:04

## 2024-04-30 RX ADMIN — ONDANSETRON 4 MG: 2 INJECTION INTRAMUSCULAR; INTRAVENOUS at 08:04

## 2024-04-30 RX ADMIN — PROCHLORPERAZINE EDISYLATE 10 MG: 5 INJECTION INTRAMUSCULAR; INTRAVENOUS at 09:04

## 2024-04-30 RX ADMIN — HYDRALAZINE HYDROCHLORIDE 50 MG: 50 TABLET ORAL at 01:04

## 2024-04-30 RX ADMIN — POLYETHYLENE GLYCOL 3350 17 G: 17 POWDER, FOR SOLUTION ORAL at 08:04

## 2024-04-30 RX ADMIN — HEPARIN SODIUM 1600 UNITS: 1000 INJECTION, SOLUTION INTRAVENOUS; SUBCUTANEOUS at 08:04

## 2024-04-30 RX ADMIN — FLUCONAZOLE 400 MG: 200 TABLET ORAL at 08:04

## 2024-04-30 RX ADMIN — HEPARIN SODIUM 1600 UNITS: 1000 INJECTION, SOLUTION INTRAVENOUS; SUBCUTANEOUS at 09:04

## 2024-04-30 RX ADMIN — LEVOTHYROXINE SODIUM 112 MCG: 112 TABLET ORAL at 08:04

## 2024-04-30 RX ADMIN — LEVOFLOXACIN 500 MG: 500 TABLET, FILM COATED ORAL at 08:04

## 2024-04-30 RX ADMIN — Medication 1 DOSE: at 08:04

## 2024-04-30 RX ADMIN — DIPHENHYDRAMINE HYDROCHLORIDE 25 MG: 25 CAPSULE ORAL at 12:04

## 2024-04-30 RX ADMIN — HYDRALAZINE HYDROCHLORIDE 50 MG: 50 TABLET ORAL at 06:04

## 2024-04-30 RX ADMIN — HEPARIN SODIUM 1600 UNITS: 1000 INJECTION, SOLUTION INTRAVENOUS; SUBCUTANEOUS at 04:04

## 2024-04-30 NOTE — PLAN OF CARE
Pt involved in plan of care and communicating needs throughout shift. Day +12 s/p Carmen Auto SCT for MM; AAOx4; C/o nausea PRN zofran & compazine given w/full relief; Ambulates in room & to RR independently; Remains afebrile. RA; Tolerating diet, voiding without difficulty. BM x1 this shift; q1h patient rounds. All VSS; Family remains bedside; No acute events so far this shift. Non skid socks on; Pt. Instructed to call if any assistance is needed & prior to getting OOB. Pt remaining free from falls or injury; Bed alarm refused; Fall risk education given; pt. Verbalizes understanding; Bed locked in lowest position with side rails up x2 & all belongings including call light within reach; Plan of care ongoing; All needs met at this time.      1 unit PLT administered; Type & Screen verified; Consents in chart; Premedicated as ordered; 2 RN verification per protocol; Pt. Tolerated well w/NADN;

## 2024-04-30 NOTE — PT/OT/SLP PROGRESS
Physical Therapy      Patient Name:  Chip Goodson   MRN:  [id number]    PT attempt at 1433, however patient not seen today secondary to patient politely declining PT services this date, citing that he is not feeling well. He states that, other than the last couple days, he typically participates in daily hallway ambulation. He reports that he is prioritizing OOB activities, such as in-room ambulation to tolerance & sitting UIC, at this time. Patient denies any concerns about his mobility status as he remains independent. Will follow-up at next appropriate date.    Patient Education Provided on:  The role of physical therapy and how the patient can benefit from skilled services  The negative effects of prolonged bed rest/sedentary behavior, along with the importance of OOB activity & patient participation with PT    Patient Verbalized understanding of all topics touched on this date. All patient questions answered within the PT scope of practice

## 2024-04-30 NOTE — PROGRESS NOTES
[facility] Stephens - Oncology ([facility])  Hematology  Bone Marrow Transplant  Progress Note    Patient Name: Chip Goodson  Admission Date: 4/16/2024  Hospital Length of Stay: 14 days  Code Status: Full Code    Subjective:     Interval History: Day +12 s/p Carmen 140 Auto SCT for MM. Reports nausea this AM, has prn antiemetics. Also reporting feeling bloated/constipated, one BM yesterday, started on miralax. Mild increase to tbili, will trend. Will receive 1unit platelets. Afebrile, VSS    Objective:     Vital Signs (Most Recent):  Temp: 98 °F (36.7 °C) (04/30/24 0825)  Pulse: 66 (04/30/24 0825)  Resp: 18 (04/30/24 0825)  BP: (!) 171/73 (04/30/24 0825)  SpO2: 97 % (04/30/24 0825) Vital Signs (24h Range):  Temp:  [97.8 °F (36.6 °C)-98.7 °F (37.1 °C)] 98 °F (36.7 °C)  Pulse:  [60-82] 66  Resp:  [15-18] 18  SpO2:  [95 %-100 %] 97 %  BP: (123-171)/(60-73) 171/73     Weight: 87.6 kg (193 lb 2 oz)  Body mass index is 27.71 kg/m².  Body surface area is 2.08 meters squared.      Intake/Output - Last 3 Shifts         04/28 0700  04/29 0659 04/29 0700  04/30 0659 04/30 0700  05/01 0659    P.O. 840 557     Blood 546      Total Intake(mL/kg) 1386 (15.8) 557 (6.4)     Urine (mL/kg/hr) 2025 (1) 1350 (0.6)     Stool 0 0     Total Output 2025 1350     Net -639 -793            Stool Occurrence 1 x 1 x              Physical Exam  Vitals and nursing note reviewed.   Constitutional:       Appearance: Normal appearance. He is well-developed.   HENT:      Head: Normocephalic and atraumatic.      Ears:      Comments: [place]     Mouth/Throat:      Mouth: Mucous membranes are moist.      Pharynx: No oropharyngeal exudate or posterior oropharyngeal erythema.   Eyes:      General:         Right eye: No discharge.         Left eye: No discharge.      Extraocular Movements: Extraocular movements intact.      Conjunctiva/sclera: Conjunctivae normal.   Cardiovascular:      Rate and Rhythm: Normal rate and regular rhythm.      Pulses: Normal pulses.      Heart  sounds: Normal heart sounds. No murmur heard.  Pulmonary:      Effort: Pulmonary effort is normal. No respiratory distress.      Breath sounds: Normal breath sounds. No wheezing or rales.   Abdominal:      General: Bowel sounds are normal. There is distension.      Palpations: Abdomen is soft.      Tenderness: There is no abdominal tenderness.   Musculoskeletal:         General: No deformity. Normal range of motion.      Cervical back: Normal range of motion and neck supple.      Right lower leg: No edema.      Left lower leg: No edema.   Skin:     General: Skin is warm and dry.      Findings: No bruising, erythema or rash.      Comments: Right chest wall vas cath. Dressing c/d/i. No sign of infection to site.   Neurological:      General: No focal deficit present.      Mental Status: He is alert and oriented to person, place, and time.      Motor: No weakness.   Psychiatric:         Mood and Affect: Mood normal.         Behavior: Behavior normal.         Thought Content: Thought content normal.         Judgment: Judgment normal.            Significant Labs:   CBC:   Recent Labs   Lab 04/29/24  0435 04/30/24  0457   WBC 0.03* 0.03*   HGB 9.0* 8.9*   HCT 25.6* 24.6*   PLT 11* 6*    and CMP:   Recent Labs   Lab 04/29/24  0435 04/30/24  0457    138   K 4.0 4.0    108   CO2 26 24   * 156*   BUN 23 19   CREATININE 1.0 0.9   CALCIUM 9.1 9.5   PROT 5.4* 5.6*   ALBUMIN 3.2* 3.3*   BILITOT 0.7 1.6*   ALKPHOS 91 95   AST 10 9*   ALT 15 15   ANIONGAP 7* 6*       Diagnostic Results:  None  Assessment/Plan:     * History of autologous stem cell transplant  - Patient of Dr. Evans  - Admitting 4/16/24 for a Carmen 140 auto SCT  - Today is Day +12   - He received 5 bags on 4/18/24 at 1330 and received the remaining 4 bags on 4/19/24 at 1330. Total CD34 dose was 2.91 x 10^6.  - See treatment plan below.    Planned conditioning regimen:  Melphalan TBD on Day -1     Antimicrobial Prophylaxis:  Acyclovir starting on  Day -1  Levofloxacin starting on Day -1  Fluconazole starting on Day -1  Bactrim starting on Day +30     Growth Factor Support:  Neupogen starting on Day +7      Caregiver: daughter and other family members  Post-transplant discharge plans: Smita Portillo    Multiple myeloma in remission  - patient of Dr. Evans; follows locally with Dr. Rose in [place]  - diagnosed June 2023 with IgA Kappa MM; stage unclear at diagnosis as FISH not available  - started DRd therapy on 7/18/2023  - he stopped the daratumumab after cycle 4 and continue with revlimid and dex only; looks to have completed ~5 cycles  - Admitted for Carmen 140 Auto SCT on 4/16/2023    Pancytopenia due to chemotherapy  - Daily CBC while inpatient  - Transfuse for Hgb <7or plts < 10K  - Continue antimicrobial ppx  - Stopped VTE ppx 4/24 d/t downtrending platelets    Chemotherapy induced nausea and vomiting  - Continue PRN Zofran and Compazine    Hyperbilirubinemia  - T-bili 1.7 on admit. Patient asymptomatic.  - Trending with daily CMP  - back up to 1.6 today    Cancer related pain  - Continue home prn Oxy IR    BPH (benign prostatic hyperplasia)  - Continue home finasteride    HTN (hypertension)  - Continue home amlodipine, carvedilol (dose-reduced for bradycardia), hydralazine, and spironolactone  - Stopped IVF following transplant. BP improved off IVF.    Type 2 diabetes mellitus without complications  - Holding home metformin  - Stopped ACHS blood glucose monitoring 4/24 as blood glucose has been stable  - Monitoring with daily CMP    Hypothyroidism, unspecified  - Continue home synthroid    GERD (gastroesophageal reflux disease)  - Previously on BID protonix and daily pepcid for stomach ulcers; now resolved  - Will continue daily protonix per transplant order set    COLTON (acute kidney injury)  - Creatinine 1.4 on admission; baseline ~1.1  - Monitoring CMP daily  - Was receiving continuous fluids as part of chemotherapy regimen (NACL with 10%  K)  RESOLVED        VTE Risk Mitigation (From admission, onward)           Ordered     heparin (porcine) injection 1,600 Units  As needed (PRN)         04/16/24 2129     IP VTE HIGH RISK PATIENT  Once         04/16/24 1904     Place sequential compression device  Until discontinued         04/16/24 1904                    Disposition: Remains inpatient pending engraftment    Chelsie Man NP  Bone Marrow Transplant  [facility]alo - Oncology ([facility])

## 2024-04-30 NOTE — ASSESSMENT & PLAN NOTE
- patient of Dr. Evans; follows locally with Dr. Rose in [place]  - diagnosed June 2023 with IgA Kappa MM; stage unclear at diagnosis as FISH not available  - started DRd therapy on 7/18/2023  - he stopped the daratumumab after cycle 4 and continue with revlimid and dex only; looks to have completed ~5 cycles  - Admitted for Carmen 140 Auto SCT on 4/16/2023

## 2024-04-30 NOTE — PLAN OF CARE
Problem: Adult Inpatient Plan of Care  Goal: Plan of Care Review  Outcome: Progressing  Goal: Patient-Specific Goal (Individualized)  Outcome: Progressing  Goal: Absence of Hospital-Acquired Illness or Injury  Outcome: Progressing  Goal: Optimal Comfort and Wellbeing  Outcome: Progressing     Problem: Diabetes Comorbidity  Goal: Blood Glucose Level Within Targeted Range  Outcome: Progressing     Problem: Fluid and Electrolyte Imbalance (Acute Kidney Injury/Impairment)  Goal: Fluid and Electrolyte Balance  Outcome: Progressing     Pt Day +12 of Carmen Auto SCT. Pt AAOx4. VSS. RA. Afebrile. No issues overnight. Pt uses urinal to void. Pt ambulates independently. Refuses bed alarm despite education. Safety maintained. Call bell within reach. Non slip socks on when OOB.

## 2024-04-30 NOTE — ASSESSMENT & PLAN NOTE
- Patient of Dr. Evans  - Admitting 4/16/24 for a Carmen 140 auto SCT  - Today is Day +12   - He received 5 bags on 4/18/24 at 1330 and received the remaining 4 bags on 4/19/24 at 1330. Total CD34 dose was 2.91 x 10^6.  - See treatment plan below.    Planned conditioning regimen:  Melphalan TBD on Day -1     Antimicrobial Prophylaxis:  Acyclovir starting on Day -1  Levofloxacin starting on Day -1  Fluconazole starting on Day -1  Bactrim starting on Day +30     Growth Factor Support:  Neupogen starting on Day +7      Caregiver: daughter and other family members  Post-transplant discharge plans: Smita Portillo

## 2024-04-30 NOTE — SUBJECTIVE & OBJECTIVE
Subjective:     Interval History: Day +12 s/p Carmen 140 Auto SCT for MM. Reports nausea this AM, has prn antiemetics. Also reporting feeling bloated/constipated, one BM yesterday, started on miralax. Mild increase to tbili, will trend. Will receive 1unit platelets. Afebrile, VSS    Objective:     Vital Signs (Most Recent):  Temp: 98 °F (36.7 °C) (04/30/24 0825)  Pulse: 66 (04/30/24 0825)  Resp: 18 (04/30/24 0825)  BP: (!) 171/73 (04/30/24 0825)  SpO2: 97 % (04/30/24 0825) Vital Signs (24h Range):  Temp:  [97.8 °F (36.6 °C)-98.7 °F (37.1 °C)] 98 °F (36.7 °C)  Pulse:  [60-82] 66  Resp:  [15-18] 18  SpO2:  [95 %-100 %] 97 %  BP: (123-171)/(60-73) 171/73     Weight: 87.6 kg (193 lb 2 oz)  Body mass index is 27.71 kg/m².  Body surface area is 2.08 meters squared.      Intake/Output - Last 3 Shifts         04/28 0700  04/29 0659 04/29 0700 04/30 0659 04/30 0700  05/01 0659    P.O. 840 557     Blood 546      Total Intake(mL/kg) 1386 (15.8) 557 (6.4)     Urine (mL/kg/hr) 2025 (1) 1350 (0.6)     Stool 0 0     Total Output 2025 1350     Net -639 -793            Stool Occurrence 1 x 1 x              Physical Exam  Vitals and nursing note reviewed.   Constitutional:       Appearance: Normal appearance. He is well-developed.   HENT:      Head: Normocephalic and atraumatic.      Ears:      Comments: [place]     Mouth/Throat:      Mouth: Mucous membranes are moist.      Pharynx: No oropharyngeal exudate or posterior oropharyngeal erythema.   Eyes:      General:         Right eye: No discharge.         Left eye: No discharge.      Extraocular Movements: Extraocular movements intact.      Conjunctiva/sclera: Conjunctivae normal.   Cardiovascular:      Rate and Rhythm: Normal rate and regular rhythm.      Pulses: Normal pulses.      Heart sounds: Normal heart sounds. No murmur heard.  Pulmonary:      Effort: Pulmonary effort is normal. No respiratory distress.      Breath sounds: Normal breath sounds. No wheezing or rales.   Abdominal:       General: Bowel sounds are normal. There is distension.      Palpations: Abdomen is soft.      Tenderness: There is no abdominal tenderness.   Musculoskeletal:         General: No deformity. Normal range of motion.      Cervical back: Normal range of motion and neck supple.      Right lower leg: No edema.      Left lower leg: No edema.   Skin:     General: Skin is warm and dry.      Findings: No bruising, erythema or rash.      Comments: Right chest wall vas cath. Dressing c/d/i. No sign of infection to site.   Neurological:      General: No focal deficit present.      Mental Status: He is alert and oriented to person, place, and time.      Motor: No weakness.   Psychiatric:         Mood and Affect: Mood normal.         Behavior: Behavior normal.         Thought Content: Thought content normal.         Judgment: Judgment normal.            Significant Labs:   CBC:   Recent Labs   Lab 04/29/24 0435 04/30/24 0457   WBC 0.03* 0.03*   HGB 9.0* 8.9*   HCT 25.6* 24.6*   PLT 11* 6*    and CMP:   Recent Labs   Lab 04/29/24 0435 04/30/24 0457    138   K 4.0 4.0    108   CO2 26 24   * 156*   BUN 23 19   CREATININE 1.0 0.9   CALCIUM 9.1 9.5   PROT 5.4* 5.6*   ALBUMIN 3.2* 3.3*   BILITOT 0.7 1.6*   ALKPHOS 91 95   AST 10 9*   ALT 15 15   ANIONGAP 7* 6*       Diagnostic Results:  None

## 2024-05-01 LAB
ALBUMIN SERPL BCP-MCNC: 3.1 G/DL (ref 3.5–5.2)
ALP SERPL-CCNC: 94 U/L (ref 55–135)
ALT SERPL W/O P-5'-P-CCNC: 15 U/L (ref 10–44)
ANION GAP SERPL CALC-SCNC: 7 MMOL/L (ref 8–16)
AST SERPL-CCNC: 7 U/L (ref 10–40)
BASOPHILS # BLD AUTO: 0 K/UL (ref 0–0.2)
BASOPHILS NFR BLD: 0 % (ref 0–1.9)
BILIRUB SERPL-MCNC: 1.2 MG/DL (ref 0.1–1)
BUN SERPL-MCNC: 22 MG/DL (ref 8–23)
CALCIUM SERPL-MCNC: 9 MG/DL (ref 8.7–10.5)
CHLORIDE SERPL-SCNC: 105 MMOL/L (ref 95–110)
CO2 SERPL-SCNC: 26 MMOL/L (ref 23–29)
CREAT SERPL-MCNC: 1 MG/DL (ref 0.5–1.4)
DIFFERENTIAL METHOD BLD: ABNORMAL
EOSINOPHIL # BLD AUTO: 0 K/UL (ref 0–0.5)
EOSINOPHIL NFR BLD: 0 % (ref 0–8)
ERYTHROCYTE [DISTWIDTH] IN BLOOD BY AUTOMATED COUNT: 10.5 % (ref 11.5–14.5)
EST. GFR  (NO RACE VARIABLE): >60 ML/MIN/1.73 M^2
GLUCOSE SERPL-MCNC: 152 MG/DL (ref 70–110)
HCT VFR BLD AUTO: 21.9 % (ref 40–54)
HGB BLD-MCNC: 7.9 G/DL (ref 14–18)
IMM GRANULOCYTES # BLD AUTO: 0 K/UL (ref 0–0.04)
IMM GRANULOCYTES NFR BLD AUTO: 0 % (ref 0–0.5)
LYMPHOCYTES # BLD AUTO: 0 K/UL (ref 1–4.8)
LYMPHOCYTES NFR BLD: 66.7 % (ref 18–48)
MAGNESIUM SERPL-MCNC: 1.8 MG/DL (ref 1.6–2.6)
MCH RBC QN AUTO: 34.6 PG (ref 27–31)
MCHC RBC AUTO-ENTMCNC: 36.1 G/DL (ref 32–36)
MCV RBC AUTO: 96 FL (ref 82–98)
MONOCYTES # BLD AUTO: 0 K/UL (ref 0.3–1)
MONOCYTES NFR BLD: 0 % (ref 4–15)
NEUTROPHILS # BLD AUTO: 0 K/UL (ref 1.8–7.7)
NEUTROPHILS NFR BLD: 33.3 % (ref 38–73)
NRBC BLD-RTO: 0 /100 WBC
PHOSPHATE SERPL-MCNC: 3.2 MG/DL (ref 2.7–4.5)
PLATELET # BLD AUTO: 18 K/UL (ref 150–450)
PLATELET BLD QL SMEAR: ABNORMAL
PMV BLD AUTO: 10.2 FL (ref 9.2–12.9)
POTASSIUM SERPL-SCNC: 4.1 MMOL/L (ref 3.5–5.1)
PROT SERPL-MCNC: 5.3 G/DL (ref 6–8.4)
RBC # BLD AUTO: 2.28 M/UL (ref 4.6–6.2)
SODIUM SERPL-SCNC: 138 MMOL/L (ref 136–145)
WBC # BLD AUTO: 0.03 K/UL (ref 3.9–12.7)

## 2024-05-01 PROCEDURE — 20600001 HC STEP DOWN PRIVATE ROOM

## 2024-05-01 PROCEDURE — 84100 ASSAY OF PHOSPHORUS: CPT | Performed by: NURSE PRACTITIONER

## 2024-05-01 PROCEDURE — 85025 COMPLETE CBC W/AUTO DIFF WBC: CPT | Performed by: NURSE PRACTITIONER

## 2024-05-01 PROCEDURE — 99232 SBSQ HOSP IP/OBS MODERATE 35: CPT | Mod: ,,, | Performed by: INTERNAL MEDICINE

## 2024-05-01 PROCEDURE — 63600175 PHARM REV CODE 636 W HCPCS: Performed by: INTERNAL MEDICINE

## 2024-05-01 PROCEDURE — 83735 ASSAY OF MAGNESIUM: CPT | Performed by: NURSE PRACTITIONER

## 2024-05-01 PROCEDURE — 97116 GAIT TRAINING THERAPY: CPT | Mod: CQ

## 2024-05-01 PROCEDURE — 25000003 PHARM REV CODE 250: Performed by: STUDENT IN AN ORGANIZED HEALTH CARE EDUCATION/TRAINING PROGRAM

## 2024-05-01 PROCEDURE — 25000003 PHARM REV CODE 250: Performed by: INTERNAL MEDICINE

## 2024-05-01 PROCEDURE — 25000003 PHARM REV CODE 250: Performed by: NURSE PRACTITIONER

## 2024-05-01 PROCEDURE — 80053 COMPREHEN METABOLIC PANEL: CPT | Performed by: NURSE PRACTITIONER

## 2024-05-01 RX ADMIN — ACYCLOVIR 800 MG: 200 CAPSULE ORAL at 09:05

## 2024-05-01 RX ADMIN — HYDRALAZINE HYDROCHLORIDE 50 MG: 50 TABLET ORAL at 05:05

## 2024-05-01 RX ADMIN — PANTOPRAZOLE SODIUM 40 MG: 40 TABLET, DELAYED RELEASE ORAL at 10:05

## 2024-05-01 RX ADMIN — SPIRONOLACTONE 12.5 MG: 25 TABLET ORAL at 10:05

## 2024-05-01 RX ADMIN — AMLODIPINE BESYLATE 10 MG: 10 TABLET ORAL at 10:05

## 2024-05-01 RX ADMIN — POLYETHYLENE GLYCOL 3350 17 G: 17 POWDER, FOR SOLUTION ORAL at 11:05

## 2024-05-01 RX ADMIN — LEVOFLOXACIN 500 MG: 500 TABLET, FILM COATED ORAL at 10:05

## 2024-05-01 RX ADMIN — ACYCLOVIR 800 MG: 200 CAPSULE ORAL at 10:05

## 2024-05-01 RX ADMIN — Medication 400 MG: at 11:05

## 2024-05-01 RX ADMIN — CARVEDILOL 6.25 MG: 6.25 TABLET, FILM COATED ORAL at 09:05

## 2024-05-01 RX ADMIN — Medication 1 DOSE: at 09:05

## 2024-05-01 RX ADMIN — Medication 1 DOSE: at 01:05

## 2024-05-01 RX ADMIN — Medication 400 MG: at 05:05

## 2024-05-01 RX ADMIN — FLUCONAZOLE 400 MG: 200 TABLET ORAL at 10:05

## 2024-05-01 RX ADMIN — FINASTERIDE 5 MG: 5 TABLET, FILM COATED ORAL at 10:05

## 2024-05-01 RX ADMIN — LEVOTHYROXINE SODIUM 112 MCG: 112 TABLET ORAL at 09:05

## 2024-05-01 RX ADMIN — HYDRALAZINE HYDROCHLORIDE 50 MG: 50 TABLET ORAL at 02:05

## 2024-05-01 RX ADMIN — HEPARIN SODIUM 1600 UNITS: 1000 INJECTION, SOLUTION INTRAVENOUS; SUBCUTANEOUS at 07:05

## 2024-05-01 RX ADMIN — CARVEDILOL 6.25 MG: 6.25 TABLET, FILM COATED ORAL at 10:05

## 2024-05-01 RX ADMIN — Medication 1 DOSE: at 11:05

## 2024-05-01 RX ADMIN — HYDRALAZINE HYDROCHLORIDE 50 MG: 50 TABLET ORAL at 09:05

## 2024-05-01 RX ADMIN — FILGRASTIM 480 MCG: 480 INJECTION, SOLUTION INTRAVENOUS; SUBCUTANEOUS at 10:05

## 2024-05-01 RX ADMIN — Medication 1 DOSE: at 06:05

## 2024-05-01 NOTE — SUBJECTIVE & OBJECTIVE
Subjective:     Interval History: Day +13 s/p Carmen 140 Auto SCT for MM. ANC 10. Nausea yesterday controlled with PRNs. Reports increased fatigue this AM. Had BM yesterday. Tbili still elevated but downtrending. Afebrile, VSS    Objective:     Vital Signs (Most Recent):  Temp: 98.3 °F (36.8 °C) (05/01/24 0756)  Pulse: 60 (05/01/24 0756)  Resp: 16 (05/01/24 0756)  BP: (!) 121/59 (05/01/24 0756)  SpO2: 98 % (05/01/24 0756) Vital Signs (24h Range):  Temp:  [97.5 °F (36.4 °C)-98.8 °F (37.1 °C)] 98.3 °F (36.8 °C)  Pulse:  [60-78] 60  Resp:  [16-20] 16  SpO2:  [95 %-100 %] 98 %  BP: (103-148)/(50-72) 121/59     Weight: 87.9 kg (193 lb 10.8 oz)  Body mass index is 27.79 kg/m².  Body surface area is 2.08 meters squared.      Intake/Output - Last 3 Shifts         04/29 0700  04/30 0659 04/30 0700  05/01 0659 05/01 0700  05/02 0659    P.O. 557 834     Blood  185     Total Intake(mL/kg) 557 (6.4) 1019 (11.6)     Urine (mL/kg/hr) 1350 (0.6) 2080 (1)     Stool 0 0     Total Output 1350 2080     Net -793 -1061            Stool Occurrence 1 x 1 x              Physical Exam  Vitals and nursing note reviewed.   Constitutional:       Appearance: Normal appearance. He is well-developed.   HENT:      Head: Normocephalic and atraumatic.      Ears:      Comments: [place]     Mouth/Throat:      Mouth: Mucous membranes are moist.      Pharynx: No oropharyngeal exudate or posterior oropharyngeal erythema.   Eyes:      General:         Right eye: No discharge.         Left eye: No discharge.      Extraocular Movements: Extraocular movements intact.      Conjunctiva/sclera: Conjunctivae normal.   Cardiovascular:      Rate and Rhythm: Normal rate and regular rhythm.      Pulses: Normal pulses.      Heart sounds: Normal heart sounds. No murmur heard.  Pulmonary:      Effort: Pulmonary effort is normal. No respiratory distress.      Breath sounds: Normal breath sounds. No wheezing or rales.   Abdominal:      General: Bowel sounds are normal. There  is no distension.      Palpations: Abdomen is soft.      Tenderness: There is no abdominal tenderness.   Musculoskeletal:         General: No deformity. Normal range of motion.      Cervical back: Normal range of motion and neck supple.      Right lower leg: No edema.      Left lower leg: No edema.   Skin:     General: Skin is warm and dry.      Findings: No bruising, erythema or rash.      Comments: Right chest wall vas cath. Dressing c/d/i. No sign of infection to site.   Neurological:      General: No focal deficit present.      Mental Status: He is alert and oriented to person, place, and time.      Motor: No weakness.   Psychiatric:         Mood and Affect: Mood normal.         Behavior: Behavior normal.         Thought Content: Thought content normal.         Judgment: Judgment normal.            Significant Labs:   CBC:   Recent Labs   Lab 04/30/24  0457 05/01/24  0410   WBC 0.03* 0.03*   HGB 8.9* 7.9*   HCT 24.6* 21.9*   PLT 6* 18*    and CMP:   Recent Labs   Lab 04/30/24 0457 05/01/24  0410    138   K 4.0 4.1    105   CO2 24 26   * 152*   BUN 19 22   CREATININE 0.9 1.0   CALCIUM 9.5 9.0   PROT 5.6* 5.3*   ALBUMIN 3.3* 3.1*   BILITOT 1.6* 1.2*   ALKPHOS 95 94   AST 9* 7*   ALT 15 15   ANIONGAP 6* 7*       Diagnostic Results:  None

## 2024-05-01 NOTE — PROGRESS NOTES
[facility] Stephens - Oncology ([facility])  Hematology  Bone Marrow Transplant  Progress Note    Patient Name: Chip Goodson  Admission Date: 4/16/2024  Hospital Length of Stay: 15 days  Code Status: Full Code    Subjective:     Interval History: Day +13 s/p Carmen 140 Auto SCT for MM. ANC 10. Nausea yesterday controlled with PRNs. Reports increased fatigue this AM. Had BM yesterday. Tbili still elevated but downtrending. Afebrile, VSS    Objective:     Vital Signs (Most Recent):  Temp: 98.3 °F (36.8 °C) (05/01/24 0756)  Pulse: 60 (05/01/24 0756)  Resp: 16 (05/01/24 0756)  BP: (!) 121/59 (05/01/24 0756)  SpO2: 98 % (05/01/24 0756) Vital Signs (24h Range):  Temp:  [97.5 °F (36.4 °C)-98.8 °F (37.1 °C)] 98.3 °F (36.8 °C)  Pulse:  [60-78] 60  Resp:  [16-20] 16  SpO2:  [95 %-100 %] 98 %  BP: (103-148)/(50-72) 121/59     Weight: 87.9 kg (193 lb 10.8 oz)  Body mass index is 27.79 kg/m².  Body surface area is 2.08 meters squared.      Intake/Output - Last 3 Shifts         04/29 0700  04/30 0659 04/30 0700  05/01 0659 05/01 0700  05/02 0659    P.O. 557 834     Blood  185     Total Intake(mL/kg) 557 (6.4) 1019 (11.6)     Urine (mL/kg/hr) 1350 (0.6) 2080 (1)     Stool 0 0     Total Output 1350 2080     Net -793 -1061            Stool Occurrence 1 x 1 x              Physical Exam  Vitals and nursing note reviewed.   Constitutional:       Appearance: Normal appearance. He is well-developed.   HENT:      Head: Normocephalic and atraumatic.      Ears:      Comments: [place]     Mouth/Throat:      Mouth: Mucous membranes are moist.      Pharynx: No oropharyngeal exudate or posterior oropharyngeal erythema.   Eyes:      General:         Right eye: No discharge.         Left eye: No discharge.      Extraocular Movements: Extraocular movements intact.      Conjunctiva/sclera: Conjunctivae normal.   Cardiovascular:      Rate and Rhythm: Normal rate and regular rhythm.      Pulses: Normal pulses.      Heart sounds: Normal heart sounds. No murmur  heard.  Pulmonary:      Effort: Pulmonary effort is normal. No respiratory distress.      Breath sounds: Normal breath sounds. No wheezing or rales.   Abdominal:      General: Bowel sounds are normal. There is no distension.      Palpations: Abdomen is soft.      Tenderness: There is no abdominal tenderness.   Musculoskeletal:         General: No deformity. Normal range of motion.      Cervical back: Normal range of motion and neck supple.      Right lower leg: No edema.      Left lower leg: No edema.   Skin:     General: Skin is warm and dry.      Findings: No bruising, erythema or rash.      Comments: Right chest wall vas cath. Dressing c/d/i. No sign of infection to site.   Neurological:      General: No focal deficit present.      Mental Status: He is alert and oriented to person, place, and time.      Motor: No weakness.   Psychiatric:         Mood and Affect: Mood normal.         Behavior: Behavior normal.         Thought Content: Thought content normal.         Judgment: Judgment normal.            Significant Labs:   CBC:   Recent Labs   Lab 04/30/24  0457 05/01/24  0410   WBC 0.03* 0.03*   HGB 8.9* 7.9*   HCT 24.6* 21.9*   PLT 6* 18*    and CMP:   Recent Labs   Lab 04/30/24  0457 05/01/24  0410    138   K 4.0 4.1    105   CO2 24 26   * 152*   BUN 19 22   CREATININE 0.9 1.0   CALCIUM 9.5 9.0   PROT 5.6* 5.3*   ALBUMIN 3.3* 3.1*   BILITOT 1.6* 1.2*   ALKPHOS 95 94   AST 9* 7*   ALT 15 15   ANIONGAP 6* 7*       Diagnostic Results:  None  Assessment/Plan:     * History of autologous stem cell transplant  - Patient of Dr. Evans  - Admitting 4/16/24 for a Carmen 140 auto SCT  - Today is Day +13   - He received 5 bags on 4/18/24 at 1330 and received the remaining 4 bags on 4/19/24 at 1330. Total CD34 dose was 2.91 x 10^6.  - See treatment plan below.    Planned conditioning regimen:  Melphalan TBD on Day -1     Antimicrobial Prophylaxis:  Acyclovir starting on Day -1  Levofloxacin starting on Day  -1  Fluconazole starting on Day -1  Bactrim starting on Day +30     Growth Factor Support:  Neupogen starting on Day +7      Caregiver: daughter and other family members  Post-transplant discharge plans: Smita Portillo    Multiple myeloma in remission  - patient of Dr. Evans; follows locally with Dr. Rose in [place]  - diagnosed June 2023 with IgA Kappa MM; stage unclear at diagnosis as FISH not available  - started DRd therapy on 7/18/2023  - he stopped the daratumumab after cycle 4 and continue with revlimid and dex only; looks to have completed ~5 cycles  - Admitted for Carmen 140 Auto SCT on 4/16/2023    Pancytopenia due to chemotherapy  - Daily CBC while inpatient  - Transfuse for Hgb <7or plts < 10K  - Continue antimicrobial ppx  - Stopped VTE ppx 4/24 d/t downtrending platelets    Chemotherapy induced nausea and vomiting  - Continue PRN Zofran and Compazine    Hyperbilirubinemia  - T-bili 1.7 on admit. Patient asymptomatic.  - Trending with daily CMP  - downtrending 1.2 today    Cancer related pain  - Continue home prn Oxy IR    BPH (benign prostatic hyperplasia)  - Continue home finasteride    HTN (hypertension)  - Continue home amlodipine, carvedilol (dose-reduced for bradycardia), hydralazine, and spironolactone  - Stopped IVF following transplant. BP improved off IVF.    Type 2 diabetes mellitus without complications  - Holding home metformin  - Stopped ACHS blood glucose monitoring 4/24 as blood glucose has been stable  - Monitoring with daily CMP    Hypothyroidism, unspecified  - Continue home synthroid    GERD (gastroesophageal reflux disease)  - Previously on BID protonix and daily pepcid for stomach ulcers; now resolved  - Will continue daily protonix per transplant order set    COLTON (acute kidney injury)  - Creatinine 1.4 on admission; baseline ~1.1  - Monitoring CMP daily  - Was receiving continuous fluids as part of chemotherapy regimen (NACL with 10% K)  RESOLVED        VTE Risk Mitigation (From  admission, onward)           Ordered     heparin (porcine) injection 1,600 Units  As needed (PRN)         04/16/24 2129     IP VTE HIGH RISK PATIENT  Once         04/16/24 1904     Place sequential compression device  Until discontinued         04/16/24 1904                    Disposition: Remains inpatient    Chelsie Man NP  Bone Marrow Transplant  [facility] italo - Oncology ([facility])

## 2024-05-01 NOTE — PROGRESS NOTES
[facility] - Oncology ([facility])  Adult Nutrition  Progress Note    SUMMARY       Recommendations    1. Continue Regular Diet with double portions.   2. Recommend continuing ONS Boost Plus- Chocolate with all meal for additional calories/ PRO.   3. Monitor I/O's weight and labs.   4. Consider an appetite stimulant.   5. RD following.    Goals: to meet % of EEN/EPN by next RD f/u  Nutrition Goal Status: progressing towards goal  Communication of RD Recs:  (POC)    Assessment and Plan       Nutrition Problem  Increased nutrient needs (energy/protein)    Related to (etiology):   Increased physiological demands    Signs and Symptoms (as evidenced by):   S/p Auto SCT     Interventions/Recommendations (treatment strategy):  Collaboration of nutrition care with other providers  ONS  Nutrition education    Nutrition Diagnosis Status:   Continues    Reason for Assessment    Reason For Assessment: RD follow-up  Diagnosis: cancer diagnosis/related complications  Relevant Medical History: HTN, Hypothyroidism, Multiple Myeloma  Interdisciplinary Rounds: did not attend  General Information Comments: RD follow-up. Patient tolerating PO intake, nausea noted but being controlled with anti-emetics PRN. Reports increased fatigue. Consuming 50-75% at meals, decreased appetite noted.  Nutrition Discharge Planning: Provided pt with high calorie, high protein diet education and food safety education for bone marrow transplant. Appropriate education material with RD contact information provided. No other needs identified.    Nutrition Risk Screen    Nutrition Risk Screen: no indicators present    Nutrition/Diet History    Patient Reported Diet/Restrictions/Preferences: general  Typical Food/Fluid Intake: 2-3 meal/day  Spiritual, Cultural Beliefs, Nondenominational Practices, Values that Affect Care: no  Supplemental Drinks or Food Habits: Premier [employer]  Food Allergies: shellfish  Factors Affecting Nutritional Intake: decreased  "appetite    Anthropometrics    Temp: 98.4 °F (36.9 °C)  Height Method: Stated  Height: 5' 10" (177.8 cm)  Height (inches): 70 in  Weight Method: Standard Scale  Weight: 87.9 kg (193 lb 10.8 oz)  Weight (lb): 193.68 lb  Ideal Body Weight (IBW), Male: 166 lb  % Ideal Body Weight, Male (lb): 127.16 %  BMI (Calculated): 27.8       Lab/Procedures/Meds    Pertinent Labs Reviewed: reviewed  Pertinent Labs Comments: H/H: 7.9/21.9, MCH 24.6, MCHC 36.1, Glu 152, TP 5.3, Tbili 1.2, AST 7  Pertinent Medications Reviewed: reviewed  Pertinent Medications Comments: Enoxaparin, nuepogen, levothyroxine, ondansetron, pantoprazole, Na Bicarb, spironolactone, abx, NaCl/KCl, carvedilol, levofloxacin      Estimated/Assessed Needs    Weight Used For Calorie Calculations: 75 kg (165 lb 5.5 oz) (IBW d/t BMI >30.0)  Energy Calorie Requirements (kcal): 1875- 2250 kcal  Energy Need Method: Kcal/kg (25-30 kcal/kg of IBW)  Protein Requirements: 113- 150g (1.5-2.0g/kg of IBW)  Weight Used For Protein Calculations: 75 kg (165 lb 5.5 oz) (IBW d/t BMI >30.0)  Fluid Requirements (mL): 1ml/1kcal or per MD  Estimated Fluid Requirement Method: RDA Method  RDA Method (mL): 1875         Nutrition Prescription Ordered    Current Diet Order: Regular Diet  Oral Nutrition Supplement: Boost Plus- chocolate (All meals)    Evaluation of Received Nutrient/Fluid Intake    I/O: -1.06 L  Energy Calories Required: meeting needs  Protein Required: meeting needs  Fluid Required:  (as per MD)  Comments: LBM: 4/30  Tolerance: tolerating  % Intake of Estimated Energy Needs: 75 - 100 %  % Meal Intake: 50 - 75 %    Nutrition Risk    Level of Risk/Frequency of Follow-up:  (RD to f/u x 1-2/week)     Monitor and Evaluation    Food and Nutrient Intake: energy intake, food and beverage intake  Food and Nutrient Adminstration: diet order  Knowledge/Beliefs/Attitudes: food and nutrition knowledge/skill, beliefs and attitudes  Physical Activity and Function: nutrition-related ADLs " and IADLs  Anthropometric Measurements: weight, weight change, body mass index  Biochemical Data, Medical Tests and Procedures: electrolyte and renal panel, gastrointestinal profile, glucose/endocrine profile, inflammatory profile, lipid profile  Nutrition-Focused Physical Findings: overall appearance, skin     Nutrition Follow-Up    RD Follow-up?: Yes    Ike Leal MS, RD, LDN

## 2024-05-01 NOTE — PT/OT/SLP PROGRESS
Physical Therapy Treatment    Patient Name:  Chip Goodson   MRN:  [id number]    Recommendations:     Discharge Recommendations: No Therapy Indicated  Discharge Equipment Recommendations: none  Barriers to discharge: None    Assessment:     Chip Goodson is a 73 y.o. male admitted with a medical diagnosis of History of autologous stem cell transplant.  He presents with the following impairments/functional limitations:  (At risk for deconditioning) Patient continues to show no difficulty transferring or ambulating on bazan way.    Rehab Prognosis: Good; patient would benefit from acute skilled PT services to address these deficits and reach maximum level of function.    Recent Surgery: * No surgery found *      Plan:     During this hospitalization, patient to be seen 2 x/week to address the identified rehab impairments via gait training, therapeutic activities, therapeutic exercises, neuromuscular re-education and progress toward the following goals:    Plan of Care Expires:  05/19/24    Subjective     Chief Complaint: none today  Patient/Family Comments/goals: to heal well.  Pain/Comfort:  Pain Rating 1: 0/10  Pain Rating Post-Intervention 1: 0/10      Objective:     Communicated with NSG prior to session.  Patient found sitting edge of bed with   upon PT entry to room.     General Precautions: Standard, fall, diabetic, hearing impaired  Orthopedic Precautions: N/A  Braces: N/A  Respiratory Status: Room air     Functional Mobility:  Bed Mobility:     Scooting: independence  Sit to Supine: independence  Transfers:     Sit to Stand:  independence with no AD  Gait: ~460 ft with no AD with supervision only.      AM-PAC 6 CLICK MOBILITY  Turning over in bed (including adjusting bedclothes, sheets and blankets)?: 4  Sitting down on and standing up from a chair with arms (e.g., wheelchair, bedside commode, etc.): 4  Moving from lying on back to sitting on the side of the bed?: 4  Moving to and from a bed to a chair  (including a wheelchair)?: 4  Need to walk in hospital room?: 4  Climbing 3-5 steps with a railing?: 4  Basic Mobility Total Score: 24       Treatment & Education:  Patient got dressed by himself and then ambulated to the bathroom to do his needs. Educated on ambulating with nurse daily to prevent muscle atrophy.    Patient left HOB elevated with call button in reach..    GOALS:   Multidisciplinary Problems       Physical Therapy Goals          Problem: Physical Therapy    Goal Priority Disciplines Outcome Goal Variances Interventions   Physical Therapy Goal     PT, PT/OT Progressing     Description: Goals to be met by: 24     Patient will maintain functional independence with mobility, without evidence of deconditioning, by performin. Supine to sit with [place]  2. Sit to stand transfer with [place]  3. Gait  x 400 feet with [place]   4. Ascend/Descend 6 inch curb step with [place]  5. Stand for 10 minutes with [place]  6. Lower extremity exercise program x30 reps per handout, with assistance as needed                         Time Tracking:     PT Received On: 24  PT Start Time: 1100     PT Stop Time: 1111  PT Total Time (min): 11 min     Billable Minutes: Gait Training 11    Treatment Type: Treatment  PT/PTA: PTA     Number of PTA visits since last PT visit: 2024

## 2024-05-01 NOTE — ASSESSMENT & PLAN NOTE
- patient of Dr. Evans; follows locally with Dr. Rose in [place]  - diagnosed June 2023 with IgA Kappa MM; stage unclear at diagnosis as FISH not available  - started DRd therapy on 7/18/2023  - he stopped the daratumumab after cycle 4 and continue with revlimid and dex only; looks to have completed ~5 cycles  - Admitted for Carmen 140 Auto SCT on 4/16/2023

## 2024-05-01 NOTE — PLAN OF CARE
Recommendations    1. Continue Regular Diet with double portions.   2. Recommend continuing ONS Boost Plus- Chocolate with all meal for additional calories/ PRO.   3. Monitor I/O's weight and labs.   4. Consider an appetite stimulant.   5. RD following.    Goals: to meet % of EEN/EPN by next RD f/u  Nutrition Goal Status: progressing towards goal  Communication of RD Recs:  (POC)

## 2024-05-01 NOTE — PLAN OF CARE
Side rails up x2; call bell in place; bed in lowest, locked position; skid proof socks on; no evidence of skin breakdown; care plan explained to patient; pt remains free of injury. Pt is day +13 of an auto SCT. Pt tolerated po, voids, ambulated in bazan, no complaints of pain, n/v or diarrhea. Frequent rounding in progress, pt encouraged to call as needed, VSS and febrile.

## 2024-05-01 NOTE — ASSESSMENT & PLAN NOTE
- Patient of Dr. Evans  - Admitting 4/16/24 for a Carmen 140 auto SCT  - Today is Day +13   - He received 5 bags on 4/18/24 at 1330 and received the remaining 4 bags on 4/19/24 at 1330. Total CD34 dose was 2.91 x 10^6.  - See treatment plan below.    Planned conditioning regimen:  Melphalan TBD on Day -1     Antimicrobial Prophylaxis:  Acyclovir starting on Day -1  Levofloxacin starting on Day -1  Fluconazole starting on Day -1  Bactrim starting on Day +30     Growth Factor Support:  Neupogen starting on Day +7      Caregiver: daughter and other family members  Post-transplant discharge plans: Smita Portillo

## 2024-05-02 PROBLEM — K59.00 CONSTIPATION: Status: ACTIVE | Noted: 2024-05-02

## 2024-05-02 LAB
ABO + RH BLD: NORMAL
ALBUMIN SERPL BCP-MCNC: 3 G/DL (ref 3.5–5.2)
ALP SERPL-CCNC: 91 U/L (ref 55–135)
ALT SERPL W/O P-5'-P-CCNC: 15 U/L (ref 10–44)
ANION GAP SERPL CALC-SCNC: 6 MMOL/L (ref 8–16)
ANISOCYTOSIS BLD QL SMEAR: SLIGHT
AST SERPL-CCNC: 8 U/L (ref 10–40)
BASOPHILS # BLD AUTO: 0 K/UL (ref 0–0.2)
BASOPHILS NFR BLD: 0 % (ref 0–1.9)
BILIRUB SERPL-MCNC: 1 MG/DL (ref 0.1–1)
BLD GP AB SCN CELLS X3 SERPL QL: NORMAL
BUN SERPL-MCNC: 23 MG/DL (ref 8–23)
CALCIUM SERPL-MCNC: 9 MG/DL (ref 8.7–10.5)
CHLORIDE SERPL-SCNC: 104 MMOL/L (ref 95–110)
CO2 SERPL-SCNC: 26 MMOL/L (ref 23–29)
CREAT SERPL-MCNC: 1.1 MG/DL (ref 0.5–1.4)
DIFFERENTIAL METHOD BLD: ABNORMAL
EOSINOPHIL # BLD AUTO: 0 K/UL (ref 0–0.5)
EOSINOPHIL NFR BLD: 0 % (ref 0–8)
ERYTHROCYTE [DISTWIDTH] IN BLOOD BY AUTOMATED COUNT: 10.6 % (ref 11.5–14.5)
EST. GFR  (NO RACE VARIABLE): >60 ML/MIN/1.73 M^2
GLUCOSE SERPL-MCNC: 143 MG/DL (ref 70–110)
HCT VFR BLD AUTO: 21.2 % (ref 40–54)
HGB BLD-MCNC: 7.8 G/DL (ref 14–18)
HYPOCHROMIA BLD QL SMEAR: ABNORMAL
IMM GRANULOCYTES # BLD AUTO: 0 K/UL (ref 0–0.04)
IMM GRANULOCYTES NFR BLD AUTO: 0 % (ref 0–0.5)
LYMPHOCYTES # BLD AUTO: 0 K/UL (ref 1–4.8)
LYMPHOCYTES NFR BLD: 75 % (ref 18–48)
MAGNESIUM SERPL-MCNC: 1.9 MG/DL (ref 1.6–2.6)
MCH RBC QN AUTO: 35.3 PG (ref 27–31)
MCHC RBC AUTO-ENTMCNC: 36.8 G/DL (ref 32–36)
MCV RBC AUTO: 96 FL (ref 82–98)
MONOCYTES # BLD AUTO: 0 K/UL (ref 0.3–1)
MONOCYTES NFR BLD: 0 % (ref 4–15)
NEUTROPHILS # BLD AUTO: 0 K/UL (ref 1.8–7.7)
NEUTROPHILS NFR BLD: 25 % (ref 38–73)
NRBC BLD-RTO: 0 /100 WBC
OVALOCYTES BLD QL SMEAR: ABNORMAL
PHOSPHATE SERPL-MCNC: 3.3 MG/DL (ref 2.7–4.5)
PLATELET # BLD AUTO: 11 K/UL (ref 150–450)
PMV BLD AUTO: 10.4 FL (ref 9.2–12.9)
POIKILOCYTOSIS BLD QL SMEAR: SLIGHT
POLYCHROMASIA BLD QL SMEAR: ABNORMAL
POTASSIUM SERPL-SCNC: 3.9 MMOL/L (ref 3.5–5.1)
PROT SERPL-MCNC: 5.3 G/DL (ref 6–8.4)
RBC # BLD AUTO: 2.21 M/UL (ref 4.6–6.2)
SODIUM SERPL-SCNC: 136 MMOL/L (ref 136–145)
SPECIMEN OUTDATE: NORMAL
SPHEROCYTES BLD QL SMEAR: ABNORMAL
WBC # BLD AUTO: 0.04 K/UL (ref 3.9–12.7)

## 2024-05-02 PROCEDURE — 25000003 PHARM REV CODE 250: Performed by: NURSE PRACTITIONER

## 2024-05-02 PROCEDURE — 63600175 PHARM REV CODE 636 W HCPCS: Performed by: NURSE PRACTITIONER

## 2024-05-02 PROCEDURE — 63600175 PHARM REV CODE 636 W HCPCS: Performed by: INTERNAL MEDICINE

## 2024-05-02 PROCEDURE — 85025 COMPLETE CBC W/AUTO DIFF WBC: CPT | Performed by: NURSE PRACTITIONER

## 2024-05-02 PROCEDURE — 84100 ASSAY OF PHOSPHORUS: CPT | Performed by: NURSE PRACTITIONER

## 2024-05-02 PROCEDURE — 25000003 PHARM REV CODE 250: Performed by: STUDENT IN AN ORGANIZED HEALTH CARE EDUCATION/TRAINING PROGRAM

## 2024-05-02 PROCEDURE — 83735 ASSAY OF MAGNESIUM: CPT | Performed by: NURSE PRACTITIONER

## 2024-05-02 PROCEDURE — 25000003 PHARM REV CODE 250: Performed by: INTERNAL MEDICINE

## 2024-05-02 PROCEDURE — 80053 COMPREHEN METABOLIC PANEL: CPT | Performed by: NURSE PRACTITIONER

## 2024-05-02 PROCEDURE — 20600001 HC STEP DOWN PRIVATE ROOM

## 2024-05-02 PROCEDURE — 99232 SBSQ HOSP IP/OBS MODERATE 35: CPT | Mod: ,,, | Performed by: INTERNAL MEDICINE

## 2024-05-02 PROCEDURE — 86850 RBC ANTIBODY SCREEN: CPT | Performed by: NURSE PRACTITIONER

## 2024-05-02 RX ORDER — AMOXICILLIN 250 MG
1 CAPSULE ORAL ONCE
Status: COMPLETED | OUTPATIENT
Start: 2024-05-02 | End: 2024-05-02

## 2024-05-02 RX ADMIN — FLUCONAZOLE 400 MG: 200 TABLET ORAL at 09:05

## 2024-05-02 RX ADMIN — Medication 1 DOSE: at 09:05

## 2024-05-02 RX ADMIN — CARVEDILOL 6.25 MG: 6.25 TABLET, FILM COATED ORAL at 09:05

## 2024-05-02 RX ADMIN — Medication 1 DOSE: at 12:05

## 2024-05-02 RX ADMIN — POLYETHYLENE GLYCOL 3350 17 G: 17 POWDER, FOR SOLUTION ORAL at 09:05

## 2024-05-02 RX ADMIN — HYDRALAZINE HYDROCHLORIDE 50 MG: 50 TABLET ORAL at 03:05

## 2024-05-02 RX ADMIN — SPIRONOLACTONE 12.5 MG: 25 TABLET ORAL at 09:05

## 2024-05-02 RX ADMIN — ACYCLOVIR 800 MG: 200 CAPSULE ORAL at 09:05

## 2024-05-02 RX ADMIN — PANTOPRAZOLE SODIUM 40 MG: 40 TABLET, DELAYED RELEASE ORAL at 09:05

## 2024-05-02 RX ADMIN — Medication 1 DOSE: at 05:05

## 2024-05-02 RX ADMIN — FILGRASTIM 480 MCG: 480 INJECTION, SOLUTION INTRAVENOUS; SUBCUTANEOUS at 09:05

## 2024-05-02 RX ADMIN — HYDRALAZINE HYDROCHLORIDE 50 MG: 50 TABLET ORAL at 09:05

## 2024-05-02 RX ADMIN — AMLODIPINE BESYLATE 10 MG: 10 TABLET ORAL at 09:05

## 2024-05-02 RX ADMIN — Medication 400 MG: at 06:05

## 2024-05-02 RX ADMIN — HEPARIN SODIUM 1600 UNITS: 1000 INJECTION, SOLUTION INTRAVENOUS; SUBCUTANEOUS at 05:05

## 2024-05-02 RX ADMIN — LEVOFLOXACIN 500 MG: 500 TABLET, FILM COATED ORAL at 09:05

## 2024-05-02 RX ADMIN — DOCUSATE SODIUM AND SENNOSIDES 1 TABLET: 8.6; 5 TABLET, FILM COATED ORAL at 10:05

## 2024-05-02 RX ADMIN — FINASTERIDE 5 MG: 5 TABLET, FILM COATED ORAL at 09:05

## 2024-05-02 RX ADMIN — LEVOTHYROXINE SODIUM 112 MCG: 112 TABLET ORAL at 09:05

## 2024-05-02 RX ADMIN — HYDRALAZINE HYDROCHLORIDE 50 MG: 50 TABLET ORAL at 06:05

## 2024-05-02 RX ADMIN — Medication 400 MG: at 10:05

## 2024-05-02 RX ADMIN — ONDANSETRON 4 MG: 2 INJECTION INTRAMUSCULAR; INTRAVENOUS at 05:05

## 2024-05-02 NOTE — SUBJECTIVE & OBJECTIVE
Subjective:     Interval History: Day +14 from a Carmen 140 auto SCT for MM. T-max of 100.1 over night. VS otherwise stable. May develop engraftment fever in the coming days. ANC 10 today. No BM in 2 days despite scheduled Mirilax. Will give a dose of pericolace today. Can try lactulose if that is ineffective.    Objective:     Vital Signs (Most Recent):  Temp: 99.1 °F (37.3 °C) (05/02/24 0715)  Pulse: 72 (05/02/24 0905)  Resp: 16 (05/02/24 0715)  BP: 122/63 (05/02/24 0905)  SpO2: 98 % (05/02/24 0715) Vital Signs (24h Range):  Temp:  [98.4 °F (36.9 °C)-100.1 °F (37.8 °C)] 99.1 °F (37.3 °C)  Pulse:  [66-90] 72  Resp:  [16-20] 16  SpO2:  [96 %-98 %] 98 %  BP: (109-162)/(55-70) 122/63     Weight: 87.6 kg (193 lb 3.7 oz)  Body mass index is 27.73 kg/m².  Body surface area is 2.08 meters squared.    ECOG SCORE           [unfilled]    Intake/Output - Last 3 Shifts         04/30 0700  05/01 0659 05/01 0700 05/02 0659 05/02 0700 05/03 0659    P.O. 834 680     Blood 185      Total Intake(mL/kg) 1019 (11.6) 680 (7.8)     Urine (mL/kg/hr) 2080 (1) 1300 (0.6)     Stool 0      Total Output 2080 1300     Net -1061 -620            Stool Occurrence 1 x               Physical Exam  Constitutional:       Appearance: He is well-developed.   HENT:      Head: Normocephalic and atraumatic.      Ears:      Comments: [place]     Mouth/Throat:      Pharynx: No oropharyngeal exudate.   Eyes:      General:         Right eye: No discharge.         Left eye: No discharge.      Conjunctiva/sclera: Conjunctivae normal.      Pupils: Pupils are equal, round, and reactive to light.   Cardiovascular:      Rate and Rhythm: Normal rate and regular rhythm.      Heart sounds: Normal heart sounds. No murmur heard.  Pulmonary:      Effort: Pulmonary effort is normal. No respiratory distress.      Breath sounds: Normal breath sounds. No wheezing or rales.   Abdominal:      General: Bowel sounds are normal. There is no distension.      Palpations: Abdomen is  soft.      Tenderness: There is no abdominal tenderness.   Musculoskeletal:         General: No deformity. Normal range of motion.      Cervical back: Normal range of motion and neck supple.   Skin:     General: Skin is warm and dry.      Findings: No erythema or rash.      Comments: Right chest wall vas cath. Dressing c/d/i. No sign of infection to site.   Neurological:      Mental Status: He is alert and oriented to person, place, and time.   Psychiatric:         Behavior: Behavior normal.         Thought Content: Thought content normal.         Judgment: Judgment normal.            Significant Labs:   CBC:   Recent Labs   Lab 05/01/24  0410 05/02/24  0423   WBC 0.03* 0.04*   HGB 7.9* 7.8*   HCT 21.9* 21.2*   PLT 18* 11*    and CMP:   Recent Labs   Lab 05/01/24 0410 05/02/24  0423    136   K 4.1 3.9    104   CO2 26 26   * 143*   BUN 22 23   CREATININE 1.0 1.1   CALCIUM 9.0 9.0   PROT 5.3* 5.3*   ALBUMIN 3.1* 3.0*   BILITOT 1.2* 1.0   ALKPHOS 94 91   AST 7* 8*   ALT 15 15   ANIONGAP 7* 6*       Diagnostic Results:  None

## 2024-05-02 NOTE — PLAN OF CARE
POC reviewed with patient; understanding verbalized. Pt is Day +14 Carmen Auto SCT. Pt had no complaints this shift. Pt. with nonskid footwear on, bed in lowest position, and locked with bed rails up x2. Despite eduction, bed alarm was refused.  Pt. instructed to call prior to getting OOB.  Pt. has call light and personal items within reach. Patient ambulates in room independently. VSS and afebrile this shift. All questions and concerns addressed at this time. Will continue to monitor.

## 2024-05-02 NOTE — ASSESSMENT & PLAN NOTE
- No BM in 2 days despite receiving scheduled Mirilax  - Will give Pericolace x 1 dose today. Can give lactulose if that is ineffective.

## 2024-05-02 NOTE — PROGRESS NOTES
[facility] Stephens - Oncology ([facility])  Hematology  Bone Marrow Transplant  Progress Note    Patient Name: Chip Goodson  Admission Date: 4/16/2024  Hospital Length of Stay: 16 days  Code Status: Full Code    Subjective:     Interval History: Day +14 from a Carmen 140 auto SCT for MM. T-max of 100.1 over night. VS otherwise stable. May develop engraftment fever in the coming days. ANC 10 today. No BM in 2 days despite scheduled Mirilax. Will give a dose of pericolace today. Can try lactulose if that is ineffective.    Objective:     Vital Signs (Most Recent):  Temp: 99.1 °F (37.3 °C) (05/02/24 0715)  Pulse: 72 (05/02/24 0905)  Resp: 16 (05/02/24 0715)  BP: 122/63 (05/02/24 0905)  SpO2: 98 % (05/02/24 0715) Vital Signs (24h Range):  Temp:  [98.4 °F (36.9 °C)-100.1 °F (37.8 °C)] 99.1 °F (37.3 °C)  Pulse:  [66-90] 72  Resp:  [16-20] 16  SpO2:  [96 %-98 %] 98 %  BP: (109-162)/(55-70) 122/63     Weight: 87.6 kg (193 lb 3.7 oz)  Body mass index is 27.73 kg/m².  Body surface area is 2.08 meters squared.    ECOG SCORE           [unfilled]    Intake/Output - Last 3 Shifts         04/30 0700 05/01 0659 05/01 0700 05/02 0659 05/02 0700  05/03 0659    P.O. 834 680     Blood 185      Total Intake(mL/kg) 1019 (11.6) 680 (7.8)     Urine (mL/kg/hr) 2080 (1) 1300 (0.6)     Stool 0      Total Output 2080 1300     Net -1061 -620            Stool Occurrence 1 x               Physical Exam  Constitutional:       Appearance: He is well-developed.   HENT:      Head: Normocephalic and atraumatic.      Ears:      Comments: [place]     Mouth/Throat:      Pharynx: No oropharyngeal exudate.   Eyes:      General:         Right eye: No discharge.         Left eye: No discharge.      Conjunctiva/sclera: Conjunctivae normal.      Pupils: Pupils are equal, round, and reactive to light.   Cardiovascular:      Rate and Rhythm: Normal rate and regular rhythm.      Heart sounds: Normal heart sounds. No murmur heard.  Pulmonary:      Effort: Pulmonary effort is  normal. No respiratory distress.      Breath sounds: Normal breath sounds. No wheezing or rales.   Abdominal:      General: Bowel sounds are normal. There is no distension.      Palpations: Abdomen is soft.      Tenderness: There is no abdominal tenderness.   Musculoskeletal:         General: No deformity. Normal range of motion.      Cervical back: Normal range of motion and neck supple.   Skin:     General: Skin is warm and dry.      Findings: No erythema or rash.      Comments: Right chest wall vas cath. Dressing c/d/i. No sign of infection to site.   Neurological:      Mental Status: He is alert and oriented to person, place, and time.   Psychiatric:         Behavior: Behavior normal.         Thought Content: Thought content normal.         Judgment: Judgment normal.            Significant Labs:   CBC:   Recent Labs   Lab 05/01/24  0410 05/02/24  0423   WBC 0.03* 0.04*   HGB 7.9* 7.8*   HCT 21.9* 21.2*   PLT 18* 11*    and CMP:   Recent Labs   Lab 05/01/24  0410 05/02/24  0423    136   K 4.1 3.9    104   CO2 26 26   * 143*   BUN 22 23   CREATININE 1.0 1.1   CALCIUM 9.0 9.0   PROT 5.3* 5.3*   ALBUMIN 3.1* 3.0*   BILITOT 1.2* 1.0   ALKPHOS 94 91   AST 7* 8*   ALT 15 15   ANIONGAP 7* 6*       Diagnostic Results:  None  Assessment/Plan:     * History of autologous stem cell transplant  - Patient of Dr. Evans  - Admitted 4/16/24 for a Carmen 140 auto SCT  - Today is Day +14   - He received 5 bags on 4/18/24 at 1330 and received the remaining 4 bags on 4/19/24 at 1330. Total CD34 dose was 2.91 x 10^6.  - See treatment plan below.    Planned conditioning regimen:  Melphalan TBD on Day -1     Antimicrobial Prophylaxis:  Acyclovir starting on Day -1  Levofloxacin starting on Day -1  Fluconazole starting on Day -1  Bactrim starting on Day +30     Growth Factor Support:  Neupogen starting on Day +7      Caregiver: daughter and other family members  Post-transplant discharge plans: [facility] [place] [facility]    Multiple  myeloma in remission  - patient of Dr. Evans; follows locally with Dr. Rose in [place]  - diagnosed June 2023 with IgA Kappa MM; stage unclear at diagnosis as FISH not available  - started DRd therapy on 7/18/2023  - he stopped the daratumumab after cycle 4 and continue with revlimid and dex only; looks to have completed ~5 cycles  - Admitted for Carmen 140 Auto SCT on 4/16/2023    Pancytopenia due to chemotherapy  - Daily CBC while inpatient  - Transfuse for Hgb <7or plts < 10K  - Continue antimicrobial ppx  - Stopped VTE ppx 4/24 d/t downtrending platelets    Chemotherapy induced nausea and vomiting  - Continue PRN Zofran and Compazine    HTN (hypertension)  - Continue home amlodipine, carvedilol (dose-reduced for bradycardia), hydralazine, and spironolactone  - Stopped IVF following transplant. BP improved off IVF.    Constipation  - No BM in 2 days despite receiving scheduled Mirilax  - Will give Pericolace x 1 dose today. Can give lactulose if that is ineffective.    Hyperbilirubinemia  - T-bili 1.7 on admit. Patient asymptomatic.  - Trending with daily CMP  - Wnl at 1.0 today    Cancer related pain  - Continue home prn Oxy IR    BPH (benign prostatic hyperplasia)  - Continue home finasteride    Type 2 diabetes mellitus without complications  - Holding home metformin  - Stopped ACHS blood glucose monitoring 4/24 as blood glucose has been stable  - Monitoring with daily CMP    Hypothyroidism, unspecified  - Continue home synthroid    GERD (gastroesophageal reflux disease)  - Previously on BID protonix and daily pepcid for stomach ulcers; now resolved  - Will continue daily protonix per transplant order set    COLTON (acute kidney injury)  - Creatinine 1.4 on admission; baseline ~1.1  - Monitoring CMP daily  - Was receiving continuous fluids as part of chemotherapy regimen (NACL with 10% K)  RESOLVED        VTE Risk Mitigation (From admission, onward)           Ordered     heparin (porcine) injection 1,600 Units   As needed (PRN)         04/16/24 2129     IP VTE HIGH RISK PATIENT  Once         04/16/24 1904     Place sequential compression device  Until discontinued         04/16/24 1904                    Disposition: Inpatient for autologous SCT. Awaiting neutrophil engraftment.    Jessica Cee, NP  Bone Marrow Transplant  [facility] italo - Oncology ([facility])

## 2024-05-02 NOTE — PLAN OF CARE
Side rails up x2; call bell in place; bed in lowest, locked position; skid proof socks on; no evidence of skin breakdown; care plan explained to patient; pt remains free of injury. Pt is day + 14 of an auto SCT. Pt with poor appetite, c/o nausea, zofran given, voids, BM x 1, ambulates in room. Pt with low Platelet count, instructed pt on calling when up OOB, and bed alarm would be applied, pt declined the bed alarm. Frequent rounding in progress, pt encouraged to call as needed, son at bed side. VSS and afebrile.

## 2024-05-02 NOTE — ASSESSMENT & PLAN NOTE
- Patient of Dr. Evans  - Admitted 4/16/24 for a Carmen 140 auto SCT  - Today is Day +14   - He received 5 bags on 4/18/24 at 1330 and received the remaining 4 bags on 4/19/24 at 1330. Total CD34 dose was 2.91 x 10^6.  - See treatment plan below.    Planned conditioning regimen:  Melphalan TBD on Day -1     Antimicrobial Prophylaxis:  Acyclovir starting on Day -1  Levofloxacin starting on Day -1  Fluconazole starting on Day -1  Bactrim starting on Day +30     Growth Factor Support:  Neupogen starting on Day +7      Caregiver: daughter and other family members  Post-transplant discharge plans: Smita Portillo

## 2024-05-02 NOTE — ASSESSMENT & PLAN NOTE
- patient of Dr. Evans; follows locally with Dr. Rose in [place]  - diagnosed June 2023 with IgA Kappa MM; stage unclear at diagnosis as FISH not available  - started DRd therapy on 7/18/2023  - he stopped the daratumumab after cycle 4 and continue with revlimid and dex only; looks to have completed ~5 cycles  - Admitted for Carmen 140 Auto SCT on 4/16/2023

## 2024-05-03 PROBLEM — D70.9 NEUTROPENIC FEVER: Status: ACTIVE | Noted: 2024-05-03

## 2024-05-03 PROBLEM — R50.81 NEUTROPENIC FEVER: Status: ACTIVE | Noted: 2024-05-03

## 2024-05-03 LAB
ALBUMIN SERPL BCP-MCNC: 2.8 G/DL (ref 3.5–5.2)
ALP SERPL-CCNC: 86 U/L (ref 55–135)
ALT SERPL W/O P-5'-P-CCNC: 19 U/L (ref 10–44)
ANION GAP SERPL CALC-SCNC: 5 MMOL/L (ref 8–16)
ANISOCYTOSIS BLD QL SMEAR: SLIGHT
AST SERPL-CCNC: 10 U/L (ref 10–40)
BACTERIA #/AREA URNS AUTO: NORMAL /HPF
BASOPHILS # BLD AUTO: 0 K/UL (ref 0–0.2)
BASOPHILS NFR BLD: 0 % (ref 0–1.9)
BILIRUB SERPL-MCNC: 0.9 MG/DL (ref 0.1–1)
BILIRUB UR QL STRIP: NEGATIVE
BLD PROD TYP BPU: NORMAL
BLOOD UNIT EXPIRATION DATE: NORMAL
BLOOD UNIT TYPE CODE: 5100
BLOOD UNIT TYPE: NORMAL
BUN SERPL-MCNC: 24 MG/DL (ref 8–23)
CALCIUM SERPL-MCNC: 8.8 MG/DL (ref 8.7–10.5)
CHLORIDE SERPL-SCNC: 103 MMOL/L (ref 95–110)
CLARITY UR REFRACT.AUTO: CLEAR
CO2 SERPL-SCNC: 26 MMOL/L (ref 23–29)
CODING SYSTEM: NORMAL
COLOR UR AUTO: YELLOW
CREAT SERPL-MCNC: 1.1 MG/DL (ref 0.5–1.4)
CROSSMATCH INTERPRETATION: NORMAL
DIFFERENTIAL METHOD BLD: ABNORMAL
DISPENSE STATUS: NORMAL
EOSINOPHIL # BLD AUTO: 0 K/UL (ref 0–0.5)
EOSINOPHIL NFR BLD: 0 % (ref 0–8)
ERYTHROCYTE [DISTWIDTH] IN BLOOD BY AUTOMATED COUNT: 10.4 % (ref 11.5–14.5)
EST. GFR  (NO RACE VARIABLE): >60 ML/MIN/1.73 M^2
GLUCOSE SERPL-MCNC: 137 MG/DL (ref 70–110)
GLUCOSE UR QL STRIP: NEGATIVE
HCT VFR BLD AUTO: 19.7 % (ref 40–54)
HGB BLD-MCNC: 7.2 G/DL (ref 14–18)
HGB UR QL STRIP: NEGATIVE
HYALINE CASTS UR QL AUTO: 0 /LPF
IMM GRANULOCYTES # BLD AUTO: 0 K/UL (ref 0–0.04)
IMM GRANULOCYTES NFR BLD AUTO: 0 % (ref 0–0.5)
KETONES UR QL STRIP: NEGATIVE
LEUKOCYTE ESTERASE UR QL STRIP: NEGATIVE
LYMPHOCYTES # BLD AUTO: 0 K/UL (ref 1–4.8)
LYMPHOCYTES NFR BLD: 60 % (ref 18–48)
MAGNESIUM SERPL-MCNC: 2 MG/DL (ref 1.6–2.6)
MCH RBC QN AUTO: 35 PG (ref 27–31)
MCHC RBC AUTO-ENTMCNC: 36.5 G/DL (ref 32–36)
MCV RBC AUTO: 96 FL (ref 82–98)
MICROSCOPIC COMMENT: NORMAL
MONOCYTES # BLD AUTO: 0 K/UL (ref 0.3–1)
MONOCYTES NFR BLD: 0 % (ref 4–15)
NEUTROPHILS # BLD AUTO: 0 K/UL (ref 1.8–7.7)
NEUTROPHILS NFR BLD: 40 % (ref 38–73)
NITRITE UR QL STRIP: NEGATIVE
NRBC BLD-RTO: 0 /100 WBC
OVALOCYTES BLD QL SMEAR: ABNORMAL
PH UR STRIP: 5 [PH] (ref 5–8)
PHOSPHATE SERPL-MCNC: 3.7 MG/DL (ref 2.7–4.5)
PLATELET # BLD AUTO: 6 K/UL (ref 150–450)
PMV BLD AUTO: 11.1 FL (ref 9.2–12.9)
POIKILOCYTOSIS BLD QL SMEAR: SLIGHT
POLYCHROMASIA BLD QL SMEAR: ABNORMAL
POTASSIUM SERPL-SCNC: 3.9 MMOL/L (ref 3.5–5.1)
PROT SERPL-MCNC: 5.2 G/DL (ref 6–8.4)
PROT UR QL STRIP: ABNORMAL
RBC # BLD AUTO: 2.06 M/UL (ref 4.6–6.2)
RBC #/AREA URNS AUTO: 1 /HPF (ref 0–4)
SODIUM SERPL-SCNC: 134 MMOL/L (ref 136–145)
SP GR UR STRIP: 1.01 (ref 1–1.03)
SQUAMOUS #/AREA URNS AUTO: 1 /HPF
UNIT NUMBER: NORMAL
URN SPEC COLLECT METH UR: ABNORMAL
WBC # BLD AUTO: 0.05 K/UL (ref 3.9–12.7)
WBC #/AREA URNS AUTO: 1 /HPF (ref 0–5)

## 2024-05-03 PROCEDURE — 80053 COMPREHEN METABOLIC PANEL: CPT | Performed by: NURSE PRACTITIONER

## 2024-05-03 PROCEDURE — 25000003 PHARM REV CODE 250: Performed by: STUDENT IN AN ORGANIZED HEALTH CARE EDUCATION/TRAINING PROGRAM

## 2024-05-03 PROCEDURE — 87086 URINE CULTURE/COLONY COUNT: CPT | Performed by: STUDENT IN AN ORGANIZED HEALTH CARE EDUCATION/TRAINING PROGRAM

## 2024-05-03 PROCEDURE — 25000003 PHARM REV CODE 250: Performed by: NURSE PRACTITIONER

## 2024-05-03 PROCEDURE — P9037 PLATE PHERES LEUKOREDU IRRAD: HCPCS | Performed by: NURSE PRACTITIONER

## 2024-05-03 PROCEDURE — 63600175 PHARM REV CODE 636 W HCPCS: Mod: JZ,JG | Performed by: INTERNAL MEDICINE

## 2024-05-03 PROCEDURE — 84100 ASSAY OF PHOSPHORUS: CPT | Performed by: NURSE PRACTITIONER

## 2024-05-03 PROCEDURE — 85025 COMPLETE CBC W/AUTO DIFF WBC: CPT | Performed by: NURSE PRACTITIONER

## 2024-05-03 PROCEDURE — 83735 ASSAY OF MAGNESIUM: CPT | Performed by: NURSE PRACTITIONER

## 2024-05-03 PROCEDURE — 20600001 HC STEP DOWN PRIVATE ROOM

## 2024-05-03 PROCEDURE — 25000003 PHARM REV CODE 250: Performed by: INTERNAL MEDICINE

## 2024-05-03 PROCEDURE — 36430 TRANSFUSION BLD/BLD COMPNT: CPT

## 2024-05-03 PROCEDURE — 87154 CUL TYP ID BLD PTHGN 6+ TRGT: CPT | Performed by: STUDENT IN AN ORGANIZED HEALTH CARE EDUCATION/TRAINING PROGRAM

## 2024-05-03 PROCEDURE — 99233 SBSQ HOSP IP/OBS HIGH 50: CPT | Mod: ,,, | Performed by: INTERNAL MEDICINE

## 2024-05-03 PROCEDURE — 87186 SC STD MICRODIL/AGAR DIL: CPT | Performed by: STUDENT IN AN ORGANIZED HEALTH CARE EDUCATION/TRAINING PROGRAM

## 2024-05-03 PROCEDURE — 87088 URINE BACTERIA CULTURE: CPT | Performed by: STUDENT IN AN ORGANIZED HEALTH CARE EDUCATION/TRAINING PROGRAM

## 2024-05-03 PROCEDURE — 81001 URINALYSIS AUTO W/SCOPE: CPT | Performed by: STUDENT IN AN ORGANIZED HEALTH CARE EDUCATION/TRAINING PROGRAM

## 2024-05-03 PROCEDURE — 63600175 PHARM REV CODE 636 W HCPCS: Performed by: STUDENT IN AN ORGANIZED HEALTH CARE EDUCATION/TRAINING PROGRAM

## 2024-05-03 PROCEDURE — 36415 COLL VENOUS BLD VENIPUNCTURE: CPT | Performed by: STUDENT IN AN ORGANIZED HEALTH CARE EDUCATION/TRAINING PROGRAM

## 2024-05-03 PROCEDURE — 87077 CULTURE AEROBIC IDENTIFY: CPT | Mod: 59 | Performed by: STUDENT IN AN ORGANIZED HEALTH CARE EDUCATION/TRAINING PROGRAM

## 2024-05-03 PROCEDURE — 87040 BLOOD CULTURE FOR BACTERIA: CPT | Mod: 59 | Performed by: STUDENT IN AN ORGANIZED HEALTH CARE EDUCATION/TRAINING PROGRAM

## 2024-05-03 PROCEDURE — 94761 N-INVAS EAR/PLS OXIMETRY MLT: CPT

## 2024-05-03 PROCEDURE — 94799 UNLISTED PULMONARY SVC/PX: CPT

## 2024-05-03 RX ORDER — AMOXICILLIN 250 MG
1 CAPSULE ORAL DAILY PRN
Status: DISCONTINUED | OUTPATIENT
Start: 2024-05-03 | End: 2024-05-09

## 2024-05-03 RX ORDER — ACETAMINOPHEN 325 MG/1
650 TABLET ORAL EVERY 6 HOURS PRN
Status: DISCONTINUED | OUTPATIENT
Start: 2024-05-03 | End: 2024-05-07

## 2024-05-03 RX ADMIN — Medication 1 DOSE: at 09:05

## 2024-05-03 RX ADMIN — ACETAMINOPHEN 650 MG: 325 TABLET ORAL at 09:05

## 2024-05-03 RX ADMIN — FILGRASTIM 480 MCG: 480 INJECTION, SOLUTION INTRAVENOUS; SUBCUTANEOUS at 09:05

## 2024-05-03 RX ADMIN — HYDRALAZINE HYDROCHLORIDE 50 MG: 50 TABLET ORAL at 05:05

## 2024-05-03 RX ADMIN — LEVOFLOXACIN 500 MG: 500 TABLET, FILM COATED ORAL at 09:05

## 2024-05-03 RX ADMIN — CEFEPIME 2 G: 2 INJECTION, POWDER, FOR SOLUTION INTRAVENOUS at 10:05

## 2024-05-03 RX ADMIN — LEVOTHYROXINE SODIUM 112 MCG: 112 TABLET ORAL at 09:05

## 2024-05-03 RX ADMIN — HEPARIN SODIUM 1600 UNITS: 1000 INJECTION, SOLUTION INTRAVENOUS; SUBCUTANEOUS at 04:05

## 2024-05-03 RX ADMIN — HEPARIN SODIUM 1600 UNITS: 1000 INJECTION, SOLUTION INTRAVENOUS; SUBCUTANEOUS at 10:05

## 2024-05-03 RX ADMIN — AMLODIPINE BESYLATE 10 MG: 10 TABLET ORAL at 09:05

## 2024-05-03 RX ADMIN — SODIUM CHLORIDE 1000 ML: 9 INJECTION, SOLUTION INTRAVENOUS at 09:05

## 2024-05-03 RX ADMIN — POLYETHYLENE GLYCOL 3350 17 G: 17 POWDER, FOR SOLUTION ORAL at 09:05

## 2024-05-03 RX ADMIN — HYDRALAZINE HYDROCHLORIDE 50 MG: 50 TABLET ORAL at 02:05

## 2024-05-03 RX ADMIN — FLUCONAZOLE 400 MG: 200 TABLET ORAL at 09:05

## 2024-05-03 RX ADMIN — HYDRALAZINE HYDROCHLORIDE 50 MG: 50 TABLET ORAL at 09:05

## 2024-05-03 RX ADMIN — Medication 1 DOSE: at 05:05

## 2024-05-03 RX ADMIN — CARVEDILOL 6.25 MG: 6.25 TABLET, FILM COATED ORAL at 09:05

## 2024-05-03 RX ADMIN — Medication 1 DOSE: at 12:05

## 2024-05-03 RX ADMIN — FINASTERIDE 5 MG: 5 TABLET, FILM COATED ORAL at 09:05

## 2024-05-03 RX ADMIN — SPIRONOLACTONE 12.5 MG: 25 TABLET ORAL at 09:05

## 2024-05-03 RX ADMIN — PANTOPRAZOLE SODIUM 40 MG: 40 TABLET, DELAYED RELEASE ORAL at 09:05

## 2024-05-03 RX ADMIN — ACYCLOVIR 800 MG: 200 CAPSULE ORAL at 09:05

## 2024-05-03 NOTE — NURSING
Patient is AAOX4. Up, independent, non skid shocks on, family at the bedside, bed alarm refused. Call light and personal items within reach. Patient involved in care. Regular diet with fair intake.1 L NS bolus provided. 1 U platelet transfused, premedication refused by the patient. Patient stable at this time.

## 2024-05-03 NOTE — PT/OT/SLP PROGRESS
Occupational Therapy      Patient Name:  Chip Goodson   MRN:  [id number]    Patient not seen today secondary to Nurse/ ALONA hold 2* patient receiving platelets. Will follow-up 5/6 or as medically appropriate.    5/3/2024

## 2024-05-03 NOTE — ASSESSMENT & PLAN NOTE
- Continue home synthroid   Rotation Flap Text: The defect edges were debeveled with a #15 scalpel blade.  Given the location of the defect, shape of the defect and the proximity to free margins a rotation flap was deemed most appropriate.  Using a sterile surgical marker, an appropriate rotation flap was drawn incorporating the defect and placing the expected incisions within the relaxed skin tension lines where possible.    The area thus outlined was incised deep to adipose tissue with a #15 scalpel blade.  The skin margins were undermined to an appropriate distance in all directions utilizing iris scissors.

## 2024-05-03 NOTE — PT/OT/SLP PROGRESS
Physical Therapy      Patient Name:  Chip Goodson   MRN:  [id number]    3:00 pm  Patient not seen today secondary to Other (Comment) (Pt to receive platelets in am due to low level.  PTA unable to attempt tx session in pm). Will follow-up on next scheduled visit.

## 2024-05-03 NOTE — ASSESSMENT & PLAN NOTE
- patient of Dr. Evans; follows locally with Dr. Rose in [place]  - diagnosed June 2023 with IgA Kappa MM; stage unclear at diagnosis as FISH not available  - started DRd therapy on 7/18/2023  - he stopped the daratumumab after cycle 4 and continue with revlimid and dex only; looks to have completed ~5 cycles  - Admitted for Carmen 140 Auto SCT on 4/16/2023

## 2024-05-03 NOTE — PLAN OF CARE
Pt A&Ox4, VSS on RA. Pt denies any nausea this shift, stated it's better tonight. Pt denies any pain. POC discussed with pt and son at the bedside. Pt refuses bed alarm, educated on importance and instructed to call for assistance. Bed is in lowest position with wheels locked. Call bell and all personal items within reach. Frequent rounding performed throughout shift.     Hematology BMT group notified via secure chat of plt 5 this AM and Hct. 19.7 per lab.

## 2024-05-03 NOTE — SUBJECTIVE & OBJECTIVE
Subjective:     Interval History: Day +15 s/p Carmen 140 Auto SCT for MM. ANC 20 today. Did have BM yesterday with miralax and dose of pericolace. Low PO intake with mild increase to BUN and hyponatremia, giving 1L NS over 5hrs. Tmax 100F this morning. Will receive 1unit platelets for count of 6K    Objective:     Vital Signs (Most Recent):  Temp: 98.9 °F (37.2 °C) (05/03/24 0924)  Pulse: 87 (05/03/24 0730)  Resp: 18 (05/03/24 0730)  BP: (!) 114/59 (05/03/24 0730)  SpO2: 98 % (05/03/24 0730) Vital Signs (24h Range):  Temp:  [98.7 °F (37.1 °C)-100 °F (37.8 °C)] 98.9 °F (37.2 °C)  Pulse:  [77-87] 87  Resp:  [18-20] 18  SpO2:  [95 %-98 %] 98 %  BP: (100-134)/(49-70) 114/59     Weight: 87.3 kg (192 lb 7.4 oz)  Body mass index is 27.62 kg/m².  Body surface area is 2.08 meters squared.      Intake/Output - Last 3 Shifts         05/01 0700  05/02 0659 05/02 0700  05/03 0659 05/03 0700  05/04 0659    P.O. 680 660     Blood       Total Intake(mL/kg) 680 (7.8) 660 (7.6)     Urine (mL/kg/hr) 1300 (0.6) 1950 (0.9) 225 (1)    Stool  0     Total Output 1300 1950 225    Net -620 -1290 -225           Urine Occurrence  1 x     Stool Occurrence  1 x              Physical Exam  Vitals and nursing note reviewed.   Constitutional:       Appearance: Normal appearance. He is well-developed.   HENT:      Head: Normocephalic and atraumatic.      Ears:      Comments: [place]     Mouth/Throat:      Mouth: Mucous membranes are moist.      Pharynx: No oropharyngeal exudate or posterior oropharyngeal erythema.   Eyes:      General:         Right eye: No discharge.         Left eye: No discharge.      Extraocular Movements: Extraocular movements intact.      Conjunctiva/sclera: Conjunctivae normal.   Cardiovascular:      Rate and Rhythm: Normal rate and regular rhythm.      Pulses: Normal pulses.      Heart sounds: Normal heart sounds. No murmur heard.  Pulmonary:      Effort: Pulmonary effort is normal. No respiratory distress.      Breath sounds:  Normal breath sounds. No wheezing or rales.   Abdominal:      General: Bowel sounds are normal. There is no distension.      Palpations: Abdomen is soft.      Tenderness: There is no abdominal tenderness.   Musculoskeletal:         General: No deformity. Normal range of motion.      Cervical back: Normal range of motion and neck supple.      Right lower leg: No edema.      Left lower leg: No edema.   Skin:     General: Skin is warm and dry.      Findings: No bruising, erythema or rash.      Comments: Right chest wall vas cath. Dressing c/d/i. No sign of infection to site.   Neurological:      General: No focal deficit present.      Mental Status: He is alert and oriented to person, place, and time.      Motor: No weakness.   Psychiatric:         Mood and Affect: Mood normal.         Behavior: Behavior normal.         Thought Content: Thought content normal.         Judgment: Judgment normal.            Significant Labs:   CBC:   Recent Labs   Lab 05/02/24  0423 05/03/24  0406   WBC 0.04* 0.05*   HGB 7.8* 7.2*   HCT 21.2* 19.7*   PLT 11* 6*    and CMP:   Recent Labs   Lab 05/02/24  0423 05/03/24  0406    134*   K 3.9 3.9    103   CO2 26 26   * 137*   BUN 23 24*   CREATININE 1.1 1.1   CALCIUM 9.0 8.8   PROT 5.3* 5.2*   ALBUMIN 3.0* 2.8*   BILITOT 1.0 0.9   ALKPHOS 91 86   AST 8* 10   ALT 15 19   ANIONGAP 6* 5*       Diagnostic Results:  None

## 2024-05-03 NOTE — PROGRESS NOTES
[facility] - Oncology ([facility])  Hematology  Bone Marrow Transplant  Progress Note    Patient Name: Chip Goodson  Admission Date: 4/16/2024  Hospital Length of Stay: 17 days  Code Status: Full Code    Subjective:     Interval History: Day +15 s/p Carmen 140 Auto SCT for MM. ANC 20 today. Did have BM yesterday with miralax and dose of pericolace. Low PO intake with mild increase to BUN and hyponatremia, giving 1L NS over 5hrs. Tmax 100F this morning. Will receive 1unit platelets for count of 6K    Objective:     Vital Signs (Most Recent):  Temp: 98.9 °F (37.2 °C) (05/03/24 0924)  Pulse: 87 (05/03/24 0730)  Resp: 18 (05/03/24 0730)  BP: (!) 114/59 (05/03/24 0730)  SpO2: 98 % (05/03/24 0730) Vital Signs (24h Range):  Temp:  [98.7 °F (37.1 °C)-100 °F (37.8 °C)] 98.9 °F (37.2 °C)  Pulse:  [77-87] 87  Resp:  [18-20] 18  SpO2:  [95 %-98 %] 98 %  BP: (100-134)/(49-70) 114/59     Weight: 87.3 kg (192 lb 7.4 oz)  Body mass index is 27.62 kg/m².  Body surface area is 2.08 meters squared.      Intake/Output - Last 3 Shifts         05/01 0700  05/02 0659 05/02 0700  05/03 0659 05/03 0700  05/04 0659    P.O. 680 660     Blood       Total Intake(mL/kg) 680 (7.8) 660 (7.6)     Urine (mL/kg/hr) 1300 (0.6) 1950 (0.9) 225 (1)    Stool  0     Total Output 1300 1950 225    Net -620 -1290 -225           Urine Occurrence  1 x     Stool Occurrence  1 x              Physical Exam  Vitals and nursing note reviewed.   Constitutional:       Appearance: Normal appearance. He is well-developed.   HENT:      Head: Normocephalic and atraumatic.      Ears:      Comments: [place]     Mouth/Throat:      Mouth: Mucous membranes are moist.      Pharynx: No oropharyngeal exudate or posterior oropharyngeal erythema.   Eyes:      General:         Right eye: No discharge.         Left eye: No discharge.      Extraocular Movements: Extraocular movements intact.      Conjunctiva/sclera: Conjunctivae normal.   Cardiovascular:      Rate and Rhythm: Normal rate and  regular rhythm.      Pulses: Normal pulses.      Heart sounds: Normal heart sounds. No murmur heard.  Pulmonary:      Effort: Pulmonary effort is normal. No respiratory distress.      Breath sounds: Normal breath sounds. No wheezing or rales.   Abdominal:      General: Bowel sounds are normal. There is no distension.      Palpations: Abdomen is soft.      Tenderness: There is no abdominal tenderness.   Musculoskeletal:         General: No deformity. Normal range of motion.      Cervical back: Normal range of motion and neck supple.      Right lower leg: No edema.      Left lower leg: No edema.   Skin:     General: Skin is warm and dry.      Findings: No bruising, erythema or rash.      Comments: Right chest wall vas cath. Dressing c/d/i. No sign of infection to site.   Neurological:      General: No focal deficit present.      Mental Status: He is alert and oriented to person, place, and time.      Motor: No weakness.   Psychiatric:         Mood and Affect: Mood normal.         Behavior: Behavior normal.         Thought Content: Thought content normal.         Judgment: Judgment normal.            Significant Labs:   CBC:   Recent Labs   Lab 05/02/24  0423 05/03/24  0406   WBC 0.04* 0.05*   HGB 7.8* 7.2*   HCT 21.2* 19.7*   PLT 11* 6*    and CMP:   Recent Labs   Lab 05/02/24  0423 05/03/24  0406    134*   K 3.9 3.9    103   CO2 26 26   * 137*   BUN 23 24*   CREATININE 1.1 1.1   CALCIUM 9.0 8.8   PROT 5.3* 5.2*   ALBUMIN 3.0* 2.8*   BILITOT 1.0 0.9   ALKPHOS 91 86   AST 8* 10   ALT 15 19   ANIONGAP 6* 5*       Diagnostic Results:  None  Assessment/Plan:     * History of autologous stem cell transplant  - Patient of Dr. Evans  - Admitted 4/16/24 for a Carmen 140 auto SCT  - Today is Day +15   - He received 5 bags on 4/18/24 at 1330 and received the remaining 4 bags on 4/19/24 at 1330. Total CD34 dose was 2.91 x 10^6.  - See treatment plan below.    Planned conditioning regimen:  Melphalan TBD on Day  -1     Antimicrobial Prophylaxis:  Acyclovir starting on Day -1  Levofloxacin starting on Day -1  Fluconazole starting on Day -1  Bactrim starting on Day +30     Growth Factor Support:  Neupogen starting on Day +7      Caregiver: daughter and other family members  Post-transplant discharge plans: Smita Portillo    Multiple myeloma in remission  - patient of Dr. Evans; follows locally with Dr. Rose in [place]  - diagnosed June 2023 with IgA Kappa MM; stage unclear at diagnosis as FISH not available  - started DRd therapy on 7/18/2023  - he stopped the daratumumab after cycle 4 and continue with revlimid and dex only; looks to have completed ~5 cycles  - Admitted for Carmen 140 Auto SCT on 4/16/2023    Pancytopenia due to chemotherapy  - Daily CBC while inpatient  - Transfuse for Hgb <7or plts < 10K  - Continue antimicrobial ppx  - Stopped VTE ppx 4/24 d/t downtrending platelets    Chemotherapy induced nausea and vomiting  - Continue PRN Zofran and Compazine    Constipation  - No BM in 2 days despite receiving scheduled Mirilax  - Given Pericolace x 1 dose 5/2 with active BM; will keep PRN    Hyperbilirubinemia  - T-bili 1.7 on admit. Patient asymptomatic.  - Trending with daily CMP  - 0.9 today  RESOLVED    Cancer related pain  - Continue home prn Oxy IR    BPH (benign prostatic hyperplasia)  - Continue home finasteride    HTN (hypertension)  - Continue home amlodipine, carvedilol (dose-reduced for bradycardia), hydralazine, and spironolactone  - Stopped IVF following transplant. BP improved off IVF.    Type 2 diabetes mellitus without complications  - Holding home metformin  - Stopped ACHS blood glucose monitoring 4/24 as blood glucose has been stable  - Monitoring with daily CMP    Hypothyroidism, unspecified  - Continue home synthroid    GERD (gastroesophageal reflux disease)  - Previously on BID protonix and daily pepcid for stomach ulcers; now resolved  - Will continue daily protonix per transplant order  set    COLTON (acute kidney injury)  - Creatinine 1.4 on admission; baseline ~1.1  - Monitoring CMP daily  - Was receiving continuous fluids as part of chemotherapy regimen (NACL with 10% K)  RESOLVED        VTE Risk Mitigation (From admission, onward)           Ordered     heparin (porcine) injection 1,600 Units  As needed (PRN)         04/16/24 2129     IP VTE HIGH RISK PATIENT  Once         04/16/24 1904     Place sequential compression device  Until discontinued         04/16/24 1904                    Disposition: Remains inpatient pending engraftment    Chelsie Man NP  Bone Marrow Transplant  [facility] - Oncology ([facility])

## 2024-05-03 NOTE — ASSESSMENT & PLAN NOTE
- No BM in 2 days despite receiving scheduled Mirilax  - Given Pericolace x 1 dose 5/2 with active BM; will keep PRN

## 2024-05-03 NOTE — ASSESSMENT & PLAN NOTE
- Patient of Dr. Evans  - Admitted 4/16/24 for a Carmen 140 auto SCT  - Today is Day +15   - He received 5 bags on 4/18/24 at 1330 and received the remaining 4 bags on 4/19/24 at 1330. Total CD34 dose was 2.91 x 10^6.  - See treatment plan below.    Planned conditioning regimen:  Melphalan TBD on Day -1     Antimicrobial Prophylaxis:  Acyclovir starting on Day -1  Levofloxacin starting on Day -1  Fluconazole starting on Day -1  Bactrim starting on Day +30     Growth Factor Support:  Neupogen starting on Day +7      Caregiver: daughter and other family members  Post-transplant discharge plans: Smita Portillo

## 2024-05-03 NOTE — NURSING
Platelet count of 6000 today. Patient refused bed alarm, educated about low platelet count, fall risk and importance of bed alarm. Family at the bedside, says he has been helping him and don't want the bed alarm on.

## 2024-05-04 LAB
ACINETOBACTER CALCOACETICUS/BAUMANNII COMPLEX: NOT DETECTED
ALBUMIN SERPL BCP-MCNC: 2.8 G/DL (ref 3.5–5.2)
ALP SERPL-CCNC: 86 U/L (ref 55–135)
ALT SERPL W/O P-5'-P-CCNC: 22 U/L (ref 10–44)
ANION GAP SERPL CALC-SCNC: 6 MMOL/L (ref 8–16)
ANISOCYTOSIS BLD QL SMEAR: SLIGHT
AST SERPL-CCNC: 16 U/L (ref 10–40)
BACTEROIDES FRAGILIS: NOT DETECTED
BASOPHILS # BLD AUTO: 0 K/UL (ref 0–0.2)
BASOPHILS NFR BLD: 0 % (ref 0–1.9)
BILIRUB SERPL-MCNC: 1 MG/DL (ref 0.1–1)
BUN SERPL-MCNC: 19 MG/DL (ref 8–23)
CALCIUM SERPL-MCNC: 8.9 MG/DL (ref 8.7–10.5)
CANDIDA ALBICANS: NOT DETECTED
CANDIDA AURIS: NOT DETECTED
CANDIDA GLABRATA: NOT DETECTED
CANDIDA KRUSEI: NOT DETECTED
CANDIDA PARAPSILOSIS: NOT DETECTED
CANDIDA TROPICALIS: NOT DETECTED
CHLORIDE SERPL-SCNC: 104 MMOL/L (ref 95–110)
CO2 SERPL-SCNC: 26 MMOL/L (ref 23–29)
CREAT SERPL-MCNC: 1 MG/DL (ref 0.5–1.4)
CRYPTOCOCCUS NEOFORMANS/GATTII: NOT DETECTED
CTX-M GENE (ESBL PRODUCER): NORMAL
DIFFERENTIAL METHOD BLD: ABNORMAL
ENTEROBACTER CLOACAE COMPLEX: NOT DETECTED
ENTEROBACTERALES: NOT DETECTED
ENTEROCOCCUS FAECALIS: NOT DETECTED
ENTEROCOCCUS FAECIUM: NOT DETECTED
EOSINOPHIL # BLD AUTO: 0 K/UL (ref 0–0.5)
EOSINOPHIL NFR BLD: 0 % (ref 0–8)
ERYTHROCYTE [DISTWIDTH] IN BLOOD BY AUTOMATED COUNT: 10.2 % (ref 11.5–14.5)
ESCHERICHIA COLI: NOT DETECTED
EST. GFR  (NO RACE VARIABLE): >60 ML/MIN/1.73 M^2
GLUCOSE SERPL-MCNC: 134 MG/DL (ref 70–110)
HAEMOPHILUS INFLUENZAE: NOT DETECTED
HCT VFR BLD AUTO: 19.7 % (ref 40–54)
HGB BLD-MCNC: 7.2 G/DL (ref 14–18)
HYPOCHROMIA BLD QL SMEAR: ABNORMAL
IMM GRANULOCYTES # BLD AUTO: 0 K/UL (ref 0–0.04)
IMM GRANULOCYTES NFR BLD AUTO: 0 % (ref 0–0.5)
IMP GENE (CARBAPENEM RESISTANT): NORMAL
KLEBSIELLA AEROGENES: NOT DETECTED
KLEBSIELLA OXYTOCA: NOT DETECTED
KLEBSIELLA PNEUMONIAE GROUP: NOT DETECTED
KPC RESISTANCE GENE (CARBAPENEM): NORMAL
LISTERIA MONOCYTOGENES: NOT DETECTED
LYMPHOCYTES # BLD AUTO: 0 K/UL (ref 1–4.8)
LYMPHOCYTES NFR BLD: 66.7 % (ref 18–48)
MAGNESIUM SERPL-MCNC: 2 MG/DL (ref 1.6–2.6)
MCH RBC QN AUTO: 35 PG (ref 27–31)
MCHC RBC AUTO-ENTMCNC: 36.5 G/DL (ref 32–36)
MCR-1: NORMAL
MCV RBC AUTO: 96 FL (ref 82–98)
MEC A/C AND MREJ (MRSA): NORMAL
MEC A/C: NORMAL
MONOCYTES # BLD AUTO: 0 K/UL (ref 0.3–1)
MONOCYTES NFR BLD: 16.7 % (ref 4–15)
NDM GENE (CARBAPENEM RESISTANT): NORMAL
NEISSERIA MENINGITIDIS: NOT DETECTED
NEUTROPHILS # BLD AUTO: 0 K/UL (ref 1.8–7.7)
NEUTROPHILS NFR BLD: 16.6 % (ref 38–73)
NRBC BLD-RTO: 0 /100 WBC
OXA-48-LIKE (CARBAPENEM RESISTANT): NORMAL
PHOSPHATE SERPL-MCNC: 3.6 MG/DL (ref 2.7–4.5)
PLATELET # BLD AUTO: 15 K/UL (ref 150–450)
PLATELET BLD QL SMEAR: ABNORMAL
PMV BLD AUTO: 9.9 FL (ref 9.2–12.9)
POTASSIUM SERPL-SCNC: 3.6 MMOL/L (ref 3.5–5.1)
PROT SERPL-MCNC: 5.3 G/DL (ref 6–8.4)
PROTEUS SPECIES: NOT DETECTED
PSEUDOMONAS AERUGINOSA: NOT DETECTED
RBC # BLD AUTO: 2.06 M/UL (ref 4.6–6.2)
SALMONELLA SP: NOT DETECTED
SERRATIA MARCESCENS: NOT DETECTED
SODIUM SERPL-SCNC: 136 MMOL/L (ref 136–145)
STAPHYLOCOCCUS AUREUS: NOT DETECTED
STAPHYLOCOCCUS EPIDERMIDIS: NOT DETECTED
STAPHYLOCOCCUS LUGDUNESIS: NOT DETECTED
STAPHYLOCOCCUS SPECIES: NOT DETECTED
STENOTROPHOMONAS MALTOPHILIA: NOT DETECTED
STREPTOCOCCUS AGALACTIAE: NOT DETECTED
STREPTOCOCCUS PNEUMONIAE: NOT DETECTED
STREPTOCOCCUS PYOGENES: NOT DETECTED
STREPTOCOCCUS SPECIES: NOT DETECTED
VAN A/B (VRE GENE): NORMAL
VIM GENE (CARBAPENEM RESISTANT): NORMAL
WBC # BLD AUTO: 0.06 K/UL (ref 3.9–12.7)

## 2024-05-04 PROCEDURE — 99233 SBSQ HOSP IP/OBS HIGH 50: CPT | Mod: ,,, | Performed by: INTERNAL MEDICINE

## 2024-05-04 PROCEDURE — 87040 BLOOD CULTURE FOR BACTERIA: CPT | Performed by: STUDENT IN AN ORGANIZED HEALTH CARE EDUCATION/TRAINING PROGRAM

## 2024-05-04 PROCEDURE — 25000003 PHARM REV CODE 250: Performed by: INTERNAL MEDICINE

## 2024-05-04 PROCEDURE — 63600175 PHARM REV CODE 636 W HCPCS: Performed by: INTERNAL MEDICINE

## 2024-05-04 PROCEDURE — 25000003 PHARM REV CODE 250: Performed by: NURSE PRACTITIONER

## 2024-05-04 PROCEDURE — 83735 ASSAY OF MAGNESIUM: CPT | Performed by: NURSE PRACTITIONER

## 2024-05-04 PROCEDURE — 80053 COMPREHEN METABOLIC PANEL: CPT | Performed by: NURSE PRACTITIONER

## 2024-05-04 PROCEDURE — 25000003 PHARM REV CODE 250: Performed by: STUDENT IN AN ORGANIZED HEALTH CARE EDUCATION/TRAINING PROGRAM

## 2024-05-04 PROCEDURE — 63600175 PHARM REV CODE 636 W HCPCS: Mod: JZ,JG | Performed by: INTERNAL MEDICINE

## 2024-05-04 PROCEDURE — 36415 COLL VENOUS BLD VENIPUNCTURE: CPT | Performed by: STUDENT IN AN ORGANIZED HEALTH CARE EDUCATION/TRAINING PROGRAM

## 2024-05-04 PROCEDURE — 84100 ASSAY OF PHOSPHORUS: CPT | Performed by: NURSE PRACTITIONER

## 2024-05-04 PROCEDURE — 63600175 PHARM REV CODE 636 W HCPCS: Performed by: STUDENT IN AN ORGANIZED HEALTH CARE EDUCATION/TRAINING PROGRAM

## 2024-05-04 PROCEDURE — 20600001 HC STEP DOWN PRIVATE ROOM

## 2024-05-04 PROCEDURE — 85025 COMPLETE CBC W/AUTO DIFF WBC: CPT | Performed by: NURSE PRACTITIONER

## 2024-05-04 RX ADMIN — CARVEDILOL 6.25 MG: 6.25 TABLET, FILM COATED ORAL at 09:05

## 2024-05-04 RX ADMIN — Medication 1 DOSE: at 09:05

## 2024-05-04 RX ADMIN — Medication 1 DOSE: at 12:05

## 2024-05-04 RX ADMIN — HYDRALAZINE HYDROCHLORIDE 50 MG: 50 TABLET ORAL at 05:05

## 2024-05-04 RX ADMIN — AMLODIPINE BESYLATE 10 MG: 10 TABLET ORAL at 09:05

## 2024-05-04 RX ADMIN — ACYCLOVIR 800 MG: 200 CAPSULE ORAL at 09:05

## 2024-05-04 RX ADMIN — LEVOTHYROXINE SODIUM 112 MCG: 112 TABLET ORAL at 09:05

## 2024-05-04 RX ADMIN — HYDRALAZINE HYDROCHLORIDE 50 MG: 50 TABLET ORAL at 01:05

## 2024-05-04 RX ADMIN — SPIRONOLACTONE 12.5 MG: 25 TABLET ORAL at 09:05

## 2024-05-04 RX ADMIN — CEFEPIME 2 G: 2 INJECTION, POWDER, FOR SOLUTION INTRAVENOUS at 05:05

## 2024-05-04 RX ADMIN — FILGRASTIM 480 MCG: 480 INJECTION, SOLUTION INTRAVENOUS; SUBCUTANEOUS at 09:05

## 2024-05-04 RX ADMIN — HEPARIN SODIUM 1600 UNITS: 1000 INJECTION, SOLUTION INTRAVENOUS; SUBCUTANEOUS at 04:05

## 2024-05-04 RX ADMIN — PANTOPRAZOLE SODIUM 40 MG: 40 TABLET, DELAYED RELEASE ORAL at 09:05

## 2024-05-04 RX ADMIN — CEFEPIME 2 G: 2 INJECTION, POWDER, FOR SOLUTION INTRAVENOUS at 09:05

## 2024-05-04 RX ADMIN — FINASTERIDE 5 MG: 5 TABLET, FILM COATED ORAL at 09:05

## 2024-05-04 RX ADMIN — FLUCONAZOLE 400 MG: 200 TABLET ORAL at 09:05

## 2024-05-04 RX ADMIN — CEFEPIME 2 G: 2 INJECTION, POWDER, FOR SOLUTION INTRAVENOUS at 01:05

## 2024-05-04 RX ADMIN — VANCOMYCIN HYDROCHLORIDE 2000 MG: 500 INJECTION, POWDER, LYOPHILIZED, FOR SOLUTION INTRAVENOUS at 04:05

## 2024-05-04 RX ADMIN — HYDRALAZINE HYDROCHLORIDE 50 MG: 50 TABLET ORAL at 09:05

## 2024-05-04 RX ADMIN — Medication 1 DOSE: at 04:05

## 2024-05-04 RX ADMIN — POTASSIUM CHLORIDE 20 MEQ: 1500 TABLET, EXTENDED RELEASE ORAL at 05:05

## 2024-05-04 NOTE — NURSING
Patient is AAOX4. Up, independent, non skid shocks on, family at the bedside, bed alarm refused. Call light and personal items within reach. Patient involved in care. Regular diet with fair intake. Blood culture positive for gram positive cocci resembling Staph, notified the team, vancomycin started, repeat blood culture sent. Patient stable at this time.

## 2024-05-04 NOTE — SUBJECTIVE & OBJECTIVE
Subjective:     Interval History: Day +16 carlotta 140 ASCT for multiple myeloma. Developed neutropenic fever overnight with Tmax 101.3. Cultures obtained and now on cefepime. He is feeling fatigued and nauseated. No vomiting. He is eating solid food in addition to liquids. Diarrhea controlled. No mouth ulcers or sores.     Objective:     Vital Signs (Most Recent):  Temp: 98.5 °F (36.9 °C) (05/04/24 1225)  Pulse: 76 (05/04/24 1225)  Resp: 20 (05/04/24 1225)  BP: 128/62 (05/04/24 1225)  SpO2: 98 % (05/04/24 1225) Vital Signs (24h Range):  Temp:  [98.5 °F (36.9 °C)-101.3 °F (38.5 °C)] 98.5 °F (36.9 °C)  Pulse:  [76-93] 76  Resp:  [18-20] 20  SpO2:  [92 %-98 %] 98 %  BP: (108-157)/(58-68) 128/62     Weight: 87.7 kg (193 lb 5.5 oz)  Body mass index is 27.74 kg/m².  Body surface area is 2.08 meters squared.    ECOG SCORE           [unfilled]    Intake/Output - Last 3 Shifts         05/02 0700  05/03 0659 05/03 0700 05/04 0659 05/04 0700  05/05 0659    P.O. 660 1078 286    Blood  281     IV Piggyback  660.7     Total Intake(mL/kg) 660 (7.6) 2019.7 (23) 286 (3.3)    Urine (mL/kg/hr) 1950 (0.9) 1450 (0.7) 500 (0.9)    Stool 0 0 0    Total Output 1950 1450 500    Net -1290 +569.7 -214           Urine Occurrence 1 x 1 x     Stool Occurrence 1 x 2 x 0 x             Physical Exam  Vitals and nursing note reviewed.   Constitutional:       Appearance: Normal appearance. He is well-developed.   HENT:      Head: Normocephalic and atraumatic.      Mouth/Throat:      Mouth: Mucous membranes are moist.      Pharynx: No oropharyngeal exudate or posterior oropharyngeal erythema.   Eyes:      General:         Right eye: No discharge.         Left eye: No discharge.      Extraocular Movements: Extraocular movements intact.      Conjunctiva/sclera: Conjunctivae normal.   Cardiovascular:      Rate and Rhythm: Normal rate and regular rhythm.      Pulses: Normal pulses.      Heart sounds: Normal heart sounds. No murmur heard.  Pulmonary:       Effort: Pulmonary effort is normal. No respiratory distress.      Breath sounds: Normal breath sounds. No wheezing or rales.   Abdominal:      General: Bowel sounds are normal. There is no distension.      Palpations: Abdomen is soft.      Tenderness: There is no abdominal tenderness.   Musculoskeletal:         General: No deformity. Normal range of motion.      Cervical back: Normal range of motion and neck supple.      Right lower leg: No edema.      Left lower leg: No edema.   Skin:     General: Skin is warm and dry.      Findings: No bruising, erythema or rash.      Comments: Right chest wall vas cath. Dressing c/d/i. No sign of infection to site.   Neurological:      General: No focal deficit present.      Mental Status: He is alert and oriented to person, place, and time.      Motor: No weakness.   Psychiatric:         Mood and Affect: Mood normal.         Behavior: Behavior normal.         Thought Content: Thought content normal.         Judgment: Judgment normal.            Significant Labs:   All pertinent labs from the last 24 hours have been reviewed.    Diagnostic Results:  I have reviewed and interpreted all pertinent imaging results/findings within the past 24 hours.

## 2024-05-04 NOTE — NURSING
[place] of [address] today. Patient refused bed alarm, educated about low platelet count, fall risk and importance of bed alarm. Family at the bedside.

## 2024-05-04 NOTE — PROGRESS NOTES
[facility] - Oncology ([facility])  Hematology  Bone Marrow Transplant  Progress Note    Patient Name: Chip Goodson  Admission Date: 4/16/2024  Hospital Length of Stay: 18 days  Code Status: Full Code    Subjective:     Interval History: Day +16 carlotta 140 ASCT for multiple myeloma. Developed neutropenic fever overnight with Tmax 101.3. Cultures obtained and now on cefepime. He is feeling fatigued and nauseated. No vomiting. He is eating solid food in addition to liquids. Diarrhea controlled. No mouth ulcers or sores.     Objective:     Vital Signs (Most Recent):  Temp: 98.5 °F (36.9 °C) (05/04/24 1225)  Pulse: 76 (05/04/24 1225)  Resp: 20 (05/04/24 1225)  BP: 128/62 (05/04/24 1225)  SpO2: 98 % (05/04/24 1225) Vital Signs (24h Range):  Temp:  [98.5 °F (36.9 °C)-101.3 °F (38.5 °C)] 98.5 °F (36.9 °C)  Pulse:  [76-93] 76  Resp:  [18-20] 20  SpO2:  [92 %-98 %] 98 %  BP: (108-157)/(58-68) 128/62     Weight: 87.7 kg (193 lb 5.5 oz)  Body mass index is 27.74 kg/m².  Body surface area is 2.08 meters squared.    ECOG SCORE           [unfilled]    Intake/Output - Last 3 Shifts         05/02 0700 05/03 0659 05/03 0700 05/04 0659 05/04 0700  05/05 0659    P.O. 660 1078 286    Blood  281     IV Piggyback  660.7     Total Intake(mL/kg) 660 (7.6) 2019.7 (23) 286 (3.3)    Urine (mL/kg/hr) 1950 (0.9) 1450 (0.7) 500 (0.9)    Stool 0 0 0    Total Output 1950 1450 500    Net -1290 +569.7 -214           Urine Occurrence 1 x 1 x     Stool Occurrence 1 x 2 x 0 x             Physical Exam  Vitals and nursing note reviewed.   Constitutional:       Appearance: Normal appearance. He is well-developed.   HENT:      Head: Normocephalic and atraumatic.      Mouth/Throat:      Mouth: Mucous membranes are moist.      Pharynx: No oropharyngeal exudate or posterior oropharyngeal erythema.   Eyes:      General:         Right eye: No discharge.         Left eye: No discharge.      Extraocular Movements: Extraocular movements intact.       Conjunctiva/sclera: Conjunctivae normal.   Cardiovascular:      Rate and Rhythm: Normal rate and regular rhythm.      Pulses: Normal pulses.      Heart sounds: Normal heart sounds. No murmur heard.  Pulmonary:      Effort: Pulmonary effort is normal. No respiratory distress.      Breath sounds: Normal breath sounds. No wheezing or rales.   Abdominal:      General: Bowel sounds are normal. There is no distension.      Palpations: Abdomen is soft.      Tenderness: There is no abdominal tenderness.   Musculoskeletal:         General: No deformity. Normal range of motion.      Cervical back: Normal range of motion and neck supple.      Right lower leg: No edema.      Left lower leg: No edema.   Skin:     General: Skin is warm and dry.      Findings: No bruising, erythema or rash.      Comments: Right chest wall vas cath. Dressing c/d/i. No sign of infection to site.   Neurological:      General: No focal deficit present.      Mental Status: He is alert and oriented to person, place, and time.      Motor: No weakness.   Psychiatric:         Mood and Affect: Mood normal.         Behavior: Behavior normal.         Thought Content: Thought content normal.         Judgment: Judgment normal.            Significant Labs:   All pertinent labs from the last 24 hours have been reviewed.    Diagnostic Results:  I have reviewed and interpreted all pertinent imaging results/findings within the past 24 hours.  Assessment/Plan:     * History of autologous stem cell transplant  - Patient of Dr. Evans  - Admitted 4/16/24 for a Carmen 140 auto SCT  - Today is Day +16   - He received 5 bags on 4/18/24 at 1330 and received the remaining 4 bags on 4/19/24 at 1330. Total CD34 dose was 2.91 x 10^6.  - See treatment plan below.    Planned conditioning regimen:  Melphalan TBD on Day -1     Antimicrobial Prophylaxis:  Acyclovir starting on Day -1  Levofloxacin starting on Day -1  Fluconazole starting on Day -1  Bactrim starting on Day +30      Growth Factor Support:  Neupogen starting on Day +7      Caregiver: daughter and other family members  Post-transplant discharge plans: Smita Portillo    Neutropenic fever  Developed on 5/3/2024. Cultures obtained and pending. On cefepime.     Constipation  - Improved with BM on 5/3    Chemotherapy induced nausea and vomiting  - Continue PRN Zofran and Compazine    Hyperbilirubinemia  - T-bili 1.7 on admit. Patient asymptomatic.  - Trending with daily CMP  - 0.9 today  RESOLVED    Pancytopenia due to chemotherapy  - Daily CBC while inpatient  - Transfuse for Hgb <7or plts < 10K  - Continue antimicrobial ppx  - Stopped VTE ppx 4/24 d/t downtrending platelets    Cancer related pain  - Continue home prn Oxy IR    BPH (benign prostatic hyperplasia)  - Continue home finasteride    HTN (hypertension)  - Continue home amlodipine, carvedilol (dose-reduced for bradycardia), hydralazine, and spironolactone  - Stopped IVF following transplant. BP improved off IVF.    Type 2 diabetes mellitus without complications  - Holding home metformin  - Stopped ACHS blood glucose monitoring 4/24 as blood glucose has been stable  - Monitoring with daily CMP    Hypothyroidism, unspecified  - Continue home synthroid    GERD (gastroesophageal reflux disease)  - Previously on BID protonix and daily pepcid for stomach ulcers; now resolved  - Will continue daily protonix per transplant order set    Multiple myeloma in remission  - patient of Dr. Evans; follows locally with Dr. Rose in [place]  - diagnosed June 2023 with IgA Kappa MM; stage unclear at diagnosis as FISH not available  - started DRd therapy on 7/18/2023  - he stopped the daratumumab after cycle 4 and continue with revlimid and dex only; looks to have completed ~5 cycles  - Admitted for Carmen 140 Auto SCT on 4/16/2023    COLTON (acute kidney injury)  - Creatinine 1.4 on admission; baseline ~1.1  - Monitoring CMP daily  - Was receiving continuous fluids as part of chemotherapy regimen  (NACL with 10% K)  RESOLVED        VTE Risk Mitigation (From admission, onward)           Ordered     heparin (porcine) injection 1,600 Units  As needed (PRN)         04/16/24 2129     IP VTE HIGH RISK PATIENT  Once         04/16/24 1904     Place sequential compression device  Until discontinued         04/16/24 1904                    Disposition: pending resolution of neutropenic fever and neutrophil engraftment.     Alo Watson MD  Bone Marrow Transplant  [facility] - Oncology ([facility])

## 2024-05-04 NOTE — ASSESSMENT & PLAN NOTE
- patient of Dr. Evans; follows locally with Dr. Rose in [place]  - diagnosed June 2023 with IgA Kappa MM; stage unclear at diagnosis as FISH not available  - started DRd therapy on 7/18/2023  - he stopped the daratumumab after cycle 4 and continue with revlimid and dex only; looks to have completed ~5 cycles  - Admitted for Carmen 140 Auto SCT on 4/16/2023

## 2024-05-04 NOTE — PROGRESS NOTES
Pharmacokinetic Initial Assessment: IV Vancomycin    Assessment/Plan:    Initiate intravenous vancomycin with loading dose of 2000 mg once followed by a maintenance dose of vancomycin 1000mg IV every 12 hours  Desired empiric serum trough concentration is 10 to 20 mcg/mL  Draw vancomycin trough level 60 min prior to fourth dose on 3/6 at approximately 0330  Pharmacy will continue to follow and monitor vancomycin.      Please contact pharmacy at [facility] with any questions regarding this assessment.     Thank you for the consult,   Albertina Stearns       Patient brief summary:  Chip Goodson is a 73 y.o. male initiated on antimicrobial therapy with IV Vancomycin for treatment of suspected skin & soft tissue infection    Drug Allergies:   Review of patient's allergies indicates:   Allergen Reactions    Iodine Anaphylaxis    Shellfish containing products     Poison ivy extract     Amoxicillin-pot clavulanate Itching       Actual Body Weight:   87.7 kg    Renal Function:   Estimated Creatinine Clearance: 72.3 mL/min (based on SCr of 1 mg/dL).,     Dialysis Method (if applicable):  N/A    CBC (last 72 hours):  Recent Labs   Lab Result Units 05/02/24 0423 05/03/24 0406 05/04/24  0400   WBC K/uL 0.04* 0.05* 0.06*   Hemoglobin g/dL 7.8* 7.2* 7.2*   Hematocrit % 21.2* 19.7* 19.7*   Platelets K/uL 11* 6* 15*   Gran % % 25.0* 40.0 16.6*   Lymph % % 75.0* 60.0* 66.7*   Mono % % 0.0* 0.0* 16.7*   Eosinophil % % 0.0 0.0 0.0   Basophil % % 0.0 0.0 0.0   Differential Method  Automated Automated Automated       Metabolic Panel (last 72 hours):  Recent Labs   Lab Result Units 05/02/24  0423 05/03/24  0406 05/03/24  2229 05/04/24  0400   Sodium mmol/L 136 134*  --  136   Potassium mmol/L 3.9 3.9  --  3.6   Chloride mmol/L 104 103  --  104   CO2 mmol/L 26 26  --  26   Glucose mg/dL 143* 137*  --  134*   Glucose, UA   --   --  Negative  --    BUN mg/dL 23 24*  --  19   Creatinine mg/dL 1.1 1.1  --  1.0   Albumin g/dL 3.0*  "2.8*  --  2.8*   Total Bilirubin mg/dL 1.0 0.9  --  1.0   Alkaline Phosphatase U/L 91 86  --  86   AST U/L 8* 10  --  16   ALT U/L 15 19  --  22   Magnesium mg/dL 1.9 2.0  --  2.0   Phosphorus mg/dL 3.3 3.7  --  3.6       Drug levels (last 3 results):  No results for input(s): "VANCOMYCINRA", "VANCORANDOM", "VANCOMYCINPE", "VANCOPEAK", "VANCOMYCINTR", "VANCOTROUGH" in the last 72 hours.    Microbiologic Results:  Microbiology Results (last 7 days)       Procedure Component Value Units Date/Time    Blood culture [[id number]]     Order Status: Sent Specimen: Blood     Blood culture [[id number]]     Order Status: Sent Specimen: Blood     Blood culture [[id number]] Collected: 05/03/24 2129    Order Status: Completed Specimen: Blood Updated: 05/04/24 1454     Blood Culture, Routine Gram stain miguelito bottle: Gram positive cocci in clusters resembling Staph      Results called to and read back by:Kaci Vargas RN 05/04/2024  14:52    Blood culture [[id number]] Collected: 05/03/24 2126    Order Status: Completed Specimen: Blood Updated: 05/04/24 0515     Blood Culture, Routine No Growth to date    Urine Culture High Risk [[id number]] Collected: 05/03/24 2229    Order Status: Sent Specimen: Urine Updated: 05/03/24 2252            "

## 2024-05-04 NOTE — ASSESSMENT & PLAN NOTE
- Patient of Dr. Evans  - Admitted 4/16/24 for a Carmen 140 auto SCT  - Today is Day +16   - He received 5 bags on 4/18/24 at 1330 and received the remaining 4 bags on 4/19/24 at 1330. Total CD34 dose was 2.91 x 10^6.  - See treatment plan below.    Planned conditioning regimen:  Melphalan TBD on Day -1     Antimicrobial Prophylaxis:  Acyclovir starting on Day -1  Levofloxacin starting on Day -1  Fluconazole starting on Day -1  Bactrim starting on Day +30     Growth Factor Support:  Neupogen starting on Day +7      Caregiver: daughter and other family members  Post-transplant discharge plans: Smita Portillo

## 2024-05-04 NOTE — PLAN OF CARE
Pt A&Ox4. Tmax 101.3F. MD notified. Chest x-ray, UA, and blood cultures were all obtained. Tylenol given, temp down to 99.8F after. Cefepime started. Pt complained of small headache with fever which resolved after tylenol. POC discussed with pt. Pt refuses bed alarm, educated on importance and instructed to call for assistance. Bed is in lowest position with wheels locked. Call bell and all personal items within reach. Frequent rounding performed throughout shift.

## 2024-05-05 PROBLEM — R78.81 BACTEREMIA DUE TO STAPHYLOCOCCUS: Status: ACTIVE | Noted: 2024-05-05

## 2024-05-05 PROBLEM — B95.8 BACTEREMIA DUE TO STAPHYLOCOCCUS: Status: ACTIVE | Noted: 2024-05-05

## 2024-05-05 LAB
ABO + RH BLD: NORMAL
ALBUMIN SERPL BCP-MCNC: 2.6 G/DL (ref 3.5–5.2)
ALP SERPL-CCNC: 77 U/L (ref 55–135)
ALT SERPL W/O P-5'-P-CCNC: 33 U/L (ref 10–44)
ANION GAP SERPL CALC-SCNC: 5 MMOL/L (ref 8–16)
ANISOCYTOSIS BLD QL SMEAR: SLIGHT
AST SERPL-CCNC: 25 U/L (ref 10–40)
BASOPHILS # BLD AUTO: 0 K/UL (ref 0–0.2)
BASOPHILS NFR BLD: 0 % (ref 0–1.9)
BILIRUB SERPL-MCNC: 0.9 MG/DL (ref 0.1–1)
BLD GP AB SCN CELLS X3 SERPL QL: NORMAL
BLD PROD TYP BPU: NORMAL
BLD PROD TYP BPU: NORMAL
BLOOD UNIT EXPIRATION DATE: NORMAL
BLOOD UNIT EXPIRATION DATE: NORMAL
BLOOD UNIT TYPE CODE: 5100
BLOOD UNIT TYPE CODE: 5100
BLOOD UNIT TYPE: NORMAL
BLOOD UNIT TYPE: NORMAL
BUN SERPL-MCNC: 20 MG/DL (ref 8–23)
CALCIUM SERPL-MCNC: 8.8 MG/DL (ref 8.7–10.5)
CHLORIDE SERPL-SCNC: 105 MMOL/L (ref 95–110)
CO2 SERPL-SCNC: 25 MMOL/L (ref 23–29)
CODING SYSTEM: NORMAL
CODING SYSTEM: NORMAL
CREAT SERPL-MCNC: 1 MG/DL (ref 0.5–1.4)
CROSSMATCH INTERPRETATION: NORMAL
CROSSMATCH INTERPRETATION: NORMAL
DACRYOCYTES BLD QL SMEAR: ABNORMAL
DIFFERENTIAL METHOD BLD: ABNORMAL
DISPENSE STATUS: NORMAL
DISPENSE STATUS: NORMAL
EOSINOPHIL # BLD AUTO: 0 K/UL (ref 0–0.5)
EOSINOPHIL NFR BLD: 0 % (ref 0–8)
ERYTHROCYTE [DISTWIDTH] IN BLOOD BY AUTOMATED COUNT: 10.1 % (ref 11.5–14.5)
EST. GFR  (NO RACE VARIABLE): >60 ML/MIN/1.73 M^2
GLUCOSE SERPL-MCNC: 130 MG/DL (ref 70–110)
HCT VFR BLD AUTO: 17.4 % (ref 40–54)
HGB BLD-MCNC: 6.4 G/DL (ref 14–18)
HYPOCHROMIA BLD QL SMEAR: ABNORMAL
IMM GRANULOCYTES # BLD AUTO: 0 K/UL (ref 0–0.04)
IMM GRANULOCYTES NFR BLD AUTO: 0 % (ref 0–0.5)
LYMPHOCYTES # BLD AUTO: 0 K/UL (ref 1–4.8)
LYMPHOCYTES NFR BLD: 66.7 % (ref 18–48)
MAGNESIUM SERPL-MCNC: 2.1 MG/DL (ref 1.6–2.6)
MCH RBC QN AUTO: 35.6 PG (ref 27–31)
MCHC RBC AUTO-ENTMCNC: 36.8 G/DL (ref 32–36)
MCV RBC AUTO: 97 FL (ref 82–98)
MONOCYTES # BLD AUTO: 0 K/UL (ref 0.3–1)
MONOCYTES NFR BLD: 16.7 % (ref 4–15)
NEUTROPHILS # BLD AUTO: 0 K/UL (ref 1.8–7.7)
NEUTROPHILS NFR BLD: 16.6 % (ref 38–73)
NRBC BLD-RTO: 0 /100 WBC
NUM UNITS TRANS PACKED RBC: NORMAL
OVALOCYTES BLD QL SMEAR: ABNORMAL
PHOSPHATE SERPL-MCNC: 3.4 MG/DL (ref 2.7–4.5)
PLATELET # BLD AUTO: 9 K/UL (ref 150–450)
PLATELET BLD QL SMEAR: ABNORMAL
PMV BLD AUTO: 10 FL (ref 9.2–12.9)
POIKILOCYTOSIS BLD QL SMEAR: SLIGHT
POLYCHROMASIA BLD QL SMEAR: ABNORMAL
POTASSIUM SERPL-SCNC: 3.8 MMOL/L (ref 3.5–5.1)
PROT SERPL-MCNC: 5 G/DL (ref 6–8.4)
RBC # BLD AUTO: 1.8 M/UL (ref 4.6–6.2)
SODIUM SERPL-SCNC: 135 MMOL/L (ref 136–145)
SPECIMEN OUTDATE: NORMAL
SPHEROCYTES BLD QL SMEAR: ABNORMAL
UNIT NUMBER: NORMAL
WBC # BLD AUTO: 0.06 K/UL (ref 3.9–12.7)

## 2024-05-05 PROCEDURE — P9037 PLATE PHERES LEUKOREDU IRRAD: HCPCS | Performed by: STUDENT IN AN ORGANIZED HEALTH CARE EDUCATION/TRAINING PROGRAM

## 2024-05-05 PROCEDURE — 63600175 PHARM REV CODE 636 W HCPCS: Performed by: INTERNAL MEDICINE

## 2024-05-05 PROCEDURE — 84100 ASSAY OF PHOSPHORUS: CPT | Performed by: NURSE PRACTITIONER

## 2024-05-05 PROCEDURE — 80053 COMPREHEN METABOLIC PANEL: CPT | Performed by: NURSE PRACTITIONER

## 2024-05-05 PROCEDURE — P9040 RBC LEUKOREDUCED IRRADIATED: HCPCS | Performed by: INTERNAL MEDICINE

## 2024-05-05 PROCEDURE — 63600175 PHARM REV CODE 636 W HCPCS: Performed by: STUDENT IN AN ORGANIZED HEALTH CARE EDUCATION/TRAINING PROGRAM

## 2024-05-05 PROCEDURE — 83735 ASSAY OF MAGNESIUM: CPT | Performed by: NURSE PRACTITIONER

## 2024-05-05 PROCEDURE — 99900035 HC TECH TIME PER 15 MIN (STAT)

## 2024-05-05 PROCEDURE — 25000003 PHARM REV CODE 250: Performed by: STUDENT IN AN ORGANIZED HEALTH CARE EDUCATION/TRAINING PROGRAM

## 2024-05-05 PROCEDURE — 20600001 HC STEP DOWN PRIVATE ROOM

## 2024-05-05 PROCEDURE — 63600175 PHARM REV CODE 636 W HCPCS: Mod: JZ,JG | Performed by: INTERNAL MEDICINE

## 2024-05-05 PROCEDURE — 25000003 PHARM REV CODE 250: Performed by: NURSE PRACTITIONER

## 2024-05-05 PROCEDURE — 86920 COMPATIBILITY TEST SPIN: CPT | Performed by: INTERNAL MEDICINE

## 2024-05-05 PROCEDURE — 99233 SBSQ HOSP IP/OBS HIGH 50: CPT | Mod: ,,, | Performed by: INTERNAL MEDICINE

## 2024-05-05 PROCEDURE — 94761 N-INVAS EAR/PLS OXIMETRY MLT: CPT

## 2024-05-05 PROCEDURE — 25000003 PHARM REV CODE 250: Performed by: INTERNAL MEDICINE

## 2024-05-05 PROCEDURE — 30233N1 TRANSFUSION OF NONAUTOLOGOUS RED BLOOD CELLS INTO PERIPHERAL VEIN, PERCUTANEOUS APPROACH: ICD-10-PCS | Performed by: INTERNAL MEDICINE

## 2024-05-05 PROCEDURE — 85025 COMPLETE CBC W/AUTO DIFF WBC: CPT | Performed by: NURSE PRACTITIONER

## 2024-05-05 PROCEDURE — 86850 RBC ANTIBODY SCREEN: CPT | Performed by: NURSE PRACTITIONER

## 2024-05-05 RX ORDER — HYDROCODONE BITARTRATE AND ACETAMINOPHEN 500; 5 MG/1; MG/1
TABLET ORAL
Status: DISCONTINUED | OUTPATIENT
Start: 2024-05-05 | End: 2024-05-07

## 2024-05-05 RX ADMIN — HEPARIN SODIUM 1600 UNITS: 1000 INJECTION, SOLUTION INTRAVENOUS; SUBCUTANEOUS at 09:05

## 2024-05-05 RX ADMIN — HYDRALAZINE HYDROCHLORIDE 50 MG: 50 TABLET ORAL at 09:05

## 2024-05-05 RX ADMIN — VANCOMYCIN HYDROCHLORIDE 1000 MG: 1 INJECTION, POWDER, LYOPHILIZED, FOR SOLUTION INTRAVENOUS at 04:05

## 2024-05-05 RX ADMIN — HYDRALAZINE HYDROCHLORIDE 50 MG: 50 TABLET ORAL at 02:05

## 2024-05-05 RX ADMIN — CARVEDILOL 6.25 MG: 6.25 TABLET, FILM COATED ORAL at 09:05

## 2024-05-05 RX ADMIN — CEFEPIME 2 G: 2 INJECTION, POWDER, FOR SOLUTION INTRAVENOUS at 02:05

## 2024-05-05 RX ADMIN — ACYCLOVIR 800 MG: 200 CAPSULE ORAL at 09:05

## 2024-05-05 RX ADMIN — CEFEPIME 2 G: 2 INJECTION, POWDER, FOR SOLUTION INTRAVENOUS at 06:05

## 2024-05-05 RX ADMIN — FINASTERIDE 5 MG: 5 TABLET, FILM COATED ORAL at 09:05

## 2024-05-05 RX ADMIN — Medication 1 DOSE: at 12:05

## 2024-05-05 RX ADMIN — HYDRALAZINE HYDROCHLORIDE 50 MG: 50 TABLET ORAL at 05:05

## 2024-05-05 RX ADMIN — Medication 1 DOSE: at 09:05

## 2024-05-05 RX ADMIN — Medication 1 DOSE: at 06:05

## 2024-05-05 RX ADMIN — FILGRASTIM 480 MCG: 480 INJECTION, SOLUTION INTRAVENOUS; SUBCUTANEOUS at 09:05

## 2024-05-05 RX ADMIN — POTASSIUM CHLORIDE 20 MEQ: 1500 TABLET, EXTENDED RELEASE ORAL at 06:05

## 2024-05-05 RX ADMIN — FLUCONAZOLE 400 MG: 200 TABLET ORAL at 09:05

## 2024-05-05 RX ADMIN — HEPARIN SODIUM 1600 UNITS: 1000 INJECTION, SOLUTION INTRAVENOUS; SUBCUTANEOUS at 06:05

## 2024-05-05 RX ADMIN — CEFEPIME 2 G: 2 INJECTION, POWDER, FOR SOLUTION INTRAVENOUS at 09:05

## 2024-05-05 RX ADMIN — LEVOTHYROXINE SODIUM 112 MCG: 112 TABLET ORAL at 09:05

## 2024-05-05 RX ADMIN — PANTOPRAZOLE SODIUM 40 MG: 40 TABLET, DELAYED RELEASE ORAL at 09:05

## 2024-05-05 RX ADMIN — SPIRONOLACTONE 12.5 MG: 25 TABLET ORAL at 09:05

## 2024-05-05 RX ADMIN — AMLODIPINE BESYLATE 10 MG: 10 TABLET ORAL at 09:05

## 2024-05-05 NOTE — NURSING
Platelet count of 9000 today. Patient refused bed alarm, educated about low platelet count, fall risk and importance of bed alarm. Family at the bedside, says he has been helping him and don't want the bed alarm on.

## 2024-05-05 NOTE — PROGRESS NOTES
[facility] - Oncology ([facility])  Hematology  Bone Marrow Transplant  Progress Note    Patient Name: Chip Goodson  Admission Date: 4/16/2024  Hospital Length of Stay: 19 days  Code Status: Full Code    Subjective:     Interval History: Day +17 carlotta 140 ASCT for multiple myeloma. Blood cultures yesterday returned positive for GPCs in clusters resembling staph. Vancomycin started. Surveillance cultures drawn. Rapid PCR ID is negative. Mr. Goodson reports an adequate bowel movement. No nausea today and seen eating breakfast.     Objective:     Vital Signs (Most Recent):  Temp: 98.2 °F (36.8 °C) (05/05/24 1049)  Pulse: 73 (05/05/24 1049)  Resp: 18 (05/05/24 1049)  BP: 119/64 (05/05/24 1049)  SpO2: 97 % (05/05/24 1049) Vital Signs (24h Range):  Temp:  [96.8 °F (36 °C)-100 °F (37.8 °C)] 98.2 °F (36.8 °C)  Pulse:  [67-88] 73  Resp:  [17-18] 18  SpO2:  [93 %-98 %] 97 %  BP: (114-137)/(61-68) 119/64     Weight: 87.8 kg (193 lb 7.3 oz)  Body mass index is 27.76 kg/m².  Body surface area is 2.08 meters squared.    ECOG SCORE           [unfilled]    Intake/Output - Last 3 Shifts         05/03 0700  05/04 0659 05/04 0700  05/05 0659 05/05 0700  05/06 0659    P.O. 1078 646     Blood 281  633    IV Piggyback 660.7 883.4     Total Intake(mL/kg) 2019.7 (23) 1529.4 (17.4) 633 (7.2)    Urine (mL/kg/hr) 1450 (0.7) 2200 (1) 250 (0.5)    Stool 0 0     Total Output 1450 2200 250    Net +569.7 -670.6 +383           Urine Occurrence 1 x      Stool Occurrence 2 x 1 x              Physical Exam  Vitals and nursing note reviewed.   Constitutional:       Appearance: Normal appearance. He is well-developed.   HENT:      Head: Normocephalic and atraumatic.      Mouth/Throat:      Mouth: Mucous membranes are moist.      Pharynx: No oropharyngeal exudate or posterior oropharyngeal erythema.   Eyes:      General:         Right eye: No discharge.         Left eye: No discharge.      Extraocular Movements: Extraocular movements intact.       Conjunctiva/sclera: Conjunctivae normal.   Cardiovascular:      Rate and Rhythm: Normal rate and regular rhythm.      Pulses: Normal pulses.      Heart sounds: Normal heart sounds. No murmur heard.  Pulmonary:      Effort: Pulmonary effort is normal. No respiratory distress.      Breath sounds: Normal breath sounds. No wheezing or rales.   Abdominal:      General: Bowel sounds are normal. There is no distension.      Palpations: Abdomen is soft.      Tenderness: There is no abdominal tenderness.   Musculoskeletal:         General: No deformity. Normal range of motion.      Cervical back: Normal range of motion and neck supple.      Right lower leg: No edema.      Left lower leg: No edema.   Skin:     General: Skin is warm and dry.      Findings: No bruising, erythema or rash.      Comments: Right chest wall vas cath. Dressing c/d/i. No sign of infection to site.   Neurological:      General: No focal deficit present.      Mental Status: He is alert and oriented to person, place, and time.      Motor: No weakness.   Psychiatric:         Mood and Affect: Mood normal.         Behavior: Behavior normal.         Thought Content: Thought content normal.         Judgment: Judgment normal.            Significant Labs:   All pertinent labs from the last 24 hours have been reviewed.    Diagnostic Results:  I have reviewed and interpreted all pertinent imaging results/findings within the past 24 hours.  Assessment/Plan:     * History of autologous stem cell transplant  - Patient of Dr. Evans  - Admitted 4/16/24 for a Carmen 140 auto SCT  - Today is Day +17 ; ANC is 10  - He received 5 bags on 4/18/24 at 1330 and received the remaining 4 bags on 4/19/24 at 1330. Total CD34 dose was 2.91 x 10^6.  - See treatment plan below.    Planned conditioning regimen:  Melphalan TBD on Day -1     Antimicrobial Prophylaxis:  Acyclovir starting on Day -1  Levofloxacin starting on Day -1  Fluconazole starting on Day -1  Bactrim starting on Day  +30     Growth Factor Support:  Neupogen starting on Day +7      Caregiver: daughter and other family members  Post-transplant discharge plans: [place]    Bacteremia due to Staphylococcus  See neutropenic fever    Neutropenic fever  Developed on 5/3/2024. Cultures obtained and showing GPCs in clusters resembling staph.   Surveillance cultures obtained on 5/4.  Continue cefepime and vancomycin.  Will need ID consult given anticipated need for outpatient IV antibiotics.     Constipation  - Improved with BM on 5/5    Chemotherapy induced nausea and vomiting  - Continue PRN Zofran and Compazine    Hyperbilirubinemia  - T-bili 1.7 on admit. Patient asymptomatic.  - Trending with daily CMP  - 0.9 today  RESOLVED    Pancytopenia due to chemotherapy  - Daily CBC while inpatient  - Transfuse for Hgb <7or plts < 10K  - Continue antimicrobial ppx  - Stopped VTE ppx 4/24 d/t downtrending platelets    Cancer related pain  - Continue home prn Oxy IR    BPH (benign prostatic hyperplasia)  - Continue home finasteride    HTN (hypertension)  - Continue home amlodipine, carvedilol (dose-reduced for bradycardia), hydralazine, and spironolactone  - Stopped IVF following transplant. BP improved off IVF.    Type 2 diabetes mellitus without complications  - Holding home metformin  - Stopped ACHS blood glucose monitoring 4/24 as blood glucose has been stable  - Monitoring with daily CMP    Hypothyroidism, unspecified  - Continue home synthroid    GERD (gastroesophageal reflux disease)  - Previously on BID protonix and daily pepcid for stomach ulcers; now resolved  - Will continue daily protonix per transplant order set    Multiple myeloma in remission  - patient of Dr. Evans; follows locally with Dr. Rose in [place]  - diagnosed June 2023 with IgA Kappa MM; stage unclear at diagnosis as FISH not available  - started DRd therapy on 7/18/2023  - he stopped the daratumumab after cycle 4 and continue with revlimid and dex only; looks to  have completed ~5 cycles  - Admitted for Carmen 140 Auto SCT on 4/16/2023    COLTON (acute kidney injury)  - Creatinine 1.4 on admission; baseline ~1.1  - Monitoring CMP daily  - Was receiving continuous fluids as part of chemotherapy regimen (NACL with 10% K)  RESOLVED        VTE Risk Mitigation (From admission, onward)           Ordered     heparin (porcine) injection 1,600 Units  As needed (PRN)         04/16/24 2129     IP VTE HIGH RISK PATIENT  Once         04/16/24 1904     Place sequential compression device  Until discontinued         04/16/24 1904                    Disposition: pending resolution of staphylococcal bacteremia and neutrophil engraftment.     Alo Watson MD  Bone Marrow Transplant  [facility] - Oncology ([facility])

## 2024-05-05 NOTE — ASSESSMENT & PLAN NOTE
- patient of Dr. Evans; follows locally with Dr. Rose in [place]  - diagnosed June 2023 with IgA Kappa MM; stage unclear at diagnosis as FISH not available  - started DRd therapy on 7/18/2023  - he stopped the daratumumab after cycle 4 and continue with revlimid and dex only; looks to have completed ~5 cycles  - Admitted for Carmen 140 Auto SCT on 4/16/2023

## 2024-05-05 NOTE — ASSESSMENT & PLAN NOTE
- Patient of Dr. Evans  - Admitted 4/16/24 for a Carmen 140 auto SCT  - Today is Day +17 ; ANC is 10  - He received 5 bags on 4/18/24 at 1330 and received the remaining 4 bags on 4/19/24 at 1330. Total CD34 dose was 2.91 x 10^6.  - See treatment plan below.    Planned conditioning regimen:  Melphalan TBD on Day -1     Antimicrobial Prophylaxis:  Acyclovir starting on Day -1  Levofloxacin starting on Day -1  Fluconazole starting on Day -1  Bactrim starting on Day +30     Growth Factor Support:  Neupogen starting on Day +7      Caregiver: daughter and other family members  Post-transplant discharge plans: Smita Portillo

## 2024-05-05 NOTE — ASSESSMENT & PLAN NOTE
Developed on 5/3/2024. Cultures obtained and showing GPCs in clusters resembling staph.   Surveillance cultures obtained on 5/4.  Continue cefepime and vancomycin.  Will need ID consult given anticipated need for outpatient IV antibiotics.

## 2024-05-05 NOTE — NURSING
Patient is AAOX4. Up, independent, non skid shocks on, family at the bedside, bed alarm refused. Call light and personal items within reach. Patient involved in care. Regular diet with fair intake. 1 U platelet and 1 U PRBC transfused, premedication refused by the patient. PO electrolyte replaced. IV antibiotic provided. Patient stable at this time.

## 2024-05-05 NOTE — SUBJECTIVE & OBJECTIVE
Subjective:     Interval History: Day +17 carlotta 140 ASCT for multiple myeloma. Blood cultures yesterday returned positive for GPCs in clusters resembling staph. Vancomycin started. Surveillance cultures drawn. Rapid PCR ID is negative. Mr. Goodson reports an adequate bowel movement. No nausea today and seen eating breakfast.     Objective:     Vital Signs (Most Recent):  Temp: 98.2 °F (36.8 °C) (05/05/24 1049)  Pulse: 73 (05/05/24 1049)  Resp: 18 (05/05/24 1049)  BP: 119/64 (05/05/24 1049)  SpO2: 97 % (05/05/24 1049) Vital Signs (24h Range):  Temp:  [96.8 °F (36 °C)-100 °F (37.8 °C)] 98.2 °F (36.8 °C)  Pulse:  [67-88] 73  Resp:  [17-18] 18  SpO2:  [93 %-98 %] 97 %  BP: (114-137)/(61-68) 119/64     Weight: 87.8 kg (193 lb 7.3 oz)  Body mass index is 27.76 kg/m².  Body surface area is 2.08 meters squared.    ECOG SCORE           [unfilled]    Intake/Output - Last 3 Shifts         05/03 0700  05/04 0659 05/04 0700  05/05 0659 05/05 0700  05/06 0659    P.O. 1078 646     Blood 281  633    IV Piggyback 660.7 883.4     Total Intake(mL/kg) 2019.7 (23) 1529.4 (17.4) 633 (7.2)    Urine (mL/kg/hr) 1450 (0.7) 2200 (1) 250 (0.5)    Stool 0 0     Total Output 1450 2200 250    Net +569.7 -670.6 +383           Urine Occurrence 1 x      Stool Occurrence 2 x 1 x              Physical Exam  Vitals and nursing note reviewed.   Constitutional:       Appearance: Normal appearance. He is well-developed.   HENT:      Head: Normocephalic and atraumatic.      Mouth/Throat:      Mouth: Mucous membranes are moist.      Pharynx: No oropharyngeal exudate or posterior oropharyngeal erythema.   Eyes:      General:         Right eye: No discharge.         Left eye: No discharge.      Extraocular Movements: Extraocular movements intact.      Conjunctiva/sclera: Conjunctivae normal.   Cardiovascular:      Rate and Rhythm: Normal rate and regular rhythm.      Pulses: Normal pulses.      Heart sounds: Normal heart sounds. No murmur heard.  Pulmonary:       Effort: Pulmonary effort is normal. No respiratory distress.      Breath sounds: Normal breath sounds. No wheezing or rales.   Abdominal:      General: Bowel sounds are normal. There is no distension.      Palpations: Abdomen is soft.      Tenderness: There is no abdominal tenderness.   Musculoskeletal:         General: No deformity. Normal range of motion.      Cervical back: Normal range of motion and neck supple.      Right lower leg: No edema.      Left lower leg: No edema.   Skin:     General: Skin is warm and dry.      Findings: No bruising, erythema or rash.      Comments: Right chest wall vas cath. Dressing c/d/i. No sign of infection to site.   Neurological:      General: No focal deficit present.      Mental Status: He is alert and oriented to person, place, and time.      Motor: No weakness.   Psychiatric:         Mood and Affect: Mood normal.         Behavior: Behavior normal.         Thought Content: Thought content normal.         Judgment: Judgment normal.            Significant Labs:   All pertinent labs from the last 24 hours have been reviewed.    Diagnostic Results:  I have reviewed and interpreted all pertinent imaging results/findings within the past 24 hours.

## 2024-05-05 NOTE — PLAN OF CARE
1927 rec'd resting in bed with eyes closed. Awakens easy, denies any needs. Oriented x4. Hard of hearing. In no distress. Family at bedside. Bed low and wheels locked. Declining bed alarm. Call bell near, enc use.    End of shift- rested quietly without any complaints. VSS, on room air. Safety measures intact.

## 2024-05-06 PROBLEM — R78.81 BACTEREMIA: Status: ACTIVE | Noted: 2024-05-06

## 2024-05-06 LAB
ALBUMIN SERPL BCP-MCNC: 2.7 G/DL (ref 3.5–5.2)
ALP SERPL-CCNC: 80 U/L (ref 55–135)
ALT SERPL W/O P-5'-P-CCNC: 48 U/L (ref 10–44)
ANION GAP SERPL CALC-SCNC: 7 MMOL/L (ref 8–16)
ASCENDING AORTA: 3.7 CM
AST SERPL-CCNC: 28 U/L (ref 10–40)
AV INDEX (PROSTH): 0.6
AV MEAN GRADIENT: 6 MMHG
AV PEAK GRADIENT: 11 MMHG
AV VALVE AREA BY VELOCITY RATIO: 3.24 CM²
AV VALVE AREA: 3.01 CM²
AV VELOCITY RATIO: 0.64
BACTERIA BLD CULT: ABNORMAL
BACTERIA UR CULT: ABNORMAL
BASOPHILS # BLD AUTO: 0 K/UL (ref 0–0.2)
BASOPHILS NFR BLD: 0 % (ref 0–1.9)
BILIRUB SERPL-MCNC: 1 MG/DL (ref 0.1–1)
BSA FOR ECHO PROCEDURE: 2.07 M2
BUN SERPL-MCNC: 16 MG/DL (ref 8–23)
CALCIUM SERPL-MCNC: 8.9 MG/DL (ref 8.7–10.5)
CHLORIDE SERPL-SCNC: 106 MMOL/L (ref 95–110)
CO2 SERPL-SCNC: 23 MMOL/L (ref 23–29)
CREAT SERPL-MCNC: 0.9 MG/DL (ref 0.5–1.4)
CV ECHO LV RWT: 0.32 CM
DIFFERENTIAL METHOD BLD: ABNORMAL
DOP CALC AO PEAK VEL: 1.63 M/S
DOP CALC AO VTI: 28.46 CM
DOP CALC LVOT AREA: 5 CM2
DOP CALC LVOT DIAMETER: 2.53 CM
DOP CALC LVOT PEAK VEL: 1.05 M/S
DOP CALC LVOT STROKE VOLUME: 85.72 CM3
DOP CALCLVOT PEAK VEL VTI: 17.06 CM
E WAVE DECELERATION TIME: 244.83 MSEC
E/A RATIO: 0.62
E/E' RATIO: 7.89 M/S
ECHO LV POSTERIOR WALL: 0.82 CM (ref 0.6–1.1)
EOSINOPHIL # BLD AUTO: 0 K/UL (ref 0–0.5)
EOSINOPHIL NFR BLD: 0 % (ref 0–8)
ERYTHROCYTE [DISTWIDTH] IN BLOOD BY AUTOMATED COUNT: 11.4 % (ref 11.5–14.5)
EST. GFR  (NO RACE VARIABLE): >60 ML/MIN/1.73 M^2
FRACTIONAL SHORTENING: 25 % (ref 28–44)
GLOBAL LONGITUIDAL STRAIN: 19 %
GLUCOSE SERPL-MCNC: 126 MG/DL (ref 70–110)
HCT VFR BLD AUTO: 19.1 % (ref 40–54)
HGB BLD-MCNC: 7 G/DL (ref 14–18)
IMM GRANULOCYTES # BLD AUTO: 0 K/UL (ref 0–0.04)
IMM GRANULOCYTES NFR BLD AUTO: 0 % (ref 0–0.5)
INTERVENTRICULAR SEPTUM: 0.8 CM (ref 0.6–1.1)
LA MAJOR: 5.69 CM
LA MINOR: 5.5 CM
LA WIDTH: 4.78 CM
LEFT ATRIUM SIZE: 3.32 CM
LEFT ATRIUM VOLUME INDEX MOD: 33.5 ML/M2
LEFT ATRIUM VOLUME INDEX: 36.8 ML/M2
LEFT ATRIUM VOLUME MOD: 68.62 CM3
LEFT ATRIUM VOLUME: 75.45 CM3
LEFT INTERNAL DIMENSION IN SYSTOLE: 3.9 CM (ref 2.1–4)
LEFT VENTRICLE DIASTOLIC VOLUME INDEX: 62.42 ML/M2
LEFT VENTRICLE DIASTOLIC VOLUME: 127.96 ML
LEFT VENTRICLE MASS INDEX: 71 G/M2
LEFT VENTRICLE SYSTOLIC VOLUME INDEX: 32.1 ML/M2
LEFT VENTRICLE SYSTOLIC VOLUME: 65.83 ML
LEFT VENTRICULAR INTERNAL DIMENSION IN DIASTOLE: 5.17 CM (ref 3.5–6)
LEFT VENTRICULAR MASS: 146.08 G
LV LATERAL E/E' RATIO: 7.1 M/S
LV SEPTAL E/E' RATIO: 8.88 M/S
LYMPHOCYTES # BLD AUTO: 0.1 K/UL (ref 1–4.8)
LYMPHOCYTES NFR BLD: 62.5 % (ref 18–48)
MAGNESIUM SERPL-MCNC: 2.1 MG/DL (ref 1.6–2.6)
MCH RBC QN AUTO: 33.3 PG (ref 27–31)
MCHC RBC AUTO-ENTMCNC: 36.6 G/DL (ref 32–36)
MCV RBC AUTO: 91 FL (ref 82–98)
MONOCYTES # BLD AUTO: 0 K/UL (ref 0.3–1)
MONOCYTES NFR BLD: 12.5 % (ref 4–15)
MV PEAK A VEL: 1.15 M/S
MV PEAK E VEL: 0.71 M/S
MV STENOSIS PRESSURE HALF TIME: 71 MS
MV VALVE AREA P 1/2 METHOD: 3.1 CM2
NEUTROPHILS # BLD AUTO: 0 K/UL (ref 1.8–7.7)
NEUTROPHILS NFR BLD: 25 % (ref 38–73)
NRBC BLD-RTO: 0 /100 WBC
OHS CV RV/LV RATIO: 0.71 CM
PHOSPHATE SERPL-MCNC: 3 MG/DL (ref 2.7–4.5)
PISA TR MAX VEL: 2.56 M/S
PLATELET # BLD AUTO: 21 K/UL (ref 150–450)
PMV BLD AUTO: 10.3 FL (ref 9.2–12.9)
POTASSIUM SERPL-SCNC: 3.7 MMOL/L (ref 3.5–5.1)
PROT SERPL-MCNC: 5.1 G/DL (ref 6–8.4)
RA MAJOR: 6.09 CM
RA PRESSURE ESTIMATED: 3 MMHG
RA WIDTH: 3.76 CM
RBC # BLD AUTO: 2.1 M/UL (ref 4.6–6.2)
RIGHT ATRIAL AREA: 18 CM2
RIGHT VENTRICULAR END-DIASTOLIC DIMENSION: 3.66 CM
RV TB RVSP: 6 MMHG
SINUS: 3.9 CM
SODIUM SERPL-SCNC: 136 MMOL/L (ref 136–145)
STJ: 3.51 CM
TDI LATERAL: 0.1 M/S
TDI SEPTAL: 0.08 M/S
TDI: 0.09 M/S
TR MAX PG: 26 MMHG
TRICUSPID ANNULAR PLANE SYSTOLIC EXCURSION: 2.14 CM
TV REST PULMONARY ARTERY PRESSURE: 29 MMHG
VANCOMYCIN TROUGH SERPL-MCNC: 12.7 UG/ML (ref 10–22)
WBC # BLD AUTO: 0.08 K/UL (ref 3.9–12.7)
Z-SCORE OF LEFT VENTRICULAR DIMENSION IN END DIASTOLE: -1.74
Z-SCORE OF LEFT VENTRICULAR DIMENSION IN END SYSTOLE: 0.28

## 2024-05-06 PROCEDURE — 25000003 PHARM REV CODE 250: Performed by: INTERNAL MEDICINE

## 2024-05-06 PROCEDURE — 63600175 PHARM REV CODE 636 W HCPCS: Performed by: INTERNAL MEDICINE

## 2024-05-06 PROCEDURE — 25000003 PHARM REV CODE 250: Performed by: NURSE PRACTITIONER

## 2024-05-06 PROCEDURE — 80202 ASSAY OF VANCOMYCIN: CPT | Performed by: INTERNAL MEDICINE

## 2024-05-06 PROCEDURE — 84100 ASSAY OF PHOSPHORUS: CPT | Performed by: NURSE PRACTITIONER

## 2024-05-06 PROCEDURE — 20600001 HC STEP DOWN PRIVATE ROOM

## 2024-05-06 PROCEDURE — 83735 ASSAY OF MAGNESIUM: CPT | Performed by: NURSE PRACTITIONER

## 2024-05-06 PROCEDURE — 80053 COMPREHEN METABOLIC PANEL: CPT | Performed by: NURSE PRACTITIONER

## 2024-05-06 PROCEDURE — 99233 SBSQ HOSP IP/OBS HIGH 50: CPT | Mod: ,,, | Performed by: INTERNAL MEDICINE

## 2024-05-06 PROCEDURE — 25000003 PHARM REV CODE 250: Performed by: STUDENT IN AN ORGANIZED HEALTH CARE EDUCATION/TRAINING PROGRAM

## 2024-05-06 PROCEDURE — 63600175 PHARM REV CODE 636 W HCPCS: Mod: JZ,JG | Performed by: INTERNAL MEDICINE

## 2024-05-06 PROCEDURE — 99233 SBSQ HOSP IP/OBS HIGH 50: CPT | Mod: GC,,, | Performed by: INTERNAL MEDICINE

## 2024-05-06 PROCEDURE — 85025 COMPLETE CBC W/AUTO DIFF WBC: CPT | Performed by: NURSE PRACTITIONER

## 2024-05-06 PROCEDURE — 63600175 PHARM REV CODE 636 W HCPCS: Performed by: STUDENT IN AN ORGANIZED HEALTH CARE EDUCATION/TRAINING PROGRAM

## 2024-05-06 RX ADMIN — HYDRALAZINE HYDROCHLORIDE 50 MG: 50 TABLET ORAL at 09:05

## 2024-05-06 RX ADMIN — LEVOTHYROXINE SODIUM 112 MCG: 112 TABLET ORAL at 08:05

## 2024-05-06 RX ADMIN — FLUCONAZOLE 400 MG: 200 TABLET ORAL at 09:05

## 2024-05-06 RX ADMIN — CARVEDILOL 6.25 MG: 6.25 TABLET, FILM COATED ORAL at 09:05

## 2024-05-06 RX ADMIN — ACYCLOVIR 800 MG: 200 CAPSULE ORAL at 08:05

## 2024-05-06 RX ADMIN — CEFEPIME 2 G: 2 INJECTION, POWDER, FOR SOLUTION INTRAVENOUS at 01:05

## 2024-05-06 RX ADMIN — CARVEDILOL 6.25 MG: 6.25 TABLET, FILM COATED ORAL at 08:05

## 2024-05-06 RX ADMIN — VANCOMYCIN HYDROCHLORIDE 1000 MG: 1 INJECTION, POWDER, LYOPHILIZED, FOR SOLUTION INTRAVENOUS at 03:05

## 2024-05-06 RX ADMIN — ACYCLOVIR 800 MG: 200 CAPSULE ORAL at 09:05

## 2024-05-06 RX ADMIN — PANTOPRAZOLE SODIUM 40 MG: 40 TABLET, DELAYED RELEASE ORAL at 09:05

## 2024-05-06 RX ADMIN — POTASSIUM CHLORIDE 20 MEQ: 1500 TABLET, EXTENDED RELEASE ORAL at 09:05

## 2024-05-06 RX ADMIN — FINASTERIDE 5 MG: 5 TABLET, FILM COATED ORAL at 09:05

## 2024-05-06 RX ADMIN — HYDRALAZINE HYDROCHLORIDE 50 MG: 50 TABLET ORAL at 01:05

## 2024-05-06 RX ADMIN — CEFEPIME 2 G: 2 INJECTION, POWDER, FOR SOLUTION INTRAVENOUS at 09:05

## 2024-05-06 RX ADMIN — CEFEPIME 2 G: 2 INJECTION, POWDER, FOR SOLUTION INTRAVENOUS at 06:05

## 2024-05-06 RX ADMIN — Medication 1 DOSE: at 12:05

## 2024-05-06 RX ADMIN — Medication 1 DOSE: at 09:05

## 2024-05-06 RX ADMIN — HYDRALAZINE HYDROCHLORIDE 50 MG: 50 TABLET ORAL at 05:05

## 2024-05-06 RX ADMIN — FILGRASTIM 480 MCG: 480 INJECTION, SOLUTION INTRAVENOUS; SUBCUTANEOUS at 09:05

## 2024-05-06 RX ADMIN — Medication 1 DOSE: at 08:05

## 2024-05-06 RX ADMIN — SPIRONOLACTONE 12.5 MG: 25 TABLET ORAL at 09:05

## 2024-05-06 RX ADMIN — Medication 1 DOSE: at 05:05

## 2024-05-06 RX ADMIN — AMLODIPINE BESYLATE 10 MG: 10 TABLET ORAL at 09:05

## 2024-05-06 NOTE — PROGRESS NOTES
[facility] - Oncology ([facility])  Hematology  Bone Marrow Transplant  Progress Note    Patient Name: Chip Goodson  Admission Date: 4/16/2024  Hospital Length of Stay: 20 days  Code Status: Full Code    Subjective:     Interval History: Day +18 s/p Carmen 140 Auto SCT for MM. ANC 20 today. Remains on cefepime and vanc for Gemella Haemolysans bacteremia. ID consulted for recs for length of ATB therapy. Echo pending. UC also with low yield enterococcus. Blood cultures 5/4 NGTD. Patient reports feeling well overall this AM. Denies pain, n/v. Did have BM    Objective:     Vital Signs (Most Recent):  Temp: 98.1 °F (36.7 °C) (05/06/24 1138)  Pulse: 69 (05/06/24 1138)  Resp: 18 (05/06/24 1138)  BP: 119/62 (05/06/24 1138)  SpO2: 97 % (05/06/24 1138) Vital Signs (24h Range):  Temp:  [98.1 °F (36.7 °C)-99.2 °F (37.3 °C)] 98.1 °F (36.7 °C)  Pulse:  [69-81] 69  Resp:  [16-19] 18  SpO2:  [95 %-98 %] 97 %  BP: (102-144)/(56-68) 119/62     Weight: 87.5 kg (192 lb 14.4 oz)  Body mass index is 27.68 kg/m².  Body surface area is 2.08 meters squared.      Intake/Output - Last 3 Shifts         05/04 0700  05/05 0659 05/05 0700  05/06 0659 05/06 0700  05/07 0659    P.O. 646 1060 240    Blood  633     IV Piggyback 883.4 728.7     Total Intake(mL/kg) 1529.4 (17.4) 2421.7 (27.7) 240 (2.7)    Urine (mL/kg/hr) 2200 (1) 2525 (1.2) 900 (1.4)    Emesis/NG output   0    Stool 0 0 0    Total Output 2200 2525 900    Net -670.6 -103.3 -660           Stool Occurrence 1 x 2 x 1 x    Emesis Occurrence   1 x             Physical Exam  Vitals and nursing note reviewed.   Constitutional:       Appearance: Normal appearance. He is well-developed.   HENT:      Head: Normocephalic and atraumatic.      Ears:      Comments: [place]     Mouth/Throat:      Mouth: Mucous membranes are moist.      Pharynx: No oropharyngeal exudate or posterior oropharyngeal erythema.   Eyes:      General:         Right eye: No discharge.         Left eye: No discharge.      Extraocular  Movements: Extraocular movements intact.      Conjunctiva/sclera: Conjunctivae normal.   Cardiovascular:      Rate and Rhythm: Normal rate and regular rhythm.      Pulses: Normal pulses.      Heart sounds: Normal heart sounds. No murmur heard.  Pulmonary:      Effort: Pulmonary effort is normal. No respiratory distress.      Breath sounds: Normal breath sounds. No wheezing or rales.   Abdominal:      General: Bowel sounds are normal. There is no distension.      Palpations: Abdomen is soft.      Tenderness: There is no abdominal tenderness.   Musculoskeletal:         General: No deformity. Normal range of motion.      Cervical back: Normal range of motion and neck supple.      Right lower leg: No edema.      Left lower leg: No edema.   Skin:     General: Skin is warm and dry.      Findings: No bruising, erythema or rash.      Comments: Right chest wall vas cath. Dressing c/d/i. No sign of infection to site.   Neurological:      General: No focal deficit present.      Mental Status: He is alert and oriented to person, place, and time.      Motor: No weakness.   Psychiatric:         Mood and Affect: Mood normal.         Behavior: Behavior normal.         Thought Content: Thought content normal.         Judgment: Judgment normal.            Significant Labs:   CBC:   Recent Labs   Lab 05/05/24  0440 05/06/24  0400   WBC 0.06* 0.08*   HGB 6.4* 7.0*   HCT 17.4* 19.1*   PLT 9* 21*    and CMP:   Recent Labs   Lab 05/05/24  0440 05/06/24  0400   * 136   K 3.8 3.7    106   CO2 25 23   * 126*   BUN 20 16   CREATININE 1.0 0.9   CALCIUM 8.8 8.9   PROT 5.0* 5.1*   ALBUMIN 2.6* 2.7*   BILITOT 0.9 1.0   ALKPHOS 77 80   AST 25 28   ALT 33 48*   ANIONGAP 5* 7*       Diagnostic Results:  I have reviewed all pertinent imaging results/findings within the past 24 hours.  Assessment/Plan:     * History of autologous stem cell transplant  - Patient of Dr. Evans  - Admitted 4/16/24 for a Carmen 140 auto SCT  - Today is Day  +18 ; ANC is 20  - He received 5 bags on 4/18/24 at 1330 and received the remaining 4 bags on 4/19/24 at 1330. Total CD34 dose was 2.91 x 10^6.  - See treatment plan below.    Planned conditioning regimen:  Melphalan TBD on Day -1     Antimicrobial Prophylaxis:  Acyclovir starting on Day -1  Levofloxacin starting on Day -1  Fluconazole starting on Day -1  Bactrim starting on Day +30     Growth Factor Support:  Neupogen starting on Day +7      Caregiver: daughter and other family members  Post-transplant discharge plans: Smita Bandar    Multiple myeloma in remission  - patient of Dr. Evans; follows locally with Dr. Rose in [place]  - diagnosed June 2023 with IgA Kappa MM; stage unclear at diagnosis as FISH not available  - started DRd therapy on 7/18/2023  - he stopped the daratumumab after cycle 4 and continue with revlimid and dex only; looks to have completed ~5 cycles  - Admitted for Carmen 140 Auto SCT on 4/16/2023    Pancytopenia due to chemotherapy  - Daily CBC while inpatient  - Transfuse for Hgb <7or plts < 10K  - Continue antimicrobial ppx  - Stopped VTE ppx 4/24 d/t downtrending platelets    Neutropenic fever  - Developed on 5/3/2024. Cultures obtained and showing Gemella Haemolysans  - Echo pending to assess for any potential endocarditis  - Surveillance cultures obtained on 5/4 currently NGTD.  - UC with low yield enterococcus facecialis  - Continue cefepime and vancomycin.  - ID consulted for recommendations for length of therapy for IV antibiotics.     Chemotherapy induced nausea and vomiting  - Continue PRN Zofran and Compazine    Bacteremia due to Staphylococcus  - See neutropenic fever    Constipation  - On daily miralax  - reports BM on 5/5 and 5/6    Hyperbilirubinemia  - T-bili 1.7 on admit. Patient asymptomatic. Now normal  - Trending with daily CMP  RESOLVED    Cancer related pain  - Continue home prn Oxy IR    BPH (benign prostatic hyperplasia)  - Continue home finasteride    HTN  (hypertension)  - Continue home amlodipine, carvedilol (dose-reduced for bradycardia), hydralazine, and spironolactone  - Stopped IVF following transplant. BP improved off IVF.    Type 2 diabetes mellitus without complications  - Holding home metformin  - Stopped ACHS blood glucose monitoring 4/24 as blood glucose has been stable  - Monitoring with daily CMP    Hypothyroidism, unspecified  - Continue home synthroid    GERD (gastroesophageal reflux disease)  - Previously on BID protonix and daily pepcid for stomach ulcers; now resolved  - Will continue daily protonix per transplant order set    COLTON (acute kidney injury)  - Creatinine 1.4 on admission; baseline ~1.1  - Monitoring CMP daily  - Was receiving continuous fluids as part of chemotherapy regimen (NACL with 10% K)  RESOLVED        VTE Risk Mitigation (From admission, onward)           Ordered     heparin (porcine) injection 1,600 Units  As needed (PRN)         04/16/24 2129     IP VTE HIGH RISK PATIENT  Once         04/16/24 1904     Place sequential compression device  Until discontinued         04/16/24 1904                    Disposition: Remains inpatient    Chelsie Man NP  Bone Marrow Transplant  [facility] - Oncology ([facility])

## 2024-05-06 NOTE — PLAN OF CARE
1928 rec'd resting in bed with eyes closed. Awakens easily, denies any needs. VSS, on room air. Family at bedside. Bed low and wheels locked. Declining bed alarm. Call bell near, enc to use for any needs/assistance.    End of shift- rested quietly over the night with no complaints voiced. VSS. In no distress. Safety measures intact.

## 2024-05-06 NOTE — ASSESSMENT & PLAN NOTE
- Patient of Dr. Evans  - Admitted 4/16/24 for a Carmen 140 auto SCT  - Today is Day +18 ; ANC is 20  - He received 5 bags on 4/18/24 at 1330 and received the remaining 4 bags on 4/19/24 at 1330. Total CD34 dose was 2.91 x 10^6.  - See treatment plan below.    Planned conditioning regimen:  Melphalan TBD on Day -1     Antimicrobial Prophylaxis:  Acyclovir starting on Day -1  Levofloxacin starting on Day -1  Fluconazole starting on Day -1  Bactrim starting on Day +30     Growth Factor Support:  Neupogen starting on Day +7      Caregiver: daughter and other family members  Post-transplant discharge plans: Smita Portillo

## 2024-05-06 NOTE — ASSESSMENT & PLAN NOTE
Multiple myeloma s/p SCT on 4/16; spiked fever 5/3; patient feeling generally well in past few days  - Ucx (5/3) = E. 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

## 2024-05-06 NOTE — PROGRESS NOTES
Pharmacokinetic Assessment Follow Up: IV Vancomycin    Vancomycin serum concentration assessment(s):    The trough level was drawn correctly and can be used to guide therapy at this time. The measurement is below the desired definitive target range of 15 to 20 mcg/mL.    Vancomycin Regimen Plan:    Change regimen to Vancomycin 1250 mg IV every 12 hours with next serum trough concentration measured at 0300 prior to 4th dose on 05/08    Drug levels (last 3 results):  Recent Labs   Lab Result Units 05/06/24  0400   Vancomycin-Trough ug/mL 12.7       Pharmacy will continue to follow and monitor vancomycin.    Please contact pharmacy at extension [phone] for questions regarding this assessment.    Thank you for the consult,     Roslyn Trejo, PharmD  Clinical Pharmacist-BMT/Hematology  [facility]       Patient brief summary:  Chip Goodson is a 73 y.o. male initiated on antimicrobial therapy with IV Vancomycin for treatment of bacteremia    The patient's current regimen is 1000 mg q12h    Drug Allergies:   Review of patient's allergies indicates:   Allergen Reactions    Iodine Anaphylaxis    Shellfish containing products     Poison ivy extract     Amoxicillin-pot clavulanate Itching       Actual Body Weight:   74.4 kg    Renal Function:   Estimated Creatinine Clearance: 74.4 mL/min (based on SCr of 0.9 mg/dL).,     Dialysis Method (if applicable):  N/A    CBC (last 72 hours):  Recent Labs   Lab Result Units 05/04/24  0400 05/05/24  0440 05/06/24  0400   WBC K/uL 0.06* 0.06* 0.08*   Hemoglobin g/dL 7.2* 6.4* 7.0*   Hematocrit % 19.7* 17.4* 19.1*   Platelets K/uL 15* 9* 21*   Gran % % 16.6* 16.6* 25.0*   Lymph % % 66.7* 66.7* 62.5*   Mono % % 16.7* 16.7* 12.5   Eosinophil % % 0.0 0.0 0.0   Basophil % % 0.0 0.0 0.0   Differential Method  Automated Automated Automated       Metabolic Panel (last 72 hours):  Recent Labs   Lab Result Units 05/03/24 2229 05/04/24  0400 05/05/24  0440 05/06/24  0400   Sodium  mmol/L  --  136 135* 136   Potassium mmol/L  --  3.6 3.8 3.7   Chloride mmol/L  --  104 105 106   CO2 mmol/L  --  26 25 23   Glucose mg/dL  --  134* 130* 126*   Glucose, UA  Negative  --   --   --    BUN mg/dL  --  19 20 16   Creatinine mg/dL  --  1.0 1.0 0.9   Albumin g/dL  --  2.8* 2.6* 2.7*   Total Bilirubin mg/dL  --  1.0 0.9 1.0   Alkaline Phosphatase U/L  --  86 77 80   AST U/L  --  16 25 28   ALT U/L  --  22 33 48*   Magnesium mg/dL  --  2.0 2.1 2.1   Phosphorus mg/dL  --  3.6 3.4 3.0       Vancomycin Administrations:  vancomycin given in the last 96 hours                     vancomycin (VANCOCIN) 1,000 mg in dextrose 5 % (D5W) 250 mL IVPB (Vial-Mate) (mg) 1,000 mg New Bag 05/06/24 0359     1,000 mg New Bag 05/05/24 1607     1,000 mg New Bag  0440    vancomycin 2 g in dextrose 5 % 500 mL IVPB (mg) 2,000 mg New Bag 05/04/24 1614                    Microbiologic Results:  Microbiology Results (last 7 days)       Procedure Component Value Units Date/Time    Blood culture [[id number]]  (Abnormal) Collected: 05/03/24 2129    Order Status: Completed Specimen: Blood Updated: 05/06/24 0931     Blood Culture, Routine Gram stain miguelito bottle: Gram positive cocci in clusters resembling Staph      Results called to and read back by:Kaci Vargas RN 05/04/2024  14:52      GEMELLA (S.) HAEMOLYSANS  Susceptibility testing not routinely performed      Blood culture [[id number]]  (Abnormal) Collected: 05/03/24 2126    Order Status: Completed Specimen: Blood Updated: 05/06/24 0931     Blood Culture, Routine Gram stain aer bottle: Gram positive cocci      Gram stain miguelito bottle: Gram positive cocci      Results called to and read back by: Ab Momin RN  05/04/2024 21:29      GEMELLA (S.) HAEMOLYSANS  Susceptibility testing not routinely performed      Urine Culture High Risk [[id number]]  (Abnormal)  (Susceptibility) Collected: 05/03/24 2222    Order Status: Completed Specimen: Urine Updated: 05/06/24 0620     Urine Culture,  Routine ENTEROCOCCUS FAECALIS  50,000 - 99,999 cfu/ml  No other significant isolate      Narrative:      Indicated criteria for high risk culture:->Neutropenic  patient    Blood culture [[id number]] Collected: 05/04/24 1543    Order Status: Completed Specimen: Blood from Peripheral, Antecubital, Left Updated: 05/05/24 1812     Blood Culture, Routine No Growth to date      No Growth to date    Blood culture [[id number]] Collected: 05/04/24 1551    Order Status: Completed Specimen: Blood from Peripheral, Antecubital, Right Updated: 05/05/24 1812     Blood Culture, Routine No Growth to date      No Growth to date    Rapid Organism ID by PCR (from Blood culture) [[id number]] Collected: 05/03/24 2126    Order Status: Completed Updated: 05/04/24 1745     Enterococcus faecalis Not Detected     Enterococcus faecium Not Detected     Listeria monocytogenes Not Detected     Staphylococcus spp. Not Detected     Staphylococcus aureus Not Detected     Staphylococcus epidermidis Not Detected     Staphylococcus lugdunensis Not Detected     Streptococcus species Not Detected     Streptococcus agalactiae Not Detected     Streptococcus pneumoniae Not Detected     Streptococcus pyogenes Not Detected     Acinetobacter calcoaceticus/baumannii complex Not Detected     Bacteroides fragilis Not Detected     Enterobacterales Not Detected     Enterobacter cloacae complex Not Detected     Escherichia coli Not Detected     Klebsiella aerogenes Not Detected     Klebsiella oxytoca Not Detected     Klebsiella pneumoniae group Not Detected     Proteus Not Detected     Salmonella sp Not Detected     Serratia marcescens Not Detected     Haemophilus influenzae Not Detected     Neisseria meningtidis Not Detected     Pseudomonas aeruginosa Not Detected     Stenotrophomonas maltophilia Not Detected     Candida albicans Not Detected     Candida auris Not Detected     Candida glabrata Not Detected     Candida krusei Not Detected     Candida parapsilosis  Not Detected     Candida tropicalis Not Detected     Cryptococcus neoformans/gattii Not Detected     CTX-M (ESBL ) Test Not Applicable     IMP (Carbapenem resistant) Test Not Applicable     KPC resistance gene (Carbapenem resistant) Test Not Applicable     mcr-1  Test Not Applicable     mec A/C  Test Not Applicable     mec A/C and MREJ (MRSA) gene Test Not Applicable     NDM (Carbapenem resistant) Test Not Applicable     OXA-48-like (Carbapenem resistant) Test Not Applicable     van A/B (VRE gene) Test Not Applicable     VIM (Carbapenem resistant) Test Not Applicable

## 2024-05-06 NOTE — PROGRESS NOTES
Pharmacokinetic Assessment Follow Up: IV Vancomycin    Vancomycin serum concentration assessment(s):    The trough level was drawn correctly and can be used to guide therapy at this time. The measurement is within the desired definitive target range of 10 to 20 mcg/mL.    Vancomycin Regimen Plan:    Continue regimen to Vancomycin 1000 mg IV every 12 hours with next serum trough concentration measured at 03:00 prior to n/a dose on 5/8/24 or sooner if renal function changes significantly    Drug levels (last 3 results):  Recent Labs   Lab Result Units 05/06/24  0400   Vancomycin-Trough ug/mL 12.7       Pharmacy will continue to follow and monitor vancomycin.    Please contact pharmacy at extension [phone] for questions regarding this assessment.    Thank you for the consult,   Kofi Garcia       Patient brief summary:  Chip Goodson is a 73 y.o. male initiated on antimicrobial therapy with IV Vancomycin for treatment of skin & soft tissue infection    The patient's current regimen is Vancomycin 1000 mg IV every 12 hrs    Drug Allergies:   Review of patient's allergies indicates:   Allergen Reactions    Iodine Anaphylaxis    Shellfish containing products     Poison ivy extract     Amoxicillin-pot clavulanate Itching       Actual Body Weight:   87.5 kg    Renal Function:   Estimated Creatinine Clearance: 66.9 mL/min (based on SCr of 1 mg/dL).,     Dialysis Method (if applicable):  N/A    CBC (last 72 hours):  Recent Labs   Lab Result Units 05/04/24  0400 05/05/24  0440   WBC K/uL 0.06* 0.06*   Hemoglobin g/dL 7.2* 6.4*   Hematocrit % 19.7* 17.4*   Platelets K/uL 15* 9*   Gran % % 16.6* 16.6*   Lymph % % 66.7* 66.7*   Mono % % 16.7* 16.7*   Eosinophil % % 0.0 0.0   Basophil % % 0.0 0.0   Differential Method  Automated Automated       Metabolic Panel (last 72 hours):  Recent Labs   Lab Result Units 05/03/24  2229 05/04/24  0400 05/05/24  0440   Sodium mmol/L  --  136 135*   Potassium mmol/L  --  3.6 3.8   Chloride mmol/L   --  104 105   CO2 mmol/L  --  26 25   Glucose mg/dL  --  134* 130*   Glucose, UA  Negative  --   --    BUN mg/dL  --  19 20   Creatinine mg/dL  --  1.0 1.0   Albumin g/dL  --  2.8* 2.6*   Total Bilirubin mg/dL  --  1.0 0.9   Alkaline Phosphatase U/L  --  86 77   AST U/L  --  16 25   ALT U/L  --  22 33   Magnesium mg/dL  --  2.0 2.1   Phosphorus mg/dL  --  3.6 3.4       Vancomycin Administrations:  vancomycin given in the last 96 hours                     vancomycin (VANCOCIN) 1,000 mg in dextrose 5 % (D5W) 250 mL IVPB (Vial-Mate) (mg) 1,000 mg New Bag 05/06/24 0359     1,000 mg New Bag 05/05/24 1607     1,000 mg New Bag  0440    vancomycin 2 g in dextrose 5 % 500 mL IVPB (mg) 2,000 mg New Bag 05/04/24 1614                    Microbiologic Results:  Microbiology Results (last 7 days)       Procedure Component Value Units Date/Time    Urine Culture High Risk [[id number]]  (Abnormal)  (Susceptibility) Collected: 05/03/24 2229    Order Status: Completed Specimen: Urine Updated: 05/06/24 0620     Urine Culture, Routine ENTEROCOCCUS FAECALIS  50,000 - 99,999 cfu/ml  No other significant isolate      Narrative:      Indicated criteria for high risk culture:->Neutropenic  patient    Blood culture [[id number]] Collected: 05/04/24 1543    Order Status: Completed Specimen: Blood from Peripheral, Antecubital, Left Updated: 05/05/24 1812     Blood Culture, Routine No Growth to date      No Growth to date    Blood culture [[id number]] Collected: 05/04/24 1551    Order Status: Completed Specimen: Blood from Peripheral, Antecubital, Right Updated: 05/05/24 1812     Blood Culture, Routine No Growth to date      No Growth to date    Blood culture [[id number]]  (Abnormal) Collected: 05/03/24 2126    Order Status: Completed Specimen: Blood Updated: 05/05/24 1227     Blood Culture, Routine Gram stain aer bottle: Gram positive cocci      Gram stain miguelito bottle: Gram positive cocci      Results called to and read back by: Ab Momin RN   05/04/2024 21:29      GEMELLA (S.) HAEMOLYSANS  Susceptibility testing not routinely performed      Blood culture [[id number]]  (Abnormal) Collected: 05/03/24 2129    Order Status: Completed Specimen: Blood Updated: 05/05/24 1227     Blood Culture, Routine Gram stain miguelito bottle: Gram positive cocci in clusters resembling Staph      Results called to and read back by:Kaci Vargas RN 05/04/2024  14:52      GEMELLA (S.) HAEMOLYSANS  Susceptibility testing not routinely performed      Rapid Organism ID by PCR (from Blood culture) [[id number]] Collected: 05/03/24 2126    Order Status: Completed Updated: 05/04/24 1745     Enterococcus faecalis Not Detected     Enterococcus faecium Not Detected     Listeria monocytogenes Not Detected     Staphylococcus spp. Not Detected     Staphylococcus aureus Not Detected     Staphylococcus epidermidis Not Detected     Staphylococcus lugdunensis Not Detected     Streptococcus species Not Detected     Streptococcus agalactiae Not Detected     Streptococcus pneumoniae Not Detected     Streptococcus pyogenes Not Detected     Acinetobacter calcoaceticus/baumannii complex Not Detected     Bacteroides fragilis Not Detected     Enterobacterales Not Detected     Enterobacter cloacae complex Not Detected     Escherichia coli Not Detected     Klebsiella aerogenes Not Detected     Klebsiella oxytoca Not Detected     Klebsiella pneumoniae group Not Detected     Proteus Not Detected     Salmonella sp Not Detected     Serratia marcescens Not Detected     Haemophilus influenzae Not Detected     Neisseria meningtidis Not Detected     Pseudomonas aeruginosa Not Detected     Stenotrophomonas maltophilia Not Detected     Candida albicans Not Detected     Candida auris Not Detected     Candida glabrata Not Detected     Candida krusei Not Detected     Candida parapsilosis Not Detected     Candida tropicalis Not Detected     Cryptococcus neoformans/gattii Not Detected     CTX-M (ESBL ) Test Not  Applicable     IMP (Carbapenem resistant) Test Not Applicable     KPC resistance gene (Carbapenem resistant) Test Not Applicable     mcr-1  Test Not Applicable     mec A/C  Test Not Applicable     mec A/C and MREJ (MRSA) gene Test Not Applicable     NDM (Carbapenem resistant) Test Not Applicable     OXA-48-like (Carbapenem resistant) Test Not Applicable     van A/B (VRE gene) Test Not Applicable     VIM (Carbapenem resistant) Test Not Applicable

## 2024-05-06 NOTE — SUBJECTIVE & OBJECTIVE
Past Medical History:   Diagnosis Date    Chronic diastolic (congestive) heart failure     Coronary artery disease     GERD (gastroesophageal reflux disease)     High cholesterol     HTN (hypertension)     Hypothyroidism, unspecified     Multiple myeloma     Secondary pulmonary arterial hypertension     Type 2 diabetes mellitus without complications        Past Surgical History:   Procedure Laterality Date    APPENDECTOMY  1965    BONE MARROW BIOPSY Right 06/12/2023    Procedure: BIOPSY, BONE MARROW;  Surgeon: Silvano Austin MD;  Location: [facility] OR;  Service: General;  Laterality: Right;    COLONOSCOPY N/A 2/15/2024    Procedure: COLONOSCOPY;  Surgeon: Kei Donahue III, MD;  Location: [facility] ENDO;  Service: Endoscopy;  Laterality: N/A;  POST-OP: ENLARGED NODULAR PROSTATE, SUBOPTIMAL PREP, PAN DIVERTICULOSIS, TRANSVERSE COLON POLYPECTOMY    INSERTION OF TUNNELED CENTRAL VENOUS HEMODIALYSIS CATHETER Left 4/8/2024    Procedure: INSERTION, CATHETER, HEMODIALYSIS, DUAL LUMEN, left poss right, Bard 14.5 fr hemosplit catheter, model [id number];  Surgeon: Theodore Caruso MD;  Location: [address];  Service: General;  Laterality: Left;    removal of boils         Review of patient's allergies indicates:   Allergen Reactions    Iodine Anaphylaxis    Shellfish containing products     Poison ivy extract     Amoxicillin-pot clavulanate Itching       Medications:  Current Facility-Administered Medications   Medication Dose Route Frequency Provider Last Rate Last Admin    0.9%  NaCl infusion (for blood administration)   Intravenous Q24H PRN Parveen Dinero MD        0.9%  NaCl infusion (for blood administration)   Intravenous Q24H PRN Alo Watson MD        0.9%  NaCl infusion (for blood administration)   Intravenous Q24H PRN Jae Joseph MD        acetaminophen tablet 650 mg  650 mg Oral Q8H PRN Parveen Dinero MD   650 mg at 04/30/24 1244    acetaminophen tablet 650 mg  650 mg Oral Q6H PRN  Jae Joseph MD   650 mg at 05/03/24 2115    acyclovir capsule 800 mg  800 mg Oral BID Ady Evans MD   800 mg at 05/06/24 0939    albuterol inhaler 2 puff  2 puff Inhalation Q4H PRN Chelsie Man NP        alteplase injection 2 mg  2 mg Intra-Catheter PRN Ady Evans MD        aluminum-magnesium hydroxide-simethicone 200-200-20 mg/5 mL suspension 30 mL  30 mL Oral Q6H PRN Chelsie Man, NORMA        amLODIPine tablet 10 mg  10 mg Oral Daily Chelsie Man, NP   10 mg at 05/06/24 0940    carvediloL tablet 6.25 mg  6.25 mg Oral BID Jessica Cee NP   6.25 mg at 05/06/24 0940    ceFEPIme (MAXIPIME) 2 g in dextrose 5 % in water (D5W) 100 mL IVPB (MB+)  2 g Intravenous Q8H Jae Joseph MD   Stopped at 05/06/24 1403    dextrose 10% bolus 125 mL 125 mL  12.5 g Intravenous PRN Chelsie Man NP        dextrose 10% bolus 250 mL 250 mL  25 g Intravenous PRN Chelsie Man, NP        diphenhydrAMINE capsule 25 mg  25 mg Oral PRN Chelsie Man, NP   25 mg at 04/30/24 1244    filgrastim (NEUPOGEN) injection 480 mcg/1.6 ml  480 mcg Subcutaneous Daily Ady Evans MD   480 mcg at 05/06/24 0940    finasteride tablet 5 mg  5 mg Oral Daily Chelsie Man, NP   5 mg at 05/06/24 0940    fluconazole tablet 400 mg  400 mg Oral Daily Ady Evans MD   400 mg at 05/06/24 0940    heparin (porcine) injection 1,600 Units  1,600 Units Intravenous PRN Ady Evans MD   1,600 Units at 05/05/24 1805    hydrALAZINE tablet 50 mg  50 mg Oral Q8H Chelsie Man, NP   50 mg at 05/06/24 1334    k phos di & mono-sod phos mono 250 mg tablet 1 tablet  1 tablet Oral Q4H PRN Chelsie Man NP   1 tablet at 04/18/24 1748    k phos di & mono-sod phos mono 250 mg tablet 2 tablet  2 tablet Oral Q4H PRN Chelsie Man, NORMA        levothyroxine tablet 112 mcg  112 mcg Oral QHS Chelsie Man NP   112 mcg at 05/05/24 2139    LORazepam injection 1 mg  1 mg  Intravenous Q6H PRN Ady Evans MD        magnesium oxide tablet 400 mg  400 mg Oral Q4H PRN Chelsie Man NP   400 mg at 05/02/24 1054    magnesium oxide tablet 400 mg  400 mg Oral Q4H PRN Chelsie Man, NP        magnesium oxide tablet 800 mg  800 mg Oral Q4H PRN Chelsie Man, NP        melatonin tablet 9 mg  9 mg Oral Nightly PRN Jae Joseph MD   9 mg at 04/30/24 2011    ondansetron injection 4 mg  4 mg Intravenous Q8H PRN Jessica Cee NP   4 mg at 05/02/24 1700    oxyCODONE immediate release tablet 5 mg  5 mg Oral Q4H PRN Chelsie Man NP        pantoprazole EC tablet 40 mg  40 mg Oral Daily Chelsie Man, NP   40 mg at 05/06/24 0940    polyethylene glycol packet 17 g  17 g Oral Daily Aliza Montano MD   17 g at 05/03/24 0914    potassium chloride SA CR tablet 20 mEq  20 mEq Oral PRN Chelsie Man NP   20 mEq at 05/06/24 0940    potassium chloride SA CR tablet 20 mEq  20 mEq Oral Q2H PRN Chelsie Man NP        potassium chloride SA CR tablet 20 mEq  20 mEq Oral Q2H PRN Chelsie Man NP        prochlorperazine injection Soln 10 mg  10 mg Intravenous Q6H PRN Jessica Cee NP   10 mg at 04/30/24 0923    senna-docusate 8.6-50 mg per tablet 1 tablet  1 tablet Oral Daily PRN Chelsie Man NP        sodium bicarb-sodium chloride powder 1 Dose  1 Dose Swish & Spit QID Ady Evans MD   1 Dose at 05/06/24 1224    sodium chloride 0.9% flush 10 mL  10 mL Intravenous PRN Ady Evans MD        spironolactone split tablet 12.5 mg  12.5 mg Oral Daily Chelsie Man NP   12.5 mg at 05/06/24 0939    [START ON 5/18/2024] sulfamethoxazole-trimethoprim 800-160mg per tablet 1 tablet  1 tablet Oral Once per day on Monday Wednesday Friday Ady Evans MD        vancomycin - pharmacy to dose   Intravenous pharmacy to manage frequency Jae Joseph MD        vancomycin 1,250 mg in dextrose 5 % (D5W) 250 mL IVPB  "(Vial-Mate)  1,250 mg Intravenous Q12H Alo Watson MD         Antibiotics (From admission, onward)      Start     Stop Route Frequency Ordered    05/18/24 0900  sulfamethoxazole-trimethoprim 800-160mg per tablet 1 tablet         -- Oral Once per day on Monday Wednesday Friday 04/16/24 2129 05/06/24 1600  vancomycin 1,250 mg in dextrose 5 % (D5W) 250 mL IVPB (Vial-Mate)         -- IV Every 12 hours (non-standard times) 05/06/24 1438    05/04/24 1610  vancomycin - pharmacy to dose  (vancomycin IVPB (PEDS and ADULTS))        Placed in "And" Linked Group    -- IV pharmacy to manage frequency 05/04/24 1510    05/03/24 2200  ceFEPIme (MAXIPIME) 2 g in dextrose 5 % in water (D5W) 100 mL IVPB (MB+)         -- IV Every 8 hours (non-standard times) 05/03/24 2054          Antifungals (From admission, onward)      Start     Stop Route Frequency Ordered    04/17/24 0900  fluconazole tablet 400 mg         -- Oral Daily 04/16/24 2129          Antivirals (From admission, onward)          Stop Route Frequency     acyclovir         -- Oral 2 times daily               There is no immunization history on file for this patient.    Family History       Problem Relation (Age of Onset)    Breast cancer Sister    Lung cancer Mother          Social History     Socioeconomic History    Marital status:      Spouse name: Graciela    Number of children: 5   Tobacco Use    Smoking status: Former     Types: Cigarettes    Smokeless tobacco: Former   Substance and Sexual Activity    Alcohol use: Yes    Drug use: Never    Sexual activity: Not Currently     Social Determinants of Health     Financial Resource Strain: Low Risk  (1/23/2024)    Overall Financial Resource Strain (CARDIA)     Difficulty of Paying Living Expenses: Not hard at all   Food Insecurity: No Food Insecurity (1/23/2024)    Hunger Vital Sign     Worried About Running Out of Food in the Last Year: Never true     Ran Out of Food in the Last Year: Never true "   Transportation Needs: No Transportation Needs (1/23/2024)    PRAPARE - Transportation     Lack of Transportation (Medical): No     Lack of Transportation (Non-Medical): No   Physical Activity: Sufficiently Active (1/23/2024)    Exercise Vital Sign     Days of Exercise per Week: 7 days     Minutes of Exercise per Session: 40 min   Stress: Stress Concern Present (1/23/2024)    [facility] of Occupational Health - Occupational Stress Questionnaire     Feeling of Stress : To some extent   Housing Stability: High Risk (1/23/2024)    Housing Stability Vital Sign     Unable to Pay for Housing in the Last Year: No     Number of Places Lived in the Last Year: 1     Unstable Housing in the Last Year: Yes       Objective:     Vital Signs (Most Recent):  Temp: 98.2 °F (36.8 °C) (05/06/24 1559)  Pulse: 74 (05/06/24 1559)  Resp: 18 (05/06/24 1559)  BP: 126/60 (05/06/24 1559)  SpO2: 96 % (05/06/24 1559) Vital Signs (24h Range):  Temp:  [98.1 °F (36.7 °C)-99.2 °F (37.3 °C)] 98.2 °F (36.8 °C)  Pulse:  [63-81] 74  Resp:  [16-19] 18  SpO2:  [95 %-98 %] 96 %  BP: (102-144)/(56-62) 126/60     Weight: 87.1 kg (192 lb)  Body mass index is 27.55 kg/m².    Estimated Creatinine Clearance: 74.4 mL/min (based on SCr of 0.9 mg/dL).     Physical Exam  Vitals and nursing note reviewed.   Constitutional:       General: He is not in acute distress.     Appearance: He is not toxic-appearing.   HENT:      Head: Normocephalic and atraumatic.      Mouth/Throat:      Mouth: Mucous membranes are moist.      Pharynx: No oropharyngeal exudate or posterior oropharyngeal erythema.   Eyes:      General: No scleral icterus.     Conjunctiva/sclera: Conjunctivae normal.   Cardiovascular:      Rate and Rhythm: Normal rate and regular rhythm.      Pulses: Normal pulses.      Heart sounds: Normal heart sounds.      Comments: R tunneled IJ WNL  Pulmonary:      Breath sounds: Normal breath sounds. No wheezing or rhonchi.   Abdominal:      General: There is no  distension.      Tenderness: There is no abdominal tenderness.   Musculoskeletal:         General: No swelling or tenderness.      Cervical back: No rigidity.      Right lower leg: No edema.      Left lower leg: No edema.   Lymphadenopathy:      Cervical: No cervical adenopathy.   Skin:     General: Skin is warm and dry.      Coloration: Skin is not jaundiced.      Findings: No bruising or rash.   Neurological:      General: No focal deficit present.      Mental Status: He is alert and oriented to person, place, and time.   Psychiatric:         Mood and Affect: Mood normal.         Behavior: Behavior normal.          Significant Labs:   Microbiology Results (last 7 days)       Procedure Component Value Units Date/Time    Blood culture [[id number]]  (Abnormal) Collected: 05/03/24 2129    Order Status: Completed Specimen: Blood Updated: 05/06/24 0931     Blood Culture, Routine Gram stain miguelito bottle: Gram positive cocci in clusters resembling Staph      Results called to and read back by:Kaci Vargas RN 05/04/2024  14:52      GEMELLA (S.) HAEMOLYSANS  Susceptibility testing not routinely performed      Blood culture [[id number]]  (Abnormal) Collected: 05/03/24 2126    Order Status: Completed Specimen: Blood Updated: 05/06/24 0931     Blood Culture, Routine Gram stain aer bottle: Gram positive cocci      Gram stain miguelito bottle: Gram positive cocci      Results called to and read back by: Ab Momin RN  05/04/2024 21:29      GEMELLA (S.) HAEMOLYSANS  Susceptibility testing not routinely performed      Urine Culture High Risk [[id number]]  (Abnormal)  (Susceptibility) Collected: 05/03/24 2229    Order Status: Completed Specimen: Urine Updated: 05/06/24 0620     Urine Culture, Routine ENTEROCOCCUS FAECALIS  50,000 - 99,999 cfu/ml  No other significant isolate      Narrative:      Indicated criteria for high risk culture:->Neutropenic  patient    Blood culture [[id number]] Collected: 05/04/24 1543    Order Status:  Completed Specimen: Blood from Peripheral, Antecubital, Left Updated: 05/05/24 1812     Blood Culture, Routine No Growth to date      No Growth to date    Blood culture [[id number]] Collected: 05/04/24 1551    Order Status: Completed Specimen: Blood from Peripheral, Antecubital, Right Updated: 05/05/24 1812     Blood Culture, Routine No Growth to date      No Growth to date    Rapid Organism ID by PCR (from Blood culture) [[id number]] Collected: 05/03/24 2126    Order Status: Completed Updated: 05/04/24 1745     Enterococcus faecalis Not Detected     Enterococcus faecium Not Detected     Listeria monocytogenes Not Detected     Staphylococcus spp. Not Detected     Staphylococcus aureus Not Detected     Staphylococcus epidermidis Not Detected     Staphylococcus lugdunensis Not Detected     Streptococcus species Not Detected     Streptococcus agalactiae Not Detected     Streptococcus pneumoniae Not Detected     Streptococcus pyogenes Not Detected     Acinetobacter calcoaceticus/baumannii complex Not Detected     Bacteroides fragilis Not Detected     Enterobacterales Not Detected     Enterobacter cloacae complex Not Detected     Escherichia coli Not Detected     Klebsiella aerogenes Not Detected     Klebsiella oxytoca Not Detected     Klebsiella pneumoniae group Not Detected     Proteus Not Detected     Salmonella sp Not Detected     Serratia marcescens Not Detected     Haemophilus influenzae Not Detected     Neisseria meningtidis Not Detected     Pseudomonas aeruginosa Not Detected     Stenotrophomonas maltophilia Not Detected     Candida albicans Not Detected     Candida auris Not Detected     Candida glabrata Not Detected     Candida krusei Not Detected     Candida parapsilosis Not Detected     Candida tropicalis Not Detected     Cryptococcus neoformans/gattii Not Detected     CTX-M (ESBL ) Test Not Applicable     IMP (Carbapenem resistant) Test Not Applicable     KPC resistance gene (Carbapenem resistant)  Test Not Applicable     mcr-1  Test Not Applicable     mec A/C  Test Not Applicable     mec A/C and MREJ (MRSA) gene Test Not Applicable     NDM (Carbapenem resistant) Test Not Applicable     OXA-48-like (Carbapenem resistant) Test Not Applicable     van A/B (VRE gene) Test Not Applicable     VIM (Carbapenem resistant) Test Not Applicable            Significant Imaging: I have reviewed all pertinent imaging results/findings within the past 24 hours.

## 2024-05-06 NOTE — PLAN OF CARE
Side rails up x2; call bell in place; bed in lowest, locked position; skid proof socks on; no evidence of skin breakdown; care plan explained to patient; pt remains free of injury. Pt is day + 18 of an auto SCT. Pt with good appetite, tolerating po, voids, BM x 3, ambulates in room. ECHO completed. Cefepime given as scheduled, vancomycin d/cd. Frequent rounding in progress, pt encouraged to call as needed, pt declined bed alarm, family in room, VSS and febrile.

## 2024-05-06 NOTE — PT/OT/SLP PROGRESS
Occupational Therapy      Patient Name:  Chip Goodson   MRN:  [id number]    Patient not seen today secondary to away at ECHO lab.  Will follow-up 05-07-24.    5/6/2024

## 2024-05-06 NOTE — CONSULTS
Attestation signed by Shasha Rojas MD at 5/6/2024  9:13 PM     I have seen the patient, reviewed the Fellow's history and physical, assessment, and plan. I have personally interviewed and examined the patient at bedside and agree with the findings.     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 Okay to discontinue vancomycin IV. Continue cefepime IV while neutropenic. Will treat empirically as endocarditis with 6 week course of IV antibiotics.       50 minutes of total time spent on the encounter, which includes face to face time and non-face to face time preparing to see the patient (eg, review of tests), obtaining and reviewing separately obtained history, documenting clinical information in the electronic or other health record, independently interpreting results (not separately reported) and communicating results to the patient/family/caregiver, and care coordination (not separately reported).            Shasha Rojas MD  Infectious Diseases  [facility] ([facility])      [facility] ([facility])  Infectious Disease  Consult Note    Patient Name: Chip Goodson  MRN: [id number]  Admission Date: 4/16/2024  Hospital Length of Stay: 20 days  Attending Physician: Alo Watson MD  Primary Care Provider: Debbie Gonzalez MD   Isolation Status: No active isolations  Patient information was obtained from patient and ER records.      Inpatient consult to Infectious Diseases  Consult performed by: Rommel Hawthorne MD  Consult ordered by: Chelsie Man NP        Assessment/Plan:     ID  Bacteremia  Multiple myeloma s/p SCT on 4/16; spiked fever 5/3; patient feeling generally well in past few days  - Ucx (5/3) = E. Faecalis  - Bcx (5/3) = Gemella Haemolysans (GPC's)  - No focal  symptoms; no exam findings concerning for infection; lines WNL  - Gemella frequently found in GI, , respiratory tracts; opportunistic as seen here  - Suspect Ucx non-pathogenic, but should be covered with treatment for Gamella regardless  - TTE (5/6) showing thickening of aortic valve, cannot rule out vegetation.  Patient would not be ideal HANSEL candidate       Recommendations:  -- Stop vancomycin  -- Continue cefepime      Thank you for your consult. I will follow-up with patient. Please contact us if you have any additional questions.    Rommel Hawthorne MD  Infectious Disease  Subjective:     Principal Problem: History of autologous stem cell transplant    HPI: 74yo M with a PMH significant for multiple myeloma (dx 2023), currently on day +17 from autologous stem cell transplant, who originally presented for said therapy on 4/16.  The patient felt generally well on arrival and denies focal complaints today.  Since arrival, he has remained fever free, and his cell lines have shown a predictable decline (WBC 0.06 today).  Blood cultures were obtained on 5/3 which have since grown Gemella Haemolysans.  He is surprised about the culture growth, as he feels better today than previous days, and denies any changes in his symptoms.  He denies headache, changes in vision, dysphagia, cough, abdominal pain, nausea, vomiting, diarrhea, or constipation.  CXR was only remarkable for mild diminished inpiratory depth without sign of parenchymal disease.  Urine culture is significant for Enterococcus faecalis, albeit patient denies dysuria.  ID was consulted to assist in antibiotic management.    Past Medical History:   Diagnosis Date    Chronic diastolic (congestive) heart failure     Coronary artery disease     GERD (gastroesophageal reflux disease)     High cholesterol     HTN (hypertension)     Hypothyroidism, unspecified     Multiple myeloma     Secondary pulmonary arterial hypertension     Type 2 diabetes mellitus without  complications        Past Surgical History:   Procedure Laterality Date    APPENDECTOMY  1965    BONE MARROW BIOPSY Right 06/12/2023    Procedure: BIOPSY, BONE MARROW;  Surgeon: Silvano Austin MD;  Location: [facility] OR;  Service: General;  Laterality: Right;    COLONOSCOPY N/A 2/15/2024    Procedure: COLONOSCOPY;  Surgeon: Kei Donahue III, MD;  Location: [facility] ENDO;  Service: Endoscopy;  Laterality: N/A;  POST-OP: ENLARGED NODULAR PROSTATE, SUBOPTIMAL PREP, PAN DIVERTICULOSIS, TRANSVERSE COLON POLYPECTOMY    INSERTION OF TUNNELED CENTRAL VENOUS HEMODIALYSIS CATHETER Left 4/8/2024    Procedure: INSERTION, CATHETER, HEMODIALYSIS, DUAL LUMEN, left poss right, Bard 14.5 fr hemosplit catheter, model [id number];  Surgeon: Theodore Caruso MD;  Location: [address];  Service: General;  Laterality: Left;    removal of boils         Review of patient's allergies indicates:   Allergen Reactions    Iodine Anaphylaxis    Shellfish containing products     Poison ivy extract     Amoxicillin-pot clavulanate Itching       Medications:  Current Facility-Administered Medications   Medication Dose Route Frequency Provider Last Rate Last Admin    0.9%  NaCl infusion (for blood administration)   Intravenous Q24H PRN Parveen Dinero MD        0.9%  NaCl infusion (for blood administration)   Intravenous Q24H PRN Alo Watson MD        0.9%  NaCl infusion (for blood administration)   Intravenous Q24H PRN Jae Joseph MD        acetaminophen tablet 650 mg  650 mg Oral Q8H PRN Parveen Dinero MD   650 mg at 04/30/24 1244    acetaminophen tablet 650 mg  650 mg Oral Q6H PRN Jae Joseph MD   650 mg at 05/03/24 2115    acyclovir capsule 800 mg  800 mg Oral BID Ady Evans MD   800 mg at 05/06/24 0939    albuterol inhaler 2 puff  2 puff Inhalation Q4H PRN Chelsie Man NP        alteplase injection 2 mg  2 mg Intra-Catheter PRN Ady Evans MD        aluminum-magnesium  hydroxide-simethicone 200-200-20 mg/5 mL suspension 30 mL  30 mL Oral Q6H PRN Chelsie Man NP        amLODIPine tablet 10 mg  10 mg Oral Daily Chelsie Man NP   10 mg at 05/06/24 0940    carvediloL tablet 6.25 mg  6.25 mg Oral BID Jessica Cee NP   6.25 mg at 05/06/24 0940    ceFEPIme (MAXIPIME) 2 g in dextrose 5 % in water (D5W) 100 mL IVPB (MB+)  2 g Intravenous Q8H Jae Joseph MD   Stopped at 05/06/24 1403    dextrose 10% bolus 125 mL 125 mL  12.5 g Intravenous PRN Chelsie Man NP        dextrose 10% bolus 250 mL 250 mL  25 g Intravenous PRN Chelsie Man NP        diphenhydrAMINE capsule 25 mg  25 mg Oral PRN Chelsie Man NP   25 mg at 04/30/24 1244    filgrastim (NEUPOGEN) injection 480 mcg/1.6 ml  480 mcg Subcutaneous Daily Ady Evans MD   480 mcg at 05/06/24 0940    finasteride tablet 5 mg  5 mg Oral Daily Chelsie Man NP   5 mg at 05/06/24 0940    fluconazole tablet 400 mg  400 mg Oral Daily Ady Evans MD   400 mg at 05/06/24 0940    heparin (porcine) injection 1,600 Units  1,600 Units Intravenous PRN Ady Evans MD   1,600 Units at 05/05/24 1805    hydrALAZINE tablet 50 mg  50 mg Oral Q8H Chelsie Man, NP   50 mg at 05/06/24 1334    k phos di & mono-sod phos mono 250 mg tablet 1 tablet  1 tablet Oral Q4H PRN Chelsie Man NP   1 tablet at 04/18/24 1748    k phos di & mono-sod phos mono 250 mg tablet 2 tablet  2 tablet Oral Q4H PRN Chelsie Man NP        levothyroxine tablet 112 mcg  112 mcg Oral QHS Chelsie Man NP   112 mcg at 05/05/24 2139    LORazepam injection 1 mg  1 mg Intravenous Q6H PRN Ady Evans MD        magnesium oxide tablet 400 mg  400 mg Oral Q4H PRN Chelsie Man, NP   400 mg at 05/02/24 1054    magnesium oxide tablet 400 mg  400 mg Oral Q4H PRN Chelsie Man, NP        magnesium oxide tablet 800 mg  800 mg Oral Q4H PRN Chelsie Man, NORMA        melatonin  tablet 9 mg  9 mg Oral Nightly PRN Jae Joseph MD   9 mg at 04/30/24 2011    ondansetron injection 4 mg  4 mg Intravenous Q8H PRN Jessica Cee NP   4 mg at 05/02/24 1700    oxyCODONE immediate release tablet 5 mg  5 mg Oral Q4H PRN Chelsie Man NP        pantoprazole EC tablet 40 mg  40 mg Oral Daily Chelsie Man, NP   40 mg at 05/06/24 0940    polyethylene glycol packet 17 g  17 g Oral Daily Aliza Montano MD   17 g at 05/03/24 0914    potassium chloride SA CR tablet 20 mEq  20 mEq Oral PRN Chelsie Man, NP   20 mEq at 05/06/24 0940    potassium chloride SA CR tablet 20 mEq  20 mEq Oral Q2H PRN Chelsie Man, NP        potassium chloride SA CR tablet 20 mEq  20 mEq Oral Q2H PRN Chelsie Man, NORMA        prochlorperazine injection Soln 10 mg  10 mg Intravenous Q6H PRN Jessica Cee NP   10 mg at 04/30/24 0923    senna-docusate 8.6-50 mg per tablet 1 tablet  1 tablet Oral Daily PRN Chelsie Man NP        sodium bicarb-sodium chloride powder 1 Dose  1 Dose Swish & Spit QID Ady Evans MD   1 Dose at 05/06/24 1224    sodium chloride 0.9% flush 10 mL  10 mL Intravenous PRN Ady Evans MD        spironolactone split tablet 12.5 mg  12.5 mg Oral Daily Chelsie Man NP   12.5 mg at 05/06/24 0939    [START ON 5/18/2024] sulfamethoxazole-trimethoprim 800-160mg per tablet 1 tablet  1 tablet Oral Once per day on Monday Wednesday Friday Ady Evans MD        vancomycin - pharmacy to dose   Intravenous pharmacy to manage frequency Jae Joseph MD        vancomycin 1,250 mg in dextrose 5 % (D5W) 250 mL IVPB (Vial-Mate)  1,250 mg Intravenous Q12H Alo Watson MD         Antibiotics (From admission, onward)      Start     Stop Route Frequency Ordered    05/18/24 0900  sulfamethoxazole-trimethoprim 800-160mg per tablet 1 tablet         -- Oral Once per day on Monday Wednesday Friday 04/16/24 2129 05/06/24 1600  vancomycin  "1,250 mg in dextrose 5 % (D5W) 250 mL IVPB (Vial-Mate)         -- IV Every 12 hours (non-standard times) 05/06/24 1438    05/04/24 1610  vancomycin - pharmacy to dose  (vancomycin IVPB (PEDS and ADULTS))        Placed in "And" Linked Group    -- IV pharmacy to manage frequency 05/04/24 1510    05/03/24 2200  ceFEPIme (MAXIPIME) 2 g in dextrose 5 % in water (D5W) 100 mL IVPB (MB+)         -- IV Every 8 hours (non-standard times) 05/03/24 2054          Antifungals (From admission, onward)      Start     Stop Route Frequency Ordered    04/17/24 0900  fluconazole tablet 400 mg         -- Oral Daily 04/16/24 2129          Antivirals (From admission, onward)          Stop Route Frequency     acyclovir         -- Oral 2 times daily               There is no immunization history on file for this patient.    Family History       Problem Relation (Age of Onset)    Breast cancer Sister    Lung cancer Mother          Social History     Socioeconomic History    Marital status:      Spouse name: Graciela    Number of children: 5   Tobacco Use    Smoking status: Former     Types: Cigarettes    Smokeless tobacco: Former   Substance and Sexual Activity    Alcohol use: Yes    Drug use: Never    Sexual activity: Not Currently     Social Determinants of Health     Financial Resource Strain: Low Risk  (1/23/2024)    Overall Financial Resource Strain (CARDIA)     Difficulty of Paying Living Expenses: Not hard at all   Food Insecurity: No Food Insecurity (1/23/2024)    Hunger Vital Sign     Worried About Running Out of Food in the Last Year: Never true     Ran Out of Food in the Last Year: Never true   Transportation Needs: No Transportation Needs (1/23/2024)    PRAPARE - Transportation     Lack of Transportation (Medical): No     Lack of Transportation (Non-Medical): No   Physical Activity: Sufficiently Active (1/23/2024)    Exercise Vital Sign     Days of Exercise per Week: 7 days     Minutes of Exercise per Session: 40 min "   Stress: Stress Concern Present (1/23/2024)    [place] [employer] - Occupational Stress Questionnaire     Feeling of Stress : To some extent   Housing Stability: High Risk (1/23/2024)    Housing Stability Vital Sign     Unable to Pay for Housing in the Last Year: No     Number of Places Lived in the Last Year: 1     Unstable Housing in the Last Year: Yes       Objective:     Vital Signs (Most Recent):  Temp: 98.2 °F (36.8 °C) (05/06/24 1559)  Pulse: 74 (05/06/24 1559)  Resp: 18 (05/06/24 1559)  BP: 126/60 (05/06/24 1559)  SpO2: 96 % (05/06/24 1559) Vital Signs (24h Range):  Temp:  [98.1 °F (36.7 °C)-99.2 °F (37.3 °C)] 98.2 °F (36.8 °C)  Pulse:  [63-81] 74  Resp:  [16-19] 18  SpO2:  [95 %-98 %] 96 %  BP: (102-144)/(56-62) 126/60     Weight: 87.1 kg (192 lb)  Body mass index is 27.55 kg/m².    Estimated Creatinine Clearance: 74.4 mL/min (based on SCr of 0.9 mg/dL).     Physical Exam  Vitals and nursing note reviewed.   Constitutional:       General: He is not in acute distress.     Appearance: He is not toxic-appearing.   HENT:      Head: Normocephalic and atraumatic.      Mouth/Throat:      Mouth: Mucous membranes are moist.      Pharynx: No oropharyngeal exudate or posterior oropharyngeal erythema.   Eyes:      General: No scleral icterus.     Conjunctiva/sclera: Conjunctivae normal.   Cardiovascular:      Rate and Rhythm: Normal rate and regular rhythm.      Pulses: Normal pulses.      Heart sounds: Normal heart sounds.      Comments: R tunneled IJ WNL  Pulmonary:      Breath sounds: Normal breath sounds. No wheezing or rhonchi.   Abdominal:      General: There is no distension.      Tenderness: There is no abdominal tenderness.   Musculoskeletal:         General: No swelling or tenderness.      Cervical back: No rigidity.      Right lower leg: No edema.      Left lower leg: No edema.   Lymphadenopathy:      Cervical: No cervical adenopathy.   Skin:     General: Skin is warm and dry.       Coloration: Skin is not jaundiced.      Findings: No bruising or rash.   Neurological:      General: No focal deficit present.      Mental Status: He is alert and oriented to person, place, and time.   Psychiatric:         Mood and Affect: Mood normal.         Behavior: Behavior normal.          Significant Labs:   Microbiology Results (last 7 days)       Procedure Component Value Units Date/Time    Blood culture [[id number]]  (Abnormal) Collected: 05/03/24 2129    Order Status: Completed Specimen: Blood Updated: 05/06/24 0931     Blood Culture, Routine Gram stain miguelito bottle: Gram positive cocci in clusters resembling Staph      Results called to and read back by:Kaci Vargas RN 05/04/2024  14:52      GEMELLA (S.) HAEMOLYSANS  Susceptibility testing not routinely performed      Blood culture [[id number]]  (Abnormal) Collected: 05/03/24 2126    Order Status: Completed Specimen: Blood Updated: 05/06/24 0931     Blood Culture, Routine Gram stain aer bottle: Gram positive cocci      Gram stain miguelito bottle: Gram positive cocci      Results called to and read back by: Ab Momin RN  05/04/2024 21:29      GEMELLA (S.) HAEMOLYSANS  Susceptibility testing not routinely performed      Urine Culture High Risk [[id number]]  (Abnormal)  (Susceptibility) Collected: 05/03/24 2229    Order Status: Completed Specimen: Urine Updated: 05/06/24 0620     Urine Culture, Routine ENTEROCOCCUS FAECALIS  50,000 - 99,999 cfu/ml  No other significant isolate      Narrative:      Indicated criteria for high risk culture:->Neutropenic  patient    Blood culture [[id number]] Collected: 05/04/24 1543    Order Status: Completed Specimen: Blood from Peripheral, Antecubital, Left Updated: 05/05/24 1812     Blood Culture, Routine No Growth to date      No Growth to date    Blood culture [[id number]] Collected: 05/04/24 1551    Order Status: Completed Specimen: Blood from Peripheral, Antecubital, Right Updated: 05/05/24 1812     Blood Culture,  Routine No Growth to date      No Growth to date    Rapid Organism ID by PCR (from Blood culture) [[id number]] Collected: 05/03/24 2126    Order Status: Completed Updated: 05/04/24 4884     Enterococcus faecalis Not Detected     Enterococcus faecium Not Detected     Listeria monocytogenes Not Detected     Staphylococcus spp. Not Detected     Staphylococcus aureus Not Detected     Staphylococcus epidermidis Not Detected     Staphylococcus lugdunensis Not Detected     Streptococcus species Not Detected     Streptococcus agalactiae Not Detected     Streptococcus pneumoniae Not Detected     Streptococcus pyogenes Not Detected     Acinetobacter calcoaceticus/baumannii complex Not Detected     Bacteroides fragilis Not Detected     Enterobacterales Not Detected     Enterobacter cloacae complex Not Detected     Escherichia coli Not Detected     Klebsiella aerogenes Not Detected     Klebsiella oxytoca Not Detected     Klebsiella pneumoniae group Not Detected     Proteus Not Detected     Salmonella sp Not Detected     Serratia marcescens Not Detected     Haemophilus influenzae Not Detected     Neisseria meningtidis Not Detected     Pseudomonas aeruginosa Not Detected     Stenotrophomonas maltophilia Not Detected     Candida albicans Not Detected     Candida auris Not Detected     Candida glabrata Not Detected     Candida krusei Not Detected     Candida parapsilosis Not Detected     Candida tropicalis Not Detected     Cryptococcus neoformans/gattii Not Detected     CTX-M (ESBL ) Test Not Applicable     IMP (Carbapenem resistant) Test Not Applicable     KPC resistance gene (Carbapenem resistant) Test Not Applicable     mcr-1  Test Not Applicable     mec A/C  Test Not Applicable     mec A/C and MREJ (MRSA) gene Test Not Applicable     NDM (Carbapenem resistant) Test Not Applicable     OXA-48-like (Carbapenem resistant) Test Not Applicable     van A/B (VRE gene) Test Not Applicable     VIM (Carbapenem resistant) Test Not  Applicable            Significant Imaging: I have reviewed all pertinent imaging results/findings within the past 24 hours.

## 2024-05-06 NOTE — SUBJECTIVE & OBJECTIVE
Subjective:     Interval History: Day +18 s/p Carmen 140 Auto SCT for MM. ANC 20 today. Remains on cefepime and vanc for Gamella Haemolysans bacteremia. ID consulted for recs for length of ATB therapy. Echo pending. UC also with low yield enterococcus. Blood cultures 5/4 NGTD. Patient reports feeling well overall this AM. Denies pain, n/v. Did have BM    Objective:     Vital Signs (Most Recent):  Temp: 98.1 °F (36.7 °C) (05/06/24 1138)  Pulse: 69 (05/06/24 1138)  Resp: 18 (05/06/24 1138)  BP: 119/62 (05/06/24 1138)  SpO2: 97 % (05/06/24 1138) Vital Signs (24h Range):  Temp:  [98.1 °F (36.7 °C)-99.2 °F (37.3 °C)] 98.1 °F (36.7 °C)  Pulse:  [69-81] 69  Resp:  [16-19] 18  SpO2:  [95 %-98 %] 97 %  BP: (102-144)/(56-68) 119/62     Weight: 87.5 kg (192 lb 14.4 oz)  Body mass index is 27.68 kg/m².  Body surface area is 2.08 meters squared.      Intake/Output - Last 3 Shifts         05/04 0700  05/05 0659 05/05 0700  05/06 0659 05/06 0700  05/07 0659    P.O. 646 1060 240    Blood  633     IV Piggyback 883.4 728.7     Total Intake(mL/kg) 1529.4 (17.4) 2421.7 (27.7) 240 (2.7)    Urine (mL/kg/hr) 2200 (1) 2525 (1.2) 900 (1.4)    Emesis/NG output   0    Stool 0 0 0    Total Output 2200 2525 900    Net -670.6 -103.3 -660           Stool Occurrence 1 x 2 x 1 x    Emesis Occurrence   1 x             Physical Exam  Vitals and nursing note reviewed.   Constitutional:       Appearance: Normal appearance. He is well-developed.   HENT:      Head: Normocephalic and atraumatic.      Ears:      Comments: [place]     Mouth/Throat:      Mouth: Mucous membranes are moist.      Pharynx: No oropharyngeal exudate or posterior oropharyngeal erythema.   Eyes:      General:         Right eye: No discharge.         Left eye: No discharge.      Extraocular Movements: Extraocular movements intact.      Conjunctiva/sclera: Conjunctivae normal.   Cardiovascular:      Rate and Rhythm: Normal rate and regular rhythm.      Pulses: Normal pulses.      Heart  sounds: Normal heart sounds. No murmur heard.  Pulmonary:      Effort: Pulmonary effort is normal. No respiratory distress.      Breath sounds: Normal breath sounds. No wheezing or rales.   Abdominal:      General: Bowel sounds are normal. There is no distension.      Palpations: Abdomen is soft.      Tenderness: There is no abdominal tenderness.   Musculoskeletal:         General: No deformity. Normal range of motion.      Cervical back: Normal range of motion and neck supple.      Right lower leg: No edema.      Left lower leg: No edema.   Skin:     General: Skin is warm and dry.      Findings: No bruising, erythema or rash.      Comments: Right chest wall vas cath. Dressing c/d/i. No sign of infection to site.   Neurological:      General: No focal deficit present.      Mental Status: He is alert and oriented to person, place, and time.      Motor: No weakness.   Psychiatric:         Mood and Affect: Mood normal.         Behavior: Behavior normal.         Thought Content: Thought content normal.         Judgment: Judgment normal.            Significant Labs:   CBC:   Recent Labs   Lab 05/05/24  0440 05/06/24  0400   WBC 0.06* 0.08*   HGB 6.4* 7.0*   HCT 17.4* 19.1*   PLT 9* 21*    and CMP:   Recent Labs   Lab 05/05/24  0440 05/06/24  0400   * 136   K 3.8 3.7    106   CO2 25 23   * 126*   BUN 20 16   CREATININE 1.0 0.9   CALCIUM 8.8 8.9   PROT 5.0* 5.1*   ALBUMIN 2.6* 2.7*   BILITOT 0.9 1.0   ALKPHOS 77 80   AST 25 28   ALT 33 48*   ANIONGAP 5* 7*       Diagnostic Results:  I have reviewed all pertinent imaging results/findings within the past 24 hours.

## 2024-05-06 NOTE — HPI
72yo M with a PMH significant for multiple myeloma (dx 2023), currently on day +17 from autologous stem cell transplant, who originally presented for said therapy on 4/16.  The patient felt generally well on arrival and denies focal complaints today.  Since arrival, he has remained fever free, and his cell lines have shown a predictable decline (WBC 0.06 today).  Blood cultures were obtained on 5/3 which have since grown Gemella Haemolysans.  He is surprised about the culture growth, as he feels better today than previous days, and denies any changes in his symptoms.  He denies headache, changes in vision, dysphagia, cough, abdominal pain, nausea, vomiting, diarrhea, or constipation.  CXR was only remarkable for mild diminished inpiratory depth without sign of parenchymal disease.  Urine culture is significant for Enterococcus faecalis, albeit patient denies dysuria.  ID was consulted to assist in antibiotic management.

## 2024-05-06 NOTE — ASSESSMENT & PLAN NOTE
- Developed on 5/3/2024. Cultures obtained and showing Gemella Haemolysans  - Echo pending to assess for any potential endocarditis  - Surveillance cultures obtained on 5/4 currently NGTD.  - UC with low yield enterococcus facecialis  - Continue cefepime and vancomycin.  - ID consulted for recommendations for length of therapy for IV antibiotics.

## 2024-05-06 NOTE — PT/OT/SLP PROGRESS
"Physical Therapy      Patient Name:  Chip Goodson   MRN:  [id number]    12:09 pm- 12:13 pm  Patient not seen today secondary to Other (Comment) (Pt found sup in bed with family member present in the room. Pt declines amb in hallway stating "I don't want to go in the hallway because there are some people who don't wear a mask" Pt was reminded that's why he wears a mask when in the hallway.  PTA offered to do the gait trial in the room, however, pt states "It's not big enough to walk" and proceeded to exercise LE's with red theraband.  PTA offers to assist him perform B LE's ex's in standing (he requested to do something more challenging than walking), however, he still refused stating "I'm going to do what I want to do". Pt also reports walking to restroom). Will follow-up on next scheduled visit.    "

## 2024-05-06 NOTE — ASSESSMENT & PLAN NOTE
- patient of Dr. Evans; follows locally with Dr. 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

## 2024-05-06 NOTE — PROGRESS NOTES
Pt seen for follow up. Oriented to count recovery process and benchmarks for discharge.  [place] expecting patient; will update on estimated date o discharge.  No other needs identified at this time. Will continue to follow and assist as needed.

## 2024-05-07 LAB
ALBUMIN SERPL BCP-MCNC: 2.7 G/DL (ref 3.5–5.2)
ALP SERPL-CCNC: 81 U/L (ref 55–135)
ALT SERPL W/O P-5'-P-CCNC: 46 U/L (ref 10–44)
ANION GAP SERPL CALC-SCNC: 6 MMOL/L (ref 8–16)
ANISOCYTOSIS BLD QL SMEAR: SLIGHT
AST SERPL-CCNC: 19 U/L (ref 10–40)
BASOPHILS # BLD AUTO: 0 K/UL (ref 0–0.2)
BASOPHILS NFR BLD: 0 % (ref 0–1.9)
BILIRUB SERPL-MCNC: 0.7 MG/DL (ref 0.1–1)
BLD PROD TYP BPU: NORMAL
BLOOD UNIT EXPIRATION DATE: NORMAL
BLOOD UNIT TYPE CODE: 5100
BLOOD UNIT TYPE: NORMAL
BUN SERPL-MCNC: 17 MG/DL (ref 8–23)
CALCIUM SERPL-MCNC: 9 MG/DL (ref 8.7–10.5)
CHLORIDE SERPL-SCNC: 108 MMOL/L (ref 95–110)
CO2 SERPL-SCNC: 23 MMOL/L (ref 23–29)
CODING SYSTEM: NORMAL
CREAT SERPL-MCNC: 0.9 MG/DL (ref 0.5–1.4)
CROSSMATCH INTERPRETATION: NORMAL
DIFFERENTIAL METHOD BLD: ABNORMAL
DISPENSE STATUS: NORMAL
EOSINOPHIL # BLD AUTO: 0 K/UL (ref 0–0.5)
EOSINOPHIL NFR BLD: 0 % (ref 0–8)
ERYTHROCYTE [DISTWIDTH] IN BLOOD BY AUTOMATED COUNT: 10.7 % (ref 11.5–14.5)
EST. GFR  (NO RACE VARIABLE): >60 ML/MIN/1.73 M^2
GLUCOSE SERPL-MCNC: 135 MG/DL (ref 70–110)
HCT VFR BLD AUTO: 18.5 % (ref 40–54)
HGB BLD-MCNC: 6.9 G/DL (ref 14–18)
HYPOCHROMIA BLD QL SMEAR: ABNORMAL
IMM GRANULOCYTES # BLD AUTO: 0 K/UL (ref 0–0.04)
IMM GRANULOCYTES NFR BLD AUTO: 0 % (ref 0–0.5)
LYMPHOCYTES # BLD AUTO: 0.1 K/UL (ref 1–4.8)
LYMPHOCYTES NFR BLD: 43.8 % (ref 18–48)
MAGNESIUM SERPL-MCNC: 2.1 MG/DL (ref 1.6–2.6)
MCH RBC QN AUTO: 34.7 PG (ref 27–31)
MCHC RBC AUTO-ENTMCNC: 37.3 G/DL (ref 32–36)
MCV RBC AUTO: 93 FL (ref 82–98)
MONOCYTES # BLD AUTO: 0 K/UL (ref 0.3–1)
MONOCYTES NFR BLD: 18.8 % (ref 4–15)
NEUTROPHILS # BLD AUTO: 0.1 K/UL (ref 1.8–7.7)
NEUTROPHILS NFR BLD: 37.4 % (ref 38–73)
NRBC BLD-RTO: 0 /100 WBC
NUM UNITS TRANS PACKED RBC: NORMAL
OVALOCYTES BLD QL SMEAR: ABNORMAL
PHOSPHATE SERPL-MCNC: 2.7 MG/DL (ref 2.7–4.5)
PLATELET # BLD AUTO: 13 K/UL (ref 150–450)
PMV BLD AUTO: 10.2 FL (ref 9.2–12.9)
POIKILOCYTOSIS BLD QL SMEAR: SLIGHT
POLYCHROMASIA BLD QL SMEAR: ABNORMAL
POTASSIUM SERPL-SCNC: 4.1 MMOL/L (ref 3.5–5.1)
PROT SERPL-MCNC: 5.1 G/DL (ref 6–8.4)
RBC # BLD AUTO: 1.99 M/UL (ref 4.6–6.2)
SODIUM SERPL-SCNC: 137 MMOL/L (ref 136–145)
WBC # BLD AUTO: 0.16 K/UL (ref 3.9–12.7)

## 2024-05-07 PROCEDURE — 86920 COMPATIBILITY TEST SPIN: CPT | Performed by: NURSE PRACTITIONER

## 2024-05-07 PROCEDURE — 63600175 PHARM REV CODE 636 W HCPCS: Performed by: STUDENT IN AN ORGANIZED HEALTH CARE EDUCATION/TRAINING PROGRAM

## 2024-05-07 PROCEDURE — 20600001 HC STEP DOWN PRIVATE ROOM

## 2024-05-07 PROCEDURE — 85025 COMPLETE CBC W/AUTO DIFF WBC: CPT | Performed by: NURSE PRACTITIONER

## 2024-05-07 PROCEDURE — P9040 RBC LEUKOREDUCED IRRADIATED: HCPCS | Performed by: NURSE PRACTITIONER

## 2024-05-07 PROCEDURE — 84100 ASSAY OF PHOSPHORUS: CPT | Performed by: NURSE PRACTITIONER

## 2024-05-07 PROCEDURE — 63600175 PHARM REV CODE 636 W HCPCS: Performed by: INTERNAL MEDICINE

## 2024-05-07 PROCEDURE — 25000003 PHARM REV CODE 250: Performed by: INTERNAL MEDICINE

## 2024-05-07 PROCEDURE — 83735 ASSAY OF MAGNESIUM: CPT | Performed by: NURSE PRACTITIONER

## 2024-05-07 PROCEDURE — 25000003 PHARM REV CODE 250: Performed by: NURSE PRACTITIONER

## 2024-05-07 PROCEDURE — 80053 COMPREHEN METABOLIC PANEL: CPT | Performed by: NURSE PRACTITIONER

## 2024-05-07 PROCEDURE — 36430 TRANSFUSION BLD/BLD COMPNT: CPT

## 2024-05-07 PROCEDURE — 99233 SBSQ HOSP IP/OBS HIGH 50: CPT | Mod: GC,,, | Performed by: INTERNAL MEDICINE

## 2024-05-07 PROCEDURE — 97530 THERAPEUTIC ACTIVITIES: CPT

## 2024-05-07 PROCEDURE — 99233 SBSQ HOSP IP/OBS HIGH 50: CPT | Mod: ,,, | Performed by: INTERNAL MEDICINE

## 2024-05-07 PROCEDURE — 25000003 PHARM REV CODE 250: Performed by: STUDENT IN AN ORGANIZED HEALTH CARE EDUCATION/TRAINING PROGRAM

## 2024-05-07 PROCEDURE — 94761 N-INVAS EAR/PLS OXIMETRY MLT: CPT

## 2024-05-07 RX ORDER — DIPHENHYDRAMINE HCL 25 MG
25 CAPSULE ORAL ONCE AS NEEDED
Status: DISCONTINUED | OUTPATIENT
Start: 2024-05-07 | End: 2024-05-08

## 2024-05-07 RX ORDER — ACETAMINOPHEN 325 MG/1
650 TABLET ORAL ONCE AS NEEDED
Status: DISCONTINUED | OUTPATIENT
Start: 2024-05-07 | End: 2024-05-09

## 2024-05-07 RX ORDER — HYDROCODONE BITARTRATE AND ACETAMINOPHEN 500; 5 MG/1; MG/1
TABLET ORAL
Status: DISCONTINUED | OUTPATIENT
Start: 2024-05-07 | End: 2024-05-08

## 2024-05-07 RX ADMIN — ACYCLOVIR 800 MG: 200 CAPSULE ORAL at 08:05

## 2024-05-07 RX ADMIN — Medication 1 DOSE: at 09:05

## 2024-05-07 RX ADMIN — FILGRASTIM 480 MCG: 480 INJECTION, SOLUTION INTRAVENOUS; SUBCUTANEOUS at 09:05

## 2024-05-07 RX ADMIN — Medication 1 DOSE: at 08:05

## 2024-05-07 RX ADMIN — HEPARIN SODIUM 1600 UNITS: 1000 INJECTION, SOLUTION INTRAVENOUS; SUBCUTANEOUS at 12:05

## 2024-05-07 RX ADMIN — LEVOTHYROXINE SODIUM 112 MCG: 112 TABLET ORAL at 08:05

## 2024-05-07 RX ADMIN — PANTOPRAZOLE SODIUM 40 MG: 40 TABLET, DELAYED RELEASE ORAL at 09:05

## 2024-05-07 RX ADMIN — FLUCONAZOLE 400 MG: 200 TABLET ORAL at 09:05

## 2024-05-07 RX ADMIN — CEFEPIME 2 G: 2 INJECTION, POWDER, FOR SOLUTION INTRAVENOUS at 01:05

## 2024-05-07 RX ADMIN — HYDRALAZINE HYDROCHLORIDE 50 MG: 50 TABLET ORAL at 09:05

## 2024-05-07 RX ADMIN — HYDRALAZINE HYDROCHLORIDE 50 MG: 50 TABLET ORAL at 06:05

## 2024-05-07 RX ADMIN — AMLODIPINE BESYLATE 10 MG: 10 TABLET ORAL at 09:05

## 2024-05-07 RX ADMIN — HEPARIN SODIUM 1600 UNITS: 1000 INJECTION, SOLUTION INTRAVENOUS; SUBCUTANEOUS at 03:05

## 2024-05-07 RX ADMIN — CEFEPIME 2 G: 2 INJECTION, POWDER, FOR SOLUTION INTRAVENOUS at 06:05

## 2024-05-07 RX ADMIN — CARVEDILOL 6.25 MG: 6.25 TABLET, FILM COATED ORAL at 09:05

## 2024-05-07 RX ADMIN — FINASTERIDE 5 MG: 5 TABLET, FILM COATED ORAL at 09:05

## 2024-05-07 RX ADMIN — ACYCLOVIR 800 MG: 200 CAPSULE ORAL at 09:05

## 2024-05-07 RX ADMIN — HYDRALAZINE HYDROCHLORIDE 50 MG: 50 TABLET ORAL at 01:05

## 2024-05-07 RX ADMIN — Medication 1 DOSE: at 05:05

## 2024-05-07 RX ADMIN — CEFEPIME 2 G: 2 INJECTION, POWDER, FOR SOLUTION INTRAVENOUS at 09:05

## 2024-05-07 RX ADMIN — CARVEDILOL 6.25 MG: 6.25 TABLET, FILM COATED ORAL at 08:05

## 2024-05-07 RX ADMIN — SPIRONOLACTONE 12.5 MG: 25 TABLET ORAL at 09:05

## 2024-05-07 RX ADMIN — Medication 1 DOSE: at 12:05

## 2024-05-07 NOTE — ASSESSMENT & PLAN NOTE
- Patient of Dr. Evans  - Admitted 4/16/24 for a Carmen 140 auto SCT  - Today is Day +19 ; ANC is 60  - He received 5 bags on 4/18/24 at 1330 and received the remaining 4 bags on 4/19/24 at 1330. Total CD34 dose was 2.91 x 10^6.  - See treatment plan below.    Planned conditioning regimen:  Melphalan TBD on Day -1     Antimicrobial Prophylaxis:  Acyclovir starting on Day -1  Levofloxacin starting on Day -1  Fluconazole starting on Day -1  Bactrim starting on Day +30     Growth Factor Support:  Neupogen starting on Day +7      Caregiver: daughter and other family members  Post-transplant discharge plans: Smita Portillo

## 2024-05-07 NOTE — SUBJECTIVE & OBJECTIVE
Subjective:     Interval History: Day +19 from a Carmen 140 auto SCT for MM. ANC up to 60 today. Remains afebrile. VSS. Continuing Cefepime for Gemella bacteremia. Repeat blood cx from 5/4 still with NGTD. Unable to r/o vegetation of aortic valve on TTE, so will complete a 6 week course of empiric IV abx per ID. Susceptibilities pending. Transfusing 1 unit prbc for hgb of 6.9.     Objective:     Vital Signs (Most Recent):  Temp: 97.7 °F (36.5 °C) (05/07/24 1206)  Pulse: 71 (05/07/24 1206)  Resp: 18 (05/07/24 1206)  BP: 135/60 (05/07/24 1206)  SpO2: 98 % (05/07/24 1206) Vital Signs (24h Range):  Temp:  [97.7 °F (36.5 °C)-99.4 °F (37.4 °C)] 97.7 °F (36.5 °C)  Pulse:  [56-83] 71  Resp:  [16-18] 18  SpO2:  [96 %-99 %] 98 %  BP: (107-146)/(53-73) 135/60     Weight: 86.8 kg (191 lb 5.8 oz)  Body mass index is 27.46 kg/m².  Body surface area is 2.07 meters squared.    ECOG SCORE           [unfilled]    Intake/Output - Last 3 Shifts         05/05 0700  05/06 0659 05/06 0700  05/07 0659 05/07 0700  05/08 0659    P.O. 1060 960 597    Blood 633  350    IV Piggyback 728.7      Total Intake(mL/kg) 2421.7 (27.7) 960 (11.1) 947 (10.9)    Urine (mL/kg/hr) 2525 (1.2) 2450 (1.2) 600 (1.2)    Emesis/NG output  0     Stool 0 0 0    Total Output 2525 2450 600    Net -103.3 -1490 +347           Stool Occurrence 2 x 3 x 1 x    Emesis Occurrence  1 x              Physical Exam  Constitutional:       Appearance: He is well-developed.   HENT:      Head: Normocephalic and atraumatic.      Ears:      Comments: [place]     Mouth/Throat:      Pharynx: No oropharyngeal exudate.   Eyes:      General:         Right eye: No discharge.         Left eye: No discharge.      Conjunctiva/sclera: Conjunctivae normal.      Pupils: Pupils are equal, round, and reactive to light.   Cardiovascular:      Rate and Rhythm: Normal rate and regular rhythm.      Heart sounds: Normal heart sounds. No murmur heard.  Pulmonary:      Effort: Pulmonary effort is normal. No  respiratory distress.      Breath sounds: Normal breath sounds. No wheezing or rales.   Abdominal:      General: Bowel sounds are normal. There is no distension.      Palpations: Abdomen is soft.      Tenderness: There is no abdominal tenderness.   Musculoskeletal:         General: No deformity. Normal range of motion.      Cervical back: Normal range of motion and neck supple.   Skin:     General: Skin is warm and dry.      Findings: No erythema or rash.      Comments: Right chest wall vas cath. Dressing c/d/i. No sign of infection to site.   Neurological:      Mental Status: He is alert and oriented to person, place, and time.   Psychiatric:         Behavior: Behavior normal.         Thought Content: Thought content normal.         Judgment: Judgment normal.            Significant Labs:   CBC:   Recent Labs   Lab 05/06/24  0400 05/07/24  0448   WBC 0.08* 0.16*   HGB 7.0* 6.9*   HCT 19.1* 18.5*   PLT 21* 13*    and CMP:   Recent Labs   Lab 05/06/24  0400 05/07/24  0448    137   K 3.7 4.1    108   CO2 23 23   * 135*   BUN 16 17   CREATININE 0.9 0.9   CALCIUM 8.9 9.0   PROT 5.1* 5.1*   ALBUMIN 2.7* 2.7*   BILITOT 1.0 0.7   ALKPHOS 80 81   AST 28 19   ALT 48* 46*   ANIONGAP 7* 6*       Diagnostic Results:  None

## 2024-05-07 NOTE — PROGRESS NOTES
[facility]   Infectious Diseases  Progress Note     Patient Name: Chip Goodson   MRN:  [id number]   Admission Date:  4/16/2024  Length of Stay:  21  Attending Physician:  Alo Watson MD   Primary Care Provider:  Debbie Gonzalez MD       Isolation Status: No active isolations    Assessment/Plan:      ID  Bacteremia  Multiple myeloma s/p SCT on 4/16; spiked fever 5/3; patient feeling generally well in past few days  - Ucx (5/3) = E. 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          Recommendations:  -- Continue cefepime at this time. As patient would not be ideal HANSEL candidate, would recommend treating empirically x6 weeks for possible infective endocarditis (end date: 6/15/2024). If ANC improves >500 will consider switching to CTX for qd dosing.      Anticipated Disposition: per primary     Thank you for your consult. We will sign off at this time. Please contact us if you have any additional questions.     Smita Ozuna MD  Infectious Diseases     Subjective:      Principal Problem: History of autologous stem cell transplant     HPI: 72yo M with a PMH significant for multiple myeloma (dx 2023), currently on day +17 from autologous stem cell transplant, who originally presented for said therapy on 4/16. The patient felt generally well on arrival and denies focal complaints today. Since arrival, he has remained fever free, and his cell lines have shown a predictable decline (WBC 0.06 today). Blood cultures were obtained on 5/3 which have since grown Gemella Haemolysans. He is surprised about the culture growth, as he feels  "better today than previous days, and denies any changes in his symptoms. He denies headache, changes in vision, dysphagia, cough, abdominal pain, nausea, vomiting, diarrhea, or constipation. CXR was only remarkable for mild diminished inpiratory depth without sign of parenchymal disease. Urine culture is significant for Enterococcus faecalis, albeit patient denies dysuria. ID was consulted to assist in antibiotic management.      Interval History: Patient awake and alert, in good spirits today. No new problems or complaints. Afebrile, ANC 59.8.      Review of Systems   ROS    Objective:      Last 24 Hour Vital Signs:  BP  Min: 107/53  Max: 146/73  Temp  Av.5 °F (36.9 °C)  Min: 97.7 °F (36.5 °C)  Max: 99.4 °F (37.4 °C)  Pulse  Av.9  Min: 56  Max: 83  Resp  Av.8  Min: 16  Max: 18  SpO2  Av.2 %  Min: 96 %  Max: 99 %  Height  Av' 10" (177.8 cm)  Min: 5' 10" (177.8 cm)  Max: 5' 10" (177.8 cm)  Weight  Av.9 kg (191 lb 10.9 oz)  Min: 86.8 kg (191 lb 5.8 oz)  Max: 87.1 kg (192 lb)  I/O last 3 completed shifts:  In: 1670 [P.O.:1320; IV Piggyback:350]  Out: 3650 [Urine:3650]    Body mass index is 27.46 kg/m².  Estimated Creatinine Clearance: 74.4 mL/min (based on SCr of 0.9 mg/dL).        Physical Exam  Physical Exam   Vitals and nursing note reviewed.   Constitutional:       General: He is not in acute distress.     Appearance: He is not toxic-appearing.   HENT:      Head: Normocephalic and atraumatic.      Mouth/Throat:      Mouth: Mucous membranes are moist.      Pharynx: No oropharyngeal exudate or posterior oropharyngeal erythema.   Eyes:      General: No scleral icterus.     Conjunctiva/sclera: Conjunctivae normal.   Cardiovascular:      Rate and Rhythm: Normal rate and regular rhythm.      Pulses: Normal pulses.      Heart sounds: Normal heart sounds.      Comments: R tunneled IJ WNL  Pulmonary:      Breath sounds: Normal breath sounds. No wheezing or rhonchi.   Abdominal:      General: " There is no distension.      Tenderness: There is no abdominal tenderness.   Musculoskeletal:         General: No swelling or tenderness.      Cervical back: No rigidity.      Right lower leg: No edema.      Left lower leg: No edema.   Lymphadenopathy:      Cervical: No cervical adenopathy.   Skin:     General: Skin is warm and dry.      Coloration: Skin is not jaundiced.      Findings: No bruising or rash.   Neurological:      General: No focal deficit present.      Mental Status: He is alert and oriented to person, place, and time.   Psychiatric:         Mood and Affect: Mood normal.         Behavior: Behavior normal.       Labs  Recent Labs   Lab 05/05/24  0440 05/06/24  0400 05/07/24  0448   WBC 0.06* 0.08* 0.16*   HGB 6.4* 7.0* 6.9*   HCT 17.4* 19.1* 18.5*   PLT 9* 21* 13*   MCV 97 91 93   RDW 10.1* 11.4* 10.7*   * 136 137   K 3.8 3.7 4.1    106 108   CO2 25 23 23   BUN 20 16 17   CREATININE 1.0 0.9 0.9   * 126* 135*   PROT 5.0* 5.1* 5.1*   ALBUMIN 2.6* 2.7* 2.7*   BILITOT 0.9 1.0 0.7   AST 25 28 19   ALKPHOS 77 80 81   ALT 33 48* 46*         Microbiology:  Microbiology Results (last 7 days)       Procedure Component Value Units Date/Time    Blood culture [[id number]] Collected: 05/04/24 1543    Order Status: Completed Specimen: Blood from Peripheral, Antecubital, Left Updated: 05/06/24 1812     Blood Culture, Routine No Growth to date      No Growth to date      No Growth to date    Blood culture [[id number]] Collected: 05/04/24 1551    Order Status: Completed Specimen: Blood from Peripheral, Antecubital, Right Updated: 05/06/24 1812     Blood Culture, Routine No Growth to date      No Growth to date      No Growth to date    Blood culture [[id number]]  (Abnormal) Collected: 05/03/24 2129    Order Status: Completed Specimen: Blood Updated: 05/06/24 0931     Blood Culture, Routine Gram stain miguelito bottle: Gram positive cocci in clusters resembling Staph      Results called to and read back  by:Kaci Vargas RN 05/04/2024  14:52      GEMELLA (S.) HAEMOLYSANS  Susceptibility testing not routinely performed      Blood culture [[id number]]  (Abnormal) Collected: 05/03/24 2126    Order Status: Completed Specimen: Blood Updated: 05/06/24 0931     Blood Culture, Routine Gram stain aer bottle: Gram positive cocci      Gram stain miguelito bottle: Gram positive cocci      Results called to and read back by: Ab Momin RN  05/04/2024 21:29      GEMELLA (S.) HAEMOLYSANS  Susceptibility testing not routinely performed      Urine Culture High Risk [[id number]]  (Abnormal)  (Susceptibility) Collected: 05/03/24 2229    Order Status: Completed Specimen: Urine Updated: 05/06/24 0620     Urine Culture, Routine ENTEROCOCCUS FAECALIS  50,000 - 99,999 cfu/ml  No other significant isolate      Narrative:      Indicated criteria for high risk culture:->Neutropenic  patient    Rapid Organism ID by PCR (from Blood culture) [[id number]] Collected: 05/03/24 2126    Order Status: Completed Updated: 05/04/24 1745     Enterococcus faecalis Not Detected     Enterococcus faecium Not Detected     Listeria monocytogenes Not Detected     Staphylococcus spp. Not Detected     Staphylococcus aureus Not Detected     Staphylococcus epidermidis Not Detected     Staphylococcus lugdunensis Not Detected     Streptococcus species Not Detected     Streptococcus agalactiae Not Detected     Streptococcus pneumoniae Not Detected     Streptococcus pyogenes Not Detected     Acinetobacter calcoaceticus/baumannii complex Not Detected     Bacteroides fragilis Not Detected     Enterobacterales Not Detected     Enterobacter cloacae complex Not Detected     Escherichia coli Not Detected     Klebsiella aerogenes Not Detected     Klebsiella oxytoca Not Detected     Klebsiella pneumoniae group Not Detected     Proteus Not Detected     Salmonella sp Not Detected     Serratia marcescens Not Detected     Haemophilus influenzae Not Detected     Neisseria meningtidis  Not Detected     Pseudomonas aeruginosa Not Detected     Stenotrophomonas maltophilia Not Detected     Candida albicans Not Detected     Candida auris Not Detected     Candida glabrata Not Detected     Candida krusei Not Detected     Candida parapsilosis Not Detected     Candida tropicalis Not Detected     Cryptococcus neoformans/gattii Not Detected     CTX-M (ESBL ) Test Not Applicable     IMP (Carbapenem resistant) Test Not Applicable     KPC resistance gene (Carbapenem resistant) Test Not Applicable     mcr-1  Test Not Applicable     mec A/C  Test Not Applicable     mec A/C and MREJ (MRSA) gene Test Not Applicable     NDM (Carbapenem resistant) Test Not Applicable     OXA-48-like (Carbapenem resistant) Test Not Applicable     van A/B (VRE gene) Test Not Applicable     VIM (Carbapenem resistant) Test Not Applicable            Significant Imaging: I have reviewed all pertinent imaging results/findings within the past 24 hours.

## 2024-05-07 NOTE — PT/OT/SLP PROGRESS
Occupational Therapy      Patient Name:  Chip Goodson   MRN:  [id number]    Patient not seen today secondary to Pt. With decreased H & H on this date  5/7/2024

## 2024-05-07 NOTE — PLAN OF CARE
Outpatient Antibiotic Therapy Plan:    Please send referral to [facility].    1) Infection: Gemella haemolysans bacteremia    2) Discharge Antibiotics:    Intravenous antibiotics:  Ceftriaxone 2g IV q24 hours      3) Therapy Duration:  6 weeks     Estimated end date of IV antibiotics: 6/14/2024    4) Outpatient Weekly Labs:    Order the following labs to be drawn on Mondays:   CBC  CMP   CRP    5) Fax Lab Results to Infectious Diseases Provider: Dr. Rojas    [facility] Fax Number: [phone]    6) Outpatient Infectious Diseases Follow-up    Follow-up appointment will be arranged by the ID clinic and will be found in the patient's appointments tab.    Prior to discharge, please ensure the patient's follow-up has been scheduled.    If there is still no follow-up scheduled prior to discharge, please send an EPIC message to Michelle Hamlin in Infectious Diseases.

## 2024-05-07 NOTE — PT/OT/SLP PROGRESS
"Physical Therapy Treatment    Patient Name:  Chip Goodson   MRN:  [id number]    Recommendations:     Discharge Recommendations: No Therapy Indicated  Discharge Equipment Recommendations: none  Barriers to discharge: None    Assessment:     Chip Goodson is a 73 y.o. male admitted with a medical diagnosis of History of autologous stem cell transplant.  He presents with the following impairments/functional limitations: Patient at risk for deconditioning. No active treatment completed this date due to patient's low Hg and platelet levels. Furthermore, patient initially demonstrating increased agitation with writing therapist, however he became more participatory as session progressed. Heavy focus on education; emphasizing and/or modifying previously prescribed activities & exercises in prescribed HEP; and determining appropriate shift in PT focus that is more applicable to patient's daily life.     Rehab Prognosis: Good; patient would benefit from acute skilled PT services to address these deficits and reach maximum level of function.    Recent Surgery: * No surgery found *      Plan:     During this hospitalization, patient to be seen 2 x/week to address the identified rehab impairments via gait training, therapeutic activities, therapeutic exercises, neuromuscular re-education and progress toward the following goals:    Plan of Care Expires:  05/19/24    Subjective     Chief Complaint: "You went to school for this, shouldn't you know what my gaps are?"  Patient/Family Comments/goals: To go home ; Patient wanting more salient activities/exercises  Pain/Comfort:  Pain Rating 1: 0/10  Pain Rating Post-Intervention 1: 0/10    Objective:     Communicated with RN prior to session.  Patient found supine with central line upon PT entry to room.     General Precautions: Standard, fall, diabetic, hearing impaired   Orthopedic Precautions:N/A   Braces: N/A   Body mass index is 27.46 kg/m².  Oxygen Device: Room Air  Vitals: " "BP (!) 140/63 (Patient Position: Lying)   Pulse 88   Temp 98.4 °F (36.9 °C) (Oral)   Resp 18   Ht 5' 10" (1.778 m)   Wt 86.8 kg (191 lb 5.8 oz)   SpO2 (!) 94%   BMI 27.46 kg/m²      Functional Mobility: Not completed this date secondary to low lab values this date.    AM-PAC 6 CLICK MOBILITY  Turning over in bed (including adjusting bedclothes, sheets and blankets)?: 4  Sitting down on and standing up from a chair with arms (e.g., wheelchair, bedside commode, etc.): 4  Moving from lying on back to sitting on the side of the bed?: 4  Moving to and from a bed to a chair (including a wheelchair)?: 4  Need to walk in hospital room?: 4  Climbing 3-5 steps with a railing?: 4  Basic Mobility Total Score: 24     Treatment & Education:  Increased time spent with patient to discuss patient's desire to continue receiving PT services  Emphasized importance of participation   Conversation held about patient's goals; daily activities that he wants to get back to doing post-discharge; and any discrepancies between PLOF & current functional status  Encouraged patient to continue utilizing recumbent bike to address endurance capacity    Patient Education Provided on:  The role of physical therapy and how the patient can benefit from skilled services  The negative effects of prolonged bed rest/sedentary behavior, along with the importance of OOB activity & patient participation with PT  Importance of avoiding Therabands for lower extremity activities due to low Hg & platelet levels  Difference between stretching vs. mobility-based exercises vs. strength-based exercises  Encouraged patient to complete more strength-based LE exercises  The importance of contacting RN, via call light, for mobility throughout the day    Patient Verbalized understanding of all topics touched on this date. All patient questions answered within the PT scope of practice    Patient left supine with all lines intact and call button in reach..    GOALS: "   Multidisciplinary Problems       Physical Therapy Goals          Problem: Physical Therapy    Goal Priority Disciplines Outcome Goal Variances Interventions   Physical Therapy Goal     PT, PT/OT Progressing     Description: Goals to be met by: 24, extend through 24    Patient will maintain functional independence with mobility, without evidence of deconditioning, by performin. Supine to sit with [place]  2. Sit to stand transfer with [place]  3. Gait  x 400 feet with [place]   4. Ascend/Descend 6 inch curb step with [place]  5. Stand for 10 minutes with [place]  6. Lower extremity exercise program x30 reps per handout, with assistance as needed                         Time Tracking:     PT Received On: 24  PT Start Time: 1341     PT Stop Time: 1353  PT Total Time (min): 12 min     Billable Minutes: Therapeutic Activity 12    Treatment Type: Treatment  PT/PTA: PT   Number of PTA visits since last PT visit: 0     2024

## 2024-05-07 NOTE — ASSESSMENT & PLAN NOTE
- Previously on BID protonix and daily pepcid for stomach ulcers; now resolved  - Continuing daily protonix per transplant order set

## 2024-05-07 NOTE — ASSESSMENT & PLAN NOTE
- Developed on 5/3/2024. Cultures obtained and showing Gemella Haemolysans  - Could not r/o aortic valve vegetation via TTE. Not a good HANSEL candidate per ID, so will complete a 6 week course of empiric IV abx.  - Surveillance cultures obtained on 5/4 currently NGTD.  - UC with low yield enterococcus facecialis (covered by Cefepime).  - Continue cefepime. Vanc stopped 5/6.  - Susceptibilities pending

## 2024-05-07 NOTE — NURSING
Patient is AAOX4. Up, independent, non skid shocks on, family at the bedside, bed alarm refused. Call light and personal items within reach. Patient involved in care. Regular diet with good intake. 1 U PRBC transfused, premedication refused by the patient. Patient stable at this time.

## 2024-05-07 NOTE — PROGRESS NOTES
[facility] - Oncology ([facility])  Hematology  Bone Marrow Transplant  Progress Note    Patient Name: Chip Goodson  Admission Date: 4/16/2024  Hospital Length of Stay: 21 days  Code Status: Full Code    Subjective:     Interval History: Day +19 from a Carmen 140 auto SCT for MM. ANC up to 60 today. Remains afebrile. VSS. Continuing Cefepime for Gemella bacteremia. Repeat blood cx from 5/4 still with NGTD. Unable to r/o vegetation of aortic valve on TTE, so will complete a 6 week course of empiric IV abx per ID. Susceptibilities pending. Transfusing 1 unit prbc for hgb of 6.9.     Objective:     Vital Signs (Most Recent):  Temp: 97.7 °F (36.5 °C) (05/07/24 1206)  Pulse: 71 (05/07/24 1206)  Resp: 18 (05/07/24 1206)  BP: 135/60 (05/07/24 1206)  SpO2: 98 % (05/07/24 1206) Vital Signs (24h Range):  Temp:  [97.7 °F (36.5 °C)-99.4 °F (37.4 °C)] 97.7 °F (36.5 °C)  Pulse:  [56-83] 71  Resp:  [16-18] 18  SpO2:  [96 %-99 %] 98 %  BP: (107-146)/(53-73) 135/60     Weight: 86.8 kg (191 lb 5.8 oz)  Body mass index is 27.46 kg/m².  Body surface area is 2.07 meters squared.    ECOG SCORE           [unfilled]    Intake/Output - Last 3 Shifts         05/05 0700  05/06 0659 05/06 0700  05/07 0659 05/07 0700  05/08 0659    P.O. 1060 960 597    Blood 633  350    IV Piggyback 728.7      Total Intake(mL/kg) 2421.7 (27.7) 960 (11.1) 947 (10.9)    Urine (mL/kg/hr) 2525 (1.2) 2450 (1.2) 600 (1.2)    Emesis/NG output  0     Stool 0 0 0    Total Output 2525 2450 600    Net -103.3 -1490 +347           Stool Occurrence 2 x 3 x 1 x    Emesis Occurrence  1 x              Physical Exam  Constitutional:       Appearance: He is well-developed.   HENT:      Head: Normocephalic and atraumatic.      Ears:      Comments: [place]     Mouth/Throat:      Pharynx: No oropharyngeal exudate.   Eyes:      General:         Right eye: No discharge.         Left eye: No discharge.      Conjunctiva/sclera: Conjunctivae normal.      Pupils: Pupils are equal, round, and  reactive to light.   Cardiovascular:      Rate and Rhythm: Normal rate and regular rhythm.      Heart sounds: Normal heart sounds. No murmur heard.  Pulmonary:      Effort: Pulmonary effort is normal. No respiratory distress.      Breath sounds: Normal breath sounds. No wheezing or rales.   Abdominal:      General: Bowel sounds are normal. There is no distension.      Palpations: Abdomen is soft.      Tenderness: There is no abdominal tenderness.   Musculoskeletal:         General: No deformity. Normal range of motion.      Cervical back: Normal range of motion and neck supple.   Skin:     General: Skin is warm and dry.      Findings: No erythema or rash.      Comments: Right chest wall vas cath. Dressing c/d/i. No sign of infection to site.   Neurological:      Mental Status: He is alert and oriented to person, place, and time.   Psychiatric:         Behavior: Behavior normal.         Thought Content: Thought content normal.         Judgment: Judgment normal.            Significant Labs:   CBC:   Recent Labs   Lab 05/06/24  0400 05/07/24  0448   WBC 0.08* 0.16*   HGB 7.0* 6.9*   HCT 19.1* 18.5*   PLT 21* 13*    and CMP:   Recent Labs   Lab 05/06/24  0400 05/07/24  0448    137   K 3.7 4.1    108   CO2 23 23   * 135*   BUN 16 17   CREATININE 0.9 0.9   CALCIUM 8.9 9.0   PROT 5.1* 5.1*   ALBUMIN 2.7* 2.7*   BILITOT 1.0 0.7   ALKPHOS 80 81   AST 28 19   ALT 48* 46*   ANIONGAP 7* 6*       Diagnostic Results:  None  Assessment/Plan:     * History of autologous stem cell transplant  - Patient of Dr. Evans  - Admitted 4/16/24 for a Carmen 140 auto SCT  - Today is Day +19 ; ANC is 60  - He received 5 bags on 4/18/24 at 1330 and received the remaining 4 bags on 4/19/24 at 1330. Total CD34 dose was 2.91 x 10^6.  - See treatment plan below.    Planned conditioning regimen:  Melphalan TBD on Day -1     Antimicrobial Prophylaxis:  Acyclovir starting on Day -1  Levofloxacin starting on Day -1  Fluconazole starting  on Day -1  Bactrim starting on Day +30     Growth Factor Support:  Neupogen starting on Day +7      Caregiver: daughter and other family members  Post-transplant discharge plans: Smita Portillo    Multiple myeloma in remission  - patient of Dr. Evans; follows locally with Dr. Rose in [place]  - diagnosed June 2023 with IgA Kappa MM; stage unclear at diagnosis as FISH not available  - started DRd therapy on 7/18/2023  - he stopped the daratumumab after cycle 4 and continue with revlimid and dex only; looks to have completed ~5 cycles  - Admitted for Carmen 140 Auto SCT on 4/16/2023    Bacteremia  - See neutropenic fever    Neutropenic fever  - Developed on 5/3/2024. Cultures obtained and showing Gemella Haemolysans  - Could not r/o aortic valve vegetation via TTE. Not a good HANSEL candidate per ID, so will complete a 6 week course of empiric IV abx.  - Surveillance cultures obtained on 5/4 currently NGTD.  - UC with low yield enterococcus facecialis (covered by Cefepime).  - Continue cefepime. Vanc stopped 5/6.  - Susceptibilities pending    Pancytopenia due to chemotherapy  - Daily CBC while inpatient  - Transfuse for Hgb <7or plts < 10K  - Continue antimicrobial ppx  - Stopped VTE ppx 4/24 d/t downtrending platelets    Chemotherapy induced nausea and vomiting  - Continue PRN Zofran and Compazine    Bacteremia due to Staphylococcus  - See neutropenic fever    Constipation  - On daily miralax  - Having BMs    Hyperbilirubinemia  - T-bili 1.7 on admit. Patient asymptomatic. Now normal  - Trending with daily CMP  RESOLVED    Cancer related pain  - Continue home prn Oxy IR    BPH (benign prostatic hyperplasia)  - Continue home finasteride    HTN (hypertension)  - Continue home amlodipine, carvedilol (dose-reduced for bradycardia), hydralazine, and spironolactone  - Stopped IVF following transplant. BP improved off IVF.    Type 2 diabetes mellitus without complications  - Holding home metformin  - Stopped ACHS blood glucose  monitoring 4/24 as blood glucose has been stable  - Monitoring with daily CMP    Hypothyroidism, unspecified  - Continue home synthroid    GERD (gastroesophageal reflux disease)  - Previously on BID protonix and daily pepcid for stomach ulcers; now resolved  - Continuing daily protonix per transplant order set    COLTON (acute kidney injury)  - Creatinine 1.4 on admission; baseline ~1.1  - Monitoring CMP daily  - Was receiving continuous fluids as part of chemotherapy regimen (NACL with 10% K)  RESOLVED        VTE Risk Mitigation (From admission, onward)           Ordered     heparin (porcine) injection 1,600 Units  As needed (PRN)         04/16/24 2129     IP VTE HIGH RISK PATIENT  Once         04/16/24 1904     Place sequential compression device  Until discontinued         04/16/24 1904                    Disposition: Inpatient for autologous SCT. Awaiting neutrophil engraftment.    Jessica Cee, NP  Bone Marrow Transplant  [facility] - Oncology ([facility])

## 2024-05-07 NOTE — ASSESSMENT & PLAN NOTE
- patient of Dr. Evans; follows locally with Dr. Rose in [place]  - diagnosed June 2023 with IgA Kappa MM; stage unclear at diagnosis as FISH not available  - started DRd therapy on 7/18/2023  - he stopped the daratumumab after cycle 4 and continue with revlimid and dex only; looks to have completed ~5 cycles  - Admitted for Carmen 140 Auto SCT on 4/16/2023

## 2024-05-07 NOTE — NURSING
[place] [address] today. Patient refused bed alarm, educated about low platelet count, fall risk and importance of bed alarm. Family at the bedside.

## 2024-05-07 NOTE — PLAN OF CARE
1919 rec'd resting quietly in bed. Denies any needs or complaints. In no distress, on room air. Bed low and wheels locked. Call bell near, enc to call for any needs.     End of shift- rested quietly over the night. No complaints voiced. VSS. Safety measures intact.

## 2024-05-07 NOTE — MEDICARE RECERTIFICATION
MEDICARE INPATIENT  PHYSICIAN RECERTIFICATION  OF MEDICAL NECESSITY      1st Recertification (required no later than the 20th day of hospitalization)     I certify that this patient's continued medical needs require an Inpatient level of care due to ongoing acute care needs and treatment; awaiting neutrophil engraftment.. I estimate that the patient will be in the hospital for another 3 days or more. Post-acute planning is in process.

## 2024-05-08 LAB
ABO + RH BLD: NORMAL
ALBUMIN SERPL BCP-MCNC: 2.8 G/DL (ref 3.5–5.2)
ALP SERPL-CCNC: 91 U/L (ref 55–135)
ALT SERPL W/O P-5'-P-CCNC: 41 U/L (ref 10–44)
ANION GAP SERPL CALC-SCNC: 7 MMOL/L (ref 8–16)
AST SERPL-CCNC: 15 U/L (ref 10–40)
BASOPHILS # BLD AUTO: 0 K/UL (ref 0–0.2)
BASOPHILS NFR BLD: 0 % (ref 0–1.9)
BILIRUB SERPL-MCNC: 1.1 MG/DL (ref 0.1–1)
BLD GP AB SCN CELLS X3 SERPL QL: NORMAL
BLD PROD TYP BPU: NORMAL
BLOOD UNIT EXPIRATION DATE: NORMAL
BLOOD UNIT TYPE CODE: 600
BLOOD UNIT TYPE: NORMAL
BUN SERPL-MCNC: 19 MG/DL (ref 8–23)
CALCIUM SERPL-MCNC: 9.3 MG/DL (ref 8.7–10.5)
CHLORIDE SERPL-SCNC: 107 MMOL/L (ref 95–110)
CO2 SERPL-SCNC: 24 MMOL/L (ref 23–29)
CODING SYSTEM: NORMAL
CREAT SERPL-MCNC: 0.9 MG/DL (ref 0.5–1.4)
CROSSMATCH INTERPRETATION: NORMAL
DIFFERENTIAL METHOD BLD: ABNORMAL
DISPENSE STATUS: NORMAL
EOSINOPHIL # BLD AUTO: 0 K/UL (ref 0–0.5)
EOSINOPHIL NFR BLD: 0 % (ref 0–8)
ERYTHROCYTE [DISTWIDTH] IN BLOOD BY AUTOMATED COUNT: 11.5 % (ref 11.5–14.5)
EST. GFR  (NO RACE VARIABLE): >60 ML/MIN/1.73 M^2
GLUCOSE SERPL-MCNC: 132 MG/DL (ref 70–110)
HCT VFR BLD AUTO: 22.1 % (ref 40–54)
HGB BLD-MCNC: 8 G/DL (ref 14–18)
IMM GRANULOCYTES # BLD AUTO: 0.01 K/UL (ref 0–0.04)
IMM GRANULOCYTES NFR BLD AUTO: 3.3 % (ref 0–0.5)
LYMPHOCYTES # BLD AUTO: 0.1 K/UL (ref 1–4.8)
LYMPHOCYTES NFR BLD: 36.7 % (ref 18–48)
MAGNESIUM SERPL-MCNC: 1.9 MG/DL (ref 1.6–2.6)
MCH RBC QN AUTO: 32.8 PG (ref 27–31)
MCHC RBC AUTO-ENTMCNC: 36.2 G/DL (ref 32–36)
MCV RBC AUTO: 91 FL (ref 82–98)
MONOCYTES # BLD AUTO: 0.1 K/UL (ref 0.3–1)
MONOCYTES NFR BLD: 26.7 % (ref 4–15)
NEUTROPHILS # BLD AUTO: 0.1 K/UL (ref 1.8–7.7)
NEUTROPHILS NFR BLD: 33.3 % (ref 38–73)
NRBC BLD-RTO: 0 /100 WBC
PHOSPHATE SERPL-MCNC: 2.7 MG/DL (ref 2.7–4.5)
PLATELET # BLD AUTO: 9 K/UL (ref 150–450)
PLATELET BLD QL SMEAR: ABNORMAL
PMV BLD AUTO: 10.5 FL (ref 9.2–12.9)
POTASSIUM SERPL-SCNC: 3.8 MMOL/L (ref 3.5–5.1)
PROT SERPL-MCNC: 5.3 G/DL (ref 6–8.4)
RBC # BLD AUTO: 2.44 M/UL (ref 4.6–6.2)
SODIUM SERPL-SCNC: 138 MMOL/L (ref 136–145)
SPECIMEN OUTDATE: NORMAL
UNIT NUMBER: NORMAL
WBC # BLD AUTO: 0.3 K/UL (ref 3.9–12.7)

## 2024-05-08 PROCEDURE — P9037 PLATE PHERES LEUKOREDU IRRAD: HCPCS | Performed by: NURSE PRACTITIONER

## 2024-05-08 PROCEDURE — 25000003 PHARM REV CODE 250: Performed by: INTERNAL MEDICINE

## 2024-05-08 PROCEDURE — 80053 COMPREHEN METABOLIC PANEL: CPT | Performed by: NURSE PRACTITIONER

## 2024-05-08 PROCEDURE — 99233 SBSQ HOSP IP/OBS HIGH 50: CPT | Mod: ,,, | Performed by: INTERNAL MEDICINE

## 2024-05-08 PROCEDURE — 83735 ASSAY OF MAGNESIUM: CPT | Performed by: NURSE PRACTITIONER

## 2024-05-08 PROCEDURE — 63600175 PHARM REV CODE 636 W HCPCS: Performed by: STUDENT IN AN ORGANIZED HEALTH CARE EDUCATION/TRAINING PROGRAM

## 2024-05-08 PROCEDURE — 86901 BLOOD TYPING SEROLOGIC RH(D): CPT | Performed by: NURSE PRACTITIONER

## 2024-05-08 PROCEDURE — 85025 COMPLETE CBC W/AUTO DIFF WBC: CPT | Performed by: NURSE PRACTITIONER

## 2024-05-08 PROCEDURE — 25000003 PHARM REV CODE 250: Performed by: NURSE PRACTITIONER

## 2024-05-08 PROCEDURE — 36430 TRANSFUSION BLD/BLD COMPNT: CPT

## 2024-05-08 PROCEDURE — 63600175 PHARM REV CODE 636 W HCPCS: Performed by: INTERNAL MEDICINE

## 2024-05-08 PROCEDURE — 84100 ASSAY OF PHOSPHORUS: CPT | Performed by: NURSE PRACTITIONER

## 2024-05-08 PROCEDURE — 20600001 HC STEP DOWN PRIVATE ROOM

## 2024-05-08 PROCEDURE — 25000003 PHARM REV CODE 250: Performed by: STUDENT IN AN ORGANIZED HEALTH CARE EDUCATION/TRAINING PROGRAM

## 2024-05-08 RX ORDER — HYDROCODONE BITARTRATE AND ACETAMINOPHEN 500; 5 MG/1; MG/1
TABLET ORAL
Status: DISCONTINUED | OUTPATIENT
Start: 2024-05-08 | End: 2024-05-11 | Stop reason: HOSPADM

## 2024-05-08 RX ORDER — ACETAMINOPHEN 325 MG/1
650 TABLET ORAL ONCE AS NEEDED
Status: COMPLETED | OUTPATIENT
Start: 2024-05-08 | End: 2024-05-11

## 2024-05-08 RX ORDER — DIPHENHYDRAMINE HCL 25 MG
25 CAPSULE ORAL ONCE AS NEEDED
Status: DISCONTINUED | OUTPATIENT
Start: 2024-05-08 | End: 2024-05-11 | Stop reason: HOSPADM

## 2024-05-08 RX ADMIN — POTASSIUM CHLORIDE 20 MEQ: 1500 TABLET, EXTENDED RELEASE ORAL at 06:05

## 2024-05-08 RX ADMIN — ACYCLOVIR 800 MG: 200 CAPSULE ORAL at 09:05

## 2024-05-08 RX ADMIN — Medication 400 MG: at 10:05

## 2024-05-08 RX ADMIN — CEFEPIME 2 G: 2 INJECTION, POWDER, FOR SOLUTION INTRAVENOUS at 09:05

## 2024-05-08 RX ADMIN — Medication 1 DOSE: at 08:05

## 2024-05-08 RX ADMIN — FILGRASTIM 480 MCG: 480 INJECTION, SOLUTION INTRAVENOUS; SUBCUTANEOUS at 09:05

## 2024-05-08 RX ADMIN — HYDRALAZINE HYDROCHLORIDE 50 MG: 50 TABLET ORAL at 01:05

## 2024-05-08 RX ADMIN — HYDRALAZINE HYDROCHLORIDE 50 MG: 50 TABLET ORAL at 09:05

## 2024-05-08 RX ADMIN — Medication 1 DOSE: at 09:05

## 2024-05-08 RX ADMIN — Medication 1 DOSE: at 04:05

## 2024-05-08 RX ADMIN — FINASTERIDE 5 MG: 5 TABLET, FILM COATED ORAL at 08:05

## 2024-05-08 RX ADMIN — CEFEPIME 2 G: 2 INJECTION, POWDER, FOR SOLUTION INTRAVENOUS at 01:05

## 2024-05-08 RX ADMIN — CEFEPIME 2 G: 2 INJECTION, POWDER, FOR SOLUTION INTRAVENOUS at 06:05

## 2024-05-08 RX ADMIN — PANTOPRAZOLE SODIUM 40 MG: 40 TABLET, DELAYED RELEASE ORAL at 08:05

## 2024-05-08 RX ADMIN — FLUCONAZOLE 400 MG: 200 TABLET ORAL at 08:05

## 2024-05-08 RX ADMIN — ACYCLOVIR 800 MG: 200 CAPSULE ORAL at 08:05

## 2024-05-08 RX ADMIN — CARVEDILOL 6.25 MG: 6.25 TABLET, FILM COATED ORAL at 09:05

## 2024-05-08 RX ADMIN — CARVEDILOL 6.25 MG: 6.25 TABLET, FILM COATED ORAL at 08:05

## 2024-05-08 RX ADMIN — AMLODIPINE BESYLATE 10 MG: 10 TABLET ORAL at 08:05

## 2024-05-08 RX ADMIN — SPIRONOLACTONE 12.5 MG: 25 TABLET ORAL at 08:05

## 2024-05-08 RX ADMIN — HEPARIN SODIUM 1600 UNITS: 1000 INJECTION, SOLUTION INTRAVENOUS; SUBCUTANEOUS at 08:05

## 2024-05-08 RX ADMIN — HYDRALAZINE HYDROCHLORIDE 50 MG: 50 TABLET ORAL at 06:05

## 2024-05-08 RX ADMIN — Medication 400 MG: at 06:05

## 2024-05-08 RX ADMIN — Medication 1 DOSE: at 12:05

## 2024-05-08 RX ADMIN — LEVOTHYROXINE SODIUM 112 MCG: 112 TABLET ORAL at 09:05

## 2024-05-08 NOTE — PROGRESS NOTES
[facility] - Oncology ([facility])  Adult Nutrition  Progress Note    SUMMARY       Recommendations    1. Continue Regular Diet. Encourage High-Calorie, High-Protein food sources, small frequent meals/snacks as tolerated. Please honor patient preferences as able.    2. Continue ONS Boost Plus- Chocolate with all meal for additional calories/ PRO.   3. Monitor I/O's weight and labs.   4. Consider an appetite stimulant, if appropriate.   5. RD following.    Goals: to meet % of EEN/EPN by next RD f/u  Nutrition Goal Status: goal met  Communication of RD Recs:  (POC)    Assessment and Plan    Nutrition Problem  Increased nutrient needs (energy/protein)    Related to (etiology):   Increased physiological demands    Signs and Symptoms (as evidenced by):   S/p Auto SCT     Interventions/Recommendations (treatment strategy):  Collaboration of nutrition care with other providers  ONS  Nutrition education    Nutrition Diagnosis Status:   Continues    Reason for Assessment    Reason For Assessment: RD follow-up  Diagnosis: cancer diagnosis/related complications  Relevant Medical History: HTN, Hypothyroidism, Multiple Myeloma  Interdisciplinary Rounds: did not attend  General Information Comments: RD follow-up. Pt tolerating PO intake, consuming % at meals, drinks Boost as ordered. No c/o N/V/C/D. 5lb weight change since admissionm, RD will continue to follow. Appetite good, overall doing well.  Nutrition Discharge Planning: Provided pt with high calorie, high protein diet education and food safety education for bone marrow transplant. Appropriate education material with RD contact information provided. No other needs identified.    Nutrition Risk Screen    Nutrition Risk Screen: no indicators present    Nutrition/Diet History    Patient Reported Diet/Restrictions/Preferences: general  Typical Food/Fluid Intake: 2-3 meal/day  Spiritual, Cultural Beliefs, Mosque Practices, Values that Affect Care: no  Supplemental  "Drinks or Food Habits: Premier Protein  Food Allergies: shellfish  Factors Affecting Nutritional Intake: None identified at this time    Anthropometrics    Temp: 97.9 °F (36.6 °C)  Height Method: Stated  Height: 5' 10" (177.8 cm)  Height (inches): 70 in  Weight Method: Standard Scale  Weight: 86.4 kg (190 lb 5.9 oz)  Weight (lb): 190.37 lb  Ideal Body Weight (IBW), Male: 166 lb  % Ideal Body Weight, Male (lb): 115.66 %  BMI (Calculated): 27.3       Lab/Procedures/Meds    Pertinent Labs Reviewed: reviewed  Pertinent Labs Comments: H/H: 8.0/22.1, MCHC 36.2, Glu 132, TP 5.3, TBili 1.1  Pertinent Medications Reviewed: reviewed  Pertinent Medications Comments: Enoxaparin, nuepogen, levothyroxine, ondansetron, pantoprazole, Na Bicarb, spironolactone, abx, NaCl/KCl, carvedilol, levofloxacin    Estimated/Assessed Needs    Weight Used For Calorie Calculations: 75 kg (165 lb 5.5 oz) (IBW d/t BMI >30.0)  Energy Calorie Requirements (kcal): 1875- 2250 kcal  Energy Need Method: Kcal/kg (25-30 kcal/kg of IBW)  Protein Requirements: 113- 150g (1.5-2.0g/kg of IBW)  Weight Used For Protein Calculations: 75 kg (165 lb 5.5 oz) (IBW d/t BMI >30.0)  Fluid Requirements (mL): 1ml/1kcal or per MD  Estimated Fluid Requirement Method: RDA Method  RDA Method (mL): 1875         Nutrition Prescription Ordered    Current Diet Order: Regular Diet  Oral Nutrition Supplement: Boost Plus- chocolate (All meals)    Evaluation of Received Nutrient/Fluid Intake    I/O: +417.9 mL  Energy Calories Required: meeting needs  Protein Required: meeting needs  Fluid Required:  (as per MD)  Comments: LBM: 5/8  Tolerance: tolerating  % Intake of Estimated Energy Needs: 75 - 100 %  % Meal Intake: 75 - 100 %    Nutrition Risk    Level of Risk/Frequency of Follow-up:  (RD to f/u x 1-2/week)     Monitor and Evaluation    Food and Nutrient Intake: energy intake, food and beverage intake  Food and Nutrient Adminstration: diet order  Knowledge/Beliefs/Attitudes: food " and nutrition knowledge/skill, beliefs and attitudes  Physical Activity and Function: nutrition-related ADLs and IADLs  Anthropometric Measurements: weight, weight change, body mass index  Biochemical Data, Medical Tests and Procedures: electrolyte and renal panel, gastrointestinal profile, glucose/endocrine profile, inflammatory profile, lipid profile  Nutrition-Focused Physical Findings: overall appearance, skin     Nutrition Follow-Up    RD Follow-up?: Yes    Ike Leal MS, RD, LDN

## 2024-05-08 NOTE — PLAN OF CARE
Recommendations    1. Continue Regular Diet. Encourage High-Calorie, High-Protein food sources, small frequent meals/snacks as tolerated. Please honor patient preferences as able.    2. Continue ONS Boost Plus- Chocolate with all meal for additional calories/ PRO.   3. Monitor I/O's weight and labs.   4. Consider an appetite stimulant, if appropriate.   5. RD following.    Goals: to meet % of EEN/EPN by next RD f/u  Nutrition Goal Status: goal met  Communication of RD Recs:  (POC)

## 2024-05-08 NOTE — PLAN OF CARE
[facility] - Oncology ([facility])      HOME HEALTH ORDERS  FACE TO FACE ENCOUNTER    Patient Name: Chip Goodson  YOB: 1950    PCP: Debbie Gonzalez MD   PCP Address: [address] / [place] [address]  PCP Phone Number: [phone]  PCP Fax: [phone]    Encounter Date: 3/21/24    Admit to Home Health    Diagnoses:  Active Hospital Problems    Diagnosis  POA    *History of autologous stem cell transplant [Z94.84]  Not Applicable     Priority: 1 - High    Multiple myeloma in remission [C90.01]  Yes     Priority: 2     Bacteremia [R78.81]  No     Priority: 3     Neutropenic fever [D70.9, R50.81]  No     Priority: 3     Pancytopenia due to chemotherapy [D61.810]  Yes     Priority: 4     Chemotherapy induced nausea and vomiting [R11.2, T45.1X5A]  Yes     Priority: 5     Constipation [K59.00]  Yes    BPH (benign prostatic hyperplasia) [N40.0]  Yes    Cancer related pain [G89.3]  Yes    Hyperbilirubinemia [E80.6]  Yes    HTN (hypertension) [I10]  Yes    Type 2 diabetes mellitus without complications [E11.9]  Yes    Hypothyroidism, unspecified [E03.9]  Yes    GERD (gastroesophageal reflux disease) [K21.9]  Yes    COLTON (acute kidney injury) [N17.9]  Yes      Resolved Hospital Problems   No resolved problems to display.       Follow Up Appointments:  Future Appointments   Date Time Provider Department Center   5/16/2024  9:00 AM [facility] [place] [facility]   5/17/2024  4:20 PM Yuli Curran NP [facility] BMT [place]   5/20/2024  9:20 AM LAB, [facility] AG LAB [place]   5/20/2024 10:20 AM Sandra Rose MD [facility] HEMON[facility] [place]   5/22/2024  7:40 AM [facility] BMT [facility] [place] [facility]   5/22/2024  8:40 AM Ady Evans MD [facility] BMT [place]   5/22/2024  9:30 AM Shasha Rojas MD [facility] ID [facility] [place]   6/14/2024 10:30 AM [facility] [address]       Allergies:  Review of patient's allergies indicates:   Allergen Reactions    Iodine Anaphylaxis    Shellfish  containing products     Poison ivy extract     Amoxicillin-pot clavulanate Itching       Medications: Review discharge medications with patient and family and provide education.    Current Facility-Administered Medications   Medication Dose Route Frequency Provider Last Rate Last Admin    0.9%  NaCl infusion (for blood administration)   Intravenous Q24H PRN Jessica Cee NP        0.9%  NaCl infusion (for blood administration)   Intravenous Q24H PRN Jessica Cee NP        acetaminophen tablet 650 mg  650 mg Oral Once PRN Jessica Cee, NORMA        acetaminophen tablet 650 mg  650 mg Oral Once PRN Jessica Cee NP        acyclovir capsule 800 mg  800 mg Oral BID Ady Evans MD   800 mg at 05/08/24 0837    albuterol inhaler 2 puff  2 puff Inhalation Q4H PRN Chelsie Man NP        alteplase injection 2 mg  2 mg Intra-Catheter PRN Ady Evans MD        aluminum-magnesium hydroxide-simethicone 200-200-20 mg/5 mL suspension 30 mL  30 mL Oral Q6H PRN Chelsie Man NP        amLODIPine tablet 10 mg  10 mg Oral Daily Chelsie Man NP   10 mg at 05/08/24 0838    carvediloL tablet 6.25 mg  6.25 mg Oral BID Jessica Cee NP   6.25 mg at 05/08/24 0838    ceFEPIme (MAXIPIME) 2 g in dextrose 5 % in water (D5W) 100 mL IVPB (MB+)  2 g Intravenous Q8H Jae Joseph MD   Stopped at 05/08/24 0649    dextrose 10% bolus 125 mL 125 mL  12.5 g Intravenous PRN Chelsie Man NP        dextrose 10% bolus 250 mL 250 mL  25 g Intravenous PRN Chelsie Man NP        diphenhydrAMINE capsule 25 mg  25 mg Oral PRN Chelsie Man NP   25 mg at 04/30/24 1244    diphenhydrAMINE capsule 25 mg  25 mg Oral Once PRN Jessica Cee NP        diphenhydrAMINE capsule 25 mg  25 mg Oral Once PRN Jaye, Jessica A., NP        filgrastim (NEUPOGEN) injection 480 mcg/1.6 ml  480 mcg Subcutaneous Daily Ady Evnas MD   480 mcg at 05/08/24 0902    finasteride tablet 5 mg  5 mg Oral  Daily Chelsie Man NP   5 mg at 05/08/24 0837    fluconazole tablet 400 mg  400 mg Oral Daily Ady Evans MD   400 mg at 05/08/24 0837    heparin (porcine) injection 1,600 Units  1,600 Units Intravenous PRN Ady Evans MD   1,600 Units at 05/08/24 0842    hydrALAZINE tablet 50 mg  50 mg Oral Q8H Chelsie Man, NP   50 mg at 05/08/24 0618    k phos di & mono-sod phos mono 250 mg tablet 1 tablet  1 tablet Oral Q4H PRN Chelsie Man, NP   1 tablet at 04/18/24 1748    k phos di & mono-sod phos mono 250 mg tablet 2 tablet  2 tablet Oral Q4H PRN Chelsie Man, NORMA        levothyroxine tablet 112 mcg  112 mcg Oral QHS Chelsie Man, NP   112 mcg at 05/07/24 2008    LORazepam injection 1 mg  1 mg Intravenous Q6H PRN Ady Evans MD        magnesium oxide tablet 400 mg  400 mg Oral Q4H PRN Chelsie Man, NP   400 mg at 05/08/24 1030    magnesium oxide tablet 400 mg  400 mg Oral Q4H PRN Chelsie Man NP        magnesium oxide tablet 800 mg  800 mg Oral Q4H PRN Chelsie Man, NP        melatonin tablet 9 mg  9 mg Oral Nightly PRN Jae Joseph MD   9 mg at 04/30/24 2011    ondansetron injection 4 mg  4 mg Intravenous Q8H PRN Jessica Cee NP   4 mg at 05/02/24 1700    oxyCODONE immediate release tablet 5 mg  5 mg Oral Q4H PRN Chelsie Man NP        pantoprazole EC tablet 40 mg  40 mg Oral Daily Chelsie Man NP   40 mg at 05/08/24 0837    polyethylene glycol packet 17 g  17 g Oral Daily Aliza Montano MD   17 g at 05/03/24 0914    potassium chloride SA CR tablet 20 mEq  20 mEq Oral PRN Chelsie Man NP   20 mEq at 05/08/24 0655    potassium chloride SA CR tablet 20 mEq  20 mEq Oral Q2H PRN Chelsie Man, NP        potassium chloride SA CR tablet 20 mEq  20 mEq Oral Q2H PRN Chelsie Man, NORMA        prochlorperazine injection Soln 10 mg  10 mg Intravenous Q6H PRN Jessica Cee NP   10 mg at 04/30/24 0923     senna-docusate 8.6-50 mg per tablet 1 tablet  1 tablet Oral Daily PRN Chelsie Man NP        sodium bicarb-sodium chloride powder 1 Dose  1 Dose Swish & Spit QID Ady Evans MD   1 Dose at 05/08/24 0838    sodium chloride 0.9% flush 10 mL  10 mL Intravenous PRN Ady Evans MD        spironolactone split tablet 12.5 mg  12.5 mg Oral Daily Chelsie Man NP   12.5 mg at 05/08/24 0837    [START ON 5/18/2024] sulfamethoxazole-trimethoprim 800-160mg per tablet 1 tablet  1 tablet Oral Once per day on Monday Wednesday Friday Ady Evans MD         Current Discharge Medication List        CONTINUE these medications which have NOT CHANGED    Details   acyclovir (ZOVIRAX) 400 MG tablet Take 1 tablet (400 mg total) by mouth 2 (two) times daily.  Qty: 60 tablet, Refills: 11    Associated Diagnoses: Multiple myeloma, remission status unspecified      amLODIPine (NORVASC) 10 MG tablet Take 1 tablet (10 mg total) by mouth every morning.    Associated Diagnoses: Primary hypertension      ascorbic acid, vitamin C, (VITAMIN C) 500 MG tablet Take 500 mg by mouth once daily.      atorvastatin (LIPITOR) 10 MG tablet Take 10 mg by mouth every evening.      carvediloL (COREG) 12.5 MG tablet Take 12.5 mg by mouth 2 (two) times daily.      cholecalciferol, vitamin D3, (VITAMIN D3) 125 mcg (5,000 unit) Tab Take 5,000 Units by mouth once daily.      famotidine (PEPCID) 20 MG tablet Take 1 tablet (20 mg total) by mouth every morning.  Qty: 30 tablet, Refills: 11    Associated Diagnoses: Multiple myeloma not having achieved remission      ferrous sulfate 325 (65 FE) MG EC tablet Take 325 mg by mouth once daily.      finasteride (PROSCAR) 5 mg tablet Take 5 mg by mouth every morning.      hydrALAZINE (APRESOLINE) 50 MG tablet Take 1 tablet (50 mg total) by mouth every 8 (eight) hours.  Qty: 90 tablet, Refills: 11    Comments: .      Lactobacillus rhamnosus GG (CULTURELLE) 10 billion cell capsule Take 1 capsule by mouth  once daily.      levothyroxine (SYNTHROID) 112 MCG tablet Take 112 mcg by mouth every evening.      metFORMIN (GLUCOPHAGE-XR) 500 MG ER 24hr tablet Take 1,000 mg by mouth 2 (two) times daily.      pantoprazole (PROTONIX) 40 MG tablet Take 1 tablet (40 mg total) by mouth 2 (two) times daily before meals.  Qty: 60 tablet, Refills: 11      ranolazine (RANEXA) 500 MG Tb12 Take 500 mg by mouth 2 (two) times daily.      spironolactone (ALDACTONE) 25 MG tablet Take 12.5 mg by mouth every morning.      zinc gluconate 50 mg tablet Take 50 mg by mouth once daily.      albuterol (PROVENTIL/VENTOLIN HFA) 90 mcg/actuation inhaler 2 puffs every 4 to 6 hours as needed.      lenalidomide (REVLIMID) 10 mg Cap Take 10 mg by mouth once daily.  Qty: 21 each, Refills: 0    Comments: Authorization Number: [id number]  Associated Diagnoses: Multiple myeloma not having achieved remission      nitroGLYCERIN (NITROSTAT) 0.4 MG SL tablet place one tablet under the tongue every five minutes as needed for chest pain up to 3 doses. if no relief call 911      ondansetron (ZOFRAN) 8 MG tablet Take 1 tablet (8 mg total) by mouth every 8 (eight) hours as needed for Nausea.  Qty: 30 tablet, Refills: 2    Associated Diagnoses: Multiple myeloma, remission status unspecified      ONETOUCH ULTRA TEST Strp USE TO TEST BLOOD GLUCOSE TWICE DAILY      oxyCODONE (ROXICODONE) 5 MG immediate release tablet Take 1 tablet (5 mg total) by mouth every 4 (four) hours as needed for Pain.  Qty: 8 tablet, Refills: 0    Comments: Quantity prescribed more than 7 day supply? Yes, quantity medically necessary               I have seen and examined this patient within the last 30 days. My clinical findings that support the need for the home health skilled services and home bound status are the following:no   Weakness/numbness causing balance and gait disturbance due to Malignancy/Cancer making it taxing to leave home.     Diet:   regular diet    Labs:  SN to perform labs:   CBC: Weekly on Mondays; 1 month, CMP: Weekly on Mondays ; 1 month, and CRP: Weekly on Mondays; 1 month  Please fax results to [phone], attention Dr. Rojas.    Activities:   activity as tolerated    Nursing:   Agency to admit patient within 24 hours of hospital discharge unless specified on physician order or at patient request    SN to complete comprehensive assessment including routine vital signs. Instruct on disease process and s/s of complications to report to MD. Review/verify medication list sent home with the patient at time of discharge  and instruct patient/caregiver as needed. Frequency may be adjusted depending on start of care date.     Skilled nurse to perform up to 3 visits PRN for symptoms related to diagnosis    Notify MD if SBP > 160 or < 90; DBP > 90 or < 50; HR > 120 or < 50; Temp > 100.4; O2 < 90%    Ok to schedule additional visits based on staff availability and patient request on consecutive days within the home health episode.    When multiple disciplines ordered:    Start of Care occurs on Sunday - Wednesday schedule remaining discipline evaluations as ordered on separate consecutive days following the start of care.    Thursday SOC -schedule subsequent evaluations Friday and Monday the following week.     Friday - Saturday SOC - schedule subsequent discipline evaluations on consecutive days starting Monday of the following week.    For all post-discharge communication and subsequent orders please contact patient's primary care physician. If unable to reach primary care physician or do not receive response within 30 minutes, please contact [facility] for clinical staff order clarification    Miscellaneous   Home Infusion Therapy:   SN to perform Infusion Therapy/Central Line Care.  Review Central Line Care & Central Line Flush with patient.    Administer (drug and dose): Ceftriaxone 2g IV Q24 hours  through 6/14/2024  Last dose given: 5/13/2024 at 10 am                          Home dose due: 5/14/2024 at 10 am    Scrub the Hub: Prior to accessing the line, always perform a 30 second alcohol scrub  Each lumen of the central line is to be flushed at least daily with 10 mL Normal Saline and 3 mL Heparin flush (10 units/mL)  Skilled Nurse (SN) may draw blood from IV access  Blood Draw Procedure:   - Aspirate at least 5 mL of blood   - Discard   - Obtain specimen   - Change injection cap   - Flush with 20 mL Normal Saline followed by a                 3-5 mL Heparin flush (10 units/mL)  Central :   - Sterile dressing changes are done weekly and as needed.   - Use chlor-hexadine scrub to cleanse site, apply Biopatch to insertion site,       apply securement device dressing   - Injection caps are changed weekly and after EVERY lab draw.   - If sterile gauze is under dressing to control oozing,                 dressing change must be performed every 24 hours until gauze is not needed.      I certify that this patient is confined to his home and needs intermittent skilled nursing care.

## 2024-05-08 NOTE — ASSESSMENT & PLAN NOTE
- patient of Dr. Evans; follows locally with Dr. Rose in [place]  - diagnosed June 2023 with IgA Kappa MM; stage unclear at diagnosis as FISH not available  - started DRd therapy on 7/18/2023  - he stopped the daratumumab after cycle 4 and continue with revlimid and dex only; looks to have completed ~5 cycles  - Admitted for Carmen 140 Auto SCT on 4/16/2023

## 2024-05-08 NOTE — ASSESSMENT & PLAN NOTE
- Continue home finasteride   Left Voicemail (1st Attempt) for the patient to call back and schedule the following:    Appointment type: Return Rheumatology  Provider: Dr Strong  Return date: September  Specialty phone number: [phone]  Additonal Notes: LVM regarding scheduling follow up with Dr Strong in Sept

## 2024-05-08 NOTE — ASSESSMENT & PLAN NOTE
- Developed on 5/3/2024. Cultures obtained and showing Gemella Haemolysans  - Could not r/o aortic valve vegetation via TTE. Not a good HANSEL candidate per ID, so will complete a 6 week course of empiric IV abx.  - Surveillance cultures obtained on 5/4 currently NGTD.  - UC with low yield enterococcus facecialis (covered by Cefepime).  - Vanc stopped 5/6.  - Per ID rec, will continue Cefepime Q 8 until engraftment, then transition to Rocephin for daily dosing OP. Will continue through 6/14/24.  - Susceptibilities pending

## 2024-05-08 NOTE — SUBJECTIVE & OBJECTIVE
Subjective:     Interval History: Day +20 from a Carmen 200 auto SCT for MM. ANC up to 100 today. Remains afebrile. VSS. Transfusing 1 unit plts for plt count of 9K. Continuing Cefepime for Gemella bacteremia until engraftment and then will transition to Rocephin for daily dosing per ID rec. Will continue Rocephin through 6/14/24. Will place HH orders today. Repeat blood cx from 5/4/24 with NGTD.     Objective:     Vital Signs (Most Recent):  Temp: 98.9 °F (37.2 °C) (05/08/24 0843)  Pulse: 68 (05/08/24 0843)  Resp: 18 (05/08/24 0843)  BP: 139/74 (05/08/24 0843)  SpO2: 98 % (05/08/24 0843) Vital Signs (24h Range):  Temp:  [97.7 °F (36.5 °C)-99.7 °F (37.6 °C)] 98.9 °F (37.2 °C)  Pulse:  [62-88] 68  Resp:  [17-20] 18  SpO2:  [94 %-98 %] 98 %  BP: (122-163)/(57-74) 139/74     Weight: 86.4 kg (190 lb 5.9 oz)  Body mass index is 27.31 kg/m².  Body surface area is 2.07 meters squared.    ECOG SCORE           [unfilled]    Intake/Output - Last 3 Shifts         05/06 0700  05/07 0659 05/07 0700  05/08 0659 05/08 0700  05/09 0659    P.O. 960 1671     Blood  350 289    IV Piggyback  96.9     Total Intake(mL/kg) 960 (11.1) 2117.9 (24.5) 289 (3.3)    Urine (mL/kg/hr) 2450 (1.2) 1700 (0.8) 250 (0.6)    Emesis/NG output 0      Stool 0 0     Total Output 2450 1700 250    Net -1490 +417.9 +39           Stool Occurrence 3 x 1 x     Emesis Occurrence 1 x               Physical Exam  Constitutional:       Appearance: He is well-developed.   HENT:      Head: Normocephalic and atraumatic.      Ears:      Comments: [place]     Mouth/Throat:      Pharynx: No oropharyngeal exudate.   Eyes:      General:         Right eye: No discharge.         Left eye: No discharge.      Conjunctiva/sclera: Conjunctivae normal.      Pupils: Pupils are equal, round, and reactive to light.   Cardiovascular:      Rate and Rhythm: Normal rate and regular rhythm.      Heart sounds: Normal heart sounds. No murmur heard.  Pulmonary:      Effort: Pulmonary effort is  normal. No respiratory distress.      Breath sounds: Normal breath sounds. No wheezing or rales.   Abdominal:      General: Bowel sounds are normal. There is no distension.      Palpations: Abdomen is soft.      Tenderness: There is no abdominal tenderness.   Musculoskeletal:         General: No deformity. Normal range of motion.      Cervical back: Normal range of motion and neck supple.   Skin:     General: Skin is warm and dry.      Findings: No erythema or rash.      Comments: Right chest wall vas cath. Dressing c/d/i. No sign of infection to site.   Neurological:      Mental Status: He is alert and oriented to person, place, and time.   Psychiatric:         Behavior: Behavior normal.         Thought Content: Thought content normal.         Judgment: Judgment normal.            Significant Labs:   CBC:   Recent Labs   Lab 05/07/24  0448 05/08/24  0437   WBC 0.16* 0.30*   HGB 6.9* 8.0*   HCT 18.5* 22.1*   PLT 13* 9*    and CMP:   Recent Labs   Lab 05/07/24  0448 05/08/24  0437    138   K 4.1 3.8    107   CO2 23 24   * 132*   BUN 17 19   CREATININE 0.9 0.9   CALCIUM 9.0 9.3   PROT 5.1* 5.3*   ALBUMIN 2.7* 2.8*   BILITOT 0.7 1.1*   ALKPHOS 81 91   AST 19 15   ALT 46* 41   ANIONGAP 6* 7*       Diagnostic Results:  None

## 2024-05-08 NOTE — PLAN OF CARE
1921 rec'd resting in bed A/A/Ox4. VSS, on room air. Denies any needs. No complaints voiced. Family at bedside. Bed low and wheels locked-refusing bed alarm. Call bell near, enc use.     End of shift- rested quietly over the night. No complaints voiced. VSS. Safety measures intact.

## 2024-05-08 NOTE — NURSING
Patient is AAOX4. Day+20 AUTO SCT. Up, independent, non skid shocks on, family at the bedside, bed alarm refused. Call light and personal items within reach. Patient involved in care. Regular diet with good intake. 1 U platelet transfused, premedication refused by the patient. PO electrolyte replaced. Emesis X 1, anti emetic offered, patient refused. Patient stable at this time.

## 2024-05-08 NOTE — ASSESSMENT & PLAN NOTE
- Patient of Dr. Evans  - Admitted 4/16/24 for a Carmen 140 auto SCT  - Today is Day +20 ; ANC up to 100 today  - He received 5 bags on 4/18/24 at 1330 and received the remaining 4 bags on 4/19/24 at 1330. Total CD34 dose was 2.91 x 10^6.  - See treatment plan below.    Planned conditioning regimen:  Melphalan TBD on Day -1     Antimicrobial Prophylaxis:  Acyclovir starting on Day -1  Levofloxacin starting on Day -1  Fluconazole starting on Day -1  Bactrim starting on Day +30     Growth Factor Support:  Neupogen starting on Day +7      Caregiver: daughter and other family members  Post-transplant discharge plans: Smita Portillo

## 2024-05-08 NOTE — PROGRESS NOTES
[facility] - Oncology ([facility])  Hematology  Bone Marrow Transplant  Progress Note    Patient Name: Chip Goodson  Admission Date: 4/16/2024  Hospital Length of Stay: 22 days  Code Status: Full Code    Subjective:     Interval History: Day +20 from a Carmen 200 auto SCT for MM. ANC up to 100 today. Remains afebrile. VSS. Transfusing 1 unit plts for plt count of 9K. Continuing Cefepime for Gemella bacteremia until engraftment and then will transition to Rocephin for daily dosing per ID rec. Will continue Rocephin through 6/14/24. Will place HH orders today. Repeat blood cx from 5/4/24 with NGTD.     Objective:     Vital Signs (Most Recent):  Temp: 98.9 °F (37.2 °C) (05/08/24 0843)  Pulse: 68 (05/08/24 0843)  Resp: 18 (05/08/24 0843)  BP: 139/74 (05/08/24 0843)  SpO2: 98 % (05/08/24 0843) Vital Signs (24h Range):  Temp:  [97.7 °F (36.5 °C)-99.7 °F (37.6 °C)] 98.9 °F (37.2 °C)  Pulse:  [62-88] 68  Resp:  [17-20] 18  SpO2:  [94 %-98 %] 98 %  BP: (122-163)/(57-74) 139/74     Weight: 86.4 kg (190 lb 5.9 oz)  Body mass index is 27.31 kg/m².  Body surface area is 2.07 meters squared.    ECOG SCORE           [unfilled]    Intake/Output - Last 3 Shifts         05/06 0700  05/07 0659 05/07 0700 05/08 0659 05/08 0700 05/09 0659    P.O. 960 1671     Blood  350 289    IV Piggyback  96.9     Total Intake(mL/kg) 960 (11.1) 2117.9 (24.5) 289 (3.3)    Urine (mL/kg/hr) 2450 (1.2) 1700 (0.8) 250 (0.6)    Emesis/NG output 0      Stool 0 0     Total Output 2450 1700 250    Net -1490 +417.9 +39           Stool Occurrence 3 x 1 x     Emesis Occurrence 1 x               Physical Exam  Constitutional:       Appearance: He is well-developed.   HENT:      Head: Normocephalic and atraumatic.      Ears:      Comments: [place]     Mouth/Throat:      Pharynx: No oropharyngeal exudate.   Eyes:      General:         Right eye: No discharge.         Left eye: No discharge.      Conjunctiva/sclera: Conjunctivae normal.      Pupils: Pupils are equal, round,  and reactive to light.   Cardiovascular:      Rate and Rhythm: Normal rate and regular rhythm.      Heart sounds: Normal heart sounds. No murmur heard.  Pulmonary:      Effort: Pulmonary effort is normal. No respiratory distress.      Breath sounds: Normal breath sounds. No wheezing or rales.   Abdominal:      General: Bowel sounds are normal. There is no distension.      Palpations: Abdomen is soft.      Tenderness: There is no abdominal tenderness.   Musculoskeletal:         General: No deformity. Normal range of motion.      Cervical back: Normal range of motion and neck supple.   Skin:     General: Skin is warm and dry.      Findings: No erythema or rash.      Comments: Right chest wall vas cath. Dressing c/d/i. No sign of infection to site.   Neurological:      Mental Status: He is alert and oriented to person, place, and time.   Psychiatric:         Behavior: Behavior normal.         Thought Content: Thought content normal.         Judgment: Judgment normal.            Significant Labs:   CBC:   Recent Labs   Lab 05/07/24  0448 05/08/24  0437   WBC 0.16* 0.30*   HGB 6.9* 8.0*   HCT 18.5* 22.1*   PLT 13* 9*    and CMP:   Recent Labs   Lab 05/07/24  0448 05/08/24  0437    138   K 4.1 3.8    107   CO2 23 24   * 132*   BUN 17 19   CREATININE 0.9 0.9   CALCIUM 9.0 9.3   PROT 5.1* 5.3*   ALBUMIN 2.7* 2.8*   BILITOT 0.7 1.1*   ALKPHOS 81 91   AST 19 15   ALT 46* 41   ANIONGAP 6* 7*       Diagnostic Results:  None  Assessment/Plan:     * History of autologous stem cell transplant  - Patient of Dr. Evans  - Admitted 4/16/24 for a Carmen 140 auto SCT  - Today is Day +20 ; ANC up to 100 today  - He received 5 bags on 4/18/24 at 1330 and received the remaining 4 bags on 4/19/24 at 1330. Total CD34 dose was 2.91 x 10^6.  - See treatment plan below.    Planned conditioning regimen:  Melphalan TBD on Day -1     Antimicrobial Prophylaxis:  Acyclovir starting on Day -1  Levofloxacin starting on Day  -1  Fluconazole starting on Day -1  Bactrim starting on Day +30     Growth Factor Support:  Neupogen starting on Day +7      Caregiver: daughter and other family members  Post-transplant discharge plans: Smita Portillo    Multiple myeloma in remission  - patient of Dr. Evans; follows locally with Dr. Rose in [place]  - diagnosed June 2023 with IgA Kappa MM; stage unclear at diagnosis as FISH not available  - started DRd therapy on 7/18/2023  - he stopped the daratumumab after cycle 4 and continue with revlimid and dex only; looks to have completed ~5 cycles  - Admitted for Carmen 140 Auto SCT on 4/16/2023    Bacteremia  - See neutropenic fever    Neutropenic fever  - Developed on 5/3/2024. Cultures obtained and showing Gemella Haemolysans  - Could not r/o aortic valve vegetation via TTE. Not a good HANSEL candidate per ID, so will complete a 6 week course of empiric IV abx.  - Surveillance cultures obtained on 5/4 currently NGTD.  - UC with low yield enterococcus facecialis (covered by Cefepime).  - Vanc stopped 5/6.  - Per ID rec, will continue Cefepime Q 8 until engraftment, then transition to Rocephin for daily dosing OP. Will continue through 6/14/24.  - Susceptibilities pending    Pancytopenia due to chemotherapy  - Daily CBC while inpatient  - Transfuse for Hgb <7or plts < 10K  - Continue antimicrobial ppx  - Stopped VTE ppx 4/24 d/t downtrending platelets    Chemotherapy induced nausea and vomiting  - Continue PRN Zofran and Compazine    Bacteremia due to Staphylococcus  - See neutropenic fever    Constipation  - On daily miralax  - Having BMs    Hyperbilirubinemia  - T-bili 1.7 on admit. Patient asymptomatic.  - Trending with daily CMP  RESOLVED    Cancer related pain  - Continue home prn Oxy IR    BPH (benign prostatic hyperplasia)  - Continue home finasteride    HTN (hypertension)  - Continue home amlodipine, carvedilol (dose-reduced for bradycardia), hydralazine, and spironolactone  - Stopped IVF following  transplant. BP improved off IVF.    Type 2 diabetes mellitus without complications  - Holding home metformin  - Stopped ACHS blood glucose monitoring 4/24 as blood glucose has been stable  - Monitoring with daily CMP    Hypothyroidism, unspecified  - Continue home synthroid    GERD (gastroesophageal reflux disease)  - Previously on BID protonix and daily pepcid for stomach ulcers; now resolved  - Continuing daily protonix per transplant order set    COLTON (acute kidney injury)  - Creatinine 1.4 on admission; baseline ~1.1  - Monitoring CMP daily  - Was receiving continuous fluids as part of chemotherapy regimen (NACL with 10% K)  RESOLVED        VTE Risk Mitigation (From admission, onward)           Ordered     heparin (porcine) injection 1,600 Units  As needed (PRN)         04/16/24 2129     IP VTE HIGH RISK PATIENT  Once         04/16/24 1904     Place sequential compression device  Until discontinued         04/16/24 1904                    Disposition: Inpatient for autologous SCT. Awaiting neutrophil engraftment.    Jessica Cee, NP  Bone Marrow Transplant  [facility] - Oncology ([facility])

## 2024-05-08 NOTE — PT/OT/SLP PROGRESS
Occupational Therapy      Patient Name:  Chip Goodson   MRN:  [id number]    Patient not seen today secondary to  pt. And family in room sleeping on OT attempt at 10:00 am on this date. Writing therapist unable to return on this date.    5/8/2024

## 2024-05-09 DIAGNOSIS — C90.00 MULTIPLE MYELOMA NOT HAVING ACHIEVED REMISSION: Primary | ICD-10-CM

## 2024-05-09 LAB
ALBUMIN SERPL BCP-MCNC: 2.8 G/DL (ref 3.5–5.2)
ALP SERPL-CCNC: 91 U/L (ref 55–135)
ALT SERPL W/O P-5'-P-CCNC: 34 U/L (ref 10–44)
ANION GAP SERPL CALC-SCNC: 7 MMOL/L (ref 8–16)
AST SERPL-CCNC: 11 U/L (ref 10–40)
BACTERIA BLD CULT: NORMAL
BACTERIA BLD CULT: NORMAL
BASOPHILS NFR BLD: 0 % (ref 0–1.9)
BILIRUB SERPL-MCNC: 1.1 MG/DL (ref 0.1–1)
BUN SERPL-MCNC: 21 MG/DL (ref 8–23)
CALCIUM SERPL-MCNC: 9.3 MG/DL (ref 8.7–10.5)
CHLORIDE SERPL-SCNC: 106 MMOL/L (ref 95–110)
CO2 SERPL-SCNC: 24 MMOL/L (ref 23–29)
CREAT SERPL-MCNC: 0.9 MG/DL (ref 0.5–1.4)
DIFFERENTIAL METHOD BLD: ABNORMAL
EOSINOPHIL NFR BLD: 0 % (ref 0–8)
ERYTHROCYTE [DISTWIDTH] IN BLOOD BY AUTOMATED COUNT: 11.1 % (ref 11.5–14.5)
EST. GFR  (NO RACE VARIABLE): >60 ML/MIN/1.73 M^2
GLUCOSE SERPL-MCNC: 127 MG/DL (ref 70–110)
HCT VFR BLD AUTO: 21.6 % (ref 40–54)
HGB BLD-MCNC: 7.9 G/DL (ref 14–18)
IMM GRANULOCYTES # BLD AUTO: ABNORMAL K/UL (ref 0–0.04)
IMM GRANULOCYTES NFR BLD AUTO: ABNORMAL % (ref 0–0.5)
LYMPHOCYTES NFR BLD: 19.2 % (ref 18–48)
MAGNESIUM SERPL-MCNC: 2 MG/DL (ref 1.6–2.6)
MCH RBC QN AUTO: 33.6 PG (ref 27–31)
MCHC RBC AUTO-ENTMCNC: 36.6 G/DL (ref 32–36)
MCV RBC AUTO: 92 FL (ref 82–98)
MONOCYTES NFR BLD: 24.6 % (ref 4–15)
NEUTROPHILS NFR BLD: 56.2 % (ref 38–73)
NRBC BLD-RTO: 0 /100 WBC
PHOSPHATE SERPL-MCNC: 2.8 MG/DL (ref 2.7–4.5)
PLATELET # BLD AUTO: 17 K/UL (ref 150–450)
PLATELET BLD QL SMEAR: ABNORMAL
PMV BLD AUTO: 10.5 FL (ref 9.2–12.9)
POTASSIUM SERPL-SCNC: 4 MMOL/L (ref 3.5–5.1)
PROT SERPL-MCNC: 5.4 G/DL (ref 6–8.4)
RBC # BLD AUTO: 2.35 M/UL (ref 4.6–6.2)
SODIUM SERPL-SCNC: 137 MMOL/L (ref 136–145)
WBC # BLD AUTO: 0.64 K/UL (ref 3.9–12.7)

## 2024-05-09 PROCEDURE — 85007 BL SMEAR W/DIFF WBC COUNT: CPT | Performed by: NURSE PRACTITIONER

## 2024-05-09 PROCEDURE — 85027 COMPLETE CBC AUTOMATED: CPT | Performed by: NURSE PRACTITIONER

## 2024-05-09 PROCEDURE — 20600001 HC STEP DOWN PRIVATE ROOM

## 2024-05-09 PROCEDURE — 84100 ASSAY OF PHOSPHORUS: CPT | Performed by: NURSE PRACTITIONER

## 2024-05-09 PROCEDURE — 25000003 PHARM REV CODE 250: Performed by: STUDENT IN AN ORGANIZED HEALTH CARE EDUCATION/TRAINING PROGRAM

## 2024-05-09 PROCEDURE — 99233 SBSQ HOSP IP/OBS HIGH 50: CPT | Mod: ,,, | Performed by: INTERNAL MEDICINE

## 2024-05-09 PROCEDURE — 25000003 PHARM REV CODE 250: Performed by: INTERNAL MEDICINE

## 2024-05-09 PROCEDURE — 25000003 PHARM REV CODE 250: Performed by: NURSE PRACTITIONER

## 2024-05-09 PROCEDURE — 94761 N-INVAS EAR/PLS OXIMETRY MLT: CPT

## 2024-05-09 PROCEDURE — 63600175 PHARM REV CODE 636 W HCPCS: Performed by: STUDENT IN AN ORGANIZED HEALTH CARE EDUCATION/TRAINING PROGRAM

## 2024-05-09 PROCEDURE — 63600175 PHARM REV CODE 636 W HCPCS: Mod: JZ,JG | Performed by: INTERNAL MEDICINE

## 2024-05-09 PROCEDURE — 97530 THERAPEUTIC ACTIVITIES: CPT

## 2024-05-09 PROCEDURE — 80053 COMPREHEN METABOLIC PANEL: CPT | Performed by: NURSE PRACTITIONER

## 2024-05-09 PROCEDURE — 83735 ASSAY OF MAGNESIUM: CPT | Performed by: NURSE PRACTITIONER

## 2024-05-09 RX ORDER — POLYETHYLENE GLYCOL 3350 17 G/17G
17 POWDER, FOR SOLUTION ORAL DAILY PRN
Status: DISCONTINUED | OUTPATIENT
Start: 2024-05-09 | End: 2024-05-11 | Stop reason: HOSPADM

## 2024-05-09 RX ORDER — AMOXICILLIN 250 MG
1 CAPSULE ORAL 2 TIMES DAILY PRN
Status: DISCONTINUED | OUTPATIENT
Start: 2024-05-09 | End: 2024-05-11 | Stop reason: HOSPADM

## 2024-05-09 RX ORDER — CYCLOBENZAPRINE HCL 5 MG
5 TABLET ORAL 3 TIMES DAILY PRN
Status: DISCONTINUED | OUTPATIENT
Start: 2024-05-09 | End: 2024-05-11 | Stop reason: HOSPADM

## 2024-05-09 RX ADMIN — Medication 1 DOSE: at 05:05

## 2024-05-09 RX ADMIN — HYDRALAZINE HYDROCHLORIDE 50 MG: 50 TABLET ORAL at 05:05

## 2024-05-09 RX ADMIN — FILGRASTIM 480 MCG: 480 INJECTION, SOLUTION INTRAVENOUS; SUBCUTANEOUS at 08:05

## 2024-05-09 RX ADMIN — CARVEDILOL 6.25 MG: 6.25 TABLET, FILM COATED ORAL at 09:05

## 2024-05-09 RX ADMIN — LEVOTHYROXINE SODIUM 112 MCG: 112 TABLET ORAL at 09:05

## 2024-05-09 RX ADMIN — ACYCLOVIR 800 MG: 200 CAPSULE ORAL at 08:05

## 2024-05-09 RX ADMIN — HYDRALAZINE HYDROCHLORIDE 50 MG: 50 TABLET ORAL at 09:05

## 2024-05-09 RX ADMIN — HYDRALAZINE HYDROCHLORIDE 50 MG: 50 TABLET ORAL at 02:05

## 2024-05-09 RX ADMIN — CEFEPIME 2 G: 2 INJECTION, POWDER, FOR SOLUTION INTRAVENOUS at 02:05

## 2024-05-09 RX ADMIN — HEPARIN SODIUM 1600 UNITS: 1000 INJECTION, SOLUTION INTRAVENOUS; SUBCUTANEOUS at 05:05

## 2024-05-09 RX ADMIN — CYCLOBENZAPRINE HYDROCHLORIDE 5 MG: 5 TABLET, FILM COATED ORAL at 11:05

## 2024-05-09 RX ADMIN — CEFEPIME 2 G: 2 INJECTION, POWDER, FOR SOLUTION INTRAVENOUS at 10:05

## 2024-05-09 RX ADMIN — Medication 1 DOSE: at 02:05

## 2024-05-09 RX ADMIN — SPIRONOLACTONE 12.5 MG: 25 TABLET ORAL at 08:05

## 2024-05-09 RX ADMIN — HEPARIN SODIUM 1600 UNITS: 1000 INJECTION, SOLUTION INTRAVENOUS; SUBCUTANEOUS at 08:05

## 2024-05-09 RX ADMIN — ACYCLOVIR 800 MG: 200 CAPSULE ORAL at 09:05

## 2024-05-09 RX ADMIN — FLUCONAZOLE 400 MG: 200 TABLET ORAL at 08:05

## 2024-05-09 RX ADMIN — Medication 1 DOSE: at 09:05

## 2024-05-09 RX ADMIN — CEFEPIME 2 G: 2 INJECTION, POWDER, FOR SOLUTION INTRAVENOUS at 05:05

## 2024-05-09 RX ADMIN — Medication 1 DOSE: at 08:05

## 2024-05-09 RX ADMIN — AMLODIPINE BESYLATE 10 MG: 10 TABLET ORAL at 08:05

## 2024-05-09 RX ADMIN — FINASTERIDE 5 MG: 5 TABLET, FILM COATED ORAL at 08:05

## 2024-05-09 RX ADMIN — CARVEDILOL 6.25 MG: 6.25 TABLET, FILM COATED ORAL at 08:05

## 2024-05-09 RX ADMIN — PANTOPRAZOLE SODIUM 40 MG: 40 TABLET, DELAYED RELEASE ORAL at 08:05

## 2024-05-09 NOTE — ASSESSMENT & PLAN NOTE
- Patient of Dr. Evans  - Admitted 4/16/24 for a Carmen 140 auto SCT  - Today is Day +21 ; ANC up to 360 today  - He received 5 bags on 4/18/24 at 1330 and received the remaining 4 bags on 4/19/24 at 1330. Total CD34 dose was 2.91 x 10^6.  - See treatment plan below.    Planned conditioning regimen:  Melphalan TBD on Day -1     Antimicrobial Prophylaxis:  Acyclovir starting on Day -1  Levofloxacin starting on Day -1  Fluconazole starting on Day -1  Bactrim starting on Day +30     Growth Factor Support:  Neupogen starting on Day +7      Caregiver: daughter and other family members  Post-transplant discharge plans: Smita Portillo

## 2024-05-09 NOTE — ASSESSMENT & PLAN NOTE
- patient of Dr. Evans; follows locally with Dr. Rose in [place]  - diagnosed June 2023 with IgA Kappa MM; stage unclear at diagnosis as FISH not available  - started DRd therapy on 7/18/2023  - he stopped the daratumumab after cycle 4 and continue with revlimid and dex only; looks to have completed ~5 cycles  - Admitted for Carmen 140 Auto SCT on 4/16/2023

## 2024-05-09 NOTE — SUBJECTIVE & OBJECTIVE
Subjective:     Interval History: Day +21 s/p Carmen 140 Auto SCT for MM.  today. Afebrile. Remains on cefepime per ID. Will switch to ceftriaxone once ANC >500. EOT 6/14. Continues with mild nausea. Denies diarrhea or constipation.     Objective:     Vital Signs (Most Recent):  Temp: 98.8 °F (37.1 °C) (05/09/24 1112)  Pulse: 74 (05/09/24 1112)  Resp: 20 (05/09/24 1112)  BP: 129/63 (05/09/24 1112)  SpO2: 95 % (05/09/24 0841) Vital Signs (24h Range):  Temp:  [97.9 °F (36.6 °C)-99.4 °F (37.4 °C)] 98.8 °F (37.1 °C)  Pulse:  [61-83] 74  Resp:  [16-20] 20  SpO2:  [95 %-96 %] 95 %  BP: (115-135)/(61-72) 129/63     Weight: 86.4 kg (190 lb 5.9 oz)  Body mass index is 27.31 kg/m².  Body surface area is 2.07 meters squared.      Intake/Output - Last 3 Shifts         05/07 0700  05/08 0659 05/08 0700  05/09 0659 05/09 0700  05/10 0659    P.O. 1671 1256 200    Blood 350 289     IV Piggyback 96.9 349.3     Total Intake(mL/kg) 2117.9 (24.5) 1894.3 (22) 200 (2.3)    Urine (mL/kg/hr) 1700 (0.8) 1325 (0.6) 750 (1.3)    Emesis/NG output  0     Stool 0 0     Total Output 1700 1325 750    Net +417.9 +569.3 -550           Stool Occurrence 1 x 1 x     Emesis Occurrence  1 x              Physical Exam  Vitals and nursing note reviewed.   Constitutional:       Appearance: Normal appearance. He is well-developed.   HENT:      Head: Normocephalic and atraumatic.      Ears:      Comments: [place]     Mouth/Throat:      Mouth: Mucous membranes are moist.      Pharynx: No oropharyngeal exudate or posterior oropharyngeal erythema.   Eyes:      General:         Right eye: No discharge.         Left eye: No discharge.      Extraocular Movements: Extraocular movements intact.      Conjunctiva/sclera: Conjunctivae normal.   Cardiovascular:      Rate and Rhythm: Normal rate and regular rhythm.      Pulses: Normal pulses.      Heart sounds: Normal heart sounds. No murmur heard.  Pulmonary:      Effort: Pulmonary effort is normal. No respiratory  distress.      Breath sounds: Normal breath sounds. No wheezing or rales.   Abdominal:      General: Bowel sounds are normal. There is no distension.      Palpations: Abdomen is soft.      Tenderness: There is no abdominal tenderness.   Musculoskeletal:         General: No deformity. Normal range of motion.      Cervical back: Normal range of motion and neck supple.      Right lower leg: No edema.      Left lower leg: No edema.   Skin:     General: Skin is warm and dry.      Findings: No bruising, erythema or rash.      Comments: Right chest wall vas cath. Dressing c/d/i. No sign of infection to site.   Neurological:      General: No focal deficit present.      Mental Status: He is alert and oriented to person, place, and time.      Motor: No weakness.   Psychiatric:         Mood and Affect: Mood normal.         Behavior: Behavior normal.         Thought Content: Thought content normal.         Judgment: Judgment normal.            Significant Labs:   CBC:   Recent Labs   Lab 05/08/24  0437 05/09/24  0514   WBC 0.30* 0.64*   HGB 8.0* 7.9*   HCT 22.1* 21.6*   PLT 9* 17*    and CMP:   Recent Labs   Lab 05/08/24  0437 05/09/24  0514    137   K 3.8 4.0    106   CO2 24 24   * 127*   BUN 19 21   CREATININE 0.9 0.9   CALCIUM 9.3 9.3   PROT 5.3* 5.4*   ALBUMIN 2.8* 2.8*   BILITOT 1.1* 1.1*   ALKPHOS 91 91   AST 15 11   ALT 41 34   ANIONGAP 7* 7*       Diagnostic Results:  None

## 2024-05-09 NOTE — PROGRESS NOTES
[facility] - Oncology ([facility])  Hematology  Bone Marrow Transplant  Progress Note    Patient Name: Chip Goodson  Admission Date: 4/16/2024  Hospital Length of Stay: 23 days  Code Status: Full Code    Subjective:     Interval History: Day +21 s/p Carmen 140 Auto SCT for MM.  today. Afebrile. Remains on cefepime per ID. Will switch to ceftriaxone once ANC >500. EOT 6/14. Continues with mild nausea. Denies diarrhea or constipation.     Objective:     Vital Signs (Most Recent):  Temp: 98.8 °F (37.1 °C) (05/09/24 1112)  Pulse: 74 (05/09/24 1112)  Resp: 20 (05/09/24 1112)  BP: 129/63 (05/09/24 1112)  SpO2: 95 % (05/09/24 0841) Vital Signs (24h Range):  Temp:  [97.9 °F (36.6 °C)-99.4 °F (37.4 °C)] 98.8 °F (37.1 °C)  Pulse:  [61-83] 74  Resp:  [16-20] 20  SpO2:  [95 %-96 %] 95 %  BP: (115-135)/(61-72) 129/63     Weight: 86.4 kg (190 lb 5.9 oz)  Body mass index is 27.31 kg/m².  Body surface area is 2.07 meters squared.      Intake/Output - Last 3 Shifts         05/07 0700  05/08 0659 05/08 0700  05/09 0659 05/09 0700  05/10 0659    P.O. 1671 1256 200    Blood 350 289     IV Piggyback 96.9 349.3     Total Intake(mL/kg) 2117.9 (24.5) 1894.3 (22) 200 (2.3)    Urine (mL/kg/hr) 1700 (0.8) 1325 (0.6) 750 (1.3)    Emesis/NG output  0     Stool 0 0     Total Output 1700 1325 750    Net +417.9 +569.3 -550           Stool Occurrence 1 x 1 x     Emesis Occurrence  1 x              Physical Exam  Vitals and nursing note reviewed.   Constitutional:       Appearance: Normal appearance. He is well-developed.   HENT:      Head: Normocephalic and atraumatic.      Ears:      Comments: [place]     Mouth/Throat:      Mouth: Mucous membranes are moist.      Pharynx: No oropharyngeal exudate or posterior oropharyngeal erythema.   Eyes:      General:         Right eye: No discharge.         Left eye: No discharge.      Extraocular Movements: Extraocular movements intact.      Conjunctiva/sclera: Conjunctivae normal.   Cardiovascular:      Rate  and Rhythm: Normal rate and regular rhythm.      Pulses: Normal pulses.      Heart sounds: Normal heart sounds. No murmur heard.  Pulmonary:      Effort: Pulmonary effort is normal. No respiratory distress.      Breath sounds: Normal breath sounds. No wheezing or rales.   Abdominal:      General: Bowel sounds are normal. There is no distension.      Palpations: Abdomen is soft.      Tenderness: There is no abdominal tenderness.   Musculoskeletal:         General: No deformity. Normal range of motion.      Cervical back: Normal range of motion and neck supple.      Right lower leg: No edema.      Left lower leg: No edema.   Skin:     General: Skin is warm and dry.      Findings: No bruising, erythema or rash.      Comments: Right chest wall vas cath. Dressing c/d/i. No sign of infection to site.   Neurological:      General: No focal deficit present.      Mental Status: He is alert and oriented to person, place, and time.      Motor: No weakness.   Psychiatric:         Mood and Affect: Mood normal.         Behavior: Behavior normal.         Thought Content: Thought content normal.         Judgment: Judgment normal.            Significant Labs:   CBC:   Recent Labs   Lab 05/08/24  0437 05/09/24  0514   WBC 0.30* 0.64*   HGB 8.0* 7.9*   HCT 22.1* 21.6*   PLT 9* 17*    and CMP:   Recent Labs   Lab 05/08/24  0437 05/09/24  0514    137   K 3.8 4.0    106   CO2 24 24   * 127*   BUN 19 21   CREATININE 0.9 0.9   CALCIUM 9.3 9.3   PROT 5.3* 5.4*   ALBUMIN 2.8* 2.8*   BILITOT 1.1* 1.1*   ALKPHOS 91 91   AST 15 11   ALT 41 34   ANIONGAP 7* 7*       Diagnostic Results:  None  Assessment/Plan:     * History of autologous stem cell transplant  - Patient of Dr. Evans  - Admitted 4/16/24 for a Carmen 140 auto SCT  - Today is Day +21 ; ANC up to 360 today  - He received 5 bags on 4/18/24 at 1330 and received the remaining 4 bags on 4/19/24 at 1330. Total CD34 dose was 2.91 x 10^6.  - See treatment plan  below.    Planned conditioning regimen:  Melphalan TBD on Day -1     Antimicrobial Prophylaxis:  Acyclovir starting on Day -1  Levofloxacin starting on Day -1  Fluconazole starting on Day -1  Bactrim starting on Day +30     Growth Factor Support:  Neupogen starting on Day +7      Caregiver: daughter and other family members  Post-transplant discharge plans: Smita Portillo    Multiple myeloma in remission  - patient of Dr. Evans; follows locally with Dr. Rose in [place]  - diagnosed June 2023 with IgA Kappa MM; stage unclear at diagnosis as FISH not available  - started DRd therapy on 7/18/2023  - he stopped the daratumumab after cycle 4 and continue with revlimid and dex only; looks to have completed ~5 cycles  - Admitted for Carmen 140 Auto SCT on 4/16/2023    Pancytopenia due to chemotherapy  - Daily CBC while inpatient  - Transfuse for Hgb <7or plts < 10K  - Continue antimicrobial ppx  - Stopped VTE ppx 4/24 d/t downtrending platelets    Neutropenic fever  - Developed on 5/3/2024. Cultures obtained and showing Gemella Haemolysans  - Could not r/o aortic valve vegetation via TTE. Not a good HANSEL candidate per ID, so will complete a 6 week course of empiric IV abx.  - Surveillance cultures obtained on 5/4 currently NGTD.  - UC with low yield enterococcus facecialis (covered by Cefepime).  - Vanc stopped 5/6.  - Per ID rec, will continue Cefepime Q8h until ANC>500, then transition to Rocephin for daily dosing OP. Will continue through 6/14/24.  - Susceptibilities pending    Chemotherapy induced nausea and vomiting  - Continue PRN Zofran and Compazine    Bacteremia  - See neutropenic fever    Bacteremia due to Staphylococcus  - See neutropenic fever    Constipation  RESOLVED  - Having Bms; miralax now PRN    Hyperbilirubinemia  - T-bili 1.7 on admit. Patient asymptomatic.  - Trending with daily CMP  RESOLVED    Cancer related pain  - Continue home prn Oxy IR    BPH (benign prostatic hyperplasia)  - Continue home  finasteride    HTN (hypertension)  - Continue home amlodipine, carvedilol (dose-reduced for bradycardia), hydralazine, and spironolactone  - Stopped IVF following transplant. BP improved off IVF.    Type 2 diabetes mellitus without complications  - Holding home metformin  - Stopped ACHS blood glucose monitoring 4/24 as blood glucose has been stable  - Monitoring with daily CMP    Hypothyroidism, unspecified  - Continue home synthroid    GERD (gastroesophageal reflux disease)  - Previously on BID protonix and daily pepcid for stomach ulcers; now resolved  - Continuing daily protonix per transplant order set    COLTON (acute kidney injury)  - Creatinine 1.4 on admission; baseline ~1.1  - Monitoring CMP daily  - Was receiving continuous fluids as part of chemotherapy regimen (NACL with 10% K)  RESOLVED        VTE Risk Mitigation (From admission, onward)           Ordered     heparin (porcine) injection 1,600 Units  As needed (PRN)         04/16/24 2129     IP VTE HIGH RISK PATIENT  Once         04/16/24 1904     Place sequential compression device  Until discontinued         04/16/24 1904                    Disposition: Remains inpatient    Chelsie Man NP  Bone Marrow Transplant  [facility] - Oncology ([facility])

## 2024-05-09 NOTE — PROGRESS NOTES
Received request to assist with IV ABX.  Referrals sent via [facility] to [facility] and [facility].  Accepted by [facility] of [facility].  Awaiting response from [facility].  Updated [place] on Estimated date of discharge.  Will follow.

## 2024-05-09 NOTE — PT/OT/SLP DISCHARGE
Occupational Therapy Discharge Summary    Chip Goodson  MRN: [id number]   Principal Problem: History of autologous stem cell transplant      Patient Discharged from acute Occupational Therapy on 05-09-24.  Please refer to prior OT note dated 04-26-24 for functional status.    Assessment:       Pt. Reports performing all of his own ADL tasks independently and does his HEP independently using theraband. Pt. Not wanting/needing  continued OT services at this time.  OT in agreement with d/c.     Objective:     GOALS:   Multidisciplinary Problems       Occupational Therapy Goals          Problem: Occupational Therapy    Goal Priority Disciplines Outcome Interventions   Occupational Therapy Goal     OT, PT/OT Progressing    Description: Goals to be met by: 05-03-24     Patient will increase functional independence with ADLs by performing:    Pt. To be independent with ADL task performance  Pt. To be independent with HEP to BUE  to maintain level of endurance                         Reasons for Discontinuation of Therapy Services  Pt. At independent level       Plan:     Patient Discharged to:  remains in acute setting    5/9/2024

## 2024-05-09 NOTE — PT/OT/SLP PROGRESS
"Physical Therapy Treatment & Discharge Summary    Patient Name:  Chip Goodson   MRN:  [id number]    Recommendations:     Discharge Recommendations: No Therapy Indicated  Discharge Equipment Recommendations: none  Barriers to discharge: None    Assessment:     Chip Goodson is a 73 y.o. male admitted with a medical diagnosis of History of autologous stem cell transplant.  He presents with the following impairments/functional limitations:  (at risk for deconditioning). Patient demonstrating good initial motivation to participate in session, with fair response to completed activities and provided education. As discussed with patient on 05/07/24, session emphasis on more salient activities this date. Patient completed ambulation on uneven surface & stand<>kneel transfer. Upon further conversation with patient, he appears to be at his functional baseline and is independent with mobility at this time. For this reason, patient is to be discharged from acute PT services. Patient continues to demonstrate no need for post-acute skilled PT services as well.    Rehab Prognosis: Good; patient would benefit from acute skilled PT services to address these deficits and reach maximum level of function.    Recent Surgery: * No surgery found *      Plan:     Discharge from acute Physical Therapy Services on 05/09/24    Subjective     Chief Complaint: Onset of back pain  Patient/Family Comments/goals: "I don't move around much" - Patient clarifying that his baseline activity level, due to him previously noting high activity level prior to admission to [facility]  Pain/Comfort:  Pain Rating 1: 0/10  Location - Side 1: Bilateral  Location - Orientation 1: lower  Location 1: back  Pain Addressed 1: Cessation of Activity  Pain Rating Post-Intervention 1: Intensity not provided      Objective:     Communicated with RN prior to session.  Patient found sitting edge of bed with  (No active lines) upon PT entry to room.     General Precautions: " "Standard, fall, diabetic, hearing impaired   Orthopedic Precautions:N/A   Braces: N/A   Body mass index is 27.31 kg/m².  Oxygen Device: Room Air  Vitals: /64 (Patient Position: Lying)   Pulse 78   Temp 98.7 °F (37.1 °C) (Oral)   Resp 18   Ht 5' 10" (1.778 m)   Wt 86.4 kg (190 lb 5.9 oz)   SpO2 96%   BMI 27.31 kg/m²      Functional Mobility:  Bed Mobility:     EOB Scooting:   Anterior: Independent  Sit>Supine: x1, Independent with HOB Flat    Transfers:     Sit<>Stand: x1, Independent from Edge of Bed with No AD  Stand<>Kneel: x1 (R), SBA with UE assist via bed rail.    Gait: x30ft on uneven surface,  SBA-Supervision  with No AD. (x1) episode LOB w/ pt utilizing step strategy & UE support on bedrail to re-establish DANN.     Total Distance: 30ft    Balance:   Static Sitting: Independent  Dynamic Sitting: Independent    Static Standing: Independent  Dynamic Standing:  SBA-Supervision    AM-PAC 6 CLICK MOBILITY  Turning over in bed (including adjusting bedclothes, sheets and blankets)?: 4  Sitting down on and standing up from a chair with arms (e.g., wheelchair, bedside commode, etc.): 4  Moving from lying on back to sitting on the side of the bed?: 4  Moving to and from a bed to a chair (including a wheelchair)?: 4  Need to walk in hospital room?: 4  Climbing 3-5 steps with a railing?: 4  Basic Mobility Total Score: 24     Treatment & Education:  Increased time spent with patient to discuss proper body mechanics when lift objects from floor and assisting with transferring his wife    Patient Education Provided on:  The role of physical therapy and how the patient can benefit from skilled services  The negative effects of prolonged bed rest/sedentary behavior, along with the importance of OOB activity & patient participation with PT  The importance of contacting RN, via call light, for mobility throughout the day  Pt white board updated with current therapists name and level of mobility assistance needed. "     Patient Verbalized understanding of all topics touched on this date. All patient questions answered within the PT scope of practice    Patient left supine with call button in reach..    GOALS:   Multidisciplinary Problems       Physical Therapy Goals          Problem: Physical Therapy    Goal Priority Disciplines Outcome Goal Variances Interventions   Physical Therapy Goal     PT, PT/OT Progressing     Description: Goals to be met by: 24, extend through 24    Patient will maintain functional independence with mobility, without evidence of deconditioning, by performin. Supine to sit with [place]  2. Sit to stand transfer with [place] - MET  3. Gait  x 400 feet with [place]   4. Ascend/Descend 6 inch curb step with [place]  5. Stand for 10 minutes with [place]  6. Lower extremity exercise program x30 reps per handout, with assistance as needed                         Time Tracking:     PT Received On: 24  PT Start Time: 1345     PT Stop Time: 1402  PT Total Time (min): 17 min     Billable Minutes: Therapeutic Activity 17    Treatment Type: Treatment  PT/PTA: PT     Number of PTA visits since last PT visit: 0     2024

## 2024-05-09 NOTE — PLAN OF CARE
Day +21 Carmen Auto SCT. Patient AAOx4. POC reviewed with patient; understanding verbalized. IV cefepime infused per order. Pt complained of neck and back pain, PRN flexeril given with moderate relief. Voids spontaneously in urinal without difficulty. Bed alarm refused after education on fall risk. Family at bedside. Pt. with nonskid footwear on, bed in lowest position, and locked with bed rails up x2. Pt. instructed to call for assistance as needed. Pt. has call light and personal items within reach. Patient ambulates in room independently. VSS and afebrile this shift. Q1H rounding. All questions and concerns addressed at this time.

## 2024-05-09 NOTE — ASSESSMENT & PLAN NOTE
- Developed on 5/3/2024. Cultures obtained and showing Gemella Haemolysans  - Could not r/o aortic valve vegetation via TTE. Not a good HANSEL candidate per ID, so will complete a 6 week course of empiric IV abx.  - Surveillance cultures obtained on 5/4 currently NGTD.  - UC with low yield enterococcus facecialis (covered by Cefepime).  - Vanc stopped 5/6.  - Per ID rec, will continue Cefepime Q8h until ANC>500, then transition to Rocephin for daily dosing OP. Will continue through 6/14/24.  - Susceptibilities pending

## 2024-05-09 NOTE — PLAN OF CARE
POC reviewed with patient; understanding verbalized. Pt is day +21 of Carmen Auto SCT. Pt had no complaints this shift. Antibiotics were given.  Pt. with nonskid footwear on, bed in lowest position, and locked with bed rails up x2.  Pt. instructed to call prior to getting OOB.  Pt. has call light and personal items within reach. Patient ambulates in room independently. VSS and afebrile this shift. All questions and concerns addressed at this time. Will continue to monitor.

## 2024-05-09 NOTE — PLAN OF CARE
Problem: Physical Therapy  Goal: Physical Therapy Goal  Description: Goals to be met by: 24, extend through 24    Patient will maintain functional independence with mobility, without evidence of deconditioning, by performin. Supine to sit with [place]  2. Sit to stand transfer with [place]  3. Gait  x 400 feet with [place]   4. Ascend/Descend 6 inch curb step with [place]  5. Stand for 10 minutes with [place]  6. Lower extremity exercise program x30 reps per handout, with assistance as needed    Outcome: Progressing

## 2024-05-10 LAB
ALBUMIN SERPL BCP-MCNC: 2.8 G/DL (ref 3.5–5.2)
ALP SERPL-CCNC: 92 U/L (ref 55–135)
ALT SERPL W/O P-5'-P-CCNC: 38 U/L (ref 10–44)
ANION GAP SERPL CALC-SCNC: 8 MMOL/L (ref 8–16)
ANISOCYTOSIS BLD QL SMEAR: SLIGHT
AST SERPL-CCNC: 16 U/L (ref 10–40)
BASOPHILS # BLD AUTO: 0.01 K/UL (ref 0–0.2)
BASOPHILS NFR BLD: 1 % (ref 0–1.9)
BILIRUB SERPL-MCNC: 1 MG/DL (ref 0.1–1)
BUN SERPL-MCNC: 21 MG/DL (ref 8–23)
CALCIUM SERPL-MCNC: 9.3 MG/DL (ref 8.7–10.5)
CHLORIDE SERPL-SCNC: 106 MMOL/L (ref 95–110)
CO2 SERPL-SCNC: 23 MMOL/L (ref 23–29)
CREAT SERPL-MCNC: 0.9 MG/DL (ref 0.5–1.4)
DACRYOCYTES BLD QL SMEAR: ABNORMAL
DIFFERENTIAL METHOD BLD: ABNORMAL
EOSINOPHIL # BLD AUTO: 0 K/UL (ref 0–0.5)
EOSINOPHIL NFR BLD: 0 % (ref 0–8)
ERYTHROCYTE [DISTWIDTH] IN BLOOD BY AUTOMATED COUNT: 10.9 % (ref 11.5–14.5)
EST. GFR  (NO RACE VARIABLE): >60 ML/MIN/1.73 M^2
GLUCOSE SERPL-MCNC: 140 MG/DL (ref 70–110)
HCT VFR BLD AUTO: 20.4 % (ref 40–54)
HGB BLD-MCNC: 7.6 G/DL (ref 14–18)
IMM GRANULOCYTES # BLD AUTO: 0.05 K/UL (ref 0–0.04)
IMM GRANULOCYTES NFR BLD AUTO: 4.9 % (ref 0–0.5)
LYMPHOCYTES # BLD AUTO: 0.1 K/UL (ref 1–4.8)
LYMPHOCYTES NFR BLD: 10.8 % (ref 18–48)
MAGNESIUM SERPL-MCNC: 2.1 MG/DL (ref 1.6–2.6)
MCH RBC QN AUTO: 34.1 PG (ref 27–31)
MCHC RBC AUTO-ENTMCNC: 37.3 G/DL (ref 32–36)
MCV RBC AUTO: 92 FL (ref 82–98)
MONOCYTES # BLD AUTO: 0.2 K/UL (ref 0.3–1)
MONOCYTES NFR BLD: 20.6 % (ref 4–15)
NEUTROPHILS # BLD AUTO: 0.6 K/UL (ref 1.8–7.7)
NEUTROPHILS NFR BLD: 62.7 % (ref 38–73)
NRBC BLD-RTO: 0 /100 WBC
OVALOCYTES BLD QL SMEAR: ABNORMAL
PHOSPHATE SERPL-MCNC: 3 MG/DL (ref 2.7–4.5)
PLATELET # BLD AUTO: 13 K/UL (ref 150–450)
PMV BLD AUTO: 11.2 FL (ref 9.2–12.9)
POIKILOCYTOSIS BLD QL SMEAR: SLIGHT
POTASSIUM SERPL-SCNC: 4.1 MMOL/L (ref 3.5–5.1)
PROT SERPL-MCNC: 5.4 G/DL (ref 6–8.4)
RBC # BLD AUTO: 2.23 M/UL (ref 4.6–6.2)
SODIUM SERPL-SCNC: 137 MMOL/L (ref 136–145)
SPHEROCYTES BLD QL SMEAR: ABNORMAL
WBC # BLD AUTO: 1.02 K/UL (ref 3.9–12.7)

## 2024-05-10 PROCEDURE — 36410 VNPNXR 3YR/> PHY/QHP DX/THER: CPT

## 2024-05-10 PROCEDURE — 87086 URINE CULTURE/COLONY COUNT: CPT | Performed by: NURSE PRACTITIONER

## 2024-05-10 PROCEDURE — 63600175 PHARM REV CODE 636 W HCPCS: Performed by: STUDENT IN AN ORGANIZED HEALTH CARE EDUCATION/TRAINING PROGRAM

## 2024-05-10 PROCEDURE — C1751 CATH, INF, PER/CENT/MIDLINE: HCPCS

## 2024-05-10 PROCEDURE — 80053 COMPREHEN METABOLIC PANEL: CPT | Performed by: NURSE PRACTITIONER

## 2024-05-10 PROCEDURE — 76937 US GUIDE VASCULAR ACCESS: CPT

## 2024-05-10 PROCEDURE — 63600175 PHARM REV CODE 636 W HCPCS: Performed by: NURSE PRACTITIONER

## 2024-05-10 PROCEDURE — 83735 ASSAY OF MAGNESIUM: CPT | Performed by: NURSE PRACTITIONER

## 2024-05-10 PROCEDURE — 85025 COMPLETE CBC W/AUTO DIFF WBC: CPT | Performed by: INTERNAL MEDICINE

## 2024-05-10 PROCEDURE — 25000003 PHARM REV CODE 250: Performed by: STUDENT IN AN ORGANIZED HEALTH CARE EDUCATION/TRAINING PROGRAM

## 2024-05-10 PROCEDURE — 99233 SBSQ HOSP IP/OBS HIGH 50: CPT | Mod: FS,,, | Performed by: INTERNAL MEDICINE

## 2024-05-10 PROCEDURE — 25000003 PHARM REV CODE 250: Performed by: INTERNAL MEDICINE

## 2024-05-10 PROCEDURE — 20600001 HC STEP DOWN PRIVATE ROOM

## 2024-05-10 PROCEDURE — 25000003 PHARM REV CODE 250: Performed by: NURSE PRACTITIONER

## 2024-05-10 PROCEDURE — 84100 ASSAY OF PHOSPHORUS: CPT | Performed by: NURSE PRACTITIONER

## 2024-05-10 PROCEDURE — 63600175 PHARM REV CODE 636 W HCPCS: Mod: JZ,JG | Performed by: INTERNAL MEDICINE

## 2024-05-10 RX ADMIN — HYDRALAZINE HYDROCHLORIDE 50 MG: 50 TABLET ORAL at 09:05

## 2024-05-10 RX ADMIN — LEVOTHYROXINE SODIUM 112 MCG: 112 TABLET ORAL at 09:05

## 2024-05-10 RX ADMIN — FLUCONAZOLE 400 MG: 200 TABLET ORAL at 10:05

## 2024-05-10 RX ADMIN — CARVEDILOL 6.25 MG: 6.25 TABLET, FILM COATED ORAL at 09:05

## 2024-05-10 RX ADMIN — ACYCLOVIR 800 MG: 200 CAPSULE ORAL at 09:05

## 2024-05-10 RX ADMIN — SPIRONOLACTONE 12.5 MG: 25 TABLET ORAL at 09:05

## 2024-05-10 RX ADMIN — Medication 1 DOSE: at 09:05

## 2024-05-10 RX ADMIN — CARVEDILOL 6.25 MG: 6.25 TABLET, FILM COATED ORAL at 10:05

## 2024-05-10 RX ADMIN — AMLODIPINE BESYLATE 10 MG: 10 TABLET ORAL at 10:05

## 2024-05-10 RX ADMIN — Medication 1 DOSE: at 10:05

## 2024-05-10 RX ADMIN — FINASTERIDE 5 MG: 5 TABLET, FILM COATED ORAL at 10:05

## 2024-05-10 RX ADMIN — HYDRALAZINE HYDROCHLORIDE 50 MG: 50 TABLET ORAL at 02:05

## 2024-05-10 RX ADMIN — FILGRASTIM 480 MCG: 480 INJECTION, SOLUTION INTRAVENOUS; SUBCUTANEOUS at 10:05

## 2024-05-10 RX ADMIN — CEFTRIAXONE SODIUM 2 G: 2 INJECTION, POWDER, FOR SOLUTION INTRAMUSCULAR; INTRAVENOUS at 02:05

## 2024-05-10 RX ADMIN — Medication 1 DOSE: at 01:05

## 2024-05-10 RX ADMIN — HYDRALAZINE HYDROCHLORIDE 50 MG: 50 TABLET ORAL at 05:05

## 2024-05-10 RX ADMIN — CEFEPIME 2 G: 2 INJECTION, POWDER, FOR SOLUTION INTRAVENOUS at 05:05

## 2024-05-10 RX ADMIN — PANTOPRAZOLE SODIUM 40 MG: 40 TABLET, DELAYED RELEASE ORAL at 10:05

## 2024-05-10 RX ADMIN — ACYCLOVIR 800 MG: 200 CAPSULE ORAL at 10:05

## 2024-05-10 RX ADMIN — Medication 1 DOSE: at 05:05

## 2024-05-10 NOTE — PROGRESS NOTES
Updated [facility] and [facility] on possible weekend discharge.  Current line cannot be used for his antibiotics.  Team notified and exploring other options.  Will follow.

## 2024-05-10 NOTE — PROGRESS NOTES
Discharge teaching done with patient and patient's caregiver on BMT unit.  Approximately 45min spent on discussion of post-transplant guidelines including:  Low microbial diet, safe food handling, dining out, home sanitation, outpatient plan of care, progression of recovery of cells and immune system, ways to prevent infection, fatigue, ways to avoid bleeding, pet and plant exposure and care, returning to work, smoking and alcohol consumption, sexual activity, sexual desire and response, immunizations, traveling, nutrition, personal hygiene, hand washing, oral care, eye care, signs and symptoms to report immediately, and emotional changes post transplant.  Also discussed who to contact during business hours, after hours, and holidays.  Written materials supplied.  Patient and family given the opportunity to ask questions.  Verbalizes understanding.  All questions answered to their satisfaction.  Patient and family instructed to call with any questions or concerns.  Patient and caregiver were given handouts which reiterate all the above teaching.     Chelsie Man NP  Hematology/Oncology/BMT

## 2024-05-10 NOTE — PROGRESS NOTES
[facility] - Oncology ([facility])  Hematology  Bone Marrow Transplant  Progress Note    Patient Name: Chip Goodson  Admission Date: 4/16/2024  Hospital Length of Stay: 24 days  Code Status: Full Code    Subjective:     Interval History: Day +22 s/p Carmen 140 Auto SCT for MM. Day 1 of engraftment today with . Will do discharge teaching today with patient and niece. Remains afebrile. Stopping cefepime and switching to ceftriaxone per ID. Denies any n/v/d/c. Will repeat urine culture today to check for clearance of enterococcus. ID follow up with Dr. Rojas is scheduled    Objective:     Vital Signs (Most Recent):  Temp: 99.5 °F (37.5 °C) (05/10/24 1103)  Pulse: 73 (05/10/24 1103)  Resp: 18 (05/10/24 1103)  BP: 126/60 (05/10/24 1103)  SpO2: 97 % (05/10/24 1103) Vital Signs (24h Range):  Temp:  [97.6 °F (36.4 °C)-99.5 °F (37.5 °C)] 99.5 °F (37.5 °C)  Pulse:  [63-83] 73  Resp:  [17-18] 18  SpO2:  [96 %-99 %] 97 %  BP: (125-144)/(60-69) 126/60     Weight: 86.7 kg (191 lb 2.2 oz)  Body mass index is 27.43 kg/m².  Body surface area is 2.07 meters squared.      Intake/Output - Last 3 Shifts         05/08 0700  05/09 0659 05/09 0700  05/10 0659 05/10 0700  05/11 0659    P.O. 1256 920     Blood 289      IV Piggyback 349.3 199.1     Total Intake(mL/kg) 1894.3 (22) 1119.1 (12.9)     Urine (mL/kg/hr) 1325 (0.6) 2475 (1.2) 650 (1.1)    Emesis/NG output 0      Stool 0  0    Total Output 1325 2475 650    Net +569.3 -1355.9 -650           Stool Occurrence 1 x  1 x    Emesis Occurrence 1 x               Physical Exam  Vitals and nursing note reviewed.   Constitutional:       Appearance: Normal appearance. He is well-developed.   HENT:      Head: Normocephalic and atraumatic.      Ears:      Comments: [place]     Mouth/Throat:      Mouth: Mucous membranes are moist.      Pharynx: No oropharyngeal exudate or posterior oropharyngeal erythema.   Eyes:      General:         Right eye: No discharge.         Left eye: No discharge.       Extraocular Movements: Extraocular movements intact.      Conjunctiva/sclera: Conjunctivae normal.   Cardiovascular:      Rate and Rhythm: Normal rate and regular rhythm.      Pulses: Normal pulses.      Heart sounds: Normal heart sounds. No murmur heard.  Pulmonary:      Effort: Pulmonary effort is normal. No respiratory distress.      Breath sounds: Normal breath sounds. No wheezing or rales.   Abdominal:      General: Bowel sounds are normal. There is no distension.      Palpations: Abdomen is soft.      Tenderness: There is no abdominal tenderness.   Musculoskeletal:         General: No deformity. Normal range of motion.      Cervical back: Normal range of motion and neck supple.      Right lower leg: No edema.      Left lower leg: No edema.   Skin:     General: Skin is warm and dry.      Findings: No bruising, erythema or rash.      Comments: Right chest wall vas cath. Dressing c/d/i. No sign of infection to site.   Neurological:      General: No focal deficit present.      Mental Status: He is alert and oriented to person, place, and time.      Motor: No weakness.   Psychiatric:         Mood and Affect: Mood normal.         Behavior: Behavior normal.         Thought Content: Thought content normal.         Judgment: Judgment normal.            Significant Labs:   CBC:   Recent Labs   Lab 05/09/24  0514 05/10/24  0543   WBC 0.64* 1.02*   HGB 7.9* 7.6*   HCT 21.6* 20.4*   PLT 17* 13*    and CMP:   Recent Labs   Lab 05/09/24  0514 05/10/24  0442    137   K 4.0 4.1    106   CO2 24 23   * 140*   BUN 21 21   CREATININE 0.9 0.9   CALCIUM 9.3 9.3   PROT 5.4* 5.4*   ALBUMIN 2.8* 2.8*   BILITOT 1.1* 1.0   ALKPHOS 91 92   AST 11 16   ALT 34 38   ANIONGAP 7* 8       Diagnostic Results:  None  Assessment/Plan:     * History of autologous stem cell transplant  - Patient of Dr. Evans  - Admitted 4/16/24 for a Carmen 140 auto SCT  - Today is Day +22 ; Day 1 of engraftment today 5/10 with ANC of 640  - He  received 5 bags on 4/18/24 at 1330 and received the remaining 4 bags on 4/19/24 at 1330. Total CD34 dose was 2.91 x 10^6.  - See treatment plan below.    Planned conditioning regimen:  Melphalan TBD on Day -1     Antimicrobial Prophylaxis:  Acyclovir starting on Day -1  Levofloxacin starting on Day -1  Fluconazole starting on Day -1  Bactrim starting on Day +30     Growth Factor Support:  Neupogen starting on Day +7      Caregiver: daughter and other family members  Post-transplant discharge plans: Smita Bandar    Multiple myeloma in remission  - patient of Dr. Evans; follows locally with Dr. Rose in [place]  - diagnosed June 2023 with IgA Kappa MM; stage unclear at diagnosis as FISH not available  - started DRd therapy on 7/18/2023  - he stopped the daratumumab after cycle 4 and continue with revlimid and dex only; looks to have completed ~5 cycles  - Admitted for Carmen 140 Auto SCT on 4/16/2023    Pancytopenia due to chemotherapy  - Daily CBC while inpatient  - Transfuse for Hgb <7or plts < 10K  - Continue antimicrobial ppx  - Stopped VTE ppx 4/24 d/t downtrending platelets    Neutropenic fever  - Developed on 5/3/2024. Cultures obtained and showing Gemella Haemolysans  - Could not r/o aortic valve vegetation via TTE. Not a good HANSEL candidate per ID, so will complete a 6 week course of empiric IV abx.  - Surveillance cultures obtained on 5/4 currently NGTD.  - UC with low yield enterococcus facecialis; sending repeat culture 5/10 to check for clearance  - Vanc stopped 5/6.  - On ceftriaxone now that ANC >500. Will continue through 6/14/24.  - Has ID follow up scheduled with Dr. Rojas on 5/22 and 6/14    Chemotherapy induced nausea and vomiting  - Continue PRN Zofran and Compazine    Bacteremia  - See neutropenic fever    Bacteremia due to Staphylococcus  - See neutropenic fever    Constipation  RESOLVED  - Having Bms; miralax now PRN    Hyperbilirubinemia  - T-bili 1.7 on admit. Patient asymptomatic.  - Trending  with daily CMP  RESOLVED    Cancer related pain  - Continue home prn Oxy IR    BPH (benign prostatic hyperplasia)  - Continue home finasteride    HTN (hypertension)  - Continue home amlodipine, carvedilol (dose-reduced for bradycardia), hydralazine, and spironolactone  - Stopped IVF following transplant. BP improved off IVF.    Type 2 diabetes mellitus without complications  - Holding home metformin  - Stopped ACHS blood glucose monitoring 4/24 as blood glucose has been stable  - Monitoring with daily CMP    Hypothyroidism, unspecified  - Continue home synthroid    GERD (gastroesophageal reflux disease)  - Previously on BID protonix and daily pepcid for stomach ulcers; now resolved  - Continuing daily protonix per transplant order set    COLTON (acute kidney injury)  - Creatinine 1.4 on admission; baseline ~1.1  - Monitoring CMP daily  - Was receiving continuous fluids as part of chemotherapy regimen (NACL with 10% K)  RESOLVED        VTE Risk Mitigation (From admission, onward)           Ordered     heparin (porcine) injection 1,600 Units  As needed (PRN)         04/16/24 2129     IP VTE HIGH RISK PATIENT  Once         04/16/24 1904     Place sequential compression device  Until discontinued         04/16/24 1904                    Disposition: Remains inpatient; Day 1 of engraftment today. Hopeful for DC tomorrow or Sunday. Will have discharge teaching completed today by NP, pharmacist, and home health. He will leave with his central line for home antibiotics    Chelsie Man NP  Bone Marrow Transplant  [facility] - Oncology ([facility])

## 2024-05-10 NOTE — PLAN OF CARE
AAOx4; POC reviewed & verbalizes understanding.  Chemo: Day +22 Carmen AUTO SCT for MM in remission  Afebrile. No acute events on shift. No deficits noted  IV access & IVF: R Vas Cath, heparin locked  BM: 5/10/24  : WNL  Appetite: adequate  Bed alarm set; fall precautions maintained.   Bed locked in lowest position, side rails up x2, call light within reach, environment clear. Encouraged pt to call nurse with any concerns.  Safety measures maintained

## 2024-05-10 NOTE — ASSESSMENT & PLAN NOTE
- Patient of Dr. Evans  - Admitted 4/16/24 for a Carmen 140 auto SCT  - Today is Day +22 ; Day 1 of engraftment today 5/10 with ANC of 640  - He received 5 bags on 4/18/24 at 1330 and received the remaining 4 bags on 4/19/24 at 1330. Total CD34 dose was 2.91 x 10^6.  - See treatment plan below.    Planned conditioning regimen:  Melphalan TBD on Day -1     Antimicrobial Prophylaxis:  Acyclovir starting on Day -1  Levofloxacin starting on Day -1  Fluconazole starting on Day -1  Bactrim starting on Day +30     Growth Factor Support:  Neupogen starting on Day +7      Caregiver: daughter and other family members  Post-transplant discharge plans: Smita Portillo

## 2024-05-10 NOTE — PROGRESS NOTES
[facility] educator met with daughter discussed discharge plan for home IVABX. Chip Goodson will dc home with family support. Patient will infuse medication via Elastomeric Pump. patient and daughter educated on S.A.S.H procedure. S.A.S.H mat provided.  Patient education checklist reviewed and acknowledged by above person(s) above and are agreeable to discharge with home infusion plan of care. IV administration process using aspetic technique was reviewed with successful return demonstration. Patient feels comfortable with infusion. Patient will dc home with ceftriaxone 2g  daily for estimated end of therapy date 6/14/24. Dosing schedule times are 2:00 pm. Midline to be placed. [facility] will follow patient for weekly dressing changes and lab draws. Time allotted for questions. Patients nurse and case management team notified teaching has been completed.     Medication delivery will be made to home (pt staying at [facility] but doesn't know room number just yet)    [facility]  and report called to [phone].     [facility]  Evelin Azul RN, BSN, Clinical Liaison  Office [phone]  Cell [phone]

## 2024-05-10 NOTE — ASSESSMENT & PLAN NOTE
- patient of Dr. Evans; follows locally with Dr. Rose in [place]  - diagnosed June 2023 with IgA Kappa MM; stage unclear at diagnosis as FISH not available  - started DRd therapy on 7/18/2023  - he stopped the daratumumab after cycle 4 and continue with revlimid and dex only; looks to have completed ~5 cycles  - Admitted for Carmen 140 Auto SCT on 4/16/2023

## 2024-05-10 NOTE — SUBJECTIVE & OBJECTIVE
Subjective:     Interval History: Day +22 s/p Carmen 140 Auto SCT for MM. Day 1 of engraftment today with . Will do discharge teaching today with patient and niece. Remains afebrile. Stopping cefepime and switching to ceftriaxone per ID. Denies any n/v/d/c. Will repeat urine culture today to check for clearance of enterococcus. ID follow up with Dr. Rojas is scheduled    Objective:     Vital Signs (Most Recent):  Temp: 99.5 °F (37.5 °C) (05/10/24 1103)  Pulse: 73 (05/10/24 1103)  Resp: 18 (05/10/24 1103)  BP: 126/60 (05/10/24 1103)  SpO2: 97 % (05/10/24 1103) Vital Signs (24h Range):  Temp:  [97.6 °F (36.4 °C)-99.5 °F (37.5 °C)] 99.5 °F (37.5 °C)  Pulse:  [63-83] 73  Resp:  [17-18] 18  SpO2:  [96 %-99 %] 97 %  BP: (125-144)/(60-69) 126/60     Weight: 86.7 kg (191 lb 2.2 oz)  Body mass index is 27.43 kg/m².  Body surface area is 2.07 meters squared.      Intake/Output - Last 3 Shifts         05/08 0700  05/09 0659 05/09 0700  05/10 0659 05/10 0700  05/11 0659    P.O. 1256 920     Blood 289      IV Piggyback 349.3 199.1     Total Intake(mL/kg) 1894.3 (22) 1119.1 (12.9)     Urine (mL/kg/hr) 1325 (0.6) 2475 (1.2) 650 (1.1)    Emesis/NG output 0      Stool 0  0    Total Output 1325 2475 650    Net +569.3 -1355.9 -650           Stool Occurrence 1 x  1 x    Emesis Occurrence 1 x               Physical Exam  Vitals and nursing note reviewed.   Constitutional:       Appearance: Normal appearance. He is well-developed.   HENT:      Head: Normocephalic and atraumatic.      Ears:      Comments: [place]     Mouth/Throat:      Mouth: Mucous membranes are moist.      Pharynx: No oropharyngeal exudate or posterior oropharyngeal erythema.   Eyes:      General:         Right eye: No discharge.         Left eye: No discharge.      Extraocular Movements: Extraocular movements intact.      Conjunctiva/sclera: Conjunctivae normal.   Cardiovascular:      Rate and Rhythm: Normal rate and regular rhythm.      Pulses: Normal pulses.       Heart sounds: Normal heart sounds. No murmur heard.  Pulmonary:      Effort: Pulmonary effort is normal. No respiratory distress.      Breath sounds: Normal breath sounds. No wheezing or rales.   Abdominal:      General: Bowel sounds are normal. There is no distension.      Palpations: Abdomen is soft.      Tenderness: There is no abdominal tenderness.   Musculoskeletal:         General: No deformity. Normal range of motion.      Cervical back: Normal range of motion and neck supple.      Right lower leg: No edema.      Left lower leg: No edema.   Skin:     General: Skin is warm and dry.      Findings: No bruising, erythema or rash.      Comments: Right chest wall vas cath. Dressing c/d/i. No sign of infection to site.   Neurological:      General: No focal deficit present.      Mental Status: He is alert and oriented to person, place, and time.      Motor: No weakness.   Psychiatric:         Mood and Affect: Mood normal.         Behavior: Behavior normal.         Thought Content: Thought content normal.         Judgment: Judgment normal.            Significant Labs:   CBC:   Recent Labs   Lab 05/09/24  0514 05/10/24  0543   WBC 0.64* 1.02*   HGB 7.9* 7.6*   HCT 21.6* 20.4*   PLT 17* 13*    and CMP:   Recent Labs   Lab 05/09/24  0514 05/10/24  0442    137   K 4.0 4.1    106   CO2 24 23   * 140*   BUN 21 21   CREATININE 0.9 0.9   CALCIUM 9.3 9.3   PROT 5.4* 5.4*   ALBUMIN 2.8* 2.8*   BILITOT 1.1* 1.0   ALKPHOS 91 92   AST 11 16   ALT 34 38   ANIONGAP 7* 8       Diagnostic Results:  None

## 2024-05-10 NOTE — CONSULTS
Single lumen 18G, 8CM midline placed in the left basilic vein. Needle advanced into the vessel under real time ultrasound guidance. Image recorded and saved.    Max dwell date 6/8/24.  Lot number: [id number]

## 2024-05-10 NOTE — ASSESSMENT & PLAN NOTE
- Developed on 5/3/2024. Cultures obtained and showing Gemella Haemolysans  - Could not r/o aortic valve vegetation via TTE. Not a good HANSEL candidate per ID, so will complete a 6 week course of empiric IV abx.  - Surveillance cultures obtained on 5/4 currently NGTD.  - UC with low yield enterococcus facecialis; sending repeat culture 5/10 to check for clearance  - Vanc stopped 5/6.  - On ceftriaxone now that ANC >500. Will continue through 6/14/24.  - Has ID follow up scheduled with Dr. Rojas on 5/22 and 6/14

## 2024-05-10 NOTE — PLAN OF CARE
POC reviewed with patient at beginning of shift and PRN. Pt denies pain,. Voids spontaneously in urinal without difficulty. Bed alarm refused after education on fall risk. Family at bedside. Pt. with nonskid footwear on, bed in lowest position, and locked with bed rails up x2. Pt. instructed to call for assistance as needed. Pt. has call light and personal items within reach. Patient ambulates in room independently. VSS and afebrile this shift. Will CTM.

## 2024-05-10 NOTE — ASSESSMENT & PLAN NOTE
"[facility]   Physical Therapy Treatment Note     [facility]  [facility] Number[facility] [id number]    Therapy Diagnosis:   Encounter Diagnoses   Name Primary?    Impaired functional mobility and activity tolerance Yes    Closed compression fracture of L3 lumbar vertebra, initial encounter     Compression fracture of T12 vertebra, sequela      Physician: Min Harden MD    Visit Date: 8/29/2022    Physician Orders: PT Eval and Treat   Medical Diagnosis from Referral: Closed compression fracture of L3 lumbar vertebra, initial encounter; Compression fracture of T12 vertebra, initial encounter   Evaluation Date: 8/4/2022  Authorization Period Expiration: 07/21/2023   Plan of Care Expiration: 09/16/2022  Progress Note Due: 09/04/2022  Visit # / Visits authorized: 2/ 20 (3 total)   FOTO: Next survey due week, of 9/5/2022     Precautions: Compression fractures, osteoporosis    PTA Visit #: 0/5     Time In: 1454  Time Out: 1539  Total Billable Time: 38 minutes    SUBJECTIVE     Pt reports: " released me for therapy."  She was not compliant with home exercise program because one has not been issued at this time and she has been in the hospital.  Response to previous treatment: Hurting ("But I think some of that was due to my low electrolytes")  Functional change: N/A at this time    Pain: 5/10  Location: Pain in thoracic spine and LEs    OBJECTIVE     Objective Measures updated at progress report unless specified.     Treatment     Carline received the treatments listed below:      therapeutic exercises to develop increased core, paraspinals, and LE strength, as well as endurance for 38 minutes including:  Recumbent bike lvl2 x8'   Standing heel cord stretch [id number]    Hip ext x10ea    Mini squat x10    Heel raises x10    Step up & over eccentric 4" x10    Lateral step up & over 4" x10    Closed chain TKE 4" x 10ea  Seated hamstring stretch [id number]   Piriformis stretch 3x30s " - Daily CBC while inpatient  - Transfuse for Hgb <7or plts < 10K  - Continue antimicrobial ppx  - Stopped VTE ppx 4/24 d/t downtrending platelets       March with core engagement x10ea    Hip abd x10    Hip add x10    LAQ x10ea    Physioball rollout x10ea    UTR x10ea    Did not perform this date:  Resisted red TB trunk rotation x 10ea    Patient Education and Home Exercises     Home Exercises Provided and Patient Education Provided     Education provided:   - Reviewed exercises as instructed above  Written Home Exercises Provided: Illustrated instructions issued this visit.  Exercises were reviewed and Carline was able to demonstrate them prior to the end of the session.  Carline demonstrated fair  understanding of the education provided. See EMR under Patient Instructions for exercises provided during therapy sessions    ASSESSMENT     Able to complete exercise program with minimal difficulty.  Minimal increase in fatigue reported following exercise session.  Per patient she is feeling better than during her previous visit.      Carline is slowly progressing towards her goals.   Pt prognosis is Fair.     Pt will continue to benefit from skilled outpatient physical therapy to address the deficits listed in the problem list box on initial evaluation, provide pt/family education and to maximize pt's level of independence in the home and community environment.     Pt's spiritual, cultural and educational needs considered and pt agreeable to plan of care and goals.     Anticipated barriers to physical therapy: Multiple fractures    Goals:   Goals:  Short Term Goals: 3 weeks               1) Decrease max pain to < 5/10 Minimal progress              2) Facilitate improved posture Minimal progress              3) Facilitate improved LE flexibility Progressing              4) Patient will bend forward to perform LE dressing without increased pain Ongoing     Long Term Goals: 6 weeks               1) Patient will consistently perform sit <> stand transfers with minimal UE support and appropriate assistive device Ongoing              2) Patient will consistently perform  rolling and scooting in bed without increased pain Progressing              3) Patient will consistently perform toilet transfers with no more that minimal difficulty Minimal progress              4) Increase walking tolerance to > 15 minutes Minimal progress              5) Increase standing tolerance to > 15 min Ongoing              6) Patient will be independent in complete HEP Ongoing    PLAN     Progress flexibility and core strengthening exercises as patient tolerates.  Add STM as needed.  Review postural correction instructions.      Una See, PT

## 2024-05-11 VITALS
RESPIRATION RATE: 18 BRPM | DIASTOLIC BLOOD PRESSURE: 63 MMHG | TEMPERATURE: 98 F | WEIGHT: 190.94 LBS | OXYGEN SATURATION: 95 % | HEART RATE: 73 BPM | HEIGHT: 70 IN | BODY MASS INDEX: 27.34 KG/M2 | SYSTOLIC BLOOD PRESSURE: 122 MMHG

## 2024-05-11 LAB
ABO + RH BLD: NORMAL
ALBUMIN SERPL BCP-MCNC: 2.8 G/DL (ref 3.5–5.2)
ALP SERPL-CCNC: 98 U/L (ref 55–135)
ALT SERPL W/O P-5'-P-CCNC: 42 U/L (ref 10–44)
ANION GAP SERPL CALC-SCNC: 9 MMOL/L (ref 8–16)
ANISOCYTOSIS BLD QL SMEAR: SLIGHT
AST SERPL-CCNC: 16 U/L (ref 10–40)
BACTERIA UR CULT: NO GROWTH
BASOPHILS NFR BLD: 0 % (ref 0–1.9)
BILIRUB SERPL-MCNC: 0.9 MG/DL (ref 0.1–1)
BLD GP AB SCN CELLS X3 SERPL QL: NORMAL
BLD PROD TYP BPU: NORMAL
BLOOD UNIT EXPIRATION DATE: NORMAL
BLOOD UNIT TYPE CODE: 2800
BLOOD UNIT TYPE: NORMAL
BUN SERPL-MCNC: 23 MG/DL (ref 8–23)
CALCIUM SERPL-MCNC: 9.3 MG/DL (ref 8.7–10.5)
CHLORIDE SERPL-SCNC: 107 MMOL/L (ref 95–110)
CO2 SERPL-SCNC: 23 MMOL/L (ref 23–29)
CODING SYSTEM: NORMAL
CREAT SERPL-MCNC: 0.9 MG/DL (ref 0.5–1.4)
CROSSMATCH INTERPRETATION: NORMAL
DIFFERENTIAL METHOD BLD: ABNORMAL
DISPENSE STATUS: NORMAL
EOSINOPHIL NFR BLD: 0 % (ref 0–8)
ERYTHROCYTE [DISTWIDTH] IN BLOOD BY AUTOMATED COUNT: 10.9 % (ref 11.5–14.5)
EST. GFR  (NO RACE VARIABLE): >60 ML/MIN/1.73 M^2
GLUCOSE SERPL-MCNC: 136 MG/DL (ref 70–110)
HCT VFR BLD AUTO: 20.1 % (ref 40–54)
HGB BLD-MCNC: 7.3 G/DL (ref 14–18)
IMM GRANULOCYTES # BLD AUTO: ABNORMAL K/UL (ref 0–0.04)
IMM GRANULOCYTES NFR BLD AUTO: ABNORMAL % (ref 0–0.5)
LYMPHOCYTES NFR BLD: 11 % (ref 18–48)
MAGNESIUM SERPL-MCNC: 2 MG/DL (ref 1.6–2.6)
MCH RBC QN AUTO: 33.3 PG (ref 27–31)
MCHC RBC AUTO-ENTMCNC: 36.3 G/DL (ref 32–36)
MCV RBC AUTO: 92 FL (ref 82–98)
MONOCYTES NFR BLD: 8 % (ref 4–15)
NEUTROPHILS NFR BLD: 78 % (ref 38–73)
NEUTS BAND NFR BLD MANUAL: 3 %
NRBC BLD-RTO: 1 /100 WBC
PHOSPHATE SERPL-MCNC: 3.3 MG/DL (ref 2.7–4.5)
PLATELET # BLD AUTO: 12 K/UL (ref 150–450)
PLATELET BLD QL SMEAR: ABNORMAL
PMV BLD AUTO: 11.7 FL (ref 9.2–12.9)
POIKILOCYTOSIS BLD QL SMEAR: SLIGHT
POLYCHROMASIA BLD QL SMEAR: ABNORMAL
POTASSIUM SERPL-SCNC: 4 MMOL/L (ref 3.5–5.1)
PROT SERPL-MCNC: 5.3 G/DL (ref 6–8.4)
RBC # BLD AUTO: 2.19 M/UL (ref 4.6–6.2)
SODIUM SERPL-SCNC: 139 MMOL/L (ref 136–145)
SPECIMEN OUTDATE: NORMAL
UNIT NUMBER: NORMAL
WBC # BLD AUTO: 1.38 K/UL (ref 3.9–12.7)

## 2024-05-11 PROCEDURE — 99239 HOSP IP/OBS DSCHRG MGMT >30: CPT | Mod: ,,, | Performed by: INTERNAL MEDICINE

## 2024-05-11 PROCEDURE — 85027 COMPLETE CBC AUTOMATED: CPT | Performed by: NURSE PRACTITIONER

## 2024-05-11 PROCEDURE — 25000003 PHARM REV CODE 250: Performed by: NURSE PRACTITIONER

## 2024-05-11 PROCEDURE — 36589 REMOVAL TUNNELED CV CATH: CPT | Mod: ,,, | Performed by: STUDENT IN AN ORGANIZED HEALTH CARE EDUCATION/TRAINING PROGRAM

## 2024-05-11 PROCEDURE — 84100 ASSAY OF PHOSPHORUS: CPT | Performed by: NURSE PRACTITIONER

## 2024-05-11 PROCEDURE — P9037 PLATE PHERES LEUKOREDU IRRAD: HCPCS | Performed by: STUDENT IN AN ORGANIZED HEALTH CARE EDUCATION/TRAINING PROGRAM

## 2024-05-11 PROCEDURE — 25000003 PHARM REV CODE 250: Performed by: INTERNAL MEDICINE

## 2024-05-11 PROCEDURE — 86901 BLOOD TYPING SEROLOGIC RH(D): CPT | Performed by: NURSE PRACTITIONER

## 2024-05-11 PROCEDURE — 85007 BL SMEAR W/DIFF WBC COUNT: CPT | Performed by: NURSE PRACTITIONER

## 2024-05-11 PROCEDURE — 63600175 PHARM REV CODE 636 W HCPCS: Performed by: NURSE PRACTITIONER

## 2024-05-11 PROCEDURE — 99223 1ST HOSP IP/OBS HIGH 75: CPT | Mod: 25,,, | Performed by: STUDENT IN AN ORGANIZED HEALTH CARE EDUCATION/TRAINING PROGRAM

## 2024-05-11 PROCEDURE — 80053 COMPREHEN METABOLIC PANEL: CPT | Performed by: NURSE PRACTITIONER

## 2024-05-11 PROCEDURE — 83735 ASSAY OF MAGNESIUM: CPT | Performed by: NURSE PRACTITIONER

## 2024-05-11 PROCEDURE — 63600175 PHARM REV CODE 636 W HCPCS: Mod: JZ,JG | Performed by: INTERNAL MEDICINE

## 2024-05-11 RX ORDER — SULFAMETHOXAZOLE AND TRIMETHOPRIM 800; 160 MG/1; MG/1
1 TABLET ORAL
Qty: 12 TABLET | Refills: 5 | Status: SHIPPED | OUTPATIENT
Start: 2024-05-20 | End: 2024-05-17

## 2024-05-11 RX ORDER — ACYCLOVIR 800 MG/1
800 TABLET ORAL 2 TIMES DAILY
Qty: 60 TABLET | Refills: 11 | Status: SHIPPED | OUTPATIENT
Start: 2024-05-11 | End: 2025-05-11

## 2024-05-11 RX ORDER — CARVEDILOL 6.25 MG/1
6.25 TABLET ORAL 2 TIMES DAILY
Qty: 180 TABLET | Refills: 3 | Status: SHIPPED | OUTPATIENT
Start: 2024-05-11 | End: 2025-05-11

## 2024-05-11 RX ORDER — HYDROCODONE BITARTRATE AND ACETAMINOPHEN 500; 5 MG/1; MG/1
TABLET ORAL
Status: DISCONTINUED | OUTPATIENT
Start: 2024-05-11 | End: 2024-05-11 | Stop reason: HOSPADM

## 2024-05-11 RX ORDER — PANTOPRAZOLE SODIUM 40 MG/1
40 TABLET, DELAYED RELEASE ORAL DAILY
Qty: 30 TABLET | Refills: 11 | Status: SHIPPED | OUTPATIENT
Start: 2024-05-11 | End: 2025-05-11

## 2024-05-11 RX ADMIN — FLUCONAZOLE 400 MG: 200 TABLET ORAL at 08:05

## 2024-05-11 RX ADMIN — CEFTRIAXONE SODIUM 2 G: 2 INJECTION, POWDER, FOR SOLUTION INTRAMUSCULAR; INTRAVENOUS at 01:05

## 2024-05-11 RX ADMIN — AMLODIPINE BESYLATE 10 MG: 10 TABLET ORAL at 08:05

## 2024-05-11 RX ADMIN — ACYCLOVIR 800 MG: 200 CAPSULE ORAL at 08:05

## 2024-05-11 RX ADMIN — Medication 1 DOSE: at 08:05

## 2024-05-11 RX ADMIN — SPIRONOLACTONE 12.5 MG: 25 TABLET ORAL at 08:05

## 2024-05-11 RX ADMIN — FILGRASTIM 480 MCG: 480 INJECTION, SOLUTION INTRAVENOUS; SUBCUTANEOUS at 08:05

## 2024-05-11 RX ADMIN — PANTOPRAZOLE SODIUM 40 MG: 40 TABLET, DELAYED RELEASE ORAL at 08:05

## 2024-05-11 RX ADMIN — FINASTERIDE 5 MG: 5 TABLET, FILM COATED ORAL at 08:05

## 2024-05-11 RX ADMIN — HYDRALAZINE HYDROCHLORIDE 50 MG: 50 TABLET ORAL at 06:05

## 2024-05-11 RX ADMIN — HYDRALAZINE HYDROCHLORIDE 50 MG: 50 TABLET ORAL at 01:05

## 2024-05-11 RX ADMIN — Medication 1 DOSE: at 12:05

## 2024-05-11 RX ADMIN — CARVEDILOL 6.25 MG: 6.25 TABLET, FILM COATED ORAL at 08:05

## 2024-05-11 NOTE — CONSULTS
[facility] ([facility])  General Surgery  Consult Note    Patient Name: Chip Goodson  MRN: [id number]  Code Status: Full Code  Admission Date: 4/16/2024  Hospital Length of Stay: 25 days  Attending Physician: Alo Watson MD  Primary Care Provider: Debbie Gonzalez MD    Patient information was obtained from patient and ER records.     Inpatient consult to General Surgery  Consult performed by: Floyd Ferguson MD  Consult ordered by: Mark Thibodeaux MD  Reason for consult: Line removal        Subjective:     Principal Problem: History of autologous stem cell transplant    History of Present Illness: Patient with history of multiple myeloma in remission.  Now status post dual lumen catheter placement on April 8th, 2024. Now s/p SCT on 4/16/24. No longer in need of line.     No current facility-administered medications on file prior to encounter.     Current Outpatient Medications on File Prior to Encounter   Medication Sig    acyclovir (ZOVIRAX) 400 MG tablet Take 1 tablet (400 mg total) by mouth 2 (two) times daily.    amLODIPine (NORVASC) 10 MG tablet Take 1 tablet (10 mg total) by mouth every morning.    ascorbic acid, vitamin C, (VITAMIN C) 500 MG tablet Take 500 mg by mouth once daily.    atorvastatin (LIPITOR) 10 MG tablet Take 10 mg by mouth every evening.    carvediloL (COREG) 12.5 MG tablet Take 12.5 mg by mouth 2 (two) times daily.    cholecalciferol, vitamin D3, (VITAMIN D3) 125 mcg (5,000 unit) Tab Take 5,000 Units by mouth once daily.    famotidine (PEPCID) 20 MG tablet Take 1 tablet (20 mg total) by mouth every morning.    ferrous sulfate 325 (65 FE) MG EC tablet Take 325 mg by mouth once daily.    finasteride (PROSCAR) 5 mg tablet Take 5 mg by mouth every morning.    hydrALAZINE (APRESOLINE) 50 MG tablet Take 1 tablet (50 mg total) by mouth every 8 (eight) hours.    Lactobacillus rhamnosus GG (CULTURELLE) 10 billion cell capsule Take 1 capsule by mouth once daily.    levothyroxine  (SYNTHROID) 112 MCG tablet Take 112 mcg by mouth every evening.    metFORMIN (GLUCOPHAGE-XR) 500 MG ER 24hr tablet Take 1,000 mg by mouth 2 (two) times daily.    pantoprazole (PROTONIX) 40 MG tablet Take 1 tablet (40 mg total) by mouth 2 (two) times daily before meals.    ranolazine (RANEXA) 500 MG Tb12 Take 500 mg by mouth 2 (two) times daily.    spironolactone (ALDACTONE) 25 MG tablet Take 12.5 mg by mouth every morning.    zinc gluconate 50 mg tablet Take 50 mg by mouth once daily.    albuterol (PROVENTIL/VENTOLIN HFA) 90 mcg/actuation inhaler 2 puffs every 4 to 6 hours as needed.    lenalidomide (REVLIMID) 10 mg Cap Take 10 mg by mouth once daily.    nitroGLYCERIN (NITROSTAT) 0.4 MG SL tablet place one tablet under the tongue every five minutes as needed for chest pain up to 3 doses. if no relief call 911    ondansetron (ZOFRAN) 8 MG tablet Take 1 tablet (8 mg total) by mouth every 8 (eight) hours as needed for Nausea.    ONETOUCH ULTRA TEST Strp USE TO TEST BLOOD GLUCOSE TWICE DAILY    oxyCODONE (ROXICODONE) 5 MG immediate release tablet Take 1 tablet (5 mg total) by mouth every 4 (four) hours as needed for Pain.       Review of patient's allergies indicates:   Allergen Reactions    Iodine Anaphylaxis    Shellfish containing products     Poison ivy extract     Amoxicillin-pot clavulanate Itching       Past Medical History:   Diagnosis Date    Chronic diastolic (congestive) heart failure     Coronary artery disease     GERD (gastroesophageal reflux disease)     High cholesterol     HTN (hypertension)     Hypothyroidism, unspecified     Multiple myeloma     Secondary pulmonary arterial hypertension     Type 2 diabetes mellitus without complications      Past Surgical History:   Procedure Laterality Date    APPENDECTOMY  1965    BONE MARROW BIOPSY Right 06/12/2023    Procedure: BIOPSY, BONE MARROW;  Surgeon: Silvano Austin MD;  Location: [facility];  Service: General;  Laterality: Right;    COLONOSCOPY N/A 2/15/2024     Procedure: COLONOSCOPY;  Surgeon: Kei Donahue III, MD;  Location: [facility];  Service: Endoscopy;  Laterality: N/A;  POST-OP: ENLARGED NODULAR PROSTATE, SUBOPTIMAL PREP, PAN DIVERTICULOSIS, TRANSVERSE COLON POLYPECTOMY    INSERTION OF TUNNELED CENTRAL VENOUS HEMODIALYSIS CATHETER Left 4/8/2024    Procedure: INSERTION, CATHETER, HEMODIALYSIS, DUAL LUMEN, left poss right, Bard 14.5 fr hemosplit catheter, model [id number];  Surgeon: Theodore Caruso MD;  Location: [address];  Service: General;  Laterality: Left;    removal of boils       Family History       Problem Relation (Age of Onset)    Breast cancer Sister    Lung cancer Mother          Tobacco Use    Smoking status: Former     Types: Cigarettes    Smokeless tobacco: Former   Substance and Sexual Activity    Alcohol use: Yes    Drug use: Never    Sexual activity: Not Currently     Review of Systems   Constitutional: Negative.    HENT: Negative.     Respiratory: Negative.     Cardiovascular: Negative.    Gastrointestinal: Negative.    Endocrine: Negative.    Genitourinary: Negative.    Musculoskeletal: Negative.    Neurological: Negative.    Hematological: Negative.    Psychiatric/Behavioral: Negative.       Objective:     Vital Signs (Most Recent):  Temp: 97.8 °F (36.6 °C) (05/11/24 1223)  Pulse: 73 (05/11/24 1223)  Resp: 18 (05/11/24 1223)  BP: 122/63 (05/11/24 1223)  SpO2: 95 % (05/11/24 1223) Vital Signs (24h Range):  Temp:  [97.5 °F (36.4 °C)-98.5 °F (36.9 °C)] 97.8 °F (36.6 °C)  Pulse:  [72-75] 73  Resp:  [15-20] 18  SpO2:  [94 %-98 %] 95 %  BP: (119-146)/(62-68) 122/63     Weight: 86.6 kg (190 lb 14.7 oz)  Body mass index is 27.39 kg/m².     Physical Exam  Vitals and nursing note reviewed.   Constitutional:       Appearance: Normal appearance. He is not ill-appearing.   HENT:      Head: Normocephalic.      Mouth/Throat:      Mouth: Mucous membranes are moist.      Pharynx: Oropharynx is clear.   Eyes:      General: No scleral icterus.      Extraocular Movements: Extraocular movements intact.      Conjunctiva/sclera: Conjunctivae normal.   Cardiovascular:      Rate and Rhythm: Normal rate.      Pulses: Normal pulses.   Pulmonary:      Effort: Pulmonary effort is normal. No respiratory distress.      Breath sounds: No wheezing.   Chest:      Comments: Right chest dual lumen port with right IJ insertion point.  Abdominal:      General: Abdomen is flat. Bowel sounds are normal. There is no distension.      Palpations: Abdomen is soft.   Musculoskeletal:         General: No swelling or tenderness. Normal range of motion.      Cervical back: Normal range of motion.   Skin:     General: Skin is warm and dry.      Coloration: Skin is not jaundiced or pale.   Neurological:      General: No focal deficit present.      Mental Status: He is alert and oriented to person, place, and time.   Psychiatric:         Mood and Affect: Mood normal.            I have reviewed all pertinent lab results within the past 24 hours.  CBC:   Recent Labs   Lab 05/11/24 0413   WBC 1.38*   RBC 2.19*   HGB 7.3*   HCT 20.1*   PLT 12*   MCV 92   MCH 33.3*   MCHC 36.3*     CMP:   Recent Labs   Lab 05/11/24 0413   *   CALCIUM 9.3   ALBUMIN 2.8*   PROT 5.3*      K 4.0   CO2 23      BUN 23   CREATININE 0.9   ALKPHOS 98   ALT 42   AST 16   BILITOT 0.9       Significant Diagnostics:  I have reviewed all pertinent imaging results/findings within the past 24 hours.    Assessment/Plan:     Multiple myeloma in remission  Patient with history of multiple myeloma in remission.  Now status post dual lumen catheter placement on April 8th, 2024. Now s/p SCT on 4/16/24. No longer in need of line.  General surgery consulted for line removal.  Unit of platelets given prior to removal.    - please see procedure note for removal details  - patient instructed on wound care  - okay for discharge from a surgical perspective      VTE Risk Mitigation (From admission, onward)            Ordered     heparin (porcine) injection 1,600 Units  As needed (PRN)         04/16/24 2129     IP VTE HIGH RISK PATIENT  Once         04/16/24 1904     Place sequential compression device  Until discontinued         04/16/24 1904                    Thank you for your consult. I will sign off. Please contact us if you have any additional questions.    Floyd Ferguson MD  General Surgery  [facility] ([facility])

## 2024-05-11 NOTE — DISCHARGE SUMMARY
[facility] - Oncology ([facility])  Hematology  Bone Marrow Transplant  Discharge Summary      Patient Name: Chip Goodson  MRN: [id number]  Admission Date: 4/16/2024  Hospital Length of Stay: 25 days  Discharge Date and Time:  05/11/2024 4:49 PM  Attending Physician: No att. providers found   Discharging Provider: Mark Thibodeaux MD  Primary Care Provider: Debbie Gonzalez MD    HPI: Mr. Goodson is a 73 y.o. male, patient of Dr. Evans, who presents today for planned admission for his Carmen 140 Auto stem cell transplant for his IgA kappa multiple myeloma. His medical history includes HTN, DM2, BPH, hypothyroid, GERD, and chronic neoplastic related pain. He was seen today in BMT clinic prior to his admission and was COVID negative. Reports feeling well overall. Denies any recent fevers, chills, chest pain, sob, n/v/d/c, bleeding, or bruising. He has a right chest central vascath.    * No surgery found *     Hospital Course: 04/18/2024 Day 0 Melphalan 140 mg/m2 autologous stem cell transplant for multiple myeloma. First day of stem cells of 5 bags, 4 bags infused tomorrow for total dose of 2.97 x10^6 CD34 cells. First stem cell infusion today at 1:30pm without any adverse side effects.  04/19/2024: Day +1 for a Carmen 140 auto SCT for MM. He will receive remaining 4 bags of stem cells today. Tolerated day 1 of transplant well. Remains afebrile. VSS. BP intermittently elevated on home BP meds. Oral intake is good, so will stop IVF following post hydration today. Denies any chemo-related side effects today.  04/22/2024: Day +4 from a Carmen 140 auto SCT for MM. Remains afebrile. VSS. BP improved off IVF. Denies mouth/throat soreness and GI toxicities. Tolerating diet well.  04/23/2024: Day +5 from a Carmen 140 auto SCT for MM. Remains afebrile. VSS. Continues to deny chemo side effects. Tolerating diet well. Blood counts dropping predictably. T-bili up to 1.6. likely 2/2 Melphalan. LFTs wnl.  04/24/2024: Day +6 from a Carmen 140 auto  SCT for MM. Remains afebrile. VSS. Expressed concern for constipation today. One time dose of Pericolace ordered and patient had large BM per his report. Will avoid daily laxatives given high risk for chemo-induced diarrhea. Had episode of emesis this morning. PRN IV Zofran ordered. Stopped lovenox ppx due to thrombocytopenia. BG stable, so stopping ACHS blood glucose monitoring.  04/25/2024 Day +7 s/p Carmen 140 Auto SCT for MM. Afebrile. Constipation resolved with pericolace and lactulose yesterday. Denies pain, n/v. Walking in halls. No other acute concerns  04/26/2024: Day +8 from a Carmen 140 auto SCT for MM. Remains afebrile. VSS. Continues to tolerate chemo well. ANC down to 10 today. Remains active. Oral intake remains good. Family remains at bedside.  04/27/2024 Day +9 following AutoSCT conditioned with [id number].  He is doing well this time.  No nausea, vomiting, diarrhea.  Continue supportive care.  04/28/2024 Day +10 following AutoSCT conditioned with [id number]. Platelets transfused this morning. He offers no new complaints at this time.  04/29/2024 Day +11 s/p Carmen 140 Auto SCT for MM. Continues doing well overall. No acute issues this AM. Afebrile, VSS  04/30/2024 Day +12 s/p Carmen 140 Auto SCT for MM. Reports nausea this AM, has prn antiemetics. Also reporting feeling bloated/constipated, one BM yesterday, started on miralax. Mild increase to tbili, will trend. Will receive 1unit platelets. Afebrile, VSS  05/01/2024 Day +13 s/p Carmen 140 Auto SCT for MM. ANC 10. Nausea yesterday controlled with PRNs. Reports increased fatigue this AM. Had BM yesterday. Tbili still elevated but downtrending. Afebrile, VSS  05/02/2024: Day +14 from a Carmen 140 auto SCT for MM. T-max of 100.1 over night. VS otherwise stable. May develop engraftment fever in the coming days. ANC 10 today. No BM in 2 days despite scheduled Mirilax. Will give a dose of pericolace today. Can try lactulose if that is ineffective.  05/03/2024 Day +15 s/p Carmen 140  Auto SCT for MM. ANC 20 today. Did have BM yesterday with miralax and dose of pericolace. Low PO intake with mild increase to BUN and hyponatremia, giving 1L NS over 5hrs. Tmax 100F this morning. Will receive 1unit platelets for count of 6K  05/04/2024 Day +16 [id number] ASCT for MM. ANC 10. Developed neutropenic fever overnight and now on cefepime.   05/05/2024 Day +17 [id number] ASCT for MM. ANC 10. Blood cultures growing GPCs in clusters resembling staphylococcal species. Rapid PCR ID is negative. Vancomycin started. Transfusing  1 unit pRBCs and 1 unit platelets.   05/06/2024 Day +18 s/p Carmen 140 Auto SCT for MM. ANC 20 today. Remains on cefepime and vanc for Gemella Haemolysans bacteremia. ID consulted for recs for length of ATB therapy. Echo pending. UC also with low yield enterococcus. Blood cultures 5/4 NGTD. Patient reports feeling well overall this AM. Denies pain, n/v. Did have BM  05/07/2024: Day +19 from a Carmen 140 auto SCT for MM. ANC up to 60 today. Remains afebrile. VSS. Continuing Cefepime for Gemella bacteremia. Repeat blood cx from 5/4 still with NGTD. Unable to r/o vegetation of aortic valve on TTE, so will complete a 6 week course of empiric IV abx per ID. Susceptibilities pending. Transfusing 1 unit prbc for hgb of 6.9.   05/08/2024: Day +20 from a Carmen 140 auto SCT for MM. ANC up to 100 today. Remains afebrile. VSS. Transfusing 1 unit plts for plt count of 9K. Continuing Cefepime for Gemella bacteremia until engraftment and then will transition to Rocephin for daily dosing per ID rec. Will continue Rocephin through 6/14/24. Will place HH orders today. Repeat blood cx from 5/4/24 with NGTD.  05/09/2024 Day +21 s/p Carmen 140 Auto SCT for MM.  today. Afebrile. Remains on cefepime per ID. Will switch to ceftriaxone once ANC >500. EOT 6/14. Continues with mild nausea. Denies diarrhea or constipation.   05/10/2024 Day +22 s/p Carmen 140 Auto SCT for MM. Day 1 of engraftment today with . Will do  discharge teaching today with patient and niece. Remains afebrile. Stopping cefepime and switching to ceftriaxone per ID. Denies any n/v/d/c. Will repeat urine culture today to check for clearance of enterococcus. ID follow up with Dr. Rojas is scheduled  05/11/2024 Day +23 s/p Carmen 140 Auto SCT for MM. Patient discharged today. Vitals stable. Patient with no new symptoms. Patient has oncology follow-up on 5/17. He was discharged with the appropriate antimicrobial prophylaxis.     Physical Exam  Vitals and nursing note reviewed.   Constitutional:       Appearance: Normal appearance. He is well-developed.   HENT:      Head: Normocephalic and atraumatic.      Ears:      Mouth/Throat:      Mouth: Mucous membranes are moist.      Pharynx: No oropharyngeal exudate or posterior oropharyngeal erythema.   Eyes:      General:         Right eye: No discharge.         Left eye: No discharge.      Extraocular Movements: Extraocular movements intact.      Conjunctiva/sclera: Conjunctivae normal.   Cardiovascular:      Rate and Rhythm: Normal rate and regular rhythm.      Pulses: Normal pulses.      Heart sounds: Normal heart sounds. No murmur heard.  Pulmonary:      Effort: Pulmonary effort is normal. No respiratory distress.      Breath sounds: Normal breath sounds. No wheezing or rales.   Abdominal:      General: Bowel sounds are normal. There is no distension.      Palpations: Abdomen is soft.      Tenderness: There is no abdominal tenderness.   Musculoskeletal:         General: No deformity. Normal range of motion.      Cervical back: Normal range of motion and neck supple.      Right lower leg: No edema.      Left lower leg: No edema.   Skin:     General: Skin is warm and dry.      Findings: No bruising, erythema or rash.      Comments: Vas cath removed.   Neurological:      General: No focal deficit present.      Mental Status: He is alert and oriented to person, place, and time.      Motor: No weakness.   Psychiatric:          Mood and Affect: Mood normal.         Behavior: Behavior normal.         Thought Content: Thought content normal.         Judgment: Judgment normal.    Goals of Care Treatment Preferences:  Code Status: Full Code      Consults (From admission, onward)          Status Ordering Provider     Inpatient consult to General Surgery  Once        Provider:  (Not yet assigned)    Completed SUJEY CORNEJO     Inpatient consult to Midline team  Once        Provider:  (Not yet assigned)    Completed FELICIANO ALVARENGA     Inpatient consult to Infectious Diseases  Once        Provider:  (Not yet assigned)    Completed FELICIANO ALVARENGA     Inpatient consult to Registered Dietitian/Nutritionist  Once        Provider:  (Not yet assigned)    Completed FELICIANO ALVARENGA            Significant Diagnostic Studies: N/A    Pending Diagnostic Studies:       None          Final Active Diagnoses:    Diagnosis Date Noted POA    PRINCIPAL PROBLEM:  History of autologous stem cell transplant [Z94.84]  Not Applicable    Bacteremia [R78.81] 05/06/2024 No    Neutropenic fever [D70.9, R50.81] 05/03/2024 No    Constipation [K59.00] 05/02/2024 Yes    Chemotherapy induced nausea and vomiting [R11.2, T45.1X5A] 04/24/2024 Yes    BPH (benign prostatic hyperplasia) [N40.0] 04/16/2024 Yes    Cancer related pain [G89.3] 04/16/2024 Yes    Pancytopenia due to chemotherapy [D61.810] 04/16/2024 Yes    Hyperbilirubinemia [E80.6] 04/16/2024 Yes    HTN (hypertension) [I10]  Yes    Type 2 diabetes mellitus without complications [E11.9]  Yes    Hypothyroidism, unspecified [E03.9]  Yes    GERD (gastroesophageal reflux disease) [K21.9]  Yes    Multiple myeloma in remission [C90.01] 06/17/2023 Yes    COLTON (acute kidney injury) [N17.9] 06/07/2023 Yes      Problems Resolved During this Admission:      Discharged Condition: stable    Disposition: Home or Self Care    Follow Up:    Patient Instructions:   No discharge procedures on  file.  Medications:  Reconciled Home Medications:      Medication List        START taking these medications      dextrose 5 % in water (D5W) PgBk 100 mL with cefTRIAXone 2 gram SolR 2 g  Inject 2 g into the vein once daily.     sulfamethoxazole-trimethoprim 800-160mg 800-160 mg Tab  Commonly known as: BACTRIM DS  Take 1 tablet by mouth 3 (three) times a week. To start on 05/20/24  Start taking on: May 20, 2024            CHANGE how you take these medications      acyclovir 800 MG Tab  Commonly known as: ZOVIRAX  Take 1 tablet (800 mg total) by mouth 2 (two) times daily.  What changed:   medication strength  how much to take     carvediloL 6.25 MG tablet  Commonly known as: COREG  Take 1 tablet (6.25 mg total) by mouth 2 (two) times daily.  What changed:   medication strength  how much to take     pantoprazole 40 MG tablet  Commonly known as: PROTONIX  Take 1 tablet (40 mg total) by mouth once daily.  What changed: when to take this            CONTINUE taking these medications      albuterol 90 mcg/actuation inhaler  Commonly known as: PROVENTIL/VENTOLIN HFA  2 puffs every 4 to 6 hours as needed.     amLODIPine 10 MG tablet  Commonly known as: NORVASC  Take 1 tablet (10 mg total) by mouth every morning.     atorvastatin 10 MG tablet  Commonly known as: LIPITOR  Take 10 mg by mouth every evening.     ferrous sulfate 325 (65 FE) MG EC tablet  Take 325 mg by mouth once daily.     finasteride 5 mg tablet  Commonly known as: PROSCAR  Take 5 mg by mouth every morning.     hydrALAZINE 50 MG tablet  Commonly known as: APRESOLINE  Take 1 tablet (50 mg total) by mouth every 8 (eight) hours.     levothyroxine 112 MCG tablet  Commonly known as: SYNTHROID  Take 112 mcg by mouth every evening.     metFORMIN 500 MG ER 24hr tablet  Commonly known as: GLUCOPHAGE-XR  Take 1,000 mg by mouth 2 (two) times daily.     nitroGLYCERIN 0.4 MG SL tablet  Commonly known as: NITROSTAT  place one tablet under the tongue every five minutes as  needed for chest pain up to 3 doses. if no relief call 911     ondansetron 8 MG tablet  Commonly known as: ZOFRAN  Take 1 tablet (8 mg total) by mouth every 8 (eight) hours as needed for Nausea.     ONETOUCH ULTRA TEST Strp  Generic drug: blood sugar diagnostic  USE TO TEST BLOOD GLUCOSE TWICE DAILY     oxyCODONE 5 MG immediate release tablet  Commonly known as: ROXICODONE  Take 1 tablet (5 mg total) by mouth every 4 (four) hours as needed for Pain.     ranolazine 500 MG Tb12  Commonly known as: RANEXA  Take 500 mg by mouth 2 (two) times daily.     spironolactone 25 MG tablet  Commonly known as: ALDACTONE  Take 12.5 mg by mouth every morning.     VITAMIN C 500 MG tablet  Generic drug: ascorbic acid (vitamin C)  Take 500 mg by mouth once daily.     VITAMIN D3 125 mcg (5,000 unit) Tab  Generic drug: cholecalciferol (vitamin D3)  Take 5,000 Units by mouth once daily.            STOP taking these medications      famotidine 20 MG tablet  Commonly known as: PEPCID     Lactobacillus rhamnosus GG 10 billion cell capsule  Commonly known as: CULTURELLE     lenalidomide 10 mg Cap  Commonly known as: REVLIMID     zinc gluconate 50 mg tablet              Mrak Thibodeaux MD  Bone Marrow Transplant  [facility] - Oncology ([facility])

## 2024-05-11 NOTE — PROCEDURES
Physician: Floyd Ferguson     Assistant:  None     Diagnosis:  Multiple myeloma     Procedure: Permacath removal     Date: 5/11/24     Location:  right Chest     Anesthesia: None     Procedure:  After informed consent and timeout was performed, the patient was positioned in his bed in a supine position. The dressing was removed. Firm pressure was applied and the line was easily removed from the skin intact. Pressure was held at the neck site for 10 minutes. There was no evidence of bleeding from the skin site or hematoma formation. A sterile dressing was applied to the skin site. Patient tolerated the procedure well.      Complications/Comments: None     Estimated Blood Loss: None     Disposition: Home

## 2024-05-11 NOTE — HPI
Patient with history of multiple myeloma in remission.  Now status post dual lumen catheter placement on April 8th, 2024. Now s/p SCT on 4/16/24. No longer in need of line.

## 2024-05-11 NOTE — ASSESSMENT & PLAN NOTE
Patient with history of multiple myeloma in remission.  Now status post dual lumen catheter placement on April 8th, 2024. Now s/p SCT on 4/16/24. No longer in need of line.  General surgery consulted for line removal.  Unit of platelets given prior to removal.    - please see procedure note for removal details  - patient instructed on wound care  - okay for discharge from a surgical perspective

## 2024-05-11 NOTE — PROGRESS NOTES
Discharge instructions given and explained to pt and niece.  Wound and line care complete.  Pt. verbalized understanding with no further questions.  Vascath D/C'd per MD.  VS WDL.  Patient is being escorted out by transport.      ROAD TEST  O=SpO2 95% on RA  A=Ambulating around room   D=Vascath D/C'd, midline in place  T=Tolerating regular diet  E=Voids  S=Performs self care independently  T=Teaching on wounds and line care complete

## 2024-05-11 NOTE — SUBJECTIVE & OBJECTIVE
No current facility-administered medications on file prior to encounter.     Current Outpatient Medications on File Prior to Encounter   Medication Sig    acyclovir (ZOVIRAX) 400 MG tablet Take 1 tablet (400 mg total) by mouth 2 (two) times daily.    amLODIPine (NORVASC) 10 MG tablet Take 1 tablet (10 mg total) by mouth every morning.    ascorbic acid, vitamin C, (VITAMIN C) 500 MG tablet Take 500 mg by mouth once daily.    atorvastatin (LIPITOR) 10 MG tablet Take 10 mg by mouth every evening.    carvediloL (COREG) 12.5 MG tablet Take 12.5 mg by mouth 2 (two) times daily.    cholecalciferol, vitamin D3, (VITAMIN D3) 125 mcg (5,000 unit) Tab Take 5,000 Units by mouth once daily.    famotidine (PEPCID) 20 MG tablet Take 1 tablet (20 mg total) by mouth every morning.    ferrous sulfate 325 (65 FE) MG EC tablet Take 325 mg by mouth once daily.    finasteride (PROSCAR) 5 mg tablet Take 5 mg by mouth every morning.    hydrALAZINE (APRESOLINE) 50 MG tablet Take 1 tablet (50 mg total) by mouth every 8 (eight) hours.    Lactobacillus rhamnosus GG (CULTURELLE) 10 billion cell capsule Take 1 capsule by mouth once daily.    levothyroxine (SYNTHROID) 112 MCG tablet Take 112 mcg by mouth every evening.    metFORMIN (GLUCOPHAGE-XR) 500 MG ER 24hr tablet Take 1,000 mg by mouth 2 (two) times daily.    pantoprazole (PROTONIX) 40 MG tablet Take 1 tablet (40 mg total) by mouth 2 (two) times daily before meals.    ranolazine (RANEXA) 500 MG Tb12 Take 500 mg by mouth 2 (two) times daily.    spironolactone (ALDACTONE) 25 MG tablet Take 12.5 mg by mouth every morning.    zinc gluconate 50 mg tablet Take 50 mg by mouth once daily.    albuterol (PROVENTIL/VENTOLIN HFA) 90 mcg/actuation inhaler 2 puffs every 4 to 6 hours as needed.    lenalidomide (REVLIMID) 10 mg Cap Take 10 mg by mouth once daily.    nitroGLYCERIN (NITROSTAT) 0.4 MG SL tablet place one tablet under the tongue every five minutes as needed for chest pain up to 3 doses. if no  relief call 911    ondansetron (ZOFRAN) 8 MG tablet Take 1 tablet (8 mg total) by mouth every 8 (eight) hours as needed for Nausea.    ONETOUCH ULTRA TEST Strp USE TO TEST BLOOD GLUCOSE TWICE DAILY    oxyCODONE (ROXICODONE) 5 MG immediate release tablet Take 1 tablet (5 mg total) by mouth every 4 (four) hours as needed for Pain.       Review of patient's allergies indicates:   Allergen Reactions    Iodine Anaphylaxis    Shellfish containing products     Poison ivy extract     Amoxicillin-pot clavulanate Itching       Past Medical History:   Diagnosis Date    Chronic diastolic (congestive) heart failure     Coronary artery disease     GERD (gastroesophageal reflux disease)     High cholesterol     HTN (hypertension)     Hypothyroidism, unspecified     Multiple myeloma     Secondary pulmonary arterial hypertension     Type 2 diabetes mellitus without complications      Past Surgical History:   Procedure Laterality Date    APPENDECTOMY  1965    BONE MARROW BIOPSY Right 06/12/2023    Procedure: BIOPSY, BONE MARROW;  Surgeon: Silvano Austin MD;  Location: [facility] OR;  Service: General;  Laterality: Right;    COLONOSCOPY N/A 2/15/2024    Procedure: COLONOSCOPY;  Surgeon: Kei Donahue III, MD;  Location: [facility] ENDO;  Service: Endoscopy;  Laterality: N/A;  POST-OP: ENLARGED NODULAR PROSTATE, SUBOPTIMAL PREP, PAN DIVERTICULOSIS, TRANSVERSE COLON POLYPECTOMY    INSERTION OF TUNNELED CENTRAL VENOUS HEMODIALYSIS CATHETER Left 4/8/2024    Procedure: INSERTION, CATHETER, HEMODIALYSIS, DUAL LUMEN, left poss right, Bard 14.5 fr hemosplit catheter, model [id number];  Surgeon: Theodore Caruso MD;  Location: [address];  Service: General;  Laterality: Left;    removal of boils       Family History       Problem Relation (Age of Onset)    Breast cancer Sister    Lung cancer Mother          Tobacco Use    Smoking status: Former     Types: Cigarettes    Smokeless tobacco: Former   Substance and Sexual Activity    Alcohol use: Yes     Drug use: Never    Sexual activity: Not Currently     Review of Systems   Constitutional: Negative.    HENT: Negative.     Respiratory: Negative.     Cardiovascular: Negative.    Gastrointestinal: Negative.    Endocrine: Negative.    Genitourinary: Negative.    Musculoskeletal: Negative.    Neurological: Negative.    Hematological: Negative.    Psychiatric/Behavioral: Negative.       Objective:     Vital Signs (Most Recent):  Temp: 97.8 °F (36.6 °C) (05/11/24 1223)  Pulse: 73 (05/11/24 1223)  Resp: 18 (05/11/24 1223)  BP: 122/63 (05/11/24 1223)  SpO2: 95 % (05/11/24 1223) Vital Signs (24h Range):  Temp:  [97.5 °F (36.4 °C)-98.5 °F (36.9 °C)] 97.8 °F (36.6 °C)  Pulse:  [72-75] 73  Resp:  [15-20] 18  SpO2:  [94 %-98 %] 95 %  BP: (119-146)/(62-68) 122/63     Weight: 86.6 kg (190 lb 14.7 oz)  Body mass index is 27.39 kg/m².     Physical Exam  Vitals and nursing note reviewed.   Constitutional:       Appearance: Normal appearance. He is not ill-appearing.   HENT:      Head: Normocephalic.      Mouth/Throat:      Mouth: Mucous membranes are moist.      Pharynx: Oropharynx is clear.   Eyes:      General: No scleral icterus.     Extraocular Movements: Extraocular movements intact.      Conjunctiva/sclera: Conjunctivae normal.   Cardiovascular:      Rate and Rhythm: Normal rate.      Pulses: Normal pulses.   Pulmonary:      Effort: Pulmonary effort is normal. No respiratory distress.      Breath sounds: No wheezing.   Chest:      Comments: Right chest dual lumen port with right IJ insertion point.  Abdominal:      General: Abdomen is flat. Bowel sounds are normal. There is no distension.      Palpations: Abdomen is soft.   Musculoskeletal:         General: No swelling or tenderness. Normal range of motion.      Cervical back: Normal range of motion.   Skin:     General: Skin is warm and dry.      Coloration: Skin is not jaundiced or pale.   Neurological:      General: No focal deficit present.      Mental Status: He is  alert and oriented to person, place, and time.   Psychiatric:         Mood and Affect: Mood normal.            I have reviewed all pertinent lab results within the past 24 hours.  CBC:   Recent Labs   Lab 05/11/24 0413   WBC 1.38*   RBC 2.19*   HGB 7.3*   HCT 20.1*   PLT 12*   MCV 92   MCH 33.3*   MCHC 36.3*     CMP:   Recent Labs   Lab 05/11/24 0413   *   CALCIUM 9.3   ALBUMIN 2.8*   PROT 5.3*      K 4.0   CO2 23      BUN 23   CREATININE 0.9   ALKPHOS 98   ALT 42   AST 16   BILITOT 0.9       Significant Diagnostics:  I have reviewed all pertinent imaging results/findings within the past 24 hours.

## 2024-05-13 ENCOUNTER — LAB VISIT (OUTPATIENT)
Dept: LAB | Facility: HOSPITAL | Age: 74
End: 2024-05-13
Attending: INTERNAL MEDICINE
Payer: MEDICARE

## 2024-05-13 DIAGNOSIS — C90.01 MULTIPLE MYELOMA IN REMISSION: Primary | ICD-10-CM

## 2024-05-13 LAB
ALBUMIN SERPL BCP-MCNC: 3.2 G/DL (ref 3.5–5.2)
ALP SERPL-CCNC: 111 U/L (ref 55–135)
ALT SERPL W/O P-5'-P-CCNC: 31 U/L (ref 10–44)
ANION GAP SERPL CALC-SCNC: 11 MMOL/L (ref 8–16)
ANISOCYTOSIS BLD QL SMEAR: SLIGHT
AST SERPL-CCNC: 11 U/L (ref 10–40)
BASOPHILS # BLD AUTO: 0.01 K/UL (ref 0–0.2)
BASOPHILS NFR BLD: 0.7 % (ref 0–1.9)
BILIRUB SERPL-MCNC: 0.8 MG/DL (ref 0.1–1)
BUN SERPL-MCNC: 22 MG/DL (ref 8–23)
CALCIUM SERPL-MCNC: 9.4 MG/DL (ref 8.7–10.5)
CHLORIDE SERPL-SCNC: 105 MMOL/L (ref 95–110)
CO2 SERPL-SCNC: 21 MMOL/L (ref 23–29)
CREAT SERPL-MCNC: 1.1 MG/DL (ref 0.5–1.4)
CRP SERPL-MCNC: 10.1 MG/L (ref 0–8.2)
DACRYOCYTES BLD QL SMEAR: ABNORMAL
DIFFERENTIAL METHOD BLD: ABNORMAL
EOSINOPHIL # BLD AUTO: 0 K/UL (ref 0–0.5)
EOSINOPHIL NFR BLD: 0 % (ref 0–8)
ERYTHROCYTE [DISTWIDTH] IN BLOOD BY AUTOMATED COUNT: 10.9 % (ref 11.5–14.5)
EST. GFR  (NO RACE VARIABLE): >60 ML/MIN/1.73 M^2
GLUCOSE SERPL-MCNC: 219 MG/DL (ref 70–110)
HCT VFR BLD AUTO: 20.3 % (ref 40–54)
HGB BLD-MCNC: 7 G/DL (ref 14–18)
HYPOCHROMIA BLD QL SMEAR: ABNORMAL
IMM GRANULOCYTES # BLD AUTO: 0.02 K/UL (ref 0–0.04)
IMM GRANULOCYTES NFR BLD AUTO: 1.4 % (ref 0–0.5)
LYMPHOCYTES # BLD AUTO: 0.1 K/UL (ref 1–4.8)
LYMPHOCYTES NFR BLD: 6.4 % (ref 18–48)
MCH RBC QN AUTO: 32.3 PG (ref 27–31)
MCHC RBC AUTO-ENTMCNC: 34.5 G/DL (ref 32–36)
MCV RBC AUTO: 94 FL (ref 82–98)
MONOCYTES # BLD AUTO: 0.2 K/UL (ref 0.3–1)
MONOCYTES NFR BLD: 15.7 % (ref 4–15)
NEUTROPHILS # BLD AUTO: 1.1 K/UL (ref 1.8–7.7)
NEUTROPHILS NFR BLD: 75.8 % (ref 38–73)
NRBC BLD-RTO: 2 /100 WBC
OVALOCYTES BLD QL SMEAR: ABNORMAL
PLATELET # BLD AUTO: 25 K/UL (ref 150–450)
PLATELET BLD QL SMEAR: ABNORMAL
PMV BLD AUTO: 11.3 FL (ref 9.2–12.9)
POIKILOCYTOSIS BLD QL SMEAR: SLIGHT
POLYCHROMASIA BLD QL SMEAR: ABNORMAL
POTASSIUM SERPL-SCNC: 4.2 MMOL/L (ref 3.5–5.1)
PROT SERPL-MCNC: 5.5 G/DL (ref 6–8.4)
RBC # BLD AUTO: 2.17 M/UL (ref 4.6–6.2)
SODIUM SERPL-SCNC: 137 MMOL/L (ref 136–145)
SPHEROCYTES BLD QL SMEAR: ABNORMAL
WBC # BLD AUTO: 1.4 K/UL (ref 3.9–12.7)

## 2024-05-13 PROCEDURE — 85025 COMPLETE CBC W/AUTO DIFF WBC: CPT | Performed by: INTERNAL MEDICINE

## 2024-05-13 PROCEDURE — 80053 COMPREHEN METABOLIC PANEL: CPT | Performed by: INTERNAL MEDICINE

## 2024-05-13 PROCEDURE — 86140 C-REACTIVE PROTEIN: CPT | Performed by: INTERNAL MEDICINE

## 2024-05-16 ENCOUNTER — PATIENT MESSAGE (OUTPATIENT)
Dept: HEMATOLOGY/ONCOLOGY | Facility: CLINIC | Age: 74
End: 2024-05-16
Payer: MEDICARE

## 2024-05-16 ENCOUNTER — TELEPHONE (OUTPATIENT)
Dept: HEMATOLOGY/ONCOLOGY | Facility: CLINIC | Age: 74
End: 2024-05-16
Payer: MEDICARE

## 2024-05-16 ENCOUNTER — INFUSION (OUTPATIENT)
Dept: INFUSION THERAPY | Facility: HOSPITAL | Age: 74
End: 2024-05-16
Attending: FAMILY MEDICINE
Payer: MEDICARE

## 2024-05-16 VITALS
TEMPERATURE: 98 F | DIASTOLIC BLOOD PRESSURE: 62 MMHG | RESPIRATION RATE: 17 BRPM | OXYGEN SATURATION: 98 % | SYSTOLIC BLOOD PRESSURE: 131 MMHG | HEART RATE: 79 BPM

## 2024-05-16 DIAGNOSIS — Z94.84 HISTORY OF AUTO STEM CELL TRANSPLANT: ICD-10-CM

## 2024-05-16 DIAGNOSIS — C90.01 MULTIPLE MYELOMA IN REMISSION: ICD-10-CM

## 2024-05-16 DIAGNOSIS — Z76.82 STEM CELL TRANSPLANT CANDIDATE: Primary | ICD-10-CM

## 2024-05-16 DIAGNOSIS — C90.01 MULTIPLE MYELOMA IN REMISSION: Primary | ICD-10-CM

## 2024-05-16 LAB
BLD PROD TYP BPU: NORMAL
BLOOD UNIT EXPIRATION DATE: NORMAL
BLOOD UNIT TYPE CODE: 5100
BLOOD UNIT TYPE: NORMAL
CODING SYSTEM: NORMAL
CROSSMATCH INTERPRETATION: NORMAL
DISPENSE STATUS: NORMAL
NUM UNITS TRANS PACKED RBC: NORMAL

## 2024-05-16 PROCEDURE — 36430 TRANSFUSION BLD/BLD COMPNT: CPT

## 2024-05-16 PROCEDURE — 25000003 PHARM REV CODE 250

## 2024-05-16 PROCEDURE — 86902 BLOOD TYPE ANTIGEN DONOR EA: CPT

## 2024-05-16 PROCEDURE — P9040 RBC LEUKOREDUCED IRRADIATED: HCPCS

## 2024-05-16 PROCEDURE — 86644 CMV ANTIBODY: CPT

## 2024-05-16 PROCEDURE — 86920 COMPATIBILITY TEST SPIN: CPT

## 2024-05-16 RX ORDER — HYDROCODONE BITARTRATE AND ACETAMINOPHEN 500; 5 MG/1; MG/1
TABLET ORAL ONCE
Status: CANCELLED | OUTPATIENT
Start: 2024-05-16 | End: 2024-05-16

## 2024-05-16 RX ORDER — ACETAMINOPHEN 325 MG/1
650 TABLET ORAL
Status: DISCONTINUED | OUTPATIENT
Start: 2024-05-16 | End: 2024-05-16 | Stop reason: HOSPADM

## 2024-05-16 RX ORDER — HYDROCODONE BITARTRATE AND ACETAMINOPHEN 500; 5 MG/1; MG/1
TABLET ORAL ONCE
Status: COMPLETED | OUTPATIENT
Start: 2024-05-16 | End: 2024-05-16

## 2024-05-16 RX ORDER — ACETAMINOPHEN 325 MG/1
650 TABLET ORAL
Status: CANCELLED | OUTPATIENT
Start: 2024-05-16

## 2024-05-16 RX ADMIN — SODIUM CHLORIDE: 0.9 INJECTION, SOLUTION INTRAVENOUS at 02:05

## 2024-05-16 NOTE — TELEPHONE ENCOUNTER
Called pt back. Pt stating he did not reach out but just finished his blood transfusion. Confirmed with pt the  appt with Yuli for tomorrow at 4:20.

## 2024-05-16 NOTE — TELEPHONE ENCOUNTER
"----- Message from Temi Kang sent at 5/16/2024  3:24 PM CDT -----  Consult/Advisory:      Name Of Caller: Ning / sister     Contact Preference:  [phone]     What is the nature of the call?: Returning call to office, no further info given. Please call       Additional Notes:  "Thank you for all that you do for our patients"  "

## 2024-05-16 NOTE — PLAN OF CARE
Pt to clinic via wlc with wife for blood transfusion. Denies any sig complaints. Midline to Left upper arm in place. Blood consent obtained. Pt tolerated transfusion well. Midline flushed w/NS and heparin and secured with green top and elastic bandage. From clinic in [facility],

## 2024-05-17 ENCOUNTER — OFFICE VISIT (OUTPATIENT)
Dept: HEMATOLOGY/ONCOLOGY | Facility: CLINIC | Age: 74
End: 2024-05-17
Payer: MEDICARE

## 2024-05-17 ENCOUNTER — DOCUMENTATION ONLY (OUTPATIENT)
Dept: HEMATOLOGY/ONCOLOGY | Facility: CLINIC | Age: 74
End: 2024-05-17
Payer: MEDICARE

## 2024-05-17 VITALS
TEMPERATURE: 98 F | SYSTOLIC BLOOD PRESSURE: 108 MMHG | DIASTOLIC BLOOD PRESSURE: 58 MMHG | HEIGHT: 70 IN | HEART RATE: 80 BPM | WEIGHT: 189.25 LBS | BODY MASS INDEX: 27.09 KG/M2 | OXYGEN SATURATION: 98 %

## 2024-05-17 DIAGNOSIS — Z94.84 HISTORY OF AUTO STEM CELL TRANSPLANT: Primary | ICD-10-CM

## 2024-05-17 DIAGNOSIS — C90.01 MULTIPLE MYELOMA IN REMISSION: ICD-10-CM

## 2024-05-17 DIAGNOSIS — D61.810 PANCYTOPENIA DUE TO CHEMOTHERAPY: ICD-10-CM

## 2024-05-17 DIAGNOSIS — C90.01 MULTIPLE MYELOMA IN REMISSION: Primary | ICD-10-CM

## 2024-05-17 DIAGNOSIS — D84.81 IMMUNODEFICIENCY DUE TO CONDITIONS CLASSIFIED ELSEWHERE: ICD-10-CM

## 2024-05-17 DIAGNOSIS — R78.81 BACTEREMIA: ICD-10-CM

## 2024-05-17 DIAGNOSIS — Z94.84 HISTORY OF AUTO STEM CELL TRANSPLANT: ICD-10-CM

## 2024-05-17 DIAGNOSIS — D70.9 NEUTROPENIA, UNSPECIFIED TYPE: ICD-10-CM

## 2024-05-17 PROCEDURE — G2211 COMPLEX E/M VISIT ADD ON: HCPCS | Mod: S$PBB,,,

## 2024-05-17 PROCEDURE — 99214 OFFICE O/P EST MOD 30 MIN: CPT | Mod: PBBFAC

## 2024-05-17 PROCEDURE — 99215 OFFICE O/P EST HI 40 MIN: CPT | Mod: S$PBB,,,

## 2024-05-17 PROCEDURE — 99999 PR PBB SHADOW E&M-EST. PATIENT-LVL IV: CPT | Mod: PBBFAC,,,

## 2024-05-17 RX ORDER — DAPSONE 100 MG/1
100 TABLET ORAL DAILY
Qty: 30 TABLET | Refills: 5 | Status: SHIPPED | OUTPATIENT
Start: 2024-05-20

## 2024-05-17 RX ORDER — FILGRASTIM-AAFI 480 UG/.8ML
480 INJECTION, SOLUTION SUBCUTANEOUS DAILY
Qty: 4 ML | Refills: 0 | Status: ACTIVE | OUTPATIENT
Start: 2024-05-17 | End: 2024-05-23

## 2024-05-17 NOTE — PROGRESS NOTES
HEMATOLOGIC MALIGNANCIES PROGRESS NOTE    IDENTIFYING STATEMENT   Chip Yadav) is a 73 y.o. male with a  of 1950 from [place], [place] with the diagnosis of multiple myeloma.      ONCOLOGY HISTORY:    INTERVAL HISTORY:    Today, Mr. Goodson presents to clinic with his caregiver for follow up on day +29  post autologous stem cell transplant. His hospital course was complicated by bacteremia. He is feeling much better today. Not requiring nausea medication. Eating small frequent meals throughout the day. Denies rash, dysuria, and diarrhea. Has a slight dry cough. Denies other respiratory symptoms.       Past Medical History, Past Social History and Past Family History have been reviewed and are unchanged except as noted in the interval history.    MEDICATIONS:     Current Outpatient Medications on File Prior to Visit   Medication Sig Dispense Refill    acyclovir (ZOVIRAX) 800 MG Tab Take 1 tablet (800 mg total) by mouth 2 (two) times daily. 60 tablet 11    albuterol (PROVENTIL/VENTOLIN HFA) 90 mcg/actuation inhaler 2 puffs every 4 to 6 hours as needed.      amLODIPine (NORVASC) 10 MG tablet Take 1 tablet (10 mg total) by mouth every morning.      ascorbic acid, vitamin C, (VITAMIN C) 500 MG tablet Take 500 mg by mouth once daily.      atorvastatin (LIPITOR) 10 MG tablet Take 10 mg by mouth every evening.      carvediloL (COREG) 6.25 MG tablet Take 1 tablet (6.25 mg total) by mouth 2 (two) times daily. 180 tablet 3    cholecalciferol, vitamin D3, (VITAMIN D3) 125 mcg (5,000 unit) Tab Take 5,000 Units by mouth once daily.      dextrose 5 % in water (D5W) PgBk 100 mL with cefTRIAXone 2 gram SolR 2 g Inject 2 g into the vein once daily.      ferrous sulfate 325 (65 FE) MG EC tablet Take 325 mg by mouth once daily.      filgrastim-aafi (NIVESTYM) 480 mcg/0.8 mL Syrg Inject 0.8 mLs (480 mcg total) into the skin once daily. for 5 days 4 mL 0    finasteride (PROSCAR) 5 mg tablet Take 5 mg by mouth every  morning.      hydrALAZINE (APRESOLINE) 50 MG tablet Take 1 tablet (50 mg total) by mouth every 8 (eight) hours. 90 tablet 11    levothyroxine (SYNTHROID) 112 MCG tablet Take 112 mcg by mouth every evening.      metFORMIN (GLUCOPHAGE-XR) 500 MG ER 24hr tablet Take 1,000 mg by mouth 2 (two) times daily.      nitroGLYCERIN (NITROSTAT) 0.4 MG SL tablet place one tablet under the tongue every five minutes as needed for chest pain up to 3 doses. if no relief call 911      ondansetron (ZOFRAN) 8 MG tablet Take 1 tablet (8 mg total) by mouth every 8 (eight) hours as needed for Nausea. 30 tablet 2    ONETOUCH ULTRA TEST Strp USE TO TEST BLOOD GLUCOSE TWICE DAILY      oxyCODONE (ROXICODONE) 5 MG immediate release tablet Take 1 tablet (5 mg total) by mouth every 4 (four) hours as needed for Pain. 8 tablet 0    pantoprazole (PROTONIX) 40 MG tablet Take 1 tablet (40 mg total) by mouth once daily. 30 tablet 11    ranolazine (RANEXA) 500 MG Tb12 Take 500 mg by mouth 2 (two) times daily.      spironolactone (ALDACTONE) 25 MG tablet Take 12.5 mg by mouth every morning.      [DISCONTINUED] sulfamethoxazole-trimethoprim 800-160mg (BACTRIM DS) 800-160 mg Tab Take 1 tablet by mouth 3 (three) times a week. To start on 05/20/24 12 tablet 5     Current Facility-Administered Medications on File Prior to Visit   Medication Dose Route Frequency Provider Last Rate Last Admin    [DISCONTINUED] acetaminophen tablet 650 mg  650 mg Oral PRN Rubia Keller NP           ALLERGIES:   Review of patient's allergies indicates:   Allergen Reactions    Iodine Anaphylaxis    Shellfish containing products     Poison ivy extract     Amoxicillin-pot clavulanate Itching        ROS:       Review of Systems - Oncology    PHYSICAL EXAM:  There were no vitals filed for this visit.    KARNOFSKY PERFORMANCE STATUS 80%  ECOG 1    Physical Exam    LAB:   Results for orders placed or performed in visit on 05/16/24   Prepare RBC 1 Unit   Result Value Ref Range     UNIT NUMBER [id number]     Product Code [id number]     DISPENSE STATUS TRANSFUSED     CODING SYSTEM [id number]     Unit Blood Type Code 5100     Unit Blood Type O POS     Unit Expiration [id number]     CROSSMATCH INTERPRETATION Compatible        PROBLEMS ASSESSED THIS VISIT:    1. Multiple myeloma in remission    2. History of auto stem cell transplant        PLAN:       Multiple Myeloma:  - Transplant oncologist: Dr. Evans, follows locally with Dr. Rose in [place]  - diagnosed June 2023 with IgA Kappa MM; stage unclear at diagnosis as FISH not available  - started DRd therapy on 7/18/2023  - he stopped the daratumumab after cycle 4 and continue with revlimid and dex only; looks to have completed ~5 cycles  - Admitted for Carmen 140 Auto SCT on 4/16/2024    History of auto stem cell transplant:   - Day 0 on 4/18/2024. Conditioned with Carmen 140.   - He received 5 bags on 4/18/24 at 1330 and received the remaining 4 bags on 4/19/24 at 1330. Total CD34 dose was 2.91 x 10^6.   - Currently residing at [facility] with caregivers     Immunodeficiency due to conditions classified elsewhere:   - Continue acyclovir antiviral prophylaxis   - Switched bactrim PJP prophylaxis to dapsone 100mg daily due to cytopenias.     Pancytopenia   - Transfuse 1 unit of PRBC for hgb < 7 or clinically appropriate. Transfuse 1 unit of plts if platelets < 10 or clinically appropriate.    - Ordered filgrastim x 5 days to start upon receipt.   - Will repeat labs on Monday.     Gemella Haemolysans bactermia   - Continue ceftriaxone through 6/14/2024  - Follow with transplant ID     Yuli Curran NP  Hematology and Stem Cell Transplant

## 2024-05-17 NOTE — PROGRESS NOTES
Received request to assist with adjustment to post-transplant plan.  Sent request to Smita Portillo requesting extension of current stay to Friday.  Tisha at [facility] will explore availability and update this writer prior to planned discharge on Monday.  Update Beth at [facility]; they will delay transfer of service to their local sister agency until after Friday.  Will update patient as information becomes available.  Will follow.

## 2024-05-20 ENCOUNTER — TELEPHONE (OUTPATIENT)
Dept: HEMATOLOGY/ONCOLOGY | Facility: CLINIC | Age: 74
End: 2024-05-20
Payer: MEDICARE

## 2024-05-20 ENCOUNTER — LAB VISIT (OUTPATIENT)
Dept: LAB | Facility: HOSPITAL | Age: 74
End: 2024-05-20
Attending: INTERNAL MEDICINE
Payer: MEDICARE

## 2024-05-20 ENCOUNTER — DOCUMENTATION ONLY (OUTPATIENT)
Dept: HEMATOLOGY/ONCOLOGY | Facility: CLINIC | Age: 74
End: 2024-05-20
Payer: MEDICARE

## 2024-05-20 DIAGNOSIS — C90.01 MULTIPLE MYELOMA IN REMISSION: ICD-10-CM

## 2024-05-20 DIAGNOSIS — Z76.82 STEM CELL TRANSPLANT CANDIDATE: ICD-10-CM

## 2024-05-20 DIAGNOSIS — C90.01 MULTIPLE MYELOMA IN REMISSION: Primary | ICD-10-CM

## 2024-05-20 LAB
ABO + RH BLD: NORMAL
ALBUMIN SERPL BCP-MCNC: 3.5 G/DL (ref 3.5–5.2)
ALP SERPL-CCNC: 115 U/L (ref 55–135)
ALT SERPL W/O P-5'-P-CCNC: 15 U/L (ref 10–44)
ANION GAP SERPL CALC-SCNC: 8 MMOL/L (ref 8–16)
ANISOCYTOSIS BLD QL SMEAR: SLIGHT
AST SERPL-CCNC: 10 U/L (ref 10–40)
BASOPHILS # BLD AUTO: 0 K/UL (ref 0–0.2)
BASOPHILS NFR BLD: 0 % (ref 0–1.9)
BILIRUB SERPL-MCNC: 1.2 MG/DL (ref 0.1–1)
BLD GP AB SCN CELLS X3 SERPL QL: NORMAL
BUN SERPL-MCNC: 24 MG/DL (ref 8–23)
CALCIUM SERPL-MCNC: 9.7 MG/DL (ref 8.7–10.5)
CHLORIDE SERPL-SCNC: 105 MMOL/L (ref 95–110)
CO2 SERPL-SCNC: 24 MMOL/L (ref 23–29)
CREAT SERPL-MCNC: 1.2 MG/DL (ref 0.5–1.4)
DIFFERENTIAL METHOD BLD: ABNORMAL
EOSINOPHIL # BLD AUTO: 0 K/UL (ref 0–0.5)
EOSINOPHIL NFR BLD: 0 % (ref 0–8)
ERYTHROCYTE [DISTWIDTH] IN BLOOD BY AUTOMATED COUNT: 10.9 % (ref 11.5–14.5)
EST. GFR  (NO RACE VARIABLE): >60 ML/MIN/1.73 M^2
GLUCOSE SERPL-MCNC: 158 MG/DL (ref 70–110)
HCT VFR BLD AUTO: 22.2 % (ref 40–54)
HGB BLD-MCNC: 7.8 G/DL (ref 14–18)
HYPOCHROMIA BLD QL SMEAR: ABNORMAL
IMM GRANULOCYTES # BLD AUTO: 0.01 K/UL (ref 0–0.04)
IMM GRANULOCYTES NFR BLD AUTO: 1.4 % (ref 0–0.5)
LYMPHOCYTES # BLD AUTO: 0.4 K/UL (ref 1–4.8)
LYMPHOCYTES NFR BLD: 52.9 % (ref 18–48)
MAGNESIUM SERPL-MCNC: 1.9 MG/DL (ref 1.6–2.6)
MCH RBC QN AUTO: 31.7 PG (ref 27–31)
MCHC RBC AUTO-ENTMCNC: 35.1 G/DL (ref 32–36)
MCV RBC AUTO: 90 FL (ref 82–98)
MONOCYTES # BLD AUTO: 0.1 K/UL (ref 0.3–1)
MONOCYTES NFR BLD: 8.6 % (ref 4–15)
NEUTROPHILS # BLD AUTO: 0.3 K/UL (ref 1.8–7.7)
NEUTROPHILS NFR BLD: 37.1 % (ref 38–73)
NRBC BLD-RTO: 3 /100 WBC
OVALOCYTES BLD QL SMEAR: ABNORMAL
PHOSPHATE SERPL-MCNC: 3 MG/DL (ref 2.7–4.5)
PLATELET # BLD AUTO: 13 K/UL (ref 150–450)
PLATELET BLD QL SMEAR: ABNORMAL
PMV BLD AUTO: 10.6 FL (ref 9.2–12.9)
POIKILOCYTOSIS BLD QL SMEAR: SLIGHT
POLYCHROMASIA BLD QL SMEAR: ABNORMAL
POTASSIUM SERPL-SCNC: 4.7 MMOL/L (ref 3.5–5.1)
PROT SERPL-MCNC: 5.9 G/DL (ref 6–8.4)
RBC # BLD AUTO: 2.46 M/UL (ref 4.6–6.2)
SODIUM SERPL-SCNC: 137 MMOL/L (ref 136–145)
SPECIMEN OUTDATE: NORMAL
WBC # BLD AUTO: 0.7 K/UL (ref 3.9–12.7)

## 2024-05-20 PROCEDURE — 83735 ASSAY OF MAGNESIUM: CPT | Performed by: INTERNAL MEDICINE

## 2024-05-20 PROCEDURE — 80053 COMPREHEN METABOLIC PANEL: CPT | Performed by: INTERNAL MEDICINE

## 2024-05-20 PROCEDURE — 84100 ASSAY OF PHOSPHORUS: CPT | Performed by: INTERNAL MEDICINE

## 2024-05-20 PROCEDURE — 86850 RBC ANTIBODY SCREEN: CPT | Performed by: INTERNAL MEDICINE

## 2024-05-20 PROCEDURE — 85025 COMPLETE CBC W/AUTO DIFF WBC: CPT | Performed by: INTERNAL MEDICINE

## 2024-05-20 NOTE — TELEPHONE ENCOUNTER
Spoke with Cole Scanlon, patient access rep at [facility] [place] [facility]. I advised that pt was currently on the [place] and sitting in 's office. I advised that pt would not be able to make today's appointment with .      advised that pt daughter had already informed their office that pt was not able to make appointment because they were not allowed to leave from a 60 mile radius due to being a recent stem cell transplant recipient. She advised that his appointment today would be rescheduled and requested that MD note from today's visit at 3pm  be faxed to [phone].

## 2024-05-20 NOTE — PROGRESS NOTES
Patient cancelled his 05/20/24 appointment with MD because he was currently on his way home from [facility]. Offered to schedule patient later in the week with Dr. Rose, but patient refused. Patient asked to be pushed out a least a week or two. Patient currently scheduled to see MD on 06/04/24 @11:40am. Patient also advised to complete weekly labs every Monday and Thursday starting on 05/23/24. Patient voiced understanding.

## 2024-05-21 ENCOUNTER — DOCUMENTATION ONLY (OUTPATIENT)
Dept: HEMATOLOGY/ONCOLOGY | Facility: CLINIC | Age: 74
End: 2024-05-21
Payer: MEDICARE

## 2024-05-21 ENCOUNTER — PATIENT MESSAGE (OUTPATIENT)
Dept: HEMATOLOGY/ONCOLOGY | Facility: CLINIC | Age: 74
End: 2024-05-21
Payer: MEDICARE

## 2024-05-21 DIAGNOSIS — Z94.84 HISTORY OF AUTO STEM CELL TRANSPLANT: ICD-10-CM

## 2024-05-21 DIAGNOSIS — C90.01 MULTIPLE MYELOMA IN REMISSION: Primary | ICD-10-CM

## 2024-05-21 NOTE — PROGRESS NOTES
LISANDRA covering for Theodore Felipe LCSW. Patient messaged Theodore directly asking where meds were.    LISANDRA spoke to patient and daughter who explained they were waiting for Heparin and wanted [facility]  of [facility] to begin care. Daughter explained that they had left [place] yesterday.    LISANDRA spoke to [facility], [phone] , who confirmed that heparin and fluid were en route to home via  and would be delivered today. LISANDRA spoke to [facility] with [facility], [phone], who confirmed patient had been discharged with them as of 5/20. LISANDRA faxed HH orders and packet to [facility] , [phone]. LISANDRA will notify Theodore Felipe of efforts.

## 2024-05-22 ENCOUNTER — DOCUMENTATION ONLY (OUTPATIENT)
Dept: HEMATOLOGY/ONCOLOGY | Facility: CLINIC | Age: 74
End: 2024-05-22
Payer: MEDICARE

## 2024-05-23 ENCOUNTER — LAB VISIT (OUTPATIENT)
Dept: LAB | Facility: HOSPITAL | Age: 74
End: 2024-05-23
Payer: MEDICARE

## 2024-05-23 DIAGNOSIS — Z94.84 HISTORY OF AUTO STEM CELL TRANSPLANT: ICD-10-CM

## 2024-05-23 DIAGNOSIS — D84.81 IMMUNODEFICIENCY DUE TO CONDITIONS CLASSIFIED ELSEWHERE: Primary | ICD-10-CM

## 2024-05-23 DIAGNOSIS — C90.00 MULTIPLE MYELOMA NOT HAVING ACHIEVED REMISSION: Primary | ICD-10-CM

## 2024-05-23 DIAGNOSIS — D70.9 NEUTROPENIA, UNSPECIFIED TYPE: ICD-10-CM

## 2024-05-23 DIAGNOSIS — C90.01 MULTIPLE MYELOMA IN REMISSION: ICD-10-CM

## 2024-05-23 DIAGNOSIS — D61.810 PANCYTOPENIA DUE TO CHEMOTHERAPY: ICD-10-CM

## 2024-05-23 DIAGNOSIS — C90.00 MULTIPLE MYELOMA NOT HAVING ACHIEVED REMISSION: ICD-10-CM

## 2024-05-23 LAB
ABS NEUT CALC (OHS): 0.09 X10(3)/MCL (ref 2.1–9.2)
ALBUMIN SERPL-MCNC: 3.5 G/DL (ref 3.4–4.8)
ALBUMIN/GLOB SERPL: 1.7 RATIO (ref 1.1–2)
ALP SERPL-CCNC: 102 UNIT/L (ref 40–150)
ALT SERPL-CCNC: 12 UNIT/L (ref 0–55)
ANION GAP SERPL CALC-SCNC: 7 MEQ/L
AST SERPL-CCNC: 9 UNIT/L (ref 5–34)
BILIRUB SERPL-MCNC: 1.2 MG/DL
BUN SERPL-MCNC: 31 MG/DL (ref 8.4–25.7)
CALCIUM SERPL-MCNC: 9.7 MG/DL (ref 8.8–10)
CHLORIDE SERPL-SCNC: 108 MMOL/L (ref 98–107)
CO2 SERPL-SCNC: 22 MMOL/L (ref 23–31)
CREAT SERPL-MCNC: 1.15 MG/DL (ref 0.73–1.18)
CREAT/UREA NIT SERPL: 27
EOSINOPHIL NFR BLD MANUAL: 0.01 X10(3)/MCL (ref 0–0.9)
EOSINOPHIL NFR BLD MANUAL: 1 % (ref 0–8)
ERYTHROCYTE [DISTWIDTH] IN BLOOD BY AUTOMATED COUNT: 11 % (ref 11.5–17)
GFR SERPLBLD CREATININE-BSD FMLA CKD-EPI: >60 ML/MIN/1.73/M2
GLOBULIN SER-MCNC: 2.1 GM/DL (ref 2.4–3.5)
GLUCOSE SERPL-MCNC: 155 MG/DL (ref 82–115)
GROUP & RH: NORMAL
HCT VFR BLD AUTO: 17.6 % (ref 42–52)
HGB BLD-MCNC: 6.2 G/DL (ref 14–18)
INDIRECT COOMBS: NORMAL
LYMPHOCYTES NFR BLD MANUAL: 0.47 X10(3)/MCL
LYMPHOCYTES NFR BLD MANUAL: 80 % (ref 13–40)
MAGNESIUM SERPL-MCNC: 1.8 MG/DL (ref 1.6–2.6)
MCH RBC QN AUTO: 31.5 PG (ref 27–31)
MCHC RBC AUTO-ENTMCNC: 35.2 G/DL (ref 33–36)
MCV RBC AUTO: 89.3 FL (ref 80–94)
MONOCYTES NFR BLD MANUAL: 0.02 X10(3)/MCL (ref 0.1–1.3)
MONOCYTES NFR BLD MANUAL: 3 % (ref 2–11)
NEUTROPHILS NFR BLD MANUAL: 16 % (ref 47–80)
PLATELET # BLD AUTO: 7 X10(3)/MCL (ref 130–400)
PLATELET # BLD EST: ABNORMAL 10*3/UL
PMV BLD AUTO: 13.3 FL (ref 7.4–10.4)
POTASSIUM SERPL-SCNC: 3.9 MMOL/L (ref 3.5–5.1)
PROT SERPL-MCNC: 5.6 GM/DL (ref 5.8–7.6)
RBC # BLD AUTO: 1.97 X10(6)/MCL (ref 4.7–6.1)
RBC MORPH BLD: NORMAL
SODIUM SERPL-SCNC: 137 MMOL/L (ref 136–145)
SPECIMEN OUTDATE: NORMAL
WBC # SPEC AUTO: 0.59 X10(3)/MCL (ref 4.5–11.5)

## 2024-05-23 PROCEDURE — 36415 COLL VENOUS BLD VENIPUNCTURE: CPT

## 2024-05-23 PROCEDURE — 85007 BL SMEAR W/DIFF WBC COUNT: CPT

## 2024-05-23 PROCEDURE — 83735 ASSAY OF MAGNESIUM: CPT

## 2024-05-23 PROCEDURE — 80053 COMPREHEN METABOLIC PANEL: CPT

## 2024-05-23 PROCEDURE — 85027 COMPLETE CBC AUTOMATED: CPT

## 2024-05-23 PROCEDURE — 86901 BLOOD TYPING SEROLOGIC RH(D): CPT

## 2024-05-23 RX ORDER — FILGRASTIM-SNDZ 480 UG/.8ML
480 INJECTION, SOLUTION INTRAVENOUS; SUBCUTANEOUS DAILY
Qty: 4 ML | Refills: 0 | Status: ACTIVE | OUTPATIENT
Start: 2024-05-23 | End: 2024-05-28

## 2024-05-23 RX ORDER — HYDROCODONE BITARTRATE AND ACETAMINOPHEN 500; 5 MG/1; MG/1
TABLET ORAL ONCE
Status: CANCELLED | OUTPATIENT
Start: 2024-05-23 | End: 2024-05-23

## 2024-05-24 ENCOUNTER — INFUSION (OUTPATIENT)
Dept: INFUSION THERAPY | Facility: HOSPITAL | Age: 74
End: 2024-05-24
Payer: MEDICARE

## 2024-05-24 VITALS
HEIGHT: 70 IN | SYSTOLIC BLOOD PRESSURE: 134 MMHG | DIASTOLIC BLOOD PRESSURE: 66 MMHG | WEIGHT: 193.19 LBS | RESPIRATION RATE: 20 BRPM | BODY MASS INDEX: 27.66 KG/M2 | HEART RATE: 74 BPM | TEMPERATURE: 98 F | OXYGEN SATURATION: 96 %

## 2024-05-24 DIAGNOSIS — C90.00 MULTIPLE MYELOMA NOT HAVING ACHIEVED REMISSION: ICD-10-CM

## 2024-05-24 LAB
ABO + RH BLD: NORMAL
BLD PROD TYP BPU: NORMAL
BLOOD UNIT EXPIRATION DATE: NORMAL
BLOOD UNIT TYPE CODE: 5100
BLOOD UNIT TYPE CODE: 5100
BLOOD UNIT TYPE CODE: 9500
BLOOD UNIT TYPE CODE: 9500
CROSSMATCH INTERPRETATION: NORMAL
DISPENSE STATUS: NORMAL
UNIT NUMBER: NORMAL

## 2024-05-24 PROCEDURE — 86920 COMPATIBILITY TEST SPIN: CPT

## 2024-05-24 PROCEDURE — P9037 PLATE PHERES LEUKOREDU IRRAD: HCPCS

## 2024-05-24 PROCEDURE — 86644 CMV ANTIBODY: CPT

## 2024-05-24 PROCEDURE — 86923 COMPATIBILITY TEST ELECTRIC: CPT

## 2024-05-24 PROCEDURE — P9040 RBC LEUKOREDUCED IRRADIATED: HCPCS

## 2024-05-24 PROCEDURE — 86922 COMPATIBILITY TEST ANTIGLOB: CPT

## 2024-05-24 PROCEDURE — 36430 TRANSFUSION BLD/BLD COMPNT: CPT

## 2024-05-24 RX ORDER — HYDROCODONE BITARTRATE AND ACETAMINOPHEN 500; 5 MG/1; MG/1
TABLET ORAL ONCE
Status: ACTIVE | OUTPATIENT
Start: 2024-05-24

## 2024-05-24 NOTE — PLAN OF CARE
Patient will receive Blood transfusion in a timely manor. Patient completed infusion with out difficulty

## 2024-05-27 ENCOUNTER — LAB VISIT (OUTPATIENT)
Dept: LAB | Facility: HOSPITAL | Age: 74
End: 2024-05-27
Payer: MEDICARE

## 2024-05-27 DIAGNOSIS — Z94.84 HISTORY OF AUTO STEM CELL TRANSPLANT: ICD-10-CM

## 2024-05-27 DIAGNOSIS — C90.01 MULTIPLE MYELOMA IN REMISSION: ICD-10-CM

## 2024-05-27 LAB
ALBUMIN SERPL-MCNC: 3.5 G/DL (ref 3.4–4.8)
ALBUMIN/GLOB SERPL: 1.5 RATIO (ref 1.1–2)
ALP SERPL-CCNC: 103 UNIT/L (ref 40–150)
ALT SERPL-CCNC: 11 UNIT/L (ref 0–55)
ANION GAP SERPL CALC-SCNC: 8 MEQ/L
AST SERPL-CCNC: 10 UNIT/L (ref 5–34)
BASOPHILS # BLD AUTO: 0 X10(3)/MCL
BASOPHILS NFR BLD AUTO: 0 %
BILIRUB SERPL-MCNC: 1 MG/DL
BUN SERPL-MCNC: 15 MG/DL (ref 8.4–25.7)
CALCIUM SERPL-MCNC: 9.7 MG/DL (ref 8.8–10)
CHLORIDE SERPL-SCNC: 106 MMOL/L (ref 98–107)
CO2 SERPL-SCNC: 25 MMOL/L (ref 23–31)
CREAT SERPL-MCNC: 1.04 MG/DL (ref 0.73–1.18)
CREAT/UREA NIT SERPL: 14
EOSINOPHIL # BLD AUTO: 0.02 X10(3)/MCL (ref 0–0.9)
EOSINOPHIL NFR BLD AUTO: 3.3 %
ERYTHROCYTE [DISTWIDTH] IN BLOOD BY AUTOMATED COUNT: 11.4 % (ref 11.5–17)
GFR SERPLBLD CREATININE-BSD FMLA CKD-EPI: >60 ML/MIN/1.73/M2
GLOBULIN SER-MCNC: 2.4 GM/DL (ref 2.4–3.5)
GLUCOSE SERPL-MCNC: 184 MG/DL (ref 82–115)
HCT VFR BLD AUTO: 21.2 % (ref 42–52)
HGB BLD-MCNC: 7.2 G/DL (ref 14–18)
IMM GRANULOCYTES # BLD AUTO: 0.01 X10(3)/MCL (ref 0–0.04)
IMM GRANULOCYTES NFR BLD AUTO: 1.6 %
LYMPHOCYTES # BLD AUTO: 0.37 X10(3)/MCL (ref 0.6–4.6)
LYMPHOCYTES NFR BLD AUTO: 60.7 %
MAGNESIUM SERPL-MCNC: 1.7 MG/DL (ref 1.6–2.6)
MCH RBC QN AUTO: 30.6 PG (ref 27–31)
MCHC RBC AUTO-ENTMCNC: 34 G/DL (ref 33–36)
MCV RBC AUTO: 90.2 FL (ref 80–94)
MONOCYTES # BLD AUTO: 0.1 X10(3)/MCL (ref 0.1–1.3)
MONOCYTES NFR BLD AUTO: 16.4 %
NEUTROPHILS # BLD AUTO: 0.11 X10(3)/MCL (ref 2.1–9.2)
NEUTROPHILS NFR BLD AUTO: 18 %
PLATELET # BLD AUTO: 17 X10(3)/MCL (ref 130–400)
PMV BLD AUTO: 9.6 FL (ref 7.4–10.4)
POTASSIUM SERPL-SCNC: 4 MMOL/L (ref 3.5–5.1)
PROT SERPL-MCNC: 5.9 GM/DL (ref 5.8–7.6)
RBC # BLD AUTO: 2.35 X10(6)/MCL (ref 4.7–6.1)
SODIUM SERPL-SCNC: 139 MMOL/L (ref 136–145)
WBC # SPEC AUTO: 0.61 X10(3)/MCL (ref 4.5–11.5)

## 2024-05-27 PROCEDURE — 36415 COLL VENOUS BLD VENIPUNCTURE: CPT

## 2024-05-27 PROCEDURE — 85025 COMPLETE CBC W/AUTO DIFF WBC: CPT

## 2024-05-27 PROCEDURE — 86850 RBC ANTIBODY SCREEN: CPT

## 2024-05-27 PROCEDURE — 83735 ASSAY OF MAGNESIUM: CPT

## 2024-05-27 PROCEDURE — 80053 COMPREHEN METABOLIC PANEL: CPT

## 2024-05-28 ENCOUNTER — DOCUMENTATION ONLY (OUTPATIENT)
Dept: HEMATOLOGY/ONCOLOGY | Facility: CLINIC | Age: 74
End: 2024-05-28
Payer: MEDICARE

## 2024-05-28 ENCOUNTER — LAB REQUISITION (OUTPATIENT)
Dept: LAB | Facility: HOSPITAL | Age: 74
End: 2024-05-28
Payer: MEDICARE

## 2024-05-28 DIAGNOSIS — C90.01 MULTIPLE MYELOMA IN REMISSION: ICD-10-CM

## 2024-05-28 DIAGNOSIS — Z45.2 ENCOUNTER FOR ADJUSTMENT AND MANAGEMENT OF VASCULAR ACCESS DEVICE: ICD-10-CM

## 2024-05-28 DIAGNOSIS — Z94.84 HISTORY OF AUTO STEM CELL TRANSPLANT: Primary | ICD-10-CM

## 2024-05-28 LAB
ALBUMIN SERPL-MCNC: 3.6 G/DL (ref 3.4–4.8)
ALBUMIN/GLOB SERPL: 1.8 RATIO (ref 1.1–2)
ALP SERPL-CCNC: 102 UNIT/L (ref 40–150)
ALT SERPL-CCNC: 13 UNIT/L (ref 0–55)
ANION GAP SERPL CALC-SCNC: 8 MEQ/L
AST SERPL-CCNC: 15 UNIT/L (ref 5–34)
BILIRUB SERPL-MCNC: 1 MG/DL
BUN SERPL-MCNC: 18 MG/DL (ref 8.4–25.7)
CALCIUM SERPL-MCNC: 9.3 MG/DL (ref 8.8–10)
CHLORIDE SERPL-SCNC: 104 MMOL/L (ref 98–107)
CO2 SERPL-SCNC: 25 MMOL/L (ref 23–31)
CREAT SERPL-MCNC: 1.06 MG/DL (ref 0.73–1.18)
CREAT/UREA NIT SERPL: 17
CRP SERPL-MCNC: 13.8 MG/L
ERYTHROCYTE [DISTWIDTH] IN BLOOD BY AUTOMATED COUNT: 11.6 % (ref 11.5–17)
GFR SERPLBLD CREATININE-BSD FMLA CKD-EPI: >60 ML/MIN/1.73/M2
GLOBULIN SER-MCNC: 2 GM/DL (ref 2.4–3.5)
GLUCOSE SERPL-MCNC: 102 MG/DL (ref 82–115)
GROUP & RH: NORMAL
HCT VFR BLD AUTO: 18.2 % (ref 42–52)
HGB BLD-MCNC: 6.4 G/DL (ref 14–18)
INDIRECT COOMBS: NORMAL
MAGNESIUM SERPL-MCNC: 1.8 MG/DL (ref 1.6–2.6)
MCH RBC QN AUTO: 31.2 PG (ref 27–31)
MCHC RBC AUTO-ENTMCNC: 35.2 G/DL (ref 33–36)
MCV RBC AUTO: 88.8 FL (ref 80–94)
PLATELET # BLD AUTO: 20 X10(3)/MCL (ref 130–400)
PMV BLD AUTO: 13.2 FL (ref 7.4–10.4)
POTASSIUM SERPL-SCNC: 4.4 MMOL/L (ref 3.5–5.1)
PROT SERPL-MCNC: 5.6 GM/DL (ref 5.8–7.6)
RBC # BLD AUTO: 2.05 X10(6)/MCL (ref 4.7–6.1)
SODIUM SERPL-SCNC: 137 MMOL/L (ref 136–145)
SPECIMEN OUTDATE: NORMAL
WBC # SPEC AUTO: 1 X10(3)/MCL (ref 4.5–11.5)

## 2024-05-28 PROCEDURE — 86140 C-REACTIVE PROTEIN: CPT | Performed by: INTERNAL MEDICINE

## 2024-05-28 PROCEDURE — 83735 ASSAY OF MAGNESIUM: CPT | Performed by: INTERNAL MEDICINE

## 2024-05-28 PROCEDURE — 85027 COMPLETE CBC AUTOMATED: CPT | Performed by: INTERNAL MEDICINE

## 2024-05-28 PROCEDURE — 80053 COMPREHEN METABOLIC PANEL: CPT | Performed by: INTERNAL MEDICINE

## 2024-05-28 RX ORDER — HYDROCODONE BITARTRATE AND ACETAMINOPHEN 500; 5 MG/1; MG/1
TABLET ORAL ONCE
Status: CANCELLED | OUTPATIENT
Start: 2024-05-28 | End: 2024-05-28

## 2024-05-29 ENCOUNTER — INFUSION (OUTPATIENT)
Dept: INFUSION THERAPY | Facility: HOSPITAL | Age: 74
End: 2024-05-29
Payer: MEDICARE

## 2024-05-29 VITALS
RESPIRATION RATE: 20 BRPM | HEART RATE: 72 BPM | WEIGHT: 194 LBS | OXYGEN SATURATION: 96 % | SYSTOLIC BLOOD PRESSURE: 144 MMHG | DIASTOLIC BLOOD PRESSURE: 88 MMHG | HEIGHT: 71 IN | BODY MASS INDEX: 27.16 KG/M2 | TEMPERATURE: 98 F

## 2024-05-29 DIAGNOSIS — Z94.84 HISTORY OF AUTO STEM CELL TRANSPLANT: ICD-10-CM

## 2024-05-29 DIAGNOSIS — C90.01 MULTIPLE MYELOMA IN REMISSION: ICD-10-CM

## 2024-05-29 LAB
ABO + RH BLD: NORMAL
ABO + RH BLD: NORMAL
BLD PROD TYP BPU: NORMAL
BLD PROD TYP BPU: NORMAL
BLOOD UNIT EXPIRATION DATE: NORMAL
BLOOD UNIT EXPIRATION DATE: NORMAL
BLOOD UNIT TYPE CODE: 5100
BLOOD UNIT TYPE CODE: 5100
CROSSMATCH INTERPRETATION: NORMAL
CROSSMATCH INTERPRETATION: NORMAL
DISPENSE STATUS: NORMAL
DISPENSE STATUS: NORMAL
UNIT NUMBER: NORMAL
UNIT NUMBER: NORMAL

## 2024-05-29 PROCEDURE — 86920 COMPATIBILITY TEST SPIN: CPT

## 2024-05-29 PROCEDURE — 36430 TRANSFUSION BLD/BLD COMPNT: CPT

## 2024-05-29 PROCEDURE — P9040 RBC LEUKOREDUCED IRRADIATED: HCPCS

## 2024-05-29 PROCEDURE — 25000003 PHARM REV CODE 250

## 2024-05-29 RX ORDER — HYDROCODONE BITARTRATE AND ACETAMINOPHEN 500; 5 MG/1; MG/1
TABLET ORAL ONCE
Status: COMPLETED | OUTPATIENT
Start: 2024-05-29 | End: 2024-05-29

## 2024-05-29 RX ADMIN — SODIUM CHLORIDE: 9 INJECTION, SOLUTION INTRAVENOUS at 08:05

## 2024-05-30 ENCOUNTER — DOCUMENTATION ONLY (OUTPATIENT)
Dept: HEMATOLOGY/ONCOLOGY | Facility: CLINIC | Age: 74
End: 2024-05-30
Payer: MEDICARE

## 2024-05-30 ENCOUNTER — LAB VISIT (OUTPATIENT)
Dept: LAB | Facility: HOSPITAL | Age: 74
End: 2024-05-30
Payer: MEDICARE

## 2024-05-30 DIAGNOSIS — D70.9 NEUTROPENIA, UNSPECIFIED TYPE: ICD-10-CM

## 2024-05-30 DIAGNOSIS — Z94.84 HISTORY OF AUTO STEM CELL TRANSPLANT: Primary | ICD-10-CM

## 2024-05-30 DIAGNOSIS — C90.01 MULTIPLE MYELOMA IN REMISSION: ICD-10-CM

## 2024-05-30 LAB
ALBUMIN SERPL-MCNC: 3.6 G/DL (ref 3.4–4.8)
ALBUMIN/GLOB SERPL: 1.5 RATIO (ref 1.1–2)
ALP SERPL-CCNC: 112 UNIT/L (ref 40–150)
ALT SERPL-CCNC: 13 UNIT/L (ref 0–55)
ANION GAP SERPL CALC-SCNC: 8 MEQ/L
AST SERPL-CCNC: 15 UNIT/L (ref 5–34)
BASOPHILS # BLD AUTO: 0.01 X10(3)/MCL
BASOPHILS NFR BLD AUTO: 0.4 %
BILIRUB SERPL-MCNC: 1.1 MG/DL
BUN SERPL-MCNC: 31 MG/DL (ref 8.4–25.7)
CALCIUM SERPL-MCNC: 10.2 MG/DL (ref 8.8–10)
CHLORIDE SERPL-SCNC: 104 MMOL/L (ref 98–107)
CO2 SERPL-SCNC: 24 MMOL/L (ref 23–31)
CREAT SERPL-MCNC: 1.29 MG/DL (ref 0.73–1.18)
CREAT/UREA NIT SERPL: 24
EOSINOPHIL # BLD AUTO: 0.02 X10(3)/MCL (ref 0–0.9)
EOSINOPHIL NFR BLD AUTO: 0.8 %
ERYTHROCYTE [DISTWIDTH] IN BLOOD BY AUTOMATED COUNT: 13.5 % (ref 11.5–17)
GFR SERPLBLD CREATININE-BSD FMLA CKD-EPI: 58 ML/MIN/1.73/M2
GLOBULIN SER-MCNC: 2.4 GM/DL (ref 2.4–3.5)
GLUCOSE SERPL-MCNC: 132 MG/DL (ref 82–115)
HCT VFR BLD AUTO: 26 % (ref 42–52)
HGB BLD-MCNC: 9.1 G/DL (ref 14–18)
IMM GRANULOCYTES # BLD AUTO: 0.01 X10(3)/MCL (ref 0–0.04)
IMM GRANULOCYTES NFR BLD AUTO: 0.4 %
LYMPHOCYTES # BLD AUTO: 0.86 X10(3)/MCL (ref 0.6–4.6)
LYMPHOCYTES NFR BLD AUTO: 32.6 %
MAGNESIUM SERPL-MCNC: 1.8 MG/DL (ref 1.6–2.6)
MCH RBC QN AUTO: 29.8 PG (ref 27–31)
MCHC RBC AUTO-ENTMCNC: 35 G/DL (ref 33–36)
MCV RBC AUTO: 85.2 FL (ref 80–94)
MONOCYTES # BLD AUTO: 0.9 X10(3)/MCL (ref 0.1–1.3)
MONOCYTES NFR BLD AUTO: 34.1 %
NEUTROPHILS # BLD AUTO: 0.84 X10(3)/MCL (ref 2.1–9.2)
NEUTROPHILS NFR BLD AUTO: 31.7 %
NRBC BLD AUTO-RTO: 1.1 %
PLATELET # BLD AUTO: 10 X10(3)/MCL (ref 130–400)
PMV BLD AUTO: 13 FL (ref 7.4–10.4)
POTASSIUM SERPL-SCNC: 4.1 MMOL/L (ref 3.5–5.1)
PROT SERPL-MCNC: 6 GM/DL (ref 5.8–7.6)
RBC # BLD AUTO: 3.05 X10(6)/MCL (ref 4.7–6.1)
SODIUM SERPL-SCNC: 136 MMOL/L (ref 136–145)
WBC # SPEC AUTO: 2.64 X10(3)/MCL (ref 4.5–11.5)

## 2024-05-30 PROCEDURE — 83735 ASSAY OF MAGNESIUM: CPT

## 2024-05-30 PROCEDURE — 80053 COMPREHEN METABOLIC PANEL: CPT

## 2024-05-30 PROCEDURE — 36415 COLL VENOUS BLD VENIPUNCTURE: CPT

## 2024-05-30 PROCEDURE — 85025 COMPLETE CBC W/AUTO DIFF WBC: CPT

## 2024-05-30 RX ORDER — HYDROCODONE BITARTRATE AND ACETAMINOPHEN 500; 5 MG/1; MG/1
TABLET ORAL ONCE
Status: CANCELLED | OUTPATIENT
Start: 2024-05-30 | End: 2024-05-30

## 2024-05-30 NOTE — PROGRESS NOTES
Central Line Type: Port  Central Line Site: Chest  Port cleansed with ChloraPrep per protocol.  Accessed via: 20 gauge Goode needle  Patency Verification: Blood return greater or equal to 3 ml before, during and after treatment  Port flushed with: 20 ml 0.9 normal saline and 100 un/ml- 500 units heparin  Goode needle removed: Yes  Deaccess/site appearance: Clear (no redness, tenderness, or edema), dressing applied if required  Discharged: Stable and Follow up appointment scheduled  Future Appointments   Date Time Provider Department Center   2/21/2019  8:45 AM [address]   2/21/2019  9:00 AM Shahana Escalera MD [address]       Dr. Shahana Escalera is supervising clinician today.   Received call from lab with PLT 10. Reported to Marjan Eli, NP

## 2024-05-31 ENCOUNTER — INFUSION (OUTPATIENT)
Dept: INFUSION THERAPY | Facility: HOSPITAL | Age: 74
End: 2024-05-31
Payer: MEDICARE

## 2024-05-31 VITALS
OXYGEN SATURATION: 96 % | RESPIRATION RATE: 20 BRPM | HEART RATE: 77 BPM | TEMPERATURE: 98 F | WEIGHT: 193 LBS | BODY MASS INDEX: 27.02 KG/M2 | HEIGHT: 71 IN | SYSTOLIC BLOOD PRESSURE: 124 MMHG | DIASTOLIC BLOOD PRESSURE: 72 MMHG

## 2024-05-31 DIAGNOSIS — C90.01 MULTIPLE MYELOMA IN REMISSION: ICD-10-CM

## 2024-05-31 DIAGNOSIS — Z94.84 HISTORY OF AUTO STEM CELL TRANSPLANT: ICD-10-CM

## 2024-05-31 DIAGNOSIS — D70.9 NEUTROPENIA, UNSPECIFIED TYPE: ICD-10-CM

## 2024-05-31 LAB
ABO + RH BLD: NORMAL
BLD PROD TYP BPU: NORMAL
BLOOD UNIT EXPIRATION DATE: NORMAL
BLOOD UNIT TYPE CODE: 6200
CROSSMATCH INTERPRETATION: NORMAL
DISPENSE STATUS: NORMAL
UNIT NUMBER: NORMAL

## 2024-05-31 PROCEDURE — 25000003 PHARM REV CODE 250

## 2024-05-31 PROCEDURE — 36430 TRANSFUSION BLD/BLD COMPNT: CPT

## 2024-05-31 PROCEDURE — P9037 PLATE PHERES LEUKOREDU IRRAD: HCPCS

## 2024-05-31 RX ORDER — HYDROCODONE BITARTRATE AND ACETAMINOPHEN 500; 5 MG/1; MG/1
TABLET ORAL ONCE
Status: COMPLETED | OUTPATIENT
Start: 2024-05-31 | End: 2024-05-31

## 2024-05-31 RX ADMIN — SODIUM CHLORIDE: 9 INJECTION, SOLUTION INTRAVENOUS at 09:05

## 2024-05-31 NOTE — PLAN OF CARE
Pt will have no s/s of blood product  transfusion reaction. Blood product checked at bedside with second RN, v/s taken at start and 15 min and end

## 2024-06-03 ENCOUNTER — DOCUMENTATION ONLY (OUTPATIENT)
Dept: HEMATOLOGY/ONCOLOGY | Facility: CLINIC | Age: 74
End: 2024-06-03
Payer: MEDICARE

## 2024-06-03 ENCOUNTER — LAB VISIT (OUTPATIENT)
Dept: LAB | Facility: HOSPITAL | Age: 74
End: 2024-06-03
Payer: MEDICARE

## 2024-06-03 DIAGNOSIS — C90.01 MULTIPLE MYELOMA IN REMISSION: ICD-10-CM

## 2024-06-03 LAB
ALBUMIN SERPL-MCNC: 3.6 G/DL (ref 3.4–4.8)
ALBUMIN/GLOB SERPL: 1.4 RATIO (ref 1.1–2)
ALP SERPL-CCNC: 108 UNIT/L (ref 40–150)
ALT SERPL-CCNC: 19 UNIT/L (ref 0–55)
ANION GAP SERPL CALC-SCNC: 8 MEQ/L
AST SERPL-CCNC: 23 UNIT/L (ref 5–34)
BASOPHILS # BLD AUTO: 0.01 X10(3)/MCL
BASOPHILS NFR BLD AUTO: 0.3 %
BILIRUB SERPL-MCNC: 0.9 MG/DL
BUN SERPL-MCNC: 30 MG/DL (ref 8.4–25.7)
CALCIUM SERPL-MCNC: 9.9 MG/DL (ref 8.8–10)
CHLORIDE SERPL-SCNC: 105 MMOL/L (ref 98–107)
CO2 SERPL-SCNC: 25 MMOL/L (ref 23–31)
CREAT SERPL-MCNC: 1.03 MG/DL (ref 0.73–1.18)
CREAT/UREA NIT SERPL: 29
EOSINOPHIL # BLD AUTO: 0.01 X10(3)/MCL (ref 0–0.9)
EOSINOPHIL NFR BLD AUTO: 0.3 %
ERYTHROCYTE [DISTWIDTH] IN BLOOD BY AUTOMATED COUNT: 13.1 % (ref 11.5–17)
GFR SERPLBLD CREATININE-BSD FMLA CKD-EPI: >60 ML/MIN/1.73/M2
GLOBULIN SER-MCNC: 2.5 GM/DL (ref 2.4–3.5)
GLUCOSE SERPL-MCNC: 128 MG/DL (ref 82–115)
HCT VFR BLD AUTO: 21.8 % (ref 42–52)
HGB BLD-MCNC: 7.3 G/DL (ref 14–18)
IMM GRANULOCYTES # BLD AUTO: 0.1 X10(3)/MCL (ref 0–0.04)
IMM GRANULOCYTES NFR BLD AUTO: 3.5 %
LYMPHOCYTES # BLD AUTO: 1.04 X10(3)/MCL (ref 0.6–4.6)
LYMPHOCYTES NFR BLD AUTO: 36 %
MAGNESIUM SERPL-MCNC: 1.8 MG/DL (ref 1.6–2.6)
MCH RBC QN AUTO: 29.8 PG (ref 27–31)
MCHC RBC AUTO-ENTMCNC: 33.5 G/DL (ref 33–36)
MCV RBC AUTO: 89 FL (ref 80–94)
MONOCYTES # BLD AUTO: 1.14 X10(3)/MCL (ref 0.1–1.3)
MONOCYTES NFR BLD AUTO: 39.4 %
NEUTROPHILS # BLD AUTO: 0.59 X10(3)/MCL (ref 2.1–9.2)
NEUTROPHILS NFR BLD AUTO: 20.5 %
PLATELET # BLD AUTO: 19 X10(3)/MCL (ref 130–400)
PMV BLD AUTO: 11.4 FL (ref 7.4–10.4)
POTASSIUM SERPL-SCNC: 3.9 MMOL/L (ref 3.5–5.1)
PROT SERPL-MCNC: 6.1 GM/DL (ref 5.8–7.6)
RBC # BLD AUTO: 2.45 X10(6)/MCL (ref 4.7–6.1)
SODIUM SERPL-SCNC: 138 MMOL/L (ref 136–145)
WBC # SPEC AUTO: 2.89 X10(3)/MCL (ref 4.5–11.5)

## 2024-06-03 PROCEDURE — 36415 COLL VENOUS BLD VENIPUNCTURE: CPT

## 2024-06-03 PROCEDURE — 85025 COMPLETE CBC W/AUTO DIFF WBC: CPT

## 2024-06-03 PROCEDURE — 83735 ASSAY OF MAGNESIUM: CPT

## 2024-06-03 PROCEDURE — 80053 COMPREHEN METABOLIC PANEL: CPT

## 2024-06-03 NOTE — PROGRESS NOTES
HEMATOLOGY/ONCOLOGY OFFICE CLINIC VISIT     Visit Information:     Initial Evaluation: 6/13/2023 (hospital consult)   Referring Provider: Dr Gonzalez  Other providers:  Code status: Not addressed     Diagnosis:  Multiple Myeloma    Present treatment:   supportive    Treatment History:  ---Daratumumab, lenalidomide, and dexamethasone x 4 cycles  initiated 7/12/23  ---Lenalidomide and dexamethasone stopped on 3/6/24  ---Stem Cell Transplant 4/16/24  ---Carmen 140: 5 bags 4/18/24 and 4 bags 4/19/24        Imaging:  CT C 6/7/2023: 1. There is a small left sided pleural effusion.2. A small patchy area of ground glass density is seen in the lateral basal segment of the left lower lobe. This could reflect an acute focal infiltrate / pneumonitis. 3. There is diffuse osteopenia along the visualised bony structures together with multiple, numerous, small round-ovoid lucent lesion of varying sizes involving the marrow. These changes could reflect metabolic bone disease like hyperparathyroidism. Correlate clinically and with laboratory findings, as regards additional evaluation and follow-up.  6/8/23 Bone Scan Whole Body:  1. Scattered activity at the anterior left right rib margins as described.  A CT exam on the previous day reveals no definite fracture.  There is mild heterogeneous and bony loss at the anterior left and right ribs.  This is not have the typical pattern of a metastases.  2. Focal increased activity at the anterior manubrium which again on the CT exam demonstrates osteopenia and heterogeneous bony loss as well as scattered subcentimeter small lytic defects. 3. Suspect mild arthritic changes at the shoulders sternoclavicular joints  6/9/23 MRI Thoracic Spine: Within limitations, no convincing evidence of acute thoracic spine abnormality, high-grade canal stenosis, or focal cord pathology. Multiple scattered vertebral body lesions are suspected and could represent metastatic disease, otherwise of incomplete  characterization by non-contrast protocol. Incidental findings of the partially visualized thoracic cavity and retroperitoneum discussed above, poorly assessed by modality/protocol.  Recent non-contrast chest CT provides overall better visualization.  6/10/23 CT Head: No acute intracranial abnormality.  Findings consistent with microangiopathy no interval change.  6/12/23 XRAY Chest: 1. Patchy hazy opacities again evident at the lower lungs bilaterally suspicious for infiltrate and atelectasis.  Small bibasilar pleural reactions cannot be excluded. 2. Borderline cardiomegaly 3. Atherosclerosis  6/16/23 XRAY Chest: Improved aeration of the lung bases with mild residual bibasilar opacities and small pleural effusions suspected.      Pathology:  6/12/23 Bone marrow aspiration/Biopsy:   PLASMA CELL MYELOMA, KAPPA RESTRICTED.     PLASMA CELL MYELOMA INVOLVES 90% OF BONE MARROW CELLULARITY WITH SMALL AREAS OF  SEQUENTIAL TRILINEAGE HEMATOPOIESIS.    ABSENT IRON STORES.     PERIPHERAL BLOOD: NORMOCYTIC NORMOCHROMIC ANEMIA. THROMBOCYTOPENIA.   3/7/24 bone marrow biopsy:  BONE MARROW ASPIRATE, TOUCH PREP, CLOT, AND DECALCIFIED NEEDLE CORE BIOPSY:  LEFT POSTEROSUPERIOR ILIAC CREST  - NO DEFINITIVE RESIDUAL KAPPA LIGHT CHAIN RESTRICTED MULTIPLE MYELOMA  - Mildly hypercellular marrow (35-45% total cellularity) with non-increased scattered plasma cells and trilineage hematopoiesis with focally increased erythroid precursors (see comments)      CLINICAL HISTORY:       Subjective  Patient: Chip Goodson is a 72 y.o. male That was seen  for the first time as hospital consult on 6/13/2023.   Patient was brought to the emergency department for confusion.  Upon evaluation in the ER CT scan of the head with no acute intracranial abnormality.  Finding consistent with microangiopathic no interval change.  CT of the chest with no contrast showed diffuse osteopenia with multiple, numerous, small round avid lucent lesions ovarian size  involving the marrow.  Changes could reflect metabolic bone disease like hyperparathyroidism.  MRI of the thoracic spine with multiple scattered vertebral body lesion are suspect and could represent metastatic disease.  Bone scan with scattered activity in the anterior left right rib margins no fractures focal increased activity at the anterior manubrium scattered subcentimeter small lytic defects.  Ultrasound of the thyroid that shows multiple nodules. skeletal survey  area on humerus only.     Labs with elevated calcium of 12.6, corrected calcium 14.36.  Total protein was 11.8 with a total globulin 10.0.  Further workup revealed an IgA over 7040.0, IgM 6.0, IgG 165.00.  Free serum kappa light chain 5.09, free serum lambda light chains 0.5600 with a kappa/lambda ratio of 9.09.  M spike 3.33.  PSA 0.96     Bone marrow biopsy performed 6/12/23. Patient receive pamidronate 60 mg on 06/09/2023 and was started on hydration.  His calcium improved as well as his mental status.    Interval History:  Patient returns to clinic for a follow up following stem cell transplant. He received SCT on 4/16/24. During his hospitalization in [place] following SCT, he developed bacteremia. He is currently taking acyclovir twice daily prophylactic. He will be on ceftriaxone until 6/14/24. He was discharged from [facility] on 5/20/24. Patient was to have sooner appointment following discharge of [facility] but patient stated that his wife and himself had other appointments to go to and agreed to today's appointment date. He does have [facility]. He does have a PICC line in left arm, dressing changes done per home health. Labs are performed twice weekly. He has received many blood and platelet transfusions since leaving [place]. He has a follow up with [place] on 6/14/24. He does report some SOB on excursion. He denies blood in stools or urine.      Review of Systems   Constitutional:  Negative for chills,  diaphoresis and fever.   HENT:  Negative for congestion, nosebleeds and sore throat.    Eyes: Negative.    Respiratory:  Negative for cough and shortness of breath.    Cardiovascular:  Negative for chest pain and palpitations.   Gastrointestinal:  Negative for abdominal pain, blood in stool, constipation, diarrhea, nausea and vomiting.   Genitourinary:  Negative for dysuria, frequency, hematuria and urgency.   Musculoskeletal:  Negative for back pain and myalgias.   Skin:  Negative for rash.   Neurological:  Negative for dizziness, tremors, weakness and headaches.   Endo/Heme/Allergies:  Does not bruise/bleed easily.   Psychiatric/Behavioral:  Negative for hallucinations and suicidal ideas. The patient is not nervous/anxious.        Review of patient's allergies indicates:   Allergen Reactions    Iodine Anaphylaxis    Shellfish containing products     Poison ivy extract     Amoxicillin-pot clavulanate Itching      Current Outpatient Medications on File Prior to Visit   Medication Sig Dispense Refill    acyclovir (ZOVIRAX) 800 MG Tab Take 1 tablet (800 mg total) by mouth 2 (two) times daily. 60 tablet 11    albuterol (PROVENTIL/VENTOLIN HFA) 90 mcg/actuation inhaler 2 puffs every 4 to 6 hours as needed.      amLODIPine (NORVASC) 10 MG tablet Take 1 tablet (10 mg total) by mouth every morning.      ascorbic acid, vitamin C, (VITAMIN C) 500 MG tablet Take 500 mg by mouth once daily.      atorvastatin (LIPITOR) 10 MG tablet Take 10 mg by mouth every evening.      carvediloL (COREG) 6.25 MG tablet Take 1 tablet (6.25 mg total) by mouth 2 (two) times daily. 180 tablet 3    cholecalciferol, vitamin D3, (VITAMIN D3) 125 mcg (5,000 unit) Tab Take 5,000 Units by mouth once daily.      dapsone 100 MG Tab Take 1 tablet (100 mg total) by mouth once daily. 30 tablet 5    dextrose 5 % in water (D5W) PgBk 100 mL with cefTRIAXone 2 gram SolR 2 g Inject 2 g into the vein once daily.      ferrous sulfate 325 (65 FE) MG EC tablet Take  "325 mg by mouth once daily.      finasteride (PROSCAR) 5 mg tablet Take 5 mg by mouth every morning.      hydrALAZINE (APRESOLINE) 50 MG tablet Take 1 tablet (50 mg total) by mouth every 8 (eight) hours. 90 tablet 11    levothyroxine (SYNTHROID) 112 MCG tablet Take 112 mcg by mouth every evening.      metFORMIN (GLUCOPHAGE-XR) 500 MG ER 24hr tablet Take 1,000 mg by mouth 2 (two) times daily.      nitroGLYCERIN (NITROSTAT) 0.4 MG SL tablet place one tablet under the tongue every five minutes as needed for chest pain up to 3 doses. if no relief call 911      ondansetron (ZOFRAN) 8 MG tablet Take 1 tablet (8 mg total) by mouth every 8 (eight) hours as needed for Nausea. 30 tablet 2    ONETOUCH ULTRA TEST Strp USE TO TEST BLOOD GLUCOSE TWICE DAILY      oxyCODONE (ROXICODONE) 5 MG immediate release tablet Take 1 tablet (5 mg total) by mouth every 4 (four) hours as needed for Pain. 8 tablet 0    pantoprazole (PROTONIX) 40 MG tablet Take 1 tablet (40 mg total) by mouth once daily. 30 tablet 11    ranolazine (RANEXA) 500 MG Tb12 Take 500 mg by mouth 2 (two) times daily.      spironolactone (ALDACTONE) 25 MG tablet Take 12.5 mg by mouth every morning.       Current Facility-Administered Medications on File Prior to Visit   Medication Dose Route Frequency Provider Last Rate Last Admin    0.9%  NaCl infusion (for blood administration)   Intravenous Once Marjan Eli, FNP        0.9%  NaCl infusion (for blood administration)   Intravenous Once Marjan Eli, FNP        0.9%  NaCl infusion (for blood administration)   Intravenous Once Marjan Eli, FNP              Vitals:    06/04/24 1150   BP: 135/75   Pulse: 82   Resp: 20   Temp: 97.4 °F (36.3 °C)   SpO2: 97%   Weight: 87.8 kg (193 lb 8 oz)   Height: 5' 10" (1.778 m)     Wt Readings from Last 6 Encounters:   06/04/24 87.8 kg (193 lb 8 oz)   05/31/24 87.5 kg (193 lb)   05/29/24 88 kg (194 lb)   05/24/24 87.6 kg (193 lb 3.2 oz)   05/17/24 85.9 kg (189 lb 4.2 oz) "   05/11/24 86.6 kg (190 lb 14.7 oz)     Body mass index is 27.76 kg/m².  Body surface area is 2.08 meters squared.    Physical Exam  Constitutional:       General: He is awake.      Appearance: Normal appearance. He is well-developed.   HENT:      Head: Normocephalic and atraumatic.   Eyes:      General: No scleral icterus.     Extraocular Movements: Extraocular movements intact.      Conjunctiva/sclera: Conjunctivae normal.   Neck:      Vascular: No JVD.   Cardiovascular:      Rate and Rhythm: Normal rate and regular rhythm.      Heart sounds: No murmur heard.  Pulmonary:      Effort: Pulmonary effort is normal.      Breath sounds: Normal breath sounds.   Abdominal:      General: There is no distension.      Palpations: Abdomen is soft.      Tenderness: There is no abdominal tenderness.   Musculoskeletal:      Cervical back: Neck supple.   Lymphadenopathy:      Head:      Right side of head: No submental or submandibular adenopathy.      Left side of head: No submental or submandibular adenopathy.      Cervical: No cervical adenopathy.      Upper Body:      Right upper body: No supraclavicular or axillary adenopathy.      Left upper body: No supraclavicular or axillary adenopathy.      Lower Body: No right inguinal adenopathy. No left inguinal adenopathy.   Skin:     General: Skin is warm.      Findings: No rash.      Nails: There is no clubbing.   Neurological:      General: No focal deficit present.      Mental Status: He is alert.      Cranial Nerves: Cranial nerves 2-12 are intact.   Psychiatric:         Attention and Perception: Attention normal.         Mood and Affect: Mood normal.         Behavior: Behavior is cooperative.         Cognition and Memory: Cognition normal.         Judgment: Judgment normal.       ECOG SCORE    1 - Restricted in strenuous activity-ambulatory and able to carry out work of a light nature          Laboratory:  CBC with Differential:  Lab Results   Component Value Date    WBC 2.41  (L) 06/04/2024    RBC 2.16 (L) 06/04/2024    HGB 6.5 (L) 06/04/2024    HCT 18.6 (LL) 06/04/2024    MCV 86.1 06/04/2024    MCH 30.1 06/04/2024    MCHC 34.9 06/04/2024    RDW 13.1 06/04/2024    PLT 15 (LL) 06/04/2024    MPV 12.7 (H) 06/04/2024    GRAN 0.3 (L) 05/20/2024    GRAN 37.1 (L) 05/20/2024    LYMPH 0.4 (L) 05/20/2024    LYMPH 52.9 (H) 05/20/2024    MONO 0.1 (L) 05/20/2024    MONO 8.6 05/20/2024    EOS 0.0 05/20/2024    BASO 0.00 05/20/2024    EOSINOPHIL 0.0 05/20/2024    BASOPHIL 0.0 05/20/2024   INITIAL MYELOMA LABS:  Serum:  SPEP: Serum protein electrophoresis shows a distinct monoclonal peak (3.33 g/dL) in the beta zone, consistent with a monoclonal gammopathy.   PAO: A band is present in the IgA trini with a corresponding band in the kappa trini.   The immunofixation electrophoresis are consistent with monoclonal gammopathy of IgA-kappa isotype.     IgG Level 540.00 - 1,822.00 mg/dL 165.00 Low     IgA Level 101.0 - 645.0 mg/dL >7,040.0 High     IgM Level 22.0 - 240.0 mg/dL 6.0 Low       Kappa Free Light Chain 0.3300 - 1.94 mg/dL 5.09 High     Lambda Free Light Chain 0.5700 - 2.63 mg/dL 0.5600 Low     Kappa/Lambda FLC Ratio 0.2600 - 1.65 9.09 High      Urine:   24 hrs Urine:  M spike    1467      H    mg/24 h      LABS:  6/8/23 M-spike 3.33, IgA >7040, serum kappa 5/ lambda 0.56/ K:L ratio 9.09, 24 hour urine for M protein done but not resulted. Calcium 13.4/Alb 1.9, Cr 2.3, globulin 10.7  7/12/23 M-spike 2.49. IgA >7040, Corrected calcium 12.3, Cr 2.09, Globulin 7.6  8/15/23 M-spike 1.49, IgA 4000  9/18/23 M-spike 1.0, IgA 1700  1/19/24 M-spike 0 (smal protein in beta fraction), IgA 335, KFLC 1.61/LFLC 0.92/ ratio 1.75  3/18/24 M-spike 0.11, IgG 516, KFLC 1.43/LFLC 0.86/ ratio 1.66      CMP:  Sodium   Date Value Ref Range Status   06/04/2024 137 136 - 145 mmol/L Final   05/20/2024 137 136 - 145 mmol/L Final     Potassium   Date Value Ref Range Status   06/04/2024 4.0 3.5 - 5.1 mmol/L Final   05/20/2024 4.7  3.5 - 5.1 mmol/L Final     Chloride   Date Value Ref Range Status   06/04/2024 104 98 - 107 mmol/L Final   05/20/2024 105 95 - 110 mmol/L Final     CO2   Date Value Ref Range Status   06/04/2024 24 23 - 31 mmol/L Final   05/20/2024 24 23 - 29 mmol/L Final     Glucose   Date Value Ref Range Status   05/20/2024 158 (H) 70 - 110 mg/dL Final     BUN   Date Value Ref Range Status   05/20/2024 24 (H) 8 - 23 mg/dL Final     Blood Urea Nitrogen   Date Value Ref Range Status   06/04/2024 32.0 (H) 8.4 - 25.7 mg/dL Final     Creatinine   Date Value Ref Range Status   06/04/2024 1.15 0.73 - 1.18 mg/dL Final   05/20/2024 1.2 0.5 - 1.4 mg/dL Final     Calcium   Date Value Ref Range Status   06/04/2024 9.4 8.8 - 10.0 mg/dL Final   05/20/2024 9.7 8.7 - 10.5 mg/dL Final     Total Protein   Date Value Ref Range Status   05/20/2024 5.9 (L) 6.0 - 8.4 g/dL Final     Albumin   Date Value Ref Range Status   06/04/2024 3.5 3.4 - 4.8 g/dL Final   05/20/2024 3.5 3.5 - 5.2 g/dL Final     Total Bilirubin   Date Value Ref Range Status   05/20/2024 1.2 (H) 0.1 - 1.0 mg/dL Final     Comment:     For infants and newborns, interpretation of results should be based  on gestational age, weight and in agreement with clinical  observations.    Premature Infant recommended reference ranges:  Up to 24 hours.............<8.0 mg/dL  Up to 48 hours............<12.0 mg/dL  3-5 days..................<15.0 mg/dL  6-29 days.................<15.0 mg/dL       Bilirubin Total   Date Value Ref Range Status   06/04/2024 0.8 <=1.5 mg/dL Final     Alkaline Phosphatase   Date Value Ref Range Status   05/20/2024 115 55 - 135 U/L Final     ALP   Date Value Ref Range Status   06/04/2024 97 40 - 150 unit/L Final     AST   Date Value Ref Range Status   06/04/2024 26 5 - 34 unit/L Final   05/20/2024 10 10 - 40 U/L Final     ALT   Date Value Ref Range Status   06/04/2024 21 0 - 55 unit/L Final   05/20/2024 15 10 - 44 U/L Final     Anion Gap   Date Value Ref Range Status    05/20/2024 8 8 - 16 mmol/L Final      Component      Latest Ref Rng 1/19/2024   Sodium      136 - 145 mmol/L 137    Potassium      3.5 - 5.1 mmol/L 3.6    Chloride      98 - 107 mmol/L 103    CO2      23 - 31 mmol/L 29    Glucose      82 - 115 mg/dL 118 (H)    BUN      8.4 - 25.7 mg/dL 28.0 (H)    Creatinine      0.73 - 1.18 mg/dL 1.15    Calcium      8.8 - 10.0 mg/dL 9.4    PROTEIN TOTAL      5.8 - 7.6 gm/dL 6.0    Albumin      3.4 - 4.8 g/dL 3.8    Globulin, Total      2.4 - 3.5 gm/dL 2.2 (L)    Albumin/Globulin Ratio      1.1 - 2.0 ratio 1.7    BILIRUBIN TOTAL      <=1.5 mg/dL 1.0    ALP      40 - 150 unit/L 104    ALT      0 - 55 unit/L 17    AST      5 - 34 unit/L 11    eGFR      mls/min/1.73/m2 >60    Kappa Free Light Chain      0.3300 - 1.94 mg/dL 1.61    Lambda Free Light Chain      0.5700 - 2.63 mg/dL 0.9200    Kappa/Lambda FLC Ratio      0.2600 - 1.65  1.75 (H)    IgG      540.00 - 1,822.00 mg/dL 454.00 (L)    IgA      101.0 - 645.0 mg/dL 335.0    IgM      22.0 - 240.0 mg/dL 15.0 (L)    LD      125 - 220 U/L 111 (L)        12/29/23 SPEP  Small abnormality in beta fraction. .     M-protein Isotype MALDI-TOF MS        IgA kappa, small monoclonal.   Suggest quantitative immunoglobulin level to assist in following monoclonal protein.        Assessment  1. Multiple myeloma in remission    2. Pancytopenia    3. S/P autologous bone marrow transplantation    4. Immunodeficiency      Multiple Myeloma Dx 6/2023:  ---Daratumumab, lenalidomide, and dexamethasone x 4 cycles  initiated 7/12/23  ---Lenalidomide and dexamethasone stopped on 3/6/24  ---Stem Cell Transplant 4/16/24  ---Carmen 140: 5 bags 4/18/24 and 4 bags 4/19/24    2. Pancytopenia  --labs q Monday and Thursday  -- transfuse 1-2 units of PRBCs for hemoglobin < 7 or clinically appropriate.  --Transfuse 1 unit of platelets if platelets < 10 K or clinically appropriate    3. 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  ---Cont prophylactic antimicrobials    4.  Gemella Haemolysans bacteremia  --Cont ceftriaxone until 6/14/2024  --Keep appts with ID      Plan:  Labs adequate; wbc slowly rising  Bi-weekly labs- can be done by [facility] on Tuesday and Thursday   Keep follow up with Dr. Evans 6/14/24  RTC in 4 weeks with MD/NP with labs  Cbc, cmp, mag, phos- 1 hr prior @ [facility]      Transfuse if hgb<7 and platelets < 10. CMV negative and irritated         Elsy Amaya MD  Hematology/Oncology  [facility]    I spent a total of 40 minutes on the day of the visit.This includes face to face time and non-face to face time preparing to see the patient (eg, review of tests), obtaining and/or reviewing separately obtained history, documenting clinical information in the electronic or other health record, independently interpreting results and communicating results to the patient/family/caregiver, or care coordinator.      Professional Services   I, Amara Andre LPN, acted solely as a scribe for and in the presence of Dr. Elsy Amaya, who performed these services.

## 2024-06-04 ENCOUNTER — EXTERNAL HOME HEALTH (OUTPATIENT)
Dept: HOME HEALTH SERVICES | Facility: HOSPITAL | Age: 74
End: 2024-06-04
Payer: MEDICARE

## 2024-06-04 ENCOUNTER — OFFICE VISIT (OUTPATIENT)
Dept: HEMATOLOGY/ONCOLOGY | Facility: CLINIC | Age: 74
End: 2024-06-04
Payer: MEDICARE

## 2024-06-04 VITALS
HEIGHT: 70 IN | WEIGHT: 193.5 LBS | DIASTOLIC BLOOD PRESSURE: 75 MMHG | OXYGEN SATURATION: 97 % | HEART RATE: 82 BPM | BODY MASS INDEX: 27.7 KG/M2 | TEMPERATURE: 97 F | RESPIRATION RATE: 20 BRPM | SYSTOLIC BLOOD PRESSURE: 135 MMHG

## 2024-06-04 DIAGNOSIS — D84.9 IMMUNODEFICIENCY: ICD-10-CM

## 2024-06-04 DIAGNOSIS — C90.01 MULTIPLE MYELOMA IN REMISSION: Primary | ICD-10-CM

## 2024-06-04 DIAGNOSIS — Z94.81 S/P AUTOLOGOUS BONE MARROW TRANSPLANTATION: ICD-10-CM

## 2024-06-04 DIAGNOSIS — D61.818 PANCYTOPENIA: ICD-10-CM

## 2024-06-04 PROCEDURE — 99215 OFFICE O/P EST HI 40 MIN: CPT | Mod: PBBFAC | Performed by: INTERNAL MEDICINE

## 2024-06-04 PROCEDURE — 99999 PR PBB SHADOW E&M-EST. PATIENT-LVL V: CPT | Mod: PBBFAC,,, | Performed by: INTERNAL MEDICINE

## 2024-06-04 PROCEDURE — 99215 OFFICE O/P EST HI 40 MIN: CPT | Mod: S$PBB,,, | Performed by: INTERNAL MEDICINE

## 2024-06-04 RX ORDER — HYDROCODONE BITARTRATE AND ACETAMINOPHEN 500; 5 MG/1; MG/1
TABLET ORAL ONCE
Status: CANCELLED | OUTPATIENT
Start: 2024-06-04 | End: 2024-06-04

## 2024-06-05 ENCOUNTER — DOCUMENT SCAN (OUTPATIENT)
Dept: HOME HEALTH SERVICES | Facility: HOSPITAL | Age: 74
End: 2024-06-05
Payer: MEDICARE

## 2024-06-06 ENCOUNTER — LAB VISIT (OUTPATIENT)
Dept: LAB | Facility: HOSPITAL | Age: 74
End: 2024-06-06
Payer: MEDICARE

## 2024-06-06 DIAGNOSIS — C90.01 MULTIPLE MYELOMA IN REMISSION: ICD-10-CM

## 2024-06-06 DIAGNOSIS — Z94.81 S/P AUTOLOGOUS BONE MARROW TRANSPLANTATION: Primary | ICD-10-CM

## 2024-06-06 DIAGNOSIS — D61.818 PANCYTOPENIA: ICD-10-CM

## 2024-06-06 LAB
ALBUMIN SERPL-MCNC: 3.7 G/DL (ref 3.4–4.8)
ALBUMIN/GLOB SERPL: 1.4 RATIO (ref 1.1–2)
ALP SERPL-CCNC: 105 UNIT/L (ref 40–150)
ALT SERPL-CCNC: 21 UNIT/L (ref 0–55)
ANION GAP SERPL CALC-SCNC: 7 MEQ/L
AST SERPL-CCNC: 27 UNIT/L (ref 5–34)
BASOPHILS # BLD AUTO: 0.01 X10(3)/MCL
BASOPHILS NFR BLD AUTO: 0.3 %
BILIRUB SERPL-MCNC: 1 MG/DL
BUN SERPL-MCNC: 24 MG/DL (ref 8.4–25.7)
CALCIUM SERPL-MCNC: 10 MG/DL (ref 8.8–10)
CHLORIDE SERPL-SCNC: 105 MMOL/L (ref 98–107)
CO2 SERPL-SCNC: 25 MMOL/L (ref 23–31)
CREAT SERPL-MCNC: 1.06 MG/DL (ref 0.73–1.18)
CREAT/UREA NIT SERPL: 23
EOSINOPHIL # BLD AUTO: 0 X10(3)/MCL (ref 0–0.9)
EOSINOPHIL NFR BLD AUTO: 0 %
ERYTHROCYTE [DISTWIDTH] IN BLOOD BY AUTOMATED COUNT: 13.3 % (ref 11.5–17)
GFR SERPLBLD CREATININE-BSD FMLA CKD-EPI: >60 ML/MIN/1.73/M2
GLOBULIN SER-MCNC: 2.6 GM/DL (ref 2.4–3.5)
GLUCOSE SERPL-MCNC: 177 MG/DL (ref 82–115)
GROUP & RH: NORMAL
HCT VFR BLD AUTO: 20.4 % (ref 42–52)
HGB BLD-MCNC: 6.7 G/DL (ref 14–18)
HYPOCHROMIA BLD QL SMEAR: SLIGHT
IMM GRANULOCYTES # BLD AUTO: 0.1 X10(3)/MCL (ref 0–0.04)
IMM GRANULOCYTES NFR BLD AUTO: 3.1 %
INDIRECT COOMBS: NORMAL
LYMPHOCYTES # BLD AUTO: 1.03 X10(3)/MCL (ref 0.6–4.6)
LYMPHOCYTES NFR BLD AUTO: 31.9 %
MACROCYTES BLD QL SMEAR: SLIGHT
MAGNESIUM SERPL-MCNC: 1.9 MG/DL (ref 1.6–2.6)
MCH RBC QN AUTO: 29.6 PG (ref 27–31)
MCHC RBC AUTO-ENTMCNC: 32.8 G/DL (ref 33–36)
MCV RBC AUTO: 90.3 FL (ref 80–94)
MICROCYTES BLD QL SMEAR: ABNORMAL
MONOCYTES # BLD AUTO: 0.78 X10(3)/MCL (ref 0.1–1.3)
MONOCYTES NFR BLD AUTO: 24.1 %
NEUTROPHILS # BLD AUTO: 1.31 X10(3)/MCL (ref 2.1–9.2)
NEUTROPHILS NFR BLD AUTO: 40.6 %
PLATELET # BLD AUTO: 20 X10(3)/MCL (ref 130–400)
PLATELET # BLD EST: ABNORMAL 10*3/UL
PMV BLD AUTO: 13.1 FL (ref 7.4–10.4)
POIKILOCYTOSIS BLD QL SMEAR: SLIGHT
POTASSIUM SERPL-SCNC: 4 MMOL/L (ref 3.5–5.1)
PROT SERPL-MCNC: 6.3 GM/DL (ref 5.8–7.6)
RBC # BLD AUTO: 2.26 X10(6)/MCL (ref 4.7–6.1)
SODIUM SERPL-SCNC: 137 MMOL/L (ref 136–145)
SPECIMEN OUTDATE: NORMAL
WBC # SPEC AUTO: 3.23 X10(3)/MCL (ref 4.5–11.5)

## 2024-06-06 PROCEDURE — 36415 COLL VENOUS BLD VENIPUNCTURE: CPT

## 2024-06-06 PROCEDURE — 83735 ASSAY OF MAGNESIUM: CPT

## 2024-06-06 PROCEDURE — 85025 COMPLETE CBC W/AUTO DIFF WBC: CPT

## 2024-06-06 PROCEDURE — 80053 COMPREHEN METABOLIC PANEL: CPT

## 2024-06-06 PROCEDURE — 86901 BLOOD TYPING SEROLOGIC RH(D): CPT | Performed by: INTERNAL MEDICINE

## 2024-06-06 RX ORDER — HYDROCODONE BITARTRATE AND ACETAMINOPHEN 5; 325 MG/1; MG/1
1 TABLET ORAL EVERY 6 HOURS PRN
Qty: 60 TABLET | Refills: 0 | Status: SHIPPED | OUTPATIENT
Start: 2024-06-06 | End: 2024-06-10

## 2024-06-07 ENCOUNTER — INFUSION (OUTPATIENT)
Dept: INFUSION THERAPY | Facility: HOSPITAL | Age: 74
End: 2024-06-07
Payer: MEDICARE

## 2024-06-07 VITALS
OXYGEN SATURATION: 96 % | RESPIRATION RATE: 20 BRPM | SYSTOLIC BLOOD PRESSURE: 126 MMHG | DIASTOLIC BLOOD PRESSURE: 66 MMHG | HEART RATE: 72 BPM | TEMPERATURE: 98 F

## 2024-06-07 DIAGNOSIS — D61.818 PANCYTOPENIA: ICD-10-CM

## 2024-06-07 DIAGNOSIS — C90.01 MULTIPLE MYELOMA IN REMISSION: ICD-10-CM

## 2024-06-07 PROCEDURE — P9040 RBC LEUKOREDUCED IRRADIATED: HCPCS | Performed by: INTERNAL MEDICINE

## 2024-06-07 PROCEDURE — 25000003 PHARM REV CODE 250: Performed by: INTERNAL MEDICINE

## 2024-06-07 PROCEDURE — 86923 COMPATIBILITY TEST ELECTRIC: CPT | Performed by: INTERNAL MEDICINE

## 2024-06-07 PROCEDURE — 36430 TRANSFUSION BLD/BLD COMPNT: CPT

## 2024-06-07 RX ORDER — HYDROCODONE BITARTRATE AND ACETAMINOPHEN 500; 5 MG/1; MG/1
TABLET ORAL ONCE
Status: COMPLETED | OUTPATIENT
Start: 2024-06-07 | End: 2024-06-07

## 2024-06-07 RX ADMIN — SODIUM CHLORIDE: 9 INJECTION, SOLUTION INTRAVENOUS at 08:06

## 2024-06-10 DIAGNOSIS — C90.01 MULTIPLE MYELOMA IN REMISSION: Primary | ICD-10-CM

## 2024-06-10 RX ORDER — HYDROCODONE BITARTRATE AND ACETAMINOPHEN 7.5; 325 MG/1; MG/1
1 TABLET ORAL EVERY 4 HOURS PRN
Qty: 90 TABLET | Refills: 0 | Status: SHIPPED | OUTPATIENT
Start: 2024-06-10

## 2024-06-11 ENCOUNTER — LAB REQUISITION (OUTPATIENT)
Dept: LAB | Facility: HOSPITAL | Age: 74
End: 2024-06-11
Payer: MEDICARE

## 2024-06-11 DIAGNOSIS — Z45.2 ENCOUNTER FOR ADJUSTMENT AND MANAGEMENT OF VASCULAR ACCESS DEVICE: ICD-10-CM

## 2024-06-11 DIAGNOSIS — R78.81 BACTEREMIA: ICD-10-CM

## 2024-06-11 DIAGNOSIS — B95.2 ENTEROCOCCUS AS THE CAUSE OF DISEASES CLASSIFIED ELSEWHERE: ICD-10-CM

## 2024-06-11 LAB
ALBUMIN SERPL-MCNC: 3.5 G/DL (ref 3.4–4.8)
ALBUMIN/GLOB SERPL: 1.7 RATIO (ref 1.1–2)
ALP SERPL-CCNC: 86 UNIT/L (ref 40–150)
ALT SERPL-CCNC: 16 UNIT/L (ref 0–55)
ANION GAP SERPL CALC-SCNC: 9 MEQ/L
AST SERPL-CCNC: 25 UNIT/L (ref 5–34)
BILIRUB SERPL-MCNC: 0.7 MG/DL
BUN SERPL-MCNC: 21 MG/DL (ref 8.4–25.7)
CALCIUM SERPL-MCNC: 9.2 MG/DL (ref 8.8–10)
CHLORIDE SERPL-SCNC: 105 MMOL/L (ref 98–107)
CO2 SERPL-SCNC: 25 MMOL/L (ref 23–31)
CREAT SERPL-MCNC: 1.14 MG/DL (ref 0.73–1.18)
CREAT/UREA NIT SERPL: 18
CRP SERPL-MCNC: 3.9 MG/L
ERYTHROCYTE [DISTWIDTH] IN BLOOD BY AUTOMATED COUNT: 18.2 % (ref 11.5–17)
GFR SERPLBLD CREATININE-BSD FMLA CKD-EPI: >60 ML/MIN/1.73/M2
GLOBULIN SER-MCNC: 2.1 GM/DL (ref 2.4–3.5)
GLUCOSE SERPL-MCNC: 97 MG/DL (ref 82–115)
HCT VFR BLD AUTO: 23.9 % (ref 42–52)
HGB BLD-MCNC: 8 G/DL (ref 14–18)
MAGNESIUM SERPL-MCNC: 1.7 MG/DL (ref 1.6–2.6)
MCH RBC QN AUTO: 30.9 PG (ref 27–31)
MCHC RBC AUTO-ENTMCNC: 33.5 G/DL (ref 33–36)
MCV RBC AUTO: 92.3 FL (ref 80–94)
NRBC BLD AUTO-RTO: 5.2 %
PLATELET # BLD AUTO: 42 X10(3)/MCL (ref 130–400)
PMV BLD AUTO: 12.8 FL (ref 7.4–10.4)
POTASSIUM SERPL-SCNC: 3.8 MMOL/L (ref 3.5–5.1)
PROT SERPL-MCNC: 5.6 GM/DL (ref 5.8–7.6)
RBC # BLD AUTO: 2.59 X10(6)/MCL (ref 4.7–6.1)
SODIUM SERPL-SCNC: 139 MMOL/L (ref 136–145)
WBC # SPEC AUTO: 3.07 X10(3)/MCL (ref 4.5–11.5)

## 2024-06-11 PROCEDURE — 85027 COMPLETE CBC AUTOMATED: CPT | Performed by: INTERNAL MEDICINE

## 2024-06-11 PROCEDURE — 83735 ASSAY OF MAGNESIUM: CPT | Performed by: INTERNAL MEDICINE

## 2024-06-11 PROCEDURE — 86140 C-REACTIVE PROTEIN: CPT | Performed by: INTERNAL MEDICINE

## 2024-06-11 PROCEDURE — 80053 COMPREHEN METABOLIC PANEL: CPT | Performed by: INTERNAL MEDICINE

## 2024-06-13 ENCOUNTER — LAB VISIT (OUTPATIENT)
Dept: LAB | Facility: HOSPITAL | Age: 74
End: 2024-06-13
Payer: MEDICARE

## 2024-06-13 DIAGNOSIS — C90.01 MULTIPLE MYELOMA IN REMISSION: ICD-10-CM

## 2024-06-13 LAB
ALBUMIN SERPL-MCNC: 3.6 G/DL (ref 3.4–4.8)
ALBUMIN/GLOB SERPL: 1.4 RATIO (ref 1.1–2)
ALP SERPL-CCNC: 90 UNIT/L (ref 40–150)
ALT SERPL-CCNC: 17 UNIT/L (ref 0–55)
ANION GAP SERPL CALC-SCNC: 7 MEQ/L
AST SERPL-CCNC: 23 UNIT/L (ref 5–34)
BASOPHILS # BLD AUTO: 0 X10(3)/MCL
BASOPHILS NFR BLD AUTO: 0 %
BILIRUB SERPL-MCNC: 0.8 MG/DL
BUN SERPL-MCNC: 25 MG/DL (ref 8.4–25.7)
CALCIUM SERPL-MCNC: 9.8 MG/DL (ref 8.8–10)
CHLORIDE SERPL-SCNC: 107 MMOL/L (ref 98–107)
CO2 SERPL-SCNC: 26 MMOL/L (ref 23–31)
CREAT SERPL-MCNC: 0.86 MG/DL (ref 0.73–1.18)
CREAT/UREA NIT SERPL: 29
EOSINOPHIL # BLD AUTO: 0 X10(3)/MCL (ref 0–0.9)
EOSINOPHIL NFR BLD AUTO: 0 %
ERYTHROCYTE [DISTWIDTH] IN BLOOD BY AUTOMATED COUNT: 0 % (ref 11.5–17)
GFR SERPLBLD CREATININE-BSD FMLA CKD-EPI: >60 ML/MIN/1.73/M2
GLOBULIN SER-MCNC: 2.6 GM/DL (ref 2.4–3.5)
GLUCOSE SERPL-MCNC: 121 MG/DL (ref 82–115)
HCT VFR BLD AUTO: 26.6 % (ref 42–52)
HGB BLD-MCNC: 8.6 G/DL (ref 14–18)
HYPOCHROMIA BLD QL SMEAR: SLIGHT
IMM GRANULOCYTES # BLD AUTO: 0.02 X10(3)/MCL (ref 0–0.04)
IMM GRANULOCYTES NFR BLD AUTO: 0.6 %
LYMPHOCYTES # BLD AUTO: 1.23 X10(3)/MCL (ref 0.6–4.6)
LYMPHOCYTES NFR BLD AUTO: 38.2 %
MACROCYTES BLD QL SMEAR: ABNORMAL
MAGNESIUM SERPL-MCNC: 1.8 MG/DL (ref 1.6–2.6)
MCH RBC QN AUTO: 31.6 PG (ref 27–31)
MCHC RBC AUTO-ENTMCNC: 32.3 G/DL (ref 33–36)
MCV RBC AUTO: 97.8 FL (ref 80–94)
MICROCYTES BLD QL SMEAR: ABNORMAL
MONOCYTES # BLD AUTO: 0.71 X10(3)/MCL (ref 0.1–1.3)
MONOCYTES NFR BLD AUTO: 22 %
NEUTROPHILS # BLD AUTO: 1.26 X10(3)/MCL (ref 2.1–9.2)
NEUTROPHILS NFR BLD AUTO: 39.2 %
OVALOCYTES (OLG): ABNORMAL
PLATELET # BLD AUTO: 47 X10(3)/MCL (ref 130–400)
PLATELET # BLD EST: ABNORMAL 10*3/UL
PMV BLD AUTO: 11.5 FL (ref 7.4–10.4)
POIKILOCYTOSIS BLD QL SMEAR: SLIGHT
POTASSIUM SERPL-SCNC: 3.7 MMOL/L (ref 3.5–5.1)
PROT SERPL-MCNC: 6.2 GM/DL (ref 5.8–7.6)
RBC # BLD AUTO: 2.72 X10(6)/MCL (ref 4.7–6.1)
RBC MORPH BLD: ABNORMAL
SODIUM SERPL-SCNC: 140 MMOL/L (ref 136–145)
TEAR DROP CELL (OLG): SLIGHT
WBC # SPEC AUTO: 3.22 X10(3)/MCL (ref 4.5–11.5)

## 2024-06-13 PROCEDURE — 85025 COMPLETE CBC W/AUTO DIFF WBC: CPT

## 2024-06-13 PROCEDURE — 83735 ASSAY OF MAGNESIUM: CPT

## 2024-06-13 PROCEDURE — 36415 COLL VENOUS BLD VENIPUNCTURE: CPT

## 2024-06-13 PROCEDURE — 80053 COMPREHEN METABOLIC PANEL: CPT

## 2024-06-14 ENCOUNTER — OFFICE VISIT (OUTPATIENT)
Dept: INFECTIOUS DISEASES | Facility: CLINIC | Age: 74
End: 2024-06-14
Payer: MEDICARE

## 2024-06-14 VITALS
HEIGHT: 70 IN | WEIGHT: 192.88 LBS | SYSTOLIC BLOOD PRESSURE: 124 MMHG | HEART RATE: 68 BPM | DIASTOLIC BLOOD PRESSURE: 79 MMHG | BODY MASS INDEX: 27.61 KG/M2 | TEMPERATURE: 99 F

## 2024-06-14 DIAGNOSIS — I33.0 ACUTE BACTERIAL ENDOCARDITIS: Primary | ICD-10-CM

## 2024-06-14 DIAGNOSIS — Z94.84 HISTORY OF AUTOLOGOUS STEM CELL TRANSPLANT: ICD-10-CM

## 2024-06-14 DIAGNOSIS — C90.01 MULTIPLE MYELOMA IN REMISSION: ICD-10-CM

## 2024-06-14 PROCEDURE — G2211 COMPLEX E/M VISIT ADD ON: HCPCS | Mod: S$PBB,,, | Performed by: INTERNAL MEDICINE

## 2024-06-14 PROCEDURE — 99999 PR PBB SHADOW E&M-EST. PATIENT-LVL IV: CPT | Mod: PBBFAC,,, | Performed by: INTERNAL MEDICINE

## 2024-06-14 PROCEDURE — 99215 OFFICE O/P EST HI 40 MIN: CPT | Mod: S$PBB,,, | Performed by: INTERNAL MEDICINE

## 2024-06-14 PROCEDURE — 99214 OFFICE O/P EST MOD 30 MIN: CPT | Mod: PBBFAC | Performed by: INTERNAL MEDICINE

## 2024-06-14 NOTE — PROGRESS NOTES
Subjective     Patient ID: Chip Goodson is a 73 y.o. male.    Chief Complaint: Follow-up for Gemella endocarditis, post-SCT vaccines    History of Present Illness  73M with history of MM s/p autoSCT 4/18/2024, presents for follow-up of Gemella endocarditis.    Summary of Illness:  Patient was admitted directly for autoSCT. 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 TTE with thickening of non-coronary cusp of aortic valve, unable to exclude vegetation. Patient was treated empirically as endocarditis with 6 week course of IV ceftriaxone.    Subjective:  Patient doing well overall. Multiple complaints since autoSCT, nothing new in the last 2 weeks.    No diarrhea related to antibiotics. No adverse effects related to antibiotics.    Patient with a horse, multiple cows at home.  New great-grandson, 1 year old, starting vaccines.       Review of Systems   Constitutional: Positive for decreased appetite, malaise/fatigue and weight loss. Negative for chills, fever and weight gain.   HENT:  Positive for tinnitus. Negative for congestion, ear pain, hearing loss, hoarse voice and sore throat.    Eyes:  Negative for blurred vision, redness and visual disturbance.   Cardiovascular:  Negative for chest pain, leg swelling and palpitations.   Respiratory:  Positive for shortness of breath. Negative for cough, hemoptysis, sputum production and wheezing.    Hematologic/Lymphatic: Negative for adenopathy. Does not bruise/bleed easily.   Skin:  Negative for dry skin, itching, rash and suspicious lesions.   Musculoskeletal:  Positive for back pain and myalgias. Negative for joint pain and neck pain.   Gastrointestinal:  Positive for nausea and vomiting. Negative for abdominal pain, constipation, diarrhea and heartburn.   Genitourinary:  Negative for dysuria, flank pain, frequency, hematuria, hesitancy and urgency.   Neurological:  Positive for  dizziness and weakness. Negative for headaches, numbness and paresthesias.   Psychiatric/Behavioral:  Negative for depression and memory loss. The patient does not have insomnia and is not nervous/anxious.       Objective   Physical Exam  Vitals reviewed.   Constitutional:       General: He is not in acute distress.     Appearance: He is well-developed. He is not diaphoretic.   HENT:      Head: Normocephalic and atraumatic.      Nose: Nose normal.   Eyes:      Conjunctiva/sclera: Conjunctivae normal.   Pulmonary:      Effort: Pulmonary effort is normal. No respiratory distress.   Abdominal:      General: Abdomen is flat. There is no distension.   Musculoskeletal:      Cervical back: Normal range of motion.      Right lower leg: No edema.      Left lower leg: No edema.   Skin:     General: Skin is warm and dry.      Findings: No erythema or rash.   Neurological:      Mental Status: He is alert.   Psychiatric:         Behavior: Behavior normal.             Sodium   Date Value Ref Range Status   06/13/2024 140 136 - 145 mmol/L Final   06/11/2024 139 136 - 145 mmol/L Final   06/06/2024 137 136 - 145 mmol/L Final   05/20/2024 137 136 - 145 mmol/L Final   05/16/2024 137 136 - 145 mmol/L Final   05/13/2024 137 136 - 145 mmol/L Final      Potassium   Date Value Ref Range Status   06/13/2024 3.7 3.5 - 5.1 mmol/L Final   06/11/2024 3.8 3.5 - 5.1 mmol/L Final   06/06/2024 4.0 3.5 - 5.1 mmol/L Final   05/20/2024 4.7 3.5 - 5.1 mmol/L Final   05/16/2024 4.2 3.5 - 5.1 mmol/L Final   05/13/2024 4.2 3.5 - 5.1 mmol/L Final      Chloride   Date Value Ref Range Status   06/13/2024 107 98 - 107 mmol/L Final   06/11/2024 105 98 - 107 mmol/L Final   06/06/2024 105 98 - 107 mmol/L Final   05/20/2024 105 95 - 110 mmol/L Final   05/16/2024 105 95 - 110 mmol/L Final   05/13/2024 105 95 - 110 mmol/L Final      CO2   Date Value Ref Range Status   06/13/2024 26 23 - 31 mmol/L Final   06/11/2024 25 23 - 31 mmol/L Final   06/06/2024 25 23 - 31  mmol/L Final   05/20/2024 24 23 - 29 mmol/L Final   05/16/2024 20 (L) 23 - 29 mmol/L Final   05/13/2024 21 (L) 23 - 29 mmol/L Final      BUN   Date Value Ref Range Status   05/20/2024 24 (H) 8 - 23 mg/dL Final   05/16/2024 22 8 - 23 mg/dL Final   05/13/2024 22 8 - 23 mg/dL Final     Blood Urea Nitrogen   Date Value Ref Range Status   06/13/2024 25.0 8.4 - 25.7 mg/dL Final   06/11/2024 21.0 8.4 - 25.7 mg/dL Final   06/06/2024 24.0 8.4 - 25.7 mg/dL Final      Creatinine   Date Value Ref Range Status   06/13/2024 0.86 0.73 - 1.18 mg/dL Final   06/11/2024 1.14 0.73 - 1.18 mg/dL Final   06/06/2024 1.06 0.73 - 1.18 mg/dL Final   05/20/2024 1.2 0.5 - 1.4 mg/dL Final   05/16/2024 1.3 0.5 - 1.4 mg/dL Final   05/13/2024 1.1 0.5 - 1.4 mg/dL Final      Glucose   Date Value Ref Range Status   05/20/2024 158 (H) 70 - 110 mg/dL Final   05/16/2024 176 (H) 70 - 110 mg/dL Final   05/13/2024 219 (H) 70 - 110 mg/dL Final       ALT   Date Value Ref Range Status   06/13/2024 17 0 - 55 unit/L Final   06/11/2024 16 0 - 55 unit/L Final   06/06/2024 21 0 - 55 unit/L Final   05/20/2024 15 10 - 44 U/L Final   05/16/2024 22 10 - 44 U/L Final   05/13/2024 31 10 - 44 U/L Final      AST   Date Value Ref Range Status   06/13/2024 23 5 - 34 unit/L Final   06/11/2024 25 5 - 34 unit/L Final   06/06/2024 27 5 - 34 unit/L Final   05/20/2024 10 10 - 40 U/L Final   05/16/2024 11 10 - 40 U/L Final   05/13/2024 11 10 - 40 U/L Final      Total Bilirubin   Date Value Ref Range Status   05/20/2024 1.2 (H) 0.1 - 1.0 mg/dL Final     Comment:     For infants and newborns, interpretation of results should be based  on gestational age, weight and in agreement with clinical  observations.    Premature Infant recommended reference ranges:  Up to 24 hours.............<8.0 mg/dL  Up to 48 hours............<12.0 mg/dL  3-5 days..................<15.0 mg/dL  6-29 days.................<15.0 mg/dL     05/16/2024 1.0 0.1 - 1.0 mg/dL Final     Comment:     For infants and  newborns, interpretation of results should be based  on gestational age, weight and in agreement with clinical  observations.    Premature Infant recommended reference ranges:  Up to 24 hours.............<8.0 mg/dL  Up to 48 hours............<12.0 mg/dL  3-5 days..................<15.0 mg/dL  6-29 days.................<15.0 mg/dL     05/13/2024 0.8 0.1 - 1.0 mg/dL Final     Comment:     For infants and newborns, interpretation of results should be based  on gestational age, weight and in agreement with clinical  observations.    Premature Infant recommended reference ranges:  Up to 24 hours.............<8.0 mg/dL  Up to 48 hours............<12.0 mg/dL  3-5 days..................<15.0 mg/dL  6-29 days.................<15.0 mg/dL       Bilirubin Total   Date Value Ref Range Status   06/13/2024 0.8 <=1.5 mg/dL Final   06/11/2024 0.7 <=1.5 mg/dL Final   06/06/2024 1.0 <=1.5 mg/dL Final      Albumin   Date Value Ref Range Status   06/13/2024 3.6 3.4 - 4.8 g/dL Final   06/11/2024 3.5 3.4 - 4.8 g/dL Final   06/06/2024 3.7 3.4 - 4.8 g/dL Final   05/20/2024 3.5 3.5 - 5.2 g/dL Final   05/16/2024 3.5 3.5 - 5.2 g/dL Final   05/13/2024 3.2 (L) 3.5 - 5.2 g/dL Final      Total Protein   Date Value Ref Range Status   05/20/2024 5.9 (L) 6.0 - 8.4 g/dL Final   05/16/2024 6.0 6.0 - 8.4 g/dL Final   05/13/2024 5.5 (L) 6.0 - 8.4 g/dL Final      Alkaline Phosphatase   Date Value Ref Range Status   05/20/2024 115 55 - 135 U/L Final   05/16/2024 129 55 - 135 U/L Final   05/13/2024 111 55 - 135 U/L Final     ALP   Date Value Ref Range Status   06/13/2024 90 40 - 150 unit/L Final   06/11/2024 86 40 - 150 unit/L Final   06/06/2024 105 40 - 150 unit/L Final        WBC   Date Value Ref Range Status   06/13/2024 3.22 (L) 4.50 - 11.50 x10(3)/mcL Final   06/11/2024 3.07 (L) 4.50 - 11.50 x10(3)/mcL Final   06/06/2024 3.23 (L) 4.50 - 11.50 x10(3)/mcL Final   05/20/2024 0.70 (LL) 3.90 - 12.70 K/uL Final     Comment:     WBC PLT   critical result(s)  called and verbal readback obtained from   Elva Cheney RN  by JA3 05/20/2024 10:10     05/16/2024 0.65 (LL) 3.90 - 12.70 K/uL Final     Comment:     WBC, PLT    critical result(s) called and verbal readback obtained   from   JANE GRANDA LPN by MRV1 05/16/2024 10:06     05/13/2024 1.40 (LL) 3.90 - 12.70 K/uL Final     Comment:     WBC  critical result(s) called and verbal readback obtained from Dr. Alo Watson. by RNS 05/13/2024 18:30        Hgb   Date Value Ref Range Status   06/13/2024 8.6 (L) 14.0 - 18.0 g/dL Final   06/11/2024 8.0 (L) 14.0 - 18.0 g/dL Final   06/06/2024 6.7 (L) 14.0 - 18.0 g/dL Final     Hemoglobin   Date Value Ref Range Status   05/20/2024 7.8 (L) 14.0 - 18.0 g/dL Final   05/16/2024 7.3 (L) 14.0 - 18.0 g/dL Final   05/13/2024 7.0 (L) 14.0 - 18.0 g/dL Final      Hematocrit   Date Value Ref Range Status   05/20/2024 22.2 (L) 40.0 - 54.0 % Final   05/16/2024 20.0 (L) 40.0 - 54.0 % Final   05/13/2024 20.3 (L) 40.0 - 54.0 % Final     Hct   Date Value Ref Range Status   06/13/2024 26.6 (L) 42.0 - 52.0 % Final   06/11/2024 23.9 (L) 42.0 - 52.0 % Final   06/06/2024 20.4 (L) 42.0 - 52.0 % Final      Platelets   Date Value Ref Range Status   05/20/2024 13 (LL) 150 - 450 K/uL Final     Comment:     WBC PLT   critical result(s) called and verbal readback obtained from   Elva Cheney RN  by JA3 05/20/2024 10:10     05/16/2024 23 (LL) 150 - 450 K/uL Final     Comment:     WBC, PLT    critical result(s) called and verbal readback obtained   from   JANE GRANDA LPN by MRV1 05/16/2024 10:06     05/13/2024 25 (LL) 150 - 450 K/uL Final     Comment:     PLT   critical result(s) called and verbal readback obtained from Dr. Alo Watson. by MH4 05/13/2024 21:29       Platelet   Date Value Ref Range Status   06/13/2024 47 (L) 130 - 400 x10(3)/mcL Final   06/11/2024 42 (L) 130 - 400 x10(3)/mcL Final   06/06/2024 20 (LL) 130 - 400 x10(3)/mcL Final             Assessment and Plan   73M with history of MM  s/p autoSCT 4/18/2024, presents for follow-up of Gemella endocarditis, s/p 6 week course of IV ceftriaxone, doing well clinically.        Plan:  - Discontinue ceftriaxone IV  - Remove midline  - Repeat TTE    Midline Removal Note:  O: Midline removed from left antecubital after sterile site prepped per protocol. PICC catheter tip visualized and intact. Pressure dressing applied with coban tape.  A: No redness, ecchymosis, edema, swelling, or drainage noted at site.  P: Instructions provided on post midline discharge care, including followup notification instructions.        Oncologist: Cristina    Type of Transplant: Auto    Date of Transplant: 4/18/2024      Table I: COVID-19 Updated ([id number]) mRNA Vaccine      COVID-19  Dose 1 Dose 2 Dose 3   Moderna ?90 days +4 weeks +4 weeks   Pfizer-BioNTech ?90 days +3 weeks +4 weeks       Table II: Inactivated High-Dose Influenza Vaccine     Month Time After Transplant   September - March ?4m Annually       TABLE III: Adult Inactivated Vaccine Schedule    Inactivated Vaccine ?6m ?8m ?10m ?12m ?24m   Pneumococcal-conjugate (Prevnar 20)* PCV20 PCV20 PCV20 PCV20   S.pneumo IgG serotypes   Haemophilus influenzae type B  HiB HiB HiB     Inactivated Polio IPV IPV IPV     Diphtheria-Tetanus-acellular Pertussis DTaP DTaP DTaP     Twinrix (Hepatitis A/B) HAV/HBV HAV/HBV  HAV/HBV HepBsAb   Meningococcal ACWY (Menveo or Menactra) MCV4 MCV4      Vaccines for Select Indications   RSV  (Coverage may be limited to age>60 years) RSV           Adult Live Vaccine Schedule After 24 Months - All Stem Cell Transplants     2-1-5 Rule - Not until:  2 years post-HSCT  >1 year off all immunosuppressive therapy  >5 months since last dose of IVIg, VZIg, plasma transfusion    Live Vaccine <24m ?24m ?25m   Measles/Mumps/Rubella (MMR)  MMR MMR   Varicella Zoster (Varivax) VZV IgG titers  If NEGATIVE, vaccinate Varivax Varivax       Adult Non-Live Adjuvant Vaccine Schedule - Shingrix    Shingles Non-Live  Adjuvant Vaccine (Shingrix)  (Insurance may not cover if age <50 years)   HSCT    Autologous    Not until:  Immunotherapy (PD-1 inhibitors) completed   VZV IgG titers  If POSITIVE, vaccinate ?Day 100   Shingrix #1 >Day 100   Shingrix #2 (2-6 months after #1) >Day 160        45 minutes of total time spent on the encounter, which includes face to face time and non-face to face time preparing to see the patient (eg, review of tests), obtaining and reviewing separately obtained history, documenting clinical information in the electronic or other health record, independently interpreting results (not separately reported) and communicating results to the patient/family/caregiver, and care coordination (not separately reported).     This patient's visit complexity is inherent to evaluation and management associated with medical care services that are part of ongoing care related to this patient's single, serious condition or complex condition.     Shasha Rojas MD MPH  Infectious Diseases - [facility]

## 2024-06-20 ENCOUNTER — LAB VISIT (OUTPATIENT)
Dept: LAB | Facility: HOSPITAL | Age: 74
End: 2024-06-20
Payer: MEDICARE

## 2024-06-20 DIAGNOSIS — C90.01 MULTIPLE MYELOMA IN REMISSION: ICD-10-CM

## 2024-06-20 LAB
ALBUMIN SERPL-MCNC: 3.8 G/DL (ref 3.4–4.8)
ALBUMIN/GLOB SERPL: 1.6 RATIO (ref 1.1–2)
ALP SERPL-CCNC: 89 UNIT/L (ref 40–150)
ALT SERPL-CCNC: 16 UNIT/L (ref 0–55)
ANION GAP SERPL CALC-SCNC: 8 MEQ/L
AST SERPL-CCNC: 21 UNIT/L (ref 5–34)
BASOPHILS # BLD AUTO: 0.01 X10(3)/MCL
BASOPHILS NFR BLD AUTO: 0.2 %
BILIRUB SERPL-MCNC: 1 MG/DL
BUN SERPL-MCNC: 22 MG/DL (ref 8.4–25.7)
CALCIUM SERPL-MCNC: 9.5 MG/DL (ref 8.8–10)
CHLORIDE SERPL-SCNC: 107 MMOL/L (ref 98–107)
CO2 SERPL-SCNC: 25 MMOL/L (ref 23–31)
CREAT SERPL-MCNC: 0.99 MG/DL (ref 0.73–1.18)
CREAT/UREA NIT SERPL: 22
EOSINOPHIL # BLD AUTO: 0 X10(3)/MCL (ref 0–0.9)
EOSINOPHIL NFR BLD AUTO: 0 %
ERYTHROCYTE [DISTWIDTH] IN BLOOD BY AUTOMATED COUNT: 0 % (ref 11.5–17)
GFR SERPLBLD CREATININE-BSD FMLA CKD-EPI: >60 ML/MIN/1.73/M2
GLOBULIN SER-MCNC: 2.4 GM/DL (ref 2.4–3.5)
GLUCOSE SERPL-MCNC: 100 MG/DL (ref 82–115)
HCT VFR BLD AUTO: 28 % (ref 42–52)
HGB BLD-MCNC: 8.9 G/DL (ref 14–18)
IMM GRANULOCYTES # BLD AUTO: 0.03 X10(3)/MCL (ref 0–0.04)
IMM GRANULOCYTES NFR BLD AUTO: 0.7 %
LYMPHOCYTES # BLD AUTO: 1.35 X10(3)/MCL (ref 0.6–4.6)
LYMPHOCYTES NFR BLD AUTO: 32.8 %
MAGNESIUM SERPL-MCNC: 2 MG/DL (ref 1.6–2.6)
MCH RBC QN AUTO: 34.8 PG (ref 27–31)
MCHC RBC AUTO-ENTMCNC: 31.8 G/DL (ref 33–36)
MCV RBC AUTO: 109.4 FL (ref 80–94)
MONOCYTES # BLD AUTO: 0.83 X10(3)/MCL (ref 0.1–1.3)
MONOCYTES NFR BLD AUTO: 20.2 %
NEUTROPHILS # BLD AUTO: 1.89 X10(3)/MCL (ref 2.1–9.2)
NEUTROPHILS NFR BLD AUTO: 46.1 %
PLATELET # BLD AUTO: 91 X10(3)/MCL (ref 130–400)
PMV BLD AUTO: 11 FL (ref 7.4–10.4)
POTASSIUM SERPL-SCNC: 4 MMOL/L (ref 3.5–5.1)
PROT SERPL-MCNC: 6.2 GM/DL (ref 5.8–7.6)
RBC # BLD AUTO: 2.56 X10(6)/MCL (ref 4.7–6.1)
SODIUM SERPL-SCNC: 140 MMOL/L (ref 136–145)
WBC # BLD AUTO: 4.11 X10(3)/MCL (ref 4.5–11.5)

## 2024-06-20 PROCEDURE — 80053 COMPREHEN METABOLIC PANEL: CPT

## 2024-06-20 PROCEDURE — 85025 COMPLETE CBC W/AUTO DIFF WBC: CPT

## 2024-06-20 PROCEDURE — 83735 ASSAY OF MAGNESIUM: CPT

## 2024-06-20 PROCEDURE — 36415 COLL VENOUS BLD VENIPUNCTURE: CPT

## 2024-06-24 ENCOUNTER — LAB VISIT (OUTPATIENT)
Dept: LAB | Facility: HOSPITAL | Age: 74
End: 2024-06-24
Payer: MEDICARE

## 2024-06-24 DIAGNOSIS — C90.01 MULTIPLE MYELOMA IN REMISSION: ICD-10-CM

## 2024-06-24 LAB
ALBUMIN SERPL-MCNC: 3.7 G/DL (ref 3.4–4.8)
ALBUMIN/GLOB SERPL: 1.6 RATIO (ref 1.1–2)
ALP SERPL-CCNC: 79 UNIT/L (ref 40–150)
ALT SERPL-CCNC: 14 UNIT/L (ref 0–55)
ANION GAP SERPL CALC-SCNC: 6 MEQ/L
AST SERPL-CCNC: 20 UNIT/L (ref 5–34)
BASOPHILS # BLD AUTO: 0.01 X10(3)/MCL
BASOPHILS NFR BLD AUTO: 0.3 %
BILIRUB SERPL-MCNC: 0.9 MG/DL
BUN SERPL-MCNC: 21 MG/DL (ref 8.4–25.7)
CALCIUM SERPL-MCNC: 9 MG/DL (ref 8.8–10)
CHLORIDE SERPL-SCNC: 107 MMOL/L (ref 98–107)
CO2 SERPL-SCNC: 26 MMOL/L (ref 23–31)
CREAT SERPL-MCNC: 0.97 MG/DL (ref 0.73–1.18)
CREAT/UREA NIT SERPL: 22
EOSINOPHIL # BLD AUTO: 0.01 X10(3)/MCL (ref 0–0.9)
EOSINOPHIL NFR BLD AUTO: 0.3 %
ERYTHROCYTE [DISTWIDTH] IN BLOOD BY AUTOMATED COUNT: ABNORMAL %
GFR SERPLBLD CREATININE-BSD FMLA CKD-EPI: >60 ML/MIN/1.73/M2
GLOBULIN SER-MCNC: 2.3 GM/DL (ref 2.4–3.5)
GLUCOSE SERPL-MCNC: 124 MG/DL (ref 82–115)
HCT VFR BLD AUTO: 28.1 % (ref 42–52)
HGB BLD-MCNC: 9 G/DL (ref 14–18)
IMM GRANULOCYTES # BLD AUTO: 0.04 X10(3)/MCL (ref 0–0.04)
IMM GRANULOCYTES NFR BLD AUTO: 1 %
LYMPHOCYTES # BLD AUTO: 1.2 X10(3)/MCL (ref 0.6–4.6)
LYMPHOCYTES NFR BLD AUTO: 30.4 %
MAGNESIUM SERPL-MCNC: 2 MG/DL (ref 1.6–2.6)
MCH RBC QN AUTO: 35.3 PG (ref 27–31)
MCHC RBC AUTO-ENTMCNC: 32 G/DL (ref 33–36)
MCV RBC AUTO: 110.2 FL (ref 80–94)
MONOCYTES # BLD AUTO: 0.72 X10(3)/MCL (ref 0.1–1.3)
MONOCYTES NFR BLD AUTO: 18.2 %
NEUTROPHILS # BLD AUTO: 1.97 X10(3)/MCL (ref 2.1–9.2)
NEUTROPHILS NFR BLD AUTO: 49.8 %
PLATELET # BLD AUTO: 97 X10(3)/MCL (ref 130–400)
PMV BLD AUTO: 10.4 FL (ref 7.4–10.4)
POTASSIUM SERPL-SCNC: 4 MMOL/L (ref 3.5–5.1)
PROT SERPL-MCNC: 6 GM/DL (ref 5.8–7.6)
RBC # BLD AUTO: 2.55 X10(6)/MCL (ref 4.7–6.1)
SODIUM SERPL-SCNC: 139 MMOL/L (ref 136–145)
WBC # BLD AUTO: 3.95 X10(3)/MCL (ref 4.5–11.5)

## 2024-06-24 PROCEDURE — 80053 COMPREHEN METABOLIC PANEL: CPT

## 2024-06-24 PROCEDURE — 83735 ASSAY OF MAGNESIUM: CPT

## 2024-06-24 PROCEDURE — 36415 COLL VENOUS BLD VENIPUNCTURE: CPT

## 2024-06-24 PROCEDURE — 85025 COMPLETE CBC W/AUTO DIFF WBC: CPT

## 2024-06-25 DIAGNOSIS — Z94.81 S/P AUTOLOGOUS BONE MARROW TRANSPLANTATION: Primary | ICD-10-CM

## 2024-06-27 ENCOUNTER — LAB VISIT (OUTPATIENT)
Dept: LAB | Facility: HOSPITAL | Age: 74
End: 2024-06-27
Attending: INTERNAL MEDICINE
Payer: MEDICARE

## 2024-06-27 DIAGNOSIS — Z94.81 S/P AUTOLOGOUS BONE MARROW TRANSPLANTATION: ICD-10-CM

## 2024-06-27 LAB
ALBUMIN SERPL-MCNC: 3.7 G/DL (ref 3.4–4.8)
ALBUMIN/GLOB SERPL: 1.5 RATIO (ref 1.1–2)
ALP SERPL-CCNC: 85 UNIT/L (ref 40–150)
ALT SERPL-CCNC: 16 UNIT/L (ref 0–55)
ANION GAP SERPL CALC-SCNC: 6 MEQ/L
AST SERPL-CCNC: 22 UNIT/L (ref 5–34)
BASOPHILS # BLD AUTO: 0.02 X10(3)/MCL
BASOPHILS NFR BLD AUTO: 0.4 %
BILIRUB SERPL-MCNC: 1 MG/DL
BUN SERPL-MCNC: 21 MG/DL (ref 8.4–25.7)
CALCIUM SERPL-MCNC: 9.8 MG/DL (ref 8.8–10)
CHLORIDE SERPL-SCNC: 107 MMOL/L (ref 98–107)
CO2 SERPL-SCNC: 26 MMOL/L (ref 23–31)
CREAT SERPL-MCNC: 1.13 MG/DL (ref 0.73–1.18)
CREAT/UREA NIT SERPL: 19
EOSINOPHIL # BLD AUTO: 0.02 X10(3)/MCL (ref 0–0.9)
EOSINOPHIL NFR BLD AUTO: 0.4 %
ERYTHROCYTE [DISTWIDTH] IN BLOOD BY AUTOMATED COUNT: 0 % (ref 11.5–17)
GFR SERPLBLD CREATININE-BSD FMLA CKD-EPI: >60 ML/MIN/1.73/M2
GLOBULIN SER-MCNC: 2.4 GM/DL (ref 2.4–3.5)
GLUCOSE SERPL-MCNC: 138 MG/DL (ref 82–115)
HCT VFR BLD AUTO: 29.7 % (ref 42–52)
HGB BLD-MCNC: 9.7 G/DL (ref 14–18)
IMM GRANULOCYTES # BLD AUTO: 0.05 X10(3)/MCL (ref 0–0.04)
IMM GRANULOCYTES NFR BLD AUTO: 0.9 %
LYMPHOCYTES # BLD AUTO: 1.29 X10(3)/MCL (ref 0.6–4.6)
LYMPHOCYTES NFR BLD AUTO: 24.3 %
MAGNESIUM SERPL-MCNC: 1.9 MG/DL (ref 1.6–2.6)
MCH RBC QN AUTO: 36.6 PG (ref 27–31)
MCHC RBC AUTO-ENTMCNC: 32.7 G/DL (ref 33–36)
MCV RBC AUTO: 112.1 FL (ref 80–94)
MONOCYTES # BLD AUTO: 0.83 X10(3)/MCL (ref 0.1–1.3)
MONOCYTES NFR BLD AUTO: 15.7 %
NEUTROPHILS # BLD AUTO: 3.09 X10(3)/MCL (ref 2.1–9.2)
NEUTROPHILS NFR BLD AUTO: 58.3 %
PLATELET # BLD AUTO: 107 X10(3)/MCL (ref 130–400)
PMV BLD AUTO: 10.3 FL (ref 7.4–10.4)
POTASSIUM SERPL-SCNC: 4.5 MMOL/L (ref 3.5–5.1)
PROT SERPL-MCNC: 6.1 GM/DL (ref 5.8–7.6)
RBC # BLD AUTO: 2.65 X10(6)/MCL (ref 4.7–6.1)
SODIUM SERPL-SCNC: 139 MMOL/L (ref 136–145)
WBC # BLD AUTO: 5.3 X10(3)/MCL (ref 4.5–11.5)

## 2024-06-27 PROCEDURE — 80053 COMPREHEN METABOLIC PANEL: CPT

## 2024-06-27 PROCEDURE — 83735 ASSAY OF MAGNESIUM: CPT

## 2024-06-27 PROCEDURE — 85025 COMPLETE CBC W/AUTO DIFF WBC: CPT

## 2024-06-27 PROCEDURE — 36415 COLL VENOUS BLD VENIPUNCTURE: CPT

## 2024-07-01 ENCOUNTER — LAB VISIT (OUTPATIENT)
Dept: LAB | Facility: HOSPITAL | Age: 74
End: 2024-07-01
Payer: MEDICARE

## 2024-07-01 DIAGNOSIS — Z76.82 STEM CELL TRANSPLANT CANDIDATE: ICD-10-CM

## 2024-07-01 DIAGNOSIS — C90.01 MULTIPLE MYELOMA IN REMISSION: ICD-10-CM

## 2024-07-01 LAB
ALBUMIN SERPL-MCNC: 3.9 G/DL (ref 3.4–4.8)
ALBUMIN/GLOB SERPL: 1.6 RATIO (ref 1.1–2)
ALP SERPL-CCNC: 97 UNIT/L (ref 40–150)
ALT SERPL-CCNC: 17 UNIT/L (ref 0–55)
ANION GAP SERPL CALC-SCNC: 7 MEQ/L
AST SERPL-CCNC: 20 UNIT/L (ref 5–34)
BASOPHILS # BLD AUTO: 0.02 X10(3)/MCL
BASOPHILS NFR BLD AUTO: 0.3 %
BILIRUB SERPL-MCNC: 1.1 MG/DL
BUN SERPL-MCNC: 31 MG/DL (ref 8.4–25.7)
CALCIUM SERPL-MCNC: 9.9 MG/DL (ref 8.8–10)
CHLORIDE SERPL-SCNC: 107 MMOL/L (ref 98–107)
CO2 SERPL-SCNC: 25 MMOL/L (ref 23–31)
CREAT SERPL-MCNC: 1.06 MG/DL (ref 0.73–1.18)
CREAT/UREA NIT SERPL: 29
EOSINOPHIL # BLD AUTO: 0.13 X10(3)/MCL (ref 0–0.9)
EOSINOPHIL NFR BLD AUTO: 1.8 %
ERYTHROCYTE [DISTWIDTH] IN BLOOD BY AUTOMATED COUNT: 0 % (ref 11.5–17)
GFR SERPLBLD CREATININE-BSD FMLA CKD-EPI: >60 ML/MIN/1.73/M2
GLOBULIN SER-MCNC: 2.5 GM/DL (ref 2.4–3.5)
GLUCOSE SERPL-MCNC: 109 MG/DL (ref 82–115)
HCT VFR BLD AUTO: 29.5 % (ref 42–52)
HGB BLD-MCNC: 9.9 G/DL (ref 14–18)
IMM GRANULOCYTES # BLD AUTO: 0.11 X10(3)/MCL (ref 0–0.04)
IMM GRANULOCYTES NFR BLD AUTO: 1.6 %
LYMPHOCYTES # BLD AUTO: 1.62 X10(3)/MCL (ref 0.6–4.6)
LYMPHOCYTES NFR BLD AUTO: 22.9 %
MCH RBC QN AUTO: 38.1 PG (ref 27–31)
MCHC RBC AUTO-ENTMCNC: 33.6 G/DL (ref 33–36)
MCV RBC AUTO: 113.5 FL (ref 80–94)
MONOCYTES # BLD AUTO: 0.86 X10(3)/MCL (ref 0.1–1.3)
MONOCYTES NFR BLD AUTO: 12.2 %
NEUTROPHILS # BLD AUTO: 4.32 X10(3)/MCL (ref 2.1–9.2)
NEUTROPHILS NFR BLD AUTO: 61.2 %
PLATELET # BLD AUTO: 113 X10(3)/MCL (ref 130–400)
PMV BLD AUTO: 10.2 FL (ref 7.4–10.4)
POTASSIUM SERPL-SCNC: 4.2 MMOL/L (ref 3.5–5.1)
PROT SERPL-MCNC: 6.4 GM/DL (ref 5.8–7.6)
RBC # BLD AUTO: 2.6 X10(6)/MCL (ref 4.7–6.1)
SODIUM SERPL-SCNC: 139 MMOL/L (ref 136–145)
WBC # BLD AUTO: 7.06 X10(3)/MCL (ref 4.5–11.5)

## 2024-07-01 PROCEDURE — 80053 COMPREHEN METABOLIC PANEL: CPT

## 2024-07-01 PROCEDURE — 36415 COLL VENOUS BLD VENIPUNCTURE: CPT | Mod: 91

## 2024-07-01 PROCEDURE — 86334 IMMUNOFIX E-PHORESIS SERUM: CPT

## 2024-07-01 PROCEDURE — 85025 COMPLETE CBC W/AUTO DIFF WBC: CPT

## 2024-07-02 ENCOUNTER — OFFICE VISIT (OUTPATIENT)
Dept: HEMATOLOGY/ONCOLOGY | Facility: CLINIC | Age: 74
End: 2024-07-02
Payer: MEDICARE

## 2024-07-02 VITALS
SYSTOLIC BLOOD PRESSURE: 122 MMHG | HEART RATE: 70 BPM | OXYGEN SATURATION: 93 % | DIASTOLIC BLOOD PRESSURE: 60 MMHG | BODY MASS INDEX: 28.06 KG/M2 | WEIGHT: 196 LBS | RESPIRATION RATE: 18 BRPM | TEMPERATURE: 98 F | HEIGHT: 70 IN

## 2024-07-02 DIAGNOSIS — R11.2 CHEMOTHERAPY INDUCED NAUSEA AND VOMITING: ICD-10-CM

## 2024-07-02 DIAGNOSIS — T45.1X5A CHEMOTHERAPY INDUCED NAUSEA AND VOMITING: ICD-10-CM

## 2024-07-02 DIAGNOSIS — C90.01 MULTIPLE MYELOMA IN REMISSION: ICD-10-CM

## 2024-07-02 DIAGNOSIS — E11.9 TYPE 2 DIABETES MELLITUS WITHOUT COMPLICATION, WITHOUT LONG-TERM CURRENT USE OF INSULIN: ICD-10-CM

## 2024-07-02 DIAGNOSIS — I10 PRIMARY HYPERTENSION: Primary | ICD-10-CM

## 2024-07-02 DIAGNOSIS — Z94.81 S/P AUTOLOGOUS BONE MARROW TRANSPLANTATION: ICD-10-CM

## 2024-07-02 DIAGNOSIS — Z94.84 HISTORY OF AUTOLOGOUS STEM CELL TRANSPLANT: ICD-10-CM

## 2024-07-02 PROCEDURE — 99999 PR PBB SHADOW E&M-EST. PATIENT-LVL IV: CPT | Mod: PBBFAC,,, | Performed by: INTERNAL MEDICINE

## 2024-07-02 PROCEDURE — 99214 OFFICE O/P EST MOD 30 MIN: CPT | Mod: S$PBB,,, | Performed by: INTERNAL MEDICINE

## 2024-07-02 PROCEDURE — 99214 OFFICE O/P EST MOD 30 MIN: CPT | Mod: PBBFAC | Performed by: INTERNAL MEDICINE

## 2024-07-02 RX ORDER — CYCLOBENZAPRINE HCL 5 MG
5 TABLET ORAL 3 TIMES DAILY PRN
Qty: 60 TABLET | Refills: 2 | Status: SHIPPED | OUTPATIENT
Start: 2024-07-02

## 2024-07-02 RX ORDER — LIDOCAINE 50 MG/G
1 PATCH TOPICAL DAILY
Qty: 30 PATCH | Refills: 3 | Status: SHIPPED | OUTPATIENT
Start: 2024-07-02

## 2024-07-02 NOTE — PROGRESS NOTES
HEMATOLOGIC MALIGNANCIES PROGRESS NOTE    IDENTIFYING STATEMENT   Chip Yadav) is a 73 y.o. male with a  of 1950 from [place], [place] with the diagnosis of multiple myeloma.      ONCOLOGY HISTORY:    INTERVAL HISTORY:    Today, Mr. Goodson presents to clinic . He is day + 75  post autologous stem cell transplant for myeloma. His hospital course was complicated by bacteremia. He is feeling much better today.     Past Medical History, Past Social History and Past Family History have been reviewed and are unchanged except as noted in the interval history.      Review of Systems   Constitutional:  Positive for malaise/fatigue. Negative for chills, fever and weight loss.   HENT:  Negative for congestion, nosebleeds and tinnitus.    Eyes:  Negative for double vision, pain and discharge.   Respiratory:  Negative for cough, sputum production, shortness of breath and stridor.    Cardiovascular:  Negative for chest pain, claudication and PND.   Gastrointestinal:  Negative for diarrhea, melena, nausea and vomiting.   Genitourinary:  Negative for dysuria and hematuria.   Musculoskeletal:  Negative for back pain, joint pain and neck pain.   Neurological:  Negative for dizziness, tremors, speech change and weakness.   Endo/Heme/Allergies:  Negative for environmental allergies.   Psychiatric/Behavioral:  Negative for depression and substance abuse. The patient is not nervous/anxious.           Vitals:    24 0920   BP: 122/60   Pulse: 70   Resp: 18   Temp: 97.9 °F (36.6 °C)         Physical Exam  Constitutional:       Appearance: Normal appearance.   HENT:      Head: Normocephalic and atraumatic.   Eyes:      General: No scleral icterus.  Cardiovascular:      Rate and Rhythm: Normal rate.      Pulses: Normal pulses.      Heart sounds: Normal heart sounds.   Pulmonary:      Effort: No respiratory distress.      Breath sounds: No stridor. No rhonchi.   Abdominal:      General: There is no distension.       Palpations: There is no mass.      Hernia: No hernia is present.   Musculoskeletal:      Right lower leg: No edema.      Left lower leg: No edema.   Lymphadenopathy:      Cervical: No cervical adenopathy.   Neurological:      General: No focal deficit present.      Mental Status: He is alert.          Current Outpatient Medications   Medication Sig    acyclovir (ZOVIRAX) 800 MG Tab Take 1 tablet (800 mg total) by mouth 2 (two) times daily.    albuterol (PROVENTIL/VENTOLIN HFA) 90 mcg/actuation inhaler 2 puffs every 4 to 6 hours as needed.    amLODIPine (NORVASC) 10 MG tablet Take 1 tablet (10 mg total) by mouth every morning.    ascorbic acid, vitamin C, (VITAMIN C) 500 MG tablet Take 500 mg by mouth once daily.    atorvastatin (LIPITOR) 10 MG tablet Take 10 mg by mouth every evening.    carvediloL (COREG) 6.25 MG tablet Take 1 tablet (6.25 mg total) by mouth 2 (two) times daily.    cholecalciferol, vitamin D3, (VITAMIN D3) 125 mcg (5,000 unit) Tab Take 5,000 Units by mouth once daily.    dapsone 100 MG Tab Take 1 tablet (100 mg total) by mouth once daily.    ferrous sulfate 325 (65 FE) MG EC tablet Take 325 mg by mouth once daily.    finasteride (PROSCAR) 5 mg tablet Take 5 mg by mouth every morning.    HYDROcodone-acetaminophen (NORCO) 7.5-325 mg per tablet Take 1 tablet by mouth every 4 (four) hours as needed for Pain.    levothyroxine (SYNTHROID) 112 MCG tablet Take 112 mcg by mouth every evening.    metFORMIN (GLUCOPHAGE-XR) 500 MG ER 24hr tablet Take 1,000 mg by mouth 2 (two) times daily.    nitroGLYCERIN (NITROSTAT) 0.4 MG SL tablet place one tablet under the tongue every five minutes as needed for chest pain up to 3 doses. if no relief call 911    ondansetron (ZOFRAN) 8 MG tablet Take 1 tablet (8 mg total) by mouth every 8 (eight) hours as needed for Nausea.    ONETOUCH ULTRA TEST Strp USE TO TEST BLOOD GLUCOSE TWICE DAILY    pantoprazole (PROTONIX) 40 MG tablet Take 1 tablet (40 mg total) by mouth once  daily.    ranolazine (RANEXA) 500 MG Tb12 Take 500 mg by mouth 2 (two) times daily.    spironolactone (ALDACTONE) 25 MG tablet Take 12.5 mg by mouth every morning.    hydrALAZINE (APRESOLINE) 50 MG tablet Take 1 tablet (50 mg total) by mouth every 8 (eight) hours.     Current Facility-Administered Medications   Medication    0.9%  NaCl infusion (for blood administration)    0.9%  NaCl infusion (for blood administration)    0.9%  NaCl infusion (for blood administration)      Component      Latest Ref Rng 6/27/2024 7/1/2024   Sodium      136 - 145 mmol/L  139    Potassium      3.5 - 5.1 mmol/L  4.2    Chloride      98 - 107 mmol/L  107    CO2      23 - 31 mmol/L  25    Glucose      82 - 115 mg/dL  109    BUN      8.4 - 25.7 mg/dL  31.0 (H)    Creatinine      0.73 - 1.18 mg/dL  1.06    Calcium      8.8 - 10.0 mg/dL  9.9    PROTEIN TOTAL      5.8 - 7.6 gm/dL  6.4    Albumin      3.4 - 4.8 g/dL  3.9    Globulin, Total      2.4 - 3.5 gm/dL  2.5    Albumin/Globulin Ratio      1.1 - 2.0 ratio  1.6    BILIRUBIN TOTAL      <=1.5 mg/dL  1.1    ALP      40 - 150 unit/L  97    ALT      0 - 55 unit/L  17    AST      5 - 34 unit/L  20    eGFR      mL/min/1.73/m2  >60    Anion Gap      mEq/L  7.0    BUN/CREAT RATIO  29    Magnesium       1.60 - 2.60 mg/dL 1.90             BC 4.50 - 11.50 x10(3)/mcL 7.06   RBC 4.70 - 6.10 x10(6)/mcL 2.60 Low    Hgb 14.0 - 18.0 g/dL 9.9 Low    Hct 42.0 - 52.0 % 29.5 Low    MCV 80.0 - 94.0 fL 113.5 High    MCH 27.0 - 31.0 pg 38.1 High    MCHC 33.0 - 36.0 g/dL 33.6   RDW 11.5 - 17.0 % 0.0 Low    Platelet 130 - 400 x10(3)/mcL 113 Low    MPV 7.4 - 10.4 fL 10.2   Neut % % 61.2   Lymph % % 22.9   Mono % % 12.2   Eos % % 1.8   Basophil % % 0.3   Lymph # 0.6 - 4.6 x10(3)/mcL 1.62   Neut # 2.1 - 9.2 x10(3)/mcL 4.32   Mono # 0.1 - 1.3 x10(3)/mcL 0.86   Eos # 0 - 0.9 x10(3)/mcL 0.13   Baso # <=0.2 x10(3)/mcL 0.02   IG# 0 - 0.04 x10(3)/mcL 0.11 High    IG% % 1.6             Assessment/Plan      Multiple  Myeloma:  - he follows locally with Dr. Rose in [place]  - diagnosed June 2023 with IgA Kappa MM; stage unclear at diagnosis as FISH not available  - started DRd therapy on 7/18/2023  - he stopped the daratumumab after cycle 4 and continue with revlimid and dex only; looks to have completed ~5 cycles  - Admitted for Carmen 140 Auto SCT on 4/16/2024    History of auto stem cell transplant:   - Day 0 on 4/18/2024. Conditioned with Carmen 140.   -day+75 today  - He received 5 bags on 4/18/24 at 1330 and received the remaining 4 bags on 4/19/24 at 1330. Total CD34 dose was 2.91 x 10^6.       Immunodeficiency due to conditions classified elsewhere:   - Continue acyclovir antiviral prophylaxis   - Switched bactrim PJP prophylaxis to dapsone 100mg daily due to cytopenias.     Anemia in neoplastic disease  -- Hgb 9.9g/dl on 7/1/24       5. Thrombocytopenia    -asymptomatic  -platelets 113k on 7/1/24       6. Gemella Haemolysans bactermia   - Continue ceftriaxone through 6/14/2024  - Follow with transplant ID       7. Cancer associated pain    -he has worsening low back pain  -no recent falls  -he is using hydrocodone-APAP as needed  -he will try lidoderm patch, as well as muscle relaxant cyclobenzaprine 5mg TID as needed            BMT Chart Routing      Follow up with physician    Follow up with [facility]    Provider visit type    Infusion scheduling note    Injection scheduling note    Labs CBC, CMP, SPEP, free light chains, immunofixation and immunoglobulins   Scheduling:  Preferred lab:  Lab interval:  labs , BM biopsy, PET CT on 7/25/24   Imaging    Pharmacy appointment    Other referrals       Additional referrals needed  ID clinic on 7/25/24

## 2024-07-02 NOTE — PROGRESS NOTES
HEMATOLOGY/ONCOLOGY OFFICE CLINIC VISIT     Visit Information:     Initial Evaluation: 6/13/2023 (hospital consult)   Referring Provider: Dr Gonzalez  Other providers:  Code status: Not addressed     Diagnosis:  Multiple Myeloma    Present treatment:   supportive    Treatment History:  ---Daratumumab, lenalidomide, and dexamethasone x 4 cycles  initiated 7/12/23  ---Lenalidomide and dexamethasone stopped on 3/6/24  ---Stem Cell Transplant 4/16/24  ---Carmen 140: 5 bags 4/18/24 and 4 bags 4/19/24        Imaging:  CT C 6/7/2023: 1. There is a small left sided pleural effusion.2. A small patchy area of ground glass density is seen in the lateral basal segment of the left lower lobe. This could reflect an acute focal infiltrate / pneumonitis. 3. There is diffuse osteopenia along the visualised bony structures together with multiple, numerous, small round-ovoid lucent lesion of varying sizes involving the marrow. These changes could reflect metabolic bone disease like hyperparathyroidism. Correlate clinically and with laboratory findings, as regards additional evaluation and follow-up.  6/8/23 Bone Scan Whole Body:  1. Scattered activity at the anterior left right rib margins as described.  A CT exam on the previous day reveals no definite fracture.  There is mild heterogeneous and bony loss at the anterior left and right ribs.  This is not have the typical pattern of a metastases.  2. Focal increased activity at the anterior manubrium which again on the CT exam demonstrates osteopenia and heterogeneous bony loss as well as scattered subcentimeter small lytic defects. 3. Suspect mild arthritic changes at the shoulders sternoclavicular joints  6/9/23 MRI Thoracic Spine: Within limitations, no convincing evidence of acute thoracic spine abnormality, high-grade canal stenosis, or focal cord pathology. Multiple scattered vertebral body lesions are suspected and could represent metastatic disease, otherwise of incomplete  characterization by non-contrast protocol. Incidental findings of the partially visualized thoracic cavity and retroperitoneum discussed above, poorly assessed by modality/protocol.  Recent non-contrast chest CT provides overall better visualization.  6/10/23 CT Head: No acute intracranial abnormality.  Findings consistent with microangiopathy no interval change.  6/12/23 XRAY Chest: 1. Patchy hazy opacities again evident at the lower lungs bilaterally suspicious for infiltrate and atelectasis.  Small bibasilar pleural reactions cannot be excluded. 2. Borderline cardiomegaly 3. Atherosclerosis  6/16/23 XRAY Chest: Improved aeration of the lung bases with mild residual bibasilar opacities and small pleural effusions suspected.      Pathology:  6/12/23 Bone marrow aspiration/Biopsy:   PLASMA CELL MYELOMA, KAPPA RESTRICTED.     PLASMA CELL MYELOMA INVOLVES 90% OF BONE MARROW CELLULARITY WITH SMALL AREAS OF  SEQUENTIAL TRILINEAGE HEMATOPOIESIS.    ABSENT IRON STORES.     PERIPHERAL BLOOD: NORMOCYTIC NORMOCHROMIC ANEMIA. THROMBOCYTOPENIA.   3/7/24 bone marrow biopsy:  BONE MARROW ASPIRATE, TOUCH PREP, CLOT, AND DECALCIFIED NEEDLE CORE BIOPSY:  LEFT POSTEROSUPERIOR ILIAC CREST  - NO DEFINITIVE RESIDUAL KAPPA LIGHT CHAIN RESTRICTED MULTIPLE MYELOMA  - Mildly hypercellular marrow (35-45% total cellularity) with non-increased scattered plasma cells and trilineage hematopoiesis with focally increased erythroid precursors (see comments)      CLINICAL HISTORY:       Subjective  Patient: Chip Goodson is a 72 y.o. male That was seen  for the first time as hospital consult on 6/13/2023.   Patient was brought to the emergency department for confusion.  Upon evaluation in the ER CT scan of the head with no acute intracranial abnormality.  Finding consistent with microangiopathic no interval change.  CT of the chest with no contrast showed diffuse osteopenia with multiple, numerous, small round avid lucent lesions ovarian size  involving the marrow.  Changes could reflect metabolic bone disease like hyperparathyroidism.  MRI of the thoracic spine with multiple scattered vertebral body lesion are suspect and could represent metastatic disease.  Bone scan with scattered activity in the anterior left right rib margins no fractures focal increased activity at the anterior manubrium scattered subcentimeter small lytic defects.  Ultrasound of the thyroid that shows multiple nodules. skeletal survey  area on humerus only.     Labs with elevated calcium of 12.6, corrected calcium 14.36.  Total protein was 11.8 with a total globulin 10.0.  Further workup revealed an IgA over 7040.0, IgM 6.0, IgG 165.00.  Free serum kappa light chain 5.09, free serum lambda light chains 0.5600 with a kappa/lambda ratio of 9.09.  M spike 3.33.  PSA 0.96     Bone marrow biopsy performed 6/12/23. Patient receive pamidronate 60 mg on 06/09/2023 and was started on hydration.  His calcium improved as well as his mental status.    Interval History:    7/3/2024: He returns to the clinic today for a four week follow-up. He was seen by Dr. Evans yesterday.  Current plan is for a BMB and PET on 7/25 with OV for review on 7/31. Reports he was given vaccine schedule, due to begin 8/2024. He will bring to next OV to begin vaccines. He feels well today without any complaints are concerns.     6/4/2024:Patient returns to clinic for a follow up following stem cell transplant. He received SCT on 4/16/24. During his hospitalization in [place] following SCT, he developed bacteremia. He is currently taking acyclovir twice daily prophylactic. He will be on ceftriaxone until 6/14/24. He was discharged from [facility] on 5/20/24. Patient was to have sooner appointment following discharge of [facility] but patient stated that his wife and himself had other appointments to go to and agreed to today's appointment date. He does have [facility] home health services. He does have a PICC line in  left arm, dressing changes done per home health. Labs are performed twice weekly. He has received many blood and platelet transfusions since leaving [facility]. He has a follow up with [facility] on 6/14/24. He does report some SOB on excursion. He denies blood in stools or urine.      Review of Systems   Constitutional:  Negative for chills, diaphoresis and fever.   HENT:  Negative for congestion, nosebleeds and sore throat.    Eyes: Negative.    Respiratory:  Negative for cough and shortness of breath.    Cardiovascular:  Negative for chest pain and palpitations.   Gastrointestinal:  Negative for abdominal pain, blood in stool, constipation, diarrhea, nausea and vomiting.   Genitourinary:  Negative for dysuria, frequency, hematuria and urgency.   Musculoskeletal:  Negative for back pain and myalgias.   Skin:  Negative for rash.   Neurological:  Negative for dizziness, tremors, weakness and headaches.   Endo/Heme/Allergies:  Does not bruise/bleed easily.   Psychiatric/Behavioral:  Negative for hallucinations and suicidal ideas. The patient is not nervous/anxious.        Review of patient's allergies indicates:   Allergen Reactions    Iodine Anaphylaxis    Shellfish containing products     Poison ivy extract     Amoxicillin-pot clavulanate Itching      Current Outpatient Medications on File Prior to Visit   Medication Sig Dispense Refill    acyclovir (ZOVIRAX) 800 MG Tab Take 1 tablet (800 mg total) by mouth 2 (two) times daily. 60 tablet 11    albuterol (PROVENTIL/VENTOLIN HFA) 90 mcg/actuation inhaler 2 puffs every 4 to 6 hours as needed.      amLODIPine (NORVASC) 10 MG tablet Take 1 tablet (10 mg total) by mouth every morning.      ascorbic acid, vitamin C, (VITAMIN C) 500 MG tablet Take 500 mg by mouth once daily.      atorvastatin (LIPITOR) 10 MG tablet Take 10 mg by mouth every evening.      carvediloL (COREG) 6.25 MG tablet Take 1 tablet (6.25 mg total) by mouth 2 (two) times daily. 180 tablet 3     cholecalciferol, vitamin D3, (VITAMIN D3) 125 mcg (5,000 unit) Tab Take 5,000 Units by mouth once daily.      cyclobenzaprine (FLEXERIL) 5 MG tablet Take 1 tablet (5 mg total) by mouth 3 (three) times daily as needed for Muscle spasms. 60 tablet 2    dapsone 100 MG Tab Take 1 tablet (100 mg total) by mouth once daily. 30 tablet 5    ferrous sulfate 325 (65 FE) MG EC tablet Take 325 mg by mouth once daily.      finasteride (PROSCAR) 5 mg tablet Take 5 mg by mouth every morning.      HYDROcodone-acetaminophen (NORCO) 7.5-325 mg per tablet Take 1 tablet by mouth every 4 (four) hours as needed for Pain. 90 tablet 0    levothyroxine (SYNTHROID) 112 MCG tablet Take 112 mcg by mouth every evening.      LIDOcaine (LIDODERM) 5 % Place 1 patch onto the skin once daily. Remove & Discard patch within 12 hours or as directed by MD 30 patch 3    metFORMIN (GLUCOPHAGE-XR) 500 MG ER 24hr tablet Take 1,000 mg by mouth 2 (two) times daily.      nitroGLYCERIN (NITROSTAT) 0.4 MG SL tablet place one tablet under the tongue every five minutes as needed for chest pain up to 3 doses. if no relief call 911      ComunitaeTOBeiBei ULTRA TEST Strp USE TO TEST BLOOD GLUCOSE TWICE DAILY      pantoprazole (PROTONIX) 40 MG tablet Take 1 tablet (40 mg total) by mouth once daily. 30 tablet 11    ranolazine (RANEXA) 500 MG Tb12 Take 500 mg by mouth 2 (two) times daily.      spironolactone (ALDACTONE) 25 MG tablet Take 12.5 mg by mouth every morning.      valsartan (DIOVAN) 160 MG tablet Take 160 mg by mouth.      hydrALAZINE (APRESOLINE) 50 MG tablet Take 1 tablet (50 mg total) by mouth every 8 (eight) hours. 90 tablet 11    [] ondansetron (ZOFRAN) 8 MG tablet Take 1 tablet (8 mg total) by mouth every 8 (eight) hours as needed for Nausea. 30 tablet 2     Current Facility-Administered Medications on File Prior to Visit   Medication Dose Route Frequency Provider Last Rate Last Admin    0.9%  NaCl infusion (for blood administration)   Intravenous Once  "Marjan Eli, FNP        0.9%  NaCl infusion (for blood administration)   Intravenous Once Marjan Eli, FNP        0.9%  NaCl infusion (for blood administration)   Intravenous Once Marjan Eli, FNP              Vitals:    07/03/24 1010   BP: 129/75   BP Location: Left arm   Pulse: 71   Resp: 20   Temp: 98.4 °F (36.9 °C)   SpO2: 95%   Weight: 88.7 kg (195 lb 9.6 oz)   Height: 5' 10" (1.778 m)       Wt Readings from Last 6 Encounters:   07/03/24 88.7 kg (195 lb 9.6 oz)   07/02/24 88.9 kg (195 lb 15.8 oz)   06/14/24 87.5 kg (192 lb 14.4 oz)   06/04/24 87.8 kg (193 lb 8 oz)   05/31/24 87.5 kg (193 lb)   05/29/24 88 kg (194 lb)     Body mass index is 28.07 kg/m².  Body surface area is 2.09 meters squared.    Physical Exam  Constitutional:       General: He is awake.      Appearance: Normal appearance. He is well-developed.   HENT:      Head: Normocephalic and atraumatic.   Eyes:      General: No scleral icterus.     Extraocular Movements: Extraocular movements intact.      Conjunctiva/sclera: Conjunctivae normal.   Neck:      Vascular: No JVD.   Cardiovascular:      Rate and Rhythm: Normal rate and regular rhythm.      Heart sounds: No murmur heard.  Pulmonary:      Effort: Pulmonary effort is normal.      Breath sounds: Normal breath sounds.   Abdominal:      General: There is no distension.      Palpations: Abdomen is soft.      Tenderness: There is no abdominal tenderness.   Musculoskeletal:      Cervical back: Neck supple.   Lymphadenopathy:      Head:      Right side of head: No submental or submandibular adenopathy.      Left side of head: No submental or submandibular adenopathy.      Cervical: No cervical adenopathy.      Upper Body:      Right upper body: No supraclavicular or axillary adenopathy.      Left upper body: No supraclavicular or axillary adenopathy.      Lower Body: No right inguinal adenopathy. No left inguinal adenopathy.   Skin:     General: Skin is warm.      Findings: No rash.      " Nails: There is no clubbing.   Neurological:      General: No focal deficit present.      Mental Status: He is alert.      Cranial Nerves: Cranial nerves 2-12 are intact.   Psychiatric:         Attention and Perception: Attention normal.         Mood and Affect: Mood normal.         Behavior: Behavior is cooperative.         Cognition and Memory: Cognition normal.         Judgment: Judgment normal.       ECOG SCORE    1 - Restricted in strenuous activity-ambulatory and able to carry out work of a light nature          Laboratory:  CBC with Differential:  Lab Results   Component Value Date    WBC 7.71 07/03/2024    RBC 2.53 (L) 07/03/2024    HGB 9.8 (L) 07/03/2024    HCT 28.9 (L) 07/03/2024    .2 (H) 07/03/2024    MCH 38.7 (H) 07/03/2024    MCHC 33.9 07/03/2024    RDW  07/03/2024      Comment:      Unable to obtain     (L) 07/03/2024    MPV 10.3 07/03/2024    GRAN 0.3 (L) 05/20/2024    GRAN 37.1 (L) 05/20/2024    LYMPH 0.4 (L) 05/20/2024    LYMPH 52.9 (H) 05/20/2024    MONO 0.1 (L) 05/20/2024    MONO 8.6 05/20/2024    EOS 0.0 05/20/2024    BASO 0.00 05/20/2024    EOSINOPHIL 0.0 05/20/2024    BASOPHIL 0.0 05/20/2024   INITIAL MYELOMA LABS:  Serum:  SPEP: Serum protein electrophoresis shows a distinct monoclonal peak (3.33 g/dL) in the beta zone, consistent with a monoclonal gammopathy.   PAO: A band is present in the IgA trini with a corresponding band in the kappa trini.   The immunofixation electrophoresis are consistent with monoclonal gammopathy of IgA-kappa isotype.     IgG Level 540.00 - 1,822.00 mg/dL 165.00 Low     IgA Level 101.0 - 645.0 mg/dL >7,040.0 High     IgM Level 22.0 - 240.0 mg/dL 6.0 Low       Kappa Free Light Chain 0.3300 - 1.94 mg/dL 5.09 High     Lambda Free Light Chain 0.5700 - 2.63 mg/dL 0.5600 Low     Kappa/Lambda FLC Ratio 0.2600 - 1.65 9.09 High      Urine:   24 hrs Urine:  M spike    1467      H    mg/24 h      LABS:  6/8/23 M-spike 3.33, IgA >7040, serum kappa 5/ lambda 0.56/  K:L ratio 9.09, 24 hour urine for M protein done but not resulted. Calcium 13.4/Alb 1.9, Cr 2.3, globulin 10.7  7/12/23 M-spike 2.49. IgA >7040, Corrected calcium 12.3, Cr 2.09, Globulin 7.6  8/15/23 M-spike 1.49, IgA 4000  9/18/23 M-spike 1.0, IgA 1700  1/19/24 M-spike 0 (smal protein in beta fraction), IgA 335, KFLC 1.61/LFLC 0.92/ ratio 1.75  3/18/24 M-spike 0.11, IgG 516, KFLC 1.43/LFLC 0.86/ ratio 1.66      CMP:  Sodium   Date Value Ref Range Status   07/01/2024 139 136 - 145 mmol/L Final   05/20/2024 137 136 - 145 mmol/L Final     Potassium   Date Value Ref Range Status   07/01/2024 4.2 3.5 - 5.1 mmol/L Final   05/20/2024 4.7 3.5 - 5.1 mmol/L Final     Chloride   Date Value Ref Range Status   07/01/2024 107 98 - 107 mmol/L Final   05/20/2024 105 95 - 110 mmol/L Final     CO2   Date Value Ref Range Status   07/01/2024 25 23 - 31 mmol/L Final   05/20/2024 24 23 - 29 mmol/L Final     Glucose   Date Value Ref Range Status   05/20/2024 158 (H) 70 - 110 mg/dL Final     BUN   Date Value Ref Range Status   05/20/2024 24 (H) 8 - 23 mg/dL Final     Blood Urea Nitrogen   Date Value Ref Range Status   07/01/2024 31.0 (H) 8.4 - 25.7 mg/dL Final     Creatinine   Date Value Ref Range Status   07/01/2024 1.06 0.73 - 1.18 mg/dL Final   05/20/2024 1.2 0.5 - 1.4 mg/dL Final     Calcium   Date Value Ref Range Status   07/01/2024 9.9 8.8 - 10.0 mg/dL Final   05/20/2024 9.7 8.7 - 10.5 mg/dL Final     Total Protein   Date Value Ref Range Status   05/20/2024 5.9 (L) 6.0 - 8.4 g/dL Final     Albumin   Date Value Ref Range Status   07/01/2024 3.9 3.4 - 4.8 g/dL Final   05/20/2024 3.5 3.5 - 5.2 g/dL Final     Total Bilirubin   Date Value Ref Range Status   05/20/2024 1.2 (H) 0.1 - 1.0 mg/dL Final     Comment:     For infants and newborns, interpretation of results should be based  on gestational age, weight and in agreement with clinical  observations.    Premature Infant recommended reference ranges:  Up to 24 hours.............<8.0  mg/dL  Up to 48 hours............<12.0 mg/dL  3-5 days..................<15.0 mg/dL  6-29 days.................<15.0 mg/dL       Bilirubin Total   Date Value Ref Range Status   07/01/2024 1.1 <=1.5 mg/dL Final     Alkaline Phosphatase   Date Value Ref Range Status   05/20/2024 115 55 - 135 U/L Final     ALP   Date Value Ref Range Status   07/01/2024 97 40 - 150 unit/L Final     AST   Date Value Ref Range Status   07/01/2024 20 5 - 34 unit/L Final   05/20/2024 10 10 - 40 U/L Final     ALT   Date Value Ref Range Status   07/01/2024 17 0 - 55 unit/L Final   05/20/2024 15 10 - 44 U/L Final     Anion Gap   Date Value Ref Range Status   05/20/2024 8 8 - 16 mmol/L Final      Component      Latest Ref Rng 1/19/2024   Sodium      136 - 145 mmol/L 137    Potassium      3.5 - 5.1 mmol/L 3.6    Chloride      98 - 107 mmol/L 103    CO2      23 - 31 mmol/L 29    Glucose      82 - 115 mg/dL 118 (H)    BUN      8.4 - 25.7 mg/dL 28.0 (H)    Creatinine      0.73 - 1.18 mg/dL 1.15    Calcium      8.8 - 10.0 mg/dL 9.4    PROTEIN TOTAL      5.8 - 7.6 gm/dL 6.0    Albumin      3.4 - 4.8 g/dL 3.8    Globulin, Total      2.4 - 3.5 gm/dL 2.2 (L)    Albumin/Globulin Ratio      1.1 - 2.0 ratio 1.7    BILIRUBIN TOTAL      <=1.5 mg/dL 1.0    ALP      40 - 150 unit/L 104    ALT      0 - 55 unit/L 17    AST      5 - 34 unit/L 11    eGFR      mls/min/1.73/m2 >60    Kappa Free Light Chain      0.3300 - 1.94 mg/dL 1.61    Lambda Free Light Chain      0.5700 - 2.63 mg/dL 0.9200    Kappa/Lambda FLC Ratio      0.2600 - 1.65  1.75 (H)    IgG      540.00 - 1,822.00 mg/dL 454.00 (L)    IgA      101.0 - 645.0 mg/dL 335.0    IgM      22.0 - 240.0 mg/dL 15.0 (L)    LD      125 - 220 U/L 111 (L)        12/29/23 SPEP  Small abnormality in beta fraction. .     M-protein Isotype MALDI-TOF MS        IgA kappa, small monoclonal.   Suggest quantitative immunoglobulin level to assist in following monoclonal protein.        Assessment  1. S/P autologous bone marrow  transplantation    2. Multiple myeloma in remission    3. Chemotherapy induced nausea and vomiting    4. History of autologous stem cell transplant    5. Pancytopenia    6. Immunodeficiency    7. History of auto stem cell transplant    8. Neutropenia, unspecified type    9. Immunodeficiency due to conditions classified elsewhere    10. Pancytopenia due to chemotherapy    11. Thrombocytopenia, unspecified        Multiple Myeloma Dx 6/2023:  ---Daratumumab, lenalidomide, and dexamethasone x 4 cycles  initiated 7/12/23  ---Lenalidomide and dexamethasone stopped on 3/6/24  ---Stem Cell Transplant 4/16/24  ---Carmen 140: 5 bags 4/18/24 and 4 bags 4/19/24    2. Pancytopenia  --labs q Monday and Thursday  -- transfuse 1-2 units of PRBCs for hemoglobin < 7 or clinically appropriate.  --Transfuse 1 unit of platelets if platelets < 10 K or clinically appropriate    3. 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  ---Cont prophylactic antimicrobials    4. Gemella Haemolysans bacteremia  --Cont ceftriaxone until 6/14/2024  --Keep appts with ID      Plan:  Labs improving.  Continue bi-weekly labs- can be done by [facility] on Tuesday and Thursday   PET and BMB as scheduled 7/25 with OV with Dr. Evans on 7/31  RTC in 5 weeks with MD/NP with labs  Cbc, cmp, mag, phos- 1 hr prior @ [facility]      Transfuse if hgb<7 and platelets < 10. CMV negative and irritated         VICKY Pineda-C  Oncology/Hematology   [facility]

## 2024-07-03 ENCOUNTER — OFFICE VISIT (OUTPATIENT)
Dept: HEMATOLOGY/ONCOLOGY | Facility: CLINIC | Age: 74
End: 2024-07-03
Payer: MEDICARE

## 2024-07-03 VITALS
HEART RATE: 71 BPM | DIASTOLIC BLOOD PRESSURE: 75 MMHG | HEIGHT: 70 IN | TEMPERATURE: 98 F | OXYGEN SATURATION: 95 % | WEIGHT: 195.63 LBS | RESPIRATION RATE: 20 BRPM | SYSTOLIC BLOOD PRESSURE: 129 MMHG | BODY MASS INDEX: 28.01 KG/M2

## 2024-07-03 DIAGNOSIS — Z94.84 HISTORY OF AUTOLOGOUS STEM CELL TRANSPLANT: ICD-10-CM

## 2024-07-03 DIAGNOSIS — Z94.81 S/P AUTOLOGOUS BONE MARROW TRANSPLANTATION: Primary | ICD-10-CM

## 2024-07-03 DIAGNOSIS — C90.01 MULTIPLE MYELOMA IN REMISSION: ICD-10-CM

## 2024-07-03 DIAGNOSIS — D70.9 NEUTROPENIA, UNSPECIFIED TYPE: ICD-10-CM

## 2024-07-03 DIAGNOSIS — Z94.84 HISTORY OF AUTO STEM CELL TRANSPLANT: ICD-10-CM

## 2024-07-03 DIAGNOSIS — D84.81 IMMUNODEFICIENCY DUE TO CONDITIONS CLASSIFIED ELSEWHERE: ICD-10-CM

## 2024-07-03 DIAGNOSIS — D69.6 THROMBOCYTOPENIA, UNSPECIFIED: ICD-10-CM

## 2024-07-03 DIAGNOSIS — D61.818 PANCYTOPENIA: ICD-10-CM

## 2024-07-03 DIAGNOSIS — R11.2 CHEMOTHERAPY INDUCED NAUSEA AND VOMITING: ICD-10-CM

## 2024-07-03 DIAGNOSIS — D61.810 PANCYTOPENIA DUE TO CHEMOTHERAPY: ICD-10-CM

## 2024-07-03 DIAGNOSIS — D84.9 IMMUNODEFICIENCY: ICD-10-CM

## 2024-07-03 DIAGNOSIS — T45.1X5A CHEMOTHERAPY INDUCED NAUSEA AND VOMITING: ICD-10-CM

## 2024-07-03 PROCEDURE — 99215 OFFICE O/P EST HI 40 MIN: CPT | Mod: S$PBB,,,

## 2024-07-03 PROCEDURE — 99215 OFFICE O/P EST HI 40 MIN: CPT | Mod: PBBFAC

## 2024-07-03 PROCEDURE — 99999 PR PBB SHADOW E&M-EST. PATIENT-LVL V: CPT | Mod: PBBFAC,,,

## 2024-07-03 RX ORDER — VALSARTAN 160 MG/1
160 TABLET ORAL
COMMUNITY
Start: 2024-06-26

## 2024-07-05 ENCOUNTER — LAB VISIT (OUTPATIENT)
Dept: LAB | Facility: HOSPITAL | Age: 74
End: 2024-07-05
Payer: MEDICARE

## 2024-07-05 DIAGNOSIS — Z94.81 S/P AUTOLOGOUS BONE MARROW TRANSPLANTATION: ICD-10-CM

## 2024-07-05 DIAGNOSIS — C90.01 MULTIPLE MYELOMA IN REMISSION: ICD-10-CM

## 2024-07-05 LAB
ALBUMIN SERPL-MCNC: 4 G/DL (ref 3.4–4.8)
ALBUMIN/GLOB SERPL: 1.6 RATIO (ref 1.1–2)
ALP SERPL-CCNC: 88 UNIT/L (ref 40–150)
ALT SERPL-CCNC: 16 UNIT/L (ref 0–55)
ANION GAP SERPL CALC-SCNC: 5 MEQ/L
AST SERPL-CCNC: 22 UNIT/L (ref 5–34)
BASOPHILS # BLD AUTO: 0.04 X10(3)/MCL
BASOPHILS NFR BLD AUTO: 0.5 %
BILIRUB SERPL-MCNC: 1 MG/DL
BUN SERPL-MCNC: 26 MG/DL (ref 8.4–25.7)
CALCIUM SERPL-MCNC: 9.9 MG/DL (ref 8.8–10)
CHLORIDE SERPL-SCNC: 107 MMOL/L (ref 98–107)
CO2 SERPL-SCNC: 26 MMOL/L (ref 23–31)
CREAT SERPL-MCNC: 1.1 MG/DL (ref 0.73–1.18)
CREAT/UREA NIT SERPL: 24
EOSINOPHIL # BLD AUTO: 0.43 X10(3)/MCL (ref 0–0.9)
EOSINOPHIL NFR BLD AUTO: 5.7 %
ERYTHROCYTE [DISTWIDTH] IN BLOOD BY AUTOMATED COUNT: 20.5 % (ref 11.5–17)
GFR SERPLBLD CREATININE-BSD FMLA CKD-EPI: >60 ML/MIN/1.73/M2
GLOBULIN SER-MCNC: 2.5 GM/DL (ref 2.4–3.5)
GLUCOSE SERPL-MCNC: 117 MG/DL (ref 82–115)
HCT VFR BLD AUTO: 29.8 % (ref 42–52)
HGB BLD-MCNC: 10.2 G/DL (ref 14–18)
IMM GRANULOCYTES # BLD AUTO: 0.26 X10(3)/MCL (ref 0–0.04)
IMM GRANULOCYTES NFR BLD AUTO: 3.4 %
LYMPHOCYTES # BLD AUTO: 1.54 X10(3)/MCL (ref 0.6–4.6)
LYMPHOCYTES NFR BLD AUTO: 20.3 %
MCH RBC QN AUTO: 39.4 PG (ref 27–31)
MCHC RBC AUTO-ENTMCNC: 34.2 G/DL (ref 33–36)
MCV RBC AUTO: 115.1 FL (ref 80–94)
MONOCYTES # BLD AUTO: 0.94 X10(3)/MCL (ref 0.1–1.3)
MONOCYTES NFR BLD AUTO: 12.4 %
NEUTROPHILS # BLD AUTO: 4.38 X10(3)/MCL (ref 2.1–9.2)
NEUTROPHILS NFR BLD AUTO: 57.7 %
PLATELET # BLD AUTO: 102 X10(3)/MCL (ref 130–400)
PMV BLD AUTO: 8.9 FL (ref 7.4–10.4)
POTASSIUM SERPL-SCNC: 4.1 MMOL/L (ref 3.5–5.1)
PROT SERPL-MCNC: 6.5 GM/DL (ref 5.8–7.6)
RBC # BLD AUTO: 2.59 X10(6)/MCL (ref 4.7–6.1)
SODIUM SERPL-SCNC: 138 MMOL/L (ref 136–145)
WBC # BLD AUTO: 7.59 X10(3)/MCL (ref 4.5–11.5)

## 2024-07-05 PROCEDURE — 80053 COMPREHEN METABOLIC PANEL: CPT

## 2024-07-05 PROCEDURE — 36415 COLL VENOUS BLD VENIPUNCTURE: CPT

## 2024-07-05 PROCEDURE — 85025 COMPLETE CBC W/AUTO DIFF WBC: CPT

## 2024-07-08 LAB — BEAKER SEE SCANNED REPORT: NORMAL

## 2024-07-09 ENCOUNTER — LAB VISIT (OUTPATIENT)
Dept: LAB | Facility: HOSPITAL | Age: 74
End: 2024-07-09
Payer: MEDICARE

## 2024-07-09 DIAGNOSIS — C90.01 MULTIPLE MYELOMA IN REMISSION: ICD-10-CM

## 2024-07-09 DIAGNOSIS — Z94.81 S/P AUTOLOGOUS BONE MARROW TRANSPLANTATION: ICD-10-CM

## 2024-07-09 LAB
ABS NEUT CALC (OHS): 4.28 X10(3)/MCL (ref 2.1–9.2)
ALBUMIN SERPL-MCNC: 3.7 G/DL (ref 3.4–4.8)
ALBUMIN/GLOB SERPL: 1.5 RATIO (ref 1.1–2)
ALP SERPL-CCNC: 106 UNIT/L (ref 40–150)
ALT SERPL-CCNC: 25 UNIT/L (ref 0–55)
ANION GAP SERPL CALC-SCNC: 6 MEQ/L
AST SERPL-CCNC: 30 UNIT/L (ref 5–34)
BASOPHILS NFR BLD MANUAL: 0.08 X10(3)/MCL (ref 0–0.2)
BASOPHILS NFR BLD MANUAL: 1 % (ref 0–2)
BILIRUB SERPL-MCNC: 0.7 MG/DL
BUN SERPL-MCNC: 26 MG/DL (ref 8.4–25.7)
CALCIUM SERPL-MCNC: 9.7 MG/DL (ref 8.8–10)
CHLORIDE SERPL-SCNC: 107 MMOL/L (ref 98–107)
CO2 SERPL-SCNC: 26 MMOL/L (ref 23–31)
CREAT SERPL-MCNC: 1.05 MG/DL (ref 0.73–1.18)
CREAT/UREA NIT SERPL: 25
EOSINOPHIL NFR BLD MANUAL: 0.23 X10(3)/MCL (ref 0–0.9)
EOSINOPHIL NFR BLD MANUAL: 3 % (ref 0–8)
ERYTHROCYTE [DISTWIDTH] IN BLOOD BY AUTOMATED COUNT: 17.1 % (ref 11.5–17)
GFR SERPLBLD CREATININE-BSD FMLA CKD-EPI: >60 ML/MIN/1.73/M2
GLOBULIN SER-MCNC: 2.5 GM/DL (ref 2.4–3.5)
GLUCOSE SERPL-MCNC: 127 MG/DL (ref 82–115)
HCT VFR BLD AUTO: 29.3 % (ref 42–52)
HGB BLD-MCNC: 10 G/DL (ref 14–18)
LYMPHOCYTES NFR BLD MANUAL: 2.29 X10(3)/MCL
LYMPHOCYTES NFR BLD MANUAL: 30 % (ref 13–40)
MCH RBC QN AUTO: 40.2 PG (ref 27–31)
MCHC RBC AUTO-ENTMCNC: 34.1 G/DL (ref 33–36)
MCV RBC AUTO: 117.7 FL (ref 80–94)
METAMYELOCYTES NFR BLD MANUAL: 2 %
MONOCYTES NFR BLD MANUAL: 0.61 X10(3)/MCL (ref 0.1–1.3)
MONOCYTES NFR BLD MANUAL: 8 % (ref 2–11)
NEUTROPHILS NFR BLD MANUAL: 50 % (ref 47–80)
NEUTS BAND NFR BLD MANUAL: 6 % (ref 0–11)
PLATELET # BLD AUTO: 88 X10(3)/MCL (ref 130–400)
PLATELET # BLD EST: ABNORMAL 10*3/UL
PMV BLD AUTO: 9.7 FL (ref 7.4–10.4)
POTASSIUM SERPL-SCNC: 4 MMOL/L (ref 3.5–5.1)
PROT SERPL-MCNC: 6.2 GM/DL (ref 5.8–7.6)
RBC # BLD AUTO: 2.49 X10(6)/MCL (ref 4.7–6.1)
RBC MORPH BLD: NORMAL
SODIUM SERPL-SCNC: 139 MMOL/L (ref 136–145)
WBC # BLD AUTO: 7.65 X10(3)/MCL (ref 4.5–11.5)

## 2024-07-09 PROCEDURE — 85025 COMPLETE CBC W/AUTO DIFF WBC: CPT

## 2024-07-09 PROCEDURE — 85007 BL SMEAR W/DIFF WBC COUNT: CPT

## 2024-07-09 PROCEDURE — 36415 COLL VENOUS BLD VENIPUNCTURE: CPT

## 2024-07-09 PROCEDURE — 85027 COMPLETE CBC AUTOMATED: CPT

## 2024-07-09 PROCEDURE — 80053 COMPREHEN METABOLIC PANEL: CPT

## 2024-07-11 ENCOUNTER — LAB VISIT (OUTPATIENT)
Dept: LAB | Facility: HOSPITAL | Age: 74
End: 2024-07-11
Payer: MEDICARE

## 2024-07-11 DIAGNOSIS — C90.01 MULTIPLE MYELOMA IN REMISSION: ICD-10-CM

## 2024-07-11 DIAGNOSIS — Z94.81 S/P AUTOLOGOUS BONE MARROW TRANSPLANTATION: ICD-10-CM

## 2024-07-11 LAB
ALBUMIN SERPL-MCNC: 3.6 G/DL (ref 3.4–4.8)
ALBUMIN/GLOB SERPL: 1.5 RATIO (ref 1.1–2)
ALP SERPL-CCNC: 95 UNIT/L (ref 40–150)
ALT SERPL-CCNC: 18 UNIT/L (ref 0–55)
ANION GAP SERPL CALC-SCNC: 6 MEQ/L
AST SERPL-CCNC: 23 UNIT/L (ref 5–34)
BASOPHILS # BLD AUTO: 0.04 X10(3)/MCL
BASOPHILS NFR BLD AUTO: 0.6 %
BILIRUB SERPL-MCNC: 0.8 MG/DL
BUN SERPL-MCNC: 18 MG/DL (ref 8.4–25.7)
CALCIUM SERPL-MCNC: 9.5 MG/DL (ref 8.8–10)
CHLORIDE SERPL-SCNC: 107 MMOL/L (ref 98–107)
CO2 SERPL-SCNC: 27 MMOL/L (ref 23–31)
CREAT SERPL-MCNC: 0.93 MG/DL (ref 0.73–1.18)
CREAT/UREA NIT SERPL: 19
EOSINOPHIL # BLD AUTO: 0.79 X10(3)/MCL (ref 0–0.9)
EOSINOPHIL NFR BLD AUTO: 11.9 %
ERYTHROCYTE [DISTWIDTH] IN BLOOD BY AUTOMATED COUNT: 15 % (ref 11.5–17)
GFR SERPLBLD CREATININE-BSD FMLA CKD-EPI: >60 ML/MIN/1.73/M2
GLOBULIN SER-MCNC: 2.4 GM/DL (ref 2.4–3.5)
GLUCOSE SERPL-MCNC: 118 MG/DL (ref 82–115)
HCT VFR BLD AUTO: 30 % (ref 42–52)
HGB BLD-MCNC: 9.9 G/DL (ref 14–18)
IMM GRANULOCYTES # BLD AUTO: 0.33 X10(3)/MCL (ref 0–0.04)
IMM GRANULOCYTES NFR BLD AUTO: 5 %
LYMPHOCYTES # BLD AUTO: 1.19 X10(3)/MCL (ref 0.6–4.6)
LYMPHOCYTES NFR BLD AUTO: 18 %
MCH RBC QN AUTO: 39.4 PG (ref 27–31)
MCHC RBC AUTO-ENTMCNC: 33 G/DL (ref 33–36)
MCV RBC AUTO: 119.5 FL (ref 80–94)
MONOCYTES # BLD AUTO: 0.67 X10(3)/MCL (ref 0.1–1.3)
MONOCYTES NFR BLD AUTO: 10.1 %
NEUTROPHILS # BLD AUTO: 3.6 X10(3)/MCL (ref 2.1–9.2)
NEUTROPHILS NFR BLD AUTO: 54.4 %
PLATELET # BLD AUTO: 94 X10(3)/MCL (ref 130–400)
PMV BLD AUTO: 10 FL (ref 7.4–10.4)
POTASSIUM SERPL-SCNC: 3.9 MMOL/L (ref 3.5–5.1)
PROT SERPL-MCNC: 6 GM/DL (ref 5.8–7.6)
RBC # BLD AUTO: 2.51 X10(6)/MCL (ref 4.7–6.1)
SODIUM SERPL-SCNC: 140 MMOL/L (ref 136–145)
WBC # BLD AUTO: 6.62 X10(3)/MCL (ref 4.5–11.5)

## 2024-07-11 PROCEDURE — 36415 COLL VENOUS BLD VENIPUNCTURE: CPT

## 2024-07-11 PROCEDURE — 80053 COMPREHEN METABOLIC PANEL: CPT

## 2024-07-11 PROCEDURE — 85025 COMPLETE CBC W/AUTO DIFF WBC: CPT

## 2024-07-15 ENCOUNTER — TELEPHONE (OUTPATIENT)
Dept: INFECTIOUS DISEASES | Facility: CLINIC | Age: 74
End: 2024-07-15
Payer: MEDICARE

## 2024-07-15 ENCOUNTER — TELEPHONE (OUTPATIENT)
Dept: HEMATOLOGY/ONCOLOGY | Facility: CLINIC | Age: 74
End: 2024-07-15
Payer: MEDICARE

## 2024-07-15 NOTE — TELEPHONE ENCOUNTER
----- Message from Radha iLm DO sent at 7/15/2024  2:46 PM CDT -----  Regarding: RE: Reschedule Appt  Contact: [phone]  Can you let them know he doesn't need to se me until October, so can schedule him for them  Let them know he can get COVID vaccine and new flu vaccine this fall, even if he hasn't seen me yet    Thanks  ----- Message -----  From: Marilee Junior MA  Sent: 7/15/2024   1:29 PM CDT  To: Radha Lim DO  Subject: FW: Reschedule Appt                              Txp pt, his appt got switched to 23rd. They are wondering if they can do virtual on 25th if can't be switched to 23rd. (You are on COMP 23rd)  ----- Message -----  From: Kulwant Sanabria  Sent: 7/15/2024   1:24 PM CDT  To: [facility] Staff  Subject: Reschedule Appt                                  Hi, pt's daughter Ashleigh called to request a call to reschedule the appt for 07/25/24. Pls call   Ashleigh Goodson (Daughter)  [phone] to reschedule.    Thank you.

## 2024-07-15 NOTE — TELEPHONE ENCOUNTER
----- Message from Hayde Astudillo sent at 7/15/2024 10:04 AM CDT -----  Regarding: Reschedule  Contact: Pt's Daughter  Pt's Daughter calling to Reschedule Biopsy for 07/25/    Please call to advise    Phone [phone] Sotelo     Thank you

## 2024-07-16 ENCOUNTER — LAB VISIT (OUTPATIENT)
Dept: LAB | Facility: HOSPITAL | Age: 74
End: 2024-07-16
Payer: MEDICARE

## 2024-07-16 DIAGNOSIS — C90.01 MULTIPLE MYELOMA IN REMISSION: ICD-10-CM

## 2024-07-16 DIAGNOSIS — Z94.81 S/P AUTOLOGOUS BONE MARROW TRANSPLANTATION: ICD-10-CM

## 2024-07-16 LAB
ALBUMIN SERPL-MCNC: 3.7 G/DL (ref 3.4–4.8)
ALBUMIN/GLOB SERPL: 1.5 RATIO (ref 1.1–2)
ALP SERPL-CCNC: 98 UNIT/L (ref 40–150)
ALT SERPL-CCNC: 48 UNIT/L (ref 0–55)
ANION GAP SERPL CALC-SCNC: 6 MEQ/L
AST SERPL-CCNC: 49 UNIT/L (ref 5–34)
BASOPHILS # BLD AUTO: 0.02 X10(3)/MCL
BASOPHILS NFR BLD AUTO: 0.3 %
BILIRUB SERPL-MCNC: 0.7 MG/DL
BUN SERPL-MCNC: 19 MG/DL (ref 8.4–25.7)
CALCIUM SERPL-MCNC: 9.5 MG/DL (ref 8.8–10)
CHLORIDE SERPL-SCNC: 107 MMOL/L (ref 98–107)
CO2 SERPL-SCNC: 28 MMOL/L (ref 23–31)
CREAT SERPL-MCNC: 0.97 MG/DL (ref 0.73–1.18)
CREAT/UREA NIT SERPL: 20
EOSINOPHIL # BLD AUTO: 0.81 X10(3)/MCL (ref 0–0.9)
EOSINOPHIL NFR BLD AUTO: 11.7 %
ERYTHROCYTE [DISTWIDTH] IN BLOOD BY AUTOMATED COUNT: 13.2 % (ref 11.5–17)
GFR SERPLBLD CREATININE-BSD FMLA CKD-EPI: >60 ML/MIN/1.73/M2
GLOBULIN SER-MCNC: 2.4 GM/DL (ref 2.4–3.5)
GLUCOSE SERPL-MCNC: 124 MG/DL (ref 82–115)
HCT VFR BLD AUTO: 32 % (ref 42–52)
HGB BLD-MCNC: 10.7 G/DL (ref 14–18)
IMM GRANULOCYTES # BLD AUTO: 0.14 X10(3)/MCL (ref 0–0.04)
IMM GRANULOCYTES NFR BLD AUTO: 2 %
LYMPHOCYTES # BLD AUTO: 1.4 X10(3)/MCL (ref 0.6–4.6)
LYMPHOCYTES NFR BLD AUTO: 20.3 %
MCH RBC QN AUTO: 40.4 PG (ref 27–31)
MCHC RBC AUTO-ENTMCNC: 33.4 G/DL (ref 33–36)
MCV RBC AUTO: 120.8 FL (ref 80–94)
MONOCYTES # BLD AUTO: 0.65 X10(3)/MCL (ref 0.1–1.3)
MONOCYTES NFR BLD AUTO: 9.4 %
NEUTROPHILS # BLD AUTO: 3.88 X10(3)/MCL (ref 2.1–9.2)
NEUTROPHILS NFR BLD AUTO: 56.3 %
PLATELET # BLD AUTO: 73 X10(3)/MCL (ref 130–400)
PMV BLD AUTO: 10.1 FL (ref 7.4–10.4)
POTASSIUM SERPL-SCNC: 4 MMOL/L (ref 3.5–5.1)
PROT SERPL-MCNC: 6.1 GM/DL (ref 5.8–7.6)
RBC # BLD AUTO: 2.65 X10(6)/MCL (ref 4.7–6.1)
SODIUM SERPL-SCNC: 141 MMOL/L (ref 136–145)
WBC # BLD AUTO: 6.9 X10(3)/MCL (ref 4.5–11.5)

## 2024-07-16 PROCEDURE — 85025 COMPLETE CBC W/AUTO DIFF WBC: CPT

## 2024-07-16 PROCEDURE — 80053 COMPREHEN METABOLIC PANEL: CPT

## 2024-07-16 PROCEDURE — 36415 COLL VENOUS BLD VENIPUNCTURE: CPT

## 2024-07-17 ENCOUNTER — LAB VISIT (OUTPATIENT)
Dept: LAB | Facility: HOSPITAL | Age: 74
End: 2024-07-17
Attending: INTERNAL MEDICINE
Payer: MEDICARE

## 2024-07-17 DIAGNOSIS — Z94.81 S/P AUTOLOGOUS BONE MARROW TRANSPLANTATION: ICD-10-CM

## 2024-07-17 DIAGNOSIS — C90.01 MULTIPLE MYELOMA IN REMISSION: ICD-10-CM

## 2024-07-17 LAB
ALBUMIN SERPL-MCNC: 3.7 G/DL (ref 3.4–4.8)
ALBUMIN/GLOB SERPL: 1.5 RATIO (ref 1.1–2)
ALP SERPL-CCNC: 95 UNIT/L (ref 40–150)
ALT SERPL-CCNC: 62 UNIT/L (ref 0–55)
ANION GAP SERPL CALC-SCNC: 5 MEQ/L
AST SERPL-CCNC: 54 UNIT/L (ref 5–34)
BASOPHILS # BLD AUTO: 0.03 X10(3)/MCL
BASOPHILS NFR BLD AUTO: 0.5 %
BILIRUB SERPL-MCNC: 0.8 MG/DL
BUN SERPL-MCNC: 17 MG/DL (ref 8.4–25.7)
CALCIUM SERPL-MCNC: 9.8 MG/DL (ref 8.8–10)
CHLORIDE SERPL-SCNC: 107 MMOL/L (ref 98–107)
CO2 SERPL-SCNC: 28 MMOL/L (ref 23–31)
CREAT SERPL-MCNC: 1.03 MG/DL (ref 0.73–1.18)
CREAT/UREA NIT SERPL: 17
EOSINOPHIL # BLD AUTO: 0.57 X10(3)/MCL (ref 0–0.9)
EOSINOPHIL NFR BLD AUTO: 9.3 %
ERYTHROCYTE [DISTWIDTH] IN BLOOD BY AUTOMATED COUNT: 12.6 % (ref 11.5–17)
GFR SERPLBLD CREATININE-BSD FMLA CKD-EPI: >60 ML/MIN/1.73/M2
GLOBULIN SER-MCNC: 2.4 GM/DL (ref 2.4–3.5)
GLUCOSE SERPL-MCNC: 108 MG/DL (ref 82–115)
HCT VFR BLD AUTO: 31.1 % (ref 42–52)
HGB BLD-MCNC: 10.5 G/DL (ref 14–18)
IMM GRANULOCYTES # BLD AUTO: 0.15 X10(3)/MCL (ref 0–0.04)
IMM GRANULOCYTES NFR BLD AUTO: 2.5 %
LYMPHOCYTES # BLD AUTO: 1.25 X10(3)/MCL (ref 0.6–4.6)
LYMPHOCYTES NFR BLD AUTO: 20.5 %
MCH RBC QN AUTO: 40.2 PG (ref 27–31)
MCHC RBC AUTO-ENTMCNC: 33.8 G/DL (ref 33–36)
MCV RBC AUTO: 119.2 FL (ref 80–94)
MONOCYTES # BLD AUTO: 0.64 X10(3)/MCL (ref 0.1–1.3)
MONOCYTES NFR BLD AUTO: 10.5 %
NEUTROPHILS # BLD AUTO: 3.47 X10(3)/MCL (ref 2.1–9.2)
NEUTROPHILS NFR BLD AUTO: 56.7 %
PLATELET # BLD AUTO: 68 X10(3)/MCL (ref 130–400)
PMV BLD AUTO: 10.2 FL (ref 7.4–10.4)
POTASSIUM SERPL-SCNC: 4.4 MMOL/L (ref 3.5–5.1)
PROT SERPL-MCNC: 6.1 GM/DL (ref 5.8–7.6)
RBC # BLD AUTO: 2.61 X10(6)/MCL (ref 4.7–6.1)
SODIUM SERPL-SCNC: 140 MMOL/L (ref 136–145)
WBC # BLD AUTO: 6.11 X10(3)/MCL (ref 4.5–11.5)

## 2024-07-17 PROCEDURE — 80053 COMPREHEN METABOLIC PANEL: CPT

## 2024-07-17 PROCEDURE — 85025 COMPLETE CBC W/AUTO DIFF WBC: CPT

## 2024-07-17 PROCEDURE — 36415 COLL VENOUS BLD VENIPUNCTURE: CPT

## 2024-07-22 ENCOUNTER — LAB VISIT (OUTPATIENT)
Dept: LAB | Facility: HOSPITAL | Age: 74
End: 2024-07-22
Attending: INTERNAL MEDICINE
Payer: MEDICARE

## 2024-07-22 DIAGNOSIS — C90.01 MULTIPLE MYELOMA IN REMISSION: ICD-10-CM

## 2024-07-22 DIAGNOSIS — Z76.82 STEM CELL TRANSPLANT CANDIDATE: ICD-10-CM

## 2024-07-22 DIAGNOSIS — Z94.84 HISTORY OF AUTOLOGOUS STEM CELL TRANSPLANT: ICD-10-CM

## 2024-07-22 PROBLEM — N17.9 AKI (ACUTE KIDNEY INJURY): Status: RESOLVED | Noted: 2023-06-07 | Resolved: 2024-07-22

## 2024-07-22 LAB
ALBUMIN SERPL-MCNC: 4.1 G/DL (ref 3.4–4.8)
ALBUMIN/GLOB SERPL: 1.7 RATIO (ref 1.1–2)
ALP SERPL-CCNC: 91 UNIT/L (ref 40–150)
ALT SERPL-CCNC: 25 UNIT/L (ref 0–55)
ANION GAP SERPL CALC-SCNC: 10 MEQ/L
AST SERPL-CCNC: 23 UNIT/L (ref 5–34)
BASOPHILS # BLD AUTO: 0.02 X10(3)/MCL
BASOPHILS NFR BLD AUTO: 0.5 %
BILIRUB SERPL-MCNC: 1.1 MG/DL
BUN SERPL-MCNC: 18 MG/DL (ref 8.4–25.7)
CALCIUM SERPL-MCNC: 10 MG/DL (ref 8.8–10)
CHLORIDE SERPL-SCNC: 104 MMOL/L (ref 98–107)
CO2 SERPL-SCNC: 28 MMOL/L (ref 23–31)
CREAT SERPL-MCNC: 1.05 MG/DL (ref 0.73–1.18)
CREAT/UREA NIT SERPL: 17
EOSINOPHIL # BLD AUTO: 0.37 X10(3)/MCL (ref 0–0.9)
EOSINOPHIL NFR BLD AUTO: 10 %
ERYTHROCYTE [DISTWIDTH] IN BLOOD BY AUTOMATED COUNT: 12.4 % (ref 11.5–17)
GFR SERPLBLD CREATININE-BSD FMLA CKD-EPI: >60 ML/MIN/1.73/M2
GLOBULIN SER-MCNC: 2.4 GM/DL (ref 2.4–3.5)
GLUCOSE SERPL-MCNC: 126 MG/DL (ref 82–115)
HCT VFR BLD AUTO: 33 % (ref 42–52)
HGB BLD-MCNC: 11 G/DL (ref 14–18)
IGA SERPL-MCNC: 128 MG/DL (ref 101–645)
IGG SERPL-MCNC: 736 MG/DL (ref 540–1822)
IGM SERPL-MCNC: 19 MG/DL (ref 22–240)
IMM GRANULOCYTES # BLD AUTO: 0.12 X10(3)/MCL (ref 0–0.04)
IMM GRANULOCYTES NFR BLD AUTO: 3.3 %
LYMPHOCYTES # BLD AUTO: 0.96 X10(3)/MCL (ref 0.6–4.6)
LYMPHOCYTES NFR BLD AUTO: 26 %
MCH RBC QN AUTO: 40.4 PG (ref 27–31)
MCHC RBC AUTO-ENTMCNC: 33.3 G/DL (ref 33–36)
MCV RBC AUTO: 121.3 FL (ref 80–94)
MONOCYTES # BLD AUTO: 0.52 X10(3)/MCL (ref 0.1–1.3)
MONOCYTES NFR BLD AUTO: 14.1 %
NEUTROPHILS # BLD AUTO: 1.7 X10(3)/MCL (ref 2.1–9.2)
NEUTROPHILS NFR BLD AUTO: 46.1 %
PLATELET # BLD AUTO: 55 X10(3)/MCL (ref 130–400)
PMV BLD AUTO: 9.5 FL (ref 7.4–10.4)
POTASSIUM SERPL-SCNC: 4.3 MMOL/L (ref 3.5–5.1)
PROT SERPL-MCNC: 6.5 GM/DL (ref 5.8–7.6)
RBC # BLD AUTO: 2.72 X10(6)/MCL (ref 4.7–6.1)
SODIUM SERPL-SCNC: 142 MMOL/L (ref 136–145)
WBC # BLD AUTO: 3.69 X10(3)/MCL (ref 4.5–11.5)

## 2024-07-22 PROCEDURE — 82784 ASSAY IGA/IGD/IGG/IGM EACH: CPT

## 2024-07-22 PROCEDURE — 80053 COMPREHEN METABOLIC PANEL: CPT

## 2024-07-22 PROCEDURE — 36415 COLL VENOUS BLD VENIPUNCTURE: CPT

## 2024-07-22 PROCEDURE — 0077U IG PARAPROTEIN QUAL BLD/UR: CPT

## 2024-07-22 PROCEDURE — 85025 COMPLETE CBC W/AUTO DIFF WBC: CPT

## 2024-07-22 PROCEDURE — 82784 ASSAY IGA/IGD/IGG/IGM EACH: CPT | Mod: 91

## 2024-07-22 PROCEDURE — 83521 IG LIGHT CHAINS FREE EACH: CPT

## 2024-07-22 PROCEDURE — 86787 VARICELLA-ZOSTER ANTIBODY: CPT

## 2024-07-23 ENCOUNTER — OFFICE VISIT (OUTPATIENT)
Dept: HEMATOLOGY/ONCOLOGY | Facility: CLINIC | Age: 74
End: 2024-07-23
Payer: MEDICARE

## 2024-07-23 ENCOUNTER — HOSPITAL ENCOUNTER (OUTPATIENT)
Dept: RADIOLOGY | Facility: HOSPITAL | Age: 74
Discharge: HOME | End: 2024-07-23
Attending: INTERNAL MEDICINE
Payer: MEDICARE

## 2024-07-23 ENCOUNTER — TELEPHONE (OUTPATIENT)
Dept: HEMATOLOGY/ONCOLOGY | Facility: CLINIC | Age: 74
End: 2024-07-23

## 2024-07-23 ENCOUNTER — PROCEDURE VISIT (OUTPATIENT)
Dept: HEMATOLOGY/ONCOLOGY | Facility: CLINIC | Age: 74
End: 2024-07-23
Payer: MEDICARE

## 2024-07-23 VITALS
SYSTOLIC BLOOD PRESSURE: 124 MMHG | HEIGHT: 70 IN | TEMPERATURE: 98 F | DIASTOLIC BLOOD PRESSURE: 66 MMHG | WEIGHT: 198.63 LBS | OXYGEN SATURATION: 95 % | BODY MASS INDEX: 28.44 KG/M2 | RESPIRATION RATE: 18 BRPM | HEART RATE: 61 BPM

## 2024-07-23 VITALS
BODY MASS INDEX: 28.5 KG/M2 | OXYGEN SATURATION: 96 % | HEART RATE: 69 BPM | HEIGHT: 70 IN | DIASTOLIC BLOOD PRESSURE: 70 MMHG | SYSTOLIC BLOOD PRESSURE: 135 MMHG | TEMPERATURE: 98 F

## 2024-07-23 DIAGNOSIS — Z94.84 HISTORY OF AUTOLOGOUS STEM CELL TRANSPLANT: ICD-10-CM

## 2024-07-23 DIAGNOSIS — G89.3 CANCER RELATED PAIN: ICD-10-CM

## 2024-07-23 DIAGNOSIS — C90.01 MULTIPLE MYELOMA IN REMISSION: ICD-10-CM

## 2024-07-23 DIAGNOSIS — D84.9 IMMUNODEFICIENCY: ICD-10-CM

## 2024-07-23 DIAGNOSIS — D69.6 THROMBOCYTOPENIA, UNSPECIFIED: ICD-10-CM

## 2024-07-23 DIAGNOSIS — E11.9 TYPE 2 DIABETES MELLITUS WITHOUT COMPLICATION, WITHOUT LONG-TERM CURRENT USE OF INSULIN: ICD-10-CM

## 2024-07-23 DIAGNOSIS — N40.0 BENIGN PROSTATIC HYPERPLASIA WITHOUT LOWER URINARY TRACT SYMPTOMS: ICD-10-CM

## 2024-07-23 DIAGNOSIS — D61.818 PANCYTOPENIA: ICD-10-CM

## 2024-07-23 DIAGNOSIS — I10 PRIMARY HYPERTENSION: Primary | ICD-10-CM

## 2024-07-23 DIAGNOSIS — Z94.81 S/P AUTOLOGOUS BONE MARROW TRANSPLANTATION: Primary | ICD-10-CM

## 2024-07-23 DIAGNOSIS — D46.4 REFRACTORY ANEMIA, UNSPECIFIED: ICD-10-CM

## 2024-07-23 DIAGNOSIS — Z76.82 STEM CELL TRANSPLANT CANDIDATE: ICD-10-CM

## 2024-07-23 DIAGNOSIS — Z94.81 S/P AUTOLOGOUS BONE MARROW TRANSPLANTATION: ICD-10-CM

## 2024-07-23 PROBLEM — B95.8 BACTEREMIA DUE TO STAPHYLOCOCCUS: Status: RESOLVED | Noted: 2024-05-05 | Resolved: 2024-07-23

## 2024-07-23 PROBLEM — R78.81 BACTEREMIA: Status: RESOLVED | Noted: 2024-05-06 | Resolved: 2024-07-23

## 2024-07-23 PROBLEM — R78.81 BACTEREMIA DUE TO STAPHYLOCOCCUS: Status: RESOLVED | Noted: 2024-05-05 | Resolved: 2024-07-23

## 2024-07-23 LAB
KAPPA LC FREE SER NEPH-MCNC: 2.64 MG/DL (ref 0.33–1.94)
KAPPA LC FREE/LAMBDA FREE SER NEPH: 1.56 {RATIO} (ref 0.26–1.65)
LAMBDA LC FREE SER NEPH-MCNC: 1.69 MG/DL (ref 0.57–2.63)
POCT GLUCOSE: 109 MG/DL (ref 70–110)
REASON FOR REFERRAL, PLASMA CELL PROLIF (PCPD), FISH: NORMAL
VZV IGG SER IA-ACNC: 1.2
VZV IGG SER QL IA: POSITIVE

## 2024-07-23 PROCEDURE — 88184 FLOWCYTOMETRY/ TC 1 MARKER: CPT | Mod: 91 | Performed by: NURSE PRACTITIONER

## 2024-07-23 PROCEDURE — 88185 FLOWCYTOMETRY/TC ADD-ON: CPT | Mod: 91 | Performed by: NURSE PRACTITIONER

## 2024-07-23 PROCEDURE — 99214 OFFICE O/P EST MOD 30 MIN: CPT | Mod: PBBFAC | Performed by: INTERNAL MEDICINE

## 2024-07-23 PROCEDURE — 99214 OFFICE O/P EST MOD 30 MIN: CPT | Mod: S$PBB,,, | Performed by: INTERNAL MEDICINE

## 2024-07-23 PROCEDURE — A9552 F18 FDG: HCPCS | Performed by: INTERNAL MEDICINE

## 2024-07-23 PROCEDURE — 78816 PET IMAGE W/CT FULL BODY: CPT | Mod: TC

## 2024-07-23 PROCEDURE — 99999 PR PBB SHADOW E&M-EST. PATIENT-LVL IV: CPT | Mod: PBBFAC,,, | Performed by: INTERNAL MEDICINE

## 2024-07-23 PROCEDURE — 88189 FLOWCYTOMETRY/READ 16 & >: CPT | Mod: ,,, | Performed by: PATHOLOGY

## 2024-07-23 PROCEDURE — 38222 DX BONE MARROW BX & ASPIR: CPT | Mod: S$PBB,RT,, | Performed by: NURSE PRACTITIONER

## 2024-07-23 PROCEDURE — 88185 FLOWCYTOMETRY/TC ADD-ON: CPT | Mod: 59 | Performed by: PATHOLOGY

## 2024-07-23 PROCEDURE — 38222 DX BONE MARROW BX & ASPIR: CPT | Mod: PBBFAC | Performed by: NURSE PRACTITIONER

## 2024-07-23 PROCEDURE — 78816 PET IMAGE W/CT FULL BODY: CPT | Mod: 26,PS,, | Performed by: NUCLEAR MEDICINE

## 2024-07-23 PROCEDURE — 88184 FLOWCYTOMETRY/ TC 1 MARKER: CPT | Performed by: PATHOLOGY

## 2024-07-23 PROCEDURE — 99999PBSHW PR PBB SHADOW TECHNICAL ONLY FILED TO HB: Mod: PBBFAC,,,

## 2024-07-23 RX ORDER — LIDOCAINE HYDROCHLORIDE 20 MG/ML
8 INJECTION, SOLUTION INFILTRATION; PERINEURAL
Status: COMPLETED | OUTPATIENT
Start: 2024-07-23 | End: 2024-07-23

## 2024-07-23 RX ORDER — FLUDEOXYGLUCOSE F18 500 MCI/ML
12.08 INJECTION INTRAVENOUS
Status: COMPLETED | OUTPATIENT
Start: 2024-07-23 | End: 2024-07-23

## 2024-07-23 RX ORDER — ALPRAZOLAM 0.5 MG/1
0.5 TABLET ORAL DAILY PRN
COMMUNITY
Start: 2024-07-03

## 2024-07-23 RX ORDER — LOSARTAN POTASSIUM 50 MG/1
50 TABLET ORAL
COMMUNITY
Start: 2024-07-15

## 2024-07-23 RX ADMIN — FLUDEOXYGLUCOSE F-18 12.08 MILLICURIE: 500 INJECTION INTRAVENOUS at 10:07

## 2024-07-23 RX ADMIN — LIDOCAINE HYDROCHLORIDE 8 ML: 20 INJECTION, SOLUTION INFILTRATION; PERINEURAL at 03:07

## 2024-07-23 NOTE — PROGRESS NOTES
HEMATOLOGIC MALIGNANCIES PROGRESS NOTE    IDENTIFYING STATEMENT   Chip Yadav) is a 73 y.o. male with a  of 1950 from [place], [place] with the diagnosis of multiple myeloma.      ONCOLOGY HISTORY:    INTERVAL HISTORY:    Today, Mr. Goodson presents to clinic . He is day + 96  post autologous stem cell transplant for myeloma. His hospital course was complicated by bacteremia. He is feeling much better today.     Past Medical History, Past Social History and Past Family History have been reviewed and are unchanged except as noted in the interval history.      Review of Systems   Constitutional:  Positive for malaise/fatigue. Negative for chills, fever and weight loss.   HENT:  Negative for congestion, nosebleeds and tinnitus.    Eyes:  Negative for double vision, pain and discharge.   Respiratory:  Negative for cough, sputum production, shortness of breath and stridor.    Cardiovascular:  Negative for chest pain, claudication and PND.   Gastrointestinal:  Negative for diarrhea, melena, nausea and vomiting.   Genitourinary:  Negative for dysuria and hematuria.   Musculoskeletal:  Negative for back pain, joint pain and neck pain.   Neurological:  Negative for dizziness, tremors, speech change and weakness.   Endo/Heme/Allergies:  Negative for environmental allergies.   Psychiatric/Behavioral:  Negative for depression and substance abuse. The patient is not nervous/anxious.           Vitals:    24 0755   BP: 124/66   Pulse: 61   Resp: 18   Temp: 98.1 °F (36.7 °C)         Physical Exam  Constitutional:       Appearance: Normal appearance.   HENT:      Head: Normocephalic and atraumatic.   Eyes:      General: No scleral icterus.  Cardiovascular:      Rate and Rhythm: Normal rate.      Pulses: Normal pulses.      Heart sounds: Normal heart sounds.   Pulmonary:      Effort: No respiratory distress.      Breath sounds: No stridor. No rhonchi.   Abdominal:      General: There is no distension.       Palpations: There is no mass.      Hernia: No hernia is present.   Musculoskeletal:      Right lower leg: No edema.      Left lower leg: No edema.   Lymphadenopathy:      Cervical: No cervical adenopathy.   Neurological:      General: No focal deficit present.      Mental Status: He is alert.          Current Outpatient Medications   Medication Sig    acyclovir (ZOVIRAX) 800 MG Tab Take 1 tablet (800 mg total) by mouth 2 (two) times daily.    albuterol (PROVENTIL/VENTOLIN HFA) 90 mcg/actuation inhaler 2 puffs every 4 to 6 hours as needed.    ALPRAZolam (XANAX) 0.5 MG tablet Take 0.5 mg by mouth daily as needed for Anxiety.    amLODIPine (NORVASC) 10 MG tablet Take 1 tablet (10 mg total) by mouth every morning.    ascorbic acid, vitamin C, (VITAMIN C) 500 MG tablet Take 500 mg by mouth once daily.    atorvastatin (LIPITOR) 10 MG tablet Take 10 mg by mouth every evening.    carvediloL (COREG) 6.25 MG tablet Take 1 tablet (6.25 mg total) by mouth 2 (two) times daily.    cholecalciferol, vitamin D3, (VITAMIN D3) 125 mcg (5,000 unit) Tab Take 5,000 Units by mouth once daily.    cyclobenzaprine (FLEXERIL) 5 MG tablet Take 1 tablet (5 mg total) by mouth 3 (three) times daily as needed for Muscle spasms.    dapsone 100 MG Tab Take 1 tablet (100 mg total) by mouth once daily.    ferrous sulfate 325 (65 FE) MG EC tablet Take 325 mg by mouth once daily.    finasteride (PROSCAR) 5 mg tablet Take 5 mg by mouth every morning.    levothyroxine (SYNTHROID) 112 MCG tablet Take 112 mcg by mouth every evening.    losartan (COZAAR) 50 MG tablet Take 50 mg by mouth.    metFORMIN (GLUCOPHAGE-XR) 500 MG ER 24hr tablet Take 1,000 mg by mouth 2 (two) times daily.    nitroGLYCERIN (NITROSTAT) 0.4 MG SL tablet place one tablet under the tongue every five minutes as needed for chest pain up to 3 doses. if no relief call 911    [employer] ULTRA TEST Strp USE TO TEST BLOOD GLUCOSE TWICE DAILY    pantoprazole (PROTONIX) 40 MG tablet Take 1 tablet  (40 mg total) by mouth once daily.    ranolazine (RANEXA) 500 MG Tb12 Take 500 mg by mouth 2 (two) times daily.    spironolactone (ALDACTONE) 25 MG tablet Take 12.5 mg by mouth every morning.    hydrALAZINE (APRESOLINE) 50 MG tablet Take 1 tablet (50 mg total) by mouth every 8 (eight) hours.    HYDROcodone-acetaminophen (NORCO) 7.5-325 mg per tablet Take 1 tablet by mouth every 4 (four) hours as needed for Pain. (Patient not taking: Reported on 7/23/2024)    LIDOcaine (LIDODERM) 5 % Place 1 patch onto the skin once daily. Remove & Discard patch within 12 hours or as directed by MD (Patient not taking: Reported on 7/23/2024)    valsartan (DIOVAN) 160 MG tablet Take 160 mg by mouth. (Patient not taking: Reported on 7/23/2024)     Current Facility-Administered Medications   Medication    0.9%  NaCl infusion (for blood administration)    0.9%  NaCl infusion (for blood administration)    0.9%  NaCl infusion (for blood administration)            Component      Latest Ref Rng 7/22/2024   Sodium      136 - 145 mmol/L 142    Potassium      3.5 - 5.1 mmol/L 4.3    Chloride      98 - 107 mmol/L 104    CO2      23 - 31 mmol/L 28    Glucose      82 - 115 mg/dL 126 (H)    BUN      8.4 - 25.7 mg/dL 18.0    Creatinine      0.73 - 1.18 mg/dL 1.05    Calcium      8.8 - 10.0 mg/dL 10.0    PROTEIN TOTAL      5.8 - 7.6 gm/dL 6.5    Albumin      3.4 - 4.8 g/dL 4.1    Globulin, Total      2.4 - 3.5 gm/dL 2.4    Albumin/Globulin Ratio      1.1 - 2.0 ratio 1.7    BILIRUBIN TOTAL      <=1.5 mg/dL 1.1    ALP      40 - 150 unit/L 91    ALT      0 - 55 unit/L 25    AST      5 - 34 unit/L 23    eGFR      mL/min/1.73/m2 >60    Anion Gap      mEq/L 10.0    BUN/CREAT RATIO 17    IgG      540.00 - 1,822.00 mg/dL 736.00    IgA Level      101.0 - 645.0 mg/dL 128.0    IgM      22.0 - 240.0 mg/dL 19.0 (L)            Component Ref Range & Units 1 d ago  (7/22/24)   WBC 4.50 - 11.50 x10(3)/mcL 3.69 Low    RBC 4.70 - 6.10 x10(6)/mcL 2.72 Low    Hgb 14.0 -  18.0 g/dL 11.0 Low    Hct 42.0 - 52.0 % 33.0 Low    MCV 80.0 - 94.0 fL 121.3 High    MCH 27.0 - 31.0 pg 40.4 High    MCHC 33.0 - 36.0 g/dL 33.3   RDW 11.5 - 17.0 % 12.4   Platelet 130 - 400 x10(3)/mcL 55 Low    MPV 7.4 - 10.4 fL 9.5   Neut % % 46.1   Lymph % % 26.0   Mono % % 14.1   Eos % % 10.0   Basophil % % 0.5   Lymph # 0.6 - 4.6 x10(3)/mcL 0.96   Neut # 2.1 - 9.2 x10(3)/mcL 1.70 Low    Mono # 0.1 - 1.3 x10(3)/mcL 0.52   Eos # 0 - 0.9 x10(3)/mcL 0.37   Baso # <=0.2 x10(3)/mcL 0.02   IG# 0 - 0.04 x10(3)/mcL 0.12 High    IG% % 3.3     Assessment/Plan      IgA kappa multiple myeloma in remission  - he follows locally with Dr. Rose in [place]  - diagnosed June 2023 with IgA Kappa MM; stage unclear at diagnosis as FISH not available  - started DRd therapy on 7/18/2023  - he stopped the daratumumab after cycle 4 and continue with revlimid and dex only; looks to have completed ~5 cycles  - Admitted for Carmen 140 Auto SCT on 4/16/2024    History of auto stem cell transplant:   - Day 0 on 4/18/2024. Conditioned with Carmen 140.   -day+ 96 today  - He received 5 bags on 4/18/24 at 1330 and received the remaining 4 bags on 4/19/24 at 1330. Total CD34 dose was 2.91 x 10^6.   -he is having day+100 restaging PET CT, bone marrow biopsy, labs today      Immunodeficiency due to conditions classified elsewhere:   - Continue acyclovir antiviral prophylaxis   - Switched bactrim PJP prophylaxis to dapsone 100 mg daily due to cytopenias.     Anemia in neoplastic disease  -- Hgb 11 g/dl on 7/22/24       5. Thrombocytopenia    -asymptomatic  -platelets 55 k on 7/22/24     6. Leukopenia      --WBC 3.69k on 7/22/24  --asymptomatic    7. Neutropenia    -ANC 1.7 ion 7/22/24  --asymptomatic      8. History of Gemella Haemolysans bactermia   - He was on ceftriaxone until 6/14/2024  - He had a follow up with transplant ID       9. Cancer associated pain    -he has ongoing low back pain  -no recent falls  -he is using hydrocodone-APAP as  needed  -he tried lidoderm patch without success  -he has been using cyclobenzaprine 5mg TID as needed    10. Immunization    --he has been evaluated by ID   -he will receive vac cines in [facility] Chart Routing      Follow up with physician . Willie f/u on 7/31   Follow up with [facility]    Provider visit type    Infusion scheduling note    Injection scheduling note    Labs    Imaging    Pharmacy appointment    Other referrals

## 2024-07-23 NOTE — PROCEDURES
Bone marrow    Date/Time: 7/23/2024 3:30 PM    Performed by: Chelsie Man NP  Authorized by: Chelsie Man NP    Consent Done?: Yes (Written)   Immediately prior to procedure a time out was called to verify the correct patient, procedure, equipment, support staff and site/side marked as required. .      Position: prone  Aspiration?: Yes   Biopsy?: Yes        PROCEDURE NOTE:  Date of Procedure: 07/23/2024  Bone Marrow Biopsy and Aspiration  Indication: MM Day +96 s/p Auto SCT  Consent: Informed consent was obtained from patient.  Timeout: Done and documented. Allergies reviewed.  Position: prone  Site: right posterior illiac crest.  Prep: Betadine.  Needle used: 11 gauge Jamshidi needle.  Anesthetic: 2% lidocaine 8 cc.  Biopsy: The biopsy needle was introduced into the marrow cavity and an aspirate was obtained without complications and sent for flow cytometry, MRDMM, PCPD fish, DNA/RNA hold, and cytogenetics. Core biopsy obtained without difficulty and sent for routine histologic examination.  Complications: None.  Disposition: The patient was placed supine for 15min following procedure. MA to assess bandaid for bleeding prior to discharge home. Patient aware to keep bandaid dry and intact for 24hrs and to call clinic for any bleeding, fevers, pain, or signs of infection.  Blood loss: Minimal.     Chelsie Man NP  Hematology/Oncology/BMT

## 2024-07-23 NOTE — TELEPHONE ENCOUNTER
----- Message from Hung Yee sent at 7/23/2024 12:15 PM CDT -----  Name Of Caller: Ashleigh        Provider Name: Ady Evans        Does patient feel the need to be seen today? yes        Relationship to the Pt?: daughter        Contact Preference?:[phone]        What is the nature of the call?: Patient has a biopsy scheduled for today at 3pm, patient's daughter would like to know if he needs to fast for this procedure.

## 2024-07-24 LAB
BODY SITE - BONE MARROW: NORMAL
CLINICAL DIAGNOSIS - BONE MARROW: NORMAL
FLOW CYTOMETRY ANTIBODIES ANALYZED - BONE MARROW: NORMAL
FLOW CYTOMETRY COMMENT - BONE MARROW: NORMAL
FLOW CYTOMETRY INTERPRETATION - BONE MARROW: NORMAL
IGA SERPL-MCNC: 119 MG/DL (ref 61–356)
IGG SERPL-MCNC: 764 MG/DL (ref 767–1590)
IGM SERPL-MCNC: 15 MG/DL (ref 37–286)
IMMUNOLOGIST REVIEW: ABNORMAL
M PROTEIN SERPL QL: NEGATIVE
M THERAPEUTIC ANTIBODY ADMINISTERED?: ABNORMAL

## 2024-07-25 ENCOUNTER — LAB VISIT (OUTPATIENT)
Dept: LAB | Facility: HOSPITAL | Age: 74
End: 2024-07-25
Payer: MEDICARE

## 2024-07-25 DIAGNOSIS — Z94.81 S/P AUTOLOGOUS BONE MARROW TRANSPLANTATION: ICD-10-CM

## 2024-07-25 DIAGNOSIS — D84.9 IMMUNODEFICIENCY: Primary | ICD-10-CM

## 2024-07-25 DIAGNOSIS — D70.9 NEUTROPENIA, UNSPECIFIED TYPE: ICD-10-CM

## 2024-07-25 DIAGNOSIS — C90.01 MULTIPLE MYELOMA IN REMISSION: ICD-10-CM

## 2024-07-25 LAB
ALBUMIN SERPL-MCNC: 3.9 G/DL (ref 3.4–4.8)
ALBUMIN/GLOB SERPL: 1.6 RATIO (ref 1.1–2)
ALP SERPL-CCNC: 89 UNIT/L (ref 40–150)
ALT SERPL-CCNC: 20 UNIT/L (ref 0–55)
ANION GAP SERPL CALC-SCNC: 8 MEQ/L
AST SERPL-CCNC: 20 UNIT/L (ref 5–34)
BASOPHILS # BLD AUTO: 0.01 X10(3)/MCL
BASOPHILS NFR BLD AUTO: 0.4 %
BILIRUB SERPL-MCNC: 1 MG/DL
BUN SERPL-MCNC: 17 MG/DL (ref 8.4–25.7)
CALCIUM SERPL-MCNC: 9.6 MG/DL (ref 8.8–10)
CHLORIDE SERPL-SCNC: 107 MMOL/L (ref 98–107)
CO2 SERPL-SCNC: 27 MMOL/L (ref 23–31)
CREAT SERPL-MCNC: 1.16 MG/DL (ref 0.73–1.18)
CREAT/UREA NIT SERPL: 15
EOSINOPHIL # BLD AUTO: 0.35 X10(3)/MCL (ref 0–0.9)
EOSINOPHIL NFR BLD AUTO: 14.8 %
ERYTHROCYTE [DISTWIDTH] IN BLOOD BY AUTOMATED COUNT: 11.9 % (ref 11.5–17)
GFR SERPLBLD CREATININE-BSD FMLA CKD-EPI: >60 ML/MIN/1.73/M2
GLOBULIN SER-MCNC: 2.4 GM/DL (ref 2.4–3.5)
GLUCOSE SERPL-MCNC: 124 MG/DL (ref 82–115)
HCT VFR BLD AUTO: 31.3 % (ref 42–52)
HGB BLD-MCNC: 10.8 G/DL (ref 14–18)
IMM GRANULOCYTES # BLD AUTO: 0 X10(3)/MCL (ref 0–0.04)
IMM GRANULOCYTES NFR BLD AUTO: 0 %
LYMPHOCYTES # BLD AUTO: 1.13 X10(3)/MCL (ref 0.6–4.6)
LYMPHOCYTES NFR BLD AUTO: 47.9 %
MAGNESIUM SERPL-MCNC: 2 MG/DL (ref 1.6–2.6)
MCH RBC QN AUTO: 39.9 PG (ref 27–31)
MCHC RBC AUTO-ENTMCNC: 34.5 G/DL (ref 33–36)
MCV RBC AUTO: 115.5 FL (ref 80–94)
MONOCYTES # BLD AUTO: 0.47 X10(3)/MCL (ref 0.1–1.3)
MONOCYTES NFR BLD AUTO: 19.9 %
NEUTROPHILS # BLD AUTO: 0.4 X10(3)/MCL (ref 2.1–9.2)
NEUTROPHILS NFR BLD AUTO: 17 %
PCPDS FINAL DIAGNOSIS: NORMAL
PCPDS PRE-ANALYSIS PRE-SORT: NORMAL
PLATELET # BLD AUTO: 43 X10(3)/MCL (ref 130–400)
PMV BLD AUTO: 9.2 FL (ref 7.4–10.4)
POTASSIUM SERPL-SCNC: 4.5 MMOL/L (ref 3.5–5.1)
PROT SERPL-MCNC: 6.3 GM/DL (ref 5.8–7.6)
RBC # BLD AUTO: 2.71 X10(6)/MCL (ref 4.7–6.1)
SODIUM SERPL-SCNC: 142 MMOL/L (ref 136–145)
WBC # BLD AUTO: 2.36 X10(3)/MCL (ref 4.5–11.5)

## 2024-07-25 PROCEDURE — 83735 ASSAY OF MAGNESIUM: CPT

## 2024-07-25 PROCEDURE — 85025 COMPLETE CBC W/AUTO DIFF WBC: CPT

## 2024-07-25 PROCEDURE — 36415 COLL VENOUS BLD VENIPUNCTURE: CPT

## 2024-07-25 PROCEDURE — 80053 COMPREHEN METABOLIC PANEL: CPT

## 2024-07-25 RX ORDER — LEVOFLOXACIN 500 MG/1
500 TABLET, FILM COATED ORAL DAILY
Qty: 30 TABLET | Refills: 0 | Status: SHIPPED | OUTPATIENT
Start: 2024-07-25

## 2024-07-25 NOTE — PROGRESS NOTES
Labs completed today.  noted. Dr. Evans notified. Instructed to begin levaquin 500mg daily. Medication called into pharmacy and pt notified. Continue twice weekly labs for now. Follow-up with Dr. Evans as scheduled for BMB results. Neutropenia and strict ED precautions given. Pt verbalized understanding.

## 2024-07-26 LAB
% B-CELL PRECURSORS: 4.29 %
% MAST CELLS: 0.02 %
ABNORMAL PLASMA CELL EVENTS: 0
DNA/RNA EXTRACT AND HOLD RESULT: NORMAL
DNA/RNA EXTRACTION: NORMAL
EXHR SPECIMEN TYPE: NORMAL
LOWER LIMIT OF QUANTITATION (LLOQ): 1 X10(-5)
MINIMAL RESIDUAL DISEASE DETECTION (MRD), BONE MARROW: 0 %
PATIENT / SAMPLE THEORETICAL LOQ: 2 X10(-6)
PERCENT NORMAL PLASMA CELLS (OF TOTAL PC): 100 %
PLASMA CELLS ASSESS MAR: NORMAL
POLY PLASMA CELL EVENTS: NORMAL
TOTAL CELLS COUNTED MAR: NORMAL
TOTAL PLASMA CELL EVENTS: NORMAL
VALIDATED ASSAY SENSITIVITY: 2.36 X10(-6)

## 2024-07-27 ENCOUNTER — TELEPHONE (OUTPATIENT)
Dept: INFECTIOUS DISEASES | Facility: HOSPITAL | Age: 74
End: 2024-07-27
Payer: MEDICARE

## 2024-07-27 NOTE — TELEPHONE ENCOUNTER
VZV IgG positive  Retuning for day 100 with Dr. Evans on 7/31  Will schedule for first dose of Pentacel, [id number], COVID, and Shingrix as patient lives far away

## 2024-07-30 ENCOUNTER — LAB VISIT (OUTPATIENT)
Dept: LAB | Facility: HOSPITAL | Age: 74
End: 2024-07-30
Payer: MEDICARE

## 2024-07-30 DIAGNOSIS — C90.01 MULTIPLE MYELOMA IN REMISSION: ICD-10-CM

## 2024-07-30 DIAGNOSIS — Z94.81 S/P AUTOLOGOUS BONE MARROW TRANSPLANTATION: ICD-10-CM

## 2024-07-30 LAB
ALBUMIN SERPL-MCNC: 3.9 G/DL (ref 3.4–4.8)
ALBUMIN/GLOB SERPL: 1.7 RATIO (ref 1.1–2)
ALP SERPL-CCNC: 74 UNIT/L (ref 40–150)
ALT SERPL-CCNC: 11 UNIT/L (ref 0–55)
ANION GAP SERPL CALC-SCNC: 9 MEQ/L
AST SERPL-CCNC: 16 UNIT/L (ref 5–34)
BASOPHILS # BLD AUTO: 0.01 X10(3)/MCL
BASOPHILS NFR BLD AUTO: 0.4 %
BILIRUB SERPL-MCNC: 1.3 MG/DL
BUN SERPL-MCNC: 24 MG/DL (ref 8.4–25.7)
CALCIUM SERPL-MCNC: 10 MG/DL (ref 8.8–10)
CHLORIDE SERPL-SCNC: 105 MMOL/L (ref 98–107)
CO2 SERPL-SCNC: 26 MMOL/L (ref 23–31)
CREAT SERPL-MCNC: 1.12 MG/DL (ref 0.73–1.18)
CREAT/UREA NIT SERPL: 21
EOSINOPHIL # BLD AUTO: 0.3 X10(3)/MCL (ref 0–0.9)
EOSINOPHIL NFR BLD AUTO: 12.8 %
ERYTHROCYTE [DISTWIDTH] IN BLOOD BY AUTOMATED COUNT: 12.6 % (ref 11.5–17)
GFR SERPLBLD CREATININE-BSD FMLA CKD-EPI: >60 ML/MIN/1.73/M2
GLOBULIN SER-MCNC: 2.3 GM/DL (ref 2.4–3.5)
GLUCOSE SERPL-MCNC: 108 MG/DL (ref 82–115)
HCT VFR BLD AUTO: 30.9 % (ref 42–52)
HGB BLD-MCNC: 10.7 G/DL (ref 14–18)
IMM GRANULOCYTES # BLD AUTO: 0.01 X10(3)/MCL (ref 0–0.04)
IMM GRANULOCYTES NFR BLD AUTO: 0.4 %
LYMPHOCYTES # BLD AUTO: 1.01 X10(3)/MCL (ref 0.6–4.6)
LYMPHOCYTES NFR BLD AUTO: 43.2 %
MCH RBC QN AUTO: 40.2 PG (ref 27–31)
MCHC RBC AUTO-ENTMCNC: 34.6 G/DL (ref 33–36)
MCV RBC AUTO: 116.2 FL (ref 80–94)
MONOCYTES # BLD AUTO: 0.42 X10(3)/MCL (ref 0.1–1.3)
MONOCYTES NFR BLD AUTO: 17.9 %
NEUTROPHILS # BLD AUTO: 0.59 X10(3)/MCL (ref 2.1–9.2)
NEUTROPHILS NFR BLD AUTO: 25.3 %
NRBC BLD AUTO-RTO: 0 %
PLATELET # BLD AUTO: 40 X10(3)/MCL (ref 130–400)
PMV BLD AUTO: 9.4 FL (ref 7.4–10.4)
POTASSIUM SERPL-SCNC: 4.1 MMOL/L (ref 3.5–5.1)
PROT SERPL-MCNC: 6.2 GM/DL (ref 5.8–7.6)
RBC # BLD AUTO: 2.66 X10(6)/MCL (ref 4.7–6.1)
SODIUM SERPL-SCNC: 140 MMOL/L (ref 136–145)
WBC # BLD AUTO: 2.34 X10(3)/MCL (ref 4.5–11.5)

## 2024-07-30 PROCEDURE — 80053 COMPREHEN METABOLIC PANEL: CPT

## 2024-07-30 PROCEDURE — 36415 COLL VENOUS BLD VENIPUNCTURE: CPT

## 2024-07-30 PROCEDURE — 85025 COMPLETE CBC W/AUTO DIFF WBC: CPT

## 2024-07-31 ENCOUNTER — OFFICE VISIT (OUTPATIENT)
Dept: HEMATOLOGY/ONCOLOGY | Facility: CLINIC | Age: 74
End: 2024-07-31
Payer: MEDICARE

## 2024-07-31 DIAGNOSIS — D69.6 THROMBOCYTOPENIA, UNSPECIFIED: Primary | ICD-10-CM

## 2024-07-31 DIAGNOSIS — C90.01 MULTIPLE MYELOMA IN REMISSION: ICD-10-CM

## 2024-07-31 DIAGNOSIS — Z94.81 S/P AUTOLOGOUS BONE MARROW TRANSPLANTATION: ICD-10-CM

## 2024-07-31 DIAGNOSIS — I10 PRIMARY HYPERTENSION: ICD-10-CM

## 2024-07-31 DIAGNOSIS — E11.9 TYPE 2 DIABETES MELLITUS WITHOUT COMPLICATION, WITHOUT LONG-TERM CURRENT USE OF INSULIN: ICD-10-CM

## 2024-07-31 DIAGNOSIS — D61.818 PANCYTOPENIA: ICD-10-CM

## 2024-07-31 RX ORDER — LENALIDOMIDE 5 MG/1
1 CAPSULE ORAL DAILY
Qty: 28 EACH | Refills: 0 | Status: ACTIVE | OUTPATIENT
Start: 2024-07-31

## 2024-07-31 NOTE — PROGRESS NOTES
HEMATOLOGIC MALIGNANCIES PROGRESS NOTE      The patient location is: home  The chief complaint leading to consultation is: multiple myeloma, history of autologous SCT, day+104    Visit type: audiovisual    Face to Face time with patient: 20 minutes  30 minutes of total time spent on the encounter, which includes face to face time and non-face to face time preparing to see the patient (eg, review of tests), Obtaining and/or reviewing separately obtained history, Documenting clinical information in the electronic or other health record, Independently interpreting results (not separately reported) and communicating results to the patient/family/caregiver, or Care coordination (not separately reported).         Each patient to whom he or she provides medical services by telemedicine is:  (1) informed of the relationship between the physician and patient and the respective role of any other health care provider with respect to management of the patient; and (2) notified that he or she may decline to receive medical services by telemedicine and may withdraw from such care at any time.    Notes:      IDENTIFYING STATEMENT   Chip Yadav) is a 73 y.o. male with a  of 1950 from [place], [place] with the diagnosis of multiple myeloma.      ONCOLOGY HISTORY:    INTERVAL HISTORY:     Mr. Goodson I alexia for virtual follow up fololowing day+100 re-staging.  He is day + 104  post autologous stem cell transplant for myeloma. His hospital course was complicated by bacteremia. He feels well today.     Past Medical History, Past Social History and Past Family History have been reviewed and are unchanged except as noted in the interval history.      Review of Systems   Constitutional:  Positive for malaise/fatigue. Negative for chills, fever and weight loss.   HENT:  Negative for congestion, nosebleeds and tinnitus.    Eyes:  Negative for double vision, pain and discharge.   Respiratory:  Negative for cough, sputum  production, shortness of breath and stridor.    Cardiovascular:  Negative for chest pain, claudication and PND.   Gastrointestinal:  Negative for diarrhea, melena, nausea and vomiting.   Genitourinary:  Negative for dysuria and hematuria.   Musculoskeletal:  Negative for back pain, joint pain and neck pain.   Neurological:  Negative for dizziness, tremors, speech change and weakness.   Endo/Heme/Allergies:  Negative for environmental allergies.   Psychiatric/Behavioral:  Negative for depression and substance abuse. The patient is not nervous/anxious.           There were no vitals filed for this visit.     Physical exam deferred due to nature of visit    Current Outpatient Medications   Medication Sig    acyclovir (ZOVIRAX) 800 MG Tab Take 1 tablet (800 mg total) by mouth 2 (two) times daily.    albuterol (PROVENTIL/VENTOLIN HFA) 90 mcg/actuation inhaler 2 puffs every 4 to 6 hours as needed.    ALPRAZolam (XANAX) 0.5 MG tablet Take 0.5 mg by mouth daily as needed for Anxiety.    amLODIPine (NORVASC) 10 MG tablet Take 1 tablet (10 mg total) by mouth every morning.    ascorbic acid, vitamin C, (VITAMIN C) 500 MG tablet Take 500 mg by mouth once daily.    atorvastatin (LIPITOR) 10 MG tablet Take 10 mg by mouth every evening.    carvediloL (COREG) 6.25 MG tablet Take 1 tablet (6.25 mg total) by mouth 2 (two) times daily.    cholecalciferol, vitamin D3, (VITAMIN D3) 125 mcg (5,000 unit) Tab Take 5,000 Units by mouth once daily.    cyclobenzaprine (FLEXERIL) 5 MG tablet Take 1 tablet (5 mg total) by mouth 3 (three) times daily as needed for Muscle spasms.    dapsone 100 MG Tab Take 1 tablet (100 mg total) by mouth once daily.    ferrous sulfate 325 (65 FE) MG EC tablet Take 325 mg by mouth once daily.    finasteride (PROSCAR) 5 mg tablet Take 5 mg by mouth every morning.    hydrALAZINE (APRESOLINE) 50 MG tablet Take 1 tablet (50 mg total) by mouth every 8 (eight) hours.    HYDROcodone-acetaminophen (NORCO) 7.5-325 mg per  tablet Take 1 tablet by mouth every 4 (four) hours as needed for Pain. (Patient not taking: Reported on 7/23/2024)    levoFLOXacin (LEVAQUIN) 500 MG tablet Take 1 tablet (500 mg total) by mouth once daily.    levothyroxine (SYNTHROID) 112 MCG tablet Take 112 mcg by mouth every evening.    LIDOcaine (LIDODERM) 5 % Place 1 patch onto the skin once daily. Remove & Discard patch within 12 hours or as directed by MD    losartan (COZAAR) 50 MG tablet Take 50 mg by mouth.    metFORMIN (GLUCOPHAGE-XR) 500 MG ER 24hr tablet Take 1,000 mg by mouth 2 (two) times daily.    nitroGLYCERIN (NITROSTAT) 0.4 MG SL tablet place one tablet under the tongue every five minutes as needed for chest pain up to 3 doses. if no relief call 911    Geenapp ULTRA TEST Strp USE TO TEST BLOOD GLUCOSE TWICE DAILY    pantoprazole (PROTONIX) 40 MG tablet Take 1 tablet (40 mg total) by mouth once daily.    ranolazine (RANEXA) 500 MG Tb12 Take 500 mg by mouth 2 (two) times daily.    spironolactone (ALDACTONE) 25 MG tablet Take 12.5 mg by mouth every morning.    valsartan (DIOVAN) 160 MG tablet Take 160 mg by mouth.     Current Facility-Administered Medications   Medication    0.9%  NaCl infusion (for blood administration)    0.9%  NaCl infusion (for blood administration)    0.9%  NaCl infusion (for blood administration)    DTaP / HiB / IPV combined (Pentacel) injection 0.5 mL    pneumoc 20-edward conj-dip cr(PF) (PREVNAR-20 (PF)) injection Syrg 0.5 mL    sars-cov-2 (covid-19) (Spikevax (Moderna) (12yrs and up 2023)) 50 mcg/0.5 mL injection 0.5 mL    varicella-zoster gE vac,2 of 2 SusR 0.5 mL      3/5/24 BONE MARROW ASPIRATE, TOUCH PREP, CLOT, AND DECALCIFIED NEEDLE CORE BIOPSY: LEFT POSTEROSUPERIOR ILIAC CREST   - NO DEFINITIVE RESIDUAL KAPPA LIGHT CHAIN RESTRICTED MULTIPLE MYELOMA   - Mildly hypercellular marrow (35-45% total cellularity) with non-increased scattered plasma cells and trilineage hematopoiesis with focally increased erythroid precursors  (see comments)   - No karyotype performed (see comments)   - Solitary small well-defined non-paratrabecular lymphoid aggregate (favor benign/reactive)   - Adequate stainable histiocytic iron stores (3+ out of 6+)   - Negative for amyloid deposition by [place] Red special stain     Component      Latest Ref Rng 7/22/2024 7/25/2024 7/30/2024   Sodium      136 - 145 mmol/L  142  140    Potassium      3.5 - 5.1 mmol/L  4.5  4.1    Chloride      98 - 107 mmol/L  107  105    CO2      23 - 31 mmol/L  27  26    Glucose      82 - 115 mg/dL  124 (H)  108    BUN      8.4 - 25.7 mg/dL  17.0  24.0    Creatinine      0.73 - 1.18 mg/dL  1.16  1.12    Calcium      8.8 - 10.0 mg/dL  9.6  10.0    PROTEIN TOTAL      5.8 - 7.6 gm/dL  6.3  6.2    Albumin      3.4 - 4.8 g/dL  3.9  3.9    Globulin, Total      2.4 - 3.5 gm/dL  2.4  2.3 (L)    Albumin/Globulin Ratio      1.1 - 2.0 ratio  1.6  1.7    BILIRUBIN TOTAL      <=1.5 mg/dL  1.0  1.3    ALP      40 - 150 unit/L  89  74    ALT      0 - 55 unit/L  20  11    AST      5 - 34 unit/L  20  16    eGFR      mL/min/1.73/m2  >60  >60    Anion Gap      mEq/L  8.0  9.0    BUN/CREAT RATIO  15  21    IgG      540.00 - 1,822.00 mg/dL 736.00      IgA Level      101.0 - 645.0 mg/dL 128.0      IgM      22.0 - 240.0 mg/dL 19.0 (L)      [place] Free Light Chain      0.3300 - 1.94 mg/dL 2.64 (H)      LAMBDA FREE LIGHT CHAIN      0.5700 - 2.63 mg/dL 1.69      Kappa/Lambda FLC Ratio      0.2600 - 1.65  1.56           Component Ref Range & Units 1 d ago  (7/30/24)   WBC 4.50 - 11.50 x10(3)/mcL 2.34 Low    RBC 4.70 - 6.10 x10(6)/mcL 2.66 Low    Hgb 14.0 - 18.0 g/dL 10.7 Low    Hct 42.0 - 52.0 % 30.9 Low    MCV 80.0 - 94.0 fL 116.2 High    MCH 27.0 - 31.0 pg 40.2 High    MCHC 33.0 - 36.0 g/dL 34.6   RDW 11.5 - 17.0 % 12.6   Platelet 130 - 400 x10(3)/mcL 40 Low    MPV 7.4 - 10.4 fL 9.4   Neut % % 25.3   Lymph % % 43.2   Mono % % 17.9   Eos % % 12.8   Basophil % % 0.4   Lymph # 0.6 - 4.6 x10(3)/mcL 1.01   Neut #  2.1 - 9.2 x10(3)/mcL 0.59 Low    Mono # 0.1 - 1.3 x10(3)/mcL 0.42   Eos # 0 - 0.9 x10(3)/mcL 0.30   Baso # <=0.2 x10(3)/mcL 0.01   IG# 0 - 0.04 x10(3)/mcL 0.01   IG% % 0.4       7/22/24 SPEP Flag M protein  isotype: negative   Multiple small clones present, consistent with immune reconstitution or immune response.       7/23/24  RIGHT ILIAC CREST BONE MARROW ASPIRATE, BONE MARROW CLOT, AND BONE MARROW CORE BIOPSY WITH:     CELLULARITY=30%, TRILINEAGE HEMATOPOIETIC ACTIVITY (M/E=1.6:1).   NO DEFINITIVE MORPHOLOGIC OR IMMUNOPHENOTYPIC EVIDENCE OF RESIDUAL PLASMA CELL NEOPLASM.  SEE COMMENT.   INCREASED EOSINOPHILS.   INCREASED STORAGE IRON.   DECREASED NUMBER OF MEGAKARYOCYTES         Immunohistochemical studies were performed on the clot and core biopsy with adequate positive and negative controls.  About 6-8%  positive plasma cells are evident.  No increased CD34 positive blasts are identified.  CD61 highlights decreased number  of megakaryocytes.  MPO shows myeloid precursors.    See [facility] report:    Bone marrow, flow cytometric immunophenotyping: No monotypic plasma cells identified (MRD-negative).      There is no definitive morphologic or immunophenotypic evidence of residual plasma cell neoplasm.  Correlate clinically.       7/23/24 PCPD FISH: The result is within normal limits for 1q duplication, TP53 deletion and IGH rearrangement.       7/23/24 PET CT  COMPARISON:  [facility]G PET-CT 03/05/2024     FINDINGS:  Quality of the study: Adequate.     In the head and neck, there are no hypermetabolic lesions worrisome for malignancy. There are no hypermetabolic mucosal lesions, and there are no pathologically enlarged or hypermetabolic lymph nodes.  There is interval resolution of prior tracer avid focus within the left thyroid cartilage.  Diffusely increased uptake throughout the thyroid, slightly improved from prior, may represent thyroiditis.     In the chest, there are no hypermetabolic lesions worrisome for  malignancy.  There are no concerning pulmonary nodules or masses, and there are no pathologically enlarged or hypermetabolic lymph nodes.  There is interval normalization of uptake within the hilar and axillary lymph nodes.     In the abdomen and pelvis, there is physiologic tracer distribution within the abdominal organs and excretion into the genitourinary system.  There is diffusely increased bowel uptake, likely related to metformin use.     In the bones, there is interval normalization of uptake within the previously hypermetabolic lesions.  For example, index right posteromedial 4th rib lesion demonstrates SUV max 1.8 (image 102), previously 6.6.  No new hypermetabolic osseous lesions.     In the extremities, there are no hypermetabolic lesions worrisome for malignancy.     Additional CT findings: Multivessel coronary artery calcifications.  Bilateral renal cysts, similar to prior.  Colonic diverticulosis.     Impression:     In this patient with multiple myeloma, there is interval normalization of previously hypermetabolic osseous lesions, compatible with favorable response to therapy.  No new hypermetabolic lesions.     Additional findings as above    Assessment/Plan      IgA kappa multiple myeloma in remission  - he follows locally with Dr. Rose in [place]  - diagnosed June 2023 with IgA Kappa MM; stage unclear at diagnosis as FISH not available  - started DRd therapy on 7/18/2023  - he stopped the daratumumab after cycle 4 and continue with revlimid and dex only; looks to have completed ~5 cycles  - Admitted for Carmen 140 Auto SCT on 4/16/2024  -day+100 restaging bone marrow biopsy on 7/23/24 demonstrated a normocellular marrow with increased eosinophils and tri-lineage hematopoiesis   -About 6-8%  positive plasma cells were evident.  No increased CD34 positive blasts were identified.  CD61 highlighted decreased number  of megakaryocytes.  MPO showed myeloid precursors.  -- No monotypic plasma cells  identified (MRD-negative) on MRD flow assay.  -There was no definitive morphologic or immunophenotypic evidence of residual plasma cell neoplasm.     -sFLC ratio normal; no monoclonal protein on SPEP    -PET CT on 7/23/24 showed here is interval normalization of uptake within the previously hypermetabolic lesions.   No new hypermetabolic osseous lesions identified.  -he will start lenalidomide maintenance 5mg daily as he is still neutropenic and thrombocytopenia  -he will have weekly CBC checked  -he will also start eltrombopag   -if cytopenias worsen he will stop lenalidomide            History of auto stem cell transplant:   - Day 0 on 4/18/2024. Conditioned with Carmen 140.   -day+ 104 today  - He received 5 bags on 4/18/24 at 1330 and received the remaining 4 bags on 4/19/24 at 1330. Total CD34 dose was 2.91 x 10^6.   - day+100 restaging bone marrow biopsy on 7/23/24 demonstrated a normocellular marrow with increased eosinophils and tri-lineage hematopoiesis   -About 6-8%  positive plasma cells were evident.  No increased CD34 positive blasts were identified.  CD61 highlighted decreased number  of megakaryocytes.  MPO showed myeloid precursors.  -- No monotypic plasma cells identified (MRD-negative) on MRD flow assay.  -There was no definitive morphologic or immunophenotypic evidence of residual plasma cell neoplasm.     -PET CT on 7/23/24 showed here is interval normalization of uptake within the previously hypermetabolic lesions.   No new hypermetabolic osseous lesions identified.        Immunodeficiency due to conditions classified elsewhere:   - Continue acyclovir antiviral prophylaxis   - Switched bactrim PJP prophylaxis to dapsone 100 mg daily due to cytopenias.     Anemia in neoplastic disease  -- Hgb 10.7 g/dl on 7/30/24       5. Thrombocytopenia    -asymptomatic  -platelets 40 k on 7/30/24   -no dysplastic changes on day+100 bone marrow biopsy  -megakaryocytes were decreased   -he will start  eltrombobag 75mg daily    6. Leukopenia      --WBC 2.34 k on 7/30/24  --asymptomatic    7. Neutropenia    - on 7/30/24  --asymptomatic      8. History of Gemella Haemolysans bactermia   - He was on ceftriaxone until 6/14/2024  - He had a follow up with transplant ID       9. Cancer associated pain    -he has ongoing low back pain  -no recent falls  -he is using hydrocodone-APAP as needed  -he tried lidoderm patch without success  -he has been using cyclobenzaprine 5mg TID as needed    10. Immunization    --he has been evaluated by ID   -he will receive post transplant vaccines in [facility]      Follow up with physician 3 months. dr. marcus   Follow up with [facility]    Provider visit type    Infusion scheduling note    Injection scheduling note    Labs CBC, CMP, SPEP, free light chains, immunofixation and immunoglobulins   Scheduling:  Preferred lab:  Lab interval:     Imaging    Pharmacy appointment    Other referrals

## 2024-08-01 ENCOUNTER — PATIENT MESSAGE (OUTPATIENT)
Dept: INFECTIOUS DISEASES | Facility: CLINIC | Age: 74
End: 2024-08-01
Payer: MEDICARE

## 2024-08-01 ENCOUNTER — LAB VISIT (OUTPATIENT)
Dept: LAB | Facility: HOSPITAL | Age: 74
End: 2024-08-01
Payer: MEDICARE

## 2024-08-01 DIAGNOSIS — Z94.81 S/P AUTOLOGOUS BONE MARROW TRANSPLANTATION: ICD-10-CM

## 2024-08-01 DIAGNOSIS — C90.01 MULTIPLE MYELOMA IN REMISSION: ICD-10-CM

## 2024-08-01 LAB
ALBUMIN SERPL-MCNC: 4 G/DL (ref 3.4–4.8)
ALBUMIN/GLOB SERPL: 1.8 RATIO (ref 1.1–2)
ALP SERPL-CCNC: 69 UNIT/L (ref 40–150)
ALT SERPL-CCNC: 10 UNIT/L (ref 0–55)
ANION GAP SERPL CALC-SCNC: 8 MEQ/L
AST SERPL-CCNC: 15 UNIT/L (ref 5–34)
BILIRUB SERPL-MCNC: 1.4 MG/DL
BUN SERPL-MCNC: 23 MG/DL (ref 8.4–25.7)
CALCIUM SERPL-MCNC: 9.5 MG/DL (ref 8.8–10)
CHLORIDE SERPL-SCNC: 106 MMOL/L (ref 98–107)
CO2 SERPL-SCNC: 27 MMOL/L (ref 23–31)
CREAT SERPL-MCNC: 1.13 MG/DL (ref 0.73–1.18)
CREAT/UREA NIT SERPL: 20
GFR SERPLBLD CREATININE-BSD FMLA CKD-EPI: >60 ML/MIN/1.73/M2
GLOBULIN SER-MCNC: 2.2 GM/DL (ref 2.4–3.5)
GLUCOSE SERPL-MCNC: 116 MG/DL (ref 82–115)
POTASSIUM SERPL-SCNC: 4 MMOL/L (ref 3.5–5.1)
PROT SERPL-MCNC: 6.2 GM/DL (ref 5.8–7.6)
SODIUM SERPL-SCNC: 141 MMOL/L (ref 136–145)

## 2024-08-01 PROCEDURE — 80053 COMPREHEN METABOLIC PANEL: CPT

## 2024-08-01 PROCEDURE — 36415 COLL VENOUS BLD VENIPUNCTURE: CPT

## 2024-08-06 ENCOUNTER — OFFICE VISIT (OUTPATIENT)
Dept: HEMATOLOGY/ONCOLOGY | Facility: CLINIC | Age: 74
End: 2024-08-06
Payer: MEDICARE

## 2024-08-06 ENCOUNTER — LAB VISIT (OUTPATIENT)
Dept: LAB | Facility: HOSPITAL | Age: 74
End: 2024-08-06
Payer: MEDICARE

## 2024-08-06 VITALS
HEART RATE: 75 BPM | TEMPERATURE: 98 F | OXYGEN SATURATION: 93 % | SYSTOLIC BLOOD PRESSURE: 119 MMHG | WEIGHT: 199.38 LBS | DIASTOLIC BLOOD PRESSURE: 68 MMHG | BODY MASS INDEX: 28.54 KG/M2 | RESPIRATION RATE: 18 BRPM | HEIGHT: 70 IN

## 2024-08-06 DIAGNOSIS — Z76.82 STEM CELL TRANSPLANT CANDIDATE: ICD-10-CM

## 2024-08-06 DIAGNOSIS — D69.6 THROMBOCYTOPENIA, UNSPECIFIED: ICD-10-CM

## 2024-08-06 DIAGNOSIS — C90.01 MULTIPLE MYELOMA IN REMISSION: ICD-10-CM

## 2024-08-06 DIAGNOSIS — Z94.81 S/P AUTOLOGOUS BONE MARROW TRANSPLANTATION: ICD-10-CM

## 2024-08-06 DIAGNOSIS — C90.01 MULTIPLE MYELOMA IN REMISSION: Primary | ICD-10-CM

## 2024-08-06 LAB
ALBUMIN SERPL-MCNC: 4.3 G/DL (ref 3.4–4.8)
ALBUMIN/GLOB SERPL: 2 RATIO (ref 1.1–2)
ALP SERPL-CCNC: 74 UNIT/L (ref 40–150)
ALT SERPL-CCNC: 11 UNIT/L (ref 0–55)
ANION GAP SERPL CALC-SCNC: 9 MEQ/L
AST SERPL-CCNC: 15 UNIT/L (ref 5–34)
BASOPHILS # BLD AUTO: 0.01 X10(3)/MCL
BASOPHILS NFR BLD AUTO: 0.2 %
BILIRUB SERPL-MCNC: 1.4 MG/DL
BUN SERPL-MCNC: 34 MG/DL (ref 8.4–25.7)
CALCIUM SERPL-MCNC: 10 MG/DL (ref 8.8–10)
CHLORIDE SERPL-SCNC: 107 MMOL/L (ref 98–107)
CO2 SERPL-SCNC: 25 MMOL/L (ref 23–31)
CREAT SERPL-MCNC: 1.04 MG/DL (ref 0.73–1.18)
CREAT/UREA NIT SERPL: 33
EOSINOPHIL # BLD AUTO: 0.19 X10(3)/MCL (ref 0–0.9)
EOSINOPHIL NFR BLD AUTO: 3.9 %
ERYTHROCYTE [DISTWIDTH] IN BLOOD BY AUTOMATED COUNT: 13.8 % (ref 11.5–17)
GFR SERPLBLD CREATININE-BSD FMLA CKD-EPI: >60 ML/MIN/1.73/M2
GLOBULIN SER-MCNC: 2.2 GM/DL (ref 2.4–3.5)
GLUCOSE SERPL-MCNC: 129 MG/DL (ref 82–115)
HCT VFR BLD AUTO: 30.6 % (ref 42–52)
HGB BLD-MCNC: 10.4 G/DL (ref 14–18)
IGA SERPL-MCNC: 125 MG/DL (ref 101–645)
IGG SERPL-MCNC: 709 MG/DL (ref 540–1822)
IGM SERPL-MCNC: 21 MG/DL (ref 22–240)
IMM GRANULOCYTES # BLD AUTO: 0.02 X10(3)/MCL (ref 0–0.04)
IMM GRANULOCYTES NFR BLD AUTO: 0.4 %
LYMPHOCYTES # BLD AUTO: 0.99 X10(3)/MCL (ref 0.6–4.6)
LYMPHOCYTES NFR BLD AUTO: 20.3 %
MCH RBC QN AUTO: 40.6 PG (ref 27–31)
MCHC RBC AUTO-ENTMCNC: 34 G/DL (ref 33–36)
MCV RBC AUTO: 119.5 FL (ref 80–94)
MONOCYTES # BLD AUTO: 0.42 X10(3)/MCL (ref 0.1–1.3)
MONOCYTES NFR BLD AUTO: 8.6 %
NEUTROPHILS # BLD AUTO: 3.25 X10(3)/MCL (ref 2.1–9.2)
NEUTROPHILS NFR BLD AUTO: 66.6 %
PLATELET # BLD AUTO: 36 X10(3)/MCL (ref 130–400)
PMV BLD AUTO: 9.8 FL (ref 7.4–10.4)
POTASSIUM SERPL-SCNC: 4 MMOL/L (ref 3.5–5.1)
PROT SERPL-MCNC: 6.5 GM/DL (ref 5.8–7.6)
RBC # BLD AUTO: 2.56 X10(6)/MCL (ref 4.7–6.1)
SODIUM SERPL-SCNC: 141 MMOL/L (ref 136–145)
WBC # BLD AUTO: 4.88 X10(3)/MCL (ref 4.5–11.5)

## 2024-08-06 PROCEDURE — 99215 OFFICE O/P EST HI 40 MIN: CPT | Mod: PBBFAC | Performed by: INTERNAL MEDICINE

## 2024-08-06 PROCEDURE — 82784 ASSAY IGA/IGD/IGG/IGM EACH: CPT | Mod: 59

## 2024-08-06 PROCEDURE — 80053 COMPREHEN METABOLIC PANEL: CPT

## 2024-08-06 PROCEDURE — 36415 COLL VENOUS BLD VENIPUNCTURE: CPT

## 2024-08-06 PROCEDURE — 84165 PROTEIN E-PHORESIS SERUM: CPT

## 2024-08-06 PROCEDURE — 83521 IG LIGHT CHAINS FREE EACH: CPT

## 2024-08-06 PROCEDURE — 99999 PR PBB SHADOW E&M-EST. PATIENT-LVL V: CPT | Mod: PBBFAC,,, | Performed by: INTERNAL MEDICINE

## 2024-08-06 PROCEDURE — 99215 OFFICE O/P EST HI 40 MIN: CPT | Mod: S$PBB,,, | Performed by: INTERNAL MEDICINE

## 2024-08-06 PROCEDURE — 86334 IMMUNOFIX E-PHORESIS SERUM: CPT

## 2024-08-06 PROCEDURE — 85025 COMPLETE CBC W/AUTO DIFF WBC: CPT

## 2024-08-06 RX ORDER — LENALIDOMIDE 5 MG/1
1 CAPSULE ORAL DAILY
Qty: 28 EACH | Refills: 0 | Status: SHIPPED | OUTPATIENT
Start: 2024-08-06

## 2024-08-07 LAB
ALBUMIN % SPEP (OHS): 59.18 (ref 48.1–59.5)
ALBUMIN SERPL-MCNC: 3.7 G/DL (ref 3.4–4.8)
ALBUMIN/GLOB SERPL: 1.4 RATIO (ref 1.1–2)
ALPHA 1 GLOB (OHS): 0.25 GM/DL (ref 0–0.4)
ALPHA 1 GLOB% (OHS): 3.9 (ref 2.3–4.9)
ALPHA 2 GLOB % (OHS): 10.18 (ref 6.9–13)
ALPHA 2 GLOB (OHS): 0.64 GM/DL (ref 0.4–1)
BETA GLOB (OHS): 0.84 GM/DL (ref 0.7–1.3)
BETA GLOB% (OHS): 13.4 (ref 13.8–19.7)
GAMMA GLOBULIN % (OHS): 13.35 (ref 10.1–21.9)
GAMMA GLOBULIN (OHS): 0.84 GM/DL (ref 0.4–1.8)
GLOBULIN SER-MCNC: 2.6 GM/DL (ref 2.4–3.5)
KAPPA LC FREE SER NEPH-MCNC: 1.86 MG/DL (ref 0.33–1.94)
KAPPA LC FREE/LAMBDA FREE SER NEPH: 1.44 {RATIO} (ref 0.26–1.65)
LAMBDA LC FREE SER NEPH-MCNC: 1.29 MG/DL (ref 0.57–2.63)
M SPIKE % (OHS): ABNORMAL
M SPIKE (OHS): ABNORMAL
PATH REV: NORMAL
PROT SERPL-MCNC: 6.3 GM/DL (ref 5.8–7.6)

## 2024-08-08 ENCOUNTER — LAB VISIT (OUTPATIENT)
Dept: LAB | Facility: HOSPITAL | Age: 74
End: 2024-08-08
Payer: MEDICARE

## 2024-08-08 DIAGNOSIS — C90.01 MULTIPLE MYELOMA IN REMISSION: ICD-10-CM

## 2024-08-08 LAB
ALBUMIN SERPL-MCNC: 4.1 G/DL (ref 3.4–4.8)
ALBUMIN/GLOB SERPL: 1.8 RATIO (ref 1.1–2)
ALP SERPL-CCNC: 62 UNIT/L (ref 40–150)
ALT SERPL-CCNC: 11 UNIT/L (ref 0–55)
ANION GAP SERPL CALC-SCNC: 9 MEQ/L
ANISOCYTOSIS BLD QL SMEAR: ABNORMAL
AST SERPL-CCNC: 16 UNIT/L (ref 5–34)
BASOPHILS # BLD AUTO: 0.01 X10(3)/MCL
BASOPHILS NFR BLD AUTO: 0.2 %
BILIRUB SERPL-MCNC: 1.8 MG/DL
BUN SERPL-MCNC: 26 MG/DL (ref 8.4–25.7)
CALCIUM SERPL-MCNC: 9.8 MG/DL (ref 8.8–10)
CHLORIDE SERPL-SCNC: 106 MMOL/L (ref 98–107)
CO2 SERPL-SCNC: 26 MMOL/L (ref 23–31)
CREAT SERPL-MCNC: 1.14 MG/DL (ref 0.73–1.18)
CREAT/UREA NIT SERPL: 23
EOSINOPHIL # BLD AUTO: 0.25 X10(3)/MCL (ref 0–0.9)
EOSINOPHIL NFR BLD AUTO: 4.7 %
ERYTHROCYTE [DISTWIDTH] IN BLOOD BY AUTOMATED COUNT: 14.1 % (ref 11.5–17)
GFR SERPLBLD CREATININE-BSD FMLA CKD-EPI: >60 ML/MIN/1.73/M2
GLOBULIN SER-MCNC: 2.3 GM/DL (ref 2.4–3.5)
GLUCOSE SERPL-MCNC: 118 MG/DL (ref 82–115)
HCT VFR BLD AUTO: 30.4 % (ref 42–52)
HGB BLD-MCNC: 10.4 G/DL (ref 14–18)
HYPOCHROMIA BLD QL SMEAR: SLIGHT
IGA SERPL-MCNC: 116 MG/DL (ref 101–645)
IGG SERPL-MCNC: 682 MG/DL (ref 540–1822)
IGM SERPL-MCNC: 27 MG/DL (ref 22–240)
IMM GRANULOCYTES # BLD AUTO: 0.03 X10(3)/MCL (ref 0–0.04)
IMM GRANULOCYTES NFR BLD AUTO: 0.6 %
LYMPHOCYTES # BLD AUTO: 0.88 X10(3)/MCL (ref 0.6–4.6)
LYMPHOCYTES NFR BLD AUTO: 16.5 %
MAGNESIUM SERPL-MCNC: 2 MG/DL (ref 1.6–2.6)
MCH RBC QN AUTO: 41.1 PG (ref 27–31)
MCHC RBC AUTO-ENTMCNC: 34.2 G/DL (ref 33–36)
MCV RBC AUTO: 120.2 FL (ref 80–94)
MONOCYTES # BLD AUTO: 0.47 X10(3)/MCL (ref 0.1–1.3)
MONOCYTES NFR BLD AUTO: 8.8 %
NEUTROPHILS # BLD AUTO: 3.69 X10(3)/MCL (ref 2.1–9.2)
NEUTROPHILS NFR BLD AUTO: 69.2 %
OVALOCYTES (OLG): SLIGHT
PLATELET # BLD AUTO: 39 X10(3)/MCL (ref 130–400)
PLATELET # BLD EST: ABNORMAL 10*3/UL
PMV BLD AUTO: 9.6 FL (ref 7.4–10.4)
POIKILOCYTOSIS BLD QL SMEAR: ABNORMAL
POTASSIUM SERPL-SCNC: 4 MMOL/L (ref 3.5–5.1)
PROT SERPL-MCNC: 6.4 GM/DL (ref 5.8–7.6)
RBC # BLD AUTO: 2.53 X10(6)/MCL (ref 4.7–6.1)
SODIUM SERPL-SCNC: 141 MMOL/L (ref 136–145)
TEAR DROP CELL (OLG): SLIGHT
WBC # BLD AUTO: 5.33 X10(3)/MCL (ref 4.5–11.5)

## 2024-08-08 PROCEDURE — 80053 COMPREHEN METABOLIC PANEL: CPT

## 2024-08-08 PROCEDURE — 85025 COMPLETE CBC W/AUTO DIFF WBC: CPT

## 2024-08-08 PROCEDURE — 36415 COLL VENOUS BLD VENIPUNCTURE: CPT

## 2024-08-08 PROCEDURE — 83521 IG LIGHT CHAINS FREE EACH: CPT

## 2024-08-08 PROCEDURE — 83735 ASSAY OF MAGNESIUM: CPT

## 2024-08-08 PROCEDURE — 86334 IMMUNOFIX E-PHORESIS SERUM: CPT

## 2024-08-08 PROCEDURE — 82784 ASSAY IGA/IGD/IGG/IGM EACH: CPT

## 2024-08-08 PROCEDURE — 84165 PROTEIN E-PHORESIS SERUM: CPT

## 2024-08-09 LAB
ALBUMIN % SPEP (OHS): 58.22 (ref 48.1–59.5)
ALBUMIN SERPL-MCNC: 3.7 G/DL (ref 3.4–4.8)
ALBUMIN/GLOB SERPL: 1.4 RATIO (ref 1.1–2)
ALPHA 1 GLOB (OHS): 0.25 GM/DL (ref 0–0.4)
ALPHA 1 GLOB% (OHS): 3.99 (ref 2.3–4.9)
ALPHA 2 GLOB % (OHS): 10.63 (ref 6.9–13)
ALPHA 2 GLOB (OHS): 0.67 GM/DL (ref 0.4–1)
BETA GLOB (OHS): 0.84 GM/DL (ref 0.7–1.3)
BETA GLOB% (OHS): 13.29 (ref 13.8–19.7)
GAMMA GLOBULIN % (OHS): 13.87 (ref 10.1–21.9)
GAMMA GLOBULIN (OHS): 0.87 GM/DL (ref 0.4–1.8)
GLOBULIN SER-MCNC: 2.6 GM/DL (ref 2.4–3.5)
KAPPA LC FREE SER NEPH-MCNC: 2.08 MG/DL (ref 0.33–1.94)
KAPPA LC FREE/LAMBDA FREE SER NEPH: 1.43 {RATIO} (ref 0.26–1.65)
LAMBDA LC FREE SER NEPH-MCNC: 1.45 MG/DL (ref 0.57–2.63)
M SPIKE % (OHS): ABNORMAL
M SPIKE (OHS): ABNORMAL
PATH REV: NORMAL
PROT SERPL-MCNC: 6.3 GM/DL (ref 5.8–7.6)

## 2024-08-13 ENCOUNTER — LAB VISIT (OUTPATIENT)
Dept: LAB | Facility: HOSPITAL | Age: 74
End: 2024-08-13
Payer: MEDICARE

## 2024-08-13 DIAGNOSIS — C90.01 MULTIPLE MYELOMA IN REMISSION: ICD-10-CM

## 2024-08-13 DIAGNOSIS — Z94.81 S/P AUTOLOGOUS BONE MARROW TRANSPLANTATION: ICD-10-CM

## 2024-08-13 LAB
ALBUMIN SERPL-MCNC: 4 G/DL (ref 3.4–4.8)
ALBUMIN/GLOB SERPL: 2 RATIO (ref 1.1–2)
ALP SERPL-CCNC: 69 UNIT/L (ref 40–150)
ALT SERPL-CCNC: 11 UNIT/L (ref 0–55)
ANION GAP SERPL CALC-SCNC: 6 MEQ/L
AST SERPL-CCNC: 14 UNIT/L (ref 5–34)
BASOPHILS # BLD AUTO: 0.01 X10(3)/MCL
BASOPHILS NFR BLD AUTO: 0.3 %
BILIRUB SERPL-MCNC: 1.2 MG/DL
BUN SERPL-MCNC: 30 MG/DL (ref 8.4–25.7)
CALCIUM SERPL-MCNC: 9.6 MG/DL (ref 8.8–10)
CHLORIDE SERPL-SCNC: 109 MMOL/L (ref 98–107)
CO2 SERPL-SCNC: 26 MMOL/L (ref 23–31)
CREAT SERPL-MCNC: 1.2 MG/DL (ref 0.73–1.18)
CREAT/UREA NIT SERPL: 25
EOSINOPHIL # BLD AUTO: 0.18 X10(3)/MCL (ref 0–0.9)
EOSINOPHIL NFR BLD AUTO: 5 %
ERYTHROCYTE [DISTWIDTH] IN BLOOD BY AUTOMATED COUNT: 15.1 % (ref 11.5–17)
GFR SERPLBLD CREATININE-BSD FMLA CKD-EPI: >60 ML/MIN/1.73/M2
GLOBULIN SER-MCNC: 2 GM/DL (ref 2.4–3.5)
GLUCOSE SERPL-MCNC: 93 MG/DL (ref 82–115)
HCT VFR BLD AUTO: 26.6 % (ref 42–52)
HGB BLD-MCNC: 8.9 G/DL (ref 14–18)
IMM GRANULOCYTES # BLD AUTO: 0.02 X10(3)/MCL (ref 0–0.04)
IMM GRANULOCYTES NFR BLD AUTO: 0.6 %
LYMPHOCYTES # BLD AUTO: 0.83 X10(3)/MCL (ref 0.6–4.6)
LYMPHOCYTES NFR BLD AUTO: 23.2 %
MCH RBC QN AUTO: 41 PG (ref 27–31)
MCHC RBC AUTO-ENTMCNC: 33.5 G/DL (ref 33–36)
MCV RBC AUTO: 122.6 FL (ref 80–94)
MONOCYTES # BLD AUTO: 0.42 X10(3)/MCL (ref 0.1–1.3)
MONOCYTES NFR BLD AUTO: 11.8 %
NEUTROPHILS # BLD AUTO: 2.11 X10(3)/MCL (ref 2.1–9.2)
NEUTROPHILS NFR BLD AUTO: 59.1 %
PLATELET # BLD AUTO: 35 X10(3)/MCL (ref 130–400)
PLATELET # BLD EST: ABNORMAL 10*3/UL
PMV BLD AUTO: 10.8 FL (ref 7.4–10.4)
POTASSIUM SERPL-SCNC: 4 MMOL/L (ref 3.5–5.1)
PROT SERPL-MCNC: 6 GM/DL (ref 5.8–7.6)
RBC # BLD AUTO: 2.17 X10(6)/MCL (ref 4.7–6.1)
SODIUM SERPL-SCNC: 141 MMOL/L (ref 136–145)
WBC # BLD AUTO: 3.57 X10(3)/MCL (ref 4.5–11.5)

## 2024-08-13 PROCEDURE — 85025 COMPLETE CBC W/AUTO DIFF WBC: CPT

## 2024-08-13 PROCEDURE — 36415 COLL VENOUS BLD VENIPUNCTURE: CPT

## 2024-08-13 PROCEDURE — 80053 COMPREHEN METABOLIC PANEL: CPT

## 2024-08-15 ENCOUNTER — LAB VISIT (OUTPATIENT)
Dept: LAB | Facility: HOSPITAL | Age: 74
End: 2024-08-15
Payer: MEDICARE

## 2024-08-15 DIAGNOSIS — Z94.81 S/P AUTOLOGOUS BONE MARROW TRANSPLANTATION: ICD-10-CM

## 2024-08-15 DIAGNOSIS — C90.01 MULTIPLE MYELOMA IN REMISSION: ICD-10-CM

## 2024-08-15 LAB
ALBUMIN SERPL-MCNC: 3.8 G/DL (ref 3.4–4.8)
ALBUMIN/GLOB SERPL: 1.8 RATIO (ref 1.1–2)
ALP SERPL-CCNC: 61 UNIT/L (ref 40–150)
ALT SERPL-CCNC: 9 UNIT/L (ref 0–55)
ANION GAP SERPL CALC-SCNC: 10 MEQ/L
AST SERPL-CCNC: 13 UNIT/L (ref 5–34)
BASOPHILS # BLD AUTO: 0.01 X10(3)/MCL
BASOPHILS NFR BLD AUTO: 0.3 %
BILIRUB SERPL-MCNC: 1.3 MG/DL
BUN SERPL-MCNC: 21 MG/DL (ref 8.4–25.7)
CALCIUM SERPL-MCNC: 9.1 MG/DL (ref 8.8–10)
CHLORIDE SERPL-SCNC: 107 MMOL/L (ref 98–107)
CO2 SERPL-SCNC: 26 MMOL/L (ref 23–31)
CREAT SERPL-MCNC: 1.3 MG/DL (ref 0.73–1.18)
CREAT/UREA NIT SERPL: 16
EOSINOPHIL # BLD AUTO: 0.14 X10(3)/MCL (ref 0–0.9)
EOSINOPHIL NFR BLD AUTO: 4 %
ERYTHROCYTE [DISTWIDTH] IN BLOOD BY AUTOMATED COUNT: 15.6 % (ref 11.5–17)
GFR SERPLBLD CREATININE-BSD FMLA CKD-EPI: 58 ML/MIN/1.73/M2
GLOBULIN SER-MCNC: 2.1 GM/DL (ref 2.4–3.5)
GLUCOSE SERPL-MCNC: 126 MG/DL (ref 82–115)
HCT VFR BLD AUTO: 26.2 % (ref 42–52)
HGB BLD-MCNC: 9 G/DL (ref 14–18)
IMM GRANULOCYTES # BLD AUTO: 0.02 X10(3)/MCL (ref 0–0.04)
IMM GRANULOCYTES NFR BLD AUTO: 0.6 %
LYMPHOCYTES # BLD AUTO: 0.92 X10(3)/MCL (ref 0.6–4.6)
LYMPHOCYTES NFR BLD AUTO: 26.2 %
MCH RBC QN AUTO: 42.7 PG (ref 27–31)
MCHC RBC AUTO-ENTMCNC: 34.4 G/DL (ref 33–36)
MCV RBC AUTO: 124.2 FL (ref 80–94)
MONOCYTES # BLD AUTO: 0.4 X10(3)/MCL (ref 0.1–1.3)
MONOCYTES NFR BLD AUTO: 11.4 %
NEUTROPHILS # BLD AUTO: 2.02 X10(3)/MCL (ref 2.1–9.2)
NEUTROPHILS NFR BLD AUTO: 57.5 %
PLATELET # BLD AUTO: 33 X10(3)/MCL (ref 130–400)
PMV BLD AUTO: 10.3 FL (ref 7.4–10.4)
POTASSIUM SERPL-SCNC: 3.7 MMOL/L (ref 3.5–5.1)
PROT SERPL-MCNC: 5.9 GM/DL (ref 5.8–7.6)
RBC # BLD AUTO: 2.11 X10(6)/MCL (ref 4.7–6.1)
SODIUM SERPL-SCNC: 143 MMOL/L (ref 136–145)
WBC # BLD AUTO: 3.51 X10(3)/MCL (ref 4.5–11.5)

## 2024-08-15 PROCEDURE — 36415 COLL VENOUS BLD VENIPUNCTURE: CPT

## 2024-08-15 PROCEDURE — 85025 COMPLETE CBC W/AUTO DIFF WBC: CPT

## 2024-08-15 PROCEDURE — 80053 COMPREHEN METABOLIC PANEL: CPT

## 2024-08-20 ENCOUNTER — LAB VISIT (OUTPATIENT)
Dept: LAB | Facility: HOSPITAL | Age: 74
End: 2024-08-20
Attending: INTERNAL MEDICINE
Payer: MEDICARE

## 2024-08-20 DIAGNOSIS — C90.01 MULTIPLE MYELOMA IN REMISSION: ICD-10-CM

## 2024-08-20 DIAGNOSIS — Z94.81 S/P AUTOLOGOUS BONE MARROW TRANSPLANTATION: ICD-10-CM

## 2024-08-20 LAB
ALBUMIN SERPL-MCNC: 4 G/DL (ref 3.4–4.8)
ALBUMIN/GLOB SERPL: 1.7 RATIO (ref 1.1–2)
ALP SERPL-CCNC: 67 UNIT/L (ref 40–150)
ALT SERPL-CCNC: 11 UNIT/L (ref 0–55)
ANION GAP SERPL CALC-SCNC: 9 MEQ/L
AST SERPL-CCNC: 14 UNIT/L (ref 5–34)
BASOPHILS # BLD AUTO: 0.01 X10(3)/MCL
BASOPHILS NFR BLD AUTO: 0.2 %
BILIRUB SERPL-MCNC: 1.5 MG/DL
BUN SERPL-MCNC: 13 MG/DL (ref 8.4–25.7)
CALCIUM SERPL-MCNC: 9.4 MG/DL (ref 8.8–10)
CHLORIDE SERPL-SCNC: 107 MMOL/L (ref 98–107)
CO2 SERPL-SCNC: 27 MMOL/L (ref 23–31)
CREAT SERPL-MCNC: 1.25 MG/DL (ref 0.73–1.18)
CREAT/UREA NIT SERPL: 10
EOSINOPHIL # BLD AUTO: 0.18 X10(3)/MCL (ref 0–0.9)
EOSINOPHIL NFR BLD AUTO: 4.5 %
ERYTHROCYTE [DISTWIDTH] IN BLOOD BY AUTOMATED COUNT: 15.6 % (ref 11.5–17)
GFR SERPLBLD CREATININE-BSD FMLA CKD-EPI: 60 ML/MIN/1.73/M2
GLOBULIN SER-MCNC: 2.3 GM/DL (ref 2.4–3.5)
GLUCOSE SERPL-MCNC: 138 MG/DL (ref 82–115)
HCT VFR BLD AUTO: 27.2 % (ref 42–52)
HGB BLD-MCNC: 8.9 G/DL (ref 14–18)
IMM GRANULOCYTES # BLD AUTO: 0.01 X10(3)/MCL (ref 0–0.04)
IMM GRANULOCYTES NFR BLD AUTO: 0.2 %
LYMPHOCYTES # BLD AUTO: 0.8 X10(3)/MCL (ref 0.6–4.6)
LYMPHOCYTES NFR BLD AUTO: 19.9 %
MCH RBC QN AUTO: 42.4 PG (ref 27–31)
MCHC RBC AUTO-ENTMCNC: 32.7 G/DL (ref 33–36)
MCV RBC AUTO: 129.5 FL (ref 80–94)
MONOCYTES # BLD AUTO: 0.61 X10(3)/MCL (ref 0.1–1.3)
MONOCYTES NFR BLD AUTO: 15.2 %
NEUTROPHILS # BLD AUTO: 2.41 X10(3)/MCL (ref 2.1–9.2)
NEUTROPHILS NFR BLD AUTO: 60 %
PLATELET # BLD AUTO: 45 X10(3)/MCL (ref 130–400)
PMV BLD AUTO: 10.9 FL (ref 7.4–10.4)
POTASSIUM SERPL-SCNC: 3.7 MMOL/L (ref 3.5–5.1)
PROT SERPL-MCNC: 6.3 GM/DL (ref 5.8–7.6)
RBC # BLD AUTO: 2.1 X10(6)/MCL (ref 4.7–6.1)
SODIUM SERPL-SCNC: 143 MMOL/L (ref 136–145)
WBC # BLD AUTO: 4.02 X10(3)/MCL (ref 4.5–11.5)

## 2024-08-20 PROCEDURE — 36415 COLL VENOUS BLD VENIPUNCTURE: CPT

## 2024-08-20 PROCEDURE — 80053 COMPREHEN METABOLIC PANEL: CPT

## 2024-08-20 PROCEDURE — 85025 COMPLETE CBC W/AUTO DIFF WBC: CPT

## 2024-08-21 DIAGNOSIS — C90.01 MULTIPLE MYELOMA IN REMISSION: ICD-10-CM

## 2024-08-21 RX ORDER — HYDROCODONE BITARTRATE AND ACETAMINOPHEN 7.5; 325 MG/1; MG/1
1 TABLET ORAL EVERY 4 HOURS PRN
Qty: 90 TABLET | Refills: 0 | Status: SHIPPED | OUTPATIENT
Start: 2024-08-21

## 2024-08-22 ENCOUNTER — LAB VISIT (OUTPATIENT)
Dept: LAB | Facility: HOSPITAL | Age: 74
End: 2024-08-22
Payer: MEDICARE

## 2024-08-22 DIAGNOSIS — T45.1X5A IMMUNODEFICIENCY DUE TO CHEMOTHERAPY: ICD-10-CM

## 2024-08-22 DIAGNOSIS — D84.821 IMMUNODEFICIENCY DUE TO CHEMOTHERAPY: ICD-10-CM

## 2024-08-22 DIAGNOSIS — Z79.899 IMMUNODEFICIENCY DUE TO CHEMOTHERAPY: ICD-10-CM

## 2024-08-22 DIAGNOSIS — Z94.81 S/P AUTOLOGOUS BONE MARROW TRANSPLANTATION: ICD-10-CM

## 2024-08-22 DIAGNOSIS — C90.01 MULTIPLE MYELOMA IN REMISSION: ICD-10-CM

## 2024-08-22 LAB
ALBUMIN SERPL-MCNC: 3.8 G/DL (ref 3.4–4.8)
ALBUMIN/GLOB SERPL: 1.7 RATIO (ref 1.1–2)
ALP SERPL-CCNC: 69 UNIT/L (ref 40–150)
ALT SERPL-CCNC: 11 UNIT/L (ref 0–55)
ANION GAP SERPL CALC-SCNC: 7 MEQ/L
AST SERPL-CCNC: 12 UNIT/L (ref 5–34)
BASOPHILS # BLD AUTO: 0.01 X10(3)/MCL
BASOPHILS NFR BLD AUTO: 0.3 %
BILIRUB SERPL-MCNC: 1.3 MG/DL
BUN SERPL-MCNC: 12 MG/DL (ref 8.4–25.7)
CALCIUM SERPL-MCNC: 9.4 MG/DL (ref 8.8–10)
CHLORIDE SERPL-SCNC: 108 MMOL/L (ref 98–107)
CO2 SERPL-SCNC: 27 MMOL/L (ref 23–31)
CREAT SERPL-MCNC: 1.15 MG/DL (ref 0.73–1.18)
CREAT/UREA NIT SERPL: 10
EOSINOPHIL # BLD AUTO: 0.14 X10(3)/MCL (ref 0–0.9)
EOSINOPHIL NFR BLD AUTO: 4 %
ERYTHROCYTE [DISTWIDTH] IN BLOOD BY AUTOMATED COUNT: 14.8 % (ref 11.5–17)
GFR SERPLBLD CREATININE-BSD FMLA CKD-EPI: >60 ML/MIN/1.73/M2
GLOBULIN SER-MCNC: 2.3 GM/DL (ref 2.4–3.5)
GLUCOSE SERPL-MCNC: 124 MG/DL (ref 82–115)
HCT VFR BLD AUTO: 26.4 % (ref 42–52)
HGB BLD-MCNC: 8.7 G/DL (ref 14–18)
IGA SERPL-MCNC: 112 MG/DL (ref 101–645)
IGG SERPL-MCNC: 701 MG/DL (ref 540–1822)
IGM SERPL-MCNC: 57 MG/DL (ref 22–240)
IMM GRANULOCYTES # BLD AUTO: 0.02 X10(3)/MCL (ref 0–0.04)
IMM GRANULOCYTES NFR BLD AUTO: 0.6 %
LYMPHOCYTES # BLD AUTO: 0.89 X10(3)/MCL (ref 0.6–4.6)
LYMPHOCYTES NFR BLD AUTO: 25.2 %
MCH RBC QN AUTO: 41.6 PG (ref 27–31)
MCHC RBC AUTO-ENTMCNC: 33 G/DL (ref 33–36)
MCV RBC AUTO: 126.3 FL (ref 80–94)
MONOCYTES # BLD AUTO: 0.57 X10(3)/MCL (ref 0.1–1.3)
MONOCYTES NFR BLD AUTO: 16.1 %
NEUTROPHILS # BLD AUTO: 1.9 X10(3)/MCL (ref 2.1–9.2)
NEUTROPHILS NFR BLD AUTO: 53.8 %
PLATELET # BLD AUTO: 44 X10(3)/MCL (ref 130–400)
PMV BLD AUTO: 10.7 FL (ref 7.4–10.4)
POTASSIUM SERPL-SCNC: 4 MMOL/L (ref 3.5–5.1)
PROT SERPL-MCNC: 6.1 GM/DL (ref 5.8–7.6)
RBC # BLD AUTO: 2.09 X10(6)/MCL (ref 4.7–6.1)
SODIUM SERPL-SCNC: 142 MMOL/L (ref 136–145)
WBC # BLD AUTO: 3.53 X10(3)/MCL (ref 4.5–11.5)

## 2024-08-22 PROCEDURE — 85025 COMPLETE CBC W/AUTO DIFF WBC: CPT

## 2024-08-22 PROCEDURE — 82784 ASSAY IGA/IGD/IGG/IGM EACH: CPT

## 2024-08-22 PROCEDURE — 80053 COMPREHEN METABOLIC PANEL: CPT

## 2024-08-22 PROCEDURE — 36415 COLL VENOUS BLD VENIPUNCTURE: CPT

## 2024-08-26 DIAGNOSIS — C90.01 MULTIPLE MYELOMA IN REMISSION: Primary | ICD-10-CM

## 2024-08-27 ENCOUNTER — LAB VISIT (OUTPATIENT)
Dept: LAB | Facility: HOSPITAL | Age: 74
End: 2024-08-27
Payer: MEDICARE

## 2024-08-27 DIAGNOSIS — C90.01 MULTIPLE MYELOMA IN REMISSION: ICD-10-CM

## 2024-08-27 LAB
ALBUMIN SERPL-MCNC: 3.6 G/DL (ref 3.4–4.8)
ALBUMIN/GLOB SERPL: 1.5 RATIO (ref 1.1–2)
ALP SERPL-CCNC: 74 UNIT/L (ref 40–150)
ALT SERPL-CCNC: 11 UNIT/L (ref 0–55)
ANION GAP SERPL CALC-SCNC: 7 MEQ/L
AST SERPL-CCNC: 12 UNIT/L (ref 5–34)
BASOPHILS # BLD AUTO: 0.02 X10(3)/MCL
BASOPHILS NFR BLD AUTO: 0.7 %
BILIRUB SERPL-MCNC: 1.2 MG/DL
BUN SERPL-MCNC: 19 MG/DL (ref 8.4–25.7)
CALCIUM SERPL-MCNC: 9.3 MG/DL (ref 8.8–10)
CHLORIDE SERPL-SCNC: 104 MMOL/L (ref 98–107)
CO2 SERPL-SCNC: 28 MMOL/L (ref 23–31)
CREAT SERPL-MCNC: 1.2 MG/DL (ref 0.73–1.18)
CREAT/UREA NIT SERPL: 16
EOSINOPHIL # BLD AUTO: 0.18 X10(3)/MCL (ref 0–0.9)
EOSINOPHIL NFR BLD AUTO: 6.5 %
ERYTHROCYTE [DISTWIDTH] IN BLOOD BY AUTOMATED COUNT: 13.6 % (ref 11.5–17)
GFR SERPLBLD CREATININE-BSD FMLA CKD-EPI: >60 ML/MIN/1.73/M2
GLOBULIN SER-MCNC: 2.4 GM/DL (ref 2.4–3.5)
GLUCOSE SERPL-MCNC: 104 MG/DL (ref 82–115)
HCT VFR BLD AUTO: 26.6 % (ref 42–52)
HGB BLD-MCNC: 8.8 G/DL (ref 14–18)
IGA SERPL-MCNC: 114 MG/DL (ref 101–645)
IGG SERPL-MCNC: 768 MG/DL (ref 540–1822)
IGM SERPL-MCNC: 56 MG/DL (ref 22–240)
IMM GRANULOCYTES # BLD AUTO: 0.01 X10(3)/MCL (ref 0–0.04)
IMM GRANULOCYTES NFR BLD AUTO: 0.4 %
LYMPHOCYTES # BLD AUTO: 0.8 X10(3)/MCL (ref 0.6–4.6)
LYMPHOCYTES NFR BLD AUTO: 28.8 %
MAGNESIUM SERPL-MCNC: 1.7 MG/DL (ref 1.6–2.6)
MCH RBC QN AUTO: 41.7 PG (ref 27–31)
MCHC RBC AUTO-ENTMCNC: 33.1 G/DL (ref 33–36)
MCV RBC AUTO: 126.1 FL (ref 80–94)
MONOCYTES # BLD AUTO: 0.51 X10(3)/MCL (ref 0.1–1.3)
MONOCYTES NFR BLD AUTO: 18.3 %
NEUTROPHILS # BLD AUTO: 1.26 X10(3)/MCL (ref 2.1–9.2)
NEUTROPHILS NFR BLD AUTO: 45.3 %
PLATELET # BLD AUTO: 55 X10(3)/MCL (ref 130–400)
PMV BLD AUTO: 10.7 FL (ref 7.4–10.4)
POTASSIUM SERPL-SCNC: 3.6 MMOL/L (ref 3.5–5.1)
PROT SERPL-MCNC: 6 GM/DL (ref 5.8–7.6)
RBC # BLD AUTO: 2.11 X10(6)/MCL (ref 4.7–6.1)
SODIUM SERPL-SCNC: 139 MMOL/L (ref 136–145)
WBC # BLD AUTO: 2.78 X10(3)/MCL (ref 4.5–11.5)

## 2024-08-27 PROCEDURE — 80053 COMPREHEN METABOLIC PANEL: CPT

## 2024-08-27 PROCEDURE — 36415 COLL VENOUS BLD VENIPUNCTURE: CPT

## 2024-08-27 PROCEDURE — 84165 PROTEIN E-PHORESIS SERUM: CPT

## 2024-08-27 PROCEDURE — 83521 IG LIGHT CHAINS FREE EACH: CPT

## 2024-08-27 PROCEDURE — 85025 COMPLETE CBC W/AUTO DIFF WBC: CPT

## 2024-08-27 PROCEDURE — 82784 ASSAY IGA/IGD/IGG/IGM EACH: CPT

## 2024-08-27 PROCEDURE — 83735 ASSAY OF MAGNESIUM: CPT

## 2024-08-27 RX ORDER — LENALIDOMIDE 5 MG/1
CAPSULE ORAL
Qty: 28 EACH | Refills: 0 | Status: SHIPPED | OUTPATIENT
Start: 2024-08-27

## 2024-08-28 LAB
ALBUMIN % SPEP (OHS): 59.18 (ref 48.1–59.5)
ALBUMIN SERPL-MCNC: 3.3 G/DL (ref 3.4–4.8)
ALBUMIN/GLOB SERPL: 1.4 RATIO (ref 1.1–2)
ALPHA 1 GLOB (OHS): 0.23 GM/DL (ref 0–0.4)
ALPHA 1 GLOB% (OHS): 4.03 (ref 2.3–4.9)
ALPHA 2 GLOB % (OHS): 9.62 (ref 6.9–13)
ALPHA 2 GLOB (OHS): 0.54 GM/DL (ref 0.4–1)
BETA GLOB (OHS): 0.74 GM/DL (ref 0.7–1.3)
BETA GLOB% (OHS): 13.14 (ref 13.8–19.7)
GAMMA GLOBULIN % (OHS): 14.03 (ref 10.1–21.9)
GAMMA GLOBULIN (OHS): 0.79 GM/DL (ref 0.4–1.8)
GLOBULIN SER-MCNC: 2.3 GM/DL (ref 2.4–3.5)
KAPPA LC FREE SER NEPH-MCNC: 4.22 MG/DL (ref 0.33–1.94)
KAPPA LC FREE/LAMBDA FREE SER NEPH: 1.5 {RATIO} (ref 0.26–1.65)
LAMBDA LC FREE SER NEPH-MCNC: 2.81 MG/DL (ref 0.57–2.63)
M SPIKE % (OHS): ABNORMAL
M SPIKE (OHS): ABNORMAL
PATH REV: NORMAL
PROT SERPL-MCNC: 5.6 GM/DL (ref 5.8–7.6)

## 2024-08-29 ENCOUNTER — LAB VISIT (OUTPATIENT)
Dept: LAB | Facility: HOSPITAL | Age: 74
End: 2024-08-29
Payer: MEDICARE

## 2024-08-29 ENCOUNTER — TELEPHONE (OUTPATIENT)
Dept: HEMATOLOGY/ONCOLOGY | Facility: CLINIC | Age: 74
End: 2024-08-29
Payer: MEDICARE

## 2024-08-29 DIAGNOSIS — Z94.81 S/P AUTOLOGOUS BONE MARROW TRANSPLANTATION: ICD-10-CM

## 2024-08-29 DIAGNOSIS — C90.01 MULTIPLE MYELOMA IN REMISSION: ICD-10-CM

## 2024-08-29 DIAGNOSIS — C90.00 MULTIPLE MYELOMA NOT HAVING ACHIEVED REMISSION: ICD-10-CM

## 2024-08-29 LAB
ALBUMIN SERPL-MCNC: 3.8 G/DL (ref 3.4–4.8)
ALBUMIN/GLOB SERPL: 1.7 RATIO (ref 1.1–2)
ALP SERPL-CCNC: 64 UNIT/L (ref 40–150)
ALT SERPL-CCNC: 11 UNIT/L (ref 0–55)
ANION GAP SERPL CALC-SCNC: 6 MEQ/L
AST SERPL-CCNC: 15 UNIT/L (ref 5–34)
BASOPHILS # BLD AUTO: 0.03 X10(3)/MCL
BASOPHILS NFR BLD AUTO: 1.4 %
BILIRUB SERPL-MCNC: 1.3 MG/DL
BUN SERPL-MCNC: 15 MG/DL (ref 8.4–25.7)
CALCIUM SERPL-MCNC: 9.4 MG/DL (ref 8.8–10)
CHLORIDE SERPL-SCNC: 106 MMOL/L (ref 98–107)
CO2 SERPL-SCNC: 29 MMOL/L (ref 23–31)
CREAT SERPL-MCNC: 1.18 MG/DL (ref 0.73–1.18)
CREAT/UREA NIT SERPL: 13
EOSINOPHIL # BLD AUTO: 0.16 X10(3)/MCL (ref 0–0.9)
EOSINOPHIL NFR BLD AUTO: 7.3 %
ERYTHROCYTE [DISTWIDTH] IN BLOOD BY AUTOMATED COUNT: 13.6 % (ref 11.5–17)
GFR SERPLBLD CREATININE-BSD FMLA CKD-EPI: >60 ML/MIN/1.73/M2
GLOBULIN SER-MCNC: 2.3 GM/DL (ref 2.4–3.5)
GLUCOSE SERPL-MCNC: 150 MG/DL (ref 82–115)
HCT VFR BLD AUTO: 26.4 % (ref 42–52)
HGB BLD-MCNC: 8.8 G/DL (ref 14–18)
IGA SERPL-MCNC: 110 MG/DL (ref 101–645)
IGG SERPL-MCNC: 796 MG/DL (ref 540–1822)
IGM SERPL-MCNC: 56 MG/DL (ref 22–240)
IMM GRANULOCYTES # BLD AUTO: 0.01 X10(3)/MCL (ref 0–0.04)
IMM GRANULOCYTES NFR BLD AUTO: 0.5 %
LYMPHOCYTES # BLD AUTO: 0.66 X10(3)/MCL (ref 0.6–4.6)
LYMPHOCYTES NFR BLD AUTO: 30.1 %
MCH RBC QN AUTO: 42.1 PG (ref 27–31)
MCHC RBC AUTO-ENTMCNC: 33.3 G/DL (ref 33–36)
MCV RBC AUTO: 126.3 FL (ref 80–94)
MONOCYTES # BLD AUTO: 0.45 X10(3)/MCL (ref 0.1–1.3)
MONOCYTES NFR BLD AUTO: 20.5 %
NEUTROPHILS # BLD AUTO: 0.88 X10(3)/MCL (ref 2.1–9.2)
NEUTROPHILS NFR BLD AUTO: 40.2 %
PLATELET # BLD AUTO: 61 X10(3)/MCL (ref 130–400)
PMV BLD AUTO: 10.1 FL (ref 7.4–10.4)
POTASSIUM SERPL-SCNC: 3.6 MMOL/L (ref 3.5–5.1)
PROT SERPL-MCNC: 6.1 GM/DL (ref 5.8–7.6)
RBC # BLD AUTO: 2.09 X10(6)/MCL (ref 4.7–6.1)
SODIUM SERPL-SCNC: 141 MMOL/L (ref 136–145)
WBC # BLD AUTO: 2.19 X10(3)/MCL (ref 4.5–11.5)

## 2024-08-29 PROCEDURE — 80053 COMPREHEN METABOLIC PANEL: CPT

## 2024-08-29 PROCEDURE — 82784 ASSAY IGA/IGD/IGG/IGM EACH: CPT

## 2024-08-29 PROCEDURE — 83521 IG LIGHT CHAINS FREE EACH: CPT

## 2024-08-29 PROCEDURE — 85025 COMPLETE CBC W/AUTO DIFF WBC: CPT

## 2024-08-29 PROCEDURE — 36415 COLL VENOUS BLD VENIPUNCTURE: CPT

## 2024-08-29 NOTE — TELEPHONE ENCOUNTER
"----- Message from Carlos Volin sent at 8/29/2024 10:24 AM CDT -----  Consult/Advisory      Name Of Caller: [facility] - [place], [place]    Contact Preference?:  [phone]     Fax: [phone]    Provider Name: Cristina / Alonzo    Does patient feel the need to be seen today? No    What is the nature of the call?: Requesting Cook[facility] for lenalidomide (REVLIMID) 5 mg Cap      Additional Notes:  "Thank you for all that you do for our patients"  "

## 2024-08-30 LAB
KAPPA LC FREE SER NEPH-MCNC: 4.25 MG/DL (ref 0.33–1.94)
KAPPA LC FREE/LAMBDA FREE SER NEPH: 1.42 {RATIO} (ref 0.26–1.65)
LAMBDA LC FREE SER NEPH-MCNC: 2.99 MG/DL (ref 0.57–2.63)

## 2024-09-03 ENCOUNTER — LAB VISIT (OUTPATIENT)
Dept: LAB | Facility: HOSPITAL | Age: 74
End: 2024-09-03
Attending: INTERNAL MEDICINE
Payer: MEDICARE

## 2024-09-03 DIAGNOSIS — Z76.82 STEM CELL TRANSPLANT CANDIDATE: ICD-10-CM

## 2024-09-03 DIAGNOSIS — C90.01 MULTIPLE MYELOMA IN REMISSION: ICD-10-CM

## 2024-09-03 LAB
ALBUMIN SERPL-MCNC: 3.9 G/DL (ref 3.4–4.8)
ALBUMIN/GLOB SERPL: 1.6 RATIO (ref 1.1–2)
ALP SERPL-CCNC: 73 UNIT/L (ref 40–150)
ALT SERPL-CCNC: 13 UNIT/L (ref 0–55)
ANION GAP SERPL CALC-SCNC: 8 MEQ/L
AST SERPL-CCNC: 14 UNIT/L (ref 5–34)
BASOPHILS # BLD AUTO: 0.04 X10(3)/MCL
BASOPHILS NFR BLD AUTO: 1.5 %
BILIRUB SERPL-MCNC: 1.3 MG/DL
BUN SERPL-MCNC: 21 MG/DL (ref 8.4–25.7)
CALCIUM SERPL-MCNC: 9.9 MG/DL (ref 8.8–10)
CHLORIDE SERPL-SCNC: 106 MMOL/L (ref 98–107)
CO2 SERPL-SCNC: 26 MMOL/L (ref 23–31)
CREAT SERPL-MCNC: 1.23 MG/DL (ref 0.73–1.18)
CREAT/UREA NIT SERPL: 17
EOSINOPHIL # BLD AUTO: 0.18 X10(3)/MCL (ref 0–0.9)
EOSINOPHIL NFR BLD AUTO: 6.7 %
ERYTHROCYTE [DISTWIDTH] IN BLOOD BY AUTOMATED COUNT: 12.8 % (ref 11.5–17)
GFR SERPLBLD CREATININE-BSD FMLA CKD-EPI: >60 ML/MIN/1.73/M2
GLOBULIN SER-MCNC: 2.5 GM/DL (ref 2.4–3.5)
GLUCOSE SERPL-MCNC: 140 MG/DL (ref 82–115)
HCT VFR BLD AUTO: 27.7 % (ref 42–52)
HGB BLD-MCNC: 9.2 G/DL (ref 14–18)
IMM GRANULOCYTES # BLD AUTO: 0.01 X10(3)/MCL (ref 0–0.04)
IMM GRANULOCYTES NFR BLD AUTO: 0.4 %
LYMPHOCYTES # BLD AUTO: 0.92 X10(3)/MCL (ref 0.6–4.6)
LYMPHOCYTES NFR BLD AUTO: 34.1 %
MCH RBC QN AUTO: 41.3 PG (ref 27–31)
MCHC RBC AUTO-ENTMCNC: 33.2 G/DL (ref 33–36)
MCV RBC AUTO: 124.2 FL (ref 80–94)
MONOCYTES # BLD AUTO: 0.54 X10(3)/MCL (ref 0.1–1.3)
MONOCYTES NFR BLD AUTO: 20 %
NEUTROPHILS # BLD AUTO: 1.01 X10(3)/MCL (ref 2.1–9.2)
NEUTROPHILS NFR BLD AUTO: 37.3 %
PLATELET # BLD AUTO: 75 X10(3)/MCL (ref 130–400)
PMV BLD AUTO: 9.9 FL (ref 7.4–10.4)
POTASSIUM SERPL-SCNC: 3.8 MMOL/L (ref 3.5–5.1)
PROT SERPL-MCNC: 6.4 GM/DL (ref 5.8–7.6)
RBC # BLD AUTO: 2.23 X10(6)/MCL (ref 4.7–6.1)
SODIUM SERPL-SCNC: 140 MMOL/L (ref 136–145)
WBC # BLD AUTO: 2.7 X10(3)/MCL (ref 4.5–11.5)

## 2024-09-03 PROCEDURE — 80053 COMPREHEN METABOLIC PANEL: CPT

## 2024-09-03 PROCEDURE — 85025 COMPLETE CBC W/AUTO DIFF WBC: CPT

## 2024-09-03 PROCEDURE — 36415 COLL VENOUS BLD VENIPUNCTURE: CPT

## 2024-09-04 ENCOUNTER — OFFICE VISIT (OUTPATIENT)
Dept: HEMATOLOGY/ONCOLOGY | Facility: CLINIC | Age: 74
End: 2024-09-04
Payer: MEDICARE

## 2024-09-04 VITALS
RESPIRATION RATE: 20 BRPM | DIASTOLIC BLOOD PRESSURE: 75 MMHG | SYSTOLIC BLOOD PRESSURE: 146 MMHG | WEIGHT: 193.88 LBS | HEIGHT: 70 IN | TEMPERATURE: 98 F | HEART RATE: 59 BPM | BODY MASS INDEX: 27.76 KG/M2 | OXYGEN SATURATION: 96 %

## 2024-09-04 DIAGNOSIS — C90.01 MULTIPLE MYELOMA IN REMISSION: Primary | ICD-10-CM

## 2024-09-04 DIAGNOSIS — D61.818 PANCYTOPENIA: ICD-10-CM

## 2024-09-04 PROCEDURE — 99999 PR PBB SHADOW E&M-EST. PATIENT-LVL V: CPT | Mod: PBBFAC,,, | Performed by: NURSE PRACTITIONER

## 2024-09-04 PROCEDURE — 99215 OFFICE O/P EST HI 40 MIN: CPT | Mod: PBBFAC | Performed by: NURSE PRACTITIONER

## 2024-09-04 PROCEDURE — 99214 OFFICE O/P EST MOD 30 MIN: CPT | Mod: S$PBB,,, | Performed by: NURSE PRACTITIONER

## 2024-09-04 NOTE — PROGRESS NOTES
HEMATOLOGY/ONCOLOGY OFFICE CLINIC VISIT     Visit Information:     Initial Evaluation: 6/13/2023 (hospital consult)   Referring Provider: Dr Gonzalez  Other providers:  Code status: Not addressed     Diagnosis:  Multiple Myeloma    Present treatment:   Maintenance Revlimid 5 mg daily and Promacta 50 mg daily-- started  8/24    Treatment History:  --- Daratumumab, lenalidomide, and dexamethasone x 4 cycles  initiated 7/12/23  --- Lenalidomide and dexamethasone stopped on 3/6/24  --- Stem Cell Transplant 4/16/24  --- Carmen 140: 5 bags 4/18/24 and 4 bags 4/19/24        Imaging:  CT C 6/7/2023: 1. There is a small left sided pleural effusion. 2. A small patchy area of ground glass density is seen in the lateral basal segment of the left lower lobe. This could reflect an acute focal infiltrate / pneumonitis. 3. There is diffuse osteopenia along the visualised bony structures together with multiple, numerous, small round-ovoid lucent lesion of varying sizes involving the marrow. These changes could reflect metabolic bone disease like hyperparathyroidism. Correlate clinically and with laboratory findings, as regards additional evaluation and follow-up.  6/8/23 Bone Scan Whole Body:  1. Scattered activity at the anterior left right rib margins as described.  A CT exam on the previous day reveals no definite fracture.  There is mild heterogeneous and bony loss at the anterior left and right ribs.  This is not have the typical pattern of a metastases.  2. Focal increased activity at the anterior manubrium which again on the CT exam demonstrates osteopenia and heterogeneous bony loss as well as scattered subcentimeter small lytic defects. 3. Suspect mild arthritic changes at the shoulders sternoclavicular joints  6/9/23 MRI Thoracic Spine: Within limitations, no convincing evidence of acute thoracic spine abnormality, high-grade canal stenosis, or focal cord pathology. Multiple scattered vertebral body lesions are suspected and  could represent metastatic disease, otherwise of incomplete characterization by non-contrast protocol. Incidental findings of the partially visualized thoracic cavity and retroperitoneum discussed above, poorly assessed by modality/protocol.  Recent non-contrast chest CT provides overall better visualization.  6/10/23 CT Head: No acute intracranial abnormality.  Findings consistent with microangiopathy no interval change.  6/12/23 XRAY Chest: 1. Patchy hazy opacities again evident at the lower lungs bilaterally suspicious for infiltrate and atelectasis.  Small bibasilar pleural reactions cannot be excluded. 2. Borderline cardiomegaly 3. Atherosclerosis  6/16/23 XRAY Chest: Improved aeration of the lung bases with mild residual bibasilar opacities and small pleural effusions suspected.  7/23/24 PET/CT:  In this patient with multiple myeloma, there is interval normalization of previously hypermetabolic osseous lesions, compatible with favorable response to therapy. No new hypermetabolic lesions.       Pathology:  6/12/23 Bone marrow aspiration/Biopsy:   PLASMA CELL MYELOMA, KAPPA RESTRICTED.     PLASMA CELL MYELOMA INVOLVES 90% OF BONE MARROW CELLULARITY WITH SMALL AREAS OF  SEQUENTIAL TRILINEAGE HEMATOPOIESIS.    ABSENT IRON STORES.     PERIPHERAL BLOOD: NORMOCYTIC NORMOCHROMIC ANEMIA. THROMBOCYTOPENIA.   3/7/24 bone marrow biopsy:  BONE MARROW ASPIRATE, TOUCH PREP, CLOT, AND DECALCIFIED NEEDLE CORE BIOPSY:  LEFT POSTEROSUPERIOR ILIAC CREST  - NO DEFINITIVE RESIDUAL KAPPA LIGHT CHAIN RESTRICTED MULTIPLE MYELOMA  - Mildly hypercellular marrow (35-45% total cellularity) with non-increased scattered plasma cells and trilineage hematopoiesis with focally increased erythroid precursors (see comments)  7/23/24 BMBx:  CELLULARITY=30%, TRILINEAGE HEMATOPOIETIC ACTIVITY (M/E=1.6:1).   NO DEFINITIVE MORPHOLOGIC OR IMMUNOPHENOTYPIC EVIDENCE OF RESIDUAL PLASMA CELL NEOPLASM.  SEE COMMENT.   INCREASED EOSINOPHILS.   INCREASED  STORAGE IRON.   DECREASED NUMBER OF MEGAKARYOCYTES.       CLINICAL HISTORY:       Subjective  Patient: Chip Goodson was seen for the first time as hospital consult on 6/13/2023.   Patient was brought to the emergency department for confusion.  Upon evaluation in the ER CT scan of the head with no acute intracranial abnormality.  Finding consistent with microangiopathic no interval change.  CT of the chest with no contrast showed diffuse osteopenia with multiple, numerous, small round avid lucent lesions ovarian size involving the marrow.  Changes could reflect metabolic bone disease like hyperparathyroidism.  MRI of the thoracic spine with multiple scattered vertebral body lesion are suspect and could represent metastatic disease.  Bone scan with scattered activity in the anterior left right rib margins no fractures focal increased activity at the anterior manubrium scattered subcentimeter small lytic defects.  Ultrasound of the thyroid that shows multiple nodules. skeletal survey  area on humerus only.     Labs with elevated calcium of 12.6, corrected calcium 14.36.  Total protein was 11.8 with a total globulin 10.0.  Further workup revealed an IgA over 7040.0, IgM 6.0, IgG 165.00.  Free serum kappa light chain 5.09, free serum lambda light chains 0.5600 with a kappa/lambda ratio of 9.09.  M spike 3.33.  PSA 0.96     Bone marrow biopsy performed 6/12/23. Patient received pamidronate 60 mg on 06/09/2023 and was started on hydration.  His calcium improved as well as his mental status.    He received treatment as follows:  Daratumumab, lenalidomide, and dexamethasone x 4 cycles  initiated 7/12/23  Lenalidomide and dexamethasone stopped on 3/6/24  Stem Cell Transplant 4/16/24    Interval History:    Mr. Goodson is here today by himself for a four week on treatment follow-up visit.  He feels well and has no complaints.  He initiated maintenance Revlimid 5 mg daily and Promacta 50 mg daily four weeks ago.  He is  tolerating treatment well with no appreciable side effects.  He has his post-transplant vaccine schedule and will speak to his PCP to begin.  Laboratory is being monitored every Tuesday and Thursday with gradual improvement in his cytopenias.  He has no monoclonal protein.  Results discussed with patient.  He continues on Acyclovir and Dapsone as prophylaxis.  Next follow up with Dr. Evans is scheduled for 11/4/24.    Review of Systems   Constitutional:  Positive for malaise/fatigue (improved). Negative for chills, diaphoresis, fever and weight loss.   HENT:  Negative for congestion, nosebleeds and sore throat.    Eyes: Negative.    Respiratory:  Negative for cough, hemoptysis and shortness of breath.    Cardiovascular:  Negative for chest pain, palpitations and leg swelling.   Gastrointestinal:  Negative for abdominal pain, blood in stool, constipation, diarrhea, melena, nausea and vomiting.   Genitourinary:  Negative for dysuria, frequency, hematuria and urgency.   Musculoskeletal:  Negative for back pain, falls, joint pain and myalgias.   Skin:  Negative for rash.   Neurological:  Negative for dizziness, tremors, seizures, weakness and headaches.   Endo/Heme/Allergies: Negative.  Does not bruise/bleed easily.   Psychiatric/Behavioral:  Negative for hallucinations and suicidal ideas. The patient is not nervous/anxious.        Review of patient's allergies indicates:   Allergen Reactions    Iodine Anaphylaxis    Shellfish containing products     Poison ivy extract     Amoxicillin-pot clavulanate Itching      Current Outpatient Medications on File Prior to Visit   Medication Sig Dispense Refill    acyclovir (ZOVIRAX) 800 MG Tab Take 1 tablet (800 mg total) by mouth 2 (two) times daily. 60 tablet 11    albuterol (PROVENTIL/VENTOLIN HFA) 90 mcg/actuation inhaler 2 puffs every 4 to 6 hours as needed.      ALPRAZolam (XANAX) 0.5 MG tablet Take 0.5 mg by mouth daily as needed for Anxiety.      amLODIPine (NORVASC) 10 MG  tablet Take 1 tablet (10 mg total) by mouth every morning.      ascorbic acid, vitamin C, (VITAMIN C) 500 MG tablet Take 500 mg by mouth once daily.      atorvastatin (LIPITOR) 10 MG tablet Take 10 mg by mouth every evening.      carvediloL (COREG) 6.25 MG tablet Take 1 tablet (6.25 mg total) by mouth 2 (two) times daily. 180 tablet 3    cholecalciferol, vitamin D3, (VITAMIN D3) 125 mcg (5,000 unit) Tab Take 5,000 Units by mouth once daily.      cyclobenzaprine (FLEXERIL) 5 MG tablet Take 1 tablet (5 mg total) by mouth 3 (three) times daily as needed for Muscle spasms. 60 tablet 2    dapsone 100 MG Tab Take 1 tablet (100 mg total) by mouth once daily. 30 tablet 5    eltrombopag olamine (PROMACTA) 50 MG Tab Take 1 tablet (50 mg total) by mouth once daily. 30 tablet 5    ferrous sulfate 325 (65 FE) MG EC tablet Take 325 mg by mouth once daily.      finasteride (PROSCAR) 5 mg tablet Take 5 mg by mouth every morning.      HYDROcodone-acetaminophen (NORCO) 7.5-325 mg per tablet Take 1 tablet by mouth every 4 (four) hours as needed for Pain. 90 tablet 0    lenalidomide (REVLIMID) 5 mg Cap TAKE 1 CAPSULE BY MOUTH DAILY  FOR 28 DAYS 28 each 0    levoFLOXacin (LEVAQUIN) 500 MG tablet Take 1 tablet (500 mg total) by mouth once daily. 30 tablet 0    levothyroxine (SYNTHROID) 112 MCG tablet Take 112 mcg by mouth every evening.      LIDOcaine (LIDODERM) 5 % Place 1 patch onto the skin once daily. Remove & Discard patch within 12 hours or as directed by MD 30 patch 3    losartan (COZAAR) 50 MG tablet Take 50 mg by mouth.      metFORMIN (GLUCOPHAGE-XR) 500 MG ER 24hr tablet Take 1,000 mg by mouth 2 (two) times daily.      nitroGLYCERIN (NITROSTAT) 0.4 MG SL tablet place one tablet under the tongue every five minutes as needed for chest pain up to 3 doses. if no relief call 911      SquirroTONifty After Fifty ULTRA TEST Strp USE TO TEST BLOOD GLUCOSE TWICE DAILY      pantoprazole (PROTONIX) 40 MG tablet Take 1 tablet (40 mg total) by mouth once  "daily. 30 tablet 11    ranolazine (RANEXA) 500 MG Tb12 Take 500 mg by mouth 2 (two) times daily.      spironolactone (ALDACTONE) 25 MG tablet Take 12.5 mg by mouth every morning.      valsartan (DIOVAN) 160 MG tablet Take 160 mg by mouth.      hydrALAZINE (APRESOLINE) 50 MG tablet Take 1 tablet (50 mg total) by mouth every 8 (eight) hours. 90 tablet 11     Current Facility-Administered Medications on File Prior to Visit   Medication Dose Route Frequency Provider Last Rate Last Admin    0.9%  NaCl infusion (for blood administration)   Intravenous Once Marjan Eli, FNP        0.9%  NaCl infusion (for blood administration)   Intravenous Once Marjan Eli, FNP        0.9%  NaCl infusion (for blood administration)   Intravenous Once PopMarjan castañeda E, FNP        DTaP / HiB / IPV combined (Pentacel) injection 0.5 mL  0.5 mL Intramuscular Q8 weeks         pneumoc 20-edward conj-dip cr(PF) (PREVNAR-20 (PF)) injection Syrg 0.5 mL  0.5 mL Intramuscular Once         sars-cov-2 (covid-19) (Spikevax (Moderna) (12yrs and up 2023)) 50 mcg/0.5 mL injection 0.5 mL  0.5 mL Intramuscular vaccine x 1 dose         varicella-zoster gE vac,2 of 2 SusR 0.5 mL  1 each Intramuscular Q8 weeks               Vitals:    09/04/24 0929   BP: (!) 146/75   BP Location: Left arm   Pulse: (!) 59   Resp: 20   Temp: 98 °F (36.7 °C)   SpO2: 96%   Weight: 88 kg (193 lb 14.4 oz)   Height: 5' 10" (1.778 m)         Wt Readings from Last 6 Encounters:   09/04/24 88 kg (193 lb 14.4 oz)   08/06/24 90.4 kg (199 lb 6.4 oz)   07/23/24 90.1 kg (198 lb 10.2 oz)   07/03/24 88.7 kg (195 lb 9.6 oz)   07/02/24 88.9 kg (195 lb 15.8 oz)   06/14/24 87.5 kg (192 lb 14.4 oz)     Body mass index is 27.82 kg/m².  Body surface area is 2.08 meters squared.    Physical Exam  Constitutional:       General: He is awake.      Appearance: Normal appearance. He is well-developed.   HENT:      Head: Normocephalic and atraumatic.   Eyes:      General: No scleral icterus.     " Extraocular Movements: Extraocular movements intact.      Conjunctiva/sclera: Conjunctivae normal.   Neck:      Vascular: No JVD.   Cardiovascular:      Rate and Rhythm: Normal rate and regular rhythm.      Heart sounds: No murmur heard.  Pulmonary:      Effort: Pulmonary effort is normal.      Breath sounds: Normal breath sounds.   Abdominal:      General: There is no distension.      Palpations: Abdomen is soft.      Tenderness: There is no abdominal tenderness.   Musculoskeletal:      Cervical back: Neck supple.   Lymphadenopathy:      Head:      Right side of head: No submental or submandibular adenopathy.      Left side of head: No submental or submandibular adenopathy.      Cervical: No cervical adenopathy.      Upper Body:      Right upper body: No supraclavicular or axillary adenopathy.      Left upper body: No supraclavicular or axillary adenopathy.      Lower Body: No right inguinal adenopathy. No left inguinal adenopathy.   Skin:     General: Skin is warm.      Findings: No rash.      Nails: There is no clubbing.   Neurological:      General: No focal deficit present.      Mental Status: He is alert.      Cranial Nerves: Cranial nerves 2-12 are intact.   Psychiatric:         Attention and Perception: Attention normal.         Mood and Affect: Mood normal.         Behavior: Behavior is cooperative.         Cognition and Memory: Cognition normal.         Judgment: Judgment normal.       ECOG SCORE    2 - Capable of all selfcare but unable to carry out any work activities, active > 50% of hours          Laboratory:  CBC with Differential:  Lab Results   Component Value Date    WBC 2.70 (L) 09/03/2024    RBC 2.23 (L) 09/03/2024    HGB 9.2 (L) 09/03/2024    HCT 27.7 (L) 09/03/2024    .2 (H) 09/03/2024    MCH 41.3 (H) 09/03/2024    MCHC 33.2 09/03/2024    RDW 12.8 09/03/2024    PLT 75 (L) 09/03/2024    MPV 9.9 09/03/2024    GRAN 0.3 (L) 05/20/2024    GRAN 37.1 (L) 05/20/2024    LYMPH 0.4 (L) 05/20/2024     LYMPH 52.9 (H) 05/20/2024    MONO 0.1 (L) 05/20/2024    MONO 8.6 05/20/2024    EOS 0.0 05/20/2024    BASO 0.00 05/20/2024    EOSINOPHIL 0.0 05/20/2024    BASOPHIL 0.0 05/20/2024   INITIAL MYELOMA LABS:  Serum:  SPEP: Serum protein electrophoresis shows a distinct monoclonal peak (3.33 g/dL) in the beta zone, consistent with a monoclonal gammopathy.   PAO: A band is present in the IgA trini with a corresponding band in the kappa trini.   The immunofixation electrophoresis are consistent with monoclonal gammopathy of IgA-kappa isotype.     IgG Level 540.00 - 1,822.00 mg/dL 165.00 Low     IgA Level 101.0 - 645.0 mg/dL >7,040.0 High     IgM Level 22.0 - 240.0 mg/dL 6.0 Low       Kappa Free Light Chain 0.3300 - 1.94 mg/dL 5.09 High     Lambda Free Light Chain 0.5700 - 2.63 mg/dL 0.5600 Low     Kappa/Lambda FLC Ratio 0.2600 - 1.65 9.09 High      Urine:   24 hrs Urine:  M spike    1467      H    mg/24 h      LABS:  6/8/23 M-spike 3.33, IgA >7040, serum kappa 5/ lambda 0.56/ K:L ratio 9.09, 24 hour urine for M protein done but not resulted. Calcium 13.4/Alb 1.9, Cr 2.3, globulin 10.7  7/12/23 M-spike 2.49. IgA >7040, Corrected calcium 12.3, Cr 2.09, Globulin 7.6  8/15/23 M-spike 1.49, IgA 4000  9/18/23 M-spike 1.0, IgA 1700  1/19/24 M-spike 0 (smal protein in beta fraction), IgA 335, KFLC 1.61/LFLC 0.92/ ratio 1.75  3/18/24 M-spike 0.11, IgG 516, KFLC 1.43/LFLC 0.86/ ratio 1.66  8/27/24 M-spike 0, IgG 768, KFLC 4.22/LFLC 2.81/ratio 1.5    CMP:   Latest Reference Range & Units 09/03/24 06:50   Sodium 136 - 145 mmol/L 140   Potassium 3.5 - 5.1 mmol/L 3.8   Chloride 98 - 107 mmol/L 106   CO2 23 - 31 mmol/L 26   Anion Gap mEq/L 8.0   BUN 8.4 - 25.7 mg/dL 21.0   Creatinine 0.73 - 1.18 mg/dL 1.23 (H)   BUN/CREAT RATIO  17   eGFR mL/min/1.73/m2 >60   Glucose 82 - 115 mg/dL 140 (H)   Calcium 8.8 - 10.0 mg/dL 9.9   ALP 40 - 150 unit/L 73   PROTEIN TOTAL 5.8 - 7.6 gm/dL 6.4   Albumin 3.4 - 4.8 g/dL 3.9   Albumin/Globulin Ratio 1.1 -  2.0 ratio 1.6   BILIRUBIN TOTAL <=1.5 mg/dL 1.3   AST 5 - 34 unit/L 14   ALT 0 - 55 unit/L 13   Globulin, Total 2.4 - 3.5 gm/dL 2.5   (H): Data is abnormally high     Assessment  1. Multiple myeloma in remission    2. Pancytopenia            Multiple Myeloma Dx 6/2023:  ---Daratumumab, lenalidomide, and dexamethasone x 4 cycles  initiated 7/12/23  ---Lenalidomide and dexamethasone stopped on 3/6/24  ---Stem Cell Transplant 4/16/24  ---Carmen 140: 5 bags 4/18/24 and 4 bags 4/19/24  -- 7/23/24 PET/CT reveals favorable response to therapy and BMBx reveals no definitive evidence of residual neoplasm  -- Per Dr. Evans: maintenance Revlimid 5 mg daily and Promacta 50 mg daily - started 8/24    2. Pancytopenia  --labs q Tuesday and Thursday  -- transfuse 1-2 units of PRBCs for hemoglobin < 7 or clinically appropriate.  --Transfuse 1 unit of platelets if platelets < 10 K or clinically appropriate    3. 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  ---Cont prophylactic antimicrobials    4. Gemella Haemolysans bacteremia  --Cont ceftriaxone until 6/14/2024  --Keep appts with ID      Plan:  Continue Revlimid 5 mg daily and Promacta 50 mg daily as maintenance.  Patient is tolerating well.  Discussed dietary restrictions associated with Promacta.  Will have clinical pharmacist reach out to patient as well.  Cytopenias improving.  Continue twice weekly laboratory monitoring every T/Th (CBC, CMP, Mg, Phos).  Continue prophylactic antimicrobials/antivirals.  Patient to begin posttransplant vaccines.  Has schedule/order set to receive with PCP or local health unit.  Follow-up in 4 weeks, or sooner if needed.  Full myeloma panel with that visit.  Patient is in agreement with plan as outlined above.    All questions answered to the satisfaction of the patient.    LEILA HUYNH, FNP-C  [facility] [place]

## 2024-09-05 ENCOUNTER — LAB VISIT (OUTPATIENT)
Dept: LAB | Facility: HOSPITAL | Age: 74
End: 2024-09-05
Payer: MEDICARE

## 2024-09-05 DIAGNOSIS — C90.01 MULTIPLE MYELOMA IN REMISSION: ICD-10-CM

## 2024-09-05 LAB
ALBUMIN SERPL-MCNC: 3.9 G/DL (ref 3.4–4.8)
ALBUMIN/GLOB SERPL: 1.5 RATIO (ref 1.1–2)
ALP SERPL-CCNC: 80 UNIT/L (ref 40–150)
ALT SERPL-CCNC: 12 UNIT/L (ref 0–55)
ANION GAP SERPL CALC-SCNC: 8 MEQ/L
AST SERPL-CCNC: 14 UNIT/L (ref 5–34)
BASOPHILS # BLD AUTO: 0.05 X10(3)/MCL
BASOPHILS NFR BLD AUTO: 1.8 %
BILIRUB SERPL-MCNC: 1 MG/DL
BUN SERPL-MCNC: 19 MG/DL (ref 8.4–25.7)
CALCIUM SERPL-MCNC: 10 MG/DL (ref 8.8–10)
CHLORIDE SERPL-SCNC: 107 MMOL/L (ref 98–107)
CO2 SERPL-SCNC: 26 MMOL/L (ref 23–31)
CREAT SERPL-MCNC: 1.19 MG/DL (ref 0.73–1.18)
CREAT/UREA NIT SERPL: 16
EOSINOPHIL # BLD AUTO: 0.2 X10(3)/MCL (ref 0–0.9)
EOSINOPHIL NFR BLD AUTO: 7.2 %
ERYTHROCYTE [DISTWIDTH] IN BLOOD BY AUTOMATED COUNT: 12.7 % (ref 11.5–17)
GFR SERPLBLD CREATININE-BSD FMLA CKD-EPI: >60 ML/MIN/1.73/M2
GLOBULIN SER-MCNC: 2.6 GM/DL (ref 2.4–3.5)
GLUCOSE SERPL-MCNC: 131 MG/DL (ref 82–115)
HCT VFR BLD AUTO: 29 % (ref 42–52)
HGB BLD-MCNC: 9.5 G/DL (ref 14–18)
IGA SERPL-MCNC: 128 MG/DL (ref 101–645)
IGG SERPL-MCNC: 913 MG/DL (ref 540–1822)
IGM SERPL-MCNC: 50 MG/DL (ref 22–240)
IMM GRANULOCYTES # BLD AUTO: 0.01 X10(3)/MCL (ref 0–0.04)
IMM GRANULOCYTES NFR BLD AUTO: 0.4 %
LYMPHOCYTES # BLD AUTO: 0.89 X10(3)/MCL (ref 0.6–4.6)
LYMPHOCYTES NFR BLD AUTO: 32.1 %
MAGNESIUM SERPL-MCNC: 2 MG/DL (ref 1.6–2.6)
MCH RBC QN AUTO: 41.1 PG (ref 27–31)
MCHC RBC AUTO-ENTMCNC: 32.8 G/DL (ref 33–36)
MCV RBC AUTO: 125.5 FL (ref 80–94)
MONOCYTES # BLD AUTO: 0.58 X10(3)/MCL (ref 0.1–1.3)
MONOCYTES NFR BLD AUTO: 20.9 %
NEUTROPHILS # BLD AUTO: 1.04 X10(3)/MCL (ref 2.1–9.2)
NEUTROPHILS NFR BLD AUTO: 37.6 %
PHOSPHATE SERPL-MCNC: 4.4 MG/DL (ref 2.3–4.7)
PLATELET # BLD AUTO: 85 X10(3)/MCL (ref 130–400)
PMV BLD AUTO: 10.1 FL (ref 7.4–10.4)
POTASSIUM SERPL-SCNC: 3.6 MMOL/L (ref 3.5–5.1)
PROT SERPL-MCNC: 6.5 GM/DL (ref 5.8–7.6)
RBC # BLD AUTO: 2.31 X10(6)/MCL (ref 4.7–6.1)
SODIUM SERPL-SCNC: 141 MMOL/L (ref 136–145)
WBC # BLD AUTO: 2.77 X10(3)/MCL (ref 4.5–11.5)

## 2024-09-05 PROCEDURE — 84165 PROTEIN E-PHORESIS SERUM: CPT

## 2024-09-05 PROCEDURE — 85025 COMPLETE CBC W/AUTO DIFF WBC: CPT

## 2024-09-05 PROCEDURE — 82784 ASSAY IGA/IGD/IGG/IGM EACH: CPT | Mod: 59

## 2024-09-05 PROCEDURE — 84100 ASSAY OF PHOSPHORUS: CPT

## 2024-09-05 PROCEDURE — 86334 IMMUNOFIX E-PHORESIS SERUM: CPT

## 2024-09-05 PROCEDURE — 83521 IG LIGHT CHAINS FREE EACH: CPT

## 2024-09-05 PROCEDURE — 83735 ASSAY OF MAGNESIUM: CPT

## 2024-09-05 PROCEDURE — 80053 COMPREHEN METABOLIC PANEL: CPT

## 2024-09-06 LAB
ALBUMIN % SPEP (OHS): 58.04 (ref 48.1–59.5)
ALBUMIN SERPL-MCNC: 3.7 G/DL (ref 3.4–4.8)
ALBUMIN/GLOB SERPL: 1.4 RATIO (ref 1.1–2)
ALPHA 1 GLOB (OHS): 0.25 GM/DL (ref 0–0.4)
ALPHA 1 GLOB% (OHS): 4.04 (ref 2.3–4.9)
ALPHA 2 GLOB % (OHS): 9.41 (ref 6.9–13)
ALPHA 2 GLOB (OHS): 0.59 GM/DL (ref 0.4–1)
BETA GLOB (OHS): 0.81 GM/DL (ref 0.7–1.3)
BETA GLOB% (OHS): 12.89 (ref 13.8–19.7)
GAMMA GLOBULIN % (OHS): 15.63 (ref 10.1–21.9)
GAMMA GLOBULIN (OHS): 0.98 GM/DL (ref 0.4–1.8)
GLOBULIN SER-MCNC: 2.6 GM/DL (ref 2.4–3.5)
KAPPA LC FREE SER NEPH-MCNC: 5.92 MG/DL (ref 0.33–1.94)
KAPPA LC FREE/LAMBDA FREE SER NEPH: 1.65 {RATIO} (ref 0.26–1.65)
LAMBDA LC FREE SER NEPH-MCNC: 3.58 MG/DL (ref 0.57–2.63)
M SPIKE % (OHS): ABNORMAL
M SPIKE (OHS): ABNORMAL
PATH REV: NORMAL
PROT SERPL-MCNC: 6.3 GM/DL (ref 5.8–7.6)

## 2024-09-10 ENCOUNTER — TELEPHONE (OUTPATIENT)
Dept: INFECTIOUS DISEASES | Facility: CLINIC | Age: 74
End: 2024-09-10
Payer: MEDICARE

## 2024-09-10 NOTE — TELEPHONE ENCOUNTER
Called Gonzalez   She will get the patient schedule to start vaccines        ----- Message from Shasha Rojas MD sent at 9/9/2024  8:03 PM CDT -----  Regarding: RE: Orders Clarification  Contact: [phone]  Yes please!  ----- Message -----  From: Michelle Hamlin MA  Sent: 9/5/2024   2:53 PM CDT  To: Shasha Rojas MD  Subject: FW: Orders Clarification                         Gonzalez would like to know if the patient can start the vaccines this month, including the Prevnar 20?  ----- Message -----  From: Mila Lopez  Sent: 9/5/2024   1:56 PM CDT  To: Crystal Pulliam Staff  Subject: Orders Clarification                             KATHERIN WOODARD calling regarding Patient Advice (message) for Missy Roth Ross [facility] needs clarification on orders that were received pls advise [phone]

## 2024-09-12 ENCOUNTER — LAB VISIT (OUTPATIENT)
Dept: LAB | Facility: HOSPITAL | Age: 74
End: 2024-09-12
Attending: NURSE PRACTITIONER
Payer: MEDICARE

## 2024-09-12 DIAGNOSIS — C90.01 MULTIPLE MYELOMA IN REMISSION: ICD-10-CM

## 2024-09-12 LAB
ALBUMIN SERPL-MCNC: 3.8 G/DL (ref 3.4–4.8)
ALBUMIN/GLOB SERPL: 1.6 RATIO (ref 1.1–2)
ALP SERPL-CCNC: 62 UNIT/L (ref 40–150)
ALT SERPL-CCNC: 10 UNIT/L (ref 0–55)
ANION GAP SERPL CALC-SCNC: 7 MEQ/L
AST SERPL-CCNC: 13 UNIT/L (ref 5–34)
BASOPHILS # BLD AUTO: 0.06 X10(3)/MCL
BASOPHILS NFR BLD AUTO: 1.7 %
BILIRUB SERPL-MCNC: 1.3 MG/DL
BUN SERPL-MCNC: 16 MG/DL (ref 8.4–25.7)
CALCIUM SERPL-MCNC: 9.5 MG/DL (ref 8.8–10)
CHLORIDE SERPL-SCNC: 107 MMOL/L (ref 98–107)
CO2 SERPL-SCNC: 26 MMOL/L (ref 23–31)
CREAT SERPL-MCNC: 1.18 MG/DL (ref 0.73–1.18)
CREAT/UREA NIT SERPL: 14
EOSINOPHIL # BLD AUTO: 0.25 X10(3)/MCL (ref 0–0.9)
EOSINOPHIL NFR BLD AUTO: 7 %
ERYTHROCYTE [DISTWIDTH] IN BLOOD BY AUTOMATED COUNT: 12.7 % (ref 11.5–17)
GFR SERPLBLD CREATININE-BSD FMLA CKD-EPI: >60 ML/MIN/1.73/M2
GLOBULIN SER-MCNC: 2.4 GM/DL (ref 2.4–3.5)
GLUCOSE SERPL-MCNC: 156 MG/DL (ref 82–115)
HCT VFR BLD AUTO: 29.2 % (ref 42–52)
HGB BLD-MCNC: 9.7 G/DL (ref 14–18)
IMM GRANULOCYTES # BLD AUTO: 0.02 X10(3)/MCL (ref 0–0.04)
IMM GRANULOCYTES NFR BLD AUTO: 0.6 %
LYMPHOCYTES # BLD AUTO: 1.05 X10(3)/MCL (ref 0.6–4.6)
LYMPHOCYTES NFR BLD AUTO: 29.4 %
MAGNESIUM SERPL-MCNC: 2 MG/DL (ref 1.6–2.6)
MCH RBC QN AUTO: 40.8 PG (ref 27–31)
MCHC RBC AUTO-ENTMCNC: 33.2 G/DL (ref 33–36)
MCV RBC AUTO: 122.7 FL (ref 80–94)
MONOCYTES # BLD AUTO: 0.69 X10(3)/MCL (ref 0.1–1.3)
MONOCYTES NFR BLD AUTO: 19.3 %
NEUTROPHILS # BLD AUTO: 1.5 X10(3)/MCL (ref 2.1–9.2)
NEUTROPHILS NFR BLD AUTO: 42 %
PHOSPHATE SERPL-MCNC: 3.1 MG/DL (ref 2.3–4.7)
PLATELET # BLD AUTO: 92 X10(3)/MCL (ref 130–400)
PMV BLD AUTO: 9.2 FL (ref 7.4–10.4)
POTASSIUM SERPL-SCNC: 3.7 MMOL/L (ref 3.5–5.1)
PROT SERPL-MCNC: 6.2 GM/DL (ref 5.8–7.6)
RBC # BLD AUTO: 2.38 X10(6)/MCL (ref 4.7–6.1)
SODIUM SERPL-SCNC: 140 MMOL/L (ref 136–145)
WBC # BLD AUTO: 3.57 X10(3)/MCL (ref 4.5–11.5)

## 2024-09-12 PROCEDURE — 85025 COMPLETE CBC W/AUTO DIFF WBC: CPT

## 2024-09-12 PROCEDURE — 36415 COLL VENOUS BLD VENIPUNCTURE: CPT

## 2024-09-12 PROCEDURE — 80053 COMPREHEN METABOLIC PANEL: CPT

## 2024-09-12 PROCEDURE — 84100 ASSAY OF PHOSPHORUS: CPT

## 2024-09-12 PROCEDURE — 83735 ASSAY OF MAGNESIUM: CPT

## 2024-09-17 ENCOUNTER — LAB VISIT (OUTPATIENT)
Dept: LAB | Facility: HOSPITAL | Age: 74
End: 2024-09-17
Payer: MEDICARE

## 2024-09-17 DIAGNOSIS — C90.01 MULTIPLE MYELOMA IN REMISSION: ICD-10-CM

## 2024-09-17 LAB
ALBUMIN SERPL-MCNC: 3.9 G/DL (ref 3.4–4.8)
ALBUMIN/GLOB SERPL: 1.8 RATIO (ref 1.1–2)
ALP SERPL-CCNC: 66 UNIT/L (ref 40–150)
ALT SERPL-CCNC: 11 UNIT/L (ref 0–55)
ANION GAP SERPL CALC-SCNC: 6 MEQ/L
AST SERPL-CCNC: 12 UNIT/L (ref 5–34)
BASOPHILS # BLD AUTO: 0.03 X10(3)/MCL
BASOPHILS NFR BLD AUTO: 1 %
BILIRUB SERPL-MCNC: 1 MG/DL
BUN SERPL-MCNC: 21 MG/DL (ref 8.4–25.7)
CALCIUM SERPL-MCNC: 9.5 MG/DL (ref 8.8–10)
CHLORIDE SERPL-SCNC: 106 MMOL/L (ref 98–107)
CO2 SERPL-SCNC: 28 MMOL/L (ref 23–31)
CREAT SERPL-MCNC: 1.11 MG/DL (ref 0.73–1.18)
CREAT/UREA NIT SERPL: 19
EOSINOPHIL # BLD AUTO: 0.38 X10(3)/MCL (ref 0–0.9)
EOSINOPHIL NFR BLD AUTO: 12.5 %
ERYTHROCYTE [DISTWIDTH] IN BLOOD BY AUTOMATED COUNT: 12.8 % (ref 11.5–17)
GFR SERPLBLD CREATININE-BSD FMLA CKD-EPI: >60 ML/MIN/1.73/M2
GLOBULIN SER-MCNC: 2.2 GM/DL (ref 2.4–3.5)
GLUCOSE SERPL-MCNC: 142 MG/DL (ref 82–115)
HCT VFR BLD AUTO: 29.2 % (ref 42–52)
HGB BLD-MCNC: 9.7 G/DL (ref 14–18)
IMM GRANULOCYTES # BLD AUTO: 0.04 X10(3)/MCL (ref 0–0.04)
IMM GRANULOCYTES NFR BLD AUTO: 1.3 %
LYMPHOCYTES # BLD AUTO: 0.92 X10(3)/MCL (ref 0.6–4.6)
LYMPHOCYTES NFR BLD AUTO: 30.3 %
MAGNESIUM SERPL-MCNC: 1.9 MG/DL (ref 1.6–2.6)
MCH RBC QN AUTO: 40.1 PG (ref 27–31)
MCHC RBC AUTO-ENTMCNC: 33.2 G/DL (ref 33–36)
MCV RBC AUTO: 120.7 FL (ref 80–94)
MONOCYTES # BLD AUTO: 0.52 X10(3)/MCL (ref 0.1–1.3)
MONOCYTES NFR BLD AUTO: 17.1 %
NEUTROPHILS # BLD AUTO: 1.15 X10(3)/MCL (ref 2.1–9.2)
NEUTROPHILS NFR BLD AUTO: 37.8 %
PHOSPHATE SERPL-MCNC: 3.3 MG/DL (ref 2.3–4.7)
PLATELET # BLD AUTO: 96 X10(3)/MCL (ref 130–400)
PMV BLD AUTO: 10 FL (ref 7.4–10.4)
POTASSIUM SERPL-SCNC: 3.7 MMOL/L (ref 3.5–5.1)
PROT SERPL-MCNC: 6.1 GM/DL (ref 5.8–7.6)
RBC # BLD AUTO: 2.42 X10(6)/MCL (ref 4.7–6.1)
SODIUM SERPL-SCNC: 140 MMOL/L (ref 136–145)
WBC # BLD AUTO: 3.04 X10(3)/MCL (ref 4.5–11.5)

## 2024-09-17 PROCEDURE — 36415 COLL VENOUS BLD VENIPUNCTURE: CPT

## 2024-09-17 PROCEDURE — 85025 COMPLETE CBC W/AUTO DIFF WBC: CPT

## 2024-09-17 PROCEDURE — 80053 COMPREHEN METABOLIC PANEL: CPT

## 2024-09-17 PROCEDURE — 83735 ASSAY OF MAGNESIUM: CPT

## 2024-09-17 PROCEDURE — 84100 ASSAY OF PHOSPHORUS: CPT

## 2024-09-19 ENCOUNTER — LAB VISIT (OUTPATIENT)
Dept: LAB | Facility: HOSPITAL | Age: 74
End: 2024-09-19
Attending: NURSE PRACTITIONER
Payer: MEDICARE

## 2024-09-19 DIAGNOSIS — C90.01 MULTIPLE MYELOMA IN REMISSION: ICD-10-CM

## 2024-09-19 LAB
ALBUMIN SERPL-MCNC: 4.1 G/DL (ref 3.4–4.8)
ALBUMIN/GLOB SERPL: 1.7 RATIO (ref 1.1–2)
ALP SERPL-CCNC: 69 UNIT/L (ref 40–150)
ALT SERPL-CCNC: 12 UNIT/L (ref 0–55)
ANION GAP SERPL CALC-SCNC: 8 MEQ/L
AST SERPL-CCNC: 12 UNIT/L (ref 5–34)
BASOPHILS # BLD AUTO: 0.05 X10(3)/MCL
BASOPHILS NFR BLD AUTO: 1.3 %
BILIRUB SERPL-MCNC: 0.9 MG/DL
BUN SERPL-MCNC: 29 MG/DL (ref 8.4–25.7)
CALCIUM SERPL-MCNC: 10.4 MG/DL (ref 8.8–10)
CHLORIDE SERPL-SCNC: 105 MMOL/L (ref 98–107)
CO2 SERPL-SCNC: 27 MMOL/L (ref 23–31)
CREAT SERPL-MCNC: 1.13 MG/DL (ref 0.73–1.18)
CREAT/UREA NIT SERPL: 26
EOSINOPHIL # BLD AUTO: 0.39 X10(3)/MCL (ref 0–0.9)
EOSINOPHIL NFR BLD AUTO: 9.8 %
ERYTHROCYTE [DISTWIDTH] IN BLOOD BY AUTOMATED COUNT: 12.9 % (ref 11.5–17)
GFR SERPLBLD CREATININE-BSD FMLA CKD-EPI: >60 ML/MIN/1.73/M2
GLOBULIN SER-MCNC: 2.4 GM/DL (ref 2.4–3.5)
GLUCOSE SERPL-MCNC: 104 MG/DL (ref 82–115)
HCT VFR BLD AUTO: 30.7 % (ref 42–52)
HGB BLD-MCNC: 10.4 G/DL (ref 14–18)
IMM GRANULOCYTES # BLD AUTO: 0.04 X10(3)/MCL (ref 0–0.04)
IMM GRANULOCYTES NFR BLD AUTO: 1 %
LYMPHOCYTES # BLD AUTO: 1.17 X10(3)/MCL (ref 0.6–4.6)
LYMPHOCYTES NFR BLD AUTO: 29.3 %
MAGNESIUM SERPL-MCNC: 1.9 MG/DL (ref 1.6–2.6)
MCH RBC QN AUTO: 40.8 PG (ref 27–31)
MCHC RBC AUTO-ENTMCNC: 33.9 G/DL (ref 33–36)
MCV RBC AUTO: 120.4 FL (ref 80–94)
MONOCYTES # BLD AUTO: 0.56 X10(3)/MCL (ref 0.1–1.3)
MONOCYTES NFR BLD AUTO: 14 %
NEUTROPHILS # BLD AUTO: 1.78 X10(3)/MCL (ref 2.1–9.2)
NEUTROPHILS NFR BLD AUTO: 44.6 %
PHOSPHATE SERPL-MCNC: 3.8 MG/DL (ref 2.3–4.7)
PLATELET # BLD AUTO: 97 X10(3)/MCL (ref 130–400)
PMV BLD AUTO: 10.4 FL (ref 7.4–10.4)
POTASSIUM SERPL-SCNC: 4 MMOL/L (ref 3.5–5.1)
PROT SERPL-MCNC: 6.5 GM/DL (ref 5.8–7.6)
RBC # BLD AUTO: 2.55 X10(6)/MCL (ref 4.7–6.1)
SODIUM SERPL-SCNC: 140 MMOL/L (ref 136–145)
WBC # BLD AUTO: 3.99 X10(3)/MCL (ref 4.5–11.5)

## 2024-09-19 PROCEDURE — 84100 ASSAY OF PHOSPHORUS: CPT

## 2024-09-19 PROCEDURE — 80053 COMPREHEN METABOLIC PANEL: CPT

## 2024-09-19 PROCEDURE — 83735 ASSAY OF MAGNESIUM: CPT

## 2024-09-19 PROCEDURE — 36415 COLL VENOUS BLD VENIPUNCTURE: CPT

## 2024-09-19 PROCEDURE — 85025 COMPLETE CBC W/AUTO DIFF WBC: CPT

## 2024-09-24 ENCOUNTER — LAB VISIT (OUTPATIENT)
Dept: LAB | Facility: HOSPITAL | Age: 74
End: 2024-09-24
Payer: MEDICARE

## 2024-09-24 DIAGNOSIS — C90.01 MULTIPLE MYELOMA IN REMISSION: ICD-10-CM

## 2024-09-24 LAB
ALBUMIN SERPL-MCNC: 3.8 G/DL (ref 3.4–4.8)
ALBUMIN/GLOB SERPL: 1.7 RATIO (ref 1.1–2)
ALP SERPL-CCNC: 71 UNIT/L (ref 40–150)
ALT SERPL-CCNC: 14 UNIT/L (ref 0–55)
ANION GAP SERPL CALC-SCNC: 6 MEQ/L
AST SERPL-CCNC: 18 UNIT/L (ref 5–34)
BASOPHILS # BLD AUTO: 0.04 X10(3)/MCL
BASOPHILS NFR BLD AUTO: 0.9 %
BILIRUB SERPL-MCNC: 0.8 MG/DL
BUN SERPL-MCNC: 14 MG/DL (ref 8.4–25.7)
CALCIUM SERPL-MCNC: 9.2 MG/DL (ref 8.8–10)
CHLORIDE SERPL-SCNC: 106 MMOL/L (ref 98–107)
CO2 SERPL-SCNC: 28 MMOL/L (ref 23–31)
CREAT SERPL-MCNC: 1.27 MG/DL (ref 0.73–1.18)
CREAT/UREA NIT SERPL: 11
EOSINOPHIL # BLD AUTO: 0.43 X10(3)/MCL (ref 0–0.9)
EOSINOPHIL NFR BLD AUTO: 10.2 %
ERYTHROCYTE [DISTWIDTH] IN BLOOD BY AUTOMATED COUNT: 13 % (ref 11.5–17)
GFR SERPLBLD CREATININE-BSD FMLA CKD-EPI: 59 ML/MIN/1.73/M2
GLOBULIN SER-MCNC: 2.2 GM/DL (ref 2.4–3.5)
GLUCOSE SERPL-MCNC: 136 MG/DL (ref 82–115)
HCT VFR BLD AUTO: 29.6 % (ref 42–52)
HGB BLD-MCNC: 9.9 G/DL (ref 14–18)
IMM GRANULOCYTES # BLD AUTO: 0.04 X10(3)/MCL (ref 0–0.04)
IMM GRANULOCYTES NFR BLD AUTO: 0.9 %
LYMPHOCYTES # BLD AUTO: 1.05 X10(3)/MCL (ref 0.6–4.6)
LYMPHOCYTES NFR BLD AUTO: 24.9 %
MAGNESIUM SERPL-MCNC: 1.7 MG/DL (ref 1.6–2.6)
MCH RBC QN AUTO: 39.9 PG (ref 27–31)
MCHC RBC AUTO-ENTMCNC: 33.4 G/DL (ref 33–36)
MCV RBC AUTO: 119.4 FL (ref 80–94)
MONOCYTES # BLD AUTO: 0.95 X10(3)/MCL (ref 0.1–1.3)
MONOCYTES NFR BLD AUTO: 22.5 %
NEUTROPHILS # BLD AUTO: 1.71 X10(3)/MCL (ref 2.1–9.2)
NEUTROPHILS NFR BLD AUTO: 40.6 %
PHOSPHATE SERPL-MCNC: 3.3 MG/DL (ref 2.3–4.7)
PLATELET # BLD AUTO: 87 X10(3)/MCL (ref 130–400)
PMV BLD AUTO: 9.9 FL (ref 7.4–10.4)
POTASSIUM SERPL-SCNC: 3.7 MMOL/L (ref 3.5–5.1)
PROT SERPL-MCNC: 6 GM/DL (ref 5.8–7.6)
RBC # BLD AUTO: 2.48 X10(6)/MCL (ref 4.7–6.1)
SODIUM SERPL-SCNC: 140 MMOL/L (ref 136–145)
WBC # BLD AUTO: 4.22 X10(3)/MCL (ref 4.5–11.5)

## 2024-09-24 PROCEDURE — 83735 ASSAY OF MAGNESIUM: CPT

## 2024-09-24 PROCEDURE — 80053 COMPREHEN METABOLIC PANEL: CPT

## 2024-09-24 PROCEDURE — 85025 COMPLETE CBC W/AUTO DIFF WBC: CPT

## 2024-09-24 PROCEDURE — 84100 ASSAY OF PHOSPHORUS: CPT

## 2024-09-24 PROCEDURE — 36415 COLL VENOUS BLD VENIPUNCTURE: CPT

## 2024-09-26 ENCOUNTER — LAB VISIT (OUTPATIENT)
Dept: LAB | Facility: HOSPITAL | Age: 74
End: 2024-09-26
Payer: MEDICARE

## 2024-09-26 DIAGNOSIS — C90.01 MULTIPLE MYELOMA IN REMISSION: ICD-10-CM

## 2024-09-26 LAB
ALBUMIN SERPL-MCNC: 4 G/DL (ref 3.4–4.8)
ALBUMIN/GLOB SERPL: 1.5 RATIO (ref 1.1–2)
ALP SERPL-CCNC: 89 UNIT/L (ref 40–150)
ALT SERPL-CCNC: 18 UNIT/L (ref 0–55)
ANION GAP SERPL CALC-SCNC: 8 MEQ/L
AST SERPL-CCNC: 18 UNIT/L (ref 5–34)
BASOPHILS # BLD AUTO: 0.04 X10(3)/MCL
BASOPHILS NFR BLD AUTO: 1.2 %
BILIRUB SERPL-MCNC: 0.8 MG/DL
BUN SERPL-MCNC: 19 MG/DL (ref 8.4–25.7)
CALCIUM SERPL-MCNC: 9.5 MG/DL (ref 8.8–10)
CHLORIDE SERPL-SCNC: 106 MMOL/L (ref 98–107)
CO2 SERPL-SCNC: 28 MMOL/L (ref 23–31)
CREAT SERPL-MCNC: 1.37 MG/DL (ref 0.73–1.18)
CREAT/UREA NIT SERPL: 14
EOSINOPHIL # BLD AUTO: 0.35 X10(3)/MCL (ref 0–0.9)
EOSINOPHIL NFR BLD AUTO: 10.1 %
ERYTHROCYTE [DISTWIDTH] IN BLOOD BY AUTOMATED COUNT: 12.9 % (ref 11.5–17)
GFR SERPLBLD CREATININE-BSD FMLA CKD-EPI: 54 ML/MIN/1.73/M2
GLOBULIN SER-MCNC: 2.6 GM/DL (ref 2.4–3.5)
GLUCOSE SERPL-MCNC: 139 MG/DL (ref 82–115)
HCT VFR BLD AUTO: 32.7 % (ref 42–52)
HGB BLD-MCNC: 10.7 G/DL (ref 14–18)
IGA SERPL-MCNC: 138 MG/DL (ref 101–645)
IGG SERPL-MCNC: 1018 MG/DL (ref 540–1822)
IGM SERPL-MCNC: 34 MG/DL (ref 22–240)
IMM GRANULOCYTES # BLD AUTO: 0.07 X10(3)/MCL (ref 0–0.04)
IMM GRANULOCYTES NFR BLD AUTO: 2 %
LYMPHOCYTES # BLD AUTO: 0.92 X10(3)/MCL (ref 0.6–4.6)
LYMPHOCYTES NFR BLD AUTO: 26.5 %
MAGNESIUM SERPL-MCNC: 1.9 MG/DL (ref 1.6–2.6)
MCH RBC QN AUTO: 38.9 PG (ref 27–31)
MCHC RBC AUTO-ENTMCNC: 32.7 G/DL (ref 33–36)
MCV RBC AUTO: 118.9 FL (ref 80–94)
MONOCYTES # BLD AUTO: 0.81 X10(3)/MCL (ref 0.1–1.3)
MONOCYTES NFR BLD AUTO: 23.3 %
NEUTROPHILS # BLD AUTO: 1.28 X10(3)/MCL (ref 2.1–9.2)
NEUTROPHILS NFR BLD AUTO: 36.9 %
PHOSPHATE SERPL-MCNC: 3.9 MG/DL (ref 2.3–4.7)
PLATELET # BLD AUTO: 94 X10(3)/MCL (ref 130–400)
PMV BLD AUTO: 9.9 FL (ref 7.4–10.4)
POTASSIUM SERPL-SCNC: 3.7 MMOL/L (ref 3.5–5.1)
PROT SERPL-MCNC: 6.6 GM/DL (ref 5.8–7.6)
RBC # BLD AUTO: 2.75 X10(6)/MCL (ref 4.7–6.1)
SODIUM SERPL-SCNC: 142 MMOL/L (ref 136–145)
WBC # BLD AUTO: 3.47 X10(3)/MCL (ref 4.5–11.5)

## 2024-09-26 PROCEDURE — 83735 ASSAY OF MAGNESIUM: CPT

## 2024-09-26 PROCEDURE — 36415 COLL VENOUS BLD VENIPUNCTURE: CPT

## 2024-09-26 PROCEDURE — 85025 COMPLETE CBC W/AUTO DIFF WBC: CPT

## 2024-09-26 PROCEDURE — 80053 COMPREHEN METABOLIC PANEL: CPT

## 2024-09-26 PROCEDURE — 82232 ASSAY OF BETA-2 PROTEIN: CPT

## 2024-09-26 PROCEDURE — 84100 ASSAY OF PHOSPHORUS: CPT

## 2024-09-26 PROCEDURE — 82784 ASSAY IGA/IGD/IGG/IGM EACH: CPT

## 2024-09-26 PROCEDURE — 83521 IG LIGHT CHAINS FREE EACH: CPT

## 2024-09-26 PROCEDURE — 86334 IMMUNOFIX E-PHORESIS SERUM: CPT

## 2024-09-26 PROCEDURE — 84165 PROTEIN E-PHORESIS SERUM: CPT

## 2024-09-26 RX ORDER — LENALIDOMIDE 5 MG/1
5 CAPSULE ORAL DAILY
Qty: 28 EACH | Refills: 0 | Status: SHIPPED | OUTPATIENT
Start: 2024-09-26

## 2024-09-27 LAB
ALBUMIN % SPEP (OHS): 57.69 (ref 48.1–59.5)
ALBUMIN SERPL-MCNC: 3.5 G/DL (ref 3.4–4.8)
ALBUMIN/GLOB SERPL: 1.4 RATIO (ref 1.1–2)
ALPHA 1 GLOB (OHS): 0.2 GM/DL (ref 0–0.4)
ALPHA 1 GLOB% (OHS): 3.4 (ref 2.3–4.9)
ALPHA 2 GLOB % (OHS): 9.34 (ref 6.9–13)
ALPHA 2 GLOB (OHS): 0.56 GM/DL (ref 0.4–1)
B2 MICROGLOB SERPL-MCNC: 3.45 MCG/ML (ref 1.21–2.7)
BETA GLOB (OHS): 0.76 GM/DL (ref 0.7–1.3)
BETA GLOB% (OHS): 12.65 (ref 13.8–19.7)
GAMMA GLOBULIN % (OHS): 16.92 (ref 10.1–21.9)
GAMMA GLOBULIN (OHS): 1.02 GM/DL (ref 0.4–1.8)
GLOBULIN SER-MCNC: 2.5 GM/DL (ref 2.4–3.5)
KAPPA LC FREE SER NEPH-MCNC: 8.5 MG/DL (ref 0.33–1.94)
KAPPA LC FREE/LAMBDA FREE SER NEPH: 2.19 {RATIO} (ref 0.26–1.65)
LAMBDA LC FREE SERPL-MCNC: 3.88 MG/DL (ref 0.57–2.63)
M SPIKE % (OHS): ABNORMAL
M SPIKE (OHS): ABNORMAL
PATH REV: NORMAL
PROT SERPL-MCNC: 6 GM/DL (ref 5.8–7.6)

## 2024-10-01 ENCOUNTER — APPOINTMENT (OUTPATIENT)
Dept: LAB | Facility: HOSPITAL | Age: 74
End: 2024-10-01
Attending: NURSE PRACTITIONER
Payer: MEDICARE

## 2024-10-01 DIAGNOSIS — C90.01 MULTIPLE MYELOMA IN REMISSION: ICD-10-CM

## 2024-10-01 LAB
ALBUMIN SERPL-MCNC: 3.6 G/DL (ref 3.4–4.8)
ALBUMIN/GLOB SERPL: 1.6 RATIO (ref 1.1–2)
ALP SERPL-CCNC: 82 UNIT/L (ref 40–150)
ALT SERPL-CCNC: 18 UNIT/L (ref 0–55)
ANION GAP SERPL CALC-SCNC: 6 MEQ/L
AST SERPL-CCNC: 14 UNIT/L (ref 5–34)
BASOPHILS # BLD AUTO: 0.03 X10(3)/MCL
BASOPHILS NFR BLD AUTO: 1.1 %
BILIRUB SERPL-MCNC: 0.9 MG/DL
BUN SERPL-MCNC: 17 MG/DL (ref 8.4–25.7)
CALCIUM SERPL-MCNC: 8.6 MG/DL (ref 8.8–10)
CHLORIDE SERPL-SCNC: 109 MMOL/L (ref 98–107)
CO2 SERPL-SCNC: 26 MMOL/L (ref 23–31)
CREAT SERPL-MCNC: 1.23 MG/DL (ref 0.73–1.18)
CREAT/UREA NIT SERPL: 14
EOSINOPHIL # BLD AUTO: 0.36 X10(3)/MCL (ref 0–0.9)
EOSINOPHIL NFR BLD AUTO: 13.3 %
ERYTHROCYTE [DISTWIDTH] IN BLOOD BY AUTOMATED COUNT: 13.3 % (ref 11.5–17)
GFR SERPLBLD CREATININE-BSD FMLA CKD-EPI: >60 ML/MIN/1.73/M2
GLOBULIN SER-MCNC: 2.3 GM/DL (ref 2.4–3.5)
GLUCOSE SERPL-MCNC: 145 MG/DL (ref 82–115)
HCT VFR BLD AUTO: 29.2 % (ref 42–52)
HGB BLD-MCNC: 9.6 G/DL (ref 14–18)
IMM GRANULOCYTES # BLD AUTO: 0.08 X10(3)/MCL (ref 0–0.04)
IMM GRANULOCYTES NFR BLD AUTO: 3 %
LYMPHOCYTES # BLD AUTO: 0.85 X10(3)/MCL (ref 0.6–4.6)
LYMPHOCYTES NFR BLD AUTO: 31.4 %
MAGNESIUM SERPL-MCNC: 2.3 MG/DL (ref 1.6–2.6)
MCH RBC QN AUTO: 39.5 PG (ref 27–31)
MCHC RBC AUTO-ENTMCNC: 32.9 G/DL (ref 33–36)
MCV RBC AUTO: 120.2 FL (ref 80–94)
MONOCYTES # BLD AUTO: 0.68 X10(3)/MCL (ref 0.1–1.3)
MONOCYTES NFR BLD AUTO: 25.1 %
NEUTROPHILS # BLD AUTO: 0.71 X10(3)/MCL (ref 2.1–9.2)
NEUTROPHILS NFR BLD AUTO: 26.1 %
PHOSPHATE SERPL-MCNC: 3.7 MG/DL (ref 2.3–4.7)
PLATELET # BLD AUTO: 87 X10(3)/MCL (ref 130–400)
PMV BLD AUTO: 9.8 FL (ref 7.4–10.4)
POTASSIUM SERPL-SCNC: 3.9 MMOL/L (ref 3.5–5.1)
PROT SERPL-MCNC: 5.9 GM/DL (ref 5.8–7.6)
RBC # BLD AUTO: 2.43 X10(6)/MCL (ref 4.7–6.1)
SODIUM SERPL-SCNC: 141 MMOL/L (ref 136–145)
WBC # BLD AUTO: 2.71 X10(3)/MCL (ref 4.5–11.5)

## 2024-10-01 PROCEDURE — 85025 COMPLETE CBC W/AUTO DIFF WBC: CPT

## 2024-10-01 PROCEDURE — 80053 COMPREHEN METABOLIC PANEL: CPT

## 2024-10-01 PROCEDURE — 36415 COLL VENOUS BLD VENIPUNCTURE: CPT

## 2024-10-01 PROCEDURE — 83735 ASSAY OF MAGNESIUM: CPT

## 2024-10-01 PROCEDURE — 84100 ASSAY OF PHOSPHORUS: CPT

## 2024-10-03 ENCOUNTER — LAB VISIT (OUTPATIENT)
Dept: LAB | Facility: HOSPITAL | Age: 74
End: 2024-10-03
Attending: NURSE PRACTITIONER
Payer: MEDICARE

## 2024-10-03 DIAGNOSIS — C90.01 MULTIPLE MYELOMA IN REMISSION: ICD-10-CM

## 2024-10-03 LAB
ABS NEUT CALC (OHS): 0.87 X10(3)/MCL (ref 2.1–9.2)
ALBUMIN SERPL-MCNC: 3.6 G/DL (ref 3.4–4.8)
ALBUMIN/GLOB SERPL: 1.4 RATIO (ref 1.1–2)
ALP SERPL-CCNC: 80 UNIT/L (ref 40–150)
ALT SERPL-CCNC: 18 UNIT/L (ref 0–55)
ANION GAP SERPL CALC-SCNC: 7 MEQ/L
AST SERPL-CCNC: 17 UNIT/L (ref 5–34)
BILIRUB SERPL-MCNC: 1.3 MG/DL
BUN SERPL-MCNC: 13 MG/DL (ref 8.4–25.7)
CALCIUM SERPL-MCNC: 9 MG/DL (ref 8.8–10)
CHLORIDE SERPL-SCNC: 108 MMOL/L (ref 98–107)
CO2 SERPL-SCNC: 25 MMOL/L (ref 23–31)
CREAT SERPL-MCNC: 1.07 MG/DL (ref 0.73–1.18)
CREAT/UREA NIT SERPL: 12
EOSINOPHIL NFR BLD MANUAL: 0.2 X10(3)/MCL (ref 0–0.9)
EOSINOPHIL NFR BLD MANUAL: 9 % (ref 0–8)
ERYTHROCYTE [DISTWIDTH] IN BLOOD BY AUTOMATED COUNT: 13 % (ref 11.5–17)
GFR SERPLBLD CREATININE-BSD FMLA CKD-EPI: >60 ML/MIN/1.73/M2
GLOBULIN SER-MCNC: 2.5 GM/DL (ref 2.4–3.5)
GLUCOSE SERPL-MCNC: 153 MG/DL (ref 82–115)
HCT VFR BLD AUTO: 32.5 % (ref 42–52)
HGB BLD-MCNC: 10.7 G/DL (ref 14–18)
HYPOCHROMIA BLD QL SMEAR: SLIGHT
LYMPHOCYTES NFR BLD MANUAL: 0.87 X10(3)/MCL
LYMPHOCYTES NFR BLD MANUAL: 39 % (ref 13–40)
MACROCYTES BLD QL SMEAR: ABNORMAL
MAGNESIUM SERPL-MCNC: 2 MG/DL (ref 1.6–2.6)
MCH RBC QN AUTO: 38.4 PG (ref 27–31)
MCHC RBC AUTO-ENTMCNC: 32.9 G/DL (ref 33–36)
MCV RBC AUTO: 116.5 FL (ref 80–94)
METAMYELOCYTES NFR BLD MANUAL: 2 %
MICROCYTES BLD QL SMEAR: SLIGHT
MONOCYTES NFR BLD MANUAL: 0.22 X10(3)/MCL (ref 0.1–1.3)
MONOCYTES NFR BLD MANUAL: 10 % (ref 2–11)
MYELOCYTES NFR BLD MANUAL: 1 %
NEUTROPHILS NFR BLD MANUAL: 38 % (ref 47–80)
NEUTS BAND NFR BLD MANUAL: 1 % (ref 0–11)
OVALOCYTES (OLG): SLIGHT
PHOSPHATE SERPL-MCNC: 2.9 MG/DL (ref 2.3–4.7)
PLATELET # BLD AUTO: 85 X10(3)/MCL (ref 130–400)
PLATELET # BLD EST: ABNORMAL 10*3/UL
PMV BLD AUTO: 10 FL (ref 7.4–10.4)
POIKILOCYTOSIS BLD QL SMEAR: SLIGHT
POTASSIUM SERPL-SCNC: 3.4 MMOL/L (ref 3.5–5.1)
PROT SERPL-MCNC: 6.1 GM/DL (ref 5.8–7.6)
RBC # BLD AUTO: 2.79 X10(6)/MCL (ref 4.7–6.1)
SODIUM SERPL-SCNC: 140 MMOL/L (ref 136–145)
TARGETS BLD QL SMEAR: SLIGHT
TEAR DROP CELL (OLG): SLIGHT
WBC # BLD AUTO: 2.24 X10(3)/MCL (ref 4.5–11.5)

## 2024-10-03 PROCEDURE — 83735 ASSAY OF MAGNESIUM: CPT

## 2024-10-03 PROCEDURE — 36415 COLL VENOUS BLD VENIPUNCTURE: CPT

## 2024-10-03 PROCEDURE — 85027 COMPLETE CBC AUTOMATED: CPT

## 2024-10-03 PROCEDURE — 80053 COMPREHEN METABOLIC PANEL: CPT

## 2024-10-03 PROCEDURE — 84100 ASSAY OF PHOSPHORUS: CPT

## 2024-10-07 ENCOUNTER — OFFICE VISIT (OUTPATIENT)
Dept: HEMATOLOGY/ONCOLOGY | Facility: CLINIC | Age: 74
End: 2024-10-07
Payer: MEDICARE

## 2024-10-07 VITALS
WEIGHT: 190 LBS | BODY MASS INDEX: 27.2 KG/M2 | DIASTOLIC BLOOD PRESSURE: 61 MMHG | TEMPERATURE: 99 F | RESPIRATION RATE: 16 BRPM | SYSTOLIC BLOOD PRESSURE: 117 MMHG | HEART RATE: 65 BPM | HEIGHT: 70 IN | OXYGEN SATURATION: 96 %

## 2024-10-07 DIAGNOSIS — C90.01 MULTIPLE MYELOMA IN REMISSION: Primary | ICD-10-CM

## 2024-10-07 DIAGNOSIS — T45.1X5A IMMUNODEFICIENCY DUE TO CHEMOTHERAPY: ICD-10-CM

## 2024-10-07 DIAGNOSIS — D61.818 PANCYTOPENIA: ICD-10-CM

## 2024-10-07 DIAGNOSIS — Z94.81 S/P AUTOLOGOUS BONE MARROW TRANSPLANTATION: ICD-10-CM

## 2024-10-07 DIAGNOSIS — D84.821 IMMUNODEFICIENCY DUE TO CHEMOTHERAPY: ICD-10-CM

## 2024-10-07 DIAGNOSIS — D84.9 IMMUNODEFICIENCY: ICD-10-CM

## 2024-10-07 DIAGNOSIS — Z79.899 IMMUNODEFICIENCY DUE TO CHEMOTHERAPY: ICD-10-CM

## 2024-10-07 PROCEDURE — 99215 OFFICE O/P EST HI 40 MIN: CPT | Mod: S$PBB,,,

## 2024-10-07 PROCEDURE — 99999 PR PBB SHADOW E&M-EST. PATIENT-LVL V: CPT | Mod: PBBFAC,,,

## 2024-10-07 PROCEDURE — 99215 OFFICE O/P EST HI 40 MIN: CPT | Mod: PBBFAC

## 2024-10-07 NOTE — PROGRESS NOTES
HEMATOLOGY/ONCOLOGY OFFICE CLINIC VISIT     Visit Information:     Initial Evaluation: 6/13/2023 (hospital consult)   Referring Provider: Dr Gonzalez  Other providers:  Code status: Not addressed     Diagnosis:  Multiple Myeloma    Present treatment:   Maintenance Revlimid 5 mg daily and Promacta 50 mg daily-- started  8/24    Treatment History:  --- Daratumumab, lenalidomide, and dexamethasone x 4 cycles  initiated 7/12/23  --- Lenalidomide and dexamethasone stopped on 3/6/24  --- Stem Cell Transplant 4/16/24  --- Carmen 140: 5 bags 4/18/24 and 4 bags 4/19/24        Imaging:  CT C 6/7/2023: 1. There is a small left sided pleural effusion. 2. A small patchy area of ground glass density is seen in the lateral basal segment of the left lower lobe. This could reflect an acute focal infiltrate / pneumonitis. 3. There is diffuse osteopenia along the visualised bony structures together with multiple, numerous, small round-ovoid lucent lesion of varying sizes involving the marrow. These changes could reflect metabolic bone disease like hyperparathyroidism. Correlate clinically and with laboratory findings, as regards additional evaluation and follow-up.  6/8/23 Bone Scan Whole Body:  1. Scattered activity at the anterior left right rib margins as described.  A CT exam on the previous day reveals no definite fracture.  There is mild heterogeneous and bony loss at the anterior left and right ribs.  This is not have the typical pattern of a metastases.  2. Focal increased activity at the anterior manubrium which again on the CT exam demonstrates osteopenia and heterogeneous bony loss as well as scattered subcentimeter small lytic defects. 3. Suspect mild arthritic changes at the shoulders sternoclavicular joints  6/9/23 MRI Thoracic Spine: Within limitations, no convincing evidence of acute thoracic spine abnormality, high-grade canal stenosis, or focal cord pathology. Multiple scattered vertebral body lesions are suspected and  could represent metastatic disease, otherwise of incomplete characterization by non-contrast protocol. Incidental findings of the partially visualized thoracic cavity and retroperitoneum discussed above, poorly assessed by modality/protocol.  Recent non-contrast chest CT provides overall better visualization.  6/10/23 CT Head: No acute intracranial abnormality.  Findings consistent with microangiopathy no interval change.  6/12/23 XRAY Chest: 1. Patchy hazy opacities again evident at the lower lungs bilaterally suspicious for infiltrate and atelectasis.  Small bibasilar pleural reactions cannot be excluded. 2. Borderline cardiomegaly 3. Atherosclerosis  6/16/23 XRAY Chest: Improved aeration of the lung bases with mild residual bibasilar opacities and small pleural effusions suspected.  7/23/24 PET/CT:  In this patient with multiple myeloma, there is interval normalization of previously hypermetabolic osseous lesions, compatible with favorable response to therapy. No new hypermetabolic lesions.       Pathology:  6/12/23 Bone marrow aspiration/Biopsy:   PLASMA CELL MYELOMA, KAPPA RESTRICTED.     PLASMA CELL MYELOMA INVOLVES 90% OF BONE MARROW CELLULARITY WITH SMALL AREAS OF  SEQUENTIAL TRILINEAGE HEMATOPOIESIS.    ABSENT IRON STORES.     PERIPHERAL BLOOD: NORMOCYTIC NORMOCHROMIC ANEMIA. THROMBOCYTOPENIA.   3/7/24 bone marrow biopsy:  BONE MARROW ASPIRATE, TOUCH PREP, CLOT, AND DECALCIFIED NEEDLE CORE BIOPSY:  LEFT POSTEROSUPERIOR ILIAC CREST  - NO DEFINITIVE RESIDUAL KAPPA LIGHT CHAIN RESTRICTED MULTIPLE MYELOMA  - Mildly hypercellular marrow (35-45% total cellularity) with non-increased scattered plasma cells and trilineage hematopoiesis with focally increased erythroid precursors (see comments)  7/23/24 BMBx:  CELLULARITY=30%, TRILINEAGE HEMATOPOIETIC ACTIVITY (M/E=1.6:1).   NO DEFINITIVE MORPHOLOGIC OR IMMUNOPHENOTYPIC EVIDENCE OF RESIDUAL PLASMA CELL NEOPLASM.  SEE COMMENT.   INCREASED EOSINOPHILS.   INCREASED  STORAGE IRON.   DECREASED NUMBER OF MEGAKARYOCYTES.       CLINICAL HISTORY:       Subjective  Patient: Chip Goodson was seen for the first time as hospital consult on 6/13/2023.   Patient was brought to the emergency department for confusion.  Upon evaluation in the ER CT scan of the head with no acute intracranial abnormality.  Finding consistent with microangiopathic no interval change.  CT of the chest with no contrast showed diffuse osteopenia with multiple, numerous, small round avid lucent lesions ovarian size involving the marrow.  Changes could reflect metabolic bone disease like hyperparathyroidism.  MRI of the thoracic spine with multiple scattered vertebral body lesion are suspect and could represent metastatic disease.  Bone scan with scattered activity in the anterior left right rib margins no fractures focal increased activity at the anterior manubrium scattered subcentimeter small lytic defects.  Ultrasound of the thyroid that shows multiple nodules. skeletal survey  area on humerus only.     Labs with elevated calcium of 12.6, corrected calcium 14.36.  Total protein was 11.8 with a total globulin 10.0.  Further workup revealed an IgA over 7040.0, IgM 6.0, IgG 165.00.  Free serum kappa light chain 5.09, free serum lambda light chains 0.5600 with a kappa/lambda ratio of 9.09.  M spike 3.33.  PSA 0.96     Bone marrow biopsy performed 6/12/23. Patient received pamidronate 60 mg on 06/09/2023 and was started on hydration.  His calcium improved as well as his mental status.    He received treatment as follows:  Daratumumab, lenalidomide, and dexamethasone x 4 cycles  initiated 7/12/23  Lenalidomide and dexamethasone stopped on 3/6/24  Stem Cell Transplant 4/16/24    Interval History:    Mr. Goodson is here today by himself for a four week on treatment follow-up visit.  He is not feeling well today. He took his wife's medication on Wednesday by mistake. He states he has been having diarrhea since  Wednesday that resolved on Sunday, denies taking any medications to help with diarrhea. He is complaining of sore throat, cough, headache, and sinus congestion that started last night.  He initiated maintenance Revlimid 5 mg daily and Promacta 50 mg daily in August. He is tolerating treatment well with no appreciable side effects.  He has his post-transplant vaccine schedule and will speak to his PCP to begin.  Laboratory is being monitored every Tuesday and Thursday.  He has no monoclonal protein.  Results discussed with patient.  He continues on Acyclovir and Dapsone as prophylaxis.  Next follow up with Dr. Evans is scheduled for 11/4/24.    Review of Systems   Constitutional:  Positive for malaise/fatigue (improved). Negative for chills, diaphoresis, fever and weight loss.   HENT:  Positive for congestion, hearing loss and sore throat. Negative for nosebleeds.    Eyes: Negative.    Respiratory:  Positive for cough. Negative for hemoptysis and shortness of breath.    Cardiovascular:  Negative for chest pain, palpitations and leg swelling.   Gastrointestinal:  Positive for diarrhea. Negative for abdominal pain, blood in stool, constipation, melena, nausea and vomiting.   Genitourinary:  Negative for dysuria, frequency, hematuria and urgency.   Musculoskeletal:  Negative for back pain, falls, joint pain and myalgias.   Skin:  Negative for rash.   Neurological:  Negative for dizziness, tremors, seizures, weakness and headaches.   Endo/Heme/Allergies: Negative.  Does not bruise/bleed easily.   Psychiatric/Behavioral:  Negative for hallucinations and suicidal ideas. The patient is not nervous/anxious.        Review of patient's allergies indicates:   Allergen Reactions    Iodine Anaphylaxis    Shellfish containing products     Poison ivy extract     Amoxicillin-pot clavulanate Itching      Current Outpatient Medications on File Prior to Visit   Medication Sig Dispense Refill    acyclovir (ZOVIRAX) 800 MG Tab Take 1  tablet (800 mg total) by mouth 2 (two) times daily. 60 tablet 11    albuterol (PROVENTIL/VENTOLIN HFA) 90 mcg/actuation inhaler 2 puffs every 4 to 6 hours as needed.      ALPRAZolam (XANAX) 0.5 MG tablet Take 0.5 mg by mouth daily as needed for Anxiety.      amLODIPine (NORVASC) 10 MG tablet Take 1 tablet (10 mg total) by mouth every morning.      ascorbic acid, vitamin C, (VITAMIN C) 500 MG tablet Take 500 mg by mouth once daily.      atorvastatin (LIPITOR) 10 MG tablet Take 10 mg by mouth every evening.      carvediloL (COREG) 6.25 MG tablet Take 1 tablet (6.25 mg total) by mouth 2 (two) times daily. 180 tablet 3    cholecalciferol, vitamin D3, (VITAMIN D3) 125 mcg (5,000 unit) Tab Take 5,000 Units by mouth once daily.      cyclobenzaprine (FLEXERIL) 5 MG tablet Take 1 tablet (5 mg total) by mouth 3 (three) times daily as needed for Muscle spasms. 60 tablet 2    dapsone 100 MG Tab Take 1 tablet (100 mg total) by mouth once daily. 30 tablet 5    eltrombopag olamine (PROMACTA) 50 MG Tab Take 1 tablet (50 mg total) by mouth once daily. 30 tablet 5    ferrous sulfate 325 (65 FE) MG EC tablet Take 325 mg by mouth once daily.      finasteride (PROSCAR) 5 mg tablet Take 5 mg by mouth every morning.      HYDROcodone-acetaminophen (NORCO) 7.5-325 mg per tablet Take 1 tablet by mouth every 4 (four) hours as needed for Pain. 90 tablet 0    lenalidomide 5 mg Cap Take 5 mg by mouth once daily. 28 each 0    levoFLOXacin (LEVAQUIN) 500 MG tablet Take 1 tablet (500 mg total) by mouth once daily. 30 tablet 0    levothyroxine (SYNTHROID) 112 MCG tablet Take 112 mcg by mouth every evening.      LIDOcaine (LIDODERM) 5 % Place 1 patch onto the skin once daily. Remove & Discard patch within 12 hours or as directed by MD 30 patch 3    losartan (COZAAR) 50 MG tablet Take 50 mg by mouth.      metFORMIN (GLUCOPHAGE-XR) 500 MG ER 24hr tablet Take 1,000 mg by mouth 2 (two) times daily.      nitroGLYCERIN (NITROSTAT) 0.4 MG SL tablet place  "one tablet under the tongue every five minutes as needed for chest pain up to 3 doses. if no relief call 911      ONETOUCH ULTRA TEST Strp USE TO TEST BLOOD GLUCOSE TWICE DAILY      pantoprazole (PROTONIX) 40 MG tablet Take 1 tablet (40 mg total) by mouth once daily. 30 tablet 11    ranolazine (RANEXA) 500 MG Tb12 Take 500 mg by mouth 2 (two) times daily.      valsartan (DIOVAN) 160 MG tablet Take 160 mg by mouth.      [DISCONTINUED] hydrALAZINE (APRESOLINE) 50 MG tablet Take 1 tablet (50 mg total) by mouth every 8 (eight) hours. 90 tablet 11    [DISCONTINUED] spironolactone (ALDACTONE) 25 MG tablet Take 12.5 mg by mouth every morning. (Patient not taking: Reported on 10/7/2024)       Current Facility-Administered Medications on File Prior to Visit   Medication Dose Route Frequency Provider Last Rate Last Admin    0.9%  NaCl infusion (for blood administration)   Intravenous Once Popeck, Marjan E, FNP        0.9%  NaCl infusion (for blood administration)   Intravenous Once Popeck, Marjan E, FNP        0.9%  NaCl infusion (for blood administration)   Intravenous Once Popeck, Marjan E, FNP        DTaP / HiB / IPV combined (Pentacel) injection 0.5 mL  0.5 mL Intramuscular Q8 weeks         pneumoc 20-edward conj-dip cr(PF) (PREVNAR-20 (PF)) injection Syrg 0.5 mL  0.5 mL Intramuscular Once         sars-cov-2 (covid-19) (Spikevax (Moderna) (12yrs and up 2023)) 50 mcg/0.5 mL injection 0.5 mL  0.5 mL Intramuscular vaccine x 1 dose         varicella-zoster gE vac,2 of 2 SusR 0.5 mL  1 each Intramuscular Q8 weeks               Vitals:    10/07/24 0829   BP: 117/61   BP Location: Left arm   Patient Position: Sitting   Pulse: 65   Resp: 16   Temp: 98.6 °F (37 °C)   TempSrc: Oral   SpO2: 96%   Weight: 86.2 kg (190 lb)   Height: 5' 10" (1.778 m)         Wt Readings from Last 6 Encounters:   10/07/24 86.2 kg (190 lb)   09/04/24 88 kg (193 lb 14.4 oz)   08/06/24 90.4 kg (199 lb 6.4 oz)   07/23/24 90.1 kg (198 lb 10.2 oz)   07/03/24 88.7 " kg (195 lb 9.6 oz)   07/02/24 88.9 kg (195 lb 15.8 oz)     Body mass index is 27.26 kg/m².  Body surface area is 2.06 meters squared.    Physical Exam  Constitutional:       General: He is awake.      Appearance: Normal appearance. He is well-developed.   HENT:      Head: Normocephalic and atraumatic.   Eyes:      General: No scleral icterus.     Extraocular Movements: Extraocular movements intact.      Conjunctiva/sclera: Conjunctivae normal.   Neck:      Vascular: No JVD.   Cardiovascular:      Rate and Rhythm: Normal rate and regular rhythm.      Heart sounds: No murmur heard.  Pulmonary:      Effort: Pulmonary effort is normal.      Breath sounds: Normal breath sounds.   Abdominal:      General: There is no distension.      Palpations: Abdomen is soft.      Tenderness: There is no abdominal tenderness.   Musculoskeletal:      Cervical back: Neck supple.   Lymphadenopathy:      Head:      Right side of head: No submental or submandibular adenopathy.      Left side of head: No submental or submandibular adenopathy.      Cervical: No cervical adenopathy.      Upper Body:      Right upper body: No supraclavicular or axillary adenopathy.      Left upper body: No supraclavicular or axillary adenopathy.      Lower Body: No right inguinal adenopathy. No left inguinal adenopathy.   Skin:     General: Skin is warm.      Findings: No rash.      Nails: There is no clubbing.   Neurological:      General: No focal deficit present.      Mental Status: He is alert.      Cranial Nerves: Cranial nerves 2-12 are intact.   Psychiatric:         Attention and Perception: Attention normal.         Mood and Affect: Mood normal.         Behavior: Behavior is cooperative.         Cognition and Memory: Cognition normal.         Judgment: Judgment normal.       ECOG SCORE              Laboratory:  CBC with Differential:  Lab Results   Component Value Date    WBC 2.24 (L) 10/03/2024    RBC 2.79 (L) 10/03/2024    HGB 10.7 (L) 10/03/2024     HCT 32.5 (L) 10/03/2024    .5 (H) 10/03/2024    MCH 38.4 (H) 10/03/2024    MCHC 32.9 (L) 10/03/2024    RDW 13.0 10/03/2024    PLT 85 (L) 10/03/2024    MPV 10.0 10/03/2024    GRAN 0.3 (L) 05/20/2024    GRAN 37.1 (L) 05/20/2024    LYMPH 0.4 (L) 05/20/2024    LYMPH 52.9 (H) 05/20/2024    MONO 0.1 (L) 05/20/2024    MONO 8.6 05/20/2024    EOS 0.0 05/20/2024    BASO 0.00 05/20/2024    EOSINOPHIL 0.0 05/20/2024    BASOPHIL 0.0 05/20/2024   INITIAL MYELOMA LABS:  Serum:  SPEP: Serum protein electrophoresis shows a distinct monoclonal peak (3.33 g/dL) in the beta zone, consistent with a monoclonal gammopathy.   PAO: A band is present in the IgA trini with a corresponding band in the kappa trini.   The immunofixation electrophoresis are consistent with monoclonal gammopathy of IgA-kappa isotype.     IgG Level 540.00 - 1,822.00 mg/dL 165.00 Low     IgA Level 101.0 - 645.0 mg/dL >7,040.0 High     IgM Level 22.0 - 240.0 mg/dL 6.0 Low       Kappa Free Light Chain 0.3300 - 1.94 mg/dL 5.09 High     Lambda Free Light Chain 0.5700 - 2.63 mg/dL 0.5600 Low     Kappa/Lambda FLC Ratio 0.2600 - 1.65 9.09 High      Urine:   24 hrs Urine:  M spike    1467      H    mg/24 h      LABS:  6/8/23 M-spike 3.33, IgA >7040, serum kappa 5/ lambda 0.56/ K:L ratio 9.09, 24 hour urine for M protein done but not resulted. Calcium 13.4/Alb 1.9, Cr 2.3, globulin 10.7  7/12/23 M-spike 2.49. IgA >7040, Corrected calcium 12.3, Cr 2.09, Globulin 7.6  8/15/23 M-spike 1.49, IgA 4000  9/18/23 M-spike 1.0, IgA 1700  1/19/24 M-spike 0 (smal protein in beta fraction), IgA 335, KFLC 1.61/LFLC 0.92/ ratio 1.75  3/18/24 M-spike 0.11, IgG 516, KFLC 1.43/LFLC 0.86/ ratio 1.66  8/27/24 M-spike 0, IgG 768, KFLC 4.22/LFLC 2.81/ratio 1.5    CMP:   Latest Reference Range & Units 09/03/24 06:50   Sodium 136 - 145 mmol/L 140   Potassium 3.5 - 5.1 mmol/L 3.8   Chloride 98 - 107 mmol/L 106   CO2 23 - 31 mmol/L 26   Anion Gap mEq/L 8.0   BUN 8.4 - 25.7 mg/dL 21.0    Creatinine 0.73 - 1.18 mg/dL 1.23 (H)   BUN/CREAT RATIO  17   eGFR mL/min/1.73/m2 >60   Glucose 82 - 115 mg/dL 140 (H)   Calcium 8.8 - 10.0 mg/dL 9.9   ALP 40 - 150 unit/L 73   PROTEIN TOTAL 5.8 - 7.6 gm/dL 6.4   Albumin 3.4 - 4.8 g/dL 3.9   Albumin/Globulin Ratio 1.1 - 2.0 ratio 1.6   BILIRUBIN TOTAL <=1.5 mg/dL 1.3   AST 5 - 34 unit/L 14   ALT 0 - 55 unit/L 13   Globulin, Total 2.4 - 3.5 gm/dL 2.5   (H): Data is abnormally high     Assessment  1. Multiple myeloma in remission    2. Pancytopenia    3. S/P autologous bone marrow transplantation    4. Immunodeficiency    5. Immunodeficiency due to chemotherapy              Multiple Myeloma Dx 6/2023:  ---Daratumumab, lenalidomide, and dexamethasone x 4 cycles  initiated 7/12/23  ---Lenalidomide and dexamethasone stopped on 3/6/24  ---Stem Cell Transplant 4/16/24  ---Carmen 140: 5 bags 4/18/24 and 4 bags 4/19/24  -- 7/23/24 PET/CT reveals favorable response to therapy and BMBx reveals no definitive evidence of residual neoplasm  -- Per Dr. Evans: maintenance Revlimid 5 mg daily and Promacta 50 mg daily - started 8/24    2. Pancytopenia  --labs q Tuesday and Thursday  -- transfuse 1-2 units of PRBCs for hemoglobin < 7 or clinically appropriate.  --Transfuse 1 unit of platelets if platelets < 10 K or clinically appropriate    3. 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  ---Cont prophylactic antimicrobials    4. Gemella Haemolysans bacteremia  --Cont ceftriaxone until 6/14/2024  --Keep appts with ID      Plan:  Stop Revlimid x 1 week due to diarrhea  Continue Promacta 50 mg daily as maintenance.  Patient is tolerating well.  Discussed dietary restrictions associated with Promacta.  Will have clinical pharmacist reach out to patient as well.  Cytopenias improving.  Continue twice weekly laboratory monitoring every T/Th (CBC, CMP, Mg, Phos).  Continue prophylactic antimicrobials/antivirals.  Patient to begin posttransplant vaccines.  Has schedule/order set to receive with PCP  or local health unit.  Follow-up in 1 week or sooner if needed.  Full myeloma panel with that visit.  Patient is in agreement with plan as outlined above.    All questions answered to the satisfaction of the patient.    MERY Dobson  Oncology/Hematology  [facility]

## 2024-10-08 ENCOUNTER — HOSPITAL ENCOUNTER (EMERGENCY)
Facility: HOSPITAL | Age: 74
Discharge: HOME | End: 2024-10-09
Attending: EMERGENCY MEDICINE
Payer: MEDICARE

## 2024-10-08 ENCOUNTER — LAB VISIT (OUTPATIENT)
Dept: LAB | Facility: HOSPITAL | Age: 74
End: 2024-10-08
Attending: NURSE PRACTITIONER
Payer: MEDICARE

## 2024-10-08 DIAGNOSIS — R19.7 DIARRHEA, UNSPECIFIED TYPE: ICD-10-CM

## 2024-10-08 DIAGNOSIS — A02.9 SALMONELLA INFECTION, UNSPECIFIED: ICD-10-CM

## 2024-10-08 DIAGNOSIS — E78.2 MIXED HYPERLIPIDEMIA: ICD-10-CM

## 2024-10-08 DIAGNOSIS — D64.9 ANEMIA, UNSPECIFIED TYPE: ICD-10-CM

## 2024-10-08 DIAGNOSIS — R53.1 WEAKNESS: ICD-10-CM

## 2024-10-08 DIAGNOSIS — E87.6 HYPOKALEMIA: Primary | ICD-10-CM

## 2024-10-08 DIAGNOSIS — E11.69 DIABETES MELLITUS ASSOCIATED WITH HORMONAL ETIOLOGY: ICD-10-CM

## 2024-10-08 DIAGNOSIS — D69.6 THROMBOCYTOPENIA: ICD-10-CM

## 2024-10-08 DIAGNOSIS — C90.01 MULTIPLE MYELOMA IN REMISSION: ICD-10-CM

## 2024-10-08 DIAGNOSIS — E03.4 IDIOPATHIC ATROPHIC HYPOTHYROIDISM: Primary | ICD-10-CM

## 2024-10-08 LAB
ABS NEUT (OLG): 0.29 X10(3)/MCL (ref 2.1–9.2)
ALBUMIN SERPL-MCNC: 3.4 G/DL (ref 3.4–4.8)
ALBUMIN SERPL-MCNC: 3.5 G/DL (ref 3.4–4.8)
ALBUMIN/GLOB SERPL: 1.1 RATIO (ref 1.1–2)
ALBUMIN/GLOB SERPL: 1.2 RATIO (ref 1.1–2)
ALP SERPL-CCNC: 71 UNIT/L (ref 40–150)
ALP SERPL-CCNC: 81 UNIT/L (ref 40–150)
ALT SERPL-CCNC: 36 UNIT/L (ref 0–55)
ALT SERPL-CCNC: 38 UNIT/L (ref 0–55)
ANION GAP SERPL CALC-SCNC: 9 MEQ/L
ANION GAP SERPL CALC-SCNC: 9 MEQ/L
AST SERPL-CCNC: 24 UNIT/L (ref 5–34)
AST SERPL-CCNC: 27 UNIT/L (ref 5–34)
BACTERIA #/AREA URNS AUTO: ABNORMAL /HPF
BASOPHILS # BLD AUTO: 0.01 X10(3)/MCL
BASOPHILS NFR BLD AUTO: 0.2 %
BILIRUB SERPL-MCNC: 1.5 MG/DL
BILIRUB SERPL-MCNC: 1.7 MG/DL
BILIRUB UR QL STRIP.AUTO: NEGATIVE
BUN SERPL-MCNC: 16.8 MG/DL (ref 8.4–25.7)
BUN SERPL-MCNC: 17 MG/DL (ref 8.4–25.7)
CALCIUM SERPL-MCNC: 8.9 MG/DL (ref 8.8–10)
CALCIUM SERPL-MCNC: 9.1 MG/DL (ref 8.8–10)
CHLORIDE SERPL-SCNC: 105 MMOL/L (ref 98–107)
CHLORIDE SERPL-SCNC: 106 MMOL/L (ref 98–107)
CHOLEST SERPL-MCNC: 80 MG/DL
CHOLEST/HDLC SERPL: 3 {RATIO} (ref 0–5)
CLARITY UR: CLEAR
CO2 SERPL-SCNC: 23 MMOL/L (ref 23–31)
CO2 SERPL-SCNC: 23 MMOL/L (ref 23–31)
COLOR UR AUTO: YELLOW
CREAT SERPL-MCNC: 1.18 MG/DL (ref 0.73–1.18)
CREAT SERPL-MCNC: 1.27 MG/DL (ref 0.73–1.18)
CREAT/UREA NIT SERPL: 13
CREAT/UREA NIT SERPL: 14
EOSINOPHIL # BLD AUTO: 0.27 X10(3)/MCL (ref 0–0.9)
EOSINOPHIL NFR BLD AUTO: 5.9 %
EOSINOPHIL NFR BLD MANUAL: 0.24 X10(3)/MCL (ref 0–0.9)
EOSINOPHIL NFR BLD MANUAL: 5 %
ERYTHROCYTE [DISTWIDTH] IN BLOOD BY AUTOMATED COUNT: 13.4 % (ref 11.5–17)
ERYTHROCYTE [DISTWIDTH] IN BLOOD BY AUTOMATED COUNT: 13.6 % (ref 11.5–17)
EST. AVERAGE GLUCOSE BLD GHB EST-MCNC: 71 MG/DL
GFR SERPLBLD CREATININE-BSD FMLA CKD-EPI: 59 ML/MIN/1.73/M2
GFR SERPLBLD CREATININE-BSD FMLA CKD-EPI: >60 ML/MIN/1.73/M2
GLOBULIN SER-MCNC: 2.9 GM/DL (ref 2.4–3.5)
GLOBULIN SER-MCNC: 3.2 GM/DL (ref 2.4–3.5)
GLUCOSE SERPL-MCNC: 111 MG/DL (ref 82–115)
GLUCOSE SERPL-MCNC: 115 MG/DL (ref 82–115)
GLUCOSE UR QL STRIP: NORMAL
HBA1C MFR BLD: 4.1 %
HCT VFR BLD AUTO: 28.5 % (ref 42–52)
HCT VFR BLD AUTO: 30.6 % (ref 42–52)
HDLC SERPL-MCNC: 28 MG/DL (ref 35–60)
HGB BLD-MCNC: 10.2 G/DL (ref 14–18)
HGB BLD-MCNC: 9.7 G/DL (ref 14–18)
HGB UR QL STRIP: NEGATIVE
HYALINE CASTS #/AREA URNS LPF: ABNORMAL /LPF
IMM GRANULOCYTES # BLD AUTO: 0 X10(3)/MCL (ref 0–0.04)
IMM GRANULOCYTES NFR BLD AUTO: 0 %
INSTRUMENT WBC (OLG): 4.81 X10(3)/MCL
KETONES UR QL STRIP: NEGATIVE
LDLC SERPL CALC-MCNC: 30 MG/DL (ref 50–140)
LEUKOCYTE ESTERASE UR QL STRIP: NEGATIVE
LYMPHOCYTES # BLD AUTO: 2.9 X10(3)/MCL (ref 0.6–4.6)
LYMPHOCYTES NFR BLD AUTO: 63.9 %
LYMPHOCYTES NFR BLD MANUAL: 3.03 X10(3)/MCL
LYMPHOCYTES NFR BLD MANUAL: 63 %
MACROCYTES BLD QL SMEAR: ABNORMAL
MAGNESIUM SERPL-MCNC: 1.9 MG/DL (ref 1.6–2.6)
MCH RBC QN AUTO: 38.1 PG (ref 27–31)
MCH RBC QN AUTO: 38.5 PG (ref 27–31)
MCHC RBC AUTO-ENTMCNC: 33.3 G/DL (ref 33–36)
MCHC RBC AUTO-ENTMCNC: 34 G/DL (ref 33–36)
MCV RBC AUTO: 113.1 FL (ref 80–94)
MCV RBC AUTO: 114.2 FL (ref 80–94)
MONOCYTES # BLD AUTO: 1.13 X10(3)/MCL (ref 0.1–1.3)
MONOCYTES NFR BLD AUTO: 24.9 %
MONOCYTES NFR BLD MANUAL: 1.15 X10(3)/MCL (ref 0.1–1.3)
MONOCYTES NFR BLD MANUAL: 24 %
MUCOUS THREADS URNS QL MICRO: ABNORMAL /LPF
NEUTROPHILS # BLD AUTO: 0.23 X10(3)/MCL (ref 2.1–9.2)
NEUTROPHILS NFR BLD AUTO: 5.1 %
NEUTROPHILS NFR BLD MANUAL: 6 %
NITRITE UR QL STRIP: NEGATIVE
NRBC BLD AUTO-RTO: 0 %
PH UR STRIP: 6 [PH]
PLATELET # BLD AUTO: 80 X10(3)/MCL (ref 130–400)
PLATELET # BLD AUTO: 95 X10(3)/MCL (ref 130–400)
PLATELET # BLD EST: ABNORMAL 10*3/UL
PMV BLD AUTO: 10 FL (ref 7.4–10.4)
PMV BLD AUTO: 10.1 FL (ref 7.4–10.4)
POTASSIUM SERPL-SCNC: 2.7 MMOL/L (ref 3.5–5.1)
POTASSIUM SERPL-SCNC: 2.8 MMOL/L (ref 3.5–5.1)
PROMYELOCYTES # BLD MANUAL: 2 %
PROT SERPL-MCNC: 6.3 GM/DL (ref 5.8–7.6)
PROT SERPL-MCNC: 6.7 GM/DL (ref 5.8–7.6)
PROT UR QL STRIP: ABNORMAL
RBC # BLD AUTO: 2.52 X10(6)/MCL (ref 4.7–6.1)
RBC # BLD AUTO: 2.68 X10(6)/MCL (ref 4.7–6.1)
RBC #/AREA URNS AUTO: ABNORMAL /HPF
RBC MORPH BLD: ABNORMAL
SODIUM SERPL-SCNC: 137 MMOL/L (ref 136–145)
SODIUM SERPL-SCNC: 138 MMOL/L (ref 136–145)
SP GR UR STRIP.AUTO: 1.02 (ref 1–1.03)
SQUAMOUS #/AREA URNS LPF: ABNORMAL /HPF
TRIGL SERPL-MCNC: 108 MG/DL (ref 34–140)
TSH SERPL-ACNC: 4.57 UIU/ML (ref 0.35–4.94)
UROBILINOGEN UR STRIP-ACNC: 2
VLDLC SERPL CALC-MCNC: 22 MG/DL
WBC # BLD AUTO: 4.54 X10(3)/MCL (ref 4.5–11.5)
WBC # BLD AUTO: 4.81 X10(3)/MCL (ref 4.5–11.5)
WBC #/AREA URNS AUTO: ABNORMAL /HPF

## 2024-10-08 PROCEDURE — 80053 COMPREHEN METABOLIC PANEL: CPT

## 2024-10-08 PROCEDURE — 83036 HEMOGLOBIN GLYCOSYLATED A1C: CPT

## 2024-10-08 PROCEDURE — 36415 COLL VENOUS BLD VENIPUNCTURE: CPT

## 2024-10-08 PROCEDURE — 84443 ASSAY THYROID STIM HORMONE: CPT

## 2024-10-08 PROCEDURE — 81001 URINALYSIS AUTO W/SCOPE: CPT | Performed by: NURSE PRACTITIONER

## 2024-10-08 PROCEDURE — 80053 COMPREHEN METABOLIC PANEL: CPT | Performed by: NURSE PRACTITIONER

## 2024-10-08 PROCEDURE — 85027 COMPLETE CBC AUTOMATED: CPT

## 2024-10-08 PROCEDURE — 87040 BLOOD CULTURE FOR BACTERIA: CPT

## 2024-10-08 PROCEDURE — 99284 EMERGENCY DEPT VISIT MOD MDM: CPT | Mod: 25

## 2024-10-08 PROCEDURE — 85007 BL SMEAR W/DIFF WBC COUNT: CPT | Performed by: NURSE PRACTITIONER

## 2024-10-08 PROCEDURE — 93005 ELECTROCARDIOGRAM TRACING: CPT

## 2024-10-08 PROCEDURE — 25000003 PHARM REV CODE 250: Performed by: EMERGENCY MEDICINE

## 2024-10-08 PROCEDURE — 80061 LIPID PANEL: CPT

## 2024-10-08 PROCEDURE — 93010 ELECTROCARDIOGRAM REPORT: CPT | Mod: ,,, | Performed by: INTERNAL MEDICINE

## 2024-10-08 PROCEDURE — 85027 COMPLETE CBC AUTOMATED: CPT | Performed by: NURSE PRACTITIONER

## 2024-10-08 PROCEDURE — 83735 ASSAY OF MAGNESIUM: CPT | Performed by: NURSE PRACTITIONER

## 2024-10-08 PROCEDURE — 84100 ASSAY OF PHOSPHORUS: CPT

## 2024-10-08 RX ADMIN — POTASSIUM BICARBONATE 25 MEQ: 977.5 TABLET, EFFERVESCENT ORAL at 11:10

## 2024-10-09 VITALS
TEMPERATURE: 98 F | DIASTOLIC BLOOD PRESSURE: 66 MMHG | RESPIRATION RATE: 17 BRPM | OXYGEN SATURATION: 94 % | SYSTOLIC BLOOD PRESSURE: 127 MMHG | HEART RATE: 65 BPM | WEIGHT: 170 LBS | BODY MASS INDEX: 20.7 KG/M2 | HEIGHT: 76 IN

## 2024-10-09 LAB
ANION GAP SERPL CALC-SCNC: 13 MEQ/L
BUN SERPL-MCNC: 14.2 MG/DL (ref 8.4–25.7)
C DIFF TOX A+B STL QL IA: NEGATIVE
CALCIUM SERPL-MCNC: 8.4 MG/DL (ref 8.8–10)
CHLORIDE SERPL-SCNC: 105 MMOL/L (ref 98–107)
CLOSTRIDIUM DIFFICILE GDH ANTIGEN (OHS): NEGATIVE
CO2 SERPL-SCNC: 22 MMOL/L (ref 23–31)
CREAT SERPL-MCNC: 1.09 MG/DL (ref 0.73–1.18)
CREAT/UREA NIT SERPL: 13
FECAL LEUKOCYTE (OHS): POSITIVE
GFR SERPLBLD CREATININE-BSD FMLA CKD-EPI: >60 ML/MIN/1.73/M2
GLUCOSE SERPL-MCNC: 162 MG/DL (ref 82–115)
OHS QRS DURATION: 86 MS
OHS QTC CALCULATION: 584 MS
PHOSPHATE SERPL-MCNC: 2.4 MG/DL (ref 2.3–4.7)
POTASSIUM SERPL-SCNC: 3.2 MMOL/L (ref 3.5–5.1)
SODIUM SERPL-SCNC: 140 MMOL/L (ref 136–145)

## 2024-10-09 PROCEDURE — 87427 SHIGA-LIKE TOXIN AG IA: CPT | Performed by: NURSE PRACTITIONER

## 2024-10-09 PROCEDURE — 80048 BASIC METABOLIC PNL TOTAL CA: CPT | Performed by: EMERGENCY MEDICINE

## 2024-10-09 PROCEDURE — 89055 LEUKOCYTE ASSESSMENT FECAL: CPT | Performed by: NURSE PRACTITIONER

## 2024-10-09 PROCEDURE — 86318 IA INFECTIOUS AGENT ANTIBODY: CPT | Performed by: NURSE PRACTITIONER

## 2024-10-09 RX ORDER — CIPROFLOXACIN 500 MG/1
500 TABLET ORAL 2 TIMES DAILY
Qty: 10 TABLET | Refills: 0 | Status: SHIPPED | OUTPATIENT
Start: 2024-10-09 | End: 2024-10-14

## 2024-10-09 RX ORDER — POTASSIUM CHLORIDE 20 MEQ/1
20 TABLET, EXTENDED RELEASE ORAL DAILY
Qty: 3 TABLET | Refills: 0 | Status: SHIPPED | OUTPATIENT
Start: 2024-10-09 | End: 2024-10-12

## 2024-10-09 NOTE — DISCHARGE INSTRUCTIONS
Take the antibiotics for your diarrhea.  Your C diff test was negative.  Take the potassium for 3 days and have your primary care doctor recheck your lab tests

## 2024-10-09 NOTE — ED PROVIDER NOTES
Encounter Date: 10/8/2024    SCRIBE #1 NOTE: I, Regan Gordon, am scribing for, and in the presence of,  Kajal Vegas MD. I have scribed the following portions of the note - Other sections scribed: HPI,ROS,PE.       History     Chief Complaint   Patient presents with    abnormal labs     Sent by PCP for hypokalemia on labwork today as well as a positive fecal swab for astrovirus, cdiff, and salmonella. Denies fevers at home. Pt c/o nausea, diarrhea, and coughing. Takes oral chemo for multiple myeloma. Hx stem cell transplant 6 months ago.      75 y/o male with PMHx of CHF, CAD, GERD, HTN, Hypercholesterolemia, hypothyroidism, multiple myeloma, and DM presents to ED c/o abnormal labs. Pt reports he has been having decreased appetite, mild mid-abdominal pain, nausea, ~2-3x episodes of vomiting per day, and ~3-4x episodes of diarrhea a day for 1x week. He reports taking nausea meds with relief. He states he is able to keep liquids and protein shakes down.     Pt's family member at bedside reports he did an at home rectal swab that was positive for astrovirus and salmonella.  Wife also states he had blood work done today that showed hypokalemia, and was told to come here. Wife states pt has been on 800 mg acyclovir bid and 100 mg dapsone qd since March this year.    PCP: Debbie Gonzalez MD  Oncology: Shaina Tai, [facility]    The history is provided by the spouse and the patient. No  was used.     Review of patient's allergies indicates:   Allergen Reactions    Iodine Anaphylaxis    Shellfish containing products     Poison ivy extract     Amoxicillin-pot clavulanate Itching     Past Medical History:   Diagnosis Date    Chronic diastolic (congestive) heart failure     Coronary artery disease     GERD (gastroesophageal reflux disease)     High cholesterol     HTN (hypertension)     Hypothyroidism, unspecified     Multiple myeloma     Secondary pulmonary arterial hypertension     Type 2  diabetes mellitus without complications      Past Surgical History:   Procedure Laterality Date    APPENDECTOMY  1965    BONE MARROW BIOPSY Right 06/12/2023    Procedure: BIOPSY, BONE MARROW;  Surgeon: Silvano Austin MD;  Location: [facility] OR;  Service: General;  Laterality: Right;    CENTRAL VENOUS CATHETER REMOVAL  5/11/2024    COLONOSCOPY N/A 2/15/2024    Procedure: COLONOSCOPY;  Surgeon: Kei Donahue III, MD;  Location: [facility] ENDO;  Service: Endoscopy;  Laterality: N/A;  POST-OP: ENLARGED NODULAR PROSTATE, SUBOPTIMAL PREP, PAN DIVERTICULOSIS, TRANSVERSE COLON POLYPECTOMY    INSERTION OF TUNNELED CENTRAL VENOUS HEMODIALYSIS CATHETER Left 4/8/2024    Procedure: INSERTION, CATHETER, HEMODIALYSIS, DUAL LUMEN, left poss right, Bard 14.5 fr hemosplit catheter, model [id number];  Surgeon: Theodore Caruso MD;  Location: [address];  Service: General;  Laterality: Left;    removal of boils       Family History   Problem Relation Name Age of Onset    Lung cancer Mother      Breast cancer Sister       Social History     Tobacco Use    Smoking status: Former     Types: Cigarettes    Smokeless tobacco: Former   Substance Use Topics    Alcohol use: Yes    Drug use: Never     Review of Systems   Constitutional:  Positive for appetite change (decreased).   Gastrointestinal:  Positive for abdominal pain, diarrhea, nausea and vomiting.       Physical Exam     Initial Vitals [10/08/24 1859]   BP Pulse Resp Temp SpO2   123/65 62 20 97.8 °F (36.6 °C) (!) 94 %      MAP       --         Physical Exam    Nursing note and vitals reviewed.  Constitutional: No distress.   HENT:   Head: Normocephalic and atraumatic.   Eyes: Conjunctivae and EOM are normal. Pupils are equal, round, and reactive to light.   Neck: Trachea normal. Neck supple.   Normal range of motion.  Cardiovascular:  Normal rate, regular rhythm, normal heart sounds and intact distal pulses.           No murmur heard.  Pulmonary/Chest: Breath sounds normal. No  respiratory distress. He exhibits no tenderness.   Abdominal: Abdomen is soft. Bowel sounds are normal. There is no abdominal tenderness.   Musculoskeletal:         General: Normal range of motion.      Cervical back: Normal range of motion and neck supple.      Lumbar back: Normal. No tenderness. Normal range of motion.     Neurological: He is alert and oriented to person, place, and time. He has normal strength. No cranial nerve deficit or sensory deficit.   Skin: Skin is warm and dry.   Psychiatric: He has a normal mood and affect.         ED Course   Procedures  Labs Reviewed   CBC WITH DIFFERENTIAL - Abnormal       Result Value    WBC 4.81      RBC 2.52 (*)     Hgb 9.7 (*)     Hct 28.5 (*)     .1 (*)     MCH 38.5 (*)     MCHC 34.0      RDW 13.6      Platelet 80 (*)     MPV 10.1      NRBC% 0.0     COMPREHENSIVE METABOLIC PANEL - Abnormal    Sodium 137      Potassium 2.8 (*)     Chloride 105      CO2 23      Glucose 115      Blood Urea Nitrogen 16.8      Creatinine 1.18      Calcium 8.9      Protein Total 6.3      Albumin 3.4      Globulin 2.9      Albumin/Globulin Ratio 1.2      Bilirubin Total 1.7 (*)     ALP 71      ALT 36      AST 24      eGFR >60      Anion Gap 9.0      BUN/Creatinine Ratio 14     URINALYSIS, REFLEX TO URINE CULTURE - Abnormal    Color, UA Yellow      Appearance, UA Clear      Specific Gravity, UA 1.025      pH, UA 6.0      Protein, UA 1+ (*)     Glucose, UA Normal      Ketones, UA Negative      Blood, UA Negative      Bilirubin, UA Negative      Urobilinogen, UA 2.0 (*)     Nitrites, UA Negative      Leukocyte Esterase, UA Negative      RBC, UA 0-5      WBC, UA 0-5      Bacteria, UA None Seen      Squamous Epithelial Cells, UA Trace      Mucous, UA Trace (*)     Hyaline Casts, UA 11-20 (*)    FECAL LEUKOCYTES - LACTOFERRIN ON  STOOL - Abnormal    Fecal Leukocyte Positive (*)    MANUAL DIFFERENTIAL - Abnormal    WBC 4.81      Neutrophils % 6      Lymphs % 63      Monocytes % 24       Eosinophils % 5      Promyelocytes % 2 (*)     Neutrophils Abs 0.2886 (*)     Lymphs Abs 3.0303      Monocytes Abs 1.1544      Eosinophils Abs 0.2405      Platelets Decreased (*)     RBC Morph Abnormal (*)     Macrocytosis 2+ (*)    BASIC METABOLIC PANEL - Abnormal    Sodium 140      Potassium 3.2 (*)     Chloride 105      CO2 22 (*)     Glucose 162 (*)     Blood Urea Nitrogen 14.2      Creatinine 1.09      BUN/Creatinine Ratio 13      Calcium 8.4 (*)     Anion Gap 13.0      eGFR >60     CLOSTRIDIUM DIFFICILE TOXIN A AND B, EIA - Normal    Clostridium Difficile GDH Antigen Negative      Clostridium Difficile Toxin A/B Negative     MAGNESIUM - Normal    Magnesium Level 1.90     STOOL CULTURE [facility]          Imaging Results    None          Medications   potassium bicarbonate disintegrating tablet 25 mEq (25 mEq Oral Given 10/8/24 2315)   potassium bicarbonate disintegrating tablet 25 mEq (25 mEq Oral Given 10/8/24 2315)     Medical Decision Making  The differential diagnosis includes, but is not limited to, lab error, and infectious diarrhea.   Cbc, cmp, mag, stool studies, c dif ordered and reviewed  Stable anemia and thrombocytopenia, hypokalemia noted  C dif negative  Although patient had hypokalemia he was tolerating p.o. and was given oral potassium and will recheck it.  Patient was not septic and nontoxic appearing and C diff was negative.  Will treat for the salmonella positive however there is no indication for admission for IV antibiotics for this.  He was able to tolerate p.o. and comfortable with this plan    Problems Addressed:  Anemia, unspecified type: chronic illness or injury  Diarrhea, unspecified type: acute illness or injury that poses a threat to life or bodily functions  Hypokalemia: acute illness or injury that poses a threat to life or bodily functions  Thrombocytopenia: chronic illness or injury  Weakness: acute illness or injury that poses a threat to life or bodily functions    Amount and/or  Complexity of Data Reviewed  Independent Historian: caregiver and spouse     Details: Pt's wife at bedside reports he did an at home rectal swab that was positive for astrovirus and salmonella.  Wife also states he had blood work done today that showed hypokalemia, and was told to come here. Wife states pt has been on 800 mg acyclovir bid and 100 mg dapsone qd since March this year.    External Data Reviewed: labs and notes.     Details: K 2.7  Labs: ordered. Decision-making details documented in ED Course.  ECG/medicine tests: ordered and independent interpretation performed.    Risk  OTC drugs.  Prescription drug management.  Decision regarding hospitalization.            Scribe Attestation:   Scribe #1: I performed the above scribed service and the documentation accurately describes the services I performed. I attest to the accuracy of the note.  Comments: Attending:   Physician Attestation Statement for Scribe #1: Kajal BALL MD, personally performed the services described in this documentation. All medical record entries made by the scribe were at my direction and in my presence.  I have reviewed the chart and agree that the record reflects my personal performance and is accurate and complete.        Attending Attestation:           Physician Attestation for Scribe:  Physician Attestation Statement for Scribe #1: Shasta BALL Brooke R, MD, reviewed documentation, as scribed by Regan Gordon in my presence, and it is both accurate and complete.             ED Course as of 10/09/24 0233   Tue Oct 08, 2024   2309 EKG obtained at 2130 rate 62 sinus rhythm with occasional pvcs, nonspecific t wave abnormality and prolonged QT (Qtc 584) [BS]   Wed Oct 09, 2024   0056 Stool WBC(!): Positive [BS]   0056 Patient was quite well-appearing in his asking to eat.  Will repeat BNP but at this time it does not appear that he would need to be admitted.  If his stool is positive he can be treated with p.o. antibiotics  [BS]   0056 Regarding the salmonella routine treatment is not always recommended however he can be treated with Cipro, Amoxil, Bactrim or azithromycin if C diff is positive he can take oral vanc azithromycin or Flagyl [BS]   0120 C diff here is negative [BS]   0123 He is tolerating po, agreeable with repeating bmp and if improving dc with oral abx for presumed salmonella [BS]      ED Course User Index  [BS] Kajal Vegas MD                             Clinical Impression:  Final diagnoses:  [R53.1] Weakness  [E87.6] Hypokalemia (Primary)  [R19.7] Diarrhea, unspecified type  [D64.9] Anemia, unspecified type  [D69.6] Thrombocytopenia          ED Disposition Condition    Discharge Stable          ED Prescriptions       Medication Sig Dispense Start Date End Date Auth. Provider    ciprofloxacin HCl (CIPRO) 500 MG tablet Take 1 tablet (500 mg total) by mouth 2 (two) times daily. for 5 days 10 tablet 10/9/2024 10/14/2024 Kajal Vegas MD    potassium chloride SA (K-DUR,KLOR-CON) 20 MEQ tablet Take 1 tablet (20 mEq total) by mouth once daily. for 3 days 3 tablet 10/9/2024 10/12/2024 Kajal Vegas MD          Follow-up Information       Follow up With Specialties Details Why Contact Info    Debbie Gonzalez MD Family Medicine Schedule an appointment as soon as possible for a visit   [facility]  [address]  [facility] [id number] [phone]      [facility] - Emergency Dept Emergency Medicine  As needed, If symptoms worsen [address] [facility] [facility] [place] [id number] [phone]             Kajal Vegas MD  10/09/24 5242

## 2024-10-10 ENCOUNTER — LAB VISIT (OUTPATIENT)
Dept: LAB | Facility: HOSPITAL | Age: 74
End: 2024-10-10
Attending: INTERNAL MEDICINE
Payer: MEDICARE

## 2024-10-10 DIAGNOSIS — Z76.82 STEM CELL TRANSPLANT CANDIDATE: ICD-10-CM

## 2024-10-10 DIAGNOSIS — C90.01 MULTIPLE MYELOMA IN REMISSION: ICD-10-CM

## 2024-10-10 LAB
ALBUMIN SERPL-MCNC: 3.2 G/DL (ref 3.4–4.8)
ALBUMIN/GLOB SERPL: 1 RATIO (ref 1.1–2)
ALP SERPL-CCNC: 75 UNIT/L (ref 40–150)
ALT SERPL-CCNC: 39 UNIT/L (ref 0–55)
ANION GAP SERPL CALC-SCNC: 9 MEQ/L
AST SERPL-CCNC: 24 UNIT/L (ref 5–34)
BASOPHILS # BLD AUTO: 0.02 X10(3)/MCL
BASOPHILS NFR BLD AUTO: 0.7 %
BILIRUB SERPL-MCNC: 1.5 MG/DL
BUN SERPL-MCNC: 14 MG/DL (ref 8.4–25.7)
CALCIUM SERPL-MCNC: 8.9 MG/DL (ref 8.8–10)
CHLORIDE SERPL-SCNC: 107 MMOL/L (ref 98–107)
CO2 SERPL-SCNC: 25 MMOL/L (ref 23–31)
CREAT SERPL-MCNC: 1.21 MG/DL (ref 0.73–1.18)
CREAT/UREA NIT SERPL: 12
EOSINOPHIL # BLD AUTO: 0.18 X10(3)/MCL (ref 0–0.9)
EOSINOPHIL NFR BLD AUTO: 6 %
ERYTHROCYTE [DISTWIDTH] IN BLOOD BY AUTOMATED COUNT: 13.7 % (ref 11.5–17)
GFR SERPLBLD CREATININE-BSD FMLA CKD-EPI: >60 ML/MIN/1.73/M2
GLOBULIN SER-MCNC: 3.1 GM/DL (ref 2.4–3.5)
GLUCOSE SERPL-MCNC: 124 MG/DL (ref 82–115)
HCT VFR BLD AUTO: 28.3 % (ref 42–52)
HGB BLD-MCNC: 9.3 G/DL (ref 14–18)
IMM GRANULOCYTES # BLD AUTO: 0 X10(3)/MCL (ref 0–0.04)
IMM GRANULOCYTES NFR BLD AUTO: 0 %
LYMPHOCYTES # BLD AUTO: 1.74 X10(3)/MCL (ref 0.6–4.6)
LYMPHOCYTES NFR BLD AUTO: 58.4 %
MCH RBC QN AUTO: 38.3 PG (ref 27–31)
MCHC RBC AUTO-ENTMCNC: 32.9 G/DL (ref 33–36)
MCV RBC AUTO: 116.5 FL (ref 80–94)
MONOCYTES # BLD AUTO: 0.83 X10(3)/MCL (ref 0.1–1.3)
MONOCYTES NFR BLD AUTO: 27.9 %
NEUTROPHILS # BLD AUTO: 0.21 X10(3)/MCL (ref 2.1–9.2)
NEUTROPHILS NFR BLD AUTO: 7 %
PLATELET # BLD AUTO: 104 X10(3)/MCL (ref 130–400)
PMV BLD AUTO: 10.5 FL (ref 7.4–10.4)
POTASSIUM SERPL-SCNC: 3.4 MMOL/L (ref 3.5–5.1)
PROT SERPL-MCNC: 6.3 GM/DL (ref 5.8–7.6)
RBC # BLD AUTO: 2.43 X10(6)/MCL (ref 4.7–6.1)
SODIUM SERPL-SCNC: 141 MMOL/L (ref 136–145)
WBC # BLD AUTO: 2.98 X10(3)/MCL (ref 4.5–11.5)

## 2024-10-10 PROCEDURE — 85025 COMPLETE CBC W/AUTO DIFF WBC: CPT

## 2024-10-10 PROCEDURE — 36415 COLL VENOUS BLD VENIPUNCTURE: CPT

## 2024-10-10 PROCEDURE — 80053 COMPREHEN METABOLIC PANEL: CPT

## 2024-10-11 LAB — BACTERIA STL CULT: NORMAL

## 2024-10-14 LAB
BACTERIA BLD CULT: NORMAL
BACTERIA BLD CULT: NORMAL

## 2024-10-15 ENCOUNTER — LAB VISIT (OUTPATIENT)
Dept: LAB | Facility: HOSPITAL | Age: 74
End: 2024-10-15
Payer: MEDICARE

## 2024-10-15 DIAGNOSIS — C90.01 MULTIPLE MYELOMA IN REMISSION: ICD-10-CM

## 2024-10-15 DIAGNOSIS — Z76.82 STEM CELL TRANSPLANT CANDIDATE: ICD-10-CM

## 2024-10-15 LAB
ALBUMIN SERPL-MCNC: 3.3 G/DL (ref 3.4–4.8)
ALBUMIN/GLOB SERPL: 1.3 RATIO (ref 1.1–2)
ALP SERPL-CCNC: 81 UNIT/L (ref 40–150)
ALT SERPL-CCNC: 30 UNIT/L (ref 0–55)
ANION GAP SERPL CALC-SCNC: 6 MEQ/L
AST SERPL-CCNC: 20 UNIT/L (ref 5–34)
BASOPHILS # BLD AUTO: 0.03 X10(3)/MCL
BASOPHILS NFR BLD AUTO: 1.3 %
BILIRUB SERPL-MCNC: 1.1 MG/DL
BUN SERPL-MCNC: 15 MG/DL (ref 8.4–25.7)
CALCIUM SERPL-MCNC: 8.7 MG/DL (ref 8.8–10)
CHLORIDE SERPL-SCNC: 110 MMOL/L (ref 98–107)
CO2 SERPL-SCNC: 24 MMOL/L (ref 23–31)
CREAT SERPL-MCNC: 1.06 MG/DL (ref 0.73–1.18)
CREAT/UREA NIT SERPL: 14
EOSINOPHIL # BLD AUTO: 0.13 X10(3)/MCL (ref 0–0.9)
EOSINOPHIL NFR BLD AUTO: 5.5 %
ERYTHROCYTE [DISTWIDTH] IN BLOOD BY AUTOMATED COUNT: 14.6 % (ref 11.5–17)
GFR SERPLBLD CREATININE-BSD FMLA CKD-EPI: >60 ML/MIN/1.73/M2
GLOBULIN SER-MCNC: 2.5 GM/DL (ref 2.4–3.5)
GLUCOSE SERPL-MCNC: 153 MG/DL (ref 82–115)
HCT VFR BLD AUTO: 27.1 % (ref 42–52)
HGB BLD-MCNC: 8.6 G/DL (ref 14–18)
IMM GRANULOCYTES # BLD AUTO: 0.01 X10(3)/MCL (ref 0–0.04)
IMM GRANULOCYTES NFR BLD AUTO: 0.4 %
LYMPHOCYTES # BLD AUTO: 1.02 X10(3)/MCL (ref 0.6–4.6)
LYMPHOCYTES NFR BLD AUTO: 43 %
MCH RBC QN AUTO: 36.9 PG (ref 27–31)
MCHC RBC AUTO-ENTMCNC: 31.7 G/DL (ref 33–36)
MCV RBC AUTO: 116.3 FL (ref 80–94)
MONOCYTES # BLD AUTO: 0.34 X10(3)/MCL (ref 0.1–1.3)
MONOCYTES NFR BLD AUTO: 14.3 %
NEUTROPHILS # BLD AUTO: 0.84 X10(3)/MCL (ref 2.1–9.2)
NEUTROPHILS NFR BLD AUTO: 35.5 %
PLATELET # BLD AUTO: 122 X10(3)/MCL (ref 130–400)
PMV BLD AUTO: 9.7 FL (ref 7.4–10.4)
POTASSIUM SERPL-SCNC: 4 MMOL/L (ref 3.5–5.1)
PROT SERPL-MCNC: 5.8 GM/DL (ref 5.8–7.6)
RBC # BLD AUTO: 2.33 X10(6)/MCL (ref 4.7–6.1)
SODIUM SERPL-SCNC: 140 MMOL/L (ref 136–145)
WBC # BLD AUTO: 2.37 X10(3)/MCL (ref 4.5–11.5)

## 2024-10-15 PROCEDURE — 36415 COLL VENOUS BLD VENIPUNCTURE: CPT

## 2024-10-15 PROCEDURE — 80053 COMPREHEN METABOLIC PANEL: CPT

## 2024-10-15 PROCEDURE — 85025 COMPLETE CBC W/AUTO DIFF WBC: CPT

## 2024-10-16 ENCOUNTER — OFFICE VISIT (OUTPATIENT)
Dept: HEMATOLOGY/ONCOLOGY | Facility: CLINIC | Age: 74
End: 2024-10-16
Payer: MEDICARE

## 2024-10-16 VITALS
RESPIRATION RATE: 18 BRPM | DIASTOLIC BLOOD PRESSURE: 65 MMHG | TEMPERATURE: 98 F | HEIGHT: 70 IN | HEART RATE: 58 BPM | BODY MASS INDEX: 28.4 KG/M2 | WEIGHT: 198.38 LBS | SYSTOLIC BLOOD PRESSURE: 121 MMHG | OXYGEN SATURATION: 97 %

## 2024-10-16 DIAGNOSIS — C90.01 MULTIPLE MYELOMA IN REMISSION: Primary | ICD-10-CM

## 2024-10-16 DIAGNOSIS — D61.818 PANCYTOPENIA: ICD-10-CM

## 2024-10-16 PROCEDURE — 99999 PR PBB SHADOW E&M-EST. PATIENT-LVL V: CPT | Mod: PBBFAC,,, | Performed by: NURSE PRACTITIONER

## 2024-10-16 PROCEDURE — 99214 OFFICE O/P EST MOD 30 MIN: CPT | Mod: S$PBB,,, | Performed by: NURSE PRACTITIONER

## 2024-10-16 PROCEDURE — 99215 OFFICE O/P EST HI 40 MIN: CPT | Mod: PBBFAC | Performed by: NURSE PRACTITIONER

## 2024-10-16 RX ORDER — POTASSIUM CHLORIDE 20 MEQ/1
TABLET, EXTENDED RELEASE ORAL
COMMUNITY
Start: 2024-10-11

## 2024-10-16 NOTE — PROGRESS NOTES
HEMATOLOGY/ONCOLOGY OFFICE CLINIC VISIT     Visit Information:     Initial Evaluation: 6/13/2023 (hospital consult)   Referring Provider: Dr Gonzalez  Other providers:  Code status: Not addressed     Diagnosis:  Multiple Myeloma    Present treatment:   Maintenance Revlimid 5 mg daily and Promacta 50 mg daily-- started  8/24    Treatment History:  --- Daratumumab, lenalidomide, and dexamethasone x 4 cycles  initiated 7/12/23  --- Lenalidomide and dexamethasone stopped on 3/6/24  --- Stem Cell Transplant 4/16/24  --- Carmen 140: 5 bags 4/18/24 and 4 bags 4/19/24        Imaging:  CT C 6/7/2023: 1. There is a small left sided pleural effusion. 2. A small patchy area of ground glass density is seen in the lateral basal segment of the left lower lobe. This could reflect an acute focal infiltrate / pneumonitis. 3. There is diffuse osteopenia along the visualised bony structures together with multiple, numerous, small round-ovoid lucent lesion of varying sizes involving the marrow. These changes could reflect metabolic bone disease like hyperparathyroidism. Correlate clinically and with laboratory findings, as regards additional evaluation and follow-up.  6/8/23 Bone Scan Whole Body:  1. Scattered activity at the anterior left right rib margins as described.  A CT exam on the previous day reveals no definite fracture.  There is mild heterogeneous and bony loss at the anterior left and right ribs.  This is not have the typical pattern of a metastases.  2. Focal increased activity at the anterior manubrium which again on the CT exam demonstrates osteopenia and heterogeneous bony loss as well as scattered subcentimeter small lytic defects. 3. Suspect mild arthritic changes at the shoulders sternoclavicular joints  6/9/23 MRI Thoracic Spine: Within limitations, no convincing evidence of acute thoracic spine abnormality, high-grade canal stenosis, or focal cord pathology. Multiple scattered vertebral body lesions are suspected and  could represent metastatic disease, otherwise of incomplete characterization by non-contrast protocol. Incidental findings of the partially visualized thoracic cavity and retroperitoneum discussed above, poorly assessed by modality/protocol.  Recent non-contrast chest CT provides overall better visualization.  6/10/23 CT Head: No acute intracranial abnormality.  Findings consistent with microangiopathy no interval change.  6/12/23 XRAY Chest: 1. Patchy hazy opacities again evident at the lower lungs bilaterally suspicious for infiltrate and atelectasis.  Small bibasilar pleural reactions cannot be excluded. 2. Borderline cardiomegaly 3. Atherosclerosis  6/16/23 XRAY Chest: Improved aeration of the lung bases with mild residual bibasilar opacities and small pleural effusions suspected.  7/23/24 PET/CT:  In this patient with multiple myeloma, there is interval normalization of previously hypermetabolic osseous lesions, compatible with favorable response to therapy. No new hypermetabolic lesions.       Pathology:  6/12/23 Bone marrow aspiration/Biopsy:   PLASMA CELL MYELOMA, KAPPA RESTRICTED.     PLASMA CELL MYELOMA INVOLVES 90% OF BONE MARROW CELLULARITY WITH SMALL AREAS OF  SEQUENTIAL TRILINEAGE HEMATOPOIESIS.    ABSENT IRON STORES.     PERIPHERAL BLOOD: NORMOCYTIC NORMOCHROMIC ANEMIA. THROMBOCYTOPENIA.   3/7/24 bone marrow biopsy:  BONE MARROW ASPIRATE, TOUCH PREP, CLOT, AND DECALCIFIED NEEDLE CORE BIOPSY:  LEFT POSTEROSUPERIOR ILIAC CREST  - NO DEFINITIVE RESIDUAL KAPPA LIGHT CHAIN RESTRICTED MULTIPLE MYELOMA  - Mildly hypercellular marrow (35-45% total cellularity) with non-increased scattered plasma cells and trilineage hematopoiesis with focally increased erythroid precursors (see comments)  7/23/24 BMBx:  CELLULARITY=30%, TRILINEAGE HEMATOPOIETIC ACTIVITY (M/E=1.6:1).   NO DEFINITIVE MORPHOLOGIC OR IMMUNOPHENOTYPIC EVIDENCE OF RESIDUAL PLASMA CELL NEOPLASM.  SEE COMMENT.   INCREASED EOSINOPHILS.   INCREASED  STORAGE IRON.   DECREASED NUMBER OF MEGAKARYOCYTES.       CLINICAL HISTORY:       Subjective  Patient: Chip Goodson was seen for the first time as hospital consult on 6/13/2023.   Patient was brought to the emergency department for confusion.  Upon evaluation in the ER CT scan of the head with no acute intracranial abnormality.  Finding consistent with microangiopathic no interval change.  CT of the chest with no contrast showed diffuse osteopenia with multiple, numerous, small round avid lucent lesions ovarian size involving the marrow.  Changes could reflect metabolic bone disease like hyperparathyroidism.  MRI of the thoracic spine with multiple scattered vertebral body lesion are suspect and could represent metastatic disease.  Bone scan with scattered activity in the anterior left right rib margins no fractures focal increased activity at the anterior manubrium scattered subcentimeter small lytic defects.  Ultrasound of the thyroid that shows multiple nodules. skeletal survey  area on humerus only.     Labs with elevated calcium of 12.6, corrected calcium 14.36.  Total protein was 11.8 with a total globulin 10.0.  Further workup revealed an IgA over 7040.0, IgM 6.0, IgG 165.00.  Free serum kappa light chain 5.09, free serum lambda light chains 0.5600 with a kappa/lambda ratio of 9.09.  M spike 3.33.  PSA 0.96     Bone marrow biopsy performed 6/12/23. Patient received pamidronate 60 mg on 06/09/2023 and was started on hydration.  His calcium improved as well as his mental status.    He received treatment as follows:  Daratumumab, lenalidomide, and dexamethasone x 4 cycles  initiated 7/12/23  Lenalidomide and dexamethasone stopped on 3/6/24  Stem Cell Transplant 4/16/24    Interval History:    Mr. Goodson is here today by himself for a his 5th week on treatment and follow-up visit.  He was seen in the ER 10/8/24  for diarrhea. Rectal swab was positive for astrovirus and salmonella. He was also hypokalemic.  He was given antibiotics which he has completed. He continues maintenance Revlimid 5 mg daily and Promacta 50 mg daily since August. He is tolerating treatment well with no appreciable side effects.  He has his post-transplant vaccine schedule and will speak to his PCP to begin.  Laboratory is being monitored every Tuesday and Thursday.  He continues on Acyclovir and Dapsone as prophylaxis.  Next follow up with Dr. Evans is scheduled for 11/4/24.    Review of Systems   Constitutional:  Positive for malaise/fatigue (improved). Negative for chills, diaphoresis, fever and weight loss.   HENT:  Positive for congestion, hearing loss and sore throat. Negative for nosebleeds.    Eyes: Negative.    Respiratory:  Positive for cough. Negative for hemoptysis and shortness of breath.    Cardiovascular:  Negative for chest pain, palpitations and leg swelling.   Gastrointestinal:  Positive for diarrhea. Negative for abdominal pain, blood in stool, constipation, melena, nausea and vomiting.   Genitourinary:  Negative for dysuria, frequency, hematuria and urgency.   Musculoskeletal:  Negative for back pain, falls, joint pain and myalgias.   Skin:  Negative for rash.   Neurological:  Negative for dizziness, tremors, seizures, weakness and headaches.   Endo/Heme/Allergies: Negative.  Does not bruise/bleed easily.   Psychiatric/Behavioral:  Negative for hallucinations and suicidal ideas. The patient is not nervous/anxious.        Review of patient's allergies indicates:   Allergen Reactions    Iodine Anaphylaxis    Shellfish containing products     Poison ivy extract     Amoxicillin-pot clavulanate Itching      Current Outpatient Medications on File Prior to Visit   Medication Sig Dispense Refill    acyclovir (ZOVIRAX) 800 MG Tab Take 1 tablet (800 mg total) by mouth 2 (two) times daily. 60 tablet 11    albuterol (PROVENTIL/VENTOLIN HFA) 90 mcg/actuation inhaler 2 puffs every 4 to 6 hours as needed.      ALPRAZolam (XANAX) 0.5 MG  tablet Take 0.5 mg by mouth daily as needed for Anxiety.      amLODIPine (NORVASC) 10 MG tablet Take 1 tablet (10 mg total) by mouth every morning.      ascorbic acid, vitamin C, (VITAMIN C) 500 MG tablet Take 500 mg by mouth once daily.      atorvastatin (LIPITOR) 10 MG tablet Take 10 mg by mouth every evening.      carvediloL (COREG) 6.25 MG tablet Take 1 tablet (6.25 mg total) by mouth 2 (two) times daily. 180 tablet 3    cholecalciferol, vitamin D3, (VITAMIN D3) 125 mcg (5,000 unit) Tab Take 5,000 Units by mouth once daily.      cyclobenzaprine (FLEXERIL) 5 MG tablet Take 1 tablet (5 mg total) by mouth 3 (three) times daily as needed for Muscle spasms. 60 tablet 2    dapsone 100 MG Tab Take 1 tablet (100 mg total) by mouth once daily. 30 tablet 5    eltrombopag olamine (PROMACTA) 50 MG Tab Take 1 tablet (50 mg total) by mouth once daily. 30 tablet 5    ferrous sulfate 325 (65 FE) MG EC tablet Take 325 mg by mouth once daily.      finasteride (PROSCAR) 5 mg tablet Take 5 mg by mouth every morning.      HYDROcodone-acetaminophen (NORCO) 7.5-325 mg per tablet Take 1 tablet by mouth every 4 (four) hours as needed for Pain. 90 tablet 0    lenalidomide 5 mg Cap Take 5 mg by mouth once daily. 28 each 0    levothyroxine (SYNTHROID) 112 MCG tablet Take 112 mcg by mouth every evening.      LIDOcaine (LIDODERM) 5 % Place 1 patch onto the skin once daily. Remove & Discard patch within 12 hours or as directed by MD 30 patch 3    losartan (COZAAR) 50 MG tablet Take 50 mg by mouth.      metFORMIN (GLUCOPHAGE-XR) 500 MG ER 24hr tablet Take 1,000 mg by mouth 2 (two) times daily.      nitroGLYCERIN (NITROSTAT) 0.4 MG SL tablet place one tablet under the tongue every five minutes as needed for chest pain up to 3 doses. if no relief call 911      Weiju ULTRA TEST Strp USE TO TEST BLOOD GLUCOSE TWICE DAILY      pantoprazole (PROTONIX) 40 MG tablet Take 1 tablet (40 mg total) by mouth once daily. 30 tablet 11    potassium chloride  "SA (K-DUR,KLOR-CON) 20 MEQ tablet TAKE ONE TABLET BY MOUTH ONCE DAILY WITH FOOD      ranolazine (RANEXA) 500 MG Tb12 Take 500 mg by mouth 2 (two) times daily.      valsartan (DIOVAN) 160 MG tablet Take 160 mg by mouth.       Current Facility-Administered Medications on File Prior to Visit   Medication Dose Route Frequency Provider Last Rate Last Admin    0.9%  NaCl infusion (for blood administration)   Intravenous Once Popeck, Marjan E, FNP        0.9%  NaCl infusion (for blood administration)   Intravenous Once Popeck, Marjan E, FNP        0.9%  NaCl infusion (for blood administration)   Intravenous Once Popeck, Marjan E, FNP        DTaP / HiB / IPV combined (Pentacel) injection 0.5 mL  0.5 mL Intramuscular Q8 weeks         pneumoc 20-edward conj-dip cr(PF) (PREVNAR-20 (PF)) injection Syrg 0.5 mL  0.5 mL Intramuscular Once         sars-cov-2 (covid-19) (Spikevax (Moderna) (12yrs and up 2023)) 50 mcg/0.5 mL injection 0.5 mL  0.5 mL Intramuscular vaccine x 1 dose         varicella-zoster gE vac,2 of 2 SusR 0.5 mL  1 each Intramuscular Q8 weeks               Vitals:    10/16/24 0926   BP: 121/65   BP Location: Right arm   Patient Position: Sitting   Pulse: (!) 58   Resp: 18   Temp: 97.9 °F (36.6 °C)   TempSrc: Oral   SpO2: 97%   Weight: 90 kg (198 lb 6.4 oz)   Height: 5' 10" (1.778 m)         Wt Readings from Last 6 Encounters:   10/16/24 90 kg (198 lb 6.4 oz)   10/08/24 77.1 kg (170 lb)   10/07/24 86.2 kg (190 lb)   09/04/24 88 kg (193 lb 14.4 oz)   08/06/24 90.4 kg (199 lb 6.4 oz)   07/23/24 90.1 kg (198 lb 10.2 oz)     Body mass index is 28.47 kg/m².  Body surface area is 2.11 meters squared.    Physical Exam  Constitutional:       General: He is awake.      Appearance: Normal appearance. He is well-developed.   HENT:      Head: Normocephalic and atraumatic.   Eyes:      General: No scleral icterus.     Extraocular Movements: Extraocular movements intact.      Conjunctiva/sclera: Conjunctivae normal.   Neck:      " Vascular: No JVD.   Cardiovascular:      Rate and Rhythm: Normal rate and regular rhythm.      Heart sounds: No murmur heard.  Pulmonary:      Effort: Pulmonary effort is normal.      Breath sounds: Normal breath sounds.   Abdominal:      General: There is no distension.      Palpations: Abdomen is soft.      Tenderness: There is no abdominal tenderness.   Musculoskeletal:      Cervical back: Neck supple.   Lymphadenopathy:      Head:      Right side of head: No submental or submandibular adenopathy.      Left side of head: No submental or submandibular adenopathy.      Cervical: No cervical adenopathy.      Upper Body:      Right upper body: No supraclavicular or axillary adenopathy.      Left upper body: No supraclavicular or axillary adenopathy.      Lower Body: No right inguinal adenopathy. No left inguinal adenopathy.   Skin:     General: Skin is warm.      Findings: No rash.      Nails: There is no clubbing.   Neurological:      General: No focal deficit present.      Mental Status: He is alert.      Cranial Nerves: Cranial nerves 2-12 are intact.   Psychiatric:         Attention and Perception: Attention normal.         Mood and Affect: Mood normal.         Behavior: Behavior is cooperative.         Cognition and Memory: Cognition normal.         Judgment: Judgment normal.       ECOG SCORE              Laboratory:  CBC with Differential:  Lab Results   Component Value Date    WBC 2.37 (L) 10/15/2024    RBC 2.33 (L) 10/15/2024    HGB 8.6 (L) 10/15/2024    HCT 27.1 (L) 10/15/2024    .3 (H) 10/15/2024    MCH 36.9 (H) 10/15/2024    MCHC 31.7 (L) 10/15/2024    RDW 14.6 10/15/2024     (L) 10/15/2024    MPV 9.7 10/15/2024    GRAN 0.3 (L) 05/20/2024    GRAN 37.1 (L) 05/20/2024    LYMPH 0.4 (L) 05/20/2024    LYMPH 52.9 (H) 05/20/2024    MONO 0.1 (L) 05/20/2024    MONO 8.6 05/20/2024    EOS 0.0 05/20/2024    BASO 0.00 05/20/2024    EOSINOPHIL 0.0 05/20/2024    BASOPHIL 0.0 05/20/2024   INITIAL MYELOMA  LABS:  Serum:  SPEP: Serum protein electrophoresis shows a distinct monoclonal peak (3.33 g/dL) in the beta zone, consistent with a monoclonal gammopathy.   PAO: A band is present in the IgA trini with a corresponding band in the kappa trini.   The immunofixation electrophoresis are consistent with monoclonal gammopathy of IgA-kappa isotype.     IgG Level 540.00 - 1,822.00 mg/dL 165.00 Low     IgA Level 101.0 - 645.0 mg/dL >7,040.0 High     IgM Level 22.0 - 240.0 mg/dL 6.0 Low       Kappa Free Light Chain 0.3300 - 1.94 mg/dL 5.09 High     Lambda Free Light Chain 0.5700 - 2.63 mg/dL 0.5600 Low     Kappa/Lambda FLC Ratio 0.2600 - 1.65 9.09 High      Urine:   24 hrs Urine:  M spike    1467      H    mg/24 h      LABS:  6/8/23 M-spike 3.33, IgA >7040, serum kappa 5/ lambda 0.56/ K:L ratio 9.09, 24 hour urine for M protein done but not resulted. Calcium 13.4/Alb 1.9, Cr 2.3, globulin 10.7  7/12/23 M-spike 2.49. IgA >7040, Corrected calcium 12.3, Cr 2.09, Globulin 7.6  8/15/23 M-spike 1.49, IgA 4000  9/18/23 M-spike 1.0, IgA 1700  1/19/24 M-spike 0 (smal protein in beta fraction), IgA 335, KFLC 1.61/LFLC 0.92/ ratio 1.75  3/18/24 M-spike 0.11, IgG 516, KFLC 1.43/LFLC 0.86/ ratio 1.66  8/27/24 M-spike 0, IgG 768, KFLC 4.22/LFLC 2.81/ratio 1.5    CMP:   Latest Reference Range & Units 09/03/24 06:50   Sodium 136 - 145 mmol/L 140   Potassium 3.5 - 5.1 mmol/L 3.8   Chloride 98 - 107 mmol/L 106   CO2 23 - 31 mmol/L 26   Anion Gap mEq/L 8.0   BUN 8.4 - 25.7 mg/dL 21.0   Creatinine 0.73 - 1.18 mg/dL 1.23 (H)   BUN/CREAT RATIO  17   eGFR mL/min/1.73/m2 >60   Glucose 82 - 115 mg/dL 140 (H)   Calcium 8.8 - 10.0 mg/dL 9.9   ALP 40 - 150 unit/L 73   PROTEIN TOTAL 5.8 - 7.6 gm/dL 6.4   Albumin 3.4 - 4.8 g/dL 3.9   Albumin/Globulin Ratio 1.1 - 2.0 ratio 1.6   BILIRUBIN TOTAL <=1.5 mg/dL 1.3   AST 5 - 34 unit/L 14   ALT 0 - 55 unit/L 13   Globulin, Total 2.4 - 3.5 gm/dL 2.5   (H): Data is abnormally high     Assessment  1. Multiple myeloma  in remission    2. Pancytopenia        Multiple Myeloma Dx 6/2023:  ---Daratumumab, lenalidomide, and dexamethasone x 4 cycles  initiated 7/12/23  ---Lenalidomide and dexamethasone stopped on 3/6/24  ---Stem Cell Transplant 4/16/24  ---Carmen 140: 5 bags 4/18/24 and 4 bags 4/19/24  -- 7/23/24 PET/CT reveals favorable response to therapy and BMBx reveals no definitive evidence of residual neoplasm  -- Per Dr. Evans: maintenance Revlimid 5 mg daily and Promacta 50 mg daily - started 8/24    2. Pancytopenia  --labs q Tuesday and Thursday  -- transfuse 1-2 units of PRBCs for hemoglobin < 7 or clinically appropriate.  --Transfuse 1 unit of platelets if platelets < 10 K or clinically appropriate    3. 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  ---Cont prophylactic antimicrobials    4. Gemella Haemolysans bacteremia  --s/p antibiotics. Keep appts with ID      Plan:  Continue Revlimid 5mg daily   Continue Promacta 50 mg daily as maintenance.  Patient is tolerating well.  Discussed dietary restrictions associated with Promacta.    Cytopenias improving.  Continue twice weekly laboratory monitoring every T/Th (CBC, CMP, Mg, Phos).  Continue prophylactic antimicrobials/antivirals.  Patient to begin posttransplant vaccines 10/24/24 via local health unit.  Patient is in agreement with plan as outlined above.    All questions answered to the satisfaction of the patient.

## 2024-10-17 ENCOUNTER — LAB VISIT (OUTPATIENT)
Dept: LAB | Facility: HOSPITAL | Age: 74
End: 2024-10-17
Payer: MEDICARE

## 2024-10-17 DIAGNOSIS — C90.01 MULTIPLE MYELOMA IN REMISSION: ICD-10-CM

## 2024-10-17 DIAGNOSIS — D61.818 PANCYTOPENIA: ICD-10-CM

## 2024-10-17 LAB
ALBUMIN SERPL-MCNC: 3.6 G/DL (ref 3.4–4.8)
ALBUMIN/GLOB SERPL: 1.3 RATIO (ref 1.1–2)
ALP SERPL-CCNC: 103 UNIT/L (ref 40–150)
ALT SERPL-CCNC: 31 UNIT/L (ref 0–55)
ANION GAP SERPL CALC-SCNC: 5 MEQ/L
AST SERPL-CCNC: 18 UNIT/L (ref 5–34)
BASOPHILS # BLD AUTO: 0.03 X10(3)/MCL
BASOPHILS NFR BLD AUTO: 0.7 %
BILIRUB SERPL-MCNC: 1.1 MG/DL
BUN SERPL-MCNC: 15 MG/DL (ref 8.4–25.7)
CALCIUM SERPL-MCNC: 9.3 MG/DL (ref 8.8–10)
CHLORIDE SERPL-SCNC: 105 MMOL/L (ref 98–107)
CO2 SERPL-SCNC: 27 MMOL/L (ref 23–31)
CREAT SERPL-MCNC: 0.89 MG/DL (ref 0.73–1.18)
CREAT/UREA NIT SERPL: 17
EOSINOPHIL # BLD AUTO: 0.01 X10(3)/MCL (ref 0–0.9)
EOSINOPHIL NFR BLD AUTO: 0.2 %
ERYTHROCYTE [DISTWIDTH] IN BLOOD BY AUTOMATED COUNT: 14.6 % (ref 11.5–17)
GFR SERPLBLD CREATININE-BSD FMLA CKD-EPI: >60 ML/MIN/1.73/M2
GLOBULIN SER-MCNC: 2.8 GM/DL (ref 2.4–3.5)
GLUCOSE SERPL-MCNC: 189 MG/DL (ref 82–115)
HCT VFR BLD AUTO: 29.4 % (ref 42–52)
HGB BLD-MCNC: 9.4 G/DL (ref 14–18)
IGA SERPL-MCNC: 201 MG/DL (ref 101–645)
IGG SERPL-MCNC: 1103 MG/DL (ref 540–1822)
IGM SERPL-MCNC: 62 MG/DL (ref 22–240)
IMM GRANULOCYTES # BLD AUTO: 0.03 X10(3)/MCL (ref 0–0.04)
IMM GRANULOCYTES NFR BLD AUTO: 0.7 %
LYMPHOCYTES # BLD AUTO: 1 X10(3)/MCL (ref 0.6–4.6)
LYMPHOCYTES NFR BLD AUTO: 22.5 %
MAGNESIUM SERPL-MCNC: 2.6 MG/DL (ref 1.6–2.6)
MCH RBC QN AUTO: 37.9 PG (ref 27–31)
MCHC RBC AUTO-ENTMCNC: 32 G/DL (ref 33–36)
MCV RBC AUTO: 118.5 FL (ref 80–94)
MONOCYTES # BLD AUTO: 0.34 X10(3)/MCL (ref 0.1–1.3)
MONOCYTES NFR BLD AUTO: 7.6 %
NEUTROPHILS # BLD AUTO: 3.04 X10(3)/MCL (ref 2.1–9.2)
NEUTROPHILS NFR BLD AUTO: 68.3 %
PLATELET # BLD AUTO: 133 X10(3)/MCL (ref 130–400)
PMV BLD AUTO: 9.7 FL (ref 7.4–10.4)
POTASSIUM SERPL-SCNC: 4.8 MMOL/L (ref 3.5–5.1)
PROT SERPL-MCNC: 6.4 GM/DL (ref 5.8–7.6)
RBC # BLD AUTO: 2.48 X10(6)/MCL (ref 4.7–6.1)
SODIUM SERPL-SCNC: 137 MMOL/L (ref 136–145)
WBC # BLD AUTO: 4.45 X10(3)/MCL (ref 4.5–11.5)

## 2024-10-17 PROCEDURE — 83735 ASSAY OF MAGNESIUM: CPT

## 2024-10-17 PROCEDURE — 82784 ASSAY IGA/IGD/IGG/IGM EACH: CPT

## 2024-10-17 PROCEDURE — 83521 IG LIGHT CHAINS FREE EACH: CPT

## 2024-10-17 PROCEDURE — 80053 COMPREHEN METABOLIC PANEL: CPT

## 2024-10-17 PROCEDURE — 85025 COMPLETE CBC W/AUTO DIFF WBC: CPT

## 2024-10-17 PROCEDURE — 84165 PROTEIN E-PHORESIS SERUM: CPT

## 2024-10-17 PROCEDURE — 36415 COLL VENOUS BLD VENIPUNCTURE: CPT

## 2024-10-18 DIAGNOSIS — C90.01 MULTIPLE MYELOMA IN REMISSION: ICD-10-CM

## 2024-10-18 LAB
ALBUMIN % SPEP (OHS): 56.54 (ref 48.1–59.5)
ALBUMIN SERPL-MCNC: 3.5 G/DL (ref 3.4–4.8)
ALBUMIN/GLOB SERPL: 1.3 RATIO (ref 1.1–2)
ALPHA 1 GLOB (OHS): 0.25 GM/DL (ref 0–0.4)
ALPHA 1 GLOB% (OHS): 4.1 (ref 2.3–4.9)
ALPHA 2 GLOB % (OHS): 7.85 (ref 6.9–13)
ALPHA 2 GLOB (OHS): 0.48 GM/DL (ref 0.4–1)
BETA GLOB (OHS): 0.79 GM/DL (ref 0.7–1.3)
BETA GLOB% (OHS): 12.98 (ref 13.8–19.7)
GAMMA GLOBULIN % (OHS): 18.53 (ref 10.1–21.9)
GAMMA GLOBULIN (OHS): 1.13 GM/DL (ref 0.4–1.8)
GLOBULIN SER-MCNC: 2.6 GM/DL (ref 2.4–3.5)
KAPPA LC FREE SER NEPH-MCNC: 4.34 MG/DL (ref 0.33–1.94)
KAPPA LC FREE/LAMBDA FREE SER NEPH: 1.22 {RATIO} (ref 0.26–1.65)
LAMBDA LC FREE SERPL-MCNC: 3.56 MG/DL (ref 0.57–2.63)
M SPIKE % (OHS): ABNORMAL
M SPIKE (OHS): ABNORMAL
PATH REV: NORMAL
PROT SERPL-MCNC: 6.1 GM/DL (ref 5.8–7.6)

## 2024-10-18 RX ORDER — LENALIDOMIDE 5 MG/1
5 CAPSULE ORAL DAILY
Qty: 28 EACH | Refills: 0 | Status: SHIPPED | OUTPATIENT
Start: 2024-10-18

## 2024-10-22 ENCOUNTER — LAB VISIT (OUTPATIENT)
Dept: LAB | Facility: HOSPITAL | Age: 74
End: 2024-10-22
Attending: NURSE PRACTITIONER
Payer: MEDICARE

## 2024-10-22 DIAGNOSIS — D61.818 PANCYTOPENIA: ICD-10-CM

## 2024-10-22 DIAGNOSIS — C90.01 MULTIPLE MYELOMA IN REMISSION: ICD-10-CM

## 2024-10-22 LAB
ALBUMIN SERPL-MCNC: 3.6 G/DL (ref 3.4–4.8)
ALBUMIN/GLOB SERPL: 1.3 RATIO (ref 1.1–2)
ALP SERPL-CCNC: 108 UNIT/L (ref 40–150)
ALT SERPL-CCNC: 98 UNIT/L (ref 0–55)
ANION GAP SERPL CALC-SCNC: 5 MEQ/L
AST SERPL-CCNC: 24 UNIT/L (ref 5–34)
BASOPHILS # BLD AUTO: 0.03 X10(3)/MCL
BASOPHILS NFR BLD AUTO: 0.6 %
BILIRUB SERPL-MCNC: 1.6 MG/DL
BUN SERPL-MCNC: 25 MG/DL (ref 8.4–25.7)
CALCIUM SERPL-MCNC: 9.2 MG/DL (ref 8.8–10)
CHLORIDE SERPL-SCNC: 106 MMOL/L (ref 98–107)
CO2 SERPL-SCNC: 26 MMOL/L (ref 23–31)
CREAT SERPL-MCNC: 1.06 MG/DL (ref 0.72–1.25)
CREAT/UREA NIT SERPL: 24
EOSINOPHIL # BLD AUTO: 0.29 X10(3)/MCL (ref 0–0.9)
EOSINOPHIL NFR BLD AUTO: 6 %
ERYTHROCYTE [DISTWIDTH] IN BLOOD BY AUTOMATED COUNT: 15.7 % (ref 11.5–17)
GFR SERPLBLD CREATININE-BSD FMLA CKD-EPI: >60 ML/MIN/1.73/M2
GLOBULIN SER-MCNC: 2.7 GM/DL (ref 2.4–3.5)
GLUCOSE SERPL-MCNC: 136 MG/DL (ref 82–115)
HCT VFR BLD AUTO: 29.7 % (ref 42–52)
HGB BLD-MCNC: 9.6 G/DL (ref 14–18)
IMM GRANULOCYTES # BLD AUTO: 0.02 X10(3)/MCL (ref 0–0.04)
IMM GRANULOCYTES NFR BLD AUTO: 0.4 %
LYMPHOCYTES # BLD AUTO: 1.17 X10(3)/MCL (ref 0.6–4.6)
LYMPHOCYTES NFR BLD AUTO: 24.4 %
MCH RBC QN AUTO: 38.6 PG (ref 27–31)
MCHC RBC AUTO-ENTMCNC: 32.3 G/DL (ref 33–36)
MCV RBC AUTO: 119.3 FL (ref 80–94)
MONOCYTES # BLD AUTO: 0.55 X10(3)/MCL (ref 0.1–1.3)
MONOCYTES NFR BLD AUTO: 11.5 %
NEUTROPHILS # BLD AUTO: 2.74 X10(3)/MCL (ref 2.1–9.2)
NEUTROPHILS NFR BLD AUTO: 57.1 %
PLATELET # BLD AUTO: 97 X10(3)/MCL (ref 130–400)
PMV BLD AUTO: 9.3 FL (ref 7.4–10.4)
POTASSIUM SERPL-SCNC: 4.3 MMOL/L (ref 3.5–5.1)
PROT SERPL-MCNC: 6.3 GM/DL (ref 5.8–7.6)
RBC # BLD AUTO: 2.49 X10(6)/MCL (ref 4.7–6.1)
SODIUM SERPL-SCNC: 137 MMOL/L (ref 136–145)
WBC # BLD AUTO: 4.8 X10(3)/MCL (ref 4.5–11.5)

## 2024-10-22 PROCEDURE — 36415 COLL VENOUS BLD VENIPUNCTURE: CPT

## 2024-10-22 PROCEDURE — 80053 COMPREHEN METABOLIC PANEL: CPT

## 2024-10-22 PROCEDURE — 85025 COMPLETE CBC W/AUTO DIFF WBC: CPT

## 2024-10-24 ENCOUNTER — LAB VISIT (OUTPATIENT)
Dept: LAB | Facility: HOSPITAL | Age: 74
End: 2024-10-24
Attending: NURSE PRACTITIONER
Payer: MEDICARE

## 2024-10-24 DIAGNOSIS — D61.818 PANCYTOPENIA: ICD-10-CM

## 2024-10-24 DIAGNOSIS — C90.01 MULTIPLE MYELOMA IN REMISSION: ICD-10-CM

## 2024-10-24 LAB
ALBUMIN SERPL-MCNC: 3.6 G/DL (ref 3.4–4.8)
ALBUMIN/GLOB SERPL: 1.1 RATIO (ref 1.1–2)
ALP SERPL-CCNC: 106 UNIT/L (ref 40–150)
ALT SERPL-CCNC: 69 UNIT/L (ref 0–55)
ANION GAP SERPL CALC-SCNC: 7 MEQ/L
AST SERPL-CCNC: 26 UNIT/L (ref 5–34)
BASOPHILS # BLD AUTO: 0.03 X10(3)/MCL
BASOPHILS NFR BLD AUTO: 0.9 %
BILIRUB SERPL-MCNC: 1.6 MG/DL
BUN SERPL-MCNC: 22 MG/DL (ref 8.4–25.7)
CALCIUM SERPL-MCNC: 9.9 MG/DL (ref 8.8–10)
CHLORIDE SERPL-SCNC: 106 MMOL/L (ref 98–107)
CO2 SERPL-SCNC: 25 MMOL/L (ref 23–31)
CREAT SERPL-MCNC: 1.07 MG/DL (ref 0.72–1.25)
CREAT/UREA NIT SERPL: 21
EOSINOPHIL # BLD AUTO: 0.31 X10(3)/MCL (ref 0–0.9)
EOSINOPHIL NFR BLD AUTO: 9.6 %
ERYTHROCYTE [DISTWIDTH] IN BLOOD BY AUTOMATED COUNT: 15.6 % (ref 11.5–17)
GFR SERPLBLD CREATININE-BSD FMLA CKD-EPI: >60 ML/MIN/1.73/M2
GLOBULIN SER-MCNC: 3.2 GM/DL (ref 2.4–3.5)
GLUCOSE SERPL-MCNC: 125 MG/DL (ref 82–115)
HCT VFR BLD AUTO: 32.4 % (ref 42–52)
HGB BLD-MCNC: 10.3 G/DL (ref 14–18)
IGA SERPL-MCNC: 184 MG/DL (ref 101–645)
IGG SERPL-MCNC: 1111 MG/DL (ref 540–1822)
IGM SERPL-MCNC: 59 MG/DL (ref 22–240)
IMM GRANULOCYTES # BLD AUTO: 0.01 X10(3)/MCL (ref 0–0.04)
IMM GRANULOCYTES NFR BLD AUTO: 0.3 %
LYMPHOCYTES # BLD AUTO: 1.03 X10(3)/MCL (ref 0.6–4.6)
LYMPHOCYTES NFR BLD AUTO: 32 %
MCH RBC QN AUTO: 38.1 PG (ref 27–31)
MCHC RBC AUTO-ENTMCNC: 31.8 G/DL (ref 33–36)
MCV RBC AUTO: 120 FL (ref 80–94)
MONOCYTES # BLD AUTO: 0.48 X10(3)/MCL (ref 0.1–1.3)
MONOCYTES NFR BLD AUTO: 14.9 %
NEUTROPHILS # BLD AUTO: 1.36 X10(3)/MCL (ref 2.1–9.2)
NEUTROPHILS NFR BLD AUTO: 42.3 %
PLATELET # BLD AUTO: 88 X10(3)/MCL (ref 130–400)
PMV BLD AUTO: 9.8 FL (ref 7.4–10.4)
POTASSIUM SERPL-SCNC: 4.5 MMOL/L (ref 3.5–5.1)
PROT SERPL-MCNC: 6.8 GM/DL (ref 5.8–7.6)
RBC # BLD AUTO: 2.7 X10(6)/MCL (ref 4.7–6.1)
SODIUM SERPL-SCNC: 138 MMOL/L (ref 136–145)
WBC # BLD AUTO: 3.22 X10(3)/MCL (ref 4.5–11.5)

## 2024-10-24 PROCEDURE — 36415 COLL VENOUS BLD VENIPUNCTURE: CPT

## 2024-10-24 PROCEDURE — 83521 IG LIGHT CHAINS FREE EACH: CPT

## 2024-10-24 PROCEDURE — 80053 COMPREHEN METABOLIC PANEL: CPT

## 2024-10-24 PROCEDURE — 85025 COMPLETE CBC W/AUTO DIFF WBC: CPT

## 2024-10-24 PROCEDURE — 82784 ASSAY IGA/IGD/IGG/IGM EACH: CPT

## 2024-10-25 LAB
KAPPA LC FREE SER NEPH-MCNC: 4.26 MG/DL (ref 0.33–1.94)
KAPPA LC FREE/LAMBDA FREE SER NEPH: 1.31 {RATIO} (ref 0.26–1.65)
LAMBDA LC FREE SERPL-MCNC: 3.24 MG/DL (ref 0.57–2.63)

## 2024-10-29 ENCOUNTER — LAB VISIT (OUTPATIENT)
Dept: LAB | Facility: HOSPITAL | Age: 74
End: 2024-10-29
Attending: NURSE PRACTITIONER
Payer: MEDICARE

## 2024-10-29 DIAGNOSIS — C90.01 MULTIPLE MYELOMA IN REMISSION: ICD-10-CM

## 2024-10-29 DIAGNOSIS — D61.818 PANCYTOPENIA: ICD-10-CM

## 2024-10-29 LAB
ALBUMIN SERPL-MCNC: 3.6 G/DL (ref 3.4–4.8)
ALBUMIN/GLOB SERPL: 1.3 RATIO (ref 1.1–2)
ALP SERPL-CCNC: 119 UNIT/L (ref 40–150)
ALT SERPL-CCNC: 92 UNIT/L (ref 0–55)
ANION GAP SERPL CALC-SCNC: 5 MEQ/L
AST SERPL-CCNC: 31 UNIT/L (ref 5–34)
BASOPHILS # BLD AUTO: 0.02 X10(3)/MCL
BASOPHILS NFR BLD AUTO: 0.7 %
BILIRUB SERPL-MCNC: 1.4 MG/DL
BUN SERPL-MCNC: 12 MG/DL (ref 8.4–25.7)
CALCIUM SERPL-MCNC: 8.9 MG/DL (ref 8.8–10)
CHLORIDE SERPL-SCNC: 109 MMOL/L (ref 98–107)
CO2 SERPL-SCNC: 27 MMOL/L (ref 23–31)
CREAT SERPL-MCNC: 1.26 MG/DL (ref 0.72–1.25)
CREAT/UREA NIT SERPL: 10
EOSINOPHIL # BLD AUTO: 0.39 X10(3)/MCL (ref 0–0.9)
EOSINOPHIL NFR BLD AUTO: 13 %
ERYTHROCYTE [DISTWIDTH] IN BLOOD BY AUTOMATED COUNT: 16.2 % (ref 11.5–17)
GFR SERPLBLD CREATININE-BSD FMLA CKD-EPI: 59 ML/MIN/1.73/M2
GLOBULIN SER-MCNC: 2.7 GM/DL (ref 2.4–3.5)
GLUCOSE SERPL-MCNC: 126 MG/DL (ref 82–115)
HCT VFR BLD AUTO: 28.4 % (ref 42–52)
HGB BLD-MCNC: 9.2 G/DL (ref 14–18)
IMM GRANULOCYTES # BLD AUTO: 0.01 X10(3)/MCL (ref 0–0.04)
IMM GRANULOCYTES NFR BLD AUTO: 0.3 %
LYMPHOCYTES # BLD AUTO: 0.87 X10(3)/MCL (ref 0.6–4.6)
LYMPHOCYTES NFR BLD AUTO: 29 %
MCH RBC QN AUTO: 38.8 PG (ref 27–31)
MCHC RBC AUTO-ENTMCNC: 32.4 G/DL (ref 33–36)
MCV RBC AUTO: 119.8 FL (ref 80–94)
MONOCYTES # BLD AUTO: 0.37 X10(3)/MCL (ref 0.1–1.3)
MONOCYTES NFR BLD AUTO: 12.3 %
NEUTROPHILS # BLD AUTO: 1.34 X10(3)/MCL (ref 2.1–9.2)
NEUTROPHILS NFR BLD AUTO: 44.7 %
PLATELET # BLD AUTO: 67 X10(3)/MCL (ref 130–400)
PMV BLD AUTO: 10 FL (ref 7.4–10.4)
POTASSIUM SERPL-SCNC: 3.8 MMOL/L (ref 3.5–5.1)
PROT SERPL-MCNC: 6.3 GM/DL (ref 5.8–7.6)
RBC # BLD AUTO: 2.37 X10(6)/MCL (ref 4.7–6.1)
SODIUM SERPL-SCNC: 141 MMOL/L (ref 136–145)
WBC # BLD AUTO: 3 X10(3)/MCL (ref 4.5–11.5)

## 2024-10-29 PROCEDURE — 85025 COMPLETE CBC W/AUTO DIFF WBC: CPT

## 2024-10-29 PROCEDURE — 80053 COMPREHEN METABOLIC PANEL: CPT

## 2024-10-29 PROCEDURE — 36415 COLL VENOUS BLD VENIPUNCTURE: CPT

## 2024-10-31 ENCOUNTER — LAB VISIT (OUTPATIENT)
Dept: LAB | Facility: HOSPITAL | Age: 74
End: 2024-10-31
Attending: NURSE PRACTITIONER
Payer: MEDICARE

## 2024-10-31 DIAGNOSIS — D61.818 PANCYTOPENIA: ICD-10-CM

## 2024-10-31 DIAGNOSIS — C90.01 MULTIPLE MYELOMA IN REMISSION: ICD-10-CM

## 2024-10-31 LAB
ALBUMIN SERPL-MCNC: 3.5 G/DL (ref 3.4–4.8)
ALBUMIN/GLOB SERPL: 1.2 RATIO (ref 1.1–2)
ALP SERPL-CCNC: 106 UNIT/L (ref 40–150)
ALT SERPL-CCNC: 60 UNIT/L (ref 0–55)
ANION GAP SERPL CALC-SCNC: 6 MEQ/L
AST SERPL-CCNC: 19 UNIT/L (ref 5–34)
BASOPHILS # BLD AUTO: 0.01 X10(3)/MCL
BASOPHILS NFR BLD AUTO: 0.3 %
BILIRUB SERPL-MCNC: 1.3 MG/DL
BUN SERPL-MCNC: 18 MG/DL (ref 8.4–25.7)
CALCIUM SERPL-MCNC: 9.6 MG/DL (ref 8.8–10)
CHLORIDE SERPL-SCNC: 107 MMOL/L (ref 98–107)
CO2 SERPL-SCNC: 26 MMOL/L (ref 23–31)
CREAT SERPL-MCNC: 1.32 MG/DL (ref 0.72–1.25)
CREAT/UREA NIT SERPL: 14
EOSINOPHIL # BLD AUTO: 0.61 X10(3)/MCL (ref 0–0.9)
EOSINOPHIL NFR BLD AUTO: 17.2 %
ERYTHROCYTE [DISTWIDTH] IN BLOOD BY AUTOMATED COUNT: 16.6 % (ref 11.5–17)
GFR SERPLBLD CREATININE-BSD FMLA CKD-EPI: 56 ML/MIN/1.73/M2
GLOBULIN SER-MCNC: 2.9 GM/DL (ref 2.4–3.5)
GLUCOSE SERPL-MCNC: 170 MG/DL (ref 82–115)
HCT VFR BLD AUTO: 27.9 % (ref 42–52)
HGB BLD-MCNC: 9.1 G/DL (ref 14–18)
IMM GRANULOCYTES # BLD AUTO: 0.02 X10(3)/MCL (ref 0–0.04)
IMM GRANULOCYTES NFR BLD AUTO: 0.6 %
LYMPHOCYTES # BLD AUTO: 0.9 X10(3)/MCL (ref 0.6–4.6)
LYMPHOCYTES NFR BLD AUTO: 25.4 %
MCH RBC QN AUTO: 39.6 PG (ref 27–31)
MCHC RBC AUTO-ENTMCNC: 32.6 G/DL (ref 33–36)
MCV RBC AUTO: 121.3 FL (ref 80–94)
MONOCYTES # BLD AUTO: 0.37 X10(3)/MCL (ref 0.1–1.3)
MONOCYTES NFR BLD AUTO: 10.4 %
NEUTROPHILS # BLD AUTO: 1.64 X10(3)/MCL (ref 2.1–9.2)
NEUTROPHILS NFR BLD AUTO: 46.1 %
PLATELET # BLD AUTO: 58 X10(3)/MCL (ref 130–400)
PMV BLD AUTO: 10.6 FL (ref 7.4–10.4)
POTASSIUM SERPL-SCNC: 3.6 MMOL/L (ref 3.5–5.1)
PROT SERPL-MCNC: 6.4 GM/DL (ref 5.8–7.6)
RBC # BLD AUTO: 2.3 X10(6)/MCL (ref 4.7–6.1)
SODIUM SERPL-SCNC: 139 MMOL/L (ref 136–145)
WBC # BLD AUTO: 3.55 X10(3)/MCL (ref 4.5–11.5)

## 2024-10-31 PROCEDURE — 36415 COLL VENOUS BLD VENIPUNCTURE: CPT

## 2024-10-31 PROCEDURE — 80053 COMPREHEN METABOLIC PANEL: CPT

## 2024-10-31 PROCEDURE — 85025 COMPLETE CBC W/AUTO DIFF WBC: CPT

## 2024-11-04 ENCOUNTER — LAB VISIT (OUTPATIENT)
Dept: LAB | Facility: HOSPITAL | Age: 74
End: 2024-11-04
Attending: INTERNAL MEDICINE
Payer: MEDICARE

## 2024-11-04 ENCOUNTER — OFFICE VISIT (OUTPATIENT)
Dept: HEMATOLOGY/ONCOLOGY | Facility: CLINIC | Age: 74
End: 2024-11-04
Payer: MEDICARE

## 2024-11-04 VITALS
TEMPERATURE: 98 F | RESPIRATION RATE: 18 BRPM | HEART RATE: 56 BPM | HEIGHT: 70 IN | WEIGHT: 200.94 LBS | BODY MASS INDEX: 28.77 KG/M2 | SYSTOLIC BLOOD PRESSURE: 123 MMHG | OXYGEN SATURATION: 93 % | DIASTOLIC BLOOD PRESSURE: 60 MMHG

## 2024-11-04 DIAGNOSIS — C90.00 MULTIPLE MYELOMA, REMISSION STATUS UNSPECIFIED: Primary | ICD-10-CM

## 2024-11-04 DIAGNOSIS — C90.01 MULTIPLE MYELOMA IN REMISSION: ICD-10-CM

## 2024-11-04 DIAGNOSIS — Z94.84 HISTORY OF AUTO STEM CELL TRANSPLANT: ICD-10-CM

## 2024-11-04 LAB
ALBUMIN SERPL BCP-MCNC: 3.8 G/DL (ref 3.5–5.2)
ALP SERPL-CCNC: 100 U/L (ref 40–150)
ALT SERPL W/O P-5'-P-CCNC: 32 U/L (ref 10–44)
ANION GAP SERPL CALC-SCNC: 9 MMOL/L (ref 8–16)
AST SERPL-CCNC: 16 U/L (ref 10–40)
BASOPHILS # BLD AUTO: 0.03 K/UL (ref 0–0.2)
BASOPHILS NFR BLD: 0.5 % (ref 0–1.9)
BILIRUB SERPL-MCNC: 1.4 MG/DL (ref 0.1–1)
BUN SERPL-MCNC: 19 MG/DL (ref 8–23)
CALCIUM SERPL-MCNC: 9.5 MG/DL (ref 8.7–10.5)
CHLORIDE SERPL-SCNC: 106 MMOL/L (ref 95–110)
CO2 SERPL-SCNC: 26 MMOL/L (ref 23–29)
CREAT SERPL-MCNC: 1.3 MG/DL (ref 0.5–1.4)
DIFFERENTIAL METHOD BLD: ABNORMAL
EOSINOPHIL # BLD AUTO: 1.2 K/UL (ref 0–0.5)
EOSINOPHIL NFR BLD: 20.8 % (ref 0–8)
ERYTHROCYTE [DISTWIDTH] IN BLOOD BY AUTOMATED COUNT: 16.7 % (ref 11.5–14.5)
EST. GFR  (NO RACE VARIABLE): 57.3 ML/MIN/1.73 M^2
GLUCOSE SERPL-MCNC: 161 MG/DL (ref 70–110)
HCT VFR BLD AUTO: 29.8 % (ref 40–54)
HGB BLD-MCNC: 9.6 G/DL (ref 14–18)
IGA SERPL-MCNC: 159 MG/DL (ref 40–350)
IGG SERPL-MCNC: 1119 MG/DL (ref 650–1600)
IGM SERPL-MCNC: 49 MG/DL (ref 50–300)
IMM GRANULOCYTES # BLD AUTO: 0.02 K/UL (ref 0–0.04)
IMM GRANULOCYTES NFR BLD AUTO: 0.4 % (ref 0–0.5)
LYMPHOCYTES # BLD AUTO: 1.2 K/UL (ref 1–4.8)
LYMPHOCYTES NFR BLD: 20.8 % (ref 18–48)
MCH RBC QN AUTO: 40 PG (ref 27–31)
MCHC RBC AUTO-ENTMCNC: 32.2 G/DL (ref 32–36)
MCV RBC AUTO: 124 FL (ref 82–98)
MONOCYTES # BLD AUTO: 0.6 K/UL (ref 0.3–1)
MONOCYTES NFR BLD: 11.3 % (ref 4–15)
NEUTROPHILS # BLD AUTO: 2.6 K/UL (ref 1.8–7.7)
NEUTROPHILS NFR BLD: 46.2 % (ref 38–73)
NRBC BLD-RTO: 0 /100 WBC
PLATELET # BLD AUTO: 59 K/UL (ref 150–450)
PMV BLD AUTO: 9.4 FL (ref 9.2–12.9)
POTASSIUM SERPL-SCNC: 3.7 MMOL/L (ref 3.5–5.1)
PROT SERPL-MCNC: 6.7 G/DL (ref 6–8.4)
RBC # BLD AUTO: 2.4 M/UL (ref 4.6–6.2)
SODIUM SERPL-SCNC: 141 MMOL/L (ref 136–145)
WBC # BLD AUTO: 5.66 K/UL (ref 3.9–12.7)

## 2024-11-04 PROCEDURE — 85025 COMPLETE CBC W/AUTO DIFF WBC: CPT | Performed by: INTERNAL MEDICINE

## 2024-11-04 PROCEDURE — 99215 OFFICE O/P EST HI 40 MIN: CPT | Mod: PBBFAC | Performed by: INTERNAL MEDICINE

## 2024-11-04 PROCEDURE — 84165 PROTEIN E-PHORESIS SERUM: CPT | Performed by: INTERNAL MEDICINE

## 2024-11-04 PROCEDURE — 36415 COLL VENOUS BLD VENIPUNCTURE: CPT | Performed by: INTERNAL MEDICINE

## 2024-11-04 PROCEDURE — 80053 COMPREHEN METABOLIC PANEL: CPT | Performed by: INTERNAL MEDICINE

## 2024-11-04 PROCEDURE — 83521 IG LIGHT CHAINS FREE EACH: CPT | Mod: 59 | Performed by: INTERNAL MEDICINE

## 2024-11-04 PROCEDURE — 82784 ASSAY IGA/IGD/IGG/IGM EACH: CPT | Mod: 59 | Performed by: INTERNAL MEDICINE

## 2024-11-04 PROCEDURE — 99214 OFFICE O/P EST MOD 30 MIN: CPT | Mod: S$PBB,,, | Performed by: INTERNAL MEDICINE

## 2024-11-04 PROCEDURE — 84165 PROTEIN E-PHORESIS SERUM: CPT | Mod: 26,,, | Performed by: PATHOLOGY

## 2024-11-04 PROCEDURE — G2211 COMPLEX E/M VISIT ADD ON: HCPCS | Mod: S$PBB,,, | Performed by: INTERNAL MEDICINE

## 2024-11-04 PROCEDURE — 86334 IMMUNOFIX E-PHORESIS SERUM: CPT | Mod: 26,,, | Performed by: PATHOLOGY

## 2024-11-04 PROCEDURE — 99999 PR PBB SHADOW E&M-EST. PATIENT-LVL V: CPT | Mod: PBBFAC,,, | Performed by: INTERNAL MEDICINE

## 2024-11-04 PROCEDURE — 86334 IMMUNOFIX E-PHORESIS SERUM: CPT | Performed by: INTERNAL MEDICINE

## 2024-11-04 RX ORDER — SPIRONOLACTONE 25 MG/1
12.5 TABLET ORAL
COMMUNITY
Start: 2024-10-21

## 2024-11-04 RX ORDER — FILGRASTIM-SNDZ 480 UG/.8ML
480 INJECTION, SOLUTION INTRAVENOUS; SUBCUTANEOUS ONCE
COMMUNITY

## 2024-11-04 NOTE — PROGRESS NOTES
HEMATOLOGIC MALIGNANCIES PROGRESS NOTE      The patient location is: home  The chief complaint leading to consultation is: multiple myeloma, history of autologous SCT, day+104    Visit type: audiovisual    Face to Face time with patient: 20 minutes  30 minutes of total time spent on the encounter, which includes face to face time and non-face to face time preparing to see the patient (eg, review of tests), Obtaining and/or reviewing separately obtained history, Documenting clinical information in the electronic or other health record, Independently interpreting results (not separately reported) and communicating results to the patient/family/caregiver, or Care coordination (not separately reported).         Each patient to whom he or she provides medical services by telemedicine is:  (1) informed of the relationship between the physician and patient and the respective role of any other health care provider with respect to management of the patient; and (2) notified that he or she may decline to receive medical services by telemedicine and may withdraw from such care at any time.    Notes:      IDENTIFYING STATEMENT   Chip Yadav) is a 73 y.o. male with a  of 1950 from [place], [place] with the diagnosis of multiple myeloma.      ONCOLOGY HISTORY:    INTERVAL HISTORY -24     Mr. Hamzah hale for  surveillance fu.   He is day + 200  post autologous stem cell transplant for myeloma.   Doing well in remission on maintenance.    Follows with [facility] team.       Past Medical History, Past Social History and Past Family History have been reviewed and are unchanged except as noted in the interval history.      Review of Systems   Constitutional:  Positive for malaise/fatigue. Negative for chills, fever and weight loss.   HENT:  Negative for congestion, nosebleeds and tinnitus.    Eyes:  Negative for double vision, pain and discharge.   Respiratory:  Negative for cough, sputum production,  shortness of breath and stridor.    Cardiovascular:  Negative for chest pain, claudication and PND.   Gastrointestinal:  Negative for diarrhea, melena, nausea and vomiting.   Genitourinary:  Negative for dysuria and hematuria.   Musculoskeletal:  Negative for back pain, joint pain and neck pain.   Neurological:  Negative for dizziness, tremors, speech change and weakness.   Endo/Heme/Allergies:  Negative for environmental allergies.   Psychiatric/Behavioral:  Negative for depression and substance abuse. The patient is not nervous/anxious.           Vitals:    11/04/24 1423   BP: 123/60   Pulse: (!) 56   Resp: 18   Temp: 97.5 °F (36.4 °C)        Physical exam deferred due to nature of visit    Current Outpatient Medications   Medication Sig    acyclovir (ZOVIRAX) 800 MG Tab Take 1 tablet (800 mg total) by mouth 2 (two) times daily.    albuterol (PROVENTIL/VENTOLIN HFA) 90 mcg/actuation inhaler 2 puffs every 4 to 6 hours as needed.    ALPRAZolam (XANAX) 0.5 MG tablet Take 0.5 mg by mouth daily as needed for Anxiety.    amLODIPine (NORVASC) 10 MG tablet Take 1 tablet (10 mg total) by mouth every morning.    ascorbic acid, vitamin C, (VITAMIN C) 500 MG tablet Take 500 mg by mouth once daily.    atorvastatin (LIPITOR) 10 MG tablet Take 10 mg by mouth every evening.    carvediloL (COREG) 6.25 MG tablet Take 1 tablet (6.25 mg total) by mouth 2 (two) times daily.    cholecalciferol, vitamin D3, (VITAMIN D3) 125 mcg (5,000 unit) Tab Take 5,000 Units by mouth once daily.    dapsone 100 MG Tab Take 1 tablet (100 mg total) by mouth once daily.    eltrombopag olamine (PROMACTA) 50 MG Tab Take 1 tablet (50 mg total) by mouth once daily.    ferrous sulfate 325 (65 FE) MG EC tablet Take 325 mg by mouth once daily.    filgrastim-sndz (ZARXIO) 480 mcg/0.8 mL Syrg Inject 480 mcg into the skin once.    finasteride (PROSCAR) 5 mg tablet Take 5 mg by mouth every morning.    HYDROcodone-acetaminophen (NORCO) 7.5-325 mg per tablet Take 1  tablet by mouth every 4 (four) hours as needed for Pain.    lenalidomide 5 mg Cap Take 5 mg by mouth once daily.    levothyroxine (SYNTHROID) 112 MCG tablet Take 112 mcg by mouth every evening.    losartan (COZAAR) 50 MG tablet Take 50 mg by mouth.    nitroGLYCERIN (NITROSTAT) 0.4 MG SL tablet place one tablet under the tongue every five minutes as needed for chest pain up to 3 doses. if no relief call 911    ONETOUCH ULTRA TEST Strp USE TO TEST BLOOD GLUCOSE TWICE DAILY    pantoprazole (PROTONIX) 40 MG tablet Take 1 tablet (40 mg total) by mouth once daily.    ranolazine (RANEXA) 500 MG Tb12 Take 500 mg by mouth 2 (two) times daily.    spironolactone (ALDACTONE) 25 MG tablet Take 12.5 mg by mouth.    cyclobenzaprine (FLEXERIL) 5 MG tablet Take 1 tablet (5 mg total) by mouth 3 (three) times daily as needed for Muscle spasms. (Patient not taking: Reported on 11/4/2024)    LIDOcaine (LIDODERM) 5 % Place 1 patch onto the skin once daily. Remove & Discard patch within 12 hours or as directed by MD (Patient not taking: Reported on 11/4/2024)    metFORMIN (GLUCOPHAGE-XR) 500 MG ER 24hr tablet Take 1,000 mg by mouth 2 (two) times daily. (Patient not taking: Reported on 11/4/2024)    potassium chloride SA (K-DUR,KLOR-CON) 20 MEQ tablet TAKE ONE TABLET BY MOUTH ONCE DAILY WITH FOOD (Patient not taking: Reported on 11/4/2024)    valsartan (DIOVAN) 160 MG tablet Take 160 mg by mouth. (Patient not taking: Reported on 11/4/2024)     Current Facility-Administered Medications   Medication    0.9%  NaCl infusion (for blood administration)    0.9%  NaCl infusion (for blood administration)    0.9%  NaCl infusion (for blood administration)    DTaP / HiB / IPV combined (Pentacel) injection 0.5 mL    pneumoc 20-edward conj-dip cr(PF) (PREVNAR-20 (PF)) injection Syrg 0.5 mL    sars-cov-2 (covid-19) (Spikevax (Moderna) (12yrs and up 2023)) 50 mcg/0.5 mL injection 0.5 mL    varicella-zoster gE vac,2 of 2 SusR 0.5 mL         Lab Results    Component Value Date    WBC 5.54 11/07/2024    HGB 9.9 (L) 11/07/2024    HCT 29.5 (L) 11/07/2024    .4 (H) 11/07/2024    PLT 80 (L) 11/07/2024       Gran # (ANC)   Date Value Ref Range Status   11/04/2024 2.6 1.8 - 7.7 K/uL Final     Gran %   Date Value Ref Range Status   11/04/2024 46.2 38.0 - 73.0 % Final     CMP  Sodium   Date Value Ref Range Status   11/07/2024 137 136 - 145 mmol/L Final   11/04/2024 141 136 - 145 mmol/L Final     Potassium   Date Value Ref Range Status   11/07/2024 4.5 3.5 - 5.1 mmol/L Final   11/04/2024 3.7 3.5 - 5.1 mmol/L Final     Chloride   Date Value Ref Range Status   11/07/2024 107 98 - 107 mmol/L Final   11/04/2024 106 95 - 110 mmol/L Final     CO2   Date Value Ref Range Status   11/07/2024 24 23 - 31 mmol/L Final   11/04/2024 26 23 - 29 mmol/L Final     Glucose   Date Value Ref Range Status   11/04/2024 161 (H) 70 - 110 mg/dL Final     BUN   Date Value Ref Range Status   11/04/2024 19 8 - 23 mg/dL Final     Blood Urea Nitrogen   Date Value Ref Range Status   11/07/2024 20.0 8.4 - 25.7 mg/dL Final     Creatinine   Date Value Ref Range Status   11/07/2024 1.59 (H) 0.72 - 1.25 mg/dL Final   11/04/2024 1.3 0.5 - 1.4 mg/dL Final     Calcium   Date Value Ref Range Status   11/07/2024 9.4 8.8 - 10.0 mg/dL Final   11/04/2024 9.5 8.7 - 10.5 mg/dL Final     Total Protein   Date Value Ref Range Status   11/04/2024 6.7 6.0 - 8.4 g/dL Final     Albumin   Date Value Ref Range Status   11/07/2024 3.4 3.4 - 4.8 g/dL Final   11/04/2024 3.8 3.5 - 5.2 g/dL Final     Total Bilirubin   Date Value Ref Range Status   11/04/2024 1.4 (H) 0.1 - 1.0 mg/dL Final     Comment:     For infants and newborns, interpretation of results should be based  on gestational age, weight and in agreement with clinical  observations.    Premature Infant recommended reference ranges:  Up to 24 hours.............<8.0 mg/dL  Up to 48 hours............<12.0 mg/dL  3-5 days..................<15.0 mg/dL  6-29  days.................<15.0 mg/dL       Bilirubin Total   Date Value Ref Range Status   11/07/2024 1.1 <=1.5 mg/dL Final     Alkaline Phosphatase   Date Value Ref Range Status   11/04/2024 100 40 - 150 U/L Final     ALP   Date Value Ref Range Status   11/07/2024 93 40 - 150 unit/L Final     AST   Date Value Ref Range Status   11/07/2024 12 5 - 34 unit/L Final   11/04/2024 16 10 - 40 U/L Final     ALT   Date Value Ref Range Status   11/07/2024 22 0 - 55 unit/L Final   11/04/2024 32 10 - 44 U/L Final     Anion Gap   Date Value Ref Range Status   11/04/2024 9 8 - 16 mmol/L Final     eGFR   Date Value Ref Range Status   11/07/2024 45 mL/min/1.73/m2 Final   11/04/2024 57.3 (A) >60 mL/min/1.73 m^2 Final     IgG   Date Value Ref Range Status   11/04/2024 1119 650 - 1600 mg/dL Final     Comment:     IgG Cord Blood Reference Range: 650-1600 mg/dL.     IgA   Date Value Ref Range Status   11/04/2024 159 40 - 350 mg/dL Final     Comment:     IgA Cord Blood Reference Range: <5 mg/dL.     IgM   Date Value Ref Range Status   11/04/2024 49 (L) 50 - 300 mg/dL Final     Comment:     IgM Cord Blood Reference Range: <25 mg/dL.     Kappa Free Light Chains   Date Value Ref Range Status   11/04/2024 7.08 (H) 0.33 - 1.94 mg/dL Final   03/11/2024 2.00 (H) 0.33 - 1.94 mg/dL Final     Lambda Free Light Chains   Date Value Ref Range Status   11/04/2024 4.81 (H) 0.57 - 2.63 mg/dL Final   03/11/2024 1.09 0.57 - 2.63 mg/dL Final     Kappa/Lambda FLC Ratio   Date Value Ref Range Status   11/04/2024 1.47 0.26 - 1.65 Final     Comment:     Undetected antigen excess is a rare event but cannot   be excluded. If these free light chain results do not   agree with other clinical or laboratory findings or   if the sample is from a patient that has previously   demonstrated antigen excess, discuss with the testing   laboratory.   Results should always be interpreted in conjunction   with other laboratory tests and clinical evidence.     03/11/2024 1.83 (H)  0.26 - 1.65 Final     Comment:     Undetected antigen excess is a rare event but cannot   be excluded. If these free light chain results do not   agree with other clinical or laboratory findings or   if the sample is from a patient that has previously   demonstrated antigen excess, discuss with the testing   laboratory.   Results should always be interpreted in conjunction   with other laboratory tests and clinical evidence.       Pathologist Interpretation SPE   Date Value Ref Range Status   11/04/2024 REVIEWED  Final     Comment:       Electronically reviewed and signed by:  Sydney Wallace MD  Signed on 11/05/24 at 13:34  Normal total protein.  There is a paraprotein band in gamma = 0.32 g/dL.      03/11/2024 REVIEWED  Final     Comment:       Electronically reviewed and signed by:  Shey Moore D.O.  Signed on 03/12/24 at 21:50  Normal total protein. Normal gamma globulins are decreased.  Paraprotein peak in beta-2 = 0.39 g/dL (previously, 0.16 g/dL) and   newly noted 2nd peak in near-gamma <0.1 g/dL (no previous value).       Pathologist Interpretation PAO   Date Value Ref Range Status   11/04/2024 REVIEWED  Final     Comment:       Electronically reviewed and signed by:  Sydney Wallace MD  Signed on 11/05/24 at 13:33  IgG kappa specific monoclonal band present.        Bone marrow biopsy  - 7/23/24     Component 3 mo ago   Final Pathologic Diagnosis     RIGHT ILIAC CREST BONE MARROW ASPIRATE, BONE MARROW CLOT, AND BONE MARROW CORE BIOPSY WITH:    CELLULARITY=30%, TRILINEAGE HEMATOPOIETIC ACTIVITY (M/E=1.6:1).  NO DEFINITIVE MORPHOLOGIC OR IMMUNOPHENOTYPIC EVIDENCE OF RESIDUAL PLASMA CELL NEOPLASM.  SEE COMMENT.  INCREASED EOSINOPHILS.  INCREASED STORAGE IRON.  DECREASED NUMBER OF MEGAKARYOCYTES.          PET scan -7/23/24  Impression:     In this patient with multiple myeloma, there is interval normalization of previously hypermetabolic osseous lesions, compatible with favorable response to therapy.   No new hypermetabolic lesions.     Additional findings as above.     Electronically signed by resident: Stephany Rosenbaum  Date:                                            07/23/2024  Time:                                           14:10     Electronically signed by:Janice Fontenot  Date:                                            07/23/2024  Time:                                           15:07    Assessment/Plan      IgA kappa multiple myeloma in remission  - he follows locally with in [place]  - diagnosed June 2023 with IgA Kappa MM; stage unclear at diagnosis as FISH not available  - started DRd therapy on 7/18/2023  - he stopped the daratumumab after cycle 4 and continue with revlimid and dex only; looks to have completed ~5 cycles  - Admitted for Carmen 140 Auto SCT on 4/16/2024  -day+100 restaging bone marrow biopsy on 7/23/24 demonstrated a normocellular marrow with increased eosinophils and tri-lineage hematopoiesis   -About 6-8%  positive plasma cells were evident.  No increased CD34 positive blasts were identified.  CD61 highlighted decreased number  of megakaryocytes.  MPO showed myeloid precursors.  -- No monotypic plasma cells identified (MRD-negative) on MRD flow assay.  -There was no definitive morphologic or immunophenotypic evidence of residual plasma cell neoplasm  -he will continue geronimo 5mg maintenance daily (Dose reduced for neutropenia)     - his 11/4/24 biochemical studies show normal SFLC ratio; there is a new small m spike but I suspect this Is reactive in light of July 2024 marrow results; fu on next biochemical studies  -continue asa, acyclovir; can stop dapsone  now day +180  -recommend continue monthly cbc, cmp, serum free light chains, quantitative immunoglobulins, serum electropheresis, serum immunofixation lab locally and at least q 2 month provider fu    [place] [facility] [place]  -will plan to have [place] return [place] [facility] [place] one [place] restaging             History of auto  stem cell transplant:   - Day 0 on 4/18/2024. Conditioned with Carmen 140.   -day+ 200 today  - He received 5 bags on 4/18/24 at 1330 and received the remaining 4 bags on 4/19/24 at 1330. Total CD34 dose was 2.91 x 10^6.   - day+100 restaging bone marrow biopsy on 7/23/24 demonstrated a normocellular marrow with increased eosinophils and tri-lineage hematopoiesis   -About 6-8%  positive plasma cells were evident.  No increased CD34 positive blasts were identified.  CD61 highlighted decreased number  of megakaryocytes.  MPO showed myeloid precursors.  -- No monotypic plasma cells identified (MRD-negative) on MRD flow assay.  -There was no definitive morphologic or immunophenotypic evidence of residual plasma cell neoplasm.     -PET CT on 7/23/24 showed here is interval normalization of uptake within the previously hypermetabolic lesions.   No new hypermetabolic osseous lesions identified.        Immunodeficiency due to conditions classified elsewhere:   - Continue acyclovir antiviral prophylaxis   - stop dapsone now day +180    4. Rev induced cytopenias  -- above further dose adjustment thresholds; monitor            5. Cancer associated pain    -he has ongoing low back pain  -no recent falls  -he is using hydrocodone-APAP as needed  -he tried lidoderm patch without success  -he has been using cyclobenzaprine 5mg TID as needed    10. Immunization    --he has been evaluated by ID   -he will receive post transplant vaccines in [place]       FU:  Virtual MD/NP appt in 3 months  Schedule 1 year post SCT restaging at [facility] after this appt

## 2024-11-04 NOTE — LETTER
November 8, 2024        Roslyn Goodson, FNP  [address]  [address]  [place] [address]             [facility] - Hematology 5th Fl  [address]  [facility] [address]  Phone: [phone]   Patient: Chip Goodson   MR Number: [id number]   YOB: 1950   Date of Visit: 11/4/2024       Dear Dr. Goodson:    Thank you for referring Chip Goodson to me for evaluation. Below are the relevant portions of my assessment and plan of care.            If you have questions, please do not hesitate to call me. I look forward to following Chip along with you.    Sincerely,      Toby Rosado MD           CC    No Recipients

## 2024-11-05 ENCOUNTER — LAB VISIT (OUTPATIENT)
Dept: LAB | Facility: HOSPITAL | Age: 74
End: 2024-11-05
Attending: INTERNAL MEDICINE
Payer: MEDICARE

## 2024-11-05 DIAGNOSIS — D61.818 PANCYTOPENIA: ICD-10-CM

## 2024-11-05 DIAGNOSIS — C90.01 MULTIPLE MYELOMA IN REMISSION: ICD-10-CM

## 2024-11-05 LAB
ABS NEUT CALC (OHS): 2.96 X10(3)/MCL (ref 2.1–9.2)
ALBUMIN SERPL ELPH-MCNC: 3.91 G/DL (ref 3.35–5.55)
ALBUMIN SERPL-MCNC: 3.3 G/DL (ref 3.4–4.8)
ALBUMIN/GLOB SERPL: 1.3 RATIO (ref 1.1–2)
ALP SERPL-CCNC: 93 UNIT/L (ref 40–150)
ALPHA1 GLOB SERPL ELPH-MCNC: 0.27 G/DL (ref 0.17–0.41)
ALPHA2 GLOB SERPL ELPH-MCNC: 0.53 G/DL (ref 0.43–0.99)
ALT SERPL-CCNC: 26 UNIT/L (ref 0–55)
ANION GAP SERPL CALC-SCNC: 6 MEQ/L
AST SERPL-CCNC: 14 UNIT/L (ref 5–34)
B-GLOBULIN SERPL ELPH-MCNC: 0.57 G/DL (ref 0.5–1.1)
BILIRUB SERPL-MCNC: 1.1 MG/DL
BUN SERPL-MCNC: 14 MG/DL (ref 8.4–25.7)
CALCIUM SERPL-MCNC: 9 MG/DL (ref 8.8–10)
CHLORIDE SERPL-SCNC: 110 MMOL/L (ref 98–107)
CO2 SERPL-SCNC: 23 MMOL/L (ref 23–31)
CREAT SERPL-MCNC: 1.19 MG/DL (ref 0.72–1.25)
CREAT/UREA NIT SERPL: 12
EOSINOPHIL NFR BLD MANUAL: 1.33 X10(3)/MCL (ref 0–0.9)
EOSINOPHIL NFR BLD MANUAL: 26 % (ref 0–8)
ERYTHROCYTE [DISTWIDTH] IN BLOOD BY AUTOMATED COUNT: 16.6 % (ref 11.5–17)
GAMMA GLOB SERPL ELPH-MCNC: 1.03 G/DL (ref 0.67–1.58)
GFR SERPLBLD CREATININE-BSD FMLA CKD-EPI: >60 ML/MIN/1.73/M2
GLOBULIN SER-MCNC: 2.5 GM/DL (ref 2.4–3.5)
GLUCOSE SERPL-MCNC: 187 MG/DL (ref 82–115)
HCT VFR BLD AUTO: 28.7 % (ref 42–52)
HGB BLD-MCNC: 9.3 G/DL (ref 14–18)
INTERPRETATION SERPL IFE-IMP: NORMAL
KAPPA LC SER QL IA: 7.08 MG/DL (ref 0.33–1.94)
KAPPA LC/LAMBDA SER IA: 1.47 (ref 0.26–1.65)
LAMBDA LC SER QL IA: 4.81 MG/DL (ref 0.57–2.63)
LYMPHOCYTES NFR BLD MANUAL: 0.51 X10(3)/MCL
LYMPHOCYTES NFR BLD MANUAL: 10 % (ref 13–40)
MCH RBC QN AUTO: 39.4 PG (ref 27–31)
MCHC RBC AUTO-ENTMCNC: 32.4 G/DL (ref 33–36)
MCV RBC AUTO: 121.6 FL (ref 80–94)
MONOCYTES NFR BLD MANUAL: 0.31 X10(3)/MCL (ref 0.1–1.3)
MONOCYTES NFR BLD MANUAL: 6 % (ref 2–11)
NEUTROPHILS NFR BLD MANUAL: 48 % (ref 47–80)
NEUTS BAND NFR BLD MANUAL: 10 % (ref 0–11)
PATHOLOGIST INTERPRETATION IFE: NORMAL
PATHOLOGIST INTERPRETATION SPE: NORMAL
PLATELET # BLD AUTO: 56 X10(3)/MCL (ref 130–400)
PLATELET # BLD EST: ABNORMAL 10*3/UL
PMV BLD AUTO: 9.8 FL (ref 7.4–10.4)
POTASSIUM SERPL-SCNC: 4 MMOL/L (ref 3.5–5.1)
PROT SERPL-MCNC: 5.8 GM/DL (ref 5.8–7.6)
PROT SERPL-MCNC: 6.3 G/DL (ref 6–8.4)
RBC # BLD AUTO: 2.36 X10(6)/MCL (ref 4.7–6.1)
SODIUM SERPL-SCNC: 139 MMOL/L (ref 136–145)
WBC # BLD AUTO: 5.11 X10(3)/MCL (ref 4.5–11.5)

## 2024-11-05 PROCEDURE — 36415 COLL VENOUS BLD VENIPUNCTURE: CPT

## 2024-11-05 PROCEDURE — 85025 COMPLETE CBC W/AUTO DIFF WBC: CPT

## 2024-11-05 PROCEDURE — 80053 COMPREHEN METABOLIC PANEL: CPT

## 2024-11-07 ENCOUNTER — LAB VISIT (OUTPATIENT)
Dept: LAB | Facility: HOSPITAL | Age: 74
End: 2024-11-07
Attending: INTERNAL MEDICINE
Payer: MEDICARE

## 2024-11-07 DIAGNOSIS — D61.818 PANCYTOPENIA: ICD-10-CM

## 2024-11-07 DIAGNOSIS — C90.01 MULTIPLE MYELOMA IN REMISSION: ICD-10-CM

## 2024-11-07 LAB
ALBUMIN SERPL-MCNC: 3.4 G/DL (ref 3.4–4.8)
ALBUMIN/GLOB SERPL: 1.4 RATIO (ref 1.1–2)
ALP SERPL-CCNC: 93 UNIT/L (ref 40–150)
ALT SERPL-CCNC: 22 UNIT/L (ref 0–55)
ANION GAP SERPL CALC-SCNC: 6 MEQ/L
AST SERPL-CCNC: 12 UNIT/L (ref 5–34)
BASOPHILS # BLD AUTO: 0.01 X10(3)/MCL
BASOPHILS NFR BLD AUTO: 0.2 %
BILIRUB SERPL-MCNC: 1.1 MG/DL
BUN SERPL-MCNC: 20 MG/DL (ref 8.4–25.7)
CALCIUM SERPL-MCNC: 9.4 MG/DL (ref 8.8–10)
CHLORIDE SERPL-SCNC: 107 MMOL/L (ref 98–107)
CO2 SERPL-SCNC: 24 MMOL/L (ref 23–31)
CREAT SERPL-MCNC: 1.59 MG/DL (ref 0.72–1.25)
CREAT/UREA NIT SERPL: 13
EOSINOPHIL # BLD AUTO: 0.22 X10(3)/MCL (ref 0–0.9)
EOSINOPHIL NFR BLD AUTO: 4 %
ERYTHROCYTE [DISTWIDTH] IN BLOOD BY AUTOMATED COUNT: 15.9 % (ref 11.5–17)
GFR SERPLBLD CREATININE-BSD FMLA CKD-EPI: 45 ML/MIN/1.73/M2
GLOBULIN SER-MCNC: 2.5 GM/DL (ref 2.4–3.5)
GLUCOSE SERPL-MCNC: 240 MG/DL (ref 82–115)
HCT VFR BLD AUTO: 29.5 % (ref 42–52)
HGB BLD-MCNC: 9.9 G/DL (ref 14–18)
IMM GRANULOCYTES # BLD AUTO: 0.03 X10(3)/MCL (ref 0–0.04)
IMM GRANULOCYTES NFR BLD AUTO: 0.5 %
LYMPHOCYTES # BLD AUTO: 0.83 X10(3)/MCL (ref 0.6–4.6)
LYMPHOCYTES NFR BLD AUTO: 15 %
MCH RBC QN AUTO: 40.1 PG (ref 27–31)
MCHC RBC AUTO-ENTMCNC: 33.6 G/DL (ref 33–36)
MCV RBC AUTO: 119.4 FL (ref 80–94)
MONOCYTES # BLD AUTO: 0.29 X10(3)/MCL (ref 0.1–1.3)
MONOCYTES NFR BLD AUTO: 5.2 %
NEUTROPHILS # BLD AUTO: 4.16 X10(3)/MCL (ref 2.1–9.2)
NEUTROPHILS NFR BLD AUTO: 75.1 %
NRBC BLD AUTO-RTO: 0 %
PLATELET # BLD AUTO: 80 X10(3)/MCL (ref 130–400)
PMV BLD AUTO: 10.6 FL (ref 7.4–10.4)
POTASSIUM SERPL-SCNC: 4.5 MMOL/L (ref 3.5–5.1)
PROT SERPL-MCNC: 5.9 GM/DL (ref 5.8–7.6)
RBC # BLD AUTO: 2.47 X10(6)/MCL (ref 4.7–6.1)
SODIUM SERPL-SCNC: 137 MMOL/L (ref 136–145)
WBC # BLD AUTO: 5.54 X10(3)/MCL (ref 4.5–11.5)

## 2024-11-07 PROCEDURE — 85025 COMPLETE CBC W/AUTO DIFF WBC: CPT

## 2024-11-07 PROCEDURE — 80053 COMPREHEN METABOLIC PANEL: CPT

## 2024-11-07 PROCEDURE — 36415 COLL VENOUS BLD VENIPUNCTURE: CPT

## 2024-11-14 ENCOUNTER — LAB VISIT (OUTPATIENT)
Dept: LAB | Facility: HOSPITAL | Age: 74
End: 2024-11-14
Attending: INTERNAL MEDICINE
Payer: MEDICARE

## 2024-11-14 DIAGNOSIS — D61.818 PANCYTOPENIA: ICD-10-CM

## 2024-11-14 DIAGNOSIS — C90.01 MULTIPLE MYELOMA IN REMISSION: ICD-10-CM

## 2024-11-14 LAB
ALBUMIN SERPL-MCNC: 3.1 G/DL (ref 3.4–4.8)
ALBUMIN/GLOB SERPL: 1.5 RATIO (ref 1.1–2)
ALP SERPL-CCNC: 98 UNIT/L (ref 40–150)
ALT SERPL-CCNC: 28 UNIT/L (ref 0–55)
ANION GAP SERPL CALC-SCNC: 5 MEQ/L
AST SERPL-CCNC: 16 UNIT/L (ref 5–34)
BASOPHILS # BLD AUTO: 0.05 X10(3)/MCL
BASOPHILS NFR BLD AUTO: 0.9 %
BILIRUB SERPL-MCNC: 1.1 MG/DL
BUN SERPL-MCNC: 20 MG/DL (ref 8.4–25.7)
CALCIUM SERPL-MCNC: 9 MG/DL (ref 8.8–10)
CHLORIDE SERPL-SCNC: 108 MMOL/L (ref 98–107)
CO2 SERPL-SCNC: 25 MMOL/L (ref 23–31)
CREAT SERPL-MCNC: 1.28 MG/DL (ref 0.72–1.25)
CREAT/UREA NIT SERPL: 16
EOSINOPHIL # BLD AUTO: 1.94 X10(3)/MCL (ref 0–0.9)
EOSINOPHIL NFR BLD AUTO: 36.6 %
ERYTHROCYTE [DISTWIDTH] IN BLOOD BY AUTOMATED COUNT: 15 % (ref 11.5–17)
GFR SERPLBLD CREATININE-BSD FMLA CKD-EPI: 58 ML/MIN/1.73/M2
GLOBULIN SER-MCNC: 2.1 GM/DL (ref 2.4–3.5)
GLUCOSE SERPL-MCNC: 193 MG/DL (ref 82–115)
HCT VFR BLD AUTO: 31.4 % (ref 42–52)
HGB BLD-MCNC: 10.6 G/DL (ref 14–18)
IMM GRANULOCYTES # BLD AUTO: 0.03 X10(3)/MCL (ref 0–0.04)
IMM GRANULOCYTES NFR BLD AUTO: 0.6 %
LYMPHOCYTES # BLD AUTO: 1.14 X10(3)/MCL (ref 0.6–4.6)
LYMPHOCYTES NFR BLD AUTO: 21.5 %
MAGNESIUM SERPL-MCNC: 2 MG/DL (ref 1.6–2.6)
MCH RBC QN AUTO: 40.5 PG (ref 27–31)
MCHC RBC AUTO-ENTMCNC: 33.8 G/DL (ref 33–36)
MCV RBC AUTO: 119.8 FL (ref 80–94)
MONOCYTES # BLD AUTO: 0.7 X10(3)/MCL (ref 0.1–1.3)
MONOCYTES NFR BLD AUTO: 13.2 %
NEUTROPHILS # BLD AUTO: 1.44 X10(3)/MCL (ref 2.1–9.2)
NEUTROPHILS NFR BLD AUTO: 27.2 %
PLATELET # BLD AUTO: 79 X10(3)/MCL (ref 130–400)
PMV BLD AUTO: 10.1 FL (ref 7.4–10.4)
POTASSIUM SERPL-SCNC: 4.8 MMOL/L (ref 3.5–5.1)
PROT SERPL-MCNC: 5.2 GM/DL (ref 5.8–7.6)
RBC # BLD AUTO: 2.62 X10(6)/MCL (ref 4.7–6.1)
SODIUM SERPL-SCNC: 138 MMOL/L (ref 136–145)
WBC # BLD AUTO: 5.3 X10(3)/MCL (ref 4.5–11.5)

## 2024-11-14 PROCEDURE — 85025 COMPLETE CBC W/AUTO DIFF WBC: CPT

## 2024-11-14 PROCEDURE — 83735 ASSAY OF MAGNESIUM: CPT

## 2024-11-14 PROCEDURE — 80053 COMPREHEN METABOLIC PANEL: CPT

## 2024-11-14 PROCEDURE — 36415 COLL VENOUS BLD VENIPUNCTURE: CPT

## 2024-11-19 ENCOUNTER — TELEPHONE (OUTPATIENT)
Dept: HEMATOLOGY/ONCOLOGY | Facility: CLINIC | Age: 74
End: 2024-11-19
Payer: MEDICARE

## 2024-11-19 NOTE — TELEPHONE ENCOUNTER
Spoke with Dr Gonzalez that kindly called me to let me know that she saw patient today and he has a generalized pruritic rash with hives and LE edema. He is doing otherwise well. He did have recent immunizations but otherwise no changes in medication, detergents, etc.     She will give lasix, steroids and will check BNP. Blood counts relatively stable.     Appreciate Dr Gonzalez.    Elsy Amaya MD

## 2024-11-21 ENCOUNTER — LAB VISIT (OUTPATIENT)
Dept: LAB | Facility: HOSPITAL | Age: 74
End: 2024-11-21
Attending: INTERNAL MEDICINE
Payer: MEDICARE

## 2024-11-21 DIAGNOSIS — Z76.82 STEM CELL TRANSPLANT CANDIDATE: ICD-10-CM

## 2024-11-21 DIAGNOSIS — C90.01 MULTIPLE MYELOMA IN REMISSION: ICD-10-CM

## 2024-11-21 LAB
ALBUMIN SERPL-MCNC: 3.8 G/DL (ref 3.4–4.8)
ALBUMIN/GLOB SERPL: 1.4 RATIO (ref 1.1–2)
ALP SERPL-CCNC: 113 UNIT/L (ref 40–150)
ALT SERPL-CCNC: 53 UNIT/L (ref 0–55)
ANION GAP SERPL CALC-SCNC: 6 MEQ/L
AST SERPL-CCNC: 28 UNIT/L (ref 5–34)
BASOPHILS # BLD AUTO: 0.09 X10(3)/MCL
BASOPHILS NFR BLD AUTO: 1.4 %
BILIRUB SERPL-MCNC: 1.3 MG/DL
BUN SERPL-MCNC: 16 MG/DL (ref 8.4–25.7)
CALCIUM SERPL-MCNC: 9 MG/DL (ref 8.8–10)
CHLORIDE SERPL-SCNC: 107 MMOL/L (ref 98–107)
CO2 SERPL-SCNC: 28 MMOL/L (ref 23–31)
CREAT SERPL-MCNC: 1.15 MG/DL (ref 0.72–1.25)
CREAT/UREA NIT SERPL: 14
EOSINOPHIL # BLD AUTO: 1.51 X10(3)/MCL (ref 0–0.9)
EOSINOPHIL NFR BLD AUTO: 23.8 %
ERYTHROCYTE [DISTWIDTH] IN BLOOD BY AUTOMATED COUNT: 15.1 % (ref 11.5–17)
GFR SERPLBLD CREATININE-BSD FMLA CKD-EPI: >60 ML/MIN/1.73/M2
GLOBULIN SER-MCNC: 2.8 GM/DL (ref 2.4–3.5)
GLUCOSE SERPL-MCNC: 202 MG/DL (ref 82–115)
HCT VFR BLD AUTO: 28.8 % (ref 42–52)
HGB BLD-MCNC: 9.6 G/DL (ref 14–18)
IMM GRANULOCYTES # BLD AUTO: 0.02 X10(3)/MCL (ref 0–0.04)
IMM GRANULOCYTES NFR BLD AUTO: 0.3 %
LYMPHOCYTES # BLD AUTO: 1.37 X10(3)/MCL (ref 0.6–4.6)
LYMPHOCYTES NFR BLD AUTO: 21.6 %
MCH RBC QN AUTO: 41.2 PG (ref 27–31)
MCHC RBC AUTO-ENTMCNC: 33.3 G/DL (ref 33–36)
MCV RBC AUTO: 123.6 FL (ref 80–94)
MONOCYTES # BLD AUTO: 0.6 X10(3)/MCL (ref 0.1–1.3)
MONOCYTES NFR BLD AUTO: 9.4 %
NEUTROPHILS # BLD AUTO: 2.76 X10(3)/MCL (ref 2.1–9.2)
NEUTROPHILS NFR BLD AUTO: 43.5 %
PLATELET # BLD AUTO: 70 X10(3)/MCL (ref 130–400)
PMV BLD AUTO: 9.4 FL (ref 7.4–10.4)
POTASSIUM SERPL-SCNC: 4.1 MMOL/L (ref 3.5–5.1)
PROT SERPL-MCNC: 6.6 GM/DL (ref 5.8–7.6)
RBC # BLD AUTO: 2.33 X10(6)/MCL (ref 4.7–6.1)
SODIUM SERPL-SCNC: 141 MMOL/L (ref 136–145)
WBC # BLD AUTO: 6.35 X10(3)/MCL (ref 4.5–11.5)

## 2024-11-21 PROCEDURE — 80053 COMPREHEN METABOLIC PANEL: CPT

## 2024-11-21 PROCEDURE — 85025 COMPLETE CBC W/AUTO DIFF WBC: CPT

## 2024-11-21 PROCEDURE — 36415 COLL VENOUS BLD VENIPUNCTURE: CPT

## 2024-11-22 ENCOUNTER — OFFICE VISIT (OUTPATIENT)
Dept: HEMATOLOGY/ONCOLOGY | Facility: CLINIC | Age: 74
End: 2024-11-22
Payer: MEDICARE

## 2024-11-22 VITALS
DIASTOLIC BLOOD PRESSURE: 61 MMHG | HEART RATE: 61 BPM | WEIGHT: 208.5 LBS | SYSTOLIC BLOOD PRESSURE: 136 MMHG | HEIGHT: 70 IN | TEMPERATURE: 98 F | RESPIRATION RATE: 18 BRPM | BODY MASS INDEX: 29.85 KG/M2 | OXYGEN SATURATION: 98 %

## 2024-11-22 DIAGNOSIS — C90.00 MULTIPLE MYELOMA, REMISSION STATUS UNSPECIFIED: Primary | ICD-10-CM

## 2024-11-22 DIAGNOSIS — D84.821 IMMUNODEFICIENCY DUE TO CHEMOTHERAPY: ICD-10-CM

## 2024-11-22 DIAGNOSIS — Z79.899 IMMUNODEFICIENCY DUE TO CHEMOTHERAPY: ICD-10-CM

## 2024-11-22 DIAGNOSIS — T45.1X5A IMMUNODEFICIENCY DUE TO CHEMOTHERAPY: ICD-10-CM

## 2024-11-22 DIAGNOSIS — I50.32 CHRONIC DIASTOLIC HEART FAILURE: Primary | ICD-10-CM

## 2024-11-22 DIAGNOSIS — Z94.84 HISTORY OF AUTO STEM CELL TRANSPLANT: ICD-10-CM

## 2024-11-22 PROCEDURE — 99443 PR PHYSICIAN TELEPHONE EVALUATION 21-30 MIN: CPT | Mod: S$PBB,95,, | Performed by: INTERNAL MEDICINE

## 2024-11-22 PROCEDURE — 99999 PR PBB SHADOW E&M-EST. PATIENT-LVL V: CPT | Mod: PBBFAC,,, | Performed by: INTERNAL MEDICINE

## 2024-11-22 PROCEDURE — 99215 OFFICE O/P EST HI 40 MIN: CPT | Mod: PBBFAC | Performed by: INTERNAL MEDICINE

## 2024-11-22 RX ORDER — PREDNISONE 10 MG/1
10 TABLET ORAL 2 TIMES DAILY
COMMUNITY
Start: 2024-11-19

## 2024-11-22 RX ORDER — FAMOTIDINE 20 MG/1
20 TABLET, FILM COATED ORAL
COMMUNITY
Start: 2024-10-21

## 2024-11-22 RX ORDER — FUROSEMIDE 20 MG/1
TABLET ORAL
COMMUNITY
Start: 2024-11-19

## 2024-11-22 RX ORDER — HYDRALAZINE HYDROCHLORIDE 50 MG/1
50 TABLET, FILM COATED ORAL 3 TIMES DAILY
COMMUNITY
Start: 2024-11-04

## 2024-11-22 NOTE — PROGRESS NOTES
HEMATOLOGIC MALIGNANCIES PROGRESS NOTE      The patient location is: home  The chief complaint leading to consultation is: multiple myeloma, history of autologous SCT, day+104    Visit type: audiovisual    Face to Face time with patient: 20 minutes  30 minutes of total time spent on the encounter, which includes face to face time and non-face to face time preparing to see the patient (eg, review of tests), Obtaining and/or reviewing separately obtained history, Documenting clinical information in the electronic or other health record, Independently interpreting results (not separately reported) and communicating results to the patient/family/caregiver, or Care coordination (not separately reported).     Each patient to whom he or she provides medical services by telemedicine is:  (1) informed of the relationship between the physician and patient and the respective role of any other health care provider with respect to management of the patient; and (2) notified that he or she may decline to receive medical services by telemedicine and may withdraw from such care at any time.    Notes:      Pathology:  6/12/23 Bone marrow aspiration/Biopsy:   PLASMA CELL MYELOMA, KAPPA RESTRICTED.     PLASMA CELL MYELOMA INVOLVES 90% OF BONE MARROW CELLULARITY WITH SMALL AREAS OF  SEQUENTIAL TRILINEAGE HEMATOPOIESIS.    ABSENT IRON STORES.     PERIPHERAL BLOOD: NORMOCYTIC NORMOCHROMIC ANEMIA. THROMBOCYTOPENIA.      LABS:  6/8/23 M-spike 3.33, IgA >7040, serum kappa 5/ lambda 0.56/ K:L ratio 9.09, 24 hour urine for M protein done but not resulted. Calcium 13.4/Alb 1.9, Cr 2.3, globulin 10.7  7/12/23 M-spike 2.49. IgA >7040, Corrected calcium 12.3, Cr 2.09, Globulin 7.6  8/15/23 M-spike 1.49, IgA 4000  9/18/23 M-spike 1.0, IgA 1700  - Transplant oncologist: Dr. Evans, follows locally with Dr. Rose in [place]  - diagnosed June 2023 with IgA Kappa MM; stage unclear at diagnosis as FISH not available  - started DRd therapy on  2023  - he stopped the daratumumab after cycle 4 and continue with revlimid and dex only; looks to have completed ~5 cycles  - Admitted for Carmen 140 Auto SCT on 2024  IDENTIFYING STATEMENT   Chip Yadav) is a 73 y.o. male with a  of 1950 from [place], [place] with the diagnosis of multiple myeloma.      ONCOLOGY HISTORY:    INTERVAL HISTORY -  24:  recent seen by transplant team. He recently had a rash and was given steroids by her PCP. It is improving except on the inner thighs.   24     Mr. Goodson I alexia for  surveillance fu.   He is day + 200  post autologous stem cell transplant for myeloma.   Doing well in remission on maintenance.    Follows with [facility] team.       Past Medical History, Past Social History and Past Family History have been reviewed and are unchanged except as noted in the interval history.      Review of Systems   Constitutional:  Positive for malaise/fatigue. Negative for chills, fever and weight loss.   HENT:  Negative for congestion, nosebleeds and tinnitus.    Eyes:  Negative for double vision, pain and discharge.   Respiratory:  Negative for cough, sputum production, shortness of breath and stridor.    Cardiovascular:  Negative for chest pain, claudication and PND.   Gastrointestinal:  Positive for abdominal pain. Negative for diarrhea, melena, nausea and vomiting.   Genitourinary:  Positive for frequency. Negative for dysuria and hematuria.   Musculoskeletal:  Negative for back pain, joint pain and neck pain.   Skin:  Positive for rash.   Neurological:  Negative for dizziness, tremors, speech change, weakness and headaches.   Endo/Heme/Allergies:  Negative for environmental allergies.   Psychiatric/Behavioral:  Negative for depression and substance abuse. The patient is not nervous/anxious.       There were no vitals filed for this visit.       Physical exam deferred due to nature of visit    Current Outpatient Medications   Medication Sig     acyclovir (ZOVIRAX) 800 MG Tab Take 1 tablet (800 mg total) by mouth 2 (two) times daily.    albuterol (PROVENTIL/VENTOLIN HFA) 90 mcg/actuation inhaler 2 puffs every 4 to 6 hours as needed.    ALPRAZolam (XANAX) 0.5 MG tablet Take 0.5 mg by mouth daily as needed for Anxiety.    amLODIPine (NORVASC) 10 MG tablet Take 1 tablet (10 mg total) by mouth every morning.    ascorbic acid, vitamin C, (VITAMIN C) 500 MG tablet Take 500 mg by mouth once daily.    atorvastatin (LIPITOR) 10 MG tablet Take 10 mg by mouth every evening.    carvediloL (COREG) 6.25 MG tablet Take 1 tablet (6.25 mg total) by mouth 2 (two) times daily.    cholecalciferol, vitamin D3, (VITAMIN D3) 125 mcg (5,000 unit) Tab Take 5,000 Units by mouth once daily.    cyclobenzaprine (FLEXERIL) 5 MG tablet Take 1 tablet (5 mg total) by mouth 3 (three) times daily as needed for Muscle spasms. (Patient not taking: Reported on 11/4/2024)    dapsone 100 MG Tab Take 1 tablet (100 mg total) by mouth once daily.    eltrombopag olamine (PROMACTA) 50 MG Tab Take 1 tablet (50 mg total) by mouth once daily.    ferrous sulfate 325 (65 FE) MG EC tablet Take 325 mg by mouth once daily.    filgrastim-sndz (ZARXIO) 480 mcg/0.8 mL Syrg Inject 480 mcg into the skin once.    finasteride (PROSCAR) 5 mg tablet Take 5 mg by mouth every morning.    HYDROcodone-acetaminophen (NORCO) 7.5-325 mg per tablet Take 1 tablet by mouth every 4 (four) hours as needed for Pain.    lenalidomide 5 mg Cap Take 5 mg by mouth once daily.    levothyroxine (SYNTHROID) 112 MCG tablet Take 112 mcg by mouth every evening.    LIDOcaine (LIDODERM) 5 % Place 1 patch onto the skin once daily. Remove & Discard patch within 12 hours or as directed by MD (Patient not taking: Reported on 11/4/2024)    losartan (COZAAR) 50 MG tablet Take 50 mg by mouth.    metFORMIN (GLUCOPHAGE-XR) 500 MG ER 24hr tablet Take 1,000 mg by mouth 2 (two) times daily. (Patient not taking: Reported on 11/4/2024)    nitroGLYCERIN  (NITROSTAT) 0.4 MG SL tablet place one tablet under the tongue every five minutes as needed for chest pain up to 3 doses. if no relief call 911    ONETOUCH ULTRA TEST Strp USE TO TEST BLOOD GLUCOSE TWICE DAILY    pantoprazole (PROTONIX) 40 MG tablet Take 1 tablet (40 mg total) by mouth once daily.    potassium chloride SA (K-DUR,KLOR-CON) 20 MEQ tablet TAKE ONE TABLET BY MOUTH ONCE DAILY WITH FOOD (Patient not taking: Reported on 11/4/2024)    ranolazine (RANEXA) 500 MG Tb12 Take 500 mg by mouth 2 (two) times daily.    spironolactone (ALDACTONE) 25 MG tablet Take 12.5 mg by mouth.    valsartan (DIOVAN) 160 MG tablet Take 160 mg by mouth. (Patient not taking: Reported on 11/4/2024)     Current Facility-Administered Medications   Medication    0.9%  NaCl infusion (for blood administration)    0.9%  NaCl infusion (for blood administration)    0.9%  NaCl infusion (for blood administration)    DTaP / HiB / IPV combined (Pentacel) injection 0.5 mL    pneumoc 20-edward conj-dip cr(PF) (PREVNAR-20 (PF)) injection Syrg 0.5 mL    sars-cov-2 (covid-19) (Spikevax (Moderna) (12yrs and up 2023)) 50 mcg/0.5 mL injection 0.5 mL         Lab Results   Component Value Date    WBC 6.35 11/21/2024    HGB 9.6 (L) 11/21/2024    HCT 28.8 (L) 11/21/2024    .6 (H) 11/21/2024    PLT 70 (L) 11/21/2024       Gran # (ANC)   Date Value Ref Range Status   11/04/2024 2.6 1.8 - 7.7 K/uL Final     Gran %   Date Value Ref Range Status   11/04/2024 46.2 38.0 - 73.0 % Final     CMP  Sodium   Date Value Ref Range Status   11/21/2024 141 136 - 145 mmol/L Final   11/04/2024 141 136 - 145 mmol/L Final     Potassium   Date Value Ref Range Status   11/21/2024 4.1 3.5 - 5.1 mmol/L Final   11/04/2024 3.7 3.5 - 5.1 mmol/L Final     Chloride   Date Value Ref Range Status   11/21/2024 107 98 - 107 mmol/L Final   11/04/2024 106 95 - 110 mmol/L Final     CO2   Date Value Ref Range Status   11/21/2024 28 23 - 31 mmol/L Final   11/04/2024 26 23 - 29 mmol/L Final      Glucose   Date Value Ref Range Status   11/04/2024 161 (H) 70 - 110 mg/dL Final     BUN   Date Value Ref Range Status   11/04/2024 19 8 - 23 mg/dL Final     Blood Urea Nitrogen   Date Value Ref Range Status   11/21/2024 16.0 8.4 - 25.7 mg/dL Final     Creatinine   Date Value Ref Range Status   11/21/2024 1.15 0.72 - 1.25 mg/dL Final   11/04/2024 1.3 0.5 - 1.4 mg/dL Final     Calcium   Date Value Ref Range Status   11/21/2024 9.0 8.8 - 10.0 mg/dL Final   11/04/2024 9.5 8.7 - 10.5 mg/dL Final     Total Protein   Date Value Ref Range Status   11/04/2024 6.7 6.0 - 8.4 g/dL Final     Albumin   Date Value Ref Range Status   11/21/2024 3.8 3.4 - 4.8 g/dL Final   11/04/2024 3.8 3.5 - 5.2 g/dL Final     Total Bilirubin   Date Value Ref Range Status   11/04/2024 1.4 (H) 0.1 - 1.0 mg/dL Final     Comment:     For infants and newborns, interpretation of results should be based  on gestational age, weight and in agreement with clinical  observations.    Premature Infant recommended reference ranges:  Up to 24 hours.............<8.0 mg/dL  Up to 48 hours............<12.0 mg/dL  3-5 days..................<15.0 mg/dL  6-29 days.................<15.0 mg/dL       Bilirubin Total   Date Value Ref Range Status   11/21/2024 1.3 <=1.5 mg/dL Final     Alkaline Phosphatase   Date Value Ref Range Status   11/04/2024 100 40 - 150 U/L Final     ALP   Date Value Ref Range Status   11/21/2024 113 40 - 150 unit/L Final     AST   Date Value Ref Range Status   11/21/2024 28 5 - 34 unit/L Final   11/04/2024 16 10 - 40 U/L Final     ALT   Date Value Ref Range Status   11/21/2024 53 0 - 55 unit/L Final   11/04/2024 32 10 - 44 U/L Final     Anion Gap   Date Value Ref Range Status   11/04/2024 9 8 - 16 mmol/L Final     eGFR   Date Value Ref Range Status   11/21/2024 >60 mL/min/1.73/m2 Final   11/04/2024 57.3 (A) >60 mL/min/1.73 m^2 Final     IgG   Date Value Ref Range Status   11/04/2024 1119 650 - 1600 mg/dL Final     Comment:     IgG Cord Blood  Reference Range: 650-1600 mg/dL.     IgA   Date Value Ref Range Status   11/04/2024 159 40 - 350 mg/dL Final     Comment:     IgA Cord Blood Reference Range: <5 mg/dL.     IgM   Date Value Ref Range Status   11/04/2024 49 (L) 50 - 300 mg/dL Final     Comment:     IgM Cord Blood Reference Range: <25 mg/dL.     Kappa Free Light Chains   Date Value Ref Range Status   11/04/2024 7.08 (H) 0.33 - 1.94 mg/dL Final   03/11/2024 2.00 (H) 0.33 - 1.94 mg/dL Final     Lambda Free Light Chains   Date Value Ref Range Status   11/04/2024 4.81 (H) 0.57 - 2.63 mg/dL Final   03/11/2024 1.09 0.57 - 2.63 mg/dL Final     Kappa/Lambda FLC Ratio   Date Value Ref Range Status   11/04/2024 1.47 0.26 - 1.65 Final     Comment:     Undetected antigen excess is a rare event but cannot   be excluded. If these free light chain results do not   agree with other clinical or laboratory findings or   if the sample is from a patient that has previously   demonstrated antigen excess, discuss with the testing   laboratory.   Results should always be interpreted in conjunction   with other laboratory tests and clinical evidence.     03/11/2024 1.83 (H) 0.26 - 1.65 Final     Comment:     Undetected antigen excess is a rare event but cannot   be excluded. If these free light chain results do not   agree with other clinical or laboratory findings or   if the sample is from a patient that has previously   demonstrated antigen excess, discuss with the testing   laboratory.   Results should always be interpreted in conjunction   with other laboratory tests and clinical evidence.       Pathologist Interpretation SPE   Date Value Ref Range Status   11/04/2024 REVIEWED  Final     Comment:       Electronically reviewed and signed by:  Sydney Wallace MD  Signed on 11/05/24 at 13:34  Normal total protein.  There is a paraprotein band in gamma = 0.32 g/dL.      03/11/2024 REVIEWED  Final     Comment:       Electronically reviewed and signed by:  Shey Moore  D.O.  Signed on 03/12/24 at 21:50  Normal total protein. Normal gamma globulins are decreased.  Paraprotein peak in beta-2 = 0.39 g/dL (previously, 0.16 g/dL) and   newly noted 2nd peak in near-gamma <0.1 g/dL (no previous value).       Pathologist Interpretation PAO   Date Value Ref Range Status   11/04/2024 REVIEWED  Final     Comment:       Electronically reviewed and signed by:  Sydney Wallace MD  Signed on 11/05/24 at 13:33  IgG kappa specific monoclonal band present.        Bone marrow biopsy  - 7/23/24     Component 3 mo ago   Final Pathologic Diagnosis     RIGHT ILIAC CREST BONE MARROW ASPIRATE, BONE MARROW CLOT, AND BONE MARROW CORE BIOPSY WITH:    CELLULARITY=30%, TRILINEAGE HEMATOPOIETIC ACTIVITY (M/E=1.6:1).  NO DEFINITIVE MORPHOLOGIC OR IMMUNOPHENOTYPIC EVIDENCE OF RESIDUAL PLASMA CELL NEOPLASM.  SEE COMMENT.  INCREASED EOSINOPHILS.  INCREASED STORAGE IRON.  DECREASED NUMBER OF MEGAKARYOCYTES.          PET scan -7/23/24  Impression:     In this patient with multiple myeloma, there is interval normalization of previously hypermetabolic osseous lesions, compatible with favorable response to therapy.  No new hypermetabolic lesions.     Additional findings as above.     Electronically signed by resident: Stephany Rosenbaum  Date:                                            07/23/2024  Time:                                           14:10     Electronically signed by:Janice Fontenot  Date:                                            07/23/2024  Time:                                           15:07    Assessment/Plan      IgA kappa multiple myeloma in remission  - he follows locally with in [place]  - diagnosed June 2023 with IgA Kappa MM; stage unclear at diagnosis as FISH not available  - started DRd therapy on 7/18/2023  - he stopped the daratumumab after cycle 4 and continue with revlimid and dex only; looks to have completed ~5 cycles  - Admitted for Carmen 140 Auto SCT on 4/16/2024  -day+100 restaging  bone marrow biopsy on 7/23/24 demonstrated a normocellular marrow with increased eosinophils and tri-lineage hematopoiesis   -About 6-8%  positive plasma cells were evident.  No increased CD34 positive blasts were identified.  CD61 highlighted decreased number  of megakaryocytes.  MPO showed myeloid precursors.  -- No monotypic plasma cells identified (MRD-negative) on MRD flow assay.  -There was no definitive morphologic or immunophenotypic evidence of residual plasma cell neoplasm  -he will continue geronimo 5mg maintenance daily (Dose reduced for neutropenia)     - his 11/4/24 biochemical studies show normal SFLC ratio; there is a new small m spike but I suspect this Is reactive in light of July 2024 marrow results; fu on next biochemical studies  -continue asa, acyclovir; can stop dapsone  now day +180  -recommend continue monthly cbc, cmp, serum free light chains, quantitative immunoglobulins, serum electropheresis, serum immunofixation lab locally and at least q 2 month provider fu    -virtual MD appt at transplant in 3 months  -return to transplant for one year restaging     History of auto stem cell transplant:   - Day 0 on 4/18/2024. Conditioned with Carmen 140.   -day+ 200 today  - He received 5 bags on 4/18/24 at 1330 and received the remaining 4 bags on 4/19/24 at 1330. Total CD34 dose was 2.91 x 10^6.   - day+100 restaging bone marrow biopsy on 7/23/24 demonstrated a normocellular marrow with increased eosinophils and tri-lineage hematopoiesis   -About 6-8%  positive plasma cells were evident.  No increased CD34 positive blasts were identified.  CD61 highlighted decreased number  of megakaryocytes.  MPO showed myeloid precursors.  -- No monotypic plasma cells identified (MRD-negative) on MRD flow assay.  -There was no definitive morphologic or immunophenotypic evidence of residual plasma cell neoplasm.     -PET CT on 7/23/24 showed here is interval normalization of uptake within the previously  hypermetabolic lesions.   No new hypermetabolic osseous lesions identified.    Immunodeficiency due to conditions classified elsewhere:   - Continue acyclovir antiviral prophylaxis   - stop dapsone now day +180    4. Rev induced cytopenias  -- above further dose adjustment thresholds; monitor     5. Cancer associated pain    -he has ongoing low back pain  -no recent falls  -he is using hydrocodone-APAP as needed  -he tried lidoderm patch without success  -he has been using cyclobenzaprine 5mg TID as needed    10. Immunization    --he has been evaluated by ID   -he will receive post transplant [facility] in [facility] one month with myeloma lab      Answers submitted by the patient for this visit:  Review of Systems Questionnaire (Submitted on 11/21/2024)  appetite change : No  unexpected weight change: Yes  mouth sores: No  visual disturbance: Yes  adenopathy: Yes

## 2024-11-25 DIAGNOSIS — C90.00 MULTIPLE MYELOMA, REMISSION STATUS UNSPECIFIED: Primary | ICD-10-CM

## 2024-11-26 ENCOUNTER — LAB VISIT (OUTPATIENT)
Dept: LAB | Facility: HOSPITAL | Age: 74
End: 2024-11-26
Attending: INTERNAL MEDICINE
Payer: MEDICARE

## 2024-11-26 DIAGNOSIS — C90.00 MULTIPLE MYELOMA, REMISSION STATUS UNSPECIFIED: ICD-10-CM

## 2024-11-26 LAB
ALBUMIN SERPL-MCNC: 3.8 G/DL (ref 3.4–4.8)
ALBUMIN/GLOB SERPL: 1.5 RATIO (ref 1.1–2)
ALP SERPL-CCNC: 124 UNIT/L (ref 40–150)
ALT SERPL-CCNC: 207 UNIT/L (ref 0–55)
ANION GAP SERPL CALC-SCNC: 5 MEQ/L
AST SERPL-CCNC: 33 UNIT/L (ref 5–34)
BASOPHILS # BLD AUTO: 0.09 X10(3)/MCL
BASOPHILS NFR BLD AUTO: 1.9 %
BILIRUB SERPL-MCNC: 1.2 MG/DL
BUN SERPL-MCNC: 25 MG/DL (ref 8.4–25.7)
CALCIUM SERPL-MCNC: 9.5 MG/DL (ref 8.8–10)
CHLORIDE SERPL-SCNC: 105 MMOL/L (ref 98–107)
CO2 SERPL-SCNC: 29 MMOL/L (ref 23–31)
CREAT SERPL-MCNC: 1.27 MG/DL (ref 0.72–1.25)
CREAT/UREA NIT SERPL: 20
EOSINOPHIL # BLD AUTO: 1.8 X10(3)/MCL (ref 0–0.9)
EOSINOPHIL NFR BLD AUTO: 37.1 %
ERYTHROCYTE [DISTWIDTH] IN BLOOD BY AUTOMATED COUNT: 14.5 % (ref 11.5–17)
GFR SERPLBLD CREATININE-BSD FMLA CKD-EPI: 59 ML/MIN/1.73/M2
GLOBULIN SER-MCNC: 2.5 GM/DL (ref 2.4–3.5)
GLUCOSE SERPL-MCNC: 205 MG/DL (ref 82–115)
HCT VFR BLD AUTO: 30.1 % (ref 42–52)
HGB BLD-MCNC: 9.8 G/DL (ref 14–18)
IGA SERPL-MCNC: 115 MG/DL (ref 101–645)
IGG SERPL-MCNC: 1145 MG/DL (ref 540–1822)
IGM SERPL-MCNC: 48 MG/DL (ref 22–240)
IMM GRANULOCYTES # BLD AUTO: 0.01 X10(3)/MCL (ref 0–0.04)
IMM GRANULOCYTES NFR BLD AUTO: 0.2 %
LYMPHOCYTES # BLD AUTO: 1.22 X10(3)/MCL (ref 0.6–4.6)
LYMPHOCYTES NFR BLD AUTO: 25.2 %
MCH RBC QN AUTO: 40.3 PG (ref 27–31)
MCHC RBC AUTO-ENTMCNC: 32.6 G/DL (ref 33–36)
MCV RBC AUTO: 123.9 FL (ref 80–94)
MONOCYTES # BLD AUTO: 0.75 X10(3)/MCL (ref 0.1–1.3)
MONOCYTES NFR BLD AUTO: 15.5 %
NEUTROPHILS # BLD AUTO: 0.98 X10(3)/MCL (ref 2.1–9.2)
NEUTROPHILS NFR BLD AUTO: 20.1 %
PLATELET # BLD AUTO: 65 X10(3)/MCL (ref 130–400)
PMV BLD AUTO: 9.9 FL (ref 7.4–10.4)
POTASSIUM SERPL-SCNC: 3.9 MMOL/L (ref 3.5–5.1)
PROT SERPL-MCNC: 6.3 GM/DL (ref 5.8–7.6)
RBC # BLD AUTO: 2.43 X10(6)/MCL (ref 4.7–6.1)
SODIUM SERPL-SCNC: 139 MMOL/L (ref 136–145)
WBC # BLD AUTO: 4.85 X10(3)/MCL (ref 4.5–11.5)

## 2024-11-26 PROCEDURE — 86334 IMMUNOFIX E-PHORESIS SERUM: CPT

## 2024-11-26 PROCEDURE — 85025 COMPLETE CBC W/AUTO DIFF WBC: CPT

## 2024-11-26 PROCEDURE — 82784 ASSAY IGA/IGD/IGG/IGM EACH: CPT

## 2024-11-26 PROCEDURE — 36415 COLL VENOUS BLD VENIPUNCTURE: CPT

## 2024-11-26 PROCEDURE — 83521 IG LIGHT CHAINS FREE EACH: CPT

## 2024-11-26 PROCEDURE — 80053 COMPREHEN METABOLIC PANEL: CPT

## 2024-11-27 LAB
ALBUMIN % SPEP (OHS): 57.9 (ref 48.1–59.5)
ALBUMIN SERPL-MCNC: 3.4 G/DL (ref 3.4–4.8)
ALBUMIN/GLOB SERPL: 1.4 RATIO (ref 1.1–2)
ALPHA 1 GLOB (OHS): 0.2 GM/DL (ref 0–0.4)
ALPHA 1 GLOB% (OHS): 3.41 (ref 2.3–4.9)
ALPHA 2 GLOB % (OHS): 8.59 (ref 6.9–13)
ALPHA 2 GLOB (OHS): 0.51 GM/DL (ref 0.4–1)
BETA GLOB (OHS): 0.67 GM/DL (ref 0.7–1.3)
BETA GLOB% (OHS): 11.31 (ref 13.8–19.7)
GAMMA GLOBULIN % (OHS): 18.79 (ref 10.1–21.9)
GAMMA GLOBULIN (OHS): 1.11 GM/DL (ref 0.4–1.8)
GLOBULIN SER-MCNC: 2.5 GM/DL (ref 2.4–3.5)
KAPPA LC FREE SER NEPH-MCNC: 4.26 MG/DL (ref 0.33–1.94)
KAPPA LC FREE/LAMBDA FREE SER NEPH: 1.12 {RATIO} (ref 0.26–1.65)
LAMBDA LC FREE SERPL-MCNC: 3.81 MG/DL (ref 0.57–2.63)
M SPIKE % (OHS): ABNORMAL
M SPIKE (OHS): ABNORMAL
PATH REV: NORMAL
PROT SERPL-MCNC: 5.9 GM/DL (ref 5.8–7.6)

## 2024-11-29 DIAGNOSIS — C90.01 MULTIPLE MYELOMA IN REMISSION: ICD-10-CM

## 2024-11-29 RX ORDER — LENALIDOMIDE 5 MG/1
5 CAPSULE ORAL DAILY
Qty: 28 EACH | Refills: 0 | Status: SHIPPED | OUTPATIENT
Start: 2024-11-29

## 2024-12-03 ENCOUNTER — LAB VISIT (OUTPATIENT)
Dept: LAB | Facility: HOSPITAL | Age: 74
End: 2024-12-03
Attending: INTERNAL MEDICINE
Payer: MEDICARE

## 2024-12-03 DIAGNOSIS — Z76.82 STEM CELL TRANSPLANT CANDIDATE: ICD-10-CM

## 2024-12-03 DIAGNOSIS — C90.01 MULTIPLE MYELOMA IN REMISSION: ICD-10-CM

## 2024-12-03 LAB
ALBUMIN SERPL-MCNC: 3.9 G/DL (ref 3.4–4.8)
ALBUMIN/GLOB SERPL: 1.5 RATIO (ref 1.1–2)
ALP SERPL-CCNC: 151 UNIT/L (ref 40–150)
ALT SERPL-CCNC: 63 UNIT/L (ref 0–55)
ANION GAP SERPL CALC-SCNC: 5 MEQ/L
AST SERPL-CCNC: 22 UNIT/L (ref 5–34)
BASOPHILS # BLD AUTO: 0.04 X10(3)/MCL
BASOPHILS NFR BLD AUTO: 0.7 %
BILIRUB SERPL-MCNC: 0.9 MG/DL
BUN SERPL-MCNC: 18 MG/DL (ref 8.4–25.7)
CALCIUM SERPL-MCNC: 9.5 MG/DL (ref 8.8–10)
CHLORIDE SERPL-SCNC: 108 MMOL/L (ref 98–107)
CO2 SERPL-SCNC: 25 MMOL/L (ref 23–31)
CREAT SERPL-MCNC: 1.24 MG/DL (ref 0.72–1.25)
CREAT/UREA NIT SERPL: 15
EOSINOPHIL # BLD AUTO: 1.25 X10(3)/MCL (ref 0–0.9)
EOSINOPHIL NFR BLD AUTO: 21.4 %
ERYTHROCYTE [DISTWIDTH] IN BLOOD BY AUTOMATED COUNT: 13 % (ref 11.5–17)
GFR SERPLBLD CREATININE-BSD FMLA CKD-EPI: >60 ML/MIN/1.73/M2
GLOBULIN SER-MCNC: 2.6 GM/DL (ref 2.4–3.5)
GLUCOSE SERPL-MCNC: 204 MG/DL (ref 82–115)
HCT VFR BLD AUTO: 31.4 % (ref 42–52)
HGB BLD-MCNC: 10.3 G/DL (ref 14–18)
IMM GRANULOCYTES # BLD AUTO: 0.01 X10(3)/MCL (ref 0–0.04)
IMM GRANULOCYTES NFR BLD AUTO: 0.2 %
LYMPHOCYTES # BLD AUTO: 0.74 X10(3)/MCL (ref 0.6–4.6)
LYMPHOCYTES NFR BLD AUTO: 12.7 %
MCH RBC QN AUTO: 40.1 PG (ref 27–31)
MCHC RBC AUTO-ENTMCNC: 32.8 G/DL (ref 33–36)
MCV RBC AUTO: 122.2 FL (ref 80–94)
MONOCYTES # BLD AUTO: 0.9 X10(3)/MCL (ref 0.1–1.3)
MONOCYTES NFR BLD AUTO: 15.4 %
NEUTROPHILS # BLD AUTO: 2.89 X10(3)/MCL (ref 2.1–9.2)
NEUTROPHILS NFR BLD AUTO: 49.6 %
PLATELET # BLD AUTO: 81 X10(3)/MCL (ref 130–400)
PMV BLD AUTO: 9.4 FL (ref 7.4–10.4)
POTASSIUM SERPL-SCNC: 4.2 MMOL/L (ref 3.5–5.1)
PROT SERPL-MCNC: 6.5 GM/DL (ref 5.8–7.6)
RBC # BLD AUTO: 2.57 X10(6)/MCL (ref 4.7–6.1)
SODIUM SERPL-SCNC: 138 MMOL/L (ref 136–145)
WBC # BLD AUTO: 5.83 X10(3)/MCL (ref 4.5–11.5)

## 2024-12-03 PROCEDURE — 85025 COMPLETE CBC W/AUTO DIFF WBC: CPT

## 2024-12-03 PROCEDURE — 36415 COLL VENOUS BLD VENIPUNCTURE: CPT

## 2024-12-03 PROCEDURE — 80053 COMPREHEN METABOLIC PANEL: CPT

## 2024-12-05 ENCOUNTER — LAB VISIT (OUTPATIENT)
Dept: LAB | Facility: HOSPITAL | Age: 74
End: 2024-12-05
Attending: INTERNAL MEDICINE
Payer: MEDICARE

## 2024-12-05 DIAGNOSIS — Z76.82 STEM CELL TRANSPLANT CANDIDATE: ICD-10-CM

## 2024-12-05 DIAGNOSIS — C90.01 MULTIPLE MYELOMA IN REMISSION: ICD-10-CM

## 2024-12-05 LAB
ALBUMIN SERPL-MCNC: 3.8 G/DL (ref 3.4–4.8)
ALBUMIN/GLOB SERPL: 1.3 RATIO (ref 1.1–2)
ALP SERPL-CCNC: 97 UNIT/L (ref 40–150)
ALT SERPL-CCNC: 47 UNIT/L (ref 0–55)
ANION GAP SERPL CALC-SCNC: 6 MEQ/L
AST SERPL-CCNC: 21 UNIT/L (ref 5–34)
BASOPHILS # BLD AUTO: 0.02 X10(3)/MCL
BASOPHILS NFR BLD AUTO: 0.4 %
BILIRUB SERPL-MCNC: 1.1 MG/DL
BUN SERPL-MCNC: 15 MG/DL (ref 8.4–25.7)
CALCIUM SERPL-MCNC: 9.8 MG/DL (ref 8.8–10)
CHLORIDE SERPL-SCNC: 107 MMOL/L (ref 98–107)
CO2 SERPL-SCNC: 26 MMOL/L (ref 23–31)
CREAT SERPL-MCNC: 1.39 MG/DL (ref 0.72–1.25)
CREAT/UREA NIT SERPL: 11
EOSINOPHIL # BLD AUTO: 0.44 X10(3)/MCL (ref 0–0.9)
EOSINOPHIL NFR BLD AUTO: 8.1 %
ERYTHROCYTE [DISTWIDTH] IN BLOOD BY AUTOMATED COUNT: 12.9 % (ref 11.5–17)
GFR SERPLBLD CREATININE-BSD FMLA CKD-EPI: 53 ML/MIN/1.73/M2
GLOBULIN SER-MCNC: 2.9 GM/DL (ref 2.4–3.5)
GLUCOSE SERPL-MCNC: 161 MG/DL (ref 82–115)
HCT VFR BLD AUTO: 32.5 % (ref 42–52)
HGB BLD-MCNC: 11 G/DL (ref 14–18)
IMM GRANULOCYTES # BLD AUTO: 0.02 X10(3)/MCL (ref 0–0.04)
IMM GRANULOCYTES NFR BLD AUTO: 0.4 %
LYMPHOCYTES # BLD AUTO: 0.97 X10(3)/MCL (ref 0.6–4.6)
LYMPHOCYTES NFR BLD AUTO: 17.9 %
MCH RBC QN AUTO: 40.4 PG (ref 27–31)
MCHC RBC AUTO-ENTMCNC: 33.8 G/DL (ref 33–36)
MCV RBC AUTO: 119.5 FL (ref 80–94)
MONOCYTES # BLD AUTO: 0.86 X10(3)/MCL (ref 0.1–1.3)
MONOCYTES NFR BLD AUTO: 15.8 %
NEUTROPHILS # BLD AUTO: 3.12 X10(3)/MCL (ref 2.1–9.2)
NEUTROPHILS NFR BLD AUTO: 57.4 %
PLATELET # BLD AUTO: 81 X10(3)/MCL (ref 130–400)
PMV BLD AUTO: 9.5 FL (ref 7.4–10.4)
POTASSIUM SERPL-SCNC: 4.2 MMOL/L (ref 3.5–5.1)
PROT SERPL-MCNC: 6.7 GM/DL (ref 5.8–7.6)
RBC # BLD AUTO: 2.72 X10(6)/MCL (ref 4.7–6.1)
SODIUM SERPL-SCNC: 139 MMOL/L (ref 136–145)
WBC # BLD AUTO: 5.43 X10(3)/MCL (ref 4.5–11.5)

## 2024-12-05 PROCEDURE — 85025 COMPLETE CBC W/AUTO DIFF WBC: CPT

## 2024-12-05 PROCEDURE — 36415 COLL VENOUS BLD VENIPUNCTURE: CPT

## 2024-12-05 PROCEDURE — 80053 COMPREHEN METABOLIC PANEL: CPT

## 2024-12-09 ENCOUNTER — HOSPITAL ENCOUNTER (OUTPATIENT)
Dept: RADIOLOGY | Facility: HOSPITAL | Age: 74
Discharge: HOME | End: 2024-12-09
Attending: FAMILY MEDICINE
Payer: MEDICARE

## 2024-12-09 DIAGNOSIS — N63.25 UNSPECIFIED LUMP IN THE LEFT BREAST, OVERLAPPING QUADRANTS: ICD-10-CM

## 2024-12-09 PROCEDURE — 77062 BREAST TOMOSYNTHESIS BI: CPT | Mod: TC

## 2024-12-09 PROCEDURE — 77062 BREAST TOMOSYNTHESIS BI: CPT | Mod: 26,,, | Performed by: STUDENT IN AN ORGANIZED HEALTH CARE EDUCATION/TRAINING PROGRAM

## 2024-12-09 PROCEDURE — 77066 DX MAMMO INCL CAD BI: CPT | Mod: 26,,, | Performed by: STUDENT IN AN ORGANIZED HEALTH CARE EDUCATION/TRAINING PROGRAM

## 2024-12-10 ENCOUNTER — LAB VISIT (OUTPATIENT)
Dept: LAB | Facility: HOSPITAL | Age: 74
End: 2024-12-10
Payer: MEDICARE

## 2024-12-10 DIAGNOSIS — Z76.82 STEM CELL TRANSPLANT CANDIDATE: ICD-10-CM

## 2024-12-10 DIAGNOSIS — C90.01 MULTIPLE MYELOMA IN REMISSION: ICD-10-CM

## 2024-12-10 LAB
ALBUMIN SERPL-MCNC: 3.6 G/DL (ref 3.4–4.8)
ALBUMIN/GLOB SERPL: 1 RATIO (ref 1.1–2)
ALP SERPL-CCNC: 92 UNIT/L (ref 40–150)
ALT SERPL-CCNC: 38 UNIT/L (ref 0–55)
ANION GAP SERPL CALC-SCNC: 8 MEQ/L
AST SERPL-CCNC: 30 UNIT/L (ref 5–34)
BASOPHILS # BLD AUTO: 0.01 X10(3)/MCL
BASOPHILS NFR BLD AUTO: 0.3 %
BILIRUB SERPL-MCNC: 0.6 MG/DL
BUN SERPL-MCNC: 22 MG/DL (ref 8.4–25.7)
CALCIUM SERPL-MCNC: 10 MG/DL (ref 8.8–10)
CHLORIDE SERPL-SCNC: 107 MMOL/L (ref 98–107)
CO2 SERPL-SCNC: 24 MMOL/L (ref 23–31)
CREAT SERPL-MCNC: 1.2 MG/DL (ref 0.72–1.25)
CREAT/UREA NIT SERPL: 18
EOSINOPHIL # BLD AUTO: 0.31 X10(3)/MCL (ref 0–0.9)
EOSINOPHIL NFR BLD AUTO: 8.3 %
ERYTHROCYTE [DISTWIDTH] IN BLOOD BY AUTOMATED COUNT: 12.3 % (ref 11.5–17)
GFR SERPLBLD CREATININE-BSD FMLA CKD-EPI: >60 ML/MIN/1.73/M2
GLOBULIN SER-MCNC: 3.6 GM/DL (ref 2.4–3.5)
GLUCOSE SERPL-MCNC: 131 MG/DL (ref 82–115)
HCT VFR BLD AUTO: 34.2 % (ref 42–52)
HGB BLD-MCNC: 11.7 G/DL (ref 14–18)
IMM GRANULOCYTES # BLD AUTO: 0.02 X10(3)/MCL (ref 0–0.04)
IMM GRANULOCYTES NFR BLD AUTO: 0.5 %
LYMPHOCYTES # BLD AUTO: 0.97 X10(3)/MCL (ref 0.6–4.6)
LYMPHOCYTES NFR BLD AUTO: 26 %
MCH RBC QN AUTO: 39.4 PG (ref 27–31)
MCHC RBC AUTO-ENTMCNC: 34.2 G/DL (ref 33–36)
MCV RBC AUTO: 115.2 FL (ref 80–94)
MONOCYTES # BLD AUTO: 0.75 X10(3)/MCL (ref 0.1–1.3)
MONOCYTES NFR BLD AUTO: 20.1 %
NEUTROPHILS # BLD AUTO: 1.67 X10(3)/MCL (ref 2.1–9.2)
NEUTROPHILS NFR BLD AUTO: 44.8 %
PLATELET # BLD AUTO: 62 X10(3)/MCL (ref 130–400)
PMV BLD AUTO: 9.7 FL (ref 7.4–10.4)
POTASSIUM SERPL-SCNC: 3.7 MMOL/L (ref 3.5–5.1)
PROT SERPL-MCNC: 7.2 GM/DL (ref 5.8–7.6)
RBC # BLD AUTO: 2.97 X10(6)/MCL (ref 4.7–6.1)
SODIUM SERPL-SCNC: 139 MMOL/L (ref 136–145)
WBC # BLD AUTO: 3.73 X10(3)/MCL (ref 4.5–11.5)

## 2024-12-10 PROCEDURE — 80053 COMPREHEN METABOLIC PANEL: CPT

## 2024-12-10 PROCEDURE — 36415 COLL VENOUS BLD VENIPUNCTURE: CPT

## 2024-12-10 PROCEDURE — 85025 COMPLETE CBC W/AUTO DIFF WBC: CPT

## 2024-12-10 NOTE — PROGRESS NOTES
HEMATOLOGIC MALIGNANCIES PROGRESS NOTE    Pathology:  23 Bone marrow aspiration/Biopsy:   PLASMA CELL MYELOMA, KAPPA RESTRICTED.     PLASMA CELL MYELOMA INVOLVES 90% OF BONE MARROW CELLULARITY WITH SMALL AREAS OF  SEQUENTIAL TRILINEAGE HEMATOPOIESIS.    ABSENT IRON STORES.     PERIPHERAL BLOOD: NORMOCYTIC NORMOCHROMIC ANEMIA. THROMBOCYTOPENIA.      LABS:  23 M-spike 3.33, IgA >7040, serum kappa 5/ lambda 0.56/ K:L ratio 9.09, 24 hour urine for M protein done but not resulted. Calcium 13.4/Alb 1.9, Cr 2.3, globulin 10.7  23 M-spike 2.49. IgA >7040, Corrected calcium 12.3, Cr 2.09, Globulin 7.6  8/15/23 M-spike 1.49, IgA 4000  23 M-spike 1.0, IgA 1700  - Transplant oncologist: Dr. Evans, follows locally with Dr. Rose in [place]  - diagnosed 2023 with IgA Kappa MM; stage unclear at diagnosis as FISH not available  - started DRd therapy on 2023  - he stopped the daratumumab after cycle 4 and continue with revlimid and dex only; looks to have completed ~5 cycles  - Admitted for Carmen 140 Auto SCT on 2024  IDENTIFYING STATEMENT   Chip Yadav) is a 73 y.o. male with a  of 1950 from [place], [place] with the diagnosis of multiple myeloma.      ONCOLOGY HISTORY:    INTERVAL HISTORY -  He returns to the clinic today for a one month follow-up. He is currently being treated with abx for a sinus infection. He feels well today other than his cough and congestion which has been present for 2 weeks now. He no longer has a rash and will be restarting his vaccines with the health clinic in [place] soon. He continues his revlimid 5mg daily as prescribed. He did have a MMG completed 2024 for concern of mass to left breast. MMG was ARABELLA.       Past Medical History, Past Social History and Past Family History have been reviewed and are unchanged except as noted in the interval history.      Review of Systems   Constitutional:  Positive for malaise/fatigue. Negative for chills,  fever and weight loss.   HENT:  Negative for congestion, nosebleeds and tinnitus.    Eyes:  Negative for double vision, pain and discharge.   Respiratory:  Negative for cough, sputum production, shortness of breath and stridor.    Cardiovascular:  Negative for chest pain, claudication and PND.   Gastrointestinal:  Positive for abdominal pain. Negative for diarrhea, melena, nausea and vomiting.   Genitourinary:  Positive for frequency. Negative for dysuria and hematuria.   Musculoskeletal:  Negative for back pain, joint pain and neck pain.   Skin:  Positive for rash.   Neurological:  Negative for dizziness, tremors, speech change, weakness and headaches.   Endo/Heme/Allergies:  Negative for environmental allergies.   Psychiatric/Behavioral:  Negative for depression and substance abuse. The patient is not nervous/anxious.       There were no vitals filed for this visit.       Physical exam deferred due to nature of visit    Current Outpatient Medications   Medication Sig    acyclovir (ZOVIRAX) 800 MG Tab Take 1 tablet (800 mg total) by mouth 2 (two) times daily.    albuterol (PROVENTIL/VENTOLIN HFA) 90 mcg/actuation inhaler 2 puffs every 4 to 6 hours as needed.    ALPRAZolam (XANAX) 0.5 MG tablet Take 0.5 mg by mouth daily as needed for Anxiety.    amLODIPine (NORVASC) 10 MG tablet Take 1 tablet (10 mg total) by mouth every morning.    ascorbic acid, vitamin C, (VITAMIN C) 500 MG tablet Take 500 mg by mouth once daily.    atorvastatin (LIPITOR) 10 MG tablet Take 10 mg by mouth every evening.    carvediloL (COREG) 6.25 MG tablet Take 1 tablet (6.25 mg total) by mouth 2 (two) times daily.    cholecalciferol, vitamin D3, (VITAMIN D3) 125 mcg (5,000 unit) Tab Take 5,000 Units by mouth once daily.    dapsone 100 MG Tab Take 1 tablet (100 mg total) by mouth once daily.    eltrombopag olamine (PROMACTA) 50 MG Tab Take 1 tablet (50 mg total) by mouth once daily.    famotidine (PEPCID) 20 MG tablet Take 20 mg by mouth.     ferrous sulfate 325 (65 FE) MG EC tablet Take 325 mg by mouth once daily.    filgrastim-sndz (ZARXIO) 480 mcg/0.8 mL Syrg Inject 480 mcg into the skin once.    finasteride (PROSCAR) 5 mg tablet Take 5 mg by mouth every morning.    furosemide (LASIX) 20 MG tablet TAKE ONE TABLET BY MOUTH ONCE DAILY FOR 5 DAYS    hydrALAZINE (APRESOLINE) 50 MG tablet Take 50 mg by mouth 3 (three) times daily.    HYDROcodone-acetaminophen (NORCO) 7.5-325 mg per tablet Take 1 tablet by mouth every 4 (four) hours as needed for Pain.    lenalidomide 5 mg Cap Take 1 capsule (5 mg total) by mouth once daily.    levothyroxine (SYNTHROID) 112 MCG tablet Take 112 mcg by mouth every evening.    losartan (COZAAR) 50 MG tablet Take 50 mg by mouth.    nitroGLYCERIN (NITROSTAT) 0.4 MG SL tablet place one tablet under the tongue every five minutes as needed for chest pain up to 3 doses. if no relief call 911    [employer] ULTRA TEST Strp USE TO TEST BLOOD GLUCOSE TWICE DAILY    pantoprazole (PROTONIX) 40 MG tablet Take 1 tablet (40 mg total) by mouth once daily.    predniSONE (DELTASONE) 10 MG tablet Take 10 mg by mouth 2 (two) times daily.    ranolazine (RANEXA) 500 MG Tb12 Take 500 mg by mouth 2 (two) times daily.    spironolactone (ALDACTONE) 25 MG tablet Take 12.5 mg by mouth.     Current Facility-Administered Medications   Medication    0.9%  NaCl infusion (for blood administration)    0.9%  NaCl infusion (for blood administration)    0.9%  NaCl infusion (for blood administration)    DTaP / HiB / IPV combined (Pentacel) injection 0.5 mL    pneumoc 20-edward conj-dip cr(PF) (PREVNAR-20 (PF)) injection Syrg 0.5 mL    sars-cov-2 (covid-19) (Spikevax (Moderna) (12yrs and up 2023)) 50 mcg/0.5 mL injection 0.5 mL         Lab Results   Component Value Date    WBC 3.73 (L) 12/10/2024    HGB 11.7 (L) 12/10/2024    HCT 34.2 (L) 12/10/2024    .2 (H) 12/10/2024    PLT 62 (L) 12/10/2024       Gran # (ANC)   Date Value Ref Range Status   11/04/2024 2.6 1.8  - 7.7 K/uL Final     Gran %   Date Value Ref Range Status   11/04/2024 46.2 38.0 - 73.0 % Final     CMP  Sodium   Date Value Ref Range Status   12/10/2024 139 136 - 145 mmol/L Final   11/04/2024 141 136 - 145 mmol/L Final     Potassium   Date Value Ref Range Status   12/10/2024 3.7 3.5 - 5.1 mmol/L Final   11/04/2024 3.7 3.5 - 5.1 mmol/L Final     Chloride   Date Value Ref Range Status   12/10/2024 107 98 - 107 mmol/L Final   11/04/2024 106 95 - 110 mmol/L Final     CO2   Date Value Ref Range Status   12/10/2024 24 23 - 31 mmol/L Final   11/04/2024 26 23 - 29 mmol/L Final     Glucose   Date Value Ref Range Status   11/04/2024 161 (H) 70 - 110 mg/dL Final     BUN   Date Value Ref Range Status   11/04/2024 19 8 - 23 mg/dL Final     Blood Urea Nitrogen   Date Value Ref Range Status   12/10/2024 22.0 8.4 - 25.7 mg/dL Final     Creatinine   Date Value Ref Range Status   12/10/2024 1.20 0.72 - 1.25 mg/dL Final   11/04/2024 1.3 0.5 - 1.4 mg/dL Final     Calcium   Date Value Ref Range Status   12/10/2024 10.0 8.8 - 10.0 mg/dL Final   11/04/2024 9.5 8.7 - 10.5 mg/dL Final     Total Protein   Date Value Ref Range Status   11/04/2024 6.7 6.0 - 8.4 g/dL Final     Albumin   Date Value Ref Range Status   12/10/2024 3.6 3.4 - 4.8 g/dL Final   11/04/2024 3.8 3.5 - 5.2 g/dL Final     Total Bilirubin   Date Value Ref Range Status   11/04/2024 1.4 (H) 0.1 - 1.0 mg/dL Final     Comment:     For infants and newborns, interpretation of results should be based  on gestational age, weight and in agreement with clinical  observations.    Premature Infant recommended reference ranges:  Up to 24 hours.............<8.0 mg/dL  Up to 48 hours............<12.0 mg/dL  3-5 days..................<15.0 mg/dL  6-29 days.................<15.0 mg/dL       Bilirubin Total   Date Value Ref Range Status   12/10/2024 0.6 <=1.5 mg/dL Final     Alkaline Phosphatase   Date Value Ref Range Status   11/04/2024 100 40 - 150 U/L Final     ALP   Date Value Ref  Range Status   12/10/2024 92 40 - 150 unit/L Final     AST   Date Value Ref Range Status   12/10/2024 30 5 - 34 unit/L Final   11/04/2024 16 10 - 40 U/L Final     ALT   Date Value Ref Range Status   12/10/2024 38 0 - 55 unit/L Final   11/04/2024 32 10 - 44 U/L Final     Anion Gap   Date Value Ref Range Status   11/04/2024 9 8 - 16 mmol/L Final     eGFR   Date Value Ref Range Status   12/10/2024 >60 mL/min/1.73/m2 Final   11/04/2024 57.3 (A) >60 mL/min/1.73 m^2 Final     IgG   Date Value Ref Range Status   11/04/2024 1119 650 - 1600 mg/dL Final     Comment:     IgG Cord Blood Reference Range: 650-1600 mg/dL.     IgA   Date Value Ref Range Status   11/04/2024 159 40 - 350 mg/dL Final     Comment:     IgA Cord Blood Reference Range: <5 mg/dL.     IgM   Date Value Ref Range Status   11/04/2024 49 (L) 50 - 300 mg/dL Final     Comment:     IgM Cord Blood Reference Range: <25 mg/dL.     IgM Level   Date Value Ref Range Status   11/26/2024 48.0 22.0 - 240.0 mg/dL Final     Kappa Free Light Chains   Date Value Ref Range Status   11/04/2024 7.08 (H) 0.33 - 1.94 mg/dL Final   03/11/2024 2.00 (H) 0.33 - 1.94 mg/dL Final     Lambda Free Light Chains   Date Value Ref Range Status   11/04/2024 4.81 (H) 0.57 - 2.63 mg/dL Final   03/11/2024 1.09 0.57 - 2.63 mg/dL Final     Kappa/Lambda FLC Ratio   Date Value Ref Range Status   11/04/2024 1.47 0.26 - 1.65 Final     Comment:     Undetected antigen excess is a rare event but cannot   be excluded. If these free light chain results do not   agree with other clinical or laboratory findings or   if the sample is from a patient that has previously   demonstrated antigen excess, discuss with the testing   laboratory.   Results should always be interpreted in conjunction   with other laboratory tests and clinical evidence.     03/11/2024 1.83 (H) 0.26 - 1.65 Final     Comment:     Undetected antigen excess is a rare event but cannot   be excluded. If these free light chain results do not    agree with other clinical or laboratory findings or   if the sample is from a patient that has previously   demonstrated antigen excess, discuss with the testing   laboratory.   Results should always be interpreted in conjunction   with other laboratory tests and clinical evidence.       Pathologist Interpretation SPE   Date Value Ref Range Status   11/04/2024 REVIEWED  Final     Comment:       Electronically reviewed and signed by:  Sydney Wallace MD  Signed on 11/05/24 at 13:34  Normal total protein.  There is a paraprotein band in gamma = 0.32 g/dL.      03/11/2024 REVIEWED  Final     Comment:       Electronically reviewed and signed by:  Shey Moore D.O.  Signed on 03/12/24 at 21:50  Normal total protein. Normal gamma globulins are decreased.  Paraprotein peak in beta-2 = 0.39 g/dL (previously, 0.16 g/dL) and   newly noted 2nd peak in near-gamma <0.1 g/dL (no previous value).       Pathologist Interpretation PAO   Date Value Ref Range Status   11/04/2024 REVIEWED  Final     Comment:       Electronically reviewed and signed by:  Sydney Wallace MD  Signed on 11/05/24 at 13:33  IgG kappa specific monoclonal band present.        Bone marrow biopsy  - 7/23/24     Component 3 mo ago   Final Pathologic Diagnosis     RIGHT ILIAC CREST BONE MARROW ASPIRATE, BONE MARROW CLOT, AND BONE MARROW CORE BIOPSY WITH:    CELLULARITY=30%, TRILINEAGE HEMATOPOIETIC ACTIVITY (M/E=1.6:1).  NO DEFINITIVE MORPHOLOGIC OR IMMUNOPHENOTYPIC EVIDENCE OF RESIDUAL PLASMA CELL NEOPLASM.  SEE COMMENT.  INCREASED EOSINOPHILS.  INCREASED STORAGE IRON.  DECREASED NUMBER OF MEGAKARYOCYTES.          PET scan -7/23/24  Impression:     In this patient with multiple myeloma, there is interval normalization of previously hypermetabolic osseous lesions, compatible with favorable response to therapy.  No new hypermetabolic lesions.     Additional findings as above.     Electronically signed by resident: Stephany Rosenbaum  Date:                                             07/23/2024  Time:                                           14:10     Electronically signed by:Janice Fontenot  Date:                                            07/23/2024  Time:                                           15:07    Assessment/Plan      IgA kappa multiple myeloma in remission  - he follows locally with in [place]  - diagnosed June 2023 with IgA Kappa MM; stage unclear at diagnosis as FISH not available  - started DRd therapy on 7/18/2023  - he stopped the daratumumab after cycle 4 and continue with revlimid and dex only; looks to have completed ~5 cycles  - Admitted for Carmen 140 Auto SCT on 4/16/2024  -day+100 restaging bone marrow biopsy on 7/23/24 demonstrated a normocellular marrow with increased eosinophils and tri-lineage hematopoiesis   -About 6-8%  positive plasma cells were evident.  No increased CD34 positive blasts were identified.  CD61 highlighted decreased number  of megakaryocytes.  MPO showed myeloid precursors.  -- No monotypic plasma cells identified (MRD-negative) on MRD flow assay.  -There was no definitive morphologic or immunophenotypic evidence of residual plasma cell neoplasm  -he will continue geronimo 5mg maintenance daily (Dose reduced for neutropenia)     - his 11/4/24 biochemical studies show normal SFLC ratio; there is a new small m spike but I suspect this Is reactive in light of July 2024 marrow results; fu on next biochemical studies  -continue asa, acyclovir; can stop dapsone  now day +180  -recommend continue monthly cbc, cmp, serum free light chains, quantitative immunoglobulins, serum electropheresis, serum immunofixation lab locally and at least q 2 month provider fu    -[facility] GRANADO appt at transplant in 3 months  -return to transplant for one year restaging     History of auto stem cell transplant:   - Day 0 on 4/18/2024. Conditioned with Carmen 140.   -day+ 200 today  - He received 5 bags on 4/18/24 at 1330 and received the remaining 4 bags  on 4/19/24 at 1330. Total CD34 dose was 2.91 x 10^6.   - day+100 restaging bone marrow biopsy on 7/23/24 demonstrated a normocellular marrow with increased eosinophils and tri-lineage hematopoiesis   -About 6-8%  positive plasma cells were evident.  No increased CD34 positive blasts were identified.  CD61 highlighted decreased number  of megakaryocytes.  MPO showed myeloid precursors.  -- No monotypic plasma cells identified (MRD-negative) on MRD flow assay.  -There was no definitive morphologic or immunophenotypic evidence of residual plasma cell neoplasm.     -PET CT on 7/23/24 showed here is interval normalization of uptake within the previously hypermetabolic lesions.   No new hypermetabolic osseous lesions identified.    Immunodeficiency due to conditions classified elsewhere:   - Continue acyclovir antiviral prophylaxis   - stop dapsone now day +180    4. Rev induced cytopenias  -- above further dose adjustment thresholds; monitor     5. Cancer associated pain    -he has ongoing low back pain  -no recent falls  -he is using hydrocodone-APAP as needed  -he tried lidoderm patch without success  -he has been using cyclobenzaprine 5mg TID as needed  -Stable today.    10. Immunization    --he has been evaluated by ID   -he will receive post transplant vaccines in [place]      Stop Cefdinir   Levaquin sent to pharmacy.Strict ED precautions given.  Twice weekly labs no longer needed per transplant. Only monthly  Okay to resume vaccine schedule once congestion has resolved.   RTC in one month with NP or sooner if needed for FU/lab.  Lab to be completed one week prior.         I personally reviewed all pertinent imaging, lab results, explained to the patient the pertinent information in detail including radiographic imaging, abnormal lab results. All questions were answered thoroughly. Total time spent on this visit was >40 minutes.    VICKY Pineda-C  Oncology/Hematology   [facility]

## 2024-12-11 ENCOUNTER — OFFICE VISIT (OUTPATIENT)
Dept: HEMATOLOGY/ONCOLOGY | Facility: CLINIC | Age: 74
End: 2024-12-11
Payer: MEDICARE

## 2024-12-11 VITALS
DIASTOLIC BLOOD PRESSURE: 67 MMHG | OXYGEN SATURATION: 97 % | SYSTOLIC BLOOD PRESSURE: 143 MMHG | BODY MASS INDEX: 28.23 KG/M2 | WEIGHT: 197.19 LBS | HEART RATE: 60 BPM | HEIGHT: 70 IN | RESPIRATION RATE: 16 BRPM | TEMPERATURE: 97 F

## 2024-12-11 DIAGNOSIS — Z94.84 HISTORY OF AUTO STEM CELL TRANSPLANT: ICD-10-CM

## 2024-12-11 DIAGNOSIS — Z79.899 IMMUNODEFICIENCY DUE TO CHEMOTHERAPY: ICD-10-CM

## 2024-12-11 DIAGNOSIS — C90.00 MULTIPLE MYELOMA, REMISSION STATUS UNSPECIFIED: ICD-10-CM

## 2024-12-11 DIAGNOSIS — R05.9 COUGH, UNSPECIFIED TYPE: ICD-10-CM

## 2024-12-11 DIAGNOSIS — D84.821 IMMUNODEFICIENCY DUE TO CHEMOTHERAPY: ICD-10-CM

## 2024-12-11 DIAGNOSIS — D69.6 THROMBOCYTOPENIA, UNSPECIFIED: ICD-10-CM

## 2024-12-11 DIAGNOSIS — C90.01 MULTIPLE MYELOMA IN REMISSION: Primary | ICD-10-CM

## 2024-12-11 DIAGNOSIS — D84.9 IMMUNODEFICIENCY: ICD-10-CM

## 2024-12-11 DIAGNOSIS — Z94.84 HISTORY OF AUTOLOGOUS STEM CELL TRANSPLANT: ICD-10-CM

## 2024-12-11 DIAGNOSIS — Z94.81 S/P AUTOLOGOUS BONE MARROW TRANSPLANTATION: ICD-10-CM

## 2024-12-11 DIAGNOSIS — T45.1X5A IMMUNODEFICIENCY DUE TO CHEMOTHERAPY: ICD-10-CM

## 2024-12-11 DIAGNOSIS — D61.818 PANCYTOPENIA: ICD-10-CM

## 2024-12-11 PROCEDURE — 99999 PR PBB SHADOW E&M-EST. PATIENT-LVL V: CPT | Mod: PBBFAC,,,

## 2024-12-11 PROCEDURE — 99215 OFFICE O/P EST HI 40 MIN: CPT | Mod: PBBFAC

## 2024-12-11 PROCEDURE — 99215 OFFICE O/P EST HI 40 MIN: CPT | Mod: S$PBB,,,

## 2024-12-11 RX ORDER — LEVOFLOXACIN 750 MG/1
750 TABLET ORAL DAILY
Qty: 7 TABLET | Refills: 0 | Status: SHIPPED | OUTPATIENT
Start: 2024-12-11 | End: 2024-12-18

## 2024-12-22 DIAGNOSIS — C90.01 MULTIPLE MYELOMA IN REMISSION: ICD-10-CM

## 2024-12-24 DIAGNOSIS — C90.01 MULTIPLE MYELOMA IN REMISSION: ICD-10-CM

## 2024-12-24 RX ORDER — LENALIDOMIDE 5 MG/1
1 CAPSULE ORAL
Qty: 28 CAPSULE | OUTPATIENT
Start: 2024-12-24

## 2024-12-24 RX ORDER — LENALIDOMIDE 5 MG/1
5 CAPSULE ORAL DAILY
Qty: 28 EACH | Refills: 0 | Status: SHIPPED | OUTPATIENT
Start: 2024-12-24

## 2025-01-02 ENCOUNTER — TELEPHONE (OUTPATIENT)
Dept: HEMATOLOGY/ONCOLOGY | Facility: CLINIC | Age: 75
End: 2025-01-02
Payer: MEDICARE

## 2025-01-02 NOTE — TELEPHONE ENCOUNTER
----- Message from [employer] sent at 1/2/2025  1:07 PM CST -----  Regarding: Scheduling Request  Contact: Ashleigh uribe  Scheduling Request           Appt Type:  EP     Date/Time Preference:any     Treating Provider:Alonzo     Caller Name:Ashleigh uribe     Contact Preference:[phone]     Comments/notes:Patients daughter is calling to schedule F/U from letter. Requesting a call back

## 2025-01-02 NOTE — TELEPHONE ENCOUNTER
----- Message from [employer] sent at 1/2/2025  1:07 PM CST -----  Regarding: Scheduling Request  Contact: Ashleigh uribe  Scheduling Request           Appt Type:  EP     Date/Time Preference:any     Treating Provider:Alonzo     Caller Name:Ashleigh uribe     Contact Preference:[phone]     Comments/notes:Patients daughter is calling to schedule F/U from letter. Requesting a call back

## 2025-01-09 ENCOUNTER — LAB VISIT (OUTPATIENT)
Dept: LAB | Facility: HOSPITAL | Age: 75
End: 2025-01-09
Payer: MEDICARE

## 2025-01-09 DIAGNOSIS — C90.00 MULTIPLE MYELOMA, REMISSION STATUS UNSPECIFIED: ICD-10-CM

## 2025-01-09 LAB
ALBUMIN SERPL-MCNC: 3.8 G/DL (ref 3.4–4.8)
ALBUMIN/GLOB SERPL: 1.2 RATIO (ref 1.1–2)
ALP SERPL-CCNC: 106 UNIT/L (ref 40–150)
ALT SERPL-CCNC: 29 UNIT/L (ref 0–55)
ANION GAP SERPL CALC-SCNC: 10 MEQ/L
AST SERPL-CCNC: 12 UNIT/L (ref 5–34)
BASOPHILS # BLD AUTO: 0.05 X10(3)/MCL
BASOPHILS NFR BLD AUTO: 1.4 %
BILIRUB SERPL-MCNC: 0.7 MG/DL
BUN SERPL-MCNC: 16 MG/DL (ref 8.4–25.7)
CALCIUM SERPL-MCNC: 9.5 MG/DL (ref 8.8–10)
CHLORIDE SERPL-SCNC: 105 MMOL/L (ref 98–107)
CO2 SERPL-SCNC: 27 MMOL/L (ref 23–31)
CREAT SERPL-MCNC: 1.38 MG/DL (ref 0.72–1.25)
CREAT/UREA NIT SERPL: 12
EOSINOPHIL # BLD AUTO: 0.54 X10(3)/MCL (ref 0–0.9)
EOSINOPHIL NFR BLD AUTO: 15.3 %
ERYTHROCYTE [DISTWIDTH] IN BLOOD BY AUTOMATED COUNT: 12.7 % (ref 11.5–17)
GFR SERPLBLD CREATININE-BSD FMLA CKD-EPI: 53 ML/MIN/1.73/M2
GLOBULIN SER-MCNC: 3.1 GM/DL (ref 2.4–3.5)
GLUCOSE SERPL-MCNC: 192 MG/DL (ref 82–115)
HCT VFR BLD AUTO: 35 % (ref 42–52)
HGB BLD-MCNC: 11.8 G/DL (ref 14–18)
IGA SERPL-MCNC: 171 MG/DL (ref 101–645)
IGG SERPL-MCNC: 1394 MG/DL (ref 540–1822)
IGM SERPL-MCNC: 55 MG/DL (ref 22–240)
IMM GRANULOCYTES # BLD AUTO: 0.02 X10(3)/MCL (ref 0–0.04)
IMM GRANULOCYTES NFR BLD AUTO: 0.6 %
LYMPHOCYTES # BLD AUTO: 1 X10(3)/MCL (ref 0.6–4.6)
LYMPHOCYTES NFR BLD AUTO: 28.2 %
MCH RBC QN AUTO: 38.1 PG (ref 27–31)
MCHC RBC AUTO-ENTMCNC: 33.7 G/DL (ref 33–36)
MCV RBC AUTO: 112.9 FL (ref 80–94)
MONOCYTES # BLD AUTO: 0.51 X10(3)/MCL (ref 0.1–1.3)
MONOCYTES NFR BLD AUTO: 14.4 %
NEUTROPHILS # BLD AUTO: 1.42 X10(3)/MCL (ref 2.1–9.2)
NEUTROPHILS NFR BLD AUTO: 40.1 %
NRBC BLD AUTO-RTO: 0 %
PLATELET # BLD AUTO: 56 X10(3)/MCL (ref 130–400)
PMV BLD AUTO: 9.6 FL (ref 7.4–10.4)
POTASSIUM SERPL-SCNC: 3.9 MMOL/L (ref 3.5–5.1)
PROT SERPL-MCNC: 6.9 GM/DL (ref 5.8–7.6)
RBC # BLD AUTO: 3.1 X10(6)/MCL (ref 4.7–6.1)
SODIUM SERPL-SCNC: 142 MMOL/L (ref 136–145)
WBC # BLD AUTO: 3.54 X10(3)/MCL (ref 4.5–11.5)

## 2025-01-09 PROCEDURE — 83521 IG LIGHT CHAINS FREE EACH: CPT

## 2025-01-09 PROCEDURE — 82784 ASSAY IGA/IGD/IGG/IGM EACH: CPT

## 2025-01-09 PROCEDURE — 85025 COMPLETE CBC W/AUTO DIFF WBC: CPT

## 2025-01-09 PROCEDURE — 36415 COLL VENOUS BLD VENIPUNCTURE: CPT

## 2025-01-09 PROCEDURE — 84165 PROTEIN E-PHORESIS SERUM: CPT

## 2025-01-09 PROCEDURE — 80053 COMPREHEN METABOLIC PANEL: CPT

## 2025-01-10 LAB
ALBUMIN % SPEP (OHS): 49.31 (ref 48.1–59.5)
ALBUMIN SERPL-MCNC: 3.3 G/DL (ref 3.4–4.8)
ALBUMIN/GLOB SERPL: 1 RATIO (ref 1.1–2)
ALPHA 1 GLOB (OHS): 0.24 GM/DL (ref 0–0.4)
ALPHA 1 GLOB% (OHS): 3.65 (ref 2.3–4.9)
ALPHA 2 GLOB % (OHS): 9.2 (ref 6.9–13)
ALPHA 2 GLOB (OHS): 0.62 GM/DL (ref 0.4–1)
BETA GLOB (OHS): 0.97 GM/DL (ref 0.7–1.3)
BETA GLOB% (OHS): 14.47 (ref 13.8–19.7)
GAMMA GLOBULIN % (OHS): 23.36 (ref 10.1–21.9)
GAMMA GLOBULIN (OHS): 1.57 GM/DL (ref 0.4–1.8)
GLOBULIN SER-MCNC: 3.4 GM/DL (ref 2.4–3.5)
M SPIKE % (OHS): ABNORMAL
M SPIKE (OHS): ABNORMAL
PATH REV: NORMAL
PROT SERPL-MCNC: 6.7 GM/DL (ref 5.8–7.6)

## 2025-01-14 LAB
KAPPA LC FREE SER NEPH-MCNC: 5.16 MG/DL (ref 0.33–1.94)
KAPPA LC FREE/LAMBDA FREE SER NEPH: 1.13 {RATIO} (ref 0.26–1.65)
LAMBDA LC FREE SERPL-MCNC: 4.55 MG/DL (ref 0.57–2.63)

## 2025-01-16 ENCOUNTER — OFFICE VISIT (OUTPATIENT)
Dept: HEMATOLOGY/ONCOLOGY | Facility: CLINIC | Age: 75
End: 2025-01-16
Payer: MEDICARE

## 2025-01-16 VITALS
HEIGHT: 70 IN | HEART RATE: 59 BPM | SYSTOLIC BLOOD PRESSURE: 147 MMHG | DIASTOLIC BLOOD PRESSURE: 72 MMHG | TEMPERATURE: 97 F | WEIGHT: 213 LBS | BODY MASS INDEX: 30.49 KG/M2 | OXYGEN SATURATION: 97 % | RESPIRATION RATE: 18 BRPM

## 2025-01-16 DIAGNOSIS — Z94.81 S/P AUTOLOGOUS BONE MARROW TRANSPLANTATION: ICD-10-CM

## 2025-01-16 DIAGNOSIS — D70.9 NEUTROPENIA, UNSPECIFIED TYPE: ICD-10-CM

## 2025-01-16 DIAGNOSIS — C90.00 MULTIPLE MYELOMA, REMISSION STATUS UNSPECIFIED: ICD-10-CM

## 2025-01-16 DIAGNOSIS — Z94.84 HISTORY OF AUTOLOGOUS STEM CELL TRANSPLANT: ICD-10-CM

## 2025-01-16 DIAGNOSIS — D84.9 IMMUNODEFICIENCY: ICD-10-CM

## 2025-01-16 DIAGNOSIS — C90.01 MULTIPLE MYELOMA IN REMISSION: Primary | ICD-10-CM

## 2025-01-16 DIAGNOSIS — D84.81 IMMUNODEFICIENCY DUE TO CONDITIONS CLASSIFIED ELSEWHERE: ICD-10-CM

## 2025-01-16 DIAGNOSIS — D61.818 PANCYTOPENIA: ICD-10-CM

## 2025-01-16 DIAGNOSIS — D61.810 PANCYTOPENIA DUE TO CHEMOTHERAPY: ICD-10-CM

## 2025-01-16 PROCEDURE — 99999 PR PBB SHADOW E&M-EST. PATIENT-LVL V: CPT | Mod: PBBFAC,,,

## 2025-01-16 PROCEDURE — 99215 OFFICE O/P EST HI 40 MIN: CPT | Mod: S$PBB,,,

## 2025-01-16 PROCEDURE — 99215 OFFICE O/P EST HI 40 MIN: CPT | Mod: PBBFAC

## 2025-01-16 NOTE — PROGRESS NOTES
HEMATOLOGIC MALIGNANCIES PROGRESS NOTE    Pathology:  23 Bone marrow aspiration/Biopsy:   PLASMA CELL MYELOMA, KAPPA RESTRICTED.     PLASMA CELL MYELOMA INVOLVES 90% OF BONE MARROW CELLULARITY WITH SMALL AREAS OF  SEQUENTIAL TRILINEAGE HEMATOPOIESIS.    ABSENT IRON STORES.     PERIPHERAL BLOOD: NORMOCYTIC NORMOCHROMIC ANEMIA. THROMBOCYTOPENIA.      LABS:  23 M-spike 3.33, IgA >7040, serum kappa 5/ lambda 0.56/ K:L ratio 9.09, 24 hour urine for M protein done but not resulted. Calcium 13.4/Alb 1.9, Cr 2.3, globulin 10.7  23 M-spike 2.49. IgA >7040, Corrected calcium 12.3, Cr 2.09, Globulin 7.6  8/15/23 M-spike 1.49, IgA 4000  23 M-spike 1.0, IgA 1700  - Transplant oncologist: Dr. Evans, follows locally with Dr. Rose in [place]  - diagnosed 2023 with IgA Kappa MM; stage unclear at diagnosis as FISH not available  - started DRd therapy on 2023  - he stopped the daratumumab after cycle 4 and continue with revlimid and dex only; looks to have completed ~5 cycles  - Admitted for Carmen 140 Auto SCT on 2024  IDENTIFYING STATEMENT   Chip Yadav) is a 73 y.o. male with a  of 1950 from [place], [place] with the diagnosis of multiple myeloma.      ONCOLOGY HISTORY:    INTERVAL HISTORY -  He returns to the clinic today for a one month follow-up.  He continues his revlimid 5mg daily as prescribed.  He feels well today, although he does feel like he has fluid in his ears bilaterally x 2 weeks now. Cough improved after completion of abx.  Rash is stable, but continues to itch at night. He has restarted his vaccine schedule at the health clinic in [place]. He did have a MMG completed 2024 for concern of mass to left breast. MMG showed ARABELLA.       Past Medical History, Past Social History and Past Family History have been reviewed and are unchanged except as noted in the interval history.      Review of Systems   Constitutional:  Positive for malaise/fatigue. Negative for  chills, fever and weight loss.   HENT:  Negative for congestion, nosebleeds and tinnitus.    Eyes:  Negative for double vision, pain and discharge.   Respiratory:  Negative for cough, sputum production, shortness of breath and stridor.    Cardiovascular:  Negative for chest pain, claudication and PND.   Gastrointestinal:  Positive for abdominal pain. Negative for diarrhea, melena, nausea and vomiting.   Genitourinary:  Positive for frequency. Negative for dysuria and hematuria.   Musculoskeletal:  Negative for back pain, joint pain and neck pain.   Skin:  Positive for rash.   Neurological:  Negative for dizziness, tremors, speech change, weakness and headaches.   Endo/Heme/Allergies:  Negative for environmental allergies.   Psychiatric/Behavioral:  Negative for depression and substance abuse. The patient is not nervous/anxious.       Vitals:    01/16/25 1054   BP: (!) 147/72   Pulse: (!) 59   Resp: 18   Temp: 97.4 °F (36.3 °C)         Physical Exam  Vitals reviewed.   Constitutional:       General: He is not in acute distress.     Appearance: Normal appearance. He is normal weight.   HENT:      Head: Normocephalic and atraumatic.      Right Ear: Decreased hearing noted. A middle ear effusion is present.      Left Ear: Decreased hearing noted. A middle ear effusion is present.      Nose: Nose normal.      Mouth/Throat:      Mouth: Mucous membranes are moist.      Pharynx: Oropharynx is clear. No oropharyngeal exudate or posterior oropharyngeal erythema.   Eyes:      Extraocular Movements: Extraocular movements intact.      Conjunctiva/sclera: Conjunctivae normal.      Pupils: Pupils are equal, round, and reactive to light.   Cardiovascular:      Rate and Rhythm: Normal rate and regular rhythm.      Pulses: Normal pulses.      Heart sounds: No murmur heard.     No friction rub. No gallop.   Pulmonary:      Effort: Pulmonary effort is normal. No respiratory distress.      Breath sounds: No wheezing, rhonchi or rales.    Abdominal:      General: Abdomen is flat. Bowel sounds are normal.      Palpations: Abdomen is soft. There is no mass.      Tenderness: There is no abdominal tenderness. There is no guarding.   Musculoskeletal:         General: No swelling, tenderness or deformity. Normal range of motion.      Cervical back: Normal range of motion and neck supple.      Right lower leg: No edema.      Left lower leg: No edema.   Lymphadenopathy:      Cervical: No cervical adenopathy.   Skin:     General: Skin is warm and dry.      Findings: Rash present. No erythema or lesion.   Neurological:      General: No focal deficit present.      Mental Status: He is alert and oriented to person, place, and time. Mental status is at baseline.      Cranial Nerves: No cranial nerve deficit.      Gait: Gait normal.   Psychiatric:         Mood and Affect: Mood normal.         Behavior: Behavior normal.         Thought Content: Thought content normal.         Judgment: Judgment normal.          Current Outpatient Medications   Medication Sig    acyclovir (ZOVIRAX) 800 MG Tab Take 1 tablet (800 mg total) by mouth 2 (two) times daily.    albuterol (PROVENTIL/VENTOLIN HFA) 90 mcg/actuation inhaler 2 puffs every 4 to 6 hours as needed.    ALPRAZolam (XANAX) 0.5 MG tablet Take 0.5 mg by mouth daily as needed for Anxiety.    amLODIPine (NORVASC) 10 MG tablet Take 1 tablet (10 mg total) by mouth every morning.    ascorbic acid, vitamin C, (VITAMIN C) 500 MG tablet Take 500 mg by mouth once daily.    atorvastatin (LIPITOR) 10 MG tablet Take 10 mg by mouth every evening.    carvediloL (COREG) 6.25 MG tablet Take 1 tablet (6.25 mg total) by mouth 2 (two) times daily.    cholecalciferol, vitamin D3, (VITAMIN D3) 125 mcg (5,000 unit) Tab Take 5,000 Units by mouth once daily.    dapsone 100 MG Tab Take 1 tablet (100 mg total) by mouth once daily.    eltrombopag olamine (PROMACTA) 50 MG Tab Take 1 tablet (50 mg total) by mouth once daily.    famotidine  (PEPCID) 20 MG tablet Take 20 mg by mouth.    ferrous sulfate 325 (65 FE) MG EC tablet Take 325 mg by mouth once daily.    filgrastim-sndz (ZARXIO) 480 mcg/0.8 mL Syrg Inject 480 mcg into the skin once.    finasteride (PROSCAR) 5 mg tablet Take 5 mg by mouth every morning.    furosemide (LASIX) 20 MG tablet TAKE ONE TABLET BY MOUTH ONCE DAILY FOR 5 DAYS    hydrALAZINE (APRESOLINE) 50 MG tablet Take 50 mg by mouth 3 (three) times daily.    HYDROcodone-acetaminophen (NORCO) 7.5-325 mg per tablet Take 1 tablet by mouth every 4 (four) hours as needed for Pain.    lenalidomide 5 mg Cap Take 1 capsule (5 mg total) by mouth once daily.    levothyroxine (SYNTHROID) 112 MCG tablet Take 112 mcg by mouth every evening.    nitroGLYCERIN (NITROSTAT) 0.4 MG SL tablet place one tablet under the tongue every five minutes as needed for chest pain up to 3 doses. if no relief call 911    [employer] ULTRA TEST Strp USE TO TEST BLOOD GLUCOSE TWICE DAILY    pantoprazole (PROTONIX) 40 MG tablet Take 1 tablet (40 mg total) by mouth once daily.    predniSONE (DELTASONE) 10 MG tablet Take 10 mg by mouth 2 (two) times daily.    ranolazine (RANEXA) 500 MG Tb12 Take 500 mg by mouth 2 (two) times daily.    spironolactone (ALDACTONE) 25 MG tablet Take 12.5 mg by mouth.     Current Facility-Administered Medications   Medication    0.9%  NaCl infusion (for blood administration)    0.9%  NaCl infusion (for blood administration)    0.9%  NaCl infusion (for blood administration)    pneumoc 20-edward conj-dip cr(PF) (PREVNAR-20 (PF)) injection Syrg 0.5 mL    sars-cov-2 (covid-19) (Spikevax (Moderna) (12yrs and up 2023)) 50 mcg/0.5 mL injection 0.5 mL         Lab Results   Component Value Date    WBC 3.54 (L) 01/09/2025    HGB 11.8 (L) 01/09/2025    HCT 35.0 (L) 01/09/2025    .9 (H) 01/09/2025    PLT 56 (L) 01/09/2025       Gran # (ANC)   Date Value Ref Range Status   11/04/2024 2.6 1.8 - 7.7 K/uL Final     Gran %   Date Value Ref Range Status    11/04/2024 46.2 38.0 - 73.0 % Final     CMP  Sodium   Date Value Ref Range Status   01/09/2025 142 136 - 145 mmol/L Final   11/04/2024 141 136 - 145 mmol/L Final     Potassium   Date Value Ref Range Status   01/09/2025 3.9 3.5 - 5.1 mmol/L Final   11/04/2024 3.7 3.5 - 5.1 mmol/L Final     Chloride   Date Value Ref Range Status   01/09/2025 105 98 - 107 mmol/L Final   11/04/2024 106 95 - 110 mmol/L Final     CO2   Date Value Ref Range Status   01/09/2025 27 23 - 31 mmol/L Final   11/04/2024 26 23 - 29 mmol/L Final     Glucose   Date Value Ref Range Status   11/04/2024 161 (H) 70 - 110 mg/dL Final     BUN   Date Value Ref Range Status   11/04/2024 19 8 - 23 mg/dL Final     Blood Urea Nitrogen   Date Value Ref Range Status   01/09/2025 16.0 8.4 - 25.7 mg/dL Final     Creatinine   Date Value Ref Range Status   01/09/2025 1.38 (H) 0.72 - 1.25 mg/dL Final   11/04/2024 1.3 0.5 - 1.4 mg/dL Final     Calcium   Date Value Ref Range Status   01/09/2025 9.5 8.8 - 10.0 mg/dL Final   11/04/2024 9.5 8.7 - 10.5 mg/dL Final     Total Protein   Date Value Ref Range Status   11/04/2024 6.7 6.0 - 8.4 g/dL Final     Albumin   Date Value Ref Range Status   01/09/2025 3.8 3.4 - 4.8 g/dL Final   01/09/2025 3.3 (L) 3.4 - 4.8 g/dL Final   11/04/2024 3.8 3.5 - 5.2 g/dL Final     Total Bilirubin   Date Value Ref Range Status   11/04/2024 1.4 (H) 0.1 - 1.0 mg/dL Final     Comment:     For infants and newborns, interpretation of results should be based  on gestational age, weight and in agreement with clinical  observations.    Premature Infant recommended reference ranges:  Up to 24 hours.............<8.0 mg/dL  Up to 48 hours............<12.0 mg/dL  3-5 days..................<15.0 mg/dL  6-29 days.................<15.0 mg/dL       Bilirubin Total   Date Value Ref Range Status   01/09/2025 0.7 <=1.5 mg/dL Final     Alkaline Phosphatase   Date Value Ref Range Status   11/04/2024 100 40 - 150 U/L Final     ALP   Date Value Ref Range Status    01/09/2025 106 40 - 150 unit/L Final     AST   Date Value Ref Range Status   01/09/2025 12 5 - 34 unit/L Final   11/04/2024 16 10 - 40 U/L Final     ALT   Date Value Ref Range Status   01/09/2025 29 0 - 55 unit/L Final   11/04/2024 32 10 - 44 U/L Final     Anion Gap   Date Value Ref Range Status   11/04/2024 9 8 - 16 mmol/L Final     eGFR   Date Value Ref Range Status   01/09/2025 53 mL/min/1.73/m2 Final   11/04/2024 57.3 (A) >60 mL/min/1.73 m^2 Final     IgG   Date Value Ref Range Status   11/04/2024 1119 650 - 1600 mg/dL Final     Comment:     IgG Cord Blood Reference Range: 650-1600 mg/dL.     IgA   Date Value Ref Range Status   11/04/2024 159 40 - 350 mg/dL Final     Comment:     IgA Cord Blood Reference Range: <5 mg/dL.     IgM   Date Value Ref Range Status   11/04/2024 49 (L) 50 - 300 mg/dL Final     Comment:     IgM Cord Blood Reference Range: <25 mg/dL.     IgM Level   Date Value Ref Range Status   01/09/2025 55.0 22.0 - 240.0 mg/dL Final     Kappa Free Light Chains   Date Value Ref Range Status   11/04/2024 7.08 (H) 0.33 - 1.94 mg/dL Final   03/11/2024 2.00 (H) 0.33 - 1.94 mg/dL Final     Lambda Free Light Chains   Date Value Ref Range Status   11/04/2024 4.81 (H) 0.57 - 2.63 mg/dL Final   03/11/2024 1.09 0.57 - 2.63 mg/dL Final     Kappa/Lambda FLC Ratio   Date Value Ref Range Status   11/04/2024 1.47 0.26 - 1.65 Final     Comment:     Undetected antigen excess is a rare event but cannot   be excluded. If these free light chain results do not   agree with other clinical or laboratory findings or   if the sample is from a patient that has previously   demonstrated antigen excess, discuss with the testing   laboratory.   Results should always be interpreted in conjunction   with other laboratory tests and clinical evidence.     03/11/2024 1.83 (H) 0.26 - 1.65 Final     Comment:     Undetected antigen excess is a rare event but cannot   be excluded. If these free light chain results do not   agree with  other clinical or laboratory findings or   if the sample is from a patient that has previously   demonstrated antigen excess, discuss with the testing   laboratory.   Results should always be interpreted in conjunction   with other laboratory tests and clinical evidence.       Pathologist Interpretation SPE   Date Value Ref Range Status   11/04/2024 REVIEWED  Final     Comment:       Electronically reviewed and signed by:  Sydney Wallace MD  Signed on 11/05/24 at 13:34  Normal total protein.  There is a paraprotein band in gamma = 0.32 g/dL.      03/11/2024 REVIEWED  Final     Comment:       Electronically reviewed and signed by:  Shey Moore D.O.  Signed on 03/12/24 at 21:50  Normal total protein. Normal gamma globulins are decreased.  Paraprotein peak in beta-2 = 0.39 g/dL (previously, 0.16 g/dL) and   newly noted 2nd peak in near-gamma <0.1 g/dL (no previous value).       Pathologist Interpretation PAO   Date Value Ref Range Status   11/04/2024 REVIEWED  Final     Comment:       Electronically reviewed and signed by:  Sydney Wallace MD  Signed on 11/05/24 at 13:33  IgG kappa specific monoclonal band present.        Bone marrow biopsy  - 7/23/24     Component 3 mo ago   Final Pathologic Diagnosis     RIGHT ILIAC CREST BONE MARROW ASPIRATE, BONE MARROW CLOT, AND BONE MARROW CORE BIOPSY WITH:    CELLULARITY=30%, TRILINEAGE HEMATOPOIETIC ACTIVITY (M/E=1.6:1).  NO DEFINITIVE MORPHOLOGIC OR IMMUNOPHENOTYPIC EVIDENCE OF RESIDUAL PLASMA CELL NEOPLASM.  SEE COMMENT.  INCREASED EOSINOPHILS.  INCREASED STORAGE IRON.  DECREASED NUMBER OF MEGAKARYOCYTES.          PET scan -7/23/24  Impression:     In this patient with multiple myeloma, there is interval normalization of previously hypermetabolic osseous lesions, compatible with favorable response to therapy.  No new hypermetabolic lesions.     Additional findings as above.     Electronically signed by resident: Stephany Rosenbaum  Date:                                             07/23/2024  Time:                                           14:10     Electronically signed by:Janice Fontenot  Date:                                            07/23/2024  Time:                                           15:07    Assessment/Plan      IgA kappa multiple myeloma in remission  - he follows locally with in [place]  - diagnosed June 2023 with IgA Kappa MM; stage unclear at diagnosis as FISH not available  - started DRd therapy on 7/18/2023  - he stopped the daratumumab after cycle 4 and continue with revlimid and dex only; looks to have completed ~5 cycles  - Admitted for Carmen 140 Auto SCT on 4/16/2024  -day+100 restaging bone marrow biopsy on 7/23/24 demonstrated a normocellular marrow with increased eosinophils and tri-lineage hematopoiesis   -About 6-8%  positive plasma cells were evident.  No increased CD34 positive blasts were identified.  CD61 highlighted decreased number  of megakaryocytes.  MPO showed myeloid precursors.  -- No monotypic plasma cells identified (MRD-negative) on MRD flow assay.  -There was no definitive morphologic or immunophenotypic evidence of residual plasma cell neoplasm  -he will continue geronimo 5mg maintenance daily (Dose reduced for neutropenia)     - his 11/4/24 biochemical studies show normal SFLC ratio; there is a new small m spike but I suspect this Is reactive in light of July 2024 marrow results; fu on next biochemical studies  -continue asa, acyclovir; can stop dapsone  now day +180  -recommend continue monthly cbc, cmp, serum free light chains, quantitative immunoglobulins, serum electropheresis, serum immunofixation lab locally and at least q 2 month provider fu    -[facility] GRANADO appt at transplant in 3 months  -return to transplant for one year restaging     History of auto stem cell transplant:   - Day 0 on 4/18/2024. Conditioned with Carmen 140.   -day+ 200 today  - He received 5 bags on 4/18/24 at 1330 and received the remaining 4 bags on 4/19/24 at  1330. Total CD34 dose was 2.91 x 10^6.   - day+100 restaging bone marrow biopsy on 7/23/24 demonstrated a normocellular marrow with increased eosinophils and tri-lineage hematopoiesis   -About 6-8%  positive plasma cells were evident.  No increased CD34 positive blasts were identified.  CD61 highlighted decreased number  of megakaryocytes.  MPO showed myeloid precursors.  -- No monotypic plasma cells identified (MRD-negative) on MRD flow assay.  -There was no definitive morphologic or immunophenotypic evidence of residual plasma cell neoplasm.     -PET CT on 7/23/24 showed here is interval normalization of uptake within the previously hypermetabolic lesions.   No new hypermetabolic osseous lesions identified.    Immunodeficiency due to conditions classified elsewhere:   - Continue acyclovir antiviral prophylaxis   - stop dapsone now day +180    4. Rev induced cytopenias  -- above further dose adjustment thresholds; monitor     5. Cancer associated pain    -he has ongoing low back pain  -no recent falls  -he is using hydrocodone-APAP as needed  -he tried lidoderm patch without success  -he has been using cyclobenzaprine 5mg TID as needed  -Stable today.    6. Immunization    --he has been evaluated by ID   -[facility].       7. Rash  -improving, but still present. Itches at night. Recommended daily moisturizer. If still present, will send in steroid cream in needed.     Lab reviewed, no Mspike on most recent labs, normal IgA, Kappa FLC increasing, normal ratio.   Flonase and claritin daily as needed for congestion. Will call if worsens.   Continue with vaccines as scheduled.   Continue with MM labs once a month, next due 2/13/2025  Follow-up with [facility] scheduled for 2/20/2025  RTC in two months with NP or sooner if needed for FU/lab.  Lab to be completed one week prior.       I personally reviewed all pertinent imaging, lab results, explained to the patient the pertinent  information in detail including radiographic imaging, abnormal lab results. All questions were answered thoroughly. Total time spent on this visit was >40 minutes.    MERY Pineda  Oncology/Hematology   [facility]

## 2025-02-03 DIAGNOSIS — C90.01 MULTIPLE MYELOMA IN REMISSION: ICD-10-CM

## 2025-02-03 RX ORDER — LENALIDOMIDE 5 MG/1
5 CAPSULE ORAL DAILY
Qty: 28 EACH | Refills: 0 | Status: SHIPPED | OUTPATIENT
Start: 2025-02-03 | End: 2025-02-26

## 2025-02-13 ENCOUNTER — LAB VISIT (OUTPATIENT)
Dept: LAB | Facility: HOSPITAL | Age: 75
End: 2025-02-13
Payer: MEDICARE

## 2025-02-13 DIAGNOSIS — Z94.81 S/P AUTOLOGOUS BONE MARROW TRANSPLANTATION: ICD-10-CM

## 2025-02-13 DIAGNOSIS — C90.01 MULTIPLE MYELOMA IN REMISSION: ICD-10-CM

## 2025-02-13 LAB
ALBUMIN SERPL-MCNC: 3.7 G/DL (ref 3.4–4.8)
ALBUMIN/GLOB SERPL: 1.1 RATIO (ref 1.1–2)
ALP SERPL-CCNC: 111 UNIT/L (ref 40–150)
ALT SERPL-CCNC: 18 UNIT/L (ref 0–55)
ANION GAP SERPL CALC-SCNC: 7 MEQ/L
AST SERPL-CCNC: 13 UNIT/L (ref 5–34)
BASOPHILS # BLD AUTO: 0.02 X10(3)/MCL
BASOPHILS NFR BLD AUTO: 0.6 %
BILIRUB SERPL-MCNC: 0.5 MG/DL
BUN SERPL-MCNC: 19 MG/DL (ref 8.4–25.7)
CALCIUM SERPL-MCNC: 9.2 MG/DL (ref 8.8–10)
CHLORIDE SERPL-SCNC: 104 MMOL/L (ref 98–107)
CO2 SERPL-SCNC: 27 MMOL/L (ref 23–31)
CREAT SERPL-MCNC: 1.41 MG/DL (ref 0.72–1.25)
CREAT/UREA NIT SERPL: 13
EOSINOPHIL # BLD AUTO: 0.31 X10(3)/MCL (ref 0–0.9)
EOSINOPHIL NFR BLD AUTO: 8.9 %
ERYTHROCYTE [DISTWIDTH] IN BLOOD BY AUTOMATED COUNT: 13 % (ref 11.5–17)
GFR SERPLBLD CREATININE-BSD FMLA CKD-EPI: 52 ML/MIN/1.73/M2
GLOBULIN SER-MCNC: 3.4 GM/DL (ref 2.4–3.5)
GLUCOSE SERPL-MCNC: 217 MG/DL (ref 82–115)
HCT VFR BLD AUTO: 32.4 % (ref 42–52)
HGB BLD-MCNC: 11.2 G/DL (ref 14–18)
IGA SERPL-MCNC: 170 MG/DL (ref 101–645)
IGG SERPL-MCNC: 1261 MG/DL (ref 540–1822)
IGM SERPL-MCNC: 50 MG/DL (ref 22–240)
IMM GRANULOCYTES # BLD AUTO: 0.01 X10(3)/MCL (ref 0–0.04)
IMM GRANULOCYTES NFR BLD AUTO: 0.3 %
LYMPHOCYTES # BLD AUTO: 0.84 X10(3)/MCL (ref 0.6–4.6)
LYMPHOCYTES NFR BLD AUTO: 24 %
MAGNESIUM SERPL-MCNC: 2.3 MG/DL (ref 1.6–2.6)
MCH RBC QN AUTO: 37.5 PG (ref 27–31)
MCHC RBC AUTO-ENTMCNC: 34.6 G/DL (ref 33–36)
MCV RBC AUTO: 108.4 FL (ref 80–94)
MONOCYTES # BLD AUTO: 0.28 X10(3)/MCL (ref 0.1–1.3)
MONOCYTES NFR BLD AUTO: 8 %
NEUTROPHILS # BLD AUTO: 2.04 X10(3)/MCL (ref 2.1–9.2)
NEUTROPHILS NFR BLD AUTO: 58.2 %
NRBC BLD AUTO-RTO: 0 %
PLATELET # BLD AUTO: 63 X10(3)/MCL (ref 130–400)
PMV BLD AUTO: 9.8 FL (ref 7.4–10.4)
POTASSIUM SERPL-SCNC: 4 MMOL/L (ref 3.5–5.1)
PROT SERPL-MCNC: 7.1 GM/DL (ref 5.8–7.6)
RBC # BLD AUTO: 2.99 X10(6)/MCL (ref 4.7–6.1)
SODIUM SERPL-SCNC: 138 MMOL/L (ref 136–145)
WBC # BLD AUTO: 3.5 X10(3)/MCL (ref 4.5–11.5)

## 2025-02-13 PROCEDURE — 82784 ASSAY IGA/IGD/IGG/IGM EACH: CPT | Mod: 59

## 2025-02-13 PROCEDURE — 83735 ASSAY OF MAGNESIUM: CPT

## 2025-02-13 PROCEDURE — 80053 COMPREHEN METABOLIC PANEL: CPT

## 2025-02-13 PROCEDURE — 86334 IMMUNOFIX E-PHORESIS SERUM: CPT

## 2025-02-13 PROCEDURE — 85025 COMPLETE CBC W/AUTO DIFF WBC: CPT

## 2025-02-13 PROCEDURE — 83521 IG LIGHT CHAINS FREE EACH: CPT

## 2025-02-13 PROCEDURE — 36415 COLL VENOUS BLD VENIPUNCTURE: CPT

## 2025-02-14 LAB — PATH REV: NORMAL

## 2025-02-17 LAB
ALBUMIN % SPEP (OHS): 50.93 (ref 48.1–59.5)
ALBUMIN SERPL-MCNC: 3.3 G/DL (ref 3.4–4.8)
ALBUMIN/GLOB SERPL: 1 RATIO (ref 1.1–2)
ALPHA 1 GLOB (OHS): 0.25 GM/DL (ref 0–0.4)
ALPHA 1 GLOB% (OHS): 3.78 (ref 2.3–4.9)
ALPHA 2 GLOB % (OHS): 10.66 (ref 6.9–13)
ALPHA 2 GLOB (OHS): 0.69 GM/DL (ref 0.4–1)
BETA GLOB (OHS): 0.93 GM/DL (ref 0.7–1.3)
BETA GLOB% (OHS): 14.27 (ref 13.8–19.7)
GAMMA GLOBULIN % (OHS): 20.36 (ref 10.1–21.9)
GAMMA GLOBULIN (OHS): 1.32 GM/DL (ref 0.4–1.8)
GLOBULIN SER-MCNC: 3.2 GM/DL (ref 2.4–3.5)
KAPPA LC FREE SER NEPH-MCNC: 3.95 MG/DL (ref 0.33–1.94)
KAPPA LC FREE/LAMBDA FREE SER NEPH: 0.67 {RATIO} (ref 0.26–1.65)
LAMBDA LC FREE SERPL-MCNC: 5.89 MG/DL (ref 0.57–2.63)
M SPIKE % (OHS): ABNORMAL
M SPIKE (OHS): ABNORMAL
PROT SERPL-MCNC: 6.5 GM/DL (ref 5.8–7.6)

## 2025-02-18 NOTE — PROGRESS NOTES
Section of Hematology and Stem Cell Transplantation  Follow-Up Note     02/20/2025  Primary Oncologic Diagnosis: Multiple myeloma in remission [C90.01]    The patient location is: Home  The chief complaint leading to consultation is: AutoSCT Follow Up    Visit type: audiovisual    Face to Face time with patient: 9 minutes  30 minutes of total time spent on the encounter, which includes face to face time and non-face to face time preparing to see the patient (eg, review of tests), Obtaining and/or reviewing separately obtained history, Documenting clinical information in the electronic or other health record, Independently interpreting results (not separately reported) and communicating results to the patient/family/caregiver, or Care coordination (not separately reported).     Each patient to whom he or she provides medical services by telemedicine is:  (1) informed of the relationship between the physician and patient and the respective role of any other health care provider with respect to management of the patient; and (2) notified that he or she may decline to receive medical services by telemedicine and may withdraw from such care at any time.    History of Present Ilness:   Chip Yadav) is a pleasant 74 y.o.male from [place], [place], here for follow up of his multiple myeloma, s/p autologous stem cell transplant, now day +308. He follows locally in [place].    Oncologic History:  - Diagnosed June 2023 with IgA Kappa MM; stage unclear at diagnosis as FISH not available  - 7/18/23: initiated DRd therapy, stopped the daratumumab after cycle 4 and continue with revlimid and dex only; looks to have completed ~5 cycles  - Admitted for [facility] 140 Auto SCT on 4/16/2024  - Day +100 restaging bone marrow biopsy on 7/23/24 demonstrated a normocellular marrow with increased eosinophils and tri-lineage hematopoiesis; about 6-8%  positive plasma cells were evident. No increased CD34 positive blasts were  "identified. CD61 highlighted decreased number of megakaryocytes. MPO showed myeloid precursors.  - No monotypic plasma cells identified (MRD-negative) on MRD flow assay.  -There was no definitive morphologic or immunophenotypic evidence of residual plasma cell neoplasm  - Initiated lenalidomide maintenance, now dose-reduced to 5 mg for cytopenias    Interval History 02/20/2025:  Patient here for post-transplant follow up, now day +308 and doing well on revlimid maintenance. He "feels great." He had a rash and revlimid was held around the time of some of his vaccines. No appreciable side effects. His daughter was with him today and involved in care. Denies fever, chills, drenching night sweats, unexplained weight loss, early satiety, chest pain, shortness of breath, new/worsening bone pain, adenopathy, N/V/D.    Past Medical History, Social History, and Past Family History are unchanged since last evaluation except for HPI.    CURRENT MEDICATIONS:   Current Outpatient Medications   Medication Sig    acyclovir (ZOVIRAX) 800 MG Tab Take 1 tablet (800 mg total) by mouth 2 (two) times daily.    albuterol (PROVENTIL/VENTOLIN HFA) 90 mcg/actuation inhaler 2 puffs every 4 to 6 hours as needed.    ALPRAZolam (XANAX) 0.5 MG tablet Take 0.5 mg by mouth daily as needed for Anxiety.    amLODIPine (NORVASC) 10 MG tablet Take 1 tablet (10 mg total) by mouth every morning.    ascorbic acid, vitamin C, (VITAMIN C) 500 MG tablet Take 500 mg by mouth once daily.    atorvastatin (LIPITOR) 10 MG tablet Take 10 mg by mouth every evening.    carvediloL (COREG) 6.25 MG tablet Take 1 tablet (6.25 mg total) by mouth 2 (two) times daily.    cholecalciferol, vitamin D3, (VITAMIN D3) 125 mcg (5,000 unit) Tab Take 5,000 Units by mouth once daily.    dapsone 100 MG Tab Take 1 tablet (100 mg total) by mouth once daily.    eltrombopag olamine (PROMACTA) 50 MG Tab Take 1 tablet (50 mg total) by mouth once daily.    famotidine (PEPCID) 20 MG tablet " Take 20 mg by mouth.    ferrous sulfate 325 (65 FE) MG EC tablet Take 325 mg by mouth once daily.    filgrastim-sndz (ZARXIO) 480 mcg/0.8 mL Syrg Inject 480 mcg into the skin once.    finasteride (PROSCAR) 5 mg tablet Take 5 mg by mouth every morning.    furosemide (LASIX) 20 MG tablet TAKE ONE TABLET BY MOUTH ONCE DAILY FOR 5 DAYS    hydrALAZINE (APRESOLINE) 50 MG tablet Take 50 mg by mouth 3 (three) times daily.    HYDROcodone-acetaminophen (NORCO) 7.5-325 mg per tablet Take 1 tablet by mouth every 4 (four) hours as needed for Pain.    lenalidomide 5 mg Cap Take 1 capsule (5 mg total) by mouth once daily.    levothyroxine (SYNTHROID) 112 MCG tablet Take 112 mcg by mouth every evening.    nitroGLYCERIN (NITROSTAT) 0.4 MG SL tablet place one tablet under the tongue every five minutes as needed for chest pain up to 3 doses. if no relief call 911    FL3XX ULTRA TEST Strp USE TO TEST BLOOD GLUCOSE TWICE DAILY    pantoprazole (PROTONIX) 40 MG tablet Take 1 tablet (40 mg total) by mouth once daily.    predniSONE (DELTASONE) 10 MG tablet Take 10 mg by mouth 2 (two) times daily.    ranolazine (RANEXA) 500 MG Tb12 Take 500 mg by mouth 2 (two) times daily.    spironolactone (ALDACTONE) 25 MG tablet Take 12.5 mg by mouth.     Current Facility-Administered Medications   Medication    0.9%  NaCl infusion (for blood administration)    0.9%  NaCl infusion (for blood administration)    0.9%  NaCl infusion (for blood administration)    pneumoc 20-edward conj-dip cr(PF) (PREVNAR-20 (PF)) injection Syrg 0.5 mL    sars-cov-2 (covid-19) (Spikevax (Moderna) (12yrs and up 2023)) 50 mcg/0.5 mL injection 0.5 mL       ALLERGIES:   Review of patient's allergies indicates:   Allergen Reactions    Iodine Anaphylaxis    Shellfish containing products     Poison ivy extract     Amoxicillin-pot clavulanate Itching       Review of Systems:     Review of Systems   Constitutional:  Negative for chills, fever, malaise/fatigue and weight loss.   HENT:   Negative for congestion, nosebleeds, sinus pain and sore throat.    Eyes:  Negative for blurred vision, double vision, photophobia and pain.   Respiratory:  Negative for cough and shortness of breath.    Cardiovascular:  Negative for chest pain, palpitations and leg swelling.   Gastrointestinal:  Negative for constipation, diarrhea, heartburn, nausea and vomiting.   Genitourinary:  Negative for dysuria and hematuria.   Musculoskeletal:  Negative for back pain and falls.   Skin:  Negative for rash.   Neurological:  Negative for dizziness, sensory change, weakness and headaches.   Endo/Heme/Allergies:  Negative for environmental allergies.   Psychiatric/Behavioral:  The patient does not have insomnia.        Physical Exam:     There were no vitals filed for this visit.    Vitals or physical exam deferred for telemedicine visit.    Physical Exam     ECOG Performance Status: (foot note - ECOG PS provided by Eastern Cooperative Oncology Group) 1 - Symptomatic but completely ambulatory    Karnofsky Performance Score:  90%- Able to Carry on Normal Activity: Minor Symptoms of Disease    Labs:   Lab Results   Component Value Date    WBC 3.50 (L) 02/13/2025    HGB 11.2 (L) 02/13/2025    HCT 32.4 (L) 02/13/2025    .4 (H) 02/13/2025    PLT 63 (L) 02/13/2025       Lab Results   Component Value Date     02/13/2025    K 4.0 02/13/2025     02/13/2025    CO2 27 02/13/2025    BUN 19.0 02/13/2025    CREATININE 1.41 (H) 02/13/2025    ALBUMIN 3.7 02/13/2025    ALBUMIN 3.3 (L) 02/13/2025    BILITOT 0.5 02/13/2025    ALKPHOS 111 02/13/2025    AST 13 02/13/2025    ALT 18 02/13/2025       Imaging:   None    Pathology:  None      Assessment and Plan:   Multiple myeloma in remission  - IgA kappa MM, diagnosed June 2023, stage unclear as no FISH available  - See oncologic history above  - Admitted for [facility] 140 Auto SCT on 4/16/2024  - 7/23/24: Day +100 restaging bone marrow biopsy on 7/23/24 demonstrated a normocellular  marrow with increased eosinophils and tri-lineage hematopoiesis. About 6-8%  positive plasma cells were evident.  No increased CD34 positive blasts were identified.  CD61 highlighted decreased number  of megakaryocytes. MPO showed myeloid precursors. No monotypic plasma cells identified (MRD-negative) on MRD flow assay.  - Continue lenalidomide 5 mg (dose-reduced for neutropenia), tolerating well  - 02/13/25 biochemical studies show CR  - Continue monthly cbc, cmp, serum free light chains, quantitative immunoglobulins, serum electropheresis, serum immunofixation lab locally and at least q 2 month provider fu   - Plan for 1 year restaging here at [facility] with MRD study   - Plan for virtual follow up at [facility] after restating    S/P autologous bone marrow transplantation  - SANG[facility] autoSCT; received 5 bags on 4/18/24 at 1330 and received the remaining 4 bags on 4/19/24 at 1330. Total CD34 dose was 2.91 x 10^6.  - Today is day +308   - 7/23/24: Day +100 restaging bone marrow biopsy on 7/23/24 demonstrated a normocellular marrow with increased eosinophils and tri-lineage hematopoiesis. About 6-8%  positive plasma cells were evident.  No increased CD34 positive blasts were identified.  CD61 highlighted decreased number  of megakaryocytes. MPO showed myeloid precursors. No monotypic plasma cells identified (MRD-negative) on MRD flow assay.  - PET CT on 7/23/24 showed here is interval normalization of uptake within the previously hypermetabolic lesions. No new hypermetabolic osseous lesions identified.  - Plan for 1 year restaging, as above    Immunodeficiency due to drugs  - Continue acyclovir two times daily through 1 year for shingles prophylaxis  - Getting post-transplant vaccines locally    Other thrombophilia  - Continue ASA 81 mg daily for VTE prophylaxis with revlimid        BMT Chart Routing      Follow up with physician . Willie Wang/Shahab, 1 year restaging, 1 week after biopsy/PET for results   Follow up with  [facility]    Provider visit type    Infusion scheduling note    Injection scheduling note    Labs CBC, CMP, free light chains, immunofixation, immunoglobulins and SPEP   Scheduling:  Preferred lab:  Lab interval:     Imaging PET scan   Mid-April labs, PET Scan + clinic [facility] on same day need lunchtime or early afternoon as driving from 3 hours away   Pharmacy appointment    Other referrals                   Orders Placed:      Orders Placed This Encounter    Bone marrow    NM PET CT FDG Whole Body    CBC Auto Differential    Comprehensive Metabolic Panel    Immunofixation Electrophoresis    Immunoglobulin Free LT Chains Blood    Immunoglobulins (IgG, IgA, IgM) Quantitative    Protein Electrophoresis, Serum    Leukemia/Lymphoma Screen - Bone Marrow Left Posterior Iliac Crest    Plasma Cell Proliferative Disorder (PCPD), FISH    Multiple Myeloma MRD by Flow, BM    HEME DISORDERS DNA/RNA HOLD, Blood    Specimen to Pathology, Bone Marrow Aspiration/Biopsy       Visit today included increased complexity associated with the care of the episodic problem restaging addressed and managing the longitudinal care of the patient due to the serious and/or complex managed problem(s) multiple myeloma, s/p autologous stem cell transplant.     Thank you for allowing me to partake in the care of this patient. If there are any questions, please do not hesitate to reach out.    Rubia Keller, FNP-C  Hematologic Malignancies, Stem Cell Transplantation, and Cellular Therapy  [facility]

## 2025-02-20 ENCOUNTER — OFFICE VISIT (OUTPATIENT)
Dept: HEMATOLOGY/ONCOLOGY | Facility: CLINIC | Age: 75
End: 2025-02-20
Payer: MEDICARE

## 2025-02-20 DIAGNOSIS — D68.69 OTHER THROMBOPHILIA: ICD-10-CM

## 2025-02-20 DIAGNOSIS — Z94.81 S/P AUTOLOGOUS BONE MARROW TRANSPLANTATION: ICD-10-CM

## 2025-02-20 DIAGNOSIS — C90.01 MULTIPLE MYELOMA IN REMISSION: Primary | ICD-10-CM

## 2025-02-20 DIAGNOSIS — D84.821 IMMUNODEFICIENCY DUE TO DRUGS: ICD-10-CM

## 2025-02-20 DIAGNOSIS — Q99.9 CHROMOSOMAL ABNORMALITY, UNSPECIFIED: ICD-10-CM

## 2025-02-20 DIAGNOSIS — Z79.899 IMMUNODEFICIENCY DUE TO DRUGS: ICD-10-CM

## 2025-02-26 DIAGNOSIS — C90.01 MULTIPLE MYELOMA IN REMISSION: ICD-10-CM

## 2025-02-26 RX ORDER — LENALIDOMIDE 5 MG/1
CAPSULE ORAL
Qty: 28 CAPSULE | Refills: 0 | Status: SHIPPED | OUTPATIENT
Start: 2025-02-26

## 2025-03-06 ENCOUNTER — LAB VISIT (OUTPATIENT)
Dept: LAB | Facility: HOSPITAL | Age: 75
End: 2025-03-06
Payer: MEDICARE

## 2025-03-06 DIAGNOSIS — Z94.81 S/P AUTOLOGOUS BONE MARROW TRANSPLANTATION: ICD-10-CM

## 2025-03-06 DIAGNOSIS — C90.01 MULTIPLE MYELOMA IN REMISSION: ICD-10-CM

## 2025-03-06 DIAGNOSIS — C90.01 MULTIPLE MYELOMA IN REMISSION: Primary | ICD-10-CM

## 2025-03-06 LAB
ALBUMIN SERPL-MCNC: 3.1 G/DL (ref 3.4–4.8)
ALBUMIN/GLOB SERPL: 0.7 RATIO (ref 1.1–2)
ALP SERPL-CCNC: 81 UNIT/L (ref 40–150)
ALT SERPL-CCNC: 153 UNIT/L (ref 0–55)
ANION GAP SERPL CALC-SCNC: 9 MEQ/L
AST SERPL-CCNC: 74 UNIT/L (ref 5–34)
BASOPHILS # BLD AUTO: 0.02 X10(3)/MCL
BASOPHILS NFR BLD AUTO: 0.7 %
BILIRUB SERPL-MCNC: 0.7 MG/DL
BUN SERPL-MCNC: 22 MG/DL (ref 8.4–25.7)
CALCIUM SERPL-MCNC: 9.4 MG/DL (ref 8.8–10)
CHLORIDE SERPL-SCNC: 104 MMOL/L (ref 98–107)
CO2 SERPL-SCNC: 23 MMOL/L (ref 23–31)
CREAT SERPL-MCNC: 1.25 MG/DL (ref 0.72–1.25)
CREAT/UREA NIT SERPL: 18
EOSINOPHIL # BLD AUTO: 0.37 X10(3)/MCL (ref 0–0.9)
EOSINOPHIL NFR BLD AUTO: 12.5 %
ERYTHROCYTE [DISTWIDTH] IN BLOOD BY AUTOMATED COUNT: 12.9 % (ref 11.5–17)
GFR SERPLBLD CREATININE-BSD FMLA CKD-EPI: 60 ML/MIN/1.73/M2
GLOBULIN SER-MCNC: 4.2 GM/DL (ref 2.4–3.5)
GLUCOSE SERPL-MCNC: 192 MG/DL (ref 82–115)
HCT VFR BLD AUTO: 29.1 % (ref 42–52)
HGB BLD-MCNC: 10 G/DL (ref 14–18)
IGA SERPL-MCNC: 204 MG/DL (ref 101–645)
IGG SERPL-MCNC: 1284 MG/DL (ref 540–1822)
IGM SERPL-MCNC: 61 MG/DL (ref 22–240)
IMM GRANULOCYTES # BLD AUTO: 0.01 X10(3)/MCL (ref 0–0.04)
IMM GRANULOCYTES NFR BLD AUTO: 0.3 %
LYMPHOCYTES # BLD AUTO: 0.74 X10(3)/MCL (ref 0.6–4.6)
LYMPHOCYTES NFR BLD AUTO: 25 %
MAGNESIUM SERPL-MCNC: 2.1 MG/DL (ref 1.6–2.6)
MCH RBC QN AUTO: 35.7 PG (ref 27–31)
MCHC RBC AUTO-ENTMCNC: 34.4 G/DL (ref 33–36)
MCV RBC AUTO: 103.9 FL (ref 80–94)
MONOCYTES # BLD AUTO: 0.32 X10(3)/MCL (ref 0.1–1.3)
MONOCYTES NFR BLD AUTO: 10.8 %
NEUTROPHILS # BLD AUTO: 1.5 X10(3)/MCL (ref 2.1–9.2)
NEUTROPHILS NFR BLD AUTO: 50.7 %
NRBC BLD AUTO-RTO: 0 %
PLATELET # BLD AUTO: 64 X10(3)/MCL (ref 130–400)
PMV BLD AUTO: 10.4 FL (ref 7.4–10.4)
POTASSIUM SERPL-SCNC: 3.7 MMOL/L (ref 3.5–5.1)
PROT SERPL-MCNC: 7.3 GM/DL (ref 5.8–7.6)
RBC # BLD AUTO: 2.8 X10(6)/MCL (ref 4.7–6.1)
SODIUM SERPL-SCNC: 136 MMOL/L (ref 136–145)
WBC # BLD AUTO: 2.96 X10(3)/MCL (ref 4.5–11.5)

## 2025-03-06 PROCEDURE — 83521 IG LIGHT CHAINS FREE EACH: CPT

## 2025-03-06 PROCEDURE — 83735 ASSAY OF MAGNESIUM: CPT

## 2025-03-06 PROCEDURE — 85025 COMPLETE CBC W/AUTO DIFF WBC: CPT

## 2025-03-06 PROCEDURE — 80053 COMPREHEN METABOLIC PANEL: CPT

## 2025-03-06 PROCEDURE — 36415 COLL VENOUS BLD VENIPUNCTURE: CPT

## 2025-03-06 PROCEDURE — 84165 PROTEIN E-PHORESIS SERUM: CPT

## 2025-03-06 PROCEDURE — 82784 ASSAY IGA/IGD/IGG/IGM EACH: CPT

## 2025-03-07 LAB
ALBUMIN % SPEP (OHS): 43.86 (ref 48.1–59.5)
ALBUMIN SERPL-MCNC: 2.8 G/DL (ref 3.4–4.8)
ALBUMIN/GLOB SERPL: 0.8 RATIO (ref 1.1–2)
ALPHA 1 GLOB (OHS): 0.35 GM/DL (ref 0–0.4)
ALPHA 1 GLOB% (OHS): 5.4 (ref 2.3–4.9)
ALPHA 2 GLOB % (OHS): 15.11 (ref 6.9–13)
ALPHA 2 GLOB (OHS): 0.97 GM/DL (ref 0.4–1)
BETA GLOB (OHS): 0.91 GM/DL (ref 0.7–1.3)
BETA GLOB% (OHS): 14.19 (ref 13.8–19.7)
GAMMA GLOBULIN % (OHS): 21.44 (ref 10.1–21.9)
GAMMA GLOBULIN (OHS): 1.37 GM/DL (ref 0.4–1.8)
GLOBULIN SER-MCNC: 3.6 GM/DL (ref 2.4–3.5)
KAPPA LC FREE SER NEPH-MCNC: 6.19 MG/DL (ref 0.33–1.94)
KAPPA LC FREE/LAMBDA FREE SER NEPH: 1.09 {RATIO} (ref 0.26–1.65)
LAMBDA LC FREE SERPL-MCNC: 5.67 MG/DL (ref 0.57–2.63)
M SPIKE % (OHS): ABNORMAL
M SPIKE (OHS): ABNORMAL
PATH REV: NORMAL
PROT SERPL-MCNC: 6.4 GM/DL (ref 5.8–7.6)

## 2025-03-13 ENCOUNTER — LAB VISIT (OUTPATIENT)
Dept: LAB | Facility: HOSPITAL | Age: 75
End: 2025-03-13
Payer: MEDICARE

## 2025-03-13 DIAGNOSIS — C90.01 MULTIPLE MYELOMA IN REMISSION: ICD-10-CM

## 2025-03-13 DIAGNOSIS — Z94.81 S/P AUTOLOGOUS BONE MARROW TRANSPLANTATION: ICD-10-CM

## 2025-03-13 LAB
ALBUMIN SERPL-MCNC: 3.1 G/DL (ref 3.4–4.8)
ALBUMIN/GLOB SERPL: 0.8 RATIO (ref 1.1–2)
ALP SERPL-CCNC: 112 UNIT/L (ref 40–150)
ALT SERPL-CCNC: 109 UNIT/L (ref 0–55)
ANION GAP SERPL CALC-SCNC: 7 MEQ/L
AST SERPL-CCNC: 22 UNIT/L (ref 5–34)
BASOPHILS # BLD AUTO: 0.04 X10(3)/MCL
BASOPHILS NFR BLD AUTO: 1.1 %
BILIRUB SERPL-MCNC: 0.6 MG/DL
BUN SERPL-MCNC: 16 MG/DL (ref 8.4–25.7)
CALCIUM SERPL-MCNC: 9.5 MG/DL (ref 8.8–10)
CHLORIDE SERPL-SCNC: 105 MMOL/L (ref 98–107)
CO2 SERPL-SCNC: 26 MMOL/L (ref 23–31)
CREAT SERPL-MCNC: 1.22 MG/DL (ref 0.72–1.25)
CREAT/UREA NIT SERPL: 13
EOSINOPHIL # BLD AUTO: 1.07 X10(3)/MCL (ref 0–0.9)
EOSINOPHIL NFR BLD AUTO: 30.3 %
ERYTHROCYTE [DISTWIDTH] IN BLOOD BY AUTOMATED COUNT: 13.1 % (ref 11.5–17)
GFR SERPLBLD CREATININE-BSD FMLA CKD-EPI: >60 ML/MIN/1.73/M2
GLOBULIN SER-MCNC: 4.1 GM/DL (ref 2.4–3.5)
GLUCOSE SERPL-MCNC: 214 MG/DL (ref 82–115)
HCT VFR BLD AUTO: 29.8 % (ref 42–52)
HGB BLD-MCNC: 10.1 G/DL (ref 14–18)
IMM GRANULOCYTES # BLD AUTO: 0.01 X10(3)/MCL (ref 0–0.04)
IMM GRANULOCYTES NFR BLD AUTO: 0.3 %
LYMPHOCYTES # BLD AUTO: 0.79 X10(3)/MCL (ref 0.6–4.6)
LYMPHOCYTES NFR BLD AUTO: 22.4 %
MCH RBC QN AUTO: 36.2 PG (ref 27–31)
MCHC RBC AUTO-ENTMCNC: 33.9 G/DL (ref 33–36)
MCV RBC AUTO: 106.8 FL (ref 80–94)
MONOCYTES # BLD AUTO: 0.43 X10(3)/MCL (ref 0.1–1.3)
MONOCYTES NFR BLD AUTO: 12.2 %
NEUTROPHILS # BLD AUTO: 1.19 X10(3)/MCL (ref 2.1–9.2)
NEUTROPHILS NFR BLD AUTO: 33.7 %
NRBC BLD AUTO-RTO: 0 %
PLATELET # BLD AUTO: 76 X10(3)/MCL (ref 130–400)
PMV BLD AUTO: 10.1 FL (ref 7.4–10.4)
POTASSIUM SERPL-SCNC: 4.2 MMOL/L (ref 3.5–5.1)
PROT SERPL-MCNC: 7.2 GM/DL (ref 5.8–7.6)
RBC # BLD AUTO: 2.79 X10(6)/MCL (ref 4.7–6.1)
SODIUM SERPL-SCNC: 138 MMOL/L (ref 136–145)
WBC # BLD AUTO: 3.53 X10(3)/MCL (ref 4.5–11.5)

## 2025-03-13 PROCEDURE — 36415 COLL VENOUS BLD VENIPUNCTURE: CPT

## 2025-03-13 PROCEDURE — 85025 COMPLETE CBC W/AUTO DIFF WBC: CPT

## 2025-03-13 PROCEDURE — 80053 COMPREHEN METABOLIC PANEL: CPT

## 2025-03-14 ENCOUNTER — OFFICE VISIT (OUTPATIENT)
Facility: CLINIC | Age: 75
End: 2025-03-14
Payer: MEDICARE

## 2025-03-14 VITALS
RESPIRATION RATE: 16 BRPM | BODY MASS INDEX: 29.65 KG/M2 | WEIGHT: 207.13 LBS | TEMPERATURE: 98 F | OXYGEN SATURATION: 97 % | HEART RATE: 61 BPM | HEIGHT: 70 IN | SYSTOLIC BLOOD PRESSURE: 138 MMHG | DIASTOLIC BLOOD PRESSURE: 62 MMHG

## 2025-03-14 DIAGNOSIS — Z94.84 HISTORY OF AUTOLOGOUS STEM CELL TRANSPLANT: ICD-10-CM

## 2025-03-14 DIAGNOSIS — D61.818 PANCYTOPENIA: ICD-10-CM

## 2025-03-14 DIAGNOSIS — Z79.899 IMMUNODEFICIENCY DUE TO DRUGS: ICD-10-CM

## 2025-03-14 DIAGNOSIS — Z79.899 LONG-TERM USE OF HIGH-RISK MEDICATION: ICD-10-CM

## 2025-03-14 DIAGNOSIS — C90.01 MULTIPLE MYELOMA IN REMISSION: Primary | ICD-10-CM

## 2025-03-14 DIAGNOSIS — D84.821 IMMUNODEFICIENCY DUE TO DRUGS: ICD-10-CM

## 2025-03-14 PROCEDURE — 99215 OFFICE O/P EST HI 40 MIN: CPT | Mod: PBBFAC | Performed by: NURSE PRACTITIONER

## 2025-03-14 PROCEDURE — 99999 PR PBB SHADOW E&M-EST. PATIENT-LVL V: CPT | Mod: PBBFAC,,, | Performed by: NURSE PRACTITIONER

## 2025-03-14 NOTE — PROGRESS NOTES
HEMATOLOGIC MALIGNANCIES PROGRESS NOTE    Pathology:  23 Bone marrow aspiration/Biopsy:   PLASMA CELL MYELOMA, KAPPA RESTRICTED.     PLASMA CELL MYELOMA INVOLVES 90% OF BONE MARROW CELLULARITY WITH SMALL AREAS OF  SEQUENTIAL TRILINEAGE HEMATOPOIESIS.    ABSENT IRON STORES.     PERIPHERAL BLOOD: NORMOCYTIC NORMOCHROMIC ANEMIA. THROMBOCYTOPENIA.      LABS:  23 M-spike 3.33, IgA >7040, serum kappa 5/ lambda 0.56/ K:L ratio 9.09, 24 hour urine for M protein done but not resulted. Calcium 13.4/Alb 1.9, Cr 2.3, globulin 10.7  23 M-spike 2.49. IgA >7040, Corrected calcium 12.3, Cr 2.09, Globulin 7.6  8/15/23 M-spike 1.49, IgA 4000  23 M-spike 1.0, IgA 1700  - Transplant oncologist: Dr. Evans, follows locally with Dr. Rose in [place]  - diagnosed 2023 with IgA Kappa MM; stage unclear at diagnosis as FISH not available  - started DRd therapy on 2023  - he stopped the daratumumab after cycle 4 and continue with revlimid and dex only; looks to have completed ~5 cycles  - Admitted for Carmen 140 Auto SCT on 2024  IDENTIFYING STATEMENT   Chip Yadav) is a 73 y.o. male with a  of 1950 from [place], [place] with the diagnosis of multiple myeloma.      ONCOLOGY HISTORY:    INTERVAL HISTORY -  He returns to the clinic today for a one month follow-up.  He continues his revlimid 5mg daily as prescribed.  He feels well today, although he does feel like he has fluid in his ears bilaterally x 2 weeks now. Cough improved after completion of abx.  Rash is stable, but continues to itch at night. He has restarted his vaccine schedule at the health clinic in [place]. He did have a MMG completed 2024 for concern of mass to left breast. MMG showed ARABELLA.       Past Medical History, Past Social History and Past Family History have been reviewed and are unchanged except as noted in the interval history.      Review of Systems   Constitutional:  Positive for malaise/fatigue. Negative for  chills, fever and weight loss.   HENT:  Negative for congestion, nosebleeds and tinnitus.    Eyes:  Negative for double vision, pain and discharge.   Respiratory:  Negative for cough, sputum production, shortness of breath and stridor.    Cardiovascular:  Negative for chest pain, claudication and PND.   Gastrointestinal:  Negative for abdominal pain, diarrhea, melena, nausea and vomiting.   Genitourinary:  Negative for frequency and hematuria.   Skin:  Negative for rash.   Neurological:  Negative for weakness.   Endo/Heme/Allergies:  Positive for environmental allergies (using OTC antihistamine and nasal sray thru PCP rec's).   Psychiatric/Behavioral:  Negative for depression.       Vitals:    03/14/25 1017   BP: 138/62   Pulse: 61   Resp: 16   Temp: 97.6 °F (36.4 °C)         Physical Exam  Vitals reviewed.   Constitutional:       General: He is not in acute distress.     Appearance: Normal appearance. He is normal weight.   HENT:      Head: Normocephalic and atraumatic.      Mouth/Throat:      Mouth: Mucous membranes are moist.      Pharynx: Oropharynx is clear. No oropharyngeal exudate or posterior oropharyngeal erythema.   Eyes:      Conjunctiva/sclera: Conjunctivae normal.   Cardiovascular:      Rate and Rhythm: Normal rate and regular rhythm.   Pulmonary:      Effort: Pulmonary effort is normal.      Breath sounds: Normal breath sounds.   Abdominal:      General: Bowel sounds are normal.      Palpations: Abdomen is soft.   Musculoskeletal:         General: No swelling. Normal range of motion.      Cervical back: Normal range of motion and neck supple.   Lymphadenopathy:      Cervical: No cervical adenopathy.   Skin:     General: Skin is warm and dry.      Findings: No rash.   Neurological:      Mental Status: He is alert and oriented to person, place, and time. Mental status is at baseline.   Psychiatric:         Mood and Affect: Mood normal.         Behavior: Behavior normal.          Current Outpatient  Medications   Medication Sig    acyclovir (ZOVIRAX) 800 MG Tab Take 1 tablet (800 mg total) by mouth 2 (two) times daily.    albuterol (PROVENTIL/VENTOLIN HFA) 90 mcg/actuation inhaler 2 puffs every 4 to 6 hours as needed.    ALPRAZolam (XANAX) 0.5 MG tablet Take 0.5 mg by mouth daily as needed for Anxiety.    amLODIPine (NORVASC) 10 MG tablet Take 1 tablet (10 mg total) by mouth every morning.    ascorbic acid, vitamin C, (VITAMIN C) 500 MG tablet Take 500 mg by mouth once daily.    atorvastatin (LIPITOR) 10 MG tablet Take 10 mg by mouth every evening.    carvediloL (COREG) 6.25 MG tablet Take 1 tablet (6.25 mg total) by mouth 2 (two) times daily.    cholecalciferol, vitamin D3, (VITAMIN D3) 125 mcg (5,000 unit) Tab Take 5,000 Units by mouth once daily.    famotidine (PEPCID) 20 MG tablet Take 20 mg by mouth.    ferrous sulfate 325 (65 FE) MG EC tablet Take 325 mg by mouth once daily.    finasteride (PROSCAR) 5 mg tablet Take 5 mg by mouth every morning.    furosemide (LASIX) 20 MG tablet TAKE ONE TABLET BY MOUTH ONCE DAILY FOR 5 DAYS    hydrALAZINE (APRESOLINE) 50 MG tablet Take 50 mg by mouth 3 (three) times daily.    HYDROcodone-acetaminophen (NORCO) 7.5-325 mg per tablet Take 1 tablet by mouth every 4 (four) hours as needed for Pain.    levothyroxine (SYNTHROID) 112 MCG tablet Take 112 mcg by mouth every evening.    nitroGLYCERIN (NITROSTAT) 0.4 MG SL tablet place one tablet under the tongue every five minutes as needed for chest pain up to 3 doses. if no relief call 911    Ablynx ULTRA TEST Strp USE TO TEST BLOOD GLUCOSE TWICE DAILY    pantoprazole (PROTONIX) 40 MG tablet Take 1 tablet (40 mg total) by mouth once daily.    ranolazine (RANEXA) 500 MG Tb12 Take 500 mg by mouth 2 (two) times daily.    REVLIMID 5 mg Cap TAKE 1 CAPSULE BY MOUTH DAILY  FOR 28 DAYS    spironolactone (ALDACTONE) 25 MG tablet Take 12.5 mg by mouth.     Current Facility-Administered Medications   Medication    0.9%  NaCl infusion (for  blood administration)    0.9%  NaCl infusion (for blood administration)    0.9%  NaCl infusion (for blood administration)    pneumoc 20-edward conj-dip cr(PF) (PREVNAR-20 (PF)) injection Syrg 0.5 mL    sars-cov-2 (covid-19) (Spikevax (Moderna) (12yrs and up 2023)) 50 mcg/0.5 mL injection 0.5 mL         Lab Results   Component Value Date    WBC 3.53 (L) 03/13/2025    HGB 10.1 (L) 03/13/2025    HCT 29.8 (L) 03/13/2025    .8 (H) 03/13/2025    PLT 76 (L) 03/13/2025       Gran # (ANC)   Date Value Ref Range Status   11/04/2024 2.6 1.8 - 7.7 K/uL Final     Gran %   Date Value Ref Range Status   11/04/2024 46.2 38.0 - 73.0 % Final     CMP  Sodium   Date Value Ref Range Status   03/13/2025 138 136 - 145 mmol/L Final   11/04/2024 141 136 - 145 mmol/L Final     Potassium   Date Value Ref Range Status   03/13/2025 4.2 3.5 - 5.1 mmol/L Final   11/04/2024 3.7 3.5 - 5.1 mmol/L Final     Chloride   Date Value Ref Range Status   03/13/2025 105 98 - 107 mmol/L Final   11/04/2024 106 95 - 110 mmol/L Final     CO2   Date Value Ref Range Status   03/13/2025 26 23 - 31 mmol/L Final   11/04/2024 26 23 - 29 mmol/L Final     Glucose   Date Value Ref Range Status   11/04/2024 161 (H) 70 - 110 mg/dL Final     BUN   Date Value Ref Range Status   11/04/2024 19 8 - 23 mg/dL Final     Blood Urea Nitrogen   Date Value Ref Range Status   03/13/2025 16.0 8.4 - 25.7 mg/dL Final     Creatinine   Date Value Ref Range Status   03/13/2025 1.22 0.72 - 1.25 mg/dL Final   11/04/2024 1.3 0.5 - 1.4 mg/dL Final     Calcium   Date Value Ref Range Status   03/13/2025 9.5 8.8 - 10.0 mg/dL Final   11/04/2024 9.5 8.7 - 10.5 mg/dL Final     Total Protein   Date Value Ref Range Status   11/04/2024 6.7 6.0 - 8.4 g/dL Final     Albumin   Date Value Ref Range Status   03/13/2025 3.1 (L) 3.4 - 4.8 g/dL Final   11/04/2024 3.8 3.5 - 5.2 g/dL Final     Total Bilirubin   Date Value Ref Range Status   11/04/2024 1.4 (H) 0.1 - 1.0 mg/dL Final     Comment:     For infants  and newborns, interpretation of results should be based  on gestational age, weight and in agreement with clinical  observations.    Premature Infant recommended reference ranges:  Up to 24 hours.............<8.0 mg/dL  Up to 48 hours............<12.0 mg/dL  3-5 days..................<15.0 mg/dL  6-29 days.................<15.0 mg/dL       Bilirubin Total   Date Value Ref Range Status   03/13/2025 0.6 <=1.5 mg/dL Final     Alkaline Phosphatase   Date Value Ref Range Status   11/04/2024 100 40 - 150 U/L Final     ALP   Date Value Ref Range Status   03/13/2025 112 40 - 150 unit/L Final     AST   Date Value Ref Range Status   03/13/2025 22 5 - 34 unit/L Final   11/04/2024 16 10 - 40 U/L Final     ALT   Date Value Ref Range Status   03/13/2025 109 (H) 0 - 55 unit/L Final   11/04/2024 32 10 - 44 U/L Final     Anion Gap   Date Value Ref Range Status   11/04/2024 9 8 - 16 mmol/L Final     eGFR   Date Value Ref Range Status   03/13/2025 >60 mL/min/1.73/m2 Final     Comment:     Estimated GFR calculated using the CKD-EPI creatinine (2021) equation.   11/04/2024 57.3 (A) >60 mL/min/1.73 m^2 Final     IgG   Date Value Ref Range Status   11/04/2024 1119 650 - 1600 mg/dL Final     Comment:     IgG Cord Blood Reference Range: 650-1600 mg/dL.     IgA   Date Value Ref Range Status   11/04/2024 159 40 - 350 mg/dL Final     Comment:     IgA Cord Blood Reference Range: <5 mg/dL.     IgM   Date Value Ref Range Status   11/04/2024 49 (L) 50 - 300 mg/dL Final     Comment:     IgM Cord Blood Reference Range: <25 mg/dL.     IgM Level   Date Value Ref Range Status   03/06/2025 61.0 22.0 - 240.0 mg/dL Final     Kappa Free Light Chains   Date Value Ref Range Status   11/04/2024 7.08 (H) 0.33 - 1.94 mg/dL Final   03/11/2024 2.00 (H) 0.33 - 1.94 mg/dL Final     Lambda Free Light Chains   Date Value Ref Range Status   11/04/2024 4.81 (H) 0.57 - 2.63 mg/dL Final   03/11/2024 1.09 0.57 - 2.63 mg/dL Final     Kappa/Lambda FLC Ratio   Date Value Ref  Range Status   11/04/2024 1.47 0.26 - 1.65 Final     Comment:     Undetected antigen excess is a rare event but cannot   be excluded. If these free light chain results do not   agree with other clinical or laboratory findings or   if the sample is from a patient that has previously   demonstrated antigen excess, discuss with the testing   laboratory.   Results should always be interpreted in conjunction   with other laboratory tests and clinical evidence.     03/11/2024 1.83 (H) 0.26 - 1.65 Final     Comment:     Undetected antigen excess is a rare event but cannot   be excluded. If these free light chain results do not   agree with other clinical or laboratory findings or   if the sample is from a patient that has previously   demonstrated antigen excess, discuss with the testing   laboratory.   Results should always be interpreted in conjunction   with other laboratory tests and clinical evidence.       Pathologist Interpretation SPE   Date Value Ref Range Status   11/04/2024 REVIEWED  Final     Comment:       Electronically reviewed and signed by:  Sydney Wallace MD  Signed on 11/05/24 at 13:34  Normal total protein.  There is a paraprotein band in gamma = 0.32 g/dL.      03/11/2024 REVIEWED  Final     Comment:       Electronically reviewed and signed by:  Shey Moore D.O.  Signed on 03/12/24 at 21:50  Normal total protein. Normal gamma globulins are decreased.  Paraprotein peak in beta-2 = 0.39 g/dL (previously, 0.16 g/dL) and   newly noted 2nd peak in near-gamma <0.1 g/dL (no previous value).       Pathologist Interpretation PAO   Date Value Ref Range Status   11/04/2024 REVIEWED  Final     Comment:       Electronically reviewed and signed by:  Sydney Wallace MD  Signed on 11/05/24 at 13:33  IgG kappa specific monoclonal band present.        Bone marrow biopsy  - 7/23/24     Component 3 mo ago   Final Pathologic Diagnosis     RIGHT ILIAC CREST BONE MARROW ASPIRATE, BONE MARROW CLOT, AND BONE MARROW  CORE BIOPSY WITH:    CELLULARITY=30%, TRILINEAGE HEMATOPOIETIC ACTIVITY (M/E=1.6:1).  NO DEFINITIVE MORPHOLOGIC OR IMMUNOPHENOTYPIC EVIDENCE OF RESIDUAL PLASMA CELL NEOPLASM.  SEE COMMENT.  INCREASED EOSINOPHILS.  INCREASED STORAGE IRON.  DECREASED NUMBER OF MEGAKARYOCYTES.          PET scan -7/23/24  Impression:     In this patient with multiple myeloma, there is interval normalization of previously hypermetabolic osseous lesions, compatible with favorable response to therapy.  No new hypermetabolic lesions.     Additional findings as above.     Electronically signed by resident: Stephany Rosenbaum  Date:                                            07/23/2024  Time:                                           14:10     Electronically signed by:Janice Fontenot  Date:                                            07/23/2024  Time:                                           15:07    Assessment/Plan      IgA kappa multiple myeloma in remission  - he follows locally with in [place]  - diagnosed June 2023 with IgA Kappa MM; stage unclear at diagnosis as FISH not available  - started DRd therapy on 7/18/2023  - he stopped the daratumumab after cycle 4 and continue with revlimid and dex only; looks to have completed ~5 cycles  - Admitted for Carmen 140 Auto SCT on 4/16/2024  -day+100 restaging bone marrow biopsy on 7/23/24 demonstrated a normocellular marrow with increased eosinophils and tri-lineage hematopoiesis   -About 6-8%  positive plasma cells were evident.  No increased CD34 positive blasts were identified.  CD61 highlighted decreased number  of megakaryocytes.  MPO showed myeloid precursors.  -- No monotypic plasma cells identified (MRD-negative) on MRD flow assay.  -There was no definitive morphologic or immunophenotypic evidence of residual plasma cell neoplasm  -he will continue geronimo 5mg maintenance daily (Dose reduced for neutropenia)     - his 11/4/24 biochemical studies show normal SFLC ratio; there is a new  small m spike but I suspect this Is reactive in light of July 2024 marrow results; fu on next biochemical studies  -continue asa, acyclovir; can stop dapsone  now day +180  -recommend continue monthly cbc, cmp, serum free light chains, quantitative immunoglobulins, serum electropheresis, serum immunofixation lab locally and at least q 2 month provider fu    -[facility] GRANADO appt at transplant in 3 months  -return to transplant for one year restaging     History of auto stem cell transplant:   - Day 0 on 4/18/2024. Conditioned with Carmen 140.   -day+ 200 today  - He received 5 bags on 4/18/24 at 1330 and received the remaining 4 bags on 4/19/24 at 1330. Total CD34 dose was 2.91 x 10^6.   - day+100 restaging bone marrow biopsy on 7/23/24 demonstrated a normocellular marrow with increased eosinophils and tri-lineage hematopoiesis   -About 6-8%  positive plasma cells were evident.  No increased CD34 positive blasts were identified.  CD61 highlighted decreased number  of megakaryocytes.  MPO showed myeloid precursors.  -- No monotypic plasma cells identified (MRD-negative) on MRD flow assay.  -There was no definitive morphologic or immunophenotypic evidence of residual plasma cell neoplasm.     -PET CT on 7/23/24 showed here is interval normalization of uptake within the previously hypermetabolic lesions.   No new hypermetabolic osseous lesions identified.    Immunodeficiency due to conditions classified elsewhere:   - Continue acyclovir antiviral prophylaxis   - stop dapsone now day +180    4. Rev induced cytopenias  -- above further dose adjustment thresholds; monitor     5. Cancer associated pain    -he has ongoing low back pain  -no recent falls  -he is using hydrocodone-APAP as needed  -he tried lidoderm patch without success  -he has been using cyclobenzaprine 5mg TID as needed  -Stable today.    6. Immunization    --he has been evaluated by ID   -currently receiving vaccines at [facility].     -Lab  reviewed, no Mspike on most recent labs, normal immunoglobulins, K/L ratio normal.  CBC/CMP stable values for him.  -Ongoing OTC nasal spray and Claritin daily as needed.    -ongoing Revlimid maintenance.  -Has PET and BMBX and followup with [facility] in April.  -RTC in two months with labs prior to NP visit.     I personally reviewed all pertinent imaging, lab results, explained to the patient the pertinent information in detail including radiographic imaging, abnormal lab results. All questions were answered thoroughly.     EDGAR Ledbetter  Oncology/Hematology  [facility]

## 2025-03-18 ENCOUNTER — DOCUMENTATION ONLY (OUTPATIENT)
Dept: HEMATOLOGY/ONCOLOGY | Facility: CLINIC | Age: 75
End: 2025-03-18
Payer: MEDICARE

## 2025-03-18 NOTE — PROGRESS NOTES
Spoke with patient regarding positive food insecurity screening. Patient informed me that he does not have any problems with getting meals to eat. He is even planting tomatoes and newell peppers in garden.

## 2025-03-21 DIAGNOSIS — C90.01 MULTIPLE MYELOMA IN REMISSION: ICD-10-CM

## 2025-03-21 RX ORDER — LENALIDOMIDE 5 MG/1
5 CAPSULE ORAL DAILY
Qty: 28 CAPSULE | Refills: 0 | Status: SHIPPED | OUTPATIENT
Start: 2025-03-21

## 2025-04-10 ENCOUNTER — PATIENT MESSAGE (OUTPATIENT)
Dept: HEMATOLOGY/ONCOLOGY | Facility: CLINIC | Age: 75
End: 2025-04-10
Payer: MEDICARE

## 2025-04-14 ENCOUNTER — HOSPITAL ENCOUNTER (OUTPATIENT)
Dept: RADIOLOGY | Facility: HOSPITAL | Age: 75
Discharge: HOME | End: 2025-04-14
Payer: MEDICARE

## 2025-04-14 DIAGNOSIS — Z94.81 S/P AUTOLOGOUS BONE MARROW TRANSPLANTATION: ICD-10-CM

## 2025-04-14 DIAGNOSIS — C90.01 MULTIPLE MYELOMA IN REMISSION: ICD-10-CM

## 2025-04-14 LAB — POCT GLUCOSE: 133 MG/DL (ref 70–110)

## 2025-04-14 PROCEDURE — 78816 PET IMAGE W/CT FULL BODY: CPT | Mod: TC

## 2025-04-14 PROCEDURE — A9552 F18 FDG: HCPCS

## 2025-04-14 RX ORDER — FLUDEOXYGLUCOSE F18 500 MCI/ML
10 INJECTION INTRAVENOUS
Status: COMPLETED | OUTPATIENT
Start: 2025-04-14 | End: 2025-04-14

## 2025-04-14 RX ADMIN — FLUDEOXYGLUCOSE F-18 9.5 MILLICURIE: 500 INJECTION INTRAVENOUS at 09:04

## 2025-04-15 ENCOUNTER — PROCEDURE VISIT (OUTPATIENT)
Dept: HEMATOLOGY/ONCOLOGY | Facility: CLINIC | Age: 75
End: 2025-04-15
Payer: MEDICARE

## 2025-04-15 VITALS
SYSTOLIC BLOOD PRESSURE: 157 MMHG | HEART RATE: 48 BPM | TEMPERATURE: 98 F | OXYGEN SATURATION: 98 % | RESPIRATION RATE: 18 BRPM | DIASTOLIC BLOOD PRESSURE: 72 MMHG

## 2025-04-15 DIAGNOSIS — C90.01 MULTIPLE MYELOMA IN REMISSION: ICD-10-CM

## 2025-04-15 DIAGNOSIS — Z94.84 HISTORY OF AUTOLOGOUS STEM CELL TRANSPLANT: Primary | ICD-10-CM

## 2025-04-15 DIAGNOSIS — Q99.9 CHROMOSOMAL ABNORMALITY, UNSPECIFIED: ICD-10-CM

## 2025-04-15 PROCEDURE — 88185 FLOWCYTOMETRY/TC ADD-ON: CPT

## 2025-04-15 PROCEDURE — 38222 DX BONE MARROW BX & ASPIR: CPT | Mod: PBBFAC

## 2025-04-15 PROCEDURE — 88184 FLOWCYTOMETRY/ TC 1 MARKER: CPT | Mod: 91

## 2025-04-15 RX ORDER — LIDOCAINE HYDROCHLORIDE 20 MG/ML
10 INJECTION, SOLUTION EPIDURAL; INFILTRATION; INTRACAUDAL; PERINEURAL ONCE
Status: COMPLETED | OUTPATIENT
Start: 2025-04-15 | End: 2025-04-15

## 2025-04-15 RX ADMIN — LIDOCAINE HYDROCHLORIDE 200 MG: 20 INJECTION, SOLUTION EPIDURAL; INFILTRATION; INTRACAUDAL; PERINEURAL at 11:04

## 2025-04-15 NOTE — PROCEDURES
Bone marrow    Date/Time: 4/15/2025 11:00 AM    Performed by: Rubia Keller NP  Authorized by: Rubia Keller NP    Aspiration?: Yes   Biopsy?: Yes      PROCEDURE NOTE:  Date of Procedure: 04/15/2025   Procedure: Bone Marrow Biopsy and Aspiration  Indication: Multiple Myeloma, 1 year post-autoSCT restaging  Consent: Informed consent was obtained from patient.  Timeout: Done and documented.  Position: Prone  Site: Left posterior illiac crest.  Prep: Betadine.  Needle used: 11 gauge Jamshidi needle.  Anesthetic: 2% lidocaine 10 mL.  Biopsy: The biopsy needle was introduced into the marrow cavity and 18 mL of aspirate was obtained without complications and sent for MM MRD, PCPD fish, flow cytometry. Core biopsy obtained without difficulty and sent for routine histologic examination.  Complications: None.  Disposition: The patient was placed supine for 15 min following procedure. RN to assess bandaid for bleeding prior to discharge home.  Blood loss: Minimal.    Discharge instructions for having a Bone Marrow Biopsy:  Keep Bandage in place for 24 hours.  - Do not shower or take a tub bath during this time (you may sponge bathe).  - Call the nurse or physician for excessive bleeding or pain at biopsy site.  - You may take Tylenol, as needed for pain.  - You can call [phone] for any problems during the hours of 8:00 AM - 5:00PM.  - With emergency after 5:00 PM, call [phone] and have the  page the Hematology-Oncologist on call.      MERY Mitchell  Malignant Hematology & Bone Marrow Transplant

## 2025-04-17 DIAGNOSIS — C90.01 MULTIPLE MYELOMA IN REMISSION: ICD-10-CM

## 2025-04-17 LAB — M PCPDS PRE-ANALYSIS CELL SORT: NORMAL

## 2025-04-17 RX ORDER — LENALIDOMIDE 5 MG/1
5 CAPSULE ORAL DAILY
Qty: 28 CAPSULE | Refills: 0 | Status: SHIPPED | OUTPATIENT
Start: 2025-04-17

## 2025-04-17 RX ORDER — LENALIDOMIDE 5 MG/1
CAPSULE ORAL
Qty: 28 CAPSULE | Refills: 0 | Status: SHIPPED | OUTPATIENT
Start: 2025-04-17 | End: 2025-04-17 | Stop reason: SDUPTHER

## 2025-04-18 LAB
% B-CELL PRECURSORS: 3.27 %
% MAST CELLS: 0.19 %
ABNORMAL PLASMA CELL EVENTS: 0
LAB DEVICE DETECTION LIMIT: 2 X10(-6)
LOWER LIMIT OF QUANTITATION (LLOQ): 1 X10(-5)
M DNA/RNA EXTRACT AND HOLD RESULT: NORMAL
M DNA/RNA EXTRACTION: NORMAL
MINIMAL RESIDUAL DISEASE DETECTION (MRD), BONE MARROW: 0 %
PERCENT NORMAL PLASMA CELLS (OF TOTAL PC): 100 %
PLASMA CELLS ASSESS MAR: NORMAL
POLY PLASMA CELL EVENTS: NORMAL
SPECIMEN SOURCE: NORMAL
TOTAL CELLS COUNTED MAR: NORMAL
TOTAL PLASMA CELL EVENTS: NORMAL
VALIDATED ASSAY SENSITIVITY: 2.43 X10(-6)

## 2025-04-25 ENCOUNTER — TELEPHONE (OUTPATIENT)
Dept: HEMATOLOGY/ONCOLOGY | Facility: CLINIC | Age: 75
End: 2025-04-25
Payer: MEDICARE

## 2025-04-25 NOTE — TELEPHONE ENCOUNTER
----- Message from Kellie sent at 4/25/2025 10:22 AM CDT -----  Regarding: Call back  Contact: Ashleigh [phone]  Type:  Sooner Apoointment RequestCaller is requesting a sooner appointment.  Caller declined first available appointment listed below.  Caller will not accept being placed on the waitlist and is requesting a message be sent to doctor.Name of Caller: PT When is the first available appointment? Symptoms: Follow up test results on Biopsy Would the patient rather a call back or a response via [facility]? Call back Best Call Back Number: Ashleigh [phone] Information: patient would like to schedule a virtual

## 2025-05-02 ENCOUNTER — OFFICE VISIT (OUTPATIENT)
Dept: HEMATOLOGY/ONCOLOGY | Facility: CLINIC | Age: 75
End: 2025-05-02
Payer: MEDICARE

## 2025-05-02 DIAGNOSIS — C90.01 MULTIPLE MYELOMA IN REMISSION: ICD-10-CM

## 2025-05-02 DIAGNOSIS — Z79.52 LONG TERM (CURRENT) USE OF SYSTEMIC STEROIDS: ICD-10-CM

## 2025-05-02 DIAGNOSIS — M89.9 BONE LESION: Primary | ICD-10-CM

## 2025-05-02 DIAGNOSIS — R60.0 LOCALIZED EDEMA: ICD-10-CM

## 2025-05-02 RX ORDER — EPINEPHRINE 0.3 MG/.3ML
0.3 INJECTION SUBCUTANEOUS ONCE AS NEEDED
OUTPATIENT
Start: 2025-05-09

## 2025-05-02 RX ORDER — ACYCLOVIR 400 MG/1
400 TABLET ORAL 2 TIMES DAILY
Qty: 180 TABLET | Refills: 3 | Status: SHIPPED | OUTPATIENT
Start: 2025-05-02 | End: 2026-05-02

## 2025-05-02 RX ORDER — HEPARIN 100 UNIT/ML
500 SYRINGE INTRAVENOUS
OUTPATIENT
Start: 2025-05-09

## 2025-05-02 RX ORDER — SODIUM CHLORIDE 0.9 % (FLUSH) 0.9 %
10 SYRINGE (ML) INJECTION
OUTPATIENT
Start: 2025-05-09

## 2025-05-02 RX ORDER — DIPHENHYDRAMINE HYDROCHLORIDE 50 MG/ML
50 INJECTION, SOLUTION INTRAMUSCULAR; INTRAVENOUS ONCE AS NEEDED
OUTPATIENT
Start: 2025-05-09

## 2025-05-02 NOTE — PROGRESS NOTES
The patient location is: Home  The chief complaint leading to consultation is: Myeloma follow up    Visit type: audiovisual    Face to Face time with patient: 30 min   45 minutes of total time spent on the encounter, which includes face to face time and non-face to face time preparing to see the patient (eg, review of tests), Obtaining and/or reviewing separately obtained history, Documenting clinical information in the electronic or other health record, Independently interpreting results (not separately reported) and communicating results to the patient/family/caregiver, or Care coordination (not separately reported).         Each patient to whom he or she provides medical services by telemedicine is:  (1) informed of the relationship between the physician and patient and the respective role of any other health care provider with respect to management of the patient; and (2) notified that he or she may decline to receive medical services by telemedicine and may withdraw from such care at any time.    Notes:      Problem List: 73 yo man with  Multiple Myeloma   - IgA Kappa; Dx June 2023 presenting as symptomatic hypercalcemia.    - Negative skeltal survvey but abnormal T spine lesions on MRI; subsequent CT/PET with spinal lucencies   - S/p Drd x 4   - S/p Melphalan 140 Auto SCT April 16, 2024   - Maintenance revlimid (persistebnt modest cytopenia)  HTN  Diabetes Mellitis  Dyslipidemia  Hypothyroidism    HPI: Mr Goodson returns for follow up of multiple myeloma s/p autologoius stem cell transplant . This was an audiovisual visit with his daughter accompanying Mr Goodson oin this visit. He is just over one year s/p autologious SCT for multiple myeloma. He remains on maintenance revlimid at 5 mg daily. He is tolerating this well.  He recently completed his vaccination schedule. 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 His appetite is well maintained and eats 3 or more meals a  day with snacks in between. He has had no reccent infections and denies having fevers or chills. He does have low back pain. He is not on skeletal supportive therapy. He is off diabetes medications.          PAST MEDICAL HISTORY: He has a history of hypertension and diabetes. He has dyslipidemia. He has CASHD and has had chronic diastolic heart failuure. He has GERD  MEDICATIONS:   ASA 81qd  Carvedill  Hydralazine  Acyclovir 800 bid  Amlodipine  Atorvastatin  Fe  Finasteride  Levothyroxione  Pantoprazole  Ranexa  Spironolactone 12.5 mg  Vit C,D  Zinc  ALLERGIES: Augemntin, Iodine  SOCIAL HISTORY: He is  and lives wwith his wife in [place]. He is a farmer. He is a former smoker and smoked for 30 years  FAMILY HISTORY:    Family History   Problem Relation Name Age of Onset    Lung cancer Mother      Breast cancer Sister          REVIEW OF SYSTEMS: He denies CP or dyspnea. No abd pain. No N/V. He has noticed pitting edema in his LLE but no associated leg pain. No R leg edema noted.     Physical Exam:  PE unable to be preformed due to AV nature of visit. Pt was sitting at table wand was in no distress and spoke fluently    Laboratory Data:   Results for orders placed or performed in visit on 04/15/25   Heme Disorders DNA/RNA Hold -Bone Marrow    Collection Time: 04/15/25 10:57 AM   Result Value Ref Range    Specimen Type BONE MARROW     DNA/RNA Extract and Hold Result see method     DNA/RNA Extraction Performed    Multiple Myeloma MRD by Flow, BM    Collection Time: 04/15/25 10:57 AM   Result Value Ref Range    Minimal Residual Disease Detection, Bone Marrow 0.0000 %    PERCENT NORMAL PLASMA CELLS (OF TOTAL PC) 100.0 %    NON-AGGREGATE EVENTS [id number]     TOTAL PLASMA CELL EVENTS 35730     POLY PLASMA CELL EVENTS 52702     ABNORMAL PLASMA CELL EVENTS 0     Final Diagnosis SEE COMMENTS     % B-CELL PRECURSORS 3.273 %    % MAST CELLS 0.192 %    VALIDATED ASSAY SENSITIVITY 2.43 x10(-6)    LOWER LIMIT OF QUANTITATION  (LLOQ) 1.0 x10(-5)    PATIENT / SAMPLE THEORETICAL LOQ 2.0 x10(-6)   Plasma Cell Proliferative Disorder (PCPD), FISH    Collection Time: 04/15/25 10:57 AM   Result Value Ref Range    Result Summary Normal     Interpretation SEE COMMENTS     Result Table SEE COMMENTS     Result SEE COMMENTS     Reason for Referral SEE COMMENTS     Specimen Bone Marrow     Method SEE COMMENTS     Additional Information SEE COMMENTS     Disclaimer SEE COMMENTS     Released By SEE COMMENTS    Leukemia/Lymphoma Screen - Bone Marrow Left Posterior Iliac Crest    Collection Time: 04/15/25 10:57 AM   Result Value Ref Range    Flow Cytometry Interpretation       No abnormal hematopoietic population is detected in this sample.       Interpreting Pathologist: Kranthi Slater MD          Flow Cytometry Comment       Flow cytometric analysis of bone marrow shows populations of  polytypic B lymphocytes and polytypic plasma cells. T lymphocytes show inverted CD4 to CD8 ratio ( 1:1.9). The blast gate is not increased.    Flow Differential:  Lymphocyte gate 8.7%, Monocyte gate 9.1%, Granulocyte gate 71.1%, Blast gate 0.9%. Debris/nRBC gate 0.7%, Plasma Cells 0.3%, Viability 99.6%.      Antibodies Analyzed         7A-AD    CD10    CD13  CD19  CD20 CD2  CD34  CD38  CD3  CD45  CD4  CD56  CD5 CD7  CD8  cKappa  cLambda  [place]  Lambda         Disclaimer Statement       This test was developed and its performance characteristics determined by the [facility] Department of Pathology and Laboratory Medicine. It has not been cleared or approved by the U.S. Food and Drug Administration. The FDA has determined that such clearance or approval is not necessary. This test is used for clinical purposes. It should not be regarded as investigational or for research. This laboratory is certified under CLIA-88 as qualified to perform high complexity clinical laboratory testing.      PCPDS Pre-Analysis Cell Sorting, BM    Collection Time: 04/15/25 10:57  AM   Result Value Ref Range    PCPDS Pre-Analysis Cell Sort Performed    Specimen to Pathology, Bone Marrow Aspiration/Biopsy    Collection Time: 04/15/25 10:57 AM   Result Value Ref Range    Case Report       [facility] [place] - Bone Marrow                        Case: [id number]                                Authorizing Provider:  Rubia Keller NP   Collected:           04/15/2025 10:57 AM          Ordering Location:     [facility] -        Received:            04/15/2025 11:51 AM                                 Hematology [facility]                                                            Pathologist:           Kranthi Slater MD                                                               Specimens:   1) - Bone Marrow Aspirate, Left Iliac Crest                                                         2) - Bone Marrow Clot, Left Iliac Crest                                                             3) - Bone Marrow Core Bx, Left Iliac Crest                                                 Clinical Information        Multiple myeloma s/p autologous stem cell transplant  (04/16/2024)      Supplemental Report       Plasma cell proliferative disorder, FISH, bone marrow   --Results: Normal.  --Comments: The result is within normal limits for 1q duplication, TP53 deletion and IGH rearrangement.       Final Diagnosis        Bone marrow, left iliac crest (aspirate smear, touch imprint, clot section, and core biopsy):  -- Variably cellular marrow (20-50%) with trilineage hematopoiesis.   -- No morphologic or immunophenotypic evidence of residual/ relapsed plasma cell myeloma.  -- Increased storage iron.  -- See comment.      Comments       Flow cytometric analysis of bone marrow shows populations of  polytypic B lymphocytes and polytypic plasma cells. T lymphocytes show inverted CD4 to CD8 ratio ( 1:1.9). The blast gate is not increased.     Flow cytometric analysis for myeloma MRD was performed at [facility]  Laboratories.  No monotypic plasma cells identified (MRD-negative).    Immunohistochemical stains are performed on the biopsy core and clot section for greater sensitivity and further architectural assessment with adequate controls.  -positive plasma cells are mildly increased ( 5-7%).    Correlation with other lab results is recommended.      Microscopic Description       BONE MARROW ASPIRATE DIFFERENTIAL COUNT:  Blasts-----------------------------------------------1.0%  Promyelocytes----------------------------------- 0.7%  Myelocytes--------------------------------------- 4.3%  Metamyelocytes--------------------------------- 6.3%  Bands----------------------------------------------- 4.3%  PMN Neutrophils-------------------------------- 9.0%  Eosinophils--------------------------------------- 3.0%  Basophils------------------------------------------ 0.0%  Monocytes---------------------------------------- 2.3%  Lymphocytes------------------------------------- 20.3%  Plasma cells-------------------------------------- 6.0%  Erythroid precursors--------------------------- 42.7%    BONE MARROW ASPIRATE SMEAR:  The smears contain cellular spicules. The myeloid to erythroid ratio is approximately 0.7:1.0. Both myeloid and erythroid precursors display orderly maturation. Blasts are not increased. Megakaryocytes are present and morphologically unremarkable.   Plasma cells are mildly increased. Storage iron is increased.  Ring sideroblasts are not identified.    BONE MARROW TOUCH IMPRINT:  The touch imprint displays cellular composition similar to the aspirate smear.    BONE MARROW CLOT SECTION:  The clot sections contain spicules with cellular composition similar to the biopsy core.  Plasma cells are mildly increased and form clusters in focal areas.  Storage iron is increased.    BONE MARROW CORE BIOPSY:  Cortical and medullary bones are present. The cellularity is variable, ranging from 20% to 50%. The myeloid to  "erythroid ratio is decreased. Megakaryocytes are present and morphologically unremarkable.   Plasma cells are mildly increased. Lymphoid aggregates or granulomas are not identified.  Storage iron is increased.        Gross Description       2. Bone Marrow Clot, Left Iliac Crest:   MRN # on Epic Label:  [id number]  MRN # on Pathology Label:  [id number]    Two specimens are received in formalin labeled "left clot +core".     Specimen 1:  The specimen consists of one fragment of red-brown hemorrhagic material measuring 1.9 x 1.7 x 0.9 cm. The specimen is serially sectioned and submitted entirely in cassettes 2A and 2B.     Grossing was completed by Liu Cody on 4/15/2025.    3. Bone Marrow Core Bx, Left Iliac Crest:   MRN # on Epic Label:  [id number]  MRN # on Pathology Label:  [id number]    Specimen 2:   The specimen consists of one tan-yellow core of bone measuring 0.2 cm in diameter and 1.7 cm in length. The specimen is placed in Immunocal and submitted entirely in cassette 3A.    Grossing was completed by Liu Cody on 4/15/2025.        Performing Location       Grossing performed at [facility], [address] [place] [address], [id number]      Sign out performed at [facility], [address] [place] [address], [id number]       CT/PET in Feb:  No FDG avid bone lesion. Previously discussed reference right 4th rib lesion is not evident today. Few lucent areas in the spine have a stable CT appearance   1. No new suspicious PET findings.  2. Punctate left upper lobe nodule not clearly seen on prior, recommend attention on follow-up.        3/13:   WBC 3.53      ANC     H/H 10.1/29.8           Plts 76  Glucose 214      Alb 3.1            ASST 22      Alk phos 112        T bnili 0.6    Assessment and Plan:   IgA kappa Multiple Myeloma   - No evidence of disease with MRD negativity on recent BMBx   - 1 year s/p autologous SCT; On maintanance revlimid at 5 mg daily   - Cytopenias noted.... ? " Revlimiid effect. Reassess Anemia studies   - Notes suggest that he has not had skeltal supportive therapy. CT/PET scans during therapy suggests multifocal lytic lesions of spine.and ribs. He has low back pain possibly degenerative uin nature. With lytic lesions, consider initiation of skeltal supportive therapy with denoumab q 3 mos.    Cytopenias with RBC macrocytosis   - As above  Lytic skeletal lesions   - As above   - Obtain Dexa scan  LLE swelling   - Obtain LE  doppler.... orders placed (he prefers obtaining it with [facility])  Hyoperglycemia   - Advised discussing diabetes management emelina Gonzalez  Hypoalbuminemia/Elevated ALT   - Optimize PO intake    - Follow ALT         BMT Chart Routing      Follow up with physician 3 months.   Follow up with [facility]    Provider visit type    Infusion scheduling note    Injection scheduling note    Labs CBC, ferritin, SPEP, TSH, reticulocytes, folate, iron and TIBC, vitamin B12, CMP and LDH   Scheduling:  Preferred lab:  Lab interval:     Imaging    Pharmacy appointment    Other referrals

## 2025-05-07 ENCOUNTER — HOSPITAL ENCOUNTER (OUTPATIENT)
Dept: RADIOLOGY | Facility: HOSPITAL | Age: 75
Discharge: HOME | End: 2025-05-07
Attending: INTERNAL MEDICINE
Payer: MEDICARE

## 2025-05-07 DIAGNOSIS — Z79.52 LONG TERM (CURRENT) USE OF SYSTEMIC STEROIDS: ICD-10-CM

## 2025-05-07 DIAGNOSIS — C90.01 MULTIPLE MYELOMA IN REMISSION: ICD-10-CM

## 2025-05-07 PROCEDURE — 77080 DXA BONE DENSITY AXIAL: CPT | Mod: TC

## 2025-05-12 ENCOUNTER — LAB VISIT (OUTPATIENT)
Dept: LAB | Facility: HOSPITAL | Age: 75
End: 2025-05-12
Payer: MEDICARE

## 2025-05-12 DIAGNOSIS — C90.01 MULTIPLE MYELOMA IN REMISSION: ICD-10-CM

## 2025-05-12 LAB
ALBUMIN SERPL-MCNC: 3.4 G/DL (ref 3.4–4.8)
ALBUMIN/GLOB SERPL: 0.8 RATIO (ref 1.1–2)
ALP SERPL-CCNC: 122 UNIT/L (ref 40–150)
ALT SERPL-CCNC: 19 UNIT/L (ref 0–55)
ANION GAP SERPL CALC-SCNC: 8 MEQ/L
AST SERPL-CCNC: 13 UNIT/L (ref 11–45)
BASOPHILS # BLD AUTO: 0.02 X10(3)/MCL
BASOPHILS NFR BLD AUTO: 0.6 %
BILIRUB SERPL-MCNC: 0.8 MG/DL
BUN SERPL-MCNC: 22 MG/DL (ref 8.4–25.7)
CALCIUM SERPL-MCNC: 9 MG/DL (ref 8.8–10)
CHLORIDE SERPL-SCNC: 105 MMOL/L (ref 98–107)
CO2 SERPL-SCNC: 25 MMOL/L (ref 23–31)
CREAT SERPL-MCNC: 1.58 MG/DL (ref 0.72–1.25)
CREAT/UREA NIT SERPL: 14
EOSINOPHIL # BLD AUTO: 0.35 X10(3)/MCL (ref 0–0.9)
EOSINOPHIL NFR BLD AUTO: 10.8 %
ERYTHROCYTE [DISTWIDTH] IN BLOOD BY AUTOMATED COUNT: 13.4 % (ref 11.5–17)
GFR SERPLBLD CREATININE-BSD FMLA CKD-EPI: 45 ML/MIN/1.73/M2
GLOBULIN SER-MCNC: 4.1 GM/DL (ref 2.4–3.5)
GLUCOSE SERPL-MCNC: 200 MG/DL (ref 82–115)
HCT VFR BLD AUTO: 32 % (ref 42–52)
HGB BLD-MCNC: 11 G/DL (ref 14–18)
IGA SERPL-MCNC: 258 MG/DL (ref 101–645)
IGG SERPL-MCNC: 1388 MG/DL (ref 540–1822)
IGM SERPL-MCNC: 61 MG/DL (ref 22–240)
IMM GRANULOCYTES # BLD AUTO: 0.01 X10(3)/MCL (ref 0–0.04)
IMM GRANULOCYTES NFR BLD AUTO: 0.3 %
LYMPHOCYTES # BLD AUTO: 0.81 X10(3)/MCL (ref 0.6–4.6)
LYMPHOCYTES NFR BLD AUTO: 25.1 %
MCH RBC QN AUTO: 37.5 PG (ref 27–31)
MCHC RBC AUTO-ENTMCNC: 34.4 G/DL (ref 33–36)
MCV RBC AUTO: 109.2 FL (ref 80–94)
MONOCYTES # BLD AUTO: 0.5 X10(3)/MCL (ref 0.1–1.3)
MONOCYTES NFR BLD AUTO: 15.5 %
NEUTROPHILS # BLD AUTO: 1.54 X10(3)/MCL (ref 2.1–9.2)
NEUTROPHILS NFR BLD AUTO: 47.7 %
NRBC BLD AUTO-RTO: 0 %
PLATELET # BLD AUTO: 40 X10(3)/MCL (ref 130–400)
PMV BLD AUTO: 9.9 FL (ref 7.4–10.4)
POTASSIUM SERPL-SCNC: 3.9 MMOL/L (ref 3.5–5.1)
PROT SERPL-MCNC: 7.5 GM/DL (ref 5.8–7.6)
RBC # BLD AUTO: 2.93 X10(6)/MCL (ref 4.7–6.1)
SODIUM SERPL-SCNC: 138 MMOL/L (ref 136–145)
WBC # BLD AUTO: 3.23 X10(3)/MCL (ref 4.5–11.5)

## 2025-05-12 PROCEDURE — 36415 COLL VENOUS BLD VENIPUNCTURE: CPT

## 2025-05-12 PROCEDURE — 80053 COMPREHEN METABOLIC PANEL: CPT

## 2025-05-12 PROCEDURE — 83521 IG LIGHT CHAINS FREE EACH: CPT

## 2025-05-12 PROCEDURE — 85025 COMPLETE CBC W/AUTO DIFF WBC: CPT

## 2025-05-12 PROCEDURE — 86334 IMMUNOFIX E-PHORESIS SERUM: CPT

## 2025-05-12 PROCEDURE — 82784 ASSAY IGA/IGD/IGG/IGM EACH: CPT | Mod: 59

## 2025-05-12 NOTE — PROGRESS NOTES
HEMATOLOGIC MALIGNANCIES PROGRESS NOTE    Pathology:  23 Bone marrow aspiration/Biopsy:   PLASMA CELL MYELOMA, KAPPA RESTRICTED.     PLASMA CELL MYELOMA INVOLVES 90% OF BONE MARROW CELLULARITY WITH SMALL AREAS OF  SEQUENTIAL TRILINEAGE HEMATOPOIESIS.    ABSENT IRON STORES.     PERIPHERAL BLOOD: NORMOCYTIC NORMOCHROMIC ANEMIA. THROMBOCYTOPENIA.      LABS:  23 M-spike 3.33, IgA >7040, serum kappa 5/ lambda 0.56/ K:L ratio 9.09, 24 hour urine for M protein done but not resulted. Calcium 13.4/Alb 1.9, Cr 2.3, globulin 10.7  23 M-spike 2.49. IgA >7040, Corrected calcium 12.3, Cr 2.09, Globulin 7.6  8/15/23 M-spike 1.49, IgA 4000  23 M-spike 1.0, IgA 1700  - Transplant oncologist: Dr. Evans, follows locally with Dr. Rose in [place]  - diagnosed 2023 with IgA Kappa MM; stage unclear at diagnosis as FISH not available  - started DRd therapy on 2023  - he stopped the daratumumab after cycle 4 and continue with revlimid and dex only; looks to have completed ~5 cycles  - Admitted for Carmen 140 Auto SCT on 2024  IDENTIFYING STATEMENT   Chip Yadav) is a 73 y.o. male with a  of 1950 from [place], [place] with the diagnosis of multiple myeloma.      ONCOLOGY HISTORY:    INTERVAL HISTORY -  He returns to the clinic today for a two month follow-up.   He continues his revlimid 5mg daily as prescribed.    He feels well today, but does report cough and congestion that has been persistent over the past week.  Rash has resolved.  He was seen by Dr. Joseph in [facility] last week with a three month follow-up.   It was recommended that he start skeletal supportive therapy with Zometa q.3 months.  Order is placed and approved, dental clearance is not needed due to upper and lower dentures.  He does report left lower extremity swelling, ultrasound was ordered by [facility] but not completed.     Past Medical History, Past Social History and Past Family History have been reviewed and are unchanged  except as noted in the interval history.      Review of Systems   Constitutional:  Negative for chills, fever, malaise/fatigue and weight loss.   HENT:  Positive for congestion. Negative for nosebleeds and tinnitus.    Eyes:  Negative for double vision, pain and discharge.   Respiratory:  Positive for cough. Negative for sputum production, shortness of breath and stridor.    Cardiovascular:  Positive for leg swelling. Negative for chest pain, claudication and PND.   Gastrointestinal:  Negative for abdominal pain, diarrhea, melena, nausea and vomiting.   Genitourinary:  Negative for dysuria, frequency and hematuria.   Musculoskeletal:  Negative for back pain, joint pain and neck pain.   Skin:  Negative for rash.   Neurological:  Negative for dizziness, tremors, speech change, weakness and headaches.   Endo/Heme/Allergies:  Negative for environmental allergies.   Psychiatric/Behavioral:  Negative for depression and substance abuse. The patient is not nervous/anxious.       Vitals:    05/13/25 0838   BP: 130/75   Pulse: 63   Resp: 16   Temp: 97.9 °F (36.6 °C)           Physical Exam  Vitals reviewed.   Constitutional:       General: He is not in acute distress.     Appearance: Normal appearance. He is normal weight.   HENT:      Head: Normocephalic and atraumatic.      Right Ear: Decreased hearing noted. A middle ear effusion is present.      Left Ear: Decreased hearing noted. A middle ear effusion is present.      Nose: Congestion and rhinorrhea present.      Mouth/Throat:      Mouth: Mucous membranes are moist.      Pharynx: Oropharynx is clear. No oropharyngeal exudate or posterior oropharyngeal erythema.   Eyes:      Extraocular Movements: Extraocular movements intact.      Conjunctiva/sclera: Conjunctivae normal.      Pupils: Pupils are equal, round, and reactive to light.   Cardiovascular:      Rate and Rhythm: Normal rate and regular rhythm.      Pulses: Normal pulses.      Heart sounds: No murmur heard.     No  friction rub. No gallop.   Pulmonary:      Effort: Pulmonary effort is normal. No respiratory distress.      Breath sounds: No wheezing, rhonchi or rales.   Abdominal:      General: Abdomen is flat. Bowel sounds are normal.      Palpations: Abdomen is soft. There is no mass.      Tenderness: There is no abdominal tenderness. There is no guarding.   Musculoskeletal:         General: Swelling present. No tenderness or deformity. Normal range of motion.      Cervical back: Normal range of motion and neck supple.      Right lower leg: No edema.      Left lower leg: No edema.   Lymphadenopathy:      Cervical: No cervical adenopathy.   Skin:     General: Skin is warm and dry.      Findings: No erythema, lesion or rash.   Neurological:      General: No focal deficit present.      Mental Status: He is alert and oriented to person, place, and time. Mental status is at baseline.      Cranial Nerves: No cranial nerve deficit.      Gait: Gait normal.   Psychiatric:         Mood and Affect: Mood normal.         Behavior: Behavior normal.         Thought Content: Thought content normal.         Judgment: Judgment normal.          Current Outpatient Medications   Medication Sig    acyclovir (ZOVIRAX) 400 MG tablet Take 1 tablet (400 mg total) by mouth 2 (two) times daily.    albuterol (PROVENTIL/VENTOLIN HFA) 90 mcg/actuation inhaler 2 puffs every 4 to 6 hours as needed.    ALPRAZolam (XANAX) 0.5 MG tablet Take 0.5 mg by mouth daily as needed for Anxiety.    amLODIPine (NORVASC) 10 MG tablet Take 1 tablet (10 mg total) by mouth every morning.    ascorbic acid, vitamin C, (VITAMIN C) 500 MG tablet Take 500 mg by mouth once daily.    atorvastatin (LIPITOR) 10 MG tablet Take 10 mg by mouth every evening.    cholecalciferol, vitamin D3, (VITAMIN D3) 125 mcg (5,000 unit) Tab Take 5,000 Units by mouth once daily.    famotidine (PEPCID) 20 MG tablet Take 20 mg by mouth.    ferrous sulfate 325 (65 FE) MG EC tablet Take 325 mg by mouth  once daily.    finasteride (PROSCAR) 5 mg tablet Take 5 mg by mouth every morning.    furosemide (LASIX) 20 MG tablet TAKE ONE TABLET BY MOUTH ONCE DAILY FOR 5 DAYS    hydrALAZINE (APRESOLINE) 50 MG tablet Take 50 mg by mouth 3 (three) times daily.    HYDROcodone-acetaminophen (NORCO) 7.5-325 mg per tablet Take 1 tablet by mouth every 4 (four) hours as needed for Pain.    lenalidomide (REVLIMID) 5 mg Cap Take 1 capsule (5 mg total) by mouth once daily For 28 days.    levothyroxine (SYNTHROID) 112 MCG tablet Take 112 mcg by mouth every evening.    nitroGLYCERIN (NITROSTAT) 0.4 MG SL tablet place one tablet under the tongue every five minutes as needed for chest pain up to 3 doses. if no relief call 911    [employer] ULTRA TEST Strp USE TO TEST BLOOD GLUCOSE TWICE DAILY    ranolazine (RANEXA) 500 MG Tb12 Take 500 mg by mouth 2 (two) times daily.    spironolactone (ALDACTONE) 25 MG tablet Take 12.5 mg by mouth.    carvediloL (COREG) 6.25 MG tablet Take 1 tablet (6.25 mg total) by mouth 2 (two) times daily.    levoFLOXacin (LEVAQUIN) 500 MG tablet Take 1 tablet (500 mg total) by mouth once daily. for 7 days    pantoprazole (PROTONIX) 40 MG tablet Take 1 tablet (40 mg total) by mouth once daily.     Current Facility-Administered Medications   Medication    0.9%  NaCl infusion (for blood administration)    0.9%  NaCl infusion (for blood administration)    0.9%  NaCl infusion (for blood administration)    pneumoc 20-edward conj-dip cr(PF) (PREVNAR-20 (PF)) injection Syrg 0.5 mL    sars-cov-2 (covid-19) (Spikevax (Moderna) (12yrs and up 2023)) 50 mcg/0.5 mL injection 0.5 mL         Lab Results   Component Value Date    WBC 3.23 (L) 05/12/2025    HGB 11.0 (L) 05/12/2025    HCT 32.0 (L) 05/12/2025    .2 (H) 05/12/2025    PLT 40 (L) 05/12/2025       Gran # (ANC)   Date Value Ref Range Status   11/04/2024 2.6 1.8 - 7.7 K/uL Final     Gran %   Date Value Ref Range Status   11/04/2024 46.2 38.0 - 73.0 % Final     CMP  Sodium    Date Value Ref Range Status   05/12/2025 138 136 - 145 mmol/L Final   11/04/2024 141 136 - 145 mmol/L Final     Potassium   Date Value Ref Range Status   05/12/2025 3.9 3.5 - 5.1 mmol/L Final   11/04/2024 3.7 3.5 - 5.1 mmol/L Final     Chloride   Date Value Ref Range Status   05/12/2025 105 98 - 107 mmol/L Final   11/04/2024 106 95 - 110 mmol/L Final     CO2   Date Value Ref Range Status   05/12/2025 25 23 - 31 mmol/L Final   11/04/2024 26 23 - 29 mmol/L Final     Glucose   Date Value Ref Range Status   05/12/2025 200 (H) 82 - 115 mg/dL Final   11/04/2024 161 (H) 70 - 110 mg/dL Final     BUN   Date Value Ref Range Status   11/04/2024 19 8 - 23 mg/dL Final     Blood Urea Nitrogen   Date Value Ref Range Status   05/12/2025 22.0 8.4 - 25.7 mg/dL Final     Creatinine   Date Value Ref Range Status   05/12/2025 1.58 (H) 0.72 - 1.25 mg/dL Final   11/04/2024 1.3 0.5 - 1.4 mg/dL Final     Calcium   Date Value Ref Range Status   05/12/2025 9.0 8.8 - 10.0 mg/dL Final   11/04/2024 9.5 8.7 - 10.5 mg/dL Final     Protein Total   Date Value Ref Range Status   05/12/2025 7.5 5.8 - 7.6 gm/dL Final   05/12/2025 7.0 5.8 - 7.6 gm/dL Final     Total Protein   Date Value Ref Range Status   11/04/2024 6.7 6.0 - 8.4 g/dL Final     Albumin   Date Value Ref Range Status   05/12/2025 3.4 3.4 - 4.8 g/dL Final   05/12/2025 3.4 3.4 - 4.8 g/dL Final   11/04/2024 3.8 3.5 - 5.2 g/dL Final     Total Bilirubin   Date Value Ref Range Status   11/04/2024 1.4 (H) 0.1 - 1.0 mg/dL Final     Comment:     For infants and newborns, interpretation of results should be based  on gestational age, weight and in agreement with clinical  observations.    Premature Infant recommended reference ranges:  Up to 24 hours.............<8.0 mg/dL  Up to 48 hours............<12.0 mg/dL  3-5 days..................<15.0 mg/dL  6-29 days.................<15.0 mg/dL       Bilirubin Total   Date Value Ref Range Status   05/12/2025 0.8 <=1.5 mg/dL Final     Alkaline  Phosphatase   Date Value Ref Range Status   11/04/2024 100 40 - 150 U/L Final     ALP   Date Value Ref Range Status   05/12/2025 122 40 - 150 unit/L Final     AST   Date Value Ref Range Status   05/12/2025 13 11 - 45 unit/L Final   11/04/2024 16 10 - 40 U/L Final     ALT   Date Value Ref Range Status   05/12/2025 19 0 - 55 unit/L Final   11/04/2024 32 10 - 44 U/L Final     Anion Gap   Date Value Ref Range Status   11/04/2024 9 8 - 16 mmol/L Final     eGFR   Date Value Ref Range Status   05/12/2025 45 mL/min/1.73/m2 Final     Comment:     Estimated GFR calculated using the CKD-EPI creatinine (2021) equation.   11/04/2024 57.3 (A) >60 mL/min/1.73 m^2 Final     IgG   Date Value Ref Range Status   11/04/2024 1119 650 - 1600 mg/dL Final     Comment:     IgG Cord Blood Reference Range: 650-1600 mg/dL.     IgA   Date Value Ref Range Status   11/04/2024 159 40 - 350 mg/dL Final     Comment:     IgA Cord Blood Reference Range: <5 mg/dL.     IgM   Date Value Ref Range Status   11/04/2024 49 (L) 50 - 300 mg/dL Final     Comment:     IgM Cord Blood Reference Range: <25 mg/dL.     IgM Level   Date Value Ref Range Status   05/12/2025 61.0 22.0 - 240.0 mg/dL Final     Kappa Free Light Chains   Date Value Ref Range Status   11/04/2024 7.08 (H) 0.33 - 1.94 mg/dL Final   03/11/2024 2.00 (H) 0.33 - 1.94 mg/dL Final     Lambda Free Light Chains   Date Value Ref Range Status   11/04/2024 4.81 (H) 0.57 - 2.63 mg/dL Final   03/11/2024 1.09 0.57 - 2.63 mg/dL Final     Kappa/Lambda FLC Ratio   Date Value Ref Range Status   11/04/2024 1.47 0.26 - 1.65 Final     Comment:     Undetected antigen excess is a rare event but cannot   be excluded. If these free light chain results do not   agree with other clinical or laboratory findings or   if the sample is from a patient that has previously   demonstrated antigen excess, discuss with the testing   laboratory.   Results should always be interpreted in conjunction   with other laboratory tests and  clinical evidence.     03/11/2024 1.83 (H) 0.26 - 1.65 Final     Comment:     Undetected antigen excess is a rare event but cannot   be excluded. If these free light chain results do not   agree with other clinical or laboratory findings or   if the sample is from a patient that has previously   demonstrated antigen excess, discuss with the testing   laboratory.   Results should always be interpreted in conjunction   with other laboratory tests and clinical evidence.       Pathologist Interpretation SPE   Date Value Ref Range Status   11/04/2024 REVIEWED  Final     Comment:       Electronically reviewed and signed by:  Sydney Wallace MD  Signed on 11/05/24 at 13:34  Normal total protein.  There is a paraprotein band in gamma = 0.32 g/dL.      03/11/2024 REVIEWED  Final     Comment:       Electronically reviewed and signed by:  Shey Moore D.O.  Signed on 03/12/24 at 21:50  Normal total protein. Normal gamma globulins are decreased.  Paraprotein peak in beta-2 = 0.39 g/dL (previously, 0.16 g/dL) and   newly noted 2nd peak in near-gamma <0.1 g/dL (no previous value).       Pathologist Interpretation PAO   Date Value Ref Range Status   11/04/2024 REVIEWED  Final     Comment:       Electronically reviewed and signed by:  Sydney Wallace MD  Signed on 11/05/24 at 13:33  IgG kappa specific monoclonal band present.        Bone marrow biopsy  - 7/23/24     Component 3 mo ago   Final Pathologic Diagnosis     RIGHT ILIAC CREST BONE MARROW ASPIRATE, BONE MARROW CLOT, AND BONE MARROW CORE BIOPSY WITH:    CELLULARITY=30%, TRILINEAGE HEMATOPOIETIC ACTIVITY (M/E=1.6:1).  NO DEFINITIVE MORPHOLOGIC OR IMMUNOPHENOTYPIC EVIDENCE OF RESIDUAL PLASMA CELL NEOPLASM.  SEE COMMENT.  INCREASED EOSINOPHILS.  INCREASED STORAGE IRON.  DECREASED NUMBER OF MEGAKARYOCYTES.          PET scan -7/23/24  Impression:     In this patient with multiple myeloma, there is interval normalization of previously hypermetabolic osseous lesions,  compatible with favorable response to therapy.  No new hypermetabolic lesions.     Additional findings as above.     Electronically signed by resident: Stephany Rosenbaum  Date:                                            07/23/2024  Time:                                           14:10     Electronically signed by:Janice Fontenot  Date:                                            07/23/2024  Time:                                           15:07    PET 4/14/2025:  Impression:  1. No new suspicious PET findings.  2. Punctate left upper lobe nodule not clearly seen on prior, recommend attention on follow-up.  Assessment/Plan      IgA kappa multiple myeloma in remission  - he follows locally with in [place]  - diagnosed June 2023 with IgA Kappa MM; stage unclear at diagnosis as FISH not available  - started DRd therapy on 7/18/2023  - he stopped the daratumumab after cycle 4 and continue with revlimid and dex only; looks to have completed ~5 cycles  - Admitted for Carmen 140 Auto SCT on 4/16/2024  -day+100 restaging bone marrow biopsy on 7/23/24 demonstrated a normocellular marrow with increased eosinophils and tri-lineage hematopoiesis   -About 6-8%  positive plasma cells were evident.  No increased CD34 positive blasts were identified.  CD61 highlighted decreased number  of megakaryocytes.  MPO showed myeloid precursors.  -- No monotypic plasma cells identified (MRD-negative) on MRD flow assay.  -There was no definitive morphologic or immunophenotypic evidence of residual plasma cell neoplasm  5/2/2025:  No evidence of disease with MRD negativity on recent BMBx              -           1 year s/p autologous SCT; On maintanance revlimid at 5 mg daily              -           Cytopenias noted.... ? Revlimiid effect. Reassess Anemia studies              -           Notes suggest that he has not had skeltal supportive therapy. CT/PET scans during therapy suggests multifocal lytic lesions of spine.and ribs. He has low  back pain possibly degenerative uin nature. With lytic lesions, consider initiation of skeltal supportive therapy with denoumab q 3 mos.    Cytopenias with RBC macrocytosis              -           As above  Lytic skeletal lesions              -           As above              -           Obtain Dexa scan  LLE swelling              -           Obtain LE  doppler.... orders placed (he prefers obtaining it with [facility])  Hyoperglycemia              -           Advised discussing diabetes management tatygh Dr Gonzalez  Hypoalbuminemia/Elevated ALT              -           Optimize PO intake               -           Follow ALT    History of auto stem cell transplant:   - Day 0 on 4/18/2024. Conditioned with Carmen 140.   -day+ 200 today  - He received 5 bags on 4/18/24 at 1330 and received the remaining 4 bags on 4/19/24 at 1330. Total CD34 dose was 2.91 x 10^6.   - day+100 restaging bone marrow biopsy on 7/23/24 demonstrated a normocellular marrow with increased eosinophils and tri-lineage hematopoiesis   -About 6-8%  positive plasma cells were evident.  No increased CD34 positive blasts were identified.  CD61 highlighted decreased number  of megakaryocytes.  MPO showed myeloid precursors.  -- No monotypic plasma cells identified (MRD-negative) on MRD flow assay.  -There was no definitive morphologic or immunophenotypic evidence of residual plasma cell neoplasm.     -PET CT on 7/23/24 showed here is interval normalization of uptake within the previously hypermetabolic lesions.   No new hypermetabolic osseous lesions identified.    Immunodeficiency due to conditions classified elsewhere:   - Continue acyclovir antiviral prophylaxis   - stop dapsone now day +180    4. Rev induced cytopenias  -- above further dose adjustment thresholds; monitor     5. Cancer associated pain    -he has ongoing low back pain  -no recent falls  -he is using hydrocodone-APAP as needed  -he tried lidoderm patch without success  -he has been using  cyclobenzaprine 5mg TID as needed  -Stable today.    6. Immunization    --he has been evaluated by ID   -[facility].       7. Rash  -improving, but still present. Itches at night. Recommended daily moisturizer. If still present, will send in steroid cream in needed.     Labs and exam stable  Levaquin called in for congestion and cough  Continue with Revlimid 5 mg daily as prescribed  Zometa today  Labs q2w  Left lower extremity ultrasound to be completed with [facility] this week.  Return to clinic in 4 weeks with NP for follow up/lab      I personally reviewed all pertinent imaging, lab results, explained to the patient the pertinent information in detail including radiographic imaging, abnormal lab results. All questions were answered thoroughly. Total time spent on this visit was >40 minutes.    MERY Pineda  Oncology/Hematology   Can[facility] Center [facility]

## 2025-05-13 ENCOUNTER — INFUSION (OUTPATIENT)
Facility: HOSPITAL | Age: 75
End: 2025-05-13
Payer: MEDICARE

## 2025-05-13 ENCOUNTER — OFFICE VISIT (OUTPATIENT)
Facility: CLINIC | Age: 75
End: 2025-05-13
Payer: MEDICARE

## 2025-05-13 VITALS
OXYGEN SATURATION: 97 % | HEIGHT: 70 IN | DIASTOLIC BLOOD PRESSURE: 75 MMHG | TEMPERATURE: 98 F | WEIGHT: 217.88 LBS | SYSTOLIC BLOOD PRESSURE: 130 MMHG | HEART RATE: 63 BPM | RESPIRATION RATE: 16 BRPM | BODY MASS INDEX: 31.19 KG/M2

## 2025-05-13 VITALS
HEART RATE: 63 BPM | TEMPERATURE: 98 F | HEIGHT: 70 IN | BODY MASS INDEX: 31.19 KG/M2 | SYSTOLIC BLOOD PRESSURE: 130 MMHG | OXYGEN SATURATION: 97 % | DIASTOLIC BLOOD PRESSURE: 75 MMHG | WEIGHT: 217.88 LBS | RESPIRATION RATE: 16 BRPM

## 2025-05-13 DIAGNOSIS — R05.9 COUGH, UNSPECIFIED TYPE: ICD-10-CM

## 2025-05-13 DIAGNOSIS — Z79.899 LONG-TERM USE OF HIGH-RISK MEDICATION: ICD-10-CM

## 2025-05-13 DIAGNOSIS — D70.9 NEUTROPENIA, UNSPECIFIED TYPE: ICD-10-CM

## 2025-05-13 DIAGNOSIS — R60.0 LOCALIZED EDEMA: ICD-10-CM

## 2025-05-13 DIAGNOSIS — D69.6 THROMBOCYTOPENIA, UNSPECIFIED: ICD-10-CM

## 2025-05-13 DIAGNOSIS — Z94.84 HISTORY OF AUTO STEM CELL TRANSPLANT: ICD-10-CM

## 2025-05-13 DIAGNOSIS — J32.9 SINUSITIS, UNSPECIFIED CHRONICITY, UNSPECIFIED LOCATION: ICD-10-CM

## 2025-05-13 DIAGNOSIS — C90.01 MULTIPLE MYELOMA IN REMISSION: ICD-10-CM

## 2025-05-13 DIAGNOSIS — D61.810 PANCYTOPENIA DUE TO CHEMOTHERAPY: ICD-10-CM

## 2025-05-13 DIAGNOSIS — Z94.84 HISTORY OF AUTOLOGOUS STEM CELL TRANSPLANT: ICD-10-CM

## 2025-05-13 DIAGNOSIS — D61.818 PANCYTOPENIA: ICD-10-CM

## 2025-05-13 DIAGNOSIS — M89.9 BONE LESION: Primary | ICD-10-CM

## 2025-05-13 DIAGNOSIS — G89.3 CANCER RELATED PAIN: ICD-10-CM

## 2025-05-13 DIAGNOSIS — Z79.52 LONG TERM (CURRENT) USE OF SYSTEMIC STEROIDS: ICD-10-CM

## 2025-05-13 DIAGNOSIS — D84.821 IMMUNODEFICIENCY DUE TO DRUGS: ICD-10-CM

## 2025-05-13 DIAGNOSIS — M89.9 BONE LESION: ICD-10-CM

## 2025-05-13 DIAGNOSIS — C90.01 MULTIPLE MYELOMA IN REMISSION: Primary | ICD-10-CM

## 2025-05-13 DIAGNOSIS — D84.9 IMMUNODEFICIENCY: ICD-10-CM

## 2025-05-13 DIAGNOSIS — Z79.899 IMMUNODEFICIENCY DUE TO DRUGS: ICD-10-CM

## 2025-05-13 LAB
ALBUMIN % SPEP (OHS): 49.03 (ref 48.1–59.5)
ALBUMIN SERPL-MCNC: 3.4 G/DL (ref 3.4–4.8)
ALBUMIN/GLOB SERPL: 0.9 RATIO (ref 1.1–2)
ALPHA 1 GLOB (OHS): 0.31 GM/DL (ref 0–0.4)
ALPHA 1 GLOB% (OHS): 4.37 (ref 2.3–4.9)
ALPHA 2 GLOB % (OHS): 11.61 (ref 6.9–13)
ALPHA 2 GLOB (OHS): 0.81 GM/DL (ref 0.4–1)
BETA GLOB (OHS): 0.97 GM/DL (ref 0.7–1.3)
BETA GLOB% (OHS): 13.82 (ref 13.8–19.7)
GAMMA GLOBULIN % (OHS): 21.18 (ref 10.1–21.9)
GAMMA GLOBULIN (OHS): 1.48 GM/DL (ref 0.4–1.8)
GLOBULIN SER-MCNC: 3.6 GM/DL (ref 2.4–3.5)
KAPPA LC FREE SER NEPH-MCNC: 6.76 MG/DL (ref 0.33–1.94)
KAPPA LC FREE/LAMBDA FREE SER NEPH: 1.29 {RATIO} (ref 0.26–1.65)
LAMBDA LC FREE SERPL-MCNC: 5.26 MG/DL (ref 0.57–2.63)
M SPIKE % (OHS): ABNORMAL
M SPIKE (OHS): ABNORMAL
PATH REV: NORMAL
PROT SERPL-MCNC: 7 GM/DL (ref 5.8–7.6)

## 2025-05-13 PROCEDURE — 63600175 PHARM REV CODE 636 W HCPCS

## 2025-05-13 PROCEDURE — 96374 THER/PROPH/DIAG INJ IV PUSH: CPT

## 2025-05-13 PROCEDURE — 25000003 PHARM REV CODE 250

## 2025-05-13 PROCEDURE — 99215 OFFICE O/P EST HI 40 MIN: CPT | Mod: PBBFAC,25

## 2025-05-13 PROCEDURE — 99999 PR PBB SHADOW E&M-EST. PATIENT-LVL V: CPT | Mod: PBBFAC,,,

## 2025-05-13 RX ORDER — DIPHENHYDRAMINE HYDROCHLORIDE 50 MG/ML
50 INJECTION, SOLUTION INTRAMUSCULAR; INTRAVENOUS ONCE AS NEEDED
OUTPATIENT
Start: 2025-08-05

## 2025-05-13 RX ORDER — SODIUM CHLORIDE 0.9 % (FLUSH) 0.9 %
10 SYRINGE (ML) INJECTION
OUTPATIENT
Start: 2025-08-05

## 2025-05-13 RX ORDER — HEPARIN 100 UNIT/ML
500 SYRINGE INTRAVENOUS
OUTPATIENT
Start: 2025-08-05

## 2025-05-13 RX ORDER — EPINEPHRINE 0.3 MG/.3ML
0.3 INJECTION SUBCUTANEOUS ONCE AS NEEDED
OUTPATIENT
Start: 2025-08-05

## 2025-05-13 RX ORDER — LEVOFLOXACIN 500 MG/1
500 TABLET, FILM COATED ORAL DAILY
Qty: 7 TABLET | Refills: 0 | Status: SHIPPED | OUTPATIENT
Start: 2025-05-13 | End: 2025-05-20

## 2025-05-13 RX ORDER — ZOLEDRONIC ACID 0.04 MG/ML
4 INJECTION, SOLUTION INTRAVENOUS
OUTPATIENT
Start: 2025-08-05

## 2025-05-13 RX ADMIN — SODIUM CHLORIDE: 9 INJECTION, SOLUTION INTRAVENOUS at 10:05

## 2025-05-13 RX ADMIN — ZOLEDRONIC ACID 3.3 MG: 4 INJECTION, SOLUTION, CONCENTRATE INTRAVENOUS at 09:05

## 2025-05-16 DIAGNOSIS — C90.01 MULTIPLE MYELOMA IN REMISSION: ICD-10-CM

## 2025-05-16 RX ORDER — LENALIDOMIDE 5 MG/1
5 CAPSULE ORAL DAILY
Qty: 28 CAPSULE | Refills: 0 | Status: SHIPPED | OUTPATIENT
Start: 2025-05-16

## 2025-05-27 ENCOUNTER — LAB VISIT (OUTPATIENT)
Dept: LAB | Facility: HOSPITAL | Age: 75
End: 2025-05-27
Payer: MEDICARE

## 2025-05-27 DIAGNOSIS — C90.01 MULTIPLE MYELOMA IN REMISSION: ICD-10-CM

## 2025-05-27 DIAGNOSIS — C90.01 MULTIPLE MYELOMA IN REMISSION: Primary | ICD-10-CM

## 2025-05-27 LAB
ABS NEUT CALC (OHS): 0.77 X10(3)/MCL (ref 2.1–9.2)
ALBUMIN SERPL-MCNC: 3.3 G/DL (ref 3.4–4.8)
ALBUMIN SERPL-MCNC: 3.3 G/DL (ref 3.4–4.8)
ALBUMIN/GLOB SERPL: 0.9 RATIO (ref 1.1–2)
ALBUMIN/GLOB SERPL: 0.9 RATIO (ref 1.1–2)
ALP SERPL-CCNC: 104 UNIT/L (ref 40–150)
ALP SERPL-CCNC: 104 UNIT/L (ref 40–150)
ALT SERPL-CCNC: 15 UNIT/L (ref 0–55)
ALT SERPL-CCNC: 15 UNIT/L (ref 0–55)
ANION GAP SERPL CALC-SCNC: 6 MEQ/L
ANION GAP SERPL CALC-SCNC: 6 MEQ/L
AST SERPL-CCNC: 14 UNIT/L (ref 11–45)
AST SERPL-CCNC: 14 UNIT/L (ref 11–45)
BILIRUB SERPL-MCNC: 1 MG/DL
BILIRUB SERPL-MCNC: 1 MG/DL
BUN SERPL-MCNC: 17 MG/DL (ref 8.4–25.7)
BUN SERPL-MCNC: 17 MG/DL (ref 8.4–25.7)
CALCIUM SERPL-MCNC: 8.1 MG/DL (ref 8.8–10)
CALCIUM SERPL-MCNC: 8.1 MG/DL (ref 8.8–10)
CHLORIDE SERPL-SCNC: 108 MMOL/L (ref 98–107)
CHLORIDE SERPL-SCNC: 108 MMOL/L (ref 98–107)
CO2 SERPL-SCNC: 24 MMOL/L (ref 23–31)
CO2 SERPL-SCNC: 24 MMOL/L (ref 23–31)
CREAT SERPL-MCNC: 1.51 MG/DL (ref 0.72–1.25)
CREAT SERPL-MCNC: 1.51 MG/DL (ref 0.72–1.25)
CREAT/UREA NIT SERPL: 11
CREAT/UREA NIT SERPL: 11
EOSINOPHIL NFR BLD MANUAL: 0.05 X10(3)/MCL (ref 0–0.9)
EOSINOPHIL NFR BLD MANUAL: 3 % (ref 0–8)
ERYTHROCYTE [DISTWIDTH] IN BLOOD BY AUTOMATED COUNT: 13.4 % (ref 11.5–17)
FERRITIN SERPL-MCNC: 647.51 NG/ML (ref 21.81–274.66)
GFR SERPLBLD CREATININE-BSD FMLA CKD-EPI: 48 ML/MIN/1.73/M2
GFR SERPLBLD CREATININE-BSD FMLA CKD-EPI: 48 ML/MIN/1.73/M2
GLOBULIN SER-MCNC: 3.7 GM/DL (ref 2.4–3.5)
GLOBULIN SER-MCNC: 3.7 GM/DL (ref 2.4–3.5)
GLUCOSE SERPL-MCNC: 190 MG/DL (ref 82–115)
GLUCOSE SERPL-MCNC: 190 MG/DL (ref 82–115)
HCT VFR BLD AUTO: 31.4 % (ref 42–52)
HGB BLD-MCNC: 10.6 G/DL (ref 14–18)
IGA SERPL-MCNC: 249 MG/DL (ref 101–645)
IGG SERPL-MCNC: 1326 MG/DL (ref 540–1822)
IGM SERPL-MCNC: 56 MG/DL (ref 22–240)
IRON SATN MFR SERPL: 27 % (ref 20–50)
IRON SERPL-MCNC: 50 UG/DL (ref 65–175)
LYMPHOCYTES NFR BLD MANUAL: 0.5 X10(3)/MCL (ref 0.6–4.6)
LYMPHOCYTES NFR BLD MANUAL: 33 % (ref 13–40)
MACROCYTES BLD QL SMEAR: ABNORMAL
MCH RBC QN AUTO: 37.7 PG (ref 27–31)
MCHC RBC AUTO-ENTMCNC: 33.8 G/DL (ref 33–36)
MCV RBC AUTO: 111.7 FL (ref 80–94)
MONOCYTES NFR BLD MANUAL: 0.21 X10(3)/MCL (ref 0.1–1.3)
MONOCYTES NFR BLD MANUAL: 14 % (ref 2–11)
NEUTROPHILS NFR BLD MANUAL: 48 % (ref 47–80)
NEUTS BAND NFR BLD MANUAL: 2 % (ref 0–11)
NRBC BLD AUTO-RTO: 0 %
PLATELET # BLD AUTO: 25 X10(3)/MCL (ref 130–400)
PLATELET # BLD EST: ABNORMAL 10*3/UL
PMV BLD AUTO: 8.8 FL (ref 7.4–10.4)
POIKILOCYTOSIS BLD QL SMEAR: SLIGHT
POTASSIUM SERPL-SCNC: 3.9 MMOL/L (ref 3.5–5.1)
POTASSIUM SERPL-SCNC: 3.9 MMOL/L (ref 3.5–5.1)
PROT SERPL-MCNC: 7 GM/DL (ref 5.8–7.6)
PROT SERPL-MCNC: 7 GM/DL (ref 5.8–7.6)
RBC # BLD AUTO: 2.81 X10(6)/MCL (ref 4.7–6.1)
RBC MORPH BLD: ABNORMAL
SODIUM SERPL-SCNC: 138 MMOL/L (ref 136–145)
SODIUM SERPL-SCNC: 138 MMOL/L (ref 136–145)
TEAR DROP CELL (OLG): SLIGHT
TIBC SERPL-MCNC: 138 UG/DL (ref 60–240)
TIBC SERPL-MCNC: 188 UG/DL (ref 250–450)
WBC # BLD AUTO: 1.53 X10(3)/MCL (ref 4.5–11.5)

## 2025-05-27 PROCEDURE — 85025 COMPLETE CBC W/AUTO DIFF WBC: CPT

## 2025-05-27 PROCEDURE — 83521 IG LIGHT CHAINS FREE EACH: CPT

## 2025-05-27 PROCEDURE — 82784 ASSAY IGA/IGD/IGG/IGM EACH: CPT

## 2025-05-27 PROCEDURE — 80053 COMPREHEN METABOLIC PANEL: CPT

## 2025-05-27 PROCEDURE — 36415 COLL VENOUS BLD VENIPUNCTURE: CPT

## 2025-05-27 PROCEDURE — 82784 ASSAY IGA/IGD/IGG/IGM EACH: CPT | Mod: 59

## 2025-05-27 PROCEDURE — 82728 ASSAY OF FERRITIN: CPT

## 2025-05-27 PROCEDURE — 83550 IRON BINDING TEST: CPT

## 2025-05-27 PROCEDURE — 80053 COMPREHEN METABOLIC PANEL: CPT | Mod: 91

## 2025-05-28 LAB
KAPPA LC FREE SER NEPH-MCNC: 6.78 MG/DL (ref 0.33–1.94)
KAPPA LC FREE/LAMBDA FREE SER NEPH: 1.45 {RATIO} (ref 0.26–1.65)
LAMBDA LC FREE SERPL-MCNC: 4.69 MG/DL (ref 0.57–2.63)

## 2025-06-03 ENCOUNTER — LAB VISIT (OUTPATIENT)
Dept: LAB | Facility: HOSPITAL | Age: 75
End: 2025-06-03
Payer: MEDICARE

## 2025-06-03 DIAGNOSIS — C90.01 MULTIPLE MYELOMA IN REMISSION: ICD-10-CM

## 2025-06-03 LAB
ALBUMIN SERPL-MCNC: 3 G/DL (ref 3.4–4.8)
ALBUMIN/GLOB SERPL: 0.8 RATIO (ref 1.1–2)
ALP SERPL-CCNC: 95 UNIT/L (ref 40–150)
ALT SERPL-CCNC: 45 UNIT/L (ref 0–55)
ANION GAP SERPL CALC-SCNC: 7 MEQ/L
AST SERPL-CCNC: 23 UNIT/L (ref 11–45)
BASOPHILS # BLD AUTO: 0.01 X10(3)/MCL
BASOPHILS NFR BLD AUTO: 0.3 %
BILIRUB SERPL-MCNC: 0.9 MG/DL
BUN SERPL-MCNC: 18 MG/DL (ref 8.4–25.7)
CALCIUM SERPL-MCNC: 9 MG/DL (ref 8.8–10)
CHLORIDE SERPL-SCNC: 110 MMOL/L (ref 98–107)
CO2 SERPL-SCNC: 24 MMOL/L (ref 23–31)
CREAT SERPL-MCNC: 1.13 MG/DL (ref 0.72–1.25)
CREAT/UREA NIT SERPL: 16
EOSINOPHIL # BLD AUTO: 0.31 X10(3)/MCL (ref 0–0.9)
EOSINOPHIL NFR BLD AUTO: 9 %
ERYTHROCYTE [DISTWIDTH] IN BLOOD BY AUTOMATED COUNT: 13.2 % (ref 11.5–17)
GFR SERPLBLD CREATININE-BSD FMLA CKD-EPI: >60 ML/MIN/1.73/M2
GLOBULIN SER-MCNC: 4 GM/DL (ref 2.4–3.5)
GLUCOSE SERPL-MCNC: 175 MG/DL (ref 82–115)
HCT VFR BLD AUTO: 30.1 % (ref 42–52)
HGB BLD-MCNC: 10.3 G/DL (ref 14–18)
IMM GRANULOCYTES # BLD AUTO: 0.01 X10(3)/MCL (ref 0–0.04)
IMM GRANULOCYTES NFR BLD AUTO: 0.3 %
LYMPHOCYTES # BLD AUTO: 0.77 X10(3)/MCL (ref 0.6–4.6)
LYMPHOCYTES NFR BLD AUTO: 22.4 %
MCH RBC QN AUTO: 37.6 PG (ref 27–31)
MCHC RBC AUTO-ENTMCNC: 34.2 G/DL (ref 33–36)
MCV RBC AUTO: 109.9 FL (ref 80–94)
MONOCYTES # BLD AUTO: 0.61 X10(3)/MCL (ref 0.1–1.3)
MONOCYTES NFR BLD AUTO: 17.8 %
NEUTROPHILS # BLD AUTO: 1.72 X10(3)/MCL (ref 2.1–9.2)
NEUTROPHILS NFR BLD AUTO: 50.2 %
NRBC BLD AUTO-RTO: 0 %
PLATELET # BLD AUTO: 48 X10(3)/MCL (ref 130–400)
PMV BLD AUTO: 10.5 FL (ref 7.4–10.4)
POTASSIUM SERPL-SCNC: 4.1 MMOL/L (ref 3.5–5.1)
PROT SERPL-MCNC: 7 GM/DL (ref 5.8–7.6)
RBC # BLD AUTO: 2.74 X10(6)/MCL (ref 4.7–6.1)
SODIUM SERPL-SCNC: 141 MMOL/L (ref 136–145)
WBC # BLD AUTO: 3.43 X10(3)/MCL (ref 4.5–11.5)

## 2025-06-03 PROCEDURE — 85025 COMPLETE CBC W/AUTO DIFF WBC: CPT

## 2025-06-03 PROCEDURE — 80053 COMPREHEN METABOLIC PANEL: CPT

## 2025-06-03 PROCEDURE — 36415 COLL VENOUS BLD VENIPUNCTURE: CPT

## 2025-06-04 ENCOUNTER — HOSPITAL ENCOUNTER (OUTPATIENT)
Dept: RADIOLOGY | Facility: HOSPITAL | Age: 75
Discharge: HOME | End: 2025-06-04
Attending: FAMILY MEDICINE
Payer: MEDICARE

## 2025-06-04 DIAGNOSIS — J06.9 ACUTE UPPER RESPIRATORY INFECTION: ICD-10-CM

## 2025-06-04 PROCEDURE — 71046 X-RAY EXAM CHEST 2 VIEWS: CPT | Mod: TC

## 2025-06-10 ENCOUNTER — LAB VISIT (OUTPATIENT)
Dept: LAB | Facility: HOSPITAL | Age: 75
End: 2025-06-10
Payer: MEDICARE

## 2025-06-10 DIAGNOSIS — C90.01 MULTIPLE MYELOMA IN REMISSION: ICD-10-CM

## 2025-06-10 LAB
ALBUMIN SERPL-MCNC: 3.2 G/DL (ref 3.4–4.8)
ALBUMIN/GLOB SERPL: 0.9 RATIO (ref 1.1–2)
ALP SERPL-CCNC: 114 UNIT/L (ref 40–150)
ALT SERPL-CCNC: 21 UNIT/L (ref 0–55)
ANION GAP SERPL CALC-SCNC: 6 MEQ/L
AST SERPL-CCNC: 11 UNIT/L (ref 11–45)
BASOPHILS # BLD AUTO: 0.01 X10(3)/MCL
BASOPHILS NFR BLD AUTO: 0.3 %
BILIRUB SERPL-MCNC: 0.8 MG/DL
BUN SERPL-MCNC: 17 MG/DL (ref 8.4–25.7)
CALCIUM SERPL-MCNC: 8.7 MG/DL (ref 8.8–10)
CHLORIDE SERPL-SCNC: 108 MMOL/L (ref 98–107)
CO2 SERPL-SCNC: 25 MMOL/L (ref 23–31)
CREAT SERPL-MCNC: 1.21 MG/DL (ref 0.72–1.25)
CREAT/UREA NIT SERPL: 14
EOSINOPHIL # BLD AUTO: 0.21 X10(3)/MCL (ref 0–0.9)
EOSINOPHIL NFR BLD AUTO: 5.9 %
ERYTHROCYTE [DISTWIDTH] IN BLOOD BY AUTOMATED COUNT: 13 % (ref 11.5–17)
GFR SERPLBLD CREATININE-BSD FMLA CKD-EPI: >60 ML/MIN/1.73/M2
GLOBULIN SER-MCNC: 3.7 GM/DL (ref 2.4–3.5)
GLUCOSE SERPL-MCNC: 195 MG/DL (ref 82–115)
HCT VFR BLD AUTO: 28.9 % (ref 42–52)
HGB BLD-MCNC: 9.9 G/DL (ref 14–18)
IMM GRANULOCYTES # BLD AUTO: 0.03 X10(3)/MCL (ref 0–0.04)
IMM GRANULOCYTES NFR BLD AUTO: 0.8 %
LYMPHOCYTES # BLD AUTO: 0.94 X10(3)/MCL (ref 0.6–4.6)
LYMPHOCYTES NFR BLD AUTO: 26.3 %
MCH RBC QN AUTO: 37.4 PG (ref 27–31)
MCHC RBC AUTO-ENTMCNC: 34.3 G/DL (ref 33–36)
MCV RBC AUTO: 109.1 FL (ref 80–94)
MONOCYTES # BLD AUTO: 0.33 X10(3)/MCL (ref 0.1–1.3)
MONOCYTES NFR BLD AUTO: 9.2 %
NEUTROPHILS # BLD AUTO: 2.05 X10(3)/MCL (ref 2.1–9.2)
NEUTROPHILS NFR BLD AUTO: 57.5 %
NRBC BLD AUTO-RTO: 0 %
PLATELET # BLD AUTO: 44 X10(3)/MCL (ref 130–400)
PMV BLD AUTO: 10.1 FL (ref 7.4–10.4)
POTASSIUM SERPL-SCNC: 4.2 MMOL/L (ref 3.5–5.1)
PROT SERPL-MCNC: 6.9 GM/DL (ref 5.8–7.6)
RBC # BLD AUTO: 2.65 X10(6)/MCL (ref 4.7–6.1)
SODIUM SERPL-SCNC: 139 MMOL/L (ref 136–145)
WBC # BLD AUTO: 3.57 X10(3)/MCL (ref 4.5–11.5)

## 2025-06-10 PROCEDURE — 80053 COMPREHEN METABOLIC PANEL: CPT

## 2025-06-10 PROCEDURE — 36415 COLL VENOUS BLD VENIPUNCTURE: CPT

## 2025-06-10 PROCEDURE — 85025 COMPLETE CBC W/AUTO DIFF WBC: CPT

## 2025-06-11 ENCOUNTER — OFFICE VISIT (OUTPATIENT)
Facility: CLINIC | Age: 75
End: 2025-06-11
Payer: MEDICARE

## 2025-06-11 VITALS
BODY MASS INDEX: 30.87 KG/M2 | TEMPERATURE: 98 F | DIASTOLIC BLOOD PRESSURE: 68 MMHG | WEIGHT: 215.63 LBS | RESPIRATION RATE: 16 BRPM | HEIGHT: 70 IN | SYSTOLIC BLOOD PRESSURE: 120 MMHG | OXYGEN SATURATION: 99 % | HEART RATE: 60 BPM

## 2025-06-11 DIAGNOSIS — D69.6 THROMBOCYTOPENIA, UNSPECIFIED: ICD-10-CM

## 2025-06-11 DIAGNOSIS — C90.01 MULTIPLE MYELOMA IN REMISSION: Primary | ICD-10-CM

## 2025-06-11 PROCEDURE — 99999 PR PBB SHADOW E&M-EST. PATIENT-LVL V: CPT | Mod: PBBFAC,,, | Performed by: NURSE PRACTITIONER

## 2025-06-11 PROCEDURE — 99215 OFFICE O/P EST HI 40 MIN: CPT | Mod: PBBFAC | Performed by: NURSE PRACTITIONER

## 2025-06-11 RX ORDER — DEXAMETHASONE 4 MG/1
40 TABLET ORAL DAILY
Qty: 40 TABLET | Refills: 0 | Status: SHIPPED | OUTPATIENT
Start: 2025-06-11 | End: 2025-06-15

## 2025-06-11 NOTE — PROGRESS NOTES
HEMATOLOGIC MALIGNANCIES PROGRESS NOTE    Pathology:  23 Bone marrow aspiration/Biopsy:   PLASMA CELL MYELOMA, KAPPA RESTRICTED.     PLASMA CELL MYELOMA INVOLVES 90% OF BONE MARROW CELLULARITY WITH SMALL AREAS OF  SEQUENTIAL TRILINEAGE HEMATOPOIESIS.    ABSENT IRON STORES.     PERIPHERAL BLOOD: NORMOCYTIC NORMOCHROMIC ANEMIA. THROMBOCYTOPENIA.      LABS:  23 M-spike 3.33, IgA >7040, serum kappa 5/ lambda 0.56/ K:L ratio 9.09, 24 hour urine for M protein done but not resulted. Calcium 13.4/Alb 1.9, Cr 2.3, globulin 10.7  23 M-spike 2.49. IgA >7040, Corrected calcium 12.3, Cr 2.09, Globulin 7.6  8/15/23 M-spike 1.49, IgA 4000  23 M-spike 1.0, IgA 1700  - Transplant oncologist: Dr. Evans, follows locally with Dr. Rose in [place]  - diagnosed 2023 with IgA Kappa MM; stage unclear at diagnosis as FISH not available  - started DRd therapy on 2023  - he stopped the daratumumab after cycle 4 and continue with revlimid and dex only; looks to have completed ~5 cycles  - Admitted for Carmen 140 Auto SCT on 2024  IDENTIFYING STATEMENT   Chip Goodson (Chip) is a 73 y.o. male with a  of 1950 from [place], [place] with the diagnosis of multiple myeloma.      ONCOLOGY HISTORY:    INTERVAL HISTORY -  He returns to the clinic today for a two month follow-up.   He continues his revlimid 5mg daily as prescribed.    He feels well today, but does report cough and congestion that has been persistent over the past week.  Rash has resolved.  He was seen by Dr. Joseph in [facility] last week with a three month follow-up.   It was recommended that he start skeletal supportive therapy with Zometa q.3 months.  Order is placed and approved, dental clearance is not needed due to upper and lower dentures.  He does report left lower extremity swelling, ultrasound was ordered by [facility] but not completed.     Interval History 25  Chip Goodson presents for follow-up of multiple myeloma Revlimid  currently being held due to thrombocytopenia.  Currently experiencing fatigue. Reports inability to work in garden for extended periods. Denies chest pain. Denies fever, chills, night sweats or weightloss. Denies any tooth or jaw pain, has both upper and lower dentures.     Past Medical History, Past Social History and Past Family History have been reviewed and are unchanged except as noted in the interval history.      Review of Systems   Constitutional:  Negative for chills, fever, malaise/fatigue and weight loss.   HENT:  Negative for congestion, nosebleeds and tinnitus.    Eyes:  Negative for double vision, pain and discharge.   Respiratory:  Negative for cough, sputum production, shortness of breath and stridor.    Cardiovascular:  Negative for chest pain, claudication, leg swelling and PND.   Gastrointestinal:  Negative for abdominal pain, diarrhea, melena, nausea and vomiting.   Genitourinary:  Negative for dysuria, frequency and hematuria.   Musculoskeletal:  Negative for back pain, joint pain and neck pain.   Skin:  Negative for rash.   Neurological:  Negative for dizziness, tremors, speech change, weakness and headaches.   Endo/Heme/Allergies:  Negative for environmental allergies.   Psychiatric/Behavioral:  Negative for depression and substance abuse. The patient is not nervous/anxious.       Vitals:    06/11/25 1013   BP: 120/68   Pulse: 60   Resp: 16   Temp: 97.6 °F (36.4 °C)           Physical Exam  Vitals reviewed.   Constitutional:       General: He is not in acute distress.     Appearance: Normal appearance. He is normal weight.   HENT:      Head: Normocephalic and atraumatic.      Right Ear: Decreased hearing noted. A middle ear effusion is present.      Left Ear: Decreased hearing noted. A middle ear effusion is present.      Nose: No congestion or rhinorrhea.      Mouth/Throat:      Mouth: Mucous membranes are moist.      Pharynx: Oropharynx is clear. No oropharyngeal exudate or posterior  oropharyngeal erythema.   Eyes:      Extraocular Movements: Extraocular movements intact.      Conjunctiva/sclera: Conjunctivae normal.      Pupils: Pupils are equal, round, and reactive to light.   Cardiovascular:      Rate and Rhythm: Normal rate and regular rhythm.      Pulses: Normal pulses.      Heart sounds: No murmur heard.     No friction rub. No gallop.   Pulmonary:      Effort: Pulmonary effort is normal. No respiratory distress.      Breath sounds: No wheezing, rhonchi or rales.   Abdominal:      General: Abdomen is flat. Bowel sounds are normal.      Palpations: Abdomen is soft. There is no mass.      Tenderness: There is no abdominal tenderness. There is no guarding.   Musculoskeletal:         General: No swelling, tenderness or deformity. Normal range of motion.      Cervical back: Normal range of motion and neck supple.      Right lower leg: No edema.      Left lower leg: No edema.   Lymphadenopathy:      Cervical: No cervical adenopathy.   Skin:     General: Skin is warm and dry.      Findings: No erythema, lesion or rash.   Neurological:      General: No focal deficit present.      Mental Status: He is alert and oriented to person, place, and time. Mental status is at baseline.      Cranial Nerves: No cranial nerve deficit.      Gait: Gait normal.   Psychiatric:         Mood and Affect: Mood normal.         Behavior: Behavior normal.         Thought Content: Thought content normal.         Judgment: Judgment normal.        Current Outpatient Medications   Medication Sig    acyclovir (ZOVIRAX) 400 MG tablet Take 1 tablet (400 mg total) by mouth 2 (two) times daily.    albuterol (PROVENTIL/VENTOLIN HFA) 90 mcg/actuation inhaler 2 puffs every 4 to 6 hours as needed.    ALPRAZolam (XANAX) 0.5 MG tablet Take 0.5 mg by mouth daily as needed for Anxiety.    amLODIPine (NORVASC) 10 MG tablet Take 1 tablet (10 mg total) by mouth every morning.    ascorbic acid, vitamin C, (VITAMIN C) 500 MG tablet Take 500  mg by mouth once daily.    atorvastatin (LIPITOR) 10 MG tablet Take 10 mg by mouth every evening.    cholecalciferol, vitamin D3, (VITAMIN D3) 125 mcg (5,000 unit) Tab Take 5,000 Units by mouth once daily.    famotidine (PEPCID) 20 MG tablet Take 20 mg by mouth.    ferrous sulfate 325 (65 FE) MG EC tablet Take 325 mg by mouth once daily.    finasteride (PROSCAR) 5 mg tablet Take 5 mg by mouth every morning.    hydrALAZINE (APRESOLINE) 50 MG tablet Take 50 mg by mouth 3 (three) times daily.    HYDROcodone-acetaminophen (NORCO) 7.5-325 mg per tablet Take 1 tablet by mouth every 4 (four) hours as needed for Pain.    lenalidomide (REVLIMID) 5 mg Cap Take 1 capsule (5 mg total) by mouth once daily For 28 days.    levothyroxine (SYNTHROID) 112 MCG tablet Take 112 mcg by mouth every evening.    nitroGLYCERIN (NITROSTAT) 0.4 MG SL tablet place one tablet under the tongue every five minutes as needed for chest pain up to 3 doses. if no relief call 911    [employer] ULTRA TEST Strp USE TO TEST BLOOD GLUCOSE TWICE DAILY    ranolazine (RANEXA) 500 MG Tb12 Take 500 mg by mouth 2 (two) times daily.    spironolactone (ALDACTONE) 25 MG tablet Take 12.5 mg by mouth.    carvediloL (COREG) 6.25 MG tablet Take 1 tablet (6.25 mg total) by mouth 2 (two) times daily.    pantoprazole (PROTONIX) 40 MG tablet Take 1 tablet (40 mg total) by mouth once daily.     Current Facility-Administered Medications   Medication    0.9%  NaCl infusion (for blood administration)    0.9%  NaCl infusion (for blood administration)    0.9%  NaCl infusion (for blood administration)    pneumoc 20-edward conj-dip cr(PF) (PREVNAR-20 (PF)) injection Syrg 0.5 mL    sars-cov-2 (covid-19) (Spikevax (Moderna) (12yrs and up 2023)) 50 mcg/0.5 mL injection 0.5 mL         Lab Results   Component Value Date    WBC 3.57 (L) 06/10/2025    HGB 9.9 (L) 06/10/2025    HCT 28.9 (L) 06/10/2025    .1 (H) 06/10/2025    PLT 44 (L) 06/10/2025       Gran # (ANC)   Date Value Ref Range  Status   11/04/2024 2.6 1.8 - 7.7 K/uL Final     Gran %   Date Value Ref Range Status   11/04/2024 46.2 38.0 - 73.0 % Final     CMP  Sodium   Date Value Ref Range Status   06/10/2025 139 136 - 145 mmol/L Final   11/04/2024 141 136 - 145 mmol/L Final     Potassium   Date Value Ref Range Status   06/10/2025 4.2 3.5 - 5.1 mmol/L Final   11/04/2024 3.7 3.5 - 5.1 mmol/L Final     Chloride   Date Value Ref Range Status   06/10/2025 108 (H) 98 - 107 mmol/L Final   11/04/2024 106 95 - 110 mmol/L Final     CO2   Date Value Ref Range Status   06/10/2025 25 23 - 31 mmol/L Final   11/04/2024 26 23 - 29 mmol/L Final     Glucose   Date Value Ref Range Status   06/10/2025 195 (H) 82 - 115 mg/dL Final   11/04/2024 161 (H) 70 - 110 mg/dL Final     BUN   Date Value Ref Range Status   11/04/2024 19 8 - 23 mg/dL Final     Blood Urea Nitrogen   Date Value Ref Range Status   06/10/2025 17.0 8.4 - 25.7 mg/dL Final     Creatinine   Date Value Ref Range Status   06/10/2025 1.21 0.72 - 1.25 mg/dL Final   11/04/2024 1.3 0.5 - 1.4 mg/dL Final     Calcium   Date Value Ref Range Status   06/10/2025 8.7 (L) 8.8 - 10.0 mg/dL Final   11/04/2024 9.5 8.7 - 10.5 mg/dL Final     Protein Total   Date Value Ref Range Status   06/10/2025 6.9 5.8 - 7.6 gm/dL Final     Total Protein   Date Value Ref Range Status   11/04/2024 6.7 6.0 - 8.4 g/dL Final     Albumin   Date Value Ref Range Status   06/10/2025 3.2 (L) 3.4 - 4.8 g/dL Final   11/04/2024 3.8 3.5 - 5.2 g/dL Final     Total Bilirubin   Date Value Ref Range Status   11/04/2024 1.4 (H) 0.1 - 1.0 mg/dL Final     Comment:     For infants and newborns, interpretation of results should be based  on gestational age, weight and in agreement with clinical  observations.    Premature Infant recommended reference ranges:  Up to 24 hours.............<8.0 mg/dL  Up to 48 hours............<12.0 mg/dL  3-5 days..................<15.0 mg/dL  6-29 days.................<15.0 mg/dL       Bilirubin Total   Date Value Ref  Range Status   06/10/2025 0.8 <=1.5 mg/dL Final     Alkaline Phosphatase   Date Value Ref Range Status   11/04/2024 100 40 - 150 U/L Final     ALP   Date Value Ref Range Status   06/10/2025 114 40 - 150 unit/L Final     AST   Date Value Ref Range Status   06/10/2025 11 11 - 45 unit/L Final   11/04/2024 16 10 - 40 U/L Final     ALT   Date Value Ref Range Status   06/10/2025 21 0 - 55 unit/L Final   11/04/2024 32 10 - 44 U/L Final     Anion Gap   Date Value Ref Range Status   11/04/2024 9 8 - 16 mmol/L Final     eGFR   Date Value Ref Range Status   06/10/2025 >60 mL/min/1.73/m2 Final     Comment:     Estimated GFR calculated using the CKD-EPI creatinine (2021) equation.   11/04/2024 57.3 (A) >60 mL/min/1.73 m^2 Final     IgG   Date Value Ref Range Status   11/04/2024 1119 650 - 1600 mg/dL Final     Comment:     IgG Cord Blood Reference Range: 650-1600 mg/dL.     IgA   Date Value Ref Range Status   11/04/2024 159 40 - 350 mg/dL Final     Comment:     IgA Cord Blood Reference Range: <5 mg/dL.     IgM   Date Value Ref Range Status   11/04/2024 49 (L) 50 - 300 mg/dL Final     Comment:     IgM Cord Blood Reference Range: <25 mg/dL.     IgM Level   Date Value Ref Range Status   05/27/2025 56.0 22.0 - 240.0 mg/dL Final     Kappa Free Light Chains   Date Value Ref Range Status   11/04/2024 7.08 (H) 0.33 - 1.94 mg/dL Final   03/11/2024 2.00 (H) 0.33 - 1.94 mg/dL Final     Lambda Free Light Chains   Date Value Ref Range Status   11/04/2024 4.81 (H) 0.57 - 2.63 mg/dL Final   03/11/2024 1.09 0.57 - 2.63 mg/dL Final     Kappa/Lambda FLC Ratio   Date Value Ref Range Status   11/04/2024 1.47 0.26 - 1.65 Final     Comment:     Undetected antigen excess is a rare event but cannot   be excluded. If these free light chain results do not   agree with other clinical or laboratory findings or   if the sample is from a patient that has previously   demonstrated antigen excess, discuss with the testing   laboratory.   Results should always  be interpreted in conjunction   with other laboratory tests and clinical evidence.     03/11/2024 1.83 (H) 0.26 - 1.65 Final     Comment:     Undetected antigen excess is a rare event but cannot   be excluded. If these free light chain results do not   agree with other clinical or laboratory findings or   if the sample is from a patient that has previously   demonstrated antigen excess, discuss with the testing   laboratory.   Results should always be interpreted in conjunction   with other laboratory tests and clinical evidence.       Pathologist Interpretation SPE   Date Value Ref Range Status   11/04/2024 REVIEWED  Final     Comment:       Electronically reviewed and signed by:  Sydney Wallace MD  Signed on 11/05/24 at 13:34  Normal total protein.  There is a paraprotein band in gamma = 0.32 g/dL.      03/11/2024 REVIEWED  Final     Comment:       Electronically reviewed and signed by:  Shey Moore D.O.  Signed on 03/12/24 at 21:50  Normal total protein. Normal gamma globulins are decreased.  Paraprotein peak in beta-2 = 0.39 g/dL (previously, 0.16 g/dL) and   newly noted 2nd peak in near-gamma <0.1 g/dL (no previous value).       Pathologist Interpretation PAO   Date Value Ref Range Status   11/04/2024 REVIEWED  Final     Comment:       Electronically reviewed and signed by:  Sydney Wallace MD  Signed on 11/05/24 at 13:33  IgG kappa specific monoclonal band present.        Bone marrow biopsy  - 7/23/24     Component 3 mo ago   Final Pathologic Diagnosis     RIGHT ILIAC CREST BONE MARROW ASPIRATE, BONE MARROW CLOT, AND BONE MARROW CORE BIOPSY WITH:    CELLULARITY=30%, TRILINEAGE HEMATOPOIETIC ACTIVITY (M/E=1.6:1).  NO DEFINITIVE MORPHOLOGIC OR IMMUNOPHENOTYPIC EVIDENCE OF RESIDUAL PLASMA CELL NEOPLASM.  SEE COMMENT.  INCREASED EOSINOPHILS.  INCREASED STORAGE IRON.  DECREASED NUMBER OF MEGAKARYOCYTES.          PET scan -7/23/24  Impression:     In this patient with multiple myeloma, there is interval  normalization of previously hypermetabolic osseous lesions, compatible with favorable response to therapy.  No new hypermetabolic lesions.     Additional findings as above.     Electronically signed by resident: Stephany Rosenbaum  Date:                                            07/23/2024  Time:                                           14:10     Electronically signed by:Janice Fontenot  Date:                                            07/23/2024  Time:                                           15:07    PET 4/14/2025:  Impression:  1. No new suspicious PET findings.  2. Punctate left upper lobe nodule not clearly seen on prior, recommend attention on follow-up.  Assessment/Plan      IgA kappa multiple myeloma in remission  - he follows locally with in [place]  - diagnosed June 2023 with IgA Kappa MM; stage unclear at diagnosis as FISH not available  - started DRd therapy on 7/18/2023  - he stopped the daratumumab after cycle 4 and continue with revlimid and dex only; looks to have completed ~5 cycles  - Admitted for Carmen 140 Auto SCT on 4/16/2024  -day+100 restaging bone marrow biopsy on 7/23/24 demonstrated a normocellular marrow with increased eosinophils and tri-lineage hematopoiesis   -About 6-8%  positive plasma cells were evident.  No increased CD34 positive blasts were identified.  CD61 highlighted decreased number  of megakaryocytes.  MPO showed myeloid precursors.  -- No monotypic plasma cells identified (MRD-negative) on MRD flow assay.  -There was no definitive morphologic or immunophenotypic evidence of residual plasma cell neoplasm  5/2/2025:  No evidence of disease with MRD negativity on recent BMBx              -           1 year s/p autologous SCT; On maintanance revlimid at 5 mg daily              -           Cytopenias noted.... ? Revlimiid effect. Reassess Anemia studies              -           Notes suggest that he has not had skeltal supportive therapy. CT/PET scans during therapy  suggests multifocal lytic lesions of spine.and ribs. He has low back pain possibly degenerative uin nature. With lytic lesions, consider initiation of skeltal supportive therapy with denoumab q 3 mos.    Cytopenias with RBC macrocytosis              -           As above  Lytic skeletal lesions              -           As above              -           Obtain Dexa scan  LLE swelling              -           Obtain LE  doppler.... orders placed (he prefers obtaining it with [facility])  Hyoperglycemia              -           Advised discussing diabetes management emelina Gonzalez  Hypoalbuminemia/Elevated ALT              -           Optimize PO intake               -           Follow ALT    History of auto stem cell transplant:   - Day 0 on 4/18/2024. Conditioned with Carmen 140.   -day+ 200 today  - He received 5 bags on 4/18/24 at 1330 and received the remaining 4 bags on 4/19/24 at 1330. Total CD34 dose was 2.91 x 10^6.   - day+100 restaging bone marrow biopsy on 7/23/24 demonstrated a normocellular marrow with increased eosinophils and tri-lineage hematopoiesis   -About 6-8%  positive plasma cells were evident.  No increased CD34 positive blasts were identified.  CD61 highlighted decreased number  of megakaryocytes.  MPO showed myeloid precursors.  -- No monotypic plasma cells identified (MRD-negative) on MRD flow assay.  -There was no definitive morphologic or immunophenotypic evidence of residual plasma cell neoplasm.     -PET CT on 7/23/24 showed here is interval normalization of uptake within the previously hypermetabolic lesions.   No new hypermetabolic osseous lesions identified.    Immunodeficiency due to conditions classified elsewhere:   - Continue acyclovir antiviral prophylaxis   - stop dapsone now day +180    4. Rev induced cytopenias  -- above further dose adjustment thresholds; monitor     5. Cancer associated pain    -he has ongoing low back pain  -no recent falls  -he is using hydrocodone-APAP as  needed  -he tried lidoderm patch without success  -he has been using cyclobenzaprine 5mg TID as needed  -Stable today.    6. Immunization    --he has been evaluated by ID   -[facility].       7. Rash  -improving, but still present. Itches at night. Recommended daily moisturizer. If still present, will send in steroid cream in needed.       Assessment:    Dx: Multiple Myeloma  Status:Remission, Revlimid currently on hold.  Revlimid currently on hold due to thrombocytopenia. Platelet count improved from 25 to 44 but remains low.   Hemoglobin/hematocrit also low 9.9/28.9.   Starting dexamethasone 40mg daily with food in morning for 4 days to help improve platelet count.   Will recheck CBC in one week.   Counseled on bleeding precautions including holding pressure on cuts, seeking emergency care for head trauma or uncontrolled bleeding.   Continue Zometa q 3 mths.  Also advised to monitor for signs of infection and anemia symptoms.  Dr. Joseph made aware and he would like to see patient. Appt will be schedule  FISH for MDS added on the recent BMBX as per Dr. Lewis instructions    Dx: Thrombocytopenia ( ITP?)  Status: Active  Platelet count 44, improved from 25. Counseled on bleeding precautions. Starting dexamethasone 40mg BID with food in morning for 4 days. Will recheck CBC in one week.        Patient instructions and visit orders.       -Follow-up:  F/U with NP in 1 week  Patient to schedule appt with Dr. Joseph  -Labs:  CBC, CMP  FISH added to bone marrow biopsy  35 were spent on this encounter    Maritza FRIEND, FNP   Department of Hematology/Oncology.    This visit was conducted by Maritza Rodriguez NP as part of her orientation training. IKj NP, collaborated with her and the visit reflects our discussion and plan.   Kj Egan, MSN, FNP-BC, APRN, AOCNP   Department of Hematology/Oncology.

## 2025-06-13 DIAGNOSIS — C90.01 MULTIPLE MYELOMA IN REMISSION: ICD-10-CM

## 2025-06-13 RX ORDER — LENALIDOMIDE 5 MG/1
5 CAPSULE ORAL DAILY
Qty: 28 CAPSULE | Refills: 0 | Status: SHIPPED | OUTPATIENT
Start: 2025-06-13

## 2025-06-17 NOTE — PROGRESS NOTES
HEMATOLOGIC MALIGNANCIES PROGRESS NOTE    Pathology:  23 Bone marrow aspiration/Biopsy:   PLASMA CELL MYELOMA, KAPPA RESTRICTED.     PLASMA CELL MYELOMA INVOLVES 90% OF BONE MARROW CELLULARITY WITH SMALL AREAS OF  SEQUENTIAL TRILINEAGE HEMATOPOIESIS.    ABSENT IRON STORES.     PERIPHERAL BLOOD: NORMOCYTIC NORMOCHROMIC ANEMIA. THROMBOCYTOPENIA.      LABS:  23 M-spike 3.33, IgA >7040, serum kappa 5/ lambda 0.56/ K:L ratio 9.09, 24 hour urine for M protein done but not resulted. Calcium 13.4/Alb 1.9, Cr 2.3, globulin 10.7  23 M-spike 2.49. IgA >7040, Corrected calcium 12.3, Cr 2.09, Globulin 7.6  8/15/23 M-spike 1.49, IgA 4000  23 M-spike 1.0, IgA 1700  - Transplant oncologist: Dr. Evans, follows locally with Dr. Rose in [place]  - diagnosed 2023 with IgA Kappa MM; stage unclear at diagnosis as FISH not available  - started DRd therapy on 2023  - he stopped the daratumumab after cycle 4 and continue with revlimid and dex only; looks to have completed ~5 cycles  - Admitted for Camren 140 Auto SCT on 2024  IDENTIFYING STATEMENT   Chip Yadav) is a 73 y.o. male with a  of 1950 from [place], [place] with the diagnosis of multiple myeloma.      ONCOLOGY HISTORY:    INTERVAL HISTORY -  He returns to the clinic today for a one week follow-up.   He continues to hold his revlimid due to thrombocytopenia.  He did not  his dex as prescribed at last OV due to his wife's hospitalization.   FISH was not able to be added to BMBx specimen.   Not scheduled with Dr. Joseph until 2025  He tolerated zometa well without issues.   He feels well today without any concerns or complaints.   Denies fever, chills, night sweats or weightloss. Denies any tooth or jaw pain, has both upper and lower dentures.       Past Medical History, Past Social History and Past Family History have been reviewed and are unchanged except as noted in the interval history.      Review of Systems    Constitutional:  Negative for chills, fever, malaise/fatigue and weight loss.   HENT:  Negative for congestion, nosebleeds and tinnitus.    Eyes:  Negative for double vision, pain and discharge.   Respiratory:  Negative for cough, sputum production, shortness of breath and stridor.    Cardiovascular:  Negative for chest pain, claudication, leg swelling and PND.   Gastrointestinal:  Negative for abdominal pain, diarrhea, melena, nausea and vomiting.   Genitourinary:  Negative for dysuria, frequency and hematuria.   Musculoskeletal:  Negative for back pain, joint pain and neck pain.   Skin:  Negative for rash.   Neurological:  Negative for dizziness, tremors, speech change, weakness and headaches.   Endo/Heme/Allergies:  Negative for environmental allergies.   Psychiatric/Behavioral:  Negative for depression and substance abuse. The patient is not nervous/anxious.       Vitals:    06/18/25 1353   BP: (!) 158/76   Pulse: (!) 59   Resp: 18   Temp: 97.7 °F (36.5 °C)             Physical Exam  Vitals reviewed.   Constitutional:       General: He is not in acute distress.     Appearance: Normal appearance. He is normal weight.   HENT:      Head: Normocephalic and atraumatic.      Right Ear: No decreased hearing noted. No middle ear effusion.      Left Ear: No decreased hearing noted.  No middle ear effusion.      Nose: No congestion or rhinorrhea.      Mouth/Throat:      Mouth: Mucous membranes are moist.      Pharynx: Oropharynx is clear. No oropharyngeal exudate or posterior oropharyngeal erythema.   Eyes:      Extraocular Movements: Extraocular movements intact.      Conjunctiva/sclera: Conjunctivae normal.      Pupils: Pupils are equal, round, and reactive to light.   Cardiovascular:      Rate and Rhythm: Normal rate and regular rhythm.      Pulses: Normal pulses.      Heart sounds: No murmur heard.     No friction rub. No gallop.   Pulmonary:      Effort: Pulmonary effort is normal. No respiratory distress.       Breath sounds: No wheezing, rhonchi or rales.   Abdominal:      General: Abdomen is flat. Bowel sounds are normal.      Palpations: Abdomen is soft. There is no mass.      Tenderness: There is no abdominal tenderness. There is no guarding.   Musculoskeletal:         General: No swelling, tenderness or deformity. Normal range of motion.      Cervical back: Normal range of motion and neck supple.      Right lower leg: No edema.      Left lower leg: No edema.   Lymphadenopathy:      Cervical: No cervical adenopathy.   Skin:     General: Skin is warm and dry.      Findings: No erythema, lesion or rash.   Neurological:      General: No focal deficit present.      Mental Status: He is alert and oriented to person, place, and time. Mental status is at baseline.      Cranial Nerves: No cranial nerve deficit.      Gait: Gait normal.   Psychiatric:         Mood and Affect: Mood normal.         Behavior: Behavior normal.         Thought Content: Thought content normal.         Judgment: Judgment normal.          Current Outpatient Medications   Medication Sig    acyclovir (ZOVIRAX) 400 MG tablet Take 1 tablet (400 mg total) by mouth 2 (two) times daily.    albuterol (PROVENTIL/VENTOLIN HFA) 90 mcg/actuation inhaler 2 puffs every 4 to 6 hours as needed.    ALPRAZolam (XANAX) 0.5 MG tablet Take 0.5 mg by mouth daily as needed for Anxiety.    amLODIPine (NORVASC) 10 MG tablet Take 1 tablet (10 mg total) by mouth every morning.    ascorbic acid, vitamin C, (VITAMIN C) 500 MG tablet Take 500 mg by mouth once daily.    atorvastatin (LIPITOR) 10 MG tablet Take 10 mg by mouth every evening.    cholecalciferol, vitamin D3, (VITAMIN D3) 125 mcg (5,000 unit) Tab Take 5,000 Units by mouth once daily.    famotidine (PEPCID) 20 MG tablet Take 20 mg by mouth.    ferrous sulfate 325 (65 FE) MG EC tablet Take 325 mg by mouth once daily.    finasteride (PROSCAR) 5 mg tablet Take 5 mg by mouth every morning.    hydrALAZINE (APRESOLINE) 50 MG  tablet Take 50 mg by mouth 3 (three) times daily.    HYDROcodone-acetaminophen (NORCO) 7.5-325 mg per tablet Take 1 tablet by mouth every 4 (four) hours as needed for Pain.    lenalidomide (REVLIMID) 5 mg Cap Take 1 capsule (5 mg total) by mouth once daily For 28 days.    levothyroxine (SYNTHROID) 112 MCG tablet Take 112 mcg by mouth every evening.    nitroGLYCERIN (NITROSTAT) 0.4 MG SL tablet place one tablet under the tongue every five minutes as needed for chest pain up to 3 doses. if no relief call 911    GTX Messaging ULTRA TEST Strp USE TO TEST BLOOD GLUCOSE TWICE DAILY    ranolazine (RANEXA) 500 MG Tb12 Take 500 mg by mouth 2 (two) times daily.    spironolactone (ALDACTONE) 25 MG tablet Take 12.5 mg by mouth.    carvediloL (COREG) 6.25 MG tablet Take 1 tablet (6.25 mg total) by mouth 2 (two) times daily.    dexAMETHasone (DECADRON) 4 MG Tab Take 10 tablets (40 mg total) by mouth once daily. for 4 days    pantoprazole (PROTONIX) 40 MG tablet Take 1 tablet (40 mg total) by mouth once daily.     Current Facility-Administered Medications   Medication    0.9%  NaCl infusion (for blood administration)    0.9%  NaCl infusion (for blood administration)    0.9%  NaCl infusion (for blood administration)    pneumoc 20-edward conj-dip cr(PF) (PREVNAR-20 (PF)) injection Syrg 0.5 mL    sars-cov-2 (covid-19) (Spikevax (Moderna) (12yrs and up 2023)) 50 mcg/0.5 mL injection 0.5 mL         Lab Results   Component Value Date    WBC 3.03 (L) 06/18/2025    HGB 10.1 (L) 06/18/2025    HCT 29.1 (L) 06/18/2025    .6 (H) 06/18/2025    PLT 34 (LL) 06/18/2025       Gran # (ANC)   Date Value Ref Range Status   11/04/2024 2.6 1.8 - 7.7 K/uL Final     Gran %   Date Value Ref Range Status   11/04/2024 46.2 38.0 - 73.0 % Final     CMP  Sodium   Date Value Ref Range Status   06/18/2025 141 136 - 145 mmol/L Final   11/04/2024 141 136 - 145 mmol/L Final     Potassium   Date Value Ref Range Status   06/18/2025 4.4 3.5 - 5.1 mmol/L Final    11/04/2024 3.7 3.5 - 5.1 mmol/L Final     Chloride   Date Value Ref Range Status   06/18/2025 110 (H) 98 - 107 mmol/L Final   11/04/2024 106 95 - 110 mmol/L Final     CO2   Date Value Ref Range Status   06/18/2025 25 23 - 31 mmol/L Final   11/04/2024 26 23 - 29 mmol/L Final     Glucose   Date Value Ref Range Status   06/18/2025 137 (H) 82 - 115 mg/dL Final   11/04/2024 161 (H) 70 - 110 mg/dL Final     BUN   Date Value Ref Range Status   11/04/2024 19 8 - 23 mg/dL Final     Blood Urea Nitrogen   Date Value Ref Range Status   06/18/2025 16.0 8.4 - 25.7 mg/dL Final     Creatinine   Date Value Ref Range Status   06/18/2025 1.35 (H) 0.72 - 1.25 mg/dL Final   11/04/2024 1.3 0.5 - 1.4 mg/dL Final     Calcium   Date Value Ref Range Status   06/18/2025 9.0 8.8 - 10.0 mg/dL Final   11/04/2024 9.5 8.7 - 10.5 mg/dL Final     Protein Total   Date Value Ref Range Status   06/18/2025 7.3 5.8 - 7.6 gm/dL Final     Total Protein   Date Value Ref Range Status   11/04/2024 6.7 6.0 - 8.4 g/dL Final     Albumin   Date Value Ref Range Status   06/18/2025 3.6 3.4 - 4.8 g/dL Final   11/04/2024 3.8 3.5 - 5.2 g/dL Final     Total Bilirubin   Date Value Ref Range Status   11/04/2024 1.4 (H) 0.1 - 1.0 mg/dL Final     Comment:     For infants and newborns, interpretation of results should be based  on gestational age, weight and in agreement with clinical  observations.    Premature Infant recommended reference ranges:  Up to 24 hours.............<8.0 mg/dL  Up to 48 hours............<12.0 mg/dL  3-5 days..................<15.0 mg/dL  6-29 days.................<15.0 mg/dL       Bilirubin Total   Date Value Ref Range Status   06/18/2025 1.1 <=1.5 mg/dL Final     Alkaline Phosphatase   Date Value Ref Range Status   11/04/2024 100 40 - 150 U/L Final     ALP   Date Value Ref Range Status   06/18/2025 116 40 - 150 unit/L Final     AST   Date Value Ref Range Status   06/18/2025 11 11 - 45 unit/L Final   11/04/2024 16 10 - 40 U/L Final     ALT    Date Value Ref Range Status   06/18/2025 14 0 - 55 unit/L Final   11/04/2024 32 10 - 44 U/L Final     Anion Gap   Date Value Ref Range Status   11/04/2024 9 8 - 16 mmol/L Final     eGFR   Date Value Ref Range Status   06/18/2025 55 mL/min/1.73/m2 Final     Comment:     Estimated GFR calculated using the CKD-EPI creatinine (2021) equation.   11/04/2024 57.3 (A) >60 mL/min/1.73 m^2 Final     IgG   Date Value Ref Range Status   11/04/2024 1119 650 - 1600 mg/dL Final     Comment:     IgG Cord Blood Reference Range: 650-1600 mg/dL.     IgA   Date Value Ref Range Status   11/04/2024 159 40 - 350 mg/dL Final     Comment:     IgA Cord Blood Reference Range: <5 mg/dL.     IgM   Date Value Ref Range Status   11/04/2024 49 (L) 50 - 300 mg/dL Final     Comment:     IgM Cord Blood Reference Range: <25 mg/dL.     IgM Level   Date Value Ref Range Status   05/27/2025 56.0 22.0 - 240.0 mg/dL Final     Kappa Free Light Chains   Date Value Ref Range Status   11/04/2024 7.08 (H) 0.33 - 1.94 mg/dL Final   03/11/2024 2.00 (H) 0.33 - 1.94 mg/dL Final     Lambda Free Light Chains   Date Value Ref Range Status   11/04/2024 4.81 (H) 0.57 - 2.63 mg/dL Final   03/11/2024 1.09 0.57 - 2.63 mg/dL Final     Kappa/Lambda FLC Ratio   Date Value Ref Range Status   11/04/2024 1.47 0.26 - 1.65 Final     Comment:     Undetected antigen excess is a rare event but cannot   be excluded. If these free light chain results do not   agree with other clinical or laboratory findings or   if the sample is from a patient that has previously   demonstrated antigen excess, discuss with the testing   laboratory.   Results should always be interpreted in conjunction   with other laboratory tests and clinical evidence.     03/11/2024 1.83 (H) 0.26 - 1.65 Final     Comment:     Undetected antigen excess is a rare event but cannot   be excluded. If these free light chain results do not   agree with other clinical or laboratory findings or   if the sample is from a  patient that has previously   demonstrated antigen excess, discuss with the testing   laboratory.   Results should always be interpreted in conjunction   with other laboratory tests and clinical evidence.       Pathologist Interpretation SPE   Date Value Ref Range Status   11/04/2024 REVIEWED  Final     Comment:       Electronically reviewed and signed by:  Sydney Wallace MD  Signed on 11/05/24 at 13:34  Normal total protein.  There is a paraprotein band in gamma = 0.32 g/dL.      03/11/2024 REVIEWED  Final     Comment:       Electronically reviewed and signed by:  Shey Moore D.O.  Signed on 03/12/24 at 21:50  Normal total protein. Normal gamma globulins are decreased.  Paraprotein peak in beta-2 = 0.39 g/dL (previously, 0.16 g/dL) and   newly noted 2nd peak in near-gamma <0.1 g/dL (no previous value).       Pathologist Interpretation PAO   Date Value Ref Range Status   11/04/2024 REVIEWED  Final     Comment:       Electronically reviewed and signed by:  Sydney Wallace MD  Signed on 11/05/24 at 13:33  IgG kappa specific monoclonal band present.        Bone marrow biopsy  - 7/23/24     Component 3 mo ago   Final Pathologic Diagnosis     RIGHT ILIAC CREST BONE MARROW ASPIRATE, BONE MARROW CLOT, AND BONE MARROW CORE BIOPSY WITH:    CELLULARITY=30%, TRILINEAGE HEMATOPOIETIC ACTIVITY (M/E=1.6:1).  NO DEFINITIVE MORPHOLOGIC OR IMMUNOPHENOTYPIC EVIDENCE OF RESIDUAL PLASMA CELL NEOPLASM.  SEE COMMENT.  INCREASED EOSINOPHILS.  INCREASED STORAGE IRON.  DECREASED NUMBER OF MEGAKARYOCYTES.          PET scan -7/23/24  Impression:     In this patient with multiple myeloma, there is interval normalization of previously hypermetabolic osseous lesions, compatible with favorable response to therapy.  No new hypermetabolic lesions.     Additional findings as above.     Electronically signed by resident: Stephany Rosenbaum  Date:                                            07/23/2024  Time:                                            14:10     Electronically signed by:Janice Fontenot  Date:                                            07/23/2024  Time:                                           15:07    PET 4/14/2025:  Impression:  1. No new suspicious PET findings.  2. Punctate left upper lobe nodule not clearly seen on prior, recommend attention on follow-up.    Assessment/Plan  IgA kappa multiple myeloma in remission  - he follows locally with in [place]  - diagnosed June 2023 with IgA Kappa MM; stage unclear at diagnosis as FISH not available  - started DRd therapy on 7/18/2023  - he stopped the daratumumab after cycle 4 and continue with revlimid and dex only; looks to have completed ~5 cycles  - Admitted for Carmen 140 Auto SCT on 4/16/2024  -day+100 restaging bone marrow biopsy on 7/23/24 demonstrated a normocellular marrow with increased eosinophils and tri-lineage hematopoiesis   -About 6-8%  positive plasma cells were evident.  No increased CD34 positive blasts were identified.  CD61 highlighted decreased number  of megakaryocytes.  MPO showed myeloid precursors.  -- No monotypic plasma cells identified (MRD-negative) on MRD flow assay.  -There was no definitive morphologic or immunophenotypic evidence of residual plasma cell neoplasm  5/2/2025:  No evidence of disease with MRD negativity on recent BMBx              -           1 year s/p autologous SCT; On maintanance revlimid at 5 mg daily              -           Cytopenias noted.... ? Revlimiid effect. Reassess Anemia studies              -           Notes suggest that he has not had skeltal supportive therapy. CT/PET scans during therapy suggests multifocal lytic lesions of spine.and ribs. He has low back pain possibly degenerative uin nature. With lytic lesions, consider initiation of skeltal supportive therapy with denoumab q 3 mos.    Cytopenias with RBC macrocytosis              -           As above  Lytic skeletal lesions              -           As above               -           Obtain Dexa scan  LLE swelling              -           Obtain LE  doppler.... orders placed (he prefers obtaining it with [facility])  Hyoperglycemia              -           Advised discussing diabetes management tatygh Dr Gonzalez  Hypoalbuminemia/Elevated ALT              -           Optimize PO intake               -           Follow ALT    History of auto stem cell transplant:   - Day 0 on 4/18/2024. Conditioned with Carmen 140.   -day+ 200 today  - He received 5 bags on 4/18/24 at 1330 and received the remaining 4 bags on 4/19/24 at 1330. Total CD34 dose was 2.91 x 10^6.   - day+100 restaging bone marrow biopsy on 7/23/24 demonstrated a normocellular marrow with increased eosinophils and tri-lineage hematopoiesis   -About 6-8%  positive plasma cells were evident.  No increased CD34 positive blasts were identified.  CD61 highlighted decreased number  of megakaryocytes.  MPO showed myeloid precursors.  -- No monotypic plasma cells identified (MRD-negative) on MRD flow assay.  -There was no definitive morphologic or immunophenotypic evidence of residual plasma cell neoplasm.     -PET CT on 7/23/24 showed here is interval normalization of uptake within the previously hypermetabolic lesions.   No new hypermetabolic osseous lesions identified.    Immunodeficiency due to conditions classified elsewhere:   - Continue acyclovir antiviral prophylaxis   - stop dapsone now day +180    4. Rev induced cytopenias  -- above further dose adjustment thresholds; monitor     5. Cancer associated pain    -he has ongoing low back pain  -no recent falls  -he is using hydrocodone-APAP as needed  -he tried lidoderm patch without success  -he has been using cyclobenzaprine 5mg TID as needed  -Stable today.    6. Immunization    --he has been evaluated by ID   -currently receiving [facility] at [facility].       7. Rash  -improving, but still present. Itches at night. Recommended daily moisturizer. If still present, will  send in steroid cream in needed.       Plan:  He did not  dex as previously prescribed.   New script sent to pharmacy for Dex 40mg daily x 4 days.   Will see if we can get him scheduled sooner for AIDA with Dr. Joseph.   Continue to hold revlimid at this time.   Bleeding precautions given.   RTC in one week with NP for FU/lab    MERY Pineda  Oncology/Hematology   [facility]       I personally reviewed all pertinent imaging, lab results, explained to the patient the pertinent information in detail including radiographic imaging, abnormal lab results. All questions were answered thoroughly. Total time spent on this visit was >40 minutes.     code applies: Patient requires or will require continuous, longitudinal, and active collaborative plan of care related to this patient's health condition, IgA kappa multiple myeloma in remission - the management of which requires the direction of a practitioner with specialized clinical knowledge, skill, and expertise.

## 2025-06-18 ENCOUNTER — TELEPHONE (OUTPATIENT)
Dept: HEMATOLOGY/ONCOLOGY | Facility: CLINIC | Age: 75
End: 2025-06-18
Payer: MEDICARE

## 2025-06-18 ENCOUNTER — OFFICE VISIT (OUTPATIENT)
Facility: CLINIC | Age: 75
End: 2025-06-18
Payer: MEDICARE

## 2025-06-18 ENCOUNTER — LAB VISIT (OUTPATIENT)
Dept: LAB | Facility: HOSPITAL | Age: 75
End: 2025-06-18
Payer: MEDICARE

## 2025-06-18 ENCOUNTER — DOCUMENTATION ONLY (OUTPATIENT)
Facility: CLINIC | Age: 75
End: 2025-06-18
Payer: MEDICARE

## 2025-06-18 VITALS
HEIGHT: 70 IN | SYSTOLIC BLOOD PRESSURE: 158 MMHG | TEMPERATURE: 98 F | OXYGEN SATURATION: 97 % | HEART RATE: 59 BPM | WEIGHT: 217.88 LBS | RESPIRATION RATE: 18 BRPM | BODY MASS INDEX: 31.19 KG/M2 | DIASTOLIC BLOOD PRESSURE: 76 MMHG

## 2025-06-18 DIAGNOSIS — Z79.899 IMMUNODEFICIENCY DUE TO DRUGS: ICD-10-CM

## 2025-06-18 DIAGNOSIS — D84.9 IMMUNODEFICIENCY: ICD-10-CM

## 2025-06-18 DIAGNOSIS — Z94.81 S/P AUTOLOGOUS BONE MARROW TRANSPLANTATION: ICD-10-CM

## 2025-06-18 DIAGNOSIS — Z79.899 LONG-TERM USE OF HIGH-RISK MEDICATION: ICD-10-CM

## 2025-06-18 DIAGNOSIS — D69.6 THROMBOCYTOPENIA, UNSPECIFIED: ICD-10-CM

## 2025-06-18 DIAGNOSIS — D61.818 PANCYTOPENIA: ICD-10-CM

## 2025-06-18 DIAGNOSIS — D84.821 IMMUNODEFICIENCY DUE TO DRUGS: ICD-10-CM

## 2025-06-18 DIAGNOSIS — Z79.52 LONG TERM (CURRENT) USE OF SYSTEMIC STEROIDS: Primary | ICD-10-CM

## 2025-06-18 DIAGNOSIS — Z94.84 HISTORY OF AUTO STEM CELL TRANSPLANT: ICD-10-CM

## 2025-06-18 DIAGNOSIS — C90.01 MULTIPLE MYELOMA IN REMISSION: ICD-10-CM

## 2025-06-18 DIAGNOSIS — D70.9 NEUTROPENIA, UNSPECIFIED TYPE: ICD-10-CM

## 2025-06-18 DIAGNOSIS — D61.810 PANCYTOPENIA DUE TO CHEMOTHERAPY: ICD-10-CM

## 2025-06-18 DIAGNOSIS — M89.9 BONE LESION: ICD-10-CM

## 2025-06-18 DIAGNOSIS — Z94.84 HISTORY OF AUTOLOGOUS STEM CELL TRANSPLANT: ICD-10-CM

## 2025-06-18 LAB
ALBUMIN SERPL-MCNC: 3.6 G/DL (ref 3.4–4.8)
ALBUMIN/GLOB SERPL: 1 RATIO (ref 1.1–2)
ALP SERPL-CCNC: 116 UNIT/L (ref 40–150)
ALT SERPL-CCNC: 14 UNIT/L (ref 0–55)
ANION GAP SERPL CALC-SCNC: 6 MEQ/L
AST SERPL-CCNC: 11 UNIT/L (ref 11–45)
BASOPHILS # BLD AUTO: 0.01 X10(3)/MCL
BASOPHILS NFR BLD AUTO: 0.3 %
BILIRUB SERPL-MCNC: 1.1 MG/DL
BUN SERPL-MCNC: 16 MG/DL (ref 8.4–25.7)
CALCIUM SERPL-MCNC: 9 MG/DL (ref 8.8–10)
CHLORIDE SERPL-SCNC: 110 MMOL/L (ref 98–107)
CO2 SERPL-SCNC: 25 MMOL/L (ref 23–31)
CREAT SERPL-MCNC: 1.35 MG/DL (ref 0.72–1.25)
CREAT/UREA NIT SERPL: 12
EOSINOPHIL # BLD AUTO: 0.27 X10(3)/MCL (ref 0–0.9)
EOSINOPHIL NFR BLD AUTO: 8.9 %
ERYTHROCYTE [DISTWIDTH] IN BLOOD BY AUTOMATED COUNT: 13.6 % (ref 11.5–17)
GFR SERPLBLD CREATININE-BSD FMLA CKD-EPI: 55 ML/MIN/1.73/M2
GLOBULIN SER-MCNC: 3.7 GM/DL (ref 2.4–3.5)
GLUCOSE SERPL-MCNC: 137 MG/DL (ref 82–115)
HCT VFR BLD AUTO: 29.1 % (ref 42–52)
HGB BLD-MCNC: 10.1 G/DL (ref 14–18)
IMM GRANULOCYTES # BLD AUTO: 0.01 X10(3)/MCL (ref 0–0.04)
IMM GRANULOCYTES NFR BLD AUTO: 0.3 %
LYMPHOCYTES # BLD AUTO: 1.09 X10(3)/MCL (ref 0.6–4.6)
LYMPHOCYTES NFR BLD AUTO: 36 %
MCH RBC QN AUTO: 38.4 PG (ref 27–31)
MCHC RBC AUTO-ENTMCNC: 34.7 G/DL (ref 33–36)
MCV RBC AUTO: 110.6 FL (ref 80–94)
MONOCYTES # BLD AUTO: 0.38 X10(3)/MCL (ref 0.1–1.3)
MONOCYTES NFR BLD AUTO: 12.5 %
NEUTROPHILS # BLD AUTO: 1.27 X10(3)/MCL (ref 2.1–9.2)
NEUTROPHILS NFR BLD AUTO: 42 %
NRBC BLD AUTO-RTO: 0 %
PLATELET # BLD AUTO: 34 X10(3)/MCL (ref 130–400)
PMV BLD AUTO: 10 FL (ref 7.4–10.4)
POTASSIUM SERPL-SCNC: 4.4 MMOL/L (ref 3.5–5.1)
PROT SERPL-MCNC: 7.3 GM/DL (ref 5.8–7.6)
RBC # BLD AUTO: 2.63 X10(6)/MCL (ref 4.7–6.1)
SODIUM SERPL-SCNC: 141 MMOL/L (ref 136–145)
WBC # BLD AUTO: 3.03 X10(3)/MCL (ref 4.5–11.5)

## 2025-06-18 PROCEDURE — 99215 OFFICE O/P EST HI 40 MIN: CPT | Mod: S$PBB,,,

## 2025-06-18 PROCEDURE — 80053 COMPREHEN METABOLIC PANEL: CPT

## 2025-06-18 PROCEDURE — G2211 COMPLEX E/M VISIT ADD ON: HCPCS | Mod: ,,,

## 2025-06-18 PROCEDURE — 36415 COLL VENOUS BLD VENIPUNCTURE: CPT

## 2025-06-18 PROCEDURE — 99999 PR PBB SHADOW E&M-EST. PATIENT-LVL V: CPT | Mod: PBBFAC,,,

## 2025-06-18 PROCEDURE — 85025 COMPLETE CBC W/AUTO DIFF WBC: CPT

## 2025-06-18 PROCEDURE — 99215 OFFICE O/P EST HI 40 MIN: CPT | Mod: PBBFAC

## 2025-06-18 RX ORDER — DEXAMETHASONE 4 MG/1
40 TABLET ORAL DAILY
Qty: 40 TABLET | Refills: 0 | Status: SHIPPED | OUTPATIENT
Start: 2025-06-18 | End: 2025-06-22

## 2025-06-18 NOTE — TELEPHONE ENCOUNTER
Copied from CRM #[id number]. Topic: Appointments - Appointment Scheduling  >> Jun 18, 2025  2:44 PM Lidia wrote:  Reschedule Existing Appointment     Current appt date:8/8      Type of appt :EP      Physician:Dr Joseph     Reason for rescheduling:Seen in clinic today and the [facility] [place] NP wanting to know if he can get a sooner appt     Caller:Bhavani Sarmiento calling from cancer treatment center in [place]     Contact Preference:.[phone]

## 2025-06-18 NOTE — PROGRESS NOTES
I can the cancer center in [place] to try to get it schedule earlier. They are sending message to  to see if it can be changed.

## 2025-06-23 ENCOUNTER — OFFICE VISIT (OUTPATIENT)
Facility: CLINIC | Age: 75
End: 2025-06-23
Payer: MEDICARE

## 2025-06-23 ENCOUNTER — TELEPHONE (OUTPATIENT)
Dept: HEMATOLOGY/ONCOLOGY | Facility: CLINIC | Age: 75
End: 2025-06-23
Payer: MEDICARE

## 2025-06-23 ENCOUNTER — LAB VISIT (OUTPATIENT)
Dept: LAB | Facility: HOSPITAL | Age: 75
End: 2025-06-23
Attending: NURSE PRACTITIONER
Payer: MEDICARE

## 2025-06-23 VITALS
WEIGHT: 214.13 LBS | RESPIRATION RATE: 16 BRPM | BODY MASS INDEX: 30.66 KG/M2 | TEMPERATURE: 98 F | HEIGHT: 70 IN | OXYGEN SATURATION: 97 % | HEART RATE: 56 BPM | DIASTOLIC BLOOD PRESSURE: 66 MMHG | SYSTOLIC BLOOD PRESSURE: 144 MMHG

## 2025-06-23 DIAGNOSIS — C90.01 MULTIPLE MYELOMA IN REMISSION: Primary | ICD-10-CM

## 2025-06-23 DIAGNOSIS — C90.01 MULTIPLE MYELOMA IN REMISSION: ICD-10-CM

## 2025-06-23 LAB
ALBUMIN SERPL-MCNC: 3.7 G/DL (ref 3.4–4.8)
ALBUMIN/GLOB SERPL: 1.1 RATIO (ref 1.1–2)
ALP SERPL-CCNC: 104 UNIT/L (ref 40–150)
ALT SERPL-CCNC: 23 UNIT/L (ref 0–55)
ANION GAP SERPL CALC-SCNC: 6 MEQ/L
AST SERPL-CCNC: 12 UNIT/L (ref 11–45)
BASOPHILS # BLD AUTO: 0 X10(3)/MCL
BASOPHILS NFR BLD AUTO: 0 %
BILIRUB SERPL-MCNC: 1.5 MG/DL
BUN SERPL-MCNC: 30 MG/DL (ref 8.4–25.7)
CALCIUM SERPL-MCNC: 9.5 MG/DL (ref 8.8–10)
CHLORIDE SERPL-SCNC: 102 MMOL/L (ref 98–107)
CO2 SERPL-SCNC: 27 MMOL/L (ref 23–31)
CREAT SERPL-MCNC: 1.4 MG/DL (ref 0.72–1.25)
CREAT/UREA NIT SERPL: 21
EOSINOPHIL # BLD AUTO: 0.01 X10(3)/MCL (ref 0–0.9)
EOSINOPHIL NFR BLD AUTO: 0.2 %
ERYTHROCYTE [DISTWIDTH] IN BLOOD BY AUTOMATED COUNT: 14.1 % (ref 11.5–17)
GFR SERPLBLD CREATININE-BSD FMLA CKD-EPI: 52 ML/MIN/1.73/M2
GLOBULIN SER-MCNC: 3.5 GM/DL (ref 2.4–3.5)
GLUCOSE SERPL-MCNC: 370 MG/DL (ref 82–115)
HCT VFR BLD AUTO: 31.1 % (ref 42–52)
HGB BLD-MCNC: 10.8 G/DL (ref 14–18)
IMM GRANULOCYTES # BLD AUTO: 0.09 X10(3)/MCL (ref 0–0.04)
IMM GRANULOCYTES NFR BLD AUTO: 1.4 %
LYMPHOCYTES # BLD AUTO: 1.17 X10(3)/MCL (ref 0.6–4.6)
LYMPHOCYTES NFR BLD AUTO: 18.1 %
MCH RBC QN AUTO: 38 PG (ref 27–31)
MCHC RBC AUTO-ENTMCNC: 34.7 G/DL (ref 33–36)
MCV RBC AUTO: 109.5 FL (ref 80–94)
MONOCYTES # BLD AUTO: 0.83 X10(3)/MCL (ref 0.1–1.3)
MONOCYTES NFR BLD AUTO: 12.8 %
NEUTROPHILS # BLD AUTO: 4.38 X10(3)/MCL (ref 2.1–9.2)
NEUTROPHILS NFR BLD AUTO: 67.5 %
NRBC BLD AUTO-RTO: 1.4 %
PLATELET # BLD AUTO: 37 X10(3)/MCL (ref 130–400)
PMV BLD AUTO: 10.6 FL (ref 7.4–10.4)
POTASSIUM SERPL-SCNC: 4.7 MMOL/L (ref 3.5–5.1)
PROT SERPL-MCNC: 7.2 GM/DL (ref 5.8–7.6)
RBC # BLD AUTO: 2.84 X10(6)/MCL (ref 4.7–6.1)
SODIUM SERPL-SCNC: 135 MMOL/L (ref 136–145)
WBC # BLD AUTO: 6.48 X10(3)/MCL (ref 4.5–11.5)

## 2025-06-23 PROCEDURE — 80053 COMPREHEN METABOLIC PANEL: CPT

## 2025-06-23 PROCEDURE — 99215 OFFICE O/P EST HI 40 MIN: CPT | Mod: PBBFAC | Performed by: NURSE PRACTITIONER

## 2025-06-23 PROCEDURE — 99214 OFFICE O/P EST MOD 30 MIN: CPT | Mod: S$PBB,,, | Performed by: NURSE PRACTITIONER

## 2025-06-23 PROCEDURE — G2211 COMPLEX E/M VISIT ADD ON: HCPCS | Mod: ,,, | Performed by: NURSE PRACTITIONER

## 2025-06-23 PROCEDURE — 36415 COLL VENOUS BLD VENIPUNCTURE: CPT

## 2025-06-23 PROCEDURE — 99999 PR PBB SHADOW E&M-EST. PATIENT-LVL V: CPT | Mod: PBBFAC,,, | Performed by: NURSE PRACTITIONER

## 2025-06-23 PROCEDURE — 85025 COMPLETE CBC W/AUTO DIFF WBC: CPT

## 2025-06-23 NOTE — PROGRESS NOTES
HEMATOLOGIC MALIGNANCIES PROGRESS NOTE    Pathology:  23 Bone marrow aspiration/Biopsy:   PLASMA CELL MYELOMA, KAPPA RESTRICTED.     PLASMA CELL MYELOMA INVOLVES 90% OF BONE MARROW CELLULARITY WITH SMALL AREAS OF  SEQUENTIAL TRILINEAGE HEMATOPOIESIS.    ABSENT IRON STORES.     PERIPHERAL BLOOD: NORMOCYTIC NORMOCHROMIC ANEMIA. THROMBOCYTOPENIA.      LABS:  23 M-spike 3.33, IgA >7040, serum kappa 5/ lambda 0.56/ K:L ratio 9.09, 24 hour urine for M protein done but not resulted. Calcium 13.4/Alb 1.9, Cr 2.3, globulin 10.7  23 M-spike 2.49. IgA >7040, Corrected calcium 12.3, Cr 2.09, Globulin 7.6  8/15/23 M-spike 1.49, IgA 4000  23 M-spike 1.0, IgA 1700  - Transplant oncologist: Dr. Evans, follows locally with Dr. Rose in [place]  - diagnosed 2023 with IgA Kappa MM; stage unclear at diagnosis as FISH not available  - started DRd therapy on 2023  - he stopped the daratumumab after cycle 4 and continue with revlimid and dex only; looks to have completed ~5 cycles  - Admitted for Carmen 140 Auto SCT on 2024  IDENTIFYING STATEMENT   Chip Yadav) is a 73 y.o. male with a  of 1950 from [place], [place] with the diagnosis of multiple myeloma.      ONCOLOGY HISTORY:    Interval history  2025:  Patient presents to the visit today for follow-up of multiple myeloma. Platelets are lower today at 37,000. Hemoglobin is stable. White blood cell count is good. Denies B symptoms. Current medications: Revlimid (currently on hold due to low platelets), steroids (completed yesterday).      Past Medical History, Past Social History and Past Family History have been reviewed and are unchanged except as noted in the interval history.      Review of Systems   Constitutional:  Negative for chills, fever, malaise/fatigue and weight loss.   HENT:  Negative for congestion, nosebleeds and tinnitus.    Eyes:  Negative for double vision, pain and discharge.   Respiratory:  Negative for cough,  sputum production, shortness of breath and stridor.    Cardiovascular:  Negative for chest pain, claudication, leg swelling and PND.   Gastrointestinal:  Negative for abdominal pain, diarrhea, melena, nausea and vomiting.   Genitourinary:  Negative for dysuria, frequency and hematuria.   Musculoskeletal:  Negative for back pain, joint pain and neck pain.   Skin:  Negative for rash.   Neurological:  Negative for dizziness, tremors, speech change, weakness and headaches.   Endo/Heme/Allergies:  Negative for environmental allergies.   Psychiatric/Behavioral:  Negative for depression and substance abuse. The patient is not nervous/anxious.       There were no vitals filed for this visit.            Physical Exam  Vitals reviewed.   Constitutional:       General: He is not in acute distress.     Appearance: Normal appearance. He is normal weight.   HENT:      Head: Normocephalic and atraumatic.      Right Ear: No decreased hearing noted. No middle ear effusion.      Left Ear: No decreased hearing noted.  No middle ear effusion.      Nose: No congestion or rhinorrhea.      Mouth/Throat:      Mouth: Mucous membranes are moist.      Pharynx: Oropharynx is clear. No oropharyngeal exudate or posterior oropharyngeal erythema.   Eyes:      Extraocular Movements: Extraocular movements intact.      Conjunctiva/sclera: Conjunctivae normal.      Pupils: Pupils are equal, round, and reactive to light.   Cardiovascular:      Rate and Rhythm: Normal rate and regular rhythm.      Pulses: Normal pulses.      Heart sounds: No murmur heard.     No friction rub. No gallop.   Pulmonary:      Effort: Pulmonary effort is normal. No respiratory distress.      Breath sounds: No wheezing, rhonchi or rales.   Abdominal:      General: Abdomen is flat. Bowel sounds are normal.      Palpations: Abdomen is soft. There is no mass.      Tenderness: There is no abdominal tenderness. There is no guarding.   Musculoskeletal:         General: No swelling,  tenderness or deformity. Normal range of motion.      Cervical back: Normal range of motion and neck supple.      Right lower leg: No edema.      Left lower leg: No edema.   Lymphadenopathy:      Cervical: No cervical adenopathy.   Skin:     General: Skin is warm and dry.      Findings: No erythema, lesion or rash.   Neurological:      General: No focal deficit present.      Mental Status: He is alert and oriented to person, place, and time. Mental status is at baseline.      Cranial Nerves: No cranial nerve deficit.      Gait: Gait normal.   Psychiatric:         Mood and Affect: Mood normal.         Behavior: Behavior normal.         Thought Content: Thought content normal.         Judgment: Judgment normal.        Current Outpatient Medications   Medication Sig    acyclovir (ZOVIRAX) 400 MG tablet Take 1 tablet (400 mg total) by mouth 2 (two) times daily.    albuterol (PROVENTIL/VENTOLIN HFA) 90 mcg/actuation inhaler 2 puffs every 4 to 6 hours as needed.    ALPRAZolam (XANAX) 0.5 MG tablet Take 0.5 mg by mouth daily as needed for Anxiety.    amLODIPine (NORVASC) 10 MG tablet Take 1 tablet (10 mg total) by mouth every morning.    ascorbic acid, vitamin C, (VITAMIN C) 500 MG tablet Take 500 mg by mouth once daily.    atorvastatin (LIPITOR) 10 MG tablet Take 10 mg by mouth every evening.    carvediloL (COREG) 6.25 MG tablet Take 1 tablet (6.25 mg total) by mouth 2 (two) times daily.    cholecalciferol, vitamin D3, (VITAMIN D3) 125 mcg (5,000 unit) Tab Take 5,000 Units by mouth once daily.    famotidine (PEPCID) 20 MG tablet Take 20 mg by mouth.    ferrous sulfate 325 (65 FE) MG EC tablet Take 325 mg by mouth once daily.    finasteride (PROSCAR) 5 mg tablet Take 5 mg by mouth every morning.    hydrALAZINE (APRESOLINE) 50 MG tablet Take 50 mg by mouth 3 (three) times daily.    HYDROcodone-acetaminophen (NORCO) 7.5-325 mg per tablet Take 1 tablet by mouth every 4 (four) hours as needed for Pain.    lenalidomide  (REVLIMID) 5 mg Cap Take 1 capsule (5 mg total) by mouth once daily For 28 days.    levothyroxine (SYNTHROID) 112 MCG tablet Take 112 mcg by mouth every evening.    nitroGLYCERIN (NITROSTAT) 0.4 MG SL tablet place one tablet under the tongue every five minutes as needed for chest pain up to 3 doses. if no relief call 911    ONETOUCH ULTRA TEST Strp USE TO TEST BLOOD GLUCOSE TWICE DAILY    pantoprazole (PROTONIX) 40 MG tablet Take 1 tablet (40 mg total) by mouth once daily.    ranolazine (RANEXA) 500 MG Tb12 Take 500 mg by mouth 2 (two) times daily.    spironolactone (ALDACTONE) 25 MG tablet Take 12.5 mg by mouth.     Current Facility-Administered Medications   Medication    0.9%  NaCl infusion (for blood administration)    0.9%  NaCl infusion (for blood administration)    0.9%  NaCl infusion (for blood administration)    pneumoc 20-edward conj-dip cr(PF) (PREVNAR-20 (PF)) injection Syrg 0.5 mL    sars-cov-2 (covid-19) (Spikevax (Moderna) (12yrs and up 2023)) 50 mcg/0.5 mL injection 0.5 mL         Lab Results   Component Value Date    WBC 3.03 (L) 06/18/2025    HGB 10.1 (L) 06/18/2025    HCT 29.1 (L) 06/18/2025    .6 (H) 06/18/2025    PLT 34 (LL) 06/18/2025       Gran # (ANC)   Date Value Ref Range Status   11/04/2024 2.6 1.8 - 7.7 K/uL Final     Gran %   Date Value Ref Range Status   11/04/2024 46.2 38.0 - 73.0 % Final     CMP  Sodium   Date Value Ref Range Status   06/18/2025 141 136 - 145 mmol/L Final   11/04/2024 141 136 - 145 mmol/L Final     Potassium   Date Value Ref Range Status   06/18/2025 4.4 3.5 - 5.1 mmol/L Final   11/04/2024 3.7 3.5 - 5.1 mmol/L Final     Chloride   Date Value Ref Range Status   06/18/2025 110 (H) 98 - 107 mmol/L Final   11/04/2024 106 95 - 110 mmol/L Final     CO2   Date Value Ref Range Status   06/18/2025 25 23 - 31 mmol/L Final   11/04/2024 26 23 - 29 mmol/L Final     Glucose   Date Value Ref Range Status   06/18/2025 137 (H) 82 - 115 mg/dL Final   11/04/2024 161 (H) 70 - 110  mg/dL Final     BUN   Date Value Ref Range Status   11/04/2024 19 8 - 23 mg/dL Final     Blood Urea Nitrogen   Date Value Ref Range Status   06/18/2025 16.0 8.4 - 25.7 mg/dL Final     Creatinine   Date Value Ref Range Status   06/18/2025 1.35 (H) 0.72 - 1.25 mg/dL Final   11/04/2024 1.3 0.5 - 1.4 mg/dL Final     Calcium   Date Value Ref Range Status   06/18/2025 9.0 8.8 - 10.0 mg/dL Final   11/04/2024 9.5 8.7 - 10.5 mg/dL Final     Protein Total   Date Value Ref Range Status   06/18/2025 7.3 5.8 - 7.6 gm/dL Final     Total Protein   Date Value Ref Range Status   11/04/2024 6.7 6.0 - 8.4 g/dL Final     Albumin   Date Value Ref Range Status   06/18/2025 3.6 3.4 - 4.8 g/dL Final   11/04/2024 3.8 3.5 - 5.2 g/dL Final     Total Bilirubin   Date Value Ref Range Status   11/04/2024 1.4 (H) 0.1 - 1.0 mg/dL Final     Comment:     For infants and newborns, interpretation of results should be based  on gestational age, weight and in agreement with clinical  observations.    Premature Infant recommended reference ranges:  Up to 24 hours.............<8.0 mg/dL  Up to 48 hours............<12.0 mg/dL  3-5 days..................<15.0 mg/dL  6-29 days.................<15.0 mg/dL       Bilirubin Total   Date Value Ref Range Status   06/18/2025 1.1 <=1.5 mg/dL Final     Alkaline Phosphatase   Date Value Ref Range Status   11/04/2024 100 40 - 150 U/L Final     ALP   Date Value Ref Range Status   06/18/2025 116 40 - 150 unit/L Final     AST   Date Value Ref Range Status   06/18/2025 11 11 - 45 unit/L Final   11/04/2024 16 10 - 40 U/L Final     ALT   Date Value Ref Range Status   06/18/2025 14 0 - 55 unit/L Final   11/04/2024 32 10 - 44 U/L Final     Anion Gap   Date Value Ref Range Status   11/04/2024 9 8 - 16 mmol/L Final     eGFR   Date Value Ref Range Status   06/18/2025 55 mL/min/1.73/m2 Final     Comment:     Estimated GFR calculated using the CKD-EPI creatinine (2021) equation.   11/04/2024 57.3 (A) >60 mL/min/1.73 m^2 Final      IgG   Date Value Ref Range Status   11/04/2024 1119 650 - 1600 mg/dL Final     Comment:     IgG Cord Blood Reference Range: 650-1600 mg/dL.     IgA   Date Value Ref Range Status   11/04/2024 159 40 - 350 mg/dL Final     Comment:     IgA Cord Blood Reference Range: <5 mg/dL.     IgM   Date Value Ref Range Status   11/04/2024 49 (L) 50 - 300 mg/dL Final     Comment:     IgM Cord Blood Reference Range: <25 mg/dL.     IgM Level   Date Value Ref Range Status   05/27/2025 56.0 22.0 - 240.0 mg/dL Final     Kappa Free Light Chains   Date Value Ref Range Status   11/04/2024 7.08 (H) 0.33 - 1.94 mg/dL Final   03/11/2024 2.00 (H) 0.33 - 1.94 mg/dL Final     Lambda Free Light Chains   Date Value Ref Range Status   11/04/2024 4.81 (H) 0.57 - 2.63 mg/dL Final   03/11/2024 1.09 0.57 - 2.63 mg/dL Final     Kappa/Lambda FLC Ratio   Date Value Ref Range Status   11/04/2024 1.47 0.26 - 1.65 Final     Comment:     Undetected antigen excess is a rare event but cannot   be excluded. If these free light chain results do not   agree with other clinical or laboratory findings or   if the sample is from a patient that has previously   demonstrated antigen excess, discuss with the testing   laboratory.   Results should always be interpreted in conjunction   with other laboratory tests and clinical evidence.     03/11/2024 1.83 (H) 0.26 - 1.65 Final     Comment:     Undetected antigen excess is a rare event but cannot   be excluded. If these free light chain results do not   agree with other clinical or laboratory findings or   if the sample is from a patient that has previously   demonstrated antigen excess, discuss with the testing   laboratory.   Results should always be interpreted in conjunction   with other laboratory tests and clinical evidence.       Pathologist Interpretation SPE   Date Value Ref Range Status   11/04/2024 REVIEWED  Final     Comment:       Electronically reviewed and signed by:  Sydney Wallace MD  Signed on  11/05/24 at 13:34  Normal total protein.  There is a paraprotein band in gamma = 0.32 g/dL.      03/11/2024 REVIEWED  Final     Comment:       Electronically reviewed and signed by:  Shey Moore D.O.  Signed on 03/12/24 at 21:50  Normal total protein. Normal gamma globulins are decreased.  Paraprotein peak in beta-2 = 0.39 g/dL (previously, 0.16 g/dL) and   newly noted 2nd peak in near-gamma <0.1 g/dL (no previous value).       Pathologist Interpretation PAO   Date Value Ref Range Status   11/04/2024 REVIEWED  Final     Comment:       Electronically reviewed and signed by:  Sydney Wallace MD  Signed on 11/05/24 at 13:33  IgG kappa specific monoclonal band present.        Bone marrow biopsy  - 7/23/24     Component 3 mo ago   Final Pathologic Diagnosis     RIGHT ILIAC CREST BONE MARROW ASPIRATE, BONE MARROW CLOT, AND BONE MARROW CORE BIOPSY WITH:    CELLULARITY=30%, TRILINEAGE HEMATOPOIETIC ACTIVITY (M/E=1.6:1).  NO DEFINITIVE MORPHOLOGIC OR IMMUNOPHENOTYPIC EVIDENCE OF RESIDUAL PLASMA CELL NEOPLASM.  SEE COMMENT.  INCREASED EOSINOPHILS.  INCREASED STORAGE IRON.  DECREASED NUMBER OF MEGAKARYOCYTES.          PET scan -7/23/24  Impression:     In this patient with multiple myeloma, there is interval normalization of previously hypermetabolic osseous lesions, compatible with favorable response to therapy.  No new hypermetabolic lesions.     Additional findings as above.     Electronically signed by resident: Stephany Rosenbaum  Date:                                            07/23/2024  Time:                                           14:10     Electronically signed by:Janice Fontenot  Date:                                            07/23/2024  Time:                                           15:07    PET 4/14/2025:  Impression:  1. No new suspicious PET findings.  2. Punctate left upper lobe nodule not clearly seen on prior, recommend attention on follow-up.    Assessment/Plan  IgA kappa multiple myeloma in  remission  - he follows locally with in [place]  - diagnosed June 2023 with IgA Kappa MM; stage unclear at diagnosis as FISH not available  - started DRd therapy on 7/18/2023  - he stopped the daratumumab after cycle 4 and continue with revlimid and dex only; looks to have completed ~5 cycles  - Admitted for Carmen 140 Auto SCT on 4/16/2024  -day+100 restaging bone marrow biopsy on 7/23/24 demonstrated a normocellular marrow with increased eosinophils and tri-lineage hematopoiesis   -About 6-8%  positive plasma cells were evident.  No increased CD34 positive blasts were identified.  CD61 highlighted decreased number  of megakaryocytes.  MPO showed myeloid precursors.  -- No monotypic plasma cells identified (MRD-negative) on MRD flow assay.  -There was no definitive morphologic or immunophenotypic evidence of residual plasma cell neoplasm  5/2/2025:  No evidence of disease with MRD negativity on recent BMBx              -           1 year s/p autologous SCT; On maintanance revlimid at 5 mg daily              -           Cytopenias noted.... ? Revlimiid effect. Reassess Anemia studies              -           Notes suggest that he has not had skeltal supportive therapy. CT/PET scans during therapy suggests multifocal lytic lesions of spine.and ribs. He has low back pain possibly degenerative uin nature. With lytic lesions, consider initiation of skeltal supportive therapy with denoumab q 3 mos.    Cytopenias with RBC macrocytosis              -           As above  Lytic skeletal lesions              -           As above              -           Obtain Dexa scan  LLE swelling              -           Obtain LE  doppler.... orders placed (he prefers obtaining it with [facility])  Hyoperglycemia              -           Advised discussing diabetes management emelina Gonzalez  Hypoalbuminemia/Elevated ALT              -           Optimize PO intake               -           Follow ALT    History of auto stem cell transplant:    - Day 0 on 4/18/2024. Conditioned with Carmen 140.   -day+ 200 today  - He received 5 bags on 4/18/24 at 1330 and received the remaining 4 bags on 4/19/24 at 1330. Total CD34 dose was 2.91 x 10^6.   - day+100 restaging bone marrow biopsy on 7/23/24 demonstrated a normocellular marrow with increased eosinophils and tri-lineage hematopoiesis   -About 6-8%  positive plasma cells were evident.  No increased CD34 positive blasts were identified.  CD61 highlighted decreased number  of megakaryocytes.  MPO showed myeloid precursors.  -- No monotypic plasma cells identified (MRD-negative) on MRD flow assay.  -There was no definitive morphologic or immunophenotypic evidence of residual plasma cell neoplasm.     -PET CT on 7/23/24 showed here is interval normalization of uptake within the previously hypermetabolic lesions.   No new hypermetabolic osseous lesions identified.    Immunodeficiency due to conditions classified elsewhere:   - Continue acyclovir antiviral prophylaxis   - stop dapsone now day +180    4. Rev induced cytopenias  -- above further dose adjustment thresholds; monitor     5. Cancer associated pain    -he has ongoing low back pain  -no recent falls  -he is using hydrocodone-APAP as needed  -he tried lidoderm patch without success  -he has been using cyclobenzaprine 5mg TID as needed  -Stable today.    6. Immunization    --he has been evaluated by ID   -[facility].       7. Rash  -improving, but still present. Itches at night. Recommended daily moisturizer. If still present, will send in steroid cream in needed.       Plan:  - Weekly platelet monitoring currently 37k no bleeding  - Return next Tuesday to see Dr. Amaya  - Consult with Dr. Joseph pending before resuming Revlimid  - Patient encouraged to call to expedite Dr. Comer appointment.  - I chatted with Dr. Joseph who said he could not make decisions on medication dose or adjustments without seeing the  patient, so we will attempt to get him to see patient sooner. Rev on hold due to thrombocytopenia.  -despite dexamethasone the platelet count has not meaningfully changed.      Patient instructions and visit orders.       -Follow-up:  F/U with MD ( Panelli)- 7/1/2025  -Labs:  CBC, CMP, LDH- 7/1/2025  -Imaging:    -Other orders:      36 were spent on this encounter    Kj FRIEND, FNP-BC, AOCNP  Department of Hematology/Oncology.       code applies: Patient requires or will require continuous, longitudinal, and active collaborative plan of care related to this patient's health condition, IgA kappa multiple myeloma in remission - the management of which requires the direction of a practitioner with specialized clinical knowledge, skill, and expertise.

## 2025-06-23 NOTE — TELEPHONE ENCOUNTER
Returned call to Ashleigh.  Left voicemail requesting call back.  No PHI given.      Copied from CRM #[id number]. Topic: Appointments - Appointment Access  >> 2025 12:50 PM Matilde wrote:  Pt called in to schedule an appt; no available appts in Epic. Pt is asking for a call back soon to schedule. Thanks.           Patient's DX: Low  blood platelets         Patient requesting call back or [facility] [phone] Ashleigh

## 2025-06-26 ENCOUNTER — TELEPHONE (OUTPATIENT)
Dept: HEMATOLOGY/ONCOLOGY | Facility: CLINIC | Age: 75
End: 2025-06-26
Payer: MEDICARE

## 2025-06-26 NOTE — TELEPHONE ENCOUNTER
Copied from CRM #[id number]. Topic: Appointments - Appointment Rescheduling  >> Jun 26, 2025  1:32 PM Sydney wrote:  Current Appt date:   08/08/25     Type of Appt:  Est pt      Physician:   Tim Joseph MD    Reason for rescheduling:  Want sooner appt date due to due to blood levels      Caller: Ashleigh villalta's daughter      Contact Preference: [phone]

## 2025-06-27 NOTE — PROGRESS NOTES
HEMATOLOGY/ONCOLOGY OFFICE CLINIC VISIT     Visit Information:     Initial Evaluation: 6/13/2023 (hospital consult)   Referring Provider: Dr Gonzalez  Other providers:  Code status: Not addressed     Diagnosis:  Multiple Myeloma     Present treatment:   Maintenance Revlimid 5 mg daily and Promacta 50 mg daily-- to start August '24      Treatment History:  --- Daratumumab, lenalidomide, and dexamethasone x 4 cycles  initiated 7/12/23  --- Lenalidomide and dexamethasone stopped on 3/6/24  --- Stem Cell Transplant 4/16/24  --- Carmen 140: 5 bags 4/18/24 and 4 bags 4/19/24     Imaging:  CT C 6/7/2023: 1. There is a small left sided pleural effusion. 2. A small patchy area of ground glass density is seen in the lateral basal segment of the left lower lobe. This could reflect an acute focal infiltrate / pneumonitis. 3. There is diffuse osteopenia along the visualised bony structures together with multiple, numerous, small round-ovoid lucent lesion of varying sizes involving the marrow. These changes could reflect metabolic bone disease like hyperparathyroidism. Correlate clinically and with laboratory findings, as regards additional evaluation and follow-up.  6/8/23 Bone Scan Whole Body:  1. Scattered activity at the anterior left right rib margins as described.  A CT exam on the previous day reveals no definite fracture.  There is mild heterogeneous and bony loss at the anterior left and right ribs.  This is not have the typical pattern of a metastases.  2. Focal increased activity at the anterior manubrium which again on the CT exam demonstrates osteopenia and heterogeneous bony loss as well as scattered subcentimeter small lytic defects. 3. Suspect mild arthritic changes at the shoulders sternoclavicular joints  6/9/23 MRI Thoracic Spine: Within limitations, no convincing evidence of acute thoracic spine abnormality, high-grade canal stenosis, or focal cord pathology. Multiple scattered vertebral body lesions are suspected  and could represent metastatic disease, otherwise of incomplete characterization by non-contrast protocol. Incidental findings of the partially visualized thoracic cavity and retroperitoneum discussed above, poorly assessed by modality/protocol.  Recent non-contrast chest CT provides overall better visualization.  6/10/23 CT Head: No acute intracranial abnormality.  Findings consistent with microangiopathy no interval change.  6/12/23 XRAY Chest: 1. Patchy hazy opacities again evident at the lower lungs bilaterally suspicious for infiltrate and atelectasis.  Small bibasilar pleural reactions cannot be excluded. 2. Borderline cardiomegaly 3. Atherosclerosis  6/16/23 XRAY Chest: Improved aeration of the lung bases with mild residual bibasilar opacities and small pleural effusions suspected.  7/23/24 PET/CT: In this patient with multiple myeloma, there is interval normalization of previously hypermetabolic osseous lesions, compatible with favorable response to therapy. No new hypermetabolic lesions.       Pathology:  6/12/23 Bone marrow aspiration/Biopsy:   PLASMA CELL MYELOMA, KAPPA RESTRICTED.     PLASMA CELL MYELOMA INVOLVES 90% OF BONE MARROW CELLULARITY WITH SMALL AREAS OF  SEQUENTIAL TRILINEAGE HEMATOPOIESIS.    ABSENT IRON STORES.     PERIPHERAL BLOOD: NORMOCYTIC NORMOCHROMIC ANEMIA. THROMBOCYTOPENIA.   4/15/25 BMBx:  RIGHT ILIAC CREST BONE MARROW ASPIRATE, BONE MARROW CLOT, AND BONE MARROW CORE BIOPSY WITH:  - CELLULARITY=30%, TRILINEAGE HEMATOPOIETIC ACTIVITY (M/E=1.6:1).  NO DEFINITIVE MORPHOLOGIC OR IMMUNOPHENOTYPIC EVIDENCE OF RESIDUAL PLASMA CELL NEOPLASM.  SEE COMMENT.  INCREASED EOSINOPHILS.  INCREASED STORAGE IRON.  DECREASED NUMBER OF MEGAKARYOCYTES.     LABS:  6/8/23 M-spike 3.33, IgA >7040, serum kappa 5/ lambda 0.56/ K:L ratio 9.09, 24 hour urine for M protein done but not resulted. Calcium 13.4/Alb 1.9, Cr 2.3, globulin 10.7  7/12/23 M-spike 2.49. IgA >7040, Corrected calcium 12.3, Cr 2.09,  Globulin 7.6  8/15/23 M-spike 1.49, IgA 4000  9/18/23 M-spike 1.0, IgA 1700  - Transplant oncologist: Dr. Evans, follows locally with Dr. Rose in [place]  - diagnosed June 2023 with IgA Kappa MM; stage unclear at diagnosis as FISH not available  - started DRd therapy on 7/18/2023  - he stopped the daratumumab after cycle 4 and continue with revlimid and dex only; looks to have completed ~5 cycles  - Admitted for Carmen 140 Auto SCT on 4/16/2024             CLINICAL HISTORY:       Subjective  Patient: Chip Goodson is a 72 y.o. male That was seen  for the first time as hospital consult on 6/13/2023.   Patient was brought to the emergency department for confusion.  Upon evaluation in the ER CT scan of the head with no acute intracranial abnormality.  Finding consistent with microangiopathic no interval change.  CT of the chest with no contrast showed diffuse osteopenia with multiple, numerous, small round avid lucent lesions ovarian size involving the marrow.  Changes could reflect metabolic bone disease like hyperparathyroidism.  MRI of the thoracic spine with multiple scattered vertebral body lesion are suspect and could represent metastatic disease.  Bone scan with scattered activity in the anterior left right rib margins no fractures focal increased activity at the anterior manubrium scattered subcentimeter small lytic defects.  Ultrasound of the thyroid that shows multiple nodules. skeletal survey  area on humerus only.     Labs with elevated calcium of 12.6, corrected calcium 14.36.  Total protein was 11.8 with a total globulin 10.0.  Further workup revealed an IgA over 7040.0, IgM 6.0, IgG 165.00.  Free serum kappa light chain 5.09, free serum lambda light chains 0.5600 with a kappa/lambda ratio of 9.09.  M spike 3.33.  PSA 0.96. Bone marrow biopsy performed 6/12/23. Patient receive pamidronate 60 mg on 06/09/2023 and was started on hydration.  His calcium improved as well as his mental status.   Patient  was diagnosed with IgA Kappa MM; stage unclear at diagnosis as FISH not available. He was started DRd therapy on 7/18/2023. He stopped the daratumumab after cycle 4 and continue with revlimid and dex only; looks to have completed ~5 cycles. He was Admitted for Carmen 140 Auto SCT on 4/16/2024    Interval History:  Patient presents to the visit today for follow-up with lab results and to discuss treatment planning.  Overall, he is doing well today, without any new concerns.  He does report bruising more easily, platelets at 31,000 today. Hemoglobin is stable.   He continues to hold Revlimid at this time given thrombocytopenia, has upcoming appointment with Dr. Joseph.  Denies SOB, chest pain, fever, chills.      Past Medical History, Past Social History and Past Family History have been reviewed and are unchanged except as noted in the interval history.      Review of Systems   Constitutional:  Negative for chills, fever, malaise/fatigue and weight loss.   HENT:  Negative for congestion, nosebleeds and tinnitus.    Eyes:  Negative for double vision, pain and discharge.   Respiratory:  Negative for cough, sputum production, shortness of breath and stridor.    Cardiovascular:  Negative for chest pain, claudication, leg swelling and PND.   Gastrointestinal:  Negative for abdominal pain, diarrhea, melena, nausea and vomiting.   Genitourinary:  Negative for dysuria, frequency and hematuria.   Musculoskeletal:  Negative for back pain, joint pain and neck pain.   Skin:  Negative for rash.   Neurological:  Negative for dizziness, tremors, speech change, weakness and headaches.   Endo/Heme/Allergies:  Negative for environmental allergies.   Psychiatric/Behavioral:  Negative for depression and substance abuse. The patient is not nervous/anxious.       Vitals:    07/01/25 0950   BP: (!) 141/71   Pulse: 62   Resp: 16   Temp: 97.5 °F (36.4 °C)     Wt Readings from Last 6 Encounters:   07/01/25 97.7 kg (215 lb 4.8 oz)   06/23/25  97.1 kg (214 lb 1.6 oz)   06/18/25 98.8 kg (217 lb 14.4 oz)   06/11/25 97.8 kg (215 lb 9.6 oz)   05/13/25 98.8 kg (217 lb 14.4 oz)   05/13/25 98.8 kg (217 lb 14.4 oz)     Body mass index is 30.89 kg/m².  Body surface area is 2.2 meters squared.    Physical Exam  Vitals reviewed.   Constitutional:       General: He is not in acute distress.     Appearance: Normal appearance. He is normal weight.   HENT:      Head: Normocephalic and atraumatic.      Right Ear: No decreased hearing noted. No middle ear effusion.      Left Ear: No decreased hearing noted.  No middle ear effusion.      Nose: No congestion or rhinorrhea.      Mouth/Throat:      Mouth: Mucous membranes are moist.      Pharynx: Oropharynx is clear. No oropharyngeal exudate or posterior oropharyngeal erythema.   Eyes:      Extraocular Movements: Extraocular movements intact.      Conjunctiva/sclera: Conjunctivae normal.      Pupils: Pupils are equal, round, and reactive to light.   Cardiovascular:      Rate and Rhythm: Normal rate and regular rhythm.      Pulses: Normal pulses.      Heart sounds: No murmur heard.     No friction rub. No gallop.   Pulmonary:      Effort: Pulmonary effort is normal. No respiratory distress.      Breath sounds: No wheezing, rhonchi or rales.   Abdominal:      General: Abdomen is flat. Bowel sounds are normal.      Palpations: Abdomen is soft. There is no mass.      Tenderness: There is no abdominal tenderness. There is no guarding.   Musculoskeletal:         General: No swelling, tenderness or deformity. Normal range of motion.      Cervical back: Normal range of motion and neck supple.      Right lower leg: No edema.      Left lower leg: No edema.   Lymphadenopathy:      Cervical: No cervical adenopathy.   Skin:     General: Skin is warm and dry.      Findings: No erythema, lesion or rash.   Neurological:      General: No focal deficit present.      Mental Status: He is alert and oriented to person, place, and time. Mental  status is at baseline.      Cranial Nerves: No cranial nerve deficit.      Gait: Gait normal.   Psychiatric:         Mood and Affect: Mood normal.         Behavior: Behavior normal.         Thought Content: Thought content normal.         Judgment: Judgment normal.        ECOG SCORE    1 - Restricted in strenuous activity-ambulatory and able to carry out work of a light nature           Current Outpatient Medications   Medication Sig    acyclovir (ZOVIRAX) 400 MG tablet Take 1 tablet (400 mg total) by mouth 2 (two) times daily.    albuterol (PROVENTIL/VENTOLIN HFA) 90 mcg/actuation inhaler 2 puffs every 4 to 6 hours as needed.    ALPRAZolam (XANAX) 0.5 MG tablet Take 0.5 mg by mouth daily as needed for Anxiety.    amLODIPine (NORVASC) 10 MG tablet Take 1 tablet (10 mg total) by mouth every morning.    ascorbic acid, vitamin C, (VITAMIN C) 500 MG tablet Take 500 mg by mouth once daily.    atorvastatin (LIPITOR) 10 MG tablet Take 10 mg by mouth every evening.    cholecalciferol, vitamin D3, (VITAMIN D3) 125 mcg (5,000 unit) Tab Take 5,000 Units by mouth once daily.    famotidine (PEPCID) 20 MG tablet Take 20 mg by mouth.    ferrous sulfate 325 (65 FE) MG EC tablet Take 325 mg by mouth once daily.    finasteride (PROSCAR) 5 mg tablet Take 5 mg by mouth every morning.    hydrALAZINE (APRESOLINE) 50 MG tablet Take 50 mg by mouth 3 (three) times daily.    HYDROcodone-acetaminophen (NORCO) 7.5-325 mg per tablet Take 1 tablet by mouth every 4 (four) hours as needed for Pain.    lenalidomide (REVLIMID) 5 mg Cap Take 1 capsule (5 mg total) by mouth once daily For 28 days.    levothyroxine (SYNTHROID) 112 MCG tablet Take 112 mcg by mouth every evening.    nitroGLYCERIN (NITROSTAT) 0.4 MG SL tablet place one tablet under the tongue every five minutes as needed for chest pain up to 3 doses. if no relief call 911    SunPods ULTRA TEST Strp USE TO TEST BLOOD GLUCOSE TWICE DAILY    ranolazine (RANEXA) 500 MG Tb12 Take 500 mg by  mouth 2 (two) times daily.    spironolactone (ALDACTONE) 25 MG tablet Take 12.5 mg by mouth.    carvediloL (COREG) 6.25 MG tablet Take 1 tablet (6.25 mg total) by mouth 2 (two) times daily.    pantoprazole (PROTONIX) 40 MG tablet Take 1 tablet (40 mg total) by mouth once daily.     Current Facility-Administered Medications   Medication    0.9%  NaCl infusion (for blood administration)    0.9%  NaCl infusion (for blood administration)    0.9%  NaCl infusion (for blood administration)    pneumoc 20-edward conj-dip cr(PF) (PREVNAR-20 (PF)) injection Syrg 0.5 mL    sars-cov-2 (covid-19) (Spikevax (Moderna) (12yrs and up 2023)) 50 mcg/0.5 mL injection 0.5 mL         Lab Results   Component Value Date    WBC 4.72 07/01/2025    HGB 9.6 (L) 07/01/2025    HCT 28.7 (L) 07/01/2025    .0 (H) 07/01/2025    PLT 31 (LL) 07/01/2025       Gran # (ANC)   Date Value Ref Range Status   11/04/2024 2.6 1.8 - 7.7 K/uL Final     Gran %   Date Value Ref Range Status   11/04/2024 46.2 38.0 - 73.0 % Final     CMP  Sodium   Date Value Ref Range Status   07/01/2025 136 136 - 145 mmol/L Final   11/04/2024 141 136 - 145 mmol/L Final     Potassium   Date Value Ref Range Status   07/01/2025 4.2 3.5 - 5.1 mmol/L Final   11/04/2024 3.7 3.5 - 5.1 mmol/L Final     Chloride   Date Value Ref Range Status   07/01/2025 105 98 - 107 mmol/L Final   11/04/2024 106 95 - 110 mmol/L Final     CO2   Date Value Ref Range Status   07/01/2025 25 23 - 31 mmol/L Final   11/04/2024 26 23 - 29 mmol/L Final     Glucose   Date Value Ref Range Status   07/01/2025 213 (H) 82 - 115 mg/dL Final   11/04/2024 161 (H) 70 - 110 mg/dL Final     BUN   Date Value Ref Range Status   11/04/2024 19 8 - 23 mg/dL Final     Blood Urea Nitrogen   Date Value Ref Range Status   07/01/2025 14.0 8.4 - 25.7 mg/dL Final     Creatinine   Date Value Ref Range Status   07/01/2025 1.20 0.72 - 1.25 mg/dL Final   11/04/2024 1.3 0.5 - 1.4 mg/dL Final     Calcium   Date Value Ref Range Status    07/01/2025 9.0 8.8 - 10.0 mg/dL Final   11/04/2024 9.5 8.7 - 10.5 mg/dL Final     Protein Total   Date Value Ref Range Status   07/01/2025 6.8 5.8 - 7.6 gm/dL Final     Total Protein   Date Value Ref Range Status   11/04/2024 6.7 6.0 - 8.4 g/dL Final     Albumin   Date Value Ref Range Status   07/01/2025 3.4 3.4 - 4.8 g/dL Final   11/04/2024 3.8 3.5 - 5.2 g/dL Final     Total Bilirubin   Date Value Ref Range Status   11/04/2024 1.4 (H) 0.1 - 1.0 mg/dL Final     Comment:     For infants and newborns, interpretation of results should be based  on gestational age, weight and in agreement with clinical  observations.    Premature Infant recommended reference ranges:  Up to 24 hours.............<8.0 mg/dL  Up to 48 hours............<12.0 mg/dL  3-5 days..................<15.0 mg/dL  6-29 days.................<15.0 mg/dL       Bilirubin Total   Date Value Ref Range Status   07/01/2025 1.1 <=1.5 mg/dL Final     Alkaline Phosphatase   Date Value Ref Range Status   11/04/2024 100 40 - 150 U/L Final     ALP   Date Value Ref Range Status   07/01/2025 117 40 - 150 unit/L Final     AST   Date Value Ref Range Status   07/01/2025 10 (L) 11 - 45 unit/L Final   11/04/2024 16 10 - 40 U/L Final     ALT   Date Value Ref Range Status   07/01/2025 17 0 - 55 unit/L Final   11/04/2024 32 10 - 44 U/L Final     Anion Gap   Date Value Ref Range Status   11/04/2024 9 8 - 16 mmol/L Final     eGFR   Date Value Ref Range Status   07/01/2025 >60 mL/min/1.73/m2 Final     Comment:     Estimated GFR calculated using the CKD-EPI creatinine (2021) equation.   11/04/2024 57.3 (A) >60 mL/min/1.73 m^2 Final     IgG   Date Value Ref Range Status   11/04/2024 1119 650 - 1600 mg/dL Final     Comment:     IgG Cord Blood Reference Range: 650-1600 mg/dL.     IgA   Date Value Ref Range Status   11/04/2024 159 40 - 350 mg/dL Final     Comment:     IgA Cord Blood Reference Range: <5 mg/dL.     IgM   Date Value Ref Range Status   11/04/2024 49 (L) 50 - 300 mg/dL  Final     Comment:     IgM Cord Blood Reference Range: <25 mg/dL.     IgM Level   Date Value Ref Range Status   05/27/2025 56.0 22.0 - 240.0 mg/dL Final     Kappa Free Light Chains   Date Value Ref Range Status   11/04/2024 7.08 (H) 0.33 - 1.94 mg/dL Final   03/11/2024 2.00 (H) 0.33 - 1.94 mg/dL Final     Lambda Free Light Chains   Date Value Ref Range Status   11/04/2024 4.81 (H) 0.57 - 2.63 mg/dL Final   03/11/2024 1.09 0.57 - 2.63 mg/dL Final     Kappa/Lambda FLC Ratio   Date Value Ref Range Status   11/04/2024 1.47 0.26 - 1.65 Final     Comment:     Undetected antigen excess is a rare event but cannot   be excluded. If these free light chain results do not   agree with other clinical or laboratory findings or   if the sample is from a patient that has previously   demonstrated antigen excess, discuss with the testing   laboratory.   Results should always be interpreted in conjunction   with other laboratory tests and clinical evidence.     03/11/2024 1.83 (H) 0.26 - 1.65 Final     Comment:     Undetected antigen excess is a rare event but cannot   be excluded. If these free light chain results do not   agree with other clinical or laboratory findings or   if the sample is from a patient that has previously   demonstrated antigen excess, discuss with the testing   laboratory.   Results should always be interpreted in conjunction   with other laboratory tests and clinical evidence.        Latest Reference Range & Units 05/12/25 07:22 05/27/25 06:51   IgG 540.00 - 1,822.00 mg/dL 1,388.00 1,326.00   IgM 22.0 - 240.0 mg/dL 61.0 56.0   IgA Level 101.0 - 645.0 mg/dL 258.0 249.0               Component  Ref Range & Units (hover) 1 mo ago  (5/27/25) 1 mo ago  (5/12/25) 3 mo ago  (3/6/25) 4 mo ago  (2/13/25) 5 mo ago  (1/9/25) 7 mo ago  (11/26/24) 7 mo ago  (11/4/24)   Wilder Free Light Chain 6.78 High  6.76 High  6.19 High  3.95 High  5.16 High  4.26 High     Lambda Free Light Chain 4.69 High  5.26 High  5.67 High  5.89  High  4.55 High  3.81 High     Kappa/Lambda FLC Ratio 1.45 1.29 CM 1.09 CM 0.6706 CM 1.13 CM 1.12 CM 1.47 R, CM        5/2025 SPEP: No M-spike indicative of a monoclonal protein is identified.   PAO:  Immunofixation shows a normal pattern of immunoglobulins.   No monoclonal bands are detected. Dylon Bond M.D.     Assessment  1. Multiple myeloma in remission    2. IgA monoclonal gammopathy    3. S/P autologous bone marrow transplantation    4. Pancytopenia    5. Thrombocytopenia, unspecified    6. Immunocompromised patient    7. Long-term use of high-risk medication        IgA kappa multiple myeloma in remission  - he follows locally with in [place]  - diagnosed June 2023 with IgA Kappa MM; stage unclear at diagnosis as FISH not available  - started DRd therapy on 7/18/2023  - he stopped the daratumumab after cycle 4 and continue with revlimid and dex only; looks to have completed ~5 cycles  - Admitted for Carmen 140 Auto SCT on 4/16/2024  -day+100 restaging bone marrow biopsy on 7/23/24 demonstrated a normocellular marrow with increased eosinophils and tri-lineage hematopoiesis   -About 6-8%  positive plasma cells were evident.  No increased CD34 positive blasts were identified.  CD61 highlighted decreased number  of megakaryocytes.  MPO showed myeloid precursors.  -- No monotypic plasma cells identified (MRD-negative) on MRD flow assay.  -There was no definitive morphologic or immunophenotypic evidence of residual plasma cell neoplasm  5/2/2025:  No evidence of disease with MRD negativity on recent BMBx              -           1 year s/p autologous SCT; On maintanance revlimid at 5 mg daily              -           Cytopenias noted.... ? Revlimiid effect. Reassess Anemia studies              -           Notes suggest that he has not had skeltal supportive therapy. CT/PET scans during therapy suggests multifocal lytic lesions of spine.and ribs. He has low back pain possibly degenerative uin nature. With  lytic lesions, consider initiation of skeltal supportive therapy with denoumab q 3 mos.    Cytopenias with RBC macrocytosis              -           As above  Lytic skeletal lesions              -           As above              -           Obtain Dexa scan  LLE swelling              -           Obtain LE  doppler.... orders placed (he prefers obtaining it with [facility])  Hyoperglycemia              -           Advised discussing diabetes management witgh Dr Gonzalez  Hypoalbuminemia/Elevated ALT              -           Optimize PO intake               -           Follow ALT    History of auto stem cell transplant:   - Day 0 on 4/18/2024. Conditioned with Carmen 140.   -day+ 200 today  - He received 5 bags on 4/18/24 at 1330 and received the remaining 4 bags on 4/19/24 at 1330. Total CD34 dose was 2.91 x 10^6.   - day+100 restaging bone marrow biopsy on 7/23/24 demonstrated a normocellular marrow with increased eosinophils and tri-lineage hematopoiesis   -About 6-8%  positive plasma cells were evident.  No increased CD34 positive blasts were identified.  CD61 highlighted decreased number  of megakaryocytes.  MPO showed myeloid precursors.  -- No monotypic plasma cells identified (MRD-negative) on MRD flow assay.  -There was no definitive morphologic or immunophenotypic evidence of residual plasma cell neoplasm.     -PET CT on 7/23/24 showed here is interval normalization of uptake within the previously hypermetabolic lesions.   No new hypermetabolic osseous lesions identified.    Immunodeficiency due to conditions classified elsewhere:   - Continue acyclovir antiviral prophylaxis   - stop dapsone now day +180    4. Rev induced cytopenias  -- above further dose adjustment thresholds; monitor     5. Cancer associated pain  -he has ongoing low back pain  -no recent falls  -he is using hydrocodone-APAP as needed  -he tried lidoderm patch without success  -he has been using cyclobenzaprine 5mg TID as needed  -Stable  today.    6. Immunization  --he has been evaluated by ID   -[facility] receiving [facility].     7. Rash  -improving, but still present. Itches at night. Recommended daily moisturizer. If still present, will send in steroid cream in needed.       Plan:  - Labs and exam stable  - Continue weekly platelet monitoring, 31K today; denies bleeding  - Continue to hold Revlimid d/t thrombocytopenia  - Appt scheduled with Dr. Joseph for 8/8/25  - RTC in 6 weeks with MD for FU/labs/planning  - cbc, cmp, ldh, mm labs- 1 day prior @ [facility]    The patient was seen, interviewed and examined. Pertinent lab and radiology studies were reviewed.   The patient was given ample opportunity to ask questions, and to the best of my abilities, all questions answered to satisfaction; patient demonstrated understanding of what we discussed and agreeable to the plan. Pt instructed to call should develop concerning signs/symptoms or have further questions.      code applies: Patient requires or will require continuous, longitudinal, and active collaborative plan of care related to this patient's health condition, IgA kappa multiple myeloma in remission - the management of which requires the direction of a practitioner with specialized clinical knowledge, skill, and expertise.     Elsy Amaya MD  Hematology/Oncology  [facility]    I spent a total of 40 minutes on the day of the visit.This includes face to face time and non-face to face time preparing to see the patient (eg, review of tests), obtaining and/or reviewing separately obtained history, documenting clinical information in the electronic or other health record, independently interpreting results and communicating results to the patient/family/caregiver, or care coordinator.    Professional Services   I, Amara Andre LPN, acted solely as a scribe for and in the presence of Dr. Elsy Amaya, who performed these services.

## 2025-07-01 ENCOUNTER — OFFICE VISIT (OUTPATIENT)
Facility: CLINIC | Age: 75
End: 2025-07-01
Payer: MEDICARE

## 2025-07-01 ENCOUNTER — LAB VISIT (OUTPATIENT)
Dept: LAB | Facility: HOSPITAL | Age: 75
End: 2025-07-01
Payer: MEDICARE

## 2025-07-01 VITALS
TEMPERATURE: 98 F | HEART RATE: 62 BPM | DIASTOLIC BLOOD PRESSURE: 71 MMHG | BODY MASS INDEX: 30.82 KG/M2 | WEIGHT: 215.31 LBS | SYSTOLIC BLOOD PRESSURE: 141 MMHG | HEIGHT: 70 IN | OXYGEN SATURATION: 97 % | RESPIRATION RATE: 16 BRPM

## 2025-07-01 DIAGNOSIS — D69.6 THROMBOCYTOPENIA, UNSPECIFIED: ICD-10-CM

## 2025-07-01 DIAGNOSIS — C90.01 MULTIPLE MYELOMA IN REMISSION: ICD-10-CM

## 2025-07-01 DIAGNOSIS — D84.9 IMMUNOCOMPROMISED PATIENT: ICD-10-CM

## 2025-07-01 DIAGNOSIS — C90.01 MULTIPLE MYELOMA IN REMISSION: Primary | ICD-10-CM

## 2025-07-01 DIAGNOSIS — Z79.899 LONG-TERM USE OF HIGH-RISK MEDICATION: ICD-10-CM

## 2025-07-01 DIAGNOSIS — D61.818 PANCYTOPENIA: ICD-10-CM

## 2025-07-01 DIAGNOSIS — Z94.81 S/P AUTOLOGOUS BONE MARROW TRANSPLANTATION: ICD-10-CM

## 2025-07-01 DIAGNOSIS — D47.2 IGA MONOCLONAL GAMMOPATHY: ICD-10-CM

## 2025-07-01 LAB
ALBUMIN SERPL-MCNC: 3.4 G/DL (ref 3.4–4.8)
ALBUMIN/GLOB SERPL: 1 RATIO (ref 1.1–2)
ALP SERPL-CCNC: 117 UNIT/L (ref 40–150)
ALT SERPL-CCNC: 17 UNIT/L (ref 0–55)
ANION GAP SERPL CALC-SCNC: 6 MEQ/L
AST SERPL-CCNC: 10 UNIT/L (ref 11–45)
BASOPHILS # BLD AUTO: 0.01 X10(3)/MCL
BASOPHILS NFR BLD AUTO: 0.2 %
BILIRUB SERPL-MCNC: 1.1 MG/DL
BUN SERPL-MCNC: 14 MG/DL (ref 8.4–25.7)
CALCIUM SERPL-MCNC: 9 MG/DL (ref 8.8–10)
CHLORIDE SERPL-SCNC: 105 MMOL/L (ref 98–107)
CO2 SERPL-SCNC: 25 MMOL/L (ref 23–31)
CREAT SERPL-MCNC: 1.2 MG/DL (ref 0.72–1.25)
CREAT/UREA NIT SERPL: 12
EOSINOPHIL # BLD AUTO: 0.13 X10(3)/MCL (ref 0–0.9)
EOSINOPHIL NFR BLD AUTO: 2.8 %
ERYTHROCYTE [DISTWIDTH] IN BLOOD BY AUTOMATED COUNT: 15 % (ref 11.5–17)
GFR SERPLBLD CREATININE-BSD FMLA CKD-EPI: >60 ML/MIN/1.73/M2
GLOBULIN SER-MCNC: 3.4 GM/DL (ref 2.4–3.5)
GLUCOSE SERPL-MCNC: 213 MG/DL (ref 82–115)
HCT VFR BLD AUTO: 28.7 % (ref 42–52)
HGB BLD-MCNC: 9.6 G/DL (ref 14–18)
IGA SERPL-MCNC: 180 MG/DL (ref 101–645)
IGG SERPL-MCNC: 950 MG/DL (ref 540–1822)
IGM SERPL-MCNC: 48 MG/DL (ref 22–240)
IMM GRANULOCYTES # BLD AUTO: 0.01 X10(3)/MCL (ref 0–0.04)
IMM GRANULOCYTES NFR BLD AUTO: 0.2 %
LYMPHOCYTES # BLD AUTO: 0.74 X10(3)/MCL (ref 0.6–4.6)
LYMPHOCYTES NFR BLD AUTO: 15.7 %
MCH RBC QN AUTO: 37.8 PG (ref 27–31)
MCHC RBC AUTO-ENTMCNC: 33.4 G/DL (ref 33–36)
MCV RBC AUTO: 113 FL (ref 80–94)
MONOCYTES # BLD AUTO: 0.63 X10(3)/MCL (ref 0.1–1.3)
MONOCYTES NFR BLD AUTO: 13.3 %
NEUTROPHILS # BLD AUTO: 3.2 X10(3)/MCL (ref 2.1–9.2)
NEUTROPHILS NFR BLD AUTO: 67.8 %
NRBC BLD AUTO-RTO: 0 %
PLATELET # BLD AUTO: 31 X10(3)/MCL (ref 130–400)
PMV BLD AUTO: 11.5 FL (ref 7.4–10.4)
POTASSIUM SERPL-SCNC: 4.2 MMOL/L (ref 3.5–5.1)
PROT SERPL-MCNC: 6.8 GM/DL (ref 5.8–7.6)
RBC # BLD AUTO: 2.54 X10(6)/MCL (ref 4.7–6.1)
SODIUM SERPL-SCNC: 136 MMOL/L (ref 136–145)
WBC # BLD AUTO: 4.72 X10(3)/MCL (ref 4.5–11.5)

## 2025-07-01 PROCEDURE — 82784 ASSAY IGA/IGD/IGG/IGM EACH: CPT

## 2025-07-01 PROCEDURE — 80053 COMPREHEN METABOLIC PANEL: CPT

## 2025-07-01 PROCEDURE — 83521 IG LIGHT CHAINS FREE EACH: CPT | Mod: 91

## 2025-07-01 PROCEDURE — 99215 OFFICE O/P EST HI 40 MIN: CPT | Mod: PBBFAC | Performed by: INTERNAL MEDICINE

## 2025-07-01 PROCEDURE — G2211 COMPLEX E/M VISIT ADD ON: HCPCS | Mod: ,,, | Performed by: INTERNAL MEDICINE

## 2025-07-01 PROCEDURE — 85025 COMPLETE CBC W/AUTO DIFF WBC: CPT

## 2025-07-01 PROCEDURE — 99214 OFFICE O/P EST MOD 30 MIN: CPT | Mod: S$PBB,,, | Performed by: INTERNAL MEDICINE

## 2025-07-01 PROCEDURE — 99999 PR PBB SHADOW E&M-EST. PATIENT-LVL V: CPT | Mod: PBBFAC,,, | Performed by: INTERNAL MEDICINE

## 2025-07-01 PROCEDURE — 36415 COLL VENOUS BLD VENIPUNCTURE: CPT

## 2025-07-01 PROCEDURE — 86334 IMMUNOFIX E-PHORESIS SERUM: CPT

## 2025-07-02 LAB
ALBUMIN % SPEP (OHS): 55.04 (ref 48.1–59.5)
ALBUMIN SERPL-MCNC: 3.4 G/DL (ref 3.4–4.8)
ALBUMIN/GLOB SERPL: 1.2 RATIO (ref 1.1–2)
ALPHA 1 GLOB (OHS): 0.25 GM/DL (ref 0–0.4)
ALPHA 1 GLOB% (OHS): 4.07 (ref 2.3–4.9)
ALPHA 2 GLOB % (OHS): 11.6 (ref 6.9–13)
ALPHA 2 GLOB (OHS): 0.72 GM/DL (ref 0.4–1)
BETA GLOB (OHS): 0.88 GM/DL (ref 0.7–1.3)
BETA GLOB% (OHS): 14.22 (ref 13.8–19.7)
GAMMA GLOBULIN % (OHS): 15.08 (ref 10.1–21.9)
GAMMA GLOBULIN (OHS): 0.93 GM/DL (ref 0.4–1.8)
GLOBULIN SER-MCNC: 2.8 GM/DL (ref 2.4–3.5)
HEMATOLOGIST REVIEW: NORMAL
KAPPA LC FREE SER NEPH-MCNC: 3.18 MG/DL (ref 0.33–1.94)
KAPPA LC FREE/LAMBDA FREE SER NEPH: 1.02 {RATIO} (ref 0.26–1.65)
LAMBDA LC FREE SERPL-MCNC: 3.13 MG/DL (ref 0.57–2.63)
M SPIKE % (OHS): NORMAL
M SPIKE (OHS): NORMAL
PATH REV: NORMAL
PROT SERPL-MCNC: 6.2 GM/DL (ref 5.8–7.6)

## 2025-08-06 ENCOUNTER — LAB VISIT (OUTPATIENT)
Dept: LAB | Facility: HOSPITAL | Age: 75
End: 2025-08-06
Payer: MEDICARE

## 2025-08-06 DIAGNOSIS — C90.01 MULTIPLE MYELOMA IN REMISSION: ICD-10-CM

## 2025-08-06 DIAGNOSIS — D61.818 PANCYTOPENIA: ICD-10-CM

## 2025-08-06 DIAGNOSIS — D69.6 THROMBOCYTOPENIA, UNSPECIFIED: ICD-10-CM

## 2025-08-06 LAB
ALBUMIN SERPL-MCNC: 3.7 G/DL (ref 3.4–4.8)
ALBUMIN/GLOB SERPL: 1.2 RATIO (ref 1.1–2)
ALP SERPL-CCNC: 113 UNIT/L (ref 40–150)
ALT SERPL-CCNC: 17 UNIT/L (ref 0–55)
ANION GAP SERPL CALC-SCNC: 5 MEQ/L
AST SERPL-CCNC: 13 UNIT/L (ref 11–45)
BASOPHILS # BLD AUTO: 0.01 X10(3)/MCL
BASOPHILS NFR BLD AUTO: 0.4 %
BILIRUB SERPL-MCNC: 0.8 MG/DL
BUN SERPL-MCNC: 17 MG/DL (ref 8.4–25.7)
CALCIUM SERPL-MCNC: 9.2 MG/DL (ref 8.8–10)
CHLORIDE SERPL-SCNC: 107 MMOL/L (ref 98–107)
CO2 SERPL-SCNC: 26 MMOL/L (ref 23–31)
CREAT SERPL-MCNC: 1.38 MG/DL (ref 0.72–1.25)
CREAT/UREA NIT SERPL: 12
EOSINOPHIL # BLD AUTO: 0.13 X10(3)/MCL (ref 0–0.9)
EOSINOPHIL NFR BLD AUTO: 4.6 %
ERYTHROCYTE [DISTWIDTH] IN BLOOD BY AUTOMATED COUNT: 15.3 % (ref 11.5–17)
GFR SERPLBLD CREATININE-BSD FMLA CKD-EPI: 53 ML/MIN/1.73/M2
GLOBULIN SER-MCNC: 3.1 GM/DL (ref 2.4–3.5)
GLUCOSE SERPL-MCNC: 224 MG/DL (ref 82–115)
HCT VFR BLD AUTO: 28.5 % (ref 42–52)
HGB BLD-MCNC: 9.6 G/DL (ref 14–18)
IGA SERPL-MCNC: 170 MG/DL (ref 101–645)
IGG SERPL-MCNC: 844 MG/DL (ref 540–1822)
IGM SERPL-MCNC: 111 MG/DL (ref 22–240)
IMM GRANULOCYTES # BLD AUTO: 0.01 X10(3)/MCL (ref 0–0.04)
IMM GRANULOCYTES NFR BLD AUTO: 0.4 %
LDH SERPL-CCNC: 170 U/L (ref 125–220)
LYMPHOCYTES # BLD AUTO: 0.9 X10(3)/MCL (ref 0.6–4.6)
LYMPHOCYTES NFR BLD AUTO: 31.9 %
MCH RBC QN AUTO: 39 PG (ref 27–31)
MCHC RBC AUTO-ENTMCNC: 33.7 G/DL (ref 33–36)
MCV RBC AUTO: 115.9 FL (ref 80–94)
MONOCYTES # BLD AUTO: 0.36 X10(3)/MCL (ref 0.1–1.3)
MONOCYTES NFR BLD AUTO: 12.8 %
NEUTROPHILS # BLD AUTO: 1.41 X10(3)/MCL (ref 2.1–9.2)
NEUTROPHILS NFR BLD AUTO: 49.9 %
NRBC BLD AUTO-RTO: 0 %
PLATELET # BLD AUTO: 34 X10(3)/MCL (ref 130–400)
PMV BLD AUTO: 11.1 FL (ref 7.4–10.4)
POTASSIUM SERPL-SCNC: 4.2 MMOL/L (ref 3.5–5.1)
PROT SERPL-MCNC: 6.8 GM/DL (ref 5.8–7.6)
RBC # BLD AUTO: 2.46 X10(6)/MCL (ref 4.7–6.1)
SODIUM SERPL-SCNC: 138 MMOL/L (ref 136–145)
WBC # BLD AUTO: 2.82 X10(3)/MCL (ref 4.5–11.5)

## 2025-08-06 PROCEDURE — 82784 ASSAY IGA/IGD/IGG/IGM EACH: CPT | Mod: 91

## 2025-08-06 PROCEDURE — 83615 LACTATE (LD) (LDH) ENZYME: CPT

## 2025-08-06 PROCEDURE — 86334 IMMUNOFIX E-PHORESIS SERUM: CPT

## 2025-08-06 PROCEDURE — 85025 COMPLETE CBC W/AUTO DIFF WBC: CPT

## 2025-08-06 PROCEDURE — 36415 COLL VENOUS BLD VENIPUNCTURE: CPT

## 2025-08-06 PROCEDURE — 83521 IG LIGHT CHAINS FREE EACH: CPT | Mod: 91

## 2025-08-06 PROCEDURE — 82784 ASSAY IGA/IGD/IGG/IGM EACH: CPT | Mod: 59

## 2025-08-06 PROCEDURE — 80053 COMPREHEN METABOLIC PANEL: CPT

## 2025-08-07 LAB
KAPPA LC FREE SER NEPH-MCNC: 2.42 MG/DL (ref 0.33–1.94)
KAPPA LC FREE/LAMBDA FREE SER NEPH: 1 {RATIO} (ref 0.26–1.65)
LAMBDA LC FREE SERPL-MCNC: 2.42 MG/DL (ref 0.57–2.63)
PATH REV: NORMAL

## 2025-08-08 ENCOUNTER — OFFICE VISIT (OUTPATIENT)
Dept: HEMATOLOGY/ONCOLOGY | Facility: CLINIC | Age: 75
End: 2025-08-08
Payer: MEDICARE

## 2025-08-08 ENCOUNTER — LAB VISIT (OUTPATIENT)
Dept: LAB | Facility: HOSPITAL | Age: 75
End: 2025-08-08
Attending: INTERNAL MEDICINE
Payer: MEDICARE

## 2025-08-08 VITALS
BODY MASS INDEX: 31.57 KG/M2 | WEIGHT: 220.56 LBS | HEART RATE: 58 BPM | DIASTOLIC BLOOD PRESSURE: 67 MMHG | SYSTOLIC BLOOD PRESSURE: 137 MMHG | TEMPERATURE: 98 F | OXYGEN SATURATION: 97 % | HEIGHT: 70 IN | RESPIRATION RATE: 16 BRPM

## 2025-08-08 DIAGNOSIS — D61.818 PANCYTOPENIA: ICD-10-CM

## 2025-08-08 DIAGNOSIS — E53.8 DEFICIENCY OF OTHER SPECIFIED B GROUP VITAMINS: ICD-10-CM

## 2025-08-08 DIAGNOSIS — D75.89 MACROCYTOSIS: ICD-10-CM

## 2025-08-08 DIAGNOSIS — C90.01 MULTIPLE MYELOMA IN REMISSION: ICD-10-CM

## 2025-08-08 DIAGNOSIS — C90.01 MULTIPLE MYELOMA IN REMISSION: Primary | ICD-10-CM

## 2025-08-08 DIAGNOSIS — Z79.899 OTHER LONG TERM (CURRENT) DRUG THERAPY: ICD-10-CM

## 2025-08-08 LAB
ABSOLUTE EOSINOPHIL (OHS): 0.19 K/UL
ABSOLUTE MONOCYTE (OHS): 0.53 K/UL (ref 0.3–1)
ABSOLUTE NEUTROPHIL COUNT (OHS): 1.78 K/UL (ref 1.8–7.7)
ALBUMIN % SPEP (OHS): 54.39 (ref 48.1–59.5)
ALBUMIN SERPL BCP-MCNC: 4.3 G/DL (ref 3.5–5.2)
ALBUMIN SERPL-MCNC: 3.4 G/DL (ref 3.4–4.8)
ALBUMIN/GLOB SERPL: 1.2 RATIO (ref 1.1–2)
ALP SERPL-CCNC: 104 UNIT/L (ref 40–150)
ALPHA 1 GLOB (OHS): 0.27 GM/DL (ref 0–0.4)
ALPHA 1 GLOB% (OHS): 4.26 (ref 2.3–4.9)
ALPHA 2 GLOB % (OHS): 10.67 (ref 6.9–13)
ALPHA 2 GLOB (OHS): 0.67 GM/DL (ref 0.4–1)
ALT SERPL W/O P-5'-P-CCNC: 20 UNIT/L (ref 0–55)
ANION GAP (OHS): 11 MMOL/L (ref 8–16)
AST SERPL-CCNC: 17 UNIT/L (ref 0–50)
BASOPHILS # BLD AUTO: 0.01 K/UL
BASOPHILS NFR BLD AUTO: 0.2 %
BETA GLOB (OHS): 0.94 GM/DL (ref 0.7–1.3)
BETA GLOB% (OHS): 14.91 (ref 13.8–19.7)
BILIRUB SERPL-MCNC: 0.9 MG/DL (ref 0.1–1)
BUN SERPL-MCNC: 14 MG/DL (ref 8–23)
CALCIUM SERPL-MCNC: 9.5 MG/DL (ref 8.7–10.5)
CHLORIDE SERPL-SCNC: 106 MMOL/L (ref 95–110)
CO2 SERPL-SCNC: 24 MMOL/L (ref 23–29)
CREAT SERPL-MCNC: 1.5 MG/DL (ref 0.5–1.4)
ERYTHROCYTE [DISTWIDTH] IN BLOOD BY AUTOMATED COUNT: 15.6 % (ref 11.5–14.5)
FOLATE SERPL-MCNC: 13.6 NG/ML (ref 4–24)
GAMMA GLOBULIN % (OHS): 15.77 (ref 10.1–21.9)
GAMMA GLOBULIN (OHS): 0.99 GM/DL (ref 0.4–1.8)
GFR SERPLBLD CREATININE-BSD FMLA CKD-EPI: 48 ML/MIN/1.73/M2
GLOBULIN SER-MCNC: 2.9 GM/DL (ref 2.4–3.5)
GLUCOSE SERPL-MCNC: 121 MG/DL (ref 70–110)
HCT VFR BLD AUTO: 30 % (ref 40–54)
HGB BLD-MCNC: 10.1 GM/DL (ref 14–18)
IMM GRANULOCYTES # BLD AUTO: 0.01 K/UL (ref 0–0.04)
IMM GRANULOCYTES NFR BLD AUTO: 0.2 % (ref 0–0.5)
LDH SERPL-CCNC: 182 U/L (ref 110–260)
LYMPHOCYTES # BLD AUTO: 1.5 K/UL (ref 1–4.8)
M SPIKE % (OHS): NORMAL
M SPIKE (OHS): NORMAL
MCH RBC QN AUTO: 38.4 PG (ref 27–31)
MCHC RBC AUTO-ENTMCNC: 33.7 G/DL (ref 32–36)
MCV RBC AUTO: 114 FL (ref 82–98)
NUCLEATED RBC (/100WBC) (OHS): 0 /100 WBC
PLATELET # BLD AUTO: 40 K/UL (ref 150–450)
PMV BLD AUTO: 10.9 FL (ref 9.2–12.9)
POTASSIUM SERPL-SCNC: 4.4 MMOL/L (ref 3.5–5.1)
PROT SERPL-MCNC: 6.3 GM/DL (ref 5.8–7.6)
PROT SERPL-MCNC: 7 GM/DL (ref 6–8.4)
RBC # BLD AUTO: 2.63 M/UL (ref 4.6–6.2)
RELATIVE EOSINOPHIL (OHS): 4.7 %
RELATIVE LYMPHOCYTE (OHS): 37.3 % (ref 18–48)
RELATIVE MONOCYTE (OHS): 13.2 % (ref 4–15)
RELATIVE NEUTROPHIL (OHS): 44.4 % (ref 38–73)
RETICS/RBC NFR AUTO: 4.7 % (ref 0.4–2)
SODIUM SERPL-SCNC: 141 MMOL/L (ref 136–145)
TSH SERPL-ACNC: 1.94 UIU/ML (ref 0.4–4)
VIT B12 SERPL-MCNC: 238 PG/ML (ref 210–950)
WBC # BLD AUTO: 4.02 K/UL (ref 3.9–12.7)

## 2025-08-08 PROCEDURE — 99999 PR PBB SHADOW E&M-EST. PATIENT-LVL V: CPT | Mod: PBBFAC,,, | Performed by: INTERNAL MEDICINE

## 2025-08-08 PROCEDURE — 83921 ORGANIC ACID SINGLE QUANT: CPT

## 2025-08-08 PROCEDURE — 83615 LACTATE (LD) (LDH) ENZYME: CPT

## 2025-08-08 PROCEDURE — 85025 COMPLETE CBC W/AUTO DIFF WBC: CPT

## 2025-08-08 PROCEDURE — 82746 ASSAY OF FOLIC ACID SERUM: CPT

## 2025-08-08 PROCEDURE — 82607 VITAMIN B-12: CPT

## 2025-08-08 PROCEDURE — 36415 COLL VENOUS BLD VENIPUNCTURE: CPT

## 2025-08-08 PROCEDURE — 85045 AUTOMATED RETICULOCYTE COUNT: CPT

## 2025-08-08 PROCEDURE — 84132 ASSAY OF SERUM POTASSIUM: CPT

## 2025-08-08 PROCEDURE — 84443 ASSAY THYROID STIM HORMONE: CPT

## 2025-08-08 PROCEDURE — 99215 OFFICE O/P EST HI 40 MIN: CPT | Mod: PBBFAC | Performed by: INTERNAL MEDICINE

## 2025-08-08 RX ORDER — FLUTICASONE PROPIONATE 50 MCG
SPRAY, SUSPENSION (ML) NASAL
COMMUNITY
Start: 2025-08-04

## 2025-08-08 NOTE — PROGRESS NOTES
Problem List: 73 yo man with  Multiple Myeloma   - IgA Kappa; Dx 2023 presenting as symptomatic hypercalcemia.    - Negative skeltal survvey but abnormal T spine lesions on MRI; subsequent CT/PET with spinal lucencies   - S/p Drd x 4   - S/p Melphalan 140 Auto SCT 2024   - Maintenance revlimid (persistebnt modest cytopenia).....  HTN  Diabetes Mellitis  Dyslipidemia  Hypothyroidism    HPI: Mr Goodson returns for follow up of multiple myeloma s/p autologoius stem cell transplant . He comes in accompabnied by his granddaughter. He has been off of revlimid since his last virtual visit. He feels well  but does report fatigue. No bone pains reported. Held May 2 for cytopenias noted on 2025 labs.... ANC 1190   H/H 10/29     Plts 76. He has had persistent cytopenias despite holding this.     PAST MEDICAL HISTORY: He has a history of hypertension and diabetes. He has dyslipidemia. He has CASHD and has had chronic diastolic heart failuure. He has GERD  MEDICATIONS:   ASA 81qd  Car[place]ill  Hydralazine  Acyclovir 800 bid  Amlodipine  Atorvastatin  Fe  Finasteride  Levothyroxione  Pantoprazole  Ranexa  Spironolactone 12.5 mg  Vit C,D  Zinc  ALLERGIES: Augemntin, Iodine  SOCIAL HISTORY: He is  and lives wwith his wife in [place]. He is a farmer. He is a former smoker and smoked for 30 years.   FAMILY HISTORY:  Mother  in her late 80s from breast cancer. Father  in his 30s from a pedestrian MVA. He has a full sister and a meternal half sister. Her full sister  had breast cancer and the other had bone cancer in her sternum. He has 3 sons and 2 daughters all healthy  Family History   Problem Relation Name Age of Onset    Lung cancer Mother      Breast cancer Sister          REVIEW OF SYSTEMS: He denies CP or dyspnea. No abd pain. No N/V. He has noticed pitting edema in his LLE but no associated leg pain. No R leg edema noted.     Physical Exam:  VS: /67 (BP Location: Right arm, Patient  Wants to swicth back to [facility]cept regarding BX   "Position: Sitting)   Pulse (!) 58   Temp 97.9 °F (36.6 °C) (Oral)   Resp 16   Ht 5' 10" (1.778 m)   Wt 100 kg (220 lb 9.1 oz)   SpO2 97%   BMI 31.65 kg/m²     Gen: Awake and Alert, No Apparent Distress  HEENT: NCAT, PERRLA, EOMI, PERRLA, Moist oral, nasal and ocular mucus membranes, OP Clear  Neck: Supple, No JVD or Adenopathy. Trachea is Midline, No Thyromegaly, No thyroid masses  Heart: Regular Rhythm and rate, No murmurs, gallops or rubs  Lungs: Symmetric chest excursions bilaterally. No use of accessory respiratory muscles. Lungs are clear to auscultation bilaterally  Abdomen: Soft, Nontender/Nondistended. There are normoactive bowel sounds. There is no hepatosplenomegaly  Extremities:  There is 2+ LE edema. No joint deformities, joint effusions or joint tenderness in the ankles, knees, hips, shoulders, elbows, wristst and digits. There is no cyanosis. 2+ radial and dorsalis pedis pulses.   Back: Straight, No paraspinal tenderness or CVA tenderness  Skin: Intact. No sites of breakdown. No subcutaneous nodules. No rashes. No petechiae. No ecchymoses.  Lymphatics: No cervical, supraclavicular, axillary or inguinal adenopathy  Neurologic: Awake, alert and fully oriented. Cranial nerves II-XII intact.      Laboratory Data:   Results for orders placed or performed in visit on 08/06/25   Comprehensive Metabolic Panel    Collection Time: 08/06/25  8:34 AM   Result Value Ref Range    Sodium 138 136 - 145 mmol/L    Potassium 4.2 3.5 - 5.1 mmol/L    Chloride 107 98 - 107 mmol/L    CO2 26 23 - 31 mmol/L    Glucose 224 (H) 82 - 115 mg/dL    Blood Urea Nitrogen 17.0 8.4 - 25.7 mg/dL    Creatinine 1.38 (H) 0.72 - 1.25 mg/dL    Calcium 9.2 8.8 - 10.0 mg/dL    Protein Total 6.8 5.8 - 7.6 gm/dL    Albumin 3.7 3.4 - 4.8 g/dL    Globulin 3.1 2.4 - 3.5 gm/dL    Albumin/Globulin Ratio 1.2 1.1 - 2.0 ratio    Bilirubin Total 0.8 <=1.5 mg/dL     40 - 150 unit/L    ALT 17 0 - 55 unit/L    AST 13 11 - 45 unit/L    eGFR 53 " mL/min/1.73/m2    Anion Gap 5.0 mEq/L    BUN/Creatinine Ratio 12    Lactate Dehydrogenase    Collection Time: 08/06/25  8:34 AM   Result Value Ref Range    Lactate Dehydrogenase 170 125 - 220 U/L   Immunoglobulins (IgG, IgA, IgM) Quantitative    Collection Time: 08/06/25  8:34 AM   Result Value Ref Range    IgG Level 844.00 540.00 - 1,822.00 mg/dL    IgA Level 170.0 101.0 - 645.0 mg/dL    IgM Level 111.0 22.0 - 240.0 mg/dL   Immunoglobulin Free LT Chains Blood    Collection Time: 08/06/25  8:34 AM   Result Value Ref Range    Kappa Free Light Chain 2.42 (H) 0.3300 - 1.94 mg/dL    Lambda Free Light Chain 2.42 0.5700 - 2.63 mg/dL    Kappa/Lambda FLC Ratio 1.00 0.2600 - 1.65   Immunofixation & Protein Electrophoresis    Collection Time: 08/06/25  8:34 AM   Result Value Ref Range    Protein Total 6.3 5.8 - 7.6 gm/dL    Albumin 3.4 3.4 - 4.8 g/dL    Globulin 2.9 2.4 - 3.5 gm/dL    Albumin/Globulin Ratio 1.2 1.1 - 2.0 ratio    Albumin, SPEP 54.39 48.1 - 59.5    Alpha 1 Glob % 4.26 2.3 - 4.9    Alpha 1 Glob 0.27 0.00 - 0.40 gm/dl    Alpha 2 Glob% 10.67 6.9 - 13    Alpha 2 Glob 0.67 0.40 - 1.00 gm/dl    Beta Glob % 14.91 13.8 - 19.7    Beta Glob 0.94 0.70 - 1.30 gm/dl    Gamma Globulin % 15.77 10.1 - 21.9    Gamma Globulin 0.99 0.40 - 1.80 gm/dl    M Scar %      M Scar     CBC with Differential    Collection Time: 08/06/25  8:34 AM   Result Value Ref Range    WBC 2.82 (L) 4.50 - 11.50 x10(3)/mcL    RBC 2.46 (L) 4.70 - 6.10 x10(6)/mcL    Hgb 9.6 (L) 14.0 - 18.0 g/dL    Hct 28.5 (L) 42.0 - 52.0 %    .9 (H) 80.0 - 94.0 fL    MCH 39.0 (H) 27.0 - 31.0 pg    MCHC 33.7 33.0 - 36.0 g/dL    RDW 15.3 11.5 - 17.0 %    Platelet 34 (LL) 130 - 400 x10(3)/mcL    MPV 11.1 (H) 7.4 - 10.4 fL    Neut % 49.9 %    Lymph % 31.9 %    Mono % 12.8 %    Eos % 4.6 %    Basophil % 0.4 %    Imm Grans % 0.4 %    Neut # 1.41 (L) 2.1 - 9.2 x10(3)/mcL    Lymph # 0.90 0.6 - 4.6 x10(3)/mcL    Mono # 0.36 0.1 - 1.3 x10(3)/mcL    Eos # 0.13 0 - 0.9  x10(3)/mcL    Baso # 0.01 <=0.2 x10(3)/mcL    Imm Gran # 0.01 0.00 - 0.04 x10(3)/mcL    NRBC% 0.0 %   Pathologist Interpretation    Collection Time: 08/06/25  8:34 AM   Result Value Ref Range    Pathology Review       SPEP: No M-spike indicative of a monoclonal protein is identified.    PAO:  Immunofixation shows a normal pattern of immunoglobulins.    No monoclonal bands are detected.    Dylon Bond M.D.      CT/PET in Feb:  No FDG avid bone lesion. Previously discussed reference right 4th rib lesion is not evident today. Few lucent areas in the spine have a stable CT appearance   1. No new suspicious PET findings.  2. Punctate left upper lobe nodule not clearly seen on prior, recommend attention on follow-up.    Dexa Scan: Normal bone density    Assessment and Plan:   IgA kappa Multiple Myeloma   - One year post transplant marrow with neg MRD and no M-protein. No evidence of recurrence   - Maintenance Revlimid held because of cytopenis but counts remain depressed  Cytopenias with RBC macrocytosis   - While anemia studies were stated in plan last visit, only a ferritin was obtained and was elevated   - Check Retic, B12, Folate, TSH, MMA today. No obvious source on recent BMBx  - Plt count lower today than in March   Lytic skeletal lesions   - As above   - Obtain Dexa scan  LLE swelling   - Doppler ordered but not dome             BMT Chart Routing      Follow up with physician 6 weeks.   Follow up with [facility]    Provider visit type    Infusion scheduling note    Injection scheduling note    Labs    Imaging    Pharmacy appointment    Other referrals

## 2025-08-11 LAB
IGA SERPL-MCNC: 187 MG/DL (ref 61–356)
IGG SERPL-MCNC: 819 MG/DL (ref 767–1590)
IGM SERPL-MCNC: 72 MG/DL (ref 37–286)
IMMUNOLOGIST REVIEW: NORMAL
M PROTEIN SERPL QL: NEGATIVE
M THERAPEUTIC ANTIBODY ADMINISTERED?: NORMAL

## 2025-08-12 ENCOUNTER — OFFICE VISIT (OUTPATIENT)
Facility: CLINIC | Age: 75
End: 2025-08-12
Payer: MEDICARE

## 2025-08-12 ENCOUNTER — LAB VISIT (OUTPATIENT)
Dept: LAB | Facility: HOSPITAL | Age: 75
End: 2025-08-12
Payer: MEDICARE

## 2025-08-12 VITALS
HEIGHT: 70 IN | DIASTOLIC BLOOD PRESSURE: 74 MMHG | HEART RATE: 67 BPM | SYSTOLIC BLOOD PRESSURE: 137 MMHG | BODY MASS INDEX: 31.68 KG/M2 | WEIGHT: 221.31 LBS | TEMPERATURE: 98 F | OXYGEN SATURATION: 97 % | RESPIRATION RATE: 16 BRPM

## 2025-08-12 DIAGNOSIS — C90.01 MULTIPLE MYELOMA IN REMISSION: ICD-10-CM

## 2025-08-12 DIAGNOSIS — D61.818 PANCYTOPENIA: Primary | ICD-10-CM

## 2025-08-12 LAB
ALBUMIN SERPL-MCNC: 3.6 G/DL (ref 3.4–4.8)
ALBUMIN/GLOB SERPL: 1.2 RATIO (ref 1.1–2)
ALP SERPL-CCNC: 101 UNIT/L (ref 40–150)
ALT SERPL-CCNC: 13 UNIT/L (ref 0–55)
ANION GAP SERPL CALC-SCNC: 5 MEQ/L
AST SERPL-CCNC: 11 UNIT/L (ref 11–45)
BASOPHILS # BLD AUTO: 0.01 X10(3)/MCL
BASOPHILS NFR BLD AUTO: 0.3 %
BILIRUB SERPL-MCNC: 0.9 MG/DL
BUN SERPL-MCNC: 19 MG/DL (ref 8.4–25.7)
CALCIUM SERPL-MCNC: 9.2 MG/DL (ref 8.8–10)
CHLORIDE SERPL-SCNC: 107 MMOL/L (ref 98–107)
CO2 SERPL-SCNC: 26 MMOL/L (ref 23–31)
CREAT SERPL-MCNC: 1.38 MG/DL (ref 0.72–1.25)
CREAT/UREA NIT SERPL: 14
EOSINOPHIL # BLD AUTO: 0.11 X10(3)/MCL (ref 0–0.9)
EOSINOPHIL NFR BLD AUTO: 3.5 %
ERYTHROCYTE [DISTWIDTH] IN BLOOD BY AUTOMATED COUNT: 15.4 % (ref 11.5–17)
GFR SERPLBLD CREATININE-BSD FMLA CKD-EPI: 53 ML/MIN/1.73/M2
GLOBULIN SER-MCNC: 3 GM/DL (ref 2.4–3.5)
GLUCOSE SERPL-MCNC: 243 MG/DL (ref 82–115)
HCT VFR BLD AUTO: 28.3 % (ref 42–52)
HGB BLD-MCNC: 9.4 G/DL (ref 14–18)
IMM GRANULOCYTES # BLD AUTO: 0.01 X10(3)/MCL (ref 0–0.04)
IMM GRANULOCYTES NFR BLD AUTO: 0.3 %
LYMPHOCYTES # BLD AUTO: 0.82 X10(3)/MCL (ref 0.6–4.6)
LYMPHOCYTES NFR BLD AUTO: 26.1 %
MCH RBC QN AUTO: 38.7 PG (ref 27–31)
MCHC RBC AUTO-ENTMCNC: 33.2 G/DL (ref 33–36)
MCV RBC AUTO: 116.5 FL (ref 80–94)
MONOCYTES # BLD AUTO: 0.4 X10(3)/MCL (ref 0.1–1.3)
MONOCYTES NFR BLD AUTO: 12.7 %
NEUTROPHILS # BLD AUTO: 1.79 X10(3)/MCL (ref 2.1–9.2)
NEUTROPHILS NFR BLD AUTO: 57.1 %
NRBC BLD AUTO-RTO: 0 %
PLATELET # BLD AUTO: 35 X10(3)/MCL (ref 130–400)
PMV BLD AUTO: 10.4 FL (ref 7.4–10.4)
POTASSIUM SERPL-SCNC: 3.9 MMOL/L (ref 3.5–5.1)
PROT SERPL-MCNC: 6.6 GM/DL (ref 5.8–7.6)
RBC # BLD AUTO: 2.43 X10(6)/MCL (ref 4.7–6.1)
SODIUM SERPL-SCNC: 138 MMOL/L (ref 136–145)
WBC # BLD AUTO: 3.14 X10(3)/MCL (ref 4.5–11.5)

## 2025-08-12 PROCEDURE — 80053 COMPREHEN METABOLIC PANEL: CPT

## 2025-08-12 PROCEDURE — 85025 COMPLETE CBC W/AUTO DIFF WBC: CPT

## 2025-08-12 PROCEDURE — 99215 OFFICE O/P EST HI 40 MIN: CPT | Mod: PBBFAC | Performed by: NURSE PRACTITIONER

## 2025-08-12 PROCEDURE — 99999 PR PBB SHADOW E&M-EST. PATIENT-LVL V: CPT | Mod: PBBFAC,,, | Performed by: NURSE PRACTITIONER

## 2025-08-12 PROCEDURE — 36415 COLL VENOUS BLD VENIPUNCTURE: CPT

## 2025-08-13 DIAGNOSIS — C90.01 MULTIPLE MYELOMA IN REMISSION: Primary | ICD-10-CM

## 2025-08-13 DIAGNOSIS — R79.89 HIGH SERUM METHYLMALONIC ACID: ICD-10-CM

## 2025-08-13 DIAGNOSIS — D61.818 PANCYTOPENIA: ICD-10-CM

## 2025-08-13 LAB — W METHYLMALONIC ACID: 1.31 UMOL/L

## 2025-08-13 RX ORDER — FOLIC ACID 1 MG/1
1 TABLET ORAL DAILY
Qty: 100 TABLET | Refills: 3 | Status: SHIPPED | OUTPATIENT
Start: 2025-08-13 | End: 2026-08-13

## 2025-08-13 RX ORDER — LANOLIN ALCOHOL/MO/W.PET/CERES
1000 CREAM (GRAM) TOPICAL DAILY
Qty: 90 TABLET | Refills: 3 | Status: SHIPPED | OUTPATIENT
Start: 2025-08-13

## 2025-08-15 ENCOUNTER — INFUSION (OUTPATIENT)
Facility: HOSPITAL | Age: 75
End: 2025-08-15
Payer: MEDICARE

## 2025-08-15 VITALS
RESPIRATION RATE: 18 BRPM | DIASTOLIC BLOOD PRESSURE: 73 MMHG | BODY MASS INDEX: 31.87 KG/M2 | HEART RATE: 58 BPM | TEMPERATURE: 98 F | WEIGHT: 222.63 LBS | OXYGEN SATURATION: 96 % | SYSTOLIC BLOOD PRESSURE: 132 MMHG | HEIGHT: 70 IN

## 2025-08-15 DIAGNOSIS — M89.9 BONE LESION: Primary | ICD-10-CM

## 2025-08-15 DIAGNOSIS — C90.01 MULTIPLE MYELOMA IN REMISSION: ICD-10-CM

## 2025-08-15 PROCEDURE — 63600175 PHARM REV CODE 636 W HCPCS

## 2025-08-15 PROCEDURE — 96374 THER/PROPH/DIAG INJ IV PUSH: CPT

## 2025-08-15 PROCEDURE — 25000003 PHARM REV CODE 250

## 2025-08-15 RX ORDER — EPINEPHRINE 0.3 MG/.3ML
0.3 INJECTION SUBCUTANEOUS ONCE AS NEEDED
OUTPATIENT
Start: 2025-10-31

## 2025-08-15 RX ORDER — HEPARIN 100 UNIT/ML
500 SYRINGE INTRAVENOUS
OUTPATIENT
Start: 2025-10-31

## 2025-08-15 RX ORDER — DIPHENHYDRAMINE HYDROCHLORIDE 50 MG/ML
50 INJECTION, SOLUTION INTRAMUSCULAR; INTRAVENOUS ONCE AS NEEDED
OUTPATIENT
Start: 2025-10-31

## 2025-08-15 RX ORDER — SODIUM CHLORIDE 0.9 % (FLUSH) 0.9 %
10 SYRINGE (ML) INJECTION
OUTPATIENT
Start: 2025-10-31

## 2025-08-15 RX ORDER — ZOLEDRONIC ACID 0.04 MG/ML
4 INJECTION, SOLUTION INTRAVENOUS
OUTPATIENT
Start: 2025-10-31

## 2025-08-15 RX ADMIN — ZOLEDRONIC ACID 3.5 MG: 4 INJECTION, SOLUTION, CONCENTRATE INTRAVENOUS at 09:08

## 2025-08-19 ENCOUNTER — LAB VISIT (OUTPATIENT)
Dept: LAB | Facility: HOSPITAL | Age: 75
End: 2025-08-19
Attending: NURSE PRACTITIONER
Payer: MEDICARE

## 2025-08-19 DIAGNOSIS — C90.01 MULTIPLE MYELOMA IN REMISSION: ICD-10-CM

## 2025-08-19 LAB
BASOPHILS # BLD AUTO: 0.01 X10(3)/MCL
BASOPHILS NFR BLD AUTO: 0.3 %
EOSINOPHIL # BLD AUTO: 0.14 X10(3)/MCL (ref 0–0.9)
EOSINOPHIL NFR BLD AUTO: 3.7 %
ERYTHROCYTE [DISTWIDTH] IN BLOOD BY AUTOMATED COUNT: 14.8 % (ref 11.5–17)
HCT VFR BLD AUTO: 28.3 % (ref 42–52)
HGB BLD-MCNC: 9.6 G/DL (ref 14–18)
IMM GRANULOCYTES # BLD AUTO: 0.01 X10(3)/MCL (ref 0–0.04)
IMM GRANULOCYTES NFR BLD AUTO: 0.3 %
LYMPHOCYTES # BLD AUTO: 1 X10(3)/MCL (ref 0.6–4.6)
LYMPHOCYTES NFR BLD AUTO: 26.1 %
MCH RBC QN AUTO: 39.2 PG (ref 27–31)
MCHC RBC AUTO-ENTMCNC: 33.9 G/DL (ref 33–36)
MCV RBC AUTO: 115.5 FL (ref 80–94)
MONOCYTES # BLD AUTO: 0.39 X10(3)/MCL (ref 0.1–1.3)
MONOCYTES NFR BLD AUTO: 10.2 %
NEUTROPHILS # BLD AUTO: 2.28 X10(3)/MCL (ref 2.1–9.2)
NEUTROPHILS NFR BLD AUTO: 59.4 %
NRBC BLD AUTO-RTO: 0 %
PLATELET # BLD AUTO: 35 X10(3)/MCL (ref 130–400)
PMV BLD AUTO: 9.9 FL (ref 7.4–10.4)
RBC # BLD AUTO: 2.45 X10(6)/MCL (ref 4.7–6.1)
WBC # BLD AUTO: 3.83 X10(3)/MCL (ref 4.5–11.5)

## 2025-08-19 PROCEDURE — 85025 COMPLETE CBC W/AUTO DIFF WBC: CPT

## 2025-08-19 PROCEDURE — 36415 COLL VENOUS BLD VENIPUNCTURE: CPT

## 2025-08-26 ENCOUNTER — LAB VISIT (OUTPATIENT)
Dept: LAB | Facility: HOSPITAL | Age: 75
End: 2025-08-26
Attending: NURSE PRACTITIONER
Payer: MEDICARE

## 2025-09-02 ENCOUNTER — LAB VISIT (OUTPATIENT)
Dept: LAB | Facility: HOSPITAL | Age: 75
End: 2025-09-02
Attending: INTERNAL MEDICINE
Payer: MEDICARE

## 2025-09-02 DIAGNOSIS — C90.01 MULTIPLE MYELOMA IN REMISSION: ICD-10-CM

## 2025-09-02 LAB
BASOPHILS # BLD AUTO: 0.01 X10(3)/MCL
BASOPHILS NFR BLD AUTO: 0.3 %
EOSINOPHIL # BLD AUTO: 0.16 X10(3)/MCL (ref 0–0.9)
EOSINOPHIL NFR BLD AUTO: 4.1 %
ERYTHROCYTE [DISTWIDTH] IN BLOOD BY AUTOMATED COUNT: 13.7 % (ref 11.5–17)
HCT VFR BLD AUTO: 29.8 % (ref 42–52)
HGB BLD-MCNC: 10.1 G/DL (ref 14–18)
IMM GRANULOCYTES # BLD AUTO: 0.01 X10(3)/MCL (ref 0–0.04)
IMM GRANULOCYTES NFR BLD AUTO: 0.3 %
LYMPHOCYTES # BLD AUTO: 1.14 X10(3)/MCL (ref 0.6–4.6)
LYMPHOCYTES NFR BLD AUTO: 29.2 %
MCH RBC QN AUTO: 39.5 PG (ref 27–31)
MCHC RBC AUTO-ENTMCNC: 33.9 G/DL (ref 33–36)
MCV RBC AUTO: 116.4 FL (ref 80–94)
MONOCYTES # BLD AUTO: 0.52 X10(3)/MCL (ref 0.1–1.3)
MONOCYTES NFR BLD AUTO: 13.3 %
NEUTROPHILS # BLD AUTO: 2.07 X10(3)/MCL (ref 2.1–9.2)
NEUTROPHILS NFR BLD AUTO: 52.8 %
NRBC BLD AUTO-RTO: 0 %
PLATELET # BLD AUTO: 40 X10(3)/MCL (ref 130–400)
PMV BLD AUTO: 10.4 FL (ref 7.4–10.4)
RBC # BLD AUTO: 2.56 X10(6)/MCL (ref 4.7–6.1)
WBC # BLD AUTO: 3.91 X10(3)/MCL (ref 4.5–11.5)

## 2025-09-02 PROCEDURE — 36415 COLL VENOUS BLD VENIPUNCTURE: CPT

## 2025-09-02 PROCEDURE — 85025 COMPLETE CBC W/AUTO DIFF WBC: CPT

## (undated) DEVICE — ELECTRODE REM PLYHSV RETURN 9

## (undated) DEVICE — SYR DISP LL 5CC

## (undated) DEVICE — TIP YANKAUERS BULB NO VENT

## (undated) DEVICE — TRAY JAMSHIDI BONE MAR 11GX4

## (undated) DEVICE — SYR ONLY LUER LOCK 20CC

## (undated) DEVICE — GLOVE PROTEXIS HYDROGEL SZ7.5

## (undated) DEVICE — SUT PROLENE 2-0 30 SH

## (undated) DEVICE — SUT MCRYL PLUS 4-0 PS2 27IN

## (undated) DEVICE — BLADE SURG CARBON STEEL SZ11

## (undated) DEVICE — SYR SLIP TIP 10ML SHIELD

## (undated) DEVICE — DRAPE T THYROID STERILE

## (undated) DEVICE — DRAPE C-ARM ELAS CLIP 42X120IN

## (undated) DEVICE — NDL HYPO REG 25G X 1 1/2

## (undated) DEVICE — DECANTER FLUID TRNSF WHITE 9IN

## (undated) DEVICE — TRAY MINOR GEN SURG OMC

## (undated) DEVICE — PAD ELECTROSURGICAL SPL W/CORD

## (undated) DEVICE — KIT SURGICAL COLON .25 1.1OZ

## (undated) DEVICE — SNARE POLYP STD SNG 7FR

## (undated) DEVICE — APPLICATOR CHLORAPREP ORN 26ML

## (undated) DEVICE — ADHESIVE DERMABOND ADVANCED

## (undated) DEVICE — TRAP SUCTION POLYP

## (undated) DEVICE — CONTAINER SPECIMEN OR STER 4OZ

## (undated) DEVICE — DRESSING ANTIMICROBIAL 1 INCH

## (undated) DEVICE — SOL NACL 0.9% INJ PF/50151